# Patient Record
Sex: MALE | Race: ASIAN | NOT HISPANIC OR LATINO | URBAN - METROPOLITAN AREA
[De-identification: names, ages, dates, MRNs, and addresses within clinical notes are randomized per-mention and may not be internally consistent; named-entity substitution may affect disease eponyms.]

---

## 2022-06-16 ENCOUNTER — INPATIENT (INPATIENT)
Facility: HOSPITAL | Age: 67
LOS: 3 days | Discharge: HOME HEALTH | End: 2022-06-20
Attending: INTERNAL MEDICINE | Admitting: INTERNAL MEDICINE
Payer: MEDICARE

## 2022-06-16 VITALS
DIASTOLIC BLOOD PRESSURE: 62 MMHG | SYSTOLIC BLOOD PRESSURE: 227 MMHG | TEMPERATURE: 103 F | HEART RATE: 76 BPM | OXYGEN SATURATION: 96 % | RESPIRATION RATE: 19 BRPM

## 2022-06-16 DIAGNOSIS — R63.4 ABNORMAL WEIGHT LOSS: ICD-10-CM

## 2022-06-16 DIAGNOSIS — R65.10 SYSTEMIC INFLAMMATORY RESPONSE SYNDROME (SIRS) OF NON-INFECTIOUS ORIGIN WITHOUT ACUTE ORGAN DYSFUNCTION: ICD-10-CM

## 2022-06-16 DIAGNOSIS — Z29.9 ENCOUNTER FOR PROPHYLACTIC MEASURES, UNSPECIFIED: ICD-10-CM

## 2022-06-16 DIAGNOSIS — E78.5 HYPERLIPIDEMIA, UNSPECIFIED: ICD-10-CM

## 2022-06-16 DIAGNOSIS — E11.9 TYPE 2 DIABETES MELLITUS WITHOUT COMPLICATIONS: ICD-10-CM

## 2022-06-16 DIAGNOSIS — I10 ESSENTIAL (PRIMARY) HYPERTENSION: ICD-10-CM

## 2022-06-16 DIAGNOSIS — N18.6 END STAGE RENAL DISEASE: ICD-10-CM

## 2022-06-16 DIAGNOSIS — R47.81 SLURRED SPEECH: ICD-10-CM

## 2022-06-16 DIAGNOSIS — B34.8 OTHER VIRAL INFECTIONS OF UNSPECIFIED SITE: ICD-10-CM

## 2022-06-16 DIAGNOSIS — E04.2 NONTOXIC MULTINODULAR GOITER: ICD-10-CM

## 2022-06-16 DIAGNOSIS — I16.0 HYPERTENSIVE URGENCY: ICD-10-CM

## 2022-06-16 DIAGNOSIS — Z98.890 OTHER SPECIFIED POSTPROCEDURAL STATES: Chronic | ICD-10-CM

## 2022-06-16 DIAGNOSIS — A41.9 SEPSIS, UNSPECIFIED ORGANISM: ICD-10-CM

## 2022-06-16 DIAGNOSIS — R50.9 FEVER, UNSPECIFIED: ICD-10-CM

## 2022-06-16 LAB
ALBUMIN SERPL ELPH-MCNC: 3.1 G/DL — LOW (ref 3.3–5)
ALP SERPL-CCNC: 49 U/L — SIGNIFICANT CHANGE UP (ref 40–120)
ALT FLD-CCNC: 14 U/L — SIGNIFICANT CHANGE UP (ref 4–41)
ANION GAP SERPL CALC-SCNC: 13 MMOL/L — SIGNIFICANT CHANGE UP (ref 7–14)
ANION GAP SERPL CALC-SCNC: 17 MMOL/L — HIGH (ref 7–14)
APPEARANCE UR: CLEAR — SIGNIFICANT CHANGE UP
APTT BLD: 28.6 SEC — SIGNIFICANT CHANGE UP (ref 27–36.3)
AST SERPL-CCNC: 25 U/L — SIGNIFICANT CHANGE UP (ref 4–40)
B PERT DNA SPEC QL NAA+PROBE: SIGNIFICANT CHANGE UP
B PERT IGG+IGM PNL SER: CLEAR — SIGNIFICANT CHANGE UP
B PERT+PARAPERT DNA PNL SPEC NAA+PROBE: SIGNIFICANT CHANGE UP
BASOPHILS # BLD AUTO: 0.02 K/UL — SIGNIFICANT CHANGE UP (ref 0–0.2)
BASOPHILS NFR BLD AUTO: 0.2 % — SIGNIFICANT CHANGE UP (ref 0–2)
BILIRUB SERPL-MCNC: 0.3 MG/DL — SIGNIFICANT CHANGE UP (ref 0.2–1.2)
BILIRUB UR-MCNC: NEGATIVE — SIGNIFICANT CHANGE UP
BLOOD GAS VENOUS COMPREHENSIVE RESULT: SIGNIFICANT CHANGE UP
BORDETELLA PARAPERTUSSIS (RAPRVP): SIGNIFICANT CHANGE UP
BUN SERPL-MCNC: 53 MG/DL — HIGH (ref 7–23)
BUN SERPL-MCNC: 56 MG/DL — HIGH (ref 7–23)
C PNEUM DNA SPEC QL NAA+PROBE: SIGNIFICANT CHANGE UP
CALCIUM SERPL-MCNC: 8 MG/DL — LOW (ref 8.4–10.5)
CALCIUM SERPL-MCNC: 8.6 MG/DL — SIGNIFICANT CHANGE UP (ref 8.4–10.5)
CHLORIDE SERPL-SCNC: 93 MMOL/L — LOW (ref 98–107)
CHLORIDE SERPL-SCNC: 98 MMOL/L — SIGNIFICANT CHANGE UP (ref 98–107)
CO2 SERPL-SCNC: 23 MMOL/L — SIGNIFICANT CHANGE UP (ref 22–31)
CO2 SERPL-SCNC: 23 MMOL/L — SIGNIFICANT CHANGE UP (ref 22–31)
COLOR FLD: SIGNIFICANT CHANGE UP
COLOR SPEC: YELLOW — SIGNIFICANT CHANGE UP
CREAT SERPL-MCNC: 8.31 MG/DL — HIGH (ref 0.5–1.3)
CREAT SERPL-MCNC: 8.63 MG/DL — HIGH (ref 0.5–1.3)
DIFF PNL FLD: ABNORMAL
EGFR: 6 ML/MIN/1.73M2 — LOW
EGFR: 6 ML/MIN/1.73M2 — LOW
EOSINOPHIL # BLD AUTO: 0.07 K/UL — SIGNIFICANT CHANGE UP (ref 0–0.5)
EOSINOPHIL # FLD: 0 % — SIGNIFICANT CHANGE UP
EOSINOPHIL NFR BLD AUTO: 0.9 % — SIGNIFICANT CHANGE UP (ref 0–6)
FLUAV SUBTYP SPEC NAA+PROBE: SIGNIFICANT CHANGE UP
FLUBV RNA SPEC QL NAA+PROBE: SIGNIFICANT CHANGE UP
FLUID INTAKE SUBSTANCE CLASS: SIGNIFICANT CHANGE UP
FOLATE+VIT B12 SERBLD-IMP: 0 % — SIGNIFICANT CHANGE UP
GLUCOSE SERPL-MCNC: 125 MG/DL — HIGH (ref 70–99)
GLUCOSE SERPL-MCNC: 128 MG/DL — HIGH (ref 70–99)
GLUCOSE UR QL: ABNORMAL
HADV DNA SPEC QL NAA+PROBE: SIGNIFICANT CHANGE UP
HCOV 229E RNA SPEC QL NAA+PROBE: SIGNIFICANT CHANGE UP
HCOV HKU1 RNA SPEC QL NAA+PROBE: SIGNIFICANT CHANGE UP
HCOV NL63 RNA SPEC QL NAA+PROBE: SIGNIFICANT CHANGE UP
HCOV OC43 RNA SPEC QL NAA+PROBE: SIGNIFICANT CHANGE UP
HCT VFR BLD CALC: 30.5 % — LOW (ref 39–50)
HGB BLD-MCNC: 9.6 G/DL — LOW (ref 13–17)
HMPV RNA SPEC QL NAA+PROBE: SIGNIFICANT CHANGE UP
HPIV1 RNA SPEC QL NAA+PROBE: SIGNIFICANT CHANGE UP
HPIV2 RNA SPEC QL NAA+PROBE: SIGNIFICANT CHANGE UP
HPIV3 RNA SPEC QL NAA+PROBE: SIGNIFICANT CHANGE UP
HPIV4 RNA SPEC QL NAA+PROBE: DETECTED
IANC: 6.19 K/UL — SIGNIFICANT CHANGE UP (ref 1.8–7.4)
IMM GRANULOCYTES NFR BLD AUTO: 0.5 % — SIGNIFICANT CHANGE UP (ref 0–1.5)
INR BLD: 1 RATIO — SIGNIFICANT CHANGE UP (ref 0.88–1.16)
KETONES UR-MCNC: ABNORMAL
LEUKOCYTE ESTERASE UR-ACNC: NEGATIVE — SIGNIFICANT CHANGE UP
LYMPHOCYTES # BLD AUTO: 0.92 K/UL — LOW (ref 1–3.3)
LYMPHOCYTES # BLD AUTO: 11.4 % — LOW (ref 13–44)
LYMPHOCYTES # FLD: 34 % — SIGNIFICANT CHANGE UP
M PNEUMO DNA SPEC QL NAA+PROBE: SIGNIFICANT CHANGE UP
MAGNESIUM SERPL-MCNC: 1.9 MG/DL — SIGNIFICANT CHANGE UP (ref 1.6–2.6)
MCHC RBC-ENTMCNC: 26.7 PG — LOW (ref 27–34)
MCHC RBC-ENTMCNC: 31.5 GM/DL — LOW (ref 32–36)
MCV RBC AUTO: 84.7 FL — SIGNIFICANT CHANGE UP (ref 80–100)
MESOTHL CELL # FLD: 0 % — SIGNIFICANT CHANGE UP
MONOCYTES # BLD AUTO: 0.82 K/UL — SIGNIFICANT CHANGE UP (ref 0–0.9)
MONOCYTES NFR BLD AUTO: 10.2 % — SIGNIFICANT CHANGE UP (ref 2–14)
MONOS+MACROS # FLD: 65 % — SIGNIFICANT CHANGE UP
NEUTROPHILS # BLD AUTO: 6.19 K/UL — SIGNIFICANT CHANGE UP (ref 1.8–7.4)
NEUTROPHILS NFR BLD AUTO: 76.8 % — SIGNIFICANT CHANGE UP (ref 43–77)
NEUTROPHILS-BODY FLUID: 1 % — SIGNIFICANT CHANGE UP
NITRITE UR-MCNC: NEGATIVE — SIGNIFICANT CHANGE UP
NRBC # BLD: 0 /100 WBCS — SIGNIFICANT CHANGE UP
NRBC # FLD: 0 K/UL — SIGNIFICANT CHANGE UP
OTHER CELLS FLD MANUAL: 0 % — SIGNIFICANT CHANGE UP
PH UR: 6.5 — SIGNIFICANT CHANGE UP (ref 5–8)
PHOSPHATE SERPL-MCNC: 5.5 MG/DL — HIGH (ref 2.5–4.5)
PLATELET # BLD AUTO: 212 K/UL — SIGNIFICANT CHANGE UP (ref 150–400)
POTASSIUM SERPL-MCNC: 3.2 MMOL/L — LOW (ref 3.5–5.3)
POTASSIUM SERPL-MCNC: 3.4 MMOL/L — LOW (ref 3.5–5.3)
POTASSIUM SERPL-SCNC: 3.2 MMOL/L — LOW (ref 3.5–5.3)
POTASSIUM SERPL-SCNC: 3.4 MMOL/L — LOW (ref 3.5–5.3)
PROT SERPL-MCNC: 5.8 G/DL — LOW (ref 6–8.3)
PROT UR-MCNC: ABNORMAL
PROTHROM AB SERPL-ACNC: 11.6 SEC — SIGNIFICANT CHANGE UP (ref 10.5–13.4)
RAPID RVP RESULT: DETECTED
RBC # BLD: 3.6 M/UL — LOW (ref 4.2–5.8)
RBC # FLD: 14.9 % — HIGH (ref 10.3–14.5)
RCV VOL RI: <2000 CELLS/UL — HIGH (ref 0–5)
RSV RNA SPEC QL NAA+PROBE: SIGNIFICANT CHANGE UP
RV+EV RNA SPEC QL NAA+PROBE: SIGNIFICANT CHANGE UP
SARS-COV-2 RNA SPEC QL NAA+PROBE: SIGNIFICANT CHANGE UP
SODIUM SERPL-SCNC: 133 MMOL/L — LOW (ref 135–145)
SODIUM SERPL-SCNC: 134 MMOL/L — LOW (ref 135–145)
SP GR SPEC: 1.03 — SIGNIFICANT CHANGE UP (ref 1–1.05)
SPECIMEN SOURCE FLD: SIGNIFICANT CHANGE UP
TOTAL CELLS COUNTED, BODY FLUID: 100 CELLS — SIGNIFICANT CHANGE UP
TOTAL NUCLEATED CELL COUNT, BODY FLUID: 20 CELLS/UL — HIGH (ref 0–5)
TUBE TYPE: SIGNIFICANT CHANGE UP
UROBILINOGEN FLD QL: SIGNIFICANT CHANGE UP
WBC # BLD: 8.06 K/UL — SIGNIFICANT CHANGE UP (ref 3.8–10.5)
WBC # FLD AUTO: 8.06 K/UL — SIGNIFICANT CHANGE UP (ref 3.8–10.5)

## 2022-06-16 PROCEDURE — 99285 EMERGENCY DEPT VISIT HI MDM: CPT

## 2022-06-16 PROCEDURE — 99223 1ST HOSP IP/OBS HIGH 75: CPT | Mod: GC

## 2022-06-16 PROCEDURE — 71045 X-RAY EXAM CHEST 1 VIEW: CPT | Mod: 26

## 2022-06-16 RX ORDER — SEVELAMER CARBONATE 2400 MG/1
800 POWDER, FOR SUSPENSION ORAL
Refills: 0 | Status: DISCONTINUED | OUTPATIENT
Start: 2022-06-16 | End: 2022-06-20

## 2022-06-16 RX ORDER — PIPERACILLIN AND TAZOBACTAM 4; .5 G/20ML; G/20ML
3.38 INJECTION, POWDER, LYOPHILIZED, FOR SOLUTION INTRAVENOUS ONCE
Refills: 0 | Status: COMPLETED | OUTPATIENT
Start: 2022-06-16 | End: 2022-06-16

## 2022-06-16 RX ORDER — LABETALOL HCL 100 MG
20 TABLET ORAL ONCE
Refills: 0 | Status: DISCONTINUED | OUTPATIENT
Start: 2022-06-16 | End: 2022-06-16

## 2022-06-16 RX ORDER — SODIUM CHLORIDE 9 MG/ML
1000 INJECTION, SOLUTION INTRAVENOUS
Refills: 0 | Status: DISCONTINUED | OUTPATIENT
Start: 2022-06-16 | End: 2022-06-20

## 2022-06-16 RX ORDER — ATORVASTATIN CALCIUM 80 MG/1
80 TABLET, FILM COATED ORAL AT BEDTIME
Refills: 0 | Status: DISCONTINUED | OUTPATIENT
Start: 2022-06-16 | End: 2022-06-20

## 2022-06-16 RX ORDER — ATORVASTATIN CALCIUM 80 MG/1
40 TABLET, FILM COATED ORAL AT BEDTIME
Refills: 0 | Status: DISCONTINUED | OUTPATIENT
Start: 2022-06-16 | End: 2022-06-16

## 2022-06-16 RX ORDER — ERYTHROPOIETIN 10000 [IU]/ML
4000 INJECTION, SOLUTION INTRAVENOUS; SUBCUTANEOUS
Refills: 0 | Status: DISCONTINUED | OUTPATIENT
Start: 2022-06-16 | End: 2022-06-20

## 2022-06-16 RX ORDER — DEXTROSE 50 % IN WATER 50 %
15 SYRINGE (ML) INTRAVENOUS ONCE
Refills: 0 | Status: DISCONTINUED | OUTPATIENT
Start: 2022-06-16 | End: 2022-06-20

## 2022-06-16 RX ORDER — DEXTROSE 50 % IN WATER 50 %
25 SYRINGE (ML) INTRAVENOUS ONCE
Refills: 0 | Status: DISCONTINUED | OUTPATIENT
Start: 2022-06-16 | End: 2022-06-20

## 2022-06-16 RX ORDER — HYDRALAZINE HCL 50 MG
2.5 TABLET ORAL ONCE
Refills: 0 | Status: COMPLETED | OUTPATIENT
Start: 2022-06-16 | End: 2022-06-16

## 2022-06-16 RX ORDER — ACETAMINOPHEN 500 MG
975 TABLET ORAL ONCE
Refills: 0 | Status: COMPLETED | OUTPATIENT
Start: 2022-06-16 | End: 2022-06-16

## 2022-06-16 RX ORDER — ACETAMINOPHEN 500 MG
650 TABLET ORAL EVERY 6 HOURS
Refills: 0 | Status: DISCONTINUED | OUTPATIENT
Start: 2022-06-16 | End: 2022-06-18

## 2022-06-16 RX ORDER — INSULIN LISPRO 100/ML
VIAL (ML) SUBCUTANEOUS AT BEDTIME
Refills: 0 | Status: DISCONTINUED | OUTPATIENT
Start: 2022-06-16 | End: 2022-06-20

## 2022-06-16 RX ORDER — DOXAZOSIN MESYLATE 4 MG
4 TABLET ORAL AT BEDTIME
Refills: 0 | Status: DISCONTINUED | OUTPATIENT
Start: 2022-06-16 | End: 2022-06-16

## 2022-06-16 RX ORDER — DEXTROSE 50 % IN WATER 50 %
12.5 SYRINGE (ML) INTRAVENOUS ONCE
Refills: 0 | Status: DISCONTINUED | OUTPATIENT
Start: 2022-06-16 | End: 2022-06-20

## 2022-06-16 RX ORDER — HYDRALAZINE HCL 50 MG
50 TABLET ORAL EVERY 8 HOURS
Refills: 0 | Status: DISCONTINUED | OUTPATIENT
Start: 2022-06-16 | End: 2022-06-16

## 2022-06-16 RX ORDER — HEPARIN SODIUM 5000 [USP'U]/ML
5000 INJECTION INTRAVENOUS; SUBCUTANEOUS EVERY 8 HOURS
Refills: 0 | Status: DISCONTINUED | OUTPATIENT
Start: 2022-06-16 | End: 2022-06-20

## 2022-06-16 RX ORDER — INSULIN LISPRO 100/ML
VIAL (ML) SUBCUTANEOUS
Refills: 0 | Status: DISCONTINUED | OUTPATIENT
Start: 2022-06-16 | End: 2022-06-20

## 2022-06-16 RX ORDER — GENTAMICIN SULFATE 0.1 %
1 OINTMENT (GRAM) TOPICAL
Refills: 0 | Status: DISCONTINUED | OUTPATIENT
Start: 2022-06-16 | End: 2022-06-20

## 2022-06-16 RX ORDER — AMLODIPINE BESYLATE 2.5 MG/1
10 TABLET ORAL DAILY
Refills: 0 | Status: DISCONTINUED | OUTPATIENT
Start: 2022-06-16 | End: 2022-06-20

## 2022-06-16 RX ORDER — GLUCAGON INJECTION, SOLUTION 0.5 MG/.1ML
1 INJECTION, SOLUTION SUBCUTANEOUS ONCE
Refills: 0 | Status: DISCONTINUED | OUTPATIENT
Start: 2022-06-16 | End: 2022-06-20

## 2022-06-16 RX ADMIN — HEPARIN SODIUM 5000 UNIT(S): 5000 INJECTION INTRAVENOUS; SUBCUTANEOUS at 06:43

## 2022-06-16 RX ADMIN — PIPERACILLIN AND TAZOBACTAM 200 GRAM(S): 4; .5 INJECTION, POWDER, LYOPHILIZED, FOR SOLUTION INTRAVENOUS at 03:19

## 2022-06-16 RX ADMIN — HEPARIN SODIUM 5000 UNIT(S): 5000 INJECTION INTRAVENOUS; SUBCUTANEOUS at 22:12

## 2022-06-16 RX ADMIN — ATORVASTATIN CALCIUM 80 MILLIGRAM(S): 80 TABLET, FILM COATED ORAL at 22:12

## 2022-06-16 RX ADMIN — Medication 2.5 MILLIGRAM(S): at 22:12

## 2022-06-16 RX ADMIN — Medication 975 MILLIGRAM(S): at 03:19

## 2022-06-16 RX ADMIN — SEVELAMER CARBONATE 800 MILLIGRAM(S): 2400 POWDER, FOR SUSPENSION ORAL at 18:22

## 2022-06-16 RX ADMIN — AMLODIPINE BESYLATE 10 MILLIGRAM(S): 2.5 TABLET ORAL at 05:39

## 2022-06-16 RX ADMIN — Medication 50 MILLIGRAM(S): at 05:39

## 2022-06-16 NOTE — ED PROVIDER NOTE - OBJECTIVE STATEMENT
67 y/o M w/ hx DM, HTN, HLD, CVA in past ESRD on daily peritoneal dialysis presents with fever, cough, transfer from [facility] for peritoneal dialysis. Patient states for the past 2 days he has had cough, fever, weakness. At [facility] he was "confused, slurred speech" so code stroke was activated, were negative and he was transferred here for peritoneal dialysis after contrast load was given. Patient denies cp pressure palpitations sob. 67 y/o M w/ hx DM, HTN, HLD, CVA in past ESRD on daily peritoneal dialysis presents with fever, cough, transfer from [facility] for peritoneal dialysis. Patient states for the past 2 days he has had cough, fever, weakness. At [facility] he was "confused, slurred speech" so code stroke was activated, were negative and he was transferred here for peritoneal dialysis after contrast load was given. Patient denies cp pressure palpitations sob.    Attendinyo male presents with fever, cough and URI symptoms.  went to [place] for weakness.  transferred here for admission because pt is on peritoneal dialysis.

## 2022-06-16 NOTE — OCCUPATIONAL THERAPY INITIAL EVALUATION ADULT - PERTINENT HX OF CURRENT PROBLEM, REHAB EVAL
Pt is a 66 year old male who presented to [facility] as a transfer from [facility] for peritoneal dialysis. Found to have hypertensive urgency and SIRS+.

## 2022-06-16 NOTE — SWALLOW BEDSIDE ASSESSMENT ADULT - ASR SWALLOW RECOMMEND DIAG
objective testing is NOT warranted given no overt s/sx of impaired airway protection, clear chest imaging and normal WBC.

## 2022-06-16 NOTE — PROGRESS NOTE ADULT - ASSESSMENT
67 y/o M w/ hx DM, HTN, HLD, CVA in past ESRD on daily peritoneal dialysis presents with fever, cough, transfer from [facility] for peritoneal dialysis. Found to have hypertensive urgency and septic secondary to parainfluenza

## 2022-06-16 NOTE — ED PROVIDER NOTE - PROGRESS NOTE DETAILS
Mateo, PGY-3: admitted to medicine--hospitalist requests zosyn to cover empirically (febrile 103), nephro paged. Mateo, PGY-3: nephrology aware of patient, will evaluate him.

## 2022-06-16 NOTE — PROGRESS NOTE ADULT - PROBLEM SELECTOR PLAN 4
- On nightly PD at home  -patient also on Bumex 1mg per son, on hold in setting of goal for gradual normotension.  - Follows with Dr. Gillian Snyder  - Nephrology consulted     #normocytic anemia  -unclear baseline, like AOCKD  -confirm outpt w/u with PMD, given report of weight loss would have patient f/u with PMD for age appropriate cancer screening (reports has never had a colonoscopy)  -trend H/H

## 2022-06-16 NOTE — CONSULT NOTE ADULT - ASSESSMENT
65 y/o M w/ hx DM2, HTN, HLD, CVA in past ESRD on daily home peritoneal dialysis presents with fever, cough, transfer from [facility] for peritoneal dialysis.    ESRD on PD  Consent in chart  placed on CAPD, 4 exchanges, 2L each, 1.5% over 3-4hrs each exchange  send cell count, gram stain and culture with 1st exchange  Renal diet  daily BMP    Sepsis  Viral PNA?  Appreciate Infectious disease specialist recs    Anemia of CKD  Epo 4k TIW Sq    BMD of ESRD  Sevelamer 800 TID QAC      Thank you for allowing me to participate in the care of your patient    For any question, call:  Cell # [phone]  Pager # [phone]  Callback # [phone]

## 2022-06-16 NOTE — ED ADULT TRIAGE NOTE - CHIEF COMPLAINT QUOTE
alert lethargic transfer from [place] was a code stroke there  CT neg  c/o fever chills productive cough  temp 103.1  Blood Pressure 227/62   PMHx ESRD  due for  peritoneal dialysis  DM2

## 2022-06-16 NOTE — PHYSICAL THERAPY INITIAL EVALUATION ADULT - MANUAL MUSCLE TESTING RESULTS, REHAB EVAL
bilateral UE: grossly at least 3/5, right LE: 3-/5 (limited due to pain), left LE: at least 3/5/grossly assessed due to

## 2022-06-16 NOTE — H&P ADULT - PROBLEM SELECTOR PLAN 3
- On nightly PD at home  - Follows with Dr. Gillian Snyder  - Consult nephrology in AM, attempt made to notify office without response overnight. - On nightly PD at home  -patient also on Bumex 1mg per son.   - Follows with Dr. Gillian Snyder  - Consult nephrology in AM, attempt made to notify office without response overnight. - On nightly PD at home  -patient also on Bumex 1mg per son. Add on based on volume status.    - Follows with Dr. Gillian Snyder  - Consult nephrology in AM, attempt made to notify office without response overnight. - Hypertensive to 226/62 in ED  - after speaking with son, patient reportedly not taking BP meds past 3-4 days, aim for gradual BP control, hydralazine and doxazosin discontinued for now.

## 2022-06-16 NOTE — PHYSICAL THERAPY INITIAL EVALUATION ADULT - ADDITIONAL COMMENTS
Pt lives with his wife and son in an apartment within a house with 4 stairs to enter. prior to admission pt was independent with all mobility and ambulated with a straight cane. Pt endorsed 3 falls within the last year. Pt owns a straight cane.     Pt. left comfortable on stretcher with all tubes/lines intact, +bed alarm, call bell in reach and in NAD.

## 2022-06-16 NOTE — H&P ADULT - NSHPPHYSICALEXAM_GEN_ALL_CORE
Vital Signs Last 24 Hrs  T(C): 39.8 (16 Jun 2022 02:34), Max: 39.8 (16 Jun 2022 02:34)  T(F): 103.7 (16 Jun 2022 02:34), Max: 103.7 (16 Jun 2022 02:34)  HR: 91 (16 Jun 2022 02:34) (76 - 91)  BP: 195/78 (16 Jun 2022 02:34) (195/78 - 227/62)  BP(mean): --  RR: 22 (16 Jun 2022 02:34) (19 - 22)  SpO2: 100% (16 Jun 2022 02:34) (96% - 100%)    PHYSICAL EXAM:  GENERAL: NAD, Resting in bed, diaphoretic  HEENT:  Head atraumatic, EOMI, PERRLA, conjunctiva and sclera clear; Moist mucous membranes, normal oropharynx  NECK: Supple, No JVD, no lymphadenopathy, no thyroid nodules or enlargement  CHEST/LUNG: Clear to auscultation bilaterally; No rales, rhonchi, wheezing, or rubs. Unlabored respirations on room air  HEART: Regular rate and rhythm; No murmurs, rubs, or gallops  ABDOMEN: Bowel sounds present; Soft, Nontender, PD catheter in place, erythema or induration  EXTREMITIES:  2+ Peripheral Pulses, brisk capillary refill. No clubbing, cyanosis, or edema  NERVOUS SYSTEM:  Alert & Oriented X3, non-focal and spontaneous movements of all extremities  SKIN: No rashes or lesions Vital Signs Last 24 Hrs  T(C): 39.8 (16 Jun 2022 02:34), Max: 39.8 (16 Jun 2022 02:34)  T(F): 103.7 (16 Jun 2022 02:34), Max: 103.7 (16 Jun 2022 02:34)  HR: 91 (16 Jun 2022 02:34) (76 - 91)  BP: 195/78 (16 Jun 2022 02:34) (195/78 - 227/62)  RR: 22 (16 Jun 2022 02:34) (19 - 22)  SpO2: 100% (16 Jun 2022 02:34) (96% - 100%)    PHYSICAL EXAM:  GENERAL: NAD, Resting in bed, diaphoretic  HEENT:  Head atraumatic, EOMI, PERRLA, conjunctiva and sclera clear; Moist mucous membranes, normal oropharynx  NECK: Supple, No JVD, no lymphadenopathy, no thyroid nodules or enlargement  CHEST/LUNG: Clear to auscultation bilaterally; No rales, rhonchi, wheezing, or rubs. Unlabored respirations on room air  HEART: Regular rate and rhythm; No murmurs, rubs, or gallops  ABDOMEN: Bowel sounds present; Soft, Nontender, PD catheter in place, erythema or induration  EXTREMITIES:  2+ Peripheral Pulses, brisk capillary refill. No clubbing, cyanosis, or edema  NERVOUS SYSTEM:  Alert & Oriented X3, non-focal and spontaneous movements of all extremities, 3/5 strength b/l LE (x1mo per pt)  SKIN: No rashes or lesions Vital Signs Last 24 Hrs  T(C): 39.8 (16 Jun 2022 02:34), Max: 39.8 (16 Jun 2022 02:34)  T(F): 103.7 (16 Jun 2022 02:34), Max: 103.7 (16 Jun 2022 02:34)  HR: 91 (16 Jun 2022 02:34) (76 - 91)  BP: 195/78 (16 Jun 2022 02:34) (195/78 - 227/62)  RR: 22 (16 Jun 2022 02:34) (19 - 22)  SpO2: 100% (16 Jun 2022 02:34) (96% - 100%)    PHYSICAL EXAM:  GENERAL: NAD, Resting in bed, diaphoretic  HEENT:  Head atraumatic, EOMI, PERRLA, conjunctiva and sclera clear; Moist mucous membranes, normal oropharynx  NECK: Supple, No JVD, no lymphadenopathy, no thyroid nodules or enlargement  CHEST/LUNG: Clear to auscultation bilaterally; No rales, rhonchi, wheezing, or rubs. Unlabored respirations on room air  HEART: Regular rate and rhythm; S1S2; No murmurs, rubs, or gallops  ABDOMEN: Bowel sounds present; Soft, Nontender, PD catheter in place without erythema or induration  EXTREMITIES:  2+ Peripheral Pulses, brisk capillary refill. No clubbing, cyanosis, or edema  NERVOUS SYSTEM:  Alert & Oriented X3, non-focal and spontaneous movements of all extremities, 3/5 strength b/l LE (x1mo per pt), 4+/5 strength b/l UE; sensation grossly intact; CN II-XII intact, FTN intact b/l  SKIN: No rashes or lesions

## 2022-06-16 NOTE — H&P ADULT - NSHPLABSRESULTS_GEN_ALL_CORE
9.6    8.06  )-----------( 212      ( 16 Jun 2022 01:45 )             30.5       06-16    133<L>  |  93<L>  |  53<H>  ----------------------------<  128<H>  3.4<L>   |  23  |  8.31<H>    Ca    8.6      16 Jun 2022 01:45  Phos  5.5     06-16  Mg     1.90     06-16    TPro  5.8<L>  /  Alb  3.1<L>  /  TBili  0.3  /  DBili  x   /  AST  25  /  ALT  14  /  AlkPhos  49  06-16                  PT/INR - ( 16 Jun 2022 01:45 )   PT: 11.6 sec;   INR: 1.00 ratio         PTT - ( 16 Jun 2022 01:45 )  PTT:28.6 sec    Lactate Trend    01:45 - VBG - pH: 7.34  | pCO2: 47    | pO2: 33    | Lactate: 1.6            CAPILLARY BLOOD GLUCOSE      POCT Blood Glucose.: 136 mg/dL (16 Jun 2022 00:40)      < from: Xray Chest 1 View- PORTABLE-Urgent (06.16.22 @ 01:46) >      FINDINGS:    The lungs are clear. No pleural effusion or pneumothorax.  Cardiomediastinal silhouette is poorly evaluated on this projection.  Osseous degenerative changes.    IMPRESSION:    Clear lungs.    < end of copied text > 9.6    8.06  )-----------( 212      ( 2022 01:45 )             30.5     06-16    133<L>  |  93<L>  |  53<H>  ----------------------------<  128<H>  3.4<L>   |  23  |  8.31<H>    Ca    8.6      2022 01:45  Phos  5.5     06-16  Mg     1.90     06-16    TPro  5.8<L>  /  Alb  3.1<L>  /  TBili  0.3  /  DBili  x   /  AST  25  /  ALT  14  /  AlkPhos  49  06-16        PT/INR - ( 2022 01:45 )   PT: 11.6 sec;   INR: 1.00 ratio    PTT - ( 2022 01:45 )  PTT:28.6 sec    Lactate Trend  01:45 - VBG - pH: 7.34  | pCO2: 47    | pO2: 33    | Lactate: 1.6      Urinalysis Basic - ( 2022 04:35 )  Color: Yellow / Appearance: Clear / S.028 / pH: x  Gluc: x / Ketone: Trace  / Bili: Negative / Urobili: <2 mg/dL   Blood: x / Protein: >600 mg/dL / Nitrite: Negative   Leuk Esterase: Negative / RBC: 2 /HPF / WBC 2 /HPF   Sq Epi: x / Non Sq Epi: 1 /HPF / Bacteria: Negative    Parainfluenza 4 (RapRVP): Detected (22 @ 01:21)    CAPILLARY BLOOD GLUCOSE  POCT Blood Glucose.: 136 mg/dL (2022 00:40) 9.6    8.06  )-----------( 212      ( 2022 01:45 )             30.5     06-16    133<L>  |  93<L>  |  53<H>  ----------------------------<  128<H>  3.4<L>   |  23  |  8.31<H>    Ca    8.6      2022 01:45  Phos  5.5     06-16  Mg     1.90     06-16    TPro  5.8<L>  /  Alb  3.1<L>  /  TBili  0.3  /  DBili  x   /  AST  25  /  ALT  14  /  AlkPhos  49  06-16        PT/INR - ( 2022 01:45 )   PT: 11.6 sec;   INR: 1.00 ratio    PTT - ( 2022 01:45 )  PTT:28.6 sec    Lactate Trend  01:45 - VBG - pH: 7.34  | pCO2: 47    | pO2: 33    | Lactate: 1.6      Urinalysis Basic - ( 2022 04:35 )  Color: Yellow / Appearance: Clear / S.028 / pH: x  Gluc: x / Ketone: Trace  / Bili: Negative / Urobili: <2 mg/dL   Blood: x / Protein: >600 mg/dL / Nitrite: Negative   Leuk Esterase: Negative / RBC: 2 /HPF / WBC 2 /HPF   Sq Epi: x / Non Sq Epi: 1 /HPF / Bacteria: Negative    Parainfluenza 4 (RapRVP): Detected (22 @ 01:21)    CAPILLARY BLOOD GLUCOSE  POCT Blood Glucose.: 136 mg/dL (2022 00:40)    6/15 Imaging from OSH:  CT head wo con: No evidence of intracranial hemorrhage, mass effect or midline shift. Chronic appearing cerebellar infarct. Chronic ischemic changes in the hemispheric white matter and left basal ganglia.  CTA neck w/wo con: No evidence of significant vascular stenosis involving the extracranial carotid or vertebral arteries. Enlarged heterogenous thyroid gland with differential enlargement of the right thyroid lobe. Multiple mediastinal and hilar lymph nodes partially imaged which are nonspecific in etiology.   CTA head: No evidence of large vessel occlusion. No evidence of intracranial aneurysm, vascular malformation or significant vascular stenosis.   CT perfusion scan: normal CT perfusion scan.  CXR personally reviewed - No focal consolidations     EKG personally reviewed - NSR 60bpm, QTc 486ms (poor quality EKG)

## 2022-06-16 NOTE — SWALLOW BEDSIDE ASSESSMENT ADULT - SWALLOW EVAL: DIAGNOSIS
Functional oral and pharyngeal stage of swallow given thin liquids, puree and regular solids marked by adequate bolus containment, adequate bolus manipulation, timely mastication with adequate ability to break down solids and adequate anterior-posterior transport. adequate oral clearance noted post primary swallow. Pharyngeal stage marked by observed initiation of the pharyngeal swallow trigger judged via laryngeal palpation. No clinically overt s/sx of impaired airway protection observed for all trials.

## 2022-06-16 NOTE — H&P ADULT - PROBLEM SELECTOR PLAN 2
- - Hypertensive to 226/62 in ED, s/p labetalol 20 IV  - c/w  home BP meds here: hydralazine 50 q8h and amlodipine 10 QD  - PRN labetalol as needed. parainfluenza positive. Likely source of cough  - c/w supportive care, isolation precautions

## 2022-06-16 NOTE — H&P ADULT - PROBLEM SELECTOR PLAN 6
- continue with home lipito 80 QHS - continue with home lipitor 80 QHS No slurred speech currently. Presented to [facility] due to concern fo slurred speech and lethargy  - CT imagingin chart  negative for large vessel occlusion or flow limitations  - Possible that elevated fever 2/2 parainfluenza caused transient delirium. No slurred speech currently. Presented to [facility] due to concern fo slurred speech and lethargy  - CT imaging in chart  negative for large vessel occlusion or flow limitations  - Possible that elevated fever 2/2 parainfluenza caused transient delirium vs hypertensive emergency vs TIA  - dysphagia screen, S&S eval  - fall, aspiration precautions  - PT/OT  - neuro checks Q4  - neuro exam significant only for LE edema (reportedly ongoing x weeks w/negative outpt w/u, no recent MR brain imaging however)  - MR brain for further eval  - monitor on tele  - check TSH, FLP, A1c  - further w/u pending MR findings

## 2022-06-16 NOTE — CONSULT NOTE ADULT - SUBJECTIVE AND OBJECTIVE BOX
[facility] - SOLOMON Hurtado / SOLOMON Foley / KANDICE Shanks/ SOLOMON Broderick/ SOLOMON Allan/ NOMI Mao / VIRGILIO Marks / BENJY Woo  -------------------------------------------------------------------------------------------------------  The patient seen and examined today.  HPI:   65 y/o M w/ hx DM2, HTN, HLD, CVA in past ESRD on daily home peritoneal dialysis presents with fever, cough, transfer from [facility] for peritoneal dialysis. According to sonSyed on the phone [phone], patient became lethargic, with mild speech difficulties at home and he called EMS. Patient taken to [place] where code stroke activated.  CT imaging there negative for ischemic infarct. Patient then transferred to [facility] for PD following contrast load.     At bedside, patient endorses cough for past one day. Also endroses subjective fevers during this time. He states that he does not remember the events that led to him going to the hospital. No sick contacts, recent travel. He also denies CP, SOB, n/v, diarrhea, constipation. States that he does PD every night with help of wife. Ambulates with cane/walker.       In ED, given tylenolx1, zosynx1. Tmax 103.7, Bp elevated to 227/62. Found to be parainfluenza positive (2022 04:10)      PAST MEDICAL & SURGICAL HISTORY:  Hypertension      ESRD on peritoneal dialysis      CVA (cerebrovascular accident)   without residual effects      Hyperlipidemia      Type 2 diabetes mellitus, with long-term current use of insulin      History of peritoneal dialysis  s/p PD catheter placement        Allergies :- No Known Allergies    Home Medications Reviewed  Hospital Medications:   MEDICATIONS  (STANDING):  amLODIPine   Tablet 10 milliGRAM(s) Oral daily  atorvastatin 80 milliGRAM(s) Oral at bedtime  dextrose 5%. 1000 milliLiter(s) (50 mL/Hr) IV Continuous <Continuous>  dextrose 5%. 1000 milliLiter(s) (100 mL/Hr) IV Continuous <Continuous>  dextrose 50% Injectable 25 Gram(s) IV Push once  dextrose 50% Injectable 12.5 Gram(s) IV Push once  dextrose 50% Injectable 25 Gram(s) IV Push once  glucagon  Injectable 1 milliGRAM(s) IntraMuscular once  heparin   Injectable 5000 Unit(s) SubCutaneous every 8 hours  insulin lispro (ADMELOG) corrective regimen sliding scale   SubCutaneous three times a day before meals  insulin lispro (ADMELOG) corrective regimen sliding scale   SubCutaneous at bedtime    SOCIAL HISTORY:  Denies ETOh,Smoking,   FAMILY HISTORY:  FH: hypertension (Mother)        REVIEW OF SYSTEMS:  CONSTITUTIONAL: No weakness, fevers or chills  EYES/ENT: No visual changes;  No vertigo or throat pain   NECK: No pain or stiffness  RESPIRATORY: No cough, wheezing, hemoptysis; No shortness of breath  CARDIOVASCULAR: No chest pain or palpitations.  GASTROINTESTINAL: No abdominal or epigastric pain. No nausea, vomiting, or hematemesis; No diarrhea or constipation. No melena or hematochezia.  GENITOURINARY: No dysuria, frequency, foamy urine, urinary urgency, incontinence or hematuria  NEUROLOGICAL: No numbness or weakness  SKIN: No itching, burning, rashes, or lesions   VASCULAR: No bilateral lower extremity edema.   All other review of systems is negative unless indicated above.    VITALS:  T(F): 98.4 (22 @ 06:52), Max: 103.7 (22 @ 02:34)  HR: 56 (22 @ 06:52)  BP: 161/65 (22 @ 06:52)  RR: 18 (22 @ 06:52)  SpO2: 100% (22 @ 06:52)  Wt(kg): --        PHYSICAL EXAM:  Constitutional: NAD  HEENT: anicteric sclera, oropharynx clear, MMM  Neck: supple.   Respiratory: Bilateral equal breath sounds , no wheezes, no crackles  Cardiovascular: S1, S2, Regular, Murmur present.  Gastrointestinal: Bowel Sound present, soft, NT/ND  Extremities: No cyanosis or clubbing. No peripheral edema  Neurological: Alert and oriented x 3, no focal deficits  Psychiatric: Normal mood, normal affect  : No CVA tenderness. No brand.   Skin: No rashes    Data:      134<L>  |  98  |  56<H>  ----------------------------<  125<H>  3.2<L>   |  23  |  8.63<H>    Ca    8.0<L>      2022 07:42  Phos  5.5     -  Mg     1.90         TPro  5.8<L>  /  Alb  3.1<L>  /  TBili  0.3  /  DBili      /  AST  25  /  ALT  14  /  AlkPhos  49      Creatinine Trend: 8.63 <--, 8.31 <--                        9.6    8.06  )-----------( 212      ( 2022 01:45 )             30.5     Urine Studies:  Urinalysis Basic - ( 2022 04:35 )    Color: Yellow / Appearance: Clear / S.028 / pH:   Gluc:  / Ketone: Trace  / Bili: Negative / Urobili: <2 mg/dL   Blood:  / Protein: >600 mg/dL / Nitrite: Negative   Leuk Esterase: Negative / RBC: 2 /HPF / WBC 2 /HPF   Sq Epi:  / Non Sq Epi: 1 /HPF / Bacteria: Negative       [facility] - SOLOMON Hurtado / SOLOMON Foley / KANDICE Shanks/ SOLOMON Broderick/ SOLOMON Allan/ NOMI Mao / VIRGILIO Marks / BENJY Woo  -------------------------------------------------------------------------------------------------------  The patient seen and examined today.  HPI:   67 y/o M w/ hx DM2, HTN, HLD, CVA in past ESRD on daily home peritoneal dialysis presents with fever, cough, transfer from [facility] for peritoneal dialysis. According to sonSyed on the phone [phone], patient became lethargic, with mild speech difficulties at home and he called EMS. Patient taken to [place] where code stroke activated.  CT imaging there negative for ischemic infarct. Patient then transferred to [facility] for PD following contrast load.     At bedside, patient endorses cough for past one day. Also endroses subjective fevers during this time. He states that he does not remember the events that led to him going to the hospital. No sick contacts, recent travel. He also denies CP, SOB, n/v, diarrhea, constipation. States that he does PD every night with help of wife. Ambulates with cane/walker.       In ED, given tylenolx1, zosynx1. Tmax 103.7, Bp elevated to 227/62. Found to be parainfluenza positive (2022 04:10)      PAST MEDICAL & SURGICAL HISTORY:  Hypertension      ESRD on peritoneal dialysis      CVA (cerebrovascular accident)   without residual effects      Hyperlipidemia      Type 2 diabetes mellitus, with long-term current use of insulin      History of peritoneal dialysis  s/p PD catheter placement        Allergies :- No Known Allergies    Home Medications Reviewed  Hospital Medications:   MEDICATIONS  (STANDING):  amLODIPine   Tablet 10 milliGRAM(s) Oral daily  atorvastatin 80 milliGRAM(s) Oral at bedtime  dextrose 5%. 1000 milliLiter(s) (50 mL/Hr) IV Continuous <Continuous>  dextrose 5%. 1000 milliLiter(s) (100 mL/Hr) IV Continuous <Continuous>  dextrose 50% Injectable 25 Gram(s) IV Push once  dextrose 50% Injectable 12.5 Gram(s) IV Push once  dextrose 50% Injectable 25 Gram(s) IV Push once  glucagon  Injectable 1 milliGRAM(s) IntraMuscular once  heparin   Injectable 5000 Unit(s) SubCutaneous every 8 hours  insulin lispro (ADMELOG) corrective regimen sliding scale   SubCutaneous three times a day before meals  insulin lispro (ADMELOG) corrective regimen sliding scale   SubCutaneous at bedtime    SOCIAL HISTORY:  Denies ETOh,Smoking,   FAMILY HISTORY:  FH: hypertension (Mother)        REVIEW OF SYSTEMS:  CONSTITUTIONAL: Fever +  EYES/ENT: No visual changes;  No vertigo or throat pain   NECK: No pain or stiffness  RESPIRATORY: No cough, wheezing, hemoptysis; No shortness of breath  CARDIOVASCULAR: No chest pain or palpitations.  GASTROINTESTINAL: No abdominal or epigastric pain. No nausea, vomiting, or hematemesis; No diarrhea or constipation. No melena or hematochezia.  GENITOURINARY: No dysuria, frequency, foamy urine, urinary urgency, incontinence or hematuria  NEUROLOGICAL: No numbness or weakness  SKIN: No itching, burning, rashes, or lesions   VASCULAR: No bilateral lower extremity edema.   All other review of systems is negative unless indicated above.    VITALS:  T(F): 98.4 (22 @ 06:52), Max: 103.7 (22 @ 02:34)  HR: 56 (22 @ 06:52)  BP: 161/65 (22 @ 06:52)  RR: 18 (22 @ 06:52)  SpO2: 100% (22 @ 06:52)  Wt(kg): --        PHYSICAL EXAM:  Constitutional: NAD  HEENT: anicteric sclera, oropharynx clear, MMM  Neck: supple.   Respiratory: Bilateral equal breath sounds , no wheezes, no crackles  Cardiovascular: S1, S2, Regular, Murmur present.  Gastrointestinal: Bowel Sound present, soft, NT/ND  Extremities: No cyanosis or clubbing. No peripheral edema  Neurological: Alert and oriented x 3, no focal deficits  Psychiatric: Normal mood, normal affect  : No CVA tenderness. No brand.   Skin: No rashes    Data:  06-16    134<L>  |  98  |  56<H>  ----------------------------<  125<H>  3.2<L>   |  23  |  8.63<H>    Ca    8.0<L>      2022 07:42  Phos  5.5     06-16  Mg     1.90     06-16    TPro  5.8<L>  /  Alb  3.1<L>  /  TBili  0.3  /  DBili      /  AST  25  /  ALT  14  /  AlkPhos  49  06-16    Creatinine Trend: 8.63 <--, 8.31 <--                        9.6    8.06  )-----------( 212      ( 2022 01:45 )             30.5     Urine Studies:  Urinalysis Basic - ( 2022 04:35 )    Color: Yellow / Appearance: Clear / S.028 / pH:   Gluc:  / Ketone: Trace  / Bili: Negative / Urobili: <2 mg/dL   Blood:  / Protein: >600 mg/dL / Nitrite: Negative   Leuk Esterase: Negative / RBC: 2 /HPF / WBC 2 /HPF   Sq Epi:  / Non Sq Epi: 1 /HPF / Bacteria: Negative

## 2022-06-16 NOTE — PROGRESS NOTE ADULT - PROBLEM SELECTOR PLAN 1
T 103.7. RR >20. s/p zosynx1 in ED. Likely 2/2 Parainfluenza  - CXR clear  - f/u blood cultures  - f/u urine culture (makes urine)  - MRSA PCR  - Send PD culture, although benign abdominal exam, low suspicion for peritonitis   - Hold on further abx for now  - lactate wnl

## 2022-06-16 NOTE — SWALLOW BEDSIDE ASSESSMENT ADULT - COMMENTS
Per H&P 6/16/22: "65 y/o M w/ hx DM2, HTN, HLD, CVA in past ESRD on daily home peritoneal dialysis presents with fever, cough, transfer from [facility] for peritoneal dialysis. According to sonSyed on the phone [phone], patient became lethargic, with mild speech difficulties at home and he called EMS. Patient taken to [place] where code stroke activated.  CT imaging there negative for ischemic infarct. Patient then transferred to [facility] for PD following contrast load."     Patient received upright in EDU stretcher, awake, alert and communicative. Patient denies symptoms of dysphagia, though reports decreased appetite.

## 2022-06-16 NOTE — H&P ADULT - PROBLEM SELECTOR PROBLEM 7
AROM: Lateral Neck Flexion        Slowly tilt head toward one shoulder, then the other.  Repeat 10 times per set. Do 1 sets per session. Do 2 sessions per day.     [email or web address]       Strengthening: Straight Leg Raise (Phase 1)        Tighten muscles on front of right thigh, then lift leg from surface, keeping knee locked.   Repeat 10 times per set. Do 1 sets per session. Do 2 sessions per day.     [email or web address]     Copyright © [employer]. All rights reserved.          Strengthening: Hip Abduction (Side-Lying)        Tighten muscles on side of left thigh, then lift leg from surface, keeping knee locked.   Repeat 10 times per set. Do 1 sets per session. Do 2 sessions per day.     [email or web address]         Scapular Retraction (Standing)    With arms at sides, squeeze shoulder blades together. Do not shrug and do not hold your breath. Hold 5 seconds.  Repeat 10 times per session. Do 2 sessions per day.         Pec Door Stretch       doorframe with palms, forearms, and elbows against frame. Lean forward until a stretch is felt in the chest. Hold 5 seconds.  Repeat 5 times per session. Do 2 sessions per day.       Prophylactic measure Hyperlipidemia

## 2022-06-16 NOTE — PHYSICAL THERAPY INITIAL EVALUATION ADULT - RANGE OF MOTION EXAMINATION, REHAB EVAL
right LE: limited knee extension to -10 degrees Active Range of Motion due to pain/bilateral upper extremity ROM was WFL (within functional limits)/Left LE ROM was WFL (within functional limits)/deficits as listed below

## 2022-06-16 NOTE — ED ADULT NURSE NOTE - OBJECTIVE STATEMENT
67 y/o male, a&ox4, received to rm 7 from [facility] with c/o fever and cough. Pt endorses symptoms for the past few days, pmh of ESRD with daily peritoneal dialysis, and DM, HTN. Pt denies CP, SOB, or dyspnea at this time. NSR on cardiac monitor. Respirations are even and unlabored, no signs of respiratory distress. Pt arrives with 18G IV in right AC, 20G IV placed to left AC, labs collected and sent off. Pt medicated as per MD orders. 103.7 rectal temp noted, MD Galindo made aware. Skin intact, no signs of redness or pressure ulcer.

## 2022-06-16 NOTE — H&P ADULT - NSICDXPASTMEDICALHX_GEN_ALL_CORE_FT
PAST MEDICAL HISTORY:  Hypertension      PAST MEDICAL HISTORY:  CVA (cerebrovascular accident) 2010 without residual effects    ESRD on peritoneal dialysis     Hyperlipidemia     Hypertension     Type 2 diabetes mellitus, with long-term current use of insulin

## 2022-06-16 NOTE — PHYSICAL THERAPY INITIAL EVALUATION ADULT - PATIENT PROFILE REVIEW, REHAB EVAL
PT orders received, chart reviewed. ACTIVITY: ambulate with assist RN, Joan ODOM, endorsed pt to PT initial evaluation. Pt willing and able to participate in therapy session./yes

## 2022-06-16 NOTE — PROGRESS NOTE ADULT - PROBLEM SELECTOR PLAN 6
No slurred speech currently. Presented to [place] due to concern fo slurred speech and lethargy  - CT imaging in chart  negative for large vessel occlusion or flow limitations  - Possible that elevated fever 2/2 parainfluenza caused transient delirium vs hypertensive emergency vs TIA  - dysphagia screen, S&S eval  - fall, aspiration precautions  - PT/OT- rec rehab  - neuro checks Q4  - neuro exam significant only for LE edema (reportedly ongoing x weeks w/negative outpt w/u, no recent MR brain imaging however)  - MR brain for further eval  - monitor on tele  - check TSH, FLP, A1c  - further w/u pending MR findings

## 2022-06-16 NOTE — H&P ADULT - PROBLEM SELECTOR PLAN 1
T 103.7. RR >20. s/p zosynx1 in ED  - CXR clear  - f/u blood cultures  -f/u urine culture (makes urine)  - MRSA PCR  -  Also concern for peritonitis from chronic PD.   - Will need to send PD culture.   - empiric abx: zosyn and vanc for now. T 103.7. RR >20. s/p zosynx1 in ED. Possibly 2/2 Parainfluenza  - CXR clear  - f/u blood cultures  -f/u urine culture (makes urine)  - MRSA PCR  - Send PD culture Also concern for peritonitis from chronic PD although benign abdominal exam   - Hold on further abx for now T 103.7. RR >20. s/p zosynx1 in ED. Possibly 2/2 Parainfluenza  - CXR clear  - f/u blood cultures  - f/u urine culture (makes urine)  - MRSA PCR  - Send PD culture Also concern for peritonitis from chronic PD although benign abdominal exam   - Hold on further abx for now T 103.7. RR >20. s/p zosynx1 in ED. Possibly 2/2 Parainfluenza  - CXR clear  - f/u blood cultures  - f/u urine culture (makes urine)  - MRSA PCR  - Send PD culture, although benign abdominal exam, low suspicion for peritonitis   - Hold on further abx for now  - lactate wnl

## 2022-06-16 NOTE — PHYSICAL THERAPY INITIAL EVALUATION ADULT - PERTINENT HX OF CURRENT PROBLEM, REHAB EVAL
Pt is a 66 year old male who presented to [facility] as a transfer from [facility] for peritoneal dialysis. Found to have hypertensive urgency and SIRS+.

## 2022-06-16 NOTE — ED PROVIDER NOTE - PRINCIPAL DIAGNOSIS
Spoke with dad. States baby has mattery eyes and a slight runny nose but no fever and is eating well and not fussy. Told dad to keep an eye on her and if she starts running a fever, isnt eating or sleeping well that she should be evaluated.  Tricia Gallardo LPN.........................2021  4:49 PM       Fever

## 2022-06-16 NOTE — H&P ADULT - PROBLEM SELECTOR PLAN 4
parainfluenza positive. Likely source of cough  - c/w supportive care - On nightly PD at home  -patient also on Bumex 1mg per son, on hold in setting of goal for gradual normotension.  - Follows with Dr. Gillian Snyder  - Consult nephrology in AM, attempt made to notify office without response overnight.    #normocytic anemia  -unclear baseline, like AOCKD  -confirm outpt w/u with PMD, given report of weight loss would have patient f/u with PMD for age appropriate cancer screening - On nightly PD at home  -patient also on Bumex 1mg per son, on hold in setting of goal for gradual normotension.  - Follows with Dr. Gillian Snyder  - Consult nephrology in AM, attempt made to notify office without response overnight.    #normocytic anemia  -unclear baseline, like AOCKD  -confirm outpt w/u with PMD, given report of weight loss would have patient f/u with PMD for age appropriate cancer screening (reports has never had a colonoscopy)  -trend H/H

## 2022-06-16 NOTE — ED PROVIDER NOTE - CLINICAL SUMMARY MEDICAL DECISION MAKING FREE TEXT BOX
65 y/o M w/ hx DM HTN HLD CVA presents with fever, cough, xfer from [facility] after CTH/CTA for code stroke which was cancelled. Here for peritoneal dialysis after contrast load. OBtain labs, likely admission. PCP is Alejandro, nephrology is at [facility]

## 2022-06-16 NOTE — H&P ADULT - PROBLEM SELECTOR PLAN 7
DVT ppx: heparin SQ  Diet: Renal restrictions. - continue with home lipitor 80 QHS - continue with home lipitor 80 QHS  check FLP

## 2022-06-16 NOTE — H&P ADULT - NSHPSOCIALHISTORY_GEN_ALL_CORE
Lives with wife and son  ambualtes with cane and walker Lives with wife and son  ambualtes with cane and walker  Denies tobacco/ ETOH use Lives with wife and son  Denies tobacco/ ETOH use/ or illicit drug use

## 2022-06-16 NOTE — H&P ADULT - ASSESSMENT
65 y/o M w/ hx DM, HTN, HLD, CVA in past ESRD on daily peritoneal dialysis presents with fever, cough, transfer from [facility] for peritoneal dialysis. Found to have hypertensive urgency and SIRS+.  67 y/o M w/ hx DM, HTN, HLD, CVA in past ESRD on daily peritoneal dialysis presents with fever, cough, transfer from [facility] for peritoneal dialysis. Found to have hypertensive urgency and septic secondary to parainfluenza

## 2022-06-16 NOTE — H&P ADULT - HISTORY OF PRESENT ILLNESS
In ED, given labetalol 20 IVx1, tylenolx1, zosynx1. Tmax 103.7, Bp elevated to 227/62  65 y/o M w/ hx DM, HTN, HLD, CVA in past ESRD on daily peritoneal dialysis presents with fever, cough, transfer from [facility] for peritoneal dialysis. According to sonSyed on the phone [phone], patient became lethargic, with mild speech difficulties at home and he called EMS. Patient taken to [place] where code stroke activated.  CT imaging there negative for ischemic infarct. Patient then transferred to [facility] for PD following contrast load.     At bedside, patient endorses cough for past one day. Also endroses subjective fevers. No sick contacts, recnet travel. He also denies CP, SOB, n/v, diarrhea, constipation.       In ED, given labetalol 20 IVx1, tylenolx1, zosynx1. Tmax 103.7, Bp elevated to 227/62. Found to be parainfluenza positive  65 y/o M w/ hx DM, HTN, HLD, CVA in past ESRD on daily peritoneal dialysis presents with fever, cough, transfer from [facility] for peritoneal dialysis. According to sonSyed on the phone [phone], patient became lethargic, with mild speech difficulties at home and he called EMS. Patient taken to [place] where code stroke activated.  CT imaging there negative for ischemic infarct. Patient then transferred to [facility] for PD following contrast load.     At bedside, patient endorses cough for past one day. Also endroses subjective fevers during this time. He states that he does not remember the events that led to him going to the hospital. No sick contacts, recnet travel. He also denies CP, SOB, n/v, diarrhea, constipation. States that he does PD every night with help of wife. Ambulates with cane/walker.       In ED, given labetalol 20 IVx1, tylenolx1, zosynx1. Tmax 103.7, Bp elevated to 227/62. Found to be parainfluenza positive  67 y/o M w/ hx DM2, HTN, HLD, CVA in past ESRD on daily home peritoneal dialysis presents with fever, cough, transfer from [facility] for peritoneal dialysis. According to sonSyed on the phone [phone], patient became lethargic, with mild speech difficulties at home and he called EMS. Patient taken to [place] where code stroke activated.  CT imaging there negative for ischemic infarct. Patient then transferred to [facility] for PD following contrast load.     At bedside, patient endorses cough for past one day. Also endroses subjective fevers during this time. He states that he does not remember the events that led to him going to the hospital. No sick contacts, recent travel. He also denies CP, SOB, n/v, diarrhea, constipation. States that he does PD every night with help of wife. Ambulates with cane/walker.       In ED, given tylenolx1, zosynx1. Tmax 103.7, Bp elevated to 227/62. Found to be parainfluenza positive

## 2022-06-16 NOTE — PROGRESS NOTE ADULT - SUBJECTIVE AND OBJECTIVE BOX
Patient is a 66y old  Male who presents with a chief complaint of peritoneal dialysis (2022 10:39)      SUBJECTIVE / OVERNIGHT EVENTS: Pt febrile. He has cough but denies any shortness of breath. Denies pain     ADDITIONAL REVIEW OF SYSTEMS:    MEDICATIONS  (STANDING):  amLODIPine   Tablet 10 milliGRAM(s) Oral daily  atorvastatin 80 milliGRAM(s) Oral at bedtime  dextrose 5%. 1000 milliLiter(s) (50 mL/Hr) IV Continuous <Continuous>  dextrose 5%. 1000 milliLiter(s) (100 mL/Hr) IV Continuous <Continuous>  dextrose 50% Injectable 25 Gram(s) IV Push once  dextrose 50% Injectable 12.5 Gram(s) IV Push once  dextrose 50% Injectable 25 Gram(s) IV Push once  gentamicin 0.1% Cream 1 Application(s) Topical <User Schedule>  glucagon  Injectable 1 milliGRAM(s) IntraMuscular once  heparin   Injectable 5000 Unit(s) SubCutaneous every 8 hours  insulin lispro (ADMELOG) corrective regimen sliding scale   SubCutaneous three times a day before meals  insulin lispro (ADMELOG) corrective regimen sliding scale   SubCutaneous at bedtime    MEDICATIONS  (PRN):  acetaminophen     Tablet .. 650 milliGRAM(s) Oral every 6 hours PRN Temp greater or equal to 38C (100.4F), Mild Pain (1 - 3)  dextrose Oral Gel 15 Gram(s) Oral once PRN Blood Glucose LESS THAN 70 milliGRAM(s)/deciliter      CAPILLARY BLOOD GLUCOSE      POCT Blood Glucose.: 132 mg/dL (2022 11:55)  POCT Blood Glucose.: 117 mg/dL (2022 09:52)  POCT Blood Glucose.: 122 mg/dL (2022 06:50)  POCT Blood Glucose.: 136 mg/dL (2022 00:40)    I&O's Summary    2022 07:01  -  2022 13:57  --------------------------------------------------------  IN: 2000 mL / OUT: 0 mL / NET: 2000 mL        PHYSICAL EXAM:  Vital Signs Last 24 Hrs  T(C): 36.8 (2022 12:28), Max: 39.8 (2022 02:34)  T(F): 98.3 (2022 12:28), Max: 103.7 (2022 02:34)  HR: 60 (:28) (56 - 91)  BP: 172/89 (2022 12:28) (161/65 - 227/62)  BP(mean): --  RR: 17 (2022 12:28) (17 - 22)  SpO2: 100% (2022 12:) (96% - 100%)  GENERAL: NAD, diaphoretic  HEENT:  Head atraumatic, EOMI, PERRLA, conjunctiva and sclera clear; Moist mucous membranes, normal oropharynx  NECK: Supple, No JVD, no lymphadenopathy, no thyroid nodules or enlargement  CHEST/LUNG: Clear to auscultation bilaterally; No rales, rhonchi, wheezing, or rubs. Unlabored respirations on room air  HEART: Regular rate and rhythm; S1S2; No murmurs, rubs, or gallops  ABDOMEN: Bowel sounds present; Soft, Nontender, PD catheter in place without erythema or induration  EXTREMITIES:  2+ Peripheral Pulses, brisk capillary refill. No clubbing, cyanosis, or edema  NERVOUS SYSTEM:  Alert & Oriented X3, 3/5 strength b/l LE, 4+/5 strength b/l UE; sensation grossly intact;   SKIN: No rashes or lesions    LABS:                        9.6    8.06  )-----------( 212      ( 2022 01:45 )             30.5     06-16    134<L>  |  98  |  56<H>  ----------------------------<  125<H>  3.2<L>   |  23  |  8.63<H>    Ca    8.0<L>      2022 07:42  Phos  5.5     06-16  Mg     1.90     06-16    TPro  5.8<L>  /  Alb  3.1<L>  /  TBili  0.3  /  DBili  x   /  AST  25  /  ALT  14  /  AlkPhos  49  06-16    PT/INR - ( 2022 01:45 )   PT: 11.6 sec;   INR: 1.00 ratio         PTT - ( 2022 01:45 )  PTT:28.6 sec      Urinalysis Basic - ( 2022 04:35 )    Color: Yellow / Appearance: Clear / S.028 / pH: x  Gluc: x / Ketone: Trace  / Bili: Negative / Urobili: <2 mg/dL   Blood: x / Protein: >600 mg/dL / Nitrite: Negative   Leuk Esterase: Negative / RBC: 2 /HPF / WBC 2 /HPF   Sq Epi: x / Non Sq Epi: 1 /HPF / Bacteria: Negative          RADIOLOGY & ADDITIONAL TESTS:  Results Reviewed:   Imaging Personally Reviewed:  Electrocardiogram Personally Reviewed:    COORDINATION OF CARE:  Care Discussed with Consultants/Other Providers [Y/N]:  Prior or Outpatient Records Reviewed [Y/N]:

## 2022-06-16 NOTE — H&P ADULT - NSICDXPASTSURGICALHX_GEN_ALL_CORE_FT
PAST SURGICAL HISTORY:  History of peritoneal dialysis      PAST SURGICAL HISTORY:  History of peritoneal dialysis s/p PD catheter placement

## 2022-06-16 NOTE — H&P ADULT - ATTENDING COMMENTS
66-year-old male with DM2, HTN, HLD, CVA ('10 without residual deficits), ESRD on daily home peritoneal dialysis presents with fever, cough x2days, transferred from [facility] for peritoneal dialysis. Patient initially presented to [facility] report of generalized weakness since 10AM with mild speech difficulties, patient brought to ED at 7PM, code "LVO" was called and subsequently cancelled due to normal imaging studies, son reported elevated BP since the AM. Patient denies to me any speech disturbances, reports weakness x2-3 weeks. Notes difficulty ambulating due to weakness, 3/5 strength b/l LE reportedly had CT scan of lumbar spine, hip and knee that were reportedly normal. Patient also notes subjective fevers, cough, found to have sepsis secondary to parainfluenza. DDx for speech disturbance/encephalopathy includes sepsis with high fever vs hypertensive emergency vs TIA. Monitor on tele, discontinued hydralazine for now, would aim for gradual BP control.   Reports anorexia and weight loss since starting on PD 3 months ago.   When I spoke with the son this morning, he reports patient's BP was 200-220s. Son reports patient has mild dementia and possible depression due to anorexia, son reports patient stopped taking his medications for the past few days. Per son,  patient recently drove down to [place] to see grandkids who were sick with URI symptoms. 66-year-old male with DM2, HTN, HLD, CVA ('10 without residual deficits), ESRD on daily home peritoneal dialysis presents with fever, cough x2days, transferred from [facility] for peritoneal dialysis. Patient initially presented to [facility] report of generalized weakness since 10AM with mild speech difficulties, patient brought to ED at 7PM, code "LVO" was called and subsequently cancelled due to normal imaging studies, son reported elevated BP since the AM. Patient denies to me any speech disturbances, reports weakness x2-3 weeks. Notes difficulty ambulating due to weakness, 3/5 strength b/l LE reportedly had CT scan of lumbar spine, hip and knee that were reportedly normal. Patient also notes subjective fevers, cough, found to have sepsis secondary to parainfluenza. DDx for speech disturbance/encephalopathy includes sepsis with high fever vs hypertensive emergency vs TIA. Monitor on tele, discontinued hydralazine for now, would aim for gradual BP control.   Reports anorexia and weight loss since starting on PD 3 months ago.   When I spoke with the son this morning, he reports patient's BP was 200-220s. Son reports patient has mild dementia and possible depression due to anorexia (patient denies), son reports patient stopped taking his medications for the past few days. Per son,  patient recently drove down to [place] to see grandkids who were sick with URI symptoms.

## 2022-06-16 NOTE — PROGRESS NOTE ADULT - PROBLEM SELECTOR PLAN 3
- Hypertensive to 226/62 in ED  - BP improved today  - after speaking with son, patient reportedly not taking BP meds past 3-4 days, aim for gradual BP control.  - Amlodipine restarted.   - Will resume hydralazine after PD started

## 2022-06-16 NOTE — ED ADULT NURSE NOTE - CHIEF COMPLAINT QUOTE
alert lethargic transfer from [place] was a code stroke there  CT neg  c/o fever chills productive cough  temp 103.1  Blood Pressure 227/62   PMHx ESRD  due for  peritoneal dialysis  DM2

## 2022-06-16 NOTE — H&P ADULT - PROBLEM SELECTOR PLAN 5
Unclear A1c. Per son, patient on PRN lantus at home?  - Low ISS here for now Unclear A1c. Per son, patient on PRN lantus at home?  - Low ISS here for now  - check A1c

## 2022-06-16 NOTE — OCCUPATIONAL THERAPY INITIAL EVALUATION ADULT - PLANNED THERAPY INTERVENTIONS, OT EVAL
ADL retraining/balance training/bed mobility training/fine motor coordination training/strengthening/transfer training ADL retraining/balance training/bed mobility training/fine motor coordination training/ROM/strengthening/transfer training

## 2022-06-16 NOTE — H&P ADULT - REASON FOR ADMISSION
BFP Brief Progress Note    Notified by RN at 5:44 AM that patient had 9 beats of Vtach. Remained vitally stable and asymptomatic. Per chart review, there was concern for sustained Vtach (versus Afib) during hospital admission at [facility] in 3/2021, improved after amiodarone bolus. However, no other known history.     Also agitated and pulled out PICC. Earlier in the night showing signs of delirium. After attempt to redirect, she finally was able to sleep some with small dose of melatonin and hydroxyzine. Suspect hospital-related delirium but prednisone could also be contributing.     Plan:  - EKG  - continue to monitor on telemetry  - check BMP, mag, phos, calcium (replace as needed)  - consider amiodarone if sustained Vtach  - RN to attempt placing peripheral IV  - one time dose of 2.5 mg IM zyprexa  - will discuss with day team the necessity of prednisone    Brittany Deshpande MD PGY-3  [facility]             peritoneal dialysisi peritoneal dialysis

## 2022-06-16 NOTE — H&P ADULT - PROBLEM SELECTOR PLAN 8
DVT ppx: heparin SQ  Diet: Renal restrictions. check TSH, son made aware of finding on CT from [place]  unclear chronicity  outpt f/u with PMD

## 2022-06-16 NOTE — PHARMACOTHERAPY INTERVENTION NOTE - COMMENTS
Medication list reviewed with [facility].   Of Note:   - Pharmacy filled Lipitor 40mg QD (June/2022).   - Bumex 1 mg tabs filled (1 tablet PO TID).   - No fill history for Lantus, only insulin on file is Humalog Kwikpens (12u SQ QHS) filled in Feb/2022 x 3 months.

## 2022-06-16 NOTE — SWALLOW BEDSIDE ASSESSMENT ADULT - ADDITIONAL RECOMMENDATIONS
monitor for any evolving neurological changes that may impact PO intake due to current stroke workup.

## 2022-06-16 NOTE — H&P ADULT - NSHPREVIEWOFSYSTEMS_GEN_ALL_CORE
CONSTITUTIONAL:  No weight loss, fever, chills, weakness or fatigue.  HEENT:  Eyes:  No visual loss, blurred vision, double vision or yellow sclerae. Ears, Nose, Throat:  No hearing loss, sneezing, congestion, runny nose or sore throat.  SKIN:  No rash or itching.  CARDIOVASCULAR:  No chest pain, chest pressure or chest discomfort. No palpitations.  RESPIRATORY: +cough  GASTROINTESTINAL:  No anorexia, nausea, vomiting or diarrhea. No abdominal pain or blood.  GENITOURINARY:  Denies hematuria, dysuria.   NEUROLOGICAL:  No headache, dizziness, syncope, paralysis, ataxia, numbness or tingling in the extremities. No change in bowel or bladder control.  MUSCULOSKELETAL:  No muscle, back pain, joint pain or stiffness.  HEMATOLOGIC:  No anemia, bleeding or bruising.  LYMPHATICS:  No enlarged nodes.   PSYCHIATRIC:  No history of depression or anxiety.  ENDOCRINOLOGIC:  No reports of sweating, cold or heat intolerance. No polyuria or polydipsia.  ALLERGIES:  No history of asthma, hives, eczema or rhinitis. CONSTITUTIONAL:  (+) weight loss of 40lbs over the past 2-3 months, (+)subjective fevers and chills x2 days; (+) weakness/fatigue x2-3weeks.  HEENT:  Eyes:  No visual loss, blurred vision, double vision or yellow sclerae. Ears, Nose, Throat:  No hearing loss, sneezing, congestion, runny nose or sore throat. No dysphagia. No neck pain/stiffness  SKIN:  No rash or itching.  CARDIOVASCULAR:  No chest pain, chest pressure or chest discomfort. No palpitations. No LE edema  RESPIRATORY: + dry cough x2days; No wheezing or shortness of breath  GASTROINTESTINAL:  (+) anorexia x24H; No nausea, vomiting or diarrhea or constipation. No abdominal pain, melena, or hematochezia.  GENITOURINARY:  Denies hematuria, dysuria, nml frequency of urination  NEUROLOGICAL:  No headache, dizziness/lightheadedness, syncope, paralysis, ataxia, numbness or tingling in the extremities. No change in bowel or bladder control/No incontinence; No saddle anesthesia  MUSCULOSKELETAL:  No muscle, back pain, joint pain or stiffness. Ambulates with cane/walker. History of fall 1 month ago prior to getting cane.  HEMATOLOGIC:  No anemia, bleeding or bruising.  LYMPHATICS:  No enlarged nodes.   PSYCHIATRIC:  No history of depression or anxiety.  ENDOCRINOLOGIC:  No reports of sweating, cold or heat intolerance. No polyuria or polydipsia.  ALLERGIES:  No history of asthma, hives, eczema or rhinitis.

## 2022-06-17 DIAGNOSIS — M79.604 PAIN IN RIGHT LEG: ICD-10-CM

## 2022-06-17 LAB
ANION GAP SERPL CALC-SCNC: 14 MMOL/L — SIGNIFICANT CHANGE UP (ref 7–14)
ANION GAP SERPL CALC-SCNC: 16 MMOL/L — HIGH (ref 7–14)
BUN SERPL-MCNC: 51 MG/DL — HIGH (ref 7–23)
BUN SERPL-MCNC: 55 MG/DL — HIGH (ref 7–23)
CALCIUM SERPL-MCNC: 7.9 MG/DL — LOW (ref 8.4–10.5)
CALCIUM SERPL-MCNC: 7.9 MG/DL — LOW (ref 8.4–10.5)
CHLORIDE SERPL-SCNC: 93 MMOL/L — LOW (ref 98–107)
CHLORIDE SERPL-SCNC: 95 MMOL/L — LOW (ref 98–107)
CO2 SERPL-SCNC: 24 MMOL/L — SIGNIFICANT CHANGE UP (ref 22–31)
CO2 SERPL-SCNC: 24 MMOL/L — SIGNIFICANT CHANGE UP (ref 22–31)
CREAT SERPL-MCNC: 7.77 MG/DL — HIGH (ref 0.5–1.3)
CREAT SERPL-MCNC: 8.53 MG/DL — HIGH (ref 0.5–1.3)
CULTURE RESULTS: NO GROWTH — SIGNIFICANT CHANGE UP
CULTURE RESULTS: SIGNIFICANT CHANGE UP
DIALYSIS INSTRUMENT RESULT - HEPATITIS B SURFACE ANTIGEN: NEGATIVE — SIGNIFICANT CHANGE UP
EGFR: 6 ML/MIN/1.73M2 — LOW
EGFR: 7 ML/MIN/1.73M2 — LOW
GLUCOSE BLDC GLUCOMTR-MCNC: 151 MG/DL — HIGH (ref 70–99)
GLUCOSE BLDC GLUCOMTR-MCNC: 228 MG/DL — HIGH (ref 70–99)
GLUCOSE BLDC GLUCOMTR-MCNC: 286 MG/DL — HIGH (ref 70–99)
GLUCOSE SERPL-MCNC: 135 MG/DL — HIGH (ref 70–99)
GLUCOSE SERPL-MCNC: 156 MG/DL — HIGH (ref 70–99)
HCT VFR BLD CALC: 24.9 % — LOW (ref 39–50)
HCV AB S/CO SERPL IA: 0.07 S/CO — SIGNIFICANT CHANGE UP (ref 0–0.99)
HCV AB SERPL-IMP: SIGNIFICANT CHANGE UP
HGB BLD-MCNC: 8 G/DL — LOW (ref 13–17)
MAGNESIUM SERPL-MCNC: 2 MG/DL — SIGNIFICANT CHANGE UP (ref 1.6–2.6)
MAGNESIUM SERPL-MCNC: 2 MG/DL — SIGNIFICANT CHANGE UP (ref 1.6–2.6)
MCHC RBC-ENTMCNC: 26.7 PG — LOW (ref 27–34)
MCHC RBC-ENTMCNC: 32.1 GM/DL — SIGNIFICANT CHANGE UP (ref 32–36)
MCV RBC AUTO: 83 FL — SIGNIFICANT CHANGE UP (ref 80–100)
MRSA PCR RESULT.: SIGNIFICANT CHANGE UP
NRBC # BLD: 0 /100 WBCS — SIGNIFICANT CHANGE UP
NRBC # FLD: 0 K/UL — SIGNIFICANT CHANGE UP
PHOSPHATE SERPL-MCNC: 5 MG/DL — HIGH (ref 2.5–4.5)
PHOSPHATE SERPL-MCNC: 5.7 MG/DL — HIGH (ref 2.5–4.5)
PLATELET # BLD AUTO: 188 K/UL — SIGNIFICANT CHANGE UP (ref 150–400)
POTASSIUM SERPL-MCNC: 2.7 MMOL/L — CRITICAL LOW (ref 3.5–5.3)
POTASSIUM SERPL-MCNC: 3.4 MMOL/L — LOW (ref 3.5–5.3)
POTASSIUM SERPL-SCNC: 2.7 MMOL/L — CRITICAL LOW (ref 3.5–5.3)
POTASSIUM SERPL-SCNC: 3.4 MMOL/L — LOW (ref 3.5–5.3)
RBC # BLD: 3 M/UL — LOW (ref 4.2–5.8)
RBC # FLD: 15 % — HIGH (ref 10.3–14.5)
S AUREUS DNA NOSE QL NAA+PROBE: DETECTED
SODIUM SERPL-SCNC: 133 MMOL/L — LOW (ref 135–145)
SODIUM SERPL-SCNC: 133 MMOL/L — LOW (ref 135–145)
SPECIMEN SOURCE: SIGNIFICANT CHANGE UP
SPECIMEN SOURCE: SIGNIFICANT CHANGE UP
WBC # BLD: 7.77 K/UL — SIGNIFICANT CHANGE UP (ref 3.8–10.5)
WBC # FLD AUTO: 7.77 K/UL — SIGNIFICANT CHANGE UP (ref 3.8–10.5)

## 2022-06-17 PROCEDURE — 99232 SBSQ HOSP IP/OBS MODERATE 35: CPT | Mod: GC

## 2022-06-17 RX ORDER — HYDRALAZINE HCL 50 MG
25 TABLET ORAL ONCE
Refills: 0 | Status: COMPLETED | OUTPATIENT
Start: 2022-06-17 | End: 2022-06-17

## 2022-06-17 RX ORDER — MUPIROCIN 20 MG/G
1 OINTMENT TOPICAL
Refills: 0 | Status: DISCONTINUED | OUTPATIENT
Start: 2022-06-17 | End: 2022-06-20

## 2022-06-17 RX ORDER — SENNA PLUS 8.6 MG/1
2 TABLET ORAL AT BEDTIME
Refills: 0 | Status: DISCONTINUED | OUTPATIENT
Start: 2022-06-17 | End: 2022-06-20

## 2022-06-17 RX ORDER — POLYETHYLENE GLYCOL 3350 17 G/17G
17 POWDER, FOR SOLUTION ORAL
Refills: 0 | Status: DISCONTINUED | OUTPATIENT
Start: 2022-06-17 | End: 2022-06-20

## 2022-06-17 RX ORDER — POTASSIUM CHLORIDE 20 MEQ
10 PACKET (EA) ORAL
Refills: 0 | Status: COMPLETED | OUTPATIENT
Start: 2022-06-17 | End: 2022-06-17

## 2022-06-17 RX ORDER — HYDRALAZINE HCL 50 MG
25 TABLET ORAL ONCE
Refills: 0 | Status: DISCONTINUED | OUTPATIENT
Start: 2022-06-17 | End: 2022-06-17

## 2022-06-17 RX ORDER — ACETAMINOPHEN 500 MG
975 TABLET ORAL ONCE
Refills: 0 | Status: COMPLETED | OUTPATIENT
Start: 2022-06-17 | End: 2022-06-17

## 2022-06-17 RX ORDER — LIDOCAINE 4 G/100G
1 CREAM TOPICAL DAILY
Refills: 0 | Status: DISCONTINUED | OUTPATIENT
Start: 2022-06-17 | End: 2022-06-20

## 2022-06-17 RX ORDER — POTASSIUM CHLORIDE 20 MEQ
20 PACKET (EA) ORAL ONCE
Refills: 0 | Status: COMPLETED | OUTPATIENT
Start: 2022-06-17 | End: 2022-06-17

## 2022-06-17 RX ORDER — HYDRALAZINE HCL 50 MG
50 TABLET ORAL EVERY 8 HOURS
Refills: 0 | Status: DISCONTINUED | OUTPATIENT
Start: 2022-06-17 | End: 2022-06-19

## 2022-06-17 RX ADMIN — HEPARIN SODIUM 5000 UNIT(S): 5000 INJECTION INTRAVENOUS; SUBCUTANEOUS at 22:49

## 2022-06-17 RX ADMIN — Medication 1: at 18:10

## 2022-06-17 RX ADMIN — ATORVASTATIN CALCIUM 80 MILLIGRAM(S): 80 TABLET, FILM COATED ORAL at 22:49

## 2022-06-17 RX ADMIN — HEPARIN SODIUM 5000 UNIT(S): 5000 INJECTION INTRAVENOUS; SUBCUTANEOUS at 05:29

## 2022-06-17 RX ADMIN — Medication 100 MILLIEQUIVALENT(S): at 12:00

## 2022-06-17 RX ADMIN — Medication 100 MILLIEQUIVALENT(S): at 13:35

## 2022-06-17 RX ADMIN — Medication 100 MILLIEQUIVALENT(S): at 10:03

## 2022-06-17 RX ADMIN — Medication 650 MILLIGRAM(S): at 20:07

## 2022-06-17 RX ADMIN — MUPIROCIN 1 APPLICATION(S): 20 OINTMENT TOPICAL at 05:29

## 2022-06-17 RX ADMIN — Medication 20 MILLIEQUIVALENT(S): at 18:10

## 2022-06-17 RX ADMIN — Medication 650 MILLIGRAM(S): at 21:07

## 2022-06-17 RX ADMIN — Medication 1: at 10:04

## 2022-06-17 RX ADMIN — Medication 975 MILLIGRAM(S): at 11:02

## 2022-06-17 RX ADMIN — Medication 975 MILLIGRAM(S): at 10:02

## 2022-06-17 RX ADMIN — Medication 650 MILLIGRAM(S): at 00:27

## 2022-06-17 RX ADMIN — Medication 2: at 12:21

## 2022-06-17 RX ADMIN — HEPARIN SODIUM 5000 UNIT(S): 5000 INJECTION INTRAVENOUS; SUBCUTANEOUS at 13:34

## 2022-06-17 RX ADMIN — Medication 1: at 22:50

## 2022-06-17 RX ADMIN — SENNA PLUS 2 TABLET(S): 8.6 TABLET ORAL at 22:49

## 2022-06-17 RX ADMIN — AMLODIPINE BESYLATE 10 MILLIGRAM(S): 2.5 TABLET ORAL at 05:29

## 2022-06-17 RX ADMIN — Medication 50 MILLIGRAM(S): at 22:49

## 2022-06-17 RX ADMIN — Medication 50 MILLIGRAM(S): at 14:04

## 2022-06-17 RX ADMIN — SEVELAMER CARBONATE 800 MILLIGRAM(S): 2400 POWDER, FOR SUSPENSION ORAL at 13:34

## 2022-06-17 RX ADMIN — LIDOCAINE 1 PATCH: 4 CREAM TOPICAL at 13:34

## 2022-06-17 RX ADMIN — SEVELAMER CARBONATE 800 MILLIGRAM(S): 2400 POWDER, FOR SUSPENSION ORAL at 10:02

## 2022-06-17 RX ADMIN — LIDOCAINE 1 PATCH: 4 CREAM TOPICAL at 19:44

## 2022-06-17 RX ADMIN — POLYETHYLENE GLYCOL 3350 17 GRAM(S): 17 POWDER, FOR SOLUTION ORAL at 22:49

## 2022-06-17 RX ADMIN — MUPIROCIN 1 APPLICATION(S): 20 OINTMENT TOPICAL at 18:11

## 2022-06-17 RX ADMIN — Medication 20 MILLIEQUIVALENT(S): at 10:02

## 2022-06-17 RX ADMIN — SEVELAMER CARBONATE 800 MILLIGRAM(S): 2400 POWDER, FOR SUSPENSION ORAL at 18:11

## 2022-06-17 NOTE — PROGRESS NOTE ADULT - ATTENDING COMMENTS
65 yo M with hx of CVA, uncontrolled HTN,  DM, ESRD on PD present to OSH for fever and acute metabolic encephalopathy, likely 2/2 parainfluenza and hypertensive urgency    afebrile o/n. currently AAOX 3, MS appears to be baseline today    -monitor off abx as infectious w/u inc. CXR, blood cx, UA, PD fluid analysis negative for bacterial infection   -c/w PD as per renal   -BP remains elevated- c/w norvasc 10. added hydralazine 50 tid, titrate as needed  -c/o LBP and RLE pain after fall- reported outpt CT done revealing- will obtain collaterals. pain control with oxycodone prn   -PT eval- rehab

## 2022-06-17 NOTE — DIETITIAN INITIAL EVALUATION ADULT - ADD RECOMMEND
1) Recommend d/c dash/TLC restriction  2) Recommend Nepro 3x daily (1,275 kcals, 52.3g protein) to promote optimal PO intake  3) Encourage PO intake and honor food preferences as able.  4) Continue to Monitor weights, labs, BM's, skin integrity, p.o. intake.  5) Reconsult RD as needed

## 2022-06-17 NOTE — ED ADULT NURSE REASSESSMENT NOTE - NS ED NURSE REASSESS COMMENT FT1
Patient returned from dialysis. Patient on stretcher, no distress noted. Patient on cardiac monitor sinus bradycardia, O2 sat 100% on a room air non labored breathing. Safety maintained.
Report given to dialysis RN Alba Isbell.
report given to [facility]
Received patient in room 8 admitted to medicine diagnosed with fever. Patient denies any pain/discomfort at this time. Patient is on cardiac monitor sinus bradycardia, O2 sat 99% on a room air, temp 97.5. Patient is A&OX3, airway patent, breathing unlabored and even, radial pulses palpable, abdomen soft, skin warm, dry. Patient has hx of peritoneal dialysis, ESRD. /60 notified NP SO, no further intervention at this time as per NP. Patient denies headache, SOB, chest pain. Patient has weakness on bilateral legs. Side rails up and safety maintained. Fall precaution in place. Patient waiting for MRI.

## 2022-06-17 NOTE — PROVIDER CONTACT NOTE (OTHER) - RECOMMENDATIONS
HS 1 Isaiah Stewart aware. Pending further recommendations. WIll continue to monitor.
Notify provider.

## 2022-06-17 NOTE — PROVIDER CONTACT NOTE (OTHER) - ASSESSMENT
Pt A&Ox4, asymptomatic, no s/s of distress or discomfort. VSS otherwise. Pt going for peritoneal dialysis, informed dialysis prior to sending patient.
Patient A&Ox4, admitted to 4N from dialysis. /74, all other vitals stable. Tylenol given for pain.

## 2022-06-17 NOTE — DIETITIAN INITIAL EVALUATION ADULT - OTHER INFO
Per chart,  67 y/o M w/ hx DM, HTN, HLD, CVA in past ESRD on daily peritoneal dialysis presents with fever, cough, transfer from [facility] for peritoneal dialysis. Found to have hypertensive urgency and septic secondary to parainfluenza    Nutrition interview: No recent episodes of nausea, vomiting, diarrhea or constipation, BM noted x3d ago as pt states he has hasn't eaten much in the last few days 2/2 current illness. Denies any chewing/swallowing difficulties. No food allergies. Stated UBW: ~176# but couldn't state when he lost weight. Food preferences explored and noted. Intake is 50% per RN flowsheets and per pt. Feeding skills: set up help    Pt declined diet education at this time as pt is well versed in renal diet nutrition therapy. Pt able to teach back principals of renal diet upon todays visit.

## 2022-06-17 NOTE — PROGRESS NOTE ADULT - ASSESSMENT
65 y/o M w/ hx DM2, HTN, HLD, CVA in past ESRD on daily home peritoneal dialysis presents with fever, cough, transfer from [facility] for peritoneal dialysis.    ESRD on PD  Consent in chart  placed on CAPD, 4 exchanges, 2L each, 1.5% over 3-4hrs each exchange  cell count, gram stain and culture negative for peritonitis  Renal diet  daily BMP    Sepsis  Viral PNA  Appreciate Infectious disease specialist recs    Anemia of CKD  Epo 4k TIW Sq    BMD of ESRD  Sevelamer 800 TID QAC    Hypokalemia  supplement PO as needed      For any question, call:  Cell # [phone]  Pager # [phone]  Callback # [phone]

## 2022-06-17 NOTE — PROGRESS NOTE ADULT - PROBLEM SELECTOR PLAN 5
Unclear A1c. Per son, patient on PRN lantus at home?  - a1c 6.7%  - FS TID premeals + QHS with low dose correctional scale No slurred speech currently. Presented to [place] due to concern fo slurred speech and lethargy  - CT imaging in chart  negative for large vessel occlusion or flow limitations  - Possible that elevated fever 2/2 parainfluenza caused transient delirium vs hypertensive emergency vs TIA  - TSH wnl  - assessed by S&S- regular diet, thin liquids   - fall, aspiration precautions  - assessed by PT, was unable to cooperate due to pain, anticipate rehab. assessed by OT  --- no [facility] facilities able to perform PD, would have to do home PT   - neuro checks Q4  - confirm w/ family when they visit to see if he is back to baseline. No clear indication for MR at this time if back at baseline.

## 2022-06-17 NOTE — PROGRESS NOTE ADULT - ASSESSMENT
67 y/o M w/ hx DM, HTN, HLD, CVA in past ESRD on daily peritoneal dialysis presents with fever, cough, transfer from [facility] for peritoneal dialysis. Found to have hypertensive urgency and septic secondary to parainfluenza

## 2022-06-17 NOTE — PROGRESS NOTE ADULT - PROBLEM SELECTOR PLAN 8
TSH wnl here  - son made aware of finding on CT from [place]  - unclear chronicity  - outpt f/u with PMD h/o CVA   - mildly elevated TGL to 160 on lipid panel   - continue with home lipitor 80 QHS

## 2022-06-17 NOTE — DIETITIAN INITIAL EVALUATION ADULT - ORAL INTAKE PTA/DIET HISTORY
Pt lives at home with his son, daughter in law, and wife. His wife and daughter in law do all the cooking and grocery shopping without difficulty. Pt following renal diet at home. No supplements or vitamins PTA

## 2022-06-17 NOTE — DIETITIAN INITIAL EVALUATION ADULT - PERTINENT LABORATORY DATA
06-17 Na n/a   Glu 156 mg/dL<H> K+ n/a   Cr 7.77 mg/dL<H> BUN 51 mg/dL<H> Phos 5.0 mg/dL<H>  06-17 @ 12:17 POCT 228 mg/dL  06-17 @ 09:58 POCT 195 mg/dL  06-16 @ 22:48 POCT 155 mg/dL  06-16 @ 19:04 POCT 172 mg/dL    POCT Blood Glucose.: 228 mg/dL (06-17-22 @ 12:17)  A1C with Estimated Average Glucose Result: 6.7 % (06-16-22 @ 01:45)

## 2022-06-17 NOTE — DIETITIAN INITIAL EVALUATION ADULT - NS FNS DIET ORDER
Diet, DASH/TLC:   Sodium & Cholesterol Restricted  Consistent Carbohydrate {No Snacks} (CSTCHO)  For patients receiving Renal Replacement - No Protein Restr, No Conc K, No Conc Phos, Low Sodium (RENAL) (06-16-22 @ 09:56) [Active]

## 2022-06-17 NOTE — PROGRESS NOTE ADULT - PROBLEM SELECTOR PLAN 1
T 103.7. RR >20. s/p zosynx1 in ED. likely 2/2 Parainfluenza. Have remained afebrile here while off abx   - CXR clear, UA neg for infection, UCx neg   - f/u blood cultures- ngtd   - MRSA PCR neg, positive MSSA   - PD cx unremarkable for infection, low suspicion for SBP   - Hold on further abx for now  - lactate wnl

## 2022-06-17 NOTE — PROGRESS NOTE ADULT - PROBLEM SELECTOR PLAN 9
nutrition consult TSH wnl here  - son made aware of finding on CT from [place]  - unclear chronicity  - outpt f/u with PMD

## 2022-06-17 NOTE — PROGRESS NOTE ADULT - PROBLEM SELECTOR PLAN 3
Hypertensive to 226/62 in ED  - BP improved today  - after speaking with son, patient reportedly not taking BP meds past 3-4 days, aim for gradual BP control.  - c/w home amlodipine  - resume home hydralazine

## 2022-06-17 NOTE — PROGRESS NOTE ADULT - PROBLEM SELECTOR PLAN 7
h/o CVA   - mildly elevated TGL to 160 on lipid panel   - continue with home lipitor 80 QHS Unclear A1c. Per son, patient on PRN lantus at home?  - a1c 6.7%  - FS TID premeals + QHS with low dose correctional scale

## 2022-06-17 NOTE — PROGRESS NOTE ADULT - SUBJECTIVE AND OBJECTIVE BOX
Progress Note    22 (1d)    Patient is a 66y old  Male who presents with a chief complaint of peritoneal dialysis (2022 10:24)      Subjective / Overnight Events :  - No acute events overnight.  - Pt seen and examined at dialysis unit. C/o pain in R hip and entire R extremity. States it started over 2 weeks after, reports falling 3x over the past 2 weeks, unclear if pain preceded or followed falls. Saw doctors (?PCP/?ortho) outpatient and had XRs and CT scan of lumbar, hip, and knee performed, reportedly unrevealing and was told to follow up with PT.    - Also with dry cough. No sputum production, sore throat, nasal congestion, n/v, abdominal pain.     Additional ROS (if any):    MEDICATIONS  (STANDING):  amLODIPine   Tablet 10 milliGRAM(s) Oral daily  atorvastatin 80 milliGRAM(s) Oral at bedtime  dextrose 5%. 1000 milliLiter(s) (50 mL/Hr) IV Continuous <Continuous>  dextrose 5%. 1000 milliLiter(s) (100 mL/Hr) IV Continuous <Continuous>  dextrose 50% Injectable 25 Gram(s) IV Push once  dextrose 50% Injectable 12.5 Gram(s) IV Push once  dextrose 50% Injectable 25 Gram(s) IV Push once  epoetin heidi-epbx (RETACRIT) Injectable 4000 Unit(s) SubCutaneous <User Schedule>  gentamicin 0.1% Cream 1 Application(s) Topical <User Schedule>  glucagon  Injectable 1 milliGRAM(s) IntraMuscular once  heparin   Injectable 5000 Unit(s) SubCutaneous every 8 hours  insulin lispro (ADMELOG) corrective regimen sliding scale   SubCutaneous three times a day before meals  insulin lispro (ADMELOG) corrective regimen sliding scale   SubCutaneous at bedtime  lidocaine   4% Patch 1 Patch Transdermal daily  mupirocin 2% Ointment 1 Application(s) Topical two times a day  potassium chloride  10 mEq/100 mL IVPB 10 milliEquivalent(s) IV Intermittent every 1 hour  sevelamer carbonate 800 milliGRAM(s) Oral three times a day with meals    MEDICATIONS  (PRN):  acetaminophen     Tablet .. 650 milliGRAM(s) Oral every 6 hours PRN Temp greater or equal to 38C (100.4F), Mild Pain (1 - 3)  dextrose Oral Gel 15 Gram(s) Oral once PRN Blood Glucose LESS THAN 70 milliGRAM(s)/deciliter      CAPILLARY BLOOD GLUCOSE      POCT Blood Glucose.: 228 mg/dL (2022 12:17)  POCT Blood Glucose.: 195 mg/dL (2022 09:58)  POCT Blood Glucose.: 155 mg/dL (2022 22:48)  POCT Blood Glucose.: 172 mg/dL (2022 19:04)    I&O's Summary    2022 07:01  -  2022 07:00  --------------------------------------------------------  IN: 8000 mL / OUT: 6000 mL / NET: 2000 mL    2022 07:01  -  2022 12:50  --------------------------------------------------------  IN: 2000 mL / OUT: 2200 mL / NET: -200 mL        PHYSICAL EXAM:  Vital Signs Last 24 Hrs  T(C): 36.7 (2022 12:30), Max: 37.6 (2022 17:00)  T(F): 98.1 (2022 12:30), Max: 99.6 (2022 17:00)  HR: 62 (2022 12:30) (57 - 66)  BP: 158/79 (2022 12:30) (125/67 - 196/79)  BP(mean): --  RR: 17 (2022 12:30) (13 - 20)  SpO2: 100% (2022 10:05) (96% - 100%)    General: NAD, non-toxic appearing   HEENT: PERRLA, EOMi, no scleral icterus  CV: RRR, normal S1 and S2, no m/r/g  Lungs: normal respiratory effort. CTAB, no wheezes, rales, or rhonchi  Abd: soft, nontender, nondistended  Ext: no edema, 2+ peripheral pulses   Pysch: AAOx3, appropriate affect   Neuro: grossly non-focal, moving all extremities spontaneously   Skin: no rashes or lesions     LABS:                          8.0    7.77  )-----------( 188      ( 2022 06:00 )             24.9       WBC Trend: 7.77<--, 8.06<--  Hb Trend: 8.0<--, 9.6<--    06-17    133<L>  |  93<L>  |  55<H>  ----------------------------<  135<H>  2.7<LL>   |  24  |  8.53<H>    Ca    7.9<L>      2022 06:00  Phos  5.7     06-17  Mg     2.00     06-17    TPro  5.8<L>  /  Alb  3.1<L>  /  TBili  0.3  /  DBili  x   /  AST  25  /  ALT  14  /  AlkPhos  49  06-16    PT/INR - ( 2022 01:45 )   PT: 11.6 sec;   INR: 1.00 ratio         PTT - ( 2022 01:45 )  PTT:28.6 sec      Urinalysis Basic - ( 2022 04:35 )    Color: Yellow / Appearance: Clear / S.028 / pH: x  Gluc: x / Ketone: Trace  / Bili: Negative / Urobili: <2 mg/dL   Blood: x / Protein: >600 mg/dL / Nitrite: Negative   Leuk Esterase: Negative / RBC: 2 /HPF / WBC 2 /HPF   Sq Epi: x / Non Sq Epi: 1 /HPF / Bacteria: Negative        Culture - Urine (collected 2022 08:38)  Source: Clean Catch Clean Catch (Midstream)  Final Report (2022 07:38):    <10,000 CFU/mL Normal Urogenital Vickie    Culture - Urine (collected 2022 08:38)  Source: Clean Catch Clean Catch (Midstream)  Final Report (2022 09:02):    No growth    Culture - Blood (collected 2022 01:45)  Source: .Blood Blood-Peripheral  Preliminary Report (2022 07:01):    No growth to date.    Culture - Blood (collected 2022 01:30)  Source: .Blood Blood-Peripheral  Preliminary Report (2022 07:01):    No growth to date.          RADIOLOGY & ADDITIONAL TESTS: Reviewed

## 2022-06-17 NOTE — DIETITIAN INITIAL EVALUATION ADULT - PERTINENT MEDS FT
MEDICATIONS  (STANDING):  amLODIPine   Tablet 10 milliGRAM(s) Oral daily  atorvastatin 80 milliGRAM(s) Oral at bedtime  dextrose 5%. 1000 milliLiter(s) (50 mL/Hr) IV Continuous <Continuous>  dextrose 5%. 1000 milliLiter(s) (100 mL/Hr) IV Continuous <Continuous>  dextrose 50% Injectable 25 Gram(s) IV Push once  dextrose 50% Injectable 12.5 Gram(s) IV Push once  dextrose 50% Injectable 25 Gram(s) IV Push once  epoetin heidi-epbx (RETACRIT) Injectable 4000 Unit(s) SubCutaneous <User Schedule>  gentamicin 0.1% Cream 1 Application(s) Topical <User Schedule>  glucagon  Injectable 1 milliGRAM(s) IntraMuscular once  heparin   Injectable 5000 Unit(s) SubCutaneous every 8 hours  hydrALAZINE 50 milliGRAM(s) Oral every 8 hours  insulin lispro (ADMELOG) corrective regimen sliding scale   SubCutaneous three times a day before meals  insulin lispro (ADMELOG) corrective regimen sliding scale   SubCutaneous at bedtime  lidocaine   4% Patch 1 Patch Transdermal daily  mupirocin 2% Ointment 1 Application(s) Topical two times a day  sevelamer carbonate 800 milliGRAM(s) Oral three times a day with meals    MEDICATIONS  (PRN):  acetaminophen     Tablet .. 650 milliGRAM(s) Oral every 6 hours PRN Temp greater or equal to 38C (100.4F), Mild Pain (1 - 3)  dextrose Oral Gel 15 Gram(s) Oral once PRN Blood Glucose LESS THAN 70 milliGRAM(s)/deciliter

## 2022-06-17 NOTE — DIETITIAN INITIAL EVALUATION ADULT - NSICDXPASTMEDICALHX_GEN_ALL_CORE_FT
PAST MEDICAL HISTORY:  CVA (cerebrovascular accident) 2010 without residual effects    ESRD on peritoneal dialysis     Hyperlipidemia     Hypertension     Type 2 diabetes mellitus, with long-term current use of insulin

## 2022-06-17 NOTE — PROGRESS NOTE ADULT - PROBLEM SELECTOR PLAN 6
No slurred speech currently. Presented to [place] due to concern fo slurred speech and lethargy  - CT imaging in chart  negative for large vessel occlusion or flow limitations  - Possible that elevated fever 2/2 parainfluenza caused transient delirium vs hypertensive emergency vs TIA  - TSH wnl  - assessed by S&S- regular diet, thin liquids   - fall, aspiration precautions  - PT/OT   - neuro checks Q4 per pt, had 3 falls within the past 2 weeks. unable to clearly specify whether pain preceded falls. per son, had followed up outpt w/ ortho with XR and CT scans of lumbar, hip, and knee and were reportedly unremarkable and told to f/u with PT   - pt with diffuse L hip/LLE pain on exam, no focal swelling, TTP, erythema. no joint effusion. DP pulses 2+ and symmetric   - will get further collateral from son regarding past imaging to avoid repeating   - per CM, no [facility] facilities accept PD, would need home PT  - MR brain unlikely to be revealing, difficulty ambulating appears to be 2/2 pain rather than weakness

## 2022-06-17 NOTE — PROGRESS NOTE ADULT - SUBJECTIVE AND OBJECTIVE BOX
[facility] - SOLOMON Hurtado / SOLOMON Foley / KANDICE Shanks/ SOLOMON Broderick/ SOLOMON Allan/ NOMI Mao / VIRGILIO Marks / BNEJY Woo  ---------------------------------------------------------------------------------------------------------------  seen and examined today for ESRD  Interval : NAD  VITALS:  T(F): 97.7 (06-17-22 @ 10:05), Max: 99.6 (06-16-22 @ 17:00)  HR: 65 (06-17-22 @ 10:05)  BP: 172/74 (06-17-22 @ 10:05)  RR: 17 (06-17-22 @ 10:05)  SpO2: 100% (06-17-22 @ 10:05)  Wt(kg): --    06-16 @ 07:01  -  06-17 @ 07:00  --------------------------------------------------------  IN: 8000 mL / OUT: 6000 mL / NET: 2000 mL    06-17 @ 07:01  -  06-17 @ 10:24  --------------------------------------------------------  IN: 2000 mL / OUT: 2200 mL / NET: -200 mL      Physical Exam :-  Constitutional: NAD  Neck: Supple.  Respiratory: Bilateral equal breath sounds,  Cardiovascular: S1, S2 normal,  Gastrointestinal: Bowel Sounds present, soft, non tender.  Extremities: No edema, pain over RLE  Neurological: Alert and Oriented x 3, decreased strength of RLE  Psychiatric: Normal mood, normal affect  Data:-  Allergies :   No Known Allergies    Hospital Medications:   MEDICATIONS  (STANDING):  amLODIPine   Tablet 10 milliGRAM(s) Oral daily  atorvastatin 80 milliGRAM(s) Oral at bedtime  dextrose 5%. 1000 milliLiter(s) (50 mL/Hr) IV Continuous <Continuous>  dextrose 5%. 1000 milliLiter(s) (100 mL/Hr) IV Continuous <Continuous>  dextrose 50% Injectable 25 Gram(s) IV Push once  dextrose 50% Injectable 12.5 Gram(s) IV Push once  dextrose 50% Injectable 25 Gram(s) IV Push once  epoetin heidi-epbx (RETACRIT) Injectable 4000 Unit(s) SubCutaneous <User Schedule>  gentamicin 0.1% Cream 1 Application(s) Topical <User Schedule>  glucagon  Injectable 1 milliGRAM(s) IntraMuscular once  heparin   Injectable 5000 Unit(s) SubCutaneous every 8 hours  insulin lispro (ADMELOG) corrective regimen sliding scale   SubCutaneous three times a day before meals  insulin lispro (ADMELOG) corrective regimen sliding scale   SubCutaneous at bedtime  mupirocin 2% Ointment 1 Application(s) Topical two times a day  potassium chloride  10 mEq/100 mL IVPB 10 milliEquivalent(s) IV Intermittent every 1 hour  sevelamer carbonate 800 milliGRAM(s) Oral three times a day with meals    06-17    133<L>  |  93<L>  |  55<H>  ----------------------------<  135<H>  2.7<LL>   |  24  |  8.53<H>    Ca    7.9<L>      17 Jun 2022 06:00  Phos  5.7     06-17  Mg     2.00     06-17    TPro  5.8<L>  /  Alb  3.1<L>  /  TBili  0.3  /  DBili      /  AST  25  /  ALT  14  /  AlkPhos  49  06-16    Creatinine Trend: 8.53 <--, 8.63 <--, 8.31 <--                        8.0    7.77  )-----------( 188      ( 17 Jun 2022 06:00 )             24.9

## 2022-06-17 NOTE — PROVIDER CONTACT NOTE (OTHER) - BACKGROUND
Patient admitted with fever and cough. Hx of DM2, CVA, ESRD.
Patient admitted with fever and cough. Hx of DM2, CVA, ESRD.

## 2022-06-17 NOTE — PROGRESS NOTE ADULT - PROBLEM SELECTOR PLAN 4
Problem: ESRD on peritoneal dialysis.   ·  Plan: - On nightly PD at home  - patient also on Bumex 1mg per son, on hold in setting of goal for gradual normotension.  - Follows with Dr. Gillian Snyder  - Nephrology following     #normocytic anemia  - unclear baseline, like AOCKD  - confirm outpt w/u with PMD, given report of weight loss would have patient f/u with PMD for age appropriate cancer screening (never had colonoscopy)  - trend

## 2022-06-18 LAB
ANION GAP SERPL CALC-SCNC: 14 MMOL/L — SIGNIFICANT CHANGE UP (ref 7–14)
BUN SERPL-MCNC: 48 MG/DL — HIGH (ref 7–23)
CALCIUM SERPL-MCNC: 7.6 MG/DL — LOW (ref 8.4–10.5)
CHLORIDE SERPL-SCNC: 93 MMOL/L — LOW (ref 98–107)
CO2 SERPL-SCNC: 23 MMOL/L — SIGNIFICANT CHANGE UP (ref 22–31)
CREAT SERPL-MCNC: 7.59 MG/DL — HIGH (ref 0.5–1.3)
EGFR: 7 ML/MIN/1.73M2 — LOW
GLUCOSE BLDC GLUCOMTR-MCNC: 189 MG/DL — HIGH (ref 70–99)
GLUCOSE BLDC GLUCOMTR-MCNC: 191 MG/DL — HIGH (ref 70–99)
GLUCOSE BLDC GLUCOMTR-MCNC: 204 MG/DL — HIGH (ref 70–99)
GLUCOSE BLDC GLUCOMTR-MCNC: 208 MG/DL — HIGH (ref 70–99)
GLUCOSE BLDC GLUCOMTR-MCNC: 251 MG/DL — HIGH (ref 70–99)
GLUCOSE BLDC GLUCOMTR-MCNC: 271 MG/DL — HIGH (ref 70–99)
GLUCOSE SERPL-MCNC: 134 MG/DL — HIGH (ref 70–99)
HCT VFR BLD CALC: 24.2 % — LOW (ref 39–50)
HGB BLD-MCNC: 7.8 G/DL — LOW (ref 13–17)
MAGNESIUM SERPL-MCNC: 1.9 MG/DL — SIGNIFICANT CHANGE UP (ref 1.6–2.6)
MCHC RBC-ENTMCNC: 26.4 PG — LOW (ref 27–34)
MCHC RBC-ENTMCNC: 32.2 GM/DL — SIGNIFICANT CHANGE UP (ref 32–36)
MCV RBC AUTO: 81.8 FL — SIGNIFICANT CHANGE UP (ref 80–100)
NRBC # BLD: 0 /100 WBCS — SIGNIFICANT CHANGE UP
NRBC # FLD: 0 K/UL — SIGNIFICANT CHANGE UP
PHOSPHATE SERPL-MCNC: 4.6 MG/DL — HIGH (ref 2.5–4.5)
PLATELET # BLD AUTO: 187 K/UL — SIGNIFICANT CHANGE UP (ref 150–400)
POTASSIUM SERPL-MCNC: 3.1 MMOL/L — LOW (ref 3.5–5.3)
POTASSIUM SERPL-SCNC: 3.1 MMOL/L — LOW (ref 3.5–5.3)
RBC # BLD: 2.96 M/UL — LOW (ref 4.2–5.8)
RBC # FLD: 15 % — HIGH (ref 10.3–14.5)
SODIUM SERPL-SCNC: 130 MMOL/L — LOW (ref 135–145)
WBC # BLD: 7.47 K/UL — SIGNIFICANT CHANGE UP (ref 3.8–10.5)
WBC # FLD AUTO: 7.47 K/UL — SIGNIFICANT CHANGE UP (ref 3.8–10.5)

## 2022-06-18 PROCEDURE — 99232 SBSQ HOSP IP/OBS MODERATE 35: CPT | Mod: GC

## 2022-06-18 RX ORDER — TRAMADOL HYDROCHLORIDE 50 MG/1
25 TABLET ORAL EVERY 8 HOURS
Refills: 0 | Status: DISCONTINUED | OUTPATIENT
Start: 2022-06-18 | End: 2022-06-20

## 2022-06-18 RX ORDER — ACETAMINOPHEN 500 MG
975 TABLET ORAL EVERY 6 HOURS
Refills: 0 | Status: DISCONTINUED | OUTPATIENT
Start: 2022-06-18 | End: 2022-06-20

## 2022-06-18 RX ORDER — TRAMADOL HYDROCHLORIDE 50 MG/1
25 TABLET ORAL EVERY 8 HOURS
Refills: 0 | Status: DISCONTINUED | OUTPATIENT
Start: 2022-06-18 | End: 2022-06-18

## 2022-06-18 RX ORDER — POTASSIUM CHLORIDE 20 MEQ
40 PACKET (EA) ORAL ONCE
Refills: 0 | Status: COMPLETED | OUTPATIENT
Start: 2022-06-18 | End: 2022-06-18

## 2022-06-18 RX ORDER — OXYCODONE HYDROCHLORIDE 5 MG/1
5 TABLET ORAL EVERY 6 HOURS
Refills: 0 | Status: DISCONTINUED | OUTPATIENT
Start: 2022-06-18 | End: 2022-06-18

## 2022-06-18 RX ADMIN — Medication 975 MILLIGRAM(S): at 12:15

## 2022-06-18 RX ADMIN — SENNA PLUS 2 TABLET(S): 8.6 TABLET ORAL at 21:25

## 2022-06-18 RX ADMIN — MUPIROCIN 1 APPLICATION(S): 20 OINTMENT TOPICAL at 06:49

## 2022-06-18 RX ADMIN — Medication 1 APPLICATION(S): at 09:04

## 2022-06-18 RX ADMIN — MUPIROCIN 1 APPLICATION(S): 20 OINTMENT TOPICAL at 17:49

## 2022-06-18 RX ADMIN — AMLODIPINE BESYLATE 10 MILLIGRAM(S): 2.5 TABLET ORAL at 06:49

## 2022-06-18 RX ADMIN — Medication 3: at 13:11

## 2022-06-18 RX ADMIN — HEPARIN SODIUM 5000 UNIT(S): 5000 INJECTION INTRAVENOUS; SUBCUTANEOUS at 21:25

## 2022-06-18 RX ADMIN — HEPARIN SODIUM 5000 UNIT(S): 5000 INJECTION INTRAVENOUS; SUBCUTANEOUS at 06:49

## 2022-06-18 RX ADMIN — Medication 3: at 17:49

## 2022-06-18 RX ADMIN — Medication 40 MILLIEQUIVALENT(S): at 09:04

## 2022-06-18 RX ADMIN — Medication 1: at 09:02

## 2022-06-18 RX ADMIN — LIDOCAINE 1 PATCH: 4 CREAM TOPICAL at 13:10

## 2022-06-18 RX ADMIN — Medication 975 MILLIGRAM(S): at 17:48

## 2022-06-18 RX ADMIN — Medication 50 MILLIGRAM(S): at 06:49

## 2022-06-18 RX ADMIN — ATORVASTATIN CALCIUM 80 MILLIGRAM(S): 80 TABLET, FILM COATED ORAL at 21:25

## 2022-06-18 RX ADMIN — LIDOCAINE 1 PATCH: 4 CREAM TOPICAL at 01:37

## 2022-06-18 RX ADMIN — SEVELAMER CARBONATE 800 MILLIGRAM(S): 2400 POWDER, FOR SUSPENSION ORAL at 09:03

## 2022-06-18 RX ADMIN — Medication 50 MILLIGRAM(S): at 14:18

## 2022-06-18 RX ADMIN — LIDOCAINE 1 PATCH: 4 CREAM TOPICAL at 19:56

## 2022-06-18 RX ADMIN — HEPARIN SODIUM 5000 UNIT(S): 5000 INJECTION INTRAVENOUS; SUBCUTANEOUS at 14:17

## 2022-06-18 RX ADMIN — SEVELAMER CARBONATE 800 MILLIGRAM(S): 2400 POWDER, FOR SUSPENSION ORAL at 17:49

## 2022-06-18 RX ADMIN — Medication 50 MILLIGRAM(S): at 21:25

## 2022-06-18 RX ADMIN — SEVELAMER CARBONATE 800 MILLIGRAM(S): 2400 POWDER, FOR SUSPENSION ORAL at 13:09

## 2022-06-18 RX ADMIN — Medication 975 MILLIGRAM(S): at 13:15

## 2022-06-18 NOTE — PROGRESS NOTE ADULT - SUBJECTIVE AND OBJECTIVE BOX
Patient seen and examined  no complaints    No Known Allergies    Hospital Medications:   MEDICATIONS  (STANDING):  acetaminophen     Tablet .. 975 milliGRAM(s) Oral every 6 hours  amLODIPine   Tablet 10 milliGRAM(s) Oral daily  atorvastatin 80 milliGRAM(s) Oral at bedtime  dextrose 5%. 1000 milliLiter(s) (50 mL/Hr) IV Continuous <Continuous>  dextrose 5%. 1000 milliLiter(s) (100 mL/Hr) IV Continuous <Continuous>  dextrose 50% Injectable 25 Gram(s) IV Push once  dextrose 50% Injectable 12.5 Gram(s) IV Push once  dextrose 50% Injectable 25 Gram(s) IV Push once  epoetin heidi-epbx (RETACRIT) Injectable 4000 Unit(s) SubCutaneous <User Schedule>  gentamicin 0.1% Cream 1 Application(s) Topical <User Schedule>  glucagon  Injectable 1 milliGRAM(s) IntraMuscular once  heparin   Injectable 5000 Unit(s) SubCutaneous every 8 hours  hydrALAZINE 50 milliGRAM(s) Oral every 8 hours  insulin lispro (ADMELOG) corrective regimen sliding scale   SubCutaneous three times a day before meals  insulin lispro (ADMELOG) corrective regimen sliding scale   SubCutaneous at bedtime  lidocaine   4% Patch 1 Patch Transdermal daily  mupirocin 2% Ointment 1 Application(s) Topical two times a day  polyethylene glycol 3350 17 Gram(s) Oral two times a day  senna 2 Tablet(s) Oral at bedtime  sevelamer carbonate 800 milliGRAM(s) Oral three times a day with meals        VITALS:  T(F): 98.3 (22 @ 09:00), Max: 98.5 (22 @ 06:57)  HR: 71 (22 @ 09:00)  BP: 166/58 (22 @ 09:00)  RR: 18 (22 @ 09:00)  SpO2: 100% (22 @ 09:00)  Wt(kg): --     @ 07:01  -   @ 07:00  --------------------------------------------------------  IN: 6950 mL / OUT: 8800 mL / NET: -1850 mL     @ 07:01  -   @ 10:53  --------------------------------------------------------  IN: 2000 mL / OUT: 1800 mL / NET: 200 mL        Physical Exam :-  Constitutional: NAD  Neck: Supple.  Respiratory: Bilateral equal breath sounds,  Cardiovascular: S1, S2 normal,  Gastrointestinal: Bowel Sounds present, soft, non tender.  Extremities: No edema, pain over RLE  Neurological: Alert and Oriented x 3, decreased strength of RLE  Psychiatric: Normal mood, normal affect    LABS:      130<L>  |  93<L>  |  48<H>  ----------------------------<  134<H>  3.1<L>   |  23  |  7.59<H>    Ca    7.6<L>      2022 06:00  Phos  4.6       Mg     1.90           Creatinine Trend: 7.59 <--, 7.77 <--, 8.53 <--, 8.63 <--, 8.31 <--                        7.8    7.47  )-----------( 187      ( 2022 06:00 )             24.2     Urine Studies:  Urinalysis Basic - ( 2022 04:35 )    Color: Yellow / Appearance: Clear / S.028 / pH:   Gluc:  / Ketone: Trace  / Bili: Negative / Urobili: <2 mg/dL   Blood:  / Protein: >600 mg/dL / Nitrite: Negative   Leuk Esterase: Negative / RBC: 2 /HPF / WBC 2 /HPF   Sq Epi:  / Non Sq Epi: 1 /HPF / Bacteria: Negative        RADIOLOGY & ADDITIONAL STUDIES:

## 2022-06-18 NOTE — PROGRESS NOTE ADULT - PROBLEM SELECTOR PLAN 11
DVT ppx: heparin SQ  Diet: Renal restrictions DVT ppx: heparin SQ  Diet: Renal restrictions  Dispo: pending pain control of LE  PT eval: pt was in too much pain to work with PT, however, no [facility] facilities able to accommodate PD, pt would need home PT

## 2022-06-18 NOTE — PROGRESS NOTE ADULT - PROBLEM SELECTOR PLAN 3
Hypertensive to 226/62 in ED  - BP improved today  - after speaking with son, patient reportedly not taking BP meds past 3-4 days, aim for gradual BP control.  - c/w home amlodipine  - resume home hydralazine parainfluenza positive. Likely source of cough  - c/w supportive care, isolation precautions

## 2022-06-18 NOTE — CHART NOTE - NSCHARTNOTEFT_GEN_A_CORE
ISTOP checked and reviewed by me on 06-18-22 @ 09:50  Reference #: [id number]    Rx Last Dispensed: 05/09/2022  Rx: acetaminophen-cod #3 tablet  Quant: 28  Days: 7   Prescriber: Mario Mondragon MD, PGY1

## 2022-06-18 NOTE — PROGRESS NOTE ADULT - PROBLEM SELECTOR PLAN 7
Unclear A1c. Per son, patient on PRN lantus at home?  - a1c 6.7%  - FS TID premeals + QHS with low dose correctional scale

## 2022-06-18 NOTE — PROGRESS NOTE ADULT - ATTENDING COMMENTS
65 yo M with hx of CVA, uncontrolled HTN,  DM, ESRD on PD present to OSH for fever and acute metabolic encephalopathy, likely 2/2 parainfluenza and hypertensive urgency    afebrile o/n. currently AAOX 3, MS appears to be baseline today    -monitor off abx as infectious w/u inc. CXR, blood cx, UA, PD fluid analysis negative for bacterial infection   -c/w PD as per renal   -BP remains elevated- c/w norvasc 10. c/w hydralazine 50 tid, titrate as needed  -added on standing tylenol 975mg q 6 hours standing, tramadol prn  -PT eval- rehab.      Rest of plan as Detailed Above

## 2022-06-18 NOTE — PROGRESS NOTE ADULT - PROBLEM SELECTOR PLAN 9
TSH wnl here  - son made aware of finding on CT from [place]  - unclear chronicity  - outpt f/u with PMD

## 2022-06-18 NOTE — PROGRESS NOTE ADULT - PROBLEM SELECTOR PLAN 5
No slurred speech currently. Presented to [facility] due to concern fo slurred speech and lethargy  - CT imaging in chart  negative for large vessel occlusion or flow limitations  - Possible that elevated fever 2/2 parainfluenza caused transient delirium vs hypertensive emergency vs TIA  - TSH wnl  - assessed by S&S- regular diet, thin liquids   - fall, aspiration precautions  - assessed by PT, was unable to cooperate due to pain, anticipate rehab. assessed by OT  --- no [facility] facilities able to perform PD, would have to do home PT   - neuro checks Q4  - confirm w/ family when they visit to see if he is back to baseline. No clear indication for MR at this time if back at baseline. Problem: ESRD on peritoneal dialysis.   ·  Plan: - On nightly PD at home  - patient also on Bumex 1mg per son, on hold in setting of goal for gradual normotension.  - Follows with Dr. Gillian Snyder  - Nephrology following     #normocytic anemia  - unclear baseline, like AOCKD  - confirm outpt w/u with PMD, given report of weight loss would have patient f/u with PMD for age appropriate cancer screening (never had colonoscopy)  - trend

## 2022-06-18 NOTE — PROGRESS NOTE ADULT - PROBLEM SELECTOR PLAN 6
per pt, had 3 falls within the past 2 weeks. unable to clearly specify whether pain preceded falls. per son, had followed up outpt w/ ortho with XR and CT scans of lumbar, hip, and knee and were reportedly unremarkable and told to f/u with PT   - pt with diffuse L hip/LLE pain on exam, no focal swelling, TTP, erythema. no joint effusion. DP pulses 2+ and symmetric   - will get further collateral from son regarding past imaging to avoid repeating   - per CM, no [facility] facilities accept PD, would need home PT  - MR brain unlikely to be revealing, difficulty ambulating appears to be 2/2 pain rather than weakness No slurred speech currently. Presented to [facility] due to concern fo slurred speech and lethargy  - CT imaging in chart  negative for large vessel occlusion or flow limitations  - Possible that elevated fever 2/2 parainfluenza caused transient delirium vs hypertensive emergency vs TIA  - TSH wnl  - assessed by S&S- regular diet, thin liquids   - fall, aspiration precautions  - assessed by PT, was unable to cooperate due to pain, anticipate rehab. assessed by OT  --- no [facility] facilities able to perform PD, would have to do home PT   - confirm w/ family when they visit to see if he is back to baseline. No clear indication for MR at this time if back at baseline.

## 2022-06-18 NOTE — PROGRESS NOTE ADULT - SUBJECTIVE AND OBJECTIVE BOX
Progress Note    06-16-22 (2d)    Patient is a 66y old  Male who presents with a chief complaint of peritoneal dialysis (18 Jun 2022 07:13)      Subjective / Overnight Events :  - No acute events overnight.  - Pt seen and examined at bedside this AM. Reporting 10/10 RLE pain, minimal to no relief with tylenol, mild relief with lidocaine patch. Pt requesting stronger pain medication.     Additional ROS (if any):    MEDICATIONS  (STANDING):  amLODIPine   Tablet 10 milliGRAM(s) Oral daily  atorvastatin 80 milliGRAM(s) Oral at bedtime  dextrose 5%. 1000 milliLiter(s) (50 mL/Hr) IV Continuous <Continuous>  dextrose 5%. 1000 milliLiter(s) (100 mL/Hr) IV Continuous <Continuous>  dextrose 50% Injectable 25 Gram(s) IV Push once  dextrose 50% Injectable 12.5 Gram(s) IV Push once  dextrose 50% Injectable 25 Gram(s) IV Push once  epoetin heidi-epbx (RETACRIT) Injectable 4000 Unit(s) SubCutaneous <User Schedule>  gentamicin 0.1% Cream 1 Application(s) Topical <User Schedule>  glucagon  Injectable 1 milliGRAM(s) IntraMuscular once  heparin   Injectable 5000 Unit(s) SubCutaneous every 8 hours  hydrALAZINE 50 milliGRAM(s) Oral every 8 hours  insulin lispro (ADMELOG) corrective regimen sliding scale   SubCutaneous three times a day before meals  insulin lispro (ADMELOG) corrective regimen sliding scale   SubCutaneous at bedtime  lidocaine   4% Patch 1 Patch Transdermal daily  mupirocin 2% Ointment 1 Application(s) Topical two times a day  polyethylene glycol 3350 17 Gram(s) Oral two times a day  senna 2 Tablet(s) Oral at bedtime  sevelamer carbonate 800 milliGRAM(s) Oral three times a day with meals    MEDICATIONS  (PRN):  acetaminophen     Tablet .. 650 milliGRAM(s) Oral every 6 hours PRN Temp greater or equal to 38C (100.4F), Mild Pain (1 - 3)  dextrose Oral Gel 15 Gram(s) Oral once PRN Blood Glucose LESS THAN 70 milliGRAM(s)/deciliter  oxyCODONE    IR 5 milliGRAM(s) Oral every 6 hours PRN Severe Pain (7 - 10)      CAPILLARY BLOOD GLUCOSE      POCT Blood Glucose.: 191 mg/dL (18 Jun 2022 08:48)  POCT Blood Glucose.: 286 mg/dL (17 Jun 2022 22:48)  POCT Blood Glucose.: 151 mg/dL (17 Jun 2022 18:05)  POCT Blood Glucose.: 228 mg/dL (17 Jun 2022 12:17)    I&O's Summary    17 Jun 2022 07:01  -  18 Jun 2022 07:00  --------------------------------------------------------  IN: 6950 mL / OUT: 8800 mL / NET: -1850 mL    18 Jun 2022 07:01  -  18 Jun 2022 10:07  --------------------------------------------------------  IN: 2000 mL / OUT: 1800 mL / NET: 200 mL        PHYSICAL EXAM:  Vital Signs Last 24 Hrs  T(C): 36.8 (18 Jun 2022 09:00), Max: 36.9 (18 Jun 2022 06:57)  T(F): 98.3 (18 Jun 2022 09:00), Max: 98.5 (18 Jun 2022 06:57)  HR: 71 (18 Jun 2022 09:00) (59 - 80)  BP: 166/58 (18 Jun 2022 09:00) (155/67 - 180/87)  BP(mean): --  RR: 18 (18 Jun 2022 09:00) (17 - 18)  SpO2: 100% (18 Jun 2022 09:00) (99% - 100%)    General: NAD, non-toxic appearing   HEENT: EOMi, no scleral icterus  CV: RRR, normal S1 and S2, no m/r/g  Lungs: normal respiratory effort. CTAB, no wheezes, rales, or rhonchi  Abd: soft, nontender, nondistended  Ext: no edema, 2+ peripheral pulses. No R calf erythema/swelling/tenderness. No joint swelling/erythema.   Pysch: AAOx3, appropriate affect   Neuro: grossly non-focal, moving all extremities spontaneously       LABS:                          7.8    7.47  )-----------( 187      ( 18 Jun 2022 06:00 )             24.2       WBC Trend: 7.47<--, 7.77<--, 8.06<--  Hb Trend: 7.8<--, 8.0<--, 9.6<--    06-18    130<L>  |  93<L>  |  48<H>  ----------------------------<  134<H>  3.1<L>   |  23  |  7.59<H>    Ca    7.6<L>      18 Jun 2022 06:00  Phos  4.6     06-18  Mg     1.90     06-18        Culture - Urine (collected 16 Jun 2022 08:38)  Source: Clean Catch Clean Catch (Midstream)  Final Report (17 Jun 2022 07:38):    <10,000 CFU/mL Normal Urogenital Vickie    Culture - Urine (collected 16 Jun 2022 08:38)  Source: Clean Catch Clean Catch (Midstream)  Final Report (17 Jun 2022 09:02):    No growth    Culture - Blood (collected 16 Jun 2022 01:45)  Source: .Blood Blood-Peripheral  Preliminary Report (17 Jun 2022 07:01):    No growth to date.    Culture - Blood (collected 16 Jun 2022 01:30)  Source: .Blood Blood-Peripheral  Preliminary Report (17 Jun 2022 07:01):    No growth to date.        RADIOLOGY & ADDITIONAL TESTS: Reviewed Progress Note    06-16-22 (2d)    Patient is a 66y old  Male who presents with a chief complaint of peritoneal dialysis (18 Jun 2022 07:13)      Subjective / Overnight Events :  - No acute events overnight.  - Pt seen and examined at bedside this AM. Reporting 10/10 RLE pain, minimal to no relief with tylenol, mild relief with lidocaine patch. Pt requesting stronger pain medication.   - Spoke with son Chet Aleman over phone: per son, pt saw ortho Dr. Ness outpt for RLE pain and had extensive imaging done. R knee replacement was discussed but given PD, was not offered. Pt then advised to follow up with physical therapy. Son interested in home PT. Agrees no need for repeat imaging here. States that pt was recently prescribed percocet but that it made him very constipated, requesting we avoid oxycodone in patient. Son visited pt yesterday and states he has returned to his baseline mental status.     Additional ROS (if any):    MEDICATIONS  (STANDING):  amLODIPine   Tablet 10 milliGRAM(s) Oral daily  atorvastatin 80 milliGRAM(s) Oral at bedtime  dextrose 5%. 1000 milliLiter(s) (50 mL/Hr) IV Continuous <Continuous>  dextrose 5%. 1000 milliLiter(s) (100 mL/Hr) IV Continuous <Continuous>  dextrose 50% Injectable 25 Gram(s) IV Push once  dextrose 50% Injectable 12.5 Gram(s) IV Push once  dextrose 50% Injectable 25 Gram(s) IV Push once  epoetin heidi-epbx (RETACRIT) Injectable 4000 Unit(s) SubCutaneous <User Schedule>  gentamicin 0.1% Cream 1 Application(s) Topical <User Schedule>  glucagon  Injectable 1 milliGRAM(s) IntraMuscular once  heparin   Injectable 5000 Unit(s) SubCutaneous every 8 hours  hydrALAZINE 50 milliGRAM(s) Oral every 8 hours  insulin lispro (ADMELOG) corrective regimen sliding scale   SubCutaneous three times a day before meals  insulin lispro (ADMELOG) corrective regimen sliding scale   SubCutaneous at bedtime  lidocaine   4% Patch 1 Patch Transdermal daily  mupirocin 2% Ointment 1 Application(s) Topical two times a day  polyethylene glycol 3350 17 Gram(s) Oral two times a day  senna 2 Tablet(s) Oral at bedtime  sevelamer carbonate 800 milliGRAM(s) Oral three times a day with meals    MEDICATIONS  (PRN):  acetaminophen     Tablet .. 650 milliGRAM(s) Oral every 6 hours PRN Temp greater or equal to 38C (100.4F), Mild Pain (1 - 3)  dextrose Oral Gel 15 Gram(s) Oral once PRN Blood Glucose LESS THAN 70 milliGRAM(s)/deciliter  oxyCODONE    IR 5 milliGRAM(s) Oral every 6 hours PRN Severe Pain (7 - 10)      CAPILLARY BLOOD GLUCOSE      POCT Blood Glucose.: 191 mg/dL (18 Jun 2022 08:48)  POCT Blood Glucose.: 286 mg/dL (17 Jun 2022 22:48)  POCT Blood Glucose.: 151 mg/dL (17 Jun 2022 18:05)  POCT Blood Glucose.: 228 mg/dL (17 Jun 2022 12:17)    I&O's Summary    17 Jun 2022 07:01  -  18 Jun 2022 07:00  --------------------------------------------------------  IN: 6950 mL / OUT: 8800 mL / NET: -1850 mL    18 Jun 2022 07:01  -  18 Jun 2022 10:07  --------------------------------------------------------  IN: 2000 mL / OUT: 1800 mL / NET: 200 mL        PHYSICAL EXAM:  Vital Signs Last 24 Hrs  T(C): 36.8 (18 Jun 2022 09:00), Max: 36.9 (18 Jun 2022 06:57)  T(F): 98.3 (18 Jun 2022 09:00), Max: 98.5 (18 Jun 2022 06:57)  HR: 71 (18 Jun 2022 09:00) (59 - 80)  BP: 166/58 (18 Jun 2022 09:00) (155/67 - 180/87)  BP(mean): --  RR: 18 (18 Jun 2022 09:00) (17 - 18)  SpO2: 100% (18 Jun 2022 09:00) (99% - 100%)    General: NAD, non-toxic appearing   HEENT: EOMi, no scleral icterus  CV: RRR, normal S1 and S2, no m/r/g  Lungs: normal respiratory effort. CTAB, no wheezes, rales, or rhonchi  Abd: soft, nontender, nondistended  Ext: no edema, 2+ peripheral pulses. No R calf erythema/swelling/tenderness. No joint swelling/erythema.   Pysch: AAOx3, appropriate affect   Neuro: grossly non-focal, moving all extremities spontaneously       LABS:                          7.8    7.47  )-----------( 187      ( 18 Jun 2022 06:00 )             24.2       WBC Trend: 7.47<--, 7.77<--, 8.06<--  Hb Trend: 7.8<--, 8.0<--, 9.6<--    06-18    130<L>  |  93<L>  |  48<H>  ----------------------------<  134<H>  3.1<L>   |  23  |  7.59<H>    Ca    7.6<L>      18 Jun 2022 06:00  Phos  4.6     06-18  Mg     1.90     06-18        Culture - Urine (collected 16 Jun 2022 08:38)  Source: Clean Catch Clean Catch (Midstream)  Final Report (17 Jun 2022 07:38):    <10,000 CFU/mL Normal Urogenital Vickie    Culture - Urine (collected 16 Jun 2022 08:38)  Source: Clean Catch Clean Catch (Midstream)  Final Report (17 Jun 2022 09:02):    No growth    Culture - Blood (collected 16 Jun 2022 01:45)  Source: .Blood Blood-Peripheral  Preliminary Report (17 Jun 2022 07:01):    No growth to date.    Culture - Blood (collected 16 Jun 2022 01:30)  Source: .Blood Blood-Peripheral  Preliminary Report (17 Jun 2022 07:01):    No growth to date.        RADIOLOGY & ADDITIONAL TESTS: Reviewed

## 2022-06-18 NOTE — PROGRESS NOTE ADULT - PROBLEM SELECTOR PLAN 2
parainfluenza positive. Likely source of cough  - c/w supportive care, isolation precautions T 103.7. RR >20. s/p zosynx1 in ED. likely 2/2 Parainfluenza. Have remained afebrile here while off abx   - CXR clear, UA neg for infection, UCx neg   - f/u blood cultures- ngtd   - MRSA PCR neg, positive MSSA   - PD cx unremarkable for infection, low suspicion for SBP   - Hold on further abx for now  - lactate wnl

## 2022-06-18 NOTE — PROGRESS NOTE ADULT - PROBLEM SELECTOR PLAN 1
T 103.7. RR >20. s/p zosynx1 in ED. likely 2/2 Parainfluenza. Have remained afebrile here while off abx   - CXR clear, UA neg for infection, UCx neg   - f/u blood cultures- ngtd   - MRSA PCR neg, positive MSSA   - PD cx unremarkable for infection, low suspicion for SBP   - Hold on further abx for now  - lactate wnl per pt, had 3 falls within the past 2 weeks. unable to clearly specify whether pain preceded falls. per son, had followed up outpt w/ ortho with XR and CT scans of lumbar, hip, and knee and were reportedly unremarkable and told to f/u with PT   - pt with diffuse L hip/LLE pain on exam, no focal swelling, TTP, erythema. no joint effusion. DP pulses 2+ and symmetric   - will get further collateral from son regarding past imaging to avoid repeating   - per CM, no [facility] facilities accept PD, would need home PT  - MR brain unlikely to be revealing, difficulty ambulating appears to be 2/2 pain rather than weakness    - pain control w/ tylenol PRN mild to moderate pain, oxy 5mg q6h PRN severe pain, lidocaine patch to back per pt, had 3 falls within the past 2 weeks. unable to clearly specify whether pain preceded falls. per son, had followed up outpt w/ ortho with XR and CT scans of lumbar, hip, and knee and were reportedly unremarkable and told to f/u with PT   - pt with diffuse L hip/LLE pain on exam, no focal swelling, TTP, erythema. no joint effusion. DP pulses 2+ and symmetric   - will get further collateral from son regarding past imaging to avoid repeating   - per CM, no [facility] facilities accept PD, would need home PT  - MR brain unlikely to be revealing, difficulty ambulating appears to be 2/2 pain rather than weakness    - pain control w/ standing tylenol 975mg q6h x 3 days, oxy 5mg q6h PRN severe pain, lidocaine patch to back per pt, had 3 falls within the past 2 weeks. unable to clearly specify whether pain preceded falls. per son, had followed up outpt w/ ortho with XR and CT scans of lumbar, hip, and knee and were reportedly unremarkable and told to f/u with PT   - pt with diffuse L hip/LLE pain on exam, no focal swelling, TTP, erythema. no joint effusion. DP pulses 2+ and symmetric   - will get further collateral from son regarding past imaging to avoid repeating   - per CM, no [facility] facilities accept PD, would need home PT  - MR brain unlikely to be revealing, difficulty ambulating appears to be 2/2 pain rather than weakness    - pain control w/ standing tylenol 975mg q6h x 3 days, lidocaine patch to back  - bowel regimen per pt, had 3 falls within the past 2 weeks. unable to clearly specify whether pain preceded falls. per son, had followed up outpt w/ ortho with XR and CT scans of lumbar, hip, and knee and were reportedly unremarkable and told to f/u with PT   - pt with diffuse L hip/LLE pain on exam, no focal swelling, TTP, erythema. no joint effusion. DP pulses 2+ and symmetric   - will get further collateral from son regarding past imaging to avoid repeating   - per CM, no [facility] facilities accept PD, would need home PT  - MR brain unlikely to be revealing, difficulty ambulating appears to be 2/2 pain rather than weakness    - pain control w/ standing tylenol 975mg q6h x 3 days, lidocaine patch to back, tramadol 25mg q12h (renally dosed) prn severe pain  - bowel regimen

## 2022-06-18 NOTE — PROGRESS NOTE ADULT - ASSESSMENT
65 y/o M w/ hx DM, HTN, HLD, CVA in past ESRD on daily peritoneal dialysis presents with fever, cough, transfer from [facility] for peritoneal dialysis. Found to have hypertensive urgency and septic secondary to parainfluenza

## 2022-06-18 NOTE — PROGRESS NOTE ADULT - ASSESSMENT
67 y/o M w/ hx DM2, HTN, HLD, CVA in past ESRD on daily home peritoneal dialysis presents with fever, cough, transfer from [facility] for peritoneal dialysis.    ESRD on PD  Consent in chart  CAPD, 4 exchanges, 2L each, 1.5% over 3-4hrs each exchange-> Make pt dry post PD today  Resume PD monday  cell count, gram stain and culture negative for peritonitis  Renal diet  daily BMP    Sepsis  Viral PNA  Appreciate Infectious disease specialist recs    Anemia of CKD  Epo 4k TIW Sq  trend hgb    BMD of ESRD  Sevelamer 800 TID QAC  trend carla and phos    Hypokalemia  s/p kcl 40meq po x 1 today  f/u repeat k    Dr Broderick  [phone]

## 2022-06-18 NOTE — PROGRESS NOTE ADULT - PROBLEM SELECTOR PLAN 4
Problem: ESRD on peritoneal dialysis.   ·  Plan: - On nightly PD at home  - patient also on Bumex 1mg per son, on hold in setting of goal for gradual normotension.  - Follows with Dr. Gillian Snyder  - Nephrology following     #normocytic anemia  - unclear baseline, like AOCKD  - confirm outpt w/u with PMD, given report of weight loss would have patient f/u with PMD for age appropriate cancer screening (never had colonoscopy)  - trend Hypertensive to 226/62 in ED  - BP improved today  - after speaking with son, patient reportedly not taking BP meds past 3-4 days, aim for gradual BP control.  - c/w home amlodipine  - home hydralazine Hypertensive to 226/62 in ED, per son not taking meds for prior 3-4 days   - BPs now improved, likely partially due to pain, hold off on uptitrating home doses   - c/w home amlodipine 10mg qd  - c/w home hydral 50mg q8h

## 2022-06-19 LAB
ANION GAP SERPL CALC-SCNC: 14 MMOL/L — SIGNIFICANT CHANGE UP (ref 7–14)
BASOPHILS # BLD AUTO: 0.03 K/UL — SIGNIFICANT CHANGE UP (ref 0–0.2)
BASOPHILS NFR BLD AUTO: 0.4 % — SIGNIFICANT CHANGE UP (ref 0–2)
BUN SERPL-MCNC: 46 MG/DL — HIGH (ref 7–23)
CALCIUM SERPL-MCNC: 7.8 MG/DL — LOW (ref 8.4–10.5)
CHLORIDE SERPL-SCNC: 94 MMOL/L — LOW (ref 98–107)
CO2 SERPL-SCNC: 25 MMOL/L — SIGNIFICANT CHANGE UP (ref 22–31)
CREAT SERPL-MCNC: 6.96 MG/DL — HIGH (ref 0.5–1.3)
EGFR: 8 ML/MIN/1.73M2 — LOW
EOSINOPHIL # BLD AUTO: 0.45 K/UL — SIGNIFICANT CHANGE UP (ref 0–0.5)
EOSINOPHIL NFR BLD AUTO: 5.6 % — SIGNIFICANT CHANGE UP (ref 0–6)
GLUCOSE BLDC GLUCOMTR-MCNC: 109 MG/DL — HIGH (ref 70–99)
GLUCOSE BLDC GLUCOMTR-MCNC: 110 MG/DL — HIGH (ref 70–99)
GLUCOSE BLDC GLUCOMTR-MCNC: 174 MG/DL — HIGH (ref 70–99)
GLUCOSE BLDC GLUCOMTR-MCNC: 210 MG/DL — HIGH (ref 70–99)
GLUCOSE BLDC GLUCOMTR-MCNC: 232 MG/DL — HIGH (ref 70–99)
GLUCOSE SERPL-MCNC: 119 MG/DL — HIGH (ref 70–99)
HCT VFR BLD CALC: 23.9 % — LOW (ref 39–50)
HGB BLD-MCNC: 7.8 G/DL — LOW (ref 13–17)
IANC: 4.93 K/UL — SIGNIFICANT CHANGE UP (ref 1.8–7.4)
IMM GRANULOCYTES NFR BLD AUTO: 2.1 % — HIGH (ref 0–1.5)
LYMPHOCYTES # BLD AUTO: 1.74 K/UL — SIGNIFICANT CHANGE UP (ref 1–3.3)
LYMPHOCYTES # BLD AUTO: 21.8 % — SIGNIFICANT CHANGE UP (ref 13–44)
MAGNESIUM SERPL-MCNC: 1.9 MG/DL — SIGNIFICANT CHANGE UP (ref 1.6–2.6)
MCHC RBC-ENTMCNC: 26.4 PG — LOW (ref 27–34)
MCHC RBC-ENTMCNC: 32.6 GM/DL — SIGNIFICANT CHANGE UP (ref 32–36)
MCV RBC AUTO: 81 FL — SIGNIFICANT CHANGE UP (ref 80–100)
MONOCYTES # BLD AUTO: 0.68 K/UL — SIGNIFICANT CHANGE UP (ref 0–0.9)
MONOCYTES NFR BLD AUTO: 8.5 % — SIGNIFICANT CHANGE UP (ref 2–14)
NEUTROPHILS # BLD AUTO: 4.93 K/UL — SIGNIFICANT CHANGE UP (ref 1.8–7.4)
NEUTROPHILS NFR BLD AUTO: 61.6 % — SIGNIFICANT CHANGE UP (ref 43–77)
NRBC # BLD: 0 /100 WBCS — SIGNIFICANT CHANGE UP
NRBC # FLD: 0 K/UL — SIGNIFICANT CHANGE UP
PHOSPHATE SERPL-MCNC: 4.3 MG/DL — SIGNIFICANT CHANGE UP (ref 2.5–4.5)
PLATELET # BLD AUTO: 214 K/UL — SIGNIFICANT CHANGE UP (ref 150–400)
POTASSIUM SERPL-MCNC: 3 MMOL/L — LOW (ref 3.5–5.3)
POTASSIUM SERPL-SCNC: 3 MMOL/L — LOW (ref 3.5–5.3)
RBC # BLD: 2.95 M/UL — LOW (ref 4.2–5.8)
RBC # FLD: 14.6 % — HIGH (ref 10.3–14.5)
SODIUM SERPL-SCNC: 133 MMOL/L — LOW (ref 135–145)
WBC # BLD: 8 K/UL — SIGNIFICANT CHANGE UP (ref 3.8–10.5)
WBC # FLD AUTO: 8 K/UL — SIGNIFICANT CHANGE UP (ref 3.8–10.5)

## 2022-06-19 PROCEDURE — 99232 SBSQ HOSP IP/OBS MODERATE 35: CPT | Mod: GC

## 2022-06-19 RX ORDER — HYDRALAZINE HCL 50 MG
75 TABLET ORAL EVERY 8 HOURS
Refills: 0 | Status: DISCONTINUED | OUTPATIENT
Start: 2022-06-19 | End: 2022-06-20

## 2022-06-19 RX ADMIN — Medication 75 MILLIGRAM(S): at 13:21

## 2022-06-19 RX ADMIN — Medication 2: at 13:19

## 2022-06-19 RX ADMIN — SEVELAMER CARBONATE 800 MILLIGRAM(S): 2400 POWDER, FOR SUSPENSION ORAL at 17:21

## 2022-06-19 RX ADMIN — Medication 975 MILLIGRAM(S): at 01:27

## 2022-06-19 RX ADMIN — MUPIROCIN 1 APPLICATION(S): 20 OINTMENT TOPICAL at 06:11

## 2022-06-19 RX ADMIN — LIDOCAINE 1 PATCH: 4 CREAM TOPICAL at 01:10

## 2022-06-19 RX ADMIN — SENNA PLUS 2 TABLET(S): 8.6 TABLET ORAL at 22:29

## 2022-06-19 RX ADMIN — Medication 975 MILLIGRAM(S): at 18:21

## 2022-06-19 RX ADMIN — ATORVASTATIN CALCIUM 80 MILLIGRAM(S): 80 TABLET, FILM COATED ORAL at 22:29

## 2022-06-19 RX ADMIN — Medication 1: at 17:23

## 2022-06-19 RX ADMIN — LIDOCAINE 1 PATCH: 4 CREAM TOPICAL at 19:39

## 2022-06-19 RX ADMIN — HEPARIN SODIUM 5000 UNIT(S): 5000 INJECTION INTRAVENOUS; SUBCUTANEOUS at 22:29

## 2022-06-19 RX ADMIN — Medication 975 MILLIGRAM(S): at 00:27

## 2022-06-19 RX ADMIN — SEVELAMER CARBONATE 800 MILLIGRAM(S): 2400 POWDER, FOR SUSPENSION ORAL at 12:39

## 2022-06-19 RX ADMIN — SEVELAMER CARBONATE 800 MILLIGRAM(S): 2400 POWDER, FOR SUSPENSION ORAL at 09:22

## 2022-06-19 RX ADMIN — Medication 975 MILLIGRAM(S): at 07:10

## 2022-06-19 RX ADMIN — Medication 975 MILLIGRAM(S): at 13:20

## 2022-06-19 RX ADMIN — HEPARIN SODIUM 5000 UNIT(S): 5000 INJECTION INTRAVENOUS; SUBCUTANEOUS at 13:19

## 2022-06-19 RX ADMIN — Medication 975 MILLIGRAM(S): at 12:38

## 2022-06-19 RX ADMIN — LIDOCAINE 1 PATCH: 4 CREAM TOPICAL at 12:39

## 2022-06-19 RX ADMIN — Medication 975 MILLIGRAM(S): at 17:21

## 2022-06-19 RX ADMIN — Medication 50 MILLIGRAM(S): at 06:10

## 2022-06-19 RX ADMIN — HEPARIN SODIUM 5000 UNIT(S): 5000 INJECTION INTRAVENOUS; SUBCUTANEOUS at 06:10

## 2022-06-19 RX ADMIN — AMLODIPINE BESYLATE 10 MILLIGRAM(S): 2.5 TABLET ORAL at 06:10

## 2022-06-19 RX ADMIN — MUPIROCIN 1 APPLICATION(S): 20 OINTMENT TOPICAL at 17:21

## 2022-06-19 RX ADMIN — Medication 75 MILLIGRAM(S): at 22:30

## 2022-06-19 RX ADMIN — Medication 975 MILLIGRAM(S): at 06:10

## 2022-06-19 RX ADMIN — POLYETHYLENE GLYCOL 3350 17 GRAM(S): 17 POWDER, FOR SOLUTION ORAL at 17:22

## 2022-06-19 NOTE — PROGRESS NOTE ADULT - SUBJECTIVE AND OBJECTIVE BOX
[facility] - SOLOMON Hurtado / SOLOMON Foley / KANDICE Shanks/ SOLOMON Broderick/ SOLOMON Allan/ NOMI Mao / VIRGILIO Marks / BENJY Woo  ---------------------------------------------------------------------------------------------------------------  seen and examined today for ESRD  Interval : NAD  VITALS:  T(F): 98.3 (06-19-22 @ 06:10), Max: 98.5 (06-18-22 @ 13:00)  HR: 60 (06-19-22 @ 06:10)  BP: 152/60 (06-19-22 @ 06:10)  RR: 18 (06-19-22 @ 06:10)  SpO2: 99% (06-19-22 @ 06:10)  Wt(kg): --    06-18 @ 07:01  -  06-19 @ 07:00  --------------------------------------------------------  IN: 8550 mL / OUT: 9800 mL / NET: -1250 mL      Physical Exam :-  Constitutional: NAD  Neck: Supple.  Respiratory: Bilateral equal breath sounds,  Cardiovascular: S1, S2 normal,  Gastrointestinal: Bowel Sounds present, soft, non tender.  Extremities: No edema  Neurological: Alert and Oriented x 3, no focal deficits  Psychiatric: Normal mood, normal affect  Data:-  Allergies :   No Known Allergies    Hospital Medications:   MEDICATIONS  (STANDING):  acetaminophen     Tablet .. 975 milliGRAM(s) Oral every 6 hours  amLODIPine   Tablet 10 milliGRAM(s) Oral daily  atorvastatin 80 milliGRAM(s) Oral at bedtime  dextrose 5%. 1000 milliLiter(s) (100 mL/Hr) IV Continuous <Continuous>  dextrose 5%. 1000 milliLiter(s) (50 mL/Hr) IV Continuous <Continuous>  dextrose 50% Injectable 25 Gram(s) IV Push once  dextrose 50% Injectable 12.5 Gram(s) IV Push once  dextrose 50% Injectable 25 Gram(s) IV Push once  epoetin heidi-epbx (RETACRIT) Injectable 4000 Unit(s) SubCutaneous <User Schedule>  gentamicin 0.1% Cream 1 Application(s) Topical <User Schedule>  glucagon  Injectable 1 milliGRAM(s) IntraMuscular once  heparin   Injectable 5000 Unit(s) SubCutaneous every 8 hours  hydrALAZINE 50 milliGRAM(s) Oral every 8 hours  insulin lispro (ADMELOG) corrective regimen sliding scale   SubCutaneous three times a day before meals  insulin lispro (ADMELOG) corrective regimen sliding scale   SubCutaneous at bedtime  lidocaine   4% Patch 1 Patch Transdermal daily  mupirocin 2% Ointment 1 Application(s) Topical two times a day  polyethylene glycol 3350 17 Gram(s) Oral two times a day  senna 2 Tablet(s) Oral at bedtime  sevelamer carbonate 800 milliGRAM(s) Oral three times a day with meals    06-19    133<L>  |  94<L>  |  46<H>  ----------------------------<  119<H>  3.0<L>   |  25  |  6.96<H>    Ca    7.8<L>      19 Jun 2022 05:23  Phos  4.3     06-19  Mg     1.90     06-19      Creatinine Trend: 6.96 <--, 7.59 <--, 7.77 <--, 8.53 <--, 8.63 <--, 8.31 <--                        7.8    8.00  )-----------( 214      ( 19 Jun 2022 05:23 )             23.9

## 2022-06-19 NOTE — PROGRESS NOTE ADULT - ATTENDING COMMENTS
65 yo M with hx of CVA, uncontrolled HTN, DM, ESRD on PD present to OSH for fever and acute metabolic encephalopathy, likely 2/2 parainfluenza and hypertensive urgency    afebrile o/n. currently AAOX 3, MS appears to be baseline today    -monitor off abx as infectious w/u inc. CXR, blood cx, UA, PD fluid analysis negative for bacterial infection   -c/w PD as per renal   -BP remains elevated- c/w norvasc 10. titrate up hydralazine to 75 tid, titrate as needed  -added on standing tylenol 975mg q 6 hours standing, tramadol prn (pain improved on this regimen)  -PT eval- rehab (however unable to go to rehab w/ PD); should have repeat eval now that pain better controlled      Rest of plan as Detailed Above .

## 2022-06-19 NOTE — PROGRESS NOTE ADULT - PROBLEM SELECTOR PLAN 9
TSH wnl here  - son made aware of finding on CT from [place]  - unclear chronicity  - outpt f/u with PMD

## 2022-06-19 NOTE — PROGRESS NOTE ADULT - ASSESSMENT
65 y/o M w/ hx DM2, HTN, HLD, CVA in past ESRD on daily home peritoneal dialysis presents with fever, cough, transfer from [facility] for peritoneal dialysis.    ESRD on PD  Consent in chart  CAPD, 4 exchanges, 2L each, 1.5% over 3-4hrs each exchange  Resume PD monday  cell count, gram stain and culture negative for peritonitis  Renal diet  daily BMP    Sepsis  Viral PNA  Appreciate Infectious disease specialist recs    Anemia of CKD  Epo 4k TIW Sq  trend hgb    BMD of ESRD  Sevelamer 800 TID QAC  trend carla and phos    Hypokalemia  s/p kcl 40meq po x 1 today  f/u repeat k      For any question, call:  Cell # [phone]  Pager # [phone]  Callback # [phone]

## 2022-06-19 NOTE — PROGRESS NOTE ADULT - PROBLEM SELECTOR PLAN 11
DVT ppx: heparin SQ  Diet: Renal restrictions  Dispo: pending pain control of LE  PT eval: pt was in too much pain to work with PT, however, no [facility] facilities able to accommodate PD, pt would need home PT

## 2022-06-19 NOTE — PROGRESS NOTE ADULT - ASSESSMENT
65 y/o M w/ hx DM, HTN, HLD, CVA in past ESRD on daily peritoneal dialysis presents with fever, cough, transfer from [facility] for peritoneal dialysis. Found to have hypertensive urgency and septic secondary to parainfluenza

## 2022-06-19 NOTE — PROGRESS NOTE ADULT - SUBJECTIVE AND OBJECTIVE BOX
PROGRESS NOTE:   Authored by Dr. Jes Ordonez, ZEYNEP pager [phone]    Patient is a 66y old  Male who presents with a chief complaint of peritoneal dialysis (18 Jun 2022 10:53)      SUBJECTIVE / OVERNIGHT EVENTS:    MEDICATIONS  (STANDING):  acetaminophen     Tablet .. 975 milliGRAM(s) Oral every 6 hours  amLODIPine   Tablet 10 milliGRAM(s) Oral daily  atorvastatin 80 milliGRAM(s) Oral at bedtime  dextrose 5%. 1000 milliLiter(s) (100 mL/Hr) IV Continuous <Continuous>  dextrose 5%. 1000 milliLiter(s) (50 mL/Hr) IV Continuous <Continuous>  dextrose 50% Injectable 12.5 Gram(s) IV Push once  dextrose 50% Injectable 25 Gram(s) IV Push once  dextrose 50% Injectable 25 Gram(s) IV Push once  epoetin heidi-epbx (RETACRIT) Injectable 4000 Unit(s) SubCutaneous <User Schedule>  gentamicin 0.1% Cream 1 Application(s) Topical <User Schedule>  glucagon  Injectable 1 milliGRAM(s) IntraMuscular once  heparin   Injectable 5000 Unit(s) SubCutaneous every 8 hours  hydrALAZINE 50 milliGRAM(s) Oral every 8 hours  insulin lispro (ADMELOG) corrective regimen sliding scale   SubCutaneous three times a day before meals  insulin lispro (ADMELOG) corrective regimen sliding scale   SubCutaneous at bedtime  lidocaine   4% Patch 1 Patch Transdermal daily  mupirocin 2% Ointment 1 Application(s) Topical two times a day  polyethylene glycol 3350 17 Gram(s) Oral two times a day  senna 2 Tablet(s) Oral at bedtime  sevelamer carbonate 800 milliGRAM(s) Oral three times a day with meals    MEDICATIONS  (PRN):  dextrose Oral Gel 15 Gram(s) Oral once PRN Blood Glucose LESS THAN 70 milliGRAM(s)/deciliter  traMADol 25 milliGRAM(s) Oral every 8 hours PRN Severe Pain (7 - 10)      CAPILLARY BLOOD GLUCOSE      POCT Blood Glucose.: 232 mg/dL (19 Jun 2022 00:23)  POCT Blood Glucose.: 204 mg/dL (18 Jun 2022 23:01)  POCT Blood Glucose.: 208 mg/dL (18 Jun 2022 21:07)  POCT Blood Glucose.: 189 mg/dL (18 Jun 2022 19:44)  POCT Blood Glucose.: 251 mg/dL (18 Jun 2022 17:22)  POCT Blood Glucose.: 271 mg/dL (18 Jun 2022 13:03)  POCT Blood Glucose.: 191 mg/dL (18 Jun 2022 08:48)    I&O's Summary    18 Jun 2022 07:01  -  19 Jun 2022 07:00  --------------------------------------------------------  IN: 8550 mL / OUT: 9800 mL / NET: -1250 mL        PHYSICAL EXAM:  Vital Signs Last 24 Hrs  T(C): 36.8 (19 Jun 2022 06:10), Max: 36.9 (18 Jun 2022 10:50)  T(F): 98.3 (19 Jun 2022 06:10), Max: 98.5 (18 Jun 2022 13:00)  HR: 60 (19 Jun 2022 06:10) (59 - 73)  BP: 152/60 (19 Jun 2022 06:10) (146/61 - 175/60)  BP(mean): --  RR: 18 (19 Jun 2022 06:10) (17 - 18)  SpO2: 99% (19 Jun 2022 06:10) (98% - 100%)    GENERAL: No acute distress, well-developed  HEAD:  Atraumatic, Normocephalic  EYES: EOMI, PERRLA, conjunctiva and sclera clear  NECK: Supple, no lymphadenopathy, no JVD  CHEST/LUNG: CTAB; No wheezes, rales, or rhonchi  HEART: Regular rate and rhythm; No murmurs, rubs, or gallops  ABDOMEN: Soft, non-tender, non-distended; normal bowel sounds, no organomegaly  EXTREMITIES:  2+ peripheral pulses b/l, No clubbing, cyanosis, or edema  NEUROLOGY: A&O x 3, no focal deficits  SKIN: No rashes or lesions    LABS:                        7.8    7.47  )-----------( 187      ( 18 Jun 2022 06:00 )             24.2     06-18    130<L>  |  93<L>  |  48<H>  ----------------------------<  134<H>  3.1<L>   |  23  |  7.59<H>    Ca    7.6<L>      18 Jun 2022 06:00  Phos  4.6     06-18  Mg     1.90     06-18                Culture - Urine (collected 16 Jun 2022 08:38)  Source: Clean Catch Clean Catch (Midstream)  Final Report (17 Jun 2022 07:38):    <10,000 CFU/mL Normal Urogenital Vickie    Culture - Urine (collected 16 Jun 2022 08:38)  Source: Clean Catch Clean Catch (Midstream)  Final Report (17 Jun 2022 09:02):    No growth        RADIOLOGY & ADDITIONAL TESTS:  Results Reviewed:   Imaging Personally Reviewed:  Electrocardiogram Personally Reviewed:    COORDINATION OF CARE:  Care Discussed with Consultants/Other Providers [Y/N]:  Prior or Outpatient Records Reviewed [Y/N]:   PROGRESS NOTE:   Authored by Dr. Jes Ordonez, ZEYNEP pager [phone]    Patient is a 66y old  Male who presents with a chief complaint of peritoneal dialysis (18 Jun 2022 10:53)      SUBJECTIVE / OVERNIGHT EVENTS: no events overnight. patient appeared well and denied any pain. He said extremity pain is well controlled and patient has been able to move all extremities without pain.     MEDICATIONS  (STANDING):  acetaminophen     Tablet .. 975 milliGRAM(s) Oral every 6 hours  amLODIPine   Tablet 10 milliGRAM(s) Oral daily  atorvastatin 80 milliGRAM(s) Oral at bedtime  dextrose 5%. 1000 milliLiter(s) (100 mL/Hr) IV Continuous <Continuous>  dextrose 5%. 1000 milliLiter(s) (50 mL/Hr) IV Continuous <Continuous>  dextrose 50% Injectable 12.5 Gram(s) IV Push once  dextrose 50% Injectable 25 Gram(s) IV Push once  dextrose 50% Injectable 25 Gram(s) IV Push once  epoetin heidi-epbx (RETACRIT) Injectable 4000 Unit(s) SubCutaneous <User Schedule>  gentamicin 0.1% Cream 1 Application(s) Topical <User Schedule>  glucagon  Injectable 1 milliGRAM(s) IntraMuscular once  heparin   Injectable 5000 Unit(s) SubCutaneous every 8 hours  hydrALAZINE 50 milliGRAM(s) Oral every 8 hours  insulin lispro (ADMELOG) corrective regimen sliding scale   SubCutaneous three times a day before meals  insulin lispro (ADMELOG) corrective regimen sliding scale   SubCutaneous at bedtime  lidocaine   4% Patch 1 Patch Transdermal daily  mupirocin 2% Ointment 1 Application(s) Topical two times a day  polyethylene glycol 3350 17 Gram(s) Oral two times a day  senna 2 Tablet(s) Oral at bedtime  sevelamer carbonate 800 milliGRAM(s) Oral three times a day with meals    MEDICATIONS  (PRN):  dextrose Oral Gel 15 Gram(s) Oral once PRN Blood Glucose LESS THAN 70 milliGRAM(s)/deciliter  traMADol 25 milliGRAM(s) Oral every 8 hours PRN Severe Pain (7 - 10)      CAPILLARY BLOOD GLUCOSE      POCT Blood Glucose.: 232 mg/dL (19 Jun 2022 00:23)  POCT Blood Glucose.: 204 mg/dL (18 Jun 2022 23:01)  POCT Blood Glucose.: 208 mg/dL (18 Jun 2022 21:07)  POCT Blood Glucose.: 189 mg/dL (18 Jun 2022 19:44)  POCT Blood Glucose.: 251 mg/dL (18 Jun 2022 17:22)  POCT Blood Glucose.: 271 mg/dL (18 Jun 2022 13:03)  POCT Blood Glucose.: 191 mg/dL (18 Jun 2022 08:48)    I&O's Summary    18 Jun 2022 07:01  -  19 Jun 2022 07:00  --------------------------------------------------------  IN: 8550 mL / OUT: 9800 mL / NET: -1250 mL        PHYSICAL EXAM:  Vital Signs Last 24 Hrs  T(C): 36.8 (19 Jun 2022 06:10), Max: 36.9 (18 Jun 2022 10:50)  T(F): 98.3 (19 Jun 2022 06:10), Max: 98.5 (18 Jun 2022 13:00)  HR: 60 (19 Jun 2022 06:10) (59 - 73)  BP: 152/60 (19 Jun 2022 06:10) (146/61 - 175/60)  BP(mean): --  RR: 18 (19 Jun 2022 06:10) (17 - 18)  SpO2: 99% (19 Jun 2022 06:10) (98% - 100%)    GENERAL: No acute distress, well-developed  HEAD:  Atraumatic, Normocephalic  EYES: EOMI, PERRLA, conjunctiva and sclera clear  NECK: Supple, no lymphadenopathy, no JVD  CHEST/LUNG: CTAB; No wheezes, rales, or rhonchi  HEART: Regular rate and rhythm; No murmurs, rubs, or gallops  ABDOMEN: Soft, large abdomen, non-tender, non-distended; normal bowel sounds, no organomegaly  EXTREMITIES:  2+ peripheral pulses b/l, No clubbing, cyanosis, or edema, slight pain with palpation of LE   NEUROLOGY: A&O x 3, no focal deficits  SKIN: No rashes or lesions    LABS:                        7.8    7.47  )-----------( 187      ( 18 Jun 2022 06:00 )             24.2     06-18    130<L>  |  93<L>  |  48<H>  ----------------------------<  134<H>  3.1<L>   |  23  |  7.59<H>    Ca    7.6<L>      18 Jun 2022 06:00  Phos  4.6     06-18  Mg     1.90     06-18                Culture - Urine (collected 16 Jun 2022 08:38)  Source: Clean Catch Clean Catch (Midstream)  Final Report (17 Jun 2022 07:38):    <10,000 CFU/mL Normal Urogenital Vickie    Culture - Urine (collected 16 Jun 2022 08:38)  Source: Clean Catch Clean Catch (Midstream)  Final Report (17 Jun 2022 09:02):    No growth        RADIOLOGY & ADDITIONAL TESTS:  Results Reviewed:   Imaging Personally Reviewed:  Electrocardiogram Personally Reviewed:    COORDINATION OF CARE:  Care Discussed with Consultants/Other Providers [Y/N]:  Prior or Outpatient Records Reviewed [Y/N]:   PROGRESS NOTE:   Authored by Dr. Jes Ordonez, ZEYNEP pager [phone]    Patient is a 66y old  Male who presents with a chief complaint of peritoneal dialysis (18 Jun 2022 10:53)      SUBJECTIVE / OVERNIGHT EVENTS: no events overnight. patient appeared well and denied any pain. He said extremity pain is well controlled and patient has been able to move all extremities without pain.     MEDICATIONS  (STANDING):  acetaminophen     Tablet .. 975 milliGRAM(s) Oral every 6 hours  amLODIPine   Tablet 10 milliGRAM(s) Oral daily  atorvastatin 80 milliGRAM(s) Oral at bedtime  dextrose 5%. 1000 milliLiter(s) (100 mL/Hr) IV Continuous <Continuous>  dextrose 5%. 1000 milliLiter(s) (50 mL/Hr) IV Continuous <Continuous>  dextrose 50% Injectable 12.5 Gram(s) IV Push once  dextrose 50% Injectable 25 Gram(s) IV Push once  dextrose 50% Injectable 25 Gram(s) IV Push once  epoetin heidi-epbx (RETACRIT) Injectable 4000 Unit(s) SubCutaneous <User Schedule>  gentamicin 0.1% Cream 1 Application(s) Topical <User Schedule>  glucagon  Injectable 1 milliGRAM(s) IntraMuscular once  heparin   Injectable 5000 Unit(s) SubCutaneous every 8 hours  hydrALAZINE 50 milliGRAM(s) Oral every 8 hours  insulin lispro (ADMELOG) corrective regimen sliding scale   SubCutaneous three times a day before meals  insulin lispro (ADMELOG) corrective regimen sliding scale   SubCutaneous at bedtime  lidocaine   4% Patch 1 Patch Transdermal daily  mupirocin 2% Ointment 1 Application(s) Topical two times a day  polyethylene glycol 3350 17 Gram(s) Oral two times a day  senna 2 Tablet(s) Oral at bedtime  sevelamer carbonate 800 milliGRAM(s) Oral three times a day with meals    MEDICATIONS  (PRN):  dextrose Oral Gel 15 Gram(s) Oral once PRN Blood Glucose LESS THAN 70 milliGRAM(s)/deciliter  traMADol 25 milliGRAM(s) Oral every 8 hours PRN Severe Pain (7 - 10)      CAPILLARY BLOOD GLUCOSE      POCT Blood Glucose.: 232 mg/dL (19 Jun 2022 00:23)  POCT Blood Glucose.: 204 mg/dL (18 Jun 2022 23:01)  POCT Blood Glucose.: 208 mg/dL (18 Jun 2022 21:07)  POCT Blood Glucose.: 189 mg/dL (18 Jun 2022 19:44)  POCT Blood Glucose.: 251 mg/dL (18 Jun 2022 17:22)  POCT Blood Glucose.: 271 mg/dL (18 Jun 2022 13:03)  POCT Blood Glucose.: 191 mg/dL (18 Jun 2022 08:48)    I&O's Summary    18 Jun 2022 07:01  -  19 Jun 2022 07:00  --------------------------------------------------------  IN: 8550 mL / OUT: 9800 mL / NET: -1250 mL        PHYSICAL EXAM:  Vital Signs Last 24 Hrs  T(C): 36.8 (19 Jun 2022 06:10), Max: 36.9 (18 Jun 2022 10:50)  T(F): 98.3 (19 Jun 2022 06:10), Max: 98.5 (18 Jun 2022 13:00)  HR: 60 (19 Jun 2022 06:10) (59 - 73)  BP: 152/60 (19 Jun 2022 06:10) (146/61 - 175/60)  BP(mean): --  RR: 18 (19 Jun 2022 06:10) (17 - 18)  SpO2: 99% (19 Jun 2022 06:10) (98% - 100%)    GENERAL: No acute distress, well-developed  HEAD:  Atraumatic, Normocephalic  EYES: EOMI, PERRLA, conjunctiva and sclera clear  NECK: Supple, no lymphadenopathy, no JVD  CHEST/LUNG: CTAB; No wheezes, rales, or rhonchi  HEART: Regular rate and rhythm; No murmurs, rubs, or gallops  ABDOMEN: Soft, non-tender, non-distended; normal bowel sounds, no organomegaly  EXTREMITIES:  2+ peripheral pulses b/l, No clubbing, cyanosis, or edema, slight pain with palpation of LE   NEUROLOGY: A&O x 3, no focal deficits  SKIN: No rashes or lesions    LABS:                        7.8    7.47  )-----------( 187      ( 18 Jun 2022 06:00 )             24.2     06-18    130<L>  |  93<L>  |  48<H>  ----------------------------<  134<H>  3.1<L>   |  23  |  7.59<H>    Ca    7.6<L>      18 Jun 2022 06:00  Phos  4.6     06-18  Mg     1.90     06-18                Culture - Urine (collected 16 Jun 2022 08:38)  Source: Clean Catch Clean Catch (Midstream)  Final Report (17 Jun 2022 07:38):    <10,000 CFU/mL Normal Urogenital Vickie    Culture - Urine (collected 16 Jun 2022 08:38)  Source: Clean Catch Clean Catch (Midstream)  Final Report (17 Jun 2022 09:02):    No growth        RADIOLOGY & ADDITIONAL TESTS:  Results Reviewed:   Imaging Personally Reviewed:  Electrocardiogram Personally Reviewed:    COORDINATION OF CARE:  Care Discussed with Consultants/Other Providers [Y/N]:  Prior or Outpatient Records Reviewed [Y/N]:

## 2022-06-19 NOTE — PROGRESS NOTE ADULT - PROBLEM SELECTOR PLAN 6
No slurred speech currently. Presented to [facility] due to concern fo slurred speech and lethargy  - CT imaging in chart  negative for large vessel occlusion or flow limitations  - Possible that elevated fever 2/2 parainfluenza caused transient delirium vs hypertensive emergency vs TIA  - TSH wnl  - assessed by S&S- regular diet, thin liquids   - fall, aspiration precautions  - assessed by PT, was unable to cooperate due to pain, anticipate rehab. assessed by OT  --- no [facility] facilities able to perform PD, would have to do home PT   - confirm w/ family when they visit to see if he is back to baseline. No clear indication for MR at this time if back at baseline.

## 2022-06-19 NOTE — PROGRESS NOTE ADULT - PROBLEM SELECTOR PLAN 1
per pt, had 3 falls within the past 2 weeks. unable to clearly specify whether pain preceded falls. per son, had followed up outpt w/ ortho with XR and CT scans of lumbar, hip, and knee and were reportedly unremarkable and told to f/u with PT   - pt with diffuse L hip/LLE pain on exam, no focal swelling, TTP, erythema. no joint effusion. DP pulses 2+ and symmetric   - will get further collateral from son regarding past imaging to avoid repeating   - per CM, no [facility] facilities accept PD, would need home PT  - MR brain unlikely to be revealing, difficulty ambulating appears to be 2/2 pain rather than weakness    - pain control w/ standing tylenol 975mg q6h x 3 days, lidocaine patch to back, tramadol 25mg q12h (renally dosed) prn severe pain  - bowel regimen

## 2022-06-19 NOTE — PROGRESS NOTE ADULT - PROBLEM SELECTOR PLAN 4
Hypertensive to 226/62 in ED, per son not taking meds for prior 3-4 days   - BPs now improved, likely partially due to pain, hold off on uptitrating home doses   - c/w home amlodipine 10mg qd  - c/w home hydral 50mg q8h Hypertensive to 226/62 in ED, per son not taking meds for prior 3-4 days   - BPs now improved, likely partially due to pain, hold off on uptitrating home doses   - c/w home amlodipine 10mg qd  - home hydral 50mg q8h--> increased on 6/19 to 75 TID

## 2022-06-20 ENCOUNTER — TRANSCRIPTION ENCOUNTER (OUTPATIENT)
Age: 67
End: 2022-06-20

## 2022-06-20 VITALS
HEART RATE: 66 BPM | RESPIRATION RATE: 18 BRPM | TEMPERATURE: 99 F | OXYGEN SATURATION: 99 % | SYSTOLIC BLOOD PRESSURE: 145 MMHG | DIASTOLIC BLOOD PRESSURE: 61 MMHG

## 2022-06-20 LAB
ANION GAP SERPL CALC-SCNC: 15 MMOL/L — HIGH (ref 7–14)
BUN SERPL-MCNC: 58 MG/DL — HIGH (ref 7–23)
CALCIUM SERPL-MCNC: 8.2 MG/DL — LOW (ref 8.4–10.5)
CHLORIDE SERPL-SCNC: 98 MMOL/L — SIGNIFICANT CHANGE UP (ref 98–107)
CO2 SERPL-SCNC: 23 MMOL/L — SIGNIFICANT CHANGE UP (ref 22–31)
CREAT SERPL-MCNC: 8.01 MG/DL — HIGH (ref 0.5–1.3)
EGFR: 7 ML/MIN/1.73M2 — LOW
GLUCOSE BLDC GLUCOMTR-MCNC: 102 MG/DL — HIGH (ref 70–99)
GLUCOSE BLDC GLUCOMTR-MCNC: 152 MG/DL — HIGH (ref 70–99)
GLUCOSE BLDC GLUCOMTR-MCNC: 248 MG/DL — HIGH (ref 70–99)
GLUCOSE BLDC GLUCOMTR-MCNC: 284 MG/DL — HIGH (ref 70–99)
GLUCOSE SERPL-MCNC: 85 MG/DL — SIGNIFICANT CHANGE UP (ref 70–99)
HCT VFR BLD CALC: 24.6 % — LOW (ref 39–50)
HGB BLD-MCNC: 8 G/DL — LOW (ref 13–17)
MAGNESIUM SERPL-MCNC: 2.4 MG/DL — SIGNIFICANT CHANGE UP (ref 1.6–2.6)
MCHC RBC-ENTMCNC: 26 PG — LOW (ref 27–34)
MCHC RBC-ENTMCNC: 32.5 GM/DL — SIGNIFICANT CHANGE UP (ref 32–36)
MCV RBC AUTO: 79.9 FL — LOW (ref 80–100)
NRBC # BLD: 0 /100 WBCS — SIGNIFICANT CHANGE UP
NRBC # FLD: 0 K/UL — SIGNIFICANT CHANGE UP
PHOSPHATE SERPL-MCNC: 5.2 MG/DL — HIGH (ref 2.5–4.5)
PLATELET # BLD AUTO: 254 K/UL — SIGNIFICANT CHANGE UP (ref 150–400)
POTASSIUM SERPL-MCNC: 3.1 MMOL/L — LOW (ref 3.5–5.3)
POTASSIUM SERPL-SCNC: 3.1 MMOL/L — LOW (ref 3.5–5.3)
RBC # BLD: 3.08 M/UL — LOW (ref 4.2–5.8)
RBC # FLD: 14.9 % — HIGH (ref 10.3–14.5)
SODIUM SERPL-SCNC: 136 MMOL/L — SIGNIFICANT CHANGE UP (ref 135–145)
WBC # BLD: 8.34 K/UL — SIGNIFICANT CHANGE UP (ref 3.8–10.5)
WBC # FLD AUTO: 8.34 K/UL — SIGNIFICANT CHANGE UP (ref 3.8–10.5)

## 2022-06-20 PROCEDURE — 99239 HOSP IP/OBS DSCHRG MGMT >30: CPT | Mod: GC

## 2022-06-20 RX ORDER — POTASSIUM CHLORIDE 20 MEQ
40 PACKET (EA) ORAL EVERY 4 HOURS
Refills: 0 | Status: COMPLETED | OUTPATIENT
Start: 2022-06-20 | End: 2022-06-20

## 2022-06-20 RX ORDER — ACETAMINOPHEN 500 MG
3 TABLET ORAL
Qty: 0 | Refills: 0 | DISCHARGE
Start: 2022-06-20

## 2022-06-20 RX ORDER — SEVELAMER CARBONATE 2400 MG/1
1 POWDER, FOR SUSPENSION ORAL
Qty: 90 | Refills: 0
Start: 2022-06-20 | End: 2022-07-19

## 2022-06-20 RX ORDER — MUPIROCIN 20 MG/G
1 OINTMENT TOPICAL
Qty: 5 | Refills: 0
Start: 2022-06-20 | End: 2022-06-20

## 2022-06-20 RX ORDER — SENNA PLUS 8.6 MG/1
2 TABLET ORAL
Qty: 0 | Refills: 0 | DISCHARGE
Start: 2022-06-20

## 2022-06-20 RX ORDER — INSULIN GLARGINE 100 [IU]/ML
5 INJECTION, SOLUTION SUBCUTANEOUS
Qty: 0 | Refills: 0 | DISCHARGE

## 2022-06-20 RX ORDER — LIDOCAINE 4 G/100G
1 CREAM TOPICAL
Qty: 3 | Refills: 0
Start: 2022-06-20 | End: 2022-06-22

## 2022-06-20 RX ADMIN — Medication 3: at 13:01

## 2022-06-20 RX ADMIN — AMLODIPINE BESYLATE 10 MILLIGRAM(S): 2.5 TABLET ORAL at 05:46

## 2022-06-20 RX ADMIN — SEVELAMER CARBONATE 800 MILLIGRAM(S): 2400 POWDER, FOR SUSPENSION ORAL at 13:01

## 2022-06-20 RX ADMIN — LIDOCAINE 1 PATCH: 4 CREAM TOPICAL at 00:39

## 2022-06-20 RX ADMIN — SEVELAMER CARBONATE 800 MILLIGRAM(S): 2400 POWDER, FOR SUSPENSION ORAL at 09:15

## 2022-06-20 RX ADMIN — Medication 1 APPLICATION(S): at 08:48

## 2022-06-20 RX ADMIN — Medication 40 MILLIEQUIVALENT(S): at 09:14

## 2022-06-20 RX ADMIN — Medication 975 MILLIGRAM(S): at 00:48

## 2022-06-20 RX ADMIN — MUPIROCIN 1 APPLICATION(S): 20 OINTMENT TOPICAL at 17:21

## 2022-06-20 RX ADMIN — Medication 75 MILLIGRAM(S): at 05:45

## 2022-06-20 RX ADMIN — HEPARIN SODIUM 5000 UNIT(S): 5000 INJECTION INTRAVENOUS; SUBCUTANEOUS at 05:46

## 2022-06-20 RX ADMIN — SEVELAMER CARBONATE 800 MILLIGRAM(S): 2400 POWDER, FOR SUSPENSION ORAL at 17:24

## 2022-06-20 RX ADMIN — HEPARIN SODIUM 5000 UNIT(S): 5000 INJECTION INTRAVENOUS; SUBCUTANEOUS at 13:01

## 2022-06-20 RX ADMIN — Medication 975 MILLIGRAM(S): at 05:46

## 2022-06-20 RX ADMIN — Medication 975 MILLIGRAM(S): at 01:48

## 2022-06-20 RX ADMIN — Medication 40 MILLIEQUIVALENT(S): at 13:03

## 2022-06-20 RX ADMIN — MUPIROCIN 1 APPLICATION(S): 20 OINTMENT TOPICAL at 05:46

## 2022-06-20 RX ADMIN — Medication 975 MILLIGRAM(S): at 17:21

## 2022-06-20 RX ADMIN — Medication 75 MILLIGRAM(S): at 13:02

## 2022-06-20 NOTE — DISCHARGE NOTE NURSING/CASE MANAGEMENT/SOCIAL WORK - PATIENT PORTAL LINK FT
You can access the FollowMyHealth Patient Portal offered by [facility] by registering at the following website: [email or web address]. By joining [facility]’s FollowMyHealth portal, you will also be able to view your health information using other applications (apps) compatible with our system.

## 2022-06-20 NOTE — DISCHARGE NOTE NURSING/CASE MANAGEMENT/SOCIAL WORK - NSDCPEFALRISK_GEN_ALL_CORE
For information on Fall & Injury Prevention, visit: [email or web address] OR  [email or web address] OR  https://www.cdc.gov/steadi/patient.html

## 2022-06-20 NOTE — PROGRESS NOTE ADULT - ASSESSMENT
67 y/o M w/ hx DM, HTN, HLD, CVA in past ESRD on daily peritoneal dialysis presents with fever, cough, transfer from [facility] for peritoneal dialysis. Found to have hypertensive urgency and septic secondary to parainfluenza 67yo M w/ PMH T2DM, HTN, HLD, CVA in past, ESRD on daily peritoneal dialysis presents with fever, cough, transfer from [facility] for peritoneal dialysis. Found to have hypertensive urgency (now resolved) and septic secondary to parainfluenza. Now stable for discharge home, pending optimization of chronic RLE pain.

## 2022-06-20 NOTE — DISCHARGE NOTE PROVIDER - CARE PROVIDER_API CALL
Gillian Hurtado  INTERNAL MEDICINE  [address] [place] [id number]  Phone: [phone]  Fax: [phone]  Established Patient  Follow Up Time:

## 2022-06-20 NOTE — PROGRESS NOTE ADULT - SUBJECTIVE AND OBJECTIVE BOX
[facility] - SOLOMON Hurtado / SOLOMON Foley / KANDICE Shanks/ SOLOMON Broderick/ SOLOMON Allan/ NOMI Mao / VIRGILIO Marks / BENJY Woo  ---------------------------------------------------------------------------------------------------------------  seen and examined today for ESRD on PD  Interval : NAD  VITALS:  T(F): 98.4 (06-20-22 @ 08:32), Max: 98.4 (06-20-22 @ 08:32)  HR: 64 (06-20-22 @ 08:32)  BP: 135/66 (06-20-22 @ 08:32)  RR: 18 (06-20-22 @ 08:32)  SpO2: 98% (06-20-22 @ 05:45)  Wt(kg): --    06-20 @ 07:01  -  06-20 @ 13:29  --------------------------------------------------------  IN: 2000 mL / OUT: 0 mL / NET: 2000 mL      Physical Exam :-  Constitutional: NAD  Neck: Supple.  Respiratory: Bilateral equal breath sounds,  Cardiovascular: S1, S2 normal,  Gastrointestinal: Bowel Sounds present, soft, non tender.  Extremities: No edema  Neurological: Alert and Oriented x 3, no focal deficits  Psychiatric: Normal mood, normal affect  Data:-  Allergies :   No Known Allergies    Hospital Medications:   MEDICATIONS  (STANDING):  acetaminophen     Tablet .. 975 milliGRAM(s) Oral every 6 hours  amLODIPine   Tablet 10 milliGRAM(s) Oral daily  atorvastatin 80 milliGRAM(s) Oral at bedtime  dextrose 5%. 1000 milliLiter(s) (50 mL/Hr) IV Continuous <Continuous>  dextrose 5%. 1000 milliLiter(s) (100 mL/Hr) IV Continuous <Continuous>  dextrose 50% Injectable 25 Gram(s) IV Push once  dextrose 50% Injectable 12.5 Gram(s) IV Push once  dextrose 50% Injectable 25 Gram(s) IV Push once  epoetin heidi-epbx (RETACRIT) Injectable 4000 Unit(s) SubCutaneous <User Schedule>  gentamicin 0.1% Cream 1 Application(s) Topical <User Schedule>  glucagon  Injectable 1 milliGRAM(s) IntraMuscular once  heparin   Injectable 5000 Unit(s) SubCutaneous every 8 hours  hydrALAZINE 75 milliGRAM(s) Oral every 8 hours  insulin lispro (ADMELOG) corrective regimen sliding scale   SubCutaneous three times a day before meals  insulin lispro (ADMELOG) corrective regimen sliding scale   SubCutaneous at bedtime  lidocaine   4% Patch 1 Patch Transdermal daily  mupirocin 2% Ointment 1 Application(s) Topical two times a day  polyethylene glycol 3350 17 Gram(s) Oral two times a day  senna 2 Tablet(s) Oral at bedtime  sevelamer carbonate 800 milliGRAM(s) Oral three times a day with meals    06-20    136  |  98  |  58<H>  ----------------------------<  85  3.1<L>   |  23  |  8.01<H>    Ca    8.2<L>      20 Jun 2022 07:32  Phos  5.2     06-20  Mg     2.40     06-20      Creatinine Trend: 8.01 <--, 6.96 <--, 7.59 <--, 7.77 <--, 8.53 <--, 8.63 <--, 8.31 <--                        8.0    8.34  )-----------( 254      ( 20 Jun 2022 07:32 )             24.6

## 2022-06-20 NOTE — PROGRESS NOTE ADULT - NUTRITIONAL ASSESSMENT
This patient has been assessed with a concern for Malnutrition and has been determined to have a diagnosis/diagnoses of Moderate protein-calorie malnutrition.    This patient is being managed with:   Diet Renal Restrictions-  For patients receiving Renal Replacement - No Protein Restr No Conc K No Conc Phos Low Sodium  [employer] Vegetarian (Accepts Fish)  Supplement Feeding Modality:  Oral  Nepro Cans or Servings Per Day:  1       Frequency:  Three Times a day  Entered: Jun 19 2022 10:21PM    
This patient has been assessed with a concern for Malnutrition and has been determined to have a diagnosis/diagnoses of Moderate protein-calorie malnutrition.    This patient is being managed with:   Diet Renal Restrictions-  For patients receiving Renal Replacement - No Protein Restr No Conc K No Conc Phos Low Sodium  Supplement Feeding Modality:  Oral  Nepro Cans or Servings Per Day:  1       Frequency:  Three Times a day  Entered: Jun 17 2022  4:32PM    
This patient has been assessed with a concern for Malnutrition and has been determined to have a diagnosis/diagnoses of Moderate protein-calorie malnutrition.    This patient is being managed with:   Diet Renal Restrictions-  For patients receiving Renal Replacement - No Protein Restr No Conc K No Conc Phos Low Sodium  Supplement Feeding Modality:  Oral  Nepro Cans or Servings Per Day:  1       Frequency:  Three Times a day  Entered: Jun 17 2022  4:32PM

## 2022-06-20 NOTE — PROGRESS NOTE ADULT - PROBLEM SELECTOR PLAN 10
has not been eating well per son   - nutrition consulted  - age appropriate cancer screening with PCP

## 2022-06-20 NOTE — PROGRESS NOTE ADULT - SUBJECTIVE AND OBJECTIVE BOX
Progress Note    06-16-22 (4d)    Patient is a 66y old  Male who presents with a chief complaint of peritoneal dialysis (20 Jun 2022 07:50)      Subjective / Overnight Events :  - No acute events overnight.   - Pt seen and examined at bedside. States RLE pain is significantly improved now with standing tylenol and lidocaine patch. Have not required PRN tramadol. Pt feels ready to go home    Additional ROS (if any):    MEDICATIONS  (STANDING):  acetaminophen     Tablet .. 975 milliGRAM(s) Oral every 6 hours  amLODIPine   Tablet 10 milliGRAM(s) Oral daily  atorvastatin 80 milliGRAM(s) Oral at bedtime  dextrose 5%. 1000 milliLiter(s) (50 mL/Hr) IV Continuous <Continuous>  dextrose 5%. 1000 milliLiter(s) (100 mL/Hr) IV Continuous <Continuous>  dextrose 50% Injectable 25 Gram(s) IV Push once  dextrose 50% Injectable 12.5 Gram(s) IV Push once  dextrose 50% Injectable 25 Gram(s) IV Push once  epoetin heidi-epbx (RETACRIT) Injectable 4000 Unit(s) SubCutaneous <User Schedule>  gentamicin 0.1% Cream 1 Application(s) Topical <User Schedule>  glucagon  Injectable 1 milliGRAM(s) IntraMuscular once  heparin   Injectable 5000 Unit(s) SubCutaneous every 8 hours  hydrALAZINE 75 milliGRAM(s) Oral every 8 hours  insulin lispro (ADMELOG) corrective regimen sliding scale   SubCutaneous three times a day before meals  insulin lispro (ADMELOG) corrective regimen sliding scale   SubCutaneous at bedtime  lidocaine   4% Patch 1 Patch Transdermal daily  mupirocin 2% Ointment 1 Application(s) Topical two times a day  polyethylene glycol 3350 17 Gram(s) Oral two times a day  potassium chloride    Tablet ER 40 milliEquivalent(s) Oral every 4 hours  senna 2 Tablet(s) Oral at bedtime  sevelamer carbonate 800 milliGRAM(s) Oral three times a day with meals    MEDICATIONS  (PRN):  dextrose Oral Gel 15 Gram(s) Oral once PRN Blood Glucose LESS THAN 70 milliGRAM(s)/deciliter  traMADol 25 milliGRAM(s) Oral every 8 hours PRN Severe Pain (7 - 10)      CAPILLARY BLOOD GLUCOSE      POCT Blood Glucose.: 152 mg/dL (20 Jun 2022 09:17)  POCT Blood Glucose.: 102 mg/dL (20 Jun 2022 07:52)  POCT Blood Glucose.: 110 mg/dL (19 Jun 2022 22:27)  POCT Blood Glucose.: 174 mg/dL (19 Jun 2022 17:15)  POCT Blood Glucose.: 210 mg/dL (19 Jun 2022 13:01)    I&O's Summary    20 Jun 2022 07:01  -  20 Jun 2022 09:24  --------------------------------------------------------  IN: 2000 mL / OUT: 0 mL / NET: 2000 mL        PHYSICAL EXAM:  Vital Signs Last 24 Hrs  T(C): 36.9 (20 Jun 2022 08:32), Max: 36.9 (20 Jun 2022 08:32)  T(F): 98.4 (20 Jun 2022 08:32), Max: 98.4 (20 Jun 2022 08:32)  HR: 64 (20 Jun 2022 08:32) (61 - 68)  BP: 135/66 (20 Jun 2022 08:32) (135/66 - 172/58)  BP(mean): --  RR: 18 (20 Jun 2022 08:32) (17 - 18)  SpO2: 98% (20 Jun 2022 05:45) (97% - 99%)    General: NAD, non-toxic appearing   HEENT: EOMi, no scleral icterus  CV: RRR, normal S1 and S2, no m/r/g  Lungs: normal respiratory effort. CTAB, no wheezes, rales, or rhonchi  Abd: soft, nontender, nondistended  Ext: no edema, 2+ peripheral pulses. moving RLE now w/o pain   Pysch: AAOx3, appropriate affect   Neuro: grossly non-focal, moving all extremities spontaneously   Skin: no rashes or lesions     LABS:                          8.0    8.34  )-----------( 254      ( 20 Jun 2022 07:32 )             24.6       WBC Trend: 8.34<--, 8.00<--, 7.47<--  Hb Trend: 8.0<--, 7.8<--, 7.8<--, 8.0<--, 9.6<--    06-20    136  |  98  |  58<H>  ----------------------------<  85  3.1<L>   |  23  |  8.01<H>    Ca    8.2<L>      20 Jun 2022 07:32  Phos  5.2     06-20  Mg     2.40     06-20            RADIOLOGY & ADDITIONAL TESTS: Reviewed  No new imaging

## 2022-06-20 NOTE — PROGRESS NOTE ADULT - ASSESSMENT
67 y/o M w/ hx DM2, HTN, HLD, CVA in past ESRD on daily home peritoneal dialysis presents with fever, cough, transfer from [facility] for peritoneal dialysis.    ESRD on PD  Consent in chart  CAPD, 4 exchanges, 2L each, 1.5% over 3-4hrs each exchange  Resume PD today  cell count, gram stain and culture negative for peritonitis  Renal diet  daily BMP    Sepsis  Viral PNA  Appreciate Infectious disease specialist recs    Anemia of CKD  Epo 4k TIW Sq  trend hgb    BMD of ESRD  Sevelamer 800 TID QAC  trend carla and phos    Hypokalemia  trend BMP      For any question, call:  Cell # [phone]  Pager # [phone]  Callback # [phone]

## 2022-06-20 NOTE — DISCHARGE NOTE PROVIDER - HOSPITAL COURSE
65yo M w/ PMH T2DM, HTN, HLD, CVA in past, and ESRD on daily peritoneal dialysis initially presented to [facility] with fever, cough, and slurred speech. Presented as a stroke code there, CTH was negative for acute findings. Was also found to be septic likely secondary to parainfluenza virus, and was transferred to [facility] for continuation of his home peritoneal dialysis. Had hypertensive urgency on presentation here with gradual improvement after resumption of home anti-hypertensives. Slurred speech had resolved and mental status returned to baseline, AMS suspect secondary to sepsis. He was monitored off antibiotics given low suspicion for bacterial etiology as he remained afebrile, WBC wnl, and blood cultures were negative. Main complaint here was subacute/chronic RLE pain, which was recently worked up by outpatient orthopedist (discussed R knee replacement but not offered given PD?). Pain was controlled with standing Tylenol and lidocaine patch. Repeat imaging was not performed given recent extensive outpt workup. Pt was assessed by PT who recommended  (of note, no facility able to accommodate PD). Home hydralazine was also uptitrated to 75mg TID for uncontrolled HTN. Pt is now optimized for discharge home with appropriate outpt followup. 65yo M w/ PMH T2DM, HTN, HLD, CVA in past, and ESRD on daily peritoneal dialysis initially presented to [facility] with fever, cough, and slurred speech. Presented as a stroke code there, CTH was negative for acute findings. Was also found to be septic likely secondary to parainfluenza virus, and was transferred to [facility] for continuation of his home peritoneal dialysis. Had hypertensive urgency on presentation here with gradual improvement after resumption of home anti-hypertensives. Slurred speech had resolved and mental status returned to baseline, AMS suspect secondary to sepsis. He was monitored off antibiotics given low suspicion for bacterial etiology as he remained afebrile, WBC wnl, and blood cultures were negative. No ascites, PD fluid culture negative. Main complaint here was subacute/chronic RLE pain, which was recently worked up by outpatient orthopedist (discussed R knee replacement but not offered given PD?). Pain was controlled with standing Tylenol and lidocaine patch. Repeat imaging was not performed given recent extensive outpt workup. Pt was assessed by PT who recommended  (of note, no facility able to accommodate PD). Home hydralazine was also uptitrated to 75mg TID for uncontrolled HTN. Pt is now optimized for discharge home with appropriate outpt followup.

## 2022-06-20 NOTE — PROGRESS NOTE ADULT - PROVIDER SPECIALTY LIST ADULT
Nephrology
Internal Medicine
Nephrology
Internal Medicine
Hospitalist

## 2022-06-20 NOTE — PROGRESS NOTE ADULT - PROBLEM SELECTOR PLAN 4
Hypertensive to 226/62 in ED, per son not taking meds for prior 3-4 days   - BPs now improved, likely partially due to pain, hold off on uptitrating home doses   - c/w home amlodipine 10mg qd  - home hydral 50mg q8h--> increased on 6/19 to 75 TID

## 2022-06-20 NOTE — DISCHARGE NOTE PROVIDER - DETAILS OF MALNUTRITION DIAGNOSIS/DIAGNOSES
This patient has been assessed with a concern for Malnutrition and was treated during this hospitalization for the following Nutrition diagnosis/diagnoses:     -  06/17/2022: Moderate protein-calorie malnutrition

## 2022-06-20 NOTE — PROGRESS NOTE ADULT - PROBLEM SELECTOR PLAN 2
on presentation, T 103.7. RR >20. s/p zosynx1 in ED. likely 2/2 Parainfluenza. Have remained afebrile here while off abx   - CXR clear, UA neg for infection, UCx neg   - f/u blood cultures- ngtd   - MRSA PCR neg, positive MSSA   - PD cx unremarkable for infection, low suspicion for SBP   - Hold on further abx for now  - lactate wnl

## 2022-06-20 NOTE — PROGRESS NOTE ADULT - PROBLEM SELECTOR PLAN 1
per pt, had 3 falls within the past 2 weeks. unable to clearly specify whether pain preceded falls. per son, had followed up outpt w/ ortho with XR and CT scans of lumbar, hip, and knee and were reportedly   ?unremarkable, but R knee replacement was discussed but ultimately not offered because of PD   - pt with diffuse L hip/LLE pain on exam, no focal swelling, TTP, erythema. no joint effusion. DP pulses 2+ and symmetric   - will get further collateral from son regarding past imaging to avoid repeating   - per CM, no [facility] facilities accept PD, would need home PT  - MR brain unlikely to be revealing, difficulty ambulating appears to be 2/2 pain rather than weakness    - pain control w/ standing tylenol 975mg q6h x 3 days, lidocaine patch to back, tramadol 25mg q12h (renally dosed) prn severe pain  - bowel regimen  - pain better controlled now, will get repeat PT

## 2022-06-20 NOTE — PROGRESS NOTE ADULT - ATTENDING COMMENTS
67 yo M with hx of CVA, uncontrolled HTN,  DM, ESRD on PD present to OSH for fever and acute metabolic encephalopathy, likely 2/2 parainfluenza and hypertensive urgency    afebrile o/n. currently AAOX 3, MS at baseline    -monitor off abx as infectious w/u inc. CXR, blood cx, UA, PD fluid analysis negative for bacterial infection   -c/w PD as per renal   -BP better controlled- c/w norvasc 10 and hydralazine 75 tid  -c/o LBP and RLE pain after fall- reported outpt CT done revealing. pain better controlled   -PT eval- rehab. pt declined. will set up home PT     stable for discharge home today. total time spent on dc 40 min

## 2022-06-20 NOTE — PROGRESS NOTE ADULT - REASON FOR ADMISSION
peritoneal dialysis

## 2022-06-20 NOTE — DISCHARGE NOTE PROVIDER - NSDCCPCAREPLAN_GEN_ALL_CORE_FT
PRINCIPAL DISCHARGE DIAGNOSIS  Diagnosis: Parainfluenza infection  Assessment and Plan of Treatment: You presented to [facility] with a fever and tested positive for parainfluenza infection, which is a common virus that cause cold-like symptoms. You tested negative for covid and your blood cultures were negative for any bacterial infections. You may continue taking over-the-counter cough medications as needed and stay hydrated.      SECONDARY DISCHARGE DIAGNOSES  Diagnosis: Slurred speech  Assessment and Plan of Treatment: You presented to [facility] with slurred speech that soon resolved. Your CT head was also negative for any signs of a new stroke. Given that you are back to your baseline, this was likely caused by your fever. No further workup needed at this time. Should these symptoms return, please return to the emergency room.    Diagnosis: Pain of right lower extremity  Assessment and Plan of Treatment: Your pain was managed with standing tylenol every 6 hours and a lidocaine patch. You may continue taking Tylenol 650mg every 6 hours as needed and using lidocaine patch as needed. We will try to set up you up for home physical therapy.    Diagnosis: ESRD on peritoneal dialysis  Assessment and Plan of Treatment: Please continue your peritoneal dialysis and follow up routinely with your nephrologist. An additional medication called Sevalamer 800mg three times a day with meals have been added by your nephrology team here.    Diagnosis: Hypertensive urgency  Assessment and Plan of Treatment: Your blood pressure was very elevated with systolic to 200s when you first arrived. With resumption and dose increase of your blood pressure medications, it is now improved. We have increased your hydralazine to 75mg three times a day. Continue amlodipine 10mg daily. And follow up with your primary care doctor for any further adjustments.    Diagnosis: Diabetes mellitus  Assessment and Plan of Treatment: Your hemoglobin a1c was 6.7% here. We do not recommend you continue your bedtime Lantus, please follow up with your primary care doctor for alternatives.     PRINCIPAL DISCHARGE DIAGNOSIS  Diagnosis: Parainfluenza infection  Assessment and Plan of Treatment: You presented to [facility] with a fever and tested positive for parainfluenza infection, which is a common virus that cause cold-like symptoms. You tested negative for covid and your blood cultures were negative for any bacterial infections. You may continue taking over-the-counter cough medications as needed and stay hydrated.      SECONDARY DISCHARGE DIAGNOSES  Diagnosis: Slurred speech  Assessment and Plan of Treatment: You presented to [facility] with slurred speech that soon resolved. Your CT head was also negative for any signs of a new stroke. Given that you are back to your baseline, this was likely caused by your fever. No further workup needed at this time. Should these symptoms return, please return to the emergency room.    Diagnosis: Pain of right lower extremity  Assessment and Plan of Treatment: Your pain was managed with standing tylenol every 6 hours and a lidocaine patch. You may continue taking Tylenol 650mg every 6 hours as needed and using lidocaine patch as needed. We will try to set up you up for home physical therapy.    Diagnosis: ESRD on peritoneal dialysis  Assessment and Plan of Treatment: Please continue your peritoneal dialysis and follow up routinely with your nephrologist.    Diagnosis: Hypertensive urgency  Assessment and Plan of Treatment: Your blood pressure was very elevated with systolic to 200s when you first arrived. With resumption and dose increase of your blood pressure medications, it is now improved. We have increased your hydralazine to 75mg three times a day. Continue amlodipine 10mg daily. And follow up with your primary care doctor for any further adjustments.    Diagnosis: Diabetes mellitus  Assessment and Plan of Treatment: Your hemoglobin a1c was 6.7% here. We do not recommend you continue your bedtime Lantus, please follow up with your primary care doctor for alternatives.

## 2022-06-20 NOTE — PROGRESS NOTE ADULT - PROBLEM SELECTOR PLAN 6
Presented to [place] due to concern fo slurred speech and lethargy. Now fully resolved and back to baseline mental status per son   - CT imaging in chart  negative for large vessel occlusion or flow limitations  - Possible that elevated fever 2/2 parainfluenza caused transient delirium vs hypertensive emergency vs TIA  - TSH wnl  - assessed by S&S- regular diet, thin liquids   - fall, aspiration precautions  - assessed by PT, was unable to cooperate due to pain, anticipate rehab. assessed by OT  --- no [facility] facilities able to perform PD, would have to do home PT   - no indication for MRI or further imaging at this point

## 2022-06-20 NOTE — PROGRESS NOTE ADULT - PROBLEM SELECTOR PLAN 9
TSH wnl here  - son made aware of finding on CT from [place]  - unclear chronicity  - outpt f/u with PMD

## 2022-06-20 NOTE — DISCHARGE NOTE PROVIDER - NSDCMRMEDTOKEN_GEN_ALL_CORE_FT
amLODIPine 10 mg oral tablet: 1 tab(s) orally once a day  Bumex 1 mg oral tablet: 1 tab(s) orally once a day  (Pharmacy filled 1 tab po TID)  doxazosin 4 mg oral tablet, extended release: 1 tab(s) orally once a day (in the morning)  hydrALAZINE 50 mg oral tablet: 1 tab(s) orally every 8 hours  Lantus 100 units/mL subcutaneous solution: 5 unit(s) subcutaneous 2 times a day, As Needed  (No fill history for Lantus, last filled Humalog in Feb/2022 x 3 months).   Lipitor 80 mg oral tablet: 1 tab(s) orally once a day  (Pharmacy filled Lipitor 40mg QD June/2022)   acetaminophen 325 mg oral tablet: 3 tab(s) orally every 6 hours, As Needed  amLODIPine 10 mg oral tablet: 1 tab(s) orally once a day  Bumex 1 mg oral tablet: 1 tab(s) orally once a day  (Pharmacy filled 1 tab po TID)  doxazosin 4 mg oral tablet, extended release: 1 tab(s) orally once a day (in the morning)  hydrALAZINE 50 mg oral tablet: 1 tab(s) orally every 8 hours  lidocaine 4% topical film: Apply topically to affected area once a day as directed  Lipitor 80 mg oral tablet: 1 tab(s) orally once a day  (Pharmacy filled Lipitor 40mg QD June/2022)  mupirocin 2% topical ointment: 1 application topically 2 times a day  senna oral tablet: 2 tab(s) orally once a day (at bedtime), As Needed   acetaminophen 325 mg oral tablet: 3 tab(s) orally every 6 hours, As Needed  amLODIPine 10 mg oral tablet: 1 tab(s) orally once a day  Bumex 1 mg oral tablet: 1 tab(s) orally once a day  (Pharmacy filled 1 tab po TID)  doxazosin 4 mg oral tablet, extended release: 1 tab(s) orally once a day (in the morning)  hydrALAZINE: 75 milligram(s) orally 3 times a day  lidocaine 4% topical film: Apply topically to affected area once a day as directed  Lipitor 80 mg oral tablet: 1 tab(s) orally once a day  (Pharmacy filled Lipitor 40mg QD June/2022)  mupirocin 2% topical ointment: 1 application topically 2 times a day  senna oral tablet: 2 tab(s) orally once a day (at bedtime), As Needed  sevelamer carbonate 800 mg oral tablet: 1 tab(s) orally 3 times a day (with meals)   acetaminophen 325 mg oral tablet: 3 tab(s) orally every 6 hours, As Needed  amLODIPine 10 mg oral tablet: 1 tab(s) orally once a day  Bumex 1 mg oral tablet: 1 tab(s) orally once a day  (Pharmacy filled 1 tab po TID)  doxazosin 4 mg oral tablet, extended release: 1 tab(s) orally once a day (in the morning)  Home Physical Therapy: home physical therapy 3 times a week   hydrALAZINE: 75 milligram(s) orally 3 times a day  lidocaine 4% topical film: Apply topically to affected area once a day as directed  Lipitor 80 mg oral tablet: 1 tab(s) orally once a day  (Pharmacy filled Lipitor 40mg QD June/2022)  mupirocin 2% topical ointment: 1 application topically 2 times a day  senna oral tablet: 2 tab(s) orally once a day (at bedtime), As Needed  sevelamer carbonate 800 mg oral tablet: 1 tab(s) orally 3 times a day (with meals)

## 2022-06-21 LAB
CULTURE RESULTS: SIGNIFICANT CHANGE UP
CULTURE RESULTS: SIGNIFICANT CHANGE UP
SPECIMEN SOURCE: SIGNIFICANT CHANGE UP
SPECIMEN SOURCE: SIGNIFICANT CHANGE UP

## 2022-06-22 LAB
CULTURE RESULTS: SIGNIFICANT CHANGE UP
SPECIMEN SOURCE: SIGNIFICANT CHANGE UP

## 2022-07-23 ENCOUNTER — INPATIENT (INPATIENT)
Facility: HOSPITAL | Age: 67
LOS: 2 days | Discharge: HOME | End: 2022-07-26
Attending: STUDENT IN AN ORGANIZED HEALTH CARE EDUCATION/TRAINING PROGRAM | Admitting: STUDENT IN AN ORGANIZED HEALTH CARE EDUCATION/TRAINING PROGRAM

## 2022-07-23 VITALS
DIASTOLIC BLOOD PRESSURE: 60 MMHG | OXYGEN SATURATION: 98 % | TEMPERATURE: 98 F | HEIGHT: 68 IN | HEART RATE: 82 BPM | RESPIRATION RATE: 18 BRPM | SYSTOLIC BLOOD PRESSURE: 171 MMHG

## 2022-07-23 DIAGNOSIS — D64.9 ANEMIA, UNSPECIFIED: ICD-10-CM

## 2022-07-23 DIAGNOSIS — Z29.9 ENCOUNTER FOR PROPHYLACTIC MEASURES, UNSPECIFIED: ICD-10-CM

## 2022-07-23 DIAGNOSIS — N18.6 END STAGE RENAL DISEASE: ICD-10-CM

## 2022-07-23 DIAGNOSIS — R06.00 DYSPNEA, UNSPECIFIED: ICD-10-CM

## 2022-07-23 DIAGNOSIS — I10 ESSENTIAL (PRIMARY) HYPERTENSION: ICD-10-CM

## 2022-07-23 DIAGNOSIS — D63.8 ANEMIA IN OTHER CHRONIC DISEASES CLASSIFIED ELSEWHERE: ICD-10-CM

## 2022-07-23 DIAGNOSIS — E87.6 HYPOKALEMIA: ICD-10-CM

## 2022-07-23 DIAGNOSIS — E11.9 TYPE 2 DIABETES MELLITUS WITHOUT COMPLICATIONS: ICD-10-CM

## 2022-07-23 DIAGNOSIS — Z98.890 OTHER SPECIFIED POSTPROCEDURAL STATES: Chronic | ICD-10-CM

## 2022-07-23 DIAGNOSIS — R77.8 OTHER SPECIFIED ABNORMALITIES OF PLASMA PROTEINS: ICD-10-CM

## 2022-07-23 DIAGNOSIS — E87.1 HYPO-OSMOLALITY AND HYPONATREMIA: ICD-10-CM

## 2022-07-23 PROBLEM — I63.9 CEREBRAL INFARCTION, UNSPECIFIED: Chronic | Status: ACTIVE | Noted: 2022-06-16

## 2022-07-23 PROBLEM — E78.5 HYPERLIPIDEMIA, UNSPECIFIED: Chronic | Status: ACTIVE | Noted: 2022-06-16

## 2022-07-23 LAB
ALBUMIN SERPL ELPH-MCNC: 2.9 G/DL — LOW (ref 3.3–5)
ALP SERPL-CCNC: 46 U/L — SIGNIFICANT CHANGE UP (ref 40–120)
ALT FLD-CCNC: 8 U/L — SIGNIFICANT CHANGE UP (ref 4–41)
ANION GAP SERPL CALC-SCNC: 11 MMOL/L — SIGNIFICANT CHANGE UP (ref 7–14)
ANION GAP SERPL CALC-SCNC: 12 MMOL/L — SIGNIFICANT CHANGE UP (ref 7–14)
AST SERPL-CCNC: 18 U/L — SIGNIFICANT CHANGE UP (ref 4–40)
B PERT DNA SPEC QL NAA+PROBE: SIGNIFICANT CHANGE UP
B PERT+PARAPERT DNA PNL SPEC NAA+PROBE: SIGNIFICANT CHANGE UP
BASE EXCESS BLDV CALC-SCNC: 3.5 MMOL/L — HIGH (ref -2–3)
BASOPHILS # BLD AUTO: 0.03 K/UL — SIGNIFICANT CHANGE UP (ref 0–0.2)
BASOPHILS NFR BLD AUTO: 0.5 % — SIGNIFICANT CHANGE UP (ref 0–2)
BILIRUB SERPL-MCNC: 0.3 MG/DL — SIGNIFICANT CHANGE UP (ref 0.2–1.2)
BLD GP AB SCN SERPL QL: NEGATIVE — SIGNIFICANT CHANGE UP
BLOOD GAS VENOUS COMPREHENSIVE RESULT: SIGNIFICANT CHANGE UP
BORDETELLA PARAPERTUSSIS (RAPRVP): SIGNIFICANT CHANGE UP
BUN SERPL-MCNC: 49 MG/DL — HIGH (ref 7–23)
BUN SERPL-MCNC: 49 MG/DL — HIGH (ref 7–23)
C PNEUM DNA SPEC QL NAA+PROBE: SIGNIFICANT CHANGE UP
CALCIUM SERPL-MCNC: 8.3 MG/DL — LOW (ref 8.4–10.5)
CALCIUM SERPL-MCNC: 8.4 MG/DL — SIGNIFICANT CHANGE UP (ref 8.4–10.5)
CHLORIDE BLDV-SCNC: 102 MMOL/L — SIGNIFICANT CHANGE UP (ref 96–108)
CHLORIDE SERPL-SCNC: 95 MMOL/L — LOW (ref 98–107)
CHLORIDE SERPL-SCNC: 96 MMOL/L — LOW (ref 98–107)
CO2 BLDV-SCNC: 29 MMOL/L — HIGH (ref 22–26)
CO2 SERPL-SCNC: 25 MMOL/L — SIGNIFICANT CHANGE UP (ref 22–31)
CO2 SERPL-SCNC: 25 MMOL/L — SIGNIFICANT CHANGE UP (ref 22–31)
CREAT SERPL-MCNC: 6.69 MG/DL — HIGH (ref 0.5–1.3)
CREAT SERPL-MCNC: 6.83 MG/DL — HIGH (ref 0.5–1.3)
DIALYSIS INSTRUMENT RESULT - HEPATITIS B SURFACE ANTIGEN: NEGATIVE — SIGNIFICANT CHANGE UP
EGFR: 8 ML/MIN/1.73M2 — LOW
EGFR: 8 ML/MIN/1.73M2 — LOW
EOSINOPHIL # BLD AUTO: 0.35 K/UL — SIGNIFICANT CHANGE UP (ref 0–0.5)
EOSINOPHIL NFR BLD AUTO: 5.4 % — SIGNIFICANT CHANGE UP (ref 0–6)
FLUAV SUBTYP SPEC NAA+PROBE: SIGNIFICANT CHANGE UP
FLUBV RNA SPEC QL NAA+PROBE: SIGNIFICANT CHANGE UP
GAS PNL BLDV: 133 MMOL/L — LOW (ref 136–145)
GAS PNL BLDV: SIGNIFICANT CHANGE UP
GLUCOSE BLDC GLUCOMTR-MCNC: 133 MG/DL — HIGH (ref 70–99)
GLUCOSE BLDC GLUCOMTR-MCNC: 216 MG/DL — HIGH (ref 70–99)
GLUCOSE BLDV-MCNC: 171 MG/DL — HIGH (ref 70–99)
GLUCOSE SERPL-MCNC: 149 MG/DL — HIGH (ref 70–99)
GLUCOSE SERPL-MCNC: 177 MG/DL — HIGH (ref 70–99)
HADV DNA SPEC QL NAA+PROBE: SIGNIFICANT CHANGE UP
HAPTOGLOB SERPL-MCNC: 236 MG/DL — HIGH (ref 34–200)
HCO3 BLDV-SCNC: 28 MMOL/L — SIGNIFICANT CHANGE UP (ref 22–29)
HCOV 229E RNA SPEC QL NAA+PROBE: SIGNIFICANT CHANGE UP
HCOV HKU1 RNA SPEC QL NAA+PROBE: SIGNIFICANT CHANGE UP
HCOV NL63 RNA SPEC QL NAA+PROBE: SIGNIFICANT CHANGE UP
HCOV OC43 RNA SPEC QL NAA+PROBE: SIGNIFICANT CHANGE UP
HCT VFR BLD CALC: 17.5 % — CRITICAL LOW (ref 39–50)
HCT VFR BLD CALC: 24.6 % — LOW (ref 39–50)
HCT VFR BLDA CALC: 16 % — CRITICAL LOW (ref 39–51)
HGB BLD CALC-MCNC: 5.3 G/DL — CRITICAL LOW (ref 13–17)
HGB BLD-MCNC: 5.4 G/DL — CRITICAL LOW (ref 13–17)
HGB BLD-MCNC: 8 G/DL — LOW (ref 13–17)
HMPV RNA SPEC QL NAA+PROBE: SIGNIFICANT CHANGE UP
HPIV1 RNA SPEC QL NAA+PROBE: SIGNIFICANT CHANGE UP
HPIV2 RNA SPEC QL NAA+PROBE: SIGNIFICANT CHANGE UP
HPIV3 RNA SPEC QL NAA+PROBE: SIGNIFICANT CHANGE UP
HPIV4 RNA SPEC QL NAA+PROBE: SIGNIFICANT CHANGE UP
IANC: 4.07 K/UL — SIGNIFICANT CHANGE UP (ref 1.8–7.4)
IMM GRANULOCYTES NFR BLD AUTO: 0.8 % — SIGNIFICANT CHANGE UP (ref 0–1.5)
LACTATE BLDV-MCNC: 0.5 MMOL/L — SIGNIFICANT CHANGE UP (ref 0.5–2)
LYMPHOCYTES # BLD AUTO: 1.27 K/UL — SIGNIFICANT CHANGE UP (ref 1–3.3)
LYMPHOCYTES # BLD AUTO: 19.4 % — SIGNIFICANT CHANGE UP (ref 13–44)
M PNEUMO DNA SPEC QL NAA+PROBE: SIGNIFICANT CHANGE UP
MAGNESIUM SERPL-MCNC: 2.1 MG/DL — SIGNIFICANT CHANGE UP (ref 1.6–2.6)
MAGNESIUM SERPL-MCNC: 2.1 MG/DL — SIGNIFICANT CHANGE UP (ref 1.6–2.6)
MCHC RBC-ENTMCNC: 27 PG — SIGNIFICANT CHANGE UP (ref 27–34)
MCHC RBC-ENTMCNC: 28.7 PG — SIGNIFICANT CHANGE UP (ref 27–34)
MCHC RBC-ENTMCNC: 30.9 GM/DL — LOW (ref 32–36)
MCHC RBC-ENTMCNC: 32.5 GM/DL — SIGNIFICANT CHANGE UP (ref 32–36)
MCV RBC AUTO: 87.5 FL — SIGNIFICANT CHANGE UP (ref 80–100)
MCV RBC AUTO: 88.2 FL — SIGNIFICANT CHANGE UP (ref 80–100)
MONOCYTES # BLD AUTO: 0.77 K/UL — SIGNIFICANT CHANGE UP (ref 0–0.9)
MONOCYTES NFR BLD AUTO: 11.8 % — SIGNIFICANT CHANGE UP (ref 2–14)
NEUTROPHILS # BLD AUTO: 4.07 K/UL — SIGNIFICANT CHANGE UP (ref 1.8–7.4)
NEUTROPHILS NFR BLD AUTO: 62.1 % — SIGNIFICANT CHANGE UP (ref 43–77)
NRBC # BLD: 0 /100 WBCS — SIGNIFICANT CHANGE UP
NRBC # BLD: 0 /100 WBCS — SIGNIFICANT CHANGE UP
NRBC # FLD: 0 K/UL — SIGNIFICANT CHANGE UP
NRBC # FLD: 0 K/UL — SIGNIFICANT CHANGE UP
NT-PROBNP SERPL-SCNC: 6976 PG/ML — HIGH
OB PNL STL: NEGATIVE — SIGNIFICANT CHANGE UP
PCO2 BLDV: 40 MMHG — LOW (ref 42–55)
PH BLDV: 7.45 — HIGH (ref 7.32–7.43)
PHOSPHATE SERPL-MCNC: 2.9 MG/DL — SIGNIFICANT CHANGE UP (ref 2.5–4.5)
PHOSPHATE SERPL-MCNC: 3.4 MG/DL — SIGNIFICANT CHANGE UP (ref 2.5–4.5)
PLATELET # BLD AUTO: 275 K/UL — SIGNIFICANT CHANGE UP (ref 150–400)
PLATELET # BLD AUTO: 323 K/UL — SIGNIFICANT CHANGE UP (ref 150–400)
PO2 BLDV: 56 MMHG — SIGNIFICANT CHANGE UP
POTASSIUM BLDV-SCNC: 2.9 MMOL/L — CRITICAL LOW (ref 3.5–5.1)
POTASSIUM SERPL-MCNC: 3.1 MMOL/L — LOW (ref 3.5–5.3)
POTASSIUM SERPL-MCNC: 3.4 MMOL/L — LOW (ref 3.5–5.3)
POTASSIUM SERPL-SCNC: 3.1 MMOL/L — LOW (ref 3.5–5.3)
POTASSIUM SERPL-SCNC: 3.4 MMOL/L — LOW (ref 3.5–5.3)
PROT SERPL-MCNC: 5.8 G/DL — LOW (ref 6–8.3)
RAPID RVP RESULT: SIGNIFICANT CHANGE UP
RBC # BLD: 2 M/UL — LOW (ref 4.2–5.8)
RBC # BLD: 2.79 M/UL — LOW (ref 4.2–5.8)
RBC # FLD: 15 % — HIGH (ref 10.3–14.5)
RBC # FLD: 16.1 % — HIGH (ref 10.3–14.5)
RH IG SCN BLD-IMP: POSITIVE — SIGNIFICANT CHANGE UP
RH IG SCN BLD-IMP: POSITIVE — SIGNIFICANT CHANGE UP
RSV RNA SPEC QL NAA+PROBE: SIGNIFICANT CHANGE UP
RV+EV RNA SPEC QL NAA+PROBE: SIGNIFICANT CHANGE UP
SAO2 % BLDV: 88.2 % — SIGNIFICANT CHANGE UP
SARS-COV-2 RNA SPEC QL NAA+PROBE: SIGNIFICANT CHANGE UP
SODIUM SERPL-SCNC: 132 MMOL/L — LOW (ref 135–145)
SODIUM SERPL-SCNC: 132 MMOL/L — LOW (ref 135–145)
TROPONIN T, HIGH SENSITIVITY RESULT: 113 NG/L — CRITICAL HIGH
TROPONIN T, HIGH SENSITIVITY RESULT: 115 NG/L — CRITICAL HIGH
WBC # BLD: 6.54 K/UL — SIGNIFICANT CHANGE UP (ref 3.8–10.5)
WBC # BLD: 7.49 K/UL — SIGNIFICANT CHANGE UP (ref 3.8–10.5)
WBC # FLD AUTO: 6.54 K/UL — SIGNIFICANT CHANGE UP (ref 3.8–10.5)
WBC # FLD AUTO: 7.49 K/UL — SIGNIFICANT CHANGE UP (ref 3.8–10.5)

## 2022-07-23 PROCEDURE — 99285 EMERGENCY DEPT VISIT HI MDM: CPT

## 2022-07-23 PROCEDURE — 99223 1ST HOSP IP/OBS HIGH 75: CPT | Mod: GC

## 2022-07-23 PROCEDURE — 71045 X-RAY EXAM CHEST 1 VIEW: CPT | Mod: 26

## 2022-07-23 PROCEDURE — 71045 X-RAY EXAM CHEST 1 VIEW: CPT | Mod: 26,77

## 2022-07-23 RX ORDER — SEVELAMER CARBONATE 2400 MG/1
800 POWDER, FOR SUSPENSION ORAL
Refills: 0 | Status: DISCONTINUED | OUTPATIENT
Start: 2022-07-23 | End: 2022-07-26

## 2022-07-23 RX ORDER — DEXTROSE 50 % IN WATER 50 %
25 SYRINGE (ML) INTRAVENOUS ONCE
Refills: 0 | Status: DISCONTINUED | OUTPATIENT
Start: 2022-07-23 | End: 2022-07-26

## 2022-07-23 RX ORDER — INSULIN LISPRO 100/ML
VIAL (ML) SUBCUTANEOUS AT BEDTIME
Refills: 0 | Status: DISCONTINUED | OUTPATIENT
Start: 2022-07-23 | End: 2022-07-26

## 2022-07-23 RX ORDER — ATORVASTATIN CALCIUM 80 MG/1
1 TABLET, FILM COATED ORAL
Qty: 0 | Refills: 0 | DISCHARGE

## 2022-07-23 RX ORDER — HYDRALAZINE HCL 50 MG
25 TABLET ORAL THREE TIMES A DAY
Refills: 0 | Status: DISCONTINUED | OUTPATIENT
Start: 2022-07-23 | End: 2022-07-23

## 2022-07-23 RX ORDER — HYDRALAZINE HCL 50 MG
1 TABLET ORAL
Qty: 0 | Refills: 0 | DISCHARGE

## 2022-07-23 RX ORDER — SENNA PLUS 8.6 MG/1
2 TABLET ORAL AT BEDTIME
Refills: 0 | Status: DISCONTINUED | OUTPATIENT
Start: 2022-07-23 | End: 2022-07-23

## 2022-07-23 RX ORDER — LIDOCAINE 4 G/100G
1 CREAM TOPICAL DAILY
Refills: 0 | Status: DISCONTINUED | OUTPATIENT
Start: 2022-07-23 | End: 2022-07-26

## 2022-07-23 RX ORDER — HYDRALAZINE HCL 50 MG
75 TABLET ORAL THREE TIMES A DAY
Refills: 0 | Status: DISCONTINUED | OUTPATIENT
Start: 2022-07-23 | End: 2022-07-26

## 2022-07-23 RX ORDER — HYDRALAZINE HCL 50 MG
75 TABLET ORAL
Qty: 0 | Refills: 0 | DISCHARGE

## 2022-07-23 RX ORDER — BUMETANIDE 0.25 MG/ML
1 INJECTION INTRAMUSCULAR; INTRAVENOUS DAILY
Refills: 0 | Status: DISCONTINUED | OUTPATIENT
Start: 2022-07-23 | End: 2022-07-26

## 2022-07-23 RX ORDER — SENNA PLUS 8.6 MG/1
2 TABLET ORAL AT BEDTIME
Refills: 0 | Status: DISCONTINUED | OUTPATIENT
Start: 2022-07-23 | End: 2022-07-26

## 2022-07-23 RX ORDER — AMLODIPINE BESYLATE 2.5 MG/1
10 TABLET ORAL DAILY
Refills: 0 | Status: DISCONTINUED | OUTPATIENT
Start: 2022-07-23 | End: 2022-07-26

## 2022-07-23 RX ORDER — BUMETANIDE 0.25 MG/ML
2 INJECTION INTRAMUSCULAR; INTRAVENOUS ONCE
Refills: 0 | Status: COMPLETED | OUTPATIENT
Start: 2022-07-23 | End: 2022-07-23

## 2022-07-23 RX ORDER — DOXAZOSIN MESYLATE 4 MG
4 TABLET ORAL AT BEDTIME
Refills: 0 | Status: DISCONTINUED | OUTPATIENT
Start: 2022-07-23 | End: 2022-07-26

## 2022-07-23 RX ORDER — GENTAMICIN SULFATE 0.1 %
1 OINTMENT (GRAM) TOPICAL
Refills: 0 | Status: DISCONTINUED | OUTPATIENT
Start: 2022-07-23 | End: 2022-07-26

## 2022-07-23 RX ORDER — ATORVASTATIN CALCIUM 80 MG/1
80 TABLET, FILM COATED ORAL AT BEDTIME
Refills: 0 | Status: DISCONTINUED | OUTPATIENT
Start: 2022-07-23 | End: 2022-07-26

## 2022-07-23 RX ORDER — INSULIN LISPRO 100/ML
VIAL (ML) SUBCUTANEOUS
Refills: 0 | Status: DISCONTINUED | OUTPATIENT
Start: 2022-07-23 | End: 2022-07-26

## 2022-07-23 RX ORDER — POTASSIUM CHLORIDE 20 MEQ
40 PACKET (EA) ORAL EVERY 4 HOURS
Refills: 0 | Status: COMPLETED | OUTPATIENT
Start: 2022-07-23 | End: 2022-07-23

## 2022-07-23 RX ORDER — AMLODIPINE BESYLATE 2.5 MG/1
10 TABLET ORAL DAILY
Refills: 0 | Status: DISCONTINUED | OUTPATIENT
Start: 2022-07-23 | End: 2022-07-23

## 2022-07-23 RX ORDER — DOXAZOSIN MESYLATE 4 MG
1 TABLET ORAL
Qty: 0 | Refills: 0 | DISCHARGE

## 2022-07-23 RX ADMIN — BUMETANIDE 2 MILLIGRAM(S): 0.25 INJECTION INTRAMUSCULAR; INTRAVENOUS at 19:41

## 2022-07-23 RX ADMIN — AMLODIPINE BESYLATE 10 MILLIGRAM(S): 2.5 TABLET ORAL at 13:31

## 2022-07-23 RX ADMIN — Medication 75 MILLIGRAM(S): at 13:31

## 2022-07-23 RX ADMIN — SEVELAMER CARBONATE 800 MILLIGRAM(S): 2400 POWDER, FOR SUSPENSION ORAL at 13:31

## 2022-07-23 RX ADMIN — Medication 40 MILLIEQUIVALENT(S): at 16:14

## 2022-07-23 NOTE — H&P ADULT - ASSESSMENT
66 year old man with PMH CVA (2010), ESRD with peritoneal dialysis (last dialysis 7/22/22), DM, HTN presents with SOB of 4 day duration. He is found to have a hgb of 5.4 down from 8.0 June 20th. He is here for management of dyspnea 2/2 anemia vs HF vs PNA.  66M with hx of ESRD on peritoneal dialysis (7 days a week), CVA, T2DM, HTN, HLD, who presents with shortness of breath x 4 day duration likely d/t symptomatic anemia, r/o HF.

## 2022-07-23 NOTE — H&P ADULT - NSHPLABSRESULTS_GEN_ALL_CORE
.  LABS:                         5.4    6.54  )-----------( 323      ( 23 Jul 2022 04:35 )             17.5     07-23    132<L>  |  96<L>  |  49<H>  ----------------------------<  177<H>  3.1<L>   |  25  |  6.69<H>    Ca    8.3<L>      23 Jul 2022 04:35  Phos  2.9     07-23  Mg     2.10     07-23    TPro  5.8<L>  /  Alb  2.9<L>  /  TBili  0.3  /  DBili  x   /  AST  18  /  ALT  8   /  AlkPhos  46  07-23        Serum Pro-Brain Natriuretic Peptide: 6976 pg/mL (07-23 @ 04:35)        RADIOLOGY, EKG & ADDITIONAL TESTS: Reviewed.

## 2022-07-23 NOTE — CONSULT NOTE ADULT - ASSESSMENT
66 y.o. M with PMH of ESRD on PD, CVA, T2DM, HTN, HLD, p/w SOB, found to be anemic Hgb 5.4, was given 2U pRBC, subsequently developed respiratory distress suspected 2/2 hypervolemia, started on BiPAP and MICU consulted for pulmonary edema.     # Pulmonary edema 2/2 TACO?   - Suspect worsening pulmonary edema 2/2 volume overload in s/o recent blood transfusion   - Agree with BiPAP. Currently pulling good volumes on 10/5   - Would repeat CXR as CXR was obtained prior to blood transfusion  - Patient still urinating. Would trial Bumex 4mg IVPB   - Discussed with renal. PD will help achieve volume removal but over slower course of time compared to HD  - No urgent need for HD at this time   - BP control to prevent flash pulmonary edema     Patient is not a MICU candidate at this time. Please reach out should clinical condition change.     PLAN NOT FINALIZED UNTIL ATTENDING ATTESTATION     66 y.o. M with PMH of ESRD on PD, CVA, T2DM, HTN, HLD, p/w SOB, found to be anemic Hgb 5.4, was given 2U pRBC, subsequently more tachypneic  suspected 2/2 hypervolemia, started on BiPAP and MICU consulted for pulmonary edema.     # Pulmonary edema 2/2 TACO?   - Suspect worsening pulmonary edema 2/2 volume overload in s/o recent blood transfusion   - Currently pulling good volumes on BiPAP 10/5. Can trial off BiPAP.   - Would repeat CXR as CXR was obtained prior to blood transfusion  - Patient still urinating. Would trial Bumex 4mg IVPB   - Discussed with renal. PD will help achieve volume removal but over slower course of time compared to HD  - No urgent need for HD at this time   - BP control to prevent flash pulmonary edema     Patient is not a MICU candidate at this time. Please reach out should clinical condition change.     PLAN NOT FINALIZED UNTIL ATTENDING ATTESTATION

## 2022-07-23 NOTE — H&P ADULT - PROBLEM SELECTOR PLAN 2
#acute blood loss vs hemolysis  - Hgb 5.4, down from 8.0 6/20/22  - transfusing 2 u pRBC  -repeat CBC after transfusion  - consider CT A&P #acute blood loss vs hemolysis  - Hemodynamically stable  - Hgb 5.4, down from 8.0 6/20/22  - transfusing 2 U pRBC  -repeat CBC after transfusion  - consider CT A&P Hb 5.4 (baseline ~8)  - stool occult negative, lower suspicion for acute bleed given hemodynamic stability  - likely anemia of chronic disease iso ESRD  - check iron studies, ferritin, b12, folate, LDH, haptoglobin  - s/p 2U PRBC, check post transfusion CBC, tx Hb goal of 7, active T&S, monitor for s/s volume overload  - nephrology consult to consider EPO Hb 5.4 (baseline ~8)  - stool occult negative, lower suspicion for acute bleed given hemodynamic stability  - likely anemia of chronic disease iso ESRD  - check iron studies, ferritin, b12, folate, LDH, haptoglobin  - s/p 2U PRBC, check post transfusion CBC, tx Hb goal of 7, active T&S, monitor for s/s volume overload  - repeat HgB 8.0 after 2 U pRBC  - nephrology consult to consider EPO

## 2022-07-23 NOTE — ED PROVIDER NOTE - ADMIT DISPOSITION PRESENT ON ADMISSION SEPSIS
,[email or web address]
No

## 2022-07-23 NOTE — H&P ADULT - PROBLEM SELECTOR PLAN 7
- Na 132  - most likely hypervolemic Na 132  - likely hypervolemic iso ESRD  - HD per nephro  - trend sodium

## 2022-07-23 NOTE — ED ADULT NURSE NOTE - OBJECTIVE STATEMENT
Pt received to room 25. Pt is A&Ox4, ambulatory with walker at baseline. Pt is c/o SOB during peritoneal dialysis. Pt has been on PD for about 4 months, states that Wednesday nigh during PD he became very SOB and stopped his dialysis, symptoms improved. Friday night pt developed SOB again during PD and came to ED. Pt does not appear tachypneic, pt satting 100% on RA. Placed on CM, NSR noted. Abdomen distended, denies any abdominal pain, n/v/d, CP, dizziness, headache, discharge from site. IV access established with 20G to the L wrist, labs collected and sent as per MD orders. Pmhx of CVA in 2011. Stretcher locked and in lowest position, call bell in reach

## 2022-07-23 NOTE — H&P ADULT - NSHPREVIEWOFSYSTEMS_GEN_ALL_CORE
REVIEW OF SYSTEMS:    CONSTITUTIONAL: No weakness, fevers or chills  EYES/ENT: No visual changes;  No vertigo or throat pain   NECK: No pain or stiffness  RESPIRATORY: No cough, wheezing, hemoptysis; + shortness of breath  CARDIOVASCULAR: No palpitations  GASTROINTESTINAL: No abdominal or epigastric pain. No nausea, vomiting, or hematemesis; No diarrhea or constipation. No melena or hematochezia.  GENITOURINARY: No dysuria, frequency or hematuria  NEUROLOGICAL: No numbness or weakness  SKIN: No itching, rashes

## 2022-07-23 NOTE — H&P ADULT - NSICDXPASTMEDICALHX_GEN_ALL_CORE_FT
PAST MEDICAL HISTORY:  BPH (benign prostatic hyperplasia)     CVA (cerebrovascular accident) 2010 without residual effects    ESRD on peritoneal dialysis     Hyperlipidemia     Hypertension     Type 2 diabetes mellitus, with long-term current use of insulin

## 2022-07-23 NOTE — H&P ADULT - PROBLEM SELECTOR PLAN 1
#Anemia vs ADHF vs PNA  - Chest xray show opacities greater of rt than left  - TTE  - f/u repeat CBC  - f/u VBG  - presents with SOB x4 day duration  - DDX: symptomatic anemia iso ESRD vs. HF, lower suspicion for PNA  - CXR with no focal consolidations   - management of anemia as below  - TTE to assess cardiac function presents with SOB x4 day duration  - DDX: symptomatic anemia iso ESRD vs. HF, lower suspicion for PNA  - CXR with no focal consolidations   - management of anemia as below  - TTE to assess cardiac function  - Starting BiPAP  - Bumex 2 mg IV once

## 2022-07-23 NOTE — H&P ADULT - NSHPSOCIALHISTORY_GEN_ALL_CORE
No alcohol, no cigarettes, no illicit drugs No alcohol, no cigarettes, no illicit drugs. Walks with a walker.

## 2022-07-23 NOTE — ED ADULT NURSE REASSESSMENT NOTE - NS ED NURSE REASSESS COMMENT FT1
Pt sleeping in stretcher, noted to desat to 90% on RA. Pt denies any CP, SOB. Pt placed on 2L NC as per MD orders with improvement to 100%. Pt stable upon exiting room

## 2022-07-23 NOTE — ED PROVIDER NOTE - OBJECTIVE STATEMENT
67 y/o M with PMH CVA, ESRD on peritoneal dialysis, DM, HTN 67 y/o M with PMH CVA, ESRD on peritoneal dialysis, DM, HTN presents with SOB during peritoneal dialysis since Wed. On Wed, pt was having SOB during his home PD, with improvement after stopping. On Thursday, pt went to his nephrologist, received peritoneal Abx, and tolerated PD. On Friday, pt again developed SOB during PD so son brought him to the hospital. They deny F/C/N/V, confusion, CP, palpitations, but do say that the patient's abdomen is more distended than usual and the legs are more swollen than previously. Pt has never had these symptoms with PD before. Denies discharge, erythema of the PD site. 65 y/o M with PMH CVA, ESRD on peritoneal dialysis, DM, HTN presents with SOB during peritoneal dialysis since Wed. On Wed, pt was having SOB during his home PD, with improvement after stopping. On Thursday, pt went to his nephrologist, received peritoneal Abx, and tolerated PD. On Friday, pt again developed SOB during PD so son brought him to the hospital. They deny F/C/N/V, confusion, CP, palpitations, but do say that the patient's abdomen is more distended than usual and the legs are more swollen than previously. Pt has never had these symptoms with PD before. Denies discharge, erythema of the PD site.  Attending - Agree with above.  I evaluated patient myself. 65 y/o M with extensive PMH as noted.  On my interview reports he has been having shortness of breath x 3-4 months.  Denies chest pain.  Reports shortness of breath increased during PD.  + b/l LE edema.  reports increased abd distention and unable to complete PD.  Previous [place] records reviewed including [facility] admission in June.  Denies fever, cough, recent covid.

## 2022-07-23 NOTE — ED ADULT NURSE REASSESSMENT NOTE - NS ED NURSE REASSESS COMMENT FT1
Additional IV access established with 20G to the R AC. Blood transfusion initiated at 5:50 am for hgb of 5.4. Pt educated on s/sx of transfusion rxn. Pt tolerating transfusion well, denies any CP, SOB, itchiness, back pain. All safety measures in place

## 2022-07-23 NOTE — H&P ADULT - NSHPPHYSICALEXAM_GEN_ALL_CORE
LOS:     VITALS:   T(C): 36.7 (07-23-22 @ 08:39), Max: 36.8 (07-23-22 @ 00:43)  HR: 83 (07-23-22 @ 08:39) (77 - 85)  BP: 154/64 (07-23-22 @ 08:39) (154/64 - 171/60)  RR: 20 (07-23-22 @ 08:39) (18 - 20)  SpO2: 97% (07-23-22 @ 08:39) (97% - 100%)    GENERAL: NAD, lying in bed comfortably  HEAD:  Atraumatic, Normocephalic  EYES: EOMI, PERRLA, conjunctiva and sclera clear  ENT: Moist mucous membranes  NECK: Supple, JVP not elevated  CHEST/LUNG: Clear to auscultation bilaterally; No rales, rhonchi, wheezing, or rubs. Unlabored respirations  HEART: Regular rate and rhythm; No murmurs, rubs, or gallops  ABDOMEN: BSx4; Soft, nontender, distended  EXTREMITIES:  2+ Peripheral Pulses, brisk capillary refill. No clubbing, cyanosis, or edema  NERVOUS SYSTEM:  A&Ox3, no focal deficits   SKIN: No rashes or lesions LOS:     VITALS:   T(C): 36.7 (07-23-22 @ 08:39), Max: 36.8 (07-23-22 @ 00:43)  HR: 83 (07-23-22 @ 08:39) (77 - 85)  BP: 154/64 (07-23-22 @ 08:39) (154/64 - 171/60)  RR: 20 (07-23-22 @ 08:39) (18 - 20)  SpO2: 97% (07-23-22 @ 08:39) (97% - 100%)    GENERAL: NAD, lying in bed comfortably  HEAD:  Atraumatic, Normocephalic  EYES: EOMI, PERRLA, conjunctiva and sclera clear  ENT: Moist mucous membranes  NECK: Supple, JVP not elevated  CHEST/LUNG: Clear to auscultation bilaterally; No rales, rhonchi, wheezing, or rubs. Unlabored respirations  HEART: Regular rate and rhythm; No murmurs, rubs, or gallops  ABDOMEN: BSx4; Soft, nontender, distended, Peritoneum access left side, non-tender, no erythema, drainage.   EXTREMITIES:  2+ Peripheral Pulses, brisk capillary refill. No clubbing, cyanosis, or edema  NERVOUS SYSTEM:  A&Ox3, no focal deficits   SKIN: No rashes or lesions

## 2022-07-23 NOTE — CONSULT NOTE ADULT - SUBJECTIVE AND OBJECTIVE BOX
[facility]  - Ans Serv [phone], Office [phone]  Dr Shanks/Dr Hurtado /Dr Vladislav carvajal /Dr NETTE Broderick/Dr Les Allan/Dr Yung Mao /Dr WICHO Marks  _______________________________________________________________________________________________  The patient seen and examined today.  HPI:  66 year old male with hx fo End Stage Renal Disease on Dialysis - PD , hx of dm, Hypertension, came with edema feet and worsening of abd distension, with substenernal chest pain  last Peritoneal Dialysis yesterday    Medical History:  CVA (2010)  ESRD - on Peritoneal Dialysis  DM  HTN    Surgical History: No history of any surgeries  Last Hospitalized at [facility] 6/16/22-6/20/22 for sepsis 2/2 parainfluenza  Allergies: none  Home Medications:  Acetaminophen 325 mg 3 pills Q6PRN  Amlodipine 10 mg PO QD  Bumex 1 mg PO QD  Doxazosin 4 mg PO QD  Hydralazine 75 mg PO TID  Atorvasatin 80 mg PO QD  Mupirocin 2% BID  Senna PO BID  Sevelamer 800 mg PO TID    Social History: No cigarettes, no illicit drugs, no alcohol. Walks with a walker.   Family History: Mother has hx of diabetes as well as his brothers   (23 Jul 2022 09:22)      PAST MEDICAL & SURGICAL HISTORY:  Hypertension      ESRD on peritoneal dialysis  CVA (cerebrovascular accident)  2010 without residual effects  Hyperlipidemia  Type 2 diabetes mellitus, with long-term current use of insulin  BPH (benign prostatic hyperplasia)  History of peritoneal dialysis  s/p PD catheter placement  Allergies :- No Known Allergies    Home Medications Reviewed  Hospital Medications:   MEDICATIONS  (STANDING):  amLODIPine   Tablet 10 milliGRAM(s) Oral daily  atorvastatin 80 milliGRAM(s) Oral at bedtime  buMETAnide 1 milliGRAM(s) Oral daily  dextrose 50% Injectable 25 Gram(s) IV Push once  doxazosin 4 milliGRAM(s) Oral at bedtime  hydrALAZINE 75 milliGRAM(s) Oral three times a day  insulin lispro (ADMELOG) corrective regimen sliding scale   SubCutaneous three times a day before meals  insulin lispro (ADMELOG) corrective regimen sliding scale   SubCutaneous at bedtime  lidocaine   4% Patch 1 Patch Transdermal daily  senna 2 Tablet(s) Oral at bedtime  sevelamer carbonate 800 milliGRAM(s) Oral three times a day with meals    SOCIAL HISTORY:  Denies ETOh,Smoking,   FAMILY HISTORY:  FH: hypertension (Mother)    REVIEW OF SYSTEMS:  CONSTITUTIONAL: No weakness, fevers or chills  EYES/ENT: No visual changes;  No vertigo or throat pain   NECK: No pain or stiffness  RESPIRATORY: No cough, wheezing, hemoptysis; No shortness of breath  CARDIOVASCULAR: No chest pain or palpitations.  GASTROINTESTINAL: No abdominal or epigastric pain. No nausea, vomiting, or hematemesis; No diarrhea or constipation. No melena or hematochezia.  GENITOURINARY: No dysuria, frequency, foamy urine, urinary urgency, incontinence or hematuria  NEUROLOGICAL: No numbness or weakness  SKIN: No itching, burning, rashes, or lesions   VASCULAR: No bilateral lower extremity edema.   All other review of systems is negative unless indicated above.    VITALS:  T(F): 98.1 (07-23-22 @ 10:30), Max: 98.5 (07-23-22 @ 10:05)  HR: 80 (07-23-22 @ 10:30)  BP: 166/72 (07-23-22 @ 10:30)  RR: 17 (07-23-22 @ 10:30)  SpO2: 98% (07-23-22 @ 10:30)  Wt(kg): --    Height (cm): 172.7 (07-23 @ 00:43)    PHYSICAL EXAM:  Constitutional: NAD  HEENT: anicteric sclera, oropharynx clear, MMM  Neck: supple.   Respiratory: Bilateral equal breath sounds , no crackles  Cardiovascular: S1, S2, Regular, Murmur present.  Gastrointestinal: Bowel Sound present, soft, NT/ND  Extremities: + peripheral edema  Neurological: Alert and oriented x 3  Psychiatric: Normal mood, normal affect    Data:  07-23    132<L>  |  96<L>  |  49<H>  ----------------------------<  177<H>  3.1<L>   |  25  |  6.69<H>    Ca    8.3<L>      23 Jul 2022 04:35  Phos  2.9     07-23  Mg     2.10     07-23    TPro  5.8<L>  /  Alb  2.9<L>  /  TBili  0.3  /  DBili      /  AST  18  /  ALT  8   /  AlkPhos  46  07-23    Creatinine Trend: 6.69 <--                        5.4    6.54  )-----------( 323      ( 23 Jul 2022 04:35 )             17.5     Urine Studies:

## 2022-07-23 NOTE — H&P ADULT - PROBLEM SELECTOR PLAN 3
- Last dialysis friday, completed session - Last dialysis friday, completed session  - sevelamer 900 mg PO TID on PD 7 days a week (last on Friday)  - c/w home sevelamer 800mg TID and Bumex 1mg daily   - nephrology consult - HD per nephro  - monitor electrolytes

## 2022-07-23 NOTE — ED PROVIDER NOTE - PROGRESS NOTE DETAILS
Cyrus Cabezas MD: Pt continues to have SOB, desaturating to 90% on room air while sleeping. Hg 5.4.  Giving 1u pRBC urgently, and will give second unit after T+S returns.  Discussed case with daughter in law, who is a nephrologist herself. Attempted to reach out to nephrology group several times w/o response. House nephrology recommended to attempt to reach out to them again. MIKHAIL Lowe: Rectal with brown stool, occult obtained, pt receiving transfusion, SPO2 now  100%, will admit to medicine. Slightly Hypokalemic, will likely improve with blood transfusion.

## 2022-07-23 NOTE — CONSULT NOTE ADULT - ASSESSMENT
·	End Stage Renal Disease on Dialysis on Peritoneal Dialysis   - access - Peritoneal Dialysis catheter  Volume Status : higher  HIGH PHOS     Plan  - Peritoneal Dialysis 3 exchanges today- use 4.25 % dextrose as 1 exchange to get ultrafilation   - if shortness of breath may need to do hemodialysis to remove volume  - supplement K level - po Potassium Chloride 40 meq x q 4 hr x 2 dose  - recehck k  - CONT sevelamer  followup card    ·	Hypertension   - hydrALAZINE 75 milliGRAM(s) Oral three times a day  - amlodipine 10 mg-     if sbp do not improve consider losartan 25 mg

## 2022-07-23 NOTE — ED PROVIDER NOTE - PHYSICAL EXAMINATION
ATTENDING PHYSICAL EXAM  GEN - answers questions appropriately, + tachypnea with RR 20-22.  O2 100RA  HEAD - NC/AT; EYES/NOSE - PERRL, EOMI, mucous membranes moist, no discharge; THROAT: Oral cavity and pharynx normal. No inflammation, swelling, exudate, or lesions  NECK: Neck supple, non-tender without lymphadenopathy, no masses, no JVD  PULMONARY - CTA b/l, symmetric breath sounds, no w/r/r  CARDIAC -s1s2, RRR, no M,R,G  ABDOMEN - + peritoneal dialysis catheter in place without signs of infection   BACK - no CVA tenderness, No vertebral or paravertebral tenderness  EXTREMITIES - 2+ edema b/l LEs ATTENDING PHYSICAL EXAM  GEN - answers questions appropriately, + tachypnea with RR 20-22.  O2 100RA  HEAD - NC/AT; EYES/NOSE - PERRL, EOMI, mucous membranes moist, no discharge; THROAT: Oral cavity and pharynx normal. No inflammation, swelling, exudate, or lesions  NECK: Neck supple, non-tender without lymphadenopathy, no masses, no JVD  PULMONARY - CTA b/l, symmetric breath sounds, no w/r/r  CARDIAC -s1s2, RRR, no M,R,G  ABDOMEN - + peritoneal dialysis catheter in place without signs of infection   BACK - no CVA tenderness, No vertebral or paravertebral tenderness  EXTREMITIES - 2+ edema b/l LEs    Rectal: Brown stool in vault, no palpable masses. MIKHAIL Lowe chaperoned by MIKHAIL Clemons.

## 2022-07-23 NOTE — H&P ADULT - HISTORY OF PRESENT ILLNESS
66 year old man with PMH CVA (2010), ESRD with peritoneal dialysis (last dialysis 7/22/22), DM, HTN presents with SOB of 4 day duration. He initially had SOB on Wednesday during his home PD. On Thursday he went to see his nephrologist and received peritoneal antibiotics and tolerated PD. On Friday, pt developed SOB during PD. He has not missed a session of dialysis and completes the full course. He affirms medication adherence. He affirms worsening of his SOB with dialysis, and when lying down. He sleeps with 1 pillow, no CPAP. He affirms lower extremity edema and worsening abdominal distention. He also affirms an episode of substernal chest pain that his since resolved, nonradiating, 4/10. He denies fever, chills, nausea, vomiting, diarrhea, melena, hemoptysis, cough, hematuria, dysuria, dizziness, lightheadedness, recent falls or recent trauma. He denies recent travel and any sick contacts.     Medical History:  CVA (2010) - with no residual defects  ESRD - on Peritoneal Dialysis  DM  HTN    Surgical History: No history of any surgeries  Last Hospitalized at [facility] 6/16/22-6/20/22 for sepsis 2/2 parainfluenza  Allergies: none  Home Medications:  Acetaminophen 325 mg PO TID Q6  Amlodipine 10 mg PO QD  Bumex 1 mg PO QD  Doxazosin 4 mg PO QD  Hydralazine 75 mg PO TID  Atorvasatin 90 mg PO QD  Mupirocin 2% BID  Senna PO BID  Sevelamer 900 mg PO TID    Social History: No cigarettes, no illicit drugs, no alcohol  Family History: Mother has hx of diabetes as well as his brothers   66 year old man with PMH CVA (2010), ESRD with peritoneal dialysis (last dialysis 7/22/22), DM, HTN presents with SOB of 4 day duration. He initially had SOB on Wednesday during his home PD. On Thursday he went to see his nephrologist and received peritoneal antibiotics and tolerated PD. On Friday, pt developed SOB during PD. He has not missed a session of dialysis and completes the full course. He affirms medication adherence. He affirms worsening of his SOB with dialysis, and when lying down. He sleeps with 1 pillow, no CPAP. He affirms lower extremity edema and worsening abdominal distention. He also affirms an episode of substernal chest pain that his since resolved, nonradiating, 4/10. He denies fever, chills, nausea, vomiting, diarrhea, melena, hemoptysis, cough, hematuria, dysuria, dizziness, lightheadedness, recent falls or recent trauma. He denies recent travel and any sick contacts.     Medical History:  CVA (2010)  ESRD - on Peritoneal Dialysis  DM  HTN    Surgical History: No history of any surgeries  Last Hospitalized at [facility] 6/16/22-6/20/22 for sepsis 2/2 parainfluenza  Allergies: none  Home Medications:  Acetaminophen 325 mg PO TID Q6  Amlodipine 10 mg PO QD  Bumex 1 mg PO QD  Doxazosin 4 mg PO QD  Hydralazine 75 mg PO TID  Atorvasatin 90 mg PO QD  Mupirocin 2% BID  Senna PO BID  Sevelamer 900 mg PO TID    Social History: No cigarettes, no illicit drugs, no alcohol. Walks with a walker.   Family History: Mother has hx of diabetes as well as his brothers   66 year old man with PMH CVA (2010), ESRD with peritoneal dialysis (last dialysis 7/22/22), DM, HTN presents with SOB of 4 day duration. He initially had SOB on Wednesday during his home PD. On Thursday he went to see his nephrologist and received peritoneal antibiotics and tolerated PD. On Friday, pt developed SOB during PD. He has not missed a session of dialysis and completes the full course. He affirms medication adherence. He affirms worsening of his SOB with dialysis, and when lying down. He sleeps with 1 pillow, no CPAP. He affirms lower extremity edema and worsening abdominal distention. He also affirms an episode of substernal chest pain that his since resolved, nonradiating, 4/10. He denies fever, chills, nausea, vomiting, diarrhea, melena, hemoptysis, cough, hematuria, dysuria, dizziness, lightheadedness, recent falls or recent trauma. He denies recent travel and any sick contacts.     Medical History:  CVA (2010)  ESRD - on Peritoneal Dialysis  DM  HTN    Surgical History: No history of any surgeries  Last Hospitalized at [facility] 6/16/22-6/20/22 for sepsis 2/2 parainfluenza  Allergies: none  Home Medications:  Acetaminophen 325 mg 3 pills Q6PRN  Amlodipine 10 mg PO QD  Bumex 1 mg PO QD  Doxazosin 4 mg PO QD  Hydralazine 75 mg PO TID  Atorvasatin 80 mg PO QD  Mupirocin 2% BID  Senna PO BID  Sevelamer 800 mg PO TID    Social History: No cigarettes, no illicit drugs, no alcohol. Walks with a walker.   Family History: Mother has hx of diabetes as well as his brothers

## 2022-07-23 NOTE — ED ADULT TRIAGE NOTE - CHIEF COMPLAINT QUOTE
C/o SOB during peritoneal dialysis and b/l foot swelling. Pt was unable to complete dialysis session due to SOB Wednesday. Thursday and this morning was able to complete session but had SOB soon as he got home. Denies chest pain, dizziness, nausea or vomiting. PMH CVA, ESRD on peritoneal dialysis, DM2 , HLD

## 2022-07-23 NOTE — CONSULT NOTE ADULT - ATTENDING COMMENTS
67 yo with PMH as above, ESRD on PD presenting with increasing SOB over several days, found to be anemic to hgb 5.4, given 2U PRBC and developed worsening SOB, placed on BiPAP for WOB (no vbg/abg done), no hypoxia recorded.  He is awake, alert, in NAD on BiPAP lying with HOB slightly elevated  BiPAP 10/5, 30%   RR 17, Vt 400-500cc, O2 sat 100%  mask leak moderate 2/2 beard, but pt tolerating BiPAP well  /75  HR 73  Agree that his increased WOB may have been the result of volume overload  Also agree that since he does make urine still, Bumex would be appropriate - if 2mg doesn't work, can try 4mg; PD vs HD per renal  Agree that he should have a trial off BiPAP asap.  If he requires to be placed back on BiPAP, the current settings are adequate and he can be admitted to floor bed on BiPAP if needed.  He does not require MICU level of care.

## 2022-07-23 NOTE — CONSULT NOTE ADULT - SUBJECTIVE AND OBJECTIVE BOX
CHIEF COMPLAINT:    HPI:  66 year old man with PMH CVA (2010), ESRD with peritoneal dialysis (last dialysis 7/22/22), DM, HTN presents with SOB of 4 day duration. He initially had SOB on Wednesday during his home PD. On Thursday he went to see his nephrologist and received peritoneal antibiotics and tolerated PD. On Friday, pt developed SOB during PD. He has not missed a session of dialysis and completes the full course. He affirms medication adherence. He affirms worsening of his SOB with dialysis, and when lying down. He sleeps with 1 pillow, no CPAP. He affirms lower extremity edema and worsening abdominal distention. He also affirms an episode of substernal chest pain that his since resolved, non-radiating, 4/10. He denies fever, chills, nausea, vomiting, diarrhea, melena, hemoptysis, cough, hematuria, dysuria, dizziness, lightheadedness, recent falls or recent trauma. He denies recent travel and any sick contacts.     Patient found to have anemia Hgb 5.4, given 2U pRBC and noted to be in respiratory distress. Given CXR of vascular congestion seen, ESRD on PD, and hypertension 160-170 systolic, suspicion for pulmonary edema in s/o volume overload was high and patient placed on BiPAP. MICU consulted for new BiPAP.       PAST MEDICAL & SURGICAL HISTORY:  Hypertension  ESRD on peritoneal dialysis  CVA (cerebrovascular accident) 2010 without residual effects  Hyperlipidemia  Type 2 diabetes mellitus, with long-term current use of insulin  BPH (benign prostatic hyperplasia)  History of peritoneal dialysis s/p PD catheter placement    FAMILY HISTORY:  FH: hypertension (Mother)    SOCIAL HISTORY:  No cigarettes, no illicit drugs, no alcohol. Walks with a walker.   Allergies  No Known Allergies  Intolerances    HOME MEDICATIONS:  Acetaminophen 325 mg 3 pills Q6PRN  Amlodipine 10 mg PO QD  Bumex 1 mg PO QD  Doxazosin 4 mg PO QD  Hydralazine 75 mg PO TID  Atorvasatin 80 mg PO QD  Mupirocin 2% BID  Senna PO BID  Sevelamer 800 mg PO TID    REVIEW OF SYSTEMS:  CONSTITUTIONAL: No weakness, fevers or chills  EYES/ENT: No visual changes;  No vertigo or throat pain   NECK: No pain or stiffness  RESPIRATORY: No cough, wheezing, hemoptysis; + shortness of breath  CARDIOVASCULAR: No palpitations  GASTROINTESTINAL: No abdominal or epigastric pain. No nausea, vomiting, or hematemesis; No diarrhea or constipation. No melena or hematochezia.  GENITOURINARY: No dysuria, frequency or hematuria  NEUROLOGICAL: No numbness or weakness  SKIN: No itching, rashes  [x] All other systems negative  [ ] Unable to assess ROS because ________    OBJECTIVE:  ICU Vital Signs Last 24 Hrs  T(C): 36.7 (23 Jul 2022 16:38), Max: 36.9 (23 Jul 2022 10:05)  T(F): 98.1 (23 Jul 2022 16:38), Max: 98.5 (23 Jul 2022 10:05)  HR: 86 (23 Jul 2022 16:38) (72 - 86)  BP: 150/74 (23 Jul 2022 16:38) (148/67 - 173/74)  BP(mean): --  ABP: --  ABP(mean): --  RR: 19 (23 Jul 2022 16:38) (17 - 20)  SpO2: 100% (23 Jul 2022 16:38) (97% - 100%)    O2 Parameters below as of 23 Jul 2022 16:38  Patient On (Oxygen Delivery Method): room air    CAPILLARY BLOOD GLUCOSE    POCT Blood Glucose.: 133 mg/dL (23 Jul 2022 12:48)    PHYSICAL EXAM:  General:   HEENT:   Lymph Nodes:  Neck:   Respiratory:   Cardiovascular:   Abdomen:   Extremities:   Skin:   Neurological:  Psychiatry:    HOSPITAL MEDICATIONS:  MEDICATIONS  (STANDING):  amLODIPine   Tablet 10 milliGRAM(s) Oral daily  atorvastatin 80 milliGRAM(s) Oral at bedtime  buMETAnide 1 milliGRAM(s) Oral daily  buMETAnide Injectable 2 milliGRAM(s) IV Push once  dextrose 50% Injectable 25 Gram(s) IV Push once  doxazosin 4 milliGRAM(s) Oral at bedtime  gentamicin 0.1% Cream 1 Application(s) Topical <User Schedule>  hydrALAZINE 75 milliGRAM(s) Oral three times a day  insulin lispro (ADMELOG) corrective regimen sliding scale   SubCutaneous three times a day before meals  insulin lispro (ADMELOG) corrective regimen sliding scale   SubCutaneous at bedtime  lidocaine   4% Patch 1 Patch Transdermal daily  potassium chloride    Tablet ER 40 milliEquivalent(s) Oral every 4 hours  senna 2 Tablet(s) Oral at bedtime  sevelamer carbonate 800 milliGRAM(s) Oral three times a day with meals    MEDICATIONS  (PRN):      LABS:                        8.0    7.49  )-----------( 275      ( 23 Jul 2022 14:17 )             24.6     07-23    132<L>  |  95<L>  |  49<H>  ----------------------------<  149<H>  3.4<L>   |  25  |  6.83<H>    Ca    8.4      23 Jul 2022 14:17  Phos  3.4     07-23  Mg     2.10     07-23    TPro  5.8<L>  /  Alb  2.9<L>  /  TBili  0.3  /  DBili  x   /  AST  18  /  ALT  8   /  AlkPhos  46  07-23    Venous Blood Gas:  07-23 @ 04:35  7.45/40/56/28/88.2  VBG Lactate: 0.5    MICROBIOLOGY:     RADIOLOGY:  [x] Reviewed and interpreted by me    EKG:  CHIEF COMPLAINT:    HPI:  66 year old man with PMH CVA (2010), ESRD with peritoneal dialysis (last dialysis 7/22/22), DM, HTN presents with SOB of 4 day duration. He initially had SOB on Wednesday during his home PD. On Thursday he went to see his nephrologist and received peritoneal antibiotics and tolerated PD. On Friday, pt developed SOB during PD. He has not missed a session of dialysis and completes the full course. He affirms medication adherence. He affirms worsening of his SOB with dialysis, and when lying down. He sleeps with 1 pillow, no CPAP. He affirms lower extremity edema and worsening abdominal distention. He also affirms an episode of substernal chest pain that his since resolved, non-radiating, 4/10. He denies fever, chills, nausea, vomiting, diarrhea, melena, hemoptysis, cough, hematuria, dysuria, dizziness, lightheadedness, recent falls or recent trauma. He denies recent travel and any sick contacts.     Patient found to have anemia Hgb 5.4, given 2U pRBC and noted to be in respiratory distress. Given CXR of vascular congestion seen, ESRD on PD, and hypertension 160-170 systolic, suspicion for pulmonary edema in s/o volume overload was high and patient placed on BiPAP. MICU consulted for new BiPAP.       PAST MEDICAL & SURGICAL HISTORY:  Hypertension  ESRD on peritoneal dialysis  CVA (cerebrovascular accident) 2010 without residual effects  Hyperlipidemia  Type 2 diabetes mellitus, with long-term current use of insulin  BPH (benign prostatic hyperplasia)  History of peritoneal dialysis s/p PD catheter placement    FAMILY HISTORY:  FH: hypertension (Mother)    SOCIAL HISTORY:  No cigarettes, no illicit drugs, no alcohol. Walks with a walker.   Allergies  No Known Allergies  Intolerances    HOME MEDICATIONS:  Acetaminophen 325 mg 3 pills Q6PRN  Amlodipine 10 mg PO QD  Bumex 1 mg PO QD  Doxazosin 4 mg PO QD  Hydralazine 75 mg PO TID  Atorvasatin 80 mg PO QD  Mupirocin 2% BID  Senna PO BID  Sevelamer 800 mg PO TID    REVIEW OF SYSTEMS:  CONSTITUTIONAL: No weakness, fevers or chills  EYES/ENT: No visual changes;  No vertigo or throat pain   NECK: No pain or stiffness  RESPIRATORY: No cough, wheezing, hemoptysis; + shortness of breath  CARDIOVASCULAR: No palpitations  GASTROINTESTINAL: No abdominal or epigastric pain. No nausea, vomiting, or hematemesis; No diarrhea or constipation. No melena or hematochezia.  GENITOURINARY: No dysuria, frequency or hematuria  NEUROLOGICAL: No numbness or weakness  SKIN: No itching, rashes  [x] All other systems negative  [ ] Unable to assess ROS because ________    OBJECTIVE:  ICU Vital Signs Last 24 Hrs  T(C): 36.7 (23 Jul 2022 16:38), Max: 36.9 (23 Jul 2022 10:05)  T(F): 98.1 (23 Jul 2022 16:38), Max: 98.5 (23 Jul 2022 10:05)  HR: 86 (23 Jul 2022 16:38) (72 - 86)  BP: 150/74 (23 Jul 2022 16:38) (148/67 - 173/74)  BP(mean): --  ABP: --  ABP(mean): --  RR: 19 (23 Jul 2022 16:38) (17 - 20)  SpO2: 100% (23 Jul 2022 16:38) (97% - 100%)    O2 Parameters below as of 23 Jul 2022 16:38  Patient On (Oxygen Delivery Method): room air    CAPILLARY BLOOD GLUCOSE    POCT Blood Glucose.: 133 mg/dL (23 Jul 2022 12:48)    PHYSICAL EXAM:  General: NAD   Respiratory: crackles b/l. no wheezes/rales/ronchi  Cardiovascular: S1S2 RRR   Abdomen: Soft nondistended   Extremities: non edematous     HOSPITAL MEDICATIONS:  MEDICATIONS  (STANDING):  amLODIPine   Tablet 10 milliGRAM(s) Oral daily  atorvastatin 80 milliGRAM(s) Oral at bedtime  buMETAnide 1 milliGRAM(s) Oral daily  buMETAnide Injectable 2 milliGRAM(s) IV Push once  dextrose 50% Injectable 25 Gram(s) IV Push once  doxazosin 4 milliGRAM(s) Oral at bedtime  gentamicin 0.1% Cream 1 Application(s) Topical <User Schedule>  hydrALAZINE 75 milliGRAM(s) Oral three times a day  insulin lispro (ADMELOG) corrective regimen sliding scale   SubCutaneous three times a day before meals  insulin lispro (ADMELOG) corrective regimen sliding scale   SubCutaneous at bedtime  lidocaine   4% Patch 1 Patch Transdermal daily  potassium chloride    Tablet ER 40 milliEquivalent(s) Oral every 4 hours  senna 2 Tablet(s) Oral at bedtime  sevelamer carbonate 800 milliGRAM(s) Oral three times a day with meals    MEDICATIONS  (PRN):      LABS:                        8.0    7.49  )-----------( 275      ( 23 Jul 2022 14:17 )             24.6     07-23    132<L>  |  95<L>  |  49<H>  ----------------------------<  149<H>  3.4<L>   |  25  |  6.83<H>    Ca    8.4      23 Jul 2022 14:17  Phos  3.4     07-23  Mg     2.10     07-23    TPro  5.8<L>  /  Alb  2.9<L>  /  TBili  0.3  /  DBili  x   /  AST  18  /  ALT  8   /  AlkPhos  46  07-23    Venous Blood Gas:  07-23 @ 04:35  7.45/40/56/28/88.2  VBG Lactate: 0.5    MICROBIOLOGY:     RADIOLOGY:  [x] Reviewed and interpreted by me    EKG: NSR with PACs

## 2022-07-23 NOTE — ED PROVIDER NOTE - CARE PLAN
1 Principal Discharge DX:	Anemia of chronic disease  Secondary Diagnosis:	ESRD on dialysis  Secondary Diagnosis:	Hypokalemia  Secondary Diagnosis:	Dyspnea

## 2022-07-23 NOTE — H&P ADULT - PROBLEM SELECTOR PLAN 4
- Hydralazine 75 mg TID  - amlodipine 10 mg QD - Hydralazine 75 mg TID  - Holding Amlodipine 10 mg QD BP stable  - c/w home hydralazine 75mg TID + amlodipine 10mg daily

## 2022-07-23 NOTE — H&P ADULT - PROBLEM SELECTOR PLAN 8
- Hx ESRD, 1 episode of CP  - ECG: Sinus with PACs no ST T wave changes, QTC - 468, 85 BPM  - Trend chest pain resolved  - trops 110s can be elevated in setting of ESRD  - ECG with no ischemic changes  - tredn troponin

## 2022-07-23 NOTE — H&P ADULT - ATTENDING COMMENTS
66M with hx of ESRD on peritoneal dialysis (7 days a week), CVA, T2DM, HTN, HLD, who presents with shortness of breath x 4 day duration likely d/t symptomatic anemia, r/o HF. VSS. Labs notable for Hb 5.4, occult negative. Symptomatic anemia likely from anemia chronic disease iso of ESRD. s/p 2U PRBC transfusions, repeat CBC, tx to Hb goal of 7, further transfusions with caution/monitor for s/s of volume overload. Check iron studies, b12, folate, LDH, haptoglobin. Nephrology consult for HD, possible EPO. TTE. Rest of care as above.

## 2022-07-23 NOTE — H&P ADULT - PROBLEM SELECTOR PLAN 5
- not on any medications FS stable  - not on any home medications  - check A1C (although may not be accurate iso anemia/ESRD), check fructosamine   - FS/SSI qac and hs

## 2022-07-23 NOTE — ED PROVIDER NOTE - CLINICAL SUMMARY MEDICAL DECISION MAKING FREE TEXT BOX
67 y/o M with PMH CVA, ESRD on peritoneal dialysis, DM, HTN presents with SOB during peritoneal dialysis since Wed. Symptoms resolve with cessation of HD. Lungs clear to auscultation, pt afebrile, abdomen w/ fluid accumulation. Will obtain EKG, CXR, CBC, CMP, VBG and re-evaluate.

## 2022-07-24 ENCOUNTER — TRANSCRIPTION ENCOUNTER (OUTPATIENT)
Age: 67
End: 2022-07-24

## 2022-07-24 LAB
A1C WITH ESTIMATED AVERAGE GLUCOSE RESULT: 5.2 % — SIGNIFICANT CHANGE UP (ref 4–5.6)
ALBUMIN SERPL ELPH-MCNC: 2.8 G/DL — LOW (ref 3.3–5)
ALP SERPL-CCNC: 50 U/L — SIGNIFICANT CHANGE UP (ref 40–120)
ALT FLD-CCNC: 8 U/L — SIGNIFICANT CHANGE UP (ref 4–41)
ANION GAP SERPL CALC-SCNC: 11 MMOL/L — SIGNIFICANT CHANGE UP (ref 7–14)
AST SERPL-CCNC: 17 U/L — SIGNIFICANT CHANGE UP (ref 4–40)
BASOPHILS # BLD AUTO: 0.04 K/UL — SIGNIFICANT CHANGE UP (ref 0–0.2)
BASOPHILS NFR BLD AUTO: 0.5 % — SIGNIFICANT CHANGE UP (ref 0–2)
BILIRUB SERPL-MCNC: 0.5 MG/DL — SIGNIFICANT CHANGE UP (ref 0.2–1.2)
BUN SERPL-MCNC: 47 MG/DL — HIGH (ref 7–23)
CALCIUM SERPL-MCNC: 8.5 MG/DL — SIGNIFICANT CHANGE UP (ref 8.4–10.5)
CHLORIDE SERPL-SCNC: 99 MMOL/L — SIGNIFICANT CHANGE UP (ref 98–107)
CO2 SERPL-SCNC: 26 MMOL/L — SIGNIFICANT CHANGE UP (ref 22–31)
CREAT SERPL-MCNC: 7.14 MG/DL — HIGH (ref 0.5–1.3)
EGFR: 8 ML/MIN/1.73M2 — LOW
EOSINOPHIL # BLD AUTO: 0.33 K/UL — SIGNIFICANT CHANGE UP (ref 0–0.5)
EOSINOPHIL NFR BLD AUTO: 4.5 % — SIGNIFICANT CHANGE UP (ref 0–6)
ESTIMATED AVERAGE GLUCOSE: 103 — SIGNIFICANT CHANGE UP
FERRITIN SERPL-MCNC: 567 NG/ML — HIGH (ref 30–400)
FOLATE SERPL-MCNC: 20 NG/ML — HIGH (ref 3.1–17.5)
FRUCTOSAMINE SERPL-MCNC: 278 UMOL/L — SIGNIFICANT CHANGE UP (ref 205–285)
GLUCOSE BLDC GLUCOMTR-MCNC: 132 MG/DL — HIGH (ref 70–99)
GLUCOSE BLDC GLUCOMTR-MCNC: 137 MG/DL — HIGH (ref 70–99)
GLUCOSE BLDC GLUCOMTR-MCNC: 139 MG/DL — HIGH (ref 70–99)
GLUCOSE BLDC GLUCOMTR-MCNC: 92 MG/DL — SIGNIFICANT CHANGE UP (ref 70–99)
GLUCOSE SERPL-MCNC: 123 MG/DL — HIGH (ref 70–99)
HAPTOGLOB SERPL-MCNC: 222 MG/DL — HIGH (ref 34–200)
HCT VFR BLD CALC: 25.7 % — LOW (ref 39–50)
HGB BLD-MCNC: 8.2 G/DL — LOW (ref 13–17)
IANC: 4.73 K/UL — SIGNIFICANT CHANGE UP (ref 1.8–7.4)
IMM GRANULOCYTES NFR BLD AUTO: 0.5 % — SIGNIFICANT CHANGE UP (ref 0–1.5)
IRON SATN MFR SERPL: 28 % — SIGNIFICANT CHANGE UP (ref 14–50)
IRON SATN MFR SERPL: 57 UG/DL — SIGNIFICANT CHANGE UP (ref 45–165)
LDH SERPL L TO P-CCNC: 222 U/L — SIGNIFICANT CHANGE UP (ref 135–225)
LYMPHOCYTES # BLD AUTO: 1.27 K/UL — SIGNIFICANT CHANGE UP (ref 1–3.3)
LYMPHOCYTES # BLD AUTO: 17.3 % — SIGNIFICANT CHANGE UP (ref 13–44)
MAGNESIUM SERPL-MCNC: 2.1 MG/DL — SIGNIFICANT CHANGE UP (ref 1.6–2.6)
MCHC RBC-ENTMCNC: 28.4 PG — SIGNIFICANT CHANGE UP (ref 27–34)
MCHC RBC-ENTMCNC: 31.9 GM/DL — LOW (ref 32–36)
MCV RBC AUTO: 88.9 FL — SIGNIFICANT CHANGE UP (ref 80–100)
MONOCYTES # BLD AUTO: 0.94 K/UL — HIGH (ref 0–0.9)
MONOCYTES NFR BLD AUTO: 12.8 % — SIGNIFICANT CHANGE UP (ref 2–14)
NEUTROPHILS # BLD AUTO: 4.73 K/UL — SIGNIFICANT CHANGE UP (ref 1.8–7.4)
NEUTROPHILS NFR BLD AUTO: 64.4 % — SIGNIFICANT CHANGE UP (ref 43–77)
NRBC # BLD: 0 /100 WBCS — SIGNIFICANT CHANGE UP
NRBC # FLD: 0 K/UL — SIGNIFICANT CHANGE UP
PHOSPHATE SERPL-MCNC: 3.1 MG/DL — SIGNIFICANT CHANGE UP (ref 2.5–4.5)
PLATELET # BLD AUTO: 300 K/UL — SIGNIFICANT CHANGE UP (ref 150–400)
POTASSIUM SERPL-MCNC: 3.8 MMOL/L — SIGNIFICANT CHANGE UP (ref 3.5–5.3)
POTASSIUM SERPL-SCNC: 3.8 MMOL/L — SIGNIFICANT CHANGE UP (ref 3.5–5.3)
PROT SERPL-MCNC: 5.8 G/DL — LOW (ref 6–8.3)
RBC # BLD: 2.89 M/UL — LOW (ref 4.2–5.8)
RBC # FLD: 15.4 % — HIGH (ref 10.3–14.5)
SODIUM SERPL-SCNC: 136 MMOL/L — SIGNIFICANT CHANGE UP (ref 135–145)
TIBC SERPL-MCNC: 203 UG/DL — LOW (ref 220–430)
TRANSFERRIN SERPL-MCNC: 182 MG/DL — LOW (ref 200–360)
UIBC SERPL-MCNC: 146 UG/DL — SIGNIFICANT CHANGE UP (ref 110–370)
VIT B12 SERPL-MCNC: 900 PG/ML — SIGNIFICANT CHANGE UP (ref 200–900)
WBC # BLD: 7.35 K/UL — SIGNIFICANT CHANGE UP (ref 3.8–10.5)
WBC # FLD AUTO: 7.35 K/UL — SIGNIFICANT CHANGE UP (ref 3.8–10.5)

## 2022-07-24 PROCEDURE — 99233 SBSQ HOSP IP/OBS HIGH 50: CPT | Mod: GC

## 2022-07-24 PROCEDURE — 93306 TTE W/DOPPLER COMPLETE: CPT | Mod: 26

## 2022-07-24 RX ORDER — LOSARTAN POTASSIUM 100 MG/1
25 TABLET, FILM COATED ORAL DAILY
Refills: 0 | Status: DISCONTINUED | OUTPATIENT
Start: 2022-07-24 | End: 2022-07-26

## 2022-07-24 RX ORDER — ERYTHROPOIETIN 10000 [IU]/ML
10000 INJECTION, SOLUTION INTRAVENOUS; SUBCUTANEOUS
Refills: 0 | Status: DISCONTINUED | OUTPATIENT
Start: 2022-07-24 | End: 2022-07-26

## 2022-07-24 RX ADMIN — Medication 4 MILLIGRAM(S): at 00:20

## 2022-07-24 RX ADMIN — SEVELAMER CARBONATE 800 MILLIGRAM(S): 2400 POWDER, FOR SUSPENSION ORAL at 12:32

## 2022-07-24 RX ADMIN — Medication 75 MILLIGRAM(S): at 05:48

## 2022-07-24 RX ADMIN — ATORVASTATIN CALCIUM 80 MILLIGRAM(S): 80 TABLET, FILM COATED ORAL at 22:16

## 2022-07-24 RX ADMIN — Medication 75 MILLIGRAM(S): at 13:41

## 2022-07-24 RX ADMIN — SEVELAMER CARBONATE 800 MILLIGRAM(S): 2400 POWDER, FOR SUSPENSION ORAL at 09:04

## 2022-07-24 RX ADMIN — AMLODIPINE BESYLATE 10 MILLIGRAM(S): 2.5 TABLET ORAL at 05:48

## 2022-07-24 RX ADMIN — BUMETANIDE 1 MILLIGRAM(S): 0.25 INJECTION INTRAMUSCULAR; INTRAVENOUS at 05:48

## 2022-07-24 RX ADMIN — Medication 4 MILLIGRAM(S): at 22:16

## 2022-07-24 RX ADMIN — Medication 75 MILLIGRAM(S): at 22:16

## 2022-07-24 RX ADMIN — SEVELAMER CARBONATE 800 MILLIGRAM(S): 2400 POWDER, FOR SUSPENSION ORAL at 16:58

## 2022-07-24 RX ADMIN — SENNA PLUS 2 TABLET(S): 8.6 TABLET ORAL at 00:19

## 2022-07-24 RX ADMIN — Medication 75 MILLIGRAM(S): at 00:19

## 2022-07-24 RX ADMIN — Medication 40 MILLIEQUIVALENT(S): at 00:20

## 2022-07-24 RX ADMIN — LOSARTAN POTASSIUM 25 MILLIGRAM(S): 100 TABLET, FILM COATED ORAL at 12:44

## 2022-07-24 RX ADMIN — ATORVASTATIN CALCIUM 80 MILLIGRAM(S): 80 TABLET, FILM COATED ORAL at 00:19

## 2022-07-24 NOTE — PHYSICAL THERAPY INITIAL EVALUATION ADULT - PERTINENT HX OF CURRENT PROBLEM, REHAB EVAL
66 year old Male with hx of ESRD on peritoneal dialysis (7 days a week), CVA, T2DM, HTN, HLD, who presents with shortness of breath x 4 day duration likely d/t symptomatic anemia, r/o HF.

## 2022-07-24 NOTE — DISCHARGE NOTE PROVIDER - PROVIDER TOKENS
PROVIDER:[TOKEN:[[id number]],FOLLOWUP:[1 week]] PROVIDER:[TOKEN:[[id number]],FOLLOWUP:[1 week]],PROVIDER:[TOKEN:[[id number]],ESTABLISHEDPATIENT:[T]]

## 2022-07-24 NOTE — PROGRESS NOTE ADULT - PROBLEM SELECTOR PLAN 4
BP stable  - c/w home hydralazine 75mg TID + amlodipine 10mg daily BP stable  - c/w home hydralazine 75mg TID + amlodipine 10mg daily  - Starting losartan 25 mg PO QD

## 2022-07-24 NOTE — PROGRESS NOTE ADULT - PROBLEM SELECTOR PLAN 5
FS stable  - not on any home medications  - check A1C (although may not be accurate iso anemia/ESRD), check fructosamine   - FS/SSI qac and hs

## 2022-07-24 NOTE — PROGRESS NOTE ADULT - PROBLEM SELECTOR PLAN 8
chest pain resolved  - trops 110s can be elevated in setting of ESRD  - ECG with no ischemic changes  - tredn troponin chest pain resolved  - trops 110s can be elevated in setting of ESRD  - ECG with no ischemic changes  - troponins plateaued

## 2022-07-24 NOTE — PROGRESS NOTE ADULT - SUBJECTIVE AND OBJECTIVE BOX
[facility] : Ans Serv [phone], Office [phone]  Dr Shanks/Dr Hurtado  /Dr Vladislav carvajal /Dr NETTE Broderick/Dr Les Allan/Dr Yung Mao /Dr WICHO Marks  _______________________________________________________________________________________________    seen and examined today for End Stage Renal Disease on Dialysis on Peritoneal Dialysis   Interval : s/p 2 exchagnes yesteday  VITALS:  T(F): 98.6 (07-24 @ 09:30), Max: 98.7 (07-24 @ 00:18)  HR: 56 (07-24 @ 09:30)  BP: 154/65 (07-24 @ 09:30)  RR: 16 (07-24 @ 09:30)  SpO2: 98% (07-24 @ 09:30)    07-23 @ 07:01  -  07-24 @ 07:00  --------------------------------------------------------  IN: 2000 mL / OUT: 3600 mL / NET: -1600 mL    Physical Exam :-  Constitutional: NAD  Respiratory: Bilateral equal breath sounds, few basal crackles present.  Cardiovascular: S1, S2 normal, positive Murmur  Gastrointestinal: Bowel Sounds present, soft  Peritoneal Dialysis catheter in dressing  Extremities: no Edema Feet  Neurological: Alert and Oriented x 3  Psychiatric: Normal mood, normal affect    Data:-  Allergies :   No Known Allergies    Hospital Medications:   MEDICATIONS  (STANDING):  amLODIPine   Tablet 10 milliGRAM(s) Oral daily  atorvastatin 80 milliGRAM(s) Oral at bedtime  buMETAnide 1 milliGRAM(s) Oral daily  dextrose 50% Injectable 25 Gram(s) IV Push once  doxazosin 4 milliGRAM(s) Oral at bedtime  gentamicin 0.1% Cream 1 Application(s) Topical <User Schedule>  hydrALAZINE 75 milliGRAM(s) Oral three times a day  insulin lispro (ADMELOG) corrective regimen sliding scale   SubCutaneous three times a day before meals  insulin lispro (ADMELOG) corrective regimen sliding scale   SubCutaneous at bedtime  lidocaine   4% Patch 1 Patch Transdermal daily  losartan 25 milliGRAM(s) Oral daily  senna 2 Tablet(s) Oral at bedtime  sevelamer carbonate 800 milliGRAM(s) Oral three times a day with meals    07-24    136  |  99  |  47<H>  ----------------------------<  123<H>  3.8   |  26  |  7.14<H>    Ca    8.5      24 Jul 2022 06:00  Phos  3.1     07-24  Mg     2.10     07-24    TPro  5.8<L>  /  Alb  2.8<L>  /  TBili  0.5  /  DBili      /  AST  17  /  ALT  8   /  AlkPhos  50  07-24    Creatinine Trend: 7.14 <--, 6.83 <--, 6.69 <--  egfr trend : 8 <--, 8 <--, 8 <--                        8.2    7.35  )-----------( 300      ( 24 Jul 2022 06:00 )             25.7

## 2022-07-24 NOTE — PROGRESS NOTE ADULT - ASSESSMENT
End Stage Renal Disease on Dialysis on Peritoneal Dialysis   - access - Peritoneal Dialysis catheter  Volume Status : higher  HIGH PHOS - controlled  low k - improved    Plan  - Peritoneal Dialysis 4 exchanges tomorrow- use 2.5 % dextrose   - CONT sevelamer as phos binders  followup card    ·	Hypertension   - hydrALAZINE 75 milliGRAM(s) Oral three times a day  - amlodipine 10 mg- am dose  - losartan 25mg qhs    started losartan today, await of response on blood pressure       anemia  Anemia Labs   Hemoglobin : 8.2 <--, 8.0 <--, 5.4 <--  Platelets : 300 <--, 275 <--, 323 <--  Ferritin : 567 <--  Iron : 28 <--  Folate : 20.0 <--  Vitamin b12 : 900 <--    Plan  epogen 10,000 units sq three times per week   follow up with team, may need heme-gi consult

## 2022-07-24 NOTE — DISCHARGE NOTE PROVIDER - NSDCMRMEDTOKEN_GEN_ALL_CORE_FT
acetaminophen 325 mg oral tablet: 3 tab(s) orally every 6 hours, As Needed  amLODIPine 10 mg oral tablet: 1 tab(s) orally once a day  Bumex 1 mg oral tablet: 1 tab(s) orally once a day  doxazosin 4 mg oral tablet, extended release: 1 tab(s) orally once a day (in the morning)  hydrALAZINE 50 mg oral tablet: 1.5 tab(s) orally 3 times a day  lidocaine 4% topical film: Apply topically to affected area once a day as directed  Lipitor 80 mg oral tablet: 1 tab(s) orally once a day  (Pharmacy filled Lipitor 40mg QD June/2022)  mupirocin 2% topical ointment: 1 application topically 2 times a day  senna oral tablet: 2 tab(s) orally once a day (at bedtime), As Needed  sevelamer carbonate 800 mg oral tablet: 1 tab(s) orally 3 times a day (with meals)   acetaminophen 325 mg oral tablet: 3 tab(s) orally every 6 hours, As Needed  amLODIPine 10 mg oral tablet: 1 tab(s) orally once a day  Bumex 1 mg oral tablet: 1 tab(s) orally once a day  doxazosin 4 mg oral tablet, extended release: 1 tab(s) orally once a day (in the morning)  hydrALAZINE 50 mg oral tablet: 1.5 tab(s) orally 3 times a day  lidocaine 4% topical film: Apply topically to affected area once a day as directed  Lipitor 80 mg oral tablet: 1 tab(s) orally once a day  (Pharmacy filled Lipitor 40mg QD June/2022)  losartan 25 mg oral tablet: 1 tab(s) orally once a day  mupirocin 2% topical ointment: 1 application topically 2 times a day  senna oral tablet: 2 tab(s) orally once a day (at bedtime), As Needed  sevelamer carbonate 800 mg oral tablet: 1 tab(s) orally 3 times a day (with meals)

## 2022-07-24 NOTE — PROGRESS NOTE ADULT - PROBLEM SELECTOR PLAN 1
presents with SOB x4 day duration  - DDX: symptomatic anemia iso ESRD vs. HF, lower suspicion for PNA  - CXR with no focal consolidations   - management of anemia as below  - TTE to assess cardiac function  - Starting BiPAP  - Bumex 2 mg IV once

## 2022-07-24 NOTE — PROGRESS NOTE ADULT - SUBJECTIVE AND OBJECTIVE BOX
PROGRESS NOTE:   Authored by Cameron Nolasco MD [id number]  Patient is a 66y old  Male who presents with a chief complaint of Acute blood loss anemia, SOB (23 Jul 2022 17:03)      SUBJECTIVE / OVERNIGHT EVENTS: No complaints, no chest pain,  palpitations, dyspnea, abdominal pain. nausea, vomiting, diarrhea, blurry vision, dysuria, lightheadedness, dizziness.    ADDITIONAL REVIEW OF SYSTEMS:    MEDICATIONS  (STANDING):  amLODIPine   Tablet 10 milliGRAM(s) Oral daily  atorvastatin 80 milliGRAM(s) Oral at bedtime  buMETAnide 1 milliGRAM(s) Oral daily  dextrose 50% Injectable 25 Gram(s) IV Push once  doxazosin 4 milliGRAM(s) Oral at bedtime  gentamicin 0.1% Cream 1 Application(s) Topical <User Schedule>  hydrALAZINE 75 milliGRAM(s) Oral three times a day  insulin lispro (ADMELOG) corrective regimen sliding scale   SubCutaneous three times a day before meals  insulin lispro (ADMELOG) corrective regimen sliding scale   SubCutaneous at bedtime  lidocaine   4% Patch 1 Patch Transdermal daily  senna 2 Tablet(s) Oral at bedtime  sevelamer carbonate 800 milliGRAM(s) Oral three times a day with meals    MEDICATIONS  (PRN):      CAPILLARY BLOOD GLUCOSE      POCT Blood Glucose.: 216 mg/dL (23 Jul 2022 21:05)  POCT Blood Glucose.: 133 mg/dL (23 Jul 2022 12:48)    I&O's Summary    23 Jul 2022 07:01  -  24 Jul 2022 07:00  --------------------------------------------------------  IN: 2000 mL / OUT: 3600 mL / NET: -1600 mL        PHYSICAL EXAM:  Vital Signs Last 24 Hrs  T(C): 36.8 (24 Jul 2022 01:30), Max: 37.1 (24 Jul 2022 00:18)  T(F): 98.3 (24 Jul 2022 01:30), Max: 98.7 (24 Jul 2022 00:18)  HR: 61 (24 Jul 2022 01:30) (61 - 86)  BP: 169/68 (24 Jul 2022 01:30) (148/67 - 176/82)  BP(mean): --  RR: 18 (24 Jul 2022 01:30) (17 - 20)  SpO2: 99% (24 Jul 2022 00:35) (97% - 100%)    Parameters below as of 24 Jul 2022 01:30  Patient On (Oxygen Delivery Method): nasal cannula  O2 Flow (L/min): 6      GENERAL: No acute distress, well-developed  HEAD:  Atraumatic, Normocephalic  EYES: EOMI, PERRLA, conjunctiva and sclera clear  NECK: Supple, no lymphadenopathy, no JVD  CHEST/LUNG: CTAB; No wheezes, rales, or rhonchi  HEART: Regular rate and rhythm; No murmurs, rubs, or gallops  ABDOMEN: Soft, non-tender, non-distended; normal bowel sounds, no organomegaly  EXTREMITIES:  2+ peripheral pulses b/l, No clubbing, cyanosis, or edema  NEUROLOGY: A&O x 3, no focal deficits  SKIN: No rashes or lesions    LABS:                        8.2    7.35  )-----------( 300      ( 24 Jul 2022 06:00 )             25.7     07-24    136  |  99  |  47<H>  ----------------------------<  123<H>  3.8   |  26  |  7.14<H>    Ca    8.5      24 Jul 2022 06:00  Phos  3.1     07-24  Mg     2.10     07-24    TPro  5.8<L>  /  Alb  2.8<L>  /  TBili  0.5  /  DBili  x   /  AST  17  /  ALT  8   /  AlkPhos  50  07-24                RADIOLOGY & ADDITIONAL TESTS:  Results Reviewed:   Imaging Personally Reviewed:  Electrocardiogram Personally Reviewed:    COORDINATION OF CARE:  Care Discussed with Consultants/Other Providers [Y/N]:  Prior or Outpatient Records Reviewed [Y/N]:

## 2022-07-24 NOTE — PHYSICAL THERAPY INITIAL EVALUATION ADULT - GENERAL OBSERVATIONS, REHAB EVAL
Pt received seated at edge of bed, +telemetry, comfortable and in NAD. Pt agreeable to participate in PT evaluation.

## 2022-07-24 NOTE — DISCHARGE NOTE PROVIDER - HOSPITAL COURSE
66 year old man with hx of ESRD (7 days a week at home), prior CVA, T2DM, HTN, HLD who presents with SOB of 4 day duration, especially following home PD sessions. Presented with a HgB 5.4, without overt signs of bleeding. Labs consistent with AOCD and anemia of ESRD, although patient has never had a colonoscopy. Pt received 2 U pRBC and corrected to 8 and remained stable. After transfusion he became tachypneic and required BiPAP, IV bumex and was able to be weaned onto room air. Chest X-ray on admission showed pleural effusions and pulmonary edema. He is to receive a TTE to evaluate for potential heart failure. He is to receive a CT A&P to evaluate for potential RP bleed vs malignancy leading to anemia. He is to follow up with his nephrologist and PCP. 66 year old man with hx of ESRD (7 days a week at home), prior CVA, T2DM, HTN, HLD who presents with SOB of 4 day duration, especially following home PD sessions. Presented with a HgB 5.4, without overt signs of bleeding. Labs consistent with AOCD and anemia of ESRD, although patient has never had a colonoscopy. Pt received 2u pRBC and corrected to 8 and remained stable. After transfusion he became tachypneic and required BiPAP, IV bumex and was able to be weaned onto room air. Chest X-ray on admission showed pleural effusions and pulmonary edema. TTE without signs of heart failure. CT a/p negative for RP bleed or malignancy contributing to anemia. The patient was started on EPO during hospitalization and will continue as outpatient. He is to follow up with his nephrologist and PCP. 66 year old man with hx of ESRD (7 days a week at home), prior CVA, T2DM, HTN, HLD who presents with SOB of 4 day duration, especially following home PD sessions. Presented with a HgB 5.4, without overt signs of bleeding. Admitted with symptomatic anemia, likely due to anemia of chronic disease in setting of ESRD. Pt received 2u pRBC and corrected to 8 and remained stable. After transfusion he became tachypneic and required BiPAP, IV bumex and was able to be weaned onto room air. Chest X-ray on admission showed pleural effusions and pulmonary edema. TTE without signs of heart failure. CT a/p negative for RP bleed or malignancy contributing to anemia. The patient was started on EPO during hospitalization and will continue as outpatient. He is to follow up with his nephrologist and PCP.

## 2022-07-24 NOTE — DISCHARGE NOTE PROVIDER - NSDCCPCAREPLAN_GEN_ALL_CORE_FT
PRINCIPAL DISCHARGE DIAGNOSIS  Diagnosis: Anemia of chronic disease  Assessment and Plan of Treatment: You came into the hospital feeling short of breath and with abdominal distention. When we examined your blood we found that your hemoglobin was very low at 5.4. When you were discharged in June your hemoglobin was 8. When your hemoglobin levels are low it can cause shortness of breath and difficulty breathing. We gave you a blood transfusion that improved your symptoms and improved your hemoglobin count. We also got rid of some of the fluid in your lungs leading to your difficulty breathing with diuretics and dialysis. We also did an ultrasound of your heart that allowed us to understand if your heart was pumping effectively. It is sometimes the case that when the heart is not pumping effectively, fluid can back up into the lungs.   Please follow with your nephrologist outpatient.      SECONDARY DISCHARGE DIAGNOSES  Diagnosis: ESRD on dialysis  Assessment and Plan of Treatment: You came in because you felt short of breath after dialysis. After discussion with nephrology it was likely due to the excess fluid in your lungs due to inadequate dialysis.    Diagnosis: Hypokalemia  Assessment and Plan of Treatment:     Diagnosis: Dyspnea  Assessment and Plan of Treatment:      PRINCIPAL DISCHARGE DIAGNOSIS  Diagnosis: Anemia of chronic disease  Assessment and Plan of Treatment: You came into the hospital feeling short of breath and with abdominal distention. When we examined your blood we found that your hemoglobin was very low at 5.4. When you were discharged in June your hemoglobin was 8. When your hemoglobin levels are low it can cause shortness of breath and difficulty breathing. We gave you a blood transfusion that improved your symptoms and improved your hemoglobin count. We also got rid of some of the fluid in your lungs leading to your difficulty breathing with diuretics and dialysis. We also did an ultrasound of your heart that allowed us to understand if your heart was pumping effectively, which was normal. It is sometimes the case that when the heart is not pumping effectively, fluid can back up into the lungs. You were started on EPO to help with low blood levels. You will continue to receive EPO 3x per week at dialysis.  Please follow with your nephrologist outpatient.      SECONDARY DISCHARGE DIAGNOSES  Diagnosis: ESRD on dialysis  Assessment and Plan of Treatment: You came in because you felt short of breath after dialysis. After discussion with nephrology it was likely due to the excess fluid in your lungs due to inadequate dialysis. You received dialysis while in the hospital with improvement in your symptoms. You will continue EPO infusions during dialysis as outpatient to assist with low blood levels.   Please follow up with your nephrologist outpatient.    Diagnosis: Hypokalemia  Assessment and Plan of Treatment:     Diagnosis: Dyspnea  Assessment and Plan of Treatment:

## 2022-07-24 NOTE — PHYSICAL THERAPY INITIAL EVALUATION ADULT - ADDITIONAL COMMENTS
Pt lives in a private house with family +5 steps to enter, uses a rollator for functional mobility independently is able to complete ADLs independently.     Pt left semi-supine in bed, all lines intact, call bell in reach, in NAD. ROBER brasher.

## 2022-07-24 NOTE — PROGRESS NOTE ADULT - PROBLEM SELECTOR PLAN 7
Na 132  - likely hypervolemic iso ESRD  - HD per nephro  - trend sodium Na 136  - likely hypervolemic iso ESRD  - HD per nephro  - resolved

## 2022-07-24 NOTE — DISCHARGE NOTE PROVIDER - NSDCCPTREATMENT_GEN_ALL_CORE_FT
PRINCIPAL PROCEDURE  Procedure: CT abdomen pelvis  Findings and Treatment:       SECONDARY PROCEDURE  Procedure: Transthoracic echocardiography (TTE)  Findings and Treatment:

## 2022-07-24 NOTE — PROGRESS NOTE ADULT - PROBLEM SELECTOR PLAN 3
on PD 7 days a week (last on Friday)  - c/w home sevelamer 800mg TID and Bumex 1mg daily   - nephrology consult - HD per nephro  - monitor electrolytes

## 2022-07-24 NOTE — DISCHARGE NOTE PROVIDER - CARE PROVIDER_API CALL
Gillian Hurtado  INTERNAL MEDICINE  [address] [place] [address]  Phone: [phone]  Fax: [phone]  Follow Up Time: 1 week   Gillian Hurtado  INTERNAL MEDICINE  [address] [place] [address]  Phone: [phone]  Fax: [phone]  Follow Up Time: 1 week    Doug Gutierrez  CARDIOVASCULAR DISEASE  [address] [place] [address]  Phone: [phone]  Fax: [phone]  Established Patient  Follow Up Time:

## 2022-07-24 NOTE — PROGRESS NOTE ADULT - ATTENDING COMMENTS
66 y.o. Male w/ Hx HTN, DM2, hyperlipidemia, CVA,  ESRD on peritoneal dialysis (7 days a week), CVA p/w  shortness of breath x 4 day duration found to have Hb of 5.4. Now s/p 2 Units of PRBCs for presumed symptomatic anemia with subsequent rise in Hb to 8.2. However patient went into acute CHF likely from fluid overload after transfusion requiring Bipap and Bumex. Acute respiratory failure now resolved and off bipap.     # acute respiratory failure  -acute CHF due to blood transfusion  -s/p Bipap, s/p Bumex IV   -now resolved  -c/w Bumex 1mg PO qdaily    #acute blood loss anemia  -H/H improved s/p 2 Units PRBCs   -monitoring CBC  -Guaiac negative  -check CT A/P r/o RP bleed  -may be anemia of renal disease; may need epogen  -f/u nephrology recs    #ESRD on peritoneal dialysis  -s/p dialysis x 2 on 7/23  -will get another tx today.     # HTN  -BPs elevated  -add Losartan 25mg qdaily

## 2022-07-24 NOTE — PROGRESS NOTE ADULT - ASSESSMENT
66M with hx of ESRD on peritoneal dialysis (7 days a week), CVA, T2DM, HTN, HLD, who presents with shortness of breath x 4 day duration likely d/t symptomatic anemia, r/o HF.

## 2022-07-24 NOTE — PATIENT PROFILE ADULT - FALL HARM RISK - HARM RISK INTERVENTIONS
Assistance with ambulation/Assistance OOB with selected safe patient handling equipment/Communicate Risk of Fall with Harm to all staff/Discuss with provider need for PT consult/Monitor gait and stability/Reinforce activity limits and safety measures with patient and family/Tailored Fall Risk Interventions/Visual Cue: Yellow wristband and red socks/Bed in lowest position, wheels locked, appropriate side rails in place/Call bell, personal items and telephone in reach/Instruct patient to call for assistance before getting out of bed or chair/Non-slip footwear when patient is out of bed/Blanch to call system/Physically safe environment - no spills, clutter or unnecessary equipment/Purposeful Proactive Rounding/Room/bathroom lighting operational, light cord in reach

## 2022-07-24 NOTE — PATIENT PROFILE ADULT - VISION (WITH CORRECTIVE LENSES IF THE PATIENT USUALLY WEARS THEM):
partially impaired in right eye at baseline/Partially impaired: cannot see medication labels or newsprint, but can see obstacles in path, and the surrounding layout; can count fingers at arm's length

## 2022-07-25 ENCOUNTER — TRANSCRIPTION ENCOUNTER (OUTPATIENT)
Age: 67
End: 2022-07-25

## 2022-07-25 LAB
ALBUMIN SERPL ELPH-MCNC: 2.5 G/DL — LOW (ref 3.3–5)
ALP SERPL-CCNC: 53 U/L — SIGNIFICANT CHANGE UP (ref 40–120)
ALT FLD-CCNC: 7 U/L — SIGNIFICANT CHANGE UP (ref 4–41)
ANION GAP SERPL CALC-SCNC: 14 MMOL/L — SIGNIFICANT CHANGE UP (ref 7–14)
AST SERPL-CCNC: 19 U/L — SIGNIFICANT CHANGE UP (ref 4–40)
BILIRUB SERPL-MCNC: 0.4 MG/DL — SIGNIFICANT CHANGE UP (ref 0.2–1.2)
BUN SERPL-MCNC: 50 MG/DL — HIGH (ref 7–23)
CALCIUM SERPL-MCNC: 8.4 MG/DL — SIGNIFICANT CHANGE UP (ref 8.4–10.5)
CHLORIDE SERPL-SCNC: 99 MMOL/L — SIGNIFICANT CHANGE UP (ref 98–107)
CO2 SERPL-SCNC: 24 MMOL/L — SIGNIFICANT CHANGE UP (ref 22–31)
CREAT SERPL-MCNC: 8.08 MG/DL — HIGH (ref 0.5–1.3)
EGFR: 7 ML/MIN/1.73M2 — LOW
GLUCOSE BLDC GLUCOMTR-MCNC: 153 MG/DL — HIGH (ref 70–99)
GLUCOSE BLDC GLUCOMTR-MCNC: 179 MG/DL — HIGH (ref 70–99)
GLUCOSE BLDC GLUCOMTR-MCNC: 190 MG/DL — HIGH (ref 70–99)
GLUCOSE BLDC GLUCOMTR-MCNC: 229 MG/DL — HIGH (ref 70–99)
GLUCOSE BLDC GLUCOMTR-MCNC: 249 MG/DL — HIGH (ref 70–99)
GLUCOSE SERPL-MCNC: 90 MG/DL — SIGNIFICANT CHANGE UP (ref 70–99)
HCT VFR BLD CALC: 24.9 % — LOW (ref 39–50)
HGB BLD-MCNC: 7.9 G/DL — LOW (ref 13–17)
LDH PNL SERPL: 248 IU/L — HIGH (ref 121–224)
LDH1 SERPL-CCNC: 15 % — LOW (ref 17–32)
LDH3 SERPL-CCNC: 28 % — HIGH (ref 17–27)
LDH3 SERPL-CCNC: 33 % — SIGNIFICANT CHANGE UP (ref 25–40)
LDH4 SERPL-CCNC: 14 % — HIGH (ref 5–13)
LDH5 SERPL-CCNC: 10 % — SIGNIFICANT CHANGE UP (ref 4–20)
MAGNESIUM SERPL-MCNC: 2.1 MG/DL — SIGNIFICANT CHANGE UP (ref 1.6–2.6)
MCHC RBC-ENTMCNC: 27.9 PG — SIGNIFICANT CHANGE UP (ref 27–34)
MCHC RBC-ENTMCNC: 31.7 GM/DL — LOW (ref 32–36)
MCV RBC AUTO: 88 FL — SIGNIFICANT CHANGE UP (ref 80–100)
NRBC # BLD: 0 /100 WBCS — SIGNIFICANT CHANGE UP
NRBC # FLD: 0 K/UL — SIGNIFICANT CHANGE UP
PHOSPHATE SERPL-MCNC: 3.3 MG/DL — SIGNIFICANT CHANGE UP (ref 2.5–4.5)
PLATELET # BLD AUTO: 320 K/UL — SIGNIFICANT CHANGE UP (ref 150–400)
POTASSIUM SERPL-MCNC: 3.8 MMOL/L — SIGNIFICANT CHANGE UP (ref 3.5–5.3)
POTASSIUM SERPL-SCNC: 3.8 MMOL/L — SIGNIFICANT CHANGE UP (ref 3.5–5.3)
PROT SERPL-MCNC: 5.4 G/DL — LOW (ref 6–8.3)
RBC # BLD: 2.83 M/UL — LOW (ref 4.2–5.8)
RBC # FLD: 15.9 % — HIGH (ref 10.3–14.5)
SODIUM SERPL-SCNC: 137 MMOL/L — SIGNIFICANT CHANGE UP (ref 135–145)
WBC # BLD: 7.14 K/UL — SIGNIFICANT CHANGE UP (ref 3.8–10.5)
WBC # FLD AUTO: 7.14 K/UL — SIGNIFICANT CHANGE UP (ref 3.8–10.5)

## 2022-07-25 PROCEDURE — 99233 SBSQ HOSP IP/OBS HIGH 50: CPT | Mod: GC

## 2022-07-25 RX ADMIN — SEVELAMER CARBONATE 800 MILLIGRAM(S): 2400 POWDER, FOR SUSPENSION ORAL at 08:57

## 2022-07-25 RX ADMIN — Medication 2: at 17:33

## 2022-07-25 RX ADMIN — SEVELAMER CARBONATE 800 MILLIGRAM(S): 2400 POWDER, FOR SUSPENSION ORAL at 17:33

## 2022-07-25 RX ADMIN — Medication 1: at 08:50

## 2022-07-25 RX ADMIN — Medication 75 MILLIGRAM(S): at 05:18

## 2022-07-25 RX ADMIN — Medication 4 MILLIGRAM(S): at 22:38

## 2022-07-25 RX ADMIN — SENNA PLUS 2 TABLET(S): 8.6 TABLET ORAL at 22:35

## 2022-07-25 RX ADMIN — Medication 2: at 12:31

## 2022-07-25 RX ADMIN — LOSARTAN POTASSIUM 25 MILLIGRAM(S): 100 TABLET, FILM COATED ORAL at 05:19

## 2022-07-25 RX ADMIN — ATORVASTATIN CALCIUM 80 MILLIGRAM(S): 80 TABLET, FILM COATED ORAL at 22:38

## 2022-07-25 RX ADMIN — Medication 75 MILLIGRAM(S): at 22:36

## 2022-07-25 RX ADMIN — SEVELAMER CARBONATE 800 MILLIGRAM(S): 2400 POWDER, FOR SUSPENSION ORAL at 12:29

## 2022-07-25 RX ADMIN — AMLODIPINE BESYLATE 10 MILLIGRAM(S): 2.5 TABLET ORAL at 05:19

## 2022-07-25 RX ADMIN — BUMETANIDE 1 MILLIGRAM(S): 0.25 INJECTION INTRAMUSCULAR; INTRAVENOUS at 05:19

## 2022-07-25 RX ADMIN — Medication 75 MILLIGRAM(S): at 12:35

## 2022-07-25 NOTE — PROGRESS NOTE ADULT - PROBLEM SELECTOR PLAN 1
presents with SOB x4 day duration  - DDX: symptomatic anemia iso ESRD vs. HF, lower suspicion for PNA  - CXR with no focal consolidations   - management of anemia as below  - TTE to assess cardiac function  - Starting BiPAP  - Bumex 2 mg IV once presents with SOB x4 day duration  - DDX: symptomatic anemia iso ESRD vs. HF, lower suspicion for PNA  - CXR with no focal consolidations   - management of anemia as below  - TTE performed 7/24 showing normal RV and LV size and function  - s/p bumex, breathing well now

## 2022-07-25 NOTE — PROGRESS NOTE ADULT - ASSESSMENT
End Stage Renal Disease on Dialysis on Peritoneal Dialysis   - access - Peritoneal Dialysis catheter  Volume Status : higher  HIGH PHOS - controlled  low k - improved    Plan  - Peritoneal Dialysis 4 exchanges tomorrow- use 2.5 % dextrose   - CONT sevelamer as phos binders  followup card    ·	Hypertension   - hydrALAZINE 75 milliGRAM(s) Oral three times a day  - amlodipine 10 mg- am dose  - losartan 25mg qhs    started losartan today, await of response on blood pressure       anemia  Anemia Labs   Hemoglobin : 8.2 <--, 8.0 <--, 5.4 <--  Platelets : 300 <--, 275 <--, 323 <--  Ferritin : 567 <--  Iron : 28 <--  Folate : 20.0 <--  Vitamin b12 : 900 <--    Plan  epogen 10,000 units sq three times per week   follow up with team, may need heme-gi consult      poc d/w pt  labs, chart reviewed  For any question, pl call:  Nephrology  Cell -[phone]  Office [phone]  Ans Serv [phone]  [email or web address] - [email or web address] ( wkend coverage for Hospital)  End Stage Renal Disease on Dialysis on Peritoneal Dialysis   - access - Peritoneal Dialysis catheter  vol ok   PHOS - at goal  low k - improved    Plan  - Peritoneal Dialysis 4 exchanges - using 2.5 % dextrose   - CONT sevelamer as phos binders    ·	Hypertension better controlled now  - hydrALAZINE 75 milliGRAM(s) Oral three times a day  - amlodipine 10 mg- am dose  - losartan 25mg qhs  low Na in diet    Anemia w/sig Hb drop s/p 2units PRBC, Hb now stable at 7.9  Anemia Labs   Hemoglobin : 8.2 <--, 8.0 <--, 5.4 <--  Platelets : 300 <--, 275 <--, 323 <--  Ferritin : 567 <--  Iron : 28 <--  Folate : 20.0 <--  Vitamin b12 : 900 <--    Plan  epogen 10,000 units sq three times per week. will get BRITTANIE and IV Fe at [facility] outpt based on labs   GI w/u in pt vs outpt  pt never had colonoscopy    d/c plan per team  poc d/w pt and hospitalist   labs, chart reviewed  For any question, pl call:  Nephrology  Cell -[phone]  Office [phone]  Ans Serv [phone]  [email or web address] - [email or web address] ( wkend coverage for Hospital)

## 2022-07-25 NOTE — PROGRESS NOTE ADULT - PROBLEM SELECTOR PLAN 8
chest pain resolved  - trops 110s can be elevated in setting of ESRD  - ECG with no ischemic changes  - troponins plateaued

## 2022-07-25 NOTE — PROGRESS NOTE ADULT - SUBJECTIVE AND OBJECTIVE BOX
[facility] - S Bryant / Og S /D Dimitris/ S Davie/ S Jerrell/ Yung Mao / WICHO Paytonu/ O Amna  service -[phone], office [phone]  ---------------------------------------------------------------------------------------------------------------    Patient seen and examined bedside    Subjective and Objective: No overnight events, sob resolved. No complaints today. feeling better    Allergies: No Known Allergies      Hospital Medications:   MEDICATIONS  (STANDING):  amLODIPine   Tablet 10 milliGRAM(s) Oral daily  atorvastatin 80 milliGRAM(s) Oral at bedtime  buMETAnide 1 milliGRAM(s) Oral daily  dextrose 50% Injectable 25 Gram(s) IV Push once  doxazosin 4 milliGRAM(s) Oral at bedtime  epoetin heidi-epbx (RETACRIT) Injectable 80618 Unit(s) SubCutaneous <User Schedule>  gentamicin 0.1% Cream 1 Application(s) Topical <User Schedule>  hydrALAZINE 75 milliGRAM(s) Oral three times a day  insulin lispro (ADMELOG) corrective regimen sliding scale   SubCutaneous three times a day before meals  insulin lispro (ADMELOG) corrective regimen sliding scale   SubCutaneous at bedtime  lidocaine   4% Patch 1 Patch Transdermal daily  losartan 25 milliGRAM(s) Oral daily  senna 2 Tablet(s) Oral at bedtime  sevelamer carbonate 800 milliGRAM(s) Oral three times a day with meals    VITALS:  T(F): 98.1 (07-25-22 @ 12:34), Max: 98.5 (07-24-22 @ 21:20)  HR: 74 (07-25-22 @ 12:34)  BP: 157/66 (07-25-22 @ 12:34)  RR: 17 (07-25-22 @ 12:34)  SpO2: 99% (07-25-22 @ 12:34)  Wt(kg): --    07-24 @ 07:01 - 07-25 @ 07:00  --------------------------------------------------------  IN: 2000 mL / OUT: 0 mL / NET: 2000 mL    07-25 @ 07:01 - 07-25 @ 15:05  --------------------------------------------------------  IN: 2000 mL / OUT: 2000 mL / NET: 0 mL      PHYSICAL EXAM:  Constitutional: NAD  HEENT: anicteric sclera  Neck: No JVD  Respiratory: CTAB, no wheezes, rales or rhonchi  Cardiovascular: S1, S2, RRR  Gastrointestinal: BS+, soft, NT, PD cath   Extremities: No peripheral edema  Neurological: A/O x 3  Psychiatric: Normal mood, normal affect  : No brand.     LABS:  07-25    137  |  99  |  50<H>  ----------------------------<  90  3.8   |  24  |  8.08<H>    Ca    8.4      25 Jul 2022 06:04  Phos  3.3     07-25  Mg     2.10     07-25    TPro  5.4<L>  /  Alb  2.5<L>  /  TBili  0.4  /  DBili      /  AST  19  /  ALT  7   /  AlkPhos  53  07-25    Creatinine Trend: 8.08 <--, 7.14 <--, 6.83 <--, 6.69 <--                        7.9    7.14  )-----------( 320      ( 25 Jul 2022 06:04 )             24.9     Urine Studies:        RADIOLOGY & ADDITIONAL STUDIES:   [facility] - S Bryant / Og S /D Dimitris/ S Davie/ S Jerrell/ Yung Mao / IWCHO Paytonu/ O Amna  service [phone], office [phone]  ---------------------------------------------------------------------------------------------------------------    Patient seen and examined bedside    Subjective and Objective: No overnight events, denied V/D/fritz/abd pain/sob. No complaints today. feeling better    Allergies: No Known Allergies      Hospital Medications:   MEDICATIONS  (STANDING):  amLODIPine   Tablet 10 milliGRAM(s) Oral daily  atorvastatin 80 milliGRAM(s) Oral at bedtime  buMETAnide 1 milliGRAM(s) Oral daily  dextrose 50% Injectable 25 Gram(s) IV Push once  doxazosin 4 milliGRAM(s) Oral at bedtime  epoetin heidi-epbx (RETACRIT) Injectable 98863 Unit(s) SubCutaneous <User Schedule>  gentamicin 0.1% Cream 1 Application(s) Topical <User Schedule>  hydrALAZINE 75 milliGRAM(s) Oral three times a day  insulin lispro (ADMELOG) corrective regimen sliding scale   SubCutaneous three times a day before meals  insulin lispro (ADMELOG) corrective regimen sliding scale   SubCutaneous at bedtime  lidocaine   4% Patch 1 Patch Transdermal daily  losartan 25 milliGRAM(s) Oral daily  senna 2 Tablet(s) Oral at bedtime  sevelamer carbonate 800 milliGRAM(s) Oral three times a day with meals    VITALS:  T(F): 98.1 (07-25-22 @ 12:34), Max: 98.5 (07-24-22 @ 21:20)  HR: 74 (07-25-22 @ 12:34)  BP: 157/66 (07-25-22 @ 12:34)  RR: 17 (07-25-22 @ 12:34)  SpO2: 99% (07-25-22 @ 12:34)  Wt(kg): --    07-24 @ 07:01  -  07-25 @ 07:00  --------------------------------------------------------  IN: 2000 mL / OUT: 0 mL / NET: 2000 mL    07-25 @ 07:01  -  07-25 @ 15:05  --------------------------------------------------------  IN: 2000 mL / OUT: 2000 mL / NET: 0 mL      PHYSICAL EXAM:  Constitutional: NAD  HEENT: anicteric sclera  Neck: No JVD  Respiratory: CTAB, no wheezes, rales or rhonchi  Cardiovascular: S1, S2, RRR  Gastrointestinal: BS+, soft, NT, PD cath   Extremities: No peripheral edema  Neurological: A/O x 3  Psychiatric: Normal mood, normal affect  : No brand.     LABS:  07-25    137  |  99  |  50<H>  ----------------------------<  90  3.8   |  24  |  8.08<H>    Ca    8.4      25 Jul 2022 06:04  Phos  3.3     07-25  Mg     2.10     07-25    TPro  5.4<L>  /  Alb  2.5<L>  /  TBili  0.4  /  DBili      /  AST  19  /  ALT  7   /  AlkPhos  53  07-25    Creatinine Trend: 8.08 <--, 7.14 <--, 6.83 <--, 6.69 <--                        7.9    7.14  )-----------( 320      ( 25 Jul 2022 06:04 )             24.9     Urine Studies:        RADIOLOGY & ADDITIONAL STUDIES:

## 2022-07-25 NOTE — PROGRESS NOTE ADULT - PROBLEM SELECTOR PLAN 7
Na 136  - likely hypervolemic iso ESRD  - HD per nephro  - resolved Na 138  - likely hypervolemic iso ESRD  - HD per nephro  - resolved

## 2022-07-25 NOTE — DISCHARGE NOTE NURSING/CASE MANAGEMENT/SOCIAL WORK - NSDCPEFALRISK_GEN_ALL_CORE
For information on Fall & Injury Prevention, visit: [email or web address] OR  [email or web address] OR  https://www.cdc.gov/steadi/patient.html

## 2022-07-25 NOTE — PROGRESS NOTE ADULT - PROBLEM SELECTOR PLAN 5
FS stable  - not on any home medications  - check A1C (although may not be accurate iso anemia/ESRD), check fructosamine   - FS/SSI qac and hs FS stable  - not on any home medications  - A1C=5.2 on 7/24 (although may not be accurate iso anemia/ESRD), check fructosamine   - FS/SSI qac and hs

## 2022-07-25 NOTE — DISCHARGE NOTE NURSING/CASE MANAGEMENT/SOCIAL WORK - PATIENT PORTAL LINK FT
You can access the FollowMyHealth Patient Portal offered by [facility] by registering at the following website: [email or web address]. By joining [employer]’s FollowMyHealth portal, you will also be able to view your health information using other applications (apps) compatible with our system.

## 2022-07-25 NOTE — PROGRESS NOTE ADULT - PROBLEM SELECTOR PLAN 3
on PD 7 days a week (last on Friday)  - c/w home sevelamer 800mg TID and Bumex 1mg daily   - nephrology consult - HD per nephro  - monitor electrolytes on PD 7 days a week (last on Friday)  - c/w home sevelamer 800mg TID and Bumex 1mg daily   - nephrology consult - PD per nephro  - monitor electrolytes

## 2022-07-25 NOTE — PROGRESS NOTE ADULT - SUBJECTIVE AND OBJECTIVE BOX
Internal Medicine Progress Note    Patient is a 66y old  Male who presents with a chief complaint of Acute blood loss anemia, SOB (24 Jul 2022 17:44)    OVERNIGHT EVENTS/SUBJECTIVE:    OBJECTIVE:  Vital Signs Last 24 Hrs  T(C): 36.7 (25 Jul 2022 05:20), Max: 37 (24 Jul 2022 09:30)  T(F): 98.1 (25 Jul 2022 05:20), Max: 98.6 (24 Jul 2022 09:30)  HR: 72 (25 Jul 2022 05:20) (56 - 84)  BP: 149/64 (25 Jul 2022 05:20) (149/64 - 167/64)  BP(mean): --  RR: 18 (25 Jul 2022 05:20) (16 - 18)  SpO2: 100% (25 Jul 2022 05:20) (88% - 100%)    Parameters below as of 25 Jul 2022 05:20  Patient On (Oxygen Delivery Method): room air      I&O's Detail    23 Jul 2022 07:01  -  24 Jul 2022 07:00  --------------------------------------------------------  IN:    Other (mL): 2000 mL  Total IN: 2000 mL    OUT:    Other (mL): 3600 mL  Total OUT: 3600 mL    Total NET: -1600 mL        Daily     Daily   Physical Exam:  General: NAD, resting comfortably in bed  Neuro: A&Ox4, 5/5 strength in all ext  HEENT: NC/AT, EOMI, normal hearing, oral mucosa moist, no oral lesions noted, no pharyngeal erythema, uvula midline  Neck: supple, thyroid not enlarged, no LAD  Resp: Breathing comfortably on RA, LCTA b/l  CV: Normal sinus rhythm, S1 and S2, no r/m/g  Abd: soft, non-distended, non-tender. No HSM.  Ext: ROMIx4, no edema, +2 pulses bilaterally  Skin: Warm and dry, no rashes or discolorations  Psych: Appropriate affect    Medications:  MEDICATIONS  (STANDING):  amLODIPine   Tablet 10 milliGRAM(s) Oral daily  atorvastatin 80 milliGRAM(s) Oral at bedtime  buMETAnide 1 milliGRAM(s) Oral daily  dextrose 50% Injectable 25 Gram(s) IV Push once  doxazosin 4 milliGRAM(s) Oral at bedtime  epoetin heidi-epbx (RETACRIT) Injectable 92243 Unit(s) SubCutaneous <User Schedule>  gentamicin 0.1% Cream 1 Application(s) Topical <User Schedule>  hydrALAZINE 75 milliGRAM(s) Oral three times a day  insulin lispro (ADMELOG) corrective regimen sliding scale   SubCutaneous three times a day before meals  insulin lispro (ADMELOG) corrective regimen sliding scale   SubCutaneous at bedtime  lidocaine   4% Patch 1 Patch Transdermal daily  losartan 25 milliGRAM(s) Oral daily  senna 2 Tablet(s) Oral at bedtime  sevelamer carbonate 800 milliGRAM(s) Oral three times a day with meals    MEDICATIONS  (PRN):      Labs:                        8.2    7.35  )-----------( 300      ( 24 Jul 2022 06:00 )             25.7     07-24    136  |  99  |  47<H>  ----------------------------<  123<H>  3.8   |  26  |  7.14<H>    Ca    8.5      24 Jul 2022 06:00  Phos  3.1     07-24  Mg     2.10     07-24    TPro  5.8<L>  /  Alb  2.8<L>  /  TBili  0.5  /  DBili  x   /  AST  17  /  ALT  8   /  AlkPhos  50  07-24        SARS-CoV-2: NotDetec (23 Jul 2022 05:13)  SARS-CoV-2: NotDetec (16 Jun 2022 01:21)      Radiology: Internal Medicine Progress Note    Patient is a 66y old  Male who presents with a chief complaint of Acute blood loss anemia, SOB (24 Jul 2022 17:44)    OVERNIGHT EVENTS/SUBJECTIVE: No overnight events. Pt was desatting to 88 but asymptomatic and returned to baseline on RA. He feels very well and is in no discomfort.  Reports no SOB, increased WOB, CP, or coughing.    OBJECTIVE:  Vital Signs Last 24 Hrs  T(C): 36.7 (25 Jul 2022 05:20), Max: 37 (24 Jul 2022 09:30)  T(F): 98.1 (25 Jul 2022 05:20), Max: 98.6 (24 Jul 2022 09:30)  HR: 72 (25 Jul 2022 05:20) (56 - 84)  BP: 149/64 (25 Jul 2022 05:20) (149/64 - 167/64)  BP(mean): --  RR: 18 (25 Jul 2022 05:20) (16 - 18)  SpO2: 100% (25 Jul 2022 05:20) (88% - 100%)    Parameters below as of 25 Jul 2022 05:20  Patient On (Oxygen Delivery Method): room air      I&O's Detail    23 Jul 2022 07:01  -  24 Jul 2022 07:00  --------------------------------------------------------  IN:    Other (mL): 2000 mL  Total IN: 2000 mL    OUT:    Other (mL): 3600 mL  Total OUT: 3600 mL    Total NET: -1600 mL        Daily     Daily   Physical Exam:  General: NAD, resting comfortably in bed  Neuro: A&Ox4,   HEENT: NC/AT, EOMI, normal hearing, oral mucosa moist, no oral lesions noted, no pharyngeal erythema  Resp: Breathing comfortably on RA, LCTA b/l  CV: Normal sinus rhythm, S1 and S2, systolic murmur heard along L sternal border  Abd: soft, non-distended, non-tender  Ext: no edema, +2 pulses bilaterally  Skin: Warm and dry, no rashes or discolorations  Psych: Appropriate affect    Medications:  MEDICATIONS  (STANDING):  amLODIPine   Tablet 10 milliGRAM(s) Oral daily  atorvastatin 80 milliGRAM(s) Oral at bedtime  buMETAnide 1 milliGRAM(s) Oral daily  dextrose 50% Injectable 25 Gram(s) IV Push once  doxazosin 4 milliGRAM(s) Oral at bedtime  epoetin heidi-epbx (RETACRIT) Injectable 30952 Unit(s) SubCutaneous <User Schedule>  gentamicin 0.1% Cream 1 Application(s) Topical <User Schedule>  hydrALAZINE 75 milliGRAM(s) Oral three times a day  insulin lispro (ADMELOG) corrective regimen sliding scale   SubCutaneous three times a day before meals  insulin lispro (ADMELOG) corrective regimen sliding scale   SubCutaneous at bedtime  lidocaine   4% Patch 1 Patch Transdermal daily  losartan 25 milliGRAM(s) Oral daily  senna 2 Tablet(s) Oral at bedtime  sevelamer carbonate 800 milliGRAM(s) Oral three times a day with meals    MEDICATIONS  (PRN):      Labs:                        8.2    7.35  )-----------( 300      ( 24 Jul 2022 06:00 )             25.7     07-24    136  |  99  |  47<H>  ----------------------------<  123<H>  3.8   |  26  |  7.14<H>    Ca    8.5      24 Jul 2022 06:00  Phos  3.1     07-24  Mg     2.10     07-24    TPro  5.8<L>  /  Alb  2.8<L>  /  TBili  0.5  /  DBili  x   /  AST  17  /  ALT  8   /  AlkPhos  50  07-24        SARS-CoV-2: NotDetec (23 Jul 2022 05:13)  SARS-CoV-2: NotDetec (16 Jun 2022 01:21)      Radiology:

## 2022-07-25 NOTE — PROGRESS NOTE ADULT - PROBLEM SELECTOR PLAN 2
Hb 5.4 (baseline ~8)  - stool occult negative, lower suspicion for acute bleed given hemodynamic stability  - likely anemia of chronic disease iso ESRD  - check iron studies, ferritin, b12, folate, LDH, haptoglobin  - s/p 2U PRBC, check post transfusion CBC, tx Hb goal of 7, active T&S, monitor for s/s volume overload  - repeat HgB 8.0 after 2 U pRBC  - nephrology consult to consider EPO Hb 7.9 (baseline ~8)  - stool occult negative, lower suspicion for acute bleed given hemodynamic stability  - likely anemia of chronic disease iso ESRD  - check iron studies, ferritin, b12, folate, LDH, haptoglobin  - s/p 2U PRBC after Hb=5.4 on 7/23, Hb goal of 7, active T&S, monitor for s/s volume overload  - nephrology rec: 10,000u sq epogen 3x/week

## 2022-07-25 NOTE — DISCHARGE NOTE NURSING/CASE MANAGEMENT/SOCIAL WORK - NSDCCRNAME_GEN_ALL_CORE_FT
Pt followed by [facility]

## 2022-07-25 NOTE — PROGRESS NOTE ADULT - ATTENDING COMMENTS
66M with hx of ESRD on peritoneal dialysis (7 days a week), CVA, T2DM, HTN, HLD, who presents with shortness of breath x 4 day duration likely d/t symptomatic anemia, r/o HF. VSS. Labs notable for Hb 5.4, occult negative. Symptomatic anemia likely from anemia chronic disease iso of ESRD. No melena/hematochezia, hemodynamically stable, low suspicion for RP bleed. s/p 2U PRBC transfusions with adequate responses/p EPO per nephro. TTE unremarkable. Volume status optimized with PD per nephro. Dyspnea resolved, patient at baseline, if ok with nephro plan to dc today with resumption of home PT. 37 mins spent dc planning. 66M with hx of ESRD on peritoneal dialysis (7 days a week), CVA, T2DM, HTN, HLD, who presents with shortness of breath x 4 day duration likely d/t symptomatic anemia, r/o HF. VSS. Labs notable for Hb 5.4, occult negative. Symptomatic anemia likely from anemia chronic disease iso of ESRD. No melena/hematochezia, occult negative. CTAP pending to r/o RP bleed. TTE unremarkable. s/p 2U PRBC transfusions with adequate responses/p EPO per nephro. PD per nephro.

## 2022-07-26 VITALS
DIASTOLIC BLOOD PRESSURE: 69 MMHG | RESPIRATION RATE: 18 BRPM | TEMPERATURE: 98 F | OXYGEN SATURATION: 100 % | HEART RATE: 84 BPM | SYSTOLIC BLOOD PRESSURE: 155 MMHG

## 2022-07-26 LAB
ANION GAP SERPL CALC-SCNC: 15 MMOL/L — HIGH (ref 7–14)
BUN SERPL-MCNC: 41 MG/DL — HIGH (ref 7–23)
CALCIUM SERPL-MCNC: 8.7 MG/DL — SIGNIFICANT CHANGE UP (ref 8.4–10.5)
CHLORIDE SERPL-SCNC: 98 MMOL/L — SIGNIFICANT CHANGE UP (ref 98–107)
CO2 SERPL-SCNC: 25 MMOL/L — SIGNIFICANT CHANGE UP (ref 22–31)
CREAT SERPL-MCNC: 7.4 MG/DL — HIGH (ref 0.5–1.3)
EGFR: 7 ML/MIN/1.73M2 — LOW
GLUCOSE BLDC GLUCOMTR-MCNC: 126 MG/DL — HIGH (ref 70–99)
GLUCOSE BLDC GLUCOMTR-MCNC: 193 MG/DL — HIGH (ref 70–99)
GLUCOSE BLDC GLUCOMTR-MCNC: 253 MG/DL — HIGH (ref 70–99)
GLUCOSE SERPL-MCNC: 182 MG/DL — HIGH (ref 70–99)
HCT VFR BLD CALC: 27.5 % — LOW (ref 39–50)
HGB BLD-MCNC: 8.8 G/DL — LOW (ref 13–17)
MAGNESIUM SERPL-MCNC: 2.1 MG/DL — SIGNIFICANT CHANGE UP (ref 1.6–2.6)
MCHC RBC-ENTMCNC: 28.4 PG — SIGNIFICANT CHANGE UP (ref 27–34)
MCHC RBC-ENTMCNC: 32 GM/DL — SIGNIFICANT CHANGE UP (ref 32–36)
MCV RBC AUTO: 88.7 FL — SIGNIFICANT CHANGE UP (ref 80–100)
NRBC # BLD: 0 /100 WBCS — SIGNIFICANT CHANGE UP
NRBC # FLD: 0 K/UL — SIGNIFICANT CHANGE UP
PHOSPHATE SERPL-MCNC: 3.8 MG/DL — SIGNIFICANT CHANGE UP (ref 2.5–4.5)
PLATELET # BLD AUTO: 322 K/UL — SIGNIFICANT CHANGE UP (ref 150–400)
POTASSIUM SERPL-MCNC: 3.3 MMOL/L — LOW (ref 3.5–5.3)
POTASSIUM SERPL-SCNC: 3.3 MMOL/L — LOW (ref 3.5–5.3)
RBC # BLD: 3.1 M/UL — LOW (ref 4.2–5.8)
RBC # FLD: 16.1 % — HIGH (ref 10.3–14.5)
SODIUM SERPL-SCNC: 138 MMOL/L — SIGNIFICANT CHANGE UP (ref 135–145)
WBC # BLD: 6.53 K/UL — SIGNIFICANT CHANGE UP (ref 3.8–10.5)
WBC # FLD AUTO: 6.53 K/UL — SIGNIFICANT CHANGE UP (ref 3.8–10.5)

## 2022-07-26 PROCEDURE — 99239 HOSP IP/OBS DSCHRG MGMT >30: CPT | Mod: GC

## 2022-07-26 PROCEDURE — 74176 CT ABD & PELVIS W/O CONTRAST: CPT | Mod: 26

## 2022-07-26 RX ORDER — HYDRALAZINE HCL 50 MG
1.5 TABLET ORAL
Qty: 0 | Refills: 0 | DISCHARGE

## 2022-07-26 RX ORDER — AMLODIPINE BESYLATE 2.5 MG/1
1 TABLET ORAL
Qty: 30 | Refills: 0
Start: 2022-07-26 | End: 2022-08-24

## 2022-07-26 RX ORDER — AMLODIPINE BESYLATE 2.5 MG/1
1 TABLET ORAL
Qty: 0 | Refills: 0 | DISCHARGE

## 2022-07-26 RX ORDER — HYDRALAZINE HCL 50 MG
1.5 TABLET ORAL
Qty: 135 | Refills: 0
Start: 2022-07-26 | End: 2022-08-24

## 2022-07-26 RX ORDER — POTASSIUM CHLORIDE 20 MEQ
20 PACKET (EA) ORAL ONCE
Refills: 0 | Status: COMPLETED | OUTPATIENT
Start: 2022-07-26 | End: 2022-07-26

## 2022-07-26 RX ORDER — LOSARTAN POTASSIUM 100 MG/1
1 TABLET, FILM COATED ORAL
Qty: 30 | Refills: 0
Start: 2022-07-26 | End: 2022-08-24

## 2022-07-26 RX ADMIN — LOSARTAN POTASSIUM 25 MILLIGRAM(S): 100 TABLET, FILM COATED ORAL at 05:04

## 2022-07-26 RX ADMIN — Medication 20 MILLIEQUIVALENT(S): at 09:30

## 2022-07-26 RX ADMIN — Medication 75 MILLIGRAM(S): at 12:23

## 2022-07-26 RX ADMIN — BUMETANIDE 1 MILLIGRAM(S): 0.25 INJECTION INTRAMUSCULAR; INTRAVENOUS at 05:04

## 2022-07-26 RX ADMIN — Medication 3: at 12:25

## 2022-07-26 RX ADMIN — Medication 75 MILLIGRAM(S): at 05:05

## 2022-07-26 RX ADMIN — AMLODIPINE BESYLATE 10 MILLIGRAM(S): 2.5 TABLET ORAL at 05:04

## 2022-07-26 RX ADMIN — SEVELAMER CARBONATE 800 MILLIGRAM(S): 2400 POWDER, FOR SUSPENSION ORAL at 17:09

## 2022-07-26 RX ADMIN — Medication 1: at 07:55

## 2022-07-26 RX ADMIN — SEVELAMER CARBONATE 800 MILLIGRAM(S): 2400 POWDER, FOR SUSPENSION ORAL at 12:23

## 2022-07-26 RX ADMIN — SEVELAMER CARBONATE 800 MILLIGRAM(S): 2400 POWDER, FOR SUSPENSION ORAL at 07:55

## 2022-07-26 NOTE — PROGRESS NOTE ADULT - PROBLEM SELECTOR PLAN 3
on PD 7 days a week (last on Friday)  - c/w home sevelamer 800mg TID and Bumex 1mg daily   - nephrology consult - PD per nephro  - monitor electrolytes

## 2022-07-26 NOTE — PROGRESS NOTE ADULT - PROBLEM SELECTOR PLAN 2
Hb 7.9 (baseline ~8)  - stool occult negative, lower suspicion for acute bleed given hemodynamic stability  - likely anemia of chronic disease iso ESRD  - check iron studies, ferritin, b12, folate, LDH, haptoglobin  - s/p 2U PRBC after Hb=5.4 on 7/23, Hb goal of 7, active T&S, monitor for s/s volume overload  - nephrology rec: 10,000u sq epogen 3x/week Hb 8.8 (baseline ~8)  - stool occult negative, lower suspicion for acute bleed given hemodynamic stability  - likely anemia of chronic disease iso ESRD  - s/p 2U PRBC  - f/u outpt GI  - nephrology rec: 10,000u sq epogen 3x/week

## 2022-07-26 NOTE — PROGRESS NOTE ADULT - REASON FOR ADMISSION
Acute blood loss anemia, SOB

## 2022-07-26 NOTE — PROGRESS NOTE ADULT - PROBLEM SELECTOR PLAN 9
DVT ppx: SCDs, hold pharmacologic ppx in setting of anemia  DIET: Renal   DISPO: PT eval - uses walker at home

## 2022-07-26 NOTE — PROGRESS NOTE ADULT - SUBJECTIVE AND OBJECTIVE BOX
[facility] - S Bryant / Og S /D Dimitris/ NETTE Broderick/ NETTE Allan/ Yung Mao / WICHO Paytonu/ O Amna  service -[phone], office [phone]  ---------------------------------------------------------------------------------------------------------------    Patient seen and examined bedside    Subjective and Objective: No overnight events, sob resolved. No complaints today. feeling better    Allergies: No Known Allergies      Hospital Medications:   MEDICATIONS  (STANDING):  amLODIPine   Tablet 10 milliGRAM(s) Oral daily  atorvastatin 80 milliGRAM(s) Oral at bedtime  buMETAnide 1 milliGRAM(s) Oral daily  dextrose 50% Injectable 25 Gram(s) IV Push once  doxazosin 4 milliGRAM(s) Oral at bedtime  epoetin heidi-epbx (RETACRIT) Injectable 60408 Unit(s) SubCutaneous <User Schedule>  gentamicin 0.1% Cream 1 Application(s) Topical <User Schedule>  hydrALAZINE 75 milliGRAM(s) Oral three times a day  insulin lispro (ADMELOG) corrective regimen sliding scale   SubCutaneous three times a day before meals  insulin lispro (ADMELOG) corrective regimen sliding scale   SubCutaneous at bedtime  lidocaine   4% Patch 1 Patch Transdermal daily  losartan 25 milliGRAM(s) Oral daily  senna 2 Tablet(s) Oral at bedtime  sevelamer carbonate 800 milliGRAM(s) Oral three times a day with meals      REVIEW OF SYSTEMS:  CONSTITUTIONAL: No weakness, fevers or chills  EYES/ENT: No visual changes;  No vertigo or throat pain   NECK: No pain or stiffness  RESPIRATORY: No cough, wheezing, hemoptysis; No shortness of breath  CARDIOVASCULAR: No chest pain or palpitations.  GASTROINTESTINAL: No abdominal or epigastric pain. No nausea, vomiting, or hematemesis; No diarrhea or constipation. No melena or hematochezia.  GENITOURINARY: No dysuria, frequency, foamy urine, urinary urgency, incontinence or hematuria  NEUROLOGICAL: No numbness or weakness  SKIN: No itching, burning, rashes, or lesions   VASCULAR: No bilateral lower extremity edema.   All other review of systems is negative unless indicated above.    VITALS:  T(F): 98.2 (07-26-22 @ 14:20), Max: 98.2 (07-25-22 @ 17:30)  HR: 75 (07-26-22 @ 14:20)  BP: 158/73 (07-26-22 @ 14:20)  RR: 17 (07-26-22 @ 14:20)  SpO2: 100% (07-26-22 @ 12:06)  Wt(kg): --    07-25 @ 07:01  -  07-26 @ 07:00  --------------------------------------------------------  IN: 8000 mL / OUT: 9400 mL / NET: -1400 mL    07-26 @ 07:01  -  07-26 @ 16:30  --------------------------------------------------------  IN: 2000 mL / OUT: 4200 mL / NET: -2200 mL    PHYSICAL EXAM:  Constitutional: NAD  HEENT: anicteric sclera, oropharynx clear  Neck: No JVD  Respiratory: CTAB, no wheezes, rales or rhonchi  Cardiovascular: S1, S2, RRR  Gastrointestinal: BS+, soft, NT/ND  Extremities: No cyanosis or clubbing. No peripheral edema  Neurological: A/O x 3, no focal deficits  Psychiatric: Normal mood, normal affect  : No CVA tenderness. No brand.   Skin: No rashes  Vascular Access:    LABS:  07-26    138  |  98  |  41<H>  ----------------------------<  182<H>  3.3<L>   |  25  |  7.40<H>    Ca    8.7      26 Jul 2022 07:02  Phos  3.8     07-26  Mg     2.10     07-26    TPro  5.4<L>  /  Alb  2.5<L>  /  TBili  0.4  /  DBili      /  AST  19  /  ALT  7   /  AlkPhos  53  07-25    Creatinine Trend: 7.40 <--, 8.08 <--, 7.14 <--, 6.83 <--, 6.69 <--                        8.8    6.53  )-----------( 322      ( 26 Jul 2022 07:02 )             27.5     Urine Studies:        RADIOLOGY & ADDITIONAL STUDIES:

## 2022-07-26 NOTE — PROGRESS NOTE ADULT - PROBLEM SELECTOR PLAN 1
presents with SOB x4 day duration  - DDX: symptomatic anemia iso ESRD vs. HF, lower suspicion for PNA  - CXR with no focal consolidations   - management of anemia as below  - TTE performed 7/24 showing normal RV and LV size and function  - s/p bumex, breathing well now

## 2022-07-26 NOTE — PROGRESS NOTE ADULT - SUBJECTIVE AND OBJECTIVE BOX
Internal Medicine Progress Note    Patient is a 66y old  Male who presents with a chief complaint of Acute blood loss anemia, SOB (2022 15:05)    OVERNIGHT EVENTS/SUBJECTIVE:    OBJECTIVE:  Vital Signs Last 24 Hrs  T(C): 36.7 (2022 06:15), Max: 36.8 (2022 17:30)  T(F): 98.1 (2022 06:15), Max: 98.2 (2022 17:30)  HR: 76 (2022 06:15) (68 - 86)  BP: 155/72 (2022 06:15) (140/67 - 170/72)  BP(mean): --  RR: 17 (2022 06:15) (17 - 18)  SpO2: 99% (2022 05:00) (97% - 100%)    Parameters below as of 2022 06:15  Patient On (Oxygen Delivery Method): room air      I&O's Detail    2022 07:01  -  2022 07:00  --------------------------------------------------------  IN:    Other (mL): 8000 mL  Total IN: 8000 mL    OUT:    Other (mL): 9400 mL  Total OUT: 9400 mL    Total NET: -1400 mL        Daily     Daily Weight in k (2022 06:15)  Physical Exam:  General: NAD, resting comfortably in bed  Neuro: A&Ox4, 5/5 strength in all ext  HEENT: NC/AT, EOMI, normal hearing, oral mucosa moist, no oral lesions noted, no pharyngeal erythema, uvula midline  Neck: supple, thyroid not enlarged, no LAD  Resp: Breathing comfortably on RA, LCTA b/l  CV: Normal sinus rhythm, S1 and S2, no r/m/g  Abd: soft, non-distended, non-tender. No HSM.  Ext: ROMIx4, no edema, +2 pulses bilaterally  Skin: Warm and dry, no rashes or discolorations  Psych: Appropriate affect    Medications:  MEDICATIONS  (STANDING):  amLODIPine   Tablet 10 milliGRAM(s) Oral daily  atorvastatin 80 milliGRAM(s) Oral at bedtime  buMETAnide 1 milliGRAM(s) Oral daily  dextrose 50% Injectable 25 Gram(s) IV Push once  doxazosin 4 milliGRAM(s) Oral at bedtime  epoetin heidi-epbx (RETACRIT) Injectable 40370 Unit(s) SubCutaneous <User Schedule>  gentamicin 0.1% Cream 1 Application(s) Topical <User Schedule>  hydrALAZINE 75 milliGRAM(s) Oral three times a day  insulin lispro (ADMELOG) corrective regimen sliding scale   SubCutaneous three times a day before meals  insulin lispro (ADMELOG) corrective regimen sliding scale   SubCutaneous at bedtime  lidocaine   4% Patch 1 Patch Transdermal daily  losartan 25 milliGRAM(s) Oral daily  senna 2 Tablet(s) Oral at bedtime  sevelamer carbonate 800 milliGRAM(s) Oral three times a day with meals    MEDICATIONS  (PRN):      Labs:                        7.9    7.14  )-----------( 320      ( 2022 06:04 )             24.9     07-25    137  |  99  |  50<H>  ----------------------------<  90  3.8   |  24  |  8.08<H>    Ca    8.4      2022 06:04  Phos  3.3     07-25  Mg     2.10     07-25    TPro  5.4<L>  /  Alb  2.5<L>  /  TBili  0.4  /  DBili  x   /  AST  19  /  ALT  7   /  AlkPhos  53  07-25        SARS-CoV-2: NotDetec (2022 05:13)  SARS-CoV-2: NotDetec (2022 01:21)      Radiology: Internal Medicine Progress Note    Patient is a 66y old  Male who presents with a chief complaint of Acute blood loss anemia, SOB (2022 15:05)    OVERNIGHT EVENTS/SUBJECTIVE: No overnight events. Pt feels well and wants to go home. No SOB, CP, abd pain, palpitations, HA.    OBJECTIVE:  Vital Signs Last 24 Hrs  T(C): 36.7 (2022 06:15), Max: 36.8 (2022 17:30)  T(F): 98.1 (2022 06:15), Max: 98.2 (2022 17:30)  HR: 76 (2022 06:15) (68 - 86)  BP: 155/72 (2022 06:15) (140/67 - 170/72)  BP(mean): --  RR: 17 (2022 06:15) (17 - 18)  SpO2: 99% (2022 05:00) (97% - 100%)    Parameters below as of 2022 06:15  Patient On (Oxygen Delivery Method): room air      I&O's Detail    2022 07:01  -  2022 07:00  --------------------------------------------------------  IN:    Other (mL): 8000 mL  Total IN: 8000 mL    OUT:    Other (mL): 9400 mL  Total OUT: 9400 mL    Total NET: -1400 mL        Daily     Daily Weight in k (2022 06:15)  Physical Exam:  General: NAD, resting comfortably in bed  Neuro: A&Ox4  HEENT: NC/AT, EOMI, normal hearing, oral mucosa moist  Resp: Breathing comfortably on RA, LCTA b/l  CV: Normal sinus rhythm, S1 and S2, systolic murmur  Abd: soft, non-distended, non-tender  Ext: no edema, +2 pulses bilaterally  Skin: Warm and dry, no rashes or discolorations  Psych: Appropriate affect    Medications:  MEDICATIONS  (STANDING):  amLODIPine   Tablet 10 milliGRAM(s) Oral daily  atorvastatin 80 milliGRAM(s) Oral at bedtime  buMETAnide 1 milliGRAM(s) Oral daily  dextrose 50% Injectable 25 Gram(s) IV Push once  doxazosin 4 milliGRAM(s) Oral at bedtime  epoetin heidi-epbx (RETACRIT) Injectable 83081 Unit(s) SubCutaneous <User Schedule>  gentamicin 0.1% Cream 1 Application(s) Topical <User Schedule>  hydrALAZINE 75 milliGRAM(s) Oral three times a day  insulin lispro (ADMELOG) corrective regimen sliding scale   SubCutaneous three times a day before meals  insulin lispro (ADMELOG) corrective regimen sliding scale   SubCutaneous at bedtime  lidocaine   4% Patch 1 Patch Transdermal daily  losartan 25 milliGRAM(s) Oral daily  senna 2 Tablet(s) Oral at bedtime  sevelamer carbonate 800 milliGRAM(s) Oral three times a day with meals    MEDICATIONS  (PRN):      Labs:                        7.9    7.14  )-----------( 320      ( 2022 06:04 )             24.9     07-25    137  |  99  |  50<H>  ----------------------------<  90  3.8   |  24  |  8.08<H>    Ca    8.4      2022 06:04  Phos  3.3     07-25  Mg     2.10     07-25    TPro  5.4<L>  /  Alb  2.5<L>  /  TBili  0.4  /  DBili  x   /  AST  19  /  ALT  7   /  AlkPhos  53  07-25        SARS-CoV-2: NotDetec (2022 05:13)  SARS-CoV-2: NotDetec (2022 01:21)      Radiology:

## 2022-07-26 NOTE — PROGRESS NOTE ADULT - ATTENDING COMMENTS
66M with hx of ESRD on peritoneal dialysis (7 days a week), CVA, T2DM, HTN, HLD, who presents with shortness of breath x 4 day duration likely d/t symptomatic anemia, r/o HF. VSS. Labs notable for Hb 5.4, occult negative. Symptomatic anemia likely from anemia chronic disease iso of ESRD. No melena/hematochezia, occult negative, CTAP negative for RP bleed. CTAP noted bladder wall thickening ?cystitis, patient asymptomatic no indiction to treat. TTE unremarkable. s/p 2U PRBC transfusions with adequate response and s/p EPO per nephro. Discussed with daughter in law who is a physician - patient has never had a screening colonoscopy, occult negative here/labs not c/w iron deficiency, however patient should get routine age appropriate screening - will followup with outpatient GI. Discussed with dimitri valenzuela ok to discharge with outpatient followup and further EPO to be administered at PD clinic. 37 mins spent dc planning. 66M with hx of ESRD on peritoneal dialysis (7 days a week), CVA, T2DM, HTN, HLD, who presents with shortness of breath x 4 day duration likely d/t symptomatic anemia, r/o HF. VSS. Labs notable for Hb 5.4, occult negative. Symptomatic anemia likely from anemia chronic disease iso of ESRD. No melena/hematochezia, occult negative, CTAP negative for RP bleed. s/p 2U PRBC transfusions with adequate response and s/p EPO per nephro.    CTAP noted bladder wall thickening ?cystitis, patient asymptomatic no indiction to treat, also with nonspecific colonic wall thickening correlate for colitis - patient without diarrhea, benign abdominal exam, low suspicion for infectious etiology, planned for outpatient GI follow up for routine screening colonoscopy.     Discussed with daughter in law who is a physician - patient has never had a screening colonoscopy, occult negative here/labs not c/w iron deficiency, however patient should get routine age appropriate screening - will followup with outpatient GI. Discussed with dimitri attg ok to discharge with outpatient followup and further EPO to be administered at PD clinic. 37 mins spent dc planning.

## 2022-07-26 NOTE — PROGRESS NOTE ADULT - PROBLEM SELECTOR PLAN 5
FS stable  - not on any home medications  - A1C=5.2 on 7/24 (although may not be accurate iso anemia/ESRD), check fructosamine   - FS/SSI qac and hs

## 2022-09-13 PROBLEM — Z00.00 ENCOUNTER FOR PREVENTIVE HEALTH EXAMINATION: Status: ACTIVE | Noted: 2022-09-13

## 2022-09-13 PROBLEM — N40.0 BENIGN PROSTATIC HYPERPLASIA WITHOUT LOWER URINARY TRACT SYMPTOMS: Chronic | Status: ACTIVE | Noted: 2022-07-23

## 2022-10-15 ENCOUNTER — EMERGENCY (EMERGENCY)
Facility: HOSPITAL | Age: 67
LOS: 1 days | Discharge: HOME | End: 2022-10-15
Attending: STUDENT IN AN ORGANIZED HEALTH CARE EDUCATION/TRAINING PROGRAM | Admitting: STUDENT IN AN ORGANIZED HEALTH CARE EDUCATION/TRAINING PROGRAM

## 2022-10-15 VITALS
RESPIRATION RATE: 16 BRPM | HEART RATE: 80 BPM | DIASTOLIC BLOOD PRESSURE: 50 MMHG | OXYGEN SATURATION: 100 % | SYSTOLIC BLOOD PRESSURE: 157 MMHG | TEMPERATURE: 99 F | HEIGHT: 68 IN

## 2022-10-15 DIAGNOSIS — Z98.890 OTHER SPECIFIED POSTPROCEDURAL STATES: Chronic | ICD-10-CM

## 2022-10-15 LAB
ALBUMIN SERPL ELPH-MCNC: 3.1 G/DL — LOW (ref 3.3–5)
ALP SERPL-CCNC: 48 U/L — SIGNIFICANT CHANGE UP (ref 40–120)
ALT FLD-CCNC: 12 U/L — SIGNIFICANT CHANGE UP (ref 4–41)
ANION GAP SERPL CALC-SCNC: 17 MMOL/L — HIGH (ref 7–14)
APTT BLD: 27.8 SEC — SIGNIFICANT CHANGE UP (ref 27–36.3)
AST SERPL-CCNC: 21 U/L — SIGNIFICANT CHANGE UP (ref 4–40)
B PERT DNA SPEC QL NAA+PROBE: SIGNIFICANT CHANGE UP
B PERT+PARAPERT DNA PNL SPEC NAA+PROBE: SIGNIFICANT CHANGE UP
BASOPHILS # BLD AUTO: 0.07 K/UL — SIGNIFICANT CHANGE UP (ref 0–0.2)
BASOPHILS NFR BLD AUTO: 0.6 % — SIGNIFICANT CHANGE UP (ref 0–2)
BILIRUB SERPL-MCNC: 0.3 MG/DL — SIGNIFICANT CHANGE UP (ref 0.2–1.2)
BORDETELLA PARAPERTUSSIS (RAPRVP): SIGNIFICANT CHANGE UP
BUN SERPL-MCNC: 59 MG/DL — HIGH (ref 7–23)
C PNEUM DNA SPEC QL NAA+PROBE: SIGNIFICANT CHANGE UP
CALCIUM SERPL-MCNC: 8.2 MG/DL — LOW (ref 8.4–10.5)
CHLORIDE SERPL-SCNC: 96 MMOL/L — LOW (ref 98–107)
CO2 SERPL-SCNC: 21 MMOL/L — LOW (ref 22–31)
CREAT SERPL-MCNC: 8.21 MG/DL — HIGH (ref 0.5–1.3)
EGFR: 7 ML/MIN/1.73M2 — LOW
EOSINOPHIL # BLD AUTO: 1.7 K/UL — HIGH (ref 0–0.5)
EOSINOPHIL NFR BLD AUTO: 13.7 % — HIGH (ref 0–6)
FLUAV SUBTYP SPEC NAA+PROBE: SIGNIFICANT CHANGE UP
FLUBV RNA SPEC QL NAA+PROBE: SIGNIFICANT CHANGE UP
GLUCOSE SERPL-MCNC: 140 MG/DL — HIGH (ref 70–99)
HADV DNA SPEC QL NAA+PROBE: SIGNIFICANT CHANGE UP
HCOV 229E RNA SPEC QL NAA+PROBE: SIGNIFICANT CHANGE UP
HCOV HKU1 RNA SPEC QL NAA+PROBE: SIGNIFICANT CHANGE UP
HCOV NL63 RNA SPEC QL NAA+PROBE: SIGNIFICANT CHANGE UP
HCOV OC43 RNA SPEC QL NAA+PROBE: SIGNIFICANT CHANGE UP
HCT VFR BLD CALC: 22.4 % — LOW (ref 39–50)
HGB BLD-MCNC: 7.1 G/DL — LOW (ref 13–17)
HMPV RNA SPEC QL NAA+PROBE: SIGNIFICANT CHANGE UP
HPIV1 RNA SPEC QL NAA+PROBE: SIGNIFICANT CHANGE UP
HPIV2 RNA SPEC QL NAA+PROBE: SIGNIFICANT CHANGE UP
HPIV3 RNA SPEC QL NAA+PROBE: SIGNIFICANT CHANGE UP
HPIV4 RNA SPEC QL NAA+PROBE: SIGNIFICANT CHANGE UP
IANC: 8.22 K/UL — HIGH (ref 1.8–7.4)
IMM GRANULOCYTES NFR BLD AUTO: 0.6 % — SIGNIFICANT CHANGE UP (ref 0–0.9)
INR BLD: 1.01 RATIO — SIGNIFICANT CHANGE UP (ref 0.88–1.16)
LIDOCAIN IGE QN: 45 U/L — SIGNIFICANT CHANGE UP (ref 7–60)
LYMPHOCYTES # BLD AUTO: 1.08 K/UL — SIGNIFICANT CHANGE UP (ref 1–3.3)
LYMPHOCYTES # BLD AUTO: 8.7 % — LOW (ref 13–44)
M PNEUMO DNA SPEC QL NAA+PROBE: SIGNIFICANT CHANGE UP
MCHC RBC-ENTMCNC: 26.6 PG — LOW (ref 27–34)
MCHC RBC-ENTMCNC: 31.7 GM/DL — LOW (ref 32–36)
MCV RBC AUTO: 83.9 FL — SIGNIFICANT CHANGE UP (ref 80–100)
MONOCYTES # BLD AUTO: 1.27 K/UL — HIGH (ref 0–0.9)
MONOCYTES NFR BLD AUTO: 10.2 % — SIGNIFICANT CHANGE UP (ref 2–14)
NEUTROPHILS # BLD AUTO: 8.22 K/UL — HIGH (ref 1.8–7.4)
NEUTROPHILS NFR BLD AUTO: 66.2 % — SIGNIFICANT CHANGE UP (ref 43–77)
NRBC # BLD: 0 /100 WBCS — SIGNIFICANT CHANGE UP (ref 0–0)
NRBC # FLD: 0 K/UL — SIGNIFICANT CHANGE UP (ref 0–0)
PLATELET # BLD AUTO: 309 K/UL — SIGNIFICANT CHANGE UP (ref 150–400)
POTASSIUM SERPL-MCNC: 4.5 MMOL/L — SIGNIFICANT CHANGE UP (ref 3.5–5.3)
POTASSIUM SERPL-SCNC: 4.5 MMOL/L — SIGNIFICANT CHANGE UP (ref 3.5–5.3)
PROT SERPL-MCNC: 5.9 G/DL — LOW (ref 6–8.3)
PROTHROM AB SERPL-ACNC: 11.7 SEC — SIGNIFICANT CHANGE UP (ref 10.5–13.4)
RAPID RVP RESULT: SIGNIFICANT CHANGE UP
RBC # BLD: 2.67 M/UL — LOW (ref 4.2–5.8)
RBC # FLD: 16 % — HIGH (ref 10.3–14.5)
RSV RNA SPEC QL NAA+PROBE: SIGNIFICANT CHANGE UP
RV+EV RNA SPEC QL NAA+PROBE: SIGNIFICANT CHANGE UP
SARS-COV-2 RNA SPEC QL NAA+PROBE: SIGNIFICANT CHANGE UP
SODIUM SERPL-SCNC: 134 MMOL/L — LOW (ref 135–145)
TROPONIN T, HIGH SENSITIVITY RESULT: 110 NG/L — CRITICAL HIGH
TROPONIN T, HIGH SENSITIVITY RESULT: 115 NG/L — CRITICAL HIGH
WBC # BLD: 12.41 K/UL — HIGH (ref 3.8–10.5)
WBC # FLD AUTO: 12.41 K/UL — HIGH (ref 3.8–10.5)

## 2022-10-15 PROCEDURE — 99285 EMERGENCY DEPT VISIT HI MDM: CPT

## 2022-10-15 PROCEDURE — 93010 ELECTROCARDIOGRAM REPORT: CPT

## 2022-10-15 PROCEDURE — 71046 X-RAY EXAM CHEST 2 VIEWS: CPT | Mod: 26

## 2022-10-15 PROCEDURE — 74176 CT ABD & PELVIS W/O CONTRAST: CPT | Mod: 26,MA

## 2022-10-15 NOTE — ED PROVIDER NOTE - PHYSICAL EXAMINATION
Attending/MD Heidi.   VITALS: reviewed  GEN: No apparent distress, A & O x 4  HEAD/EYES: NC/AT, PERRL, EOMI, anicteric sclerae, no conjunctival pallor  ENT: mucus membranes moist, trachea midline, no JVD, neck is supple  RESP: lungs CTA with equal breath sounds bilaterally, chest wall nontender and atraumatic  CV: heart with reg rhythm S1, S2, no murmur; distal pulses intact and symmetric bilaterally  ABDOMEN: normoactive bowel sounds, soft, nondistended, nontender, + left lower abd, with PD catheter without erythematous  MSK: extremities atraumatic and nontender, no edema, no asymmetry.  SKIN: warm, dry, no rash, no bruising, no cyanosis.  NEURO: alert, mentating appropriately, no facial asymmetry.  PSYCH: Affect appropriate

## 2022-10-15 NOTE — ED PROVIDER NOTE - OBJECTIVE STATEMENT
Attending/MD Heidi. 68 yo M, ESRD on PD, HTN, p/w back, abd, RUQ pain, had right upper US at PCP's office, without finding, over the last 3 wks, significantly worsening, today developed chest pain, no fever or chills, but + dry cough. Denies sob, but due to the pain, pt significantly have problem walking    Son: [phone]

## 2022-10-15 NOTE — CONSULT NOTE ADULT - SUBJECTIVE AND OBJECTIVE BOX
[facility] - S Bryant / Og S /D Dimitris/ NETTE Broderick/ NETTE Allan/ Yung Mao / M Selina/ O Amna  service -[phone], office [phone]  ---------------------------------------------------------------------------------------------------------------    Patient is a 67y Male whom presented to the hospital with   Denied recent NSAID use/Abx use/iv contrast studies.    Patient seen and examined    PAST MEDICAL & SURGICAL HISTORY:  Hypertension      ESRD on peritoneal dialysis      CVA (cerebrovascular accident)  2010 without residual effects      Hyperlipidemia      Type 2 diabetes mellitus, with long-term current use of insulin      BPH (benign prostatic hyperplasia)      History of peritoneal dialysis  s/p PD catheter placement          Allergies: No Known Allergies    Home Medications Reviewed  Hospital Medications:   MEDICATIONS  (STANDING):    SOCIAL HISTORY:  Denies ETOh,Smoking, illicit drug use  FAMILY HISTORY:  FH: hypertension (Mother)        REVIEW OF SYSTEMS:  CONSTITUTIONAL: No weakness, fevers or chills  EYES/ENT: No visual changes;  No vertigo or throat pain   NECK: No pain or stiffness  RESPIRATORY: No cough, wheezing, hemoptysis; No shortness of breath  CARDIOVASCULAR: No chest pain or palpitations.  GASTROINTESTINAL: No abdominal or epigastric pain. No nausea, vomiting, or hematemesis; No diarrhea or constipation. No melena or hematochezia.  GENITOURINARY: No dysuria, frequency, foamy urine, urinary urgency, incontinence or hematuria  NEUROLOGICAL: No numbness or weakness  SKIN: No itching, burning, rashes, or lesions   VASCULAR: No bilateral lower extremity edema.   All other review of systems is negative unless indicated above.    VITALS:  T(F): 99.1 (10-15-22 @ 14:27), Max: 99.2 (10-15-22 @ 12:51)  HR: 73 (10-15-22 @ 14:27)  BP: 148/55 (10-15-22 @ 14:27)  RR: 18 (10-15-22 @ 14:27)  SpO2: 100% (10-15-22 @ 14:27)  Wt(kg): --    Height (cm): 172.7 (10-15 @ 12:51)    PHYSICAL EXAM:  Constitutional: NAD  HEENT: anicteric sclera  Neck: No JVD  Respiratory: CTAB, no wheezes, rales or rhonchi  Cardiovascular: S1, S2, RRR  Gastrointestinal: BS+, soft, NT/ND  Extremities: no pedal edema b/l   Neurological: A/O x 3  Psychiatric: Normal mood, normal affect  : No brand.     LABS:        Creatinine Trend:     Urine Studies:        RADIOLOGY & ADDITIONAL STUDIES:                 [facility] - S Bryant / Og S /D Dimitris/ S Davie/ S Jerrell/ Yung Mao / M Selina/ O Amna  service [phone], office [phone]  ---------------------------------------------------------------------------------------------------------------  Patient seen and examined in ER    68 yo M, ESRD on PD, HTN, p/w back, abd, RUQ pain, had right upper US at PCP's office, without finding, over the last 3 wks, significantly worsening, today developed chest pain, no fever or chills, but + dry cough. Denies sob, but due to the pain, pt significantly have problem walking. Renal consulted for ESRD/PD Mx. on CCPD/cycler at home. Pt well known to me, my office PD pt from [facility] PD clinic. reports PD no issues. last performed last night, drained empty in am, fluid was clear. pt c/o cp form past 2-3days. denied fevers/V/D.     PAST MEDICAL & SURGICAL HISTORY:  Hypertension      ESRD on peritoneal dialysis      CVA (cerebrovascular accident)  2010 without residual effects      Hyperlipidemia      Type 2 diabetes mellitus, with long-term current use of insulin      BPH (benign prostatic hyperplasia)      History of peritoneal dialysis  s/p PD catheter placement    Allergies: No Known Allergies    Home Medications Reviewed  Hospital Medications:   MEDICATIONS  (STANDING):    SOCIAL HISTORY:  Denies ETOh,Smoking, illicit drug use  FAMILY HISTORY:  FH: hypertension (Mother)    REVIEW OF SYSTEMS:  CONSTITUTIONAL: No weakness, fevers or chills  EYES/ENT: No visual changes;  No vertigo or throat pain   NECK: No pain or stiffness  RESPIRATORY: No cough, wheezing, hemoptysis; No shortness of breath  CARDIOVASCULAR: + chest pain no palpitations.  GASTROINTESTINAL: No nausea, vomiting, or hematemesis; No diarrhea or constipation. No melena or hematochezia.  NEUROLOGICAL: No numbness or weakness  SKIN: No itching, burning, rashes, or lesions   VASCULAR: No bilateral lower extremity edema.   All other review of systems is negative unless indicated above.    VITALS:  T(F): 99.1 (10-15-22 @ 14:27), Max: 99.2 (10-15-22 @ 12:51)  HR: 73 (10-15-22 @ 14:27)  BP: 148/55 (10-15-22 @ 14:27)  RR: 18 (10-15-22 @ 14:27)  SpO2: 100% (10-15-22 @ 14:27)  Wt(kg): --    Height (cm): 172.7 (10-15 @ 12:51)    PHYSICAL EXAM:  Constitutional: NAD  HEENT: anicteric sclera  Neck: No JVD  Respiratory: CTAB, no wheezes, rales or rhonchi  Cardiovascular: S1, S2, RRR  Gastrointestinal: BS+, soft, NT/ND  Extremities: no pedal edema b/l   Neurological: A/O x 3  Psychiatric: Normal mood, normal affect  : No brand.     LABS:  10-15    134<L>  |  96<L>  |  59<H>  ----------------------------<  140<H>  4.5   |  21<L>  |  8.21<H>    Ca    8.2<L>      15 Oct 2022 17:05    TPro  5.9<L>  /  Alb  3.1<L>  /  TBili  0.3  /  DBili  x   /  AST  21  /  ALT  12  /  AlkPhos  48  10-15                        7.1    12.41 )-----------( 309      ( 15 Oct 2022 17:27 )             22.4   Creatinine Trend:     Urine Studies:        RADIOLOGY & ADDITIONAL STUDIES:    < from: Xray Chest 2 Views PA/Lat (10.15.22 @ 17:09) >    INTERPRETATION:  Clear lungs.    < end of copied text >

## 2022-10-15 NOTE — ED PROVIDER NOTE - ATTENDING CONTRIBUTION TO CARE
I have personally seen and examined this patient.  I have fully participated in the care of this patient. I performed a substantive portion of the visit including all aspects of the medical decision making. I have reviewed all pertinent clinical information, including history, physical exam, plan and the Resident’s note and agree except as noted. - MD Heidi.    see my notes

## 2022-10-15 NOTE — ED PROVIDER NOTE - CLINICAL SUMMARY MEDICAL DECISION MAKING FREE TEXT BOX
Attending/MD Heidi. 66 yo M, on PD for ESRD, p/w acute on chronic, bodily pain, starting chest tightness, any motion worsens the pain, appears to be neuroleptic, over the 3 wk, but will get ct and labs, r/o acute pathology

## 2022-10-15 NOTE — ED ADULT NURSE NOTE - NSIMPLEMENTINTERV_GEN_ALL_ED
Implemented All Fall with Harm Risk Interventions:  [place] to call system. Call bell, personal items and telephone within reach. Instruct patient to call for assistance. Room bathroom lighting operational. Non-slip footwear when patient is off stretcher. Physically safe environment: no spills, clutter or unnecessary equipment. Stretcher in lowest position, wheels locked, appropriate side rails in place. Provide visual cue, wrist band, yellow gown, etc. Monitor gait and stability. Monitor for mental status changes and reorient to person, place, and time. Review medications for side effects contributing to fall risk. Reinforce activity limits and safety measures with patient and family. Provide visual clues: red socks.

## 2022-10-15 NOTE — ED ADULT NURSE NOTE - NSHOSCREENINGQ1_ED_ALL_ED
Bilateral Screening Mammogram completed at [facility] on 11/9/20-  Impression: Incomplete-     Unilateral right diagnostic mammogram done at [facility] on 11/18/20  Impression:  \" a irregular lesion most likely is fast lobule and is probably benign.  A follow up mammogram
No

## 2022-10-15 NOTE — ED ADULT NURSE REASSESSMENT NOTE - SYMPTOMS
Pt rpts diff walking does use walker went outside but able to walk in home unasst.. Pthx Esrd  peritoneal dialysis , DM , CVA 2010, BPH/weakness

## 2022-10-15 NOTE — ED PROVIDER NOTE - PROGRESS NOTE DETAILS
Raymond Interiano PGY3: Pt signed out to me pending CT. CT results w/o acute pathology to explain pain. Rpt trop stable, otherwise labs unactionable. Pt was re-evaluated at bedside, VSS, feeling better overall. Results were discussed with patient as well as return precautions and follow up plan with PCP and/or specialist. Time was taken to answer any questions that the patient had before providing them with discharge paperwork.

## 2022-10-15 NOTE — ED PROVIDER NOTE - EKG ADDITIONAL INFORMATION FREE TEXT
NSR, no ST segement alterations. sinus rhythm without life-threatening arrhythmic patter such as short or long MO interval, ipsilon wave form, or Brugada pattern.

## 2022-10-15 NOTE — ED ADULT NURSE NOTE - OBJECTIVE STATEMENT
Pt presents with c/o chest pain " all  over" and sob on and off for a few weeks. + abd, RUQ pain, had right upper US at PCP's office, without finding, , denies fever or chills, + dry cough. + nightly peritoneal diaylsis at home

## 2022-10-15 NOTE — ED PROVIDER NOTE - PATIENT PORTAL LINK FT
You can access the FollowMyHealth Patient Portal offered by [facility] by registering at the following website: [email or web address]. By joining [employer]’s FollowMyHealth portal, you will also be able to view your health information using other applications (apps) compatible with our system.

## 2022-10-15 NOTE — CONSULT NOTE ADULT - ASSESSMENT
66 yo M, ESRD on PD, HTN, p/w back, abd, RUQ pain, had right upper US at PCP's office, without finding, over the last 3 wks, significantly worsening, today developed chest pain. Renal consulted for ESRD/PD Mx.     ESRD on peritoneal dialysis.  CCPD/cycler at home  K, vol acceptable.   clinically not in chf    s/p PD last night at home  Informed consent for PD obtained from pt. in chart   no indication for urgent PD/HD at this time  renal diet when diet resumed  dose all meds for ESRD  Anemia in ckd, h/o guiac +, f/u w/outpt GI for outpt colonoscopy. will add BRITTANIE  HTN, controlled-bp stable. resume home meds  chest pain r/o ACS. serial Ping, EKG. cardiology eval.     w/u, disposition per ER  Thanks for consulting. will closely follow up.   poc d/w pt, ER RN  labs, rad, chart reviewed  For any question, pl call:  Nephrology  Cell -[phone]  Office [phone]  Ans Serv [phone]  [email or web address]yk[employer] ( wkend coverage for Hospital)

## 2022-10-16 VITALS
TEMPERATURE: 101 F | SYSTOLIC BLOOD PRESSURE: 187 MMHG | RESPIRATION RATE: 20 BRPM | OXYGEN SATURATION: 99 % | HEART RATE: 81 BPM | DIASTOLIC BLOOD PRESSURE: 78 MMHG

## 2022-10-26 ENCOUNTER — INPATIENT (INPATIENT)
Facility: HOSPITAL | Age: 67
LOS: 7 days | Discharge: HOME HEALTH | End: 2022-11-03
Attending: INTERNAL MEDICINE | Admitting: INTERNAL MEDICINE

## 2022-10-26 VITALS
HEART RATE: 88 BPM | DIASTOLIC BLOOD PRESSURE: 60 MMHG | SYSTOLIC BLOOD PRESSURE: 151 MMHG | TEMPERATURE: 98 F | OXYGEN SATURATION: 94 % | RESPIRATION RATE: 18 BRPM | HEIGHT: 68 IN

## 2022-10-26 DIAGNOSIS — Z98.890 OTHER SPECIFIED POSTPROCEDURAL STATES: Chronic | ICD-10-CM

## 2022-10-26 LAB
ALBUMIN SERPL ELPH-MCNC: 3.1 G/DL — LOW (ref 3.3–5)
ALP SERPL-CCNC: 67 U/L — SIGNIFICANT CHANGE UP (ref 40–120)
ALT FLD-CCNC: 14 U/L — SIGNIFICANT CHANGE UP (ref 4–41)
ANION GAP SERPL CALC-SCNC: 17 MMOL/L — HIGH (ref 7–14)
APTT BLD: 28.6 SEC — SIGNIFICANT CHANGE UP (ref 27–36.3)
AST SERPL-CCNC: 15 U/L — SIGNIFICANT CHANGE UP (ref 4–40)
B PERT DNA SPEC QL NAA+PROBE: SIGNIFICANT CHANGE UP
B PERT+PARAPERT DNA PNL SPEC NAA+PROBE: SIGNIFICANT CHANGE UP
BASOPHILS # BLD AUTO: 0.03 K/UL — SIGNIFICANT CHANGE UP (ref 0–0.2)
BASOPHILS # BLD AUTO: 0.05 K/UL — SIGNIFICANT CHANGE UP (ref 0–0.2)
BASOPHILS NFR BLD AUTO: 0.3 % — SIGNIFICANT CHANGE UP (ref 0–2)
BASOPHILS NFR BLD AUTO: 0.5 % — SIGNIFICANT CHANGE UP (ref 0–2)
BILIRUB SERPL-MCNC: 0.3 MG/DL — SIGNIFICANT CHANGE UP (ref 0.2–1.2)
BLD GP AB SCN SERPL QL: NEGATIVE — SIGNIFICANT CHANGE UP
BORDETELLA PARAPERTUSSIS (RAPRVP): SIGNIFICANT CHANGE UP
BUN SERPL-MCNC: 62 MG/DL — HIGH (ref 7–23)
C PNEUM DNA SPEC QL NAA+PROBE: SIGNIFICANT CHANGE UP
CALCIUM SERPL-MCNC: 8.5 MG/DL — SIGNIFICANT CHANGE UP (ref 8.4–10.5)
CHLORIDE SERPL-SCNC: 92 MMOL/L — LOW (ref 98–107)
CO2 SERPL-SCNC: 24 MMOL/L — SIGNIFICANT CHANGE UP (ref 22–31)
CREAT SERPL-MCNC: 7.34 MG/DL — HIGH (ref 0.5–1.3)
DIALYSIS INSTRUMENT RESULT - HEPATITIS B SURFACE ANTIGEN: NEGATIVE — SIGNIFICANT CHANGE UP
EGFR: 8 ML/MIN/1.73M2 — LOW
EOSINOPHIL # BLD AUTO: 0.57 K/UL — HIGH (ref 0–0.5)
EOSINOPHIL # BLD AUTO: 0.73 K/UL — HIGH (ref 0–0.5)
EOSINOPHIL NFR BLD AUTO: 6 % — SIGNIFICANT CHANGE UP (ref 0–6)
EOSINOPHIL NFR BLD AUTO: 7.5 % — HIGH (ref 0–6)
FLUAV SUBTYP SPEC NAA+PROBE: SIGNIFICANT CHANGE UP
FLUBV RNA SPEC QL NAA+PROBE: SIGNIFICANT CHANGE UP
GLUCOSE SERPL-MCNC: 173 MG/DL — HIGH (ref 70–99)
HADV DNA SPEC QL NAA+PROBE: SIGNIFICANT CHANGE UP
HCOV 229E RNA SPEC QL NAA+PROBE: SIGNIFICANT CHANGE UP
HCOV HKU1 RNA SPEC QL NAA+PROBE: SIGNIFICANT CHANGE UP
HCOV NL63 RNA SPEC QL NAA+PROBE: SIGNIFICANT CHANGE UP
HCOV OC43 RNA SPEC QL NAA+PROBE: SIGNIFICANT CHANGE UP
HCT VFR BLD CALC: 19 % — CRITICAL LOW (ref 39–50)
HCT VFR BLD CALC: 19.4 % — CRITICAL LOW (ref 39–50)
HGB BLD-MCNC: 5.9 G/DL — CRITICAL LOW (ref 13–17)
HGB BLD-MCNC: 6.1 G/DL — CRITICAL LOW (ref 13–17)
HMPV RNA SPEC QL NAA+PROBE: SIGNIFICANT CHANGE UP
HPIV1 RNA SPEC QL NAA+PROBE: SIGNIFICANT CHANGE UP
HPIV2 RNA SPEC QL NAA+PROBE: SIGNIFICANT CHANGE UP
HPIV3 RNA SPEC QL NAA+PROBE: SIGNIFICANT CHANGE UP
HPIV4 RNA SPEC QL NAA+PROBE: SIGNIFICANT CHANGE UP
IANC: 6.46 K/UL — SIGNIFICANT CHANGE UP (ref 1.8–7.4)
IANC: 6.49 K/UL — SIGNIFICANT CHANGE UP (ref 1.8–7.4)
IMM GRANULOCYTES NFR BLD AUTO: 1 % — HIGH (ref 0–0.9)
IMM GRANULOCYTES NFR BLD AUTO: 1.8 % — HIGH (ref 0–0.9)
INR BLD: 1.2 RATIO — HIGH (ref 0.88–1.16)
LYMPHOCYTES # BLD AUTO: 0.88 K/UL — LOW (ref 1–3.3)
LYMPHOCYTES # BLD AUTO: 1.09 K/UL — SIGNIFICANT CHANGE UP (ref 1–3.3)
LYMPHOCYTES # BLD AUTO: 11.2 % — LOW (ref 13–44)
LYMPHOCYTES # BLD AUTO: 9.3 % — LOW (ref 13–44)
M PNEUMO DNA SPEC QL NAA+PROBE: SIGNIFICANT CHANGE UP
MCHC RBC-ENTMCNC: 26.6 PG — LOW (ref 27–34)
MCHC RBC-ENTMCNC: 26.6 PG — LOW (ref 27–34)
MCHC RBC-ENTMCNC: 31.1 GM/DL — LOW (ref 32–36)
MCHC RBC-ENTMCNC: 31.4 GM/DL — LOW (ref 32–36)
MCV RBC AUTO: 84.7 FL — SIGNIFICANT CHANGE UP (ref 80–100)
MCV RBC AUTO: 85.6 FL — SIGNIFICANT CHANGE UP (ref 80–100)
MONOCYTES # BLD AUTO: 1.3 K/UL — HIGH (ref 0–0.9)
MONOCYTES # BLD AUTO: 1.34 K/UL — HIGH (ref 0–0.9)
MONOCYTES NFR BLD AUTO: 13.4 % — SIGNIFICANT CHANGE UP (ref 2–14)
MONOCYTES NFR BLD AUTO: 14.1 % — HIGH (ref 2–14)
NEUTROPHILS # BLD AUTO: 6.46 K/UL — SIGNIFICANT CHANGE UP (ref 1.8–7.4)
NEUTROPHILS # BLD AUTO: 6.49 K/UL — SIGNIFICANT CHANGE UP (ref 1.8–7.4)
NEUTROPHILS NFR BLD AUTO: 66.6 % — SIGNIFICANT CHANGE UP (ref 43–77)
NEUTROPHILS NFR BLD AUTO: 68.3 % — SIGNIFICANT CHANGE UP (ref 43–77)
NRBC # BLD: 0 /100 WBCS — SIGNIFICANT CHANGE UP (ref 0–0)
NRBC # BLD: 0 /100 WBCS — SIGNIFICANT CHANGE UP (ref 0–0)
NRBC # FLD: 0 K/UL — SIGNIFICANT CHANGE UP (ref 0–0)
NRBC # FLD: 0 K/UL — SIGNIFICANT CHANGE UP (ref 0–0)
NT-PROBNP SERPL-SCNC: 5643 PG/ML — HIGH
OB PNL STL: NEGATIVE — SIGNIFICANT CHANGE UP
PLATELET # BLD AUTO: 385 K/UL — SIGNIFICANT CHANGE UP (ref 150–400)
PLATELET # BLD AUTO: 421 K/UL — HIGH (ref 150–400)
POTASSIUM SERPL-MCNC: 3.7 MMOL/L — SIGNIFICANT CHANGE UP (ref 3.5–5.3)
POTASSIUM SERPL-SCNC: 3.7 MMOL/L — SIGNIFICANT CHANGE UP (ref 3.5–5.3)
PROT SERPL-MCNC: 6.4 G/DL — SIGNIFICANT CHANGE UP (ref 6–8.3)
PROTHROM AB SERPL-ACNC: 14 SEC — HIGH (ref 10.5–13.4)
RAPID RVP RESULT: SIGNIFICANT CHANGE UP
RBC # BLD: 2.22 M/UL — LOW (ref 4.2–5.8)
RBC # BLD: 2.29 M/UL — LOW (ref 4.2–5.8)
RBC # FLD: 16.4 % — HIGH (ref 10.3–14.5)
RBC # FLD: 16.5 % — HIGH (ref 10.3–14.5)
RH IG SCN BLD-IMP: POSITIVE — SIGNIFICANT CHANGE UP
RSV RNA SPEC QL NAA+PROBE: SIGNIFICANT CHANGE UP
RV+EV RNA SPEC QL NAA+PROBE: SIGNIFICANT CHANGE UP
SARS-COV-2 RNA SPEC QL NAA+PROBE: SIGNIFICANT CHANGE UP
SODIUM SERPL-SCNC: 133 MMOL/L — LOW (ref 135–145)
TROPONIN T, HIGH SENSITIVITY RESULT: 116 NG/L — CRITICAL HIGH
TROPONIN T, HIGH SENSITIVITY RESULT: 116 NG/L — CRITICAL HIGH
WBC # BLD: 9.5 K/UL — SIGNIFICANT CHANGE UP (ref 3.8–10.5)
WBC # BLD: 9.71 K/UL — SIGNIFICANT CHANGE UP (ref 3.8–10.5)
WBC # FLD AUTO: 9.5 K/UL — SIGNIFICANT CHANGE UP (ref 3.8–10.5)
WBC # FLD AUTO: 9.71 K/UL — SIGNIFICANT CHANGE UP (ref 3.8–10.5)

## 2022-10-26 PROCEDURE — 73030 X-RAY EXAM OF SHOULDER: CPT | Mod: 26,LT

## 2022-10-26 PROCEDURE — 99291 CRITICAL CARE FIRST HOUR: CPT

## 2022-10-26 PROCEDURE — 93308 TTE F-UP OR LMTD: CPT | Mod: 26

## 2022-10-26 PROCEDURE — 93010 ELECTROCARDIOGRAM REPORT: CPT

## 2022-10-26 PROCEDURE — 71045 X-RAY EXAM CHEST 1 VIEW: CPT | Mod: 26

## 2022-10-26 RX ORDER — FUROSEMIDE 40 MG
80 TABLET ORAL ONCE
Refills: 0 | Status: COMPLETED | OUTPATIENT
Start: 2022-10-26 | End: 2022-10-26

## 2022-10-26 RX ORDER — MORPHINE SULFATE 50 MG/1
2 CAPSULE, EXTENDED RELEASE ORAL ONCE
Refills: 0 | Status: DISCONTINUED | OUTPATIENT
Start: 2022-10-26 | End: 2022-10-26

## 2022-10-26 RX ORDER — ERYTHROPOIETIN 10000 [IU]/ML
20000 INJECTION, SOLUTION INTRAVENOUS; SUBCUTANEOUS ONCE
Refills: 0 | Status: DISCONTINUED | OUTPATIENT
Start: 2022-10-27 | End: 2022-11-03

## 2022-10-26 RX ORDER — ACETAMINOPHEN 500 MG
650 TABLET ORAL ONCE
Refills: 0 | Status: COMPLETED | OUTPATIENT
Start: 2022-10-26 | End: 2022-10-26

## 2022-10-26 RX ADMIN — Medication 650 MILLIGRAM(S): at 17:40

## 2022-10-26 RX ADMIN — Medication 80 MILLIGRAM(S): at 22:20

## 2022-10-26 RX ADMIN — Medication 650 MILLIGRAM(S): at 18:10

## 2022-10-26 RX ADMIN — MORPHINE SULFATE 2 MILLIGRAM(S): 50 CAPSULE, EXTENDED RELEASE ORAL at 21:51

## 2022-10-26 NOTE — ED PROVIDER NOTE - PHYSICAL EXAMINATION
VS:  unremarkable    GEN - malaise mild SOB but speaking in complete sentences;   A+O x3  (+)pallor  HEAD - NC/AT     ENT - PEERL, EOMI, mucous membranes    moist , no discharge      NECK: Neck supple, non-tender without lymphadenopathy, no masses, no JVD  PULM - CTA b/l,  symmetric breath sounds  COR -  normal heart sounds    ABD - , mild distension  LLQ PD site c/d/i, NT, soft,  BACK - no CVA tenderness, nontender spine     EXTREMS -(+)1 edema, no deformity, warm and well perfused    SKIN - no rash    or bruising      NEUROLOGIC - alert, face symmetric, speech fluent, sensation nl, motor no focal deficit.

## 2022-10-26 NOTE — CONSULT NOTE ADULT - SUBJECTIVE AND OBJECTIVE BOX
[facility] - S Bryant / Og S /D Dimitris/ S Davie/ NETTE Allan/ Yung Mao / WICHO Marks/ O Amna  service -[phone], office [phone]  ---------------------------------------------------------------------------------------------------------------  Patient seen and examined in ER    67M p/w ESRD on PD, HTN, DM, HDL, CVA, Anemia, sent in by PD clinic due to low Hgb. Pt reports GERMAIN and general weakness.  Also c/o L shoulder pain and L neck pain, no trauma, no fever no vomiting. Pt appears pale but HD stable with mild SOB but able to speak full sentences. Renal consulted for ESRD/PD Mx. PT WELL KNOWN TO ME, my office PD pt. Hb was drawn yesterday and was called today and advised to go to ER due to hgb dropped to 6.6. pt denies any obvious bleed/fritz. also, pt's PD cath was cut hence lines changed outpt yesterday, PD fluid sent for cx and given empirical abxs yesterday at PD clinic and pt was provided w/dialysate bag w/abx to use at home, pt filled at 11am today, was advised to drain afet r7hrs. pt usually on cycler/CCPD at night. currently asking to have PD fluid drained. denied cp/sob.     PAST MEDICAL & SURGICAL HISTORY:  Hypertension      ESRD on peritoneal dialysis      CVA (cerebrovascular accident)  2010 without residual effects      Hyperlipidemia      Type 2diabetes mellitus, with long-term current use of insulin    BPH (benign prostatic hyperplasia)    History of peritoneal dialysis  s/p PD catheter placement      Allergies: No Known Allergies    Home Medications Reviewed  Hospital Medications:   MEDICATIONS  (STANDING):    SOCIAL HISTORY:  Denies ETOh,Smoking, illicit drug use  FAMILY HISTORY:  FH: hypertension (Mother)      REVIEW OF SYSTEMS:  CONSTITUTIONAL: No weakness, fevers or chills  EYES/ENT: No visual changes;  No vertigo or throat pain   NECK: No pain or stiffness  RESPIRATORY: No cough, wheezing, hemoptysis; No shortness of breath  CARDIOVASCULAR: No chest pain or palpitations.  GASTROINTESTINAL: No abdominal or epigastric pain. No nausea, vomiting, or hematemesis; No diarrhea or constipation. No melena or hematochezia.  NEUROLOGICAL: No numbness or weakness  SKIN: No itching, burning, rashes, or lesions   VASCULAR: No bilateral lower extremity edema.   All other review of systems is negative unless indicated above.    VITALS:  T(F): 97.7 (10-26-22 @ 17:30), Max: 99.4 (10-26-22 @ 15:55)  HR: 73 (10-26-22 @ 17:50)  BP: 139/91 (10-26-22 @ 17:50)  RR: 16 (10-26-22 @ 17:50)  SpO2: 100% (10-26-22 @ 17:50)  Wt(kg): --    Height (cm): 172.7 (10-26 @ 14:39)    PHYSICAL EXAM:  Constitutional: NAD  HEENT: anicteric sclera  Neck: No JVD  Respiratory: CTAB, no wheezes, rales or rhonchi  Cardiovascular: S1, S2, RRR  Gastrointestinal: BS+, soft, distended, +PD cath, +fluid  Extremities: no pedal edema b/l   Neurological: A/O x 3  Psychiatric: Normal mood, normal affect  : No brand.       LABS:  10-26    133<L>  |  92<L>  |  62<H>  ----------------------------<  173<H>  3.7   |  24  |  7.34<H>    Ca    8.5      26 Oct 2022 17:34    TPro  6.4  /  Alb  3.1<L>  /  TBili  0.3  /  DBili      /  AST  15  /  ALT  14  /  AlkPhos  67  10-26    Creatinine Trend: 7.34 <--    Urine Studies:        RADIOLOGY & ADDITIONAL STUDIES:  < from: Xray Chest 1 View AP/PA (10.26.22 @ 18:56) >  INTERPRETATION:  cardiomegaly, moderate pulmonary edema, small bilateral   pleural effusions      < end of copied text >

## 2022-10-26 NOTE — ED PROVIDER NOTE - OBJECTIVE STATEMENT
67M p/w sent in by nephr due to low Hgb - it was drawn during PD yesterday and was called today due to hgb was apparently 5.  Pt was told by the calling Dr he may need a colonoscopy.  Pt reports GERMAIN and general weakness.  Also c/o L shoulder pain and L neck pain, no trauma, no fever no vomiting.  PMHX ESRD on PD DM HTN.  Pt appears pale but HD stable with mild SOB but able to speak full sentences.  L shoulder mild ttp but no deformity or sign of infection.  No midline c-spine ttp.  Distal hands NVT intact.  Rx pain med, check xray, labs, type, ekg trops, admit.  EKG SR at 71 no gilberto no std no twi  pr 140 qtc 410  VS:  unremarkable    GEN - malaise mild SOB but speaking in complete sentences;   A+O x3  (+)pallor  HEAD - NC/AT     ENT - PEERL, EOMI, mucous membranes    moist , no discharge      NECK: Neck supple, non-tender without lymphadenopathy, no masses, no JVD  PULM - CTA b/l,  symmetric breath sounds  COR -  normal heart sounds    ABD - , mild distension  LLQ PD site c/d/i, NT, soft,  BACK - no CVA tenderness, nontender spine     EXTREMS -(+)1 edema, no deformity, warm and well perfused    SKIN - no rash    or bruising      NEUROLOGIC - alert, face symmetric, speech fluent, sensation nl, motor no focal deficit.

## 2022-10-26 NOTE — CONSULT NOTE ADULT - ASSESSMENT
67M p/w ESRD on PD, HTN, DM, HDL, CVA, Anemia, sent in by PD clinic due to low Hgb. Renal consulted for ESRD/PD Mx.    ESRD on peritoneal dialysis.   K ok  pulm edema on prelim CXR, pt not in resp distress  Informed consent for PD obtained from pt. in chart     Peritoneal Dialysis 1 exchange tonight and 4 exchanges tomorrow using 2.5 % dextrose dialysate  renal diet, fluid restriction 1.2L/day  dose all meds for ESRD  CONT home sevelamer as phos binders    Anemia in ckd, likely GIB- Hb <goal. outpt Hb from 10/25/22 was 6.6, was getting BRITTANIE outpt  awaiting outpt colonoscopy, never had it   monitor H/H closely, pending now  transfuse PRBC 1 unit     HTN, controlled-bp stable. resume home bp meds w/holding parameters.     Thanks for consulting. will closely follow up.   poc d/w pt, ER Attending earlier, ER and HD RN  labs, rad, chart reviewed  For any question, pl call:  Nephrology  Cell -[phone]  Office [phone]  Ans Serv [phone]

## 2022-10-26 NOTE — ED PROVIDER NOTE - CRITICAL CARE ATTENDING CONTRIBUTION TO CARE
Attending Statement: This was a shared visit with the CRAIG. I reviewed and verified the documentation and independently performed the documented:     History, Exam and Medical Decision Making.     Attending Contribution to Care: 67M p/w sent in by nephr due to low Hgb - it was drawn during PD yesterday and was called today due to hgb was apparently 5.  Pt was told by the calling Dr he may need a colonoscopy.  Pt reports GERMAIN and general weakness.  Also c/o L shoulder pain and L neck pain, no trauma, no fever no vomiting.  PMHX ESRD on PD DM HTN.  Pt appears pale but HD stable with mild SOB but able to speak full sentences.  L shoulder mild ttp but no deformity or sign of infection.  No midline c-spine ttp.  Distal hands NVT intact.  Rx pain med, check xray, labs, type, ekg trops, admit.  EKG SR at 71 no gilberto no std no twi  pr 140 qtc 410  UPDATE - found to have significant anemia - Rx PRBC.  Lab result was delayed for hours, resent and found to be low.  D/w Dr Tamayo from nephro - rx PRBC, dialysis should help with PCE whether it be hemo- or peritoneal dialysis, dialysis orders written.  Cards consulted due to pericardial effusion with tamponade physiology on echo.    VS:  unremarkable    GEN - malaise mild SOB but speaking in complete sentences;   A+O x3  (+)pallor  HEAD - NC/AT     ENT - PEERL, EOMI, mucous membranes    moist , no discharge      NECK: Neck supple, non-tender without lymphadenopathy, no masses, no JVD  PULM - CTA b/l,  symmetric breath sounds  COR -  normal heart sounds    ABD - , mild distension  LLQ PD site c/d/i, NT, soft,  BACK - no CVA tenderness, nontender spine     EXTREMS -(+)1 edema, no deformity, warm and well perfused    SKIN - no rash    or bruising      NEUROLOGIC - alert, face symmetric, speech fluent, sensation nl, motor no focal deficit.    Upon my evaluation, this patient had a high probability of imminent or life-threatening deterioration due to pericardial effusion with tamponade physiology, anemia requiring blood transfusion, which required my direct attention, intervention, and personal management.  The patient has a  medical condition that impairs one or more vital organ systems.  Frequent personal assessment and adjustment of medical interventions was performed.      I have personally provided 60 minutes of critical care time exclusive of time spent on separately billable procedures. Time includes review of laboratory data, radiology results, discussion with consultants, patient and family; monitoring for potential decompensation, as well as time spent retrieving data and reviewing the chart and documenting the visit. Interventions were performed as documented above.

## 2022-10-26 NOTE — ED ADULT TRIAGE NOTE - CHIEF COMPLAINT QUOTE
Patient is on peritonea dialysis and was sent to ER for admission for ~ Hgb is 5. Pt needs transfusion and colonoscopy to r/o  blood in stool.  Next exchange to take fluid out is  is 6pm. Patient is on peritoneal dialysis and was sent to ER for admission for ~ Hgb is 5. Pt needs transfusion and colonoscopy to r/o  blood in stool. Pt c/o shortness of breath.  Next exchange to take fluid out is  is 6pm.  TYpe 2 DM: FS: 266

## 2022-10-26 NOTE — ED ADULT NURSE NOTE - OBJECTIVE STATEMENT
BEVERLEY RN: pt. received to room 19 A&Ox4 ambulatory cares for self pmh Peritoneal Dialysis p/w SOB. pt. was told he has a Hgb of ~5. pt. states "I have 5 bloods left". endorses CP, SOB, L sided neck pain, L shoulder pain. equal rise and fall of chest noted. no wheezing noted.. pt. in NAD at this time. respirations even and unlabored on RA. NSR on cardiac monitor. 20g IV placed in the R AC. labs drawn and sent. comfort measures provided. safety precautions maintained. report endorsed to Primary RN.

## 2022-10-26 NOTE — ED ADULT NURSE NOTE - CHIEF COMPLAINT QUOTE
Patient is on peritoneal dialysis and was sent to ER for admission for ~ Hgb is 5. Pt needs transfusion and colonoscopy to r/o  blood in stool. Pt c/o shortness of breath.  Next exchange to take fluid out is  is 6pm.  TYpe 2 DM: FS: 266

## 2022-10-26 NOTE — ED ADULT NURSE NOTE - BP NONINVASIVE SYSTOLIC (MM HG)
Pt wife again concerned that pt appiah is not working properly. Expresses concerns that she does not see it draining. Writer again assessed appiah to find properly functioning appiah with urine return noted in tubing and urometer. 139

## 2022-10-26 NOTE — ED PROVIDER NOTE - PROGRESS NOTE DETAILS
MIKHAIL Wilson: Pt received at sign out pending CBC and admission for anemia and HF and peritoneal dialysis. Pt sitting up in bed c/o abdominal pain because he has "too much fluid". Pt appears non-toxic, lungs with rales to bases b/l. Heart RRR. Abdomen with mild tenderness to suprapubic region with dialysis catheter in place. Morphine ordered.

## 2022-10-26 NOTE — ED PROVIDER NOTE - CARE PLAN
1 Principal Discharge DX:	Pericardial effusion   Principal Discharge DX:	Pericardial effusion  Secondary Diagnosis:	Anemia due to acute blood loss

## 2022-10-26 NOTE — ED ADULT NURSE REASSESSMENT NOTE - NS ED NURSE REASSESS COMMENT FT1
Received patient in room 19 c/o SOB, low hemoglobin hx of peritoneal dialysis. Patient is on cardiac monitor NSR w/O2 sat 99% on a room air. Patient is A&OX4, airway patent, breathing unlabored and even, radial pulses palpable, peritoneal dialysis access on abdomen. Pending lab results. Side rails up and safety maintained. Fall precaution in place. Call bells within reach.
Pt received ot RM 19 AxOx4 ambulatory self complaining of SOB. Pt sent in by PCP for low Hbg. NSR on cardiac monitor, O2 100% on 3LNC. Respirations are dyspneic on exertion. states he has chest pain during exhalation. LLQ dialysis tube.

## 2022-10-26 NOTE — ED PROVIDER NOTE - CLINICAL SUMMARY MEDICAL DECISION MAKING FREE TEXT BOX
67M p/w sent in by nephr due to low Hgb - it was drawn during PD yesterday and was called today due to hgb was apparently 5.  Pt was told by the calling Dr he may need a colonoscopy.  Pt reports GERMAIN and general weakness.  Also c/o L shoulder pain and L neck pain, no trauma, no fever no vomiting.  PMHX ESRD on PD DM HTN.  Pt appears pale but HD stable with mild SOB but able to speak full sentences.  L shoulder mild ttp but no deformity or sign of infection.  No midline c-spine ttp.  Distal hands NVT intact.  Rx pain med, check xray, labs, type, ekg trops, admit.  EKG SR at 71 no gilberto no std no twi  pr 140 qtc 410

## 2022-10-27 DIAGNOSIS — N18.6 END STAGE RENAL DISEASE: ICD-10-CM

## 2022-10-27 DIAGNOSIS — I31.39 OTHER PERICARDIAL EFFUSION (NONINFLAMMATORY): ICD-10-CM

## 2022-10-27 DIAGNOSIS — Z29.9 ENCOUNTER FOR PROPHYLACTIC MEASURES, UNSPECIFIED: ICD-10-CM

## 2022-10-27 DIAGNOSIS — J81.1 CHRONIC PULMONARY EDEMA: ICD-10-CM

## 2022-10-27 DIAGNOSIS — I63.9 CEREBRAL INFARCTION, UNSPECIFIED: ICD-10-CM

## 2022-10-27 DIAGNOSIS — D64.9 ANEMIA, UNSPECIFIED: ICD-10-CM

## 2022-10-27 DIAGNOSIS — I10 ESSENTIAL (PRIMARY) HYPERTENSION: ICD-10-CM

## 2022-10-27 DIAGNOSIS — M25.512 PAIN IN LEFT SHOULDER: ICD-10-CM

## 2022-10-27 DIAGNOSIS — E11.9 TYPE 2 DIABETES MELLITUS WITHOUT COMPLICATIONS: ICD-10-CM

## 2022-10-27 DIAGNOSIS — E78.5 HYPERLIPIDEMIA, UNSPECIFIED: ICD-10-CM

## 2022-10-27 LAB
A1C WITH ESTIMATED AVERAGE GLUCOSE RESULT: 5.6 % — SIGNIFICANT CHANGE UP (ref 4–5.6)
ALBUMIN SERPL ELPH-MCNC: 3 G/DL — LOW (ref 3.3–5)
ALP SERPL-CCNC: 67 U/L — SIGNIFICANT CHANGE UP (ref 40–120)
ALT FLD-CCNC: 12 U/L — SIGNIFICANT CHANGE UP (ref 4–41)
ANION GAP SERPL CALC-SCNC: 19 MMOL/L — HIGH (ref 7–14)
ANTI-RIBONUCLEAR PROTEIN: <0.2 AI — SIGNIFICANT CHANGE UP
APTT BLD: 27.5 SEC — SIGNIFICANT CHANGE UP (ref 27–36.3)
AST SERPL-CCNC: 18 U/L — SIGNIFICANT CHANGE UP (ref 4–40)
BASOPHILS # BLD AUTO: 0.05 K/UL — SIGNIFICANT CHANGE UP (ref 0–0.2)
BASOPHILS NFR BLD AUTO: 0.5 % — SIGNIFICANT CHANGE UP (ref 0–2)
BILIRUB SERPL-MCNC: 0.4 MG/DL — SIGNIFICANT CHANGE UP (ref 0.2–1.2)
BUN SERPL-MCNC: 60 MG/DL — HIGH (ref 7–23)
CALCIUM SERPL-MCNC: 8.6 MG/DL — SIGNIFICANT CHANGE UP (ref 8.4–10.5)
CHLORIDE SERPL-SCNC: 93 MMOL/L — LOW (ref 98–107)
CHOLEST SERPL-MCNC: 104 MG/DL — SIGNIFICANT CHANGE UP
CMV IGM FLD-ACNC: <8 AU/ML — SIGNIFICANT CHANGE UP
CMV IGM SERPL QL: NEGATIVE — SIGNIFICANT CHANGE UP
CO2 SERPL-SCNC: 24 MMOL/L — SIGNIFICANT CHANGE UP (ref 22–31)
CREAT SERPL-MCNC: 7.8 MG/DL — HIGH (ref 0.5–1.3)
CRP SERPL-MCNC: 111.4 MG/L — HIGH
DSDNA AB FLD-ACNC: <0.2 AI — SIGNIFICANT CHANGE UP
EBV EA AB SER IA-ACNC: 5.2 U/ML — SIGNIFICANT CHANGE UP
EBV EA AB TITR SER IF: POSITIVE
EBV EA IGG SER-ACNC: NEGATIVE — SIGNIFICANT CHANGE UP
EBV NA IGG SER IA-ACNC: 116 U/ML — HIGH
EBV PATRN SPEC IB-IMP: SIGNIFICANT CHANGE UP
EBV VCA IGG AVIDITY SER QL IA: POSITIVE
EBV VCA IGM SER IA-ACNC: 122 U/ML — HIGH
EBV VCA IGM SER IA-ACNC: <10 U/ML — SIGNIFICANT CHANGE UP
EBV VCA IGM TITR FLD: NEGATIVE — SIGNIFICANT CHANGE UP
EGFR: 7 ML/MIN/1.73M2 — LOW
ENA SCL70 AB SER-ACNC: <0.2 AI — SIGNIFICANT CHANGE UP
ENA SS-A AB FLD IA-ACNC: <0.2 AI — SIGNIFICANT CHANGE UP
EOSINOPHIL # BLD AUTO: 0.88 K/UL — HIGH (ref 0–0.5)
EOSINOPHIL NFR BLD AUTO: 8.7 % — HIGH (ref 0–6)
ERYTHROCYTE [SEDIMENTATION RATE] IN BLOOD: 121 MM/HR — HIGH (ref 1–15)
ESTIMATED AVERAGE GLUCOSE: 114 — SIGNIFICANT CHANGE UP
GLUCOSE BLDC GLUCOMTR-MCNC: 194 MG/DL — HIGH (ref 70–99)
GLUCOSE BLDC GLUCOMTR-MCNC: 214 MG/DL — HIGH (ref 70–99)
GLUCOSE BLDC GLUCOMTR-MCNC: 232 MG/DL — HIGH (ref 70–99)
GLUCOSE BLDC GLUCOMTR-MCNC: 271 MG/DL — HIGH (ref 70–99)
GLUCOSE BLDC GLUCOMTR-MCNC: 291 MG/DL — HIGH (ref 70–99)
GLUCOSE BLDC GLUCOMTR-MCNC: 294 MG/DL — HIGH (ref 70–99)
GLUCOSE BLDC GLUCOMTR-MCNC: 328 MG/DL — HIGH (ref 70–99)
GLUCOSE SERPL-MCNC: 160 MG/DL — HIGH (ref 70–99)
HCT VFR BLD CALC: 22.9 % — LOW (ref 39–50)
HCT VFR BLD CALC: 26.1 % — LOW (ref 39–50)
HDLC SERPL-MCNC: 44 MG/DL — SIGNIFICANT CHANGE UP
HGB BLD-MCNC: 7.4 G/DL — LOW (ref 13–17)
HGB BLD-MCNC: 8.2 G/DL — LOW (ref 13–17)
HIV 1+2 AB+HIV1 P24 AG SERPL QL IA: SIGNIFICANT CHANGE UP
IANC: 6.96 K/UL — SIGNIFICANT CHANGE UP (ref 1.8–7.4)
IMM GRANULOCYTES NFR BLD AUTO: 1.3 % — HIGH (ref 0–0.9)
INR BLD: 1.23 RATIO — HIGH (ref 0.88–1.16)
LIPID PNL WITH DIRECT LDL SERPL: 41 MG/DL — SIGNIFICANT CHANGE UP
LYMPHOCYTES # BLD AUTO: 0.83 K/UL — LOW (ref 1–3.3)
LYMPHOCYTES # BLD AUTO: 8.2 % — LOW (ref 13–44)
MAGNESIUM SERPL-MCNC: 1.9 MG/DL — SIGNIFICANT CHANGE UP (ref 1.6–2.6)
MCHC RBC-ENTMCNC: 26.6 PG — LOW (ref 27–34)
MCHC RBC-ENTMCNC: 27.3 PG — SIGNIFICANT CHANGE UP (ref 27–34)
MCHC RBC-ENTMCNC: 31.4 GM/DL — LOW (ref 32–36)
MCHC RBC-ENTMCNC: 32.3 GM/DL — SIGNIFICANT CHANGE UP (ref 32–36)
MCV RBC AUTO: 84.5 FL — SIGNIFICANT CHANGE UP (ref 80–100)
MCV RBC AUTO: 84.7 FL — SIGNIFICANT CHANGE UP (ref 80–100)
MONOCYTES # BLD AUTO: 1.26 K/UL — HIGH (ref 0–0.9)
MONOCYTES NFR BLD AUTO: 12.5 % — SIGNIFICANT CHANGE UP (ref 2–14)
NEUTROPHILS # BLD AUTO: 6.96 K/UL — SIGNIFICANT CHANGE UP (ref 1.8–7.4)
NEUTROPHILS NFR BLD AUTO: 68.8 % — SIGNIFICANT CHANGE UP (ref 43–77)
NON HDL CHOLESTEROL: 60 MG/DL — SIGNIFICANT CHANGE UP
NRBC # BLD: 0 /100 WBCS — SIGNIFICANT CHANGE UP (ref 0–0)
NRBC # BLD: 0 /100 WBCS — SIGNIFICANT CHANGE UP (ref 0–0)
NRBC # FLD: 0 K/UL — SIGNIFICANT CHANGE UP (ref 0–0)
NRBC # FLD: 0 K/UL — SIGNIFICANT CHANGE UP (ref 0–0)
PHOSPHATE SERPL-MCNC: 5.7 MG/DL — HIGH (ref 2.5–4.5)
PLATELET # BLD AUTO: 398 K/UL — SIGNIFICANT CHANGE UP (ref 150–400)
PLATELET # BLD AUTO: 479 K/UL — HIGH (ref 150–400)
POTASSIUM SERPL-MCNC: 3.7 MMOL/L — SIGNIFICANT CHANGE UP (ref 3.5–5.3)
POTASSIUM SERPL-SCNC: 3.7 MMOL/L — SIGNIFICANT CHANGE UP (ref 3.5–5.3)
PROT SERPL-MCNC: 6.2 G/DL — SIGNIFICANT CHANGE UP (ref 6–8.3)
PROTHROM AB SERPL-ACNC: 14.3 SEC — HIGH (ref 10.5–13.4)
RBC # BLD: 2.71 M/UL — LOW (ref 4.2–5.8)
RBC # BLD: 3.08 M/UL — LOW (ref 4.2–5.8)
RBC # FLD: 15.6 % — HIGH (ref 10.3–14.5)
RBC # FLD: 15.8 % — HIGH (ref 10.3–14.5)
RHEUMATOID FACT SERPL-ACNC: 17 IU/ML — HIGH (ref 0–13)
SODIUM SERPL-SCNC: 136 MMOL/L — SIGNIFICANT CHANGE UP (ref 135–145)
TRIGL SERPL-MCNC: 96 MG/DL — SIGNIFICANT CHANGE UP
WBC # BLD: 10.11 K/UL — SIGNIFICANT CHANGE UP (ref 3.8–10.5)
WBC # BLD: 11.32 K/UL — HIGH (ref 3.8–10.5)
WBC # FLD AUTO: 10.11 K/UL — SIGNIFICANT CHANGE UP (ref 3.8–10.5)
WBC # FLD AUTO: 11.32 K/UL — HIGH (ref 3.8–10.5)

## 2022-10-27 PROCEDURE — 93306 TTE W/DOPPLER COMPLETE: CPT | Mod: 26

## 2022-10-27 PROCEDURE — 99223 1ST HOSP IP/OBS HIGH 75: CPT

## 2022-10-27 PROCEDURE — 71250 CT THORAX DX C-: CPT | Mod: 26

## 2022-10-27 PROCEDURE — 12345: CPT | Mod: NC

## 2022-10-27 PROCEDURE — 99222 1ST HOSP IP/OBS MODERATE 55: CPT | Mod: GC

## 2022-10-27 RX ORDER — ATORVASTATIN CALCIUM 80 MG/1
80 TABLET, FILM COATED ORAL AT BEDTIME
Refills: 0 | Status: DISCONTINUED | OUTPATIENT
Start: 2022-10-27 | End: 2022-11-03

## 2022-10-27 RX ORDER — DOXAZOSIN MESYLATE 4 MG
4 TABLET ORAL AT BEDTIME
Refills: 0 | Status: DISCONTINUED | OUTPATIENT
Start: 2022-10-27 | End: 2022-10-27

## 2022-10-27 RX ORDER — DEXTROSE 50 % IN WATER 50 %
12.5 SYRINGE (ML) INTRAVENOUS ONCE
Refills: 0 | Status: DISCONTINUED | OUTPATIENT
Start: 2022-10-27 | End: 2022-11-03

## 2022-10-27 RX ORDER — INSULIN LISPRO 100/ML
VIAL (ML) SUBCUTANEOUS
Refills: 0 | Status: DISCONTINUED | OUTPATIENT
Start: 2022-10-27 | End: 2022-10-28

## 2022-10-27 RX ORDER — DEXTROSE 50 % IN WATER 50 %
15 SYRINGE (ML) INTRAVENOUS ONCE
Refills: 0 | Status: DISCONTINUED | OUTPATIENT
Start: 2022-10-27 | End: 2022-11-03

## 2022-10-27 RX ORDER — GENTAMICIN SULFATE 0.1 %
1 OINTMENT (GRAM) TOPICAL
Refills: 0 | Status: DISCONTINUED | OUTPATIENT
Start: 2022-10-27 | End: 2022-10-29

## 2022-10-27 RX ORDER — GLUCAGON INJECTION, SOLUTION 0.5 MG/.1ML
1 INJECTION, SOLUTION SUBCUTANEOUS ONCE
Refills: 0 | Status: DISCONTINUED | OUTPATIENT
Start: 2022-10-27 | End: 2022-11-03

## 2022-10-27 RX ORDER — HYDRALAZINE HCL 50 MG
75 TABLET ORAL EVERY 8 HOURS
Refills: 0 | Status: DISCONTINUED | OUTPATIENT
Start: 2022-10-27 | End: 2022-10-29

## 2022-10-27 RX ORDER — BUMETANIDE 0.25 MG/ML
2 INJECTION INTRAMUSCULAR; INTRAVENOUS
Refills: 0 | Status: DISCONTINUED | OUTPATIENT
Start: 2022-10-27 | End: 2022-10-27

## 2022-10-27 RX ORDER — BUMETANIDE 0.25 MG/ML
2 INJECTION INTRAMUSCULAR; INTRAVENOUS
Refills: 0 | Status: DISCONTINUED | OUTPATIENT
Start: 2022-10-27 | End: 2022-11-03

## 2022-10-27 RX ORDER — LIDOCAINE 4 G/100G
1 CREAM TOPICAL EVERY 24 HOURS
Refills: 0 | Status: DISCONTINUED | OUTPATIENT
Start: 2022-10-27 | End: 2022-11-03

## 2022-10-27 RX ORDER — LOSARTAN POTASSIUM 100 MG/1
25 TABLET, FILM COATED ORAL DAILY
Refills: 0 | Status: DISCONTINUED | OUTPATIENT
Start: 2022-10-28 | End: 2022-11-03

## 2022-10-27 RX ORDER — ACETAMINOPHEN 500 MG
650 TABLET ORAL ONCE
Refills: 0 | Status: COMPLETED | OUTPATIENT
Start: 2022-10-27 | End: 2022-10-27

## 2022-10-27 RX ORDER — INSULIN LISPRO 100/ML
VIAL (ML) SUBCUTANEOUS AT BEDTIME
Refills: 0 | Status: DISCONTINUED | OUTPATIENT
Start: 2022-10-27 | End: 2022-10-28

## 2022-10-27 RX ORDER — CHLORHEXIDINE GLUCONATE 213 G/1000ML
1 SOLUTION TOPICAL
Refills: 0 | Status: DISCONTINUED | OUTPATIENT
Start: 2022-10-27 | End: 2022-11-03

## 2022-10-27 RX ORDER — DEXTROSE 50 % IN WATER 50 %
25 SYRINGE (ML) INTRAVENOUS ONCE
Refills: 0 | Status: DISCONTINUED | OUTPATIENT
Start: 2022-10-27 | End: 2022-11-03

## 2022-10-27 RX ADMIN — Medication 4: at 19:58

## 2022-10-27 RX ADMIN — Medication 75 MILLIGRAM(S): at 22:44

## 2022-10-27 RX ADMIN — Medication 2: at 22:35

## 2022-10-27 RX ADMIN — Medication 75 MILLIGRAM(S): at 13:42

## 2022-10-27 RX ADMIN — BUMETANIDE 2 MILLIGRAM(S): 0.25 INJECTION INTRAMUSCULAR; INTRAVENOUS at 14:23

## 2022-10-27 RX ADMIN — MORPHINE SULFATE 2 MILLIGRAM(S): 50 CAPSULE, EXTENDED RELEASE ORAL at 00:00

## 2022-10-27 RX ADMIN — ATORVASTATIN CALCIUM 80 MILLIGRAM(S): 80 TABLET, FILM COATED ORAL at 22:44

## 2022-10-27 RX ADMIN — Medication 8: at 13:41

## 2022-10-27 RX ADMIN — LIDOCAINE 1 PATCH: 4 CREAM TOPICAL at 05:30

## 2022-10-27 RX ADMIN — Medication 650 MILLIGRAM(S): at 05:28

## 2022-10-27 NOTE — H&P ADULT - PROBLEM SELECTOR PLAN 8
- BG >200. Goal of 140-180  - Moderate dose Insulin sliding scale for now.  - FS premeals and at bedtime - Continue Statin when no longer NPO

## 2022-10-27 NOTE — PROGRESS NOTE ADULT - SUBJECTIVE AND OBJECTIVE BOX
[facility] - SOLOMON Hurtado / SOLOMON Foley / KANDICE Shanks/ SOLOMON Broderick/ SOLOMON Allan/ NOMI Mao / VIRGILIO Marks / BENJY Woo  ---------------------------------------------------------------------------------------------------------------  seen and examined today for ESRD  Interval : NAD  VITALS:  T(F): 98.1 (10-27-22 @ 12:29), Max: 99.4 (10-26-22 @ 15:55)  HR: 77 (10-27-22 @ 12:29)  BP: 200/76 (10-27-22 @ 12:29)  RR: 18 (10-27-22 @ 12:29)  SpO2: 100% (10-27-22 @ 12:29)  Wt(kg): --    10-26 @ 07:01  -  10-27 @ 07:00  --------------------------------------------------------  IN: 2000 mL / OUT: 3000 mL / NET: -1000 mL    10-27 @ 07:01  -  10-27 @ 13:44  --------------------------------------------------------  IN: 4000 mL / OUT: 2500 mL / NET: 1500 mL      Physical Exam :-  Constitutional: NAD  Neck: Supple.  Respiratory: Bilateral equal breath sounds,  Cardiovascular: S1, S2 normal,  Gastrointestinal: Bowel Sounds present, soft, non tender.  Extremities: 2+ edema  Neurological: Alert and Oriented x 3, no focal deficits  Psychiatric: Normal mood, normal affect  Data:-  Allergies :   No Known Allergies    Hospital Medications:   MEDICATIONS  (STANDING):  atorvastatin 80 milliGRAM(s) Oral at bedtime  buMETAnide Injectable 2 milliGRAM(s) IV Push two times a day  dextrose 50% Injectable 25 Gram(s) IV Push once  dextrose 50% Injectable 12.5 Gram(s) IV Push once  dextrose 50% Injectable 25 Gram(s) IV Push once  dextrose Oral Gel 15 Gram(s) Oral once  epoetin heidi-epbx (RETACRIT) Injectable 87870 Unit(s) SubCutaneous Once  glucagon  Injectable 1 milliGRAM(s) IntraMuscular once  hydrALAZINE 75 milliGRAM(s) Oral every 8 hours  insulin lispro (ADMELOG) corrective regimen sliding scale   SubCutaneous three times a day before meals  insulin lispro (ADMELOG) corrective regimen sliding scale   SubCutaneous at bedtime  lidocaine   4% Patch 1 Patch Transdermal every 24 hours    10-27    136  |  93<L>  |  60<H>  ----------------------------<  160<H>  3.7   |  24  |  7.80<H>    Ca    8.6      27 Oct 2022 05:42  Phos  5.7     10-27  Mg     1.90     10-27    TPro  6.2  /  Alb  3.0<L>  /  TBili  0.4  /  DBili      /  AST  18  /  ALT  12  /  AlkPhos  67  10-27    Creatinine Trend: 7.80 <--, 7.34 <--                        7.4    10.11 )-----------( 398      ( 27 Oct 2022 05:42 )             22.9

## 2022-10-27 NOTE — CONSULT NOTE ADULT - ASSESSMENT
Chiki Aleman is a 67 year old with a PMHx notable for ESRD on PD, HTN, DM, HDL, CVA and chronic anemia sent in by PD clinic for acute on chronic anemia. GI consulted for workup of acute on chronic anemia.     #Acute on Chronic Anemia   Presenting with a hgb of 5.9 from a baseline of 7-8 with no overt signs of GI bleeding with no episodes of melena or hematochezia with pt incidentally found to have a large  Chiki Aleman is a 67 year old with a PMHx notable for ESRD on PD, HTN, DM, HDL, CVA and chronic anemia sent in by PD clinic for acute on chronic anemia. GI consulted for workup of acute on chronic anemia.     #Acute on Chronic Anemia   Presenting with a hgb of 5.9 from a baseline of 7-8 with no overt signs of GI bleeding with no episodes of melena or hematochezia with pt incidentally found to have a large pericardial effusion this admission. s/p 1 unit pRBC with hgb increase to 7.4. 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 No signs of any active bleeding at this time with no melena or hematochezia. Suspect largely related to AoCD although with Tsat<20% can not definitely exclude occult blood loss possibly related to AVMs in the setting of ESRD vs. less likely PUD or underlying malignancies. While he warrants pan-endoscopic evaluation for workup of the above, no plans for any acute endoscopic intervention at this time given ongoing pericardial effusion. Please reach out to our team after resolution of the above medical issues where we can consider inpatient pan-endoscopic evaluation or defer till the outpatient setting.     Recommendations;  -Diet per primary team   -CBC QD and transfuse for goal hgb>7   -Maintain two large bore IV, active T&S  -No plans for any endoscopic intervention at this time. Will need non-urgent pan-endoscopic evaluation either inpatient of ongoing acute medical issues or as an outpatient.    GI will plan to sign off at this time. Please call back with any folloq up q's and resolution of the above. All recommendations are tentative until note is attested by attending.     Edin Rivera, PGY-4  Gastroenterology/Hepatology Fellow  Available on [employer] Teams   [phone] (Long Range Pager)  [phone] (Short Range Pager [facility])    After 5pm, please contact the on-call GI fellow. [phone]              Chiki Aleman is a 67 year old with a PMHx notable for ESRD on PD, HTN, DM, HDL, CVA and chronic anemia sent in by PD clinic for acute on chronic anemia. GI consulted for workup of acute on chronic anemia.     #Acute on Chronic Anemia   Presenting with a hgb of 5.9 from a baseline of 7-8 with no overt signs of GI bleeding with no episodes of melena or hematochezia with pt incidentally found to have a large pericardial effusion this admission. s/p 1 unit pRBC with hgb increase to 7.4. 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 No signs of any active bleeding at this time with no melena or hematochezia. Suspect largely related to AoCD although with Tsat<20% can not definitely exclude occult blood loss possibly related to AVMs in the setting of ESRD vs. less likely PUD or underlying malignancies. While he warrants pan-endoscopic evaluation for workup of the above, no plans for any acute endoscopic intervention at this time given ongoing pericardial effusion. Please reach out to our team after resolution of the above medical issues where we can consider inpatient pan-endoscopic evaluation or defer till the outpatient setting.     Recommendations;  -Diet per primary team   -CBC QD and transfuse for goal hgb>7   -Maintain two large bore IV, active T&S  -No plans for any endoscopic intervention at this time. Will need non-urgent pan-endoscopic evaluation after resolution of the above.    GI will plan to sign off at this time. Please call back with any follow up q's and resolution of the above. All recommendations are tentative until note is attested by attending.     Edin Rivera, PGY-4  Gastroenterology/Hepatology Fellow  Available on [employer] Teams   [phone] (Long Range Pager)  [phone] (Short Range Pager [facility])    After 5pm, please contact the on-call GI fellow. [phone]

## 2022-10-27 NOTE — PROGRESS NOTE ADULT - PROBLEM SELECTOR PLAN 6
- BP elevated at this time  - Per cardiology, deferring to hold home anti-hypertensives for now in setting of pericardial effusion with tamponade physiology - BP elevated at this time  - can restart home BP meds one at a time. - BP elevated at this time  - can restart home BP meds one at a time.  Start Hydralazine

## 2022-10-27 NOTE — PROGRESS NOTE ADULT - PROBLEM SELECTOR PLAN 3
- CXR preliminary read with cardiomegaly, moderate pulmonary edema, small bilateral pleural effusions likely 2/2 volume overload in setting of dialysis  - Patient to undergo PD per nephro recs  - Monitor respiratory status  - Supplemental O2. Will wean as tolerated. - CXR preliminary read with cardiomegaly, moderate pulmonary edema, small bilateral pleural effusions likely 2/2 volume overload in setting of dialysis  - c/w PD per nephro recs  - Monitor respiratory status  - Supplemental O2. Will wean as tolerated.

## 2022-10-27 NOTE — H&P ADULT - PROBLEM SELECTOR PLAN 7
- Continue Statin when no longer NPO - BG >200. Goal of 140-180  - Moderate dose Insulin sliding scale for now.  - FS premeals and at bedtime

## 2022-10-27 NOTE — PROGRESS NOTE ADULT - ASSESSMENT
67M p/w ESRD on PD, HTN, DM, HDL, CVA, Anemia, sent in by PD clinic due to low Hgb. Renal consulted for ESRD/PD Mx.    ESRD on peritoneal dialysis.   K ok  pulm edema on prelim CXR, pt not in resp distress  Informed consent for PD obtained from pt. in chart   Peritoneal Dialysis increase PD fluid glucose to 4.25% to improve UF  Start Bumex 2mg IV BID also  renal diet, fluid restriction 1.2L/day  dose all meds for ESRD  CONT home sevelamer as phos binders    Anemia in ckd,   likely GIB- Hb <goal. outpt Hb from 10/25/22 was 6.6, was getting BRITTANIE outpt  awaiting outpt colonoscopy, never had it   monitor H/H closely, pending now  transfuse PRBC 1 unit     HTN,   uncontrolled-edema +  increase UF on PD      For any question, call:  Cell # [phone]  Pager # [phone]  Callback # [phone]

## 2022-10-27 NOTE — H&P ADULT - ATTENDING COMMENTS
Agree w/ above    #Large pericardial effusion  -etiology unknown obtain formal TTE  -BP stable, possible early tamponade  -RVP negative, no viral symptoms, chest pain, or AI symptoms  -obtain rheum labs, hiv, bld cx, and antibody tests for viral dx (coxsackie)  -EKG in am   -NPO for now in case of any intervention  -hold AC    #Anemia  -no melena/hematochezia reported  -hasn't had egd or c-scope yet was planning on it  -added on iron panel f/u    #ESRD  -nephro following  -euvolemic  -K+acceptable

## 2022-10-27 NOTE — H&P ADULT - NSHPLABSRESULTS_GEN_ALL_CORE
.  LABS:                         5.9    9.71  )-----------( 385      ( 26 Oct 2022 21:56 )             19.0     10-26    133<L>  |  92<L>  |  62<H>  ----------------------------<  173<H>  3.7   |  24  |  7.34<H>    Ca    8.5      26 Oct 2022 17:34    TPro  6.4  /  Alb  3.1<L>  /  TBili  0.3  /  DBili  x   /  AST  15  /  ALT  14  /  AlkPhos  67  10-26    PT/INR - ( 26 Oct 2022 17:44 )   PT: 14.0 sec;   INR: 1.20 ratio         PTT - ( 26 Oct 2022 17:44 )  PTT:28.6 sec    Serum Pro-Brain Natriuretic Peptide: 5643 pg/mL (10-26 @ 17:34)        RADIOLOGY, EKG & ADDITIONAL TESTS: Reviewed.

## 2022-10-27 NOTE — PROGRESS NOTE ADULT - ASSESSMENT
66 yo Male w/ hx HTN, DM, CVA, ESRD on PD, HDL, Anemia, sent in by PD clinic due to low Hgb. Endorses GERMAIN and general weakness. In ED Hg/hct 5.9/19, CXR: mod pulm edema with small b/l pleural effusions. Seen by nephrology with plan to start PD tonight and transfuse with PRBC. Cardiology consult called for Pericardial effusion seen on POCUS with early tamponade physiology. 68 yo Male w/ hx HTN, DM, CVA, ESRD on PD, HDL, Anemia, sent in by PD clinic due to low Hgb. Endorses GERMAIN and general weakness. In ED Hg/hct 5.9/19 which improved to 7.4/22.9 after 1 Unit PRBCs.  CXR: mod pulm edema with small b/l pleural effusions. Patient also found to have a large pericardial effusion with signs of early tamponade on POCUS and TTE.

## 2022-10-27 NOTE — CONSULT NOTE ADULT - ASSESSMENT
66 yo M PMH ESRD on PD, HTN, DM, HDL, CVA, Anemia, sent in by PD clinic due to low Hgb. Endorses GERMAIN and general weakness. In ED Hg/hct 5.9/19, CXR: mod pulm edema with small b/l pleural effusions. Seen by nephrology with plan to start PD tonight and transfuse with PRBC. Cardiology consult called for Pericardial effusion seen on POCUS with early tamponade physiology.    Pericardial effusion   Etiology unknown: malignant vs uremia vs idiopathic  Patient without s/s tamponade: no tachycardia, no JVP, no hypotension, extremities warm to touch   Patient hemodynamically stable at present. No indication for CCU at current time. Will reevaluate as needed.    Plan  Monitor on telemetry  TTE evaluation of pericardial effusion and R/O tamponade physiology  Avoid anticoagulants   maintain optimal blood pressure  Hold antihypertensives

## 2022-10-27 NOTE — PROGRESS NOTE ADULT - PROBLEM SELECTOR PLAN 2
- Per nephro, plan for Peritoneal Dialysis 1 exchange tonight and 4 exchanges tomorrow using 2.5 % dextrose dialysate  - Renal diet, fluid restriction 1.2L/day  - Dose meds renally  - Avoid nephrotoxins.  - Will continue home sevelamer once patient no longer NPO - s/p Peritoneal Dialysis 1 exchange yesterday  -Plan for 4 exchanges today  using 2.5 % dextrose dialysate  - Renal diet, fluid restriction 1.2L/day  - Dose meds renally  - Avoid nephrotoxins.  - Will continue home sevelamer once patient no longer NPO

## 2022-10-27 NOTE — H&P ADULT - PROBLEM SELECTOR PLAN 10
Diet: NPO for now pending tentative pericardiocentesis  DVT Ppx: SCD for now given anemia  - Dispo: Pending cardiac workup and intervention

## 2022-10-27 NOTE — H&P ADULT - PROBLEM SELECTOR PLAN 3
- CXR preliminary read with cardiomegaly, moderate pulmonary edema, small bilateral pleural effusions likely 2/2 volume overload in setting of dialysis  - Patient to undergo PD per nephro recs  - Monitor respiratory status  - Supplemental O2. Will wean as tolerated.

## 2022-10-27 NOTE — H&P ADULT - NSHPPHYSICALEXAM_GEN_ALL_CORE
VITALS:   T(C): 36.3 (10-27-22 @ 03:20), Max: 37.4 (10-26-22 @ 15:55)  HR: 79 (10-27-22 @ 03:20) (70 - 88)  BP: 183/74 (10-27-22 @ 03:20) (139/91 - 183/74)  RR: 17 (10-27-22 @ 01:50) (16 - 18)  SpO2: 100% (10-27-22 @ 01:50) (94% - 100%)    GENERAL: NAD, lying in bed comfortably  HEAD:  Atraumatic, normocephalic  EYES: EOMI, PERRLA, conjunctiva and sclera clear  ENT: Moist mucous membranes  NECK: Supple, no JVD  HEART: Regular rate and rhythm, no murmurs, rubs, or gallops  LUNGS: Unlabored respirations.  Clear to auscultation bilaterally, no crackles, wheezing, or rhonchi  ABDOMEN: Soft, nontender, nondistended, +BS  EXTREMITIES: 2+ peripheral pulses bilaterally. No clubbing, cyanosis, or edema  NERVOUS SYSTEM:  A&Ox3, no focal deficits   SKIN: No rashes or lesions  Psych: Normal. normal behavior. normal speech VITALS:   T(C): 36.3 (10-27-22 @ 03:20), Max: 37.4 (10-26-22 @ 15:55)  HR: 79 (10-27-22 @ 03:20) (70 - 88)  BP: 183/74 (10-27-22 @ 03:20) (139/91 - 183/74)  RR: 17 (10-27-22 @ 01:50) (16 - 18)  SpO2: 100% (10-27-22 @ 01:50) (94% - 100%)    GENERAL: NAD, lying in bed comfortably  HEAD:  Atraumatic, normocephalic  EYES: EOMI, PERRLA, conjunctiva and sclera clear  ENT: Moist mucous membranes  NECK: Supple, no JVD  HEART: Mild friction Rub heard on auscultation. Regular rate and rhythm, no murmurs, rubs, or gallops  LUNGS: Unlabored respirations.  Clear to auscultation bilaterally, no crackles, wheezing, or rhonchi  ABDOMEN: Soft, nontender, nondistended, +BS. PD catheter in place and c/d/i  EXTREMITIES: 2+ peripheral pulses bilaterally. No clubbing, cyanosis, or edema  NERVOUS SYSTEM:  A&Ox3, no focal deficits   SKIN: No rashes or lesions  Psych: Normal. normal behavior. normal speech

## 2022-10-27 NOTE — H&P ADULT - PROBLEM SELECTOR PLAN 1
- Unclear etiology. Considerations would include uremic pericardial effusion, viral pericardial effusion, autoimmune related pericardial effusion and other infectious etiology. Likely hemorrhagic as patient not on AC  - POCUS with effusion with concern for tamponade physiology   - Monitor on telemetry  - TTE evaluation of pericardial effusion and R/O tamponade physiology  - RVP negative  - F/u autoimmune, rheumatological workup and infectious workup  - F/u CT. Tentative plan to perform pericardiocentesis per cardiology.   - Avoid anticoagulants   - Per cardiology deferring to hold anti-hypertensives at this time.

## 2022-10-27 NOTE — CONSULT NOTE ADULT - ATTENDING COMMENTS
67 year old with a PMHx notable for ESRD on PD, HTN, DM, HDL, CVA and chronic anemia sent in by PD clinic for acute on chronic anemia. GI consulted for workup of acute on chronic anemia  Hemoglobin 5.9, baseline is 7-8, no reports of overt GI bleeding such as melena/hematochezia   Found with pericardial effusion, systolic blood pressure greater than 200, patient received in the emergency room with acute shortness of breath    Recommendation  1.  No plans for urgent EGD/colonoscopy as patient is not medically optimized

## 2022-10-27 NOTE — H&P ADULT - PROBLEM SELECTOR PLAN 6
- BP elevated at this time  - Per cardiology, deferring to hold home anti-hypertensives for now in setting of pericardial effusion with tamponade physiology

## 2022-10-27 NOTE — H&P ADULT - HISTORY OF PRESENT ILLNESS
67M p/w ESRD on PD, HTN, DM, HDL, CVA, Anemia, sent in by PD clinic due to low Hgb. Pt reports GERMAIN and general weakness.  Also c/o L shoulder pain and L neck pain, no trauma, no fever no vomiting. Pt appears pale but HD stable with mild SOB but able to speak full sentences. Renal consulted for ESRD/PD Mx. PT WELL KNOWN TO ME, my office PD pt. Hb was drawn yesterday and was called today and advised to go to ER due to hgb dropped to 6.6. pt denies any obvious bleed/fritz. also, pt's PD cath was cut hence lines changed outpt yesterday, PD fluid sent for cx and given empirical abxs yesterday at PD clinic and pt was provided w/dialysate bag w/abx to use at home, pt filled at 11am today, was advised to drain afet r7hrs. pt usually on cycler/CCPD at night. currently asking to have PD fluid drained. denied cp/sob.     In ED Hg/hct 5.9/19, CXR: mod pulm edema with small b/l pleural effusions. EKG: SR without ischemia. troponins flat iso CKD. Seen by nephrology with plan to start PD tonight and transfuse with PRBC. Cardiology consult called for Pericardial effusion seen on POCUS with early tamponade physiology. In ED patient hemodynamically stable. Received 80 Lasix and Morphine x 1 for shoulder neck pain.  67M p/w ESRD on PD, HTN, DM, HDL, CVA, Anemia, sent in by PD clinic due to low Hgb. Pt reports GERMAIN and general weakness.  Also c/o L shoulder pain and L neck pain, no trauma, no fever no vomiting.  Endorses some mild SOB and some fatigue over the last couple of days. He was called by his primary nephrologist regarding Hb which was drawn yesterday, and was advised to go to ER due to hgb dropped to 6.6. He denies any obvious bleed/fritz. Patients PD cath was cut at home so he was not able to do proper dialysis at home for about 2 days.  At Nephrology office line was changed and PD fluid was sent for cx and he was given empirical abxs w/dialysate bag w/abx to use at home. Denies any fever/chills, nausea/vomiting, abdominal pain.     In ED Hg/hct 5.9/19, CXR: mod pulm edema with small b/l pleural effusions. EKG: SR without ischemia. troponins flat iso CKD. Seen by nephrology with plan to start PD tonight and transfuse with PRBC. Cardiology consult called for Pericardial effusion seen on POCUS with early tamponade physiology. In ED patient hemodynamically stable. Received 80 Lasix and Morphine x 1 for shoulder neck pain.  67M p/w ESRD on PD, HTN, DM, HDL, CVA, Anemia, sent in by PD clinic due to low Hgb. Pt reports GERMAIN and general weakness.  Also c/o L shoulder pain and L neck pain, no trauma, no fever no vomiting.  Endorses some mild SOB and some fatigue over the last couple of days. He was called by his primary nephrologist regarding Hb which was drawn yesterday, and was advised to go to ER due to hgb dropped to 6.6. He denies any obvious bleed/fritz. Patients PD cath was cut at home so he was not able to do proper dialysis at home for about 2 days.  At Nephrology office line was changed and PD fluid was sent for cx and he was given empirical abxs w/dialysate bag w/abx to use at home. Denies any fever/chills, nausea/vomiting, abdominal pain.     POCUS in ED w/ large pericardial effusion, concern for tamponade, cardiology consulted.    In ED Hg/hct 5.9/19, CXR: mod pulm edema with small b/l pleural effusions. EKG: SR without ischemia. troponins flat iso CKD. Seen by nephrology with plan to start PD tonight and transfuse with PRBC. Cardiology consult called for Pericardial effusion seen on POCUS with early tamponade physiology. In ED patient hemodynamically stable. Received 80 Lasix and Morphine x 1 for shoulder neck pain.

## 2022-10-27 NOTE — H&P ADULT - PROBLEM SELECTOR PLAN 5
- Per patient he sustained a fracture to his left shoulder but can't recall the exact mechanism 3 months ago  - Xray showing Known left distal clavicular fracture, with either fracture nonunion or incomplete healing.  - Consider ortho consult given concern for non union of fracture  - Pain control  - Continue Lidocaine patch

## 2022-10-27 NOTE — H&P ADULT - ASSESSMENT
68 yo M PMH ESRD on PD, HTN, DM, HDL, CVA, Anemia, sent in by PD clinic due to low Hgb. Endorses GERMAIN and general weakness. In ED Hg/hct 5.9/19, CXR: mod pulm edema with small b/l pleural effusions. Seen by nephrology with plan to start PD tonight and transfuse with PRBC. Cardiology consult called for Pericardial effusion seen on POCUS with early tamponade physiology.

## 2022-10-27 NOTE — PROGRESS NOTE ADULT - PROBLEM SELECTOR PLAN 1
- Unclear etiology. Considerations would include uremic pericardial effusion, viral pericardial effusion, autoimmune related pericardial effusion and other infectious etiology. Likely hemorrhagic as patient not on AC  - POCUS with effusion with concern for tamponade physiology   - Monitor on telemetry  - TTE evaluation of pericardial effusion and R/O tamponade physiology  - RVP negative  - F/u autoimmune, rheumatological workup and infectious workup  - F/u CT. Tentative plan to perform pericardiocentesis per cardiology.   - Avoid anticoagulants   - Per cardiology deferring to hold anti-hypertensives at this time. - Unclear etiology.  - POCUS with effusion with concern for tamponade physiology   - TTE also shows pericardial effusion with concern of early tamponade physiology  - RVP negative  - F/u autoimmune, rheumatological workup and infectious workup  - CT chest shows moderate pericardial effusion increased from previous imaging.   -F/U cardiology recs in regard to pericardiocentesis  - Avoid anticoagulants - Unclear etiology.  - POCUS with effusion with concern for tamponade physiology   - TTE also shows pericardial effusion with concern of early tamponade physiology  - RVP negative  - F/u autoimmune, rheumatological workup and infectious workup  - CT chest shows moderate pericardial effusion increased from previous imaging.   -Spoke to cardiology attending Dr arellano and he said it would be technically difficult to do a pericardiocentesis b/c of location of effusion so suggested IR consult.   - Avoid anticoagulants - Unclear etiology.  - POCUS with effusion with concern for tamponade physiology   - TTE also shows pericardial effusion with concern of early tamponade physiology  - RVP negative  - F/u autoimmune, rheumatological workup and infectious workup  - CT chest shows moderate pericardial effusion increased from previous imaging.   -Spoke to cardiology attending Dr Monique and he said it would be technically difficult to do a pericardiocentesis b/c of location of effusion so suggested IR consult.   - Avoid anticoagulants

## 2022-10-27 NOTE — H&P ADULT - NSHPREVIEWOFSYSTEMS_GEN_ALL_CORE
REVIEW OF SYSTEMS:    CONSTITUTIONAL: No weakness, fevers or chills  EYES: PEERLA  ENT: No visual changes;  No vertigo or throat pain   NECK: No pain or stiffness  RESPIRATORY: No cough, wheezing, hemoptysis; No shortness of breath  CARDIOVASCULAR: No chest pain or palpitations  GASTROINTESTINAL: No abdominal or epigastric pain. No nausea, vomiting, or hematemesis; No diarrhea or constipation. No melena or hematochezia.  GENITOURINARY: No dysuria, frequency or hematuria  NEUROLOGICAL: No numbness or weakness  SKIN: No itching, rashes  Psych: No anxiety. No depression REVIEW OF SYSTEMS:    CONSTITUTIONAL: No weakness, fevers or chills  EYES: PEERLA  ENT: No visual changes;  No vertigo or throat pain   NECK: No pain or stiffness  RESPIRATORY: No cough, wheezing, hemoptysis; + shortness of breath  CARDIOVASCULAR: No chest pain or palpitations  GASTROINTESTINAL: No abdominal or epigastric pain. No nausea, vomiting, or hematemesis; No diarrhea or constipation. No melena or hematochezia.  GENITOURINARY: No dysuria, frequency or hematuria  NEUROLOGICAL: No numbness or weakness  SKIN: No itching, rashes  Psych: No anxiety. No depression

## 2022-10-27 NOTE — CONSULT NOTE ADULT - SUBJECTIVE AND OBJECTIVE BOX
HISTORY OF PRESENT ILLNESS:  Patient is a 67y old  Male who presents with a chief complaint of     HPI: 66 yo M PMH ESRD on PD, HTN, DM, HDL, CVA, Anemia, sent in by PD clinic due to low Hgb. Endorses GERMAIN and general weakness. Pt also told by the calling MD he may need a colonoscopy.  In ED Hg/hct 5.9/19, CXR: mod pulm edema with small b/l pleural effusions. EKG: SR without ischemia. troponins flat iso CKD. Seen by nephrology with plan to start PD tonight and transfuse with PRBC. Cardiology consult called for Pericardial effusion seen on POCUS with early tamponade physiology. Pt currently hemodynamically stable, in no distress. Denies CP, palpitations, syncope, cough, NVD, fever or chills      ED Course: T  , HR 74 , /70 , SpO2 100% 2-3L NC, resp 16 .     Allergies  No Known Allergies  	    MEDICATIONS:  epoetin heidi-epbx (RETACRIT) Injectable 23627 Unit(s) SubCutaneous Once      PAST MEDICAL & SURGICAL HISTORY:  Hypertension  ESRD on peritoneal dialysis  CVA (cerebrovascular accident)  2010 without residual effects  Hyperlipidemia  Type 2 diabetes mellitus, with long-term current use of insulin  BPH (benign prostatic hyperplasia)  History of peritoneal dialysis  s/p PD catheter placement          FAMILY HISTORY:  FH: hypertension (Mother)        REVIEW OF SYSTEMS  See HPI. Otherwise, 10 point ROS done and otherwise negative.  >>> <<<    PHYSICAL EXAM:  T(C): 36.6 (10-26-22 @ 23:15), Max: 37.4 (10-26-22 @ 15:55)  HR: 77 (10-26-22 @ 23:15) (73 - 88)  BP: 156/70 (10-26-22 @ 22:26) (139/91 - 156/70)  RR: 17 (10-26-22 @ 22:26) (16 - 18)  SpO2: 100% (10-26-22 @ 22:26) (94% - 100%)  Wt(kg): --    Appearance: Normal	  HEENT:   Normal oral mucosa,   Lymphatic: No lymphadenopathy  Cardiovascular: Normal S1 S2, No JVD, No murmurs, mild pedal edema  Respiratory: B/L crackles  Psychiatry: A & O x 3, Mood & affect appropriate  Gastrointestinal:  Soft, Non-tender, + BS	  Skin: No rashes, No ecchymoses, No cyanosis	  Neurologic: Non-focal, A&Ox3, nonfocal, FRIEND x 4  Extremities: Normal range of motion, No clubbing, cyanosis   Vascular: Peripheral pulses palpable 2+ bilaterally    I&O's Summary    	 	  LABS:	 	                          5.9    9.71  )-----------( 385      ( 26 Oct 2022 21:56 )             19.0     10-26    133<L>  |  92<L>  |  62<H>  ----------------------------<  173<H>  3.7   |  24  |  7.34<H>  Ca    8.5      26 Oct 2022 17:34  TPro  6.4  /  Alb  3.1<L>  /  TBili  0.3  /  DBili  x   /  AST  15  /  ALT  14  /  AlkPhos  67  10-26  LIVER FUNCTIONS - ( 26 Oct 2022 17:34 )  Alb: 3.1 g/dL / Pro: 6.4 g/dL / ALK PHOS: 67 U/L / ALT: 14 U/L / AST: 15 U/L / GGT: x         proBNP: Serum Pro-Brain Natriuretic Peptide: 5643 pg/mL (10-26 @ 17:34)  Lipid Profile:   HgA1c:   TSH:   PT/INR - ( 26 Oct 2022 17:44 )   PT: 14.0 sec;   INR: 1.20 ratio    PTT - ( 26 Oct 2022 17:44 )  PTT:28.6 sec

## 2022-10-27 NOTE — H&P ADULT - NSHPSOCIALHISTORY_GEN_ALL_CORE
Marital Status:  (   )    (   ) Single    (   )    (  )   Lives with: (  ) alone  (  ) children   (  ) spouse   (  ) parents  (  ) other  Recent Travel: No recent travel  Occupation:  Mobility:   Funcational status:  Substance Use (street drugs): ( x ) never used  (  ) other:  Tobacco Usage:  ( x  ) never smoked   (   ) former smoker   (   ) current smoker  (     ) pack year  Alcohol Usage: None Marital Status:  ( x )    (   ) Single    (   )    (  )   Lives with: (  ) alone  (  ) children   ( x ) spouse   (  ) parents  (  ) other  Recent Travel: No recent travel  Mobility: No disability  Funcational status:   Substance Use (street drugs): ( x ) never used  (  ) other:  Tobacco Usage:  ( x  ) never smoked   (   ) former smoker   (   ) current smoker  (     ) pack year  Alcohol Usage: None

## 2022-10-27 NOTE — CONSULT NOTE ADULT - NS ATTEND AMEND GEN_ALL_CORE FT
Personally saw and examined patient  labs and vitals reviewed   agree with above assessment and plan  pt sent in with acute anemia, workup per primary team  also found to have pericardial effusion.   tte showing possible early tamponade physiology, although systolic bp now 170s  no plans to intervene on effusion at this time given stable BP.   given location of pericardial effusion, if he requires a tap, would need to consult IR.   workup of etiology of pericardial effusion per primary team, vast differential includes uremia, infectious, inflammatory or malignant  will follow with you

## 2022-10-27 NOTE — PROGRESS NOTE ADULT - PROBLEM SELECTOR PLAN 7
- BG >200. Goal of 140-180  - Moderate dose Insulin sliding scale for now.  - FS premeals and at bedtime

## 2022-10-27 NOTE — H&P ADULT - PROBLEM SELECTOR PLAN 4
- Likely anemia of chronic disease vs secondary to occult bleed, thought patient does not endorse any hematochezia/melena  - Trend CBC q12. If stable and no signs of bleeding, can trend QD  - Consider colonoscopy  - Transfuse <7  - Continue Retacrit

## 2022-10-27 NOTE — CHART NOTE - NSCHARTNOTEFT_GEN_A_CORE
Patient's imaging reviewed, no acute intervention advised during this admission. Patient should be NWB LUE in sling for comfort and followup with Dr. Baum outpatient. ([phone])    Sultana Rhodes PA-C  Team Pager #[phone]

## 2022-10-27 NOTE — CONSULT NOTE ADULT - SUBJECTIVE AND OBJECTIVE BOX
Interventional Radiology Inpatient Consult Note     Patient is a 67y old  Male who presents with a chief complaint of GERMAIN pericardial effusion (27 Oct 2022 13:43)      Vital Signs: Vital Signs Last 24 Hrs  T(C): 37.1 (27 Oct 2022 13:40), Max: 37.4 (26 Oct 2022 15:55)  T(F): 98.7 (27 Oct 2022 13:40), Max: 99.4 (26 Oct 2022 15:55)  HR: 82 (27 Oct 2022 14:10) (69 - 84)  BP: 183/77 (27 Oct 2022 14:10) (139/91 - 209/92)  BP(mean): --  RR: 18 (27 Oct 2022 14:10) (16 - 18)  SpO2: 100% (27 Oct 2022 14:10) (98% - 100%)    Parameters below as of 27 Oct 2022 14:10  Patient On (Oxygen Delivery Method): nasal cannula  O2 Flow (L/min): 3      Past Medical/ Surgical History: PAST MEDICAL & SURGICAL HISTORY:  Hypertension      ESRD on peritoneal dialysis      CVA (cerebrovascular accident)  2010 without residual effects      Hyperlipidemia      Type 2 diabetes mellitus, with long-term current use of insulin      BPH (benign prostatic hyperplasia)      History of peritoneal dialysis  s/p PD catheter placement          Allergies: Allergies    No Known Allergies    Intolerances        Medications: MEDICATIONS  (STANDING):  atorvastatin 80 milliGRAM(s) Oral at bedtime  buMETAnide Injectable 2 milliGRAM(s) IV Push two times a day  dextrose 50% Injectable 25 Gram(s) IV Push once  dextrose 50% Injectable 12.5 Gram(s) IV Push once  dextrose 50% Injectable 25 Gram(s) IV Push once  dextrose Oral Gel 15 Gram(s) Oral once  epoetin heidi-epbx (RETACRIT) Injectable 49774 Unit(s) SubCutaneous Once  glucagon  Injectable 1 milliGRAM(s) IntraMuscular once  hydrALAZINE 75 milliGRAM(s) Oral every 8 hours  insulin lispro (ADMELOG) corrective regimen sliding scale   SubCutaneous three times a day before meals  insulin lispro (ADMELOG) corrective regimen sliding scale   SubCutaneous at bedtime  lidocaine   4% Patch 1 Patch Transdermal every 24 hours    MEDICATIONS  (PRN):      SOCIAL HISTORY:    FAMILY HISTORY:  FH: hypertension (Mother)          LABS:                        7.4    10.11 )-----------( 398      ( 27 Oct 2022 05:42 )             22.9     10-27    136  |  93<L>  |  60<H>  ----------------------------<  160<H>  3.7   |  24  |  7.80<H>    Ca    8.6      27 Oct 2022 05:42  Phos  5.7     10-27  Mg     1.90     10-27    TPro  6.2  /  Alb  3.0<L>  /  TBili  0.4  /  DBili  x   /  AST  18  /  ALT  12  /  AlkPhos  67  10-27    PT/INR - ( 27 Oct 2022 06:43 )   PT: 14.3 sec;   INR: 1.23 ratio         PTT - ( 27 Oct 2022 06:43 )  PTT:27.5 sec      RADIOLOGY & ADDITIONAL STUDIES:      ASSESSMENT/DISCUSSION:  Patient is a 67y old  Male who presents with a chief complaint of GERMAIN pericardial effusion (27 Oct 2022 13:43)    IR consulted for pericardiocentesis.    Cardiology consultants have recommended drainage.    Pt w/POCUS c/f tamponade physio; IR called for management. Imaging and case reviewed w/IR attending Dr. Marquez.     Numerous medical management issues plague the pt at current including anemia (2/2 GIB), ESRD on PD, uremia, and the pericardial effusion. Multiple lab derangements and poor glycemic control also active issues.  Severely hypertensive with most recent SBP>200 not c/w tamponade physiology.    RECOMMENDATIONS:    - Address pt's medical issues; correct electrolytes, obtain dialysis as recommended by nephro, control BP. Controlling medical issues may independently improve the pericardial effusion   - Obtain formal echocardiogram for validity  - IR will follow the pt; consideration of drain TBD by the f/u echo and pt status after medical optimization  - d/w IR attending Dr. Marquez   - Covering ACP was paged at [phone] at time of consult note entry to allow opportunity for questions    Chandan Rust MD  PGY-3, Interventional Radiology    -Available on [employer] TEAMS for all non-urgent questions  -Emergent issues: [facility]-p.[phone]; [facility]-p.[phone] ([phone])  -Non-emergent consults: Please place a [facility] order "Consult-Interventional Radiology" with an appropriate callback number  -Scheduling questions: [facility]: [phone]; [facility]: [phone]  -Clinic/Outpatient booking: [facility]: [phone]; [facility]: [phone]                   Interventional Radiology Inpatient Consult Note     Patient is a 67y old  Male who presents with a chief complaint of GERMAIN, pericardial effusion (27 Oct 2022 13:43)    Vital Signs: Vital Signs Last 24 Hrs  T(C): 37.1 (27 Oct 2022 13:40), Max: 37.4 (26 Oct 2022 15:55)  T(F): 98.7 (27 Oct 2022 13:40), Max: 99.4 (26 Oct 2022 15:55)  HR: 82 (27 Oct 2022 14:10) (69 - 84)  BP: 183/77 (27 Oct 2022 14:10) (139/91 - 209/92)  BP(mean): --  RR: 18 (27 Oct 2022 14:10) (16 - 18)  SpO2: 100% (27 Oct 2022 14:10) (98% - 100%)    Parameters below as of 27 Oct 2022 14:10  Patient On (Oxygen Delivery Method): nasal cannula  O2 Flow (L/min): 3    Past Medical/ Surgical History: PAST MEDICAL & SURGICAL HISTORY:  Hypertension    ESRD on peritoneal dialysis    CVA (cerebrovascular accident)  2010 without residual effects    Hyperlipidemia    Type 2 diabetes mellitus, with long-term current use of insulin    BPH (benign prostatic hyperplasia)    History of peritoneal dialysis  s/p PD catheter placement      Allergies: Allergies    No Known Allergies      Medications: MEDICATIONS  (STANDING):  atorvastatin 80 milliGRAM(s) Oral at bedtime  buMETAnide Injectable 2 milliGRAM(s) IV Push two times a day  dextrose 50% Injectable 25 Gram(s) IV Push once  dextrose 50% Injectable 12.5 Gram(s) IV Push once  dextrose 50% Injectable 25 Gram(s) IV Push once  dextrose Oral Gel 15 Gram(s) Oral once  epoetin heidi-epbx (RETACRIT) Injectable 88998 Unit(s) SubCutaneous Once  glucagon  Injectable 1 milliGRAM(s) IntraMuscular once  hydrALAZINE 75 milliGRAM(s) Oral every 8 hours  insulin lispro (ADMELOG) corrective regimen sliding scale   SubCutaneous three times a day before meals  insulin lispro (ADMELOG) corrective regimen sliding scale   SubCutaneous at bedtime  lidocaine   4% Patch 1 Patch Transdermal every 24 hours    MEDICATIONS  (PRN):      SOCIAL HISTORY:    FAMILY HISTORY:  FH: hypertension (Mother)      LABS:                        7.4    10.11 )-----------( 398      ( 27 Oct 2022 05:42 )             22.9     10-27    136  |  93<L>  |  60<H>  ----------------------------<  160<H>  3.7   |  24  |  7.80<H>    Ca    8.6      27 Oct 2022 05:42  Phos  5.7     10-27  Mg     1.90     10-27    TPro  6.2  /  Alb  3.0<L>  /  TBili  0.4  /  DBili  x   /  AST  18  /  ALT  12  /  AlkPhos  67  10-27    PT/INR - ( 27 Oct 2022 06:43 )   PT: 14.3 sec;   INR: 1.23 ratio         PTT - ( 27 Oct 2022 06:43 )  PTT:27.5 sec      RADIOLOGY & ADDITIONAL STUDIES:      ASSESSMENT/DISCUSSION:  Patient is a 67y old  Male who presents with a chief complaint of GERMAIN, pericardial effusion (27 Oct 2022 13:43)    IR consulted for pericardiocentesis.    Cardiology consultants have recommended drainage.    Pt w/POCUS c/f tamponade physio; IR called for management. Imaging and case reviewed w/IR attending Dr. Marquez.     Patient with multiple medical management issues, including anemia (2/2 GIB), ESRD on PD, uremia, and the pericardial effusion. Multiple lab derangements and poor glycemic control also active issues.  Severely hypertensive with most recent SBP>200 not c/w tamponade physiology.    RECOMMENDATIONS:    - Address pt's medical issues; correct electrolytes, recommend dialysis as recommended by nephro, control BP. Controlling medical issues/dialysis may independently improve the pericardial effusion.   - Recommend obtaining a formal echocardiogram  - IR will follow the pt; consideration of drain TBD by the f/u echo and pt status after medical optimization  - d/w IR attending Dr. Marquez   - Covering ACP was paged at [phone] at time of consult note entry to allow opportunity for questions    Chandan Rust MD  PGY-3, Interventional Radiology    -Available on [employer] TEAMS for all non-urgent questions  -Emergent issues: [facility]-p.[phone]; [facility]-p.[phone] ([phone])  -Non-emergent consults: Please place a [place] order "Consult-Interventional Radiology" with an appropriate callback number  -Scheduling questions: [facility]: [phone]; [facility]: [phone]  -Clinic/Outpatient booking: [facility]: [phone]; [facility]: [phone]

## 2022-10-27 NOTE — PROGRESS NOTE ADULT - PROBLEM SELECTOR PLAN 5
- Per patient he sustained a fracture to his left shoulder but can't recall the exact mechanism 3 months ago  - Xray showing Known left distal clavicular fracture, with either fracture nonunion or incomplete healing.  - Consider ortho consult given concern for non union of fracture  - Pain control  - Continue Lidocaine patch - Per patient he sustained a fracture to his left shoulder but can't recall the exact mechanism 3 months ago  - Xray showing Known left distal clavicular fracture, with either fracture nonunion or incomplete healing.  - Will consult ortho consult given concern for non union of fracture.   - Pain control  - Continue Lidocaine patch

## 2022-10-27 NOTE — PROGRESS NOTE ADULT - PROBLEM SELECTOR PLAN 4
- Likely anemia of chronic disease vs secondary to occult bleed, thought patient does not endorse any hematochezia/melena  - Trend CBC q12. If stable and no signs of bleeding, can trend QD  - Consider colonoscopy  - Transfuse <7  - Continue Retacrit - Likely anemia of chronic disease vs secondary to occult bleed, thought patient does not endorse any hematochezia/melena   -GI consult appreciated  - Hb improved s/p 1 Unit PRBcs  - Transfuse <7  - Continue Retacrit

## 2022-10-27 NOTE — CONSULT NOTE ADULT - SUBJECTIVE AND OBJECTIVE BOX
HPI:  Chiki Aleman is a 67 year old with a PMHx notable for ESRD on PD, HTN, DM, HDL, CVA and chronic anemia sent in by PD clinic for acute on chronic anemia. GI consulted for the above.     Mr. Aleman states that he has been having GERMAIN and and general weaknes in addition to c/o L shoulder pain and L neck pain. Notably, pt PD cath was cut at home so he was not able to do proper dialysis at home for about 2 days.  At Nephrology office line was changed and PD fluid was sent for cx and he was given empirical abxs w/dialysate bag w/abx to use at home. He had labs checked during that clinic visit and was noted to have a hgb of 6.1 and referred into the ED for further management. Workup in the ED was notable for  a hgb of 5.9 for which he was transfused 1 unit pRBC. He also underwent a TTE that was notable for a large pericardial effusion, measuring about  2.2 cm lateral to the left ventricle with some c/f early tamponade physiology for which cardiology was consulted.       Allergies:  No Known Allergies    Home Medications:  acetaminophen 325 mg oral tablet: 3 tab(s) orally every 6 hours, As Needed (23 Jul 2022 11:13)  Bumex 1 mg oral tablet: 1 tab(s) orally once a day (23 Jul 2022 11:13)  doxazosin 4 mg oral tablet, extended release: 1 tab(s) orally once a day (in the morning) (23 Jul 2022 11:13)  Lipitor 80 mg oral tablet: 1 tab(s) orally once a day  (Pharmacy filled Lipitor 40mg QD June/2022) (23 Jul 2022 11:13)  senna oral tablet: 2 tab(s) orally once a day (at bedtime), As Needed (23 Jul 2022 11:13)      Hospital Medications:  atorvastatin 80 milliGRAM(s) Oral at bedtime  buMETAnide IVPB 2 milliGRAM(s) IV Intermittent two times a day  dextrose 50% Injectable 25 Gram(s) IV Push once  dextrose 50% Injectable 12.5 Gram(s) IV Push once  dextrose 50% Injectable 25 Gram(s) IV Push once  dextrose Oral Gel 15 Gram(s) Oral once  epoetin heidi-epbx (RETACRIT) Injectable 07448 Unit(s) SubCutaneous Once  glucagon  Injectable 1 milliGRAM(s) IntraMuscular once  insulin lispro (ADMELOG) corrective regimen sliding scale   SubCutaneous three times a day before meals  insulin lispro (ADMELOG) corrective regimen sliding scale   SubCutaneous at bedtime  lidocaine   4% Patch 1 Patch Transdermal every 24 hours      PMHX/PSHX:    Hypertension  ESRD on peritoneal dialysis  CVA (cerebrovascular accident)  Hyperlipidemia  Type 2 diabetes mellitus, with long-term current use of insulin  BPH (benign prostatic hyperplasia)  History of peritoneal dialysis        Family history:  FH: hypertension (Mother)  Denies family history of colon cancer/polyps, stomach cancer/polyps, pancreatic cancer/masses, liver cancer/disease, ovarian cancer and endometrial cancer.    Social History:   Tob: Denies  EtOH: Denies  Illicit Drugs: Denies    PHYSICAL EXAM:     GENERAL:  No acute distress  HEENT:  NCAT, no scleral icterus   CHEST:  no respiratory distress  HEART:  Regular rate and rhythm  ABDOMEN:  Soft, non-tender, non-distended, normoactive bowel sounds,  no masses  EXTREMITIES: No edema  SKIN:  No rash/erythema/ecchymoses/petechiae/wounds/abscess/warm/dry  NEURO:  Alert and oriented x 3, no asterixis    Vital Signs:  Vital Signs Last 24 Hrs  T(C): 36.5 (27 Oct 2022 10:58), Max: 37.4 (26 Oct 2022 15:55)  T(F): 97.7 (27 Oct 2022 10:58), Max: 99.4 (26 Oct 2022 15:55)  HR: 75 (27 Oct 2022 10:58) (69 - 88)  BP: 170/70 (27 Oct 2022 10:58) (139/91 - 183/74)  BP(mean): --  RR: 17 (27 Oct 2022 10:58) (16 - 18)  SpO2: 100% (27 Oct 2022 09:05) (94% - 100%)    Parameters below as of 27 Oct 2022 10:58  Patient On (Oxygen Delivery Method): nasal cannula  O2 Flow (L/min): 3    Daily Height in cm: 172.72 (26 Oct 2022 14:39)    Daily     LABS:                        7.4    10.11 )-----------( 398      ( 27 Oct 2022 05:42 )             22.9     Mean Cell Volume: 84.5 fL (10-27-22 @ 05:42)    10-27    136  |  93<L>  |  60<H>  ----------------------------<  160<H>  3.7   |  24  |  7.80<H>    Ca    8.6      27 Oct 2022 05:42  Phos  5.7     10-27  Mg     1.90     10-27    TPro  6.2  /  Alb  3.0<L>  /  TBili  0.4  /  DBili  x   /  AST  18  /  ALT  12  /  AlkPhos  67  10-27    LIVER FUNCTIONS - ( 27 Oct 2022 05:42 )  Alb: 3.0 g/dL / Pro: 6.2 g/dL / ALK PHOS: 67 U/L / ALT: 12 U/L / AST: 18 U/L / GGT: x           PT/INR - ( 27 Oct 2022 06:43 )   PT: 14.3 sec;   INR: 1.23 ratio         PTT - ( 27 Oct 2022 06:43 )  PTT:27.5 sec                            7.4    10.11 )-----------( 398      ( 27 Oct 2022 05:42 )             22.9                         5.9    9.71  )-----------( 385      ( 26 Oct 2022 21:56 )             19.0                         6.1    9.50  )-----------( 421      ( 26 Oct 2022 17:34 )             19.4       Imaging:  TTE 10/27/2022  ------------------------------------------------------------------------  CONCLUSIONS:  1. Mitral annular calcification, otherwise normal mitral  valve. Minimal mitral regurgitation.  2. Normal left ventricular internal dimensions and wall  thicknesses.  3. Normal left ventricular systolic function. No segmental  wall motion abnormalities.  4. Normal right ventricular size and function.  5. Large pericardial effusion, measuring about  2.2 cm  lateral to the left ventricle.  Moderate pericardial  effusion, measuring about  1.6 cm posterior to the left  ventricle and about  1.0 cm around the LV apex.  Small  pericardial effusion anterior to the right ventricle.  Increased respirophasic variability in the transmitral and  transtricuspid spectral Doppler signals is seen.  In  addition, the right ventricle appears underfilled in the  subcostal view.  Findings may be consistent with early  cardiac tamponade physiology.  *** Compared with echocardiogram of 7/24/2022, a large  pericardial effusion is now seen.           HPI:  Chiki Aleman is a 67 year old with a PMHx notable for ESRD on PD, HTN, DM, HDL, CVA and chronic anemia sent in by PD clinic for acute on chronic anemia. GI consulted for the above.     Mr. Aleman states that he has been having GERMAIN and and general weaknes in addition to c/o L shoulder pain and L neck pain. Notably, pt PD cath was cut at home so he was not able to do proper dialysis at home for about 2 days.  At Nephrology office line was changed and PD fluid was sent for cx and he was given empirical abxs w/dialysate bag w/abx to use at home. He had labs checked during that clinic visit and was noted to have a hgb of 6.1 and referred into the ED for further management. Workup in the ED was notable for  a hgb of 5.9 for which he was transfused 1 unit pRBC. He also underwent a TTE that was notable for a large pericardial effusion, measuring about  2.2 cm lateral to the left ventricle with some c/f early tamponade physiology for which cardiology was consulted.     Currently endorsing ongoing shortness of breath and GERMAIN. Regarding medications, patient denies any ASA or anticoagulation use. Does occasionally use around 1-2 times per month. Denies any prior EGD or colonoscopy. He denies any ongoing n/v or abdominal pain. Endorses brown BMs with no episodes of melena, hematochezia or hematemesis. Patient denies any family history of GI malignancy. Otherwise denies any ongoing dysphagia, odynophagia, early satiety, weight loss.     Allergies:  No Known Allergies    Home Medications:  acetaminophen 325 mg oral tablet: 3 tab(s) orally every 6 hours, As Needed (23 Jul 2022 11:13)  Bumex 1 mg oral tablet: 1 tab(s) orally once a day (23 Jul 2022 11:13)  doxazosin 4 mg oral tablet, extended release: 1 tab(s) orally once a day (in the morning) (23 Jul 2022 11:13)  Lipitor 80 mg oral tablet: 1 tab(s) orally once a day  (Pharmacy filled Lipitor 40mg QD June/2022) (23 Jul 2022 11:13)  senna oral tablet: 2 tab(s) orally once a day (at bedtime), As Needed (23 Jul 2022 11:13)      Hospital Medications:  atorvastatin 80 milliGRAM(s) Oral at bedtime  buMETAnide IVPB 2 milliGRAM(s) IV Intermittent two times a day  dextrose 50% Injectable 25 Gram(s) IV Push once  dextrose 50% Injectable 12.5 Gram(s) IV Push once  dextrose 50% Injectable 25 Gram(s) IV Push once  dextrose Oral Gel 15 Gram(s) Oral once  epoetin heidi-epbx (RETACRIT) Injectable 77451 Unit(s) SubCutaneous Once  glucagon  Injectable 1 milliGRAM(s) IntraMuscular once  insulin lispro (ADMELOG) corrective regimen sliding scale   SubCutaneous three times a day before meals  insulin lispro (ADMELOG) corrective regimen sliding scale   SubCutaneous at bedtime  lidocaine   4% Patch 1 Patch Transdermal every 24 hours      PMHX/PSHX:    Hypertension  ESRD on peritoneal dialysis  CVA (cerebrovascular accident)  Hyperlipidemia  Type 2 diabetes mellitus, with long-term current use of insulin  BPH (benign prostatic hyperplasia)  History of peritoneal dialysis      Family history:  FH: hypertension (Mother). Denies family history of hx of CRC     Social History:   Tob: Denies  EtOH: Denies  Illicit Drugs: Denies    PHYSICAL EXAM:   GENERAL:  No acute distress  HEENT:  +JVD   CHEST:  no respiratory distress  HEART:  Regular rate and rhythm, distant heart sounds   ABDOMEN:  Soft, non-tender, non-distended, normoactive bowel sounds. PD site in place with dressing c/d/i,Small non-bleeding external hemorrhoids with brown stool in the rectal vauly.    EXTREMITIES: No pedal edema  NEURO:  Alert and oriented x 3    Vital Signs:  Vital Signs Last 24 Hrs  T(C): 36.5 (27 Oct 2022 10:58), Max: 37.4 (26 Oct 2022 15:55)  T(F): 97.7 (27 Oct 2022 10:58), Max: 99.4 (26 Oct 2022 15:55)  HR: 75 (27 Oct 2022 10:58) (69 - 88)  BP: 170/70 (27 Oct 2022 10:58) (139/91 - 183/74)  BP(mean): --  RR: 17 (27 Oct 2022 10:58) (16 - 18)  SpO2: 100% (27 Oct 2022 09:05) (94% - 100%)    Parameters below as of 27 Oct 2022 10:58  Patient On (Oxygen Delivery Method): nasal cannula  O2 Flow (L/min): 3    Daily Height in cm: 172.72 (26 Oct 2022 14:39)    Daily     LABS:                        7.4    10.11 )-----------( 398      ( 27 Oct 2022 05:42 )             22.9     Mean Cell Volume: 84.5 fL (10-27-22 @ 05:42)    10-27    136  |  93<L>  |  60<H>  ----------------------------<  160<H>  3.7   |  24  |  7.80<H>    Ca    8.6      27 Oct 2022 05:42  Phos  5.7     10-27  Mg     1.90     10-27    TPro  6.2  /  Alb  3.0<L>  /  TBili  0.4  /  DBili  x   /  AST  18  /  ALT  12  /  AlkPhos  67  10-27    LIVER FUNCTIONS - ( 27 Oct 2022 05:42 )  Alb: 3.0 g/dL / Pro: 6.2 g/dL / ALK PHOS: 67 U/L / ALT: 12 U/L / AST: 18 U/L / GGT: x           PT/INR - ( 27 Oct 2022 06:43 )   PT: 14.3 sec;   INR: 1.23 ratio         PTT - ( 27 Oct 2022 06:43 )  PTT:27.5 sec                            7.4    10.11 )-----------( 398      ( 27 Oct 2022 05:42 )             22.9                         5.9    9.71  )-----------( 385      ( 26 Oct 2022 21:56 )             19.0                         6.1    9.50  )-----------( 421      ( 26 Oct 2022 17:34 )             19.4       Imaging:  TTE 10/27/2022  ------------------------------------------------------------------------  CONCLUSIONS:  1. Mitral annular calcification, otherwise normal mitral  valve. Minimal mitral regurgitation.  2. Normal left ventricular internal dimensions and wall  thicknesses.  3. Normal left ventricular systolic function. No segmental  wall motion abnormalities.  4. Normal right ventricular size and function.  5. Large pericardial effusion, measuring about  2.2 cm  lateral to the left ventricle.  Moderate pericardial  effusion, measuring about  1.6 cm posterior to the left  ventricle and about  1.0 cm around the LV apex.  Small  pericardial effusion anterior to the right ventricle.  Increased respirophasic variability in the transmitral and  transtricuspid spectral Doppler signals is seen.  In  addition, the right ventricle appears underfilled in the  subcostal view.  Findings may be consistent with early  cardiac tamponade physiology.  *** Compared with echocardiogram of 7/24/2022, a large  pericardial effusion is now seen.

## 2022-10-27 NOTE — PROGRESS NOTE ADULT - SUBJECTIVE AND OBJECTIVE BOX
Patient is a 67y old  Male who presents with a chief complaint of GERMAIN pericardial effusion (27 Oct 2022 11:19)      SUBJECTIVE / OVERNIGHT EVENTS:    MEDICATIONS  (STANDING):  atorvastatin 80 milliGRAM(s) Oral at bedtime  buMETAnide Injectable 2 milliGRAM(s) IV Push two times a day  dextrose 50% Injectable 25 Gram(s) IV Push once  dextrose 50% Injectable 12.5 Gram(s) IV Push once  dextrose 50% Injectable 25 Gram(s) IV Push once  dextrose Oral Gel 15 Gram(s) Oral once  epoetin heidi-epbx (RETACRIT) Injectable 58336 Unit(s) SubCutaneous Once  glucagon  Injectable 1 milliGRAM(s) IntraMuscular once  hydrALAZINE 75 milliGRAM(s) Oral every 8 hours  insulin lispro (ADMELOG) corrective regimen sliding scale   SubCutaneous three times a day before meals  insulin lispro (ADMELOG) corrective regimen sliding scale   SubCutaneous at bedtime  lidocaine   4% Patch 1 Patch Transdermal every 24 hours    MEDICATIONS  (PRN):      Vital Signs Last 24 Hrs  T(C): 36.7 (27 Oct 2022 12:29), Max: 37.4 (26 Oct 2022 15:55)  T(F): 98.1 (27 Oct 2022 12:29), Max: 99.4 (26 Oct 2022 15:55)  HR: 77 (27 Oct 2022 12:29) (69 - 88)  BP: 200/76 (27 Oct 2022 12:29) (139/91 - 200/76)  BP(mean): --  RR: 18 (27 Oct 2022 12:29) (16 - 18)  SpO2: 100% (27 Oct 2022 12:29) (94% - 100%)    Parameters below as of 27 Oct 2022 12:29  Patient On (Oxygen Delivery Method): nasal cannula      CAPILLARY BLOOD GLUCOSE      POCT Blood Glucose.: 214 mg/dL (27 Oct 2022 10:38)  POCT Blood Glucose.: 235 mg/dL (27 Oct 2022 01:24)  POCT Blood Glucose.: 266 mg/dL (26 Oct 2022 14:46)    I&O's Summary    26 Oct 2022 07:01  -  27 Oct 2022 07:00  --------------------------------------------------------  IN: 2000 mL / OUT: 3000 mL / NET: -1000 mL    27 Oct 2022 07:01  -  27 Oct 2022 12:57  --------------------------------------------------------  IN: 4000 mL / OUT: 2500 mL / NET: 1500 mL        PHYSICAL EXAM:  GENERAL: NAD, well-developed  HEAD:  Atraumatic, Normocephalic  EYES: EOMI, PERRLA, conjunctiva and sclera clear  NECK: Supple, No JVD  CHEST/LUNG: Clear to auscultation bilaterally; No wheeze  HEART: Regular rate and rhythm; No murmurs, rubs, or gallops  ABDOMEN: Soft, Nontender, Nondistended; Bowel sounds present  EXTREMITIES:  2+ Peripheral Pulses, No clubbing, cyanosis, or edema  PSYCH: AAOx3  NEUROLOGY: non-focal  SKIN: No rashes or lesions    LABS:                        7.4    10.11 )-----------( 398      ( 27 Oct 2022 05:42 )             22.9     10-27    136  |  93<L>  |  60<H>  ----------------------------<  160<H>  3.7   |  24  |  7.80<H>    Ca    8.6      27 Oct 2022 05:42  Phos  5.7     10-27  Mg     1.90     10-27    TPro  6.2  /  Alb  3.0<L>  /  TBili  0.4  /  DBili  x   /  AST  18  /  ALT  12  /  AlkPhos  67  10-27    PT/INR - ( 27 Oct 2022 06:43 )   PT: 14.3 sec;   INR: 1.23 ratio         PTT - ( 27 Oct 2022 06:43 )  PTT:27.5 sec          RADIOLOGY & ADDITIONAL TESTS:    Imaging Personally Reviewed:    Consultant(s) Notes Reviewed:      Care Discussed with Consultants/Other Providers:   Patient is a 67y old  Male who presents with a chief complaint of GERMAIN pericardial effusion (27 Oct 2022 11:19)      SUBJECTIVE / OVERNIGHT EVENTS:    MEDICATIONS  (STANDING):  atorvastatin 80 milliGRAM(s) Oral at bedtime  buMETAnide Injectable 2 milliGRAM(s) IV Push two times a day  dextrose 50% Injectable 25 Gram(s) IV Push once  dextrose 50% Injectable 12.5 Gram(s) IV Push once  dextrose 50% Injectable 25 Gram(s) IV Push once  dextrose Oral Gel 15 Gram(s) Oral once  epoetin heidi-epbx (RETACRIT) Injectable 59140 Unit(s) SubCutaneous Once  glucagon  Injectable 1 milliGRAM(s) IntraMuscular once  hydrALAZINE 75 milliGRAM(s) Oral every 8 hours  insulin lispro (ADMELOG) corrective regimen sliding scale   SubCutaneous three times a day before meals  insulin lispro (ADMELOG) corrective regimen sliding scale   SubCutaneous at bedtime  lidocaine   4% Patch 1 Patch Transdermal every 24 hours    MEDICATIONS  (PRN):      Vital Signs Last 24 Hrs  T(C): 36.7 (27 Oct 2022 12:29), Max: 37.4 (26 Oct 2022 15:55)  T(F): 98.1 (27 Oct 2022 12:29), Max: 99.4 (26 Oct 2022 15:55)  HR: 77 (27 Oct 2022 12:29) (69 - 88)  BP: 200/76 (27 Oct 2022 12:29) (139/91 - 200/76)  BP(mean): --  RR: 18 (27 Oct 2022 12:29) (16 - 18)  SpO2: 100% (27 Oct 2022 12:29) (94% - 100%)    Parameters below as of 27 Oct 2022 12:29  Patient On (Oxygen Delivery Method): nasal cannula      CAPILLARY BLOOD GLUCOSE      POCT Blood Glucose.: 214 mg/dL (27 Oct 2022 10:38)  POCT Blood Glucose.: 235 mg/dL (27 Oct 2022 01:24)  POCT Blood Glucose.: 266 mg/dL (26 Oct 2022 14:46)    I&O's Summary    26 Oct 2022 07:01  -  27 Oct 2022 07:00  --------------------------------------------------------  IN: 2000 mL / OUT: 3000 mL / NET: -1000 mL    27 Oct 2022 07:01  -  27 Oct 2022 12:57  --------------------------------------------------------  IN: 4000 mL / OUT: 2500 mL / NET: 1500 mL        PHYSICAL EXAM:  GENERAL: NAD, well-developed  HEAD:  Atraumatic, Normocephalic  EYES: EOMI, PERRLA, conjunctiva and sclera clear  NECK: Supple, No JVD  CHEST/LUNG: Clear to auscultation bilaterally; No wheeze  HEART: Regular rate and rhythm; No murmurs, rubs, or gallops  ABDOMEN: Soft, Nontender, Nondistended; Bowel sounds present  EXTREMITIES:  2+ Peripheral Pulses, No clubbing, cyanosis, or edema  PSYCH: AAOx3  NEUROLOGY: non-focal  SKIN: No rashes or lesions    LABS:                        7.4    10.11 )-----------( 398      ( 27 Oct 2022 05:42 )             22.9     10-27    136  |  93<L>  |  60<H>  ----------------------------<  160<H>  3.7   |  24  |  7.80<H>    Ca    8.6      27 Oct 2022 05:42  Phos  5.7     10-27  Mg     1.90     10-27    TPro  6.2  /  Alb  3.0<L>  /  TBili  0.4  /  DBili  x   /  AST  18  /  ALT  12  /  AlkPhos  67  10-27    PT/INR - ( 27 Oct 2022 06:43 )   PT: 14.3 sec;   INR: 1.23 ratio         PTT - ( 27 Oct 2022 06:43 )  PTT:27.5 sec          RADIOLOGY & ADDITIONAL TESTS:    Imaging Personally Reviewed: < from: Transthoracic Echocardiogram (10.27.22 @ 08:02) >  CONCLUSIONS:  1. Mitral annular calcification, otherwise normal mitral  valve. Minimal mitral regurgitation.  2. Normal left ventricular internal dimensions and wall  thicknesses.  3. Normal left ventricular systolic function. No segmental  wall motion abnormalities.  4. Normal right ventricular size and function.  5. Large pericardial effusion, measuring about  2.2 cm  lateral to the left ventricle.  Moderate pericardial  effusion, measuring about  1.6 cm posterior to the left  ventricle and about  1.0 cm around the LV apex.  Small  pericardial effusion anterior to the right ventricle.  Increased respirophasic variability in the transmitral and  transtricuspid spectral Doppler signals is seen.  In  addition, the right ventricle appears underfilled in the  subcostal view.  Findings may be consistent with early  cardiac tamponade physiology.  *** Compared with echocardiogram of 7/24/2022, a large  pericardial effusion is now seen.    < end of copied text >    < from: CT Chest No Cont (10.27.22 @ 10:13) >  IMPRESSION:    Moderate pericardial effusion, increased in size from prior CT.    Bilateral pleural effusions,left greater than right, with complete   atelectasis of left lower lobe, increased in size from prior CT from   10/15/2022.      < end of copied text >  7.4    Consultant(s) Notes Reviewed:      Care Discussed with Consultants/Other Providers:   Patient is a 67y old  Male who presents with a chief complaint of GERMAIN pericardial effusion (27 Oct 2022 11:19)      SUBJECTIVE / OVERNIGHT EVENTS:  Patient has no new complaints. Denies cp, SOB, abdominal pain, N/V/D     MEDICATIONS  (STANDING):  atorvastatin 80 milliGRAM(s) Oral at bedtime  buMETAnide Injectable 2 milliGRAM(s) IV Push two times a day  dextrose 50% Injectable 25 Gram(s) IV Push once  dextrose 50% Injectable 12.5 Gram(s) IV Push once  dextrose 50% Injectable 25 Gram(s) IV Push once  dextrose Oral Gel 15 Gram(s) Oral once  epoetin heidi-epbx (RETACRIT) Injectable 63502 Unit(s) SubCutaneous Once  glucagon  Injectable 1 milliGRAM(s) IntraMuscular once  hydrALAZINE 75 milliGRAM(s) Oral every 8 hours  insulin lispro (ADMELOG) corrective regimen sliding scale   SubCutaneous three times a day before meals  insulin lispro (ADMELOG) corrective regimen sliding scale   SubCutaneous at bedtime  lidocaine   4% Patch 1 Patch Transdermal every 24 hours    MEDICATIONS  (PRN):      Vital Signs Last 24 Hrs  T(C): 36.7 (27 Oct 2022 12:29), Max: 37.4 (26 Oct 2022 15:55)  T(F): 98.1 (27 Oct 2022 12:29), Max: 99.4 (26 Oct 2022 15:55)  HR: 77 (27 Oct 2022 12:29) (69 - 88)  BP: 200/76 (27 Oct 2022 12:29) (139/91 - 200/76)  BP(mean): --  RR: 18 (27 Oct 2022 12:29) (16 - 18)  SpO2: 100% (27 Oct 2022 12:29) (94% - 100%)    Parameters below as of 27 Oct 2022 12:29  Patient On (Oxygen Delivery Method): nasal cannula      CAPILLARY BLOOD GLUCOSE      POCT Blood Glucose.: 214 mg/dL (27 Oct 2022 10:38)  POCT Blood Glucose.: 235 mg/dL (27 Oct 2022 01:24)  POCT Blood Glucose.: 266 mg/dL (26 Oct 2022 14:46)    I&O's Summary    26 Oct 2022 07:01  -  27 Oct 2022 07:00  --------------------------------------------------------  IN: 2000 mL / OUT: 3000 mL / NET: -1000 mL    27 Oct 2022 07:01  -  27 Oct 2022 12:57  --------------------------------------------------------  IN: 4000 mL / OUT: 2500 mL / NET: 1500 mL        PHYSICAL EXAM:  GENERAL: NAD, well-developed  HEAD:  Atraumatic, Normocephalic  EYES: EOMI, PERRLA, conjunctiva and sclera clear  NECK: Supple, No JVD  CHEST/LUNG: Decreased BS  bilaterally; No wheeze  HEART: Regular rate and rhythm; No murmurs, rubs, or gallops  ABDOMEN: Soft, Nontender, Nondistended; Bowel sounds present  EXTREMITIES:  2+ Peripheral Pulses, No clubbing, cyanosis, or edema  PSYCH: AAOx3  NEUROLOGY: non-focal  SKIN: No rashes or lesions    LABS:                        7.4    10.11 )-----------( 398      ( 27 Oct 2022 05:42 )             22.9     10-27    136  |  93<L>  |  60<H>  ----------------------------<  160<H>  3.7   |  24  |  7.80<H>    Ca    8.6      27 Oct 2022 05:42  Phos  5.7     10-27  Mg     1.90     10-27    TPro  6.2  /  Alb  3.0<L>  /  TBili  0.4  /  DBili  x   /  AST  18  /  ALT  12  /  AlkPhos  67  10-27    PT/INR - ( 27 Oct 2022 06:43 )   PT: 14.3 sec;   INR: 1.23 ratio         PTT - ( 27 Oct 2022 06:43 )  PTT:27.5 sec          RADIOLOGY & ADDITIONAL TESTS:    Imaging Personally Reviewed: < from: Transthoracic Echocardiogram (10.27.22 @ 08:02) >  CONCLUSIONS:  1. Mitral annular calcification, otherwise normal mitral  valve. Minimal mitral regurgitation.  2. Normal left ventricular internal dimensions and wall  thicknesses.  3. Normal left ventricular systolic function. No segmental  wall motion abnormalities.  4. Normal right ventricular size and function.  5. Large pericardial effusion, measuring about  2.2 cm  lateral to the left ventricle.  Moderate pericardial  effusion, measuring about  1.6 cm posterior to the left  ventricle and about  1.0 cm around the LV apex.  Small  pericardial effusion anterior to the right ventricle.  Increased respirophasic variability in the transmitral and  transtricuspid spectral Doppler signals is seen.  In  addition, the right ventricle appears underfilled in the  subcostal view.  Findings may be consistent with early  cardiac tamponade physiology.  *** Compared with echocardiogram of 7/24/2022, a large  pericardial effusion is now seen.    < end of copied text >    < from: CT Chest No Cont (10.27.22 @ 10:13) >  IMPRESSION:    Moderate pericardial effusion, increased in size from prior CT.    Bilateral pleural effusions,left greater than right, with complete   atelectasis of left lower lobe, increased in size from prior CT from   10/15/2022.      < end of copied text >  7.4    Consultant(s) Notes Reviewed:      Care Discussed with Consultants/Other Providers:

## 2022-10-28 ENCOUNTER — RESULT REVIEW (OUTPATIENT)
Age: 67
End: 2022-10-28

## 2022-10-28 LAB
ANION GAP SERPL CALC-SCNC: 12 MMOL/L — SIGNIFICANT CHANGE UP (ref 7–14)
ANION GAP SERPL CALC-SCNC: 17 MMOL/L — HIGH (ref 7–14)
B PERT IGG+IGM PNL SER: ABNORMAL
BUN SERPL-MCNC: 54 MG/DL — HIGH (ref 7–23)
BUN SERPL-MCNC: 54 MG/DL — HIGH (ref 7–23)
CALCIUM SERPL-MCNC: 8.4 MG/DL — SIGNIFICANT CHANGE UP (ref 8.4–10.5)
CALCIUM SERPL-MCNC: 8.8 MG/DL — SIGNIFICANT CHANGE UP (ref 8.4–10.5)
CHLORIDE SERPL-SCNC: 92 MMOL/L — LOW (ref 98–107)
CHLORIDE SERPL-SCNC: 96 MMOL/L — LOW (ref 98–107)
CO2 SERPL-SCNC: 25 MMOL/L — SIGNIFICANT CHANGE UP (ref 22–31)
CO2 SERPL-SCNC: 29 MMOL/L — SIGNIFICANT CHANGE UP (ref 22–31)
COLOR FLD: ABNORMAL
CREAT SERPL-MCNC: 7.64 MG/DL — HIGH (ref 0.5–1.3)
CREAT SERPL-MCNC: 7.86 MG/DL — HIGH (ref 0.5–1.3)
EGFR: 7 ML/MIN/1.73M2 — LOW
EGFR: 7 ML/MIN/1.73M2 — LOW
EOSINOPHIL # FLD: 1 % — SIGNIFICANT CHANGE UP
FLUID INTAKE SUBSTANCE CLASS: SIGNIFICANT CHANGE UP
FOLATE+VIT B12 SERBLD-IMP: 0 % — SIGNIFICANT CHANGE UP
GLUCOSE BLDC GLUCOMTR-MCNC: 117 MG/DL — HIGH (ref 70–99)
GLUCOSE BLDC GLUCOMTR-MCNC: 119 MG/DL — HIGH (ref 70–99)
GLUCOSE BLDC GLUCOMTR-MCNC: 224 MG/DL — HIGH (ref 70–99)
GLUCOSE BLDC GLUCOMTR-MCNC: 244 MG/DL — HIGH (ref 70–99)
GLUCOSE BLDC GLUCOMTR-MCNC: 294 MG/DL — HIGH (ref 70–99)
GLUCOSE BLDC GLUCOMTR-MCNC: 360 MG/DL — HIGH (ref 70–99)
GLUCOSE SERPL-MCNC: 107 MG/DL — HIGH (ref 70–99)
GLUCOSE SERPL-MCNC: 196 MG/DL — HIGH (ref 70–99)
HCT VFR BLD CALC: 22 % — LOW (ref 39–50)
HCT VFR BLD CALC: 25.1 % — LOW (ref 39–50)
HGB BLD-MCNC: 7.1 G/DL — LOW (ref 13–17)
HGB BLD-MCNC: 8 G/DL — LOW (ref 13–17)
IGG4 SER-MCNC: 59 MG/DL — SIGNIFICANT CHANGE UP (ref 2–96)
LYMPHOCYTES # FLD: 11 % — SIGNIFICANT CHANGE UP
MAGNESIUM SERPL-MCNC: 1.8 MG/DL — SIGNIFICANT CHANGE UP (ref 1.6–2.6)
MAGNESIUM SERPL-MCNC: 1.9 MG/DL — SIGNIFICANT CHANGE UP (ref 1.6–2.6)
MCHC RBC-ENTMCNC: 26.7 PG — LOW (ref 27–34)
MCHC RBC-ENTMCNC: 26.9 PG — LOW (ref 27–34)
MCHC RBC-ENTMCNC: 31.9 GM/DL — LOW (ref 32–36)
MCHC RBC-ENTMCNC: 32.3 GM/DL — SIGNIFICANT CHANGE UP (ref 32–36)
MCV RBC AUTO: 82.7 FL — SIGNIFICANT CHANGE UP (ref 80–100)
MCV RBC AUTO: 84.5 FL — SIGNIFICANT CHANGE UP (ref 80–100)
MESOTHL CELL # FLD: 0 % — SIGNIFICANT CHANGE UP
MONOS+MACROS # FLD: 38 % — SIGNIFICANT CHANGE UP
MRSA PCR RESULT.: SIGNIFICANT CHANGE UP
NEUTROPHILS-BODY FLUID: 50 % — SIGNIFICANT CHANGE UP
NRBC # BLD: 0 /100 WBCS — SIGNIFICANT CHANGE UP (ref 0–0)
NRBC # BLD: 0 /100 WBCS — SIGNIFICANT CHANGE UP (ref 0–0)
NRBC # FLD: 0 K/UL — SIGNIFICANT CHANGE UP (ref 0–0)
NRBC # FLD: 0 K/UL — SIGNIFICANT CHANGE UP (ref 0–0)
OTHER CELLS FLD MANUAL: 0 % — SIGNIFICANT CHANGE UP
PHOSPHATE SERPL-MCNC: 6.3 MG/DL — HIGH (ref 2.5–4.5)
PHOSPHATE SERPL-MCNC: 6.4 MG/DL — HIGH (ref 2.5–4.5)
PLATELET # BLD AUTO: 387 K/UL — SIGNIFICANT CHANGE UP (ref 150–400)
PLATELET # BLD AUTO: 445 K/UL — HIGH (ref 150–400)
POTASSIUM SERPL-MCNC: 3.5 MMOL/L — SIGNIFICANT CHANGE UP (ref 3.5–5.3)
POTASSIUM SERPL-MCNC: 3.6 MMOL/L — SIGNIFICANT CHANGE UP (ref 3.5–5.3)
POTASSIUM SERPL-SCNC: 3.5 MMOL/L — SIGNIFICANT CHANGE UP (ref 3.5–5.3)
POTASSIUM SERPL-SCNC: 3.6 MMOL/L — SIGNIFICANT CHANGE UP (ref 3.5–5.3)
RBC # BLD: 2.66 M/UL — LOW (ref 4.2–5.8)
RBC # BLD: 2.97 M/UL — LOW (ref 4.2–5.8)
RBC # FLD: 15.7 % — HIGH (ref 10.3–14.5)
RBC # FLD: 15.8 % — HIGH (ref 10.3–14.5)
RCV VOL RI: HIGH CELLS/UL (ref 0–5)
S AUREUS DNA NOSE QL NAA+PROBE: DETECTED
SODIUM SERPL-SCNC: 134 MMOL/L — LOW (ref 135–145)
SODIUM SERPL-SCNC: 137 MMOL/L — SIGNIFICANT CHANGE UP (ref 135–145)
SPECIMEN SOURCE FLD: SIGNIFICANT CHANGE UP
TOTAL NUCLEATED CELL COUNT, BODY FLUID: 4687 CELLS/UL — HIGH (ref 0–5)
TUBE TYPE: SIGNIFICANT CHANGE UP
WBC # BLD: 10.64 K/UL — HIGH (ref 3.8–10.5)
WBC # BLD: 11.07 K/UL — HIGH (ref 3.8–10.5)
WBC # FLD AUTO: 10.64 K/UL — HIGH (ref 3.8–10.5)
WBC # FLD AUTO: 11.07 K/UL — HIGH (ref 3.8–10.5)

## 2022-10-28 PROCEDURE — 88112 CYTOPATH CELL ENHANCE TECH: CPT | Mod: 26

## 2022-10-28 PROCEDURE — 33019 PERQ PRCRD DRG INSJ CATH CT: CPT

## 2022-10-28 PROCEDURE — 99233 SBSQ HOSP IP/OBS HIGH 50: CPT

## 2022-10-28 PROCEDURE — 88305 TISSUE EXAM BY PATHOLOGIST: CPT | Mod: 26

## 2022-10-28 RX ORDER — AMLODIPINE BESYLATE 2.5 MG/1
10 TABLET ORAL DAILY
Refills: 0 | Status: DISCONTINUED | OUTPATIENT
Start: 2022-10-28 | End: 2022-11-03

## 2022-10-28 RX ORDER — IPRATROPIUM/ALBUTEROL SULFATE 18-103MCG
3 AEROSOL WITH ADAPTER (GRAM) INHALATION ONCE
Refills: 0 | Status: COMPLETED | OUTPATIENT
Start: 2022-10-28 | End: 2022-10-28

## 2022-10-28 RX ORDER — INSULIN LISPRO 100/ML
VIAL (ML) SUBCUTANEOUS EVERY 6 HOURS
Refills: 0 | Status: DISCONTINUED | OUTPATIENT
Start: 2022-10-28 | End: 2022-10-29

## 2022-10-28 RX ORDER — LABETALOL HCL 100 MG
5 TABLET ORAL ONCE
Refills: 0 | Status: COMPLETED | OUTPATIENT
Start: 2022-10-28 | End: 2022-10-28

## 2022-10-28 RX ADMIN — Medication 75 MILLIGRAM(S): at 06:11

## 2022-10-28 RX ADMIN — BUMETANIDE 2 MILLIGRAM(S): 0.25 INJECTION INTRAMUSCULAR; INTRAVENOUS at 06:11

## 2022-10-28 RX ADMIN — LIDOCAINE 1 PATCH: 4 CREAM TOPICAL at 18:05

## 2022-10-28 RX ADMIN — Medication 2: at 15:34

## 2022-10-28 RX ADMIN — Medication 75 MILLIGRAM(S): at 22:46

## 2022-10-28 RX ADMIN — Medication 1 APPLICATION(S): at 07:12

## 2022-10-28 RX ADMIN — LOSARTAN POTASSIUM 25 MILLIGRAM(S): 100 TABLET, FILM COATED ORAL at 06:11

## 2022-10-28 RX ADMIN — Medication 5 MILLIGRAM(S): at 20:49

## 2022-10-28 RX ADMIN — CHLORHEXIDINE GLUCONATE 1 APPLICATION(S): 213 SOLUTION TOPICAL at 10:15

## 2022-10-28 RX ADMIN — LIDOCAINE 1 PATCH: 4 CREAM TOPICAL at 06:13

## 2022-10-28 RX ADMIN — BUMETANIDE 2 MILLIGRAM(S): 0.25 INJECTION INTRAMUSCULAR; INTRAVENOUS at 18:01

## 2022-10-28 RX ADMIN — Medication 75 MILLIGRAM(S): at 15:31

## 2022-10-28 RX ADMIN — ATORVASTATIN CALCIUM 80 MILLIGRAM(S): 80 TABLET, FILM COATED ORAL at 22:46

## 2022-10-28 RX ADMIN — Medication 3: at 10:14

## 2022-10-28 NOTE — CHART NOTE - NSCHARTNOTEFT_GEN_A_CORE
Interventional Radiology Pre-Procedure Note    This is a 67y Male    Procedure: Pericardial effusion    Diagnosis/Indication: Patient is a 67y old  Male who presents with a chief complaint of GERMAIN pericardial effusion (28 Oct 2022 08:27)      PAST MEDICAL & SURGICAL HISTORY:  Hypertension      ESRD on peritoneal dialysis      CVA (cerebrovascular accident)  2010 without residual effects      Hyperlipidemia      Type 2 diabetes mellitus, with long-term current use of insulin      BPH (benign prostatic hyperplasia)      History of peritoneal dialysis  s/p PD catheter placement           Male    Allergies: No Known Allergies      LABS:  CBC Full  -  ( 27 Oct 2022 21:07 )  WBC Count : 11.32 K/uL  RBC Count : 3.08 M/uL  Hemoglobin : 8.2 g/dL  Hematocrit : 26.1 %  Platelet Count - Automated : 479 K/uL  Mean Cell Volume : 84.7 fL  Mean Cell Hemoglobin : 26.6 pg  Mean Cell Hemoglobin Concentration : 31.4 gm/dL  Auto Neutrophil # : x  Auto Lymphocyte # : x  Auto Monocyte # : x  Auto Eosinophil # : x  Auto Basophil # : x  Auto Neutrophil % : x  Auto Lymphocyte % : x  Auto Monocyte % : x  Auto Eosinophil % : x  Auto Basophil % : x    10-28    134<L>  |  92<L>  |  54<H>  ----------------------------<  107<H>  3.5   |  25  |  7.86<H>    Ca    8.4      28 Oct 2022 06:07  Phos  6.3     10-28  Mg     1.80     10-28    TPro  6.2  /  Alb  3.0<L>  /  TBili  0.4  /  DBili  x   /  AST  18  /  ALT  12  /  AlkPhos  67  10-27    PT/INR - ( 27 Oct 2022 06:43 )   PT: 14.3 sec;   INR: 1.23 ratio         PTT - ( 27 Oct 2022 06:43 )  PTT:27.5 sec Interventional Radiology Pre-Procedure Note    This is a 67y Male    Procedure: Pericardial drain    Diagnosis/Indication: Patient is a 67y old  Male who presents with a chief complaint of GERMAIN pericardial effusion (28 Oct 2022 08:27)      PAST MEDICAL & SURGICAL HISTORY:  Hypertension      ESRD on peritoneal dialysis      CVA (cerebrovascular accident)  2010 without residual effects      Hyperlipidemia      Type 2 diabetes mellitus, with long-term current use of insulin      BPH (benign prostatic hyperplasia)      History of peritoneal dialysis  s/p PD catheter placement           Male    Allergies: No Known Allergies      LABS:  CBC Full  -  ( 27 Oct 2022 21:07 )  WBC Count : 11.32 K/uL  RBC Count : 3.08 M/uL  Hemoglobin : 8.2 g/dL  Hematocrit : 26.1 %  Platelet Count - Automated : 479 K/uL  Mean Cell Volume : 84.7 fL  Mean Cell Hemoglobin : 26.6 pg  Mean Cell Hemoglobin Concentration : 31.4 gm/dL  Auto Neutrophil # : x  Auto Lymphocyte # : x  Auto Monocyte # : x  Auto Eosinophil # : x  Auto Basophil # : x  Auto Neutrophil % : x  Auto Lymphocyte % : x  Auto Monocyte % : x  Auto Eosinophil % : x  Auto Basophil % : x    10-28    134<L>  |  92<L>  |  54<H>  ----------------------------<  107<H>  3.5   |  25  |  7.86<H>    Ca    8.4      28 Oct 2022 06:07  Phos  6.3     10-28  Mg     1.80     10-28    TPro  6.2  /  Alb  3.0<L>  /  TBili  0.4  /  DBili  x   /  AST  18  /  ALT  12  /  AlkPhos  67  10-27    PT/INR - ( 27 Oct 2022 06:43 )   PT: 14.3 sec;   INR: 1.23 ratio         PTT - ( 27 Oct 2022 06:43 )  PTT:27.5 sec

## 2022-10-28 NOTE — PROGRESS NOTE ADULT - ASSESSMENT
67M p/w ESRD on PD, HTN, DM, HDL, CVA, Anemia, sent in by PD clinic due to low Hgb. Renal consulted for ESRD/PD Mx.    ESRD on peritoneal dialysis.   K ok  pulm edema on prelim CXR, pt not in resp distress  Informed consent for PD obtained from pt. in chart   Peritoneal Dialysis increase PD fluid glucose to 4.25% to improve UF  continue Bumex 2mg IV BID also  renal diet, fluid restriction 1.2L/day  dose all meds for ESRD  CONT home sevelamer as phos binders    Pericardial effusion  due for pericardiocentesis today  depending on quality of effusion might need transition to HD  continue high UF for now    Anemia in ckd,   likely GIB- Hb <goal. outpt Hb from 10/25/22 was 6.6, was getting BRITTANIE outpt  awaiting outpt colonoscopy, never had it   monitor H/H closely, pending now  transfuse PRBC 1 unit     HTN,   uncontrolled-edema +  increase UF on PD      For any question, call:  Cell # [phone]  Pager # [phone]  Callback # [phone]

## 2022-10-28 NOTE — PRE PROCEDURE NOTE - PRE PROCEDURE EVALUATION
Interventional Radiology Pre-Procedure Note      Diagnosis/Indication: Patient is a 67y old  Male who presents with a chief complaint of GERMAIN pericardial effusion. Patient not improved with peritoneal dialysis with persistent shortness of breath. Early tamponade physiology on echo. Plan for pericardial drain placement       PAST MEDICAL & SURGICAL HISTORY:  Hypertension      ESRD on peritoneal dialysis      CVA (cerebrovascular accident)  2010 without residual effects      Hyperlipidemia      Type 2 diabetes mellitus, with long-term current use of insulin      BPH (benign prostatic hyperplasia)      History of peritoneal dialysis  s/p PD catheter placement           Allergies: No Known Allergies      LABS:                        8.0    11.07 )-----------( 387      ( 28 Oct 2022 16:00 )             25.1     10-28    137  |  96<L>  |  54<H>  ----------------------------<  196<H>  3.6   |  29  |  7.64<H>    Ca    8.8      28 Oct 2022 16:00  Phos  6.4     10-28  Mg     1.90     10-28    TPro  6.2  /  Alb  3.0<L>  /  TBili  0.4  /  DBili  x   /  AST  18  /  ALT  12  /  AlkPhos  67  10-27    PT/INR - ( 27 Oct 2022 06:43 )   PT: 14.3 sec;   INR: 1.23 ratio         PTT - ( 27 Oct 2022 06:43 )  PTT:27.5 sec    Procedure/ risks/ benefits were explained, informed consent obtained from patient, verbalizes understanding.

## 2022-10-28 NOTE — PROGRESS NOTE ADULT - SUBJECTIVE AND OBJECTIVE BOX
[facility] - SOLOMON Hurtado / SOLOMON Foley / KANDICE Shanks/ SOLOMON Broderick/ SOLOMON Allan/ NOMI Mao / VIRGILIO Marks / BENJY Woo  ---------------------------------------------------------------------------------------------------------------  seen and examined today for ESRD  Interval : plan for pericardiocentesis today  VITALS:  T(F): 97.7 (10-28-22 @ 10:40), Max: 98.9 (10-27-22 @ 22:30)  HR: 71 (10-28-22 @ 10:40)  BP: 159/71 (10-28-22 @ 10:40)  RR: 18 (10-28-22 @ 10:40)  SpO2: 100% (10-28-22 @ 10:40)  Wt(kg): --    10-27 @ 07:01  -  10-28 @ 07:00  --------------------------------------------------------  IN: 04253 mL / OUT: 16049 mL / NET: -50 mL    10-28 @ 07:01  -  10-28 @ 11:09  --------------------------------------------------------  IN: 2000 mL / OUT: 2600 mL / NET: -600 mL      Physical Exam :-  Constitutional: NAD  Neck: Supple.  Respiratory: Bilateral equal breath sounds,  Cardiovascular: S1, S2 normal,  Gastrointestinal: Bowel Sounds present, soft, non tender.  Extremities: 2+ edema  Neurological: Alert and Oriented x 3, no focal deficits  Psychiatric: Normal mood, normal affect  Data:-  Allergies :   No Known Allergies    Hospital Medications:   MEDICATIONS  (STANDING):  albuterol/ipratropium for Nebulization. 3 milliLiter(s) Nebulizer once  amLODIPine   Tablet 10 milliGRAM(s) Oral daily  atorvastatin 80 milliGRAM(s) Oral at bedtime  buMETAnide Injectable 2 milliGRAM(s) IV Push two times a day  chlorhexidine 2% Cloths 1 Application(s) Topical <User Schedule>  dextrose 50% Injectable 25 Gram(s) IV Push once  dextrose 50% Injectable 12.5 Gram(s) IV Push once  dextrose 50% Injectable 25 Gram(s) IV Push once  dextrose Oral Gel 15 Gram(s) Oral once  epoetin heidi-epbx (RETACRIT) Injectable 51512 Unit(s) SubCutaneous Once  gentamicin 0.1% Cream 1 Application(s) Topical <User Schedule>  glucagon  Injectable 1 milliGRAM(s) IntraMuscular once  hydrALAZINE 75 milliGRAM(s) Oral every 8 hours  insulin lispro (ADMELOG) corrective regimen sliding scale   SubCutaneous every 6 hours  lidocaine   4% Patch 1 Patch Transdermal every 24 hours  losartan 25 milliGRAM(s) Oral daily    10-28    134<L>  |  92<L>  |  54<H>  ----------------------------<  107<H>  3.5   |  25  |  7.86<H>    Ca    8.4      28 Oct 2022 06:07  Phos  6.3     10-28  Mg     1.80     10-28    TPro  6.2  /  Alb  3.0<L>  /  TBili  0.4  /  DBili      /  AST  18  /  ALT  12  /  AlkPhos  67  10-27    Creatinine Trend: 7.86 <--, 7.80 <--, 7.34 <--                        7.1    10.64 )-----------( 445      ( 28 Oct 2022 06:07 )             22.0

## 2022-10-28 NOTE — PROGRESS NOTE ADULT - PROBLEM SELECTOR PLAN 5
- Per patient he sustained a fracture to his left shoulder but can't recall the exact mechanism 3 months ago  - Xray showing Known left distal clavicular fracture, with either fracture nonunion or incomplete healing.  - Will consult ortho consult given concern for non union of fracture.   - Pain control  - Continue Lidocaine patch

## 2022-10-28 NOTE — PROVIDER CONTACT NOTE (OTHER) - RECOMMENDATIONS
Patient will resume CAPD tomorrow 10/29/22.
As per provider Iraida Rosas (#[id number]) administer bumex at 1730 if the patient is still on th unit. RN will continue to monitor.
As per provider Iraida Rosas (#[id number]) recheck BP in an hour. RN will continue to monitor.

## 2022-10-28 NOTE — PROGRESS NOTE ADULT - ASSESSMENT
68 yo M PMH ESRD on PD, HTN, DM, HDL, CVA, Anemia, sent in by PD clinic due to low Hgb. Endorses GERMAIN and general weakness. In ED Hg/hct 5.9/19, CXR: mod pulm edema with small b/l pleural effusions. Seen by nephrology with plan to start PD tonight and transfuse with 2 PRBC. Cardiology consult called for Pericardial effusion seen on POCUS with early tamponade physiology.    #Pericardial Effusion - not in clinical tamponade. TTE shows early tamponade physiology 1.6cm posterior to LV 2.2cm lateral to LV 1cm around LV apex and small anterior to RV with respirophasic variability in transmitral and transtricuspid spectral doppler signals. DDx malignancy vs. infectious vs. inflammatory  - Plan for IR to intervene today   - Monitor on tele     #Hypertension - remains uncontrolled   - Started on Amlodipine 10mg  Note not final until signed by attending     #Volume Overload - likely in the setting of renal disease on PD EF 64%  - On Bumex as per renal     #Anemia - FOBT negative  - GI recs appreciated  - Anemia w/u as per primary team      Note not final until signed by attending      Smita Clemons MD  Cardiology Fellow PGY-5  Phone: [phone]    For all New Consults  [email or web address]   Login: ko- Continue Losartan 25mg, hydralazine 75mg q8h

## 2022-10-28 NOTE — PATIENT PROFILE ADULT - FALL HARM RISK - HARM RISK INTERVENTIONS

## 2022-10-28 NOTE — PROGRESS NOTE ADULT - PROBLEM SELECTOR PLAN 4
- Likely anemia of chronic disease vs secondary to occult bleed, thought patient does not endorse any hematochezia/melena   -GI consult appreciated  - Hb improved s/p 1 Unit PRBcs  - Transfuse <7  - Continue Retacrit

## 2022-10-28 NOTE — PROGRESS NOTE ADULT - PROBLEM SELECTOR PLAN 10
Diet: NPO for now pending tentative pericardiocentesis  DVT Ppx: SCD for now given anemia  - Dispo: Pending cardiac workup and intervention Diet: NPO for now pending tentative pericardiocentesis  DVT Ppx: SCD for now given anemia  - Dispo: Pending cardiac workup and intervention    Spoke to Family at bedside and sister in law (Dr Escobar)  [phone]

## 2022-10-28 NOTE — PROGRESS NOTE ADULT - SUBJECTIVE AND OBJECTIVE BOX
Patient seen and examined at bedside.    Overnight Events:     REVIEW OF SYSTEMS:  General: no fatigue/malaise, weight loss/gain.  Skin: no rashes.  Ophthalmologic: no blurred vision, no loss of vision. 	  ENT: no sore throat, rhinorrhea, sinus congestion.  Respiratory: no SOB, cough or wheeze.  CV: No chest pain. No palpitations.   Gastrointestinal:  no N/V/D, no melena/hematemesis/hematochezia.  Genitourinary: no dysuria/hesitancy or hematuria.  Musculoskeletal: no myalgias or arthralgias.  Neurological: no changes in vision or hearing, no lightheadedness/dizziness, no syncope/near syncope	  Psychiatric: no unusual stress/anxiety.   Hematology/Lymphatics: no unusual bleeding, bruising and no lymphadenopathy.  Endocrine: no unusual thirst.        Current Meds:  atorvastatin 80 milliGRAM(s) Oral at bedtime  buMETAnide Injectable 2 milliGRAM(s) IV Push two times a day  chlorhexidine 2% Cloths 1 Application(s) Topical <User Schedule>  dextrose 50% Injectable 25 Gram(s) IV Push once  dextrose 50% Injectable 12.5 Gram(s) IV Push once  dextrose 50% Injectable 25 Gram(s) IV Push once  dextrose Oral Gel 15 Gram(s) Oral once  epoetin heidi-epbx (RETACRIT) Injectable 27208 Unit(s) SubCutaneous Once  gentamicin 0.1% Cream 1 Application(s) Topical <User Schedule>  glucagon  Injectable 1 milliGRAM(s) IntraMuscular once  hydrALAZINE 75 milliGRAM(s) Oral every 8 hours  insulin lispro (ADMELOG) corrective regimen sliding scale   SubCutaneous three times a day before meals  insulin lispro (ADMELOG) corrective regimen sliding scale   SubCutaneous at bedtime  lidocaine   4% Patch 1 Patch Transdermal every 24 hours  losartan 25 milliGRAM(s) Oral daily      PAST MEDICAL & SURGICAL HISTORY:  Hypertension      ESRD on peritoneal dialysis      CVA (cerebrovascular accident)  2010 without residual effects      Hyperlipidemia      Type 2 diabetes mellitus, with long-term current use of insulin      BPH (benign prostatic hyperplasia)      History of peritoneal dialysis  s/p PD catheter placement          Vitals:  T(F): 98.7 (10-28), Max: 98.9 (10-27)  HR: 87 (10-28) (69 - 88)  BP: 170/74 (10-28) (158/73 - 209/92)  RR: 18 (10-28)  SpO2: 97% (10-27)  I&O's Summary    26 Oct 2022 07:01  -  27 Oct 2022 07:00  --------------------------------------------------------  IN: 2000 mL / OUT: 3000 mL / NET: -1000 mL    27 Oct 2022 07:01  -  28 Oct 2022 06:32  --------------------------------------------------------  IN: 8000 mL / OUT: 18027 mL / NET: -2000 mL        Physical Exam:  Appearance: No acute distress  Eyes: PERRL, EOMI, pink conjunctiva  HENT: Normal oral mucosa  Cardiovascular: RRR, S1, S2, no murmurs, rubs, or gallops; no edema; no JVD  Respiratory: Clear to auscultation bilaterally  Gastrointestinal: soft, non-tender, non-distended with normal bowel sounds  Musculoskeletal: No clubbing; no joint deformity   Neurologic: Non-focal  Lymphatic: No lymphadenopathy  Psychiatry: AAOx3, mood & affect appropriate  Skin: No rashes, ecchymoses, or cyanosis                          8.2    11.32 )-----------( 479      ( 27 Oct 2022 21:07 )             26.1     10-27    136  |  93<L>  |  60<H>  ----------------------------<  160<H>  3.7   |  24  |  7.80<H>    Ca    8.6      27 Oct 2022 05:42  Phos  5.7     10-27  Mg     1.90     10-27    TPro  6.2  /  Alb  3.0<L>  /  TBili  0.4  /  DBili  x   /  AST  18  /  ALT  12  /  AlkPhos  67  10-27    PT/INR - ( 27 Oct 2022 06:43 )   PT: 14.3 sec;   INR: 1.23 ratio         PTT - ( 27 Oct 2022 06:43 )  PTT:27.5 sec      Serum Pro-Brain Natriuretic Peptide: 5643 pg/mL (10-26 @ 17:34)          New ECG(s): Personally reviewed    Echo:    Stress Testing:     Cath:    Imaging:    Interpretation of Telemetry:   Patient seen and examined at bedside.    Overnight Events:   - Sleeping comfortably  - Denies CP/SOB/LH/dizziness      Current Meds:  atorvastatin 80 milliGRAM(s) Oral at bedtime  buMETAnide Injectable 2 milliGRAM(s) IV Push two times a day  chlorhexidine 2% Cloths 1 Application(s) Topical <User Schedule>  dextrose 50% Injectable 25 Gram(s) IV Push once  dextrose 50% Injectable 12.5 Gram(s) IV Push once  dextrose 50% Injectable 25 Gram(s) IV Push once  dextrose Oral Gel 15 Gram(s) Oral once  epoetin heidi-epbx (RETACRIT) Injectable 14001 Unit(s) SubCutaneous Once  gentamicin 0.1% Cream 1 Application(s) Topical <User Schedule>  glucagon  Injectable 1 milliGRAM(s) IntraMuscular once  hydrALAZINE 75 milliGRAM(s) Oral every 8 hours  insulin lispro (ADMELOG) corrective regimen sliding scale   SubCutaneous three times a day before meals  insulin lispro (ADMELOG) corrective regimen sliding scale   SubCutaneous at bedtime  lidocaine   4% Patch 1 Patch Transdermal every 24 hours  losartan 25 milliGRAM(s) Oral daily      PAST MEDICAL & SURGICAL HISTORY:  Hypertension      ESRD on peritoneal dialysis      CVA (cerebrovascular accident)  2010 without residual effects      Hyperlipidemia      Type 2 diabetes mellitus, with long-term current use of insulin      BPH (benign prostatic hyperplasia)      History of peritoneal dialysis  s/p PD catheter placement          Vitals:  T(F): 98.7 (10-28), Max: 98.9 (10-27)  HR: 87 (10-28) (69 - 88)  BP: 170/74 (10-28) (158/73 - 209/92)  RR: 18 (10-28)  SpO2: 97% (10-27)  I&O's Summary    26 Oct 2022 07:01  -  27 Oct 2022 07:00  --------------------------------------------------------  IN: 2000 mL / OUT: 3000 mL / NET: -1000 mL    27 Oct 2022 07:01  -  28 Oct 2022 06:32  --------------------------------------------------------  IN: 8000 mL / OUT: 71383 mL / NET: -2000 mL        Appearance: Normal	  HEENT:   Normal oral mucosa,   Lymphatic: No lymphadenopathy  Cardiovascular: Normal S1 S2, No JVD, No murmurs, mild pedal edema  Respiratory: B/L crackles  Psychiatry: A & O x 3, Mood & affect appropriate  Gastrointestinal:  Soft, Non-tender, + BS	  Skin: No rashes, No ecchymoses, No cyanosis	  Neurologic: Non-focal, A&Ox3, nonfocal, FRIEND x 4  Extremities: Normal range of motion, No clubbing, cyanosis   Vascular: Peripheral pulses palpable 2+ bilaterally                          8.2    11.32 )-----------( 479      ( 27 Oct 2022 21:07 )             26.1     10-27    136  |  93<L>  |  60<H>  ----------------------------<  160<H>  3.7   |  24  |  7.80<H>    Ca    8.6      27 Oct 2022 05:42  Phos  5.7     10-27  Mg     1.90     10-27    TPro  6.2  /  Alb  3.0<L>  /  TBili  0.4  /  DBili  x   /  AST  18  /  ALT  12  /  AlkPhos  67  10-27    PT/INR - ( 27 Oct 2022 06:43 )   PT: 14.3 sec;   INR: 1.23 ratio         PTT - ( 27 Oct 2022 06:43 )  PTT:27.5 sec      Serum Pro-Brain Natriuretic Peptide: 5643 pg/mL (10-26 @ 17:34)          New ECG(s): Personally reviewed    Echo:    PROCEDURE: Transthoracic echocardiogram with 2-D, M-Mode  and complete spectral and color flow Doppler.  INDICATION: Heart failure, unspecified (I50.9), Pericardial  effusion (noninflammatory) (I31.3)  ------------------------------------------------------------------------  DIMENSIONS:  Dimensions:     Normal Values:  LA:     3.8 cm    2.0 - 4.0 cm  Ao:     3.2 cm    2.0 - 3.8 cm  SEPTUM: 0.9 cm    0.6 - 1.2 cm  PWT:    0.9 cm    0.6 - 1.1 cm  LVIDd:  5.1 cm    3.0 - 5.6 cm  LVIDs:  3.3 cm    1.8 - 4.0 cm  Derived Variables:  LVMI: 90 g/m2  RWT: 0.35  Fractional short: 35 %  Ejection Fraction (Wilburn Rule): 64 %  ------------------------------------------------------------------------  OBSERVATIONS:  Mitral Valve: Mitral annular calcification, otherwise  normal mitral valve. Minimal mitral regurgitation.  Aortic Root: Normal aortic root.  Aortic Valve: Aortic valve leaflet morphology not well  visualized.  Left Atrium: Normal left atrium.  Left Ventricle: Normal left ventricular systolic function.  No segmental wall motion abnormalities. Normal left  ventricular internal dimensions and wall thicknesses.  Right Heart: Normal right atrium. Normal right ventricular  size and function. Normal tricuspid valve. Minimal  tricuspid regurgitation. Normal pulmonic valve.  Pericardium/PleuraLarge pericardial effusion, measuring  about  2.2 cm lateral to theleft ventricle.  Moderate  pericardial effusion, measuring about  1.6 cm posterior to  the left ventricle and about  1.0 cm around the LV apex.  Small pericardial effusion anterior to the right ventricle.   Increased respirophasic variability in the transmitral and  transtricuspid spectral Doppler signals is seen.  In  addition, the right ventricle appears underfilled in the  subcostal view.  Findings may be consistent with early  cardiac tamponade physiology.  ------------------------------------------------------------------------  CONCLUSIONS:  1. Mitral annular calcification, otherwise normal mitral  valve. Minimal mitral regurgitation.  2. Normal left ventricular internal dimensions and wall  thicknesses.  3. Normal left ventricular systolic function. No segmental  wall motion abnormalities.  4. Normal right ventricular size and function.  5. Large pericardial effusion, measuring about  2.2 cm  lateral to the left ventricle.  Moderate pericardial  effusion, measuring about  1.6 cm posterior to the left  ventricle and about  1.0 cm around the LV apex.  Small  pericardial effusion anterior to the right ventricle.  Increased respirophasic variability in the transmitral and  transtricuspid spectral Doppler signals is seen.  In  addition, the right ventricle appears underfilled in the  subcostal view.  Findings may be consistent with early  cardiac tamponade physiology.  *** Compared with echocardiogram of 7/24/2022, a large  pericardial effusion is now seen.  ------------------------------------------------------------------------  Confirmed on  10/27/2022 - 10:26:32 by Merrill Brambila M.D.,  [facility], [place]  ------------------------------------------------------------------------

## 2022-10-28 NOTE — PROGRESS NOTE ADULT - ASSESSMENT
66 yo Male w/ hx HTN, DM, CVA, ESRD on PD, HDL, Anemia, sent in by PD clinic due to low Hgb. Endorses GERMAIN and general weakness. In ED Hg/hct 5.9/19 which improved to 7.4/22.9 after 1 Unit PRBCs.  CXR: mod pulm edema with small b/l pleural effusions. Patient also found to have a large pericardial effusion with signs of early tamponade on POCUS and TTE.

## 2022-10-28 NOTE — PROGRESS NOTE ADULT - PROBLEM SELECTOR PLAN 6
- BP elevated on admission but improving now when restarting home BP meds  - c/w Hydralazine  -restarted losartan   -can also restart Norvasc 10mg qdaily today.

## 2022-10-28 NOTE — PROCEDURE NOTE - NSASSISTBY_GEN_A_CORE
26y Female whom presented to the hospital with a history of depression, HTN, FSGS with proteinuria here with suicide attempt with taking multiple lisinopril T's.     FSGS  -NSAID avoidance  -Ok to hold ace for now  -Check urine studies, protein:cr ratio to assess for increase proteinuria as possible source of edema      HTN  -Clonidine increase to BID  - Coreg keep at 6.25 mg po bid, doubt BBL playing a role in edema (none noticeable clinically)    CVS  -[facility]S following  -ECHO Dr. Sebastian/Resident Dr. Sebastian/Fellow

## 2022-10-28 NOTE — PROGRESS NOTE ADULT - PROBLEM SELECTOR PLAN 3
- CXR preliminary read with cardiomegaly, moderate pulmonary edema, small bilateral pleural effusions likely 2/2 volume overload in setting of dialysis  - c/w PD per nephro recs  - Monitor respiratory status  - Supplemental O2. Will wean as tolerated. - CXR preliminary read with cardiomegaly, moderate pulmonary edema, small bilateral pleural effusions likely 2/2 volume overload in setting of dialysis  - c/w PD per nephro recs  - Monitor respiratory status  - Supplemental O2. Will wean as tolerated.  -Duonebs ordered for wheezing

## 2022-10-28 NOTE — PROCEDURE NOTE - PROCEDURE FINDINGS AND DETAILS
Large pericardial effusion. A 7F drain age catheter placed with 520cc of serosanguinous fluid. Connected to gravity drainage,.

## 2022-10-28 NOTE — PROGRESS NOTE ADULT - SUBJECTIVE AND OBJECTIVE BOX
Patient is a 67y old  Male who presents with a chief complaint of GERMAIN pericardial effusion (28 Oct 2022 06:32)      SUBJECTIVE / OVERNIGHT EVENTS:    MEDICATIONS  (STANDING):  atorvastatin 80 milliGRAM(s) Oral at bedtime  buMETAnide Injectable 2 milliGRAM(s) IV Push two times a day  chlorhexidine 2% Cloths 1 Application(s) Topical <User Schedule>  dextrose 50% Injectable 25 Gram(s) IV Push once  dextrose 50% Injectable 12.5 Gram(s) IV Push once  dextrose 50% Injectable 25 Gram(s) IV Push once  dextrose Oral Gel 15 Gram(s) Oral once  epoetin heidi-epbx (RETACRIT) Injectable 18558 Unit(s) SubCutaneous Once  gentamicin 0.1% Cream 1 Application(s) Topical <User Schedule>  glucagon  Injectable 1 milliGRAM(s) IntraMuscular once  hydrALAZINE 75 milliGRAM(s) Oral every 8 hours  insulin lispro (ADMELOG) corrective regimen sliding scale   SubCutaneous three times a day before meals  insulin lispro (ADMELOG) corrective regimen sliding scale   SubCutaneous at bedtime  lidocaine   4% Patch 1 Patch Transdermal every 24 hours  losartan 25 milliGRAM(s) Oral daily    MEDICATIONS  (PRN):      Vital Signs Last 24 Hrs  T(C): 36.8 (28 Oct 2022 06:54), Max: 37.2 (27 Oct 2022 22:30)  T(F): 98.2 (28 Oct 2022 06:54), Max: 98.9 (27 Oct 2022 22:30)  HR: 79 (28 Oct 2022 06:54) (75 - 88)  BP: 161/69 (28 Oct 2022 06:54) (152/65 - 209/92)  BP(mean): --  RR: 17 (28 Oct 2022 06:54) (16 - 20)  SpO2: 99% (28 Oct 2022 06:00) (97% - 100%)    Parameters below as of 28 Oct 2022 06:54  Patient On (Oxygen Delivery Method): nasal cannula  O2 Flow (L/min): 3    CAPILLARY BLOOD GLUCOSE      POCT Blood Glucose.: 244 mg/dL (28 Oct 2022 07:24)  POCT Blood Glucose.: 117 mg/dL (28 Oct 2022 05:52)  POCT Blood Glucose.: 291 mg/dL (27 Oct 2022 21:37)  POCT Blood Glucose.: 232 mg/dL (27 Oct 2022 19:46)  POCT Blood Glucose.: 271 mg/dL (27 Oct 2022 17:28)  POCT Blood Glucose.: 194 mg/dL (27 Oct 2022 15:32)  POCT Blood Glucose.: 294 mg/dL (27 Oct 2022 14:29)  POCT Blood Glucose.: 328 mg/dL (27 Oct 2022 13:35)  POCT Blood Glucose.: 214 mg/dL (27 Oct 2022 10:38)    I&O's Summary    27 Oct 2022 07:01  -  28 Oct 2022 07:00  --------------------------------------------------------  IN: 50828 mL / OUT: 99898 mL / NET: -50 mL        PHYSICAL EXAM:  GENERAL: NAD, well-developed  HEAD:  Atraumatic, Normocephalic  EYES: EOMI, PERRLA, conjunctiva and sclera clear  NECK: Supple, No JVD  CHEST/LUNG: Clear to auscultation bilaterally; No wheeze  HEART: Regular rate and rhythm; No murmurs, rubs, or gallops  ABDOMEN: Soft, Nontender, Nondistended; Bowel sounds present  EXTREMITIES:  2+ Peripheral Pulses, No clubbing, cyanosis, or edema  PSYCH: AAOx3  NEUROLOGY: non-focal  SKIN: No rashes or lesions    LABS:                        8.2    11.32 )-----------( 479      ( 27 Oct 2022 21:07 )             26.1     10-27    136  |  93<L>  |  60<H>  ----------------------------<  160<H>  3.7   |  24  |  7.80<H>    Ca    8.6      27 Oct 2022 05:42  Phos  5.7     10-27  Mg     1.90     10-27    TPro  6.2  /  Alb  3.0<L>  /  TBili  0.4  /  DBili  x   /  AST  18  /  ALT  12  /  AlkPhos  67  10-27    PT/INR - ( 27 Oct 2022 06:43 )   PT: 14.3 sec;   INR: 1.23 ratio         PTT - ( 27 Oct 2022 06:43 )  PTT:27.5 sec          RADIOLOGY & ADDITIONAL TESTS:    Imaging Personally Reviewed:    Consultant(s) Notes Reviewed:      Care Discussed with Consultants/Other Providers:   Patient is a 67y old  Male who presents with a chief complaint of GERMAIN pericardial effusion (28 Oct 2022 06:32)      SUBJECTIVE / OVERNIGHT EVENTS:  Patient with mild SOB but no chest pain. Denies abdominal pain, N/V/D     MEDICATIONS  (STANDING):  atorvastatin 80 milliGRAM(s) Oral at bedtime  buMETAnide Injectable 2 milliGRAM(s) IV Push two times a day  chlorhexidine 2% Cloths 1 Application(s) Topical <User Schedule>  dextrose 50% Injectable 25 Gram(s) IV Push once  dextrose 50% Injectable 12.5 Gram(s) IV Push once  dextrose 50% Injectable 25 Gram(s) IV Push once  dextrose Oral Gel 15 Gram(s) Oral once  epoetin heidi-epbx (RETACRIT) Injectable 33315 Unit(s) SubCutaneous Once  gentamicin 0.1% Cream 1 Application(s) Topical <User Schedule>  glucagon  Injectable 1 milliGRAM(s) IntraMuscular once  hydrALAZINE 75 milliGRAM(s) Oral every 8 hours  insulin lispro (ADMELOG) corrective regimen sliding scale   SubCutaneous three times a day before meals  insulin lispro (ADMELOG) corrective regimen sliding scale   SubCutaneous at bedtime  lidocaine   4% Patch 1 Patch Transdermal every 24 hours  losartan 25 milliGRAM(s) Oral daily    MEDICATIONS  (PRN):      Vital Signs Last 24 Hrs  T(C): 36.8 (28 Oct 2022 06:54), Max: 37.2 (27 Oct 2022 22:30)  T(F): 98.2 (28 Oct 2022 06:54), Max: 98.9 (27 Oct 2022 22:30)  HR: 79 (28 Oct 2022 06:54) (75 - 88)  BP: 161/69 (28 Oct 2022 06:54) (152/65 - 209/92)  BP(mean): --  RR: 17 (28 Oct 2022 06:54) (16 - 20)  SpO2: 99% (28 Oct 2022 06:00) (97% - 100%)    Parameters below as of 28 Oct 2022 06:54  Patient On (Oxygen Delivery Method): nasal cannula  O2 Flow (L/min): 3    CAPILLARY BLOOD GLUCOSE      POCT Blood Glucose.: 244 mg/dL (28 Oct 2022 07:24)  POCT Blood Glucose.: 117 mg/dL (28 Oct 2022 05:52)  POCT Blood Glucose.: 291 mg/dL (27 Oct 2022 21:37)  POCT Blood Glucose.: 232 mg/dL (27 Oct 2022 19:46)  POCT Blood Glucose.: 271 mg/dL (27 Oct 2022 17:28)  POCT Blood Glucose.: 194 mg/dL (27 Oct 2022 15:32)  POCT Blood Glucose.: 294 mg/dL (27 Oct 2022 14:29)  POCT Blood Glucose.: 328 mg/dL (27 Oct 2022 13:35)  POCT Blood Glucose.: 214 mg/dL (27 Oct 2022 10:38)    I&O's Summary    27 Oct 2022 07:01  -  28 Oct 2022 07:00  --------------------------------------------------------  IN: 37866 mL / OUT: 86915 mL / NET: -50 mL        PHYSICAL EXAM:  GENERAL: NAD, well-developed  HEAD:  Atraumatic, Normocephalic  EYES: EOMI, PERRLA, conjunctiva and sclera clear  NECK: Supple, No JVD  CHEST/LUNG: B/L diffuse wheezing  bilaterally  HEART: Regular rate and rhythm; No murmurs, rubs, or gallops  ABDOMEN: Soft, Nontender, Nondistended; Bowel sounds present  EXTREMITIES:  2+ Peripheral Pulses, No clubbing, cyanosis, or edema  PSYCH: AAOx3  NEUROLOGY: non-focal  SKIN: No rashes or lesions    LABS:                        8.2    11.32 )-----------( 479      ( 27 Oct 2022 21:07 )             26.1     10-27    136  |  93<L>  |  60<H>  ----------------------------<  160<H>  3.7   |  24  |  7.80<H>    Ca    8.6      27 Oct 2022 05:42  Phos  5.7     10-27  Mg     1.90     10-27    TPro  6.2  /  Alb  3.0<L>  /  TBili  0.4  /  DBili  x   /  AST  18  /  ALT  12  /  AlkPhos  67  10-27    PT/INR - ( 27 Oct 2022 06:43 )   PT: 14.3 sec;   INR: 1.23 ratio         PTT - ( 27 Oct 2022 06:43 )  PTT:27.5 sec          RADIOLOGY & ADDITIONAL TESTS:    Imaging Personally Reviewed:    Consultant(s) Notes Reviewed:      Care Discussed with Consultants/Other Providers:

## 2022-10-28 NOTE — PROGRESS NOTE ADULT - PROBLEM SELECTOR PLAN 2
-s/p 4 exchanges on 10/27  - Renal diet, fluid restriction 1.2L/day  - Dose meds renally  - Avoid nephrotoxins.  - c/w home sevelamer

## 2022-10-28 NOTE — PROGRESS NOTE ADULT - PROBLEM SELECTOR PLAN 1
- Unclear etiology.  - POCUS with effusion with concern for tamponade physiology   - TTE also shows pericardial effusion with concern of early tamponade physiology  - RVP negative  - F/u autoimmune, rheumatological workup and infectious workup  - CT chest shows moderate pericardial effusion increased from previous imaging.   -Spoke to cardiology attending Dr Monique and he said it would be technically difficult to do a pericardiocentesis b/c of location of effusion so suggested IR consult.   -F/U IR recs  - Avoid anticoagulants - Unclear etiology.  - POCUS with effusion with concern for tamponade physiology   - TTE also shows pericardial effusion with concern of early tamponade physiology  - RVP negative  - F/u autoimmune, rheumatological workup and infectious workup  - CT chest shows moderate pericardial effusion increased from previous imaging.   -Spoke to cardiology attending Dr Monique and he said it would be technically difficult to do a pericardiocentesis b/c of location of effusion so suggested IR consult.   -F/U IR recs  - Avoid anticoagulants  -ordered antihistone Ab to r/o hydralazine induced pericardial effusion.

## 2022-10-28 NOTE — PROVIDER CONTACT NOTE (OTHER) - ASSESSMENT
Patient is A&Ox4, Patient dneies pain, chest pain, shortness of breath, nausea, vomiting or dizziness.
Patient is A&Ox4, Patient denies pain, chest pain, shortness of breath, nausea, vomiting or dizziness.
RAOUL

## 2022-10-28 NOTE — PROCEDURE NOTE - PLAN
- record output of drainage catheter   - repeat echocardiogram once output decreases.   - follow up results of fluid analysis   - remainder of care per primary team.

## 2022-10-28 NOTE — CHART NOTE - NSCHARTNOTEFT_GEN_A_CORE
IR Follow up     IR consulted for pericardial drain in light of new pericardial effusion. Echo demonstrates early tamponade physiology. Patient remains hypertensive. Patient due for peritoneal dialysis today.    IR to place pericardial drain today 10/28    -- Keep patient NPO  -- Primary team to help coordinate with dialysis. Will obtain post dialysis labs.  -- Follow up glucose  -- hold AC  -- please complete IR pre-procedure note  -- please place IR procedure request order under Dr. Das    --  Mayank High, PGY-3  Vascular and Interventional Radiology   Available on [employer] Teams    - Non-emergent consults: Place IR consult order in [place]  - Emergent issues (pager): [facility] [phone]; [facility] [phone]; [phone]  - Scheduling questions: [facility] [phone]; [facility] [phone]  - Clinic/outpatient booking: [facility] [phone]; [facility] [phone]

## 2022-10-28 NOTE — CHART NOTE - NSCHARTNOTEFT_GEN_A_CORE
Discussed case with Dr. Nunez. Amlodipine 10 mg QD restarted per recommendations. Team to continue to monitor.

## 2022-10-29 LAB
ANA TITR SER: NEGATIVE — SIGNIFICANT CHANGE UP
ANION GAP SERPL CALC-SCNC: 17 MMOL/L — HIGH (ref 7–14)
ANION GAP SERPL CALC-SCNC: 18 MMOL/L — HIGH (ref 7–14)
BASOPHILS # BLD AUTO: 0.07 K/UL — SIGNIFICANT CHANGE UP (ref 0–0.2)
BASOPHILS NFR BLD AUTO: 0.7 % — SIGNIFICANT CHANGE UP (ref 0–2)
BUN SERPL-MCNC: 52 MG/DL — HIGH (ref 7–23)
BUN SERPL-MCNC: 55 MG/DL — HIGH (ref 7–23)
CALCIUM SERPL-MCNC: 8.5 MG/DL — SIGNIFICANT CHANGE UP (ref 8.4–10.5)
CALCIUM SERPL-MCNC: 8.7 MG/DL — SIGNIFICANT CHANGE UP (ref 8.4–10.5)
CCP IGG SERPL-ACNC: <8 UNITS — SIGNIFICANT CHANGE UP
CHLORIDE SERPL-SCNC: 94 MMOL/L — LOW (ref 98–107)
CHLORIDE SERPL-SCNC: 96 MMOL/L — LOW (ref 98–107)
CO2 SERPL-SCNC: 24 MMOL/L — SIGNIFICANT CHANGE UP (ref 22–31)
CO2 SERPL-SCNC: 26 MMOL/L — SIGNIFICANT CHANGE UP (ref 22–31)
CREAT SERPL-MCNC: 7.99 MG/DL — HIGH (ref 0.5–1.3)
CREAT SERPL-MCNC: 8.67 MG/DL — HIGH (ref 0.5–1.3)
CV B1 AB TITR FLD: NEGATIVE — SIGNIFICANT CHANGE UP
CV B2 AB TITR FLD: HIGH
CV B3 AB TITR FLD: NEGATIVE — SIGNIFICANT CHANGE UP
CV B4 AB TITR FLD: NEGATIVE — SIGNIFICANT CHANGE UP
CV B5 AB TITR FLD: HIGH
CV B6 AB TITR FLD: HIGH
DSDNA AB SER-ACNC: 69 IU/ML — HIGH
EGFR: 6 ML/MIN/1.73M2 — LOW
EGFR: 7 ML/MIN/1.73M2 — LOW
EOSINOPHIL # BLD AUTO: 0.85 K/UL — HIGH (ref 0–0.5)
EOSINOPHIL NFR BLD AUTO: 7.9 % — HIGH (ref 0–6)
GLUCOSE BLDC GLUCOMTR-MCNC: 102 MG/DL — HIGH (ref 70–99)
GLUCOSE BLDC GLUCOMTR-MCNC: 106 MG/DL — HIGH (ref 70–99)
GLUCOSE BLDC GLUCOMTR-MCNC: 283 MG/DL — HIGH (ref 70–99)
GLUCOSE BLDC GLUCOMTR-MCNC: 289 MG/DL — HIGH (ref 70–99)
GLUCOSE BLDC GLUCOMTR-MCNC: 313 MG/DL — HIGH (ref 70–99)
GLUCOSE BLDC GLUCOMTR-MCNC: 355 MG/DL — HIGH (ref 70–99)
GLUCOSE SERPL-MCNC: 392 MG/DL — HIGH (ref 70–99)
GLUCOSE SERPL-MCNC: 94 MG/DL — SIGNIFICANT CHANGE UP (ref 70–99)
GRAM STN FLD: SIGNIFICANT CHANGE UP
HCT VFR BLD CALC: 24.1 % — LOW (ref 39–50)
HGB BLD-MCNC: 7.6 G/DL — LOW (ref 13–17)
IANC: 7.5 K/UL — HIGH (ref 1.8–7.4)
IMM GRANULOCYTES NFR BLD AUTO: 0.9 % — SIGNIFICANT CHANGE UP (ref 0–0.9)
LYMPHOCYTES # BLD AUTO: 0.96 K/UL — LOW (ref 1–3.3)
LYMPHOCYTES # BLD AUTO: 8.9 % — LOW (ref 13–44)
MAGNESIUM SERPL-MCNC: 1.8 MG/DL — SIGNIFICANT CHANGE UP (ref 1.6–2.6)
MAGNESIUM SERPL-MCNC: 1.9 MG/DL — SIGNIFICANT CHANGE UP (ref 1.6–2.6)
MCHC RBC-ENTMCNC: 26.7 PG — LOW (ref 27–34)
MCHC RBC-ENTMCNC: 31.5 GM/DL — LOW (ref 32–36)
MCV RBC AUTO: 84.6 FL — SIGNIFICANT CHANGE UP (ref 80–100)
MONOCYTES # BLD AUTO: 1.27 K/UL — HIGH (ref 0–0.9)
MONOCYTES NFR BLD AUTO: 11.8 % — SIGNIFICANT CHANGE UP (ref 2–14)
MPO AB + PR3 PNL SER: SIGNIFICANT CHANGE UP
NEUTROPHILS # BLD AUTO: 7.5 K/UL — HIGH (ref 1.8–7.4)
NEUTROPHILS NFR BLD AUTO: 69.8 % — SIGNIFICANT CHANGE UP (ref 43–77)
NRBC # BLD: 0 /100 WBCS — SIGNIFICANT CHANGE UP (ref 0–0)
NRBC # FLD: 0 K/UL — SIGNIFICANT CHANGE UP (ref 0–0)
PHOSPHATE SERPL-MCNC: 6.4 MG/DL — HIGH (ref 2.5–4.5)
PHOSPHATE SERPL-MCNC: 7 MG/DL — HIGH (ref 2.5–4.5)
PLATELET # BLD AUTO: 451 K/UL — HIGH (ref 150–400)
POTASSIUM SERPL-MCNC: 3.6 MMOL/L — SIGNIFICANT CHANGE UP (ref 3.5–5.3)
POTASSIUM SERPL-MCNC: 3.8 MMOL/L — SIGNIFICANT CHANGE UP (ref 3.5–5.3)
POTASSIUM SERPL-SCNC: 3.6 MMOL/L — SIGNIFICANT CHANGE UP (ref 3.5–5.3)
POTASSIUM SERPL-SCNC: 3.8 MMOL/L — SIGNIFICANT CHANGE UP (ref 3.5–5.3)
RBC # BLD: 2.85 M/UL — LOW (ref 4.2–5.8)
RBC # FLD: 15.5 % — HIGH (ref 10.3–14.5)
RF+CCP IGG SER-IMP: NEGATIVE — SIGNIFICANT CHANGE UP
SODIUM SERPL-SCNC: 137 MMOL/L — SIGNIFICANT CHANGE UP (ref 135–145)
SODIUM SERPL-SCNC: 138 MMOL/L — SIGNIFICANT CHANGE UP (ref 135–145)
SPECIMEN SOURCE: SIGNIFICANT CHANGE UP
WBC # BLD: 10.75 K/UL — HIGH (ref 3.8–10.5)
WBC # FLD AUTO: 10.75 K/UL — HIGH (ref 3.8–10.5)

## 2022-10-29 PROCEDURE — 99233 SBSQ HOSP IP/OBS HIGH 50: CPT

## 2022-10-29 RX ORDER — GENTAMICIN SULFATE 0.1 %
1 OINTMENT (GRAM) TOPICAL
Refills: 0 | Status: DISCONTINUED | OUTPATIENT
Start: 2022-10-29 | End: 2022-11-03

## 2022-10-29 RX ORDER — ACETAMINOPHEN 500 MG
650 TABLET ORAL ONCE
Refills: 0 | Status: COMPLETED | OUTPATIENT
Start: 2022-10-29 | End: 2022-10-29

## 2022-10-29 RX ORDER — INSULIN LISPRO 100/ML
VIAL (ML) SUBCUTANEOUS
Refills: 0 | Status: DISCONTINUED | OUTPATIENT
Start: 2022-10-29 | End: 2022-11-03

## 2022-10-29 RX ORDER — SEVELAMER CARBONATE 2400 MG/1
800 POWDER, FOR SUSPENSION ORAL
Refills: 0 | Status: DISCONTINUED | OUTPATIENT
Start: 2022-10-29 | End: 2022-11-03

## 2022-10-29 RX ORDER — LABETALOL HCL 100 MG
100 TABLET ORAL THREE TIMES A DAY
Refills: 0 | Status: DISCONTINUED | OUTPATIENT
Start: 2022-10-29 | End: 2022-11-03

## 2022-10-29 RX ORDER — MAGNESIUM SULFATE 500 MG/ML
1 VIAL (ML) INJECTION ONCE
Refills: 0 | Status: COMPLETED | OUTPATIENT
Start: 2022-10-29 | End: 2022-10-29

## 2022-10-29 RX ORDER — TRAMADOL HYDROCHLORIDE 50 MG/1
50 TABLET ORAL EVERY 6 HOURS
Refills: 0 | Status: DISCONTINUED | OUTPATIENT
Start: 2022-10-29 | End: 2022-11-03

## 2022-10-29 RX ORDER — POTASSIUM CHLORIDE 20 MEQ
20 PACKET (EA) ORAL ONCE
Refills: 0 | Status: COMPLETED | OUTPATIENT
Start: 2022-10-29 | End: 2022-10-29

## 2022-10-29 RX ORDER — ACETAMINOPHEN 500 MG
650 TABLET ORAL EVERY 6 HOURS
Refills: 0 | Status: DISCONTINUED | OUTPATIENT
Start: 2022-10-29 | End: 2022-11-03

## 2022-10-29 RX ORDER — INSULIN LISPRO 100/ML
VIAL (ML) SUBCUTANEOUS AT BEDTIME
Refills: 0 | Status: DISCONTINUED | OUTPATIENT
Start: 2022-10-29 | End: 2022-11-03

## 2022-10-29 RX ADMIN — Medication 650 MILLIGRAM(S): at 18:24

## 2022-10-29 RX ADMIN — Medication 100 GRAM(S): at 22:19

## 2022-10-29 RX ADMIN — Medication 1 APPLICATION(S): at 09:25

## 2022-10-29 RX ADMIN — SEVELAMER CARBONATE 800 MILLIGRAM(S): 2400 POWDER, FOR SUSPENSION ORAL at 11:57

## 2022-10-29 RX ADMIN — Medication 20 MILLIEQUIVALENT(S): at 22:18

## 2022-10-29 RX ADMIN — LOSARTAN POTASSIUM 25 MILLIGRAM(S): 100 TABLET, FILM COATED ORAL at 07:59

## 2022-10-29 RX ADMIN — BUMETANIDE 2 MILLIGRAM(S): 0.25 INJECTION INTRAMUSCULAR; INTRAVENOUS at 07:59

## 2022-10-29 RX ADMIN — Medication 650 MILLIGRAM(S): at 07:59

## 2022-10-29 RX ADMIN — ATORVASTATIN CALCIUM 80 MILLIGRAM(S): 80 TABLET, FILM COATED ORAL at 22:18

## 2022-10-29 RX ADMIN — TRAMADOL HYDROCHLORIDE 50 MILLIGRAM(S): 50 TABLET ORAL at 13:00

## 2022-10-29 RX ADMIN — Medication 5: at 17:50

## 2022-10-29 RX ADMIN — TRAMADOL HYDROCHLORIDE 50 MILLIGRAM(S): 50 TABLET ORAL at 13:30

## 2022-10-29 RX ADMIN — CHLORHEXIDINE GLUCONATE 1 APPLICATION(S): 213 SOLUTION TOPICAL at 09:25

## 2022-10-29 RX ADMIN — Medication 3: at 11:57

## 2022-10-29 RX ADMIN — LIDOCAINE 1 PATCH: 4 CREAM TOPICAL at 05:36

## 2022-10-29 RX ADMIN — BUMETANIDE 2 MILLIGRAM(S): 0.25 INJECTION INTRAMUSCULAR; INTRAVENOUS at 23:42

## 2022-10-29 RX ADMIN — Medication 100 MILLIGRAM(S): at 17:49

## 2022-10-29 RX ADMIN — Medication 650 MILLIGRAM(S): at 08:30

## 2022-10-29 RX ADMIN — SEVELAMER CARBONATE 800 MILLIGRAM(S): 2400 POWDER, FOR SUSPENSION ORAL at 17:49

## 2022-10-29 RX ADMIN — AMLODIPINE BESYLATE 10 MILLIGRAM(S): 2.5 TABLET ORAL at 07:59

## 2022-10-29 RX ADMIN — Medication 2: at 22:17

## 2022-10-29 RX ADMIN — Medication 75 MILLIGRAM(S): at 07:59

## 2022-10-29 NOTE — PROGRESS NOTE ADULT - PROBLEM SELECTOR PLAN 10
Diet: NPO for now pending tentative pericardiocentesis  DVT Ppx: SCD for now given anemia  - Dispo: Pending cardiac workup and intervention    Spoke to Family at bedside and sister in law (Dr Escobar)  [phone] on 10/28 Diet: NPO for now pending tentative pericardiocentesis  DVT Ppx: SCD for now given anemia  - Dispo: Pending cardiac workup and intervention    Spoke to Son Rosario [phone] and sister in law ( Luz)  [phone] on 10/28.

## 2022-10-29 NOTE — PROGRESS NOTE ADULT - SUBJECTIVE AND OBJECTIVE BOX
Patient is a 67y old  Male who presents with a chief complaint of GERMAIN pericardial effusion (28 Oct 2022 11:08)      SUBJECTIVE / OVERNIGHT EVENTS:    MEDICATIONS  (STANDING):  amLODIPine   Tablet 10 milliGRAM(s) Oral daily  atorvastatin 80 milliGRAM(s) Oral at bedtime  buMETAnide Injectable 2 milliGRAM(s) IV Push two times a day  chlorhexidine 2% Cloths 1 Application(s) Topical <User Schedule>  dextrose 50% Injectable 25 Gram(s) IV Push once  dextrose 50% Injectable 12.5 Gram(s) IV Push once  dextrose 50% Injectable 25 Gram(s) IV Push once  dextrose Oral Gel 15 Gram(s) Oral once  epoetin heidi-epbx (RETACRIT) Injectable 00468 Unit(s) SubCutaneous Once  gentamicin 0.1% Cream 1 Application(s) Topical <User Schedule>  glucagon  Injectable 1 milliGRAM(s) IntraMuscular once  hydrALAZINE 75 milliGRAM(s) Oral every 8 hours  insulin lispro (ADMELOG) corrective regimen sliding scale   SubCutaneous every 6 hours  lidocaine   4% Patch 1 Patch Transdermal every 24 hours  losartan 25 milliGRAM(s) Oral daily  sevelamer carbonate 800 milliGRAM(s) Oral three times a day with meals    MEDICATIONS  (PRN):      Vital Signs Last 24 Hrs  T(C): 36.8 (29 Oct 2022 06:40), Max: 37.2 (28 Oct 2022 18:00)  T(F): 98.2 (29 Oct 2022 06:40), Max: 99 (29 Oct 2022 00:57)  HR: 69 (29 Oct 2022 06:40) (66 - 85)  BP: 160/62 (29 Oct 2022 06:40) (139/62 - 191/78)  BP(mean): 87 (28 Oct 2022 21:15) (84 - 106)  RR: 18 (29 Oct 2022 06:40) (12 - 19)  SpO2: 100% (29 Oct 2022 06:40) (98% - 100%)    Parameters below as of 29 Oct 2022 06:40  Patient On (Oxygen Delivery Method): nasal cannula  O2 Flow (L/min): 2    CAPILLARY BLOOD GLUCOSE      POCT Blood Glucose.: 102 mg/dL (29 Oct 2022 05:29)  POCT Blood Glucose.: 106 mg/dL (29 Oct 2022 01:00)  POCT Blood Glucose.: 119 mg/dL (28 Oct 2022 20:35)  POCT Blood Glucose.: 224 mg/dL (28 Oct 2022 15:29)  POCT Blood Glucose.: 360 mg/dL (28 Oct 2022 11:57)  POCT Blood Glucose.: 294 mg/dL (28 Oct 2022 09:28)    I&O's Summary    28 Oct 2022 07:01  -  29 Oct 2022 07:00  --------------------------------------------------------  IN: 4000 mL / OUT: 5550 mL / NET: -1550 mL    29 Oct 2022 07:01  -  29 Oct 2022 09:04  --------------------------------------------------------  IN: 80 mL / OUT: 300 mL / NET: -220 mL        PHYSICAL EXAM:  GENERAL: NAD, well-developed  HEAD:  Atraumatic, Normocephalic  EYES: EOMI, PERRLA, conjunctiva and sclera clear  NECK: Supple, No JVD  CHEST/LUNG: Clear to auscultation bilaterally; No wheeze  HEART: Regular rate and rhythm; No murmurs, rubs, or gallops  ABDOMEN: Soft, Nontender, Nondistended; Bowel sounds present  EXTREMITIES:  2+ Peripheral Pulses, No clubbing, cyanosis, or edema  PSYCH: AAOx3  NEUROLOGY: non-focal  SKIN: No rashes or lesions    LABS:                        7.6    10.75 )-----------( 451      ( 29 Oct 2022 05:21 )             24.1     10-29    138  |  96<L>  |  55<H>  ----------------------------<  94  3.6   |  24  |  8.67<H>    Ca    8.5      29 Oct 2022 05:21  Phos  7.0     10-29  Mg     1.80     10-29                RADIOLOGY & ADDITIONAL TESTS:    Imaging Personally Reviewed:    Consultant(s) Notes Reviewed:      Care Discussed with Consultants/Other Providers:   Patient is a 67y old  Male who presents with a chief complaint of GERMAIN pericardial effusion (28 Oct 2022 11:08)      SUBJECTIVE / OVERNIGHT EVENTS:  Patient has no new complaints. He is very sleepy because he didn't sleep last night so wants to rest. Denies cp, SOB, abdominal pain, N/V/D     MEDICATIONS  (STANDING):  amLODIPine   Tablet 10 milliGRAM(s) Oral daily  atorvastatin 80 milliGRAM(s) Oral at bedtime  buMETAnide Injectable 2 milliGRAM(s) IV Push two times a day  chlorhexidine 2% Cloths 1 Application(s) Topical <User Schedule>  dextrose 50% Injectable 25 Gram(s) IV Push once  dextrose 50% Injectable 12.5 Gram(s) IV Push once  dextrose 50% Injectable 25 Gram(s) IV Push once  dextrose Oral Gel 15 Gram(s) Oral once  epoetin heidi-epbx (RETACRIT) Injectable 37822 Unit(s) SubCutaneous Once  gentamicin 0.1% Cream 1 Application(s) Topical <User Schedule>  glucagon  Injectable 1 milliGRAM(s) IntraMuscular once  hydrALAZINE 75 milliGRAM(s) Oral every 8 hours  insulin lispro (ADMELOG) corrective regimen sliding scale   SubCutaneous every 6 hours  lidocaine   4% Patch 1 Patch Transdermal every 24 hours  losartan 25 milliGRAM(s) Oral daily  sevelamer carbonate 800 milliGRAM(s) Oral three times a day with meals    MEDICATIONS  (PRN):      Vital Signs Last 24 Hrs  T(C): 36.8 (29 Oct 2022 06:40), Max: 37.2 (28 Oct 2022 18:00)  T(F): 98.2 (29 Oct 2022 06:40), Max: 99 (29 Oct 2022 00:57)  HR: 69 (29 Oct 2022 06:40) (66 - 85)  BP: 160/62 (29 Oct 2022 06:40) (139/62 - 191/78)  BP(mean): 87 (28 Oct 2022 21:15) (84 - 106)  RR: 18 (29 Oct 2022 06:40) (12 - 19)  SpO2: 100% (29 Oct 2022 06:40) (98% - 100%)    Parameters below as of 29 Oct 2022 06:40  Patient On (Oxygen Delivery Method): nasal cannula  O2 Flow (L/min): 2    CAPILLARY BLOOD GLUCOSE      POCT Blood Glucose.: 102 mg/dL (29 Oct 2022 05:29)  POCT Blood Glucose.: 106 mg/dL (29 Oct 2022 01:00)  POCT Blood Glucose.: 119 mg/dL (28 Oct 2022 20:35)  POCT Blood Glucose.: 224 mg/dL (28 Oct 2022 15:29)  POCT Blood Glucose.: 360 mg/dL (28 Oct 2022 11:57)  POCT Blood Glucose.: 294 mg/dL (28 Oct 2022 09:28)    I&O's Summary    28 Oct 2022 07:01  -  29 Oct 2022 07:00  --------------------------------------------------------  IN: 4000 mL / OUT: 5550 mL / NET: -1550 mL    29 Oct 2022 07:01  -  29 Oct 2022 09:04  --------------------------------------------------------  IN: 80 mL / OUT: 300 mL / NET: -220 mL        PHYSICAL EXAM:  GENERAL: NAD, well-developed  HEAD:  Atraumatic, Normocephalic  EYES: EOMI, PERRLA, conjunctiva and sclera clear  NECK: Supple, No JVD  CHEST/LUNG: Left chest pericardial drain in placed with minimal bloody fluid output. Clear to auscultation bilaterally; No wheeze  HEART: Regular rate and rhythm; No murmurs, rubs, or gallops  ABDOMEN: Soft, Nontender, Nondistended; Bowel sounds present  EXTREMITIES:  2+ Peripheral Pulses, No clubbing, cyanosis, or edema  PSYCH: AAOx3  NEUROLOGY: non-focal  SKIN: No rashes or lesions    LABS:                        7.6    10.75 )-----------( 451      ( 29 Oct 2022 05:21 )             24.1     10-29    138  |  96<L>  |  55<H>  ----------------------------<  94  3.6   |  24  |  8.67<H>    Ca    8.5      29 Oct 2022 05:21  Phos  7.0     10-29  Mg     1.80     10-29                RADIOLOGY & ADDITIONAL TESTS:    Imaging Personally Reviewed:    Consultant(s) Notes Reviewed:      Care Discussed with Consultants/Other Providers:

## 2022-10-29 NOTE — PROGRESS NOTE ADULT - PROBLEM SELECTOR PLAN 3
- CXR preliminary read with cardiomegaly, moderate pulmonary edema, small bilateral pleural effusions likely 2/2 volume overload in setting of dialysis  - c/w PD per nephro recs  - Monitor respiratory status  - Supplemental O2. Will wean as tolerated.  -Duonebs ordered for wheezing

## 2022-10-29 NOTE — PROGRESS NOTE ADULT - SUBJECTIVE AND OBJECTIVE BOX
[facility] - S Bryant / Og S /D Dimitris/ S Davie/ S Jerrell/ Yung Mao / WICHO Paytonu/ O Amna  service -[phone], office [phone]  ---------------------------------------------------------------------------------------------------------------    Patient seen and examined bedside    Subjective and Objective: No overnight events, sob resolved. No complaints today. feeling better    Allergies: No Known Allergies      Hospital Medications:   MEDICATIONS  (STANDING):  amLODIPine   Tablet 10 milliGRAM(s) Oral daily  atorvastatin 80 milliGRAM(s) Oral at bedtime  buMETAnide Injectable 2 milliGRAM(s) IV Push two times a day  chlorhexidine 2% Cloths 1 Application(s) Topical <User Schedule>  dextrose 50% Injectable 25 Gram(s) IV Push once  dextrose 50% Injectable 12.5 Gram(s) IV Push once  dextrose 50% Injectable 25 Gram(s) IV Push once  dextrose Oral Gel 15 Gram(s) Oral once  epoetin heidi-epbx (RETACRIT) Injectable 54803 Unit(s) SubCutaneous Once  gentamicin 0.1% Ointment 1 Application(s) Topical <User Schedule>  glucagon  Injectable 1 milliGRAM(s) IntraMuscular once  insulin lispro (ADMELOG) corrective regimen sliding scale   SubCutaneous every 6 hours  labetalol 100 milliGRAM(s) Oral three times a day  lidocaine   4% Patch 1 Patch Transdermal every 24 hours  losartan 25 milliGRAM(s) Oral daily  sevelamer carbonate 800 milliGRAM(s) Oral three times a day with meals    VITALS:  T(F): 98.4 (10-29-22 @ 16:53), Max: 99 (10-29-22 @ 00:57)  HR: 79 (10-29-22 @ 16:53)  BP: 137/60 (10-29-22 @ 16:53)  RR: 18 (10-29-22 @ 16:53)  SpO2: 98% (10-29-22 @ 16:53)  Wt(kg): --    10-28 @ 07:01  -  10-29 @ 07:00  --------------------------------------------------------  IN: 4000 mL / OUT: 5550 mL / NET: -1550 mL    10-29 @ 07:01  -  10-29 @ 17:52  --------------------------------------------------------  IN: 4430 mL / OUT: 6200 mL / NET: -1770 mL      Height (cm): 172.7 (10-28 @ 21:59)  Weight (kg): 67.5 (10-28 @ 21:59)  BMI (kg/m2): 22.6 (10-28 @ 21:59)  BSA (m2): 1.8 (10-28 @ 21:59)    PHYSICAL EXAM:  Constitutional: NAD  HEENT: anicteric sclera  Neck: No JVD  Respiratory: CTAB, no wheezes, rales or rhonchi  Cardiovascular: S1, S2, RRR, +pericardial drain   Gastrointestinal: BS+, soft, NT, +fluid  Extremities: no pedal edema b/l   Neurological: A/O x 3  Psychiatric: Normal mood, normal affect  : No brand.     LABS:  10-29    138  |  96<L>  |  55<H>  ----------------------------<  94  3.6   |  24  |  8.67<H>    Ca    8.5      29 Oct 2022 05:21  Phos  7.0     10-29  Mg     1.80     10-29      Creatinine Trend: 8.67 <--, 7.64 <--, 7.86 <--, 7.80 <--, 7.34 <--                        7.6    10.75 )-----------( 451      ( 29 Oct 2022 05:21 )             24.1     Urine Studies:        RADIOLOGY & ADDITIONAL STUDIES:   [facility] - S Bryant / Og S /D Dimitris/ S Davie/ S Jerrell/ Yung Mao / WICHO Paytonu/ O Amna  service -[phone], office [phone]  ---------------------------------------------------------------------------------------------------------------    Patient seen and examined bedside    Subjective and Objective: No overnight events, sob resolved. No complaints today. feeling better    Allergies: No Known Allergies      Hospital Medications:   MEDICATIONS  (STANDING):  amLODIPine   Tablet 10 milliGRAM(s) Oral daily  atorvastatin 80 milliGRAM(s) Oral at bedtime  buMETAnide Injectable 2 milliGRAM(s) IV Push two times a day  chlorhexidine 2% Cloths 1 Application(s) Topical <User Schedule>  dextrose 50% Injectable 25 Gram(s) IV Push once  dextrose 50% Injectable 12.5 Gram(s) IV Push once  dextrose 50% Injectable 25 Gram(s) IV Push once  dextrose Oral Gel 15 Gram(s) Oral once  epoetin heidi-epbx (RETACRIT) Injectable 85331 Unit(s) SubCutaneous Once  gentamicin 0.1% Ointment 1 Application(s) Topical <User Schedule>  glucagon  Injectable 1 milliGRAM(s) IntraMuscular once  insulin lispro (ADMELOG) corrective regimen sliding scale   SubCutaneous every 6 hours  labetalol 100 milliGRAM(s) Oral three times a day  lidocaine   4% Patch 1 Patch Transdermal every 24 hours  losartan 25 milliGRAM(s) Oral daily  sevelamer carbonate 800 milliGRAM(s) Oral three times a day with meals    VITALS:  T(F): 98.4 (10-29-22 @ 16:53), Max: 99 (10-29-22 @ 00:57)  HR: 79 (10-29-22 @ 16:53)  BP: 137/60 (10-29-22 @ 16:53)  RR: 18 (10-29-22 @ 16:53)  SpO2: 98% (10-29-22 @ 16:53)  Wt(kg): --    10-28 @ 07:01  -  10-29 @ 07:00  --------------------------------------------------------  IN: 4000 mL / OUT: 5550 mL / NET: -1550 mL    10-29 @ 07:01  -  10-29 @ 17:52  --------------------------------------------------------  IN: 4430 mL / OUT: 6200 mL / NET: -1770 mL      Height (cm): 172.7 (10-28 @ 21:59)  Weight (kg): 67.5 (10-28 @ 21:59)  BMI (kg/m2): 22.6 (10-28 @ 21:59)  BSA (m2): 1.8 (10-28 @ 21:59)    PHYSICAL EXAM:  Constitutional: NAD  HEENT: anicteric sclera  Neck: No JVD  Respiratory: CTAB, no wheezes, rales or rhonchi  Cardiovascular: S1, S2, RRR, +pericardial drain   Gastrointestinal: BS+, soft, NT, +fluid, +PD cath  Extremities: no pedal edema b/l   Neurological: A/O x 3  Psychiatric: Normal mood, normal affect  : No brand.     LABS:  10-29    138  |  96<L>  |  55<H>  ----------------------------<  94  3.6   |  24  |  8.67<H>    Ca    8.5      29 Oct 2022 05:21  Phos  7.0     10-29  Mg     1.80     10-29      Creatinine Trend: 8.67 <--, 7.64 <--, 7.86 <--, 7.80 <--, 7.34 <--                        7.6    10.75 )-----------( 451      ( 29 Oct 2022 05:21 )             24.1     Urine Studies:        RADIOLOGY & ADDITIONAL STUDIES:

## 2022-10-29 NOTE — PROGRESS NOTE ADULT - ASSESSMENT
67M p/w ESRD on PD, HTN, DM, HDL, CVA, Anemia, sent in by PD clinic due to low Hgb. Renal following for ESRD/PD Mx.    ESRD on peritoneal dialysis.   K ok  pulm edema on prelim CXR, pt not in resp distress  Informed consent for PD obtained from pt. in chart     c/w Peritoneal Dialysis 4 exchanges today using 4.25 % dextrose dialysate, overnight dry. no PD Sunday  renal diet, fluid restriction 1.2L/day  dose all meds for ESRD  CONT home sevelamer as phos binders    Anemia in ckd, likely GIB- Hb <goal. outpt Hb from 10/25/22 was 6.6, was getting BRITTANIE outpt  awaiting outpt colonoscopy, never had it   monitor H/H closely,  transfused PRBC 1 unit     HTN, controlled-bp stable. resume home bp meds w/holding parameters.     will closely follow up.   poc d/w pt, PD RN  labs, rad, chart reviewed  For any question, pl call:  Nephrology  Cell [phone]  Office [phone]  Ans Serv [phone]   67M p/w ESRD on PD, HTN, DM, HDL, CVA, Anemia, sent in by PD clinic due to low Hgb. Renal following for ESRD/PD Mx.    ESRD on peritoneal dialysis.   K ok  pulm edema on prelim CXR, pt not in resp distress  Informed consent for PD obtained from pt. in chart     c/w Peritoneal Dialysis 4 exchanges today using 4.25 % dextrose dialysate, overnight dry. no PD Sunday  renal diet, fluid restriction 1.2L/day  dose all meds for ESRD  CONT home sevelamer as phos binders    Pericardial effusion  s/p pericardiocentesis 10/28  depending on quality of effusion might need transition to HD  continue high UF for now    Anemia in ckd, likely GIB- Hb <goal. outpt Hb from 10/25/22 was 6.6, was getting BRITTANIE outpt  awaiting outpt colonoscopy, never had it   monitor H/H closely,  transfused PRBC 1 unit     HTN, controlled-bp stable. resume home bp meds w/holding parameters.     will closely follow up.   poc d/w pt, PD RN  labs, rad, chart reviewed  For any question, pl call:  Nephrology  Cell -[phone]  Office [phone]  Ans Serv [phone]   67M p/w ESRD on PD, HTN, DM, HDL, CVA, Anemia, sent in by PD clinic due to low Hgb. Renal following for ESRD/PD Mx.    ESRD on peritoneal dialysis.   K ok  hypervolemia improving  Informed consent for PD obtained from pt. in chart     c/w Peritoneal Dialysis 4 exchanges using 4.25 % dextrose dialysate, overnight dry. UF negative. no PD Sunday  renal diet, fluid restriction 1.2L/day  dose all meds for ESRD  CONT home sevelamer as phos binders    Pericardial effusion s/p pericardiocentesis 10/28  f/u output. f/u fluid analysis  continue high UF PD for now    Anemia in ckd, likely GIB- Hb <goal. outpt Hb from 10/25/22 was 6.6, was getting BRITTANIE outpt  awaiting outpt colonoscopy, never had it   monitor H/H closely,  transfused PRBC 1 unit     HTN, controlled-bp stable. resume home bp meds w/holding parameters.     will closely follow up.   poc d/w pt, PD RN  labs, rad, chart reviewed  For any question, pl call:  Nephrology  Cell [phone]  Office [phone]  Ans Serv [phone]

## 2022-10-29 NOTE — PROGRESS NOTE ADULT - ASSESSMENT
66 yo Male w/ hx HTN, DM, CVA, ESRD on PD, HDL, Anemia, sent in by PD clinic due to low Hgb. Endorses GERMAIN and general weakness. In ED Hg/hct 5.9/19 which improved to 7.4/22.9 after 1 Unit PRBCs.  CXR: mod pulm edema with small b/l pleural effusions. Patient also found to have a large pericardial effusion with signs of early tamponade on POCUS and TTE.  Patient now s/p pericardiocentesis via IR on 10/28 w/ drain placed.

## 2022-10-29 NOTE — PROGRESS NOTE ADULT - PROBLEM SELECTOR PLAN 1
- Unclear etiology.  - POCUS with effusion with concern for tamponade physiology   - TTE also shows pericardial effusion with concern of early tamponade physiology  - RVP negative  - F/u autoimmune, rheumatological workup and infectious workup  - CT chest shows moderate pericardial effusion increased from previous imaging.   -Spoke to cardiology attending Dr Monique and he said it would be technically difficult to do a pericardiocentesis b/c of location of effusion so suggested IR consult.   - s/p pericardiocentesis via IR on 10/28 w/ drain placed; 520 cc of serosanginous fluid drained  -F/U IR recs  - Avoid anticoagulants  -ordered antihistone Ab to r/o hydralazine induced pericardial effusion.

## 2022-10-29 NOTE — PROGRESS NOTE ADULT - PROBLEM SELECTOR PLAN 6
- BP elevated on admission but improving now when restarting home BP meds  - c/w Hydralazine  -restarted losartan   -can also restart Norvasc 10mg qdaily today. - BP elevated on admission but improving now when restarting home BP meds  -c/w Norvasc and losartan   -Will d/c hydralazine as pericardial effusion may be induced by this; Will substitute with labetalol 100mg tid

## 2022-10-30 LAB
ANION GAP SERPL CALC-SCNC: 16 MMOL/L — HIGH (ref 7–14)
AUTO DIFF PNL BLD: NEGATIVE — SIGNIFICANT CHANGE UP
BASOPHILS # BLD AUTO: 0.07 K/UL — SIGNIFICANT CHANGE UP (ref 0–0.2)
BASOPHILS NFR BLD AUTO: 0.6 % — SIGNIFICANT CHANGE UP (ref 0–2)
BUN SERPL-MCNC: 54 MG/DL — HIGH (ref 7–23)
C-ANCA SER-ACNC: NEGATIVE — SIGNIFICANT CHANGE UP
CALCIUM SERPL-MCNC: 8.7 MG/DL — SIGNIFICANT CHANGE UP (ref 8.4–10.5)
CHLORIDE SERPL-SCNC: 98 MMOL/L — SIGNIFICANT CHANGE UP (ref 98–107)
CO2 SERPL-SCNC: 26 MMOL/L — SIGNIFICANT CHANGE UP (ref 22–31)
CREAT SERPL-MCNC: 8.61 MG/DL — HIGH (ref 0.5–1.3)
EGFR: 6 ML/MIN/1.73M2 — LOW
EOSINOPHIL # BLD AUTO: 0.96 K/UL — HIGH (ref 0–0.5)
EOSINOPHIL NFR BLD AUTO: 8.5 % — HIGH (ref 0–6)
GLUCOSE BLDC GLUCOMTR-MCNC: 119 MG/DL — HIGH (ref 70–99)
GLUCOSE BLDC GLUCOMTR-MCNC: 159 MG/DL — HIGH (ref 70–99)
GLUCOSE BLDC GLUCOMTR-MCNC: 234 MG/DL — HIGH (ref 70–99)
GLUCOSE BLDC GLUCOMTR-MCNC: 243 MG/DL — HIGH (ref 70–99)
GLUCOSE SERPL-MCNC: 137 MG/DL — HIGH (ref 70–99)
HCT VFR BLD CALC: 23.7 % — LOW (ref 39–50)
HGB BLD-MCNC: 7.6 G/DL — LOW (ref 13–17)
IANC: 7.63 K/UL — HIGH (ref 1.8–7.4)
IMM GRANULOCYTES NFR BLD AUTO: 0.9 % — SIGNIFICANT CHANGE UP (ref 0–0.9)
LYMPHOCYTES # BLD AUTO: 1.27 K/UL — SIGNIFICANT CHANGE UP (ref 1–3.3)
LYMPHOCYTES # BLD AUTO: 11.3 % — LOW (ref 13–44)
MAGNESIUM SERPL-MCNC: 2.1 MG/DL — SIGNIFICANT CHANGE UP (ref 1.6–2.6)
MCHC RBC-ENTMCNC: 27 PG — SIGNIFICANT CHANGE UP (ref 27–34)
MCHC RBC-ENTMCNC: 32.1 GM/DL — SIGNIFICANT CHANGE UP (ref 32–36)
MCV RBC AUTO: 84 FL — SIGNIFICANT CHANGE UP (ref 80–100)
MONOCYTES # BLD AUTO: 1.24 K/UL — HIGH (ref 0–0.9)
MONOCYTES NFR BLD AUTO: 11 % — SIGNIFICANT CHANGE UP (ref 2–14)
NEUTROPHILS # BLD AUTO: 7.63 K/UL — HIGH (ref 1.8–7.4)
NEUTROPHILS NFR BLD AUTO: 67.7 % — SIGNIFICANT CHANGE UP (ref 43–77)
NRBC # BLD: 0 /100 WBCS — SIGNIFICANT CHANGE UP (ref 0–0)
NRBC # FLD: 0 K/UL — SIGNIFICANT CHANGE UP (ref 0–0)
P-ANCA SER-ACNC: NEGATIVE — SIGNIFICANT CHANGE UP
PHOSPHATE SERPL-MCNC: 7.2 MG/DL — HIGH (ref 2.5–4.5)
PLATELET # BLD AUTO: 457 K/UL — HIGH (ref 150–400)
POTASSIUM SERPL-MCNC: 3.7 MMOL/L — SIGNIFICANT CHANGE UP (ref 3.5–5.3)
POTASSIUM SERPL-SCNC: 3.7 MMOL/L — SIGNIFICANT CHANGE UP (ref 3.5–5.3)
RBC # BLD: 2.82 M/UL — LOW (ref 4.2–5.8)
RBC # FLD: 15.5 % — HIGH (ref 10.3–14.5)
SODIUM SERPL-SCNC: 140 MMOL/L — SIGNIFICANT CHANGE UP (ref 135–145)
WBC # BLD: 11.27 K/UL — HIGH (ref 3.8–10.5)
WBC # FLD AUTO: 11.27 K/UL — HIGH (ref 3.8–10.5)

## 2022-10-30 PROCEDURE — 99233 SBSQ HOSP IP/OBS HIGH 50: CPT

## 2022-10-30 RX ORDER — INSULIN GLARGINE 100 [IU]/ML
12 INJECTION, SOLUTION SUBCUTANEOUS AT BEDTIME
Refills: 0 | Status: DISCONTINUED | OUTPATIENT
Start: 2022-10-30 | End: 2022-11-03

## 2022-10-30 RX ORDER — INSULIN LISPRO 100/ML
4 VIAL (ML) SUBCUTANEOUS
Refills: 0 | Status: DISCONTINUED | OUTPATIENT
Start: 2022-10-30 | End: 2022-11-03

## 2022-10-30 RX ADMIN — LIDOCAINE 1 PATCH: 4 CREAM TOPICAL at 17:59

## 2022-10-30 RX ADMIN — Medication 1: at 12:32

## 2022-10-30 RX ADMIN — Medication 100 MILLIGRAM(S): at 22:09

## 2022-10-30 RX ADMIN — Medication 4 UNIT(S): at 17:49

## 2022-10-30 RX ADMIN — Medication 1 APPLICATION(S): at 09:33

## 2022-10-30 RX ADMIN — SEVELAMER CARBONATE 800 MILLIGRAM(S): 2400 POWDER, FOR SUSPENSION ORAL at 17:50

## 2022-10-30 RX ADMIN — LOSARTAN POTASSIUM 25 MILLIGRAM(S): 100 TABLET, FILM COATED ORAL at 06:14

## 2022-10-30 RX ADMIN — Medication 4 UNIT(S): at 12:32

## 2022-10-30 RX ADMIN — LIDOCAINE 1 PATCH: 4 CREAM TOPICAL at 06:53

## 2022-10-30 RX ADMIN — BUMETANIDE 2 MILLIGRAM(S): 0.25 INJECTION INTRAMUSCULAR; INTRAVENOUS at 06:37

## 2022-10-30 RX ADMIN — INSULIN GLARGINE 12 UNIT(S): 100 INJECTION, SOLUTION SUBCUTANEOUS at 22:42

## 2022-10-30 RX ADMIN — Medication 650 MILLIGRAM(S): at 22:41

## 2022-10-30 RX ADMIN — ATORVASTATIN CALCIUM 80 MILLIGRAM(S): 80 TABLET, FILM COATED ORAL at 22:09

## 2022-10-30 RX ADMIN — SEVELAMER CARBONATE 800 MILLIGRAM(S): 2400 POWDER, FOR SUSPENSION ORAL at 09:33

## 2022-10-30 RX ADMIN — CHLORHEXIDINE GLUCONATE 1 APPLICATION(S): 213 SOLUTION TOPICAL at 09:33

## 2022-10-30 RX ADMIN — Medication 2: at 09:32

## 2022-10-30 RX ADMIN — Medication 650 MILLIGRAM(S): at 22:11

## 2022-10-30 RX ADMIN — Medication 100 MILLIGRAM(S): at 14:50

## 2022-10-30 RX ADMIN — BUMETANIDE 2 MILLIGRAM(S): 0.25 INJECTION INTRAMUSCULAR; INTRAVENOUS at 14:51

## 2022-10-30 RX ADMIN — Medication 100 MILLIGRAM(S): at 06:14

## 2022-10-30 RX ADMIN — LIDOCAINE 1 PATCH: 4 CREAM TOPICAL at 06:13

## 2022-10-30 RX ADMIN — AMLODIPINE BESYLATE 10 MILLIGRAM(S): 2.5 TABLET ORAL at 06:14

## 2022-10-30 RX ADMIN — SEVELAMER CARBONATE 800 MILLIGRAM(S): 2400 POWDER, FOR SUSPENSION ORAL at 12:35

## 2022-10-30 NOTE — PROGRESS NOTE ADULT - PROBLEM SELECTOR PLAN 4
- Likely anemia of chronic disease vs secondary to occult bleed, thought patient does not endorse any hematochezia/melena   -anemia may have been due to pericardial effusion since > 500 cc of bloody fluid was drained.   -GI consult appreciated  - Hb improved s/p 1 Unit PRBcs  -H/H stable  - Transfuse <7  - Continue Retacrit

## 2022-10-30 NOTE — PROGRESS NOTE ADULT - PROBLEM SELECTOR PLAN 7
- BG >200. Goal of 140-180  - Moderate dose Insulin sliding scale for now.  - FS premeals and at bedtime - BG >200. Goal of 140-180  - Moderate dose Insulin sliding scale   -Start Lantus 12 Units qHS and admelog 4 Units qAc  - FS premeals and at bedtime

## 2022-10-30 NOTE — PROGRESS NOTE ADULT - PROBLEM SELECTOR PLAN 3
- CXR preliminary read with cardiomegaly, moderate pulmonary edema, small bilateral pleural effusions likely 2/2 volume overload in setting of dialysis  - c/w PD per nephro recs  - Monitor respiratory status  - Supplemental O2. Will wean as tolerated.  -c/w Duonebs prn for wheezing

## 2022-10-30 NOTE — PROGRESS NOTE ADULT - PROBLEM SELECTOR PLAN 6
- BP elevated on admission but improving now when restarting home BP meds  -c/w Norvasc and losartan   -Will d/c hydralazine as pericardial effusion may be induced by this; Will substitute with labetalol 100mg tid - BP elevated on admission but improving now when restarting home BP meds  -c/w Norvasc and losartan   -home hydralazine d/c'ed due to concern of it as a cause of  pericardial effusion. F/U antihistone ab sent on 10/27 to confirm.    -Added labetalol 100mg PO tid on 10/27 with better BP control.

## 2022-10-30 NOTE — PROGRESS NOTE ADULT - PROBLEM SELECTOR PLAN 1
- Unclear etiology.  - POCUS with effusion with concern for tamponade physiology   - TTE also shows pericardial effusion with concern of early tamponade physiology  - RVP negative  - so far autoimmune, rheumatological workup and infectious workup negative  - CT chest shows moderate pericardial effusion increased from previous imaging.   -Spoke to cardiology attending Dr Monique and he said it would be technically difficult to do a pericardiocentesis b/c of location of effusion so suggested IR consult.   - s/p pericardiocentesis via IR on 10/28 w/ drain placed; 520 cc of serosanginous fluid drained  -F/U IR recs in regard to drain management  - Avoid anticoagulants  -F/U antihistone Ab sent on 10/29 to r/o hydralazine induced pericardial effusion. - Unclear etiology.  - POCUS with effusion with concern for tamponade physiology   - TTE also shows pericardial effusion with concern of early tamponade physiology  - RVP negative  - so far autoimmune, rheumatological workup and infectious workup negative; Will need to consult rheumatology.   - CT chest shows moderate pericardial effusion increased from previous imaging.   -Spoke to cardiology attending Dr Monique and he said it would be technically difficult to do a pericardiocentesis b/c of location of effusion so suggested IR consult.   - s/p pericardiocentesis via IR on 10/28 w/ drain placed; 520 cc of serosanginous fluid drained  -F/U IR recs in regard to drain management  - Avoid anticoagulants  -F/U antihistone Ab sent on 10/29 to r/o hydralazine induced pericardial effusion.

## 2022-10-30 NOTE — PROGRESS NOTE ADULT - PROBLEM SELECTOR PLAN 10
Diet: NPO for now pending tentative pericardiocentesis  DVT Ppx: SCD for now given anemia  - Dispo: Pending cardiac workup and intervention    Spoke to Son Rosario [phone] and sister in law ( Luz)  [phone] on 10/28. Diet: diabetic diet  DVT Ppx: SCD for now given anemia  - Dispo: Pending pain control, pericardial effusion management    Spoke to Son Rosario [phone] and sister in law (Dr Escobar)  [phone] on 10/28. Diet: diabetic diet  DVT Ppx: SCD for now given anemia  - Dispo: Pending PT eval, pericardial effusion management    Spoke to Son Rosario [phone] and sister in law (Dr Escobar)  [phone] on 10/28. Diet: diabetic diet  DVT Ppx: SCD for now given anemia  - Dispo: Pending PT eval, pericardial effusion management    Spoke to Son Rosario [phone] on 10/30 and sister in law (Dr Escobar)  [phone] on 10/29.

## 2022-10-30 NOTE — PROGRESS NOTE ADULT - SUBJECTIVE AND OBJECTIVE BOX
Patient is a 67y old  Male who presents with a chief complaint of GERMAIN pericardial effusion (29 Oct 2022 17:52)      SUBJECTIVE / OVERNIGHT EVENTS:    MEDICATIONS  (STANDING):  amLODIPine   Tablet 10 milliGRAM(s) Oral daily  atorvastatin 80 milliGRAM(s) Oral at bedtime  buMETAnide Injectable 2 milliGRAM(s) IV Push two times a day  chlorhexidine 2% Cloths 1 Application(s) Topical <User Schedule>  dextrose 50% Injectable 25 Gram(s) IV Push once  dextrose 50% Injectable 12.5 Gram(s) IV Push once  dextrose 50% Injectable 25 Gram(s) IV Push once  dextrose Oral Gel 15 Gram(s) Oral once  epoetin heidi-epbx (RETACRIT) Injectable 34060 Unit(s) SubCutaneous Once  gentamicin 0.1% Ointment 1 Application(s) Topical <User Schedule>  glucagon  Injectable 1 milliGRAM(s) IntraMuscular once  insulin lispro (ADMELOG) corrective regimen sliding scale   SubCutaneous three times a day before meals  insulin lispro (ADMELOG) corrective regimen sliding scale   SubCutaneous at bedtime  labetalol 100 milliGRAM(s) Oral three times a day  lidocaine   4% Patch 1 Patch Transdermal every 24 hours  losartan 25 milliGRAM(s) Oral daily  sevelamer carbonate 800 milliGRAM(s) Oral three times a day with meals    MEDICATIONS  (PRN):  acetaminophen     Tablet .. 650 milliGRAM(s) Oral every 6 hours PRN Temp greater or equal to 38C (100.4F), Mild Pain (1 - 3), Moderate Pain (4 - 6)  traMADol 50 milliGRAM(s) Oral every 6 hours PRN moderate to severe pain      Vital Signs Last 24 Hrs  T(C): 37.7 (30 Oct 2022 05:45), Max: 37.7 (30 Oct 2022 05:45)  T(F): 99.9 (30 Oct 2022 05:45), Max: 99.9 (30 Oct 2022 05:45)  HR: 82 (30 Oct 2022 05:45) (64 - 82)  BP: 140/53 (30 Oct 2022 05:45) (115/59 - 155/60)  BP(mean): --  RR: 19 (30 Oct 2022 05:45) (17 - 19)  SpO2: 99% (30 Oct 2022 05:45) (98% - 100%)    Parameters below as of 30 Oct 2022 05:45  Patient On (Oxygen Delivery Method): room air      CAPILLARY BLOOD GLUCOSE      POCT Blood Glucose.: 313 mg/dL (29 Oct 2022 22:13)  POCT Blood Glucose.: 283 mg/dL (29 Oct 2022 20:20)  POCT Blood Glucose.: 355 mg/dL (29 Oct 2022 17:42)  POCT Blood Glucose.: 289 mg/dL (29 Oct 2022 11:54)    I&O's Summary    29 Oct 2022 07:01  -  30 Oct 2022 07:00  --------------------------------------------------------  IN: 6770 mL / OUT: 49092 mL / NET: -6340 mL    30 Oct 2022 07:01  -  30 Oct 2022 09:15  --------------------------------------------------------  IN: 0 mL / OUT: 150 mL / NET: -150 mL        PHYSICAL EXAM:  GENERAL: NAD, well-developed  HEAD:  Atraumatic, Normocephalic  EYES: EOMI, PERRLA, conjunctiva and sclera clear  NECK: Supple, No JVD  CHEST/LUNG: Clear to auscultation bilaterally; No wheeze  HEART: Regular rate and rhythm; No murmurs, rubs, or gallops  ABDOMEN: Soft, Nontender, Nondistended; Bowel sounds present  EXTREMITIES:  2+ Peripheral Pulses, No clubbing, cyanosis, or edema  PSYCH: AAOx3  NEUROLOGY: non-focal  SKIN: No rashes or lesions    LABS:                        7.6    11.27 )-----------( 457      ( 30 Oct 2022 05:40 )             23.7     10-30    140  |  98  |  54<H>  ----------------------------<  137<H>  3.7   |  26  |  8.61<H>    Ca    8.7      30 Oct 2022 05:40  Phos  7.2     10-30  Mg     2.10     10-30                RADIOLOGY & ADDITIONAL TESTS:    Imaging Personally Reviewed:    Consultant(s) Notes Reviewed:      Care Discussed with Consultants/Other Providers:   Patient is a 67y old  Male who presents with a chief complaint of GERMAIN pericardial effusion (29 Oct 2022 17:52)      SUBJECTIVE / OVERNIGHT EVENTS:  Patient has no new complaints. He says his pain is controlled. Denies cp, SOB, abdominal pain, N/V/D     MEDICATIONS  (STANDING):  amLODIPine   Tablet 10 milliGRAM(s) Oral daily  atorvastatin 80 milliGRAM(s) Oral at bedtime  buMETAnide Injectable 2 milliGRAM(s) IV Push two times a day  chlorhexidine 2% Cloths 1 Application(s) Topical <User Schedule>  dextrose 50% Injectable 25 Gram(s) IV Push once  dextrose 50% Injectable 12.5 Gram(s) IV Push once  dextrose 50% Injectable 25 Gram(s) IV Push once  dextrose Oral Gel 15 Gram(s) Oral once  epoetin heidi-epbx (RETACRIT) Injectable 99343 Unit(s) SubCutaneous Once  gentamicin 0.1% Ointment 1 Application(s) Topical <User Schedule>  glucagon  Injectable 1 milliGRAM(s) IntraMuscular once  insulin lispro (ADMELOG) corrective regimen sliding scale   SubCutaneous three times a day before meals  insulin lispro (ADMELOG) corrective regimen sliding scale   SubCutaneous at bedtime  labetalol 100 milliGRAM(s) Oral three times a day  lidocaine   4% Patch 1 Patch Transdermal every 24 hours  losartan 25 milliGRAM(s) Oral daily  sevelamer carbonate 800 milliGRAM(s) Oral three times a day with meals    MEDICATIONS  (PRN):  acetaminophen     Tablet .. 650 milliGRAM(s) Oral every 6 hours PRN Temp greater or equal to 38C (100.4F), Mild Pain (1 - 3), Moderate Pain (4 - 6)  traMADol 50 milliGRAM(s) Oral every 6 hours PRN moderate to severe pain      Vital Signs Last 24 Hrs  T(C): 37.7 (30 Oct 2022 05:45), Max: 37.7 (30 Oct 2022 05:45)  T(F): 99.9 (30 Oct 2022 05:45), Max: 99.9 (30 Oct 2022 05:45)  HR: 82 (30 Oct 2022 05:45) (64 - 82)  BP: 140/53 (30 Oct 2022 05:45) (115/59 - 155/60)  BP(mean): --  RR: 19 (30 Oct 2022 05:45) (17 - 19)  SpO2: 99% (30 Oct 2022 05:45) (98% - 100%)    Parameters below as of 30 Oct 2022 05:45  Patient On (Oxygen Delivery Method): room air      CAPILLARY BLOOD GLUCOSE      POCT Blood Glucose.: 313 mg/dL (29 Oct 2022 22:13)  POCT Blood Glucose.: 283 mg/dL (29 Oct 2022 20:20)  POCT Blood Glucose.: 355 mg/dL (29 Oct 2022 17:42)  POCT Blood Glucose.: 289 mg/dL (29 Oct 2022 11:54)    I&O's Summary    29 Oct 2022 07:01  -  30 Oct 2022 07:00  --------------------------------------------------------  IN: 6770 mL / OUT: 64555 mL / NET: -6340 mL    30 Oct 2022 07:01  -  30 Oct 2022 09:15  --------------------------------------------------------  IN: 0 mL / OUT: 150 mL / NET: -150 mL        PHYSICAL EXAM:  GENERAL: NAD, well-developed  HEAD:  Atraumatic, Normocephalic  EYES: EOMI, PERRLA, conjunctiva and sclera clear  NECK: Supple, No JVD  CHEST/LUNG: Left chest pericardial drain in placed with minimal bloody fluid output. Clear to auscultation bilaterally; No wheeze  HEART: Regular rate and rhythm; No murmurs, rubs, or gallops  ABDOMEN: Soft, Nontender, Nondistended; Bowel sounds present  EXTREMITIES:  2+ Peripheral Pulses, No clubbing, cyanosis, or edema  PSYCH: AAOx3  NEUROLOGY: non-focal  SKIN: No rashes or lesions    LABS:                        7.6    11.27 )-----------( 457      ( 30 Oct 2022 05:40 )             23.7     10-30    140  |  98  |  54<H>  ----------------------------<  137<H>  3.7   |  26  |  8.61<H>    Ca    8.7      30 Oct 2022 05:40  Phos  7.2     10-30  Mg     2.10     10-30                RADIOLOGY & ADDITIONAL TESTS:    Imaging Personally Reviewed:    Consultant(s) Notes Reviewed:      Care Discussed with Consultants/Other Providers:

## 2022-10-30 NOTE — PROGRESS NOTE ADULT - PROBLEM SELECTOR PLAN 5
- Per patient he sustained a fracture to his left shoulder but can't recall the exact mechanism 3 months ago  - Xray showing Known left distal clavicular fracture, with either fracture nonunion or incomplete healing.  - Will consult ortho consult given concern for non union of fracture.   - Pain control  - Continue Lidocaine patch - Per patient he sustained a fracture to his left shoulder but can't recall the exact mechanism 3 months ago  - Xray showing Known left distal clavicular fracture, with either fracture nonunion or incomplete healing.  - Ortho consulted and assessed no acute intervention advised during this admission. Patient should be NWB LUE in sling for comfort and followup with Dr. Baum outpatient. ([phone])  - Pain control  - Continue Lidocaine patch

## 2022-10-30 NOTE — PROGRESS NOTE ADULT - SUBJECTIVE AND OBJECTIVE BOX
[facility] - S Bryant / Og S /D Dimitris/ S Davie/ NETTE Allan/ Yung Mao / WICHO Paytonu/ O Amna  service -[phone], office [phone]  ---------------------------------------------------------------------------------------------------------------    Patient seen and examined bedside    Subjective and Objective: No overnight events, sob resolved. No complaints today. feeling better    Allergies: No Known Allergies      Hospital Medications:   MEDICATIONS  (STANDING):  amLODIPine   Tablet 10 milliGRAM(s) Oral daily  atorvastatin 80 milliGRAM(s) Oral at bedtime  buMETAnide Injectable 2 milliGRAM(s) IV Push two times a day  chlorhexidine 2% Cloths 1 Application(s) Topical <User Schedule>  dextrose 50% Injectable 25 Gram(s) IV Push once  dextrose 50% Injectable 12.5 Gram(s) IV Push once  dextrose 50% Injectable 25 Gram(s) IV Push once  dextrose Oral Gel 15 Gram(s) Oral once  epoetin heidi-epbx (RETACRIT) Injectable 36595 Unit(s) SubCutaneous Once  gentamicin 0.1% Ointment 1 Application(s) Topical <User Schedule>  glucagon  Injectable 1 milliGRAM(s) IntraMuscular once  insulin glargine Injectable (LANTUS) 12 Unit(s) SubCutaneous at bedtime  insulin lispro (ADMELOG) corrective regimen sliding scale   SubCutaneous three times a day before meals  insulin lispro (ADMELOG) corrective regimen sliding scale   SubCutaneous at bedtime  insulin lispro Injectable (ADMELOG) 4 Unit(s) SubCutaneous three times a day before meals  labetalol 100 milliGRAM(s) Oral three times a day  lidocaine   4% Patch 1 Patch Transdermal every 24 hours  losartan 25 milliGRAM(s) Oral daily  sevelamer carbonate 800 milliGRAM(s) Oral three times a day with meals      REVIEW OF SYSTEMS:  CONSTITUTIONAL: No weakness, fevers or chills  EYES/ENT: No visual changes;  No vertigo or throat pain   NECK: No pain or stiffness  RESPIRATORY: No cough, wheezing, hemoptysis; No shortness of breath  CARDIOVASCULAR: No chest pain or palpitations.  GASTROINTESTINAL: No abdominal or epigastric pain. No nausea, vomiting, or hematemesis; No diarrhea or constipation. No melena or hematochezia.  GENITOURINARY: No dysuria, frequency, foamy urine, urinary urgency, incontinence or hematuria  NEUROLOGICAL: No numbness or weakness  SKIN: No itching, burning, rashes, or lesions   VASCULAR: No bilateral lower extremity edema.   All other review of systems is negative unless indicated above.    VITALS:  T(F): 97.9 (10-30-22 @ 12:43), Max: 99.9 (10-30-22 @ 05:45)  HR: 69 (10-30-22 @ 14:50)  BP: 146/59 (10-30-22 @ 14:50)  RR: 16 (10-30-22 @ 12:43)  SpO2: 99% (10-30-22 @ 12:43)  Wt(kg): --    10-29 @ 07:01  -  10-30 @ 07:00  --------------------------------------------------------  IN: 6770 mL / OUT: 60409 mL / NET: -6340 mL    10-30 @ 07:01  -  10-30 @ 18:06  --------------------------------------------------------  IN: 480 mL / OUT: 155 mL / NET: 325 mL          PHYSICAL EXAM:  Constitutional: NAD  HEENT: anicteric sclera, oropharynx clear  Neck: No JVD  Respiratory: CTAB, no wheezes, rales or rhonchi  Cardiovascular: S1, S2, RRR  Gastrointestinal: BS+, soft, NT/ND  Extremities: No cyanosis or clubbing. No peripheral edema  Neurological: A/O x 3, no focal deficits  Psychiatric: Normal mood, normal affect  : No CVA tenderness. No brand.   Skin: No rashes  Vascular Access:    LABS:  10-30    140  |  98  |  54<H>  ----------------------------<  137<H>  3.7   |  26  |  8.61<H>    Ca    8.7      30 Oct 2022 05:40  Phos  7.2     10-30  Mg     2.10     10-30      Creatinine Trend: 8.61 <--, 7.99 <--, 8.67 <--, 7.64 <--, 7.86 <--, 7.80 <--, 7.34 <--                        7.6    11.27 )-----------( 457      ( 30 Oct 2022 05:40 )             23.7     Urine Studies:        RADIOLOGY & ADDITIONAL STUDIES:   [facility] - S Bryant / Og S /D Dimitris/ NETTE Broderick/ NETTE Allan/ Yung Mao / WICHO Paytonu/ O Amna  service -[phone], office [phone]  ---------------------------------------------------------------------------------------------------------------    Patient seen and examined bedside    Subjective and Objective: No overnight events, sob better. No new complaints today. feeling better    Allergies: No Known Allergies      Hospital Medications:   MEDICATIONS  (STANDING):  amLODIPine   Tablet 10 milliGRAM(s) Oral daily  atorvastatin 80 milliGRAM(s) Oral at bedtime  buMETAnide Injectable 2 milliGRAM(s) IV Push two times a day  chlorhexidine 2% Cloths 1 Application(s) Topical <User Schedule>  dextrose 50% Injectable 25 Gram(s) IV Push once  dextrose 50% Injectable 12.5 Gram(s) IV Push once  dextrose 50% Injectable 25 Gram(s) IV Push once  dextrose Oral Gel 15 Gram(s) Oral once  epoetin heidi-epbx (RETACRIT) Injectable 12074 Unit(s) SubCutaneous Once  gentamicin 0.1% Ointment 1 Application(s) Topical <User Schedule>  glucagon  Injectable 1 milliGRAM(s) IntraMuscular once  insulin glargine Injectable (LANTUS) 12 Unit(s) SubCutaneous at bedtime  insulin lispro (ADMELOG) corrective regimen sliding scale   SubCutaneous three times a day before meals  insulin lispro (ADMELOG) corrective regimen sliding scale   SubCutaneous at bedtime  insulin lispro Injectable (ADMELOG) 4 Unit(s) SubCutaneous three times a day before meals  labetalol 100 milliGRAM(s) Oral three times a day  lidocaine   4% Patch 1 Patch Transdermal every 24 hours  losartan 25 milliGRAM(s) Oral daily  sevelamer carbonate 800 milliGRAM(s) Oral three times a day with meals    VITALS:  T(F): 97.9 (10-30-22 @ 12:43), Max: 99.9 (10-30-22 @ 05:45)  HR: 69 (10-30-22 @ 14:50)  BP: 146/59 (10-30-22 @ 14:50)  RR: 16 (10-30-22 @ 12:43)  SpO2: 99% (10-30-22 @ 12:43)  Wt(kg): --    10-29 @ 07:01  -  10-30 @ 07:00  --------------------------------------------------------  IN: 6770 mL / OUT: 75078 mL / NET: -6340 mL    10-30 @ 07:01  -  10-30 @ 18:06  --------------------------------------------------------  IN: 480 mL / OUT: 155 mL / NET: 325 mL      PHYSICAL EXAM:  Constitutional: NAD  HEENT: anicteric sclera  Neck: No JVD  Respiratory: CTAB, no wheezes, rales or rhonchi  Cardiovascular: S1, S2, RRR, +pericardial drain   Gastrointestinal: BS+, soft, NT, +fluid, +PD cath  Extremities: no pedal edema b/l   Neurological: A/O x 3  Psychiatric: Normal mood, normal affect  : No brand.     LABS:  10-30    140  |  98  |  54<H>  ----------------------------<  137<H>  3.7   |  26  |  8.61<H>    Ca    8.7      30 Oct 2022 05:40  Phos  7.2     10-30  Mg     2.10     10-30      Creatinine Trend: 8.61 <--, 7.99 <--, 8.67 <--, 7.64 <--, 7.86 <--, 7.80 <--, 7.34 <--                        7.6    11.27 )-----------( 457      ( 30 Oct 2022 05:40 )             23.7     Urine Studies:        RADIOLOGY & ADDITIONAL STUDIES:

## 2022-10-30 NOTE — PROGRESS NOTE ADULT - ASSESSMENT
68 yo Male w/ hx HTN, DM, CVA, ESRD on PD, HDL, Anemia, sent in by PD clinic due to low Hgb. Endorses GERMAIN and general weakness. In ED Hg/hct 5.9/19 which improved to 7.4/22.9 after 1 Unit PRBCs.  CXR: mod pulm edema with small b/l pleural effusions. Patient also found to have a large pericardial effusion with signs of early tamponade on POCUS and TTE.  Patient now s/p pericardiocentesis via IR on 10/28 w/ drain placed.

## 2022-10-30 NOTE — PROGRESS NOTE ADULT - ASSESSMENT
67M p/w ESRD on PD, HTN, DM, HDL, CVA, Anemia, sent in by PD clinic due to low Hgb. Renal following for ESRD/PD Mx.    ESRD on peritoneal dialysis.   K ok  hypervolemia improving  Informed consent for PD obtained from pt. in chart     will resume Peritoneal Dialysis 4 exchanges tomorrow using 4.25 % dextrose dialysate, overnight dry. UF negative. no PD today  renal diet, fluid restriction 1.2L/day  dose all meds for ESRD  Hyperphosphatemia - CONT home sevelamer as phos binders    mod Pericardial effusion w/s/o tamponade   s/p pericardiocentesis 10/28 520ml removed  f/u fluid analysis. f/u output  continue high UF PD for now  f/u autoimmune, rheum, infectious w/u     Anemia in ckd, likely GIB- Hb <goal. outpt Hb from 10/25/22 was 6.6, was getting BRITTANIE outpt  awaiting outpt colonoscopy, never had it   monitor H/H closely,  s/p PRBC 1 unit   epo 20k subqx1 given    HTN, controlled-bp stable. c/w home bp meds w/holding parameters.     will closely follow up.   poc d/w pt, PD RN  labs, rad, chart reviewed  For any question, pl call:  Nephrology  Cell -[phone]  Office [phone]  Ans Serv [phone]

## 2022-10-31 LAB
ANION GAP SERPL CALC-SCNC: 16 MMOL/L — HIGH (ref 7–14)
BASOPHILS # BLD AUTO: 0.07 K/UL — SIGNIFICANT CHANGE UP (ref 0–0.2)
BASOPHILS NFR BLD AUTO: 0.7 % — SIGNIFICANT CHANGE UP (ref 0–2)
BLD GP AB SCN SERPL QL: NEGATIVE — SIGNIFICANT CHANGE UP
BUN SERPL-MCNC: 60 MG/DL — HIGH (ref 7–23)
CALCIUM SERPL-MCNC: 8.1 MG/DL — LOW (ref 8.4–10.5)
CHLORIDE SERPL-SCNC: 98 MMOL/L — SIGNIFICANT CHANGE UP (ref 98–107)
CO2 SERPL-SCNC: 25 MMOL/L — SIGNIFICANT CHANGE UP (ref 22–31)
CREAT SERPL-MCNC: 9.54 MG/DL — HIGH (ref 0.5–1.3)
EGFR: 5 ML/MIN/1.73M2 — LOW
EOSINOPHIL # BLD AUTO: 1.17 K/UL — HIGH (ref 0–0.5)
EOSINOPHIL NFR BLD AUTO: 11.4 % — HIGH (ref 0–6)
GLUCOSE BLDC GLUCOMTR-MCNC: 155 MG/DL — HIGH (ref 70–99)
GLUCOSE BLDC GLUCOMTR-MCNC: 170 MG/DL — HIGH (ref 70–99)
GLUCOSE BLDC GLUCOMTR-MCNC: 184 MG/DL — HIGH (ref 70–99)
GLUCOSE BLDC GLUCOMTR-MCNC: 194 MG/DL — HIGH (ref 70–99)
GLUCOSE BLDC GLUCOMTR-MCNC: 206 MG/DL — HIGH (ref 70–99)
GLUCOSE BLDC GLUCOMTR-MCNC: 238 MG/DL — HIGH (ref 70–99)
GLUCOSE SERPL-MCNC: 113 MG/DL — HIGH (ref 70–99)
HCT VFR BLD CALC: 22.2 % — LOW (ref 39–50)
HGB BLD-MCNC: 7.1 G/DL — LOW (ref 13–17)
IANC: 6.3 K/UL — SIGNIFICANT CHANGE UP (ref 1.8–7.4)
IMM GRANULOCYTES NFR BLD AUTO: 1.1 % — HIGH (ref 0–0.9)
LYMPHOCYTES # BLD AUTO: 1.52 K/UL — SIGNIFICANT CHANGE UP (ref 1–3.3)
LYMPHOCYTES # BLD AUTO: 14.8 % — SIGNIFICANT CHANGE UP (ref 13–44)
MAGNESIUM SERPL-MCNC: 2.2 MG/DL — SIGNIFICANT CHANGE UP (ref 1.6–2.6)
MCHC RBC-ENTMCNC: 26.8 PG — LOW (ref 27–34)
MCHC RBC-ENTMCNC: 32 GM/DL — SIGNIFICANT CHANGE UP (ref 32–36)
MCV RBC AUTO: 83.8 FL — SIGNIFICANT CHANGE UP (ref 80–100)
MONOCYTES # BLD AUTO: 1.09 K/UL — HIGH (ref 0–0.9)
MONOCYTES NFR BLD AUTO: 10.6 % — SIGNIFICANT CHANGE UP (ref 2–14)
NEUTROPHILS # BLD AUTO: 6.3 K/UL — SIGNIFICANT CHANGE UP (ref 1.8–7.4)
NEUTROPHILS NFR BLD AUTO: 61.4 % — SIGNIFICANT CHANGE UP (ref 43–77)
NRBC # BLD: 0 /100 WBCS — SIGNIFICANT CHANGE UP (ref 0–0)
NRBC # FLD: 0 K/UL — SIGNIFICANT CHANGE UP (ref 0–0)
PHOSPHATE SERPL-MCNC: 7.3 MG/DL — HIGH (ref 2.5–4.5)
PLATELET # BLD AUTO: 446 K/UL — HIGH (ref 150–400)
POTASSIUM SERPL-MCNC: 3.7 MMOL/L — SIGNIFICANT CHANGE UP (ref 3.5–5.3)
POTASSIUM SERPL-SCNC: 3.7 MMOL/L — SIGNIFICANT CHANGE UP (ref 3.5–5.3)
RBC # BLD: 2.65 M/UL — LOW (ref 4.2–5.8)
RBC # FLD: 15.4 % — HIGH (ref 10.3–14.5)
RH IG SCN BLD-IMP: POSITIVE — SIGNIFICANT CHANGE UP
SODIUM SERPL-SCNC: 139 MMOL/L — SIGNIFICANT CHANGE UP (ref 135–145)
WBC # BLD: 10.26 K/UL — SIGNIFICANT CHANGE UP (ref 3.8–10.5)
WBC # FLD AUTO: 10.26 K/UL — SIGNIFICANT CHANGE UP (ref 3.8–10.5)

## 2022-10-31 PROCEDURE — 99222 1ST HOSP IP/OBS MODERATE 55: CPT | Mod: GC

## 2022-10-31 PROCEDURE — 99233 SBSQ HOSP IP/OBS HIGH 50: CPT

## 2022-10-31 PROCEDURE — 93306 TTE W/DOPPLER COMPLETE: CPT | Mod: 26

## 2022-10-31 RX ORDER — MUPIROCIN 20 MG/G
1 OINTMENT TOPICAL
Refills: 0 | Status: DISCONTINUED | OUTPATIENT
Start: 2022-10-31 | End: 2022-11-03

## 2022-10-31 RX ADMIN — SEVELAMER CARBONATE 800 MILLIGRAM(S): 2400 POWDER, FOR SUSPENSION ORAL at 18:47

## 2022-10-31 RX ADMIN — LOSARTAN POTASSIUM 25 MILLIGRAM(S): 100 TABLET, FILM COATED ORAL at 09:15

## 2022-10-31 RX ADMIN — BUMETANIDE 2 MILLIGRAM(S): 0.25 INJECTION INTRAMUSCULAR; INTRAVENOUS at 13:05

## 2022-10-31 RX ADMIN — LIDOCAINE 1 PATCH: 4 CREAM TOPICAL at 06:19

## 2022-10-31 RX ADMIN — Medication 4 UNIT(S): at 18:46

## 2022-10-31 RX ADMIN — Medication 1: at 18:46

## 2022-10-31 RX ADMIN — Medication 2: at 13:02

## 2022-10-31 RX ADMIN — Medication 4 UNIT(S): at 08:50

## 2022-10-31 RX ADMIN — Medication 100 MILLIGRAM(S): at 09:16

## 2022-10-31 RX ADMIN — INSULIN GLARGINE 12 UNIT(S): 100 INJECTION, SOLUTION SUBCUTANEOUS at 22:23

## 2022-10-31 RX ADMIN — Medication 2: at 08:49

## 2022-10-31 RX ADMIN — ATORVASTATIN CALCIUM 80 MILLIGRAM(S): 80 TABLET, FILM COATED ORAL at 22:24

## 2022-10-31 RX ADMIN — CHLORHEXIDINE GLUCONATE 1 APPLICATION(S): 213 SOLUTION TOPICAL at 09:16

## 2022-10-31 RX ADMIN — SEVELAMER CARBONATE 800 MILLIGRAM(S): 2400 POWDER, FOR SUSPENSION ORAL at 13:04

## 2022-10-31 RX ADMIN — AMLODIPINE BESYLATE 10 MILLIGRAM(S): 2.5 TABLET ORAL at 09:16

## 2022-10-31 RX ADMIN — BUMETANIDE 2 MILLIGRAM(S): 0.25 INJECTION INTRAMUSCULAR; INTRAVENOUS at 06:18

## 2022-10-31 RX ADMIN — Medication 4 UNIT(S): at 13:02

## 2022-10-31 RX ADMIN — Medication 100 MILLIGRAM(S): at 18:47

## 2022-10-31 RX ADMIN — Medication 1 APPLICATION(S): at 08:00

## 2022-10-31 RX ADMIN — LIDOCAINE 1 PATCH: 4 CREAM TOPICAL at 07:02

## 2022-10-31 RX ADMIN — MUPIROCIN 1 APPLICATION(S): 20 OINTMENT TOPICAL at 18:47

## 2022-10-31 RX ADMIN — LIDOCAINE 1 PATCH: 4 CREAM TOPICAL at 18:32

## 2022-10-31 RX ADMIN — SEVELAMER CARBONATE 800 MILLIGRAM(S): 2400 POWDER, FOR SUSPENSION ORAL at 08:51

## 2022-10-31 NOTE — PHYSICAL THERAPY INITIAL EVALUATION ADULT - NS ASR WT BEARING DETAIL LUE
nonweight-bearing X-ray showing known left distal clavicular fracture; Per ortho consult note NWB, sling for comfort./nonweight-bearing

## 2022-10-31 NOTE — CONSULT NOTE ADULT - ASSESSMENT
66 yo M PMH ESRD on PD, HTN, DM2, HDL, CVA, L shoulder fracture (2022), chronic anemia sent in by his PCP for dyspnea on exertion and fatigue found to have hgb 5.9 w/ large hemorrhagic pericardial effusion. Rheumatology consulted for evaluation.    #large hemorrhagic pericardial effusion:  =s/p pericardiocentesis 10/28/22 w/ 160,000 RBC  =pt also w/ significant weight loss, worsening anemia, b/l pleural effusions and multiple supraclavicular lymph nodes largest of which measures 1.2 cm w/ multiple precarinal lymph nodes largest measures 1 cm on CT chest  =Most common cause of hemorrhagic pericardial effusion is malignancy, idiopathic, or uremia. Autoimmune causes do not usually cause hemorrhagic effusion  =Pt w/o stigmata of autoimmune disease on ROS or exam  =Does have serology +DS-DNA, however, this can occur w/ hydralazine use - which can create autoantibodies that are not pathologic    Serology: RF=17, DS-DNA=69. Negative HARVEY, ANCA, SSA/SSB, CCP, RNP, IGG4     Plan:  -Pt needs malignancy ruled out, would ensure proper screening performed, as this is the most common cause of hemorrhagic pericardial effusion  -would obtain QuantiFeron to r/o TB, SPEP, UPEP, DS-DNA crithidia (more specific), C3/C4, histone (ordered)  -f/u pericardial fluid cytology    Discussed with Attending Dr. Flavio Harper DO  Rheumatology Fellow  Pager: [phone]  Available on TEAMS 66 yo M PMH ESRD on PD, HTN, DM2, HDL, CVA, L shoulder fracture (2022), chronic anemia sent in by his PCP for dyspnea on exertion and fatigue found to have hgb 5.9 w/ large hemorrhagic pericardial effusion. Rheumatology consulted for evaluation.    #large hemorrhagic pericardial effusion:  =s/p pericardiocentesis 10/28/22 w/ 160,000 RBC  =pt also w/ significant weight loss, worsening anemia, b/l pleural effusions and multiple supraclavicular lymph nodes largest of which measures 1.2 cm w/ multiple precarinal lymph nodes largest measures 1 cm on CT chest  =Most common cause of hemorrhagic pericardial effusion is malignancy, idiopathic, or uremia. Autoimmune causes do not usually cause hemorrhagic effusion  =Pt w/o stigmata of autoimmune disease on ROS or exam.  Per daughter in law, who is a nephrologist, pt had ESRD 2/2 HTN/DM2, not requiring kidney biopsy. Pt has also had various joint pain and swelling over last couple of months including knees and elbows. Has had unrevealing arthrocentesis w/ ortho.    =Does have serology +DS-DNA, however, this can occur w/ hydralazine use - which can create autoantibodies that are not pathologic    Serology: RF=17, DS-DNA=69. Negative HARVEY, ANCA, SSA/SSB, CCP, RNP, IGG4     Plan:  -Pt needs malignancy ruled out, would ensure proper screening performed, as this is the most common cause of hemorrhagic pericardial effusion  -would obtain QuantiFeron to r/o TB, SPEP, UPEP, DS-DNA crithidia (more specific), C3/C4, histone (ordered)  -f/u pericardial fluid cytology    Discussed with Attending Dr. Flavio Hraper DO  Rheumatology Fellow  Pager: [phone]  Available on TEAMS 66 yo M PMH ESRD on PD, HTN, DM2, HDL, CVA, L shoulder fracture (2022), chronic anemia sent in by his PCP for dyspnea on exertion and fatigue found to have hgb 5.9 w/ large hemorrhagic pericardial effusion. Rheumatology consulted for evaluation.    #large hemorrhagic pericardial effusion:  =s/p pericardiocentesis 10/28/22 w/ 160,000 RBC  =pt also w/ significant weight loss, worsening anemia, b/l pleural effusions and multiple supraclavicular lymph nodes largest of which measures 1.2 cm w/ multiple precarinal lymph nodes largest measures 1 cm on CT chest  =Most common cause of hemorrhagic pericardial effusion is malignancy, idiopathic, or uremia. Autoimmune causes do not usually cause hemorrhagic effusion  =Pt w/o stigmata of autoimmune disease on ROS or exam.  Per daughter in law, who is a nephrologist, pt had ESRD 2/2 HTN/DM2, not requiring kidney biopsy. Pt has also had various joint pain and swelling over last couple of months including knees and elbows. Has had unrevealing arthrocentesis w/ ortho.    =Does have borderline +DS-DNA, however, this can occur w/ hydralazine use - which can create autoantibodies that are not pathologic    Serology: RF=17, DS-DNA=69. Negative HARVEY, ANCA, SSA/SSB, CCP, RNP, IGG4     Plan:  -Pt needs malignancy work up as stated above, would ensure proper screening performed, as this is the most common cause of hemorrhagic pericardial effusion  -would obtain QuantiFeron to r/o TB, SPEP, UPEP, DS-DNA crithidia (more specific), C3/C4, histone (ordered)  -f/u pericardial fluid cytology    Discussed with Attending Dr. Flavio Harper DO  Rheumatology Fellow  Pager: [phone]  Available on TEAMS

## 2022-10-31 NOTE — PHYSICAL THERAPY INITIAL EVALUATION ADULT - FOLLOWS COMMANDS/ANSWERS QUESTIONS, REHAB EVAL
Juliann primary, able to make needs known and follow commands in English/100% of the time/able to follow single-step instructions

## 2022-10-31 NOTE — PROGRESS NOTE ADULT - PROBLEM SELECTOR PLAN 10
Diet: diabetic diet  DVT Ppx: SCD for now given anemia  - Dispo: Pending PT eval, pericardial effusion management

## 2022-10-31 NOTE — PROGRESS NOTE ADULT - PROBLEM SELECTOR PLAN 5
- Per patient he sustained a fracture to his left shoulder but can't recall the exact mechanism 3 months ago  - Xray showing Known left distal clavicular fracture, with either fracture nonunion or incomplete healing.  - Ortho consulted and assessed no acute intervention advised during this admission. Patient should be NWB LUE in sling for comfort and followup with Dr. Baum outpatient. ([phone])  - Pain control  - Continue Lidocaine patch

## 2022-10-31 NOTE — PROVIDER CONTACT NOTE (OTHER) - BACKGROUND
67M p/w ESRD on PD, HTN, DM, HDL, CVA, Anemia, sent in by PD clinic due to low Hgb. Due for pericardiocentesis today.
67 year old male admitted for pericardial effusion
Pt. admitted for pericardial effusion.
67 year old male admitted for pericardial effusion

## 2022-10-31 NOTE — PROGRESS NOTE ADULT - SUBJECTIVE AND OBJECTIVE BOX
[facility] Division of Hospital Medicine  Bobo Kidd MD  Pager [phone]      Patient is a 67y old  Male who presents with a chief complaint of GERMAIN pericardial effusion (31 Oct 2022 11:16)      SUBJECTIVE / OVERNIGHT EVENTS:    no acute event o/n. no new complaints. minimal output from pericardial drain     ADDITIONAL REVIEW OF SYSTEMS:    RESPIRATORY: No cough, wheezing, chills or hemoptysis; No shortness of breath  CARDIOVASCULAR: No chest pain, palpitations, dizziness, or leg swelling  GASTROINTESTINAL: No abdominal or epigastric pain. No nausea, vomiting, or hematemesis; No diarrhea or constipation. No melena or hematochezia.      MEDICATIONS  (STANDING):  amLODIPine   Tablet 10 milliGRAM(s) Oral daily  atorvastatin 80 milliGRAM(s) Oral at bedtime  buMETAnide Injectable 2 milliGRAM(s) IV Push two times a day  chlorhexidine 2% Cloths 1 Application(s) Topical <User Schedule>  dextrose 50% Injectable 25 Gram(s) IV Push once  dextrose 50% Injectable 12.5 Gram(s) IV Push once  dextrose 50% Injectable 25 Gram(s) IV Push once  dextrose Oral Gel 15 Gram(s) Oral once  epoetin heidi-epbx (RETACRIT) Injectable 84818 Unit(s) SubCutaneous Once  gentamicin 0.1% Ointment 1 Application(s) Topical <User Schedule>  glucagon  Injectable 1 milliGRAM(s) IntraMuscular once  insulin glargine Injectable (LANTUS) 12 Unit(s) SubCutaneous at bedtime  insulin lispro (ADMELOG) corrective regimen sliding scale   SubCutaneous three times a day before meals  insulin lispro (ADMELOG) corrective regimen sliding scale   SubCutaneous at bedtime  insulin lispro Injectable (ADMELOG) 4 Unit(s) SubCutaneous three times a day before meals  labetalol 100 milliGRAM(s) Oral three times a day  lidocaine   4% Patch 1 Patch Transdermal every 24 hours  losartan 25 milliGRAM(s) Oral daily  mupirocin 2% Ointment 1 Application(s) Topical two times a day  sevelamer carbonate 800 milliGRAM(s) Oral three times a day with meals    MEDICATIONS  (PRN):  acetaminophen     Tablet .. 650 milliGRAM(s) Oral every 6 hours PRN Temp greater or equal to 38C (100.4F), Mild Pain (1 - 3), Moderate Pain (4 - 6)  traMADol 50 milliGRAM(s) Oral every 6 hours PRN moderate to severe pain      CAPILLARY BLOOD GLUCOSE      POCT Blood Glucose.: 238 mg/dL (31 Oct 2022 12:04)  POCT Blood Glucose.: 206 mg/dL (31 Oct 2022 08:42)  POCT Blood Glucose.: 234 mg/dL (30 Oct 2022 22:26)  POCT Blood Glucose.: 119 mg/dL (30 Oct 2022 17:17)    I&O's Summary    30 Oct 2022 07:01  -  31 Oct 2022 07:00  --------------------------------------------------------  IN: 600 mL / OUT: 405 mL / NET: 195 mL    31 Oct 2022 07:01  -  31 Oct 2022 14:48  --------------------------------------------------------  IN: 6240 mL / OUT: 5650 mL / NET: 590 mL        PHYSICAL EXAM:  Vital Signs Last 24 Hrs  T(C): 36.7 (31 Oct 2022 13:38), Max: 37.7 (30 Oct 2022 22:18)  T(F): 98 (31 Oct 2022 13:38), Max: 99.9 (30 Oct 2022 22:18)  HR: 57 (31 Oct 2022 13:38) (54 - 70)  BP: 145/56 (31 Oct 2022 13:38) (104/46 - 152/61)  BP(mean): --  RR: 16 (31 Oct 2022 13:38) (16 - 19)  SpO2: 99% (31 Oct 2022 13:01) (95% - 99%)    Parameters below as of 31 Oct 2022 13:38  Patient On (Oxygen Delivery Method): room air        CONSTITUTIONAL: NAD,  EYES: PERRLA; conjunctiva and sclera clear  ENMT: Moist oral mucosa, no pharyngeal injection or exudates;   NECK: Supple, no palpable masses;  RESPIRATORY: Normal respiratory effort; lungs are clear to auscultation bilaterally  CARDIOVASCULAR: Regular rate and rhythm, normal S1 and S2, no murmur/rub/gallop; No lower extremity edema; Peripheral pulses are 2+ bilaterally. pericardial drain in place  ABDOMEN: Nontender to palpation, normoactive bowel sounds, no rebound/guarding;   MUSCLOSKELETAL:   no clubbing or cyanosis of digits; no joint swelling or tenderness to palpation  PSYCH: A+O to person, place, and time; affect appropriate  NEUROLOGY: CN 2-12 are intact and symmetric; no gross sensory deficits;   SKIN: No rashes;     LABS:                        7.1    10.26 )-----------( 446      ( 31 Oct 2022 06:39 )             22.2     10-31    139  |  98  |  60<H>  ----------------------------<  113<H>  3.7   |  25  |  9.54<H>    Ca    8.1<L>      31 Oct 2022 06:39  Phos  7.3     10-31  Mg     2.20     10-31                Culture - Fungal, Body Fluid (collected 28 Oct 2022 20:00)  Source: .Body Fluid PERICARDIAL EFFUSION  Preliminary Report (31 Oct 2022 10:55):    Testing in progress    Culture - Body Fluid with Gram Stain (collected 28 Oct 2022 20:00)  Source: .Body Fluid PERICARDIAL EFFUSION  Gram Stain (29 Oct 2022 03:56):    polymorphonuclear leukocytes seen    No organisms seen    by cytocentrifuge  Preliminary Report (29 Oct 2022 20:20):    No growth        RADIOLOGY & ADDITIONAL TESTS:  Results Reviewed:   Imaging Personally Reviewed:  Electrocardiogram Personally Reviewed:    COORDINATION OF CARE:  Care Discussed with Consultants/Other Providers [Y/N]:  Prior or Outpatient Records Reviewed [Y/N]:

## 2022-10-31 NOTE — PROGRESS NOTE ADULT - PROBLEM SELECTOR PLAN 2
-c/w PD as per renal   - Renal diet, fluid restriction 1.2L/day  - Dose meds renally  - Avoid nephrotoxins.  - c/w home sevelamer

## 2022-10-31 NOTE — PROGRESS NOTE ADULT - PROBLEM SELECTOR PLAN 7
- BG >200. Goal of 140-180  - Moderate dose Insulin sliding scale   -Start Lantus 12 Units qHS and admelog 4 Units qAc  - FS premeals and at bedtime

## 2022-10-31 NOTE — PROVIDER CONTACT NOTE (OTHER) - ACTION/TREATMENT ORDERED:
As per provider Iraida Rsoas (#[id number]) recheck BP in an hour. RN will continue to monitor.
Provider OK with moving hypertensive medications to later on in the day and administering Bumex with AM medications.
Patient will resume CAPD tomorrow 10/29/22.
As per provider Iraida Rosas (#[id number]) administer bumex at 1730 if the patient is still on th unit. RN will continue to monitor.

## 2022-10-31 NOTE — CONSULT NOTE ADULT - SUBJECTIVE AND OBJECTIVE BOX
incomplete LEONIDAS LARON  [id number]    HPI/Hospital Course: 66 yo M PMH ESRD on PD, HTN, DM2, HDL, CVA, L shoulder fracture (2022), chronic anemia sent in by his PCP for dyspnea on exertion and fatigue found to have hgb 5.9. He was transfused prbc, and had CXR showing pulmonary edema, given bumex. TTE showing large pericardial effusion w/ signs of early tamponade physiology. S/p pericardial drain 10/28/2022. Fluid showing hemorrhagic effusion w/ 160,000 RBC. CT chest showing b/l pleural effusions L>R, w/ Multiple supraclavicular lymph nodes largest of which measures 1.2 cm. Multiple precarinal lymph nodes largest measures 1 cm. 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 Negative HARVEY, ANCA, SSA/SSB, CCP, RNP, IGG4  Rheumatology consulted to evaluate for autoimmune causes of pericardial effusion.    Subjective: Pt admits to still having some shortness of breath. Denies having any issues w/ his peritoneal dialysis at home. Has some pain at the catheter site. Feels weak in his legs as he has been bedbound in hospital. Says he had rash on his back from excessive sweating when he first came in, now resolved. Has been on hydralazine for many years. Admits to 30lb weight loss in the last 3 months. No prior EGD/c-scope in past. Admits to lack of appetite. No prior hx of blood clot.     ROS: Denies fever, chills, gerd, alopecia, rash, nasal/oral/genital ulcers, sicca symptoms, joint pain, Raynaud's melena, hematochezia     MEDICATIONS  (STANDING):  amLODIPine   Tablet 10 milliGRAM(s) Oral daily  atorvastatin 80 milliGRAM(s) Oral at bedtime  buMETAnide Injectable 2 milliGRAM(s) IV Push two times a day  chlorhexidine 2% Cloths 1 Application(s) Topical <User Schedule>  dextrose 50% Injectable 25 Gram(s) IV Push once  dextrose 50% Injectable 12.5 Gram(s) IV Push once  dextrose 50% Injectable 25 Gram(s) IV Push once  dextrose Oral Gel 15 Gram(s) Oral once  epoetin heidi-epbx (RETACRIT) Injectable 21093 Unit(s) SubCutaneous Once  gentamicin 0.1% Ointment 1 Application(s) Topical <User Schedule>  glucagon  Injectable 1 milliGRAM(s) IntraMuscular once  insulin glargine Injectable (LANTUS) 12 Unit(s) SubCutaneous at bedtime  insulin lispro (ADMELOG) corrective regimen sliding scale   SubCutaneous three times a day before meals  insulin lispro (ADMELOG) corrective regimen sliding scale   SubCutaneous at bedtime  insulin lispro Injectable (ADMELOG) 4 Unit(s) SubCutaneous three times a day before meals  labetalol 100 milliGRAM(s) Oral three times a day  lidocaine   4% Patch 1 Patch Transdermal every 24 hours  losartan 25 milliGRAM(s) Oral daily  mupirocin 2% Ointment 1 Application(s) Topical two times a day  sevelamer carbonate 800 milliGRAM(s) Oral three times a day with meals    MEDICATIONS  (PRN):  acetaminophen     Tablet .. 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    FAMILY HISTORY:  FH: hypertension (Mother)        Vital Signs Last 24 Hrs  T(C): 36.7 (31 Oct 2022 13:38), Max: 37.7 (30 Oct 2022 22:18)  T(F): 98 (31 Oct 2022 13:38), Max: 99.9 (30 Oct 2022 22:18)  HR: 57 (31 Oct 2022 13:38) (54 - 70)  BP: 145/56 (31 Oct 2022 13:38) (104/46 - 152/61)  BP(mean): --  RR: 16 (31 Oct 2022 13:38) (16 - 19)  SpO2: 99% (31 Oct 2022 13:01) (95% - 99%)    Parameters below as of 31 Oct 2022 13:38  Patient On (Oxygen Delivery Method): room air        Physical Exam:  General: cachexic, breathing comfortably on room air  HEENT: EOMI, MMM, no oral ulcers  CVS: +S1/S2, RRR  Resp: CTA b/l. No crackles/wheezing  GI: Soft, NT/ND +BS  MSK: 5/5 muscle strength in neck, arms, wrists, hands, fingers, legs, feet. 4/5 in b/l thighs. No synovitis.   Skin: no visible rashes    LABS:                        7.1    10.26 )-----------( 446      ( 31 Oct 2022 06:39 )             22.2     10-31    139  |  98  |  60<H>  ----------------------------<  113<H>  3.7   |  25  |  9.54<H>    Ca    8.1<L>      31 Oct 2022 06:39  Phos  7.3     10-31  Mg     2.20     10-31      Cell Count, Body Fluid (10.28.22 @ 20:00)   Mesothelial Cells - Body Fluid: 0 %   Fluid Source: Pericardial   Tube Type: Lavender   Fluid Type: Pericardial Fluid   Body Fluid Appearance: Bloody   Color - Body Fluid: Red   Total Nucleated Cell Count, Body Fluid: 4687: Reference Ranges:   Synovial Fluid   Total nucleated cells < 150 cells/uL   Neutrophils <25%   Lymphocytes 10-15%   Monocytes/Macrophages   < from: CT Chest No Cont (10.27.22 @ 10:13) >  ACC: [id number] EXAM:  CT CHEST                          PROCEDURE DATE:  10/27/2022          INTERPRETATION:  CLINICAL INFORMATION: Shortness of breath. Pericardial   effusion of unknown etiology.    COMPARISON: Chest x-ray 10/26/2022 CT abdomen 10/15/2022.    PROCEDURE:  CT scan of the chest was obtained without intravenous contrast.    FINDINGS:    LYMPH NODES: Multiple supraclavicular lymph nodes largest of which   measures 1.2 cm. Multiple precarinal lymph nodes largest measures 1 cm.    HEART/VASCULATURE: Pericardial effusion, measuring 2.0cm anterior to the   free wall of RV and 3.2cm adjacent to lateral wall of left ventricle,   increased in size from prior CT from 10/15/2022. Aorta and pulmonary   artery are normal in caliber. Coronary artery and aortic calcifications.    AIRWAYS/LUNGS/PLEURA: Central airways are patent. Bilateral pleural   effusions, left greater than right, with complete atelectasis of left   lower lobe, increased in size from prior CT from 10/15/2022.    UPPERABDOMEN: Visualized upper abdomen is within normal limits.    BONES/SOFT TISSUES: Mild degenerative changes. No aggressive bone lesions.    IMPRESSION:    Moderate pericardial effusion, increased in size from prior CT.    Bilateral pleural effusions,left greater than right, with complete   atelectasis of left lower lobe, increased in size from prior CT from   10/15/2022.    --- End of Report ---          DANA ALEXANDER MD; Resident Radiologist  This document has been electronically signed.  SERENITY PAULINO MD; Attending Radiologist  This document has been electronically signed. Oct 27 2022 12:59PM    < end of copied text >  Other parameters- No established reference ranges.   Bronchoalveolar lavage   Macrophages >85%   Lymphocytes 10-15%   Neutrophils <3%   Eosinophils <1%   Other parameters- No established reference ranges.   Peritoneal/pleural/pericardial fluid/other body fluid types   No established reference ranges. cells/uL   Total RBC Count: [id number]: Red Cell count correlates with the number and proportion of cells on   cytospin preparation. cells/uL   Neutrophils-Body Fluid: 50 %   BF Lymphocytes: 11 %   Atypical Lymphocytes - Body Fluid: 0 %   Monocyte/Macrophage Count - Body Fluid: 38 %   Eosinophil Count - Body Fluid: 1 %   Other Body Cells: 0 %     LEONIDAS LARON  [id number]    HPI/Hospital Course: 68 yo M PMH ESRD on PD, HTN, DM2, HDL, CVA, L shoulder fracture (2022), chronic anemia sent in by his PCP for dyspnea on exertion and fatigue found to have hgb 5.9. He was transfused prbc, and had CXR showing pulmonary edema, given bumex. TTE showing large pericardial effusion w/ signs of early tamponade physiology. S/p pericardial drain 10/28/2022. Fluid showing hemorrhagic effusion w/ 160,000 RBC. CT chest showing b/l pleural effusions L>R, w/ Multiple supraclavicular lymph nodes largest of which measures 1.2 cm. Multiple precarinal lymph nodes largest measures 1 cm. 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 Negative HARVEY, ANCA, SSA/SSB, CCP, RNP, IGG4  Rheumatology consulted to evaluate for autoimmune causes of pericardial effusion.    Subjective: Pt admits to still having some shortness of breath. Denies having any issues w/ his peritoneal dialysis at home. Has some pain at the catheter site. Feels weak in his legs as he has been bedbound in hospital. Says he had rash on his back from excessive sweating when he first came in, now resolved. Has been on hydralazine for many years. Admits to 30lb weight loss in the last 3 months. No prior EGD/c-scope in past. Admits to lack of appetite. No prior hx of blood clot. Per daughter in law, who is a nephrologist, pt had ESRD 2/2 HTN/DM2, not requiring kidney biopsy. Pt has also had various joint pain and swelling over last couple of months including knees and elbows. Has had unrevealing arthrocentesis w/ ortho.      ROS: Denies fever, chills, gerd, alopecia, rash, nasal/oral/genital ulcers, sicca symptoms, joint pain, Raynaud's melena, hematochezia     MEDICATIONS  (STANDING):  amLODIPine   Tablet 10 milliGRAM(s) Oral daily  atorvastatin 80 milliGRAM(s) Oral at bedtime  buMETAnide Injectable 2 milliGRAM(s) IV Push two times a day  chlorhexidine 2% Cloths 1 Application(s) Topical <User Schedule>  dextrose 50% Injectable 25 Gram(s) IV Push once  dextrose 50% Injectable 12.5 Gram(s) IV Push once  dextrose 50% Injectable 25 Gram(s) IV Push once  dextrose Oral Gel 15 Gram(s) Oral once  epoetin heidi-epbx (RETACRIT) Injectable 46236 Unit(s) SubCutaneous Once  gentamicin 0.1% Ointment 1 Application(s) Topical <User Schedule>  glucagon  Injectable 1 milliGRAM(s) IntraMuscular once  insulin glargine Injectable (LANTUS) 12 Unit(s) SubCutaneous at bedtime  insulin lispro (ADMELOG) corrective regimen sliding scale   SubCutaneous three times a day before meals  insulin lispro (ADMELOG) corrective regimen sliding scale   SubCutaneous at bedtime  insulin lispro Injectable (ADMELOG) 4 Unit(s) SubCutaneous three times a day before meals  labetalol 100 milliGRAM(s) Oral three times a day  lidocaine   4% Patch 1 Patch Transdermal every 24 hours  losartan 25 milliGRAM(s) Oral daily  mupirocin 2% Ointment 1 Application(s) Topical two times a day  sevelamer carbonate 800 milliGRAM(s) Oral three times a day with meals    MEDICATIONS  (PRN):  acetaminophen     Tablet .. 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    FAMILY HISTORY:  FH: hypertension (Mother)        Vital Signs Last 24 Hrs  T(C): 36.7 (31 Oct 2022 13:38), Max: 37.7 (30 Oct 2022 22:18)  T(F): 98 (31 Oct 2022 13:38), Max: 99.9 (30 Oct 2022 22:18)  HR: 57 (31 Oct 2022 13:38) (54 - 70)  BP: 145/56 (31 Oct 2022 13:38) (104/46 - 152/61)  BP(mean): --  RR: 16 (31 Oct 2022 13:38) (16 - 19)  SpO2: 99% (31 Oct 2022 13:01) (95% - 99%)    Parameters below as of 31 Oct 2022 13:38  Patient On (Oxygen Delivery Method): room air        Physical Exam:  General: cachexic, breathing comfortably on room air  HEENT: EOMI, MMM, no oral ulcers  CVS: +S1/S2, RRR  Resp: CTA b/l. No crackles/wheezing  GI: Soft, NT/ND +BS  MSK: 5/5 muscle strength in neck, arms, wrists, hands, fingers, legs, feet. 4/5 in b/l thighs. No synovitis.   Skin: no visible rashes    LABS:                        7.1    10.26 )-----------( 446      ( 31 Oct 2022 06:39 )             22.2     10-31    139  |  98  |  60<H>  ----------------------------<  113<H>  3.7   |  25  |  9.54<H>    Ca    8.1<L>      31 Oct 2022 06:39  Phos  7.3     10-31  Mg     2.20     10-31      Cell Count, Body Fluid (10.28.22 @ 20:00)   Mesothelial Cells - Body Fluid: 0 %   Fluid Source: Pericardial   Tube Type: Lavender   Fluid Type: Pericardial Fluid   Body Fluid Appearance: Bloody   Color - Body Fluid: Red   Total Nucleated Cell Count, Body Fluid: 4687: Reference Ranges:   Synovial Fluid   Total nucleated cells < 150 cells/uL   Neutrophils <25%   Lymphocytes 10-15%   Monocytes/Macrophages   < from: CT Chest No Cont (10.27.22 @ 10:13) >  ACC: [id number] EXAM:  CT CHEST                          PROCEDURE DATE:  10/27/2022          INTERPRETATION:  CLINICAL INFORMATION: Shortness of breath. Pericardial   effusion of unknown etiology.    COMPARISON: Chest x-ray 10/26/2022 CT abdomen 10/15/2022.    PROCEDURE:  CT scan of the chest was obtained without intravenous contrast.    FINDINGS:    LYMPH NODES: Multiple supraclavicular lymph nodes largest of which   measures 1.2 cm. Multiple precarinal lymph nodes largest measures 1 cm.    HEART/VASCULATURE: Pericardial effusion, measuring 2.0cm anterior to the   free wall of RV and 3.2cm adjacent to lateral wall of left ventricle,   increased in size from prior CT from 10/15/2022. Aorta and pulmonary   artery are normal in caliber. Coronary artery and aortic calcifications.    AIRWAYS/LUNGS/PLEURA: Central airways are patent. Bilateral pleural   effusions, left greater than right, with complete atelectasis of left   lower lobe, increased in size from prior CT from 10/15/2022.    UPPERABDOMEN: Visualized upper abdomen is within normal limits.    BONES/SOFT TISSUES: Mild degenerative changes. No aggressive bone lesions.    IMPRESSION:    Moderate pericardial effusion, increased in size from prior CT.    Bilateral pleural effusions,left greater than right, with complete   atelectasis of left lower lobe, increased in size from prior CT from   10/15/2022.    --- End of Report ---          DANA ALEXANDER MD; Resident Radiologist  This document has been electronically signed.  SERENITY PAULINO MD; Attending Radiologist  This document has been electronically signed. Oct 27 2022 12:59PM    < end of copied text >  Other parameters- No established reference ranges.   Bronchoalveolar lavage   Macrophages >85%   Lymphocytes 10-15%   Neutrophils <3%   Eosinophils <1%   Other parameters- No established reference ranges.   Peritoneal/pleural/pericardial fluid/other body fluid types   No established reference ranges. cells/uL   Total RBC Count: [id number]: Red Cell count correlates with the number and proportion of cells on   cytospin preparation. cells/uL   Neutrophils-Body Fluid: 50 %   BF Lymphocytes: 11 %   Atypical Lymphocytes - Body Fluid: 0 %   Monocyte/Macrophage Count - Body Fluid: 38 %   Eosinophil Count - Body Fluid: 1 %   Other Body Cells: 0 %

## 2022-10-31 NOTE — PROGRESS NOTE ADULT - ASSESSMENT
67M p/w ESRD on PD, HTN, DM, HDL, CVA, Anemia, sent in by PD clinic due to low Hgb. Renal following for ESRD/PD Mx.    ESRD on peritoneal dialysis.   K ok  hypervolemia improving  Informed consent for PD obtained from pt. in chart     will resume Peritoneal Dialysis 4 exchanges tomorrow,  now more euvolemic, will do 2 exchanges 4.25 % dextrose dialysate and 2 of 2.5%  renal diet, fluid restriction 1.2L/day  dose all meds for ESRD  Hyperphosphatemia - CONT home sevelamer as phos binders    mod Pericardial effusion w/s/o tamponade   s/p pericardiocentesis 10/28 520ml removed  fluid analysis hemorrhagic  discussed with patient -> he says he has performed HD as prescribed, not skipping, performing nightly  Achieving good KT/V as outpatient  effusion could be viral in nature  appreciate Infectious disease specialist recs    Anemia in ckd, likely GIB- Hb <goal. outpt Hb from 10/25/22 was 6.6, was getting BRITTANIE outpt  awaiting outpt colonoscopy, never had it   monitor H/H closely,  s/p PRBC 1 unit   epo 20k subqx1 given    HTN, controlled-bp stable. c/w home bp meds w/holding parameters.       For any question, call:  Cell # [phone]  Pager # [phone]  Callback # [phone]

## 2022-10-31 NOTE — PHYSICAL THERAPY INITIAL EVALUATION ADULT - PERTINENT HX OF CURRENT PROBLEM, REHAB EVAL
Pt is a 67 year old male presenting with Pt is a 67 year old male sent in from outpatient PD clinic with low hemoglobin. Pt also admits to GERMAIN and generalized weakness. Pt found to have a large pericardial effusion with signs of early tamponade on POCUS and TTE. Pt now s/p pericardiocentesis via IR on 10/28 with drain placed. PMH listed below.

## 2022-10-31 NOTE — PROGRESS NOTE ADULT - PROBLEM SELECTOR PLAN 1
- Unclear etiology.  - initial TTE also shows pericardial effusion with concern of early tamponade physiology   - CT chest shows moderate pericardial effusion increased from previous imaging.   - s/p pericardiocentesis via IR on 10/28 w/ drain placed; 520 cc of serosanginous fluid drained. currently minimal output from drain. will obtain repeat echo and F/U IR recs in regard to drain management  - Avoid anticoagulants  -F/U antihistone Ab sent on 10/29 to r/o hydralazine induced pericardial effusion. DS DNA and RF also elevated, will consult Rheum

## 2022-10-31 NOTE — CONSULT NOTE ADULT - CONSULT REQUESTED DATE/TIME
Mr. Kevon Perez, 81 y.o. man with multi-vessel diseases not amenable for CABG with plan to proceed with staged PCI, high degree AV block s/p DC-PPM on 5/2/20222. He presented with chest pain, SOB, cough.  -Initial workup was notable for BNP 1194, uptrending troponin, worsening BUN/Cr. CXR showed Bibasilar edema.   -Given his tachycardia and dyspnea, there were initial concerns for PE, however it is unlikely as LE U/S, V/Q scan non-concerning, and absence of right heart strain on TTE.     Recent Labs     05/04/22  1426 05/05/22  0450 05/05/22  1230   TROPONINI 27.388* 35.947* 40.689*  39.987*  39.388*       Intake/Output Summary (Last 24 hours) at 5/6/2022 1759  Last data filed at 5/6/2022 1700  Gross per 24 hour   Intake 1853.75 ml   Output 4335 ml   Net -2481.25 ml       Net IO Since Admission: -2,951.85 mL [05/06/22 1759]    -Given his TTE showing significant worsening of Ejection Fraction to less than 20%, signs of end-organ damage(acute renal failure, troponinemia, transaminitis), there were concerns for cardiogenic shock for which patient was transferred to the CCU .    Plan  - Transferred to CCU  - Interventional Cardiology consulted, continue ACS protocol with heparin, aspirin, clopidogrel  - s/p Vaiden Kaykay catheter for hemodynamics montioring and IABP balloon pump MCS on 05/05.   - Initiated on  gtt, vasopressin gtt. Wean pressors and inotropic support as tolerated  - Hold AV sue blocking agents as concerns for cardiogenic shock  - Given leukocytosis, initiatied antibiotics an de-escalate if no infectious foci.    
27-Oct-2022 00:30
27-Oct-2022 11:19
26-Oct-2022
27-Oct-2022 14:56
31-Oct-2022 16:34
EPISTAXIS

## 2022-10-31 NOTE — PROVIDER CONTACT NOTE (OTHER) - SITUATION
Patient's BP is 160/60
Pt. scheduled for peritoneal dialysis this morning. Current VS - /59 HR 57 T 97.9 O2 97%. Pt. receiving 3 BP meds and Bumex this morning. RN verifying medication administration.
Patient with an order to provide 4 CAPD exchanges today and received 3 exchanges due to patient had to go for pericardiocentesis.
Patient's BP is 180/70, hydralazine 75mg administered as ordered.

## 2022-10-31 NOTE — PROGRESS NOTE ADULT - PROBLEM SELECTOR PLAN 6
- BP elevated on admission but improving now when restarting home BP meds  -c/w Norvasc and losartan   -home hydralazine d/c'ed due to concern of it as a cause of  pericardial effusion. F/U antihistone ab sent on 10/27 to confirm.    -Added labetalol 100mg PO tid on 10/27 with better BP control.

## 2022-10-31 NOTE — PROGRESS NOTE ADULT - SUBJECTIVE AND OBJECTIVE BOX
[facility] - SOLOMON Hurtado / SOLOMON Foley / KANDICE Shanks/ SOLOMON Broderick/ SOLOMON Allan/ NOMI Mao / VIRGILIO Marks / BENJY Woo  ---------------------------------------------------------------------------------------------------------------  seen and examined today for ESRD  Interval : S/P pericardiocentesis  VITALS:  T(F): 97.2 (10-31-22 @ 10:35), Max: 99.9 (10-30-22 @ 22:18)  HR: 54 (10-31-22 @ 10:35)  BP: 104/46 (10-31-22 @ 10:35)  RR: 16 (10-31-22 @ 10:35)  SpO2: 99% (10-31-22 @ 08:56)  Wt(kg): --    10-30 @ 07:01  -  10-31 @ 07:00  --------------------------------------------------------  IN: 600 mL / OUT: 405 mL / NET: 195 mL    10-31 @ 07:01  -  10-31 @ 11:16  --------------------------------------------------------  IN: 4240 mL / OUT: 3150 mL / NET: 1090 mL      Physical Exam :-  Constitutional: NAD  Neck: Supple.  Respiratory: Bilateral equal breath sounds,  Cardiovascular: S1, S2 normal,  Gastrointestinal: Bowel Sounds present, soft, non tender.  Extremities: No edema  Neurological: Alert and Oriented x 3, no focal deficits  Psychiatric: Normal mood, normal affect  Data:-  Allergies :   No Known Allergies    Hospital Medications:   MEDICATIONS  (STANDING):  amLODIPine   Tablet 10 milliGRAM(s) Oral daily  atorvastatin 80 milliGRAM(s) Oral at bedtime  buMETAnide Injectable 2 milliGRAM(s) IV Push two times a day  chlorhexidine 2% Cloths 1 Application(s) Topical <User Schedule>  dextrose 50% Injectable 25 Gram(s) IV Push once  dextrose 50% Injectable 12.5 Gram(s) IV Push once  dextrose 50% Injectable 25 Gram(s) IV Push once  dextrose Oral Gel 15 Gram(s) Oral once  epoetin heidi-epbx (RETACRIT) Injectable 19780 Unit(s) SubCutaneous Once  gentamicin 0.1% Ointment 1 Application(s) Topical <User Schedule>  glucagon  Injectable 1 milliGRAM(s) IntraMuscular once  insulin glargine Injectable (LANTUS) 12 Unit(s) SubCutaneous at bedtime  insulin lispro (ADMELOG) corrective regimen sliding scale   SubCutaneous three times a day before meals  insulin lispro (ADMELOG) corrective regimen sliding scale   SubCutaneous at bedtime  insulin lispro Injectable (ADMELOG) 4 Unit(s) SubCutaneous three times a day before meals  labetalol 100 milliGRAM(s) Oral three times a day  lidocaine   4% Patch 1 Patch Transdermal every 24 hours  losartan 25 milliGRAM(s) Oral daily  sevelamer carbonate 800 milliGRAM(s) Oral three times a day with meals    10-31    139  |  98  |  60<H>  ----------------------------<  113<H>  3.7   |  25  |  9.54<H>    Ca    8.1<L>      31 Oct 2022 06:39  Phos  7.3     10-31  Mg     2.20     10-31      Creatinine Trend: 9.54 <--, 8.61 <--, 7.99 <--, 8.67 <--, 7.64 <--, 7.86 <--, 7.80 <--, 7.34 <--                        7.1    10.26 )-----------( 446      ( 31 Oct 2022 06:39 )             22.2

## 2022-10-31 NOTE — PHYSICAL THERAPY INITIAL EVALUATION ADULT - GENERAL OBSERVATIONS, REHAB EVAL
Pt received semisupine in bed, +drain, +telemetry, in NAD. Pt agreeable to participate in PT evaluation. Pt left seated in bedside chair, all needs met, all lines intact and RN aware.

## 2022-10-31 NOTE — PHYSICAL THERAPY INITIAL EVALUATION ADULT - ADDITIONAL COMMENTS
Pt lives in an apartment with 3-4 steps to enter, +railing. At baseline, pt endorses ambulating independently.

## 2022-10-31 NOTE — PHYSICAL THERAPY INITIAL EVALUATION ADULT - RANGE OF MOTION EXAMINATION, REHAB EVAL
except left shoulder not tested/bilateral upper extremity ROM was WFL (within functional limits)/bilateral lower extremity ROM was WFL (within functional limits)

## 2022-10-31 NOTE — PHYSICAL THERAPY INITIAL EVALUATION ADULT - PATIENT PROFILE REVIEW, REHAB EVAL
PT evaluate and treat orders received: no formal activity orders. Consult with ROBER SANDS, pt may participate in PT evaluation./yes

## 2022-10-31 NOTE — PROVIDER CONTACT NOTE (OTHER) - REASON
Patient's BP is 180/70, hydralazine 75mg administered as ordered.
Patient's BP is 160/60
Hold BP medications.
Interruption on CAPD exchange order

## 2022-10-31 NOTE — PROGRESS NOTE ADULT - PROBLEM SELECTOR PLAN 4
- Likely anemia of chronic disease vs secondary to occult bleed, thought patient does not endorse any hematochezia/melena   -GI consult appreciated- no indication for inpt EGD/C-scope. f/u outpt  - Hb improved s/p 1 Unit PRBcs. cont to monitor   - Transfuse <7  - Continue Retacrit

## 2022-11-01 LAB
% ALBUMIN: 39.8 % — SIGNIFICANT CHANGE UP
% ALPHA 1: 7.3 % — SIGNIFICANT CHANGE UP
% ALPHA 2: 21.6 % — SIGNIFICANT CHANGE UP
% BETA: 15.2 % — SIGNIFICANT CHANGE UP
% GAMMA: 16.1 % — SIGNIFICANT CHANGE UP
ALBUMIN SERPL ELPH-MCNC: 2.43 G/DL — LOW (ref 3.3–4.4)
ALBUMIN/GLOB SERPL ELPH: 0.7 RATIO — SIGNIFICANT CHANGE UP
ALPHA1 GLOB SERPL ELPH-MCNC: 0.45 G/DL — HIGH (ref 0.1–0.3)
ALPHA2 GLOB SERPL ELPH-MCNC: 1.3 G/DL — HIGH (ref 0.6–1)
ANION GAP SERPL CALC-SCNC: 18 MMOL/L — HIGH (ref 7–14)
B-GLOBULIN SERPL ELPH-MCNC: 0.93 G/DL — SIGNIFICANT CHANGE UP (ref 0.6–1.1)
BASOPHILS # BLD AUTO: 0.07 K/UL — SIGNIFICANT CHANGE UP (ref 0–0.2)
BASOPHILS NFR BLD AUTO: 0.7 % — SIGNIFICANT CHANGE UP (ref 0–2)
BUN SERPL-MCNC: 52 MG/DL — HIGH (ref 7–23)
C3 SERPL-MCNC: 128 MG/DL — SIGNIFICANT CHANGE UP (ref 90–180)
C4 SERPL-MCNC: 32 MG/DL — SIGNIFICANT CHANGE UP (ref 10–40)
C4 SERPL-MCNC: 32 MG/DL — SIGNIFICANT CHANGE UP (ref 10–40)
CALCIUM SERPL-MCNC: 8.4 MG/DL — SIGNIFICANT CHANGE UP (ref 8.4–10.5)
CHLORIDE SERPL-SCNC: 97 MMOL/L — LOW (ref 98–107)
CO2 SERPL-SCNC: 26 MMOL/L — SIGNIFICANT CHANGE UP (ref 22–31)
CREAT SERPL-MCNC: 9.84 MG/DL — HIGH (ref 0.5–1.3)
CULTURE RESULTS: SIGNIFICANT CHANGE UP
CULTURE RESULTS: SIGNIFICANT CHANGE UP
EGFR: 5 ML/MIN/1.73M2 — LOW
EOSINOPHIL # BLD AUTO: 1.14 K/UL — HIGH (ref 0–0.5)
EOSINOPHIL NFR BLD AUTO: 12.2 % — HIGH (ref 0–6)
GAMMA GLOBULIN: 0.98 G/DL — SIGNIFICANT CHANGE UP (ref 0.7–1.7)
GLUCOSE BLDC GLUCOMTR-MCNC: 153 MG/DL — HIGH (ref 70–99)
GLUCOSE BLDC GLUCOMTR-MCNC: 153 MG/DL — HIGH (ref 70–99)
GLUCOSE BLDC GLUCOMTR-MCNC: 207 MG/DL — HIGH (ref 70–99)
GLUCOSE BLDC GLUCOMTR-MCNC: 241 MG/DL — HIGH (ref 70–99)
GLUCOSE BLDC GLUCOMTR-MCNC: 270 MG/DL — HIGH (ref 70–99)
GLUCOSE SERPL-MCNC: 67 MG/DL — LOW (ref 70–99)
HCT VFR BLD CALC: 23.8 % — LOW (ref 39–50)
HGB BLD-MCNC: 7.6 G/DL — LOW (ref 13–17)
IANC: 5.49 K/UL — SIGNIFICANT CHANGE UP (ref 1.8–7.4)
IMM GRANULOCYTES NFR BLD AUTO: 0.6 % — SIGNIFICANT CHANGE UP (ref 0–0.9)
LYMPHOCYTES # BLD AUTO: 1.68 K/UL — SIGNIFICANT CHANGE UP (ref 1–3.3)
LYMPHOCYTES # BLD AUTO: 18 % — SIGNIFICANT CHANGE UP (ref 13–44)
MAGNESIUM SERPL-MCNC: 2.2 MG/DL — SIGNIFICANT CHANGE UP (ref 1.6–2.6)
MCHC RBC-ENTMCNC: 26.7 PG — LOW (ref 27–34)
MCHC RBC-ENTMCNC: 31.9 GM/DL — LOW (ref 32–36)
MCV RBC AUTO: 83.5 FL — SIGNIFICANT CHANGE UP (ref 80–100)
MONOCYTES # BLD AUTO: 0.91 K/UL — HIGH (ref 0–0.9)
MONOCYTES NFR BLD AUTO: 9.7 % — SIGNIFICANT CHANGE UP (ref 2–14)
NEUTROPHILS # BLD AUTO: 5.49 K/UL — SIGNIFICANT CHANGE UP (ref 1.8–7.4)
NEUTROPHILS NFR BLD AUTO: 58.8 % — SIGNIFICANT CHANGE UP (ref 43–77)
NON-GYNECOLOGICAL CYTOLOGY STUDY: SIGNIFICANT CHANGE UP
NRBC # BLD: 0 /100 WBCS — SIGNIFICANT CHANGE UP (ref 0–0)
NRBC # FLD: 0 K/UL — SIGNIFICANT CHANGE UP (ref 0–0)
PHOSPHATE SERPL-MCNC: 7.2 MG/DL — HIGH (ref 2.5–4.5)
PLATELET # BLD AUTO: 495 K/UL — HIGH (ref 150–400)
POTASSIUM SERPL-MCNC: 3.5 MMOL/L — SIGNIFICANT CHANGE UP (ref 3.5–5.3)
POTASSIUM SERPL-SCNC: 3.5 MMOL/L — SIGNIFICANT CHANGE UP (ref 3.5–5.3)
PROT PATTERN SERPL ELPH-IMP: SIGNIFICANT CHANGE UP
PROT SERPL-MCNC: 6.1 G/DL — SIGNIFICANT CHANGE UP
PROT SERPL-MCNC: 6.1 G/DL — SIGNIFICANT CHANGE UP (ref 6–8.3)
RBC # BLD: 2.85 M/UL — LOW (ref 4.2–5.8)
RBC # FLD: 15.3 % — HIGH (ref 10.3–14.5)
SODIUM SERPL-SCNC: 141 MMOL/L — SIGNIFICANT CHANGE UP (ref 135–145)
SPECIMEN SOURCE: SIGNIFICANT CHANGE UP
SPECIMEN SOURCE: SIGNIFICANT CHANGE UP
WBC # BLD: 9.35 K/UL — SIGNIFICANT CHANGE UP (ref 3.8–10.5)
WBC # FLD AUTO: 9.35 K/UL — SIGNIFICANT CHANGE UP (ref 3.8–10.5)

## 2022-11-01 PROCEDURE — 84165 PROTEIN E-PHORESIS SERUM: CPT | Mod: 26

## 2022-11-01 PROCEDURE — 99232 SBSQ HOSP IP/OBS MODERATE 35: CPT

## 2022-11-01 RX ORDER — ERYTHROPOIETIN 10000 [IU]/ML
20000 INJECTION, SOLUTION INTRAVENOUS; SUBCUTANEOUS ONCE
Refills: 0 | Status: COMPLETED | OUTPATIENT
Start: 2022-11-01 | End: 2022-11-01

## 2022-11-01 RX ADMIN — Medication 4 UNIT(S): at 18:00

## 2022-11-01 RX ADMIN — SEVELAMER CARBONATE 800 MILLIGRAM(S): 2400 POWDER, FOR SUSPENSION ORAL at 12:58

## 2022-11-01 RX ADMIN — Medication 1 APPLICATION(S): at 12:59

## 2022-11-01 RX ADMIN — ERYTHROPOIETIN 20000 UNIT(S): 10000 INJECTION, SOLUTION INTRAVENOUS; SUBCUTANEOUS at 18:01

## 2022-11-01 RX ADMIN — ATORVASTATIN CALCIUM 80 MILLIGRAM(S): 80 TABLET, FILM COATED ORAL at 23:02

## 2022-11-01 RX ADMIN — Medication 1: at 08:43

## 2022-11-01 RX ADMIN — Medication 4 UNIT(S): at 13:15

## 2022-11-01 RX ADMIN — BUMETANIDE 2 MILLIGRAM(S): 0.25 INJECTION INTRAMUSCULAR; INTRAVENOUS at 17:58

## 2022-11-01 RX ADMIN — INSULIN GLARGINE 12 UNIT(S): 100 INJECTION, SOLUTION SUBCUTANEOUS at 23:02

## 2022-11-01 RX ADMIN — LOSARTAN POTASSIUM 25 MILLIGRAM(S): 100 TABLET, FILM COATED ORAL at 05:04

## 2022-11-01 RX ADMIN — MUPIROCIN 1 APPLICATION(S): 20 OINTMENT TOPICAL at 05:05

## 2022-11-01 RX ADMIN — Medication 3: at 13:14

## 2022-11-01 RX ADMIN — BUMETANIDE 2 MILLIGRAM(S): 0.25 INJECTION INTRAMUSCULAR; INTRAVENOUS at 05:05

## 2022-11-01 RX ADMIN — Medication 4 UNIT(S): at 08:44

## 2022-11-01 RX ADMIN — Medication 100 MILLIGRAM(S): at 05:04

## 2022-11-01 RX ADMIN — AMLODIPINE BESYLATE 10 MILLIGRAM(S): 2.5 TABLET ORAL at 05:05

## 2022-11-01 RX ADMIN — SEVELAMER CARBONATE 800 MILLIGRAM(S): 2400 POWDER, FOR SUSPENSION ORAL at 18:04

## 2022-11-01 RX ADMIN — MUPIROCIN 1 APPLICATION(S): 20 OINTMENT TOPICAL at 17:59

## 2022-11-01 RX ADMIN — CHLORHEXIDINE GLUCONATE 1 APPLICATION(S): 213 SOLUTION TOPICAL at 17:57

## 2022-11-01 RX ADMIN — Medication 2: at 17:59

## 2022-11-01 RX ADMIN — SEVELAMER CARBONATE 800 MILLIGRAM(S): 2400 POWDER, FOR SUSPENSION ORAL at 08:43

## 2022-11-01 NOTE — PROGRESS NOTE ADULT - PROBLEM SELECTOR PLAN 10
Diet: diabetic diet  DVT Ppx: SCD for now given anemia    Dispo- PT rec [facility] but family prefer home with services. dc pending removal of pericardial drain

## 2022-11-01 NOTE — PROGRESS NOTE ADULT - ASSESSMENT
67M p/w ESRD on PD, HTN, DM, HDL, CVA, Anemia, sent in by PD clinic due to low Hgb. Renal following for ESRD/PD Mx.    ESRD on peritoneal dialysis.   K ok  hypervolemia improving  Informed consent for PD obtained from pt. in chart     c/w Peritoneal Dialysis 4 exchanges using alternating 4.25 % and 2.5% dextrose dialysate, overnight dry. UF negative, 1L neg w/current exchange  low Na diet, fluid restriction 1.2L/day  dose all meds for ESRD  Hyperphosphatemia - CONT home sevelamer as phos binders    mod Pericardial effusion w/s/o tamponade   s/p pericardiocentesis 10/28 520ml removed  f/u fluid analysis. f/u output  continue high UF PD for now  f/u autoimmune, rheum, infectious w/u     Anemia in ckd, likely GIB- Hb <goal. outpt Hb from 10/25/22 was 6.6, was getting BRITTANIE outpt  awaiting outpt colonoscopy, never had it   monitor H/H closely,  s/p PRBC 1 unit   epo 20k subqx1 given, will rpt qwkly    HTN, controlled-bp stable. c/w home bp meds w/holding parameters.     will closely follow up.   poc d/w pt, PD RN  labs, chart reviewed  For any question, pl call:  Nephrology  Cell -[phone]  Office [phone]  Ans Serv [phone]

## 2022-11-01 NOTE — PROGRESS NOTE ADULT - PROBLEM SELECTOR PLAN 1
Unclear etiology. DDx inc. uremia vs. drug induced vs. malignancy vs. autoimmune. infectious w/u negative so far  - initial TTE also shows pericardial effusion with concern of early tamponade physiology   - CT chest shows moderate pericardial effusion increased from previous imaging.   - s/p pericardiocentesis via IR on 10/28 w/ drain placed; 520 cc of serosanguinous fluid drained. currently minimal output from drain. repeat echo trace effusion. will consult IR for drain removal   - Avoid anticoagulants  -F/U antihistone Ab, QuantiFeron TB, cytology.   - rheum consult appreciated -currently low suspicion for connective tissue disease as cause of pericardial effusion

## 2022-11-01 NOTE — PROGRESS NOTE ADULT - PROBLEM SELECTOR PLAN 3
Pt called back informed of results and repeat labs in 2-3 months   - CXR preliminary read with cardiomegaly, moderate pulmonary edema, small bilateral pleural effusions likely 2/2 volume overload in setting of dialysis  - c/w PD per nephro recs  - Monitor respiratory status  - Supplemental O2. Will wean as tolerated.  -c/w Duonebs prn for wheezing

## 2022-11-01 NOTE — PROGRESS NOTE ADULT - PROBLEM SELECTOR PLAN 5
- Per patient he sustained a fracture to his left shoulder but can't recall the exact mechanism 3 months ago  - Xray showing Known left distal clavicular fracture, with either fracture nonunion or incomplete healing.  - Ortho consulted and assessed no acute intervention advised during this admission. Patient should be NWB LUE in sling for comfort and followup with Dr. Baum outpatient. ([phone])  - Pain control  - Continue Lidocaine patch

## 2022-11-01 NOTE — PROGRESS NOTE ADULT - SUBJECTIVE AND OBJECTIVE BOX
[facility] - S Bryant / Og S /D Dimitris/ S Davie/ NETTE Allan/ Yung Mao / WICHO Paytonu/ O Amna  service -[phone], office [phone]  ---------------------------------------------------------------------------------------------------------------    Patient seen and examined bedside    Subjective and Objective: No overnight events, sob resolved. No complaints today. feeling better    Allergies: No Known Allergies      Hospital Medications:   MEDICATIONS  (STANDING):  amLODIPine   Tablet 10 milliGRAM(s) Oral daily  atorvastatin 80 milliGRAM(s) Oral at bedtime  buMETAnide Injectable 2 milliGRAM(s) IV Push two times a day  chlorhexidine 2% Cloths 1 Application(s) Topical <User Schedule>  dextrose 50% Injectable 25 Gram(s) IV Push once  dextrose 50% Injectable 12.5 Gram(s) IV Push once  dextrose 50% Injectable 25 Gram(s) IV Push once  dextrose Oral Gel 15 Gram(s) Oral once  epoetin heidi-epbx (RETACRIT) Injectable 80537 Unit(s) SubCutaneous Once  gentamicin 0.1% Ointment 1 Application(s) Topical <User Schedule>  glucagon  Injectable 1 milliGRAM(s) IntraMuscular once  insulin glargine Injectable (LANTUS) 12 Unit(s) SubCutaneous at bedtime  insulin lispro (ADMELOG) corrective regimen sliding scale   SubCutaneous three times a day before meals  insulin lispro (ADMELOG) corrective regimen sliding scale   SubCutaneous at bedtime  insulin lispro Injectable (ADMELOG) 4 Unit(s) SubCutaneous three times a day before meals  labetalol 100 milliGRAM(s) Oral three times a day  lidocaine   4% Patch 1 Patch Transdermal every 24 hours  losartan 25 milliGRAM(s) Oral daily  mupirocin 2% Ointment 1 Application(s) Topical two times a day  sevelamer carbonate 800 milliGRAM(s) Oral three times a day with meals      REVIEW OF SYSTEMS:  CONSTITUTIONAL: No weakness, fevers or chills  EYES/ENT: No visual changes;  No vertigo or throat pain   NECK: No pain or stiffness  RESPIRATORY: No cough, wheezing, hemoptysis; No shortness of breath  CARDIOVASCULAR: No chest pain or palpitations.  GASTROINTESTINAL: No abdominal or epigastric pain. No nausea, vomiting, or hematemesis; No diarrhea or constipation. No melena or hematochezia.  GENITOURINARY: No dysuria, frequency, foamy urine, urinary urgency, incontinence or hematuria  NEUROLOGICAL: No numbness or weakness  SKIN: No itching, burning, rashes, or lesions   VASCULAR: No bilateral lower extremity edema.   All other review of systems is negative unless indicated above.    VITALS:  T(F): 98 (11-01-22 @ 09:45), Max: 98.6 (10-31-22 @ 17:50)  HR: 56 (11-01-22 @ 09:45)  BP: 117/50 (11-01-22 @ 09:45)  RR: 18 (11-01-22 @ 09:45)  SpO2: 99% (11-01-22 @ 09:45)  Wt(kg): --    10-31 @ 07:01  -  11-01 @ 07:00  --------------------------------------------------------  IN: 8360 mL / OUT: 71982 mL / NET: -3240 mL    11-01 @ 07:01  -  11-01 @ 10:33  --------------------------------------------------------  IN: 2000 mL / OUT: 300 mL / NET: 1700 mL          PHYSICAL EXAM:  Constitutional: NAD  HEENT: anicteric sclera, oropharynx clear  Neck: No JVD  Respiratory: CTAB, no wheezes, rales or rhonchi  Cardiovascular: S1, S2, RRR  Gastrointestinal: BS+, soft, NT/ND  Extremities: No cyanosis or clubbing. No peripheral edema  Neurological: A/O x 3, no focal deficits  Psychiatric: Normal mood, normal affect  : No CVA tenderness. No brand.   Skin: No rashes  Vascular Access:    LABS:  11-01    141  |  97<L>  |  52<H>  ----------------------------<  67<L>  3.5   |  26  |  9.84<H>    Ca    8.4      01 Nov 2022 05:25  Phos  7.2     11-01  Mg     2.20     11-01    TPro  6.1  /  Alb      /  TBili      /  DBili      /  AST      /  ALT      /  AlkPhos      11-01    Creatinine Trend: 9.84 <--, 9.54 <--, 8.61 <--, 7.99 <--, 8.67 <--, 7.64 <--, 7.86 <--, 7.80 <--, 7.34 <--                        7.6    9.35  )-----------( 495      ( 01 Nov 2022 05:25 )             23.8     Urine Studies:        RADIOLOGY & ADDITIONAL STUDIES:   [facility] - S Bryant / Og S /D Dimitris/ NETTE Broderick/ NETTE Allan/ Yung Mao / WICHO Paytonu/ O Amna  service -[phone], office [phone]  ---------------------------------------------------------------------------------------------------------------    Patient seen and examined bedside in hd unit during PD exchange    Subjective and Objective: No overnight events, sob resolved. No complaints today. feeling better  PD draining well    Allergies: No Known Allergies      Hospital Medications:   MEDICATIONS  (STANDING):  amLODIPine   Tablet 10 milliGRAM(s) Oral daily  atorvastatin 80 milliGRAM(s) Oral at bedtime  buMETAnide Injectable 2 milliGRAM(s) IV Push two times a day  chlorhexidine 2% Cloths 1 Application(s) Topical <User Schedule>  dextrose 50% Injectable 25 Gram(s) IV Push once  dextrose 50% Injectable 12.5 Gram(s) IV Push once  dextrose 50% Injectable 25 Gram(s) IV Push once  dextrose Oral Gel 15 Gram(s) Oral once  epoetin heidi-epbx (RETACRIT) Injectable 43988 Unit(s) SubCutaneous Once  gentamicin 0.1% Ointment 1 Application(s) Topical <User Schedule>  glucagon  Injectable 1 milliGRAM(s) IntraMuscular once  insulin glargine Injectable (LANTUS) 12 Unit(s) SubCutaneous at bedtime  insulin lispro (ADMELOG) corrective regimen sliding scale   SubCutaneous three times a day before meals  insulin lispro (ADMELOG) corrective regimen sliding scale   SubCutaneous at bedtime  insulin lispro Injectable (ADMELOG) 4 Unit(s) SubCutaneous three times a day before meals  labetalol 100 milliGRAM(s) Oral three times a day  lidocaine   4% Patch 1 Patch Transdermal every 24 hours  losartan 25 milliGRAM(s) Oral daily  mupirocin 2% Ointment 1 Application(s) Topical two times a day  sevelamer carbonate 800 milliGRAM(s) Oral three times a day with meals    VITALS:  T(F): 98 (11-01-22 @ 09:45), Max: 98.6 (10-31-22 @ 17:50)  HR: 56 (11-01-22 @ 09:45)  BP: 117/50 (11-01-22 @ 09:45)  RR: 18 (11-01-22 @ 09:45)  SpO2: 99% (11-01-22 @ 09:45)  Wt(kg): --    10-31 @ 07:01  -  11-01 @ 07:00  --------------------------------------------------------  IN: 8360 mL / OUT: 04393 mL / NET: -3240 mL    11-01 @ 07:01  -  11-01 @ 10:33  --------------------------------------------------------  IN: 2000 mL / OUT: 300 mL / NET: 1700 mL      PHYSICAL EXAM:  Constitutional: NAD  HEENT: anicteric sclera  Neck: No JVD  Respiratory: CTAB, no wheezes, rales or rhonchi  Cardiovascular: S1, S2, RRR, +pericardial drain   Gastrointestinal: BS+, soft, NT, +fluid, +PD cath  Extremities: no pedal edema b/l   Neurological: A/O x 3  Psychiatric: Normal mood, normal affect  : No brand.     LABS:  11-01    141  |  97<L>  |  52<H>  ----------------------------<  67<L>  3.5   |  26  |  9.84<H>    Ca    8.4      01 Nov 2022 05:25  Phos  7.2     11-01  Mg     2.20     11-01    TPro  6.1  /  Alb      /  TBili      /  DBili      /  AST      /  ALT      /  AlkPhos      11-01    Creatinine Trend: 9.84 <--, 9.54 <--, 8.61 <--, 7.99 <--, 8.67 <--, 7.64 <--, 7.86 <--, 7.80 <--, 7.34 <--                        7.6    9.35  )-----------( 495      ( 01 Nov 2022 05:25 )             23.8     Urine Studies:        RADIOLOGY & ADDITIONAL STUDIES:

## 2022-11-02 ENCOUNTER — TRANSCRIPTION ENCOUNTER (OUTPATIENT)
Age: 67
End: 2022-11-02

## 2022-11-02 ENCOUNTER — NON-APPOINTMENT (OUTPATIENT)
Age: 67
End: 2022-11-02

## 2022-11-02 LAB
ANION GAP SERPL CALC-SCNC: 19 MMOL/L — HIGH (ref 7–14)
BUN SERPL-MCNC: 57 MG/DL — HIGH (ref 7–23)
CALCIUM SERPL-MCNC: 8.5 MG/DL — SIGNIFICANT CHANGE UP (ref 8.4–10.5)
CHLORIDE SERPL-SCNC: 97 MMOL/L — LOW (ref 98–107)
CMV IGG AVIDITY SERPL IA-RTO: 0.91 — SIGNIFICANT CHANGE UP
CO2 SERPL-SCNC: 26 MMOL/L — SIGNIFICANT CHANGE UP (ref 22–31)
CREAT SERPL-MCNC: 10.38 MG/DL — HIGH (ref 0.5–1.3)
CULTURE RESULTS: SIGNIFICANT CHANGE UP
EGFR: 5 ML/MIN/1.73M2 — LOW
GLUCOSE BLDC GLUCOMTR-MCNC: 107 MG/DL — HIGH (ref 70–99)
GLUCOSE BLDC GLUCOMTR-MCNC: 141 MG/DL — HIGH (ref 70–99)
GLUCOSE BLDC GLUCOMTR-MCNC: 170 MG/DL — HIGH (ref 70–99)
GLUCOSE BLDC GLUCOMTR-MCNC: 183 MG/DL — HIGH (ref 70–99)
GLUCOSE BLDC GLUCOMTR-MCNC: 245 MG/DL — HIGH (ref 70–99)
GLUCOSE SERPL-MCNC: 105 MG/DL — HIGH (ref 70–99)
HCT VFR BLD CALC: 26.1 % — LOW (ref 39–50)
HGB BLD-MCNC: 8 G/DL — LOW (ref 13–17)
HISTONE AB SER-ACNC: 0.2 UNITS — SIGNIFICANT CHANGE UP (ref 0–0.9)
MAGNESIUM SERPL-MCNC: 2.3 MG/DL — SIGNIFICANT CHANGE UP (ref 1.6–2.6)
MCHC RBC-ENTMCNC: 26.2 PG — LOW (ref 27–34)
MCHC RBC-ENTMCNC: 30.7 GM/DL — LOW (ref 32–36)
MCV RBC AUTO: 85.6 FL — SIGNIFICANT CHANGE UP (ref 80–100)
NRBC # BLD: 0 /100 WBCS — SIGNIFICANT CHANGE UP (ref 0–0)
NRBC # FLD: 0 K/UL — SIGNIFICANT CHANGE UP (ref 0–0)
PHOSPHATE SERPL-MCNC: 7.2 MG/DL — HIGH (ref 2.5–4.5)
PLATELET # BLD AUTO: 540 K/UL — HIGH (ref 150–400)
POTASSIUM SERPL-MCNC: 3.7 MMOL/L — SIGNIFICANT CHANGE UP (ref 3.5–5.3)
POTASSIUM SERPL-SCNC: 3.7 MMOL/L — SIGNIFICANT CHANGE UP (ref 3.5–5.3)
PROT SERPL-MCNC: 6.6 G/DL — SIGNIFICANT CHANGE UP (ref 6–8.3)
RBC # BLD: 3.05 M/UL — LOW (ref 4.2–5.8)
RBC # FLD: 15 % — HIGH (ref 10.3–14.5)
SARS-COV-2 RNA SPEC QL NAA+PROBE: SIGNIFICANT CHANGE UP
SODIUM SERPL-SCNC: 142 MMOL/L — SIGNIFICANT CHANGE UP (ref 135–145)
SPECIMEN SOURCE: SIGNIFICANT CHANGE UP
WBC # BLD: 9.58 K/UL — SIGNIFICANT CHANGE UP (ref 3.8–10.5)
WBC # FLD AUTO: 9.58 K/UL — SIGNIFICANT CHANGE UP (ref 3.8–10.5)

## 2022-11-02 PROCEDURE — 99232 SBSQ HOSP IP/OBS MODERATE 35: CPT

## 2022-11-02 PROCEDURE — 71250 CT THORAX DX C-: CPT | Mod: 26

## 2022-11-02 PROCEDURE — 99231 SBSQ HOSP IP/OBS SF/LOW 25: CPT | Mod: GC

## 2022-11-02 PROCEDURE — 84165 PROTEIN E-PHORESIS SERUM: CPT | Mod: 26

## 2022-11-02 RX ORDER — GENTAMICIN SULFATE 0.1 %
1 OINTMENT (GRAM) TOPICAL ONCE
Refills: 0 | Status: COMPLETED | OUTPATIENT
Start: 2022-11-02 | End: 2022-11-02

## 2022-11-02 RX ADMIN — LIDOCAINE 1 PATCH: 4 CREAM TOPICAL at 06:20

## 2022-11-02 RX ADMIN — BUMETANIDE 2 MILLIGRAM(S): 0.25 INJECTION INTRAMUSCULAR; INTRAVENOUS at 18:54

## 2022-11-02 RX ADMIN — Medication 100 MILLIGRAM(S): at 12:26

## 2022-11-02 RX ADMIN — Medication 2: at 12:25

## 2022-11-02 RX ADMIN — Medication 1 APPLICATION(S): at 12:19

## 2022-11-02 RX ADMIN — SEVELAMER CARBONATE 800 MILLIGRAM(S): 2400 POWDER, FOR SUSPENSION ORAL at 18:54

## 2022-11-02 RX ADMIN — MUPIROCIN 1 APPLICATION(S): 20 OINTMENT TOPICAL at 18:53

## 2022-11-02 RX ADMIN — Medication 4 UNIT(S): at 08:54

## 2022-11-02 RX ADMIN — CHLORHEXIDINE GLUCONATE 1 APPLICATION(S): 213 SOLUTION TOPICAL at 12:26

## 2022-11-02 RX ADMIN — SEVELAMER CARBONATE 800 MILLIGRAM(S): 2400 POWDER, FOR SUSPENSION ORAL at 08:55

## 2022-11-02 RX ADMIN — Medication 4 UNIT(S): at 12:25

## 2022-11-02 RX ADMIN — Medication 4 UNIT(S): at 18:54

## 2022-11-02 RX ADMIN — BUMETANIDE 2 MILLIGRAM(S): 0.25 INJECTION INTRAMUSCULAR; INTRAVENOUS at 06:18

## 2022-11-02 RX ADMIN — LIDOCAINE 1 PATCH: 4 CREAM TOPICAL at 19:02

## 2022-11-02 RX ADMIN — MUPIROCIN 1 APPLICATION(S): 20 OINTMENT TOPICAL at 06:15

## 2022-11-02 RX ADMIN — INSULIN GLARGINE 12 UNIT(S): 100 INJECTION, SOLUTION SUBCUTANEOUS at 23:59

## 2022-11-02 RX ADMIN — LOSARTAN POTASSIUM 25 MILLIGRAM(S): 100 TABLET, FILM COATED ORAL at 06:16

## 2022-11-02 RX ADMIN — ATORVASTATIN CALCIUM 80 MILLIGRAM(S): 80 TABLET, FILM COATED ORAL at 21:10

## 2022-11-02 RX ADMIN — SEVELAMER CARBONATE 800 MILLIGRAM(S): 2400 POWDER, FOR SUSPENSION ORAL at 12:26

## 2022-11-02 RX ADMIN — LIDOCAINE 1 PATCH: 4 CREAM TOPICAL at 07:06

## 2022-11-02 RX ADMIN — Medication 100 MILLIGRAM(S): at 21:10

## 2022-11-02 NOTE — DISCHARGE NOTE PROVIDER - PROVIDER TOKENS
PROVIDER:[TOKEN:[[id number]]],PROVIDER:[TOKEN:[[id number]]],PROVIDER:[TOKEN:[[id number]]],FREE:[LAST:[],PHONE:[phone]],FAX:[(   )    -],ADDRESS:[Orthopedic Doctor]]

## 2022-11-02 NOTE — PROGRESS NOTE ADULT - ASSESSMENT
67M p/w ESRD on PD, HTN, DM, HDL, CVA, Anemia, sent in by PD clinic due to low Hgb. Renal following for ESRD/PD Mx.    ESRD on peritoneal dialysis.   K ok  hypervolemia improving  Informed consent for PD obtained from pt. in chart   c/w Peritoneal Dialysis 4 exchanges using 2.5% now, more euvolemic  low Na diet, fluid restriction 1.2L/day  dose all meds for ESRD  Hyperphosphatemia - CONT home sevelamer as phos binders    mod Pericardial effusion w/s/o tamponade   s/p pericardiocentesis 10/28 520ml removed  f/u fluid analysis. f/u output  continue high UF PD for now  f/u autoimmune, rheum, infectious w/u     Anemia in ckd, likely GIB- Hb <goal. outpt Hb from 10/25/22 was 6.6, was getting BRITTANIE outpt  awaiting outpt colonoscopy, never had it   monitor H/H closely,  s/p PRBC 1 unit   epo 20k subqx1 given, will rpt qwkly    HTN, controlled-bp stable. c/w home bp meds w/holding parameters.       For any question, call:  Cell # [phone]  Pager # [phone]  Callback # [phone]

## 2022-11-02 NOTE — PROGRESS NOTE ADULT - ASSESSMENT
66 yo M PMH ESRD on PD, HTN, DM2, HDL, CVA, L shoulder fracture (2022), chronic anemia sent in by his PCP for dyspnea on exertion and fatigue found to have hgb 5.9 w/ large hemorrhagic pericardial effusion. Rheumatology consulted for evaluation.    #large hemorrhagic pericardial effusion:  =s/p pericardiocentesis 10/28/22 w/ 160,000 RBC  =pt also w/ significant weight loss, worsening anemia, b/l pleural effusions and multiple supraclavicular lymph nodes largest of which measures 1.2 cm w/ multiple precarinal lymph nodes largest measures 1 cm on CT chest  =Most common cause of hemorrhagic pericardial effusion is malignancy, idiopathic, or uremia. Autoimmune causes do not usually cause hemorrhagic effusion  =Pt w/o stigmata of autoimmune disease on ROS or exam.  Per daughter in law, who is a nephrologist, pt had ESRD 2/2 HTN/DM2, not requiring kidney biopsy. Pt has also had various joint pain and swelling over last couple of months including knees and elbows. Has had unrevealing arthrocentesis w/ ortho.    =Does have borderline +DS-DNA, however, this can occur w/ hydralazine use - which can create autoantibodies that are not pathologic    Serology:   pericardial fluid negative for malignant cells  RF=17, DS-DNA=69.   Negative HARVEY, ANCA, SSA/SSB, CCP, RNP, IGG4, histone antibody, c3, c4    Plan:  -rheumatologic w/u has been unrevealing  -Pt still needs malignancy work up as stated above, would ensure proper screening performed, as this is the most common cause of hemorrhagic pericardial effusion  -follow up QuantiFeron and crithidia (more specific for double stranded DNA)   -will sign off. Please call/teams if any questions    Discussed with Attending Dr. Flavio Harper,   Rheumatology Fellow  Pager: [phone]  Available on TEAMS 68 yo M PMH ESRD on PD, HTN, DM2, HDL, CVA, L shoulder fracture (2022), chronic anemia sent in by his PCP for dyspnea on exertion and fatigue found to have hgb 5.9 w/ large hemorrhagic pericardial effusion. Rheumatology consulted for evaluation.    #large hemorrhagic pericardial effusion:  =s/p pericardiocentesis 10/28/22 w/ 160,000 RBC  =pt also w/ significant weight loss, worsening anemia, b/l pleural effusions and multiple supraclavicular lymph nodes largest of which measures 1.2 cm w/ multiple precarinal lymph nodes largest measures 1 cm on CT chest  =Most common cause of hemorrhagic pericardial effusion is malignancy, idiopathic, or uremia. Autoimmune causes do not usually cause hemorrhagic effusion  =Pt w/o stigmata of autoimmune disease on ROS or exam.  Per daughter in law, who is a nephrologist, pt had ESRD 2/2 HTN/DM2, not requiring kidney biopsy. Pt has also had various joint pain and swelling over last couple of months including knees and elbows. Has had unrevealing arthrocentesis w/ ortho.    =Does have borderline +DS-DNA, however, this can occur w/ hydralazine use - which can create autoantibodies that are not pathologic    Serology:   pericardial fluid negative for malignant cells  RF=17, DS-DNA=69.   Negative HARVEY, ANCA, SSA/SSB, CCP, RNP, IGG4, histone antibody, c3, c4    Plan:  -rheumatologic w/u has been unrevealing  -Pt still needs malignancy work up as stated above, would ensure proper screening performed  -follow up QuantiFeron and crithidia (more specific for double stranded DNA)   -will sign off. Please call/teams if any questions    Discussed with Attending Dr. Flavio Harper DO  Rheumatology Fellow  Pager: [phone]  Available on TEAMS 68 yo M PMH ESRD on PD, HTN, DM2, HDL, CVA, L shoulder fracture (2022), chronic anemia sent in by his PCP for dyspnea on exertion and fatigue found to have hgb 5.9 w/ large hemorrhagic pericardial effusion. Rheumatology consulted for evaluation.    #large hemorrhagic pericardial effusion:  =s/p pericardiocentesis 10/28/22 w/ 160,000 RBC  =pt also w/ significant weight loss, worsening anemia, b/l pleural effusions and multiple supraclavicular lymph nodes largest of which measures 1.2 cm w/ multiple precarinal lymph nodes largest measures 1 cm on CT chest  =Most common cause of hemorrhagic pericardial effusion is malignancy, idiopathic, or uremia. Autoimmune causes do not usually cause hemorrhagic effusion  =Pt w/o stigmata of autoimmune disease on ROS or exam.  Per daughter in law, who is a nephrologist, pt had ESRD 2/2 HTN/DM2, not requiring kidney biopsy. Pt has also had various joint pain and swelling over last couple of months including knees and elbows. Has had unrevealing arthrocentesis w/ ortho.    =Does have borderline +DS-DNA, however, this can occur w/ hydralazine use - which can create autoantibodies that are not pathologic    Serology:   pericardial fluid negative for malignant cells  RF=17, DS-DNA=69.   Negative HARVEY, ANCA, SSA/SSB, CCP, RNP, IGG4, histone antibody, c3, c4, crithidia (specific test for double-stranded DNA)    Plan:  -rheumatologic w/u has been unrevealing  -Pt still needs malignancy work up as stated above, would ensure proper screening performed  -follow up QuantiFeron    -will sign off. Please call/teams if any questions    Discussed with Attending Dr. Flavio Harper DO  Rheumatology Fellow  Pager: [phone]  Available on TEAMS

## 2022-11-02 NOTE — PROGRESS NOTE ADULT - SUBJECTIVE AND OBJECTIVE BOX
[facility] - SOLOMON Hurtado / SOLOMON Foley / KANDICE Shanks/ SOLOMON Broderick/ SOLOMON Allan/ NOMI Mao / VIRGILIO Marks / BENJY Woo  ---------------------------------------------------------------------------------------------------------------  seen and examined today for ESRD  Interval : NAD  VITALS:  T(F): 98.8 (11-02-22 @ 09:00), Max: 98.8 (11-02-22 @ 09:00)  HR: 68 (11-02-22 @ 09:00)  BP: 152/58 (11-02-22 @ 09:00)  RR: 18 (11-02-22 @ 09:00)  SpO2: 98% (11-02-22 @ 09:00)  Wt(kg): --    11-01 @ 07:01  -  11-02 @ 07:00  --------------------------------------------------------  IN: 8000 mL / OUT: 37938 mL / NET: -4300 mL    11-02 @ 07:01  -  11-02 @ 10:39  --------------------------------------------------------  IN: 2120 mL / OUT: 100 mL / NET: 2020 mL      Physical Exam :-  Constitutional: NAD  Neck: Supple.  Respiratory: Bilateral equal breath sounds,  Cardiovascular: S1, S2 normal,  Gastrointestinal: Bowel Sounds present, soft, non tender.  Extremities: No edema  Neurological: Alert and Oriented x 3, no focal deficits  Psychiatric: Normal mood, normal affect  Data:-  Allergies :   No Known Allergies    Hospital Medications:   MEDICATIONS  (STANDING):  amLODIPine   Tablet 10 milliGRAM(s) Oral daily  atorvastatin 80 milliGRAM(s) Oral at bedtime  buMETAnide Injectable 2 milliGRAM(s) IV Push two times a day  chlorhexidine 2% Cloths 1 Application(s) Topical <User Schedule>  dextrose 50% Injectable 25 Gram(s) IV Push once  dextrose 50% Injectable 12.5 Gram(s) IV Push once  dextrose 50% Injectable 25 Gram(s) IV Push once  dextrose Oral Gel 15 Gram(s) Oral once  epoetin heidi-epbx (RETACRIT) Injectable 10691 Unit(s) SubCutaneous Once  gentamicin 0.1% Ointment 1 Application(s) Topical <User Schedule>  glucagon  Injectable 1 milliGRAM(s) IntraMuscular once  insulin glargine Injectable (LANTUS) 12 Unit(s) SubCutaneous at bedtime  insulin lispro (ADMELOG) corrective regimen sliding scale   SubCutaneous three times a day before meals  insulin lispro (ADMELOG) corrective regimen sliding scale   SubCutaneous at bedtime  insulin lispro Injectable (ADMELOG) 4 Unit(s) SubCutaneous three times a day before meals  labetalol 100 milliGRAM(s) Oral three times a day  lidocaine   4% Patch 1 Patch Transdermal every 24 hours  losartan 25 milliGRAM(s) Oral daily  mupirocin 2% Ointment 1 Application(s) Topical two times a day  sevelamer carbonate 800 milliGRAM(s) Oral three times a day with meals    11-02    142  |  97<L>  |  57<H>  ----------------------------<  105<H>  3.7   |  26  |  10.38<H>    Ca    8.5      02 Nov 2022 06:45  Phos  7.2     11-02  Mg     2.30     11-02    TPro  6.6  /  Alb      /  TBili      /  DBili      /  AST      /  ALT      /  AlkPhos      11-02    Creatinine Trend: 10.38 <--, 9.84 <--, 9.54 <--, 8.61 <--, 7.99 <--, 8.67 <--, 7.64 <--, 7.86 <--, 7.80 <--, 7.34 <--                        8.0    9.58  )-----------( 540      ( 02 Nov 2022 06:45 )             26.1

## 2022-11-02 NOTE — DISCHARGE NOTE PROVIDER - CARE PROVIDERS DIRECT ADDRESSES
[email or web address]

## 2022-11-02 NOTE — DISCHARGE NOTE PROVIDER - NSDCMRMEDTOKEN_GEN_ALL_CORE_FT
acetaminophen 325 mg oral tablet: 3 tab(s) orally every 6 hours, As Needed  amLODIPine 10 mg oral tablet: 1 tab(s) orally once a day  Bumex 1 mg oral tablet: 1 tab(s) orally once a day  doxazosin 4 mg oral tablet, extended release: 1 tab(s) orally once a day (in the morning)  hydrALAZINE 50 mg oral tablet: 1.5 tab(s) orally 3 times a day  lidocaine 4% topical film: Apply topically to affected area once a day as directed  Lipitor 80 mg oral tablet: 1 tab(s) orally once a day  (Pharmacy filled Lipitor 40mg QD June/2022)  losartan 25 mg oral tablet: 1 tab(s) orally once a day  senna oral tablet: 2 tab(s) orally once a day (at bedtime), As Needed  sevelamer carbonate 800 mg oral tablet: 1 tab(s) orally 3 times a day (with meals)   amLODIPine 10 mg oral tablet: 1 tab(s) orally once a day  bumetanide 2 mg oral tablet: 1 tab(s) orally 2 times a day  doxazosin 4 mg oral tablet, extended release: 1 tab(s) orally once a day (in the morning)  insulin glargine 100 units/mL subcutaneous solution: 12 unit(s) subcutaneous once a day (at bedtime)  labetalol 100 mg oral tablet: 1 tab(s) orally 2 times a day   Lipitor 80 mg oral tablet: 1 tab(s) orally once a day  (Pharmacy filled Lipitor 40mg QD June/2022)  losartan 25 mg oral tablet: 1 tab(s) orally once a day  sevelamer carbonate 800 mg oral tablet: 1 tab(s) orally 3 times a day (with meals)

## 2022-11-02 NOTE — DISCHARGE NOTE PROVIDER - NSDCFUADDAPPT_GEN_ALL_CORE_FT
Continue medications as prescribed. Follow up with your primary care doctor for further evaluation and management. Please call to make an appointment within 1-2 weeks of discharge.     Ortho Dr. Baum outpatient. ([phone]) Continue medications as prescribed. Follow up with your primary care doctor, transplant surgeon, nephrologist, cardiologist, Gastroenterologist, and orthopedist for further evaluation and management. Please call to make an appointment within 1-2 weeks of discharge.    Continue medications as prescribed. Follow up with your primary care doctor, transplant surgeon, nephrologist, cardiologist, gastroenterologist, and orthopedist for further evaluation and management. Please call to make an appointment within 1-2 weeks of discharge.

## 2022-11-02 NOTE — DISCHARGE NOTE PROVIDER - CARE PROVIDER_API CALL
Randall Garcia  NEPHROLOGY  [address] [place] [address]  Phone: [phone]  Fax: [phone]  Follow Up Time:     Dane Phipps  SURGERY  [address] [place] [address]  Phone: [phone]  Fax: [phone]  Follow Up Time:     DANA MARY  Internal Medicine  [address] [place] [address]  Phone: [phone]  Fax: [phone]  Follow Up Time:     ,   Orthopedic Doctor  Phone: [phone]  Fax: (   )    -  Follow Up Time:

## 2022-11-02 NOTE — DISCHARGE NOTE PROVIDER - NSFOLLOWUPCLINICS_GEN_ALL_ED_FT
[facility]  Cardiology  [address] [place] [address]  Phone: [phone]  Fax:     [facility]  Gastroenterology  [address] [place] [id number]  Phone: [phone]  Fax:

## 2022-11-02 NOTE — PROGRESS NOTE ADULT - PROBLEM SELECTOR PLAN 1
Unclear etiology. DDx inc. uremia vs. drug induced vs. malignancy vs. autoimmune. infectious w/u negative so far  - initial TTE also shows pericardial effusion with concern of early tamponade physiology   - CT chest shows moderate pericardial effusion increased from previous imaging.   - s/p pericardiocentesis via IR on 10/28 w/ drain placed; 520 cc of serosanguinous fluid drained. currently minimal output from drain. repeat echo trace effusion. consulted IR for drain removal   - Avoid anticoagulants  - rheum consult appreciated -currently low suspicion for connective tissue disease as cause of pericardial effusion

## 2022-11-02 NOTE — DISCHARGE NOTE PROVIDER - HOSPITAL COURSE
68 yo Male w/ hx HTN, DM, CVA, ESRD on PD, HDL, Anemia, sent in by PD clinic due to low Hgb. Endorses GERMAIN and general weakness. In ED Hg/hct 5.9/19 which improved to 7.4/22.9 after 1 Unit PRBCs.  CXR: mod pulm edema with small b/l pleural effusions. Patient also found to have a large pericardial effusion with signs of early tamponade on POCUS and TTE.  Patient now s/p pericardiocentesis via IR on 10/28 w/ drain placed.     Pericardial effusion.   - Unclear etiology. DDx inc. uremia vs. drug induced vs. malignancy vs. autoimmune. infectious w/u negative so far  - initial TTE also shows pericardial effusion with concern of early tamponade physiology   - CT chest shows moderate pericardial effusion increased from previous imaging.   - s/p pericardiocentesis via IR on 10/28 w/ drain placed; 520 cc of serosanguinous fluid drained. currently minimal output from drain. repeat echo trace effusion. IR has removed drain.  - Avoid anticoagulants  - rheum consult appreciated - currently low suspicion for connective tissue disease as cause of pericardial effusion    ESRD on peritoneal dialysis.   - c/w PD as per renal   - Renal diet, fluid restriction 1.2L/day  - Dosed meds renally  - Avoided nephrotoxins.  - c/w home sevelamer    Pulmonary edema.   - CXR preliminary read with cardiomegaly, moderate pulmonary edema, small bilateral pleural effusions likely 2/2 volume overload in setting of dialysis  - c/w PD per nephro recs  - Monitored respiratory status  - Supplemental O2. Has been weaned off, stable on room air   - received Duonebs prn for wheezing    Anemia  - Likely anemia of chronic disease vs secondary to occult bleed, though patient does not endorse any hematochezia/melena   - GI consult appreciated- no indication for inpt EGD/C-scope. f/u outpt  - Hb improved s/p 1 Unit PRBcs  - Transfuse <7  - Given Retacrit    Shoulder pain, left.   - Per patient he sustained a fracture to his left shoulder but can't recall the exact mechanism 3 months ago  - Xray showing Known left distal clavicular fracture, with either fracture nonunion or incomplete healing  - Ortho consulted and assessed no acute intervention advised during this admission. Patient should be NWB LUE in sling for comfort and followup with Dr. Baum outpatient. ([phone])  - Pain Control  - Given Lidocaine patch    Hypertension.   - BP elevated on admission but improving now when restarting home BP meds  - c/w Norvasc and losartan   - home hydralazine d/c'ed due to concern of it as a cause of  pericardial effusion. F/U antihistone ab sent on 10/27 to confirm.    - Added labetalol 100mg PO tid on 10/27 with better BP control    Diabetes mellitus.   ·  Plan: - BG >200. Goal of 140-180  - Moderate dose Insulin sliding scale   -Start Lantus 12 Units qHS and admelog 4 Units qAc  - FS premeals and at bedtime.     Problem/Plan - 8:  ·  Problem: Hyperlipidemia.   ·  Plan: - Continue Statin.     Problem/Plan - 9:  ·  Problem: CVA (cerebrovascular accident).   ·  Plan: - Continue Statin.     Problem/Plan - 10:  ·  Problem: Prophylactic measure.   ·  Plan; Diet: diabetic diet  DVT Ppx: SCD for now given anemia   68 yo Male w/ hx HTN, DM, CVA, ESRD on PD, HDL, Anemia, sent in by PD clinic due to low Hgb. Endorses GERMAIN and general weakness. In ED Hg/hct 5.9/19 which improved to 7.4/22.9 after 1 Unit PRBCs.  CXR: mod pulm edema with small b/l pleural effusions. Patient also found to have a large pericardial effusion with signs of early tamponade on POCUS and TTE.  Patient now s/p pericardiocentesis via IR on 10/28 w/ drain placed.     Pericardial effusion.   - Unclear etiology. DDx inc. uremia vs. drug induced vs. malignancy vs. autoimmune. infectious w/u negative so far  - initial TTE also shows pericardial effusion with concern of early tamponade physiology   - CT chest shows moderate pericardial effusion increased from previous imaging.   - s/p pericardiocentesis via IR on 10/28 w/ drain placed; 520 cc of serosanguinous fluid drained. currently minimal output from drain. repeat echo trace effusion. IR has removed drain.  - Avoid anticoagulants  - rheum consult appreciated - currently low suspicion for connective tissue disease as cause of pericardial effusion    ESRD on peritoneal dialysis.   - c/w PD as per renal   - Renal diet, fluid restriction 1.2L/day  - Dosed meds renally  - Avoided nephrotoxins.  - c/w home sevelamer    Pulmonary edema.   - CXR preliminary read with cardiomegaly, moderate pulmonary edema, small bilateral pleural effusions likely 2/2 volume overload in setting of dialysis  - c/w PD per nephro recs  - Monitored respiratory status  - Supplemental O2. Has been weaned off, stable on room air   - received Duonebs prn for wheezing    Anemia  - Likely anemia of chronic disease vs secondary to occult bleed, though patient does not endorse any hematochezia/melena   - GI consult appreciated- no indication for inpt EGD/C-scope. f/u outpt  - Hb improved s/p 1 Unit PRBcs  - Transfuse <7  - Given Retacrit    Shoulder pain, left.   - Per patient he sustained a fracture to his left shoulder but can't recall the exact mechanism 3 months ago  - Xray showing Known left distal clavicular fracture, with either fracture nonunion or incomplete healing  - Ortho consulted and assessed no acute intervention advised during this admission. Patient should be NWB LUE in sling for comfort and followup with Dr. Baum outpatient. ([phone])  - Pain Control  - Given Lidocaine patch    Hypertension.   - BP elevated on admission but improving now when restarting home BP meds  - c/w Norvasc and losartan   - home hydralazine d/c'ed due to concern of it as a cause of  pericardial effusion. F/U antihistone ab sent on 10/27 to confirm.    - Added labetalol 100mg PO tid on 10/27 with better BP control    Diabetes mellitus.   - BG >200. Goal of 140-180  - Given a moderate dose Insulin sliding scale   - Started Lantus 12 Units qHS and admelog 4 Units qAc  - FS premeals and at bedtime performed    Hyperlipidemia.   - Continue Statin.    CVA (cerebrovascular accident).   ·  Plan: - Continue Statin.     Problem/Plan - 10:  ·  Problem: Prophylactic measure.   ·  Plan; Diet: diabetic diet  DVT Ppx: SCD for now given anemia   68 yo Male w/ hx HTN, DM, CVA, ESRD on PD, HDL, Anemia, sent in by PD clinic due to low Hgb. Endorses GERMAIN and general weakness. In ED Hg/hct 5.9/19 which improved to 7.4/22.9 after 1 Unit PRBCs.  CXR: mod pulm edema with small b/l pleural effusions. Patient also found to have a large pericardial effusion with signs of early tamponade on POCUS and TTE.  Patient now s/p pericardiocentesis via IR on 10/28 w/ drain placed.     Pericardial effusion.   - Unclear etiology. DDx inc. uremia vs. drug induced vs. malignancy vs. autoimmune. infectious w/u negative so far  - initial TTE also shows pericardial effusion with concern of early tamponade physiology   - CT chest shows moderate pericardial effusion increased from previous imaging.   - s/p pericardiocentesis via IR on 10/28 w/ drain placed; 520 cc of serosanguinous fluid drained. currently minimal output from drain. repeat echo trace effusion. IR has removed drain.  - Avoid anticoagulants  - rheum consult appreciated - currently low suspicion for connective tissue disease as cause of pericardial effusion    ESRD on peritoneal dialysis.   - c/w PD as per renal   - Renal diet, fluid restriction 1.2L/day  - Dosed meds renally  - Avoided nephrotoxins.  - c/w home sevelamer    Pulmonary edema.   - CXR preliminary read with cardiomegaly, moderate pulmonary edema, small bilateral pleural effusions likely 2/2 volume overload in setting of dialysis  - c/w PD per nephro recs  - Monitored respiratory status  - Supplemental O2. Has been weaned off, stable on room air   - received Duonebs prn for wheezing    Anemia  - Likely anemia of chronic disease vs secondary to occult bleed, though patient does not endorse any hematochezia/melena   - GI consult appreciated- no indication for inpt EGD/C-scope. f/u outpt  - Hb improved s/p 1 Unit PRBcs  - Transfuse <7  - Given Retacrit    Shoulder pain, left.   - Per patient he sustained a fracture to his left shoulder but can't recall the exact mechanism 3 months ago  - Xray showing Known left distal clavicular fracture, with either fracture nonunion or incomplete healing  - Ortho consulted and assessed no acute intervention advised during this admission. Patient should be NWB LUE in sling for comfort and followup with Dr. Baum outpatient. ([phone])  - Pain Control  - Given Lidocaine patch    Hypertension.   - BP elevated on admission but improving now when restarting home BP meds  - c/w Norvasc and losartan   - home hydralazine d/c'ed due to concern of it as a cause of  pericardial effusion. F/U antihistone ab sent on 10/27 to confirm.    - Added labetalol 100mg PO tid on 10/27 with better BP control    Diabetes mellitus.   - BG >200. Goal of 140-180  - Given a moderate dose Insulin sliding scale   - Started Lantus 12 Units qHS and admelog 4 Units qAc  - FS premeals and at bedtime performed    Hyperlipidemia.   - Continue Statin.    CVA (cerebrovascular accident).   - Continue Statin.    On 11/3/22, case was discussed with , patient is medically cleared and optimized for discharge today. All medications were reviewed with attending, and sent to mutually agreed upon pharmacy.

## 2022-11-02 NOTE — PROGRESS NOTE ADULT - PROBLEM SELECTOR PROBLEM 5
Shoulder pain, left

## 2022-11-02 NOTE — PROGRESS NOTE ADULT - PROBLEM SELECTOR PLAN 5
- Per patient he sustained a fracture to his left shoulder but can't recall the exact mechanism 3 months ago  - Xray showing Known left distal clavicular fracture, with either fracture nonunion or incomplete healing.  - Ortho consulted and assessed no acute intervention advised during this admission. Patient should be NWB LUE in sling for comfort and followup with Dr. Baum outpatient. ([phone])  - Pain control  - Continue Lidocaine patch

## 2022-11-02 NOTE — PROGRESS NOTE ADULT - PROBLEM SELECTOR PROBLEM 3
Pulmonary edema

## 2022-11-02 NOTE — PROGRESS NOTE ADULT - ATTENDING COMMENTS
Personally saw and examined patient  labs and vitals reviewed  agree with above assessment and plan  pt resting  no complaints, denies cp sob, syncope  plan for IR pericardial drain today  cont futher care of anemia ?GIB per primary team/GI  will follow with you
as above

## 2022-11-02 NOTE — PROGRESS NOTE ADULT - SUBJECTIVE AND OBJECTIVE BOX
incomplete LEONIDAS [facility]  [id number]    INTERVAL HPI/OVERNIGHT EVENTS: Pt feels well. Able to walk around w/o issue. Still has drain in place. Denies fever, chills, chest pain, sob.     MEDICATIONS  (STANDING):  amLODIPine   Tablet 10 milliGRAM(s) Oral daily  atorvastatin 80 milliGRAM(s) Oral at bedtime  buMETAnide Injectable 2 milliGRAM(s) IV Push two times a day  chlorhexidine 2% Cloths 1 Application(s) Topical <User Schedule>  dextrose 50% Injectable 25 Gram(s) IV Push once  dextrose 50% Injectable 12.5 Gram(s) IV Push once  dextrose 50% Injectable 25 Gram(s) IV Push once  dextrose Oral Gel 15 Gram(s) Oral once  epoetin heidi-epbx (RETACRIT) Injectable 99723 Unit(s) SubCutaneous Once  gentamicin 0.1% Ointment 1 Application(s) Topical <User Schedule>  glucagon  Injectable 1 milliGRAM(s) IntraMuscular once  insulin glargine Injectable (LANTUS) 12 Unit(s) SubCutaneous at bedtime  insulin lispro (ADMELOG) corrective regimen sliding scale   SubCutaneous three times a day before meals  insulin lispro (ADMELOG) corrective regimen sliding scale   SubCutaneous at bedtime  insulin lispro Injectable (ADMELOG) 4 Unit(s) SubCutaneous three times a day before meals  labetalol 100 milliGRAM(s) Oral three times a day  lidocaine   4% Patch 1 Patch Transdermal every 24 hours  losartan 25 milliGRAM(s) Oral daily  mupirocin 2% Ointment 1 Application(s) Topical two times a day  sevelamer carbonate 800 milliGRAM(s) Oral three times a day with meals    MEDICATIONS  (PRN):  acetaminophen     Tablet .. 650 milliGRAM(s) Oral every 6 hours PRN Temp greater or equal to 38C (100.4F), Mild Pain (1 - 3), Moderate Pain (4 - 6)  traMADol 50 milliGRAM(s) Oral every 6 hours PRN moderate to severe pain      Allergies    No Known Allergies    Intolerances        Review of Systems: As above    Vital Signs Last 24 Hrs  T(C): 36.5 (02 Nov 2022 16:40), Max: 37.1 (02 Nov 2022 09:00)  T(F): 97.7 (02 Nov 2022 16:40), Max: 98.8 (02 Nov 2022 09:00)  HR: 69 (02 Nov 2022 16:40) (57 - 69)  BP: 149/68 (02 Nov 2022 16:40) (141/59 - 165/73)  BP(mean): --  RR: 18 (02 Nov 2022 16:40) (18 - 18)  SpO2: 100% (02 Nov 2022 16:40) (96% - 100%)    Parameters below as of 02 Nov 2022 16:40  Patient On (Oxygen Delivery Method): room air        Physical Exam:  General: cachexic, breathing comfortably on room air  HEENT: EOMI, MMM, no oral ulcers  CVS: +S1/S2, RRR. +COSMO drain on chest w/ minimal serosanguinous fluid  Resp: CTA b/l. No crackles/wheezing  GI: Soft, NT/ND +BS  MSK: No synovitis.   Skin: no visible rashes    LABS:                        8.0    9.58  )-----------( 540      ( 02 Nov 2022 06:45 )             26.1     11-02    142  |  97<L>  |  57<H>  ----------------------------<  105<H>  3.7   |  26  |  10.38<H>    Ca    8.5      02 Nov 2022 06:45  Phos  7.2     11-02  Mg     2.30     11-02    TPro  6.6  /  Alb  x   /  TBili  x   /  DBili  x   /  AST  x   /  ALT  x   /  AlkPhos  x   11-02

## 2022-11-02 NOTE — DISCHARGE NOTE PROVIDER - NSDCFUSCHEDAPPT_GEN_ALL_CORE_FT
[facility]  TRANSPLANT [address] [facility]   Scheduled Appointment: 11/21/2022    Randall Garcia  [facility]  NEPHRO [address] [facility]  Scheduled Appointment: 11/21/2022    Dane Phipps  [facility]  TRANSPLANT [address] [facility]   Scheduled Appointment: 11/21/2022    [facility]  TRANSPLANT [address] [facility]   Scheduled Appointment: 11/21/2022

## 2022-11-02 NOTE — PROGRESS NOTE ADULT - SUBJECTIVE AND OBJECTIVE BOX
[facility] Division of Hospital Medicine  Bobo Kidd MD  Pager [phone]      Patient is a 67y old  Male who presents with a chief complaint of GERMAIN pericardial effusion (02 Nov 2022 10:38)      SUBJECTIVE / OVERNIGHT EVENTS:    no acute event o/n. no new complaints     ADDITIONAL REVIEW OF SYSTEMS:    RESPIRATORY: No cough, wheezing, chills or hemoptysis; No shortness of breath  CARDIOVASCULAR: No chest pain, palpitations, dizziness, or leg swelling  GASTROINTESTINAL: No abdominal or epigastric pain. No nausea, vomiting, or hematemesis; No diarrhea or constipation. No melena or hematochezia.      MEDICATIONS  (STANDING):  amLODIPine   Tablet 10 milliGRAM(s) Oral daily  atorvastatin 80 milliGRAM(s) Oral at bedtime  buMETAnide Injectable 2 milliGRAM(s) IV Push two times a day  chlorhexidine 2% Cloths 1 Application(s) Topical <User Schedule>  dextrose 50% Injectable 25 Gram(s) IV Push once  dextrose 50% Injectable 12.5 Gram(s) IV Push once  dextrose 50% Injectable 25 Gram(s) IV Push once  dextrose Oral Gel 15 Gram(s) Oral once  epoetin heidi-epbx (RETACRIT) Injectable 21612 Unit(s) SubCutaneous Once  gentamicin 0.1% Ointment 1 Application(s) Topical <User Schedule>  glucagon  Injectable 1 milliGRAM(s) IntraMuscular once  insulin glargine Injectable (LANTUS) 12 Unit(s) SubCutaneous at bedtime  insulin lispro (ADMELOG) corrective regimen sliding scale   SubCutaneous three times a day before meals  insulin lispro (ADMELOG) corrective regimen sliding scale   SubCutaneous at bedtime  insulin lispro Injectable (ADMELOG) 4 Unit(s) SubCutaneous three times a day before meals  labetalol 100 milliGRAM(s) Oral three times a day  lidocaine   4% Patch 1 Patch Transdermal every 24 hours  losartan 25 milliGRAM(s) Oral daily  mupirocin 2% Ointment 1 Application(s) Topical two times a day  sevelamer carbonate 800 milliGRAM(s) Oral three times a day with meals    MEDICATIONS  (PRN):  acetaminophen     Tablet .. 650 milliGRAM(s) Oral every 6 hours PRN Temp greater or equal to 38C (100.4F), Mild Pain (1 - 3), Moderate Pain (4 - 6)  traMADol 50 milliGRAM(s) Oral every 6 hours PRN moderate to severe pain      CAPILLARY BLOOD GLUCOSE      POCT Blood Glucose.: 245 mg/dL (02 Nov 2022 12:01)  POCT Blood Glucose.: 170 mg/dL (02 Nov 2022 08:39)  POCT Blood Glucose.: 107 mg/dL (02 Nov 2022 07:15)  POCT Blood Glucose.: 153 mg/dL (01 Nov 2022 22:47)  POCT Blood Glucose.: 207 mg/dL (01 Nov 2022 17:38)    I&O's Summary    01 Nov 2022 07:01  -  02 Nov 2022 07:00  --------------------------------------------------------  IN: 8000 mL / OUT: 33587 mL / NET: -4300 mL    02 Nov 2022 07:01  -  02 Nov 2022 14:15  --------------------------------------------------------  IN: 4120 mL / OUT: 2400 mL / NET: 1720 mL        PHYSICAL EXAM:  Vital Signs Last 24 Hrs  T(C): 36.6 (02 Nov 2022 11:15), Max: 37.1 (02 Nov 2022 09:00)  T(F): 97.9 (02 Nov 2022 11:15), Max: 98.8 (02 Nov 2022 09:00)  HR: 64 (02 Nov 2022 12:35) (53 - 68)  BP: 154/66 (02 Nov 2022 12:35) (126/63 - 165/73)  BP(mean): --  RR: 18 (02 Nov 2022 11:15) (17 - 18)  SpO2: 98% (02 Nov 2022 09:00) (96% - 100%)    Parameters below as of 02 Nov 2022 11:15  Patient On (Oxygen Delivery Method): room air        CONSTITUTIONAL: NAD,  EYES: PERRLA; conjunctiva and sclera clear  ENMT: Moist oral mucosa, no pharyngeal injection or exudates;   NECK: Supple, no palpable masses;  RESPIRATORY: Normal respiratory effort; lungs are clear to auscultation bilaterally  CARDIOVASCULAR: Regular rate and rhythm, normal S1 and S2, no murmur/rub/gallop; No lower extremity edema; Peripheral pulses are 2+ bilaterally. pericardial drain in place   ABDOMEN: Nontender to palpation, normoactive bowel sounds, no rebound/guarding;   MUSCLOSKELETAL:   no clubbing or cyanosis of digits; no joint swelling or tenderness to palpation  PSYCH: A+O to person, place, and time; affect appropriate  NEUROLOGY: CN 2-12 are intact and symmetric; no gross sensory deficits;   SKIN: No rashes;     LABS:                        8.0    9.58  )-----------( 540      ( 02 Nov 2022 06:45 )             26.1     11-02    142  |  97<L>  |  57<H>  ----------------------------<  105<H>  3.7   |  26  |  10.38<H>    Ca    8.5      02 Nov 2022 06:45  Phos  7.2     11-02  Mg     2.30     11-02    TPro  6.6  /  Alb  x   /  TBili  x   /  DBili  x   /  AST  x   /  ALT  x   /  AlkPhos  x   11-02                RADIOLOGY & ADDITIONAL TESTS:  Results Reviewed:   Imaging Personally Reviewed:  Electrocardiogram Personally Reviewed:    COORDINATION OF CARE:  Care Discussed with Consultants/Other Providers [Y/N]:  Prior or Outpatient Records Reviewed [Y/N]:

## 2022-11-02 NOTE — DISCHARGE NOTE PROVIDER - NSDCCPCAREPLAN_GEN_ALL_CORE_FT
PRINCIPAL DISCHARGE DIAGNOSIS  Diagnosis: Pericardial effusion  Assessment and Plan of Treatment: On ultrasound of your heart and CAT scan you were found to have fluid build up around your heart. You were seen by the interventional radiology team. They removed fluid from your heart with a drain. This drain was eventually removed. Repeat ultrasound of your heart showed minimal fluid around your heart. You were also seen by the rheumatology team and it is not likely this fluid build up around your heart was due to a contective tissue disease. Continue medications as prescribed. Follow up with your primary care doctor and cardiologist for further evaluation and management. Please call to make an appointment within 1-2 weeks of discharge.      SECONDARY DISCHARGE DIAGNOSES  Diagnosis: Anemia due to acute blood loss  Assessment and Plan of Treatment: You came to the hospital from dialysis clinic due to low blood lab levels. You were given 1 unit of blood. Your blood labs have improved since.  You were seen by the GI team (gastroenterology team) and there was no need for acute intervention at this time. It is recommended you follow up with them in the outpatient setting. Continue medications as prescribed. Follow up with your primary care doctor and GI doctor for further evaluation and management. Please call to make an appointment within 1-2 weeks of discharge.    Diagnosis: ESRD on peritoneal dialysis  Assessment and Plan of Treatment: You have a medical history of end stage renal disease and are on dialysis. Please continue to follow your dialysis schedule and refer to your primary provider/nephrologist for further care and monitoring of kidney function and electrolytes. Continue a renal restricted diet (Avoiding foods high in potassium and phosphorus), your prescribed medications, and supplementations as directed.    Diagnosis: Pulmonary edema  Assessment and Plan of Treatment: You were found to have fluid in your lungs on chest xray. You were seen by the Nephrology team (kidney doctors). You were on oxygen and are now stable on room air. Continue medications as prescribed. Follow up with your primary care doctor and nephrologist for further evaluation and management. Please call to make an appointment within 1-2 weeks of discharge.    Diagnosis: Shoulder pain, left  Assessment and Plan of Treatment: You had a fracture to your left shoulder three months ago. During your stay you complained of shouler pain. you were given medication for this pain. Xray of your shoulder had no new findings compared to your old xray. You were seen by the orthopedic team and they do not recommend any acute intervention at this time. You are to follow up with the orthopedist  ([phone]) in the outpatient setting. You were given a sling. Continue medications as prescribed. Follow up with your primary care doctor and orthopedist for further evaluation and management. Please call to make an appointment within 1-2 weeks of discharge.    Diagnosis: Hypertension  Assessment and Plan of Treatment: You have a medical history of high blood pressure. You blood pressure medication Hydralazine was discontinued. You were started on the blood pressure medication called Labetalol. Continue current blood pressure medication regimen as directed. Monitor for any visual changes, headaches or dizziness.  Monitor blood pressure regularly.  Follow up with your PCP for further management for high blood pressure, please call to make appointment within 1 week of discharge    Diagnosis: Diabetes mellitus  Assessment and Plan of Treatment: You have a medical history of Diabetes. You were given insulin during your stay. Continue your medication regimen and a consistent carbohydrate diet (Meaning eating the same amount of carbohydrates at the same time each day). Monitor blood glucose levels throughout the day before meals and at bedtime. Record blood sugars and bring to outpatient providers appointment in order to be reviewed by your doctor for management modifications. If your sugars are more than 400 or less than 70 you should contact your PCP immediately. Monitor for signs/symptoms of low blood glucose, such as, dizziness, altered mental status, or cool/clammy skin. In addition, monitor for signs/symptoms of high blood glucose, such as, feeling hot, dry, fatigued, or with increased thirst/urination. Make regular podiatry appointments in order to have feet checked for wounds and uncontrolled toe nail growth to prevent infections, as well as, appointments with an ophthalmologist to monitor your vision.

## 2022-11-02 NOTE — PROGRESS NOTE ADULT - PROBLEM SELECTOR PROBLEM 2
ESRD on peritoneal dialysis

## 2022-11-02 NOTE — PROGRESS NOTE ADULT - PROBLEM SELECTOR PROBLEM 9
CVA (cerebrovascular accident)

## 2022-11-02 NOTE — CHART NOTE - NSCHARTNOTEFT_GEN_A_CORE
67yoM p/w GERMAIN found to have a large pericardial effusion of unclear etiology s/p IR placement of a pericardial drain on 10/28. 5cc output recorded on 10/30, since then no recorded output. Repeat echo 10/31 showing trace pericardial effusion. IR consulted for removal.     - IR to remove pericardial drain

## 2022-11-02 NOTE — PROGRESS NOTE ADULT - PROBLEM SELECTOR PLAN 9
- Continue Statin

## 2022-11-03 ENCOUNTER — TRANSCRIPTION ENCOUNTER (OUTPATIENT)
Age: 67
End: 2022-11-03

## 2022-11-03 VITALS
TEMPERATURE: 97 F | SYSTOLIC BLOOD PRESSURE: 148 MMHG | OXYGEN SATURATION: 100 % | RESPIRATION RATE: 18 BRPM | HEART RATE: 54 BPM | DIASTOLIC BLOOD PRESSURE: 62 MMHG

## 2022-11-03 LAB
ANION GAP SERPL CALC-SCNC: 19 MMOL/L — HIGH (ref 7–14)
BASOPHILS # BLD AUTO: 0.08 K/UL — SIGNIFICANT CHANGE UP (ref 0–0.2)
BASOPHILS NFR BLD AUTO: 0.9 % — SIGNIFICANT CHANGE UP (ref 0–2)
BLD GP AB SCN SERPL QL: NEGATIVE — SIGNIFICANT CHANGE UP
BUN SERPL-MCNC: 52 MG/DL — HIGH (ref 7–23)
CALCIUM SERPL-MCNC: 8.9 MG/DL — SIGNIFICANT CHANGE UP (ref 8.4–10.5)
CHLORIDE SERPL-SCNC: 98 MMOL/L — SIGNIFICANT CHANGE UP (ref 98–107)
CO2 SERPL-SCNC: 27 MMOL/L — SIGNIFICANT CHANGE UP (ref 22–31)
CREAT SERPL-MCNC: 10.06 MG/DL — HIGH (ref 0.5–1.3)
DSDNA AB SER QL CLIF: NEGATIVE — SIGNIFICANT CHANGE UP
EGFR: 5 ML/MIN/1.73M2 — LOW
EOSINOPHIL # BLD AUTO: 0.86 K/UL — HIGH (ref 0–0.5)
EOSINOPHIL NFR BLD AUTO: 9.9 % — HIGH (ref 0–6)
GAMMA INTERFERON BACKGROUND BLD IA-ACNC: 0.07 IU/ML — SIGNIFICANT CHANGE UP
GLUCOSE BLDC GLUCOMTR-MCNC: 164 MG/DL — HIGH (ref 70–99)
GLUCOSE BLDC GLUCOMTR-MCNC: 173 MG/DL — HIGH (ref 70–99)
GLUCOSE BLDC GLUCOMTR-MCNC: 201 MG/DL — HIGH (ref 70–99)
GLUCOSE BLDC GLUCOMTR-MCNC: 335 MG/DL — HIGH (ref 70–99)
GLUCOSE BLDC GLUCOMTR-MCNC: 48 MG/DL — CRITICAL LOW (ref 70–99)
GLUCOSE BLDC GLUCOMTR-MCNC: 52 MG/DL — CRITICAL LOW (ref 70–99)
GLUCOSE BLDC GLUCOMTR-MCNC: 77 MG/DL — SIGNIFICANT CHANGE UP (ref 70–99)
GLUCOSE SERPL-MCNC: 90 MG/DL — SIGNIFICANT CHANGE UP (ref 70–99)
HCT VFR BLD CALC: 29.4 % — LOW (ref 39–50)
HGB BLD-MCNC: 8.9 G/DL — LOW (ref 13–17)
IANC: 5.27 K/UL — SIGNIFICANT CHANGE UP (ref 1.8–7.4)
IMM GRANULOCYTES NFR BLD AUTO: 0.6 % — SIGNIFICANT CHANGE UP (ref 0–0.9)
LYMPHOCYTES # BLD AUTO: 1.75 K/UL — SIGNIFICANT CHANGE UP (ref 1–3.3)
LYMPHOCYTES # BLD AUTO: 20.1 % — SIGNIFICANT CHANGE UP (ref 13–44)
M TB IFN-G BLD-IMP: NEGATIVE — SIGNIFICANT CHANGE UP
M TB IFN-G CD4+ BCKGRND COR BLD-ACNC: 0.11 IU/ML — SIGNIFICANT CHANGE UP
M TB IFN-G CD4+CD8+ BCKGRND COR BLD-ACNC: 0.11 IU/ML — SIGNIFICANT CHANGE UP
MAGNESIUM SERPL-MCNC: 2.1 MG/DL — SIGNIFICANT CHANGE UP (ref 1.6–2.6)
MCHC RBC-ENTMCNC: 26.2 PG — LOW (ref 27–34)
MCHC RBC-ENTMCNC: 30.3 GM/DL — LOW (ref 32–36)
MCV RBC AUTO: 86.5 FL — SIGNIFICANT CHANGE UP (ref 80–100)
MONOCYTES # BLD AUTO: 0.71 K/UL — SIGNIFICANT CHANGE UP (ref 0–0.9)
MONOCYTES NFR BLD AUTO: 8.1 % — SIGNIFICANT CHANGE UP (ref 2–14)
NEUTROPHILS # BLD AUTO: 5.27 K/UL — SIGNIFICANT CHANGE UP (ref 1.8–7.4)
NEUTROPHILS NFR BLD AUTO: 60.4 % — SIGNIFICANT CHANGE UP (ref 43–77)
NRBC # BLD: 0 /100 WBCS — SIGNIFICANT CHANGE UP (ref 0–0)
NRBC # FLD: 0 K/UL — SIGNIFICANT CHANGE UP (ref 0–0)
PHOSPHATE SERPL-MCNC: 7 MG/DL — HIGH (ref 2.5–4.5)
PLATELET # BLD AUTO: 533 K/UL — HIGH (ref 150–400)
POTASSIUM SERPL-MCNC: 3.7 MMOL/L — SIGNIFICANT CHANGE UP (ref 3.5–5.3)
POTASSIUM SERPL-SCNC: 3.7 MMOL/L — SIGNIFICANT CHANGE UP (ref 3.5–5.3)
QUANT TB PLUS MITOGEN MINUS NIL: 2.04 IU/ML — SIGNIFICANT CHANGE UP
RBC # BLD: 3.4 M/UL — LOW (ref 4.2–5.8)
RBC # FLD: 15.1 % — HIGH (ref 10.3–14.5)
RH IG SCN BLD-IMP: POSITIVE — SIGNIFICANT CHANGE UP
SODIUM SERPL-SCNC: 144 MMOL/L — SIGNIFICANT CHANGE UP (ref 135–145)
WBC # BLD: 8.72 K/UL — SIGNIFICANT CHANGE UP (ref 3.8–10.5)
WBC # FLD AUTO: 8.72 K/UL — SIGNIFICANT CHANGE UP (ref 3.8–10.5)

## 2022-11-03 PROCEDURE — 99239 HOSP IP/OBS DSCHRG MGMT >30: CPT

## 2022-11-03 RX ORDER — LOSARTAN POTASSIUM 100 MG/1
1 TABLET, FILM COATED ORAL
Qty: 0 | Refills: 0 | DISCHARGE
Start: 2022-11-03

## 2022-11-03 RX ORDER — BUMETANIDE 0.25 MG/ML
1 INJECTION INTRAMUSCULAR; INTRAVENOUS
Qty: 60 | Refills: 0
Start: 2022-11-03 | End: 2022-12-02

## 2022-11-03 RX ORDER — BUMETANIDE 0.25 MG/ML
1 INJECTION INTRAMUSCULAR; INTRAVENOUS
Qty: 0 | Refills: 0 | DISCHARGE

## 2022-11-03 RX ORDER — INSULIN GLARGINE 100 [IU]/ML
12 INJECTION, SOLUTION SUBCUTANEOUS
Qty: 0 | Refills: 0 | DISCHARGE
Start: 2022-11-03

## 2022-11-03 RX ORDER — LABETALOL HCL 100 MG
1 TABLET ORAL
Qty: 60 | Refills: 0
Start: 2022-11-03 | End: 2022-12-02

## 2022-11-03 RX ORDER — BUMETANIDE 0.25 MG/ML
2 INJECTION INTRAMUSCULAR; INTRAVENOUS
Refills: 0 | Status: DISCONTINUED | OUTPATIENT
Start: 2022-11-03 | End: 2022-11-03

## 2022-11-03 RX ADMIN — CHLORHEXIDINE GLUCONATE 1 APPLICATION(S): 213 SOLUTION TOPICAL at 12:11

## 2022-11-03 RX ADMIN — Medication 1 APPLICATION(S): at 12:12

## 2022-11-03 RX ADMIN — BUMETANIDE 2 MILLIGRAM(S): 0.25 INJECTION INTRAMUSCULAR; INTRAVENOUS at 15:49

## 2022-11-03 RX ADMIN — Medication 4 UNIT(S): at 12:17

## 2022-11-03 RX ADMIN — Medication 4: at 12:14

## 2022-11-03 RX ADMIN — MUPIROCIN 1 APPLICATION(S): 20 OINTMENT TOPICAL at 05:41

## 2022-11-03 RX ADMIN — LOSARTAN POTASSIUM 25 MILLIGRAM(S): 100 TABLET, FILM COATED ORAL at 05:41

## 2022-11-03 RX ADMIN — BUMETANIDE 2 MILLIGRAM(S): 0.25 INJECTION INTRAMUSCULAR; INTRAVENOUS at 05:42

## 2022-11-03 RX ADMIN — SEVELAMER CARBONATE 800 MILLIGRAM(S): 2400 POWDER, FOR SUSPENSION ORAL at 12:13

## 2022-11-03 NOTE — PROGRESS NOTE ADULT - REASON FOR ADMISSION
GERMAIN pericardial effusion

## 2022-11-03 NOTE — DISCHARGE NOTE NURSING/CASE MANAGEMENT/SOCIAL WORK - NSDCPEFALRISK_GEN_ALL_CORE
For information on Fall & Injury Prevention, visit: [email or web address] OR  [email or web address] OR  https://www.cdc.gov/steadi/patient.html

## 2022-11-03 NOTE — DISCHARGE NOTE NURSING/CASE MANAGEMENT/SOCIAL WORK - NSDCFUADDAPPT_GEN_ALL_CORE_FT
Continue medications as prescribed. Follow up with your primary care doctor, transplant surgeon, nephrologist, cardiologist, gastroenterologist, and orthopedist for further evaluation and management. Please call to make an appointment within 1-2 weeks of discharge.

## 2022-11-03 NOTE — PROGRESS NOTE ADULT - SUBJECTIVE AND OBJECTIVE BOX
[facility] - S Bryant / Og S /D Dimitris/ S Davie/ NETTE Allan/ Yung Mao / WICHO Paytonu/ O Amna  service -[phone], office [phone]  ---------------------------------------------------------------------------------------------------------------    Patient seen and examined bedside    Subjective and Objective: No overnight events, sob resolved. No complaints today. feeling better    Allergies: No Known Allergies      Hospital Medications:   MEDICATIONS  (STANDING):  amLODIPine   Tablet 10 milliGRAM(s) Oral daily  atorvastatin 80 milliGRAM(s) Oral at bedtime  buMETAnide 2 milliGRAM(s) Oral two times a day  chlorhexidine 2% Cloths 1 Application(s) Topical <User Schedule>  dextrose 50% Injectable 25 Gram(s) IV Push once  dextrose 50% Injectable 12.5 Gram(s) IV Push once  dextrose 50% Injectable 25 Gram(s) IV Push once  dextrose Oral Gel 15 Gram(s) Oral once  epoetin heidi-epbx (RETACRIT) Injectable 24269 Unit(s) SubCutaneous Once  gentamicin 0.1% Ointment 1 Application(s) Topical <User Schedule>  glucagon  Injectable 1 milliGRAM(s) IntraMuscular once  insulin glargine Injectable (LANTUS) 12 Unit(s) SubCutaneous at bedtime  insulin lispro (ADMELOG) corrective regimen sliding scale   SubCutaneous three times a day before meals  insulin lispro (ADMELOG) corrective regimen sliding scale   SubCutaneous at bedtime  insulin lispro Injectable (ADMELOG) 4 Unit(s) SubCutaneous three times a day before meals  labetalol 100 milliGRAM(s) Oral three times a day  lidocaine   4% Patch 1 Patch Transdermal every 24 hours  losartan 25 milliGRAM(s) Oral daily  mupirocin 2% Ointment 1 Application(s) Topical two times a day  sevelamer carbonate 800 milliGRAM(s) Oral three times a day with meals      REVIEW OF SYSTEMS:  CONSTITUTIONAL: No weakness, fevers or chills  EYES/ENT: No visual changes;  No vertigo or throat pain   NECK: No pain or stiffness  RESPIRATORY: No cough, wheezing, hemoptysis; No shortness of breath  CARDIOVASCULAR: No chest pain or palpitations.  GASTROINTESTINAL: No abdominal or epigastric pain. No nausea, vomiting, or hematemesis; No diarrhea or constipation. No melena or hematochezia.  GENITOURINARY: No dysuria, frequency, foamy urine, urinary urgency, incontinence or hematuria  NEUROLOGICAL: No numbness or weakness  SKIN: No itching, burning, rashes, or lesions   VASCULAR: No bilateral lower extremity edema.   All other review of systems is negative unless indicated above.    VITALS:  T(F): 97.8 (11-03-22 @ 11:24), Max: 98.6 (11-02-22 @ 22:43)  HR: 60 (11-03-22 @ 11:24)  BP: 145/69 (11-03-22 @ 11:24)  RR: 17 (11-03-22 @ 11:24)  SpO2: 99% (11-03-22 @ 11:24)  Wt(kg): --    11-02 @ 07:01  -  11-03 @ 07:00  --------------------------------------------------------  IN: 8360 mL / OUT: 9650 mL / NET: -1290 mL    11-03 @ 07:01  -  11-03 @ 13:37  --------------------------------------------------------  IN: 2000 mL / OUT: 3200 mL / NET: -1200 mL          PHYSICAL EXAM:  Constitutional: NAD  HEENT: anicteric sclera, oropharynx clear  Neck: No JVD  Respiratory: CTAB, no wheezes, rales or rhonchi  Cardiovascular: S1, S2, RRR  Gastrointestinal: BS+, soft, NT/ND  Extremities: No cyanosis or clubbing. No peripheral edema  Neurological: A/O x 3, no focal deficits  Psychiatric: Normal mood, normal affect  : No CVA tenderness. No brand.   Skin: No rashes  Vascular Access:    LABS:  11-03    144  |  98  |  52<H>  ----------------------------<  90  3.7   |  27  |  10.06<H>    Ca    8.9      03 Nov 2022 06:40  Phos  7.0     11-03  Mg     2.10     11-03    TPro  6.6  /  Alb      /  TBili      /  DBili      /  AST      /  ALT      /  AlkPhos      11-02    Creatinine Trend: 10.06 <--, 10.38 <--, 9.84 <--, 9.54 <--, 8.61 <--, 7.99 <--, 8.67 <--, 7.64 <--, 7.86 <--                        8.9    8.72  )-----------( 533      ( 03 Nov 2022 06:40 )             29.4     Urine Studies:        RADIOLOGY & ADDITIONAL STUDIES:   [facility] - S Bryant / Og S /D Dimitris/ S Davie/ NETTE Allan/ Yung Mao / WICHO Paytonu/ O Amna  service -[phone], office [phone]  ---------------------------------------------------------------------------------------------------------------    Patient seen and examined bedside    Subjective and Objective: No overnight events, sob resolved. No complaints today. feeling better    Allergies: No Known Allergies      Hospital Medications:   MEDICATIONS  (STANDING):  amLODIPine   Tablet 10 milliGRAM(s) Oral daily  atorvastatin 80 milliGRAM(s) Oral at bedtime  buMETAnide 2 milliGRAM(s) Oral two times a day  chlorhexidine 2% Cloths 1 Application(s) Topical <User Schedule>  dextrose 50% Injectable 25 Gram(s) IV Push once  dextrose 50% Injectable 12.5 Gram(s) IV Push once  dextrose 50% Injectable 25 Gram(s) IV Push once  dextrose Oral Gel 15 Gram(s) Oral once  epoetin heidi-epbx (RETACRIT) Injectable 52885 Unit(s) SubCutaneous Once  gentamicin 0.1% Ointment 1 Application(s) Topical <User Schedule>  glucagon  Injectable 1 milliGRAM(s) IntraMuscular once  insulin glargine Injectable (LANTUS) 12 Unit(s) SubCutaneous at bedtime  insulin lispro (ADMELOG) corrective regimen sliding scale   SubCutaneous three times a day before meals  insulin lispro (ADMELOG) corrective regimen sliding scale   SubCutaneous at bedtime  insulin lispro Injectable (ADMELOG) 4 Unit(s) SubCutaneous three times a day before meals  labetalol 100 milliGRAM(s) Oral three times a day  lidocaine   4% Patch 1 Patch Transdermal every 24 hours  losartan 25 milliGRAM(s) Oral daily  mupirocin 2% Ointment 1 Application(s) Topical two times a day  sevelamer carbonate 800 milliGRAM(s) Oral three times a day with meals      VITALS:  T(F): 97.8 (11-03-22 @ 11:24), Max: 98.6 (11-02-22 @ 22:43)  HR: 60 (11-03-22 @ 11:24)  BP: 145/69 (11-03-22 @ 11:24)  RR: 17 (11-03-22 @ 11:24)  SpO2: 99% (11-03-22 @ 11:24)  Wt(kg): --    11-02 @ 07:01  -  11-03 @ 07:00  --------------------------------------------------------  IN: 8360 mL / OUT: 9650 mL / NET: -1290 mL    11-03 @ 07:01  -  11-03 @ 13:37  --------------------------------------------------------  IN: 2000 mL / OUT: 3200 mL / NET: -1200 mL      PHYSICAL EXAM:  Constitutional: NAD  HEENT: anicteric sclera  Neck: No JVD  Respiratory: CTAB, no wheezes, rales or rhonchi  Cardiovascular: S1, S2, RRR  Gastrointestinal: BS+, soft, NT, +PD cath  Extremities: No peripheral edema  Neurological: A/O x 3, no focal deficits  Psychiatric: Normal mood, normal affect  : No brand.     LABS:  11-03    144  |  98  |  52<H>  ----------------------------<  90  3.7   |  27  |  10.06<H>    Ca    8.9      03 Nov 2022 06:40  Phos  7.0     11-03  Mg     2.10     11-03    TPro  6.6  /  Alb      /  TBili      /  DBili      /  AST      /  ALT      /  AlkPhos      11-02    Creatinine Trend: 10.06 <--, 10.38 <--, 9.84 <--, 9.54 <--, 8.61 <--, 7.99 <--, 8.67 <--, 7.64 <--, 7.86 <--                        8.9    8.72  )-----------( 533      ( 03 Nov 2022 06:40 )             29.4     Urine Studies:        RADIOLOGY & ADDITIONAL STUDIES:

## 2022-11-03 NOTE — PROGRESS NOTE ADULT - PROVIDER SPECIALTY LIST ADULT
Nephrology
Hospitalist
Nephrology
Rheumatology
Cardiology
Nephrology
Hospitalist

## 2022-11-03 NOTE — PROGRESS NOTE ADULT - ASSESSMENT
67M p/w ESRD on PD, HTN, DM, HDL, CVA, Anemia, sent in by PD clinic due to low Hgb. Renal following for ESRD/PD Mx.    ESRD on peritoneal dialysis.   K ok  hypervolemia improving  Informed consent for PD obtained from pt. in chart     on Peritoneal Dialysis 4 exchanges using 2.5% now, more euvolemic now. drained empty as plan for d/c home. pt to resume cycler/CCPD at home tonight  low Na diet, fluid restriction 1.2L/day  dose all meds for ESRD  Hyperphosphatemia - CONT home sevelamer as phos binders    mod Pericardial effusion w/s/o tamponade   s/p pericardiocentesis 10/28 520ml removed  continue high UF PD for now  f/u autoimmune, rheum, infectious w/u   s/p removal catheter    Anemia in ckd, likely GIB- Hb <goal. outpt Hb from 10/25/22 was 6.6, was getting BRITTANIE outpt  awaiting outpt colonoscopy, never had it   monitor H/H closely,  s/p PRBC 1 unit   epo 20k subqx1 given, will continue outpt at PD clinic    HTN, controlled-bp stable. c/w home bp meds w/holding parameters.       stable for d/c renal stand point

## 2022-11-03 NOTE — CHART NOTE - NSCHARTNOTESELECT_GEN_ALL_CORE
Interventional Radiology
Chart Note/Event Note
Event Note
IR Follow up/Event Note
Orthopedics/Event Note

## 2022-11-03 NOTE — CHART NOTE - NSCHARTNOTEFT_GEN_A_CORE
pt seen and examined. vitals, labs reviewed. pericardial drain removed by IR this am. pt is stable for discharge home today. will increase home dose lantus from 4 to 12, and replace home hydralazine to labetalol.  pt in agreement with plan     total time spent on dc 45min

## 2022-11-03 NOTE — CHART NOTE - NSCHARTNOTEFT_GEN_A_CORE
IR called to bedside for pericardial drain removal. Drain placed by IR on 10/28. Pt seen at bedside. No acute distress. Drain successfully removed. No immediate complication. Sterile dressing placed. I personally saw and examined the patient in detail.  I have spoken to the above provider regarding the assessment and plan of care.  I reviewed the above assessment and plan of care, and agree.  I have made changes in the body of the note where appropriate.

## 2022-11-03 NOTE — DISCHARGE NOTE NURSING/CASE MANAGEMENT/SOCIAL WORK - PATIENT PORTAL LINK FT
You can access the FollowMyHealth Patient Portal offered by [facility] by registering at the following website: [email or web address]. By joining [employer]’s FollowMyHealth portal, you will also be able to view your health information using other applications (apps) compatible with our system.

## 2022-11-09 LAB
% ALBUMIN: 40 % — SIGNIFICANT CHANGE UP
% ALPHA 1: 6.7 % — SIGNIFICANT CHANGE UP
% ALPHA 2: 20.4 % — SIGNIFICANT CHANGE UP
% BETA: 15.9 % — SIGNIFICANT CHANGE UP
% GAMMA: 17 % — SIGNIFICANT CHANGE UP
ALBUMIN SERPL ELPH-MCNC: 2.64 G/DL — LOW (ref 3.3–4.4)
ALBUMIN/GLOB SERPL ELPH: 0.7 RATIO — SIGNIFICANT CHANGE UP
ALPHA1 GLOB SERPL ELPH-MCNC: 0.44 G/DL — HIGH (ref 0.1–0.3)
ALPHA2 GLOB SERPL ELPH-MCNC: 1.3 G/DL — HIGH (ref 0.6–1)
B-GLOBULIN SERPL ELPH-MCNC: 1.05 G/DL — SIGNIFICANT CHANGE UP (ref 0.6–1.1)
GAMMA GLOBULIN: 1.12 G/DL — SIGNIFICANT CHANGE UP (ref 0.7–1.7)
PROT PATTERN SERPL ELPH-IMP: SIGNIFICANT CHANGE UP
PROT SERPL-MCNC: 6.6 G/DL — SIGNIFICANT CHANGE UP

## 2022-11-21 ENCOUNTER — APPOINTMENT (OUTPATIENT)
Dept: NEPHROLOGY | Facility: CLINIC | Age: 67
End: 2022-11-21

## 2022-11-21 ENCOUNTER — NON-APPOINTMENT (OUTPATIENT)
Age: 67
End: 2022-11-21

## 2022-11-21 ENCOUNTER — APPOINTMENT (OUTPATIENT)
Dept: TRANSPLANT | Facility: CLINIC | Age: 67
End: 2022-11-21

## 2022-11-21 VITALS
RESPIRATION RATE: 14 BRPM | OXYGEN SATURATION: 100 % | WEIGHT: 136 LBS | HEART RATE: 52 BPM | TEMPERATURE: 97.8 F | BODY MASS INDEX: 20.61 KG/M2 | HEIGHT: 68 IN | DIASTOLIC BLOOD PRESSURE: 61 MMHG | SYSTOLIC BLOOD PRESSURE: 135 MMHG

## 2022-11-21 PROCEDURE — 99072 ADDL SUPL MATRL&STAF TM PHE: CPT

## 2022-11-21 PROCEDURE — 99204 OFFICE O/P NEW MOD 45 MIN: CPT

## 2022-11-21 PROCEDURE — 99205 OFFICE O/P NEW HI 60 MIN: CPT

## 2022-11-21 NOTE — ASSESSMENT
[Good candidate] : a good candidate. We should proceed with our protocol for evaluation for kidney transplantation. [FreeTextEntry1] : recent pericardial effusion. Needs to complete hem/onc workup. Recommend Hem/onc eval\par Needs colonoscopy as he never had one\par CT chest, abdomen, pelvis from [facility] reviewed- no abnormalities or calcified vessels. No need to repeat\par Cardiology eval\par standard transplant workup

## 2022-11-21 NOTE — REVIEW OF SYSTEMS
[Patient Intake Form Reviewed] : Patient intake form was reviewed [Sclera anicteric] : sclera anicteric [Anemia] : anemia [Fever] : no fever [Fatigue] : no fatigue [Recent Weight Loss (___ Lbs)] : no recent weight loss [Blurred Vision] : no blurred vision [Chest Pain] : no chest pain [SOB] : no shortness of breath [Dyspnea on Exertion] : no dyspnea on exertion [Abdominal Pain] : no abdominal pain [Nausea] : no nausea [Dysuria] : no dysuria [Hematuria] : no hematuria

## 2022-11-21 NOTE — PHYSICAL EXAM
[No Acute Distress] : no acute distress [Sclera Anicteric] : sclera anicteric [Clear to Auscultation] : clear to auscultation [Breathing Comfortably on RA] : breathing comfortably on room air [Normal Rate] : normal rate [Regular Rhythm] : regular rhythm [Soft] : soft [Non-tender] : non-tender [No] : no fistula/graft [] : right dorsalis pedis palpable [Normal] : normal [FreeTextEntry1] : +dentures [TextBox_25] : Right inguinal hernia, reducible, nontender [TextBox_34] : No edema or ulcerations b/l LE [TextBox_86] : No inguinal lymphadenopathy

## 2022-11-21 NOTE — HISTORY OF PRESENT ILLNESS
[Diabetes Mellitus] : Diabetes Mellitus [Hypertension] : Hypertension [Blood Transfusion] : prior blood transfusion [Diabetes] : diabetes [Previous Kidney Transplant] : no previous kidney transplant [Cardiac History] : no cardiac history [Claudication/Angina] : no claudication/angina [Hx of DVT/Thrombosis/Miscarriage] : no history of dvt/thrombosis/miscarriage [TextBox_16] : 2/2022 [TextBox_24] : 15 years ago [TextBox_28] : 15 years ago [TextBox_30] : several blocks [de-identified] : 67M referred for evaluation for renal transplant.\par Patient has been on PD since earlier this year without incident.\par He was admitted to [facility] a few weeks ago with anemia and received 2u RBC. he was found to have pericardial effusion and underwent IR drainage. Cytology was negative for malignancy, but etiology has not been found yet.\par He denies any fevers, cp, sob, nausea, abdominal pain, hematuria or dysuria.\par He has never had a colonoscopy, but guaiac was negative.\par His daughter-in-law is a nephrologist.\par \par \par Prior transplant - No\par Listed elsewhere - No\par HD - PD at home nightly\par Living Donor - possible, friend\par \par PMH: HTN, DM, CVA (no deficit)\par PSH: PD catheter placement\par Meds: Amlodipine, Atorvastatin, Bumetanide, Calcitriol, Doxazosin, Gabapentin, Hydralazine, Labetalol, Losartan, Renavite, Sevelamer\par Allergies: NKDA\par Social: Denies tob, etoh, drugs\par lives with wife and son in [place]\par FMH: 3 sons, all relatively healthy. Possible HTN not requiring meds\par Mother had DM.\par No family history of kidney disease or malignancy\par \par ROS: Cardiac - no MI, CHF, CAD\par Pulmonary- no h/o COPD, Asthma, or pneumonia\par Infections- no h/o TB, HIV, Hepatitis. +vaccinated for covid. \par Heme- no h/o malignancy or bleeding disorders. No DVT/PE\par Endo- Diabetes, Not on insulin. No Thyroid disease\par Neuro- +history of CVA, no deficit. No seizures\par Sensitization events- + previous blood transfusions x2; No previous transplant\par No infections or hospitalizations in the last 6 months\par  - No UTI or Kidney stones. Makes normal amount of urine\par Never had colonoscopy

## 2022-11-21 NOTE — REASON FOR VISIT
[Initial] : an initial visit for [End-Stage Renal Disease] : end-stage renal disease [Family Member] : family member

## 2022-11-22 NOTE — HISTORY OF PRESENT ILLNESS
[TextBox_42] : LEONIDAS LARRY is a 67 year old male who presents for kidney transplant evaluation. \par Cause of ESRD : Long standing h/o DM. no biopsy done. \par on PD since 2/2022\par  \par Other PMH :\par Cardiac - HTN X 15 years. No h/o MI , CHF, CAD\par Pulmonary-  no h/o COPD, Asthma. \par Infections- no h/o TB, HIV, Hepatitis  \par Heme- no h/o malignancy or bleeding disorders.No DVT/PE\par Endo- diabetes X 15 years with retinopathy and neuropathy.  no Thyroid disease\par Neuro- h/o  CVA 9 years ago with mild residual weakness on right side . h/o  TIA 2 years ago \par Psych- no h/o suicidal ideation, depression or anxiety. \par Sensitization events- no previous blood transfusions or previous transplant\par \par Most recent hospitalizations-  admitted to [facility] a few weeks ago with anemia and received 2u RBC. He was found to have pericardial effusion and underwent IR drainage. Cytology was negative for malignancy, but etiology has not been found yet.\par \par Surgical h/o : none\par Functional status: can walk 5-6 blocks .\par Social: Denies tob, etoh, drugs. lives with wife and son in [place]\par Family history : 3 sons, all relatively healthy. Possible HTN not requiring meds. Mother had DM.No family history of kidney disease or malignancy\par Meds: Amlodipine, Atorvastatin, Bumetanide, Calcitriol, Doxazosin, Gabapentin, Hydralazine, Labetalol, Losartan, Renavite, Sevelamer\par Allergies: NKDA\par \par

## 2022-11-22 NOTE — PLAN
[FreeTextEntry1] : 1. ESRD sec to DM on PD since 2/2022:  Mr. LEONIDAS LARRY is a  good candidate for kidney transplantation \par 2.Cardiac risk: echo, stress test and cardiac evaluation \par 3. Cancer screening: colonoscopy, PSA\par 4. ID: Serology for acute and chronic viral infections. Screening for latent TB\par 5. Imaging: Renal/abdominal /chest /Iliac imaging\par 6.Diabetes Mellitus: check HbA1c \par \par I have personally discussed the risks and benefits of transplantation and patient attended transplant education class where the following was disclosed:\par  \par Reviewed factors affecting survival and morbidity while on dialysis, the transplant wait list and reviewed wes-operative and long-term risk factors affecting outcome in kidney transplantation. \par  \par One year SRTR outcomes for national and [facility] were discussed in regards to patient survival and graft survival after transplantation. \par  \par Details of transplant surgery, including complications were discussed.\par Immunosuppression and complications including infection including life threatening sepsis and opportunistic infections, malignancy and new onset diabetes were discussed. \par  \par Benefits of live donor transplantation as well as variability in wait times across regions and multiple listing were discussed. \par KDPI >85% and PHS high risk criteria donors were discussed. \par HCV kidney transplantation was discussed.\par  \par Will proceed with completing/ updating work up and listing for transplant/ live donor transplant once work up is reviewed and found to be acceptable by multidisciplinary listing committee.\par

## 2022-11-26 LAB
CULTURE RESULTS: SIGNIFICANT CHANGE UP
SPECIMEN SOURCE: SIGNIFICANT CHANGE UP

## 2022-11-29 LAB
ABO + RH PNL BLD: NORMAL
ABO + RH PNL BLD: NORMAL
ALBUMIN SERPL ELPH-MCNC: 3.3 G/DL
ALP BLD-CCNC: 69 U/L
ALT SERPL-CCNC: 12 U/L
ANION GAP SERPL CALC-SCNC: 16 MMOL/L
APPEARANCE: CLEAR
AST SERPL-CCNC: 18 U/L
BACTERIA: NEGATIVE
BASOPHILS # BLD AUTO: 0.06 K/UL
BASOPHILS NFR BLD AUTO: 0.8 %
BILIRUB SERPL-MCNC: 0.2 MG/DL
BILIRUBIN URINE: NEGATIVE
BLOOD URINE: ABNORMAL
BUN SERPL-MCNC: 48 MG/DL
CALCIUM SERPL-MCNC: 8.5 MG/DL
CHLORIDE SERPL-SCNC: 93 MMOL/L
CHOLEST SERPL-MCNC: 128 MG/DL
CMV IGG SERPL QL: 2.7 U/ML
CMV IGG SERPL-IMP: POSITIVE
CO2 SERPL-SCNC: 24 MMOL/L
COLOR: NORMAL
COVID-19 NUCLEOCAPSID  GAM ANTIBODY INTERPRETATION: POSITIVE
COVID-19 SPIKE DOMAIN ANTIBODY INTERPRETATION: POSITIVE
CREAT SERPL-MCNC: 7.95 MG/DL
CREAT SPEC-SCNC: 41 MG/DL
CREAT/PROT UR: 9.5 RATIO
EBV EA AB SER IA-ACNC: <5 U/ML
EBV EA AB TITR SER IF: POSITIVE
EBV EA IGG SER QL IA: 144 U/ML
EBV EA IGG SER-ACNC: NEGATIVE
EBV EA IGM SER IA-ACNC: NEGATIVE
EBV PATRN SPEC IB-IMP: NORMAL
EBV VCA IGG SER IA-ACNC: 137 U/ML
EBV VCA IGM SER QL IA: <10 U/ML
EGFR: 7 ML/MIN/1.73M2
EOSINOPHIL # BLD AUTO: 0.58 K/UL
EOSINOPHIL NFR BLD AUTO: 7.5 %
EPSTEIN-BARR VIRUS CAPSID ANTIGEN IGG: POSITIVE
ESTIMATED AVERAGE GLUCOSE: 131 MG/DL
GLUCOSE QUALITATIVE U: ABNORMAL
GLUCOSE SERPL-MCNC: 111 MG/DL
HAV IGM SER QL: NONREACTIVE
HBA1C MFR BLD HPLC: 6.2 %
HBV CORE IGG+IGM SER QL: NONREACTIVE
HBV SURFACE AB SER QL: ABNORMAL
HBV SURFACE AB SERPL IA-ACNC: 9.5 MIU/ML
HBV SURFACE AG SER QL: NONREACTIVE
HCT VFR BLD CALC: 31.6 %
HCV AB SER QL: NONREACTIVE
HCV S/CO RATIO: 0.12 S/CO
HDLC SERPL-MCNC: 50 MG/DL
HEPATITIS A IGG ANTIBODY: REACTIVE
HGB BLD-MCNC: 9.6 G/DL
HIV1+2 AB SPEC QL IA.RAPID: NONREACTIVE
HSV 1+2 IGG SER IA-IMP: POSITIVE
HSV 1+2 IGG SER IA-IMP: POSITIVE
HSV1 IGG SER QL: 18.2 INDEX
HSV2 IGG SER QL: 13.5 INDEX
HYALINE CASTS: 0 /LPF
IMM GRANULOCYTES NFR BLD AUTO: 0.4 %
KETONES URINE: NEGATIVE
LDLC SERPL CALC-MCNC: 52 MG/DL
LEUKOCYTE ESTERASE URINE: NEGATIVE
LYMPHOCYTES # BLD AUTO: 1.18 K/UL
LYMPHOCYTES NFR BLD AUTO: 15.3 %
M TB IFN-G BLD-IMP: NEGATIVE
MAGNESIUM SERPL-MCNC: 2.1 MG/DL
MAN DIFF?: NORMAL
MCHC RBC-ENTMCNC: 25.9 PG
MCHC RBC-ENTMCNC: 30.4 GM/DL
MCV RBC AUTO: 85.4 FL
MICROSCOPIC-UA: NORMAL
MONOCYTES # BLD AUTO: 0.66 K/UL
MONOCYTES NFR BLD AUTO: 8.6 %
NEUTROPHILS # BLD AUTO: 5.19 K/UL
NEUTROPHILS NFR BLD AUTO: 67.4 %
NITRITE URINE: NEGATIVE
NONHDLC SERPL-MCNC: 78 MG/DL
PH URINE: 7.5
PHOSPHATE SERPL-MCNC: 7 MG/DL
PLATELET # BLD AUTO: 385 K/UL
POTASSIUM SERPL-SCNC: 4.4 MMOL/L
PROT SERPL-MCNC: 6.2 G/DL
PROT UR-MCNC: 389 MG/DL
PROTEIN URINE: ABNORMAL
PSA SERPL-MCNC: 1.89 NG/ML
QUANTIFERON TB PLUS MITOGEN MINUS NIL: 0.5 IU/ML
QUANTIFERON TB PLUS NIL: 0.03 IU/ML
QUANTIFERON TB PLUS TB1 MINUS NIL: 0.05 IU/ML
QUANTIFERON TB PLUS TB2 MINUS NIL: 0.06 IU/ML
RBC # BLD: 3.7 M/UL
RBC # FLD: 16.6 %
RED BLOOD CELLS URINE: 2 /HPF
ROGOSIN: NORMAL
RUBV IGG FLD-ACNC: 6.9 INDEX
RUBV IGG SER-IMP: POSITIVE
SARS-COV-2 AB SERPL IA-ACNC: >250 U/ML
SARS-COV-2 AB SERPL QL IA: 115 INDEX
SARS-COV-2 N GENE NPH QL NAA+PROBE: NOT DETECTED
SODIUM SERPL-SCNC: 133 MMOL/L
SPECIFIC GRAVITY URINE: 1.01
SQUAMOUS EPITHELIAL CELLS: 1 /HPF
T GONDII AB SER-IMP: NEGATIVE
T GONDII IGG SER QL: <3 IU/ML
T PALLIDUM AB SER QL IA: NEGATIVE
TRIGL SERPL-MCNC: 130 MG/DL
UROBILINOGEN URINE: NORMAL
VZV AB TITR SER: POSITIVE
VZV IGG SER IF-ACNC: 2551 INDEX
WBC # FLD AUTO: 7.7 K/UL
WHITE BLOOD CELLS URINE: 1 /HPF

## 2022-12-13 ENCOUNTER — NON-APPOINTMENT (OUTPATIENT)
Age: 67
End: 2022-12-13

## 2022-12-20 ENCOUNTER — NON-APPOINTMENT (OUTPATIENT)
Age: 67
End: 2022-12-20

## 2022-12-20 ENCOUNTER — APPOINTMENT (OUTPATIENT)
Dept: CARDIOLOGY | Facility: CLINIC | Age: 67
End: 2022-12-20

## 2022-12-20 VITALS
HEART RATE: 61 BPM | BODY MASS INDEX: 20.98 KG/M2 | WEIGHT: 138 LBS | OXYGEN SATURATION: 100 % | DIASTOLIC BLOOD PRESSURE: 64 MMHG | SYSTOLIC BLOOD PRESSURE: 160 MMHG

## 2022-12-20 DIAGNOSIS — E11.9 TYPE 2 DIABETES MELLITUS W/OUT COMPLICATIONS: ICD-10-CM

## 2022-12-20 DIAGNOSIS — Z83.3 FAMILY HISTORY OF DIABETES MELLITUS: ICD-10-CM

## 2022-12-20 DIAGNOSIS — Z86.73 PERSONAL HISTORY OF TRANSIENT ISCHEMIC ATTACK (TIA), AND CEREBRAL INFARCTION W/OUT RESIDUAL DEFICITS: ICD-10-CM

## 2022-12-20 DIAGNOSIS — Z78.9 OTHER SPECIFIED HEALTH STATUS: ICD-10-CM

## 2022-12-20 PROCEDURE — 93000 ELECTROCARDIOGRAM COMPLETE: CPT

## 2022-12-20 PROCEDURE — 99072 ADDL SUPL MATRL&STAF TM PHE: CPT

## 2022-12-20 PROCEDURE — 99215 OFFICE O/P EST HI 40 MIN: CPT

## 2022-12-20 NOTE — CARDIOLOGY SUMMARY
[de-identified] : 12/20/2022 sinus rhythm, LAE, voltage criteria for LVH, non-specific ST-T wave abnormality.

## 2022-12-20 NOTE — DISCUSSION/SUMMARY
[Patient] : the patient [EKG obtained to assist in diagnosis and management of assessed problem(s)] : EKG obtained to assist in diagnosis and management of assessed problem(s) [Echocardiogram] : echocardiogram [Hypertension] : hypertension [Responding to Treatment] : responding to treatment [de-identified] : Murmurs suggestive of mild aortic stenosis and mitral regurgitation [de-identified] : On multidrug regimen [FreeTextEntry1] : 67 year old man being evaluated for kidney transplant. Arranging echocardiogram to evaluate murmurs consistent with mild aortic stenosis and mitral regurgitation as well as LV function, pulmonary pressures. Also arranging pharmacologic stress test with radionuclide perfusion imaging.

## 2022-12-20 NOTE — HISTORY OF PRESENT ILLNESS
[FreeTextEntry1] : Mr. Chiki Aleman is a 67 year old man presenting for cardiovascular evaluation in anticipation of kidney transplant. End stage renal disease attributed to diabetes and has been on peritoneal dialysis since 2/2022. He has hypertension, otherwise no known cardiovascular disease. He had a stroke nine years ago with residual right sided weakness and had a TIA two years ago. Functional capacity is impaired, limited walking with walker in home and cannot walk outdoors or climb stairs, too weak. He has never been a smoker and does not consume alcohol. He stopped working many years ago.

## 2022-12-20 NOTE — PHYSICAL EXAM
[Normal Rate] : normal [Rhythm Regular] : regular [P2 Accentuated] : had an accentuated P2 [Holosystolic] : holosystolic [High] : high pitched [II] : a grade 2 [Crescendo-Decrescendo] : crescendo-decrescendo [Early] : early [___ +] : bilateral [unfilled]U+ pretibial pitting edema [2+] : left 2+ [1+] : left 1+ [No Abnormalities] : the abdominal aorta was not enlarged and no bruit was heard [Normal S2] : abnormal S2 [Right Carotid Bruit] : no bruit heard over the right carotid [Left Carotid Bruit] : no bruit heard over the left carotid [de-identified] : right lateral ribs tender to palpation

## 2022-12-20 NOTE — REVIEW OF SYSTEMS
[Feeling Fatigued] : feeling fatigued [Lower Ext Edema] : lower extremity edema [Weakness] : weakness [Negative] : Heme/Lymph [SOB] : no shortness of breath [Chest Discomfort] : no chest discomfort [Leg Claudication] : no intermittent leg claudication [Palpitations] : no palpitations [Orthopnea] : no orthopnea [PND] : no PND [Syncope] : no syncope [FreeTextEntry9] : right sided chest, rib pain

## 2022-12-21 NOTE — PATIENT PROFILE ADULT - NSPROHMDIABETHBA1C_GEN_A_NUR
Additional Notes: Patient has had allergy test by allergist previously and has lots of allergies.
Render Risk Assessment In Note?: no
Detail Level: Simple
unknown

## 2023-01-10 ENCOUNTER — APPOINTMENT (OUTPATIENT)
Dept: GASTROENTEROLOGY | Facility: CLINIC | Age: 68
End: 2023-01-10
Payer: COMMERCIAL

## 2023-01-10 VITALS
HEART RATE: 72 BPM | BODY MASS INDEX: 22.13 KG/M2 | TEMPERATURE: 98 F | SYSTOLIC BLOOD PRESSURE: 120 MMHG | OXYGEN SATURATION: 97 % | WEIGHT: 146 LBS | RESPIRATION RATE: 12 BRPM | DIASTOLIC BLOOD PRESSURE: 60 MMHG | HEIGHT: 68 IN

## 2023-01-10 DIAGNOSIS — Z86.79 PERSONAL HISTORY OF OTHER DISEASES OF THE CIRCULATORY SYSTEM: ICD-10-CM

## 2023-01-10 DIAGNOSIS — Z86.39 PERSONAL HISTORY OF OTHER ENDOCRINE, NUTRITIONAL AND METABOLIC DISEASE: ICD-10-CM

## 2023-01-10 PROCEDURE — 99204 OFFICE O/P NEW MOD 45 MIN: CPT

## 2023-01-10 PROCEDURE — 99072 ADDL SUPL MATRL&STAF TM PHE: CPT

## 2023-01-10 RX ORDER — BUMETANIDE 1 MG/1
1 TABLET ORAL
Refills: 0 | Status: ACTIVE | COMMUNITY

## 2023-01-10 RX ORDER — AMLODIPINE BESYLATE 10 MG/1
10 TABLET ORAL
Refills: 0 | Status: ACTIVE | COMMUNITY

## 2023-01-10 RX ORDER — LOSARTAN POTASSIUM 25 MG/1
25 TABLET, FILM COATED ORAL
Refills: 0 | Status: ACTIVE | COMMUNITY

## 2023-01-10 RX ORDER — DOXAZOSIN 4 MG/1
4 TABLET ORAL
Refills: 0 | Status: ACTIVE | COMMUNITY

## 2023-01-10 RX ORDER — LABETALOL HYDROCHLORIDE 200 MG/1
200 TABLET, FILM COATED ORAL
Refills: 0 | Status: ACTIVE | COMMUNITY

## 2023-01-10 RX ORDER — ATORVASTATIN CALCIUM 80 MG/1
80 TABLET, FILM COATED ORAL
Refills: 0 | Status: ACTIVE | COMMUNITY

## 2023-01-10 RX ORDER — INSULIN GLARGINE 100 [IU]/ML
100 INJECTION, SOLUTION SUBCUTANEOUS
Refills: 0 | Status: ACTIVE | COMMUNITY

## 2023-01-10 NOTE — ASSESSMENT
[FreeTextEntry1] : LEONIDAS LARRY was advised to undergo colonoscopy to which he agreed. The procedure will be performed in [facility] \par [facility] with the assistance of an anesthesiologist. The patient was given a Golytely preparation prescription and understood the \par procedure as it was explained to his. He was given a booklet distributed by the [employer] Society of Gastrointestinal\par  Endoscopy explaining the procedure in detail and he understood the risks of the procedure not limited to infection, bleeding,\par perforation or non- diagnosis of colorectal cancer. He was advised that he could not drive home, if he chooses to\par  receive sedation.\par \par Further diagnostic and treatment recommendations will be based upon the procedure and any biopsies, if they are taken.\par \par Thank you for allowing me to participate in this [facility] health care.\par \par , Best personal regards -- Don\par \par He needs cardiology clearance prior to his colonoscopy\par He also needs IV antibiotic prophylaxis prior to his colonoscopy\par

## 2023-01-10 NOTE — HISTORY OF PRESENT ILLNESS
[FreeTextEntry1] : He is a 67-year-old asymptomatic male referred for a prekidney transplant evaluation of his colon.  He has never had a screening colonoscopy.  He denies any family history of colon cancer. He is getting  daily peritoneal dialysis in the evening

## 2023-01-10 NOTE — CONSULT LETTER
[Dear  ___] : Dear  [unfilled], [Consult Letter:] : I had the pleasure of evaluating your patient, [unfilled]. [Please see my note below.] : Please see my note below. [Consult Closing:] : Thank you very much for allowing me to participate in the care of this patient.  If you have any questions, please do not hesitate to contact me. [Sincerely,] : Sincerely, [FreeTextEntry3] : Nito Khalil MD\par \par Gastroenterology\par [facility]\par [facility]\par \par

## 2023-01-10 NOTE — PHYSICAL EXAM
[Normal] : normal bowel sounds, non-tender, no masses, soft, no no hepato-splenomegaly [de-identified] : LLQ peritoneal;catheter

## 2023-01-19 NOTE — PROGRESS NOTE ADULT - PROBLEM SELECTOR PLAN 6
[Cerumen Impaction] : Cerumen Impaction [Same] : same as the Pre Op Dx. [] : Removal of Cerumen [FreeTextEntry5] : b scant cerumen removed atraumatically with curette and alligator forceps l>r, b TMs nl  - BP elevated on admission but improving now when restarting home BP meds  -c/w Norvasc and losartan   -home hydralazine d/c'ed due to concern of it as a cause of  pericardial effusion. F/U antihistone ab sent on 10/27 to confirm.    -Added labetalol 100mg PO tid on 10/27 with better BP control.

## 2023-01-24 ENCOUNTER — APPOINTMENT (OUTPATIENT)
Dept: CARDIOLOGY | Facility: CLINIC | Age: 68
End: 2023-01-24
Payer: COMMERCIAL

## 2023-01-24 PROCEDURE — 93015 CV STRESS TEST SUPVJ I&R: CPT

## 2023-01-24 PROCEDURE — 93306 TTE W/DOPPLER COMPLETE: CPT

## 2023-01-24 PROCEDURE — 78452 HT MUSCLE IMAGE SPECT MULT: CPT

## 2023-01-24 PROCEDURE — A9500: CPT

## 2023-01-24 PROCEDURE — 99072 ADDL SUPL MATRL&STAF TM PHE: CPT

## 2023-01-28 ENCOUNTER — EMERGENCY (EMERGENCY)
Facility: HOSPITAL | Age: 68
LOS: 1 days | Discharge: HOME | End: 2023-01-28
Attending: EMERGENCY MEDICINE | Admitting: EMERGENCY MEDICINE
Payer: MEDICARE

## 2023-01-28 VITALS
HEART RATE: 64 BPM | DIASTOLIC BLOOD PRESSURE: 74 MMHG | SYSTOLIC BLOOD PRESSURE: 187 MMHG | TEMPERATURE: 98 F | OXYGEN SATURATION: 100 % | RESPIRATION RATE: 18 BRPM

## 2023-01-28 DIAGNOSIS — Z98.890 OTHER SPECIFIED POSTPROCEDURAL STATES: Chronic | ICD-10-CM

## 2023-01-28 LAB
ALBUMIN SERPL ELPH-MCNC: 3.2 G/DL — LOW (ref 3.3–5)
ALP SERPL-CCNC: 56 U/L — SIGNIFICANT CHANGE UP (ref 40–120)
ALT FLD-CCNC: 19 U/L — SIGNIFICANT CHANGE UP (ref 4–41)
ANION GAP SERPL CALC-SCNC: 15 MMOL/L — HIGH (ref 7–14)
AST SERPL-CCNC: 36 U/L — SIGNIFICANT CHANGE UP (ref 4–40)
BASOPHILS # BLD AUTO: 0.04 K/UL — SIGNIFICANT CHANGE UP (ref 0–0.2)
BASOPHILS NFR BLD AUTO: 0.4 % — SIGNIFICANT CHANGE UP (ref 0–2)
BILIRUB SERPL-MCNC: 0.4 MG/DL — SIGNIFICANT CHANGE UP (ref 0.2–1.2)
BUN SERPL-MCNC: 57 MG/DL — HIGH (ref 7–23)
CALCIUM SERPL-MCNC: 7.6 MG/DL — LOW (ref 8.4–10.5)
CHLORIDE SERPL-SCNC: 92 MMOL/L — LOW (ref 98–107)
CK MB CFR SERPL CALC: 7.2 NG/ML — HIGH
CO2 SERPL-SCNC: 22 MMOL/L — SIGNIFICANT CHANGE UP (ref 22–31)
CREAT SERPL-MCNC: 8.21 MG/DL — HIGH (ref 0.5–1.3)
EGFR: 7 ML/MIN/1.73M2 — LOW
EOSINOPHIL # BLD AUTO: 0.78 K/UL — HIGH (ref 0–0.5)
EOSINOPHIL NFR BLD AUTO: 8.7 % — HIGH (ref 0–6)
GLUCOSE SERPL-MCNC: 93 MG/DL — SIGNIFICANT CHANGE UP (ref 70–99)
HCT VFR BLD CALC: 29.1 % — LOW (ref 39–50)
HGB BLD-MCNC: 9.2 G/DL — LOW (ref 13–17)
IANC: 6.11 K/UL — SIGNIFICANT CHANGE UP (ref 1.8–7.4)
IMM GRANULOCYTES NFR BLD AUTO: 0.4 % — SIGNIFICANT CHANGE UP (ref 0–0.9)
LYMPHOCYTES # BLD AUTO: 1.14 K/UL — SIGNIFICANT CHANGE UP (ref 1–3.3)
LYMPHOCYTES # BLD AUTO: 12.8 % — LOW (ref 13–44)
MCHC RBC-ENTMCNC: 25.6 PG — LOW (ref 27–34)
MCHC RBC-ENTMCNC: 31.6 GM/DL — LOW (ref 32–36)
MCV RBC AUTO: 80.8 FL — SIGNIFICANT CHANGE UP (ref 80–100)
MONOCYTES # BLD AUTO: 0.83 K/UL — SIGNIFICANT CHANGE UP (ref 0–0.9)
MONOCYTES NFR BLD AUTO: 9.3 % — SIGNIFICANT CHANGE UP (ref 2–14)
NEUTROPHILS # BLD AUTO: 6.11 K/UL — SIGNIFICANT CHANGE UP (ref 1.8–7.4)
NEUTROPHILS NFR BLD AUTO: 68.4 % — SIGNIFICANT CHANGE UP (ref 43–77)
NRBC # BLD: 0 /100 WBCS — SIGNIFICANT CHANGE UP (ref 0–0)
NRBC # FLD: 0 K/UL — SIGNIFICANT CHANGE UP (ref 0–0)
NT-PROBNP SERPL-SCNC: HIGH PG/ML
PLATELET # BLD AUTO: 303 K/UL — SIGNIFICANT CHANGE UP (ref 150–400)
POTASSIUM SERPL-MCNC: 4.1 MMOL/L — SIGNIFICANT CHANGE UP (ref 3.5–5.3)
POTASSIUM SERPL-SCNC: 4.1 MMOL/L — SIGNIFICANT CHANGE UP (ref 3.5–5.3)
PROT SERPL-MCNC: 6.5 G/DL — SIGNIFICANT CHANGE UP (ref 6–8.3)
RBC # BLD: 3.6 M/UL — LOW (ref 4.2–5.8)
RBC # FLD: 17.5 % — HIGH (ref 10.3–14.5)
SODIUM SERPL-SCNC: 129 MMOL/L — LOW (ref 135–145)
TROPONIN T, HIGH SENSITIVITY RESULT: 167 NG/L — CRITICAL HIGH
WBC # BLD: 8.94 K/UL — SIGNIFICANT CHANGE UP (ref 3.8–10.5)
WBC # FLD AUTO: 8.94 K/UL — SIGNIFICANT CHANGE UP (ref 3.8–10.5)

## 2023-01-28 PROCEDURE — 99285 EMERGENCY DEPT VISIT HI MDM: CPT

## 2023-01-28 NOTE — ED ADULT TRIAGE NOTE - RESPIRATORY RATE (BREATHS/MIN)
[de-identified] : General:  Awake and alert in no acute distress\par Psych: normal mood and affect\par HEENT: NC/AT, normal visual tracking\par Pulmonary: no increased work of breathing\par CV: extremities are warm and perfused\par Abd: non-distended\par Ext: no c/c/e\par \par Inspection: Non-antalgic gait\par \par Palpation: Mild TTP over coccyx region\par \par ROM: \par Flexion - full, painless\par Extension -  full, painless\par Oblique Extension -  full, painless\par \par \par Strength:  HF, KE, KF, DF, PF, EHL all 5/5 \par \par Sensation: Intact at L2-S1 dermatomes bilaterally to light touch \par \par Reflexes: 2+/4 at bilateral Patellar (L2-4) and Achilles (S1).\par Downgoing Babinski bilaterally\par \par Special Tests: \par SLR: (R) - negative; (L) - negative  \par JANAE:  (R) - negative    ; (L) - negative  \par FADIR:  (R) - negative  ; (L) - negative  \par \par \par \par 
18

## 2023-01-28 NOTE — ED PROVIDER NOTE - PHYSICAL EXAMINATION
General: Alert and Orientated x 3. No apparent distress.  Head: Normocephalic and atraumatic.  Eyes: PERRLA with EOMI.  Neck: Supple. Trachea midline.   Cardiac: Normal S1 and S2 w/ RRR. No murmurs appreciated.   Pulmonary: CTA bilaterally. No increased WOB. No wheezes or crackles.  Abdominal: Soft, non-tender. (+) bowel sounds appreciated in all 4 quadrants. No hepatosplenomegaly. No erythema, warmth or induration around PD catheter site.   Neurologic: No focal sensory or motor deficits.  Musculoskeletal: Strength appropriate in all 4 extremities for age with no limited ROM.  Skin: Color appropriate for race. Intact, warm, and well-perfused.  Psychiatric: Appropriate mood and affect. No apparent risk to self or others.

## 2023-01-28 NOTE — ED PROVIDER NOTE - PROGRESS NOTE DETAILS
Theresa Campos MD (PGY2): POCUS w/o pericardial effusion. B-lines throughout. No obvious pleural effusion. PendinG CXR. Theresa Campos MD (PGY2): BNP elevated likely i/s/o ESRD. Troponin stable. CXR clear. Lab work otherwise non-actionable. Patient walked w/ amb sats wnl. Vitals stable. Patient states he makes his own medical decisions and would like to go home. States SOB subsided since hes been in the ED. Offered to speak to son who is MD, but patient declined. He will have friend pick him up. Theresa Campos MD (PGY2): POCUS w/o pericardial effusion. B-lines throughout. No obvious pleural effusion. Pending CXR.

## 2023-01-28 NOTE — ED PROVIDER NOTE - PATIENT PORTAL LINK FT
You can access the FollowMyHealth Patient Portal offered by [facility] by registering at the following website: [email or web address]. By joining [employer]’s FollowMyHealth portal, you will also be able to view your health information using other applications (apps) compatible with our system.

## 2023-01-28 NOTE — ED PROVIDER NOTE - DIFFERENTIAL DIAGNOSIS
pericardial effusion, pericarditis, pneumonia, pleural effusions, viral illness Differential Diagnosis

## 2023-01-28 NOTE — ED PROVIDER NOTE - CLINICAL SUMMARY MEDICAL DECISION MAKING FREE TEXT BOX
TBD 68 y/o M with PMHx sig for HTN, DM, CVA, ESRD on PD, HDL, Anemia, hx of pericardial effusion who pted with 1.5 hours sob. Denies chest pain, n/v, f/c, uri sx. Due for PD tonight. No sick contacts or travel. Vitals stable, sat 100 on RA. Exam w/o clear lungs to auscultation, no murmur, no abd ttp, no skin changes around PD catheter site. Patient states SOB subsided in ED. Will pocus to eval for effusion. Will also get labs including trop and bnp, cxr, ekg and reassess. Dispo pending labs and imaging.

## 2023-01-28 NOTE — ED PROVIDER NOTE - NSFOLLOWUPINSTRUCTIONS_ED_ALL_ED_FT
You were seen in the Emergency Department for shortness of breath. Lab and imaging results, if performed, were discussed with you along with your discharge diagnosis.    Continue dialysis as scheduled. Follow with your Nephrologist. Follow up with your doctor in 1 week - bring copies of your results if you were given. If you do not have a primary doctor, please call [phone] to find one convenient for you.    Continue all prescribed medications.     Return to ED for any new or worsening symptoms including but not limited to: development of chest pain, shortness of breath, fever, vomiting, focal numbness, weakness or tingling, any severe CP, headache, abdominal pain, back pain.      Rest and keep yourself hydrated with fluids

## 2023-01-28 NOTE — ED PROVIDER NOTE - ATTENDING CONTRIBUTION TO CARE
Brief HPI:   68 y/o M with PMHx sig for HTN, DM, CVA, ESRD on PD, HDL, Anemia, hx of pericardial effusion  who presents to the ED with SOB x1.5 hrs.  Patient did not perform PD tonight.  Denies cp, fever, chill.      Vitals:   Reviewed    Exam:    GEN:  Non-toxic appearing, non-distressed, speaking full sentences, non-diaphoretic, AAOx3  HEENT:  NCAT, neck supple, EOMI, PERRLA, sclera anicteric, no conjunctival pallor or injection, no stridor, normal voice, no tonsillar exudate, uvula midline  CV:  regular rhythm and rate, s1/s2 audible, no murmurs, rubs or gallops, peripheral pulses 2+ and symmetric  PULM:  non-labored respirations, crackles at b/l lung bases   ABD:  non distended, non-tender, no rebound, no guarding, negative Craft's sign, bowel sounds normal, no cvat  MSK:  no gross deformity, non-tender extremities and joints, range of motion grossly normal appearing, no extremity edema, extremities warm and well perfused   NEURO:  AAOx3, CN II-XII intact, motor 5/5 in upper and lower extremities bilaterally, sensation grossly intact in extremities and trunk  SKIN:  warm, dry, no rash or vesicles     A/P:  68 y/o M with PMHx sig for HTN, DM, CVA, ESRD on PD, HDL, Anemia, hx of pericardial effusion  who presents to the ED with SOB x1.5 hrs.  VSS.  Crackles at lung bases.  Suspect pulm edema vs. pna.  Low c/f pe.  Plan for labs, cxr, supportive care.  Dispo pending.

## 2023-01-28 NOTE — ED ADULT TRIAGE NOTE - CHIEF COMPLAINT QUOTE
alert oriented c/o SOB started 1 hour ago no c/o CP or dizziness PMHx ESRD with HD HTN DM2 pericardial effusion

## 2023-01-28 NOTE — ED PROVIDER NOTE - OBJECTIVE STATEMENT
Patient is a 66 y/o M with PMHx sig for HTN, DM, CVA, ESRD on PD, HDL, Anemia, hx of pericardial effusion  who presents to the ED with SOB x1.5 hrs. Felt like he "gasping for air" at rest. No chest pain, nausea, vomiting, fever, chills, cough, congestion, sore throat. Completed PD yesterday, due for today. No skin changes around catheter.

## 2023-01-28 NOTE — ED ADULT NURSE NOTE - OBJECTIVE STATEMENT
66yo male received in room 24. pt A&Ox4, hx of ESRD, HTN, DM, pericardiac effusion, c/o shortness of breath x 1 hour ago. Pt offered no other complains at present. Denies cp, dizziness, and palpitation. Respiration even and non-labored. in NAD. NSR on monitor. MD cleary in progress. Side rails up, bed at lowest position, call bell within reach, patient oriented to the unit, safety maintained. 66yo male received in room 24. pt A&Ox4, hx of ESRD with peritoneal catheter (daily dialysis), HTN, DM, pericardiac effusion, c/o shortness of breath x 1 hour ago when he was sitting down on couch. Pt offered no other complains at present. Denies cp, dizziness, and palpitation. States shortness of breath is resolved. Respiration even and non-labored. in NAD. NSR on monitor. Dressing clean and intact on peritoneal catheter. MD cleary in progress. Side rails up, bed at lowest position, call bell within reach, patient oriented to the unit, safety maintained. 20G IV placed in RAC, lab drawn and sent.

## 2023-01-29 VITALS
RESPIRATION RATE: 18 BRPM | SYSTOLIC BLOOD PRESSURE: 169 MMHG | HEART RATE: 65 BPM | TEMPERATURE: 98 F | DIASTOLIC BLOOD PRESSURE: 68 MMHG | OXYGEN SATURATION: 100 %

## 2023-01-29 LAB
FLUAV AG NPH QL: SIGNIFICANT CHANGE UP
FLUBV AG NPH QL: SIGNIFICANT CHANGE UP
RSV RNA NPH QL NAA+NON-PROBE: SIGNIFICANT CHANGE UP
SARS-COV-2 RNA SPEC QL NAA+PROBE: SIGNIFICANT CHANGE UP
TROPONIN T, HIGH SENSITIVITY RESULT: 168 NG/L — CRITICAL HIGH

## 2023-01-29 PROCEDURE — 71046 X-RAY EXAM CHEST 2 VIEWS: CPT | Mod: 26

## 2023-01-29 NOTE — ED ADULT NURSE REASSESSMENT NOTE - NS ED NURSE REASSESS COMMENT FT1
VS as noted. Ambulatory sat %. Pt offered no complains at present. Denies cp, sob, dizziness, and palpitation. Respiration even and non-labored. in NAD. NSR on monitor. Dispo in progress.

## 2023-03-22 ENCOUNTER — NON-APPOINTMENT (OUTPATIENT)
Age: 68
End: 2023-03-22

## 2023-04-04 RX ORDER — POLYETHYLENE GLYCOL 3350 AND ELECTROLYTES WITH LEMON FLAVOR 236; 22.74; 6.74; 5.86; 2.97 G/4L; G/4L; G/4L; G/4L; G/4L
236 POWDER, FOR SOLUTION ORAL
Qty: 1 | Refills: 0 | Status: ACTIVE | COMMUNITY
Start: 2023-01-10 | End: 1900-01-01

## 2023-05-05 ENCOUNTER — NON-APPOINTMENT (OUTPATIENT)
Age: 68
End: 2023-05-05

## 2023-05-10 ENCOUNTER — RESULT REVIEW (OUTPATIENT)
Age: 68
End: 2023-05-10

## 2023-05-11 ENCOUNTER — APPOINTMENT (OUTPATIENT)
Dept: GASTROENTEROLOGY | Facility: AMBULATORY SURGERY CENTER | Age: 68
End: 2023-05-11
Payer: MEDICARE

## 2023-05-11 PROCEDURE — 45380 COLONOSCOPY AND BIOPSY: CPT | Mod: 59

## 2023-05-11 PROCEDURE — 45385 COLONOSCOPY W/LESION REMOVAL: CPT

## 2023-06-02 ENCOUNTER — APPOINTMENT (OUTPATIENT)
Dept: INFECTIOUS DISEASE | Facility: CLINIC | Age: 68
End: 2023-06-02
Payer: COMMERCIAL

## 2023-06-02 VITALS
HEART RATE: 47 BPM | WEIGHT: 139 LBS | BODY MASS INDEX: 21.07 KG/M2 | TEMPERATURE: 98 F | OXYGEN SATURATION: 98 % | DIASTOLIC BLOOD PRESSURE: 75 MMHG | HEIGHT: 68 IN | SYSTOLIC BLOOD PRESSURE: 162 MMHG

## 2023-06-02 DIAGNOSIS — Z23 ENCOUNTER FOR IMMUNIZATION: ICD-10-CM

## 2023-06-02 PROCEDURE — G0009: CPT

## 2023-06-02 PROCEDURE — 90677 PCV20 VACCINE IM: CPT

## 2023-06-02 PROCEDURE — 99204 OFFICE O/P NEW MOD 45 MIN: CPT | Mod: 25

## 2023-06-02 PROCEDURE — 90739 HEPB VACC 2/4 DOSE ADULT IM: CPT

## 2023-06-02 PROCEDURE — G0010: CPT

## 2023-06-02 NOTE — ASSESSMENT
[FreeTextEntry1] : Patient is a 66 y/o male with PMH of  CVA 13 years ago, TIA about 2 years ago, HTN, DM not on insulin with Hgb A1c of 6.2, hyperlipidemia, end stage renal disease on peritoneal presents for pre-transplant evaluation. \par Presents with Daughter In-law Daniella\par \par \par #Encounter for Immunization \par - COVID: Moderna x3. Would benefit from bivalent booster\par - Flu: Fall 2022  \par - Pneumococcal: Prevnar 20 administered today\par - Hep B: Heplisav #1 administered today, #2 due in 1 month \par - Hep A: immune \par - Shingles: Would benefit from Shingrix\par - VZV:  immune \par \par  \par #Pre-Transplant Evaluation \par ---- HAV IgG: reactive \par ---- HBs Ab: non-reactive \par ---- HCV Ab: non-reactive \par ---- HSV 1/2 IgG: positive\par ---- EBV Serology: positive \par ---- CMV IgG: positive \par ---- VZV IgG: positive  \par ---- Rubella IgG: positive \par ---- QuantiFERON-TB Gold: negative \par ---- HIV Test: negative \par ---- Syphilis Screen: negative  \par ---- Toxoplasma IgG: negative \par ---- Strongyloides Ab: will send serology \par ----COVID Ruiz: positive \par ----COVID Nucleocapsid: positive \par \par Patient with no absolute ID contraindications for transplant.\par \par Return for follow-up care in 1 month.\par \par  \par \par  \par \par  \par \par

## 2023-06-02 NOTE — HISTORY OF PRESENT ILLNESS
[FreeTextEntry1] : Patient is a 66 y/o male with PMH of  CVA 13 years ago, TIA about 2 years ago, HTN, DM not on insulin with Hgb A1c of 6.2, hyperlipidemia, end stage renal disease on peritoneal presents for pre-transplant evaluation. \par Presents with Daughter In-law Daniella\par \par Patient reports a very active lifestyle walks couple of miles, walks with a cane due to right sided weakness. 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

## 2023-06-02 NOTE — PHYSICAL EXAM
[General Appearance - Alert] : alert [General Appearance - In No Acute Distress] : in no acute distress [Sclera] : the sclera and conjunctiva were normal [PERRL With Normal Accommodation] : pupils were equal in size, round, reactive to light [Outer Ear] : the ears and nose were normal in appearance [Hearing Threshold Finger Rub Not [place]] : hearing was normal [Both Tympanic Membranes Were Examined] : both tympanic membranes were normal [Oropharynx] : the oropharynx was normal with no thrush [Neck Appearance] : the appearance of the neck was normal [Neck Cervical Mass (___cm)] : no neck mass was observed [Jugular Venous Distention Increased] : there was no jugular-venous distention [Thyroid Diffuse Enlargement] : the thyroid was not enlarged [Respiration, Rhythm And Depth] : normal respiratory rhythm and effort [Exaggerated Use Of Accessory Muscles For Inspiration] : no accessory muscle use [Auscultation Breath Sounds / Voice Sounds] : lungs were clear to auscultation bilaterally [Heart Rate And Rhythm] : heart rate was normal and rhythm regular [Heart Sounds] : normal S1 and S2 [Heart Sounds Gallop] : no gallops [Heart Sounds Pericardial Friction Rub] : no pericardial rub [Edema] : there was no peripheral edema [Bowel Sounds] : normal bowel sounds [Abdomen Soft] : soft [Abdomen Tenderness] : non-tender [Abdomen Mass (___ Cm)] : no abdominal mass palpated [Costovertebral Angle Tenderness] : no CVA tenderness [No Palpable Adenopathy] : no palpable adenopathy [Cervical Lymph Nodes Enlarged Posterior Bilaterally] : posterior cervical [Cervical Lymph Nodes Enlarged Anterior Bilaterally] : anterior cervical [Supraclavicular Lymph Nodes Enlarged Bilaterally] : supraclavicular [Musculoskeletal - Swelling] : no joint swelling [Range of Motion to Joints] : range of motion to joints [Nail Clubbing] : no clubbing  or cyanosis of the fingernails [Motor Tone] : muscle strength and tone were normal [FreeTextEntry1] : Right sided weakness [Skin Color & Pigmentation] : normal skin color and pigmentation [] : no rash [Skin Lesions] : no skin lesions [Oriented To Time, Place, And Person] : oriented to person, place, and time [Affect] : the affect was normal

## 2023-06-06 LAB — STRONGYLOIDES AB SER IA-ACNC: NEGATIVE

## 2023-07-10 ENCOUNTER — APPOINTMENT (OUTPATIENT)
Dept: INFECTIOUS DISEASE | Facility: CLINIC | Age: 68
End: 2023-07-10

## 2023-07-10 RX ORDER — ZOSTER VACCINE RECOMBINANT, ADJUVANTED 50 MCG/0.5
50 KIT INTRAMUSCULAR
Qty: 1 | Refills: 1 | Status: ACTIVE | COMMUNITY
Start: 2023-07-10 | End: 1900-01-01

## 2023-07-14 ENCOUNTER — APPOINTMENT (OUTPATIENT)
Dept: GASTROENTEROLOGY | Facility: CLINIC | Age: 68
End: 2023-07-14
Payer: MEDICARE

## 2023-07-14 VITALS
TEMPERATURE: 98.2 F | SYSTOLIC BLOOD PRESSURE: 128 MMHG | WEIGHT: 148 LBS | RESPIRATION RATE: 14 BRPM | BODY MASS INDEX: 22.43 KG/M2 | HEIGHT: 68 IN | OXYGEN SATURATION: 97 % | DIASTOLIC BLOOD PRESSURE: 60 MMHG | HEART RATE: 53 BPM

## 2023-07-14 DIAGNOSIS — Z86.010 PERSONAL HISTORY OF COLONIC POLYPS: ICD-10-CM

## 2023-07-14 DIAGNOSIS — D37.4 NEOPLASM OF UNCERTAIN BEHAVIOR OF COLON: ICD-10-CM

## 2023-07-14 PROCEDURE — 99214 OFFICE O/P EST MOD 30 MIN: CPT

## 2023-07-15 PROBLEM — D37.4 VILLOUS ADENOMA OF COLON: Status: ACTIVE | Noted: 2023-07-15

## 2023-07-15 PROBLEM — Z86.010 HISTORY OF COLON POLYPS: Status: ACTIVE | Noted: 2023-07-15

## 2023-07-15 NOTE — ASSESSMENT
[FreeTextEntry1] : Repeat colonoscopy with a 2 day prep\par \par LEONIDAS LARRY was advised to undergo colonoscopy to which he agreed. The procedure will be performed in [facility] \[facility] with the assistance of an anesthesiologist. The patient was given a Miralax and Golytely  preparation. prescription and understood the \par procedure as it was explained to his. He was given a booklet distributed by the [employer] of Gastrointestinal\par  Endoscopy explaining the procedure in detail and he understood the risks of the procedure not limited to infection, bleeding,\par perforation or non- diagnosis of colorectal cancer. He was advised that he could not drive home, if he chooses to\par  receive sedation.\par \par Further diagnostic and treatment recommendations will be based upon the procedure and any biopsies, if they are taken.\par \par Thank you for allowing me to participate in this [facility] health care.\par \par , Best personal regards -- Don\par \par I spent 32 minutes with the patient and his son and answered all their  questions\par

## 2023-07-15 NOTE — CONSULT LETTER
[Dear  ___] : Dear  [unfilled], [Consult Letter:] : I had the pleasure of evaluating your patient, [unfilled]. [( Thank you for referring [unfilled] for consultation for _____ )] : Thank you for referring [unfilled] for consultation for [unfilled] [Please see my note below.] : Please see my note below. [Consult Closing:] : Thank you very much for allowing me to participate in the care of this patient.  If you have any questions, please do not hesitate to contact me. [Sincerely,] : Sincerely, [FreeTextEntry3] : Nito Khalil MD\par \par Gastroenterology\par [facility]\par [facility]\par \par

## 2023-07-18 ENCOUNTER — NON-APPOINTMENT (OUTPATIENT)
Age: 68
End: 2023-07-18

## 2023-07-24 ENCOUNTER — APPOINTMENT (OUTPATIENT)
Dept: GASTROENTEROLOGY | Facility: AMBULATORY SURGERY CENTER | Age: 68
End: 2023-07-24

## 2023-08-11 NOTE — HISTORY OF PRESENT ILLNESS
Mom called back.  She was able to get him in to [facility] for a Sports Physical.  Disregard request.  Madeline Jeanine on 8/11/2023 at 9:31 AM     [FreeTextEntry1] : He had a colonoscopy on  May 11 2023 in which 5 polyps were removed including a a very large one of 50 mm , which was consistent with a tubulovillous  adenoma. His prep was only fair, so he needs a repeat colonoscopy with better prep. He is on peritoneal dialysis.\par \par \par His son was in the room.

## 2023-09-06 ENCOUNTER — APPOINTMENT (OUTPATIENT)
Dept: ULTRASOUND IMAGING | Facility: CLINIC | Age: 68
End: 2023-09-06

## 2023-09-12 RX ORDER — POLYETHYLENE GLYCOL 3350, SODIUM CHLORIDE, SODIUM BICARBONATE AND POTASSIUM CHLORIDE WITH LEMON FLAVOR 420; 11.2; 5.72; 1.48 G/4L; G/4L; G/4L; G/4L
420 POWDER, FOR SOLUTION ORAL
Qty: 1 | Refills: 0 | Status: ACTIVE | COMMUNITY
Start: 2023-07-15 | End: 1900-01-01

## 2023-09-14 ENCOUNTER — RESULT REVIEW (OUTPATIENT)
Age: 68
End: 2023-09-14

## 2023-09-14 ENCOUNTER — APPOINTMENT (OUTPATIENT)
Dept: GASTROENTEROLOGY | Facility: AMBULATORY SURGERY CENTER | Age: 68
End: 2023-09-14
Payer: MEDICARE

## 2023-09-14 PROCEDURE — 45380 COLONOSCOPY AND BIOPSY: CPT | Mod: 59

## 2023-09-14 PROCEDURE — 45385 COLONOSCOPY W/LESION REMOVAL: CPT

## 2023-09-22 ENCOUNTER — NON-APPOINTMENT (OUTPATIENT)
Age: 68
End: 2023-09-22

## 2023-10-13 ENCOUNTER — APPOINTMENT (OUTPATIENT)
Dept: GASTROENTEROLOGY | Facility: CLINIC | Age: 68
End: 2023-10-13

## 2023-10-30 NOTE — ED ADULT TRIAGE NOTE - MODE OF ARRIVAL
What Type Of Note Output Would You Prefer (Optional)?: Standard Output How Severe Is Your Rash?: moderate Is This A New Presentation, Or A Follow-Up?: Rash Walk in

## 2024-02-13 ENCOUNTER — NON-APPOINTMENT (OUTPATIENT)
Age: 69
End: 2024-02-13

## 2024-02-16 NOTE — PROGRESS NOTE ADULT - PROBLEM SELECTOR PLAN 2
Hb 5.4 (baseline ~8)  - stool occult negative, lower suspicion for acute bleed given hemodynamic stability  - likely anemia of chronic disease iso ESRD  - check iron studies, ferritin, b12, folate, LDH, haptoglobin  - s/p 2U PRBC, check post transfusion CBC, tx Hb goal of 7, active T&S, monitor for s/s volume overload  - repeat HgB 8.0 after 2 U pRBC  - nephrology consult to consider EPO declines

## 2024-03-07 ENCOUNTER — APPOINTMENT (OUTPATIENT)
Dept: NEPHROLOGY | Facility: CLINIC | Age: 69
End: 2024-03-07
Payer: COMMERCIAL

## 2024-03-07 ENCOUNTER — LABORATORY RESULT (OUTPATIENT)
Age: 69
End: 2024-03-07

## 2024-03-07 ENCOUNTER — NON-APPOINTMENT (OUTPATIENT)
Age: 69
End: 2024-03-07

## 2024-03-07 VITALS
SYSTOLIC BLOOD PRESSURE: 124 MMHG | HEIGHT: 68 IN | DIASTOLIC BLOOD PRESSURE: 73 MMHG | OXYGEN SATURATION: 98 % | HEART RATE: 46 BPM | TEMPERATURE: 98.1 F

## 2024-03-07 PROCEDURE — 99215 OFFICE O/P EST HI 40 MIN: CPT

## 2024-03-08 NOTE — HISTORY OF PRESENT ILLNESS
[TextBox_42] : LEONIDAS LARRY is a 68 year old male who presents for annual f/u for  kidney transplant evaluation.  Cause of ESRD : Long standing h/o DM. no biopsy done.  on PD since 2/2022   Other PMH : Cardiac - HTN X 15 years. No h/o MI , CHF, CAD Pulmonary-  no h/o COPD, Asthma.  Infections- no h/o TB, HIV, Hepatitis   Heme- no h/o malignancy or bleeding disorders. No DVT/PE Endo- diabetes X 15 years with retinopathy and neuropathy.  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 h/o  TIA 2 years ago  Psych- no h/o suicidal ideation, depression or anxiety.  Sensitization events- no previous blood transfusions or previous transplant  Surgical h/o : none Functional status: can walk 5-6 blocks . Social: Denies tob, etoh, drugs. lives with wife and son in [place] Family history : 3 sons, all relatively healthy. Possible HTN not requiring meds. Mother had DM.No family history of kidney disease or malignancy Meds: Amlodipine, Atorvastatin, Bumetanide, Calcitriol, Doxazosin, Gabapentin, Hydralazine, Labetalol, Losartan, Renavite, Sevelamer Allergies: NKDA  DIALYSIS started 2/2022 PD at home nightly  Last Seen 11/21/2022 Listed 9/21/2022 Dialysis PD 2/2022 ABO B will check titer today PRA 0 % will repeat today  Most recent testing Cardiac - Dr. Galan ECHO 1/24/2023 - Mild mitral regurgitation. Aortic valve calcified tri leaflet aortic valve w/ normal opening. Moderately dilated LA. LA volume index =45cc/m2. Mild concentric LVH. EF 70%.  Nuclear Stress 1/24/2023- Normal study. 41% MPHR, MPHR: 152bpm. LV mildly enlarged, normal myocardial perfusion scan w/o evidence of infarction or inducible ischemia. LVEF 72% and post stress LVEDV of 122 mL.  Per Dr. Galan's note on 1/24/2023, Mr. Larry is a good candidate for kidney transplant and there is no contraindication to surgery and anesthesia.  Radiology Chest Xray 2V PA LAT 1/29/2023 clear lungs  CT A/P 10/15/2022 Interval improvement of bilateral pleural effusions to a small residuum. Interval development of a small pericardial effusion. Airspace opacity in the left lower lobe is likely related to compression atelectasis however superimposed infection is not entirely excluded. Otherwise, no acute abdominopelvic findings. Right fat-containing inguinal hernia with a loop of non obstructed bowel at the level of the right inner inguinal orifice.  CT Chest 11/2/2022 Drainage catheter in pericardial space with interval decrease in size of pericardial effusion. Interval decrease in size of bilateral pleural effusion.  **NEEDS CT A/P**  Cancer Screening PSA 11/21/2022- 1.89  Colonoscopy 9/14/2023- revealed 15mm polyp in descending colon, 8 mm in descending colon and 9 mm polyp in sigmoid colon. Final path tubular adenoma, repeat colonoscopy in 3 years.  Currently seeing ID - Quantiferon TB neg. Received Hep B 6-7/2023 11/2022 labs: Anti-HBs indeterminate, Hep B surface AB low

## 2024-03-08 NOTE — PHYSICAL EXAM
[No Acute Distress] : no acute distress [Sclera Anicteric] : sclera anicteric [Clear to Auscultation] : clear to auscultation [Breathing Comfortably on RA] : breathing comfortably on room air [Normal Rate] : normal rate [Soft] : soft [Regular Rhythm] : regular rhythm [Non-tender] : non-tender [No] : no fistula/graft [] : right dorsalis pedis palpable [Normal] : normal [TextBox_25] : Right inguinal hernia, reducible, nontender [FreeTextEntry1] : +dentures [TextBox_86] : No inguinal lymphadenopathy  [TextBox_34] : No edema or ulcerations b/l LE

## 2024-03-21 ENCOUNTER — NON-APPOINTMENT (OUTPATIENT)
Age: 69
End: 2024-03-21

## 2024-03-21 ENCOUNTER — APPOINTMENT (OUTPATIENT)
Dept: CARDIOLOGY | Facility: CLINIC | Age: 69
End: 2024-03-21

## 2024-03-26 LAB
ABO + RH PNL BLD: NORMAL
ALBUMIN SERPL ELPH-MCNC: 3.7 G/DL
ALP BLD-CCNC: 85 U/L
ALT SERPL-CCNC: 25 U/L
ANION GAP SERPL CALC-SCNC: 18 MMOL/L
APPEARANCE: CLEAR
AST SERPL-CCNC: 28 U/L
BILIRUB SERPL-MCNC: 0.4 MG/DL
BILIRUBIN URINE: NEGATIVE
BLOOD URINE: ABNORMAL
BUN SERPL-MCNC: 53 MG/DL
C PEPTIDE SERPL-MCNC: 13.3 NG/ML
CALCIUM SERPL-MCNC: 8.3 MG/DL
CHLORIDE SERPL-SCNC: 94 MMOL/L
CHOLEST SERPL-MCNC: 113 MG/DL
CK SERPL-CCNC: 415 U/L
CMV IGG SERPL QL: 6.2 U/ML
CMV IGG SERPL-IMP: POSITIVE
CO2 SERPL-SCNC: 24 MMOL/L
COLOR: YELLOW
COVID-19 SPIKE DOMAIN ANTIBODY INTERPRETATION: POSITIVE
CREAT SERPL-MCNC: 10.47 MG/DL
CREAT SPEC-SCNC: 4 MG/DL
CREAT/PROT UR: 9.6 RATIO
CRP SERPL-MCNC: <3 MG/L
EBV EA AB SER IA-ACNC: <5 U/ML
EBV EA AB TITR SER IF: POSITIVE
EBV EA IGG SER QL IA: 131 U/ML
EBV EA IGG SER-ACNC: NEGATIVE
EBV EA IGM SER IA-ACNC: NEGATIVE
EBV PATRN SPEC IB-IMP: NORMAL
EBV VCA IGG SER IA-ACNC: 181 U/ML
EBV VCA IGM SER QL IA: <10 U/ML
EGFR: 5 ML/MIN/1.73M2
EPSTEIN-BARR VIRUS CAPSID ANTIGEN IGG: POSITIVE
ERYTHROCYTE [SEDIMENTATION RATE] IN BLOOD BY WESTERGREN METHOD: 56 MM/HR
ESTIMATED AVERAGE GLUCOSE: 151 MG/DL
GLUCOSE QUALITATIVE U: NEGATIVE MG/DL
GLUCOSE SERPL-MCNC: 114 MG/DL
HAV IGM SER QL: NONREACTIVE
HBA1C MFR BLD HPLC: 6.9 %
HBV CORE IGG+IGM SER QL: NONREACTIVE
HBV SURFACE AB SER QL: REACTIVE
HBV SURFACE AB SERPL IA-ACNC: 101.9 MIU/ML
HBV SURFACE AG SER QL: NONREACTIVE
HCT VFR BLD CALC: 28.6 %
HCV AB SER QL: NONREACTIVE
HCV S/CO RATIO: 0.07 S/CO
HDLC SERPL-MCNC: 56 MG/DL
HEPATITIS A IGG ANTIBODY: REACTIVE
HGB BLD-MCNC: 9.1 G/DL
HIV1+2 AB SPEC QL IA.RAPID: NONREACTIVE
HSV 1+2 IGG SER IA-IMP: POSITIVE
HSV1 IGG SER QL: 22.3 INDEX
HSV1 IGG SER QL: 22.3 INDEX
HSV2 IGG SER QL: >23.6 INDEX
ISOAGGLUTININ TITER, ANTI-A, IGG: 32
ISOAGGLUTININ TITER, ANTI-A, IGM: 64
KETONES URINE: NEGATIVE MG/DL
LDLC SERPL CALC-MCNC: 43 MG/DL
LEUKOCYTE ESTERASE URINE: NEGATIVE
M TB IFN-G BLD-IMP: NEGATIVE
MAGNESIUM SERPL-MCNC: 2.4 MG/DL
MCHC RBC-ENTMCNC: 25.6 PG
MCHC RBC-ENTMCNC: 31.8 GM/DL
MCV RBC AUTO: 80.3 FL
NITRITE URINE: NEGATIVE
NONHDLC SERPL-MCNC: 58 MG/DL
PH URINE: 7
PHOSPHATE SERPL-MCNC: 5.3 MG/DL
PLATELET # BLD AUTO: 223 K/UL
POTASSIUM SERPL-SCNC: 5 MMOL/L
PROT SERPL-MCNC: 5.8 G/DL
PROT UR-MCNC: 34 MG/DL
PROTEIN URINE: 30 MG/DL
PSA SERPL-MCNC: 1.28 NG/ML
QUANTIFERON TB PLUS MITOGEN MINUS NIL: >10 IU/ML
QUANTIFERON TB PLUS NIL: 0.03 IU/ML
QUANTIFERON TB PLUS TB1 MINUS NIL: 0.17 IU/ML
QUANTIFERON TB PLUS TB2 MINUS NIL: 0.15 IU/ML
RBC # BLD: 3.56 M/UL
RBC # FLD: 17.2 %
RUBV IGG FLD-ACNC: 4.4 INDEX
RUBV IGG SER-IMP: POSITIVE
SARS-COV-2 AB SERPL IA-ACNC: >250 U/ML
SARS-COV-2 N GENE NPH QL NAA+PROBE: NOT DETECTED
SODIUM SERPL-SCNC: 137 MMOL/L
SPECIFIC GRAVITY URINE: <1.005
T GONDII AB SER-IMP: NEGATIVE
T GONDII IGG SER QL: <3 IU/ML
T PALLIDUM AB SER QL IA: NEGATIVE
T3 SERPL-MCNC: 93 NG/DL
T4 FREE SERPL-MCNC: 1.1 NG/DL
TRIGL SERPL-MCNC: 72 MG/DL
TSH SERPL-ACNC: 3.06 UIU/ML
URATE SERPL-MCNC: 6.6 MG/DL
UROBILINOGEN URINE: 0.2 MG/DL
VZV AB TITR SER: POSITIVE
VZV IGG SER IF-ACNC: 2383 INDEX
WBC # FLD AUTO: 7.05 K/UL

## 2024-04-07 ENCOUNTER — TRANSCRIPTION ENCOUNTER (OUTPATIENT)
Age: 69
End: 2024-04-07

## 2024-04-07 ENCOUNTER — OUTPATIENT (OUTPATIENT)
Dept: OUTPATIENT SERVICES | Facility: HOSPITAL | Age: 69
LOS: 1 days | End: 2024-04-07
Payer: MEDICARE

## 2024-04-07 DIAGNOSIS — Z98.890 OTHER SPECIFIED POSTPROCEDURAL STATES: Chronic | ICD-10-CM

## 2024-04-07 PROCEDURE — 86803 HEPATITIS C AB TEST: CPT

## 2024-04-07 PROCEDURE — 83605 ASSAY OF LACTIC ACID: CPT

## 2024-04-07 PROCEDURE — 85730 THROMBOPLASTIN TIME PARTIAL: CPT

## 2024-04-07 PROCEDURE — 86704 HEP B CORE ANTIBODY TOTAL: CPT

## 2024-04-07 PROCEDURE — 86900 BLOOD TYPING SEROLOGIC ABO: CPT

## 2024-04-07 PROCEDURE — 87635 SARS-COV-2 COVID-19 AMP PRB: CPT

## 2024-04-07 PROCEDURE — 82803 BLOOD GASES ANY COMBINATION: CPT

## 2024-04-07 PROCEDURE — 84100 ASSAY OF PHOSPHORUS: CPT

## 2024-04-07 PROCEDURE — 84295 ASSAY OF SERUM SODIUM: CPT

## 2024-04-07 PROCEDURE — 85610 PROTHROMBIN TIME: CPT

## 2024-04-07 PROCEDURE — 86850 RBC ANTIBODY SCREEN: CPT

## 2024-04-07 PROCEDURE — 82330 ASSAY OF CALCIUM: CPT

## 2024-04-07 PROCEDURE — 80053 COMPREHEN METABOLIC PANEL: CPT

## 2024-04-07 PROCEDURE — 87389 HIV-1 AG W/HIV-1&-2 AB AG IA: CPT

## 2024-04-07 PROCEDURE — 71045 X-RAY EXAM CHEST 1 VIEW: CPT

## 2024-04-07 PROCEDURE — 85018 HEMOGLOBIN: CPT

## 2024-04-07 PROCEDURE — 82947 ASSAY GLUCOSE BLOOD QUANT: CPT

## 2024-04-07 PROCEDURE — 82435 ASSAY OF BLOOD CHLORIDE: CPT

## 2024-04-07 PROCEDURE — 82962 GLUCOSE BLOOD TEST: CPT

## 2024-04-07 PROCEDURE — 85014 HEMATOCRIT: CPT

## 2024-04-07 PROCEDURE — 87340 HEPATITIS B SURFACE AG IA: CPT

## 2024-04-07 PROCEDURE — 86901 BLOOD TYPING SEROLOGIC RH(D): CPT

## 2024-04-07 PROCEDURE — 83735 ASSAY OF MAGNESIUM: CPT

## 2024-04-07 PROCEDURE — 86706 HEP B SURFACE ANTIBODY: CPT

## 2024-04-07 PROCEDURE — 85025 COMPLETE CBC W/AUTO DIFF WBC: CPT

## 2024-04-07 PROCEDURE — 87522 HEPATITIS C REVRS TRNSCRPJ: CPT

## 2024-04-07 PROCEDURE — 93005 ELECTROCARDIOGRAM TRACING: CPT

## 2024-04-07 PROCEDURE — 84132 ASSAY OF SERUM POTASSIUM: CPT

## 2024-04-12 ENCOUNTER — NON-APPOINTMENT (OUTPATIENT)
Age: 69
End: 2024-04-12

## 2024-04-12 DIAGNOSIS — Z94.0 KIDNEY TRANSPLANT STATUS: ICD-10-CM

## 2024-04-20 NOTE — PLAN
show
[FreeTextEntry1] : 1. ESRD sec to DM on PD since 2/2022:  Mr. LEONIDAS LARRY remains a good candidate for kidney transplantation. 2.Cardiac risk: echo, stress test from 2023 reviewed. 3. Cancer screening: colonoscopy reviewed, update PSA 4. ID: update serology for acute and chronic viral infections. Screening for latent TB 5. Imaging: needs to be updated  6.Diabetes Mellitus: check HbA1c   I have personally discussed the risks and benefits of transplantation and patient attended transplant education class where the following was disclosed:   Reviewed factors affecting survival and morbidity while on dialysis, the transplant wait list and reviewed wes-operative and long-term risk factors affecting outcome in kidney transplantation.    One year SRTR outcomes for national and [facility] were discussed in regards to patient survival and graft survival after transplantation.    Details of transplant surgery, including complications were discussed. Immunosuppression and complications including infection including life threatening sepsis and opportunistic infections, malignancy and new onset diabetes were discussed.    Benefits of live donor transplantation as well as variability in wait times across regions and multiple listing were discussed.  KDPI >85% and PHS high risk criteria donors were discussed.  HCV kidney transplantation was discussed.   Will proceed with completing/ updating work up and listing for transplant/ live donor transplant once work up is reviewed and found to be acceptable by multidisciplinary listing committee.

## 2024-04-24 ENCOUNTER — APPOINTMENT (OUTPATIENT)
Dept: CARDIOLOGY | Facility: CLINIC | Age: 69
End: 2024-04-24
Payer: COMMERCIAL

## 2024-04-24 ENCOUNTER — NON-APPOINTMENT (OUTPATIENT)
Age: 69
End: 2024-04-24

## 2024-04-24 VITALS
DIASTOLIC BLOOD PRESSURE: 60 MMHG | WEIGHT: 146 LBS | BODY MASS INDEX: 22.2 KG/M2 | OXYGEN SATURATION: 100 % | SYSTOLIC BLOOD PRESSURE: 160 MMHG | HEART RATE: 51 BPM

## 2024-04-24 VITALS — SYSTOLIC BLOOD PRESSURE: 120 MMHG | DIASTOLIC BLOOD PRESSURE: 65 MMHG | HEART RATE: 52 BPM

## 2024-04-24 VITALS — DIASTOLIC BLOOD PRESSURE: 60 MMHG | SYSTOLIC BLOOD PRESSURE: 154 MMHG

## 2024-04-24 DIAGNOSIS — I44.0 ATRIOVENTRICULAR BLOCK, FIRST DEGREE: ICD-10-CM

## 2024-04-24 DIAGNOSIS — I34.0 NONRHEUMATIC MITRAL (VALVE) INSUFFICIENCY: ICD-10-CM

## 2024-04-24 DIAGNOSIS — I10 ESSENTIAL (PRIMARY) HYPERTENSION: ICD-10-CM

## 2024-04-24 DIAGNOSIS — I35.0 NONRHEUMATIC AORTIC (VALVE) STENOSIS: ICD-10-CM

## 2024-04-24 DIAGNOSIS — Z01.818 ENCOUNTER FOR OTHER PREPROCEDURAL EXAMINATION: ICD-10-CM

## 2024-04-24 PROCEDURE — 93000 ELECTROCARDIOGRAM COMPLETE: CPT

## 2024-04-24 PROCEDURE — 99214 OFFICE O/P EST MOD 30 MIN: CPT

## 2024-04-24 NOTE — PHYSICAL EXAM
[Normal Rate] : normal [Rhythm Regular] : regular [P2 Accentuated] : had an accentuated P2 [Holosystolic] : holosystolic [High] : high pitched [II] : a grade 2 [Crescendo-Decrescendo] : crescendo-decrescendo [Early] : early [2+] : left 2+ [No Abnormalities] : the abdominal aorta was not enlarged and no bruit was heard [Normal S1] : normal S1 [Normal S2] : abnormal S2 [Carotids] : the murmur was transmitted to the carotid arteries [No Pitting Edema] : no pitting edema present [1+] : left 1+ [Normal] : alert and oriented, normal memory [de-identified] : peritoneal dialysis catheter

## 2024-04-24 NOTE — DISCUSSION/SUMMARY
[Patient] : the patient [EKG obtained to assist in diagnosis and management of assessed problem(s)] : EKG obtained to assist in diagnosis and management of assessed problem(s) [Echocardiogram] : echocardiogram [First Degree A-V Block] : first degree AV block [Hypertension] : hypertension [Responding to Treatment] : responding to treatment [de-identified] : Murmurs suggestive of mild aortic stenosis and mitral regurgitation [de-identified] : On multidrug regimen [FreeTextEntry1] : 68 year old man being evaluated for kidney transplant. Test results satisfactory last year. Arranging echocardiogram to evaluate murmurs consistent with mild aortic stenosis and mitral regurgitation as well as LV function, pulmonary pressures. Also arranging pharmacologic stress test with radionuclide perfusion imaging.

## 2024-04-24 NOTE — REASON FOR VISIT
[Hypertension] : hypertension [Other: ____] : [unfilled] [Arrhythmia/ECG Abnorrmalities] : arrhythmia/ECG abnormalities [Structural Heart and Valve Disease] : structural heart and valve disease

## 2024-04-24 NOTE — HISTORY OF PRESENT ILLNESS
[FreeTextEntry1] : Mr. Chiki Aleman is a 68 year old man presenting for cardiovascular re-evaluation in anticipation of kidney transplant. End stage renal disease attributed to diabetes and has been on peritoneal dialysis since 2/2022. He has hypertension, otherwise no known cardiovascular disease. He had a stroke nine years ago with residual right sided weakness and had a TIA two years ago. Functional capacity is impaired, limited walking with walker in home and cannot walk outdoors or climb stairs, too weak. He has never been a smoker and does not consume alcohol. He stopped working many years ago.

## 2024-04-24 NOTE — REVIEW OF SYSTEMS
[Weakness] : weakness [SOB] : no shortness of breath [Chest Discomfort] : no chest discomfort [Lower Ext Edema] : no extremity edema [Leg Claudication] : no intermittent leg claudication [Palpitations] : no palpitations [Orthopnea] : no orthopnea [PND] : no PND [Syncope] : no syncope [Negative] : Musculoskeletal

## 2024-04-24 NOTE — CARDIOLOGY SUMMARY
[de-identified] : 12/20/2022 sinus rhythm, LAE, voltage criteria for LVH, non-specific ST-T wave abnormality. 4/24/2024 sinus bradycardia, 1st degree heart block,  ms. [de-identified] : 1/24/2023 pharmacologic, enlarged LV with normal perfusion. [de-identified] : 1/24/2023 normal LV function, mild LVH, no significant valvular pathology, but AV calcified.

## 2024-04-25 ENCOUNTER — APPOINTMENT (OUTPATIENT)
Dept: CARDIOLOGY | Facility: CLINIC | Age: 69
End: 2024-04-25

## 2024-05-20 ENCOUNTER — APPOINTMENT (OUTPATIENT)
Dept: CARDIOLOGY | Facility: CLINIC | Age: 69
End: 2024-05-20

## 2024-06-20 ENCOUNTER — APPOINTMENT (OUTPATIENT)
Dept: CARDIOLOGY | Facility: CLINIC | Age: 69
End: 2024-06-20
Payer: COMMERCIAL

## 2024-06-20 PROCEDURE — 93306 TTE W/DOPPLER COMPLETE: CPT

## 2024-06-20 PROCEDURE — 78452 HT MUSCLE IMAGE SPECT MULT: CPT

## 2024-06-20 PROCEDURE — A9500: CPT

## 2024-06-20 PROCEDURE — 93015 CV STRESS TEST SUPVJ I&R: CPT

## 2024-06-21 DIAGNOSIS — R94.39 ABNORMAL RESULT OF OTHER CARDIOVASCULAR FUNCTION STUDY: ICD-10-CM

## 2024-07-08 ENCOUNTER — OUTPATIENT (OUTPATIENT)
Dept: OUTPATIENT SERVICES | Facility: HOSPITAL | Age: 69
LOS: 1 days | End: 2024-07-08
Payer: MEDICARE

## 2024-07-08 ENCOUNTER — TRANSCRIPTION ENCOUNTER (OUTPATIENT)
Age: 69
End: 2024-07-08

## 2024-07-08 VITALS
DIASTOLIC BLOOD PRESSURE: 76 MMHG | HEART RATE: 48 BPM | RESPIRATION RATE: 17 BRPM | OXYGEN SATURATION: 100 % | SYSTOLIC BLOOD PRESSURE: 161 MMHG | TEMPERATURE: 97 F

## 2024-07-08 VITALS
OXYGEN SATURATION: 100 % | TEMPERATURE: 98 F | WEIGHT: 149.91 LBS | DIASTOLIC BLOOD PRESSURE: 81 MMHG | HEIGHT: 67 IN | SYSTOLIC BLOOD PRESSURE: 180 MMHG | HEART RATE: 41 BPM | RESPIRATION RATE: 16 BRPM

## 2024-07-08 DIAGNOSIS — R94.39 ABNORMAL RESULT OF OTHER CARDIOVASCULAR FUNCTION STUDY: ICD-10-CM

## 2024-07-08 DIAGNOSIS — Z98.890 OTHER SPECIFIED POSTPROCEDURAL STATES: Chronic | ICD-10-CM

## 2024-07-08 LAB
ANION GAP SERPL CALC-SCNC: 17 MMOL/L — SIGNIFICANT CHANGE UP (ref 5–17)
BUN SERPL-MCNC: 54 MG/DL — HIGH (ref 7–23)
CALCIUM SERPL-MCNC: 8.8 MG/DL — SIGNIFICANT CHANGE UP (ref 8.4–10.5)
CHLORIDE SERPL-SCNC: 91 MMOL/L — LOW (ref 96–108)
CO2 SERPL-SCNC: 24 MMOL/L — SIGNIFICANT CHANGE UP (ref 22–31)
CREAT SERPL-MCNC: 9.77 MG/DL — HIGH (ref 0.5–1.3)
EGFR: 5 ML/MIN/1.73M2 — LOW
GLUCOSE BLDC GLUCOMTR-MCNC: 128 MG/DL — HIGH (ref 70–99)
GLUCOSE BLDC GLUCOMTR-MCNC: 185 MG/DL — HIGH (ref 70–99)
GLUCOSE SERPL-MCNC: 128 MG/DL — HIGH (ref 70–99)
HCT VFR BLD CALC: 42.2 % — SIGNIFICANT CHANGE UP (ref 39–50)
HGB BLD-MCNC: 13 G/DL — SIGNIFICANT CHANGE UP (ref 13–17)
MCHC RBC-ENTMCNC: 26.9 PG — LOW (ref 27–34)
MCHC RBC-ENTMCNC: 30.8 GM/DL — LOW (ref 32–36)
MCV RBC AUTO: 87.2 FL — SIGNIFICANT CHANGE UP (ref 80–100)
NRBC # BLD: 0 /100 WBCS — SIGNIFICANT CHANGE UP (ref 0–0)
PLATELET # BLD AUTO: 241 K/UL — SIGNIFICANT CHANGE UP (ref 150–400)
POTASSIUM SERPL-MCNC: 4.2 MMOL/L — SIGNIFICANT CHANGE UP (ref 3.5–5.3)
POTASSIUM SERPL-SCNC: 4.2 MMOL/L — SIGNIFICANT CHANGE UP (ref 3.5–5.3)
RBC # BLD: 4.84 M/UL — SIGNIFICANT CHANGE UP (ref 4.2–5.8)
RBC # FLD: 15.4 % — HIGH (ref 10.3–14.5)
SODIUM SERPL-SCNC: 132 MMOL/L — LOW (ref 135–145)
WBC # BLD: 7.06 K/UL — SIGNIFICANT CHANGE UP (ref 3.8–10.5)
WBC # FLD AUTO: 7.06 K/UL — SIGNIFICANT CHANGE UP (ref 3.8–10.5)

## 2024-07-08 PROCEDURE — C1887: CPT

## 2024-07-08 PROCEDURE — 36415 COLL VENOUS BLD VENIPUNCTURE: CPT

## 2024-07-08 PROCEDURE — C1894: CPT

## 2024-07-08 PROCEDURE — C9600: CPT | Mod: LD

## 2024-07-08 PROCEDURE — 93454 CORONARY ARTERY ANGIO S&I: CPT | Mod: 26,59

## 2024-07-08 PROCEDURE — C1769: CPT

## 2024-07-08 PROCEDURE — C1874: CPT

## 2024-07-08 PROCEDURE — 85027 COMPLETE CBC AUTOMATED: CPT

## 2024-07-08 PROCEDURE — 93010 ELECTROCARDIOGRAM REPORT: CPT

## 2024-07-08 PROCEDURE — C1725: CPT

## 2024-07-08 PROCEDURE — 92928 PRQ TCAT PLMT NTRAC ST 1 LES: CPT | Mod: LD

## 2024-07-08 PROCEDURE — 99152 MOD SED SAME PHYS/QHP 5/>YRS: CPT

## 2024-07-08 PROCEDURE — 93005 ELECTROCARDIOGRAM TRACING: CPT

## 2024-07-08 PROCEDURE — 80048 BASIC METABOLIC PNL TOTAL CA: CPT

## 2024-07-08 PROCEDURE — 82962 GLUCOSE BLOOD TEST: CPT

## 2024-07-08 PROCEDURE — 93454 CORONARY ARTERY ANGIO S&I: CPT | Mod: 59

## 2024-07-08 RX ORDER — LABETALOL HYDROCHLORIDE 300 MG/1
200 TABLET ORAL
Refills: 0 | Status: DISCONTINUED | OUTPATIENT
Start: 2024-07-08 | End: 2024-07-22

## 2024-07-08 RX ORDER — AMLODIPINE BESYLATE 2.5 MG/1
10 TABLET ORAL DAILY
Refills: 0 | Status: DISCONTINUED | OUTPATIENT
Start: 2024-07-08 | End: 2024-07-22

## 2024-07-08 RX ORDER — ISOSORBIDE MONONITRATE 30 MG/1
60 TABLET, EXTENDED RELEASE ORAL DAILY
Refills: 0 | Status: DISCONTINUED | OUTPATIENT
Start: 2024-07-08 | End: 2024-07-22

## 2024-07-08 RX ORDER — CALCITRIOL 0.25 UG/1
0.5 CAPSULE, LIQUID FILLED ORAL DAILY
Refills: 0 | Status: DISCONTINUED | OUTPATIENT
Start: 2024-07-08 | End: 2024-07-22

## 2024-07-08 RX ORDER — DOXAZOSIN MESYLATE 2 MG/1
4 TABLET ORAL DAILY
Refills: 0 | Status: DISCONTINUED | OUTPATIENT
Start: 2024-07-08 | End: 2024-07-22

## 2024-07-08 RX ORDER — ISOSORBIDE MONONITRATE 30 MG/1
1 TABLET, EXTENDED RELEASE ORAL
Refills: 0 | DISCHARGE

## 2024-07-08 RX ORDER — LABETALOL HYDROCHLORIDE 300 MG/1
1 TABLET ORAL
Refills: 0 | DISCHARGE

## 2024-07-08 RX ORDER — CLOPIDOGREL BISULFATE 75 MG/1
75 TABLET, FILM COATED ORAL DAILY
Refills: 0 | Status: DISCONTINUED | OUTPATIENT
Start: 2024-07-09 | End: 2024-07-22

## 2024-07-08 RX ORDER — ATORVASTATIN CALCIUM 20 MG/1
80 TABLET, FILM COATED ORAL AT BEDTIME
Refills: 0 | Status: DISCONTINUED | OUTPATIENT
Start: 2024-07-08 | End: 2024-07-22

## 2024-07-08 RX ORDER — CALCITRIOL 0.25 UG/1
1 CAPSULE, LIQUID FILLED ORAL
Refills: 0 | DISCHARGE

## 2024-07-08 RX ORDER — VALSARTAN 320 MG/1
1 TABLET ORAL
Refills: 0 | DISCHARGE

## 2024-07-08 RX ORDER — ACETAMINOPHEN 325 MG
975 TABLET ORAL ONCE
Refills: 0 | Status: COMPLETED | OUTPATIENT
Start: 2024-07-08 | End: 2024-07-08

## 2024-07-08 RX ORDER — VALSARTAN 320 MG/1
160 TABLET ORAL
Refills: 0 | Status: DISCONTINUED | OUTPATIENT
Start: 2024-07-08 | End: 2024-07-22

## 2024-07-08 RX ORDER — BUMETANIDE 0.25 MG/ML
2 INJECTION INTRAMUSCULAR; INTRAVENOUS
Refills: 0 | Status: DISCONTINUED | OUTPATIENT
Start: 2024-07-08 | End: 2024-07-22

## 2024-07-08 RX ORDER — ASPIRIN 325 MG/1
81 TABLET, FILM COATED ORAL DAILY
Refills: 0 | Status: DISCONTINUED | OUTPATIENT
Start: 2024-07-08 | End: 2024-07-22

## 2024-07-08 RX ORDER — ASPIRIN 325 MG/1
0 TABLET, FILM COATED ORAL
Refills: 0 | DISCHARGE

## 2024-07-08 RX ORDER — CLOPIDOGREL BISULFATE 75 MG/1
1 TABLET, FILM COATED ORAL
Qty: 90 | Refills: 3
Start: 2024-07-08 | End: 2025-07-02

## 2024-07-08 RX ADMIN — DOXAZOSIN MESYLATE 4 MILLIGRAM(S): 2 TABLET ORAL at 10:15

## 2024-07-08 RX ADMIN — AMLODIPINE BESYLATE 10 MILLIGRAM(S): 2.5 TABLET ORAL at 10:15

## 2024-07-08 RX ADMIN — VALSARTAN 160 MILLIGRAM(S): 320 TABLET ORAL at 10:15

## 2024-07-08 RX ADMIN — Medication 975 MILLIGRAM(S): at 11:40

## 2024-07-08 RX ADMIN — ISOSORBIDE MONONITRATE 60 MILLIGRAM(S): 30 TABLET, EXTENDED RELEASE ORAL at 10:15

## 2024-07-08 RX ADMIN — BUMETANIDE 2 MILLIGRAM(S): 0.25 INJECTION INTRAMUSCULAR; INTRAVENOUS at 12:31

## 2024-07-08 RX ADMIN — Medication 975 MILLIGRAM(S): at 10:38

## 2024-07-08 NOTE — ASU DISCHARGE PLAN (ADULT/PEDIATRIC) - CARE PROVIDER_API CALL
Félix Galan  Cardiovascular Disease  [address]  [place] [address]  Phone: [phone]  Fax: [phone]  Follow Up Time: 2 weeks

## 2024-07-08 NOTE — H&P CARDIOLOGY - NSICDXFAMILYHX_GEN_ALL_CORE_FT
FAMILY HISTORY:  Mother  Still living? Unknown  FH: hypertension, Age at diagnosis: Age Unknown     FAMILY HISTORY:  Father  Still living? Unknown  FH: HTN (hypertension), Age at diagnosis: Age Unknown  FH: type 2 diabetes, Age at diagnosis: Age Unknown    Mother  Still living? No  FH: HTN (hypertension), Age at diagnosis: Age Unknown  FH: type 2 diabetes, Age at diagnosis: Age Unknown

## 2024-07-08 NOTE — PROVIDER CONTACT NOTE (OTHER) - SITUATION
HR runs SB 40s. Spoke with NP okay to lower HR parameter from 43 to 40. pt sitting up with no complaints.
Pt baseline HR is 45. SB. pt is asymptomatic. Per NP okay to lower HR parameter to 43 at this time. Spoke with tele tech Marivel Tenorio and confirmed.

## 2024-07-08 NOTE — ASU DISCHARGE PLAN (ADULT/PEDIATRIC) - NS MD DC FALL RISK RISK
For information on Fall & Injury Prevention, visit: [email or web address] OR  [email or web address] OR  https://www.cdc.gov/steadi/patient.html

## 2024-07-08 NOTE — H&P CARDIOLOGY - HISTORY OF PRESENT ILLNESS
67 yo M with PMHx of HTN, HLD, DMT2, ESRD on PD since 2/2022, CVA(2015) with mild residual weakness on right side, TIA(2022), pericardiocentesis 11/2022 presented for cardiac cath. Pt reports he was evaluated by Dr. Galan for pre kidney transplant cardiac clearance.     NST 6/20/24  1. Myocardial Perfusion: Abnormal.  2. Qualitative Perfusion:  - medium-sized, mild to moderate defect(s) in the anterior and anteroseptal and anterolateral walls that are predominantly reversible consistent with ischemia. - medium-sized, mild defect(s) in the inferior and basal inferoseptal walls that are reversible consistent with ischemia.  3. The left ventricle is normal in function and mildly enlarged in size. The post stress left ventricular EF is 74 %. The stress end diastolic volume is 133 ml and systolic volume is 35 ml.    TTE 6/20/24:   1. Left ventricular cavity is mildly dilated. Left ventricular systolic function is normal with an ejection fraction of 58 % by Wilburn's method of disks.  2. There is increased LV mass and eccentric hypertrophy.  3. The left atrium is moderately dilated.  4. No significant valvular disease.  5. Estimated pulmonary artery systolic pressure is 34 mmHg.     67 yo M with PMHx of HTN, HLD, DMT2(not on med), ESRD on PD since 2/2022, CVA(2015) with mild residual weakness on right side, TIA(2022), pericardiocentesis 11/2022 presented for cardiac cath. Pt reports he was evaluated by Dr. Galan for pre kidney transplant cardiac clearance and referred for C after having abnormal NST. Pt denies chest pain, SOB or dizziness.     Card: Dr. DINESH Galan    NST 6/20/24  1. Myocardial Perfusion: Abnormal.  2. Qualitative Perfusion:  - medium-sized, mild to moderate defect(s) in the anterior and anteroseptal and anterolateral walls that are predominantly reversible consistent with ischemia. - medium-sized, mild defect(s) in the inferior and basal inferoseptal walls that are reversible consistent with ischemia.  3. The left ventricle is normal in function and mildly enlarged in size. The post stress left ventricular EF is 74 %. The stress end diastolic volume is 133 ml and systolic volume is 35 ml.    TTE 6/20/24:   1. Left ventricular cavity is mildly dilated. Left ventricular systolic function is normal with an ejection fraction of 58 % by Wilburn's method of disks.  2. There is increased LV mass and eccentric hypertrophy.  3. The left atrium is moderately dilated.  4. No significant valvular disease.  5. Estimated pulmonary artery systolic pressure is 34 mmHg.

## 2024-07-08 NOTE — ASU PATIENT PROFILE, ADULT - FALL HARM RISK - ATTEMPT OOB
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 *  Resolved Problems:    * No resolved hospital problems. *    Medical changes or updates since last session: s/p laryngeal cleft injection this afternoon    SUBJECTIVE   Collaboration with: Nurse   and Parent/Caregiver mom who report pt had been tolerating diet of thickened liquids via Roberto with level 2 nipple and straw cup at home.    Pain before session:  FLACC (face, legs, activity, cry, consolability):   Face 0 No particular expression or smile   Legs 0 Normal position or relaxed   Activity 0 Lying quietly, normal position, moves easily   Cry 0 No cry, quiet   Consolability 0 Content, relaxed   Score 0       OBJECTIVE   Pt seen bedside for assessment with baseline diet, especially liquids.    Status at onset of session:   Physiologic: Stable autonomic behaviors including  stable heart rate, regular respirations, pink/stable color and appropriate oxygen saturations.  Motor: Stable motor behaviors including  sucking and balance of flexion/extension.  State: Infant was in a drowsy state. Stable state behaviors including appropriate responses.    Current Feeding: PO Nectar thick liquids and general toddler diet  Respiratory Status: room air,         Treatment/Intervention    Oral Motor/ Peripheral Examination  Structural observations: intact  Oral musculature: adequate for feeding  Secretion management: within functional limits  Phonation: hoarse  Non-nutritive suck: able to root and initiate a non-nutirtive suck with adequate skills for gestational age  Stress cues observed during oral motor:   - Motor: none noted   - Physiological: none noted    Oral Feeding:   - Pt fed by parent/mom  - Postioning during  feeding: Upright   - Infant consumed 120 mls  and nectar thick liquid plus liquid Tylenol from oral syringe via Home bottle (Lidia with level 2 nipple).       Oral Phase:   - Initiation: able to initiate a suck without stimulation required   - Skills: WNL    - Pattern: rhythmical   Pharyngeal Phase: Intact    Stress Cues noted during oral feeding:  Autonomic: none  State: none  Motor: none    Therapy Techniques: assessment and guidance to parent on recommendations, scheduling of repeat VFSS    Family Centered Support/Education: Mom present to share impressions and continue teaching.     ASSESSMENT:     Impressions & Recommendations:  Aspiration Risk Mild and Moderate  Factors include: aspiration confirmed on VFSS   Pacing Self-paced   Nipple Home bottle (lidia level 2) OR straw   Positioning Upright   Safe feeding strategies found effective Resume preoperative diet of nectar thick liquids and general toddler diet  Repeat VFSS scheduled for 5/5/21     Speech therapy to see for repeat VFSS    Pain after session:  FLACC (face, legs, activity, cry, consolability):   Face 0 No particular expression or smile   Legs 0 Normal position or relaxed   Activity 0 Lying quietly, normal position, moves easily   Cry 0 No cry, quiet   Consolability 0 Content, relaxed   Score 0       PLAN:   Suggestions for next session as indicated:  Reassessment as needed, repeat VFSS    Goals:       Frequency:      Documentation completed on following flowsheet: SLP flowsheet   No

## 2024-07-08 NOTE — ASU DISCHARGE PLAN (ADULT/PEDIATRIC) - ASU DC SPECIAL INSTRUCTIONSFT
See pre printed instructions  We have provided you with a prescription for cardiac rehab which is medically supervised exercise program for your heart and has been shown to improve the quantity and quality of life of people with heart disease like yours. You should attend cardiac rehab 3 times per week for 12 weeks. We have provided you with a list of nearby facilities. Please call your insurance carrier to determine which of these facilities are covered under your plan. Please bring this prescription with you to your follow up appointment with your cardiologist who can then further assist you to enroll into a cardiac rehab program.

## 2024-07-08 NOTE — PROVIDER CONTACT NOTE (OTHER) - ASSESSMENT
axox4. vss. asymptomatic. Pt arrived to pre-procedure at HR of 44. Pts HR is running 44-45 baseline SB no symptoms. Okay to lower HR parameter to 43 discussed with Jamel Juarez
axox4. vss. no complaints.

## 2024-10-01 ENCOUNTER — EMERGENCY (EMERGENCY)
Facility: HOSPITAL | Age: 69
LOS: 0 days | Discharge: HOME | End: 2024-10-01
Attending: STUDENT IN AN ORGANIZED HEALTH CARE EDUCATION/TRAINING PROGRAM
Payer: MEDICARE

## 2024-10-01 VITALS
WEIGHT: 150.36 LBS | HEIGHT: 68 IN | RESPIRATION RATE: 18 BRPM | OXYGEN SATURATION: 98 % | HEART RATE: 46 BPM | DIASTOLIC BLOOD PRESSURE: 57 MMHG | TEMPERATURE: 98 F | SYSTOLIC BLOOD PRESSURE: 126 MMHG

## 2024-10-01 VITALS
RESPIRATION RATE: 18 BRPM | HEART RATE: 51 BPM | TEMPERATURE: 98 F | OXYGEN SATURATION: 100 % | SYSTOLIC BLOOD PRESSURE: 143 MMHG | DIASTOLIC BLOOD PRESSURE: 62 MMHG

## 2024-10-01 DIAGNOSIS — E11.22 TYPE 2 DIABETES MELLITUS WITH DIABETIC CHRONIC KIDNEY DISEASE: ICD-10-CM

## 2024-10-01 DIAGNOSIS — Z99.2 DEPENDENCE ON RENAL DIALYSIS: ICD-10-CM

## 2024-10-01 DIAGNOSIS — N18.6 END STAGE RENAL DISEASE: ICD-10-CM

## 2024-10-01 DIAGNOSIS — I69.351 HEMIPLEGIA AND HEMIPARESIS FOLLOWING CEREBRAL INFARCTION AFFECTING RIGHT DOMINANT SIDE: ICD-10-CM

## 2024-10-01 DIAGNOSIS — R00.1 BRADYCARDIA, UNSPECIFIED: ICD-10-CM

## 2024-10-01 DIAGNOSIS — I12.0 HYPERTENSIVE CHRONIC KIDNEY DISEASE WITH STAGE 5 CHRONIC KIDNEY DISEASE OR END STAGE RENAL DISEASE: ICD-10-CM

## 2024-10-01 DIAGNOSIS — Z98.890 OTHER SPECIFIED POSTPROCEDURAL STATES: Chronic | ICD-10-CM

## 2024-10-01 LAB
ALBUMIN SERPL ELPH-MCNC: 2.5 G/DL — LOW (ref 3.3–5)
ALP SERPL-CCNC: 84 U/L — SIGNIFICANT CHANGE UP (ref 40–120)
ALT FLD-CCNC: 21 U/L — SIGNIFICANT CHANGE UP (ref 12–78)
ANION GAP SERPL CALC-SCNC: 9 MMOL/L — SIGNIFICANT CHANGE UP (ref 5–17)
AST SERPL-CCNC: 19 U/L — SIGNIFICANT CHANGE UP (ref 15–37)
BASOPHILS # BLD AUTO: 0.04 K/UL — SIGNIFICANT CHANGE UP (ref 0–0.2)
BASOPHILS NFR BLD AUTO: 0.5 % — SIGNIFICANT CHANGE UP (ref 0–2)
BILIRUB SERPL-MCNC: 0.6 MG/DL — SIGNIFICANT CHANGE UP (ref 0.2–1.2)
BUN SERPL-MCNC: 56 MG/DL — HIGH (ref 7–23)
CALCIUM SERPL-MCNC: 8.5 MG/DL — SIGNIFICANT CHANGE UP (ref 8.5–10.1)
CHLORIDE SERPL-SCNC: 88 MMOL/L — LOW (ref 96–108)
CO2 SERPL-SCNC: 29 MMOL/L — SIGNIFICANT CHANGE UP (ref 22–31)
CREAT SERPL-MCNC: 11 MG/DL — HIGH (ref 0.5–1.3)
EGFR: 5 ML/MIN/1.73M2 — LOW
EOSINOPHIL # BLD AUTO: 0.56 K/UL — HIGH (ref 0–0.5)
EOSINOPHIL NFR BLD AUTO: 6.6 % — HIGH (ref 0–6)
GLUCOSE SERPL-MCNC: 125 MG/DL — HIGH (ref 70–99)
HCT VFR BLD CALC: 22.7 % — LOW (ref 39–50)
HGB BLD-MCNC: 7.9 G/DL — LOW (ref 13–17)
IMM GRANULOCYTES NFR BLD AUTO: 0.5 % — SIGNIFICANT CHANGE UP (ref 0–0.9)
LYMPHOCYTES # BLD AUTO: 1.08 K/UL — SIGNIFICANT CHANGE UP (ref 1–3.3)
LYMPHOCYTES # BLD AUTO: 12.8 % — LOW (ref 13–44)
MAGNESIUM SERPL-MCNC: 2.3 MG/DL — SIGNIFICANT CHANGE UP (ref 1.6–2.6)
MCHC RBC-ENTMCNC: 27.7 PG — SIGNIFICANT CHANGE UP (ref 27–34)
MCHC RBC-ENTMCNC: 34.8 G/DL — SIGNIFICANT CHANGE UP (ref 32–36)
MCV RBC AUTO: 79.6 FL — LOW (ref 80–100)
MONOCYTES # BLD AUTO: 0.91 K/UL — HIGH (ref 0–0.9)
MONOCYTES NFR BLD AUTO: 10.8 % — SIGNIFICANT CHANGE UP (ref 2–14)
NEUTROPHILS # BLD AUTO: 5.81 K/UL — SIGNIFICANT CHANGE UP (ref 1.8–7.4)
NEUTROPHILS NFR BLD AUTO: 68.8 % — SIGNIFICANT CHANGE UP (ref 43–77)
NRBC # BLD: 0 /100 WBCS — SIGNIFICANT CHANGE UP (ref 0–0)
PHOSPHATE SERPL-MCNC: 4 MG/DL — SIGNIFICANT CHANGE UP (ref 2.5–4.5)
PLATELET # BLD AUTO: 211 K/UL — SIGNIFICANT CHANGE UP (ref 150–400)
POTASSIUM SERPL-MCNC: 3.8 MMOL/L — SIGNIFICANT CHANGE UP (ref 3.5–5.3)
POTASSIUM SERPL-SCNC: 3.8 MMOL/L — SIGNIFICANT CHANGE UP (ref 3.5–5.3)
PROT SERPL-MCNC: 5.7 GM/DL — LOW (ref 6–8.3)
RBC # BLD: 2.85 M/UL — LOW (ref 4.2–5.8)
RBC # FLD: 16.8 % — HIGH (ref 10.3–14.5)
SODIUM SERPL-SCNC: 126 MMOL/L — LOW (ref 135–145)
WBC # BLD: 8.44 K/UL — SIGNIFICANT CHANGE UP (ref 3.8–10.5)
WBC # FLD AUTO: 8.44 K/UL — SIGNIFICANT CHANGE UP (ref 3.8–10.5)

## 2024-10-01 PROCEDURE — 93010 ELECTROCARDIOGRAM REPORT: CPT | Mod: 76

## 2024-10-01 PROCEDURE — 99285 EMERGENCY DEPT VISIT HI MDM: CPT | Mod: FS

## 2024-10-01 PROCEDURE — 74018 RADEX ABDOMEN 1 VIEW: CPT | Mod: 26

## 2024-10-01 NOTE — ED ADULT NURSE NOTE - NSFALLHARMRISKINTERV_ED_ALL_ED

## 2024-10-01 NOTE — ED PROVIDER NOTE - CLINICAL SUMMARY MEDICAL DECISION MAKING FREE TEXT BOX
68 y/o male with HTN, HLD, DMT2(not on med), ESRD on PD since 2/2022, CVA(2015) with mild residual weakness on right side, TIA(2022), pericardiocentesis 11/2022 here for dialysis due to PD catheter not working.   Vs stable.   Pt's nephrologist Andrey Mendoza.   Will obtain basic labs check for electrolyte abnormalities, ekg  and consult renal 68 y/o male with HTN, HLD, DMT2(not on med), ESRD on PD since 2/2022, CVA(2015) with mild residual weakness on right side, TIA(2022), pericardiocentesis 11/2022 here for dialysis due to PD catheter not working.   Vs stable.   Pt's nephrologist Andrey Mendoza.   Will obtain basic labs check for electrolyte abnormalities, ekg  and consult renal    labs reviewed . K normal Creatinine 11. Discussed with Dr Caceres at bedside covering for Dr Mendoza recommend Dr Ram to see patient. Spoke with surgery PA and states Dr Ram is not available. Dr Caceres in the ED saw patient and arrange the PD catheter to be evaluated outpatient. Pt denies any complaints. Pt stable ot be discharged home.

## 2024-10-01 NOTE — ED PROVIDER NOTE - PHYSICAL EXAMINATION
GEN: Awake, alert, interactive, NAD.  HEAD AND NECK: NC/AT. Airway patent. Neck supple.   EYES:  Clear b/l. EOMI. PERRL.   ENT: Moist mucus membranes.   CARDIAC: Regular rate, regular rhythm. No evident pedal edema.    RESP/CHEST: Normal respiratory effort with no use of accessory muscles or retractions. Clear throughout on auscultation.  ABD: soft, non-distended, non-tender. (+) PD catheter in place to the LLQ. No erythema , nontender, no drainage. No rebound, no guarding.   BACK: No midline spinal TTP. No CVAT.   EXTREMITIES: Moving all extremities with no apparent deformities.   SKIN: Warm, dry, intact normal color. No rash.   NEURO: AOx3, CN II-XII grossly intact, no focal deficits.   PSYCH: Appropriate mood and affect.

## 2024-10-01 NOTE — CONSULT NOTE ADULT - ASSESSMENT
ESRD on  PD.  Not able to fill today.  No complaints.  Dr Ram is not available.  Labs reviewed.  He is stable clinically and labs acceptable.  - Discharge home.  - Referred to [facility] tomorrow.    Anand Caceres MD  Nephrology.  [phone]

## 2024-10-01 NOTE — CONSULT NOTE ADULT - SUBJECTIVE AND OBJECTIVE BOX
LEONIDAS LARRY  Patient is a 69y old  Male who presents with a chief complaint of   HPI:  ESRD on PD, missed PD due to inability to drain.  No new complaints.    PAST MEDICAL & SURGICAL HISTORY:  Hypertension      ESRD on peritoneal dialysis      CVA (cerebrovascular accident)  2010 without residual effects      Hyperlipidemia      Type 2 diabetes mellitus, with long-term current use of insulin      BPH (benign prostatic hyperplasia)      History of peritoneal dialysis  s/p PD catheter placement        MEDICATIONS  (STANDING):    Allergies    No Known Allergies    Intolerances    hydrALAZINE (Short breath)    FAMILY HISTORY:  FH: HTN (hypertension) (Mother, Father)    FH: type 2 diabetes (Mother, Father)        REVIEW OF SYSTEMS    General:  No fever, chills or night sweats.    Ophthalmologic: No changes in vision.  	  ENMT: No difficulty swallowing. 	    Respiratory and Thorax: No cough, wheezes or dyspnea.  	  Cardiovascular: No chest pains, tiredness or palpitations.	    Gastrointestinal: No dyspepsia, constipation or diarrhea.	    Genitourinary:	 No dysuria, hematuria or frequency.    Musculoskeletal: No joint pains or swelling. No muscle pains.    Neurological:	No weakness or numbness. No seizures.    Hematology/Lymphatics: No heat or cold intolerance.    Endocrine: No polyuria or polydipsia.	      Vital Signs Last 24 Hrs  T(C): 36.7 (01 Oct 2024 10:35), Max: 36.7 (01 Oct 2024 10:35)  T(F): 98.1 (01 Oct 2024 10:35), Max: 98.1 (01 Oct 2024 10:35)  HR: 46 (01 Oct 2024 10:35) (46 - 46)  BP: 126/57 (01 Oct 2024 10:35) (126/57 - 126/57)  BP(mean): --  RR: 18 (01 Oct 2024 10:35) (18 - 18)  SpO2: 98% (01 Oct 2024 10:35) (98% - 98%)    Parameters below as of 01 Oct 2024 10:35  Patient On (Oxygen Delivery Method): room air        PHYSICAL EXAMINATION:  Constitutional:   appears comfortable and not distressed. Not diaphoretic.    Neck:  The thyroid is normal. Trachea is midline.     Breasts: Normal examination.    Respiratory: The lungs are clear to auscultation. No dullness and expansion is normal.    Cardiovascular: S1 and S2 are normal. No mummurs, rubs or gallops are present.    Gastrointestinal: The abdomen is soft. No tenderness is present. No masses are present. Bowel sounds are normal.    Genitourinary: The bladder is not distended. No CVA tenderness is present.    Extremities: No edema is noted. No deformities are present.    Neurological: Cognition is normal. Tone, power and sensation are normal. Gait is steady.    Skin: No leasions are seen  or palpated.    Lymph Nodes: No lymphadenopathy is present.    Psychiatric: Mood is appropriate. No hallucinations or flight of ideas are noted.                            7.9    8.44  )-----------( 211      ( 01 Oct 2024 13:00 )             22.7     10-01    126[L]  |  88[L]  |  56[H]  ----------------------------<  125[H]  3.8   |  29  |  11.00[H]    Ca    8.5      01 Oct 2024 13:00  Phos  4.0     10-01  Mg     2.3     10-01    TPro  5.7[L]  /  Alb  2.5[L]  /  TBili  0.6  /  DBili  x   /  AST  19  /  ALT  21  /  AlkPhos  84  10-01    LIVER FUNCTIONS - ( 01 Oct 2024 13:00 )  Alb: 2.5 g/dL / Pro: 5.7 gm/dL / ALK PHOS: 84 U/L / ALT: 21 U/L / AST: 19 U/L / GGT: x

## 2024-10-01 NOTE — ED PROVIDER NOTE - NSFOLLOWUPINSTRUCTIONS_ED_ALL_ED_FT
Rest, drink plenty of fluids.  Advance activity as tolerated.  Continue all previously prescribed medications as directed.  Follow up with your kidney doctor tomorrow - bring copies of your results.  Return to the ER for worsening or persistent symptoms, and/or ANY NEW OR CONCERNING SYMPTOMS. If you have issues obtaining follow up, please call: [phone] to obtain a doctor or specialist who takes your insurance in your area.  You may call [phone] to make an appointment with the internal medicine clinic.

## 2024-10-01 NOTE — ED PROVIDER NOTE - PATIENT PORTAL LINK FT
You can access the FollowMyHealth Patient Portal offered by [facility] by registering at the following website: [email or web address]. By joining [employer]’s FollowMyHealth portal, you will also be able to view your health information using other applications (apps) compatible with our system.

## 2024-10-01 NOTE — ED PROVIDER NOTE - NSICDXFAMILYHX_GEN_ALL_CORE_FT
FAMILY HISTORY:  Father  Still living? Unknown  FH: HTN (hypertension), Age at diagnosis: Age Unknown  FH: type 2 diabetes, Age at diagnosis: Age Unknown    Mother  Still living? No  FH: HTN (hypertension), Age at diagnosis: Age Unknown  FH: type 2 diabetes, Age at diagnosis: Age Unknown

## 2024-10-01 NOTE — ED ADULT TRIAGE NOTE - CHIEF COMPLAINT QUOTE
pt states was sent to ED by pmd for dialysis. pt states was unable to complete dialysis last night. hx: peritoneal dialysis nightly

## 2024-10-01 NOTE — ED PROVIDER NOTE - OBJECTIVE STATEMENT
68 y/o male with esrd on PD, htn, dm, CVA 2015, history TIA 2022 here for dialysis. Pt states he was trying to do dialysis at home and was unable to and states the catheter was not working. Last dialysis was 2 days ago. Denies fever, vomiting, pain, chest pain, dyspnea. Pt currently is asymptomatic.

## 2024-10-01 NOTE — ED ADULT NURSE NOTE - SUICIDE SCREENING QUESTION 3
Patient Instructions by Grecia Lucas DO at 08/18/17 12:55 PM     Author:  Grecia Lucas DO Service:  (none) Author Type:  Physician     Filed:  08/18/17 12:55 PM Encounter Date:  8/18/2017 Status:  Signed     :  Grecia Lucas DO (Physician)            PATIENT INFORMATION       Additional Educational Resources:  For additional resources regarding your symptoms, diagnosis, or further health information, please visit the Health Resources section on [email or web address] or the Online Health Resources section in [email or web address].          Revision History        User Key Date/Time User Provider Type Action    > [N/A] 08/18/17 12:55 PM Grecia Lucas DO Physician Sign             No

## 2024-10-01 NOTE — ED ADULT NURSE NOTE - NS ED NURSE IV DC DT
MS RN NOTES

PT IS REQUESTING TO GO HOME TODAY WHILE ROBERT POST MADE THE PT ROUNDS AT BEDSIDE.DNP SAID 
OK TO D/C HOME TODAY.NEW ORDERS NOTED AND CARRIED OUT. 01-Oct-2024 15:30

## 2024-10-01 NOTE — ED PROVIDER NOTE - ATTENDING APP SHARED VISIT CONTRIBUTION OF CARE
68 y/o male with  h/o esrd on PD, htn, dm, CVA 2015, history TIA 2022 here for dialysis. Pt states he was trying to do dialysis at home and was unable to and states the catheter was not working, instructed to go to the ER, his last dialysis was 2 days ago. Denies fever, vomiting, pain, chest pain, dyspnea. Pt currently is asymptomatic. Pts nephrologist Dr Caceres did evaluate the patient in the ER and able to set up an appt PD evaluation for then following day. Labs today show a normal K level, no emergent dialysis needed, pt in agreement with plan for follow up tmr

## 2024-10-01 NOTE — ED ADULT NURSE NOTE - EXTENSIONS OF SELF_ADULT
RECORDS STATUS - ALL OTHER DIAGNOSIS      RECORDS RECEIVED FROM: Internal Referral   DATE RECEIVED: In Epic   NOTES STATUS DETAILS   OFFICE NOTE from referring provider     OFFICE NOTE from medical oncologist     DISCHARGE SUMMARY from hospital     DISCHARGE REPORT from the ER     OPERATIVE REPORT     MEDICATION LIST     CLINICAL TRIAL TREATMENTS TO DATE     LABS     PATHOLOGY REPORTS     ANYTHING RELATED TO DIAGNOSIS     GENONOMIC TESTING     TYPE:     IMAGING (NEED IMAGES & REPORT)     CT SCANS     MRI     MAMMO     ULTRASOUND     PET          Cane

## 2024-10-01 NOTE — ED ADULT NURSE NOTE - OBJECTIVE STATEMENT
68 yo male, A&Ox4, presents to ED instructed by nephrologists. Per patient he has a hx of ESRD on peritoneal dialysis. States he has been unable to perform dialysis because "catheter isn't working". Reports calling nephrologist who instructed him to come to ED. Patient currently has no other complaints, asymptomatic. Last PD 2 days ago. Catheter site clean, dry, and intact, without s/s of infection. PMH: ESRD, HTN, BPH, CVA, DMT2

## 2024-11-24 ENCOUNTER — INPATIENT (INPATIENT)
Facility: HOSPITAL | Age: 69
LOS: 4 days | Discharge: HOME | End: 2024-11-29
Attending: INTERNAL MEDICINE | Admitting: INTERNAL MEDICINE
Payer: MEDICARE

## 2024-11-24 VITALS
RESPIRATION RATE: 180 BRPM | HEIGHT: 68 IN | TEMPERATURE: 98 F | HEART RATE: 53 BPM | SYSTOLIC BLOOD PRESSURE: 150 MMHG | WEIGHT: 149.91 LBS | DIASTOLIC BLOOD PRESSURE: 64 MMHG | OXYGEN SATURATION: 99 %

## 2024-11-24 DIAGNOSIS — Z98.890 OTHER SPECIFIED POSTPROCEDURAL STATES: Chronic | ICD-10-CM

## 2024-11-24 DIAGNOSIS — R06.02 SHORTNESS OF BREATH: ICD-10-CM

## 2024-11-24 LAB
ADD ON TEST-SPECIMEN IN LAB: SIGNIFICANT CHANGE UP
ALBUMIN SERPL ELPH-MCNC: 3 G/DL — LOW (ref 3.3–5)
ALP SERPL-CCNC: 100 U/L — SIGNIFICANT CHANGE UP (ref 40–120)
ALT FLD-CCNC: 16 U/L — SIGNIFICANT CHANGE UP (ref 4–41)
ANION GAP SERPL CALC-SCNC: 16 MMOL/L — HIGH (ref 7–14)
AST SERPL-CCNC: 25 U/L — SIGNIFICANT CHANGE UP (ref 4–40)
BASOPHILS # BLD AUTO: 0.05 K/UL — SIGNIFICANT CHANGE UP (ref 0–0.2)
BASOPHILS NFR BLD AUTO: 0.7 % — SIGNIFICANT CHANGE UP (ref 0–2)
BILIRUB SERPL-MCNC: 0.3 MG/DL — SIGNIFICANT CHANGE UP (ref 0.2–1.2)
BUN SERPL-MCNC: 58 MG/DL — HIGH (ref 7–23)
CALCIUM SERPL-MCNC: 7.5 MG/DL — LOW (ref 8.4–10.5)
CHLORIDE SERPL-SCNC: 93 MMOL/L — LOW (ref 98–107)
CO2 SERPL-SCNC: 25 MMOL/L — SIGNIFICANT CHANGE UP (ref 22–31)
CREAT SERPL-MCNC: 13.7 MG/DL — HIGH (ref 0.5–1.3)
EGFR: 4 ML/MIN/1.73M2 — LOW
EOSINOPHIL # BLD AUTO: 0.96 K/UL — HIGH (ref 0–0.5)
EOSINOPHIL NFR BLD AUTO: 13.3 % — HIGH (ref 0–6)
FLUAV AG NPH QL: SIGNIFICANT CHANGE UP
FLUBV AG NPH QL: SIGNIFICANT CHANGE UP
GLUCOSE BLDC GLUCOMTR-MCNC: 100 MG/DL — HIGH (ref 70–99)
GLUCOSE BLDC GLUCOMTR-MCNC: 114 MG/DL — HIGH (ref 70–99)
GLUCOSE SERPL-MCNC: 115 MG/DL — HIGH (ref 70–99)
HCT VFR BLD CALC: 25.4 % — LOW (ref 39–50)
HGB BLD-MCNC: 8.4 G/DL — LOW (ref 13–17)
IANC: 4.42 K/UL — SIGNIFICANT CHANGE UP (ref 1.8–7.4)
IMM GRANULOCYTES NFR BLD AUTO: 0.4 % — SIGNIFICANT CHANGE UP (ref 0–0.9)
LIDOCAIN IGE QN: 57 U/L — SIGNIFICANT CHANGE UP (ref 7–60)
LYMPHOCYTES # BLD AUTO: 0.97 K/UL — LOW (ref 1–3.3)
LYMPHOCYTES # BLD AUTO: 13.4 % — SIGNIFICANT CHANGE UP (ref 13–44)
MAGNESIUM SERPL-MCNC: 2.5 MG/DL — SIGNIFICANT CHANGE UP (ref 1.6–2.6)
MCHC RBC-ENTMCNC: 28.5 PG — SIGNIFICANT CHANGE UP (ref 27–34)
MCHC RBC-ENTMCNC: 33.1 G/DL — SIGNIFICANT CHANGE UP (ref 32–36)
MCV RBC AUTO: 86.1 FL — SIGNIFICANT CHANGE UP (ref 80–100)
MONOCYTES # BLD AUTO: 0.79 K/UL — SIGNIFICANT CHANGE UP (ref 0–0.9)
MONOCYTES NFR BLD AUTO: 10.9 % — SIGNIFICANT CHANGE UP (ref 2–14)
NEUTROPHILS # BLD AUTO: 4.42 K/UL — SIGNIFICANT CHANGE UP (ref 1.8–7.4)
NEUTROPHILS NFR BLD AUTO: 61.3 % — SIGNIFICANT CHANGE UP (ref 43–77)
NRBC # BLD: 0 /100 WBCS — SIGNIFICANT CHANGE UP (ref 0–0)
NRBC # FLD: 0 K/UL — SIGNIFICANT CHANGE UP (ref 0–0)
NT-PROBNP SERPL-SCNC: HIGH PG/ML
PLATELET # BLD AUTO: 269 K/UL — SIGNIFICANT CHANGE UP (ref 150–400)
POTASSIUM SERPL-MCNC: 4 MMOL/L — SIGNIFICANT CHANGE UP (ref 3.5–5.3)
POTASSIUM SERPL-SCNC: 4 MMOL/L — SIGNIFICANT CHANGE UP (ref 3.5–5.3)
PROT SERPL-MCNC: 6.2 G/DL — SIGNIFICANT CHANGE UP (ref 6–8.3)
RBC # BLD: 2.95 M/UL — LOW (ref 4.2–5.8)
RBC # FLD: 13.8 % — SIGNIFICANT CHANGE UP (ref 10.3–14.5)
RSV RNA NPH QL NAA+NON-PROBE: SIGNIFICANT CHANGE UP
SARS-COV-2 RNA SPEC QL NAA+PROBE: SIGNIFICANT CHANGE UP
SODIUM SERPL-SCNC: 134 MMOL/L — LOW (ref 135–145)
TROPONIN T, HIGH SENSITIVITY RESULT: 273 NG/L — CRITICAL HIGH
TSH SERPL-MCNC: 6.22 UIU/ML — HIGH (ref 0.27–4.2)
WBC # BLD: 7.22 K/UL — SIGNIFICANT CHANGE UP (ref 3.8–10.5)
WBC # FLD AUTO: 7.22 K/UL — SIGNIFICANT CHANGE UP (ref 3.8–10.5)

## 2024-11-24 PROCEDURE — 71046 X-RAY EXAM CHEST 2 VIEWS: CPT | Mod: 26

## 2024-11-24 PROCEDURE — 99285 EMERGENCY DEPT VISIT HI MDM: CPT

## 2024-11-24 PROCEDURE — 99223 1ST HOSP IP/OBS HIGH 75: CPT

## 2024-11-24 RX ORDER — FUROSEMIDE 40 MG/1
80 TABLET ORAL ONCE
Refills: 0 | Status: COMPLETED | OUTPATIENT
Start: 2024-11-24 | End: 2024-11-24

## 2024-11-24 RX ORDER — HEPARIN SODIUM,PORCINE 1000/ML
5000 VIAL (ML) INJECTION EVERY 12 HOURS
Refills: 0 | Status: DISCONTINUED | OUTPATIENT
Start: 2024-11-24 | End: 2024-11-29

## 2024-11-24 RX ORDER — ACETAMINOPHEN, DIPHENHYDRAMINE HCL, PHENYLEPHRINE HCL 325; 25; 5 MG/1; MG/1; MG/1
3 TABLET ORAL AT BEDTIME
Refills: 0 | Status: DISCONTINUED | OUTPATIENT
Start: 2024-11-24 | End: 2024-11-29

## 2024-11-24 RX ORDER — ACETAMINOPHEN 500MG 500 MG/1
650 TABLET, COATED ORAL EVERY 6 HOURS
Refills: 0 | Status: DISCONTINUED | OUTPATIENT
Start: 2024-11-24 | End: 2024-11-29

## 2024-11-24 NOTE — ED ADULT NURSE REASSESSMENT NOTE - NS ED NURSE REASSESS COMMENT FT1
[facility], pt transported with all pt belongings to [facility]
report received from ROBER New. pt remains at baseline mental status A&oX4, RR even unlabored completing full clear sentences. pt resting in stretcher comfortably at this time, no new complaints offered. rpt vs per flowsheet. o2 saturation 95% and above on RA. stretcher lowest position siderails up safety measures in place. pt pending results at this time

## 2024-11-24 NOTE — H&P ADULT - PROBLEM SELECTOR PLAN 4
Pro-BNP = 93787, Troponin = 273  ECGs as above  Elevated pro-BNP not solely of cardiac origin and is likely associated with pulmonary etiology  Patient reports on average ~ 0.5 liters of fluid intake daily  - intake/output Q4H, weight daily, HOB elevation, fluid restriction of 1.2 liters daily for now  - f/u TSH level; added to initial lab-work

## 2024-11-24 NOTE — ED ADULT NURSE NOTE - NSFALLRISKINTERV_ED_ALL_ED

## 2024-11-24 NOTE — H&P ADULT - ASSESSMENT
[ x ]  Lab studies personally reviewed  [ x ]  Radiology personally reviewed  [ x ]  Old records personally reviewed    69-year-old male, with past history of CAD - s/p Stent, HTN, CAV, ESRD (on Peritoneal dialysis), DM and BPH presented to the ED secondary to shortness of breath.  Diagnosed with Shortness of Breath in the ED.

## 2024-11-24 NOTE — H&P ADULT - PROBLEM SELECTOR PLAN 1
Persistent for the past few days, but now with acute worsening.  Sitting up in bed with significant shortness of breath, per patient.  Has GERMAIN, orthopnea, PND.  Occasional palpitations  CXR = "Moderate right layering pleural effusion with associated atelectasis." [personally reviewed]  Pro-BNP = 33994, Troponin = 273.  No significant LE edema  ECG [17:32:10] = sinus bradycardia w/ 1st degree AVB at 56 bpm, QTc = 474, Q-wave in III  ECG [22:53:48] = sinus bradycardia w/ 1st degree AVB at 57 bpm, QTc = 465, Q-wave in III  COVID-19/RSV/Influenza A&B negative  Compliant with nightly peritoneal dialysis therapy  Shortness of breath appears to be secondary to the right pleural effusion  - d/w Nephrologist; giving furosemide 80 mg IV x one (will also give metolazone 30 min prior to furosemide dose)  - f/u intake/output Q4H, weight daily.  For fluid restriction of 1.2 liters daily for now.  Weight daily  - HOB elevation  - for peritoneal dialysis in the AM  - Pulmonology consult in the AM (per PMD's designation)

## 2024-11-24 NOTE — H&P ADULT - HISTORY OF PRESENT ILLNESS
69-year-old male, with past history of CAD - s/p Stent, HTN, CAV, ESRD (on Peritoneal dialysis), DM and BPH presented to the ED secondary to shortness of breath.  Seen and evaluated at bedside;    Vital signs upon ED presentation as follows: BP = 150/64, HR = 53, RR = ?____ (later recorded at 16), T = 36.7 C (98.1 F), O2 Sat = 99% on RA.  Diagnosed with Shortness of Breath in the ED. 69-year-old male, with past history of CAD - s/p Stent, HTN, CAV, ESRD (on Peritoneal dialysis), DM and BPH presented to the ED secondary to shortness of breath.  Seen and evaluated at bedside; NAD at rest.  Patient endorses worsening shortness of breath for the past few days, but with sudden acuity while sitting up in bed in the AM.  Unable to lie flat and wakens from sleep gasping for breath at times.  Endorses palpitations, but no chest pain.  No associated diaphoresis, nausea, vomiting.  No lower extremities edema.  Drinks about 0.5 liters of fluid daily.  Uses one pillow nightly.  Has profound GERMAIN.  Relates falling in the bathroom while going into the shower about 3 to 4 days ago; hit the right temporal head on the tub.  Unable to determine if had LOC, but does stress that the trauma to the head was significant.      Vital signs upon ED presentation as follows: BP = 150/64, HR = 53, RR = ?____ (later recorded at 16), T = 36.7 C (98.1 F), O2 Sat = 99% on RA.  Diagnosed with Shortness of Breath in the ED.

## 2024-11-24 NOTE — H&P ADULT - PROBLEM SELECTOR PLAN 3
No acute issues presently; shortness of breath is most likely due to the moderate right sided pleural effusion  No significant electrolyte abnormalities  - case discussed with Nephrologist; for peritoneal dialysis tomorrow  - per recommendation, giving furosemide 80 mg IV x one (will also give metolazone 30 min prior to furosemide dose)  - f/u repeat lab-work in the AM

## 2024-11-24 NOTE — ED PROVIDER NOTE - ATTENDING CONTRIBUTION TO CARE
Magdaleno Broderick DO:  patient seen and evaluated with the resident.  I was present for key portions of the History & Physical, and I agree with the Impression & Plan.

## 2024-11-24 NOTE — ED ADULT NURSE NOTE - DOES PATIENT HAVE ADVANCE DIRECTIVE
Have stitches removed in 8-10 days    Avoid contact sports or activities that could put you at risk for another head injury for 1 week from when you are feeling back to normal.    Take Tylenol or ibuprofen as needed for pain.  
No

## 2024-11-24 NOTE — H&P ADULT - PROBLEM SELECTOR PLAN 7
- Med-Hx Pharmacist e-mailed; please f/u and complete Med-Rec No acute issues presently  F/u Med-Hx Pharmacist for current meds list and prescribe meds

## 2024-11-24 NOTE — H&P ADULT - NSHPREVIEWOFSYSTEMS_GEN_ALL_CORE
REVIEW OF SYSTEMS:    CONSTITUTIONAL: No weakness, fever, chills or sweating  EYES/ENT: No visual changes.  No dysphagia  NECK: No pain or stiffness  RESPIRATORY: No cough or hemoptysis.  (+) shortness of breath and GERMAIN  CARDIOVASCULAR: Shortness of breath, GERMAIN, PND and orthopnea.  Occasional palpitations.  No chest pain.  No lower extremity edema  GASTROINTESTINAL: No epigastric or abdominal pain. No nausea, vomiting or hematemesis.  No diarrhea or constipation. No melena or hematochezia.  GENITOURINARY: Still makes a small amount of urine.  No dysuria, frequency or hematuria  MUSCULOSKELETAL: No joint pain, swelling, decreased ROM, erythema, warmth  NEUROLOGICAL: No numbness or weakness  PSYCHIATRY: No anxiety, or depression.  SKIN: No itching, burning, rashes, or lesions   All other review of systems is negative unless indicated above.

## 2024-11-24 NOTE — H&P ADULT - PROBLEM SELECTOR PLAN 6
No acute issues presently  F/u Med-Hx Pharmacist for current meds list and prescribe meds Patient cites significant discomfort  Likely in the setting of kidney disease  Could also be due to vitamin, mineral deficiency  Reports using skin emollients without relief  - trial of Lac-hydrin lotion prescribed; f/u for effectivity, or change to other regimen  - f/u AM lab-work

## 2024-11-24 NOTE — ED PROVIDER NOTE - CLINICAL SUMMARY MEDICAL DECISION MAKING FREE TEXT BOX
Magdaleno Broderick, DO: 68 yo m pmh esrd on PD, htn, dm, CVA 2015, history TIA 2022, pw sob. Patient reports 1 day of symptoms, mild SOB, worse with laying down and GERMAIN.  Reports had full peritoneal dialysis session last night, 10 hours.  Denies CP, N/V, F/C, new cough.  Patient arrives HDS, well-appearing, pending EKG.  Not hypoxic.  Will do labs, imaging, reassess.  Atypical for ACS.

## 2024-11-24 NOTE — H&P ADULT - NSICDXPASTMEDICALHX_GEN_ALL_CORE_FT
PAST MEDICAL HISTORY:  BPH (benign prostatic hyperplasia)     CVA (cerebrovascular accident) 2010 without residual effects    ESRD on peritoneal dialysis     Hyperlipidemia     Hypertension     Type 2 diabetes mellitus, with long-term current use of insulin      PAST MEDICAL HISTORY:  BPH (benign prostatic hyperplasia)     CAD (coronary artery disease)     CVA (cerebrovascular accident) 2010 without residual effects    ESRD on peritoneal dialysis     Hyperlipidemia     Hypertension     Type 2 diabetes mellitus, with long-term current use of insulin

## 2024-11-24 NOTE — H&P ADULT - PROBLEM SELECTOR PLAN 2
CXR = ""Moderate right layering pleural effusion with associated atelectasis." [personally reviewed]  Likely the cause of patient's persistent, and now acute, shortness of breath  - HOB elevation  - aspiration precautions  - f/u AM lab-work, including VBG  - Pulmonology consult in the AM (per PMD's designation)

## 2024-11-24 NOTE — H&P ADULT - PROBLEM SELECTOR PLAN 8
Heparin SQ  HOB elevation  Fall precautions - Med-Hx Pharmacist e-mailed; please f/u and complete Med-Rec

## 2024-11-24 NOTE — H&P ADULT - NSHPPHYSICALEXAM_GEN_ALL_CORE
Vital Signs Last 24 Hrs  T(C): 36.9 (24 Nov 2024 21:51), Max: 36.9 (24 Nov 2024 21:51)  T(F): 98.5 (24 Nov 2024 21:51), Max: 98.5 (24 Nov 2024 21:51)  HR: 60 (24 Nov 2024 21:51) (53 - 60)  BP: 155/60 (24 Nov 2024 21:51) (142/68 - 155/60)  BP(mean): --  RR: 18 (24 Nov 2024 21:51) (16 - 180)  SpO2: 99% (24 Nov 2024 21:51) (99% - 100%)    Parameters below as of 24 Nov 2024 20:47  Patient On (Oxygen Delivery Method): room air    =================================================== Vital Signs Last 24 Hrs  T(C): 36.9 (24 Nov 2024 21:51), Max: 36.9 (24 Nov 2024 21:51)  T(F): 98.5 (24 Nov 2024 21:51), Max: 98.5 (24 Nov 2024 21:51)  HR: 60 (24 Nov 2024 21:51) (53 - 60)  BP: 155/60 (24 Nov 2024 21:51) (142/68 - 155/60)  BP(mean): --  RR: 18 (24 Nov 2024 21:51) (16 - 180)  SpO2: 99% (24 Nov 2024 21:51) (99% - 100%)    Parameters below as of 24 Nov 2024 20:47  Patient On (Oxygen Delivery Method): room air    ===================================================  PHYSICAL EXAMINATION:    APPEARANCE: Adequately groomed, slim male, lying propped up in bed w/ HOB elevated at ~ >45 degrees.  Increased respiratory effort with movement  HEENT: Moist oral mucosa.  Pupils reactive to light.  EOMI  LYMPHATIC: No lymphadenopathy appreciated  CARDIOVASCULAR: (+) S1 S2.  No JVD.  No gross murmurs appreciated.  No edema  RESPIRATORY: Coarse breath sounds bilaterally with decreased breath sounds at the RLL area.  No gross wheezing, rhonchi appreciated  GASTROINTESTINAL: Peritoneal catheter in place to the RLQ; non-tender and site clean and intact.  Soft.  Non-tender.  (+) BS  GENITOURINARY: No suprapubic tenderness.  No CVA tenderness B/L  EXTREMITIES: Normal range of motion.  No clubbing, cyanosis or edema  MUSCULOSKELETAL: No atrophy.  No asymmetry.  Good ROM  SKIN: No rashes. No ecchymoses.  No cyanosis  PSYCHIATRIC: A&O x 3.  Mood & affect appropriate to situation  NEUROLOGICAL: Non-focal, FRIEND x 4 against gravity  VASCULAR: Peripheral pulses palpable Vital Signs Last 24 Hrs  T(C): 36.9 (24 Nov 2024 21:51), Max: 36.9 (24 Nov 2024 21:51)  T(F): 98.5 (24 Nov 2024 21:51), Max: 98.5 (24 Nov 2024 21:51)  HR: 60 (24 Nov 2024 21:51) (53 - 60)  BP: 155/60 (24 Nov 2024 21:51) (142/68 - 155/60)  BP(mean): --  RR: 18 (24 Nov 2024 21:51) (16 - 180)  SpO2: 99% (24 Nov 2024 21:51) (99% - 100%)    Parameters below as of 24 Nov 2024 20:47  Patient On (Oxygen Delivery Method): room air    ===================================================  PHYSICAL EXAMINATION:    APPEARANCE: Adequately groomed, slim male, lying propped up in bed w/ HOB elevated at ~ >45 degrees.  Increased respiratory effort with movement  HEENT: Moist oral mucosa.  Pupils reactive to light.  EOMI  LYMPHATIC: No lymphadenopathy appreciated  CARDIOVASCULAR: (+) S1 S2.  No JVD.  No gross murmurs appreciated.  Trace LE edema  RESPIRATORY: Coarse breath sounds bilaterally with decreased breath sounds at the RLL area.  No gross wheezing, rhonchi appreciated  GASTROINTESTINAL: Peritoneal catheter in place to the RLQ; non-tender and site clean and intact.  Soft.  Non-tender.  (+) BS  GENITOURINARY: No suprapubic tenderness.  No CVA tenderness B/L  EXTREMITIES: Normal range of motion.  No clubbing or cyanosis.  Trace LE edema  MUSCULOSKELETAL: No atrophy.  No asymmetry.  Good ROM  SKIN: No rashes. No ecchymoses.  No cyanosis  PSYCHIATRIC: A&O x 3.  Mood & affect appropriate to situation  NEUROLOGICAL: Non-focal, FRIEND x 4 against gravity  VASCULAR: Peripheral pulses palpable

## 2024-11-24 NOTE — H&P ADULT - NSHPSOCIALHISTORY_GEN_ALL_CORE
SOCIAL HISTORY:    Marital Status:  (  )    (  ) Single        (  )        (  )        (  )   Lives with:          (  ) Alone      (  ) Spouse     (  ) Children         (  ) Parents             (  ) Other    No history of smoking  No history of alcohol abuse  No history of illegal drug use    Occupation: SOCIAL HISTORY:    Marital Status:  ( x )    (  ) Single        (  )        (  )        (  )   Lives with:          (  ) Alone      ( x ) Spouse     ( x ) Children         (  ) Parents             (  ) Other    No history of smoking  No history of alcohol abuse  No history of illegal drug use    Occupation:

## 2024-11-24 NOTE — H&P ADULT - NSHPLABSRESULTS_GEN_ALL_CORE
8.4    7.22  )-----------( 269      ( 24 Nov 2024 20:34 )             25.4       11-24    134[L]  |  93[L]  |  58[H]  ----------------------------<  115[H]  4.0   |  25  |  13.70[H]    Ca    7.5[L]      24 Nov 2024 20:34  Mg     2.50     11-24    TPro  6.2  /  Alb  3.0[L]  /  TBili  0.3  /  DBili  x   /  AST  25  /  ALT  16  /  AlkPhos  100  11-24    Pro-BNP = 85693    Lactate Trend    CAPILLARY BLOOD GLUCOSE    POCT Blood Glucose.: 145 mg/dL (24 Nov 2024 17:23)    =============================================        =============================================  . 8.4    7.22  )-----------( 269      ( 24 Nov 2024 20:34 )             25.4       11-24    134[L]  |  93[L]  |  58[H]  ----------------------------<  115[H]  4.0   |  25  |  13.70[H]    Ca    7.5[L]      24 Nov 2024 20:34  Mg     2.50     11-24    TPro  6.2  /  Alb  3.0[L]  /  TBili  0.3  /  DBili  x   /  AST  25  /  ALT  16  /  AlkPhos  100  11-24      Troponin = 273  Pro-BNP = 81055    Lactate Trend    CAPILLARY BLOOD GLUCOSE    POCT Blood Glucose.: 145 mg/dL (24 Nov 2024 17:23)    ========================================================  ECG [17:32:10] = sinus bradycardia w/ 1st degree AVB at 56 bpm,   QTc = 474, Q-wave in III    ECG [22:53:48] = sinus bradycardia w/ 1st degree AVB at 57 bpm,   QTc = 465, Q-save in III  ========================================================  . 8.4    7.22  )-----------( 269      ( 24 Nov 2024 20:34 )             25.4       11-24    134[L]  |  93[L]  |  58[H]  ----------------------------<  115[H]  4.0   |  25  |  13.70[H]    Ca    7.5[L]      24 Nov 2024 20:34  Mg     2.50     11-24    TPro  6.2  /  Alb  3.0[L]  /  TBili  0.3  /  DBili  x   /  AST  25  /  ALT  16  /  AlkPhos  100  11-24      Troponin = 273  Pro-BNP = 91800    Lactate Trend    CAPILLARY BLOOD GLUCOSE    POCT Blood Glucose.: 145 mg/dL (24 Nov 2024 17:23)    ========================================================  ECG [17:32:10] = sinus bradycardia w/ 1st degree AVB at 56 bpm,   QTc = 474, Q-wave in III    ECG [22:53:48] = sinus bradycardia w/ 1st degree AVB at 57 bpm,   QTc = 465, Q-wave in III  ========================================================  .

## 2024-11-24 NOTE — H&P ADULT - NSICDXPASTSURGICALHX_GEN_ALL_CORE_FT
PAST SURGICAL HISTORY:  History of peritoneal dialysis s/p PD catheter placement     PAST SURGICAL HISTORY:  History of peritoneal dialysis s/p PD catheter placement    S/P coronary artery stent placement

## 2024-11-24 NOTE — H&P ADULT - PROBLEM SELECTOR PLAN 5
Heart rate upper 50's from ECGs x 2  Chart review notes prior bradycardia on ECG  - could be secondary to medication side effect; f/u Med-Hx Pharmacist for current list and evaluate meds  - TSH elevated at 6.22; f/u FT3, T4 levels (added to prior lab-work also)  - f/u electrolytes

## 2024-11-24 NOTE — ED PROVIDER NOTE - PROGRESS NOTE DETAILS
Shanelle Suarez PGY-1:  pt with new R pleural effusion. vitals stable. dr. lopez is pts nephrologist. will reach out regarding admission.

## 2024-11-25 ENCOUNTER — RESULT REVIEW (OUTPATIENT)
Age: 69
End: 2024-11-25

## 2024-11-25 DIAGNOSIS — R00.1 BRADYCARDIA, UNSPECIFIED: ICD-10-CM

## 2024-11-25 DIAGNOSIS — Z95.5 PRESENCE OF CORONARY ANGIOPLASTY IMPLANT AND GRAFT: Chronic | ICD-10-CM

## 2024-11-25 DIAGNOSIS — J90 PLEURAL EFFUSION, NOT ELSEWHERE CLASSIFIED: ICD-10-CM

## 2024-11-25 DIAGNOSIS — L29.9 PRURITUS, UNSPECIFIED: ICD-10-CM

## 2024-11-25 DIAGNOSIS — N18.6 END STAGE RENAL DISEASE: ICD-10-CM

## 2024-11-25 DIAGNOSIS — I10 ESSENTIAL (PRIMARY) HYPERTENSION: ICD-10-CM

## 2024-11-25 DIAGNOSIS — Z79.899 OTHER LONG TERM (CURRENT) DRUG THERAPY: ICD-10-CM

## 2024-11-25 DIAGNOSIS — R06.02 SHORTNESS OF BREATH: ICD-10-CM

## 2024-11-25 DIAGNOSIS — R79.89 OTHER SPECIFIED ABNORMAL FINDINGS OF BLOOD CHEMISTRY: ICD-10-CM

## 2024-11-25 DIAGNOSIS — Z29.9 ENCOUNTER FOR PROPHYLACTIC MEASURES, UNSPECIFIED: ICD-10-CM

## 2024-11-25 LAB
24R-OH-CALCIDIOL SERPL-MCNC: 17.1 NG/ML — LOW (ref 30–80)
A1C WITH ESTIMATED AVERAGE GLUCOSE RESULT: 4.9 % — SIGNIFICANT CHANGE UP (ref 4–5.6)
ALBUMIN FLD-MCNC: 1.7 G/DL — SIGNIFICANT CHANGE UP
ANION GAP SERPL CALC-SCNC: 19 MMOL/L — HIGH (ref 7–14)
B PERT IGG+IGM PNL SER: ABNORMAL
BASOPHILS # BLD AUTO: 0.05 K/UL — SIGNIFICANT CHANGE UP (ref 0–0.2)
BASOPHILS NFR BLD AUTO: 0.8 % — SIGNIFICANT CHANGE UP (ref 0–2)
BLOOD GAS VENOUS COMPREHENSIVE RESULT: SIGNIFICANT CHANGE UP
BUN SERPL-MCNC: 58 MG/DL — HIGH (ref 7–23)
CALCIUM SERPL-MCNC: 7.2 MG/DL — LOW (ref 8.4–10.5)
CHLORIDE SERPL-SCNC: 94 MMOL/L — LOW (ref 98–107)
CO2 SERPL-SCNC: 23 MMOL/L — SIGNIFICANT CHANGE UP (ref 22–31)
COLOR FLD: YELLOW
CREAT SERPL-MCNC: 13.79 MG/DL — HIGH (ref 0.5–1.3)
DIALYSIS INSTRUMENT RESULT - HEPATITIS B SURFACE ANTIGEN: NEGATIVE — SIGNIFICANT CHANGE UP
EGFR: 3 ML/MIN/1.73M2 — LOW
EOSINOPHIL # BLD AUTO: 0.81 K/UL — HIGH (ref 0–0.5)
EOSINOPHIL # FLD: 1 % — SIGNIFICANT CHANGE UP
EOSINOPHIL NFR BLD AUTO: 12.2 % — HIGH (ref 0–6)
ESTIMATED AVERAGE GLUCOSE: 94 — SIGNIFICANT CHANGE UP
FLUID INTAKE SUBSTANCE CLASS: SIGNIFICANT CHANGE UP
FOLATE+VIT B12 SERBLD-IMP: 0 % — SIGNIFICANT CHANGE UP
GLUCOSE BLDC GLUCOMTR-MCNC: 122 MG/DL — HIGH (ref 70–99)
GLUCOSE BLDC GLUCOMTR-MCNC: 160 MG/DL — HIGH (ref 70–99)
GLUCOSE BLDC GLUCOMTR-MCNC: 179 MG/DL — HIGH (ref 70–99)
GLUCOSE BLDC GLUCOMTR-MCNC: 326 MG/DL — HIGH (ref 70–99)
GLUCOSE BLDC GLUCOMTR-MCNC: 82 MG/DL — SIGNIFICANT CHANGE UP (ref 70–99)
GLUCOSE BLDC GLUCOMTR-MCNC: 88 MG/DL — SIGNIFICANT CHANGE UP (ref 70–99)
GLUCOSE BLDC GLUCOMTR-MCNC: 97 MG/DL — SIGNIFICANT CHANGE UP (ref 70–99)
GLUCOSE FLD-MCNC: 139 MG/DL — SIGNIFICANT CHANGE UP
GLUCOSE SERPL-MCNC: 79 MG/DL — SIGNIFICANT CHANGE UP (ref 70–99)
HCT VFR BLD CALC: 25.1 % — LOW (ref 39–50)
HCV AB S/CO SERPL IA: 0.07 S/CO — SIGNIFICANT CHANGE UP (ref 0–0.99)
HCV AB SERPL-IMP: SIGNIFICANT CHANGE UP
HGB BLD-MCNC: 8 G/DL — LOW (ref 13–17)
IANC: 3.92 K/UL — SIGNIFICANT CHANGE UP (ref 1.8–7.4)
IMM GRANULOCYTES NFR BLD AUTO: 0.3 % — SIGNIFICANT CHANGE UP (ref 0–0.9)
LDH SERPL L TO P-CCNC: 145 U/L — SIGNIFICANT CHANGE UP
LYMPHOCYTES # BLD AUTO: 1.13 K/UL — SIGNIFICANT CHANGE UP (ref 1–3.3)
LYMPHOCYTES # BLD AUTO: 17 % — SIGNIFICANT CHANGE UP (ref 13–44)
LYMPHOCYTES # FLD: 5 % — SIGNIFICANT CHANGE UP
MAGNESIUM SERPL-MCNC: 2.4 MG/DL — SIGNIFICANT CHANGE UP (ref 1.6–2.6)
MCHC RBC-ENTMCNC: 28 PG — SIGNIFICANT CHANGE UP (ref 27–34)
MCHC RBC-ENTMCNC: 31.9 G/DL — LOW (ref 32–36)
MCV RBC AUTO: 87.8 FL — SIGNIFICANT CHANGE UP (ref 80–100)
MESOTHL CELL # FLD: 3 % — SIGNIFICANT CHANGE UP
MONOCYTES # BLD AUTO: 0.72 K/UL — SIGNIFICANT CHANGE UP (ref 0–0.9)
MONOCYTES NFR BLD AUTO: 10.8 % — SIGNIFICANT CHANGE UP (ref 2–14)
MONOS+MACROS # FLD: 89 % — SIGNIFICANT CHANGE UP
MRSA PCR RESULT.: SIGNIFICANT CHANGE UP
NEUTROPHILS # BLD AUTO: 3.92 K/UL — SIGNIFICANT CHANGE UP (ref 1.8–7.4)
NEUTROPHILS NFR BLD AUTO: 58.9 % — SIGNIFICANT CHANGE UP (ref 43–77)
NEUTROPHILS-BODY FLUID: 2 % — SIGNIFICANT CHANGE UP
NRBC # BLD: 0 /100 WBCS — SIGNIFICANT CHANGE UP (ref 0–0)
NRBC # FLD: 0 K/UL — SIGNIFICANT CHANGE UP (ref 0–0)
OTHER CELLS FLD MANUAL: 0 % — SIGNIFICANT CHANGE UP
PHOSPHATE SERPL-MCNC: 5.7 MG/DL — HIGH (ref 2.5–4.5)
PLATELET # BLD AUTO: 246 K/UL — SIGNIFICANT CHANGE UP (ref 150–400)
POTASSIUM SERPL-MCNC: 3.6 MMOL/L — SIGNIFICANT CHANGE UP (ref 3.5–5.3)
POTASSIUM SERPL-SCNC: 3.6 MMOL/L — SIGNIFICANT CHANGE UP (ref 3.5–5.3)
PROT FLD-MCNC: 2.9 G/DL — SIGNIFICANT CHANGE UP
RBC # BLD: 2.86 M/UL — LOW (ref 4.2–5.8)
RBC # FLD: 14.2 % — SIGNIFICANT CHANGE UP (ref 10.3–14.5)
RCV VOL RI: 3000 CELLS/UL — HIGH (ref 0–5)
S AUREUS DNA NOSE QL NAA+PROBE: SIGNIFICANT CHANGE UP
SODIUM SERPL-SCNC: 136 MMOL/L — SIGNIFICANT CHANGE UP (ref 135–145)
SPECIMEN SOURCE FLD: SIGNIFICANT CHANGE UP
T4 FREE SERPL-MCNC: 1.1 NG/DL — SIGNIFICANT CHANGE UP (ref 0.9–1.8)
TOTAL CELLS COUNTED, BODY FLUID: 100 CELLS — SIGNIFICANT CHANGE UP
TOTAL NUCLEATED CELL COUNT, BODY FLUID: 164 CELLS/UL — HIGH (ref 0–5)
TROPONIN T, HIGH SENSITIVITY RESULT: 238 NG/L — CRITICAL HIGH
TUBE TYPE: SIGNIFICANT CHANGE UP
WBC # BLD: 6.65 K/UL — SIGNIFICANT CHANGE UP (ref 3.8–10.5)
WBC # FLD AUTO: 6.65 K/UL — SIGNIFICANT CHANGE UP (ref 3.8–10.5)

## 2024-11-25 PROCEDURE — 70450 CT HEAD/BRAIN W/O DYE: CPT | Mod: 26

## 2024-11-25 PROCEDURE — 32555 ASPIRATE PLEURA W/ IMAGING: CPT | Mod: RT,GC

## 2024-11-25 PROCEDURE — 71045 X-RAY EXAM CHEST 1 VIEW: CPT | Mod: 26

## 2024-11-25 PROCEDURE — 88305 TISSUE EXAM BY PATHOLOGIST: CPT | Mod: 26

## 2024-11-25 PROCEDURE — 88341 IMHCHEM/IMCYTCHM EA ADD ANTB: CPT | Mod: 26

## 2024-11-25 PROCEDURE — 76604 US EXAM CHEST: CPT | Mod: 26,GC

## 2024-11-25 PROCEDURE — 88342 IMHCHEM/IMCYTCHM 1ST ANTB: CPT | Mod: 26

## 2024-11-25 PROCEDURE — 88112 CYTOPATH CELL ENHANCE TECH: CPT | Mod: 26

## 2024-11-25 PROCEDURE — 99223 1ST HOSP IP/OBS HIGH 75: CPT | Mod: GC,25

## 2024-11-25 RX ORDER — CHLORHEXIDINE GLUCONATE 1.2 MG/ML
1 RINSE ORAL DAILY
Refills: 0 | Status: DISCONTINUED | OUTPATIENT
Start: 2024-11-25 | End: 2024-11-29

## 2024-11-25 RX ORDER — CHOLECALCIFEROL (VITAMIN D3) 10MCG/0.25
1000 DROPS ORAL DAILY
Refills: 0 | Status: DISCONTINUED | OUTPATIENT
Start: 2024-11-25 | End: 2024-11-29

## 2024-11-25 RX ORDER — CYANOCOBALAMIN/FOLIC AC/VIT B6 1-2.2-25MG
1 TABLET ORAL
Refills: 0 | DISCHARGE

## 2024-11-25 RX ORDER — AMLODIPINE BESYLATE 10 MG/1
5 TABLET ORAL ONCE
Refills: 0 | Status: COMPLETED | OUTPATIENT
Start: 2024-11-25 | End: 2024-11-25

## 2024-11-25 RX ORDER — ERYTHROPOIETIN 3000 [IU]/ML
10000 INJECTION, SOLUTION INTRAVENOUS; SUBCUTANEOUS
Refills: 0 | Status: DISCONTINUED | OUTPATIENT
Start: 2024-11-25 | End: 2024-11-29

## 2024-11-25 RX ORDER — 0.9 % SODIUM CHLORIDE 0.9 %
1000 INTRAVENOUS SOLUTION INTRAVENOUS
Refills: 0 | Status: DISCONTINUED | OUTPATIENT
Start: 2024-11-25 | End: 2024-11-29

## 2024-11-25 RX ORDER — GLUCAGON INJECTION, SOLUTION 0.5 MG/.1ML
1 INJECTION, SOLUTION SUBCUTANEOUS ONCE
Refills: 0 | Status: DISCONTINUED | OUTPATIENT
Start: 2024-11-25 | End: 2024-11-29

## 2024-11-25 RX ORDER — GENTAMICIN SULFATE 0.1 %
1 CREAM (GRAM) TOPICAL ONCE
Refills: 0 | Status: COMPLETED | OUTPATIENT
Start: 2024-11-25 | End: 2024-11-25

## 2024-11-25 RX ORDER — DOXAZOSIN MESYLATE 1 MG
1 TABLET ORAL
Refills: 0 | DISCHARGE

## 2024-11-25 RX ORDER — AMMONIUM LACTATE 120 MG/G
1 LOTION TOPICAL
Refills: 0 | Status: DISCONTINUED | OUTPATIENT
Start: 2024-11-25 | End: 2024-11-29

## 2024-11-25 RX ADMIN — FUROSEMIDE 80 MILLIGRAM(S): 40 TABLET ORAL at 00:26

## 2024-11-25 RX ADMIN — Medication 4: at 11:45

## 2024-11-25 RX ADMIN — Medication 1 APPLICATION(S): at 11:09

## 2024-11-25 RX ADMIN — AMLODIPINE BESYLATE 5 MILLIGRAM(S): 10 TABLET ORAL at 06:39

## 2024-11-25 RX ADMIN — AMMONIUM LACTATE 1 APPLICATION(S): 120 LOTION TOPICAL at 18:33

## 2024-11-25 RX ADMIN — Medication 5000 UNIT(S): at 17:13

## 2024-11-25 RX ADMIN — CHLORHEXIDINE GLUCONATE 1 APPLICATION(S): 1.2 RINSE ORAL at 11:49

## 2024-11-25 RX ADMIN — Medication 5000 UNIT(S): at 06:00

## 2024-11-25 NOTE — PHARMACOTHERAPY INTERVENTION NOTE - COMMENTS
Medication history is incomplete. Unable to verify patient's medication list with two sources. Sources: Patient, Surescript    Patientt was unsure of dosage/frequency for the following:  -Calcitriol 0.25 mcg oral capsule: 3 cap(s) orally 3 times a week (25 Nov 2024 14:37)  -Sevelamer carbonate 800 mg oral tablet: 3 tab(s) orally 3 times a day ...(last filled 7/25/2024 x 90 day supply) (25 Nov 2024 14:36    -Pharmacy will continue to reach out to family member to confirm    Home Medications:  aspirin 81 mg oral delayed release tablet: 1 tab(s) orally once a day (25 Nov 2024 14:29)  amLODIPine 10 mg oral tablet: 1 tab(s) orally once a day (25 Nov 2024 14:29)  atorvastatin 40 mg oral tablet: 1 tab(s) orally once a day (25 Nov 2024 14:37)  bumetanide 2 mg oral tablet: 1 tab(s) orally 2 times a day (25 Nov 2024 14:29)  clopidogrel 75 mg oral tablet: 1 tab(s) orally once a day MDD: 1 (25 Nov 2024 14:29)  Diovan 160 mg oral tablet: 1 tab(s) orally 2 times a day (25 Nov 2024 14:29)  doxazosin 4 mg oral tablet: 1 tab(s) orally once a day (at bedtime) (25 Nov 2024 14:37)  isosorbide mononitrate 60 mg oral tablet, extended release: 1 tab(s) orally 2 times a day (25 Nov 2024 14:29)  labetalol 200 mg oral tablet: 1 tab(s) orally 2 times a day (25 Nov 2024 14:37)  Yee-Selena Rx oral tablet: 1 tab(s) orally once a day (25 Nov 2024 14:37)             Medication history is incomplete. Unable to verify patient's medication list with two sources. Sources: Patient, Surescript    Patient was unsure of dosage/frequency for the following:  -Calcitriol 0.25 mcg oral capsule: 3 cap(s) orally 3 times a week (25 Nov 2024 14:37)  -Sevelamer carbonate 800 mg oral tablet: 3 tab(s) orally 3 times a day ...(last filled 7/25/2024 x 90 day supply) (25 Nov 2024 14:36        Home Medications:  aspirin 81 mg oral delayed release tablet: 1 tab(s) orally once a day (25 Nov 2024 14:29)  amLODIPine 10 mg oral tablet: 1 tab(s) orally once a day (25 Nov 2024 14:29)  atorvastatin 40 mg oral tablet: 1 tab(s) orally once a day (25 Nov 2024 14:37)  bumetanide 2 mg oral tablet: 1 tab(s) orally 2 times a day (25 Nov 2024 14:29)  clopidogrel 75 mg oral tablet: 1 tab(s) orally once a day MDD: 1 (25 Nov 2024 14:29)  Diovan 160 mg oral tablet: 1 tab(s) orally 2 times a day (25 Nov 2024 14:29)  doxazosin 4 mg oral tablet: 1 tab(s) orally once a day (at bedtime) (25 Nov 2024 14:37)  isosorbide mononitrate 60 mg oral tablet, extended release: 1 tab(s) orally 2 times a day (25 Nov 2024 14:29)  labetalol 200 mg oral tablet: 1 tab(s) orally 2 times a day (25 Nov 2024 14:37)  Yee-Selena Rx oral tablet: 1 tab(s) orally once a day (25 Nov 2024 14:37)

## 2024-11-25 NOTE — CONSULT NOTE ADULT - SUBJECTIVE AND OBJECTIVE BOX
INTERVENTIONAL PULMONOLOGY CONSULTATION NOTE    NAME: LEONIDAS LARRY  MEDICAL RECORD NUMBER: [id number]    CHIEF COMPLAINT: Patient is a 69y old  Male who presents with a chief complaint of Shortness of breath (25 Nov 2024 11:44)      HISTORY OF PRESENT ILLNESS:   HPI:  69-year-old male, with past history of CAD - s/p Stent, HTN, CAV, ESRD (on Peritoneal dialysis), DM and BPH presented to the ED secondary to shortness of breath.  Seen and evaluated at bedside; NAD at rest.  Patient endorses worsening shortness of breath for the past few days, but with sudden acuity while sitting up in bed in the AM.  Unable to lie flat and wakens from sleep gasping for breath at times.  Endorses palpitations, but no chest pain.  No associated diaphoresis, nausea, vomiting.  No lower extremities edema.  Drinks about 0.5 liters of fluid daily.  Uses one pillow nightly.  Has profound GERMAIN.  Relates falling in the bathroom while going into the shower about 3 to 4 days ago; hit the right temporal head on the tub.  Unable to determine if had LOC, but does stress that the trauma to the head was significant.     (24 Nov 2024 21:52)      ====================BACKGROUND INFORMATION====================    FAMILY HISTORY: FAMILY HISTORY:  FH: HTN (hypertension) (Mother, Father)    FH: type 2 diabetes (Mother, Father)        PAST MEDICAL AND SURGICAL HISTORY: PAST MEDICAL & SURGICAL HISTORY:  Hypertension      ESRD on peritoneal dialysis      CVA (cerebrovascular accident)  2010 without residual effects      Hyperlipidemia      Type 2 diabetes mellitus, with long-term current use of insulin      BPH (benign prostatic hyperplasia)      CAD (coronary artery disease)      History of peritoneal dialysis  s/p PD catheter placement      S/P coronary artery stent placement          ALLERGIES:Allergies    No Known Allergies    Intolerances    hydrALAZINE (Short breath)      HOME MEDICATIONS:     OUTPATIENT PULMONARY DOCTOR:     PFTs:     SOCIAL HISTORY:  TOBACCO USE:  EMPLOYMENT:  CODE STATUS:    ========================REVIEW OF SYSTEMS========================  [x] ROS negative except as per HPI    ========================MEDICATIONS=============================  NEURO    CARDIO    PULM    FEN/GI  dextrose 5%. 1000 milliLiter(s) IV Continuous <Continuous>  dextrose 5%. 1000 milliLiter(s) IV Continuous <Continuous>    HEME/ONC  heparin   Injectable 5000 Unit(s) SubCutaneous every 12 hours    ANTIMICROBIALS    ENDO  dextrose 50% Injectable 25 Gram(s) IV Push once  dextrose 50% Injectable 12.5 Gram(s) IV Push once  dextrose 50% Injectable 25 Gram(s) IV Push once  glucagon  Injectable 1 milliGRAM(s) IntraMuscular once  insulin lispro (ADMELOG) corrective regimen sliding scale   SubCutaneous three times a day before meals  insulin lispro (ADMELOG) corrective regimen sliding scale   SubCutaneous at bedtime    OTHER  ammonium lactate 12% Lotion 1 Application(s) Topical two times a day  chlorhexidine 2% Cloths 1 Application(s) Topical daily  epoetin heidi (EPOGEN) Injectable 99448 Unit(s) SubCutaneous <User Schedule>      PRN      Drips      ========================PHYSICAL EXAM============================    VITALS: Vital Signs Last 24 Hrs  T(C): 36.5 (25 Nov 2024 14:30), Max: 36.9 (24 Nov 2024 21:51)  T(F): 97.7 (25 Nov 2024 14:30), Max: 98.5 (24 Nov 2024 21:51)  HR: 56 (25 Nov 2024 14:30) (53 - 61)  BP: 150/69 (25 Nov 2024 14:30) (142/68 - 174/71)  BP(mean): --  RR: 17 (25 Nov 2024 14:30) (16 - 180)  SpO2: 97% (25 Nov 2024 12:46) (97% - 100%)    Parameters below as of 25 Nov 2024 14:30  Patient On (Oxygen Delivery Method): room air        INTAKE and OUTPUT: I&O's Detail    25 Nov 2024 07:01  -  25 Nov 2024 15:05  --------------------------------------------------------  IN:    Oral Fluid: 380 mL    Other (mL): 4000 mL  Total IN: 4380 mL    OUT:    Other (mL): 2400 mL  Total OUT: 2400 mL    Total NET: 1980 mL          WEIGHTHeight (cm): 172.7 (11-24-24 @ 17:20)  Weight (kg): 68 (11-24-24 @ 17:20)  BMI (kg/m2): 22.8 (11-24-24 @ 17:20)  BSA (m2): 1.81 (11-24-24 @ 17:20)    VENTILATOR SETTINGS:     Physical Exam  CONST:        NAD, well-developed, awake and alert  ENMT:         MMM, no pharyngeal injection or exudates; no LAD  RESP:           Normal effort and expansion. Normal and equal air entry. No WRR.  CHEST:        No tenderness. No cardiac devices, lines, or chest tubes.   CARD:          RRR, normal S1,S2. no MRG.  VASC:          No appreciable JVD; no LE edema  ABD:            Soft, nontender, nondistended  MSK:            No clubbing or cyanosis of digits  EXT:             WWP; cap refill <2s; pulses are 2+ B/L  PSYCH:         Mood and affect appropriate  NEURO:       Non-focal; moving all extremities   SKIN:            No visible rashes on exposed skin       ======================LABORATORY RESULTS AND IMAGING=============                        8.0    6.65  )-----------( 246      ( 25 Nov 2024 06:50 )             25.1                                  11-25    136  |  94[L]  |  58[H]  ----------------------------<  79  3.6   |  23  |  13.79[H]    Ca    7.2[L]      25 Nov 2024 06:50  Phos  5.7     11-25  Mg     2.40     11-25    TPro  6.2  /  Alb  3.0[L]  /  TBili  0.3  /  DBili  x   /  AST  25  /  ALT  16  /  AlkPhos  100  11-24        ABG Trend    VBG Trend  11-25-24 @ 06:45 FiO2--  - 7.39/41/60/25/90.9 Lactate 0.7  04-07-24 @ 03:15 FiO2--  - 7.35/33/45/18/72.6 Lactate 0.9  07-23-22 @ 04:35 FiO2--  - 7.45/40/56/28/88.2 Lactate 0.5  06-16-22 @ 01:45 FiO2--  - 7.34/47/33/25/53.6 Lactate 1.6        [x] RADIOLOGY REVIEWED AND INTERPRETED BY ME  Xray Chest 2 Views PA/Lat:   ACC: [id number] EXAM:  XR CHEST PA LAT 2V   ORDERED BY: TOMER THOMAS     PROCEDURE DATE:  11/24/2024          INTERPRETATION:  EXAMINATION: XR CHEST PA AND LATERAL    CLINICAL INDICATION: Chest Pain    TECHNIQUE: 2 views; Frontal and lateral views of the chest were obtained.    COMPARISON: Chest x-ray 4/7/2024.    FINDINGS:  The heart is normal in size.  The visualized lungs are clear.  No pneumothorax.  Moderate right layering pleural effusion with associated atelectasis.  No acute bony abnormality.      IMPRESSION:  Moderate right layering pleural effusion with associated atelectasis.    --- End of Report ---          JESSE GONSALEZ MD; Resident Radiologist  This document has been electronically signed.  MOISES HERNANDEZ MD; Attending Radiologist  This document has been electronically signed. Nov 25 2024  9:09AM (11-24-24 @ 20:36)

## 2024-11-25 NOTE — PATIENT PROFILE ADULT - HAVE YOU RECENTLY LOST WEIGHT WITHOUT TRYING?
Other (Free Text): *pt was self treating lesion at her last with efudex, initial lesion may have been a superficial BCC based on history and reaction to efudex.  S/p full four week course with reported moderate-severe inflammatory reaction. \\nClear today. Render Risk Assessment In Note?: no Note Text (......Xxx Chief Complaint.): This diagnosis correlates with the Detail Level: Simple No (0)

## 2024-11-25 NOTE — CONSULT NOTE ADULT - SUBJECTIVE AND OBJECTIVE BOX
[facility]   MD MESSI ROSE MD MARIA SANTIAGO, NP        TEL:  OFFICE: [phone]  From 5pm-7am answering service [phone]    --- INITIAL RENAL CONSULT NOTE ---date of service 11-25-24 @ 10:51    HPI:  69-year-old male, with past history of CAD - s/p Stent, HTN, CAV, ESRD (on Peritoneal dialysis), DM and BPH presented to the ED with shortness of breath.  Seen and evaluated at bedside; NAD at rest.  Patient endorses worsening shortness of breath for the past few days, but with sudden acuity while sitting up in bed in the AM.  Unable to lie flat and wakens from sleep gasping for breath at times.  Endorses palpitations, but no chest pain. Nephrology consulted for dialysis needs.          Allergies:  No Known Allergies  hydrALAZINE (Short breath)      PAST MEDICAL & SURGICAL HISTORY:  Hypertension      ESRD on peritoneal dialysis      CVA (cerebrovascular accident)  2010 without residual effects      Hyperlipidemia      Type 2 diabetes mellitus, with long-term current use of insulin      BPH (benign prostatic hyperplasia)      CAD (coronary artery disease)      History of peritoneal dialysis  s/p PD catheter placement      S/P coronary artery stent placement          Home Medications Reviewed    Hospital Medications:   MEDICATIONS  (STANDING):  ammonium lactate 12% Lotion 1 Application(s) Topical two times a day  chlorhexidine 2% Cloths 1 Application(s) Topical daily  gentamicin 0.1% Cream 1 Application(s) Topical once  heparin   Injectable 5000 Unit(s) SubCutaneous every 12 hours      SOCIAL HISTORY:  Denies ETOh, Smoking    FAMILY HISTORY:  FH: HTN (hypertension) (Mother, Father)    FH: type 2 diabetes (Mother, Father)        REVIEW OF SYSTEMS:  CONSTITUTIONAL: No weakness, fevers or chills  EYES/ENT: No visual changes;  No vertigo or throat pain   NECK: No pain or stiffness  RESPIRATORY: as per hpi   CARDIOVASCULAR: No chest pain or palpitations.  GASTROINTESTINAL: No abdominal or epigastric pain. No nausea, vomiting, or hematemesis; No diarrhea or constipation. No melena or hematochezia.  GENITOURINARY: No dysuria, frequency, foamy urine, urinary urgency, incontinence or hematuria  NEUROLOGICAL: No numbness or weakness  SKIN: No itching, burning, rashes, or lesions   VASCULAR: No bilateral lower extremity edema.   All other review of systems is negative unless indicated above.    VITALS:  T(F): 98.1 (11-25-24 @ 06:00), Max: 98.5 (11-24-24 @ 21:51)  HR: 60 (11-25-24 @ 06:37)  BP: 174/71 (11-25-24 @ 06:37)  RR: 18 (11-25-24 @ 06:00)  SpO2: 99% (11-25-24 @ 06:00)  Wt(kg): --    Height (cm): 172.7 (11-24 @ 17:20)  Weight (kg): 68 (11-24 @ 17:20)  BMI (kg/m2): 22.8 (11-24 @ 17:20)  BSA (m2): 1.81 (11-24 @ 17:20)    PHYSICAL EXAM:  General: NAD  HEENT: anicteric sclera, oropharynx clear, MMM  Neck: No JVD  Respiratory: decreased breath sounds  Cardiovascular: S1, S2, RRR  Gastrointestinal: BS+, soft, NT/ND  Extremities: No cyanosis or clubbing. No peripheral edema  Neurological: A/O x 3, no focal deficits  Psychiatric: Normal mood, normal affect  : No CVA tenderness. No brand.   Skin: No rashes  Vascular Access: peritoneal dialysis catheter    LABS:  11-25    136  |  94[L]  |  58[H]  ----------------------------<  79  3.6   |  23  |  13.79[H]    Ca    7.2[L]      25 Nov 2024 06:50  Phos  5.7     11-25  Mg     2.40     11-25    TPro  6.2  /  Alb  3.0[L]  /  TBili  0.3  /  DBili      /  AST  25  /  ALT  16  /  AlkPhos  100  11-24    Creatinine Trend: 13.79 <--, 13.70 <--                        8.0    6.65  )-----------( 246      ( 25 Nov 2024 06:50 )             25.1     Urine Studies:  Urinalysis Basic - ( 25 Nov 2024 06:50 )    Color:  / Appearance:  / SG:  / pH:   Gluc: 79 mg/dL / Ketone:   / Bili:  / Urobili:    Blood:  / Protein:  / Nitrite:    Leuk Esterase:  / RBC:  / WBC    Sq Epi:  / Non Sq Epi:  / Bacteria:           RADIOLOGY & ADDITIONAL STUDIES:

## 2024-11-25 NOTE — CONSULT NOTE ADULT - NS ATTEND AMEND GEN_ALL_CORE FT

## 2024-11-25 NOTE — CONSULT NOTE ADULT - ATTENDING COMMENTS
Patient seen and examined with team at bedside during rounds after lab data, medical records and radiology reports reviewed. I have read and agreeable in general with the documented note, assessment, and management plan which reflects my opinions from bedside rounds. Agree with note above and will highlight the followinM with PMH of MI s/p stent ESRD (peritoneal dialysis) who presented with progressive dyspnea. CXR demonstrating large right effusion of unclear duration. No fevers chills recent pneumonia. No previous thoracentesis completed. differential including Mi vs ESRD as transudative . Unilateral nature raises concern for more concerning etiologies including malignancy or infection however no history to suggest. Thoracentesis therefore indicated for diagnostic and therapeutic indications.     I have personally provided 78 minutes of non-critical care time concurrently with the resident/fellow/NP/PA. This time excludes time spent on separate procedures and time spent teaching. I have reviewed theresident/fellow/NP/PA’s documentation and I agree with the assessment and plan of care. I have personally reviewed laboratory data, radiology results, discussion with primary team\patient, discussion with consulting services, and monitoring for potential decompensation.    Guerrero Santos MD  Interventional Pulmonology & Critical Care Medicine

## 2024-11-25 NOTE — CONSULT NOTE ADULT - SUBJECTIVE AND OBJECTIVE BOX
11-25-24 @ 11:44    Patient is a 69y old  Male who presents with a chief complaint of Shortness of breath (25 Nov 2024 10:50)      HPI:  69-year-old male, with past history of CAD - s/p Stent, HTN, CAV, ESRD (on Peritoneal dialysis), DM and BPH presented to the ED secondary to shortness of breath.  Seen and evaluated at bedside; NAD at rest.  Patient endorses worsening shortness of breath for the past few days, but with sudden acuity while sitting up in bed in the AM.  Unable to lie flat and wakens from sleep gasping for breath at times.  Endorses palpitations, but no chest pain.  No associated diaphoresis, nausea, vomiting.  No lower extremities edema.  Drinks about 0.5 liters of fluid daily.  Uses one pillow nightly.  Has profound GERMAIN.  Relates falling in the bathroom while going into the shower about 3 to 4 days ago; hit the right temporal head on the tub.  Unable to determine if had LOC, but does stress that the trauma to the head was significant.      Vital signs upon ED presentation as follows: BP = 150/64, HR = 53, RR = ?____ (later recorded at 16), T = 36.7 C (98.1 F), O2 Sat = 99% on RA.  Diagnosed with Shortness of Breath in the ED. (24 Nov 2024 21:52)      ?FOLLOWING PRESENT  [ ] Hx of PE/DVT, [ ] Hx COPD, [ ] Hx of Asthma, [ ] Hx of Hospitalization, [ ]  Hx of BiPAP/CPAP use, [ ] Hx of DEBORAH    Allergies    No Known Allergies    Intolerances    hydrALAZINE (Short breath)      PAST MEDICAL & SURGICAL HISTORY:  Hypertension      ESRD on peritoneal dialysis      CVA (cerebrovascular accident)  2010 without residual effects      Hyperlipidemia      Type 2 diabetes mellitus, with long-term current use of insulin      BPH (benign prostatic hyperplasia)      CAD (coronary artery disease)      History of peritoneal dialysis  s/p PD catheter placement      S/P coronary artery stent placement          FAMILY HISTORY:  FH: HTN (hypertension) (Mother, Father)    FH: type 2 diabetes (Mother, Father)        Social History: [  ] TOBACCO                  [  ] ETOH                                 [  ] IVDA/DRUGS    REVIEW OF SYSTEMS      General:	    Skin/Breast:  	  Ophthalmologic:  	  ENMT:	    Respiratory and Thorax:  	  Cardiovascular:	    Gastrointestinal:	    Genitourinary:	    Musculoskeletal:	    Neurological:	    Psychiatric:	    Hematology/Lymphatics:	    Endocrine:	    Allergic/Immunologic:	    MEDICATIONS  (STANDING):  ammonium lactate 12% Lotion 1 Application(s) Topical two times a day  chlorhexidine 2% Cloths 1 Application(s) Topical daily  dextrose 5%. 1000 milliLiter(s) (50 mL/Hr) IV Continuous <Continuous>  dextrose 5%. 1000 milliLiter(s) (100 mL/Hr) IV Continuous <Continuous>  dextrose 50% Injectable 25 Gram(s) IV Push once  dextrose 50% Injectable 12.5 Gram(s) IV Push once  dextrose 50% Injectable 25 Gram(s) IV Push once  glucagon  Injectable 1 milliGRAM(s) IntraMuscular once  heparin   Injectable 5000 Unit(s) SubCutaneous every 12 hours  insulin lispro (ADMELOG) corrective regimen sliding scale   SubCutaneous three times a day before meals  insulin lispro (ADMELOG) corrective regimen sliding scale   SubCutaneous at bedtime    MEDICATIONS  (PRN):  acetaminophen     Tablet .. 650 milliGRAM(s) Oral every 6 hours PRN Mild Pain (1 - 3)  dextrose Oral Gel 15 Gram(s) Oral once PRN Blood Glucose LESS THAN 70 milliGRAM(s)/deciliter  melatonin 3 milliGRAM(s) Oral at bedtime PRN Insomnia       Vital Signs Last 24 Hrs  T(C): 36.7 (25 Nov 2024 11:00), Max: 36.9 (24 Nov 2024 21:51)  T(F): 98 (25 Nov 2024 11:00), Max: 98.5 (24 Nov 2024 21:51)  HR: 59 (25 Nov 2024 11:00) (53 - 61)  BP: 155/72 (25 Nov 2024 11:00) (142/68 - 174/71)  BP(mean): --  RR: 19 (25 Nov 2024 11:00) (16 - 180)  SpO2: 99% (25 Nov 2024 11:00) (98% - 100%)    Parameters below as of 25 Nov 2024 11:00  Patient On (Oxygen Delivery Method): room air    Orthostatic VS          I&O's Summary    25 Nov 2024 07:01  -  25 Nov 2024 11:44  --------------------------------------------------------  IN: 2000 mL / OUT: 0 mL / NET: 2000 mL        Physical Exam:   GENERAL: NAD, well-groomed, well-developed  HEENT: ALEJANDRO/   Atraumatic, Normocephalic  ENMT: No tonsillar erythema, exudates, or enlargement; Moist mucous membranes, Good dentition, No lesions  NECK: Supple, No JVD, Normal thyroid  CHEST/LUNG: Clear to auscultation bilaterally; No rales, rhonchi, wheezing, or rubs  CVS: Regular rate and rhythm; No murmurs, rubs, or gallops  GI: : Soft, Nontender, Nondistended; Bowel sounds present  NERVOUS SYSTEM:  Alert & Oriented X3, Good concentration; Motor Strength 5/5 B/L upper and lower extremities; DTRs 2+ intact and symmetric  EXTREMITIES:  2+ Peripheral Pulses, No clubbing, cyanosis, or edema  LYMPH: No lymphadenopathy noted  SKIN: No rashes or lesions  ENDOCRINOLOGY: No Thyromegaly  PSYCH: Appropriate    Labs:  -0.2<41<4>>60<<7.395>>-0.2<<3><<4><<5<<609>>                            8.0    6.65  )-----------( 246      ( 25 Nov 2024 06:50 )             25.1                         8.4    7.22  )-----------( 269      ( 24 Nov 2024 20:34 )             25.4     11-25    136  |  94[L]  |  58[H]  ----------------------------<  79  3.6   |  23  |  13.79[H]  11-24    134[L]  |  93[L]  |  58[H]  ----------------------------<  115[H]  4.0   |  25  |  13.70[H]    Ca    7.2[L]      25 Nov 2024 06:50  Ca    7.5[L]      24 Nov 2024 20:34  Phos  5.7     11-25  Mg     2.40     11-25  Mg     2.50     11-24    TPro  6.2  /  Alb  3.0[L]  /  TBili  0.3  /  DBili  x   /  AST  25  /  ALT  16  /  AlkPhos  100  11-24    CAPILLARY BLOOD GLUCOSE      POCT Blood Glucose.: 326 mg/dL (25 Nov 2024 11:37)  POCT Blood Glucose.: 88 mg/dL (25 Nov 2024 07:21)  POCT Blood Glucose.: 97 mg/dL (25 Nov 2024 00:40)  POCT Blood Glucose.: 100 mg/dL (24 Nov 2024 23:35)  POCT Blood Glucose.: 114 mg/dL (24 Nov 2024 22:03)  POCT Blood Glucose.: 145 mg/dL (24 Nov 2024 17:23)    LIVER FUNCTIONS - ( 24 Nov 2024 20:34 )  Alb: 3.0 g/dL / Pro: 6.2 g/dL / ALK PHOS: 100 U/L / ALT: 16 U/L / AST: 25 U/L / GGT: x             Urinalysis Basic - ( 25 Nov 2024 06:50 )    Color: x / Appearance: x / SG: x / pH: x  Gluc: 79 mg/dL / Ketone: x  / Bili: x / Urobili: x   Blood: x / Protein: x / Nitrite: x   Leuk Esterase: x / RBC: x / WBC x   Sq Epi: x / Non Sq Epi: x / Bacteria: x      D DImer      Studies  Chest X-RAY  CT SCAN Chest   CT Abdomen  Venous Dopplers: LE:   Others      rad< from: CT Head No Cont (11.25.24 @ 09:01) >  There is periventricular and subcortical white matter hypodensity without   mass effect, nonspecific, likely representing mild chronic microvascular   ischemic changes. There is no compelling evidence for an acute   transcortical infarction. There is no evidence of mass, mass effect,   midline shift or extra-axial fluid collection. The lateral ventricles and   cortical sulci are age-appropriate in size and configuration. Small left   mastoid effusion. The orbits, right mastoid air cells and visualized   paranasal sinuses are unremarkable. The calvarium is intact. Consider MRI   as clinically warranted.    IMPRESSION:  Mild chronic microvascular changes without evidence of an   acute transcortical infarction or hemorrhage.    --- End of Report ---            NAY LA MD; Attending Radiologist  This document has been electronically signed. Nov 25 2024  9:12AM    < end of copied text >  < from: Xray Chest 2 Views PA/Lat (11.24.24 @ 20:36) >    COMPARISON: Chest x-ray 4/7/2024.    FINDINGS:  The heart is normal in size.  The visualized lungs are clear.  No pneumothorax.  Moderate right layering pleural effusion with associated atelectasis.  No acute bony abnormality.      IMPRESSION:  Moderate right layering pleural effusion with associated atelectasis.    --- End of Report ---          JESSE GONSALEZ MD; Resident Radiologist  This document has been electronically signed.  MOISES HERNANDEZ MD; Attending Radiologist  This document has been electronically signed. Nov 25 2024  9:09AM    < end of copied text >  < from: CT Chest No Cont (11.02.22 @ 09:09) >  BONES/SOFT TISSUES: Mild degenerative changes. No osseous lesions.    IMPRESSION:  Drainage catheter in pericardial space with interval decrease in size of   pericardial effusion.    Interval decrease in size of bilateral pleural effusion.    --- End of Report ---    < end of copied text >      < from: Transthoracic Echocardiogram (10.31.22 @ 16:15) >  Height: 172 cm  Weight: 67 kg  BSA: 1.8 m2  ------------------------------------------------------------------------  PROCEDURE: Transthoracic echocardiogram with 2-D, M-Mode  and complete spectral and color flow Doppler.  INDICATION: Pericardial effusion (noninflammatory) (I31.3)  ------------------------------------------------------------------------  OBSERVATIONS:  Pericardium/PleuraNormal pericardium with trace pericardial  effusion. Bilateral pleural effusions.  ------------------------------------------------------------------------  CONCLUSIONS:  1. Normal pericardium with trace pericardial effusion.  2. Bilateral pleural effusions.  ------------------------------------------------------------------------  Confirmed on  11/1/2022 - 08:50:54 by DOMONIQUE Woods  ------------------------------------------------------------------------    < end of copied text >             11-25-24 @ 11:44    Patient is a 69y old  Male who presents with a chief complaint of Shortness of breath (25 Nov 2024 10:50)      HPI:  69-year-old male, with past history of CAD - s/p Stent, HTN, CAV, ESRD (on Peritoneal dialysis), DM and BPH presented to the ED secondary to shortness of breath.  Seen and evaluated at bedside; NAD at rest.  Patient endorses worsening shortness of breath for the past few days, but with sudden acuity while sitting up in bed in the AM.  Unable to lie flat and wakens from sleep gasping for breath at times.  Endorses palpitations, but no chest pain.  No associated diaphoresis, nausea, vomiting.  No lower extremities edema.  Drinks about 0.5 liters of fluid daily.  Uses one pillow nightly.  Has profound MARQUEZ.  Relates falling in the bathroom while going into the shower about 3 to 4 days ago; hit the right temporal head on the tub.  Unable to determine if had LOC, but does stress that the trauma to the head was significant.      Vital signs upon ED presentation as follows: BP = 150/64, HR = 53, RR = ?____ (later recorded at 16), T = 36.7 C (98.1 F), O2 Sat = 99% on RA.  Diagnosed with Shortness of Breath in the ED. (24 Nov 2024 21:52):    he say she is feeling sob for a few days and is also hearing whistling sounds in his lungs :  he has no hx of asthma and never smoked        ?FOLLOWING PRESENT  [ x] Hx of PE/DVT, x[ ] Hx COPD, [ ]x Hx of Asthma, [ y] Hx of Hospitalization, [x ]  Hx of BiPAP/CPAP use, [ ]x Hx of DEBORAH    Allergies    No Known Allergies    Intolerances    hydrALAZINE (Short breath)      PAST MEDICAL & SURGICAL HISTORY:  Hypertension      ESRD on peritoneal dialysis      CVA (cerebrovascular accident)  2010 without residual effects      Hyperlipidemia      Type 2 diabetes mellitus, with long-term current use of insulin      BPH (benign prostatic hyperplasia)      CAD (coronary artery disease)      History of peritoneal dialysis  s/p PD catheter placement      S/P coronary artery stent placement          FAMILY HISTORY:  FH: HTN (hypertension) (Mother, Father)    FH: type 2 diabetes (Mother, Father)        Social History: [ x ] TOBACCO                  [ x ] ETOH                                 [ x ] IVDA/DRUGS    REVIEW OF SYSTEMS      General:	x    Skin/Breast:x  	  Ophthalmologic:x  	  ENMT:	x    Respiratory and Thorax:  sob,  marquez,  wheezing  	  Cardiovascular:	x    Gastrointestinal:	x    Genitourinary:	x    Musculoskeletal:	x    Neurological:	x    Psychiatric:	x    Hematology/Lymphatics:	x    Endocrine:x	    Allergic/Immunologic:	x    MEDICATIONS  (STANDING):  ammonium lactate 12% Lotion 1 Application(s) Topical two times a day  chlorhexidine 2% Cloths 1 Application(s) Topical daily  dextrose 5%. 1000 milliLiter(s) (50 mL/Hr) IV Continuous <Continuous>  dextrose 5%. 1000 milliLiter(s) (100 mL/Hr) IV Continuous <Continuous>  dextrose 50% Injectable 25 Gram(s) IV Push once  dextrose 50% Injectable 12.5 Gram(s) IV Push once  dextrose 50% Injectable 25 Gram(s) IV Push once  glucagon  Injectable 1 milliGRAM(s) IntraMuscular once  heparin   Injectable 5000 Unit(s) SubCutaneous every 12 hours  insulin lispro (ADMELOG) corrective regimen sliding scale   SubCutaneous three times a day before meals  insulin lispro (ADMELOG) corrective regimen sliding scale   SubCutaneous at bedtime    MEDICATIONS  (PRN):  acetaminophen     Tablet .. 650 milliGRAM(s) Oral every 6 hours PRN Mild Pain (1 - 3)  dextrose Oral Gel 15 Gram(s) Oral once PRN Blood Glucose LESS THAN 70 milliGRAM(s)/deciliter  melatonin 3 milliGRAM(s) Oral at bedtime PRN Insomnia       Vital Signs Last 24 Hrs  T(C): 36.7 (25 Nov 2024 11:00), Max: 36.9 (24 Nov 2024 21:51)  T(F): 98 (25 Nov 2024 11:00), Max: 98.5 (24 Nov 2024 21:51)  HR: 59 (25 Nov 2024 11:00) (53 - 61)  BP: 155/72 (25 Nov 2024 11:00) (142/68 - 174/71)  BP(mean): --  RR: 19 (25 Nov 2024 11:00) (16 - 180)  SpO2: 99% (25 Nov 2024 11:00) (98% - 100%)    Parameters below as of 25 Nov 2024 11:00  Patient On (Oxygen Delivery Method): room air    Orthostatic VS          I&O's Summary    25 Nov 2024 07:01  -  25 Nov 2024 11:44  --------------------------------------------------------  IN: 2000 mL / OUT: 0 mL / NET: 2000 mL        Physical Exam:   GENERAL: NAD, well-groomed, well-developed  HEENT: ALEJANDRO/   Atraumatic, Normocephalic  ENMT: No tonsillar erythema, exudates, or enlargement; Moist mucous membranes, Good dentition, No lesions  NECK: Supple, No JVD, Normal thyroid  CHEST/LUNG: mild wheezing:  decreased air entry rigth side  CVS: Regular rate and rhythm; No murmurs, rubs, or gallops  GI: : Soft, Nontender, Nondistended; Bowel sounds present  NERVOUS SYSTEM:  Alert & Oriented X3  EXTREMITIES: - edema  LYMPH: No lymphadenopathy noted  SKIN: No rashes or lesions  ENDOCRINOLOGY: No Thyromegaly  PSYCH: Appropriate    Labs:  -0.2<41<4>>60<<7.395>>-0.2<<3><<4><<5<<609>>                            8.0    6.65  )-----------( 246      ( 25 Nov 2024 06:50 )             25.1                         8.4    7.22  )-----------( 269      ( 24 Nov 2024 20:34 )             25.4     11-25    136  |  94[L]  |  58[H]  ----------------------------<  79  3.6   |  23  |  13.79[H]  11-24    134[L]  |  93[L]  |  58[H]  ----------------------------<  115[H]  4.0   |  25  |  13.70[H]    Ca    7.2[L]      25 Nov 2024 06:50  Ca    7.5[L]      24 Nov 2024 20:34  Phos  5.7     11-25  Mg     2.40     11-25  Mg     2.50     11-24    TPro  6.2  /  Alb  3.0[L]  /  TBili  0.3  /  DBili  x   /  AST  25  /  ALT  16  /  AlkPhos  100  11-24    CAPILLARY BLOOD GLUCOSE      POCT Blood Glucose.: 326 mg/dL (25 Nov 2024 11:37)  POCT Blood Glucose.: 88 mg/dL (25 Nov 2024 07:21)  POCT Blood Glucose.: 97 mg/dL (25 Nov 2024 00:40)  POCT Blood Glucose.: 100 mg/dL (24 Nov 2024 23:35)  POCT Blood Glucose.: 114 mg/dL (24 Nov 2024 22:03)  POCT Blood Glucose.: 145 mg/dL (24 Nov 2024 17:23)    LIVER FUNCTIONS - ( 24 Nov 2024 20:34 )  Alb: 3.0 g/dL / Pro: 6.2 g/dL / ALK PHOS: 100 U/L / ALT: 16 U/L / AST: 25 U/L / GGT: x             Urinalysis Basic - ( 25 Nov 2024 06:50 )    Color: x / Appearance: x / SG: x / pH: x  Gluc: 79 mg/dL / Ketone: x  / Bili: x / Urobili: x   Blood: x / Protein: x / Nitrite: x   Leuk Esterase: x / RBC: x / WBC x   Sq Epi: x / Non Sq Epi: x / Bacteria: x      D DImer      Studies  Chest X-RAY  CT SCAN Chest   CT Abdomen  Venous Dopplers: LE:   Others      rad< from: CT Head No Cont (11.25.24 @ 09:01) >  There is periventricular and subcortical white matter hypodensity without   mass effect, nonspecific, likely representing mild chronic microvascular   ischemic changes. There is no compelling evidence for an acute   transcortical infarction. There is no evidence of mass, mass effect,   midline shift or extra-axial fluid collection. The lateral ventricles and   cortical sulci are age-appropriate in size and configuration. Small left   mastoid effusion. The orbits, right mastoid air cells and visualized   paranasal sinuses are unremarkable. The calvarium is intact. Consider MRI   as clinically warranted.    IMPRESSION:  Mild chronic microvascular changes without evidence of an   acute transcortical infarction or hemorrhage.    --- End of Report ---            NAY LA MD; Attending Radiologist  This document has been electronically signed. Nov 25 2024  9:12AM    < end of copied text >  < from: Xray Chest 2 Views PA/Lat (11.24.24 @ 20:36) >    COMPARISON: Chest x-ray 4/7/2024.    FINDINGS:  The heart is normal in size.  The visualized lungs are clear.  No pneumothorax.  Moderate right layering pleural effusion with associated atelectasis.  No acute bony abnormality.      IMPRESSION:  Moderate right layering pleural effusion with associated atelectasis.    --- End of Report ---          JESSE GONSALEZ MD; Resident Radiologist  This document has been electronically signed.  MOISES HERNANDEZ MD; Attending Radiologist  This document has been electronically signed. Nov 25 2024  9:09AM    < end of copied text >  < from: CT Chest No Cont (11.02.22 @ 09:09) >  BONES/SOFT TISSUES: Mild degenerative changes. No osseous lesions.    IMPRESSION:  Drainage catheter in pericardial space with interval decrease in size of   pericardial effusion.    Interval decrease in size of bilateral pleural effusion.    --- End of Report ---    < end of copied text >      < from: Transthoracic Echocardiogram (10.31.22 @ 16:15) >  Height: 172 cm  Weight: 67 kg  BSA: 1.8 m2  ------------------------------------------------------------------------  PROCEDURE: Transthoracic echocardiogram with 2-D, M-Mode  and complete spectral and color flow Doppler.  INDICATION: Pericardial effusion (noninflammatory) (I31.3)  ------------------------------------------------------------------------  OBSERVATIONS:  Pericardium/PleuraNormal pericardium with trace pericardial  effusion. Bilateral pleural effusions.  ------------------------------------------------------------------------  CONCLUSIONS:  1. Normal pericardium with trace pericardial effusion.  2. Bilateral pleural effusions.  ------------------------------------------------------------------------  Confirmed on  11/1/2022 - 08:50:54 by Deysi Vera M.D. [facility]  ------------------------------------------------------------------------    < end of copied text >

## 2024-11-25 NOTE — CONSULT NOTE ADULT - ASSESSMENT
69-year-old male, with past history of CAD - s/p Stent, HTN, CAV, ESRD (on Peritoneal dialysis), DM and BPH presented to the ED secondary to shortness of breath. Found to have mod-large pleural effusion, IP consulted for thoracentesis.    # Right pleural effusion  - most likely transudate based on history  - s/p thora on 11/25 with 2L removed. patient had some anterior chest discomfort so procedure terminated at that point.   - lung sliding noted post procedure  - f/u post-procedure CXR  - f/u thoracentesis studies  - remainder of care per primary team and Dr. Palafox  - [facility] Pulmonology team to sign off, please call back with questions

## 2024-11-25 NOTE — CONSULT NOTE ADULT - ASSESSMENT
69-year-old male, with past history of CAD - s/p Stent, HTN, CAV, ESRD (on Peritoneal dialysis), DM and BPH presented to the ED secondary to shortness of breath.  Seen and evaluated at bedside; NAD at rest.  Patient endorses worsening shortness of breath for the past few days, but with sudden acuity while sitting up in bed in the AM.  Unable to lie flat and wakens from sleep gasping for breath at times.  Endorses palpitations, but no chest pain.  No associated diaphoresis, nausea, vomiting.  No lower extremities edema.  Drinks about 0.5 liters of fluid daily.  Uses one pillow nightly.  Has profound GERMAIN.  Relates falling in the bathroom while going into the shower about 3 to 4 days ago; hit the right temporal head on the tub.  Unable to determine if had LOC, but does stress that the trauma to the head was significant.      Vital signs upon ED presentation as follows: BP = 150/64, HR = 53, RR = ?____ (later recorded at 16), T = 36.7 C (98.1 F), O2 Sat = 99% on RA.  Diagnosed with Shortness of Breath in the ED. (24 Nov 2024 21:52)      sob:  increasing rigth sided pleural effusion :  hx of pericardial fluid drainage  CAD  HTN  ESRD -PD  DM  BPH    sob:  increasing rigth sided pleural effusion :  hx of pericardial fluid drainage  CAD  HTN  ESRD -PD  DM  BPH   69-year-old male, with past history of CAD - s/p Stent, HTN, CAV, ESRD (on Peritoneal dialysis), DM and BPH presented to the ED secondary to shortness of breath.  Seen and evaluated at bedside; NAD at rest.  Patient endorses worsening shortness of breath for the past few days, but with sudden acuity while sitting up in bed in the AM.  Unable to lie flat and wakens from sleep gasping for breath at times.  Endorses palpitations, but no chest pain.  No associated diaphoresis, nausea, vomiting.  No lower extremities edema.  Drinks about 0.5 liters of fluid daily.  Uses one pillow nightly.  Has profound GERMAIN.  Relates falling in the bathroom while going into the shower about 3 to 4 days ago; hit the right temporal head on the tub.  Unable to determine if had LOC, but does stress that the trauma to the head was significant.      Vital signs upon ED presentation as follows: BP = 150/64, HR = 53, RR = ?____ (later recorded at 16), T = 36.7 C (98.1 F), O2 Sat = 99% on RA.  Diagnosed with Shortness of Breath in the ED. (24 Nov 2024 21:52)      sob:  increasing rigth sided pleural effusion :  hx of pericardial fluid drainage  CAD  HTN  ESRD -PD  DM  BPH    sob:  increasing rigth sided pleural effusion :  his sob seems to be relaetd to pleural effusion and wheezing he is getting;   he carries no diagnosis of wheezing:   start BD and thoracentesis   add steroids too  currently he isno tin any resp distress  hx of pericardial fluid drainage  -was drained before?  etiology  : defer to cards   CAD  -cont current meds  HTN  -not on any anti hypertensives at this time  ESRD -PD  -per renal    DM  heparin   Injectable 5000 Unit(s) SubCutaneous every 12 hours  insulin lispro (ADMELOG) corrective regimen sliding scale   SubCutaneous three times a day before meals  insulin lispro (ADMELOG) corrective regimen sliding scale   SubCutaneous at   [facility]    dw team

## 2024-11-25 NOTE — PATIENT PROFILE ADULT - FALL HARM RISK - HARM RISK INTERVENTIONS

## 2024-11-25 NOTE — PATIENT PROFILE ADULT - FALL HARM RISK - PATIENT NEEDS ASSISTANCE
Lab Results   Component Value Date    HGBA1C 6 7 (H) 10/07/2019       · History of diabetes on metformin, glipizide, repaglinide and Canaglifozin    · A1c 6 7  · Discontinue insulin and discharged patient on his home medications  · To follow up for regular A1c checks Standing/Walking/Toileting

## 2024-11-25 NOTE — CHART NOTE - NSCHARTNOTEFT_GEN_A_CORE
:  Esteban Ham    INDICATION: pleural effusion    PROCEDURE:   [ ] LIMITED ECHO  [ x] LIMITED CHEST  [ ] LIMITED RETROPERITONEAL  [ ] LIMITED ABDOMINAL  [ ] LIMITED DVT  [ ] LIMITED NECK - TRACHEOSTOMY EVAL  [ ] NEEDLE GUIDANCE VASCULAR  [x ] NEEDLE GUIDANCE THORACENTESIS  [ ] NEEDLE GUIDANCE PARACENTESIS  [ ] NEEDLE GUIDANCE PERICARDIOCENTESIS  [ ] OTHER    FINDINGS:   moderate-large simple right pleural effusion.  after drainage, effusion is now trace    INTERPRETATION:   successful thoracentesis    Supervised by Dr. Santos  Images stored in qpath  Should not be considered final until signed by attending. :  Esteban Ham    INDICATION: pleural effusion    PROCEDURE:   [ ] LIMITED ECHO  [ x] LIMITED CHEST  [ ] LIMITED RETROPERITONEAL  [ ] LIMITED ABDOMINAL  [ ] LIMITED DVT  [ ] LIMITED NECK - TRACHEOSTOMY EVAL  [ ] NEEDLE GUIDANCE VASCULAR  [x ] NEEDLE GUIDANCE THORACENTESIS  [ ] NEEDLE GUIDANCE PARACENTESIS  [ ] NEEDLE GUIDANCE PERICARDIOCENTESIS  [ ] OTHER    FINDINGS:   moderate-large simple right pleural effusion.  after drainage, effusion is now trace    INTERPRETATION:   successful thoracentesis    Supervised by Dr. Santos  Images stored in qpath  Should not be considered final until signed by attending.      Attending Addendum:     I was present during the entire procedure and agree with the above findings and interpretation. TIme spent on the procedure was separate from the critical care time spent caring for the patient.       Guerrero Santos MD  Interventional Pulmonology & Critical Care Medicine  [facility]

## 2024-11-25 NOTE — CONSULT NOTE ADULT - ASSESSMENT
69-year-old male, with past history of CAD - s/p Stent, HTN, CAV, ESRD (on Peritoneal dialysis), DM and BPH presented to the ED with shortness of breath.  Seen and evaluated at bedside; NAD at rest.  Patient endorses worsening shortness of breath for the past few days, but with sudden acuity while sitting up in bed in the AM.  Unable to lie flat and wakens from sleep gasping for breath at times.  Endorses palpitations, but no chest pain. Nephrology consulted for dialysis needs.    A/P  Peritoneal dialysis  PD at home for 10 hrs  Follows with nephro Dr. Menjivar   Consent obtained, witnessed, placed in chart  Manual today using 4.25% dialysate then cycler tomorrow   Monitor bmp     HTN  bp suboptimal  resume home meds  low sodium diet  monitor bp     Anemia   hgb below goal  epo TIW  transfuse prn to keep hgb >8   monitor hgb     CKD-MBD  check pth  monitor Ca, PO4 daily     Shortness of breath  managed as per primary team    69-year-old male, with past history of CAD - s/p Stent, HTN, CAV, ESRD (on Peritoneal dialysis), DM and BPH presented to the ED with shortness of breath.  Seen and evaluated at bedside; NAD at rest.  Patient endorses worsening shortness of breath for the past few days, but with sudden acuity while sitting up in bed in the AM.  Unable to lie flat and wakens from sleep gasping for breath at times.  Endorses palpitations, but no chest pain. Nephrology consulted for dialysis needs.    A/P  Peritoneal dialysis  PD at home  Follows with nephro Dr. Menjivar   Consent obtained, witnessed, placed in chart  Manual today using 4.25% dialysate then cycler tomorrow   Monitor bmp     HTN  bp suboptimal  resume home meds  low sodium diet  monitor bp     Anemia   hgb below goal  epo 10,000 SQ TIW  transfuse prn to keep hgb >8   monitor hgb     CKD-MBD  check pth  monitor Ca, PO4 daily     Shortness of breath  managed as per primary team    69-year-old male, with past history of CAD - s/p Stent, HTN, CAV, ESRD (on Peritoneal dialysis), DM and BPH presented to the ED with shortness of breath.  Seen and evaluated at bedside; NAD at rest.  Patient endorses worsening shortness of breath for the past few days, but with sudden acuity while sitting up in bed in the AM.  Unable to lie flat and wakens from sleep gasping for breath at times.  Endorses palpitations, but no chest pain. Nephrology consulted for dialysis needs.    A/P  ESRD on Peritoneal dialysis  FRom   Follows with nephro Dr. Menjivar   Consent obtained, witnessed, placed in chart  Manual today using 2.5% and 4.25% dialysate then cycler tomorrow   Monitor bmp     HTN  bp suboptimal  resume home meds  low sodium diet  monitor bp     Anemia   hgb below goal  epo 10,000 SQ TIW  transfuse prn to keep hgb >8   monitor hgb     CKD-MBD  check pth  monitor Ca, PO4 daily     Shortness of breath  UF with PD   Recommend Bumex IV  managed as per primary team

## 2024-11-25 NOTE — CONSULT NOTE ADULT - ASSESSMENT
EKG NSR LVH, repolarization abnormality    [facility] 7/8/2024 LAD s/p PCI    A/P    69-year-old male, with past history of CAD - s/p Stent, HTN, CAV, ESRD (on Peritoneal dialysis), DM and BPH presented to the ED secondary to shortness of breath.  Diagnosed with Shortness of Breath in the ED.    1. Shortness of breath  - BNP 78977, Trop 273>238, s/p lasix 80 IV, metolazol po per renal in the ED  - CXR Rt pulm effu, pulmo consulted  - Pt denies CP, TTE pending  - ESRD on PD    2. ESRD on peritoneal dialysis.   -f/u renal recs    3. CAD s/p PCI  - asa, plavix, statin    4. HTN  -resume BP meds accordlingly       EKG NSR LVH, repolarization abnormality    [facility] 7/8/2024 LAD s/p PCI    A/P    69-year-old male, with past history of CAD - s/p Stent, HTN, CAV, ESRD (on Peritoneal dialysis), DM and BPH presented to the ED secondary to shortness of breath.  Diagnosed with Shortness of Breath in the ED.    1. Shortness of breath  - BNP 53616, Trop 273>238, s/p lasix 80 IV, metolazol po per renal in the ED  - CXR Rt pulm effu, pulmo consulted  - Pt denies CP, TTE pending  - ESRD on PD    2. ESRD on peritoneal dialysis.   -f/u renal recs    3. CAD s/p LAD , LM PCI  - asa, plavix, statin    4. HTN  -resume BP meds accordlingly

## 2024-11-25 NOTE — CONSULT NOTE ADULT - SUBJECTIVE AND OBJECTIVE BOX
Doug Garcia MD  Interventional Cardiology / Endovascular Specialist  [place] Office : [address] [place] [address] [place] [address]  Tel:   [place] Office : [address] [facility] [place] [address]  Tel: [phone]  Cell : [phone]    HISTORY OF PRESENTING ILLNESS:  HPI:  69-year-old male, with past history of CAD - s/p Stent, HTN, CAV, ESRD (on Peritoneal dialysis), DM and BPH presented to the ED secondary to shortness of breath.  Seen and evaluated at bedside; NAD at rest.  Patient endorses worsening shortness of breath for the past few days, but with sudden acuity while sitting up in bed in the AM.  Unable to lie flat and wakens from sleep gasping for breath at times.  Endorses palpitations, but no chest pain.  No associated diaphoresis, nausea, vomiting.  No lower extremities edema.  Drinks about 0.5 liters of fluid daily.  Uses one pillow nightly.  Has profound GERMAIN.  Relates falling in the bathroom while going into the shower about 3 to 4 days ago; hit the right temporal head on the tub.  Unable to determine if had LOC, but does stress that the trauma to the head was significant.        	  MEDICATIONS:  heparin   Injectable 5000 Unit(s) SubCutaneous every 12 hours        acetaminophen     Tablet .. 650 milliGRAM(s) Oral every 6 hours PRN  melatonin 3 milliGRAM(s) Oral at bedtime PRN      dextrose 50% Injectable 25 Gram(s) IV Push once  dextrose 50% Injectable 12.5 Gram(s) IV Push once  dextrose 50% Injectable 25 Gram(s) IV Push once  dextrose Oral Gel 15 Gram(s) Oral once PRN  glucagon  Injectable 1 milliGRAM(s) IntraMuscular once  insulin lispro (ADMELOG) corrective regimen sliding scale   SubCutaneous three times a day before meals  insulin lispro (ADMELOG) corrective regimen sliding scale   SubCutaneous at bedtime    ammonium lactate 12% Lotion 1 Application(s) Topical two times a day  chlorhexidine 2% Cloths 1 Application(s) Topical daily  dextrose 5%. 1000 milliLiter(s) IV Continuous <Continuous>  dextrose 5%. 1000 milliLiter(s) IV Continuous <Continuous>  epoetin heidi (EPOGEN) Injectable 32322 Unit(s) SubCutaneous <User Schedule>      PAST MEDICAL/SURGICAL HISTORY  PAST MEDICAL & SURGICAL HISTORY:  Hypertension      ESRD on peritoneal dialysis      CVA (cerebrovascular accident)  2010 without residual effects      Hyperlipidemia      Type 2 diabetes mellitus, with long-term current use of insulin      BPH (benign prostatic hyperplasia)      CAD (coronary artery disease)      History of peritoneal dialysis  s/p PD catheter placement      S/P coronary artery stent placement          SOCIAL HISTORY: Substance Use (street drugs): ( x ) never used  (  ) other:    FAMILY HISTORY:  FH: HTN (hypertension) (Mother, Father)    FH: type 2 diabetes (Mother, Father)            PHYSICAL EXAM:  T(C): 36.5 (11-25-24 @ 14:30), Max: 36.9 (11-24-24 @ 21:51)  HR: 56 (11-25-24 @ 14:30) (53 - 61)  BP: 150/69 (11-25-24 @ 14:30) (142/68 - 174/71)  RR: 17 (11-25-24 @ 14:30) (16 - 180)  SpO2: 97% (11-25-24 @ 12:46) (97% - 100%)  Wt(kg): --  I&O's Summary    25 Nov 2024 07:01  -  25 Nov 2024 15:54  --------------------------------------------------------  IN: 4380 mL / OUT: 2400 mL / NET: 1980 mL      Height (cm): 172.7 (11-24 @ 17:20)  Weight (kg): 68 (11-24 @ 17:20)  BMI (kg/m2): 22.8 (11-24 @ 17:20)  BSA (m2): 1.81 (11-24 @ 17:20)    GENERAL: NAD  EYES: EOMI, PERRLA, conjunctiva and sclera clear  ENMT: No tonsillar erythema, exudates, or enlargement; Moist mucous membranes, Good dentition, No lesions  Cardiovascular: Normal S1 S2, No JVD, No murmurs, No edema  Respiratory: mild wheezing bilaterally  Gastrointestinal:  Soft, Non-tender, + BS	  Extremities: no edema                                    8.0    6.65  )-----------( 246      ( 25 Nov 2024 06:50 )             25.1     11-25    136  |  94[L]  |  58[H]  ----------------------------<  79  3.6   |  23  |  13.79[H]    Ca    7.2[L]      25 Nov 2024 06:50  Phos  5.7     11-25  Mg     2.40     11-25    TPro  6.2  /  Alb  3.0[L]  /  TBili  0.3  /  DBili  x   /  AST  25  /  ALT  16  /  AlkPhos  100  11-24    proBNP:   Lipid Profile:   HgA1c:   TSH: Thyroid Stimulating Hormone, Serum: 6.22 uIU/mL (11-24 @ 20:34)      Consultant(s) Notes Reviewed:  [x ] YES  [ ] NO    Care Discussed with Consultants/Other Providers [ x] YES  [ ] NO    Imaging Personally Reviewed independently:  [x] YES  [ ] NO    All labs, radiologic studies, vitals, orders and medications list reviewed. Patient is seen and examined at bedside. Case discussed with medical team.

## 2024-11-26 LAB
ADD ON TEST-SPECIMEN IN LAB: SIGNIFICANT CHANGE UP
ANION GAP SERPL CALC-SCNC: 20 MMOL/L — HIGH (ref 7–14)
BUN SERPL-MCNC: 52 MG/DL — HIGH (ref 7–23)
CALCIUM SERPL-MCNC: 7.3 MG/DL — LOW (ref 8.4–10.5)
CHLORIDE SERPL-SCNC: 94 MMOL/L — LOW (ref 98–107)
CHOLEST FLD-MCNC: 43 MG/DL — SIGNIFICANT CHANGE UP
CO2 SERPL-SCNC: 24 MMOL/L — SIGNIFICANT CHANGE UP (ref 22–31)
CREAT SERPL-MCNC: 12.51 MG/DL — HIGH (ref 0.5–1.3)
EGFR: 4 ML/MIN/1.73M2 — LOW
GLUCOSE BLDC GLUCOMTR-MCNC: 160 MG/DL — HIGH (ref 70–99)
GLUCOSE BLDC GLUCOMTR-MCNC: 286 MG/DL — HIGH (ref 70–99)
GLUCOSE BLDC GLUCOMTR-MCNC: 92 MG/DL — SIGNIFICANT CHANGE UP (ref 70–99)
GLUCOSE BLDC GLUCOMTR-MCNC: 98 MG/DL — SIGNIFICANT CHANGE UP (ref 70–99)
GLUCOSE SERPL-MCNC: 76 MG/DL — SIGNIFICANT CHANGE UP (ref 70–99)
GRAM STN FLD: SIGNIFICANT CHANGE UP
HCT VFR BLD CALC: 27.4 % — LOW (ref 39–50)
HGB BLD-MCNC: 8.7 G/DL — LOW (ref 13–17)
LDH SERPL L TO P-CCNC: 346 U/L — HIGH (ref 135–225)
MCHC RBC-ENTMCNC: 28 PG — SIGNIFICANT CHANGE UP (ref 27–34)
MCHC RBC-ENTMCNC: 31.8 G/DL — LOW (ref 32–36)
MCV RBC AUTO: 88.1 FL — SIGNIFICANT CHANGE UP (ref 80–100)
NRBC # BLD: 0 /100 WBCS — SIGNIFICANT CHANGE UP (ref 0–0)
NRBC # FLD: 0 K/UL — SIGNIFICANT CHANGE UP (ref 0–0)
PH FLD: 7.8 — SIGNIFICANT CHANGE UP
PLATELET # BLD AUTO: 263 K/UL — SIGNIFICANT CHANGE UP (ref 150–400)
POTASSIUM SERPL-MCNC: 3.8 MMOL/L — SIGNIFICANT CHANGE UP (ref 3.5–5.3)
POTASSIUM SERPL-SCNC: 3.8 MMOL/L — SIGNIFICANT CHANGE UP (ref 3.5–5.3)
PROT SERPL-MCNC: 5.5 G/DL — LOW (ref 6–8.3)
RBC # BLD: 3.11 M/UL — LOW (ref 4.2–5.8)
RBC # FLD: 14.3 % — SIGNIFICANT CHANGE UP (ref 10.3–14.5)
SODIUM SERPL-SCNC: 138 MMOL/L — SIGNIFICANT CHANGE UP (ref 135–145)
SPECIMEN SOURCE: SIGNIFICANT CHANGE UP
WBC # BLD: 6.96 K/UL — SIGNIFICANT CHANGE UP (ref 3.8–10.5)
WBC # FLD AUTO: 6.96 K/UL — SIGNIFICANT CHANGE UP (ref 3.8–10.5)

## 2024-11-26 RX ORDER — CALCIUM ACETATE 667 MG/5ML
667 SOLUTION ORAL
Refills: 0 | Status: DISCONTINUED | OUTPATIENT
Start: 2024-11-26 | End: 2024-11-29

## 2024-11-26 RX ORDER — LABETALOL 100 MG/1
200 TABLET, FILM COATED ORAL
Refills: 0 | Status: DISCONTINUED | OUTPATIENT
Start: 2024-11-26 | End: 2024-11-29

## 2024-11-26 RX ORDER — BUMETANIDE 1 MG/1
2 TABLET ORAL
Refills: 0 | Status: DISCONTINUED | OUTPATIENT
Start: 2024-11-26 | End: 2024-11-29

## 2024-11-26 RX ORDER — AMLODIPINE BESYLATE 10 MG/1
10 TABLET ORAL DAILY
Refills: 0 | Status: DISCONTINUED | OUTPATIENT
Start: 2024-11-26 | End: 2024-11-29

## 2024-11-26 RX ADMIN — Medication 5000 UNIT(S): at 17:34

## 2024-11-26 RX ADMIN — BUMETANIDE 2 MILLIGRAM(S): 1 TABLET ORAL at 15:39

## 2024-11-26 RX ADMIN — AMLODIPINE BESYLATE 10 MILLIGRAM(S): 10 TABLET ORAL at 17:33

## 2024-11-26 RX ADMIN — CALCIUM ACETATE 667 MILLIGRAM(S): 667 SOLUTION ORAL at 17:33

## 2024-11-26 RX ADMIN — Medication 3: at 13:05

## 2024-11-26 RX ADMIN — Medication 1000 UNIT(S): at 13:04

## 2024-11-26 RX ADMIN — Medication 1: at 18:51

## 2024-11-26 RX ADMIN — LABETALOL 200 MILLIGRAM(S): 100 TABLET, FILM COATED ORAL at 17:33

## 2024-11-26 RX ADMIN — CHLORHEXIDINE GLUCONATE 1 APPLICATION(S): 1.2 RINSE ORAL at 13:05

## 2024-11-26 RX ADMIN — AMMONIUM LACTATE 1 APPLICATION(S): 120 LOTION TOPICAL at 06:10

## 2024-11-26 RX ADMIN — Medication 5000 UNIT(S): at 06:09

## 2024-11-26 NOTE — PROGRESS NOTE ADULT - ASSESSMENT
69-year-old male, with past history of CAD - s/p Stent, HTN, CAV, ESRD (on Peritoneal dialysis), DM and BPH presented to the ED secondary to shortness of breath.  Seen and evaluated at bedside; NAD at rest.  Patient endorses worsening shortness of breath for the past few days, but with sudden acuity while sitting up in bed in the AM.  Unable to lie flat and wakens from sleep gasping for breath at times.  Endorses palpitations, but no chest pain.  No associated diaphoresis, nausea, vomiting.  No lower extremities edema.  Drinks about 0.5 liters of fluid daily.  Uses one pillow nightly.  Has profound GERMAIN.  Relates falling in the bathroom while going into the shower about 3 to 4 days ago; hit the right temporal head on the tub.  Unable to determine if had LOC, but does stress that the trauma to the head was significant.      Vital signs upon ED presentation as follows: BP = 150/64, HR = 53, RR = ?____ (later recorded at 16), T = 36.7 C (98.1 F), O2 Sat = 99% on RA.  Diagnosed with Shortness of Breath in the ED. (24 Nov 2024 21:52)      sob:  increasing rigth sided pleural effusion :  hx of pericardial fluid drainage  CAD  HTN  ESRD -PD  DM  BPH    sob:  increasing rigth sided pleural effusion :  his sob seems to be relaetd to pleural effusion and wheezing he is getting;   he carries no diagnosis of wheezing:   start BD and thoracentesis   add steroids too  currently he isno tin any resp distress    11/26:  he seems vvery good today  :  no sob:   on room air:   effusion seems transudative but need to wait for serum LDH     hx of pericardial fluid drainage  -was drained before?  etiology  : defer to cards     CAD  -cont current meds  HTN  -not on any anti hypertensives at this time  ESRD -PD  -per renal    DM  heparin   Injectable 5000 Unit(s) SubCutaneous every 12 hours  insulin lispro (ADMELOG) corrective regimen sliding scale   SubCutaneous three times a day before meals  insulin lispro (ADMELOG) corrective regimen sliding scale   SubCutaneous at   [facility]    dw team

## 2024-11-26 NOTE — PROGRESS NOTE ADULT - ASSESSMENT
69-year-old male, with past history of CAD - s/p Stent, HTN, CAV, ESRD (on Peritoneal dialysis), DM and BPH presented to the ED with shortness of breath.  Seen and evaluated at bedside; NAD at rest.  Patient endorses worsening shortness of breath for the past few days, but with sudden acuity while sitting up in bed in the AM.  Unable to lie flat and wakens from sleep gasping for breath at times.  Endorses palpitations, but no chest pain. Nephrology consulted for dialysis needs.    A/P  ESRD on Peritoneal dialysis  FRom   Follows with nephro Dr. Menjivar   Consent obtained, witnessed, placed in chart  continue PD as ordered.   Monitor bmp     HTN  bp suboptimal  on bumex 2mg bid at home will continue iv while in hospital  low sodium diet  monitor bp     Anemia   hgb below goal  epo 10,000 SQ TIW  transfuse prn to keep hgb >8   monitor hgb     CKD-MBD  check pth  low calcium using pd 2  elevated phos will start phoslo  monitor Ca, PO4 daily     Shortness of breath  UF with PD   Recommend Bumex IV  s/p thoracentesis f/u pulm  managed as per primary team

## 2024-11-26 NOTE — PROGRESS NOTE ADULT - ASSESSMENT
EKG NSR LVH, repolarization abnormality    [facility] 7/8/2024 LAD s/p PCI    A/P    69-year-old male, with past history of CAD - s/p Stent, HTN, CAV, ESRD (on Peritoneal dialysis), DM and BPH presented to the ED secondary to shortness of breath.  Diagnosed with Shortness of Breath in the ED.    1. Shortness of breath  - BNP 96282, Trop 273>238, s/p lasix 80 IV, metolazol po per renal in the ED, Pt denies CP  - CXR Rt pulm effu s/p thora 2L 11/25  - ESRD on PD  - get echo     2. ESRD on peritoneal dialysis.   -f/u renal recs    3. CAD s/p LAD , LM PCI  - asa, plavix, statin    4. HTN  -resume BP meds accordlingly   EKG NSR LVH, repolarization abnormality    [facility] 7/8/2024 LAD s/p PCI    A/P    69-year-old male, with past history of CAD - s/p Stent, HTN, CAV, ESRD (on Peritoneal dialysis), DM and BPH presented to the ED secondary to shortness of breath.  Diagnosed with Shortness of Breath in the ED.    1. Shortness of breath  - BNP 78774, Trop 273>238, s/p lasix 80 IV, metolazol po per renal in the ED, Pt denies CP  - CXR Rt pulm effu s/p thora 2L 11/25  - ESRD on PD  - ok to start diuretics   - get echo     2. ESRD on peritoneal dialysis.   -f/u renal recs    3. CAD s/p LAD , LM PCI  - asa, plavix, statin    4. HTN  -resume BP meds accordlingly

## 2024-11-27 LAB
ANION GAP SERPL CALC-SCNC: 19 MMOL/L — HIGH (ref 7–14)
BASOPHILS # BLD AUTO: 0.05 K/UL — SIGNIFICANT CHANGE UP (ref 0–0.2)
BASOPHILS NFR BLD AUTO: 0.7 % — SIGNIFICANT CHANGE UP (ref 0–2)
BUN SERPL-MCNC: 47 MG/DL — HIGH (ref 7–23)
CALCIUM SERPL-MCNC: 7.6 MG/DL — LOW (ref 8.4–10.5)
CALCIUM SERPL-MCNC: 7.6 MG/DL — LOW (ref 8.4–10.5)
CHLORIDE SERPL-SCNC: 96 MMOL/L — LOW (ref 98–107)
CO2 SERPL-SCNC: 23 MMOL/L — SIGNIFICANT CHANGE UP (ref 22–31)
CREAT SERPL-MCNC: 11.95 MG/DL — HIGH (ref 0.5–1.3)
EGFR: 4 ML/MIN/1.73M2 — LOW
EOSINOPHIL # BLD AUTO: 0.64 K/UL — HIGH (ref 0–0.5)
EOSINOPHIL NFR BLD AUTO: 8.7 % — HIGH (ref 0–6)
GLUCOSE BLDC GLUCOMTR-MCNC: 109 MG/DL — HIGH (ref 70–99)
GLUCOSE BLDC GLUCOMTR-MCNC: 205 MG/DL — HIGH (ref 70–99)
GLUCOSE BLDC GLUCOMTR-MCNC: 285 MG/DL — HIGH (ref 70–99)
GLUCOSE BLDC GLUCOMTR-MCNC: 79 MG/DL — SIGNIFICANT CHANGE UP (ref 70–99)
GLUCOSE SERPL-MCNC: 82 MG/DL — SIGNIFICANT CHANGE UP (ref 70–99)
HCT VFR BLD CALC: 29.1 % — LOW (ref 39–50)
HGB BLD-MCNC: 8.9 G/DL — LOW (ref 13–17)
IANC: 5.1 K/UL — SIGNIFICANT CHANGE UP (ref 1.8–7.4)
IMM GRANULOCYTES NFR BLD AUTO: 0.3 % — SIGNIFICANT CHANGE UP (ref 0–0.9)
LYMPHOCYTES # BLD AUTO: 1.01 K/UL — SIGNIFICANT CHANGE UP (ref 1–3.3)
LYMPHOCYTES # BLD AUTO: 13.8 % — SIGNIFICANT CHANGE UP (ref 13–44)
MAGNESIUM SERPL-MCNC: 2.1 MG/DL — SIGNIFICANT CHANGE UP (ref 1.6–2.6)
MCHC RBC-ENTMCNC: 27.6 PG — SIGNIFICANT CHANGE UP (ref 27–34)
MCHC RBC-ENTMCNC: 30.6 G/DL — LOW (ref 32–36)
MCV RBC AUTO: 90.1 FL — SIGNIFICANT CHANGE UP (ref 80–100)
MONOCYTES # BLD AUTO: 0.52 K/UL — SIGNIFICANT CHANGE UP (ref 0–0.9)
MONOCYTES NFR BLD AUTO: 7.1 % — SIGNIFICANT CHANGE UP (ref 2–14)
NEUTROPHILS # BLD AUTO: 5.1 K/UL — SIGNIFICANT CHANGE UP (ref 1.8–7.4)
NEUTROPHILS NFR BLD AUTO: 69.4 % — SIGNIFICANT CHANGE UP (ref 43–77)
NON-GYNECOLOGICAL CYTOLOGY STUDY: SIGNIFICANT CHANGE UP
NRBC # BLD: 0 /100 WBCS — SIGNIFICANT CHANGE UP (ref 0–0)
NRBC # FLD: 0 K/UL — SIGNIFICANT CHANGE UP (ref 0–0)
PHOSPHATE SERPL-MCNC: 5.8 MG/DL — HIGH (ref 2.5–4.5)
PLATELET # BLD AUTO: 284 K/UL — SIGNIFICANT CHANGE UP (ref 150–400)
POTASSIUM SERPL-MCNC: 3.9 MMOL/L — SIGNIFICANT CHANGE UP (ref 3.5–5.3)
POTASSIUM SERPL-SCNC: 3.9 MMOL/L — SIGNIFICANT CHANGE UP (ref 3.5–5.3)
PTH-INTACT FLD-MCNC: 250 PG/ML — HIGH (ref 15–65)
RBC # BLD: 3.23 M/UL — LOW (ref 4.2–5.8)
RBC # FLD: 14.6 % — HIGH (ref 10.3–14.5)
SODIUM SERPL-SCNC: 138 MMOL/L — SIGNIFICANT CHANGE UP (ref 135–145)
WBC # BLD: 7.34 K/UL — SIGNIFICANT CHANGE UP (ref 3.8–10.5)
WBC # FLD AUTO: 7.34 K/UL — SIGNIFICANT CHANGE UP (ref 3.8–10.5)

## 2024-11-27 RX ORDER — GENTAMICIN SULFATE 0.1 %
1 CREAM (GRAM) TOPICAL
Refills: 0 | Status: DISCONTINUED | OUTPATIENT
Start: 2024-11-27 | End: 2024-11-29

## 2024-11-27 RX ADMIN — Medication 5000 UNIT(S): at 18:03

## 2024-11-27 RX ADMIN — AMLODIPINE BESYLATE 10 MILLIGRAM(S): 10 TABLET ORAL at 06:00

## 2024-11-27 RX ADMIN — CHLORHEXIDINE GLUCONATE 1 APPLICATION(S): 1.2 RINSE ORAL at 12:41

## 2024-11-27 RX ADMIN — Medication 2: at 18:22

## 2024-11-27 RX ADMIN — Medication 1 APPLICATION(S): at 11:27

## 2024-11-27 RX ADMIN — BUMETANIDE 2 MILLIGRAM(S): 1 TABLET ORAL at 15:16

## 2024-11-27 RX ADMIN — Medication 3: at 12:40

## 2024-11-27 RX ADMIN — LABETALOL 200 MILLIGRAM(S): 100 TABLET, FILM COATED ORAL at 18:02

## 2024-11-27 RX ADMIN — CALCIUM ACETATE 667 MILLIGRAM(S): 667 SOLUTION ORAL at 12:40

## 2024-11-27 RX ADMIN — CALCIUM ACETATE 667 MILLIGRAM(S): 667 SOLUTION ORAL at 18:02

## 2024-11-27 RX ADMIN — Medication 5000 UNIT(S): at 05:59

## 2024-11-27 RX ADMIN — LABETALOL 200 MILLIGRAM(S): 100 TABLET, FILM COATED ORAL at 06:00

## 2024-11-27 RX ADMIN — BUMETANIDE 2 MILLIGRAM(S): 1 TABLET ORAL at 06:00

## 2024-11-27 RX ADMIN — CALCIUM ACETATE 667 MILLIGRAM(S): 667 SOLUTION ORAL at 08:55

## 2024-11-27 RX ADMIN — Medication 1000 UNIT(S): at 12:41

## 2024-11-27 NOTE — PROGRESS NOTE ADULT - ASSESSMENT
69-year-old male, with past history of CAD - s/p Stent, HTN, CAV, ESRD (on Peritoneal dialysis), DM and BPH presented to the ED with shortness of breath.  Seen and evaluated at bedside; NAD at rest.  Patient endorses worsening shortness of breath for the past few days, but with sudden acuity while sitting up in bed in the AM.  Unable to lie flat and wakens from sleep gasping for breath at times.  Endorses palpitations, but no chest pain. Nephrology consulted for dialysis needs.    A/P  ESRD on Peritoneal dialysis  FRom QV  Follows with nephro Dr. Menjivar   Consent obtained, witnessed, placed in chart  cycler done 11/26 with +500cc  will attempt cycler with 4.25% today  continue PD as ordered.   Monitor bmp     HTN  bp better  on bumex 2mg bid at home continue iv bumex while in hospital  low sodium diet  monitor bp     Anemia   hgb below goal  epo 10,000 SQ TIW  transfuse prn to keep hgb >8   monitor hgb     CKD-MBD  acceptable pth for ckd  low calcium using pd 2  elevated phos will start phoslo  monitor Ca, PO4 daily     Shortness of breath  UF with PD   continue Bumex IV  s/p thoracentesis f/u pulm  managed as per primary team    69-year-old male, with past history of CAD - s/p Stent, HTN, CAV, ESRD (on Peritoneal dialysis), DM and BPH presented to the ED with shortness of breath.  Seen and evaluated at bedside; NAD at rest.  Patient endorses worsening shortness of breath for the past few days, but with sudden acuity while sitting up in bed in the AM.  Unable to lie flat and wakens from sleep gasping for breath at times.  Endorses palpitations, but no chest pain. Nephrology consulted for dialysis needs.    A/P  ESRD on Peritoneal dialysis  FRom QV  Follows with nephro Dr. Menjivar   Consent obtained, witnessed, placed in chart  cycler done 11/26 with +500cc  will attempt cycler with 4.25% today  continue PD as ordered.   IN view of  unilateral pleural effusion , need to rule out if there is leak from peritoneal fluid . IF reoccurs , please send pleural fluid glucose   Monitor bmp     HTN  bp better  on bumex 2mg bid at home continue iv bumex while in hospital  low sodium diet  ALLERGIC To hydralazine , please avoid  monitor bp     Anemia   hgb below goal  epo 10,000 SQ TIW  transfuse prn to keep hgb >8   monitor hgb     CKD-MBD  acceptable pth for ckd  low calcium using pd 2  elevated phos will start phoslo  monitor Ca, PO4 daily     Shortness of breath  UF with PD   continue Bumex IV  s/p thoracentesis f/u pulm  managed as per primary team

## 2024-11-27 NOTE — PHYSICAL THERAPY INITIAL EVALUATION ADULT - PERTINENT HX OF CURRENT PROBLEM, REHAB EVAL
69-year-old male, with past history of CAD - s/p Stent, HTN, CAV, ESRD (on Peritoneal dialysis), DM and BPH presented to the ED secondary to shortness of breath.

## 2024-11-27 NOTE — PROGRESS NOTE ADULT - ASSESSMENT
EKG NSR LVH, repolarization abnormality    [facility] 7/8/2024 LAD s/p PCI    A/P    69-year-old male, with past history of CAD - s/p Stent, HTN, CAV, ESRD (on Peritoneal dialysis), DM and BPH presented to the ED secondary to shortness of breath.  Diagnosed with Shortness of Breath in the ED.    1. Shortness of breath  - BNP 97717, Trop 273>238, s/p lasix 80 IV, metolazol po per renal in the ED, Pt denies CP  - CXR Rt pulm effu s/p thora 2L 11/25  - ESRD on PD  - ok to start diuretics   - echo pending    2. ESRD on peritoneal dialysis.   -f/u renal recs    3. CAD s/p LAD , LM PCI  - asa, plavix, statin    4. HTN  -labetalol 200 bid, amlodipine 10

## 2024-11-27 NOTE — PHYSICAL THERAPY INITIAL EVALUATION ADULT - ADDITIONAL COMMENTS
Pt states he lives with his family in a house with 3 steps to enter and 1 flight inside to his bedroom. Prior to admission, pt was ambulating with a quad cane.   Post PT evaluation, pt left seated at edge of bed, alarm on, call bell and remote within reach, all precautions maintained, NAD. RN aware.

## 2024-11-27 NOTE — PROGRESS NOTE ADULT - ASSESSMENT
69-year-old male, with past history of CAD - s/p Stent, HTN, CAV, ESRD (on Peritoneal dialysis), DM and BPH presented to the ED secondary to shortness of breath.  Seen and evaluated at bedside; NAD at rest.  Patient endorses worsening shortness of breath for the past few days, but with sudden acuity while sitting up in bed in the AM.  Unable to lie flat and wakens from sleep gasping for breath at times.  Endorses palpitations, but no chest pain.  No associated diaphoresis, nausea, vomiting.  No lower extremities edema.  Drinks about 0.5 liters of fluid daily.  Uses one pillow nightly.  Has profound GERMAIN.  Relates falling in the bathroom while going into the shower about 3 to 4 days ago; hit the right temporal head on the tub.  Unable to determine if had LOC, but does stress that the trauma to the head was significant.      Vital signs upon ED presentation as follows: BP = 150/64, HR = 53, RR = ?____ (later recorded at 16), T = 36.7 C (98.1 F), O2 Sat = 99% on RA.  Diagnosed with Shortness of Breath in the ED. (24 Nov 2024 21:52)      sob:  increasing rigth sided pleural effusion :  hx of pericardial fluid drainage  CAD  HTN  ESRD -PD  DM  BPH    sob:  increasing rigth sided pleural effusion :  his sob seems to be relaetd to pleural effusion and wheezing he is getting;   he carries no diagnosis of wheezing:   start BD and thoracentesis   add steroids too  currently he isno tin any resp distress    11/26:  he seems vvery good today  :  no sob:   on room air:   effusion seems transudative but need to wait for serum LDH     11/27: transudative effusion;     hx of pericardial fluid drainage  -was drained before?  etiology  : defer to cards     CAD  -cont current meds  HTN  -not on any anti hypertensives at this time  ESRD -PD  -per renal    DM  heparin   Injectable 5000 Unit(s) SubCutaneous every 12 hours  insulin lispro (ADMELOG) corrective regimen sliding scale   SubCutaneous three times a day before meals  insulin lispro (ADMELOG) corrective regimen sliding scale   SubCutaneous at   [facility]    dw team

## 2024-11-28 ENCOUNTER — RESULT REVIEW (OUTPATIENT)
Age: 69
End: 2024-11-28

## 2024-11-28 LAB
ANION GAP SERPL CALC-SCNC: 20 MMOL/L — HIGH (ref 7–14)
BASOPHILS # BLD AUTO: 0.06 K/UL — SIGNIFICANT CHANGE UP (ref 0–0.2)
BASOPHILS NFR BLD AUTO: 0.7 % — SIGNIFICANT CHANGE UP (ref 0–2)
BUN SERPL-MCNC: 43 MG/DL — HIGH (ref 7–23)
CALCIUM SERPL-MCNC: 8.5 MG/DL — SIGNIFICANT CHANGE UP (ref 8.4–10.5)
CHLORIDE SERPL-SCNC: 97 MMOL/L — LOW (ref 98–107)
CO2 SERPL-SCNC: 24 MMOL/L — SIGNIFICANT CHANGE UP (ref 22–31)
CREAT SERPL-MCNC: 11.13 MG/DL — HIGH (ref 0.5–1.3)
EGFR: 4 ML/MIN/1.73M2 — LOW
EOSINOPHIL # BLD AUTO: 0.64 K/UL — HIGH (ref 0–0.5)
EOSINOPHIL NFR BLD AUTO: 7.9 % — HIGH (ref 0–6)
GLUCOSE BLDC GLUCOMTR-MCNC: 109 MG/DL — HIGH (ref 70–99)
GLUCOSE BLDC GLUCOMTR-MCNC: 94 MG/DL — SIGNIFICANT CHANGE UP (ref 70–99)
GLUCOSE BLDC GLUCOMTR-MCNC: 95 MG/DL — SIGNIFICANT CHANGE UP (ref 70–99)
GLUCOSE BLDC GLUCOMTR-MCNC: 98 MG/DL — SIGNIFICANT CHANGE UP (ref 70–99)
GLUCOSE SERPL-MCNC: 86 MG/DL — SIGNIFICANT CHANGE UP (ref 70–99)
HCT VFR BLD CALC: 30.7 % — LOW (ref 39–50)
HGB BLD-MCNC: 9.9 G/DL — LOW (ref 13–17)
IANC: 5.64 K/UL — SIGNIFICANT CHANGE UP (ref 1.8–7.4)
IMM GRANULOCYTES NFR BLD AUTO: 0.2 % — SIGNIFICANT CHANGE UP (ref 0–0.9)
LYMPHOCYTES # BLD AUTO: 1.2 K/UL — SIGNIFICANT CHANGE UP (ref 1–3.3)
LYMPHOCYTES # BLD AUTO: 14.8 % — SIGNIFICANT CHANGE UP (ref 13–44)
MAGNESIUM SERPL-MCNC: 2.2 MG/DL — SIGNIFICANT CHANGE UP (ref 1.6–2.6)
MCHC RBC-ENTMCNC: 28.4 PG — SIGNIFICANT CHANGE UP (ref 27–34)
MCHC RBC-ENTMCNC: 32.2 G/DL — SIGNIFICANT CHANGE UP (ref 32–36)
MCV RBC AUTO: 88 FL — SIGNIFICANT CHANGE UP (ref 80–100)
MONOCYTES # BLD AUTO: 0.56 K/UL — SIGNIFICANT CHANGE UP (ref 0–0.9)
MONOCYTES NFR BLD AUTO: 6.9 % — SIGNIFICANT CHANGE UP (ref 2–14)
NEUTROPHILS # BLD AUTO: 5.64 K/UL — SIGNIFICANT CHANGE UP (ref 1.8–7.4)
NEUTROPHILS NFR BLD AUTO: 69.5 % — SIGNIFICANT CHANGE UP (ref 43–77)
NRBC # BLD: 0 /100 WBCS — SIGNIFICANT CHANGE UP (ref 0–0)
NRBC # FLD: 0 K/UL — SIGNIFICANT CHANGE UP (ref 0–0)
PHOSPHATE SERPL-MCNC: 5.6 MG/DL — HIGH (ref 2.5–4.5)
PLATELET # BLD AUTO: 384 K/UL — SIGNIFICANT CHANGE UP (ref 150–400)
POTASSIUM SERPL-MCNC: 3.8 MMOL/L — SIGNIFICANT CHANGE UP (ref 3.5–5.3)
POTASSIUM SERPL-SCNC: 3.8 MMOL/L — SIGNIFICANT CHANGE UP (ref 3.5–5.3)
RBC # BLD: 3.49 M/UL — LOW (ref 4.2–5.8)
RBC # FLD: 14.4 % — SIGNIFICANT CHANGE UP (ref 10.3–14.5)
SODIUM SERPL-SCNC: 141 MMOL/L — SIGNIFICANT CHANGE UP (ref 135–145)
T3FREE SERPL-MCNC: 1.85 PG/ML — LOW (ref 2–4.4)
WBC # BLD: 8.12 K/UL — SIGNIFICANT CHANGE UP (ref 3.8–10.5)
WBC # FLD AUTO: 8.12 K/UL — SIGNIFICANT CHANGE UP (ref 3.8–10.5)

## 2024-11-28 PROCEDURE — 93306 TTE W/DOPPLER COMPLETE: CPT | Mod: 26

## 2024-11-28 RX ORDER — LEVOTHYROXINE SODIUM 150 MCG
25 TABLET ORAL DAILY
Refills: 0 | Status: DISCONTINUED | OUTPATIENT
Start: 2024-11-28 | End: 2024-11-29

## 2024-11-28 RX ORDER — DOXAZOSIN MESYLATE 1 MG
4 TABLET ORAL AT BEDTIME
Refills: 0 | Status: DISCONTINUED | OUTPATIENT
Start: 2024-11-28 | End: 2024-11-29

## 2024-11-28 RX ORDER — ISOSORBIDE MONONITRATE 10 MG
60 TABLET ORAL DAILY
Refills: 0 | Status: DISCONTINUED | OUTPATIENT
Start: 2024-11-28 | End: 2024-11-29

## 2024-11-28 RX ORDER — CLOPIDOGREL 75 MG/1
75 TABLET, FILM COATED ORAL DAILY
Refills: 0 | Status: DISCONTINUED | OUTPATIENT
Start: 2024-11-28 | End: 2024-11-29

## 2024-11-28 RX ORDER — VALSARTAN 320 MG/1
160 TABLET ORAL
Refills: 0 | Status: DISCONTINUED | OUTPATIENT
Start: 2024-11-28 | End: 2024-11-29

## 2024-11-28 RX ADMIN — Medication 81 MILLIGRAM(S): at 17:35

## 2024-11-28 RX ADMIN — VALSARTAN 160 MILLIGRAM(S): 320 TABLET ORAL at 17:34

## 2024-11-28 RX ADMIN — CHLORHEXIDINE GLUCONATE 1 APPLICATION(S): 1.2 RINSE ORAL at 13:06

## 2024-11-28 RX ADMIN — CALCIUM ACETATE 667 MILLIGRAM(S): 667 SOLUTION ORAL at 13:05

## 2024-11-28 RX ADMIN — BUMETANIDE 2 MILLIGRAM(S): 1 TABLET ORAL at 06:22

## 2024-11-28 RX ADMIN — Medication 5000 UNIT(S): at 06:22

## 2024-11-28 RX ADMIN — LABETALOL 200 MILLIGRAM(S): 100 TABLET, FILM COATED ORAL at 17:36

## 2024-11-28 RX ADMIN — Medication 1000 UNIT(S): at 13:05

## 2024-11-28 RX ADMIN — AMMONIUM LACTATE 1 APPLICATION(S): 120 LOTION TOPICAL at 17:36

## 2024-11-28 RX ADMIN — Medication 4 MILLIGRAM(S): at 21:29

## 2024-11-28 RX ADMIN — Medication 5000 UNIT(S): at 17:36

## 2024-11-28 RX ADMIN — Medication 40 MILLIGRAM(S): at 21:29

## 2024-11-28 RX ADMIN — CALCIUM ACETATE 667 MILLIGRAM(S): 667 SOLUTION ORAL at 17:35

## 2024-11-28 RX ADMIN — CLOPIDOGREL 75 MILLIGRAM(S): 75 TABLET, FILM COATED ORAL at 17:35

## 2024-11-28 RX ADMIN — AMLODIPINE BESYLATE 10 MILLIGRAM(S): 10 TABLET ORAL at 06:22

## 2024-11-28 RX ADMIN — BUMETANIDE 2 MILLIGRAM(S): 1 TABLET ORAL at 13:05

## 2024-11-28 RX ADMIN — AMMONIUM LACTATE 1 APPLICATION(S): 120 LOTION TOPICAL at 06:23

## 2024-11-28 NOTE — PROGRESS NOTE ADULT - ASSESSMENT
69-year-old male, with past history of CAD - s/p Stent, HTN, CAV, ESRD (on Peritoneal dialysis), DM and BPH presented to the ED secondary to shortness of breath.  Diagnosed with Shortness of Breath in the ED.    # Shortness of breath  - BNP 41962, Trop 273>238, s/p lasix 80 IV, metolazol po per renal in the ED, Pt denies CP  - CXR Rt pulm effu s/p thora 2L 11/25  - ESRD on PD  - Repeat TTE Trace pericardial effusion known LVEF-64%  -Dyspnea improved was likely 2/2 Pleural effusion s/p tap-2000ml      # ESRD on peritoneal dialysis.   -f/u renal recs    # CAD s/p LAD , LM PCI  - asa, plavix, statin    #  HTN  -labetalol 200 bid, amlodipine 10

## 2024-11-28 NOTE — PROGRESS NOTE ADULT - ASSESSMENT
69-year-old male, with past history of CAD - s/p Stent, HTN, CAV, ESRD (on Peritoneal dialysis), DM and BPH presented to the ED with shortness of breath.  Seen and evaluated at bedside; NAD at rest.  Patient endorses worsening shortness of breath for the past few days, but with sudden acuity while sitting up in bed in the AM.  Unable to lie flat and wakens from sleep gasping for breath at times.  Endorses palpitations, but no chest pain. Nephrology consulted for dialysis needs.    A/P  ESRD on Peritoneal dialysis  FRom   Follows with nephro Dr. Menjivar   Consent obtained, witnessed, placed in chart  cycler done 11/26 with +500cc  cycler with 4.25% 11/26 with -1.6L  continue PD as ordered.   IN view of  unilateral pleural effusion , need to rule out if there is leak from peritoneal fluid . IF reoccurs , please send pleural fluid glucose   Monitor bmp     HTN  bp suboptimal  on bumex 2mg bid at home continue iv bumex while in hospital  low sodium diet  ALLERGIC To hydralazine , please avoid  monitor bp     Anemia   hgb below goal  epo 10,000 SQ TIW  transfuse prn to keep hgb >8   monitor hgb     CKD-MBD  acceptable pth for ckd  low calcium using pd 2  elevated phos, on phoslo 667 mg tid  monitor Ca, PO4 daily     Shortness of breath  UF with PD   continue Bumex IV  s/p thoracentesis f/u pulm  managed as per primary team

## 2024-11-28 NOTE — PROGRESS NOTE ADULT - ASSESSMENT
69-year-old male, with past history of CAD - s/p Stent, HTN, CAV, ESRD (on Peritoneal dialysis), DM and BPH presented to the ED secondary to shortness of breath.  Seen and evaluated at bedside; NAD at rest.  Patient endorses worsening shortness of breath for the past few days, but with sudden acuity while sitting up in bed in the AM.  Unable to lie flat and wakens from sleep gasping for breath at times.  Endorses palpitations, but no chest pain.  No associated diaphoresis, nausea, vomiting.  No lower extremities edema.  Drinks about 0.5 liters of fluid daily.  Uses one pillow nightly.  Has profound GERMAIN.  Relates falling in the bathroom while going into the shower about 3 to 4 days ago; hit the right temporal head on the tub.  Unable to determine if had LOC, but does stress that the trauma to the head was significant.      Vital signs upon ED presentation as follows: BP = 150/64, HR = 53, RR = ?____ (later recorded at 16), T = 36.7 C (98.1 F), O2 Sat = 99% on RA.  Diagnosed with Shortness of Breath in the ED. (24 Nov 2024 21:52)      sob:  increasing rigth sided pleural effusion :  hx of pericardial fluid drainage  CAD  HTN  ESRD -PD  DM  BPH    sob:  increasing rigth sided pleural effusion :  his sob seems to be relaetd to pleural effusion and wheezing he is getting;   he carries no diagnosis of wheezing:   start BD and thoracentesis   add steroids too  currently he isno tin any resp distress    11/26:  he seems vvery good today  :  no sob:   on room air:   effusion seems transudative but need to wait for serum LDH     11/27: transudative effusion;     11/28: he seems pretty good:   no sob:  no cuh : no phlegm  :  cont diuresis pre primary team      hx of pericardial fluid drainage  -was drained before?  etiology  : defer to cards     CAD  -cont current meds  HTN  -not on any anti hypertensives at this time  ESRD -PD  -per renal    DM  heparin   Injectable 5000 Unit(s) SubCutaneous every 12 hours  insulin lispro (ADMELOG) corrective regimen sliding scale   SubCutaneous three times a day before meals  insulin lispro (ADMELOG) corrective regimen sliding scale   SubCutaneous at   [facility]    dw team

## 2024-11-29 ENCOUNTER — TRANSCRIPTION ENCOUNTER (OUTPATIENT)
Age: 69
End: 2024-11-29

## 2024-11-29 VITALS
WEIGHT: 141.1 LBS | SYSTOLIC BLOOD PRESSURE: 146 MMHG | RESPIRATION RATE: 18 BRPM | DIASTOLIC BLOOD PRESSURE: 61 MMHG | TEMPERATURE: 98 F | HEART RATE: 50 BPM

## 2024-11-29 LAB
ANION GAP SERPL CALC-SCNC: 15 MMOL/L — HIGH (ref 7–14)
BASOPHILS # BLD AUTO: 0.03 K/UL — SIGNIFICANT CHANGE UP (ref 0–0.2)
BASOPHILS NFR BLD AUTO: 0.5 % — SIGNIFICANT CHANGE UP (ref 0–2)
BUN SERPL-MCNC: 49 MG/DL — HIGH (ref 7–23)
CALCIUM SERPL-MCNC: 7.8 MG/DL — LOW (ref 8.4–10.5)
CHLORIDE SERPL-SCNC: 96 MMOL/L — LOW (ref 98–107)
CO2 SERPL-SCNC: 26 MMOL/L — SIGNIFICANT CHANGE UP (ref 22–31)
CREAT SERPL-MCNC: 12.33 MG/DL — HIGH (ref 0.5–1.3)
EGFR: 4 ML/MIN/1.73M2 — LOW
EOSINOPHIL # BLD AUTO: 0.78 K/UL — HIGH (ref 0–0.5)
EOSINOPHIL NFR BLD AUTO: 12.4 % — HIGH (ref 0–6)
GLUCOSE BLDC GLUCOMTR-MCNC: 161 MG/DL — HIGH (ref 70–99)
GLUCOSE BLDC GLUCOMTR-MCNC: 256 MG/DL — HIGH (ref 70–99)
GLUCOSE BLDC GLUCOMTR-MCNC: 87 MG/DL — SIGNIFICANT CHANGE UP (ref 70–99)
GLUCOSE SERPL-MCNC: 74 MG/DL — SIGNIFICANT CHANGE UP (ref 70–99)
HCT VFR BLD CALC: 29.3 % — LOW (ref 39–50)
HGB BLD-MCNC: 8.8 G/DL — LOW (ref 13–17)
IANC: 3.66 K/UL — SIGNIFICANT CHANGE UP (ref 1.8–7.4)
IMM GRANULOCYTES NFR BLD AUTO: 0.3 % — SIGNIFICANT CHANGE UP (ref 0–0.9)
LYMPHOCYTES # BLD AUTO: 1.22 K/UL — SIGNIFICANT CHANGE UP (ref 1–3.3)
LYMPHOCYTES # BLD AUTO: 19.4 % — SIGNIFICANT CHANGE UP (ref 13–44)
MAGNESIUM SERPL-MCNC: 2.1 MG/DL — SIGNIFICANT CHANGE UP (ref 1.6–2.6)
MCHC RBC-ENTMCNC: 26.8 PG — LOW (ref 27–34)
MCHC RBC-ENTMCNC: 30 G/DL — LOW (ref 32–36)
MCV RBC AUTO: 89.3 FL — SIGNIFICANT CHANGE UP (ref 80–100)
MONOCYTES # BLD AUTO: 0.59 K/UL — SIGNIFICANT CHANGE UP (ref 0–0.9)
MONOCYTES NFR BLD AUTO: 9.4 % — SIGNIFICANT CHANGE UP (ref 2–14)
NEUTROPHILS # BLD AUTO: 3.66 K/UL — SIGNIFICANT CHANGE UP (ref 1.8–7.4)
NEUTROPHILS NFR BLD AUTO: 58 % — SIGNIFICANT CHANGE UP (ref 43–77)
NRBC # BLD: 0 /100 WBCS — SIGNIFICANT CHANGE UP (ref 0–0)
NRBC # FLD: 0 K/UL — SIGNIFICANT CHANGE UP (ref 0–0)
PHOSPHATE SERPL-MCNC: 5.3 MG/DL — HIGH (ref 2.5–4.5)
PLATELET # BLD AUTO: 313 K/UL — SIGNIFICANT CHANGE UP (ref 150–400)
POTASSIUM SERPL-MCNC: 3.8 MMOL/L — SIGNIFICANT CHANGE UP (ref 3.5–5.3)
POTASSIUM SERPL-SCNC: 3.8 MMOL/L — SIGNIFICANT CHANGE UP (ref 3.5–5.3)
RBC # BLD: 3.28 M/UL — LOW (ref 4.2–5.8)
RBC # FLD: 14.5 % — SIGNIFICANT CHANGE UP (ref 10.3–14.5)
SODIUM SERPL-SCNC: 137 MMOL/L — SIGNIFICANT CHANGE UP (ref 135–145)
WBC # BLD: 6.3 K/UL — SIGNIFICANT CHANGE UP (ref 3.8–10.5)
WBC # FLD AUTO: 6.3 K/UL — SIGNIFICANT CHANGE UP (ref 3.8–10.5)

## 2024-11-29 RX ORDER — SEVELAMER CARBONATE 800 MG/1
3 TABLET, FILM COATED ORAL
Refills: 0 | DISCHARGE

## 2024-11-29 RX ORDER — ISOSORBIDE MONONITRATE 10 MG
1 TABLET ORAL
Qty: 30 | Refills: 0
Start: 2024-11-29 | End: 2024-12-28

## 2024-11-29 RX ORDER — CALCIUM ACETATE 667 MG/5ML
1 SOLUTION ORAL
Qty: 90 | Refills: 0
Start: 2024-11-29 | End: 2024-12-28

## 2024-11-29 RX ORDER — CHOLECALCIFEROL (VITAMIN D3) 10MCG/0.25
1000 DROPS ORAL
Qty: 30 | Refills: 0
Start: 2024-11-29 | End: 2024-12-28

## 2024-11-29 RX ORDER — CALCITRIOL 0.5 UG/1
3 CAPSULE, LIQUID FILLED ORAL
Refills: 0 | DISCHARGE

## 2024-11-29 RX ORDER — CALCITRIOL 0.5 UG/1
1 CAPSULE, LIQUID FILLED ORAL
Qty: 0 | Refills: 0 | DISCHARGE
Start: 2024-11-29

## 2024-11-29 RX ORDER — GENTAMICIN SULFATE 0.1 %
1 CREAM (GRAM) TOPICAL
Qty: 1 | Refills: 0
Start: 2024-11-29 | End: 2024-12-28

## 2024-11-29 RX ORDER — ISOSORBIDE MONONITRATE 10 MG
1 TABLET ORAL
Refills: 0 | DISCHARGE

## 2024-11-29 RX ORDER — NIFEDIPINE 10 MG
60 CAPSULE ORAL DAILY
Refills: 0 | Status: DISCONTINUED | OUTPATIENT
Start: 2024-11-29 | End: 2024-11-29

## 2024-11-29 RX ORDER — AMMONIUM LACTATE 120 MG/G
1 LOTION TOPICAL
Qty: 1 | Refills: 0
Start: 2024-11-29 | End: 2024-12-28

## 2024-11-29 RX ORDER — NIFEDIPINE 10 MG
1 CAPSULE ORAL
Qty: 30 | Refills: 0
Start: 2024-11-29 | End: 2024-12-28

## 2024-11-29 RX ORDER — LEVOTHYROXINE SODIUM 150 MCG
1 TABLET ORAL
Qty: 30 | Refills: 0
Start: 2024-11-29 | End: 2024-12-28

## 2024-11-29 RX ADMIN — BUMETANIDE 2 MILLIGRAM(S): 1 TABLET ORAL at 05:24

## 2024-11-29 RX ADMIN — Medication 25 MICROGRAM(S): at 05:24

## 2024-11-29 RX ADMIN — Medication 3: at 14:03

## 2024-11-29 RX ADMIN — BUMETANIDE 2 MILLIGRAM(S): 1 TABLET ORAL at 14:02

## 2024-11-29 RX ADMIN — Medication 5000 UNIT(S): at 18:08

## 2024-11-29 RX ADMIN — CALCIUM ACETATE 667 MILLIGRAM(S): 667 SOLUTION ORAL at 14:10

## 2024-11-29 RX ADMIN — AMLODIPINE BESYLATE 10 MILLIGRAM(S): 10 TABLET ORAL at 05:24

## 2024-11-29 RX ADMIN — AMMONIUM LACTATE 1 APPLICATION(S): 120 LOTION TOPICAL at 18:51

## 2024-11-29 RX ADMIN — AMMONIUM LACTATE 1 APPLICATION(S): 120 LOTION TOPICAL at 05:24

## 2024-11-29 RX ADMIN — CALCIUM ACETATE 667 MILLIGRAM(S): 667 SOLUTION ORAL at 09:53

## 2024-11-29 RX ADMIN — Medication 81 MILLIGRAM(S): at 14:11

## 2024-11-29 RX ADMIN — VALSARTAN 160 MILLIGRAM(S): 320 TABLET ORAL at 18:07

## 2024-11-29 RX ADMIN — Medication 1000 UNIT(S): at 14:10

## 2024-11-29 RX ADMIN — Medication 5000 UNIT(S): at 05:23

## 2024-11-29 RX ADMIN — Medication 1: at 18:24

## 2024-11-29 RX ADMIN — CLOPIDOGREL 75 MILLIGRAM(S): 75 TABLET, FILM COATED ORAL at 14:03

## 2024-11-29 RX ADMIN — CHLORHEXIDINE GLUCONATE 1 APPLICATION(S): 1.2 RINSE ORAL at 14:12

## 2024-11-29 RX ADMIN — Medication 60 MILLIGRAM(S): at 14:03

## 2024-11-29 RX ADMIN — CALCIUM ACETATE 667 MILLIGRAM(S): 667 SOLUTION ORAL at 18:08

## 2024-11-29 RX ADMIN — LABETALOL 200 MILLIGRAM(S): 100 TABLET, FILM COATED ORAL at 18:09

## 2024-11-29 RX ADMIN — VALSARTAN 160 MILLIGRAM(S): 320 TABLET ORAL at 05:23

## 2024-11-29 NOTE — DISCHARGE NOTE PROVIDER - CARE PROVIDER_API CALL
Doug Gutierrez  Cardiovascular Disease  [address] [place] [address]  Phone: [phone]  Fax: [phone]  Follow Up Time:     Félix Galan  Cardiovascular Disease  [address] [address]  Phone: [phone]  Fax: [phone]  Follow Up Time:    Doug Gutierrez  Cardiovascular Disease  [address] [place] [address]  Phone: [phone]  Fax: [phone]  Follow Up Time:     Félix Galan  Cardiovascular Disease  [address] [address]  Phone: [phone]  Fax: [phone]  Follow Up Time:     Andrey Menjivar  Nephrology  [address]  [place] [address]  Phone: [phone]  Fax: [phone]  Follow Up Time:

## 2024-11-29 NOTE — PROGRESS NOTE ADULT - ASSESSMENT
69-year-old male, with past history of CAD - s/p Stent, HTN, CAV, ESRD (on Peritoneal dialysis), DM and BPH presented to the ED with shortness of breath.  Seen and evaluated at bedside; NAD at rest.  Patient endorses worsening shortness of breath for the past few days, but with sudden acuity while sitting up in bed in the AM.  Unable to lie flat and wakens from sleep gasping for breath at times.  Endorses palpitations, but no chest pain. Nephrology consulted for dialysis needs.    A/P  ESRD on Peritoneal dialysis  FRom QV  Follows with nephro Dr. Menjivar   Consent obtained, witnessed, placed in chart  cycler done 11/26 with +500cc  cycler with 4.25% 11/26 with -1.6L  stable from renal for dc. will need to follow up with Dr. Menjivar for PD.   continue PD as ordered.   Monitor bmp     HTN  bp suboptimal  on bumex 2mg bid at home continue iv bumex while in hospital  change norvasc to nifedipine 60mg daily  low sodium diet  ALLERGIC To hydralazine , please avoid  monitor bp     Anemia   hgb below goal  epo 10,000 SQ TIW  transfuse prn to keep hgb >8   monitor hgb     CKD-MBD  acceptable pth for ckd  low calcium using pd 2  elevated phos, on phoslo 667 mg tid  monitor Ca, PO4 daily     Shortness of breath  UF with PD   right pl effusion s/p thoracentesis  ? etiology of pl effusion ? leak from pd catheter check glucose level of pl fluids if reoccurs   f/u pulm  managed as per primary team

## 2024-11-29 NOTE — PROGRESS NOTE ADULT - PROBLEM SELECTOR PROBLEM 3
ESRD on peritoneal dialysis

## 2024-11-29 NOTE — PROGRESS NOTE ADULT - PROBLEM SELECTOR PLAN 1
Persistent for the past few days, but now with acute worsening.  Sitting up in bed with significant shortness of breath, per patient.  Has GERMAIN, orthopnea, PND.  Occasional palpitations  CXR = "Moderate right layering pleural effusion with associated atelectasis." [personally reviewed]  Pro-BNP = 54010, Troponin = 273.  No significant LE edema  ECG [17:32:10] = sinus bradycardia w/ 1st degree AVB at 56 bpm, QTc = 474, Q-wave in III  ECG [22:53:48] = sinus bradycardia w/ 1st degree AVB at 57 bpm, QTc = 465, Q-wave in III  COVID-19/RSV/Influenza A&B negative  Compliant with nightly peritoneal dialysis therapy  Shortness of breath appears to be secondary to the right pleural effusion  - d/w Nephrologist; giving furosemide 80 mg IV x one (will also give metolazone 30 min prior to furosemide dose)  - f/u intake/output Q4H, weight daily.  For fluid restriction of 1.2 liters daily for now.  Weight daily  - HOB elevation  -pulm eval called
Persistent for the past few days, but now with acute worsening.  Sitting up in bed with significant shortness of breath, per patient.  Has GERMAIN, orthopnea, PND.  Occasional palpitations  CXR = "Moderate right layering pleural effusion with associated atelectasis." [personally reviewed]  Pro-BNP = 66039, Troponin = 273.  No significant LE edema  ECG [17:32:10] = sinus bradycardia w/ 1st degree AVB at 56 bpm, QTc = 474, Q-wave in III  ECG [22:53:48] = sinus bradycardia w/ 1st degree AVB at 57 bpm, QTc = 465, Q-wave in III  COVID-19/RSV/Influenza A&B negative  Compliant with nightly peritoneal dialysis therapy  Shortness of breath appears to be secondary to the right pleural effusion  - d/w Nephrologist; giving furosemide 80 mg IV x one (will also give metolazone 30 min prior to furosemide dose)  - f/u intake/output Q4H, weight daily.  For fluid restriction of 1.2 liters daily for now.  Weight daily  - HOB elevation  -pulm eval called, S/P thoracentesis
Persistent for the past few days, but now with acute worsening.  Sitting up in bed with significant shortness of breath, per patient.  Has GERMAIN, orthopnea, PND.  Occasional palpitations  CXR = "Moderate right layering pleural effusion with associated atelectasis." [personally reviewed]  Pro-BNP = 86972, Troponin = 273.  No significant LE edema  ECG [17:32:10] = sinus bradycardia w/ 1st degree AVB at 56 bpm, QTc = 474, Q-wave in III  ECG [22:53:48] = sinus bradycardia w/ 1st degree AVB at 57 bpm, QTc = 465, Q-wave in III  COVID-19/RSV/Influenza A&B negative  Compliant with nightly peritoneal dialysis therapy  Shortness of breath appears to be secondary to the right pleural effusion  - d/w Nephrologist; giving furosemide 80 mg IV x one (will also give metolazone 30 min prior to furosemide dose)  - f/u intake/output Q4H, weight daily.  For fluid restriction of 1.2 liters daily for now.  Weight daily  - HOB elevation  -pulm eval called, S/P thoracentesis
Persistent for the past few days, but now with acute worsening.  Sitting up in bed with significant shortness of breath, per patient.  Has GERMAIN, orthopnea, PND.  Occasional palpitations  CXR = "Moderate right layering pleural effusion with associated atelectasis." [personally reviewed]  Pro-BNP = 87471, Troponin = 273.  No significant LE edema  ECG [17:32:10] = sinus bradycardia w/ 1st degree AVB at 56 bpm, QTc = 474, Q-wave in III  ECG [22:53:48] = sinus bradycardia w/ 1st degree AVB at 57 bpm, QTc = 465, Q-wave in III  COVID-19/RSV/Influenza A&B negative  Compliant with nightly peritoneal dialysis therapy  Shortness of breath appears to be secondary to the right pleural effusion  - d/w Nephrologist; giving furosemide 80 mg IV x one (will also give metolazone 30 min prior to furosemide dose)  - f/u intake/output Q4H, weight daily.  For fluid restriction of 1.2 liters daily for now.  Weight daily  - HOB elevation  -pulm eval called, S/P thoracentesis  - bumex drip, diuresis as per renal
Persistent for the past few days, but now with acute worsening.  Sitting up in bed with significant shortness of breath, per patient.  Has GERMAIN, orthopnea, PND.  Occasional palpitations  CXR = "Moderate right layering pleural effusion with associated atelectasis." [personally reviewed]  Pro-BNP = 54370, Troponin = 273.  No significant LE edema  ECG [17:32:10] = sinus bradycardia w/ 1st degree AVB at 56 bpm, QTc = 474, Q-wave in III  ECG [22:53:48] = sinus bradycardia w/ 1st degree AVB at 57 bpm, QTc = 465, Q-wave in III  COVID-19/RSV/Influenza A&B negative  Compliant with nightly peritoneal dialysis therapy  Shortness of breath appears to be secondary to the right pleural effusion  - d/w Nephrologist; giving furosemide 80 mg IV x one (will also give metolazone 30 min prior to furosemide dose)  - f/u intake/output Q4H, weight daily.  For fluid restriction of 1.2 liters daily for now.  Weight daily  - HOB elevation  -pulm eval called, S/P thoracentesis  - bumex drip

## 2024-11-29 NOTE — DISCHARGE NOTE NURSING/CASE MANAGEMENT/SOCIAL WORK - PATIENT PORTAL LINK FT
You can access the FollowMyHealth Patient Portal offered by [facility] by registering at the following website: [email or web address]. By joining [employer]’s FollowMyHealth portal, you will also be able to view your health information using other applications (apps) compatible with our system.

## 2024-11-29 NOTE — DISCHARGE NOTE PROVIDER - CARE PROVIDERS DIRECT ADDRESSES
,DirectAddress_Unknown,[email or web address] ,DirectAddress_Unknown,[email or web address]

## 2024-11-29 NOTE — DISCHARGE NOTE PROVIDER - HOSPITAL COURSE
69-year-old male, with past history of CAD - s/p Stent, HTN, CAV, ESRD (on Peritoneal dialysis), DM and BPH presented to the ED secondary to shortness of breath.  Diagnosed with Shortness of Breath in the ED.    SOB 2/2 R pleural effusion  - CXR w/ R sided pleural effusion & atelectasis   - COVID/RSV/Flu neg, compliant w/ peritoneal dialysis    - Nephrology consulted: s/p Lasix 80mg IV x1, Bumex 2mg IV BID, changed to PO on d/c     - Pulmonology consulted: 11/25 s/p thora w/ 2L removed, Cx neg, cytology neg    - Cardiology consulted: TTE w/ nL LVSF, EF 68%, no WMA, mild LVDD, severely dilated LA, R pleural effusion   - Started on Doxazosin, IMDUR, Atorvastatin, ASA, and Valsartan     ESRD on Peritoneal Dialysis  - Nephrology consulted as ordered and will resume outpatient    Sinus Bradycardia  - on prior ECGs, poss 2/2 med SE  - TSH elevated, f/u free T3 outpatient  - Free T4 neg    Skin Pruiritus   - Lac-hydrin lotion    HTN  - Cards: labetalol 200 BID, amlodipine 10 QD    CAD s/p LAD , LM PCI  - Cards: asa, plavix, statin    Dispo  - PT recs no needs    ***STARS Patient***** 69-year-old male, with past history of CAD - s/p Stent, HTN, CAV, ESRD (on Peritoneal dialysis), DM and BPH presented to the ED secondary to shortness of breath.  Diagnosed with Shortness of Breath in the ED.    SOB 2/2 R pleural effusion  - CXR w/ R sided pleural effusion & atelectasis   - COVID/RSV/Flu neg, compliant w/ peritoneal dialysis    - Nephrology consulted: s/p Lasix 80mg IV x1, Bumex 2mg IV BID, changed to PO on d/c     - Pulmonology consulted: 11/25 s/p thora w/ 2L removed, Cx neg, cytology neg    - Cardiology consulted: TTE w/ nL LVSF, EF 68%, no WMA, mild LVDD, severely dilated LA, R pleural effusion   - Started on Doxazosin, IMDUR, Atorvastatin, ASA, and Valsartan     ESRD on Peritoneal Dialysis  - Nephrology consulted as ordered and will resume outpatient    Sinus Bradycardia  - on prior ECGs, poss 2/2 med SE  - TSH elevated, f/u free T3 outpatient  - Free T4 neg  - Started on Synthroid    Skin Pruiritus   - Lac-hydrin lotion    HTN  - Cards: labetalol 200 BID  - Nephrology: discontinue Norvasc and started Nifedipine 60mg QD    CAD s/p LAD , LM PCI  - Cards: asa, plavix, statin    Dispo  - PT recs no needs    ***STARS Patient*****    On 11/29/24, case was discussed with , patient is medically cleared and optimized for discharge today. All medications were reviewed with attending, and sent to mutually agreed upon pharmacy.

## 2024-11-29 NOTE — DISCHARGE NOTE PROVIDER - NSDCMRMEDTOKEN_GEN_ALL_CORE_FT
amLODIPine 10 mg oral tablet: 1 tab(s) orally once a day  aspirin 81 mg oral delayed release tablet: 1 tab(s) orally once a day  atorvastatin 40 mg oral tablet: 1 tab(s) orally once a day  bumetanide 2 mg oral tablet: 1 tab(s) orally 2 times a day  calcitriol 0.25 mcg oral capsule: 3 cap(s) orally 3 times a week  clopidogrel 75 mg oral tablet: 1 tab(s) orally once a day MDD: 1  Diovan 160 mg oral tablet: 1 tab(s) orally 2 times a day  doxazosin 4 mg oral tablet: 1 tab(s) orally once a day (at bedtime)  isosorbide mononitrate 60 mg oral tablet, extended release: 1 tab(s) orally 2 times a day  labetalol 200 mg oral tablet: 1 tab(s) orally 2 times a day  Yee-Selena Rx oral tablet: 1 tab(s) orally once a day  sevelamer carbonate 800 mg oral tablet: 3 tab(s) orally 3 times a day ...(last filled 7/25/2024 x 90 day supply)   ammonium lactate 12% topical lotion: Apply topically to affected area 2 times a day as needed for  itching  aspirin 81 mg oral delayed release tablet: 1 tab(s) orally once a day  atorvastatin 40 mg oral tablet: 1 tab(s) orally once a day  bumetanide 2 mg oral tablet: 1 tab(s) orally 2 times a day  calcium acetate 667 mg oral tablet: 1 tab(s) orally 3 times a day  cholecalciferol oral tablet: 1,000 unit(s) orally once a day 1000 unit(s) orally once a day  clopidogrel 75 mg oral tablet: 1 tab(s) orally once a day MDD: 1  Diovan 160 mg oral tablet: 1 tab(s) orally 2 times a day  doxazosin 4 mg oral tablet: 1 tab(s) orally once a day (at bedtime)  gentamicin 0.1% topical cream: Apply topically to affected area every 48 hours for PD cath dressing changes  isosorbide mononitrate 60 mg oral tablet, extended release: 1 tab(s) orally once a day  labetalol 200 mg oral tablet: 1 tab(s) orally 2 times a day  levothyroxine 25 mcg (0.025 mg) oral tablet: 1 tab(s) orally once a day  NIFEdipine 60 mg oral tablet, extended release: 1 tab(s) orally once a day  Yee-Selena Rx oral tablet: 1 tab(s) orally once a day   ammonium lactate 12% topical lotion: Apply topically to affected area 2 times a day as needed for  itching  aspirin 81 mg oral delayed release tablet: 1 tab(s) orally once a day  atorvastatin 40 mg oral tablet: 1 tab(s) orally once a day  bumetanide 2 mg oral tablet: 1 tab(s) orally 2 times a day  calcitriol 0.25 mcg oral capsule: 1 cap(s) orally 3 times a week  calcium acetate 667 mg oral tablet: 1 tab(s) orally 3 times a day  cholecalciferol oral tablet: 1,000 unit(s) orally once a day 1000 unit(s) orally once a day  clopidogrel 75 mg oral tablet: 1 tab(s) orally once a day MDD: 1  Diovan 160 mg oral tablet: 1 tab(s) orally 2 times a day  doxazosin 4 mg oral tablet: 1 tab(s) orally once a day (at bedtime)  gentamicin 0.1% topical cream: Apply topically to affected area every 48 hours for PD cath dressing changes  isosorbide mononitrate 60 mg oral tablet, extended release: 1 tab(s) orally once a day  labetalol 200 mg oral tablet: 1 tab(s) orally 2 times a day  levothyroxine 25 mcg (0.025 mg) oral tablet: 1 tab(s) orally once a day  NIFEdipine 60 mg oral tablet, extended release: 1 tab(s) orally once a day  Yee-Selena Rx oral tablet: 1 tab(s) orally once a day

## 2024-11-29 NOTE — PROGRESS NOTE ADULT - PROVIDER SPECIALTY LIST ADULT
Nephrology
Pulmonology
Cardiology
Pulmonology
Cardiology
Cardiology
Internal Medicine
Nephrology
Pulmonology
Pulmonology
Nephrology
Nephrology
Cardiology
Internal Medicine

## 2024-11-29 NOTE — PROGRESS NOTE ADULT - NSPROGADDITIONALINFOA_GEN_ALL_CORE
Patient seen and examined by me. patient care and plan discussed and reviewed with PA. Plan as outlined above edited by me to reflect our discussion. Advanced care planning/advanced directives discussed with patient/family. DNR status including forceful chest compressions to attempt to restart the heart, ventilator support/artificial breathing, electric shock, artificial nutrition, health care proxy, Molst form all discussed with pt. More than 50% of the visit was spent counseling and/or coordinating care by the attending physician.
D/C planing

## 2024-11-29 NOTE — PROGRESS NOTE ADULT - TIME BILLING
- Review of records, telemetry, vital signs and daily labs.   - General and cardiovascular physical examination.  - Generation of cardiovascular treatment plan and completion of note .  - Coordination of care.      Patient was seen and examined by me on  11/29/2024 ,interim events noted,labs and radiology studies reviewed.  Anthony Rosario MD,FACC.  [address] [facility].  [phone]
- Review of records, telemetry, vital signs and daily labs.   - General and cardiovascular physical examination.  - Generation of cardiovascular treatment plan and completion of note .  - Coordination of care.      Patient was seen and examined by me on  11/28/2024 ,interim events noted,labs and radiology studies reviewed.  Anthony Rosario MD,FACC.  [address] [facility].  [phone]

## 2024-11-29 NOTE — PROGRESS NOTE ADULT - PROBLEM SELECTOR PLAN 9
Heparin SQ  HOB elevation  Fall precautions

## 2024-11-29 NOTE — PROGRESS NOTE ADULT - PROBLEM SELECTOR PROBLEM 1
Shortness of breath at rest

## 2024-11-29 NOTE — DISCHARGE NOTE NURSING/CASE MANAGEMENT/SOCIAL WORK - INFLUENZA IMMUNIZATION DATE (APPROXIMATE):
48y f h/o etoh abuse p/w AMS. Pt Polish-speaking, HPI provided by sons and niece who state that pt has been increasingly altered over last week. Rpts that she is confused with memory loss and falling a lot. Also w/decr po intake. Family rpts long-standing h/o EtOH abuse, last drink was yesterday afternoon. Does not have a PCP. Pt denies any current HA, blurry vision, cp/sob, nv, abd pain, FNDs except for chronic bl hand & foot paresthesias. No recent illnesses. No other complaints.
25-Sep-2024

## 2024-11-29 NOTE — DISCHARGE NOTE NURSING/CASE MANAGEMENT/SOCIAL WORK - NSDCCRNAME_GEN_ALL_CORE_FT
[facility]  [address]  [place] [address] [facility]  [address]  [place] [address]

## 2024-11-29 NOTE — PHARMACOTHERAPY INTERVENTION NOTE - COMMENTS
Discharge medications reviewed with the patient. Current medication schedule was discussed in detail including: medication name, indication, dose, administration times, treatment duration, and side effects. Patient questions and concerns were answered and addressed. Patient demonstrated understanding.     New medications:   Calcium acetate, levothyroxine, nifedipine    Blayne Escobar, PaulaD  Clinical Pharmacy Specialist  [facility] [address]

## 2024-11-29 NOTE — PROGRESS NOTE ADULT - NS ATTEND AMEND GEN_ALL_CORE FT
I have discussed the patient in detail. Plan as above
I reviewed the overnight course of events on the unit, re-confirming the patient history. I discussed the care with the patient and their family. The plan of care was discussed with the ACP team and modifications were made to the notation where appropriate. Differential diagnosis and plan of care discussed with patient after the evaluation. Advanced care planning was discussed with patient and family.  Advanced care planning forms were reviewed and discussed.  Risks, benefits and alternatives of cardiac procedures were discussed in detail and all questions were answered. 35 minutes spent on total encounter of which more than fifty percent of the encounter was spent counseling and/or coordinating care by the attending physician.
I reviewed the overnight course of events on the unit, re-confirming the patient history. I discussed the care with the patient and their family. The plan of care was discussed with the ACP team and modifications were made to the notation where appropriate. Differential diagnosis and plan of care discussed with patient after the evaluation. Advanced care planning was discussed with patient and family.  Advanced care planning forms were reviewed and discussed.  Risks, benefits and alternatives of cardiac procedures were discussed in detail and all questions were answered. 35 minutes spent on total encounter of which more than fifty percent of the encounter was spent counseling and/or coordinating care by the attending physician.
cw PD as ordered
Patient discussed in details. Plan as above. PD holiday today to resume tomm. Continue diuretics
as above

## 2024-11-29 NOTE — DISCHARGE NOTE PROVIDER - PROVIDER TOKENS
PROVIDER:[TOKEN:[[id number]]],PROVIDER:[TOKEN:[[id number]]] PROVIDER:[TOKEN:[[id number]]],PROVIDER:[TOKEN:[[id number]]],PROVIDER:[TOKEN:[[id number]]]

## 2024-11-29 NOTE — PROGRESS NOTE ADULT - PROBLEM SELECTOR PROBLEM 2
Moderate sized pleural effusion

## 2024-11-29 NOTE — PROGRESS NOTE ADULT - PROBLEM SELECTOR PLAN 3
No acute issues presently; shortness of breath is most likely due to the moderate right sided pleural effusion  No significant electrolyte abnormalities  - case discussed with Nephrologist; for peritoneal dialysis tomorrow  - per recommendation, giving furosemide 80 mg IV x one (will also give metolazone 30 min prior to furosemide dose)  - Renal eval appreciated

## 2024-11-29 NOTE — PROGRESS NOTE ADULT - ASSESSMENT
69-year-old male, with past history of CAD - s/p Stent, HTN, CAV, ESRD (on Peritoneal dialysis), DM and BPH presented to the ED secondary to shortness of breath.  Seen and evaluated at bedside; NAD at rest.  Patient endorses worsening shortness of breath for the past few days, but with sudden acuity while sitting up in bed in the AM.  Unable to lie flat and wakens from sleep gasping for breath at times.  Endorses palpitations, but no chest pain.  No associated diaphoresis, nausea, vomiting.  No lower extremities edema.  Drinks about 0.5 liters of fluid daily.  Uses one pillow nightly.  Has profound GERMAIN.  Relates falling in the bathroom while going into the shower about 3 to 4 days ago; hit the right temporal head on the tub.  Unable to determine if had LOC, but does stress that the trauma to the head was significant.      Vital signs upon ED presentation as follows: BP = 150/64, HR = 53, RR = ?____ (later recorded at 16), T = 36.7 C (98.1 F), O2 Sat = 99% on RA.  Diagnosed with Shortness of Breath in the ED. (24 Nov 2024 21:52)      sob:  increasing rigth sided pleural effusion :  hx of pericardial fluid drainage  CAD  HTN  ESRD -PD  DM  BPH    sob:  increasing rigth sided pleural effusion :  his sob seems to be relaetd to pleural effusion and wheezing he is getting;   he carries no diagnosis of wheezing:   start BD and thoracentesis   add steroids too  currently he isno tin any resp distress    11/26:  he seems very good today  :  no sob:   on room air:   effusion seems transudative but need to wait for serum LDH     11/27: transudative effusion;     11/28: he seems pretty good:   no sob:  no cuh : no phlegm  :  cont diuresis pre primary team      11/29: no symptoms   no sob:   no cough or wheezing:   pleural fluid culture is negative:   no malignant cells       hx of pericardial fluid drainage  -was drained before?  etiology  : defer to cards     CAD  -cont current meds  HTN  -not on any anti hypertensives at this time  ESRD -PD  -per renal    DM  heparin   Injectable 5000 Unit(s) SubCutaneous every 12 hours  insulin lispro (ADMELOG) corrective regimen sliding scale   SubCutaneous three times a day before meals  insulin lispro (ADMELOG) corrective regimen sliding scale   SubCutaneous at   [facility] : dc planning

## 2024-11-29 NOTE — PROGRESS NOTE ADULT - PROBLEM SELECTOR PLAN 6
Patient cites significant discomfort  Likely in the setting of kidney disease  Could also be due to vitamin, mineral deficiency  Reports using skin emollients without relief  - trial of Lac-hydrin lotion prescribed; f/u for effectivity, or change to other regimen

## 2024-11-29 NOTE — PROGRESS NOTE ADULT - PROBLEM SELECTOR PROBLEM 4
Elevated brain natriuretic peptide (BNP) level

## 2024-11-29 NOTE — PROGRESS NOTE ADULT - ASSESSMENT
69-year-old male, with past history of CAD - s/p Stent, HTN, CAV, ESRD (on Peritoneal dialysis), DM and BPH presented to the ED secondary to shortness of breath.  Diagnosed with Shortness of Breath in the ED.    # Shortness of breath  - BNP 43138, Trop 273>238, s/p lasix 80 IV, metolazol po per renal in the ED, Pt denies CP  - CXR Rt pulm effu s/p thora 2L 11/25  - ESRD on PD  - Repeat TTE Trace pericardial effusion known LVEF-64%  -Dyspnea improved was likely 2/2 Pleural effusion s/p tap-2000ml      # ESRD on peritoneal dialysis.   -f/u renal recs    # CAD s/p LAD , LM PCI  - asa, plavix, statin    #  HTN  -labetalol 200 bid, amlodipine 10

## 2024-11-29 NOTE — PROGRESS NOTE ADULT - SUBJECTIVE AND OBJECTIVE BOX
Date of Service: 11-26-24 @ 12:48    Patient is a 69y old  Male who presents with a chief complaint of Shortness of breath (26 Nov 2024 11:17)      Any change in ROS: feels to be doing  ok :  no sob:   s/p tap yesterday  : he feels much better     MEDICATIONS  (STANDING):  ammonium lactate 12% Lotion 1 Application(s) Topical two times a day  chlorhexidine 2% Cloths 1 Application(s) Topical daily  cholecalciferol 1000 Unit(s) Oral daily  dextrose 5%. 1000 milliLiter(s) (100 mL/Hr) IV Continuous <Continuous>  dextrose 5%. 1000 milliLiter(s) (50 mL/Hr) IV Continuous <Continuous>  dextrose 50% Injectable 25 Gram(s) IV Push once  dextrose 50% Injectable 12.5 Gram(s) IV Push once  dextrose 50% Injectable 25 Gram(s) IV Push once  epoetin heidi (EPOGEN) Injectable 98070 Unit(s) SubCutaneous <User Schedule>  glucagon  Injectable 1 milliGRAM(s) IntraMuscular once  heparin   Injectable 5000 Unit(s) SubCutaneous every 12 hours  insulin lispro (ADMELOG) corrective regimen sliding scale   SubCutaneous three times a day before meals  insulin lispro (ADMELOG) corrective regimen sliding scale   SubCutaneous at bedtime    MEDICATIONS  (PRN):  acetaminophen     Tablet .. 650 milliGRAM(s) Oral every 6 hours PRN Mild Pain (1 - 3)  dextrose Oral Gel 15 Gram(s) Oral once PRN Blood Glucose LESS THAN 70 milliGRAM(s)/deciliter  melatonin 3 milliGRAM(s) Oral at bedtime PRN Insomnia    Vital Signs Last 24 Hrs  T(C): 36.6 (26 Nov 2024 11:10), Max: 36.9 (26 Nov 2024 06:00)  T(F): 97.8 (26 Nov 2024 11:10), Max: 98.4 (26 Nov 2024 06:00)  HR: 61 (26 Nov 2024 11:10) (56 - 64)  BP: 159/65 (26 Nov 2024 11:10) (150/69 - 180/78)  BP(mean): --  RR: 18 (26 Nov 2024 11:10) (16 - 18)  SpO2: 99% (26 Nov 2024 06:00) (99% - 100%)    Parameters below as of 26 Nov 2024 11:10  Patient On (Oxygen Delivery Method): room air        I&O's Summary    25 Nov 2024 07:01  -  26 Nov 2024 07:00  --------------------------------------------------------  IN: 8620 mL / OUT: 7650 mL / NET: 970 mL          Physical Exam:   GENERAL: NAD, well-groomed, well-developed  HEENT: ALEJANDRO/   Atraumatic, Normocephalic  ENMT: No tonsillar erythema, exudates, or enlargement; Moist mucous membranes, Good dentition, No lesions  NECK: Supple, No JVD, Normal thyroid  CHEST/LUNG: Clear to auscultaion  CVS: Regular rate and rhythm; No murmurs, rubs, or gallops  GI: : Soft, Nontender, Nondistended; Bowel sounds present  NERVOUS SYSTEM:  Alert & Oriented X3  EXTREMITIES: - edema  LYMPH: No lymphadenopathy noted  SKIN: No rashes or lesions  ENDOCRINOLOGY: No Thyromegaly  PSYCH: Appropriate    Labs:  25                            8.7    6.96  )-----------( 263      ( 26 Nov 2024 06:05 )             27.4                         8.0    6.65  )-----------( 246      ( 25 Nov 2024 06:50 )             25.1                         8.4    7.22  )-----------( 269      ( 24 Nov 2024 20:34 )             25.4     11-26    138  |  94[L]  |  52[H]  ----------------------------<  76  3.8   |  24  |  12.51[H]  11-25    136  |  94[L]  |  58[H]  ----------------------------<  79  3.6   |  23  |  13.79[H]  11-24    134[L]  |  93[L]  |  58[H]  ----------------------------<  115[H]  4.0   |  25  |  13.70[H]    Ca    7.3[L]      26 Nov 2024 06:05  Ca    7.2[L]      25 Nov 2024 06:50  Ca    7.5[L]      24 Nov 2024 20:34  Phos  5.7     11-25  Mg     2.40     11-25  Mg     2.50     11-24    TPro  6.2  /  Alb  3.0[L]  /  TBili  0.3  /  DBili  x   /  AST  25  /  ALT  16  /  AlkPhos  100  11-24    CAPILLARY BLOOD GLUCOSE      POCT Blood Glucose.: 98 mg/dL (26 Nov 2024 08:28)  POCT Blood Glucose.: 160 mg/dL (25 Nov 2024 21:53)  POCT Blood Glucose.: 179 mg/dL (25 Nov 2024 21:28)  POCT Blood Glucose.: 122 mg/dL (25 Nov 2024 17:00)  POCT Blood Glucose.: 82 mg/dL (25 Nov 2024 14:18)      LIVER FUNCTIONS - ( 24 Nov 2024 20:34 )  Alb: 3.0 g/dL / Pro: 6.2 g/dL / ALK PHOS: 100 U/L / ALT: 16 U/L / AST: 25 U/L / GGT: x             Urinalysis Basic - ( 26 Nov 2024 06:05 )    Color: x / Appearance: x / SG: x / pH: x  Gluc: 76 mg/dL / Ketone: x  / Bili: x / Urobili: x   Blood: x / Protein: x / Nitrite: x   Leuk Esterase: x / RBC: x / WBC x   Sq Epi: x / Non Sq Epi: x / Bacteria: x      Fluid Source Pleural  Albumin, Fluid1.7 g/dL  Glucose, [id number] mg/dL  Protein total, Fluid2.9 g/dL  Lacatate Dehydrogenase, [id number] U/L  pH, Fluid--  Cytopathology-Non Gyn Report--        RECENT CULTURES:  11-25 @ 16:29 Body Fluid       polymorphonuclear leukocytes seen  No organisms seen  by cytocentrifuge    rad< from: Xray Chest 2 Views PA/Lat (11.24.24 @ 20:36) >  CLINICAL INDICATION: Chest Pain    TECHNIQUE: 2 views; Frontal and lateral views of the chest were obtained.    COMPARISON: Chest x-ray 4/7/2024.    FINDINGS:  The heart is normal in size.  The visualized lungs are clear.  No pneumothorax.  Moderate right layering pleural effusion with associated atelectasis.  No acute bony abnormality.      IMPRESSION:  Moderate right layering pleural effusion with associated atelectasis.    --- End of Report ---          JESSE GONSALEZ MD; Resident Radiologist  This document has been electronically signed.  MOISES HERNANDEZ MD; Attending Radiologist  This document has been electronically signed. Nov 25 2024  9:09AM    < end of copied text >  < from: Xray Chest 1 View- PORTABLE-Urgent (Xray Chest 1 View- PORTABLE-Urgent .) (11.25.24 @ 16:16) >  Decrease in the right pleural effusion since the study of the day before.  No pneumothorax.  No focal consolidations.        COMPARISON: November 24        IMPRESSION: Status post right thoracentesis without complications.    --- End of Report ---            MALLORIE CARPENTER MD; Attending Radiologist  This document has been electronically signed. Nov 25 2024  4:59PM    < end of copied text >         Testing in progress          RESPIRATORY CULTURES:          Studies  Chest X-RAY  CT SCAN Chest   Venous Dopplers: LE:   CT Abdomen  Others              
Date of Service: 11-27-24 @ 12:38    Patient is a 69y old  Male who presents with a chief complaint of Shortness of breath (26 Nov 2024 13:59)      Any change in ROS: seems to be doing ok : no sob:     MEDICATIONS  (STANDING):  amLODIPine   Tablet 10 milliGRAM(s) Oral daily  ammonium lactate 12% Lotion 1 Application(s) Topical two times a day  buMETAnide Injectable 2 milliGRAM(s) IV Push two times a day  calcium acetate 667 milliGRAM(s) Oral three times a day with meals  chlorhexidine 2% Cloths 1 Application(s) Topical daily  cholecalciferol 1000 Unit(s) Oral daily  dextrose 5%. 1000 milliLiter(s) (100 mL/Hr) IV Continuous <Continuous>  dextrose 5%. 1000 milliLiter(s) (50 mL/Hr) IV Continuous <Continuous>  dextrose 50% Injectable 25 Gram(s) IV Push once  dextrose 50% Injectable 12.5 Gram(s) IV Push once  dextrose 50% Injectable 25 Gram(s) IV Push once  epoetin heidi (EPOGEN) Injectable 95242 Unit(s) SubCutaneous <User Schedule>  gentamicin 0.1% Cream 1 Application(s) Topical <User Schedule>  glucagon  Injectable 1 milliGRAM(s) IntraMuscular once  heparin   Injectable 5000 Unit(s) SubCutaneous every 12 hours  insulin lispro (ADMELOG) corrective regimen sliding scale   SubCutaneous three times a day before meals  insulin lispro (ADMELOG) corrective regimen sliding scale   SubCutaneous at bedtime  labetalol 200 milliGRAM(s) Oral two times a day    MEDICATIONS  (PRN):  acetaminophen     Tablet .. 650 milliGRAM(s) Oral every 6 hours PRN Mild Pain (1 - 3)  dextrose Oral Gel 15 Gram(s) Oral once PRN Blood Glucose LESS THAN 70 milliGRAM(s)/deciliter  melatonin 3 milliGRAM(s) Oral at bedtime PRN Insomnia    Vital Signs Last 24 Hrs  T(C): 36.2 (27 Nov 2024 08:40), Max: 36.9 (26 Nov 2024 21:26)  T(F): 97.2 (27 Nov 2024 08:40), Max: 98.4 (26 Nov 2024 21:26)  HR: 51 (27 Nov 2024 08:40) (51 - 70)  BP: 123/47 (27 Nov 2024 08:40) (123/47 - 189/76)  BP(mean): 91 (26 Nov 2024 23:26) (91 - 91)  RR: 16 (27 Nov 2024 08:40) (16 - 18)  SpO2: 100% (27 Nov 2024 08:40) (100% - 100%)    Parameters below as of 27 Nov 2024 08:40  Patient On (Oxygen Delivery Method): room air        I&O's Summary    26 Nov 2024 07:01  -  27 Nov 2024 07:00  --------------------------------------------------------  IN: 96481 mL / OUT: 56195 mL / NET: 507 mL    27 Nov 2024 07:01  -  27 Nov 2024 12:38  --------------------------------------------------------  IN: 2000 mL / OUT: 2000 mL / NET: 0 mL          Physical Exam:   GENERAL: NAD, well-groomed, well-developed  HEENT: ALEJANDRO/   Atraumatic, Normocephalic  ENMT: No tonsillar erythema, exudates, or enlargement; Moist mucous membranes, Good dentition, No lesions  NECK: Supple, No JVD, Normal thyroid  CHEST/LUNG: Clear to auscultaion  CVS: Regular rate and rhythm; No murmurs, rubs, or gallops  GI: : Soft, Nontender, Nondistended; Bowel sounds present  NERVOUS SYSTEM:  Alert & Oriented X3  EXTREMITIES:  -edema  LYMPH: No lymphadenopathy noted  SKIN: No rashes or lesions  ENDOCRINOLOGY: No Thyromegaly  PSYCH: Appropriate    Labs:  25                            8.9    7.34  )-----------( 284      ( 27 Nov 2024 04:23 )             29.1                         8.7    6.96  )-----------( 263      ( 26 Nov 2024 06:05 )             27.4                         8.0    6.65  )-----------( 246      ( 25 Nov 2024 06:50 )             25.1                         8.4    7.22  )-----------( 269      ( 24 Nov 2024 20:34 )             25.4     11-27    138  |  96[L]  |  47[H]  ----------------------------<  82  3.9   |  23  |  11.95[H]  11-26    138  |  94[L]  |  52[H]  ----------------------------<  76  3.8   |  24  |  12.51[H]  11-25    136  |  94[L]  |  58[H]  ----------------------------<  79  3.6   |  23  |  13.79[H]  11-24    134[L]  |  93[L]  |  58[H]  ----------------------------<  115[H]  4.0   |  25  |  13.70[H]    Ca    7.6[L]      27 Nov 2024 04:23  Ca    7.3[L]      26 Nov 2024 06:05  Phos  5.8     11-27  Mg     2.10     11-27    TPro  5.5[L]  /  Alb  x   /  TBili  x   /  DBili  x   /  AST  x   /  ALT  x   /  AlkPhos  x   11-26  TPro  6.2  /  Alb  3.0[L]  /  TBili  0.3  /  DBili  x   /  AST  25  /  ALT  16  /  AlkPhos  100  11-24    CAPILLARY BLOOD GLUCOSE      POCT Blood Glucose.: 285 mg/dL (27 Nov 2024 12:29)  POCT Blood Glucose.: 79 mg/dL (27 Nov 2024 08:37)  POCT Blood Glucose.: 92 mg/dL (26 Nov 2024 21:49)  POCT Blood Glucose.: 160 mg/dL (26 Nov 2024 18:03)  POCT Blood Glucose.: 286 mg/dL (26 Nov 2024 13:02)      LIVER FUNCTIONS - ( 26 Nov 2024 06:05 )  Alb: x     / Pro: 5.5 g/dL / ALK PHOS: x     / ALT: x     / AST: x     / GGT: x             Urinalysis Basic - ( 27 Nov 2024 04:23 )    Color: x / Appearance: x / SG: x / pH: x  Gluc: 82 mg/dL / Ketone: x  / Bili: x / Urobili: x   Blood: x / Protein: x / Nitrite: x   Leuk Esterase: x / RBC: x / WBC x   Sq Epi: x / Non Sq Epi: x / Bacteria: x      Fluid Source Pleural  Albumin, Fluid1.7 g/dL  Glucose, [id number] mg/dL  Protein total, Fluid2.9 g/dL  Lacatate Dehydrogenase, Mjllq932 U/L  pH, Fluid7.8  Cytopathology-Non Gyn Report--        RECENT CULTURES:  11-25 @ 16:29 Body Fluid       polymorphonuclear leukocytes seen  No organisms seen  by cytocentrifuge       rad< from: Xray Chest 1 View- PORTABLE-Urgent (Xray Chest 1 View- PORTABLE-Urgent .) (11.25.24 @ 16:16) >    Decrease in the right pleural effusion since the study of the day before.  No pneumothorax.  No focal consolidations.        COMPARISON: November 24        IMPRESSION: Status post right thoracentesis without complications.    --- End of Report ---            MALLORIE CARPENTER MD; Attending Radiologist  This document has been electronically signed. Nov 25 2024  4:59PM    < end of copied text >      No growth to date.          RESPIRATORY CULTURES:          Studies  Chest X-RAY  CT SCAN Chest   Venous Dopplers: LE:   CT Abdomen  Others              
Date of Service: 11-28-24 @ 09:42    Patient is a 69y old  Male who presents with a chief complaint of Shortness of breath (27 Nov 2024 15:48)      Any change in ROS: seems dee doing  ok ; no cough : no phlegm      MEDICATIONS  (STANDING):  amLODIPine   Tablet 10 milliGRAM(s) Oral daily  ammonium lactate 12% Lotion 1 Application(s) Topical two times a day  buMETAnide Injectable 2 milliGRAM(s) IV Push two times a day  calcium acetate 667 milliGRAM(s) Oral three times a day with meals  chlorhexidine 2% Cloths 1 Application(s) Topical daily  cholecalciferol 1000 Unit(s) Oral daily  dextrose 5%. 1000 milliLiter(s) (100 mL/Hr) IV Continuous <Continuous>  dextrose 5%. 1000 milliLiter(s) (50 mL/Hr) IV Continuous <Continuous>  dextrose 50% Injectable 25 Gram(s) IV Push once  dextrose 50% Injectable 12.5 Gram(s) IV Push once  dextrose 50% Injectable 25 Gram(s) IV Push once  epoetin heidi (EPOGEN) Injectable 29680 Unit(s) SubCutaneous <User Schedule>  gentamicin 0.1% Cream 1 Application(s) Topical <User Schedule>  glucagon  Injectable 1 milliGRAM(s) IntraMuscular once  heparin   Injectable 5000 Unit(s) SubCutaneous every 12 hours  insulin lispro (ADMELOG) corrective regimen sliding scale   SubCutaneous three times a day before meals  insulin lispro (ADMELOG) corrective regimen sliding scale   SubCutaneous at bedtime  labetalol 200 milliGRAM(s) Oral two times a day    MEDICATIONS  (PRN):  acetaminophen     Tablet .. 650 milliGRAM(s) Oral every 6 hours PRN Mild Pain (1 - 3)  dextrose Oral Gel 15 Gram(s) Oral once PRN Blood Glucose LESS THAN 70 milliGRAM(s)/deciliter  melatonin 3 milliGRAM(s) Oral at bedtime PRN Insomnia    Vital Signs Last 24 Hrs  T(C): 36.6 (28 Nov 2024 05:30), Max: 36.6 (28 Nov 2024 05:30)  T(F): 97.9 (28 Nov 2024 05:30), Max: 97.9 (28 Nov 2024 05:30)  HR: 54 (28 Nov 2024 05:30) (54 - 58)  BP: 151/67 (28 Nov 2024 05:30) (151/67 - 158/80)  BP(mean): --  RR: 18 (28 Nov 2024 05:30) (17 - 18)  SpO2: 100% (28 Nov 2024 05:30) (100% - 100%)    Parameters below as of 28 Nov 2024 05:30  Patient On (Oxygen Delivery Method): room air        I&O's Summary    27 Nov 2024 07:01  -  28 Nov 2024 07:00  --------------------------------------------------------  IN: 80859 mL / OUT: 63610 mL / NET: -1632 mL          Physical Exam:   GENERAL: NAD, well-groomed, well-developed  HEENT: ALEJANDRO/   Atraumatic, Normocephalic  ENMT: No tonsillar erythema, exudates, or enlargement; Moist mucous membranes, Good dentition, No lesions  NECK: Supple, No JVD, Normal thyroid  CHEST/LUNG: Clear to auscultaion  CVS: Regular rate and rhythm; No murmurs, rubs, or gallops  GI: : Soft, Nontender, Nondistended; Bowel sounds present  NERVOUS SYSTEM:  Alert & Oriented X3  EXTREMITIES:  - edema  LYMPH: No lymphadenopathy noted  SKIN: No rashes or lesions  ENDOCRINOLOGY: No Thyromegaly  PSYCH: Appropriate    Labs:  25                            9.9    8.12  )-----------( 384      ( 28 Nov 2024 05:00 )             30.7                         8.9    7.34  )-----------( 284      ( 27 Nov 2024 04:23 )             29.1                         8.7    6.96  )-----------( 263      ( 26 Nov 2024 06:05 )             27.4                         8.0    6.65  )-----------( 246      ( 25 Nov 2024 06:50 )             25.1                         8.4    7.22  )-----------( 269      ( 24 Nov 2024 20:34 )             25.4     11-28    141  |  97[L]  |  43[H]  ----------------------------<  86  3.8   |  24  |  11.13[H]  11-27    138  |  96[L]  |  47[H]  ----------------------------<  82  3.9   |  23  |  11.95[H]  11-26    138  |  94[L]  |  52[H]  ----------------------------<  76  3.8   |  24  |  12.51[H]  11-25    136  |  94[L]  |  58[H]  ----------------------------<  79  3.6   |  23  |  13.79[H]  11-24    134[L]  |  93[L]  |  58[H]  ----------------------------<  115[H]  4.0   |  25  |  13.70[H]    Ca    8.5      28 Nov 2024 05:00  Ca    7.6[L]      27 Nov 2024 04:23  Phos  5.6     11-28  Phos  5.8     11-27  Mg     2.20     11-28  Mg     2.10     11-27    TPro  5.5[L]  /  Alb  x   /  TBili  x   /  DBili  x   /  AST  x   /  ALT  x   /  AlkPhos  x   11-26  TPro  6.2  /  Alb  3.0[L]  /  TBili  0.3  /  DBili  x   /  AST  25  /  ALT  16  /  AlkPhos  100  11-24    CAPILLARY BLOOD GLUCOSE      POCT Blood Glucose.: 94 mg/dL (28 Nov 2024 09:09)  POCT Blood Glucose.: 109 mg/dL (27 Nov 2024 21:38)  POCT Blood Glucose.: 205 mg/dL (27 Nov 2024 18:12)  POCT Blood Glucose.: 285 mg/dL (27 Nov 2024 12:29)     (11-25 @ 13:30)        Urinalysis Basic - ( 28 Nov 2024 05:00 )    Color: x / Appearance: x / SG: x / pH: x  Gluc: 86 mg/dL / Ketone: x  / Bili: x / Urobili: x   Blood: x / Protein: x / Nitrite: x   Leuk Esterase: x / RBC: x / WBC x   Sq Epi: x / Non Sq Epi: x / Bacteria: x      Fluid Source Pleural  Albumin, Fluid1.7 g/dL  Glucose, [id number] mg/dL  Protein total, Fluid2.9 g/dL  Lacatate Dehydrogenase, [id number] U/L  pH, Fluid7.8  Cytopathology-Non Gyn Report--  Fluid Source --  Albumin, Fluid--  Glucose, Fluid--  Protein total, Fluid--  Lacatate Dehydrogenase, Fluid--  pH, Fluid--  Cytopathology-Non Gyn Report  ACCESSION No:  [id number]  Patient:     LEONIDAS LARRY      Accession:                             [id number]    Collected Date/Time:                   11/25/2024 13:30 EST  Received Date/Time:                    11/25/2024 20:52 EST    Non-Gynecologic Report - Auth (Verified)    Specimen(s) Submitted  PLEURAL FLUID, RIGHT    Final Diagnosis  PLEURAL FLUID, RIGHT  NEGATIVE FOR MALIGNANT CELLS.    The cytology slides and cell block consist of mesothelial cells,  histiocytes and lymphocytes.    Screened by: Leila DE LA PAZ(ASCP)  Verified by: Lencho Vilchis M.D.  (Electronic Signature)  Reported on: 11/27/24 15:31 EST, [id number] [place] [address]  Phone: [phone] Fax: [phone]  Cytology processed at [facility], [address] [place] [id number]  _________________________________________________________________      Clinical Information  69 years old male with past history of CAD status post stent, HT, CAV,  ESRD on peritoneal dialysis.  DM and BPH presented to the ED secondary to shortness of breath.  Transudate.    Gross Description  Received: 30  ml of cloudy fluid in Cyto[facility]yt  Prepared: 1 ThinPrep slide, 1 smear, 1 cellblock        RECENT CULTURES:  11-25 @ 16:29 Body Fluid       polymorphonuclear leukocytes seen  No organisms seen  by cytocentrifuge           No growth to date.          RESPIRATORY CULTURES:    rad< from: Xray Chest 1 View- PORTABLE-Urgent (Xray Chest 1 View- PORTABLE-Urgent .) (11.25.24 @ 16:16) >    TIME OF EXAMINATION: November 25 at 2:20 PM    EXAM: Portable chest    FINDINGS:    Decrease in the right pleural effusion since the study of the day before.  No pneumothorax.  No focal consolidations.        COMPARISON: November 24        IMPRESSION: Status post right thoracentesis without complications.    --- End of Report ---    < end of copied text >        Studies  Chest X-RAY  CT SCAN Chest   Venous Dopplers: LE:   CT Abdomen  Others              
Doug Garcia MD   Interventional Cardiology / Endovascular Specialist   [place] Office : [address] [place] [address] [place] [address]  Tel:    [place] Office : [address] [place] [address]  Tel: [phone]   Cell : [phone]      Pt is lying in bed comfortable not in distress, no chest pains no SOB no palpitations  	  MEDICATIONS:  amLODIPine   Tablet 10 milliGRAM(s) Oral daily  buMETAnide Injectable 2 milliGRAM(s) IV Push two times a day  heparin   Injectable 5000 Unit(s) SubCutaneous every 12 hours  labetalol 200 milliGRAM(s) Oral two times a day        acetaminophen     Tablet .. 650 milliGRAM(s) Oral every 6 hours PRN  melatonin 3 milliGRAM(s) Oral at bedtime PRN      dextrose 50% Injectable 25 Gram(s) IV Push once  dextrose 50% Injectable 12.5 Gram(s) IV Push once  dextrose 50% Injectable 25 Gram(s) IV Push once  dextrose Oral Gel 15 Gram(s) Oral once PRN  glucagon  Injectable 1 milliGRAM(s) IntraMuscular once  insulin lispro (ADMELOG) corrective regimen sliding scale   SubCutaneous three times a day before meals  insulin lispro (ADMELOG) corrective regimen sliding scale   SubCutaneous at bedtime    ammonium lactate 12% Lotion 1 Application(s) Topical two times a day  calcium acetate 667 milliGRAM(s) Oral three times a day with meals  chlorhexidine 2% Cloths 1 Application(s) Topical daily  cholecalciferol 1000 Unit(s) Oral daily  dextrose 5%. 1000 milliLiter(s) IV Continuous <Continuous>  dextrose 5%. 1000 milliLiter(s) IV Continuous <Continuous>  epoetin heidi (EPOGEN) Injectable 79623 Unit(s) SubCutaneous <User Schedule>  gentamicin 0.1% Cream 1 Application(s) Topical <User Schedule>      PAST MEDICAL/SURGICAL HISTORY  PAST MEDICAL & SURGICAL HISTORY:  Hypertension      ESRD on peritoneal dialysis      CVA (cerebrovascular accident)  2010 without residual effects      Hyperlipidemia      Type 2 diabetes mellitus, with long-term current use of insulin      BPH (benign prostatic hyperplasia)      CAD (coronary artery disease)      History of peritoneal dialysis  s/p PD catheter placement      S/P coronary artery stent placement          SOCIAL HISTORY: Substance Use (street drugs): ( x ) never used  (  ) other:    FAMILY HISTORY:  FH: HTN (hypertension) (Mother, Father)    FH: type 2 diabetes (Mother, Father)          PHYSICAL EXAM:  T(C): 36.2 (11-27-24 @ 08:40), Max: 36.9 (11-26-24 @ 21:26)  HR: 51 (11-27-24 @ 08:40) (51 - 70)  BP: 123/47 (11-27-24 @ 08:40) (123/47 - 189/76)  RR: 16 (11-27-24 @ 08:40) (16 - 18)  SpO2: 100% (11-27-24 @ 08:40) (100% - 100%)  Wt(kg): --  I&O's Summary    26 Nov 2024 07:01  -  27 Nov 2024 07:00  --------------------------------------------------------  IN: 33517 mL / OUT: 06935 mL / NET: 507 mL    27 Nov 2024 07:01  -  27 Nov 2024 14:00  --------------------------------------------------------  IN: 2000 mL / OUT: 2000 mL / NET: 0 mL          GENERAL: NAD  EYES:   PERRLA   ENMT:   Moist mucous membranes, Good dentition, No lesions  Cardiovascular: Normal S1 S2, No JVD, No murmurs, No edema  Respiratory: Lungs clear to auscultation	  Gastrointestinal:  Soft, Non-tender, + BS	  Extremities: no edema                                    8.9    7.34  )-----------( 284      ( 27 Nov 2024 04:23 )             29.1     11-27    138  |  96[L]  |  47[H]  ----------------------------<  82  3.9   |  23  |  11.95[H]    Ca    7.6[L]      27 Nov 2024 04:23  Phos  5.8     11-27  Mg     2.10     11-27    TPro  5.5[L]  /  Alb  x   /  TBili  x   /  DBili  x   /  AST  x   /  ALT  x   /  AlkPhos  x   11-26    proBNP:   Lipid Profile:   HgA1c:   TSH:     Consultant(s) Notes Reviewed:  [x ] YES  [ ] NO    Care Discussed with Consultants/Other Providers [ x] YES  [ ] NO    Imaging Personally Reviewed independently:  [x] YES  [ ] NO    All labs, radiologic studies, vitals, orders and medications list reviewed. Patient is seen and examined at bedside. Case discussed with medical team.        
Doug Garcia MD   Interventional Cardiology / Endovascular Specialist   [place] Office : [address] [place] [address] [place] [address]  Tel:    [place] Office : [address] [facility] [place] [address]  Tel: [phone]   Cell : [phone]      Pt is lying in bed comfortable not in distress, no chest pains no palpitations, pt reports sob improved  	  MEDICATIONS:  heparin   Injectable 5000 Unit(s) SubCutaneous every 12 hours        acetaminophen     Tablet .. 650 milliGRAM(s) Oral every 6 hours PRN  melatonin 3 milliGRAM(s) Oral at bedtime PRN      dextrose 50% Injectable 25 Gram(s) IV Push once  dextrose 50% Injectable 12.5 Gram(s) IV Push once  dextrose 50% Injectable 25 Gram(s) IV Push once  dextrose Oral Gel 15 Gram(s) Oral once PRN  glucagon  Injectable 1 milliGRAM(s) IntraMuscular once  insulin lispro (ADMELOG) corrective regimen sliding scale   SubCutaneous three times a day before meals  insulin lispro (ADMELOG) corrective regimen sliding scale   SubCutaneous at bedtime    ammonium lactate 12% Lotion 1 Application(s) Topical two times a day  chlorhexidine 2% Cloths 1 Application(s) Topical daily  cholecalciferol 1000 Unit(s) Oral daily  dextrose 5%. 1000 milliLiter(s) IV Continuous <Continuous>  dextrose 5%. 1000 milliLiter(s) IV Continuous <Continuous>  epoetin heidi (EPOGEN) Injectable 37952 Unit(s) SubCutaneous <User Schedule>      PAST MEDICAL/SURGICAL HISTORY  PAST MEDICAL & SURGICAL HISTORY:  Hypertension      ESRD on peritoneal dialysis      CVA (cerebrovascular accident)  2010 without residual effects      Hyperlipidemia      Type 2 diabetes mellitus, with long-term current use of insulin      BPH (benign prostatic hyperplasia)      CAD (coronary artery disease)      History of peritoneal dialysis  s/p PD catheter placement      S/P coronary artery stent placement          SOCIAL HISTORY: Substance Use (street drugs): ( x ) never used  (  ) other:    FAMILY HISTORY:  FH: HTN (hypertension) (Mother, Father)    FH: type 2 diabetes (Mother, Father)          PHYSICAL EXAM:  T(C): 36.6 (11-26-24 @ 11:10), Max: 36.9 (11-26-24 @ 06:00)  HR: 61 (11-26-24 @ 11:10) (56 - 64)  BP: 159/65 (11-26-24 @ 11:10) (150/69 - 180/78)  RR: 18 (11-26-24 @ 11:10) (16 - 18)  SpO2: 99% (11-26-24 @ 06:00) (97% - 100%)  Wt(kg): --  I&O's Summary    25 Nov 2024 07:01  -  26 Nov 2024 07:00  --------------------------------------------------------  IN: 8620 mL / OUT: 7650 mL / NET: 970 mL          GENERAL: NAD  EYES:   PERRLA   ENMT:   Moist mucous membranes, Good dentition, No lesions  Cardiovascular: Normal S1 S2, No JVD, No murmurs, No edema  Respiratory: mild wheezing bilaterally  Gastrointestinal:  Soft, Non-tender, + BS	  Extremities: no edema                                    8.7    6.96  )-----------( 263      ( 26 Nov 2024 06:05 )             27.4     11-26    138  |  94[L]  |  52[H]  ----------------------------<  76  3.8   |  24  |  12.51[H]    Ca    7.3[L]      26 Nov 2024 06:05  Phos  5.7     11-25  Mg     2.40     11-25    TPro  6.2  /  Alb  3.0[L]  /  TBili  0.3  /  DBili  x   /  AST  25  /  ALT  16  /  AlkPhos  100  11-24    proBNP:   Lipid Profile:   HgA1c:   TSH:     Consultant(s) Notes Reviewed:  [x ] YES  [ ] NO    Care Discussed with Consultants/Other Providers [ x] YES  [ ] NO    Imaging Personally Reviewed independently:  [x] YES  [ ] NO    All labs, radiologic studies, vitals, orders and medications list reviewed. Patient is seen and examined at bedside. Case discussed with medical team.        
[facility] [place]   MD MESSI ROSE MD ANGELA WONG, PA    TEL:  OFFICE: [phone]  From 5pm-7am Answering Service [phone]    -- RENAL FOLLOW UP NOTE ---Date of Service 11-26-24 @ 13:44    Patient is a 69y old  Male who presents with a chief complaint of Shortness of breath (26 Nov 2024 12:48)      Patient seen and examined at bedside. No chest pain/sob feeling much better    VITALS:  T(F): 97.8 (11-26-24 @ 11:10), Max: 98.4 (11-26-24 @ 06:00)  HR: 61 (11-26-24 @ 11:10)  BP: 159/65 (11-26-24 @ 11:10)  RR: 18 (11-26-24 @ 11:10)  SpO2: 99% (11-26-24 @ 06:00)  Wt(kg): --    11-25 @ 07:01  -  11-26 @ 07:00  --------------------------------------------------------  IN: 8620 mL / OUT: 7650 mL / NET: 970 mL          PHYSICAL EXAM:  General: NAD  Neck: No JVD  Respiratory: CTAB, no wheezes, rales or rhonchi  Cardiovascular: S1, S2, RRR  Gastrointestinal: BS+, soft, NT/ND  Extremities: No peripheral edema    Hospital Medications:   MEDICATIONS  (STANDING):  ammonium lactate 12% Lotion 1 Application(s) Topical two times a day  chlorhexidine 2% Cloths 1 Application(s) Topical daily  cholecalciferol 1000 Unit(s) Oral daily  dextrose 5%. 1000 milliLiter(s) (100 mL/Hr) IV Continuous <Continuous>  dextrose 5%. 1000 milliLiter(s) (50 mL/Hr) IV Continuous <Continuous>  dextrose 50% Injectable 25 Gram(s) IV Push once  dextrose 50% Injectable 12.5 Gram(s) IV Push once  dextrose 50% Injectable 25 Gram(s) IV Push once  epoetin heidi (EPOGEN) Injectable 47509 Unit(s) SubCutaneous <User Schedule>  glucagon  Injectable 1 milliGRAM(s) IntraMuscular once  heparin   Injectable 5000 Unit(s) SubCutaneous every 12 hours  insulin lispro (ADMELOG) corrective regimen sliding scale   SubCutaneous three times a day before meals  insulin lispro (ADMELOG) corrective regimen sliding scale   SubCutaneous at bedtime      LABS:  11-26    138  |  94[L]  |  52[H]  ----------------------------<  76  3.8   |  24  |  12.51[H]    Ca    7.3[L]      26 Nov 2024 06:05  Phos  5.7     11-25  Mg     2.40     11-25    TPro  6.2  /  Alb  3.0[L]  /  TBili  0.3  /  DBili      /  AST  25  /  ALT  16  /  AlkPhos  100  11-24    Creatinine Trend: 12.51 <--, 13.79 <--, 13.70 <--                                8.7    6.96  )-----------( 263      ( 26 Nov 2024 06:05 )             27.4     Urine Studies:  Urinalysis - [11-26-24 @ 06:05]      Color  / Appearance  / SG  / pH       Gluc 76 / Ketone   / Bili  / Urobili        Blood  / Protein  / Leuk Est  / Nitrite       RBC  / WBC  / Hyaline  / Gran  / Sq Epi  / Non Sq Epi  / Bacteria       Vitamin D (25OH) 17.1      [11-25-24 @ 06:50]  TSH 6.22      [11-24-24 @ 20:34]    HCV 0.07, Nonreact      [11-24-24 @ 20:34]      RADIOLOGY & ADDITIONAL STUDIES:  
[facility] [place] [facility]   MD MESSI ROSE MD SAKIL BHUIYAN, MD MARIA SANTIAGO, MARGUERITE    TEL:  OFFICE: [phone]  From 5pm-7am Answering Service [phone]    -- RENAL FOLLOW UP NOTE ---Date of Service 11-28-24 @ 11:57    Patient is a 69y old  Male who presents with a chief complaint of Shortness of breath       Patient seen and examined at bedside. No chest pain/sob    VITALS:  T(F): 97.9 (11-28-24 @ 05:30), Max: 97.9 (11-28-24 @ 05:30)  HR: 54 (11-28-24 @ 05:30)  BP: 151/67 (11-28-24 @ 05:30)  RR: 18 (11-28-24 @ 05:30)  SpO2: 100% (11-28-24 @ 05:30)  Wt(kg): --    11-27 @ 07:01  -  11-28 @ 07:00  --------------------------------------------------------  IN: 49169 mL / OUT: 45239 mL / NET: -1632 mL          PHYSICAL EXAM:  General: NAD  Neck: No JVD  Respiratory: CTAB, no wheezes, rales or rhonchi  Cardiovascular: S1, S2, RRR  Gastrointestinal: BS+, soft, NT/ND  Extremities: No peripheral edema    Hospital Medications:   MEDICATIONS  (STANDING):  amLODIPine   Tablet 10 milliGRAM(s) Oral daily  ammonium lactate 12% Lotion 1 Application(s) Topical two times a day  buMETAnide Injectable 2 milliGRAM(s) IV Push two times a day  calcium acetate 667 milliGRAM(s) Oral three times a day with meals  chlorhexidine 2% Cloths 1 Application(s) Topical daily  cholecalciferol 1000 Unit(s) Oral daily  dextrose 5%. 1000 milliLiter(s) (100 mL/Hr) IV Continuous <Continuous>  dextrose 5%. 1000 milliLiter(s) (50 mL/Hr) IV Continuous <Continuous>  dextrose 50% Injectable 25 Gram(s) IV Push once  dextrose 50% Injectable 12.5 Gram(s) IV Push once  dextrose 50% Injectable 25 Gram(s) IV Push once  epoetin heidi (EPOGEN) Injectable 18256 Unit(s) SubCutaneous <User Schedule>  gentamicin 0.1% Cream 1 Application(s) Topical <User Schedule>  glucagon  Injectable 1 milliGRAM(s) IntraMuscular once  heparin   Injectable 5000 Unit(s) SubCutaneous every 12 hours  insulin lispro (ADMELOG) corrective regimen sliding scale   SubCutaneous three times a day before meals  insulin lispro (ADMELOG) corrective regimen sliding scale   SubCutaneous at bedtime  labetalol 200 milliGRAM(s) Oral two times a day      LABS:  11-28    141  |  97[L]  |  43[H]  ----------------------------<  86  3.8   |  24  |  11.13[H]    Ca    8.5      28 Nov 2024 05:00  Phos  5.6     11-28  Mg     2.20     11-28      Creatinine Trend: 11.13 <--, 11.95 <--, 12.51 <--, 13.79 <--, 13.70 <--    Phosphorus: 5.6 mg/dL (11-28 @ 05:00)                              9.9    8.12  )-----------( 384      ( 28 Nov 2024 05:00 )             30.7     Urine Studies:  Urinalysis - [11-28-24 @ 05:00]      Color  / Appearance  / SG  / pH       Gluc 86 / Ketone   / Bili  / Urobili        Blood  / Protein  / Leuk Est  / Nitrite       RBC  / WBC  / Hyaline  / Gran  / Sq Epi  / Non Sq Epi  / Bacteria       PTH -- (Ca 7.6)      [11-27-24 @ 04:23]   250  Vitamin D (25OH) 17.1      [11-25-24 @ 06:50]  TSH 6.22      [11-24-24 @ 20:34]    HCV 0.07, Nonreact      [11-24-24 @ 20:34]      RADIOLOGY & ADDITIONAL STUDIES:  
[facility] – [place] [place] [facility]   MD MESSI ROSE MD ANGELA WONG, PA    TEL:  OFFICE: [phone]  From 5pm-7am Answering Service [phone]    -- RENAL FOLLOW UP NOTE ---Date of Service 11-27-24 @ 12:43    Patient is a 69y old  Male who presents with a chief complaint of Shortness of breath (27 Nov 2024 12:38)      Patient seen and examined at bedside. No chest pain/sob    VITALS:  T(F): 97.2 (11-27-24 @ 08:40), Max: 98.4 (11-26-24 @ 21:26)  HR: 51 (11-27-24 @ 08:40)  BP: 123/47 (11-27-24 @ 08:40)  RR: 16 (11-27-24 @ 08:40)  SpO2: 100% (11-27-24 @ 08:40)  Wt(kg): --    11-26 @ 07:01  -  11-27 @ 07:00  --------------------------------------------------------  IN: 71772 mL / OUT: 18618 mL / NET: 507 mL    11-27 @ 07:01  -  11-27 @ 12:43  --------------------------------------------------------  IN: 2000 mL / OUT: 2000 mL / NET: 0 mL          PHYSICAL EXAM:  General: NAD  Neck: No JVD  Respiratory: CTAB, no wheezes, rales or rhonchi  Cardiovascular: S1, S2, RRR  Gastrointestinal: BS+, soft, NT/ND  Extremities: No peripheral edema    Hospital Medications:   MEDICATIONS  (STANDING):  amLODIPine   Tablet 10 milliGRAM(s) Oral daily  ammonium lactate 12% Lotion 1 Application(s) Topical two times a day  buMETAnide Injectable 2 milliGRAM(s) IV Push two times a day  calcium acetate 667 milliGRAM(s) Oral three times a day with meals  chlorhexidine 2% Cloths 1 Application(s) Topical daily  cholecalciferol 1000 Unit(s) Oral daily  dextrose 5%. 1000 milliLiter(s) (100 mL/Hr) IV Continuous <Continuous>  dextrose 5%. 1000 milliLiter(s) (50 mL/Hr) IV Continuous <Continuous>  dextrose 50% Injectable 25 Gram(s) IV Push once  dextrose 50% Injectable 12.5 Gram(s) IV Push once  dextrose 50% Injectable 25 Gram(s) IV Push once  epoetin heidi (EPOGEN) Injectable 36703 Unit(s) SubCutaneous <User Schedule>  gentamicin 0.1% Cream 1 Application(s) Topical <User Schedule>  glucagon  Injectable 1 milliGRAM(s) IntraMuscular once  heparin   Injectable 5000 Unit(s) SubCutaneous every 12 hours  insulin lispro (ADMELOG) corrective regimen sliding scale   SubCutaneous three times a day before meals  insulin lispro (ADMELOG) corrective regimen sliding scale   SubCutaneous at bedtime  labetalol 200 milliGRAM(s) Oral two times a day      LABS:  11-27    138  |  96[L]  |  47[H]  ----------------------------<  82  3.9   |  23  |  11.95[H]    Ca    7.6[L]      27 Nov 2024 04:23  Phos  5.8     11-27  Mg     2.10     11-27    TPro  5.5[L]  /  Alb      /  TBili      /  DBili      /  AST      /  ALT      /  AlkPhos      11-26    Creatinine Trend: 11.95 <--, 12.51 <--, 13.79 <--, 13.70 <--    Phosphorus: 5.8 mg/dL (11-27 @ 04:23)    calcium7.6  intact [id number]  parathyroid hormone intact, serum--                            8.9    7.34  )-----------( 284      ( 27 Nov 2024 04:23 )             29.1     Urine Studies:  Urinalysis - [11-27-24 @ 04:23]      Color  / Appearance  / SG  / pH       Gluc 82 / Ketone   / Bili  / Urobili        Blood  / Protein  / Leuk Est  / Nitrite       RBC  / WBC  / Hyaline  / Gran  / Sq Epi  / Non Sq Epi  / Bacteria       PTH -- (Ca 7.6)      [11-27-24 @ 04:23]   250  Vitamin D (25OH) 17.1      [11-25-24 @ 06:50]  TSH 6.22      [11-24-24 @ 20:34]    HCV 0.07, Nonreact      [11-24-24 @ 20:34]      RADIOLOGY & ADDITIONAL STUDIES:  
Date of Service: 11-29-24 @ 11:24    Patient is a 69y old  Male who presents with a chief complaint of Shortness of breath (28 Nov 2024 13:27)      Any change in ROS: seems OK:   no sob:   no cough  no phlegm      MEDICATIONS  (STANDING):  ammonium lactate 12% Lotion 1 Application(s) Topical two times a day  aspirin enteric coated 81 milliGRAM(s) Oral daily  atorvastatin 40 milliGRAM(s) Oral at bedtime  buMETAnide Injectable 2 milliGRAM(s) IV Push two times a day  calcium acetate 667 milliGRAM(s) Oral three times a day with meals  chlorhexidine 2% Cloths 1 Application(s) Topical daily  cholecalciferol 1000 Unit(s) Oral daily  clopidogrel Tablet 75 milliGRAM(s) Oral daily  dextrose 5%. 1000 milliLiter(s) (100 mL/Hr) IV Continuous <Continuous>  dextrose 5%. 1000 milliLiter(s) (50 mL/Hr) IV Continuous <Continuous>  dextrose 50% Injectable 25 Gram(s) IV Push once  dextrose 50% Injectable 12.5 Gram(s) IV Push once  dextrose 50% Injectable 25 Gram(s) IV Push once  doxazosin 4 milliGRAM(s) Oral at bedtime  epoetin heidi (EPOGEN) Injectable 65061 Unit(s) SubCutaneous <User Schedule>  gentamicin 0.1% Cream 1 Application(s) Topical <User Schedule>  glucagon  Injectable 1 milliGRAM(s) IntraMuscular once  heparin   Injectable 5000 Unit(s) SubCutaneous every 12 hours  insulin lispro (ADMELOG) corrective regimen sliding scale   SubCutaneous three times a day before meals  insulin lispro (ADMELOG) corrective regimen sliding scale   SubCutaneous at bedtime  isosorbide   mononitrate ER Tablet (IMDUR) 60 milliGRAM(s) Oral daily  labetalol 200 milliGRAM(s) Oral two times a day  levothyroxine 25 MICROGram(s) Oral daily  NIFEdipine XL 60 milliGRAM(s) Oral daily  valsartan 160 milliGRAM(s) Oral two times a day    MEDICATIONS  (PRN):  acetaminophen     Tablet .. 650 milliGRAM(s) Oral every 6 hours PRN Mild Pain (1 - 3)  dextrose Oral Gel 15 Gram(s) Oral once PRN Blood Glucose LESS THAN 70 milliGRAM(s)/deciliter  melatonin 3 milliGRAM(s) Oral at bedtime PRN Insomnia    Vital Signs Last 24 Hrs  T(C): 36.2 (29 Nov 2024 10:05), Max: 36.7 (28 Nov 2024 11:37)  T(F): 97.2 (29 Nov 2024 10:05), Max: 98 (28 Nov 2024 11:37)  HR: 51 (29 Nov 2024 10:05) (49 - 58)  BP: 161/52 (29 Nov 2024 10:05) (161/52 - 183/65)  BP(mean): --  RR: 18 (29 Nov 2024 10:05) (17 - 18)  SpO2: 100% (29 Nov 2024 09:20) (100% - 100%)    Parameters below as of 29 Nov 2024 10:05  Patient On (Oxygen Delivery Method): room air        I&O's Summary        Physical Exam:   GENERAL: NAD, well-groomed, well-developed  HEENT: ALEJANDRO/   Atraumatic, Normocephalic  ENMT: No tonsillar erythema, exudates, or enlargement; Moist mucous membranes, Good dentition, No lesions  NECK: Supple, No JVD, Normal thyroid  CHEST/LUNG: Clear to auscultaion  CVS: Regular rate and rhythm; No murmurs, rubs, or gallops  GI: : Soft, Nontender, Nondistended; Bowel sounds present  NERVOUS SYSTEM:  Alert & Oriented X3  EXTREMITIES:  - edema  LYMPH: No lymphadenopathy noted  SKIN: No rashes or lesions  ENDOCRINOLOGY: No Thyromegaly  PSYCH: Appropriate    Labs:  25                            8.8    6.30  )-----------( 313      ( 29 Nov 2024 04:40 )             29.3                         9.9    8.12  )-----------( 384      ( 28 Nov 2024 05:00 )             30.7                         8.9    7.34  )-----------( 284      ( 27 Nov 2024 04:23 )             29.1                         8.7    6.96  )-----------( 263      ( 26 Nov 2024 06:05 )             27.4     11-29    137  |  96[L]  |  49[H]  ----------------------------<  74  3.8   |  26  |  12.33[H]  11-28    141  |  97[L]  |  43[H]  ----------------------------<  86  3.8   |  24  |  11.13[H]  11-27    138  |  96[L]  |  47[H]  ----------------------------<  82  3.9   |  23  |  11.95[H]  11-26    138  |  94[L]  |  52[H]  ----------------------------<  76  3.8   |  24  |  12.51[H]    Ca    7.8[L]      29 Nov 2024 04:40  Ca    8.5      28 Nov 2024 05:00  Phos  5.3     11-29  Phos  5.6     11-28  Mg     2.10     11-29  Mg     2.20     11-28    TPro  5.5[L]  /  Alb  x   /  TBili  x   /  DBili  x   /  AST  x   /  ALT  x   /  AlkPhos  x   11-26    CAPILLARY BLOOD GLUCOSE      POCT Blood Glucose.: 87 mg/dL (29 Nov 2024 08:46)  POCT Blood Glucose.: 109 mg/dL (28 Nov 2024 21:18)  POCT Blood Glucose.: 95 mg/dL (28 Nov 2024 17:43)  POCT Blood Glucose.: 98 mg/dL (28 Nov 2024 12:46)     (11-25 @ 13:30)        Urinalysis Basic - ( 29 Nov 2024 04:40 )    Color: x / Appearance: x / SG: x / pH: x  Gluc: 74 mg/dL / Ketone: x  / Bili: x / Urobili: x   Blood: x / Protein: x / Nitrite: x   Leuk Esterase: x / RBC: x / WBC x   Sq Epi: x / Non Sq Epi: x / Bacteria: x      Fluid Source Pleural  Albumin, Fluid1.7 g/dL  Glucose, [id number] mg/dL  Protein total, Fluid2.9 g/dL  Lacatate Dehydrogenase, [id number] U/L  pH, Fluid7.8  Cytopathology-Non Gyn Report--  Fluid Source --  Albumin, Fluid--  Glucose, Fluid--  Protein total, Fluid--  Lacatate Dehydrogenase, Fluid--  pH, Fluid--  Cytopathology-Non Gyn Report  ACCESSION No:  [id number]  Patient:     LEONIDAS LARRY      Accession:                             [id number]    Collected Date/Time:                   11/25/2024 13:30 EST  Received Date/Time:                    11/25/2024 20:52 EST    Non-Gynecologic Report - Auth (Verified)    Specimen(s) Submitted  PLEURAL FLUID, RIGHT    Final Diagnosis  PLEURAL FLUID, RIGHT  NEGATIVE FOR MALIGNANT CELLS.    The cytology slides and cell block consist of mesothelial cells,  histiocytes and lymphocytes.    Screened by: Leila DE LA PAZ(ASCP)  Verified by: Lencho Vilchis M.D.  (Electronic Signature)  Reported on: 11/27/24 15:31 EST, [id number]   [address] [place] [address]  Phone: [phone] Fax: [phone]  Cytology processed at [facility], [address] [place] [address]  _________________________________________________________________      Clinical Information  69 years old male with past history of CAD status post stent, HT, CAV,  ESRD on peritoneal dialysis.  DM and BPH presented to the ED secondary to shortness of breath.  Transudate.    Gross Description  Received: 30  ml of cloudy fluid in CytoLyt  Prepared: 1 ThinPrep slide, 1 smear, 1 cellblock        RECENT CULTURES:  11-25 @ 16:29 Body Fluid       polymorphonuclear leukocytes seen  No organisms seen  by cytocentrifuge    rad< from: Xray Chest 1 View- PORTABLE-Urgent (Xray Chest 1 View- PORTABLE-Urgent .) (11.25.24 @ 16:16) >    ACC: [id number] EXAM:  XR CHEST PORTABLE URGENT 1V   ORDERED BY: JESSE RINALDI     PROCEDURE DATE:  11/25/2024          INTERPRETATION:  CLINICAL INFORMATION: Post right thoracentesis    TIME OF EXAMINATION: November 25 at 2:20 PM    EXAM: Portable chest    FINDINGS:    Decrease in the right pleural effusion since the study of the day before.  No pneumothorax.  No focal consolidations.        COMPARISON: November 24        IMPRESSION: Status post right thoracentesis without complications.    --- End of Report ---            MALLORIE CARPENTER MD; Attending Radiologist  This document has been electronically signed. Nov 25 2024  4:59PM    < end of copied text >         No growth to date.          RESPIRATORY CULTURES:          Studies  Chest X-RAY  CT SCAN Chest   Venous Dopplers: LE:   CT Abdomen  Others              
[facility] [place] [facility]   MD MESSI ROSE MD ANGELA WONG, PA    TEL:  OFFICE: [phone]  From 5pm-7am Answering Service [phone]    -- RENAL FOLLOW UP NOTE ---Date of Service 11-29-24 @ 11:19    Patient is a 69y old  Male who presents with a chief complaint of Shortness of breath (28 Nov 2024 13:27)      Patient seen and examined at bedside. No chest pain/sob    VITALS:  T(F): 97.2 (11-29-24 @ 10:05), Max: 98 (11-28-24 @ 11:37)  HR: 51 (11-29-24 @ 10:05)  BP: 161/52 (11-29-24 @ 10:05)  RR: 18 (11-29-24 @ 10:05)  SpO2: 100% (11-29-24 @ 09:20)  Wt(kg): --        PHYSICAL EXAM:  General: NAD  Neck: No JVD  Respiratory: CTAB, no wheezes, rales or rhonchi  Cardiovascular: S1, S2, RRR  Gastrointestinal: BS+, soft, NT/ND  Extremities: No peripheral edema    Hospital Medications:   MEDICATIONS  (STANDING):  amLODIPine   Tablet 10 milliGRAM(s) Oral daily  ammonium lactate 12% Lotion 1 Application(s) Topical two times a day  aspirin enteric coated 81 milliGRAM(s) Oral daily  atorvastatin 40 milliGRAM(s) Oral at bedtime  buMETAnide Injectable 2 milliGRAM(s) IV Push two times a day  calcium acetate 667 milliGRAM(s) Oral three times a day with meals  chlorhexidine 2% Cloths 1 Application(s) Topical daily  cholecalciferol 1000 Unit(s) Oral daily  clopidogrel Tablet 75 milliGRAM(s) Oral daily  dextrose 5%. 1000 milliLiter(s) (100 mL/Hr) IV Continuous <Continuous>  dextrose 5%. 1000 milliLiter(s) (50 mL/Hr) IV Continuous <Continuous>  dextrose 50% Injectable 25 Gram(s) IV Push once  dextrose 50% Injectable 12.5 Gram(s) IV Push once  dextrose 50% Injectable 25 Gram(s) IV Push once  doxazosin 4 milliGRAM(s) Oral at bedtime  epoetin heidi (EPOGEN) Injectable 04199 Unit(s) SubCutaneous <User Schedule>  gentamicin 0.1% Cream 1 Application(s) Topical <User Schedule>  glucagon  Injectable 1 milliGRAM(s) IntraMuscular once  heparin   Injectable 5000 Unit(s) SubCutaneous every 12 hours  insulin lispro (ADMELOG) corrective regimen sliding scale   SubCutaneous three times a day before meals  insulin lispro (ADMELOG) corrective regimen sliding scale   SubCutaneous at bedtime  isosorbide   mononitrate ER Tablet (IMDUR) 60 milliGRAM(s) Oral daily  labetalol 200 milliGRAM(s) Oral two times a day  levothyroxine 25 MICROGram(s) Oral daily  valsartan 160 milliGRAM(s) Oral two times a day      LABS:  11-29    137  |  96[L]  |  49[H]  ----------------------------<  74  3.8   |  26  |  12.33[H]    Ca    7.8[L]      29 Nov 2024 04:40  Phos  5.3     11-29  Mg     2.10     11-29      Creatinine Trend: 12.33 <--, 11.13 <--, 11.95 <--, 12.51 <--, 13.79 <--, 13.70 <--    Phosphorus: 5.3 mg/dL (11-29 @ 04:40)                              8.8    6.30  )-----------( 313      ( 29 Nov 2024 04:40 )             29.3     Urine Studies:  Urinalysis - [11-29-24 @ 04:40]      Color  / Appearance  / SG  / pH       Gluc 74 / Ketone   / Bili  / Urobili        Blood  / Protein  / Leuk Est  / Nitrite       RBC  / WBC  / Hyaline  / Gran  / Sq Epi  / Non Sq Epi  / Bacteria       PTH -- (Ca 7.6)      [11-27-24 @ 04:23]   250  Vitamin D (25OH) 17.1      [11-25-24 @ 06:50]  TSH 6.22      [11-24-24 @ 20:34]    HCV 0.07, Nonreact      [11-24-24 @ 20:34]      RADIOLOGY & ADDITIONAL STUDIES:  
MR#[id number]  PATIENT NAME:TATO LARRY    DATE OF SERVICE: 11-28-24 @ 10:25  Patient was seen and examined by Anthony Rosario MD on    11-28-24 @ 10:25 .  Interim events noted.Consultant notes ,Labs,Telemetry reviewed by me    Covering Dr Garcia     HOSPITAL COURSE: HPI:  69-year-old male, with past history of CAD - s/p Stent, HTN, CAV, ESRD (on Peritoneal dialysis), DM and BPH presented to the ED secondary to shortness of breath.  Seen and evaluated at bedside; NAD at rest.  Patient endorses worsening shortness of breath for the past few days, but with sudden acuity while sitting up in bed in the AM.  Unable to lie flat and wakens from sleep gasping for breath at times.  Endorses palpitations, but no chest pain.  No associated diaphoresis, nausea, vomiting.  No lower extremities edema.  Drinks about 0.5 liters of fluid daily.  Uses one pillow nightly.  Has profound GERMAIN.  Relates falling in the bathroom while going into the shower about 3 to 4 days ago; hit the right temporal head on the tub.  Unable to determine if had LOC, but does stress that the trauma to the head was significant.      Vital signs upon ED presentation as follows: BP = 150/64, HR = 53, RR = ?____ (later recorded at 16), T = 36.7 C (98.1 F), O2 Sat = 99% on RA.  Diagnosed with Shortness of Breath in the ED. (24 Nov 2024 21:52)      INTERIM EVENTS:Patient seen at bedside ,interim events noted.  Awake alert not dyspneac     PMH -reviewed admission note, no change since admission  HEART FAILURE: Acute[ ]Chronic[x ] Systolic[x ] Diastolic[ ] Combined Systolic and Diastolic[ ]  CAD[x ] CABG[ ] PCI[x]  DEVICES[ ] PPM[ ] ICD[ ] ILR[ ]  ATRIAL FIBRILLATION[ ] Paroxysmal[ ] Permanent[ ] CHADS2-[  ]  MACRINA[ ] CKD1[ ] CKD2[ ] CKD3[ ] CKD4[ ] ESRD[x ]  COPD[x ] HTN[ ]   DM[ ] Type1[ ] Type 2[ ]   CVA[ ] Paresis[ ]    AMBULATION: Assisted[ ] Cane/walker[x ] Independent[ ]    MEDICATIONS  (STANDING):  amLODIPine   Tablet 10 milliGRAM(s) Oral daily  ammonium lactate 12% Lotion 1 Application(s) Topical two times a day  buMETAnide Injectable 2 milliGRAM(s) IV Push two times a day  calcium acetate 667 milliGRAM(s) Oral three times a day with meals  chlorhexidine 2% Cloths 1 Application(s) Topical daily  cholecalciferol 1000 Unit(s) Oral daily  epoetin heidi (EPOGEN) Injectable 67113 Unit(s) SubCutaneous <User Schedule>  gentamicin 0.1% Cream 1 Application(s) Topical <User Schedule>  glucagon  Injectable 1 milliGRAM(s) IntraMuscular once  heparin   Injectable 5000 Unit(s) SubCutaneous every 12 hours  insulin lispro (ADMELOG) corrective regimen sliding scale   SubCutaneous three times a day before meals  insulin lispro (ADMELOG) corrective regimen sliding scale   SubCutaneous at bedtime  labetalol 200 milliGRAM(s) Oral two times a day    MEDICATIONS  (PRN):  acetaminophen     Tablet .. 650 milliGRAM(s) Oral every 6 hours PRN Mild Pain (1 - 3)  dextrose Oral Gel 15 Gram(s) Oral once PRN Blood Glucose LESS THAN 70 milliGRAM(s)/deciliter  melatonin 3 milliGRAM(s) Oral at bedtime PRN Insomnia            REVIEW OF SYSTEMS:  Constitutional: [ ] fever, [ ]weight loss,  [x ]fatigue [ ]weight gain  Eyes: [ ] visual changes  Respiratory: [ ]shortness of breath;  [ ] cough, [ ]wheezing, [ ]chills, [ ]hemoptysis  Cardiovascular: [ ] chest pain, [ ]palpitations, [ ]dizziness,  [ ]leg swelling[ ]orthopnea[ ]PND  Gastrointestinal: [ ] abdominal pain, [ ]nausea, [ ]vomiting,  [ ]diarrhea [ ]Constipation [ ]Melena  Genitourinary: [ ] dysuria, [ ] hematuria [ ][place]  Neurologic: [ ] headaches [ ] tremors[ ]weakness [ ]Paralysis Right[ ] Left[ ]  Skin: [ ] itching, [ ]burning, [ ] rashes  Endocrine: [ ] heat or cold intolerance  Musculoskeletal: [ ] joint pain or swelling; [ ] muscle, back, or extremity pain  Psychiatric: [ ] depression, [ ]anxiety, [ ]mood swings, or [ ]difficulty sleeping  Hematologic: [ ] easy bruising, [ ] bleeding gums    [ ] All remaining systems negative except as per above.   [ ]Unable to obtain.  [x] No change in ROS since admission      Vital Signs Last 24 Hrs  T(C): 36.6 (28 Nov 2024 05:30), Max: 36.6 (28 Nov 2024 05:30)  T(F): 97.9 (28 Nov 2024 05:30), Max: 97.9 (28 Nov 2024 05:30)  HR: 54 (28 Nov 2024 05:30) (54 - 58)  BP: 151/67 (28 Nov 2024 05:30) (151/67 - 158/80)  RR: 18 (28 Nov 2024 05:30) (17 - 18)  SpO2: 100% (28 Nov 2024 05:30) (100% - 100%)    Parameters below as of 28 Nov 2024 05:30  Patient On (Oxygen Delivery Method): room air      I&O's Summary    27 Nov 2024 07:01  -  28 Nov 2024 07:00  --------------------------------------------------------  IN: 82572 mL / OUT: 87579 mL / NET: -1632 mL        PHYSICAL EXAM:  General: No acute distress BMI-30  HEENT: EOMI, PERRL  Neck: Supple, [ ] JVD  Lungs: Equal air entry bilaterally; [ ] rales [ ] wheezing [ ] rhonchi  Heart: Regular rate and rhythm; [x ] murmur   2/6 [ x] systolic [ ] diastolic [ ] radiation[ ] rubs [ ]  gallops  Abdomen: Nontender, bowel sounds present  Extremities: No clubbing, cyanosis, [ ] edema [ ]Pulses  equal and intact  Nervous system:  Alert & Oriented X3, no focal deficits  Psychiatric: Normal affect  Skin: No rashes or lesions    LABS:  11-28    141  |  97[L]  |  43[H]  ----------------------------<  86  3.8   |  24  |  11.13[H]    Ca    8.5      28 Nov 2024 05:00  Phos  5.6     11-28  Mg     2.20     11-28      Creatinine Trend: 11.13<--, 11.95<--, 12.51<--, 13.79<--, 13.70<--                        9.9    8.12  )-----------( 384      ( 28 Nov 2024 05:00 )             30.7        Transthoracic Echocardiogram (10.31.22 @ 16:15) >  CONCLUSIONS:  1. Normal pericardium with trace pericardial effusion.  2. Bilateral pleural effusions.              
PATIENT SEEN AND EXAMINED BY DANNIELLE DEL RIO M.D. ON :- 11/29/24  DATE OF SERVICE:    11/29/24         Interim events noted,Labs ,Radiological studies and Cardiology tests reviewed .    Patient is a 69y old  Male who presents with a chief complaint of Shortness of breath (29 Nov 2024 11:32)      HPI:  69-year-old male, with past history of CAD - s/p Stent, HTN, CAV, ESRD (on Peritoneal dialysis), DM and BPH presented to the ED secondary to shortness of breath.  Seen and evaluated at bedside; NAD at rest.  Patient endorses worsening shortness of breath for the past few days, but with sudden acuity while sitting up in bed in the AM.  Unable to lie flat and wakens from sleep gasping for breath at times.  Endorses palpitations, but no chest pain.  No associated diaphoresis, nausea, vomiting.  No lower extremities edema.  Drinks about 0.5 liters of fluid daily.  Uses one pillow nightly.  Has profound GERMAIN.  Relates falling in the bathroom while going into the shower about 3 to 4 days ago; hit the right temporal head on the tub.  Unable to determine if had LOC, but does stress that the trauma to the head was significant.      Vital signs upon ED presentation as follows: BP = 150/64, HR = 53, RR = ?____ (later recorded at 16), T = 36.7 C (98.1 F), O2 Sat = 99% on RA.  Diagnosed with Shortness of Breath in the ED. (24 Nov 2024 21:52)      PAST MEDICAL & SURGICAL HISTORY:  Hypertension      ESRD on peritoneal dialysis      CVA (cerebrovascular accident)  2010 without residual effects      Hyperlipidemia      Type 2 diabetes mellitus, with long-term current use of insulin      BPH (benign prostatic hyperplasia)      CAD (coronary artery disease)      History of peritoneal dialysis  s/p PD catheter placement      S/P coronary artery stent placement          PREVIOUS DIAGNOSTIC TESTING:      ECHO  FINDINGS:    STRESS  FINDINGS:    CATHETERIZATION  FINDINGS:    MEDICATIONS  (STANDING):  ammonium lactate 12% Lotion 1 Application(s) Topical two times a day  aspirin enteric coated 81 milliGRAM(s) Oral daily  atorvastatin 40 milliGRAM(s) Oral at bedtime  buMETAnide Injectable 2 milliGRAM(s) IV Push two times a day  calcium acetate 667 milliGRAM(s) Oral three times a day with meals  chlorhexidine 2% Cloths 1 Application(s) Topical daily  cholecalciferol 1000 Unit(s) Oral daily  clopidogrel Tablet 75 milliGRAM(s) Oral daily  dextrose 5%. 1000 milliLiter(s) (100 mL/Hr) IV Continuous <Continuous>  dextrose 5%. 1000 milliLiter(s) (50 mL/Hr) IV Continuous <Continuous>  dextrose 50% Injectable 25 Gram(s) IV Push once  dextrose 50% Injectable 12.5 Gram(s) IV Push once  dextrose 50% Injectable 25 Gram(s) IV Push once  doxazosin 4 milliGRAM(s) Oral at bedtime  epoetin heidi (EPOGEN) Injectable 45907 Unit(s) SubCutaneous <User Schedule>  gentamicin 0.1% Cream 1 Application(s) Topical <User Schedule>  glucagon  Injectable 1 milliGRAM(s) IntraMuscular once  heparin   Injectable 5000 Unit(s) SubCutaneous every 12 hours  insulin lispro (ADMELOG) corrective regimen sliding scale   SubCutaneous three times a day before meals  insulin lispro (ADMELOG) corrective regimen sliding scale   SubCutaneous at bedtime  isosorbide   mononitrate ER Tablet (IMDUR) 60 milliGRAM(s) Oral daily  labetalol 200 milliGRAM(s) Oral two times a day  levothyroxine 25 MICROGram(s) Oral daily  NIFEdipine XL 60 milliGRAM(s) Oral daily  valsartan 160 milliGRAM(s) Oral two times a day    MEDICATIONS  (PRN):  acetaminophen     Tablet .. 650 milliGRAM(s) Oral every 6 hours PRN Mild Pain (1 - 3)  dextrose Oral Gel 15 Gram(s) Oral once PRN Blood Glucose LESS THAN 70 milliGRAM(s)/deciliter  melatonin 3 milliGRAM(s) Oral at bedtime PRN Insomnia      FAMILY HISTORY:  FH: HTN (hypertension) (Mother, Father)    FH: type 2 diabetes (Mother, Father)        SOCIAL HISTORY:    CIGARETTES:    ALCOHOL:    REVIEW OF SYSTEMS:  CONSTITUTIONAL: No fever, weight loss, or fatigue  EYES: No eye pain, visual disturbances, or discharge  ENMT:  No difficulty hearing, tinnitus, vertigo; No sinus or throat pain  NECK: No pain or stiffness  RESPIRATORY: No cough, wheezing, chills or hemoptysis; No shortness of breath  CARDIOVASCULAR: No chest pain, palpitations, dizziness, or leg swelling  GASTROINTESTINAL: No abdominal or epigastric pain. No nausea, vomiting, or hematemesis; No diarrhea or constipation. No melena or hematochezia.  GENITOURINARY: No dysuria, frequency, hematuria, or incontinence  NEUROLOGICAL: No headaches, memory loss, loss of strength, numbness, or tremors  SKIN: No itching, burning, rashes, or lesions   LYMPH NODES: No enlarged glands  ENDOCRINE: No heat or cold intolerance; No hair loss  MUSCULOSKELETAL: No joint pain or swelling; No muscle, back, or extremity pain  PSYCHIATRIC: No depression, anxiety, mood swings, or difficulty sleeping  HEME/LYMPH: No easy bruising, or bleeding gums  ALLERY AND IMMUNOLOGIC: No hives or eczema    Vital Signs Last 24 Hrs  T(C): 36.2 (29 Nov 2024 10:05), Max: 36.6 (28 Nov 2024 20:46)  T(F): 97.2 (29 Nov 2024 10:05), Max: 97.8 (28 Nov 2024 20:46)  HR: 51 (29 Nov 2024 10:05) (49 - 54)  BP: 161/52 (29 Nov 2024 10:05) (161/52 - 176/69)  BP(mean): --  RR: 18 (29 Nov 2024 10:05) (17 - 18)  SpO2: 100% (29 Nov 2024 09:20) (100% - 100%)    Parameters below as of 29 Nov 2024 10:05  Patient On (Oxygen Delivery Method): room air          PHYSICAL EXAM:  GENERAL: NAD, well-groomed, well-developed  HEAD:  Atraumatic, Normocephalic  EYES: EOMI, PERRLA, conjunctiva and sclera clear  ENMT: No tonsillar erythema, exudates, or enlargement; Moist mucous membranes, Good dentition, No lesions  NECK: Supple, No JVD, Normal thyroid  NERVOUS SYSTEM:  Alert & Oriented X3, Good concentration; Motor Strength 5/5 B/L upper and lower extremities; DTRs 2+ intact and symmetric  CHEST/LUNG: Clear to percussion bilaterally; No rales, rhonchi, wheezing, or rubs  HEART: Regular rate and rhythm; No murmurs, rubs, or gallops  ABDOMEN: Soft, Nontender, Nondistended; Bowel sounds present  EXTREMITIES:  2+ Peripheral Pulses, No clubbing, cyanosis, or edema  LYMPH: No lymphadenopathy noted  SKIN: No rashes or lesions      INTERPRETATION OF TELEMETRY:    ECG:    CHADSVASC:     LABS:                        8.8    6.30  )-----------( 313      ( 29 Nov 2024 04:40 )             29.3     11-29    137  |  96[L]  |  49[H]  ----------------------------<  74  3.8   |  26  |  12.33[H]    Ca    7.8[L]      29 Nov 2024 04:40  Phos  5.3     11-29  Mg     2.10     11-29            Urinalysis Basic - ( 29 Nov 2024 04:40 )    Color: x / Appearance: x / SG: x / pH: x  Gluc: 74 mg/dL / Ketone: x  / Bili: x / Urobili: x   Blood: x / Protein: x / Nitrite: x   Leuk Esterase: x / RBC: x / WBC x   Sq Epi: x / Non Sq Epi: x / Bacteria: x      Lipid Panel:   I&O's Summary      RADIOLOGY & ADDITIONAL STUDIES:    < from: TTE W or WO Ultrasound Enhancing Agent (11.28.24 @ 09:57) >  CONCLUSIONS:      1. Left ventricular cavity is normal in size. Left ventricular wall thickness is normal. Left ventricular systolic function is normal with an ejection fraction of 68 % by Wilburn's method of disks. There are no regional wall motion abnormalities seen.   2. There is mild (grade 1) left ventricular diastolic dysfunction.   3. Left atrium is severely dilated.   4. No significant valvular disease.   5. No pericardial effusion seen.   6. Right pleural effusion noted.   7. The inferior vena cava is normal in size (normal <2.1cm) with normal inspiratory collapse (normal >50%) consistent with normal right atrial pressure (~3, range 0-5mmHg).    < end of copied text >  
Name of Patient : LEONIDAS LARRY  MRN: [id number]  Date of visit: 11-27-24       Subjective: Patient seen and examined. No new events except as noted.   Doing okay     REVIEW OF SYSTEMS:    CONSTITUTIONAL: No weakness, fevers or chills  EYES/ENT: No visual changes;  No vertigo or throat pain   NECK: No pain or stiffness  RESPIRATORY: No cough, wheezing, hemoptysis; No shortness of breath  CARDIOVASCULAR: No chest pain or palpitations  GASTROINTESTINAL: No abdominal or epigastric pain. No nausea, vomiting, or hematemesis; No diarrhea or constipation. No melena or hematochezia.  GENITOURINARY: No dysuria, frequency or hematuria  NEUROLOGICAL: No numbness or weakness  SKIN: No itching, burning, rashes, or lesions   All other review of systems is negative unless indicated above.    MEDICATIONS:  MEDICATIONS  (STANDING):  amLODIPine   Tablet 10 milliGRAM(s) Oral daily  ammonium lactate 12% Lotion 1 Application(s) Topical two times a day  buMETAnide Injectable 2 milliGRAM(s) IV Push two times a day  calcium acetate 667 milliGRAM(s) Oral three times a day with meals  chlorhexidine 2% Cloths 1 Application(s) Topical daily  cholecalciferol 1000 Unit(s) Oral daily  dextrose 5%. 1000 milliLiter(s) (100 mL/Hr) IV Continuous <Continuous>  dextrose 5%. 1000 milliLiter(s) (50 mL/Hr) IV Continuous <Continuous>  dextrose 50% Injectable 25 Gram(s) IV Push once  dextrose 50% Injectable 12.5 Gram(s) IV Push once  dextrose 50% Injectable 25 Gram(s) IV Push once  epoetin heidi (EPOGEN) Injectable 02825 Unit(s) SubCutaneous <User Schedule>  gentamicin 0.1% Cream 1 Application(s) Topical <User Schedule>  glucagon  Injectable 1 milliGRAM(s) IntraMuscular once  heparin   Injectable 5000 Unit(s) SubCutaneous every 12 hours  insulin lispro (ADMELOG) corrective regimen sliding scale   SubCutaneous three times a day before meals  insulin lispro (ADMELOG) corrective regimen sliding scale   SubCutaneous at bedtime  labetalol 200 milliGRAM(s) Oral two times a day      PHYSICAL EXAM:  T(C): 36.2 (11-27-24 @ 08:40), Max: 36.9 (11-26-24 @ 21:26)  HR: 55 (11-27-24 @ 15:10) (51 - 70)  BP: 157/69 (11-27-24 @ 15:10) (123/47 - 178/57)  RR: 16 (11-27-24 @ 08:40) (16 - 18)  SpO2: 100% (11-27-24 @ 08:40) (100% - 100%)  Wt(kg): --  I&O's Summary    26 Nov 2024 07:01 - 27 Nov 2024 07:00  --------------------------------------------------------  IN: 98433 mL / OUT: 95669 mL / NET: 507 mL    27 Nov 2024 07:01  -  27 Nov 2024 18:49  --------------------------------------------------------  IN: 2000 mL / OUT: 2000 mL / NET: 0 mL          Appearance: Normal	  HEENT:  PERRLA   Lymphatic: No lymphadenopathy   Cardiovascular: Normal S1 S2, no JVD  Respiratory: normal effort , clear  Gastrointestinal:  Soft, Non-tender  Skin: No rashes,  warm to touch  Psychiatry:  Mood & affect appropriate  Musculuskeletal: No edema    recent labs, Imaging and EKGs personally reviewed     11-26-24 @ 07:01  -  11-27-24 @ 07:00  --------------------------------------------------------  IN: 42613 mL / OUT: 12138 mL / NET: 507 mL    11-27-24 @ 07:01  -  11-27-24 @ 18:49  --------------------------------------------------------  IN: 2000 mL / OUT: 2000 mL / NET: 0 mL                          8.9    7.34  )-----------( 284      ( 27 Nov 2024 04:23 )             29.1               11-27    138  |  96[L]  |  47[H]  ----------------------------<  82  3.9   |  23  |  11.95[H]    Ca    7.6[L]      27 Nov 2024 04:23  Phos  5.8     11-27  Mg     2.10     11-27    TPro  5.5[L]  /  Alb  x   /  TBili  x   /  DBili  x   /  AST  x   /  ALT  x   /  AlkPhos  x   11-26                       Urinalysis Basic - ( 27 Nov 2024 04:23 )    Color: x / Appearance: x / SG: x / pH: x  Gluc: 82 mg/dL / Ketone: x  / Bili: x / Urobili: x   Blood: x / Protein: x / Nitrite: x   Leuk Esterase: x / RBC: x / WBC x   Sq Epi: x / Non Sq Epi: x / Bacteria: x              
Name of Patient : LEONIDAS LARRY  MRN: [id number]  Date of visit: 11-28-24       Subjective: Patient seen and examined. No new events except as noted.     REVIEW OF SYSTEMS:    CONSTITUTIONAL: No weakness, fevers or chills  EYES/ENT: No visual changes;  No vertigo or throat pain   NECK: No pain or stiffness  RESPIRATORY: No cough, wheezing, hemoptysis; No shortness of breath  CARDIOVASCULAR: No chest pain or palpitations  GASTROINTESTINAL: No abdominal or epigastric pain. No nausea, vomiting, or hematemesis; No diarrhea or constipation. No melena or hematochezia.  GENITOURINARY: No dysuria, frequency or hematuria  NEUROLOGICAL: No numbness or weakness  SKIN: No itching, burning, rashes, or lesions   All other review of systems is negative unless indicated above.    MEDICATIONS:  MEDICATIONS  (STANDING):  amLODIPine   Tablet 10 milliGRAM(s) Oral daily  ammonium lactate 12% Lotion 1 Application(s) Topical two times a day  aspirin enteric coated 81 milliGRAM(s) Oral daily  atorvastatin 40 milliGRAM(s) Oral at bedtime  buMETAnide Injectable 2 milliGRAM(s) IV Push two times a day  calcium acetate 667 milliGRAM(s) Oral three times a day with meals  chlorhexidine 2% Cloths 1 Application(s) Topical daily  cholecalciferol 1000 Unit(s) Oral daily  clopidogrel Tablet 75 milliGRAM(s) Oral daily  dextrose 5%. 1000 milliLiter(s) (100 mL/Hr) IV Continuous <Continuous>  dextrose 5%. 1000 milliLiter(s) (50 mL/Hr) IV Continuous <Continuous>  dextrose 50% Injectable 25 Gram(s) IV Push once  dextrose 50% Injectable 12.5 Gram(s) IV Push once  dextrose 50% Injectable 25 Gram(s) IV Push once  doxazosin 4 milliGRAM(s) Oral at bedtime  epoetin heidi (EPOGEN) Injectable 61221 Unit(s) SubCutaneous <User Schedule>  gentamicin 0.1% Cream 1 Application(s) Topical <User Schedule>  glucagon  Injectable 1 milliGRAM(s) IntraMuscular once  heparin   Injectable 5000 Unit(s) SubCutaneous every 12 hours  insulin lispro (ADMELOG) corrective regimen sliding scale   SubCutaneous three times a day before meals  insulin lispro (ADMELOG) corrective regimen sliding scale   SubCutaneous at bedtime  isosorbide   mononitrate ER Tablet (IMDUR) 60 milliGRAM(s) Oral daily  labetalol 200 milliGRAM(s) Oral two times a day  levothyroxine 25 MICROGram(s) Oral daily  valsartan 160 milliGRAM(s) Oral two times a day      PHYSICAL EXAM:  T(C): 36.6 (11-28-24 @ 20:46), Max: 36.7 (11-28-24 @ 11:37)  HR: 53 (11-28-24 @ 20:46) (53 - 58)  BP: 161/77 (11-28-24 @ 20:46) (151/67 - 183/65)  RR: 18 (11-28-24 @ 20:46) (18 - 18)  SpO2: 100% (11-28-24 @ 20:46) (100% - 100%)  Wt(kg): --  I&O's Summary    27 Nov 2024 07:01  -  28 Nov 2024 07:00  --------------------------------------------------------  IN: 70187 mL / OUT: 47555 mL / NET: -1632 mL          Appearance: Normal	  HEENT:  PERRLA   Lymphatic: No lymphadenopathy   Cardiovascular: Normal S1 S2, no JVD  Respiratory: normal effort , clear  Gastrointestinal:  Soft, Non-tender  Skin: No rashes,  warm to touch  Psychiatry:  Mood & affect appropriate  Musculuskeletal: No edema    recent labs, Imaging and EKGs personally reviewed     11-27-24 @ 07:01  -  11-28-24 @ 07:00  --------------------------------------------------------  IN: 74303 mL / OUT: 59339 mL / NET: -1632 mL                          9.9    8.12  )-----------( 384      ( 28 Nov 2024 05:00 )             30.7               11-28    141  |  97[L]  |  43[H]  ----------------------------<  86  3.8   |  24  |  11.13[H]    Ca    8.5      28 Nov 2024 05:00  Phos  5.6     11-28  Mg     2.20     11-28                         Urinalysis Basic - ( 28 Nov 2024 05:00 )    Color: x / Appearance: x / SG: x / pH: x  Gluc: 86 mg/dL / Ketone: x  / Bili: x / Urobili: x   Blood: x / Protein: x / Nitrite: x   Leuk Esterase: x / RBC: x / WBC x   Sq Epi: x / Non Sq Epi: x / Bacteria: x              
Name of Patient : LEONIDAS LARRY  MRN: [id number]  Date of visit: 11-29-24     Subjective: Patient seen and examined. No new events except as noted.   doing okay     REVIEW OF SYSTEMS:    CONSTITUTIONAL: No weakness, fevers or chills  EYES/ENT: No visual changes;  No vertigo or throat pain   NECK: No pain or stiffness  RESPIRATORY: No cough, wheezing, hemoptysis; No shortness of breath  CARDIOVASCULAR: No chest pain or palpitations  GASTROINTESTINAL: No abdominal or epigastric pain. No nausea, vomiting, or hematemesis; No diarrhea or constipation. No melena or hematochezia.  GENITOURINARY: No dysuria, frequency or hematuria  NEUROLOGICAL: No numbness or weakness  SKIN: No itching, burning, rashes, or lesions   All other review of systems is negative unless indicated above.    MEDICATIONS:  MEDICATIONS  (STANDING):  ammonium lactate 12% Lotion 1 Application(s) Topical two times a day  aspirin enteric coated 81 milliGRAM(s) Oral daily  atorvastatin 40 milliGRAM(s) Oral at bedtime  buMETAnide Injectable 2 milliGRAM(s) IV Push two times a day  calcium acetate 667 milliGRAM(s) Oral three times a day with meals  chlorhexidine 2% Cloths 1 Application(s) Topical daily  cholecalciferol 1000 Unit(s) Oral daily  clopidogrel Tablet 75 milliGRAM(s) Oral daily  dextrose 5%. 1000 milliLiter(s) (100 mL/Hr) IV Continuous <Continuous>  dextrose 5%. 1000 milliLiter(s) (50 mL/Hr) IV Continuous <Continuous>  dextrose 50% Injectable 25 Gram(s) IV Push once  dextrose 50% Injectable 12.5 Gram(s) IV Push once  dextrose 50% Injectable 25 Gram(s) IV Push once  doxazosin 4 milliGRAM(s) Oral at bedtime  epoetin heidi (EPOGEN) Injectable 01593 Unit(s) SubCutaneous <User Schedule>  gentamicin 0.1% Cream 1 Application(s) Topical <User Schedule>  glucagon  Injectable 1 milliGRAM(s) IntraMuscular once  heparin   Injectable 5000 Unit(s) SubCutaneous every 12 hours  insulin lispro (ADMELOG) corrective regimen sliding scale   SubCutaneous three times a day before meals  insulin lispro (ADMELOG) corrective regimen sliding scale   SubCutaneous at bedtime  isosorbide   mononitrate ER Tablet (IMDUR) 60 milliGRAM(s) Oral daily  labetalol 200 milliGRAM(s) Oral two times a day  levothyroxine 25 MICROGram(s) Oral daily  NIFEdipine XL 60 milliGRAM(s) Oral daily  valsartan 160 milliGRAM(s) Oral two times a day      PHYSICAL EXAM:  T(C): 36.4 (11-29-24 @ 19:10), Max: 36.6 (11-29-24 @ 00:48)  HR: 50 (11-29-24 @ 19:10) (49 - 54)  BP: 146/61 (11-29-24 @ 19:10) (146/61 - 176/69)  RR: 18 (11-29-24 @ 19:10) (17 - 18)  SpO2: 100% (11-29-24 @ 09:20) (100% - 100%)  Wt(kg): --  I&O's Summary    29 Nov 2024 07:01  -  29 Nov 2024 23:00  --------------------------------------------------------  IN: 72132 mL / OUT: 29255 mL / NET: -1555 mL          Appearance: Normal	  HEENT:  PERRLA   Lymphatic: No lymphadenopathy   Cardiovascular: Normal S1 S2, no JVD  Respiratory: normal effort , clear  Gastrointestinal:  Soft, Non-tender  Skin: No rashes,  warm to touch  Psychiatry:  Mood & affect appropriate  Musculuskeletal: No edema    recent labs, Imaging and EKGs personally reviewed     11-29-24 @ 07:01  -  11-29-24 @ 23:00  --------------------------------------------------------  IN: 69725 mL / OUT: 90015 mL / NET: -1555 mL                          8.8    6.30  )-----------( 313      ( 29 Nov 2024 04:40 )             29.3               11-29    137  |  96[L]  |  49[H]  ----------------------------<  74  3.8   |  26  |  12.33[H]    Ca    7.8[L]      29 Nov 2024 04:40  Phos  5.3     11-29  Mg     2.10     11-29                         Urinalysis Basic - ( 29 Nov 2024 04:40 )    Color: x / Appearance: x / SG: x / pH: x  Gluc: 74 mg/dL / Ketone: x  / Bili: x / Urobili: x   Blood: x / Protein: x / Nitrite: x   Leuk Esterase: x / RBC: x / WBC x   Sq Epi: x / Non Sq Epi: x / Bacteria: x              
Name of Patient : LEONIDAS LARRY  MRN: [id number]  Date of visit: 11-26-24     Subjective: Patient seen and examined. No new events except as noted.   doing okay  S/P thoracentesis       REVIEW OF SYSTEMS:    CONSTITUTIONAL: No weakness, fevers or chills  EYES/ENT: No visual changes;  No vertigo or throat pain   NECK: No pain or stiffness  RESPIRATORY: No cough, wheezing, hemoptysis; No shortness of breath  CARDIOVASCULAR: No chest pain or palpitations  GASTROINTESTINAL: No abdominal or epigastric pain. No nausea, vomiting, or hematemesis; No diarrhea or constipation. No melena or hematochezia.  GENITOURINARY: No dysuria, frequency or hematuria  NEUROLOGICAL: No numbness or weakness  SKIN: No itching, burning, rashes, or lesions   All other review of systems is negative unless indicated above.    MEDICATIONS:  MEDICATIONS  (STANDING):  amLODIPine   Tablet 10 milliGRAM(s) Oral daily  ammonium lactate 12% Lotion 1 Application(s) Topical two times a day  buMETAnide Injectable 2 milliGRAM(s) IV Push two times a day  calcium acetate 667 milliGRAM(s) Oral three times a day with meals  chlorhexidine 2% Cloths 1 Application(s) Topical daily  cholecalciferol 1000 Unit(s) Oral daily  dextrose 5%. 1000 milliLiter(s) (100 mL/Hr) IV Continuous <Continuous>  dextrose 5%. 1000 milliLiter(s) (50 mL/Hr) IV Continuous <Continuous>  dextrose 50% Injectable 25 Gram(s) IV Push once  dextrose 50% Injectable 12.5 Gram(s) IV Push once  dextrose 50% Injectable 25 Gram(s) IV Push once  epoetin heidi (EPOGEN) Injectable 20437 Unit(s) SubCutaneous <User Schedule>  glucagon  Injectable 1 milliGRAM(s) IntraMuscular once  heparin   Injectable 5000 Unit(s) SubCutaneous every 12 hours  insulin lispro (ADMELOG) corrective regimen sliding scale   SubCutaneous three times a day before meals  insulin lispro (ADMELOG) corrective regimen sliding scale   SubCutaneous at bedtime  labetalol 200 milliGRAM(s) Oral two times a day      PHYSICAL EXAM:  T(C): 36.9 (11-26-24 @ 21:26), Max: 36.9 (11-26-24 @ 06:00)  HR: 60 (11-26-24 @ 21:26) (57 - 62)  BP: 175/63 (11-26-24 @ 21:26) (159/65 - 189/76)  RR: 18 (11-26-24 @ 21:26) (17 - 18)  SpO2: 100% (11-26-24 @ 21:26) (99% - 100%)  Wt(kg): --  I&O's Summary    25 Nov 2024 07:01 - 26 Nov 2024 07:00  --------------------------------------------------------  IN: 8620 mL / OUT: 7650 mL / NET: 970 mL    26 Nov 2024 07:01  -  26 Nov 2024 22:59  --------------------------------------------------------  IN: 71962 mL / OUT: 24437 mL / NET: 507 mL          Appearance: Normal	  HEENT:  PERRLA   Lymphatic: No lymphadenopathy   Cardiovascular: Normal S1 S2, no JVD  Respiratory: normal effort , clear  Gastrointestinal:  Soft, Non-tender  Skin: No rashes,  warm to touch  Psychiatry:  Mood & affect appropriate  Musculuskeletal: No edema    recent labs, Imaging and EKGs personally reviewed     11-25-24 @ 07:01  -  11-26-24 @ 07:00  --------------------------------------------------------  IN: 8620 mL / OUT: 7650 mL / NET: 970 mL    11-26-24 @ 07:01  -  11-26-24 @ 22:59  --------------------------------------------------------  IN: 38987 mL / OUT: 02026 mL / NET: 507 mL                            8.7    6.96  )-----------( 263      ( 26 Nov 2024 06:05 )             27.4               11-26    138  |  94[L]  |  52[H]  ----------------------------<  76  3.8   |  24  |  12.51[H]    Ca    7.3[L]      26 Nov 2024 06:05  Phos  5.7     11-25  Mg     2.40     11-25    TPro  5.5[L]  /  Alb  x   /  TBili  x   /  DBili  x   /  AST  x   /  ALT  x   /  AlkPhos  x   11-26                       Urinalysis Basic - ( 26 Nov 2024 06:05 )    Color: x / Appearance: x / SG: x / pH: x  Gluc: 76 mg/dL / Ketone: x  / Bili: x / Urobili: x   Blood: x / Protein: x / Nitrite: x   Leuk Esterase: x / RBC: x / WBC x   Sq Epi: x / Non Sq Epi: x / Bacteria: x            
Name of Patient : LEONIDAS LARRY  MRN: [id number]  Date of visit: 11-25-24       Subjective: Patient seen and examined. No new events except as noted.   Doing okay       REVIEW OF SYSTEMS:    CONSTITUTIONAL: No weakness, fevers or chills  EYES/ENT: No visual changes;  No vertigo or throat pain   NECK: No pain or stiffness  RESPIRATORY: No cough, wheezing, hemoptysis; No shortness of breath  CARDIOVASCULAR: No chest pain or palpitations  GASTROINTESTINAL: No abdominal or epigastric pain. No nausea, vomiting, or hematemesis; No diarrhea or constipation. No melena or hematochezia.  GENITOURINARY: No dysuria, frequency or hematuria  NEUROLOGICAL: No numbness or weakness  SKIN: No itching, burning, rashes, or lesions   All other review of systems is negative unless indicated above.    MEDICATIONS:  MEDICATIONS  (STANDING):  ammonium lactate 12% Lotion 1 Application(s) Topical two times a day  chlorhexidine 2% Cloths 1 Application(s) Topical daily  dextrose 5%. 1000 milliLiter(s) (50 mL/Hr) IV Continuous <Continuous>  dextrose 5%. 1000 milliLiter(s) (100 mL/Hr) IV Continuous <Continuous>  dextrose 50% Injectable 25 Gram(s) IV Push once  dextrose 50% Injectable 12.5 Gram(s) IV Push once  dextrose 50% Injectable 25 Gram(s) IV Push once  epoetin heidi (EPOGEN) Injectable 45191 Unit(s) SubCutaneous <User Schedule>  glucagon  Injectable 1 milliGRAM(s) IntraMuscular once  heparin   Injectable 5000 Unit(s) SubCutaneous every 12 hours  insulin lispro (ADMELOG) corrective regimen sliding scale   SubCutaneous three times a day before meals  insulin lispro (ADMELOG) corrective regimen sliding scale   SubCutaneous at bedtime      PHYSICAL EXAM:  T(C): 36.7 (11-25-24 @ 22:00), Max: 36.9 (11-24-24 @ 23:38)  HR: 61 (11-25-24 @ 22:00) (56 - 61)  BP: 168/69 (11-25-24 @ 22:00) (150/69 - 180/78)  RR: 16 (11-25-24 @ 22:00) (16 - 20)  SpO2: 99% (11-25-24 @ 22:00) (97% - 100%)  Wt(kg): --  I&O's Summary    25 Nov 2024 07:01  -  25 Nov 2024 22:31  --------------------------------------------------------  IN: 6620 mL / OUT: 4850 mL / NET: 1770 mL          Appearance: Normal	  HEENT:  PERRLA   Lymphatic: No lymphadenopathy   Cardiovascular: Normal S1 S2, no JVD  Respiratory: normal effort , clear  Gastrointestinal:  Soft, Non-tender  Skin: No rashes,  warm to touch  Psychiatry:  Mood & affect appropriate  Musculuskeletal: No edema    recent labs, Imaging and EKGs personally reviewed     11-25-24 @ 07:01  -  11-25-24 @ 22:31  --------------------------------------------------------  IN: 6620 mL / OUT: 4850 mL / NET: 1770 mL                          8.0    6.65  )-----------( 246      ( 25 Nov 2024 06:50 )             25.1               11-25    136  |  94[L]  |  58[H]  ----------------------------<  79  3.6   |  23  |  13.79[H]    Ca    7.2[L]      25 Nov 2024 06:50  Phos  5.7     11-25  Mg     2.40     11-25    TPro  6.2  /  Alb  3.0[L]  /  TBili  0.3  /  DBili  x   /  AST  25  /  ALT  16  /  AlkPhos  100  11-24                       Urinalysis Basic - ( 25 Nov 2024 06:50 )    Color: x / Appearance: x / SG: x / pH: x  Gluc: 79 mg/dL / Ketone: x  / Bili: x / Urobili: x   Blood: x / Protein: x / Nitrite: x   Leuk Esterase: x / RBC: x / WBC x   Sq Epi: x / Non Sq Epi: x / Bacteria: x

## 2024-11-29 NOTE — DISCHARGE NOTE PROVIDER - NSDCFUSCHEDAPPT_GEN_ALL_CORE_FT
Félix Galan  [facility]  CARDIOLOGY [address] [place] [address]   Scheduled Appointment: 12/02/2024    Félix Galan  [facility]  CARDIOLOGY [address] [place] [address]   Scheduled Appointment: 01/03/2025

## 2024-11-29 NOTE — PROGRESS NOTE ADULT - PROBLEM SELECTOR PLAN 2
CXR = ""Moderate right layering pleural effusion with associated atelectasis." [personally reviewed]  Likely the cause of patient's persistent, and now acute, shortness of breath  - HOB elevation  - aspiration precautions  - pulm eval for thoracentesis eval, S/P drain  - diuresis
CXR = ""Moderate right layering pleural effusion with associated atelectasis." [personally reviewed]  Likely the cause of patient's persistent, and now acute, shortness of breath  - HOB elevation  - aspiration precautions  - pulm eval for thracentesis eval
CXR = ""Moderate right layering pleural effusion with associated atelectasis." [personally reviewed]  Likely the cause of patient's persistent, and now acute, shortness of breath  - HOB elevation  - aspiration precautions  - pulm eval for thoracentesis eval, S/P drain  - diuresis
CXR = ""Moderate right layering pleural effusion with associated atelectasis." [personally reviewed]  Likely the cause of patient's persistent, and now acute, shortness of breath  - HOB elevation  - aspiration precautions  - pulm eval for thoracentesis eval, S/P drain  - diuresis
CXR = ""Moderate right layering pleural effusion with associated atelectasis." [personally reviewed]  Likely the cause of patient's persistent, and now acute, shortness of breath  - HOB elevation  - aspiration precautions  - pulm eval for thracentesis eval, S/P drain

## 2024-11-29 NOTE — PROGRESS NOTE ADULT - PROBLEM SELECTOR PLAN 7
No acute issues presently

## 2024-11-29 NOTE — PROGRESS NOTE ADULT - PROBLEM SELECTOR PLAN 5
Heart rate upper 50's from ECGs x 2  Chart review notes prior bradycardia on ECG  - could be secondary to medication side effect; f/u Med-Hx Pharmacist for current list and evaluate meds  - TSH elevated , added synthroid , follow up outatient with PCP   - f/u electrolytes
Heart rate upper 50's from ECGs x 2  Chart review notes prior bradycardia on ECG  - could be secondary to medication side effect; f/u Med-Hx Pharmacist for current list and evaluate meds  - TSH elevated at 6.22; f/u FT3, T4 levels (added to prior lab-work also)  - f/u electrolytes

## 2024-11-29 NOTE — DISCHARGE NOTE PROVIDER - NSDCFUADDAPPT_GEN_ALL_CORE_FT
APPTS ARE READY TO BE MADE: [x] YES    Best Family or Patient Contact (if needed):    Additional Information about above appointments (if needed):    1: Heart Failure  2:   3:     Other comments or requests:    APPTS ARE READY TO BE MADE: [x] YES    Best Family or Patient Contact (if needed):    Additional Information about above appointments (if needed):    1: Heart Failure  2:   3:     Other comments or requests:     Prior to outreaching the patient, it was visible that the patient has secured a follow up appointment which was not scheduled by our team. Patient is scheduled with Dr. Galan 12/2 3:30pm [facility] [place] 2[place]   APPTS ARE READY TO BE MADE: [x] YES    Best Family or Patient Contact (if needed):    Additional Information about above appointments (if needed):    1: Heart Failure  2:   3:     Other comments or requests:     Prior to outreaching the patient, it was visible that the patient has secured a follow up appointment which was not scheduled by our team. Patient is scheduled with Dr. Galan 12/2 3:30pm [facility]    Prior to outreaching the patient, it was visible that the patient has secured a follow up appointment which was not scheduled by our team. Patient is scheduled with Dr. Garcia 3/20/25 10:30am [facility]

## 2024-11-29 NOTE — PROGRESS NOTE ADULT - PROBLEM SELECTOR PLAN 4
Pro-BNP = 37815, Troponin = 273  ECGs as above  Elevated pro-BNP not solely of cardiac origin and is likely associated with pulmonary etiology  Patient reports on average ~ 0.5 liters of fluid intake daily  - intake/output Q4H, weight daily, HOB elevation, fluid restriction of 1.2 liters daily for now  - f/u TSH level; added to initial lab-work  - card evin appreciated
Pro-BNP = 79754, Troponin = 273  ECGs as above  Elevated pro-BNP not solely of cardiac origin and is likely associated with pulmonary etiology  Patient reports on average ~ 0.5 liters of fluid intake daily  - intake/output Q4H, weight daily, HOB elevation, fluid restriction of 1.2 liters daily for now  - f/u TSH level; added to initial lab-work  - card evin appreciated
Pro-BNP = 72942, Troponin = 273  ECGs as above  Elevated pro-BNP not solely of cardiac origin and is likely associated with pulmonary etiology  Patient reports on average ~ 0.5 liters of fluid intake daily  - intake/output Q4H, weight daily, HOB elevation, fluid restriction of 1.2 liters daily for now  - f/u TSH level; added to initial lab-work  - card evin appreciated
Pro-BNP = 57483, Troponin = 273  ECGs as above  Elevated pro-BNP not solely of cardiac origin and is likely associated with pulmonary etiology  Patient reports on average ~ 0.5 liters of fluid intake daily  - intake/output Q4H, weight daily, HOB elevation, fluid restriction of 1.2 liters daily for now  - f/u TSH level; added to initial lab-work  - card evin appreciated
Pro-BNP = 15690, Troponin = 273  ECGs as above  Elevated pro-BNP not solely of cardiac origin and is likely associated with pulmonary etiology  Patient reports on average ~ 0.5 liters of fluid intake daily  - intake/output Q4H, weight daily, HOB elevation, fluid restriction of 1.2 liters daily for now  - f/u TSH level; added to initial lab-work  - card evin called

## 2024-11-29 NOTE — DISCHARGE NOTE NURSING/CASE MANAGEMENT/SOCIAL WORK - FINANCIAL ASSISTANCE
[facility] provides services at a reduced cost to those who are determined to be eligible through [facility]’s financial assistance program. Information regarding [facility]’s financial assistance program can be found by going to [email or web address] or by calling [phone].

## 2024-11-30 LAB
CULTURE RESULTS: SIGNIFICANT CHANGE UP
SPECIMEN SOURCE: SIGNIFICANT CHANGE UP

## 2024-12-02 ENCOUNTER — NON-APPOINTMENT (OUTPATIENT)
Age: 69
End: 2024-12-02

## 2024-12-02 ENCOUNTER — APPOINTMENT (OUTPATIENT)
Dept: CARDIOLOGY | Facility: CLINIC | Age: 69
End: 2024-12-02
Payer: MEDICARE

## 2024-12-02 VITALS
WEIGHT: 138 LBS | SYSTOLIC BLOOD PRESSURE: 164 MMHG | OXYGEN SATURATION: 100 % | BODY MASS INDEX: 20.98 KG/M2 | DIASTOLIC BLOOD PRESSURE: 60 MMHG | HEART RATE: 56 BPM

## 2024-12-02 DIAGNOSIS — I25.10 ATHEROSCLEROTIC HEART DISEASE OF NATIVE CORONARY ARTERY W/OUT ANGINA PECTORIS: ICD-10-CM

## 2024-12-02 DIAGNOSIS — I10 ESSENTIAL (PRIMARY) HYPERTENSION: ICD-10-CM

## 2024-12-02 DIAGNOSIS — I44.0 ATRIOVENTRICULAR BLOCK, FIRST DEGREE: ICD-10-CM

## 2024-12-02 DIAGNOSIS — I50.31 ACUTE DIASTOLIC (CONGESTIVE) HEART FAILURE: ICD-10-CM

## 2024-12-02 DIAGNOSIS — Z95.5 PRESENCE OF CORONARY ANGIOPLASTY IMPLANT AND GRAFT: ICD-10-CM

## 2024-12-02 DIAGNOSIS — Z01.818 ENCOUNTER FOR OTHER PREPROCEDURAL EXAMINATION: ICD-10-CM

## 2024-12-02 PROCEDURE — 99215 OFFICE O/P EST HI 40 MIN: CPT

## 2024-12-02 PROCEDURE — 93000 ELECTROCARDIOGRAM COMPLETE: CPT

## 2024-12-02 PROCEDURE — G2211 COMPLEX E/M VISIT ADD ON: CPT

## 2024-12-02 RX ORDER — SEVELAMER CARBONATE 800 MG/1
800 TABLET, FILM COATED ORAL
Qty: 810 | Refills: 0 | Status: DISCONTINUED | COMMUNITY
Start: 2024-11-27

## 2024-12-02 RX ORDER — LEVOTHYROXINE SODIUM 0.03 MG/1
25 TABLET ORAL
Qty: 30 | Refills: 0 | Status: ACTIVE | COMMUNITY
Start: 2024-11-29

## 2024-12-02 RX ORDER — CEPHALEXIN 500 MG/1
500 CAPSULE ORAL
Qty: 14 | Refills: 0 | Status: COMPLETED | COMMUNITY
Start: 2024-06-10

## 2024-12-02 RX ORDER — CALCIUM ACETATE 667 MG
667 TABLET ORAL
Qty: 90 | Refills: 0 | Status: ACTIVE | COMMUNITY
Start: 2024-11-29

## 2024-12-02 RX ORDER — ATORVASTATIN CALCIUM 40 MG/1
40 TABLET, FILM COATED ORAL
Qty: 90 | Refills: 0 | Status: ACTIVE | COMMUNITY
Start: 2023-12-11

## 2024-12-02 RX ORDER — CALCITRIOL 0.25 UG/1
0.25 CAPSULE, LIQUID FILLED ORAL
Qty: 116 | Refills: 0 | Status: ACTIVE | COMMUNITY
Start: 2024-02-16

## 2024-12-02 RX ORDER — CEPHALEXIN 250 MG/1
250 CAPSULE ORAL
Qty: 21 | Refills: 0 | Status: COMPLETED | COMMUNITY
Start: 2024-07-10

## 2024-12-02 RX ORDER — VALSARTAN 160 MG/1
160 TABLET, COATED ORAL
Qty: 180 | Refills: 0 | Status: ACTIVE | COMMUNITY
Start: 2024-09-02

## 2024-12-02 RX ORDER — ISOSORBIDE MONONITRATE 60 MG/1
60 TABLET, EXTENDED RELEASE ORAL
Qty: 30 | Refills: 0 | Status: ACTIVE | COMMUNITY
Start: 2024-11-29

## 2024-12-02 RX ORDER — GENTAMICIN SULFATE 1 MG/G
0.1 CREAM TOPICAL
Qty: 15 | Refills: 0 | Status: ACTIVE | COMMUNITY
Start: 2024-11-29

## 2024-12-02 RX ORDER — NIFEDIPINE 60 MG/1
60 TABLET, EXTENDED RELEASE ORAL
Qty: 30 | Refills: 0 | Status: ACTIVE | COMMUNITY
Start: 2024-11-29

## 2024-12-02 RX ORDER — VIT B COMP NO.3/FOLIC/C/BIOTIN 1 MG-60 MG
1 TABLET ORAL
Qty: 90 | Refills: 0 | Status: ACTIVE | COMMUNITY
Start: 2023-12-11

## 2024-12-02 RX ORDER — CLOPIDOGREL BISULFATE 75 MG/1
75 TABLET, FILM COATED ORAL
Qty: 90 | Refills: 0 | Status: ACTIVE | COMMUNITY
Start: 2024-11-27

## 2024-12-29 ENCOUNTER — INPATIENT (INPATIENT)
Facility: HOSPITAL | Age: 69
LOS: 19 days | Discharge: HOME HEALTH | End: 2025-01-18
Attending: INTERNAL MEDICINE | Admitting: INTERNAL MEDICINE
Payer: MEDICARE

## 2024-12-29 VITALS
WEIGHT: 147.93 LBS | DIASTOLIC BLOOD PRESSURE: 69 MMHG | HEIGHT: 68 IN | HEART RATE: 70 BPM | OXYGEN SATURATION: 96 % | SYSTOLIC BLOOD PRESSURE: 222 MMHG | TEMPERATURE: 98 F | RESPIRATION RATE: 16 BRPM

## 2024-12-29 DIAGNOSIS — I16.0 HYPERTENSIVE URGENCY: ICD-10-CM

## 2024-12-29 DIAGNOSIS — Z98.890 OTHER SPECIFIED POSTPROCEDURAL STATES: Chronic | ICD-10-CM

## 2024-12-29 DIAGNOSIS — Z95.5 PRESENCE OF CORONARY ANGIOPLASTY IMPLANT AND GRAFT: Chronic | ICD-10-CM

## 2024-12-29 DIAGNOSIS — R06.02 SHORTNESS OF BREATH: ICD-10-CM

## 2024-12-29 DIAGNOSIS — N18.6 END STAGE RENAL DISEASE: ICD-10-CM

## 2024-12-29 DIAGNOSIS — J90 PLEURAL EFFUSION, NOT ELSEWHERE CLASSIFIED: ICD-10-CM

## 2024-12-29 DIAGNOSIS — E78.5 HYPERLIPIDEMIA, UNSPECIFIED: ICD-10-CM

## 2024-12-29 DIAGNOSIS — I25.10 ATHEROSCLEROTIC HEART DISEASE OF NATIVE CORONARY ARTERY WITHOUT ANGINA PECTORIS: ICD-10-CM

## 2024-12-29 DIAGNOSIS — E11.9 TYPE 2 DIABETES MELLITUS WITHOUT COMPLICATIONS: ICD-10-CM

## 2024-12-29 LAB
ALBUMIN SERPL ELPH-MCNC: 2.7 G/DL — LOW (ref 3.3–5)
ALP SERPL-CCNC: 75 U/L — SIGNIFICANT CHANGE UP (ref 40–120)
ALT FLD-CCNC: 21 U/L — SIGNIFICANT CHANGE UP (ref 4–41)
ANION GAP SERPL CALC-SCNC: 16 MMOL/L — HIGH (ref 7–14)
AST SERPL-CCNC: 28 U/L — SIGNIFICANT CHANGE UP (ref 4–40)
B PERT IGG+IGM PNL SER: CLEAR — SIGNIFICANT CHANGE UP
BASOPHILS # BLD AUTO: 0.06 K/UL — SIGNIFICANT CHANGE UP (ref 0–0.2)
BASOPHILS NFR BLD AUTO: 0.6 % — SIGNIFICANT CHANGE UP (ref 0–2)
BILIRUB SERPL-MCNC: 0.3 MG/DL — SIGNIFICANT CHANGE UP (ref 0.2–1.2)
BLOOD GAS VENOUS COMPREHENSIVE RESULT: SIGNIFICANT CHANGE UP
BUN SERPL-MCNC: 51 MG/DL — HIGH (ref 7–23)
CALCIUM SERPL-MCNC: 8.4 MG/DL — SIGNIFICANT CHANGE UP (ref 8.4–10.5)
CHLORIDE SERPL-SCNC: 97 MMOL/L — LOW (ref 98–107)
CO2 SERPL-SCNC: 26 MMOL/L — SIGNIFICANT CHANGE UP (ref 22–31)
COLOR FLD: SIGNIFICANT CHANGE UP
CREAT SERPL-MCNC: 9.84 MG/DL — HIGH (ref 0.5–1.3)
EGFR: 5 ML/MIN/1.73M2 — LOW
EGFR: 5 ML/MIN/1.73M2 — LOW
EOSINOPHIL # BLD AUTO: 0.84 K/UL — HIGH (ref 0–0.5)
EOSINOPHIL # FLD: 0 % — SIGNIFICANT CHANGE UP
EOSINOPHIL NFR BLD AUTO: 8.2 % — HIGH (ref 0–6)
FLUID INTAKE SUBSTANCE CLASS: SIGNIFICANT CHANGE UP
FOLATE+VIT B12 SERBLD-IMP: 0 % — SIGNIFICANT CHANGE UP
GLUCOSE BLDC GLUCOMTR-MCNC: 253 MG/DL — HIGH (ref 70–99)
GLUCOSE BLDC GLUCOMTR-MCNC: 274 MG/DL — HIGH (ref 70–99)
GLUCOSE BLDC GLUCOMTR-MCNC: 352 MG/DL — HIGH (ref 70–99)
GLUCOSE SERPL-MCNC: 90 MG/DL — SIGNIFICANT CHANGE UP (ref 70–99)
HCT VFR BLD CALC: 23.8 % — LOW (ref 39–50)
HGB BLD-MCNC: 7.7 G/DL — LOW (ref 13–17)
IANC: 7.02 K/UL — SIGNIFICANT CHANGE UP (ref 1.8–7.4)
IMM GRANULOCYTES NFR BLD AUTO: 0.7 % — SIGNIFICANT CHANGE UP (ref 0–0.9)
LIDOCAIN IGE QN: 38 U/L — SIGNIFICANT CHANGE UP (ref 7–60)
LYMPHOCYTES # BLD AUTO: 1.17 K/UL — SIGNIFICANT CHANGE UP (ref 1–3.3)
LYMPHOCYTES # BLD AUTO: 11.4 % — LOW (ref 13–44)
LYMPHOCYTES # FLD: 15 % — SIGNIFICANT CHANGE UP
MAGNESIUM SERPL-MCNC: 1.9 MG/DL — SIGNIFICANT CHANGE UP (ref 1.6–2.6)
MCHC RBC-ENTMCNC: 27 PG — SIGNIFICANT CHANGE UP (ref 27–34)
MCHC RBC-ENTMCNC: 32.4 G/DL — SIGNIFICANT CHANGE UP (ref 32–36)
MCV RBC AUTO: 83.5 FL — SIGNIFICANT CHANGE UP (ref 80–100)
MESOTHL CELL # FLD: 1 % — SIGNIFICANT CHANGE UP
MONOCYTES # BLD AUTO: 1.09 K/UL — HIGH (ref 0–0.9)
MONOCYTES NFR BLD AUTO: 10.6 % — SIGNIFICANT CHANGE UP (ref 2–14)
MONOS+MACROS # FLD: 76 % — SIGNIFICANT CHANGE UP
NEUTROPHILS # BLD AUTO: 7.02 K/UL — SIGNIFICANT CHANGE UP (ref 1.8–7.4)
NEUTROPHILS NFR BLD AUTO: 68.5 % — SIGNIFICANT CHANGE UP (ref 43–77)
NEUTROPHILS-BODY FLUID: 8 % — SIGNIFICANT CHANGE UP
NRBC # BLD AUTO: 0 K/UL — SIGNIFICANT CHANGE UP (ref 0–0)
NRBC # BLD: 0 /100 WBCS — SIGNIFICANT CHANGE UP (ref 0–0)
NRBC # FLD: 0 K/UL — SIGNIFICANT CHANGE UP (ref 0–0)
NRBC BLD-RTO: 0 /100 WBCS — SIGNIFICANT CHANGE UP (ref 0–0)
NT-PROBNP SERPL-SCNC: HIGH PG/ML
OTHER CELLS FLD MANUAL: 0 % — SIGNIFICANT CHANGE UP
PHOSPHATE SERPL-MCNC: 3.9 MG/DL — SIGNIFICANT CHANGE UP (ref 2.5–4.5)
PLATELET # BLD AUTO: 344 K/UL — SIGNIFICANT CHANGE UP (ref 150–400)
POTASSIUM SERPL-MCNC: 3.8 MMOL/L — SIGNIFICANT CHANGE UP (ref 3.5–5.3)
POTASSIUM SERPL-SCNC: 3.8 MMOL/L — SIGNIFICANT CHANGE UP (ref 3.5–5.3)
PROT SERPL-MCNC: 6.2 G/DL — SIGNIFICANT CHANGE UP (ref 6–8.3)
RBC # BLD: 2.85 M/UL — LOW (ref 4.2–5.8)
RBC # FLD: 15.5 % — HIGH (ref 10.3–14.5)
RCV VOL RI: <2000 CELLS/UL — HIGH (ref 0–5)
SODIUM SERPL-SCNC: 139 MMOL/L — SIGNIFICANT CHANGE UP (ref 135–145)
TOTAL CELLS COUNTED, BODY FLUID: 100 CELLS — SIGNIFICANT CHANGE UP
TOTAL NUCLEATED CELL COUNT, BODY FLUID: 47 CELLS/UL — HIGH (ref 0–5)
TROPONIN T, HIGH SENSITIVITY RESULT: 257 NG/L — CRITICAL HIGH
TROPONIN T, HIGH SENSITIVITY RESULT: 265 NG/L — CRITICAL HIGH
WBC # BLD: 10.25 K/UL — SIGNIFICANT CHANGE UP (ref 3.8–10.5)
WBC # FLD AUTO: 10.25 K/UL — SIGNIFICANT CHANGE UP (ref 3.8–10.5)

## 2024-12-29 PROCEDURE — 99285 EMERGENCY DEPT VISIT HI MDM: CPT | Mod: GC

## 2024-12-29 PROCEDURE — 71250 CT THORAX DX C-: CPT | Mod: 26,MC

## 2024-12-29 PROCEDURE — 74176 CT ABD & PELVIS W/O CONTRAST: CPT | Mod: 26,MC

## 2024-12-29 PROCEDURE — 99497 ADVNCD CARE PLAN 30 MIN: CPT | Mod: 25

## 2024-12-29 PROCEDURE — 71045 X-RAY EXAM CHEST 1 VIEW: CPT | Mod: 26

## 2024-12-29 PROCEDURE — 99223 1ST HOSP IP/OBS HIGH 75: CPT

## 2024-12-29 RX ORDER — ISOSORBIDE MONONITRATE 60 MG/1
60 TABLET, EXTENDED RELEASE ORAL
Refills: 0 | Status: DISCONTINUED | OUTPATIENT
Start: 2024-12-29 | End: 2025-01-18

## 2024-12-29 RX ORDER — CLOPIDOGREL BISULFATE 75 MG/1
75 TABLET, FILM COATED ORAL DAILY
Refills: 0 | Status: DISCONTINUED | OUTPATIENT
Start: 2024-12-29 | End: 2024-12-30

## 2024-12-29 RX ORDER — ASPIRIN 325 MG
81 TABLET ORAL DAILY
Refills: 0 | Status: DISCONTINUED | OUTPATIENT
Start: 2024-12-29 | End: 2024-12-30

## 2024-12-29 RX ORDER — ONDANSETRON HCL/PF 4 MG/2 ML
4 VIAL (ML) INJECTION EVERY 8 HOURS
Refills: 0 | Status: DISCONTINUED | OUTPATIENT
Start: 2024-12-29 | End: 2024-12-29

## 2024-12-29 RX ORDER — LEVOTHYROXINE SODIUM 300 MCG
25 TABLET ORAL DAILY
Refills: 0 | Status: DISCONTINUED | OUTPATIENT
Start: 2024-12-29 | End: 2025-01-18

## 2024-12-29 RX ORDER — LABETALOL HYDROCHLORIDE 200 MG/1
200 TABLET, FILM COATED ORAL
Refills: 0 | Status: DISCONTINUED | OUTPATIENT
Start: 2024-12-30 | End: 2025-01-18

## 2024-12-29 RX ORDER — HEPARIN SODIUM 1000 [USP'U]/ML
5000 INJECTION INTRAVENOUS; SUBCUTANEOUS EVERY 12 HOURS
Refills: 0 | Status: DISCONTINUED | OUTPATIENT
Start: 2024-12-30 | End: 2025-01-18

## 2024-12-29 RX ORDER — MAGNESIUM, ALUMINUM HYDROXIDE 200-200 MG
30 TABLET,CHEWABLE ORAL EVERY 4 HOURS
Refills: 0 | Status: DISCONTINUED | OUTPATIENT
Start: 2024-12-29 | End: 2025-01-18

## 2024-12-29 RX ORDER — ISOSORBIDE MONONITRATE 60 MG/1
60 TABLET, EXTENDED RELEASE ORAL ONCE
Refills: 0 | Status: COMPLETED | OUTPATIENT
Start: 2024-12-29 | End: 2024-12-29

## 2024-12-29 RX ORDER — GENTAMICIN SULFATE 1 MG/G
1 OINTMENT TOPICAL
Refills: 0 | Status: DISCONTINUED | OUTPATIENT
Start: 2024-12-29 | End: 2025-01-18

## 2024-12-29 RX ORDER — LABETALOL HYDROCHLORIDE 200 MG/1
10 TABLET, FILM COATED ORAL ONCE
Refills: 0 | Status: COMPLETED | OUTPATIENT
Start: 2024-12-29 | End: 2024-12-29

## 2024-12-29 RX ORDER — SODIUM CHLORIDE 9 G/1000ML
1000 INJECTION, SOLUTION INTRAVENOUS
Refills: 0 | Status: DISCONTINUED | OUTPATIENT
Start: 2024-12-29 | End: 2025-01-18

## 2024-12-29 RX ORDER — DOXAZOSIN MESYLATE 8 MG/1
4 TABLET ORAL AT BEDTIME
Refills: 0 | Status: DISCONTINUED | OUTPATIENT
Start: 2024-12-30 | End: 2025-01-18

## 2024-12-29 RX ORDER — ACETAMINOPHEN 500 MG/5ML
975 LIQUID (ML) ORAL ONCE
Refills: 0 | Status: COMPLETED | OUTPATIENT
Start: 2024-12-29 | End: 2024-12-29

## 2024-12-29 RX ORDER — DEXTROSE 50 % IN WATER 50 %
12.5 SYRINGE (ML) INTRAVENOUS ONCE
Refills: 0 | Status: DISCONTINUED | OUTPATIENT
Start: 2024-12-29 | End: 2025-01-18

## 2024-12-29 RX ORDER — B1/B2/B3/B5/B6/B12/VIT C/FOLIC 500-0.5 MG
1 TABLET ORAL DAILY
Refills: 0 | Status: DISCONTINUED | OUTPATIENT
Start: 2024-12-29 | End: 2025-01-18

## 2024-12-29 RX ORDER — LABETALOL HYDROCHLORIDE 200 MG/1
200 TABLET, FILM COATED ORAL ONCE
Refills: 0 | Status: COMPLETED | OUTPATIENT
Start: 2024-12-29 | End: 2024-12-29

## 2024-12-29 RX ORDER — EPOETIN ALFA 10000 [IU]/ML
10000 SOLUTION INTRAVENOUS; SUBCUTANEOUS
Refills: 0 | Status: DISCONTINUED | OUTPATIENT
Start: 2024-12-29 | End: 2025-01-07

## 2024-12-29 RX ORDER — DEXTROSE 50 % IN WATER 50 %
25 SYRINGE (ML) INTRAVENOUS ONCE
Refills: 0 | Status: DISCONTINUED | OUTPATIENT
Start: 2024-12-29 | End: 2025-01-18

## 2024-12-29 RX ORDER — GLUCAGON 3 MG/1
1 POWDER NASAL ONCE
Refills: 0 | Status: DISCONTINUED | OUTPATIENT
Start: 2024-12-29 | End: 2025-01-18

## 2024-12-29 RX ORDER — INSULIN LISPRO 100 U/ML
INJECTION, SOLUTION INTRAVENOUS; SUBCUTANEOUS AT BEDTIME
Refills: 0 | Status: DISCONTINUED | OUTPATIENT
Start: 2024-12-29 | End: 2025-01-08

## 2024-12-29 RX ORDER — ACETAMINOPHEN 500 MG/5ML
650 LIQUID (ML) ORAL EVERY 6 HOURS
Refills: 0 | Status: DISCONTINUED | OUTPATIENT
Start: 2024-12-29 | End: 2025-01-18

## 2024-12-29 RX ORDER — NIFEDIPINE 30 MG
60 TABLET, EXTENDED RELEASE 24 HR ORAL
Refills: 0 | Status: DISCONTINUED | OUTPATIENT
Start: 2024-12-29 | End: 2025-01-05

## 2024-12-29 RX ORDER — MELATONIN 5 MG
3 TABLET ORAL AT BEDTIME
Refills: 0 | Status: DISCONTINUED | OUTPATIENT
Start: 2024-12-29 | End: 2025-01-18

## 2024-12-29 RX ORDER — BUMETANIDE 1 MG/1
2 TABLET ORAL
Refills: 0 | Status: DISCONTINUED | OUTPATIENT
Start: 2024-12-29 | End: 2025-01-18

## 2024-12-29 RX ORDER — DEXTROSE 50 % IN WATER 50 %
15 SYRINGE (ML) INTRAVENOUS ONCE
Refills: 0 | Status: DISCONTINUED | OUTPATIENT
Start: 2024-12-29 | End: 2025-01-18

## 2024-12-29 RX ORDER — BUMETANIDE 1 MG/1
2 TABLET ORAL ONCE
Refills: 0 | Status: COMPLETED | OUTPATIENT
Start: 2024-12-29 | End: 2024-12-29

## 2024-12-29 RX ORDER — INSULIN LISPRO 100 U/ML
INJECTION, SOLUTION INTRAVENOUS; SUBCUTANEOUS
Refills: 0 | Status: DISCONTINUED | OUTPATIENT
Start: 2024-12-29 | End: 2025-01-08

## 2024-12-29 RX ORDER — ATORVASTATIN CALCIUM 80 MG/1
40 TABLET, FILM COATED ORAL AT BEDTIME
Refills: 0 | Status: DISCONTINUED | OUTPATIENT
Start: 2024-12-29 | End: 2025-01-18

## 2024-12-29 RX ORDER — NIFEDIPINE 30 MG
60 TABLET, EXTENDED RELEASE 24 HR ORAL ONCE
Refills: 0 | Status: COMPLETED | OUTPATIENT
Start: 2024-12-29 | End: 2024-12-29

## 2024-12-29 RX ADMIN — LABETALOL HYDROCHLORIDE 200 MILLIGRAM(S): 200 TABLET, FILM COATED ORAL at 12:34

## 2024-12-29 RX ADMIN — ISOSORBIDE MONONITRATE 60 MILLIGRAM(S): 60 TABLET, EXTENDED RELEASE ORAL at 12:34

## 2024-12-29 RX ADMIN — BUMETANIDE 2 MILLIGRAM(S): 1 TABLET ORAL at 09:58

## 2024-12-29 RX ADMIN — INSULIN LISPRO 1: 100 INJECTION, SOLUTION INTRAVENOUS; SUBCUTANEOUS at 23:52

## 2024-12-29 RX ADMIN — Medication 60 MILLIGRAM(S): at 12:34

## 2024-12-29 RX ADMIN — LABETALOL HYDROCHLORIDE 10 MILLIGRAM(S): 200 TABLET, FILM COATED ORAL at 09:47

## 2024-12-29 RX ADMIN — Medication 975 MILLIGRAM(S): at 09:58

## 2024-12-30 PROBLEM — E11.9 TYPE 2 DIABETES MELLITUS WITHOUT COMPLICATIONS: Chronic | Status: INACTIVE | Noted: 2022-06-16 | Resolved: 2024-12-29

## 2024-12-30 LAB
ANION GAP SERPL CALC-SCNC: 16 MMOL/L — HIGH (ref 7–14)
BASOPHILS # BLD AUTO: 0.05 K/UL — SIGNIFICANT CHANGE UP (ref 0–0.2)
BASOPHILS NFR BLD AUTO: 0.6 % — SIGNIFICANT CHANGE UP (ref 0–2)
BUN SERPL-MCNC: 54 MG/DL — HIGH (ref 7–23)
CALCIUM SERPL-MCNC: 7.9 MG/DL — LOW (ref 8.4–10.5)
CALCIUM SERPL-MCNC: 7.9 MG/DL — LOW (ref 8.4–10.5)
CHLORIDE SERPL-SCNC: 96 MMOL/L — LOW (ref 98–107)
CO2 SERPL-SCNC: 28 MMOL/L — SIGNIFICANT CHANGE UP (ref 22–31)
CREAT SERPL-MCNC: 10.72 MG/DL — HIGH (ref 0.5–1.3)
EGFR: 5 ML/MIN/1.73M2 — LOW
EGFR: 5 ML/MIN/1.73M2 — LOW
EOSINOPHIL # BLD AUTO: 0.77 K/UL — HIGH (ref 0–0.5)
EOSINOPHIL NFR BLD AUTO: 8.7 % — HIGH (ref 0–6)
GLUCOSE BLDC GLUCOMTR-MCNC: 126 MG/DL — HIGH (ref 70–99)
GLUCOSE BLDC GLUCOMTR-MCNC: 144 MG/DL — HIGH (ref 70–99)
GLUCOSE BLDC GLUCOMTR-MCNC: 207 MG/DL — HIGH (ref 70–99)
GLUCOSE BLDC GLUCOMTR-MCNC: 226 MG/DL — HIGH (ref 70–99)
GLUCOSE BLDC GLUCOMTR-MCNC: 245 MG/DL — HIGH (ref 70–99)
GLUCOSE BLDC GLUCOMTR-MCNC: 258 MG/DL — HIGH (ref 70–99)
GLUCOSE BLDC GLUCOMTR-MCNC: 88 MG/DL — SIGNIFICANT CHANGE UP (ref 70–99)
GLUCOSE SERPL-MCNC: 74 MG/DL — SIGNIFICANT CHANGE UP (ref 70–99)
HCT VFR BLD CALC: 27.2 % — LOW (ref 39–50)
HGB BLD-MCNC: 8.8 G/DL — LOW (ref 13–17)
IANC: 6.09 K/UL — SIGNIFICANT CHANGE UP (ref 1.8–7.4)
IMM GRANULOCYTES NFR BLD AUTO: 0.4 % — SIGNIFICANT CHANGE UP (ref 0–0.9)
LYMPHOCYTES # BLD AUTO: 0.99 K/UL — LOW (ref 1–3.3)
LYMPHOCYTES # BLD AUTO: 11.1 % — LOW (ref 13–44)
MCHC RBC-ENTMCNC: 27.2 PG — SIGNIFICANT CHANGE UP (ref 27–34)
MCHC RBC-ENTMCNC: 32.4 G/DL — SIGNIFICANT CHANGE UP (ref 32–36)
MCV RBC AUTO: 84.2 FL — SIGNIFICANT CHANGE UP (ref 80–100)
MONOCYTES # BLD AUTO: 0.96 K/UL — HIGH (ref 0–0.9)
MONOCYTES NFR BLD AUTO: 10.8 % — SIGNIFICANT CHANGE UP (ref 2–14)
MRSA PCR RESULT.: SIGNIFICANT CHANGE UP
NEUTROPHILS # BLD AUTO: 6.09 K/UL — SIGNIFICANT CHANGE UP (ref 1.8–7.4)
NEUTROPHILS NFR BLD AUTO: 68.4 % — SIGNIFICANT CHANGE UP (ref 43–77)
NRBC # BLD AUTO: 0 K/UL — SIGNIFICANT CHANGE UP (ref 0–0)
NRBC # BLD: 0 /100 WBCS — SIGNIFICANT CHANGE UP (ref 0–0)
NRBC # FLD: 0 K/UL — SIGNIFICANT CHANGE UP (ref 0–0)
NRBC BLD-RTO: 0 /100 WBCS — SIGNIFICANT CHANGE UP (ref 0–0)
PHOSPHATE SERPL-MCNC: 3.8 MG/DL — SIGNIFICANT CHANGE UP (ref 2.5–4.5)
PLATELET # BLD AUTO: 395 K/UL — SIGNIFICANT CHANGE UP (ref 150–400)
POTASSIUM SERPL-MCNC: 3.9 MMOL/L — SIGNIFICANT CHANGE UP (ref 3.5–5.3)
POTASSIUM SERPL-SCNC: 3.9 MMOL/L — SIGNIFICANT CHANGE UP (ref 3.5–5.3)
PTH-INTACT FLD-MCNC: 229 PG/ML — HIGH (ref 15–65)
RBC # BLD: 3.23 M/UL — LOW (ref 4.2–5.8)
RBC # FLD: 15.4 % — HIGH (ref 10.3–14.5)
S AUREUS DNA NOSE QL NAA+PROBE: SIGNIFICANT CHANGE UP
SODIUM SERPL-SCNC: 140 MMOL/L — SIGNIFICANT CHANGE UP (ref 135–145)
TSH SERPL-MCNC: 3.53 UIU/ML — SIGNIFICANT CHANGE UP (ref 0.27–4.2)
WBC # BLD: 8.9 K/UL — SIGNIFICANT CHANGE UP (ref 3.8–10.5)
WBC # FLD AUTO: 8.9 K/UL — SIGNIFICANT CHANGE UP (ref 3.8–10.5)

## 2024-12-30 PROCEDURE — 76604 US EXAM CHEST: CPT | Mod: 26,GC

## 2024-12-30 PROCEDURE — 99283 EMERGENCY DEPT VISIT LOW MDM: CPT | Mod: 57

## 2024-12-30 PROCEDURE — 99223 1ST HOSP IP/OBS HIGH 75: CPT | Mod: GC

## 2024-12-30 RX ADMIN — CLOPIDOGREL BISULFATE 75 MILLIGRAM(S): 75 TABLET, FILM COATED ORAL at 09:30

## 2024-12-30 RX ADMIN — LABETALOL HYDROCHLORIDE 200 MILLIGRAM(S): 200 TABLET, FILM COATED ORAL at 17:58

## 2024-12-30 RX ADMIN — BUMETANIDE 2 MILLIGRAM(S): 1 TABLET ORAL at 06:06

## 2024-12-30 RX ADMIN — BUMETANIDE 2 MILLIGRAM(S): 1 TABLET ORAL at 13:41

## 2024-12-30 RX ADMIN — ISOSORBIDE MONONITRATE 60 MILLIGRAM(S): 60 TABLET, EXTENDED RELEASE ORAL at 09:49

## 2024-12-30 RX ADMIN — INSULIN LISPRO 2: 100 INJECTION, SOLUTION INTRAVENOUS; SUBCUTANEOUS at 17:58

## 2024-12-30 RX ADMIN — LABETALOL HYDROCHLORIDE 200 MILLIGRAM(S): 200 TABLET, FILM COATED ORAL at 06:06

## 2024-12-30 RX ADMIN — Medication 60 MILLIGRAM(S): at 09:49

## 2024-12-30 RX ADMIN — EPOETIN ALFA 10000 UNIT(S): 10000 SOLUTION INTRAVENOUS; SUBCUTANEOUS at 20:03

## 2024-12-30 RX ADMIN — INSULIN LISPRO 3: 100 INJECTION, SOLUTION INTRAVENOUS; SUBCUTANEOUS at 13:33

## 2024-12-30 RX ADMIN — Medication 667 MILLIGRAM(S): at 09:29

## 2024-12-30 RX ADMIN — Medication 667 MILLIGRAM(S): at 17:57

## 2024-12-30 RX ADMIN — Medication 1 TABLET(S): at 09:29

## 2024-12-30 RX ADMIN — HEPARIN SODIUM 5000 UNIT(S): 1000 INJECTION INTRAVENOUS; SUBCUTANEOUS at 17:58

## 2024-12-30 RX ADMIN — Medication 25 MICROGRAM(S): at 06:06

## 2024-12-30 RX ADMIN — Medication 667 MILLIGRAM(S): at 13:33

## 2024-12-30 RX ADMIN — Medication 160 MILLIGRAM(S): at 06:13

## 2024-12-30 RX ADMIN — ATORVASTATIN CALCIUM 40 MILLIGRAM(S): 80 TABLET, FILM COATED ORAL at 22:34

## 2024-12-30 RX ADMIN — Medication 1000 UNIT(S): at 09:30

## 2024-12-30 RX ADMIN — DOXAZOSIN MESYLATE 4 MILLIGRAM(S): 8 TABLET ORAL at 22:57

## 2024-12-30 RX ADMIN — Medication 81 MILLIGRAM(S): at 09:29

## 2024-12-30 RX ADMIN — Medication 1 APPLICATION(S): at 09:54

## 2024-12-30 RX ADMIN — Medication 160 MILLIGRAM(S): at 19:53

## 2024-12-31 LAB
ANION GAP SERPL CALC-SCNC: 15 MMOL/L — HIGH (ref 7–14)
BUN SERPL-MCNC: 50 MG/DL — HIGH (ref 7–23)
CALCIUM SERPL-MCNC: 8.4 MG/DL — SIGNIFICANT CHANGE UP (ref 8.4–10.5)
CHLORIDE SERPL-SCNC: 96 MMOL/L — LOW (ref 98–107)
CHOLEST SERPL-MCNC: 112 MG/DL — SIGNIFICANT CHANGE UP
CO2 SERPL-SCNC: 27 MMOL/L — SIGNIFICANT CHANGE UP (ref 22–31)
CREAT SERPL-MCNC: 10.18 MG/DL — HIGH (ref 0.5–1.3)
EGFR: 5 ML/MIN/1.73M2 — LOW
EGFR: 5 ML/MIN/1.73M2 — LOW
GLUCOSE BLDC GLUCOMTR-MCNC: 130 MG/DL — HIGH (ref 70–99)
GLUCOSE BLDC GLUCOMTR-MCNC: 150 MG/DL — HIGH (ref 70–99)
GLUCOSE BLDC GLUCOMTR-MCNC: 252 MG/DL — HIGH (ref 70–99)
GLUCOSE BLDC GLUCOMTR-MCNC: 99 MG/DL — SIGNIFICANT CHANGE UP (ref 70–99)
GLUCOSE SERPL-MCNC: 88 MG/DL — SIGNIFICANT CHANGE UP (ref 70–99)
HCT VFR BLD CALC: 25.8 % — LOW (ref 39–50)
HDLC SERPL-MCNC: 46 MG/DL — SIGNIFICANT CHANGE UP
HGB BLD-MCNC: 7.9 G/DL — LOW (ref 13–17)
LDLC SERPL-MCNC: 53 MG/DL — SIGNIFICANT CHANGE UP
LIPID PNL WITH DIRECT LDL SERPL: 53 MG/DL — SIGNIFICANT CHANGE UP
MAGNESIUM SERPL-MCNC: 2 MG/DL — SIGNIFICANT CHANGE UP (ref 1.6–2.6)
MCHC RBC-ENTMCNC: 26.4 PG — LOW (ref 27–34)
MCHC RBC-ENTMCNC: 30.6 G/DL — LOW (ref 32–36)
MCV RBC AUTO: 86.3 FL — SIGNIFICANT CHANGE UP (ref 80–100)
NONHDLC SERPL-MCNC: 66 MG/DL — SIGNIFICANT CHANGE UP
NRBC # BLD AUTO: 0 K/UL — SIGNIFICANT CHANGE UP (ref 0–0)
NRBC # BLD: 0 /100 WBCS — SIGNIFICANT CHANGE UP (ref 0–0)
NRBC # FLD: 0 K/UL — SIGNIFICANT CHANGE UP (ref 0–0)
NRBC BLD-RTO: 0 /100 WBCS — SIGNIFICANT CHANGE UP (ref 0–0)
PHOSPHATE SERPL-MCNC: 3.9 MG/DL — SIGNIFICANT CHANGE UP (ref 2.5–4.5)
PLATELET # BLD AUTO: 405 K/UL — HIGH (ref 150–400)
POTASSIUM SERPL-MCNC: 3.2 MMOL/L — LOW (ref 3.5–5.3)
POTASSIUM SERPL-SCNC: 3.2 MMOL/L — LOW (ref 3.5–5.3)
RBC # BLD: 2.99 M/UL — LOW (ref 4.2–5.8)
RBC # FLD: 15.6 % — HIGH (ref 10.3–14.5)
SODIUM SERPL-SCNC: 138 MMOL/L — SIGNIFICANT CHANGE UP (ref 135–145)
TRIGL SERPL-MCNC: 61 MG/DL — SIGNIFICANT CHANGE UP
WBC # BLD: 8.39 K/UL — SIGNIFICANT CHANGE UP (ref 3.8–10.5)
WBC # FLD AUTO: 8.39 K/UL — SIGNIFICANT CHANGE UP (ref 3.8–10.5)

## 2024-12-31 RX ORDER — ASPIRIN 325 MG
81 TABLET ORAL DAILY
Refills: 0 | Status: DISCONTINUED | OUTPATIENT
Start: 2024-12-31 | End: 2025-01-18

## 2024-12-31 RX ADMIN — GENTAMICIN SULFATE 1 APPLICATION(S): 1 OINTMENT TOPICAL at 13:15

## 2024-12-31 RX ADMIN — Medication 1 TABLET(S): at 12:23

## 2024-12-31 RX ADMIN — HEPARIN SODIUM 5000 UNIT(S): 1000 INJECTION INTRAVENOUS; SUBCUTANEOUS at 06:07

## 2024-12-31 RX ADMIN — BUMETANIDE 2 MILLIGRAM(S): 1 TABLET ORAL at 14:52

## 2024-12-31 RX ADMIN — Medication 40 MILLIEQUIVALENT(S): at 12:42

## 2024-12-31 RX ADMIN — INSULIN LISPRO 3: 100 INJECTION, SOLUTION INTRAVENOUS; SUBCUTANEOUS at 17:19

## 2024-12-31 RX ADMIN — Medication 60 MILLIGRAM(S): at 09:19

## 2024-12-31 RX ADMIN — Medication 1 APPLICATION(S): at 12:21

## 2024-12-31 RX ADMIN — DOXAZOSIN MESYLATE 4 MILLIGRAM(S): 8 TABLET ORAL at 22:09

## 2024-12-31 RX ADMIN — Medication 160 MILLIGRAM(S): at 18:21

## 2024-12-31 RX ADMIN — Medication 1000 UNIT(S): at 12:23

## 2024-12-31 RX ADMIN — Medication 667 MILLIGRAM(S): at 12:23

## 2024-12-31 RX ADMIN — LABETALOL HYDROCHLORIDE 200 MILLIGRAM(S): 200 TABLET, FILM COATED ORAL at 18:21

## 2024-12-31 RX ADMIN — LABETALOL HYDROCHLORIDE 200 MILLIGRAM(S): 200 TABLET, FILM COATED ORAL at 06:06

## 2024-12-31 RX ADMIN — ISOSORBIDE MONONITRATE 60 MILLIGRAM(S): 60 TABLET, EXTENDED RELEASE ORAL at 09:40

## 2024-12-31 RX ADMIN — Medication 160 MILLIGRAM(S): at 06:06

## 2024-12-31 RX ADMIN — HEPARIN SODIUM 5000 UNIT(S): 1000 INJECTION INTRAVENOUS; SUBCUTANEOUS at 18:22

## 2024-12-31 RX ADMIN — Medication 25 MICROGRAM(S): at 06:06

## 2024-12-31 RX ADMIN — Medication 81 MILLIGRAM(S): at 12:42

## 2024-12-31 RX ADMIN — Medication 667 MILLIGRAM(S): at 09:40

## 2024-12-31 RX ADMIN — BUMETANIDE 2 MILLIGRAM(S): 1 TABLET ORAL at 06:06

## 2024-12-31 RX ADMIN — ATORVASTATIN CALCIUM 40 MILLIGRAM(S): 80 TABLET, FILM COATED ORAL at 22:09

## 2024-12-31 RX ADMIN — Medication 667 MILLIGRAM(S): at 17:21

## 2025-01-01 LAB
ANION GAP SERPL CALC-SCNC: 16 MMOL/L — HIGH (ref 7–14)
APTT BLD: 33.1 SEC — SIGNIFICANT CHANGE UP (ref 24.5–35.6)
BUN SERPL-MCNC: 48 MG/DL — HIGH (ref 7–23)
CALCIUM SERPL-MCNC: 8.7 MG/DL — SIGNIFICANT CHANGE UP (ref 8.4–10.5)
CHLORIDE SERPL-SCNC: 97 MMOL/L — LOW (ref 98–107)
CO2 SERPL-SCNC: 26 MMOL/L — SIGNIFICANT CHANGE UP (ref 22–31)
CREAT SERPL-MCNC: 10.08 MG/DL — HIGH (ref 0.5–1.3)
EGFR: 5 ML/MIN/1.73M2 — LOW
EGFR: 5 ML/MIN/1.73M2 — LOW
GLUCOSE BLDC GLUCOMTR-MCNC: 100 MG/DL — HIGH (ref 70–99)
GLUCOSE BLDC GLUCOMTR-MCNC: 109 MG/DL — HIGH (ref 70–99)
GLUCOSE BLDC GLUCOMTR-MCNC: 158 MG/DL — HIGH (ref 70–99)
GLUCOSE BLDC GLUCOMTR-MCNC: 96 MG/DL — SIGNIFICANT CHANGE UP (ref 70–99)
GLUCOSE SERPL-MCNC: 91 MG/DL — SIGNIFICANT CHANGE UP (ref 70–99)
HCT VFR BLD CALC: 24.4 % — LOW (ref 39–50)
HGB BLD-MCNC: 7.9 G/DL — LOW (ref 13–17)
INR BLD: 1.04 RATIO — SIGNIFICANT CHANGE UP (ref 0.85–1.16)
MAGNESIUM SERPL-MCNC: 1.9 MG/DL — SIGNIFICANT CHANGE UP (ref 1.6–2.6)
MCHC RBC-ENTMCNC: 26.8 PG — LOW (ref 27–34)
MCHC RBC-ENTMCNC: 32.4 G/DL — SIGNIFICANT CHANGE UP (ref 32–36)
MCV RBC AUTO: 82.7 FL — SIGNIFICANT CHANGE UP (ref 80–100)
NRBC # BLD AUTO: 0 K/UL — SIGNIFICANT CHANGE UP (ref 0–0)
NRBC # BLD: 0 /100 WBCS — SIGNIFICANT CHANGE UP (ref 0–0)
NRBC # FLD: 0 K/UL — SIGNIFICANT CHANGE UP (ref 0–0)
NRBC BLD-RTO: 0 /100 WBCS — SIGNIFICANT CHANGE UP (ref 0–0)
PHOSPHATE SERPL-MCNC: 3.7 MG/DL — SIGNIFICANT CHANGE UP (ref 2.5–4.5)
PLATELET # BLD AUTO: 393 K/UL — SIGNIFICANT CHANGE UP (ref 150–400)
POTASSIUM SERPL-MCNC: 3.9 MMOL/L — SIGNIFICANT CHANGE UP (ref 3.5–5.3)
POTASSIUM SERPL-SCNC: 3.9 MMOL/L — SIGNIFICANT CHANGE UP (ref 3.5–5.3)
PROTHROM AB SERPL-ACNC: 12.1 SEC — SIGNIFICANT CHANGE UP (ref 9.9–13.4)
RBC # BLD: 2.95 M/UL — LOW (ref 4.2–5.8)
RBC # FLD: 15.4 % — HIGH (ref 10.3–14.5)
SODIUM SERPL-SCNC: 139 MMOL/L — SIGNIFICANT CHANGE UP (ref 135–145)
WBC # BLD: 9.27 K/UL — SIGNIFICANT CHANGE UP (ref 3.8–10.5)
WBC # FLD AUTO: 9.27 K/UL — SIGNIFICANT CHANGE UP (ref 3.8–10.5)

## 2025-01-01 RX ADMIN — Medication 667 MILLIGRAM(S): at 11:41

## 2025-01-01 RX ADMIN — Medication 160 MILLIGRAM(S): at 06:12

## 2025-01-01 RX ADMIN — EPOETIN ALFA 10000 UNIT(S): 10000 SOLUTION INTRAVENOUS; SUBCUTANEOUS at 11:00

## 2025-01-01 RX ADMIN — ISOSORBIDE MONONITRATE 60 MILLIGRAM(S): 60 TABLET, EXTENDED RELEASE ORAL at 09:01

## 2025-01-01 RX ADMIN — Medication 81 MILLIGRAM(S): at 11:40

## 2025-01-01 RX ADMIN — Medication 25 MICROGRAM(S): at 06:12

## 2025-01-01 RX ADMIN — BUMETANIDE 2 MILLIGRAM(S): 1 TABLET ORAL at 06:12

## 2025-01-01 RX ADMIN — Medication 667 MILLIGRAM(S): at 17:15

## 2025-01-01 RX ADMIN — Medication 1000 UNIT(S): at 11:40

## 2025-01-01 RX ADMIN — Medication 1 APPLICATION(S): at 11:41

## 2025-01-01 RX ADMIN — LABETALOL HYDROCHLORIDE 200 MILLIGRAM(S): 200 TABLET, FILM COATED ORAL at 06:12

## 2025-01-01 RX ADMIN — LABETALOL HYDROCHLORIDE 200 MILLIGRAM(S): 200 TABLET, FILM COATED ORAL at 17:15

## 2025-01-01 RX ADMIN — HEPARIN SODIUM 5000 UNIT(S): 1000 INJECTION INTRAVENOUS; SUBCUTANEOUS at 17:23

## 2025-01-01 RX ADMIN — Medication 160 MILLIGRAM(S): at 17:15

## 2025-01-01 RX ADMIN — ATORVASTATIN CALCIUM 40 MILLIGRAM(S): 80 TABLET, FILM COATED ORAL at 21:20

## 2025-01-01 RX ADMIN — INSULIN LISPRO 1: 100 INJECTION, SOLUTION INTRAVENOUS; SUBCUTANEOUS at 11:54

## 2025-01-01 RX ADMIN — HEPARIN SODIUM 5000 UNIT(S): 1000 INJECTION INTRAVENOUS; SUBCUTANEOUS at 06:12

## 2025-01-01 RX ADMIN — BUMETANIDE 2 MILLIGRAM(S): 1 TABLET ORAL at 14:05

## 2025-01-01 RX ADMIN — Medication 60 MILLIGRAM(S): at 09:01

## 2025-01-01 RX ADMIN — DOXAZOSIN MESYLATE 4 MILLIGRAM(S): 8 TABLET ORAL at 21:27

## 2025-01-01 RX ADMIN — Medication 1 TABLET(S): at 11:39

## 2025-01-01 RX ADMIN — Medication 667 MILLIGRAM(S): at 09:01

## 2025-01-02 LAB
ANION GAP SERPL CALC-SCNC: 17 MMOL/L — HIGH (ref 7–14)
BUN SERPL-MCNC: 55 MG/DL — HIGH (ref 7–23)
CALCIUM SERPL-MCNC: 8.8 MG/DL — SIGNIFICANT CHANGE UP (ref 8.4–10.5)
CHLORIDE SERPL-SCNC: 94 MMOL/L — LOW (ref 98–107)
CO2 SERPL-SCNC: 24 MMOL/L — SIGNIFICANT CHANGE UP (ref 22–31)
CREAT SERPL-MCNC: 11.33 MG/DL — HIGH (ref 0.5–1.3)
EGFR: 4 ML/MIN/1.73M2 — LOW
EGFR: 4 ML/MIN/1.73M2 — LOW
GLUCOSE BLDC GLUCOMTR-MCNC: 396 MG/DL — HIGH (ref 70–99)
GLUCOSE BLDC GLUCOMTR-MCNC: 409 MG/DL — HIGH (ref 70–99)
GLUCOSE BLDC GLUCOMTR-MCNC: 90 MG/DL — SIGNIFICANT CHANGE UP (ref 70–99)
GLUCOSE SERPL-MCNC: 79 MG/DL — SIGNIFICANT CHANGE UP (ref 70–99)
HCT VFR BLD CALC: 25.9 % — LOW (ref 39–50)
HGB BLD-MCNC: 8.2 G/DL — LOW (ref 13–17)
MAGNESIUM SERPL-MCNC: 2 MG/DL — SIGNIFICANT CHANGE UP (ref 1.6–2.6)
MCHC RBC-ENTMCNC: 26.6 PG — LOW (ref 27–34)
MCHC RBC-ENTMCNC: 31.7 G/DL — LOW (ref 32–36)
MCV RBC AUTO: 84.1 FL — SIGNIFICANT CHANGE UP (ref 80–100)
NRBC # BLD AUTO: 0 K/UL — SIGNIFICANT CHANGE UP (ref 0–0)
NRBC # BLD: 0 /100 WBCS — SIGNIFICANT CHANGE UP (ref 0–0)
NRBC # FLD: 0 K/UL — SIGNIFICANT CHANGE UP (ref 0–0)
NRBC BLD-RTO: 0 /100 WBCS — SIGNIFICANT CHANGE UP (ref 0–0)
PHOSPHATE SERPL-MCNC: 3.9 MG/DL — SIGNIFICANT CHANGE UP (ref 2.5–4.5)
PLATELET # BLD AUTO: 391 K/UL — SIGNIFICANT CHANGE UP (ref 150–400)
POTASSIUM SERPL-MCNC: 4.2 MMOL/L — SIGNIFICANT CHANGE UP (ref 3.5–5.3)
POTASSIUM SERPL-SCNC: 4.2 MMOL/L — SIGNIFICANT CHANGE UP (ref 3.5–5.3)
RBC # BLD: 3.08 M/UL — LOW (ref 4.2–5.8)
RBC # FLD: 15.3 % — HIGH (ref 10.3–14.5)
SODIUM SERPL-SCNC: 135 MMOL/L — SIGNIFICANT CHANGE UP (ref 135–145)
WBC # BLD: 8.96 K/UL — SIGNIFICANT CHANGE UP (ref 3.8–10.5)
WBC # FLD AUTO: 8.96 K/UL — SIGNIFICANT CHANGE UP (ref 3.8–10.5)

## 2025-01-02 PROCEDURE — 71045 X-RAY EXAM CHEST 1 VIEW: CPT | Mod: 26

## 2025-01-02 RX ADMIN — Medication 60 MILLIGRAM(S): at 09:05

## 2025-01-02 RX ADMIN — DOXAZOSIN MESYLATE 4 MILLIGRAM(S): 8 TABLET ORAL at 22:11

## 2025-01-02 RX ADMIN — Medication 25 MICROGRAM(S): at 05:22

## 2025-01-02 RX ADMIN — BUMETANIDE 2 MILLIGRAM(S): 1 TABLET ORAL at 05:18

## 2025-01-02 RX ADMIN — ATORVASTATIN CALCIUM 40 MILLIGRAM(S): 80 TABLET, FILM COATED ORAL at 22:11

## 2025-01-02 RX ADMIN — INSULIN LISPRO 3: 100 INJECTION, SOLUTION INTRAVENOUS; SUBCUTANEOUS at 22:15

## 2025-01-02 RX ADMIN — LABETALOL HYDROCHLORIDE 200 MILLIGRAM(S): 200 TABLET, FILM COATED ORAL at 05:21

## 2025-01-02 RX ADMIN — Medication 667 MILLIGRAM(S): at 09:05

## 2025-01-02 RX ADMIN — ISOSORBIDE MONONITRATE 60 MILLIGRAM(S): 60 TABLET, EXTENDED RELEASE ORAL at 09:05

## 2025-01-02 RX ADMIN — HEPARIN SODIUM 5000 UNIT(S): 1000 INJECTION INTRAVENOUS; SUBCUTANEOUS at 05:25

## 2025-01-02 RX ADMIN — Medication 160 MILLIGRAM(S): at 05:50

## 2025-01-03 ENCOUNTER — APPOINTMENT (OUTPATIENT)
Dept: CARDIOLOGY | Facility: CLINIC | Age: 70
End: 2025-01-03

## 2025-01-03 LAB
ANION GAP SERPL CALC-SCNC: 20 MMOL/L — HIGH (ref 7–14)
BUN SERPL-MCNC: 56 MG/DL — HIGH (ref 7–23)
CALCIUM SERPL-MCNC: 8.6 MG/DL — SIGNIFICANT CHANGE UP (ref 8.4–10.5)
CHLORIDE SERPL-SCNC: 94 MMOL/L — LOW (ref 98–107)
CO2 SERPL-SCNC: 23 MMOL/L — SIGNIFICANT CHANGE UP (ref 22–31)
CREAT SERPL-MCNC: 11.82 MG/DL — HIGH (ref 0.5–1.3)
EGFR: 4 ML/MIN/1.73M2 — LOW
EGFR: 4 ML/MIN/1.73M2 — LOW
GLUCOSE BLDC GLUCOMTR-MCNC: 112 MG/DL — HIGH (ref 70–99)
GLUCOSE BLDC GLUCOMTR-MCNC: 123 MG/DL — HIGH (ref 70–99)
GLUCOSE BLDC GLUCOMTR-MCNC: 210 MG/DL — HIGH (ref 70–99)
GLUCOSE BLDC GLUCOMTR-MCNC: 275 MG/DL — HIGH (ref 70–99)
GLUCOSE BLDC GLUCOMTR-MCNC: 83 MG/DL — SIGNIFICANT CHANGE UP (ref 70–99)
GLUCOSE SERPL-MCNC: 78 MG/DL — SIGNIFICANT CHANGE UP (ref 70–99)
HCT VFR BLD CALC: 26.5 % — LOW (ref 39–50)
HGB BLD-MCNC: 8.5 G/DL — LOW (ref 13–17)
MAGNESIUM SERPL-MCNC: 2.1 MG/DL — SIGNIFICANT CHANGE UP (ref 1.6–2.6)
MCHC RBC-ENTMCNC: 26.8 PG — LOW (ref 27–34)
MCHC RBC-ENTMCNC: 32.1 G/DL — SIGNIFICANT CHANGE UP (ref 32–36)
MCV RBC AUTO: 83.6 FL — SIGNIFICANT CHANGE UP (ref 80–100)
NRBC # BLD AUTO: 0 K/UL — SIGNIFICANT CHANGE UP (ref 0–0)
NRBC # BLD: 0 /100 WBCS — SIGNIFICANT CHANGE UP (ref 0–0)
NRBC # FLD: 0 K/UL — SIGNIFICANT CHANGE UP (ref 0–0)
NRBC BLD-RTO: 0 /100 WBCS — SIGNIFICANT CHANGE UP (ref 0–0)
PHOSPHATE SERPL-MCNC: 4.6 MG/DL — HIGH (ref 2.5–4.5)
PLATELET # BLD AUTO: 495 K/UL — HIGH (ref 150–400)
POTASSIUM SERPL-MCNC: 4.1 MMOL/L — SIGNIFICANT CHANGE UP (ref 3.5–5.3)
POTASSIUM SERPL-SCNC: 4.1 MMOL/L — SIGNIFICANT CHANGE UP (ref 3.5–5.3)
RBC # BLD: 3.17 M/UL — LOW (ref 4.2–5.8)
RBC # FLD: 15.4 % — HIGH (ref 10.3–14.5)
SODIUM SERPL-SCNC: 137 MMOL/L — SIGNIFICANT CHANGE UP (ref 135–145)
WBC # BLD: 8.84 K/UL — SIGNIFICANT CHANGE UP (ref 3.8–10.5)
WBC # FLD AUTO: 8.84 K/UL — SIGNIFICANT CHANGE UP (ref 3.8–10.5)

## 2025-01-03 PROCEDURE — 78291 PERTL-VEN SHUNT PATENCY TEST: CPT | Mod: 26

## 2025-01-03 RX ADMIN — INSULIN LISPRO 3: 100 INJECTION, SOLUTION INTRAVENOUS; SUBCUTANEOUS at 16:51

## 2025-01-03 RX ADMIN — BUMETANIDE 2 MILLIGRAM(S): 1 TABLET ORAL at 14:08

## 2025-01-03 RX ADMIN — Medication 1000 UNIT(S): at 12:04

## 2025-01-03 RX ADMIN — Medication 160 MILLIGRAM(S): at 18:35

## 2025-01-03 RX ADMIN — DOXAZOSIN MESYLATE 4 MILLIGRAM(S): 8 TABLET ORAL at 21:50

## 2025-01-03 RX ADMIN — Medication 667 MILLIGRAM(S): at 17:07

## 2025-01-03 RX ADMIN — HEPARIN SODIUM 5000 UNIT(S): 1000 INJECTION INTRAVENOUS; SUBCUTANEOUS at 06:14

## 2025-01-03 RX ADMIN — EPOETIN ALFA 10000 UNIT(S): 10000 SOLUTION INTRAVENOUS; SUBCUTANEOUS at 14:36

## 2025-01-03 RX ADMIN — LABETALOL HYDROCHLORIDE 200 MILLIGRAM(S): 200 TABLET, FILM COATED ORAL at 18:35

## 2025-01-03 RX ADMIN — ATORVASTATIN CALCIUM 40 MILLIGRAM(S): 80 TABLET, FILM COATED ORAL at 21:50

## 2025-01-03 RX ADMIN — BUMETANIDE 2 MILLIGRAM(S): 1 TABLET ORAL at 06:14

## 2025-01-03 RX ADMIN — Medication 81 MILLIGRAM(S): at 12:04

## 2025-01-03 RX ADMIN — GENTAMICIN SULFATE 1 APPLICATION(S): 1 OINTMENT TOPICAL at 12:30

## 2025-01-03 RX ADMIN — HEPARIN SODIUM 5000 UNIT(S): 1000 INJECTION INTRAVENOUS; SUBCUTANEOUS at 18:35

## 2025-01-03 RX ADMIN — Medication 160 MILLIGRAM(S): at 06:15

## 2025-01-03 RX ADMIN — Medication 1 TABLET(S): at 12:04

## 2025-01-03 RX ADMIN — LABETALOL HYDROCHLORIDE 200 MILLIGRAM(S): 200 TABLET, FILM COATED ORAL at 06:14

## 2025-01-03 RX ADMIN — ISOSORBIDE MONONITRATE 60 MILLIGRAM(S): 60 TABLET, EXTENDED RELEASE ORAL at 07:59

## 2025-01-03 RX ADMIN — Medication 1 APPLICATION(S): at 12:04

## 2025-01-03 RX ADMIN — Medication 25 MICROGRAM(S): at 06:14

## 2025-01-03 RX ADMIN — Medication 667 MILLIGRAM(S): at 07:59

## 2025-01-03 RX ADMIN — Medication 60 MILLIGRAM(S): at 07:59

## 2025-01-03 RX ADMIN — Medication 667 MILLIGRAM(S): at 12:03

## 2025-01-04 LAB
ALBUMIN FLD-MCNC: 1.3 G/DL — SIGNIFICANT CHANGE UP
ANION GAP SERPL CALC-SCNC: 19 MMOL/L — HIGH (ref 7–14)
BUN SERPL-MCNC: 52 MG/DL — HIGH (ref 7–23)
CALCIUM SERPL-MCNC: 8.3 MG/DL — LOW (ref 8.4–10.5)
CHLORIDE SERPL-SCNC: 95 MMOL/L — LOW (ref 98–107)
CO2 SERPL-SCNC: 24 MMOL/L — SIGNIFICANT CHANGE UP (ref 22–31)
CREAT SERPL-MCNC: 11.58 MG/DL — HIGH (ref 0.5–1.3)
CULTURE RESULTS: NO GROWTH — SIGNIFICANT CHANGE UP
EGFR: 4 ML/MIN/1.73M2 — LOW
EGFR: 4 ML/MIN/1.73M2 — LOW
GLUCOSE BLDC GLUCOMTR-MCNC: 110 MG/DL — HIGH (ref 70–99)
GLUCOSE BLDC GLUCOMTR-MCNC: 215 MG/DL — HIGH (ref 70–99)
GLUCOSE BLDC GLUCOMTR-MCNC: 282 MG/DL — HIGH (ref 70–99)
GLUCOSE BLDC GLUCOMTR-MCNC: 99 MG/DL — SIGNIFICANT CHANGE UP (ref 70–99)
GLUCOSE FLD-MCNC: 208 MG/DL — SIGNIFICANT CHANGE UP
GLUCOSE FLD-MCNC: 97 MG/DL — SIGNIFICANT CHANGE UP
GLUCOSE SERPL-MCNC: 92 MG/DL — SIGNIFICANT CHANGE UP (ref 70–99)
HCT VFR BLD CALC: 25.6 % — LOW (ref 39–50)
HGB BLD-MCNC: 7.9 G/DL — LOW (ref 13–17)
LDH SERPL L TO P-CCNC: 233 U/L — SIGNIFICANT CHANGE UP
MAGNESIUM SERPL-MCNC: 1.9 MG/DL — SIGNIFICANT CHANGE UP (ref 1.6–2.6)
MCHC RBC-ENTMCNC: 26.1 PG — LOW (ref 27–34)
MCHC RBC-ENTMCNC: 30.9 G/DL — LOW (ref 32–36)
MCV RBC AUTO: 84.5 FL — SIGNIFICANT CHANGE UP (ref 80–100)
NRBC # BLD AUTO: 0 K/UL — SIGNIFICANT CHANGE UP (ref 0–0)
NRBC # BLD: 0 /100 WBCS — SIGNIFICANT CHANGE UP (ref 0–0)
NRBC # FLD: 0 K/UL — SIGNIFICANT CHANGE UP (ref 0–0)
NRBC BLD-RTO: 0 /100 WBCS — SIGNIFICANT CHANGE UP (ref 0–0)
PHOSPHATE SERPL-MCNC: 4.9 MG/DL — HIGH (ref 2.5–4.5)
PLATELET # BLD AUTO: 480 K/UL — HIGH (ref 150–400)
POTASSIUM SERPL-MCNC: 4 MMOL/L — SIGNIFICANT CHANGE UP (ref 3.5–5.3)
POTASSIUM SERPL-SCNC: 4 MMOL/L — SIGNIFICANT CHANGE UP (ref 3.5–5.3)
PROT FLD-MCNC: 2.2 G/DL — SIGNIFICANT CHANGE UP
RBC # BLD: 3.03 M/UL — LOW (ref 4.2–5.8)
RBC # FLD: 15.9 % — HIGH (ref 10.3–14.5)
SODIUM SERPL-SCNC: 138 MMOL/L — SIGNIFICANT CHANGE UP (ref 135–145)
SPECIMEN SOURCE: SIGNIFICANT CHANGE UP
WBC # BLD: 9.29 K/UL — SIGNIFICANT CHANGE UP (ref 3.8–10.5)
WBC # FLD AUTO: 9.29 K/UL — SIGNIFICANT CHANGE UP (ref 3.8–10.5)

## 2025-01-04 PROCEDURE — 88112 CYTOPATH CELL ENHANCE TECH: CPT | Mod: 26

## 2025-01-04 PROCEDURE — 71045 X-RAY EXAM CHEST 1 VIEW: CPT | Mod: 26

## 2025-01-04 PROCEDURE — 88305 TISSUE EXAM BY PATHOLOGIST: CPT | Mod: 26

## 2025-01-04 RX ORDER — HYDROMORPHONE/SOD CHLOR,ISO/PF 2 MG/10 ML
0.25 SYRINGE (ML) INJECTION ONCE
Refills: 0 | Status: DISCONTINUED | OUTPATIENT
Start: 2025-01-04 | End: 2025-01-04

## 2025-01-04 RX ADMIN — BUMETANIDE 2 MILLIGRAM(S): 1 TABLET ORAL at 06:02

## 2025-01-04 RX ADMIN — Medication 160 MILLIGRAM(S): at 06:01

## 2025-01-04 RX ADMIN — ISOSORBIDE MONONITRATE 60 MILLIGRAM(S): 60 TABLET, EXTENDED RELEASE ORAL at 08:24

## 2025-01-04 RX ADMIN — Medication 1 TABLET(S): at 11:59

## 2025-01-04 RX ADMIN — Medication 160 MILLIGRAM(S): at 17:52

## 2025-01-04 RX ADMIN — Medication 667 MILLIGRAM(S): at 08:23

## 2025-01-04 RX ADMIN — Medication 1 APPLICATION(S): at 11:58

## 2025-01-04 RX ADMIN — Medication 0.25 MILLIGRAM(S): at 14:58

## 2025-01-04 RX ADMIN — Medication 81 MILLIGRAM(S): at 12:00

## 2025-01-04 RX ADMIN — LABETALOL HYDROCHLORIDE 200 MILLIGRAM(S): 200 TABLET, FILM COATED ORAL at 04:37

## 2025-01-04 RX ADMIN — Medication 667 MILLIGRAM(S): at 16:40

## 2025-01-04 RX ADMIN — Medication 1000 UNIT(S): at 11:58

## 2025-01-04 RX ADMIN — INSULIN LISPRO 3: 100 INJECTION, SOLUTION INTRAVENOUS; SUBCUTANEOUS at 16:40

## 2025-01-04 RX ADMIN — Medication 60 MILLIGRAM(S): at 08:23

## 2025-01-04 RX ADMIN — BUMETANIDE 2 MILLIGRAM(S): 1 TABLET ORAL at 15:18

## 2025-01-04 RX ADMIN — DOXAZOSIN MESYLATE 4 MILLIGRAM(S): 8 TABLET ORAL at 21:36

## 2025-01-04 RX ADMIN — LABETALOL HYDROCHLORIDE 200 MILLIGRAM(S): 200 TABLET, FILM COATED ORAL at 17:51

## 2025-01-04 RX ADMIN — Medication 667 MILLIGRAM(S): at 11:59

## 2025-01-04 RX ADMIN — ATORVASTATIN CALCIUM 40 MILLIGRAM(S): 80 TABLET, FILM COATED ORAL at 21:36

## 2025-01-04 RX ADMIN — HEPARIN SODIUM 5000 UNIT(S): 1000 INJECTION INTRAVENOUS; SUBCUTANEOUS at 06:01

## 2025-01-04 RX ADMIN — HEPARIN SODIUM 5000 UNIT(S): 1000 INJECTION INTRAVENOUS; SUBCUTANEOUS at 17:52

## 2025-01-04 RX ADMIN — Medication 25 MICROGRAM(S): at 06:00

## 2025-01-04 RX ADMIN — Medication 0.25 MILLIGRAM(S): at 15:18

## 2025-01-05 LAB
ADD ON TEST-SPECIMEN IN LAB: SIGNIFICANT CHANGE UP
ANION GAP SERPL CALC-SCNC: 15 MMOL/L — HIGH (ref 7–14)
B PERT IGG+IGM PNL SER: CLEAR — SIGNIFICANT CHANGE UP
BUN SERPL-MCNC: 48 MG/DL — HIGH (ref 7–23)
CALCIUM SERPL-MCNC: 8.1 MG/DL — LOW (ref 8.4–10.5)
CHLORIDE SERPL-SCNC: 98 MMOL/L — SIGNIFICANT CHANGE UP (ref 98–107)
CO2 SERPL-SCNC: 27 MMOL/L — SIGNIFICANT CHANGE UP (ref 22–31)
COLOR FLD: ABNORMAL
CREAT SERPL-MCNC: 10.74 MG/DL — HIGH (ref 0.5–1.3)
EGFR: 5 ML/MIN/1.73M2 — LOW
EGFR: 5 ML/MIN/1.73M2 — LOW
EOSINOPHIL # FLD: 0 % — SIGNIFICANT CHANGE UP
FLUID INTAKE SUBSTANCE CLASS: SIGNIFICANT CHANGE UP
FOLATE+VIT B12 SERBLD-IMP: 0 % — SIGNIFICANT CHANGE UP
GLUCOSE BLDC GLUCOMTR-MCNC: 108 MG/DL — HIGH (ref 70–99)
GLUCOSE BLDC GLUCOMTR-MCNC: 120 MG/DL — HIGH (ref 70–99)
GLUCOSE BLDC GLUCOMTR-MCNC: 209 MG/DL — HIGH (ref 70–99)
GLUCOSE BLDC GLUCOMTR-MCNC: 93 MG/DL — SIGNIFICANT CHANGE UP (ref 70–99)
GLUCOSE SERPL-MCNC: 92 MG/DL — SIGNIFICANT CHANGE UP (ref 70–99)
GRAM STN FLD: SIGNIFICANT CHANGE UP
HCT VFR BLD CALC: 25.6 % — LOW (ref 39–50)
HGB BLD-MCNC: 8.1 G/DL — LOW (ref 13–17)
LDH SERPL L TO P-CCNC: 250 U/L — HIGH (ref 135–225)
LYMPHOCYTES # FLD: 27 % — SIGNIFICANT CHANGE UP
MAGNESIUM SERPL-MCNC: 1.8 MG/DL — SIGNIFICANT CHANGE UP (ref 1.6–2.6)
MCHC RBC-ENTMCNC: 26.4 PG — LOW (ref 27–34)
MCHC RBC-ENTMCNC: 31.6 G/DL — LOW (ref 32–36)
MCV RBC AUTO: 83.4 FL — SIGNIFICANT CHANGE UP (ref 80–100)
MESOTHL CELL # FLD: 0 % — SIGNIFICANT CHANGE UP
MONOS+MACROS # FLD: 70 % — SIGNIFICANT CHANGE UP
NEUTROPHILS-BODY FLUID: 3 % — SIGNIFICANT CHANGE UP
NRBC # BLD AUTO: 0 K/UL — SIGNIFICANT CHANGE UP (ref 0–0)
NRBC # BLD: 0 /100 WBCS — SIGNIFICANT CHANGE UP (ref 0–0)
NRBC # FLD: 0 K/UL — SIGNIFICANT CHANGE UP (ref 0–0)
NRBC BLD-RTO: 0 /100 WBCS — SIGNIFICANT CHANGE UP (ref 0–0)
OTHER CELLS FLD MANUAL: 0 % — SIGNIFICANT CHANGE UP
PH FLD: 8.2 — SIGNIFICANT CHANGE UP
PHOSPHATE SERPL-MCNC: 4.4 MG/DL — SIGNIFICANT CHANGE UP (ref 2.5–4.5)
PLATELET # BLD AUTO: 480 K/UL — HIGH (ref 150–400)
POTASSIUM SERPL-MCNC: 3.9 MMOL/L — SIGNIFICANT CHANGE UP (ref 3.5–5.3)
POTASSIUM SERPL-SCNC: 3.9 MMOL/L — SIGNIFICANT CHANGE UP (ref 3.5–5.3)
RBC # BLD: 3.07 M/UL — LOW (ref 4.2–5.8)
RBC # FLD: 15.9 % — HIGH (ref 10.3–14.5)
RCV VOL RI: 1000 CELLS/UL — HIGH (ref 0–5)
SODIUM SERPL-SCNC: 140 MMOL/L — SIGNIFICANT CHANGE UP (ref 135–145)
SPECIMEN SOURCE FLD: SIGNIFICANT CHANGE UP
SPECIMEN SOURCE: SIGNIFICANT CHANGE UP
TOTAL CELLS COUNTED, BODY FLUID: 100 CELLS — SIGNIFICANT CHANGE UP
TOTAL NUCLEATED CELL COUNT, BODY FLUID: 202 CELLS/UL — HIGH (ref 0–5)
TUBE TYPE: SIGNIFICANT CHANGE UP
WBC # BLD: 10.5 K/UL — SIGNIFICANT CHANGE UP (ref 3.8–10.5)
WBC # FLD AUTO: 10.5 K/UL — SIGNIFICANT CHANGE UP (ref 3.8–10.5)

## 2025-01-05 PROCEDURE — 74018 RADEX ABDOMEN 1 VIEW: CPT | Mod: 26

## 2025-01-05 RX ORDER — CLOPIDOGREL BISULFATE 75 MG/1
75 TABLET, FILM COATED ORAL DAILY
Refills: 0 | Status: DISCONTINUED | OUTPATIENT
Start: 2025-01-05 | End: 2025-01-05

## 2025-01-05 RX ORDER — NIFEDIPINE 30 MG
90 TABLET, EXTENDED RELEASE 24 HR ORAL DAILY
Refills: 0 | Status: DISCONTINUED | OUTPATIENT
Start: 2025-01-06 | End: 2025-01-10

## 2025-01-05 RX ADMIN — LABETALOL HYDROCHLORIDE 200 MILLIGRAM(S): 200 TABLET, FILM COATED ORAL at 05:11

## 2025-01-05 RX ADMIN — Medication 60 MILLIGRAM(S): at 08:59

## 2025-01-05 RX ADMIN — Medication 1000 UNIT(S): at 11:37

## 2025-01-05 RX ADMIN — Medication 1 TABLET(S): at 11:38

## 2025-01-05 RX ADMIN — Medication 81 MILLIGRAM(S): at 11:37

## 2025-01-05 RX ADMIN — Medication 25 MICROGRAM(S): at 05:11

## 2025-01-05 RX ADMIN — HEPARIN SODIUM 5000 UNIT(S): 1000 INJECTION INTRAVENOUS; SUBCUTANEOUS at 17:01

## 2025-01-05 RX ADMIN — Medication 667 MILLIGRAM(S): at 11:37

## 2025-01-05 RX ADMIN — Medication 1 APPLICATION(S): at 11:38

## 2025-01-05 RX ADMIN — LABETALOL HYDROCHLORIDE 200 MILLIGRAM(S): 200 TABLET, FILM COATED ORAL at 17:01

## 2025-01-05 RX ADMIN — DOXAZOSIN MESYLATE 4 MILLIGRAM(S): 8 TABLET ORAL at 21:45

## 2025-01-05 RX ADMIN — BUMETANIDE 2 MILLIGRAM(S): 1 TABLET ORAL at 13:22

## 2025-01-05 RX ADMIN — Medication 667 MILLIGRAM(S): at 17:00

## 2025-01-05 RX ADMIN — Medication 160 MILLIGRAM(S): at 05:11

## 2025-01-05 RX ADMIN — CLOPIDOGREL BISULFATE 75 MILLIGRAM(S): 75 TABLET, FILM COATED ORAL at 12:38

## 2025-01-05 RX ADMIN — BUMETANIDE 2 MILLIGRAM(S): 1 TABLET ORAL at 05:11

## 2025-01-05 RX ADMIN — HEPARIN SODIUM 5000 UNIT(S): 1000 INJECTION INTRAVENOUS; SUBCUTANEOUS at 05:11

## 2025-01-05 RX ADMIN — Medication 667 MILLIGRAM(S): at 08:59

## 2025-01-05 RX ADMIN — Medication 160 MILLIGRAM(S): at 17:00

## 2025-01-05 RX ADMIN — ATORVASTATIN CALCIUM 40 MILLIGRAM(S): 80 TABLET, FILM COATED ORAL at 21:45

## 2025-01-05 RX ADMIN — ISOSORBIDE MONONITRATE 60 MILLIGRAM(S): 60 TABLET, EXTENDED RELEASE ORAL at 09:00

## 2025-01-06 LAB
ANION GAP SERPL CALC-SCNC: 21 MMOL/L — HIGH (ref 7–14)
APTT BLD: 33.2 SEC — SIGNIFICANT CHANGE UP (ref 24.5–35.6)
BLD GP AB SCN SERPL QL: NEGATIVE — SIGNIFICANT CHANGE UP
BUN SERPL-MCNC: 61 MG/DL — HIGH (ref 7–23)
CALCIUM SERPL-MCNC: 8.6 MG/DL — SIGNIFICANT CHANGE UP (ref 8.4–10.5)
CHLORIDE SERPL-SCNC: 91 MMOL/L — LOW (ref 98–107)
CO2 SERPL-SCNC: 22 MMOL/L — SIGNIFICANT CHANGE UP (ref 22–31)
CREAT SERPL-MCNC: 12.28 MG/DL — HIGH (ref 0.5–1.3)
DIALYSIS INSTRUMENT RESULT - HEPATITIS B SURFACE ANTIGEN: NEGATIVE — SIGNIFICANT CHANGE UP
EGFR: 4 ML/MIN/1.73M2 — LOW
EGFR: 4 ML/MIN/1.73M2 — LOW
FLUAV AG NPH QL: SIGNIFICANT CHANGE UP
FLUBV AG NPH QL: SIGNIFICANT CHANGE UP
GLUCOSE BLDC GLUCOMTR-MCNC: 101 MG/DL — HIGH (ref 70–99)
GLUCOSE BLDC GLUCOMTR-MCNC: 107 MG/DL — HIGH (ref 70–99)
GLUCOSE BLDC GLUCOMTR-MCNC: 94 MG/DL — SIGNIFICANT CHANGE UP (ref 70–99)
GLUCOSE BLDC GLUCOMTR-MCNC: 95 MG/DL — SIGNIFICANT CHANGE UP (ref 70–99)
GLUCOSE SERPL-MCNC: 91 MG/DL — SIGNIFICANT CHANGE UP (ref 70–99)
HCT VFR BLD CALC: 25.9 % — LOW (ref 39–50)
HGB BLD-MCNC: 8.6 G/DL — LOW (ref 13–17)
INR BLD: 1.1 RATIO — SIGNIFICANT CHANGE UP (ref 0.85–1.16)
LDH SERPL L TO P-CCNC: 230 U/L — HIGH (ref 135–225)
MAGNESIUM SERPL-MCNC: 2 MG/DL — SIGNIFICANT CHANGE UP (ref 1.6–2.6)
MCHC RBC-ENTMCNC: 27 PG — SIGNIFICANT CHANGE UP (ref 27–34)
MCHC RBC-ENTMCNC: 33.2 G/DL — SIGNIFICANT CHANGE UP (ref 32–36)
MCV RBC AUTO: 81.4 FL — SIGNIFICANT CHANGE UP (ref 80–100)
NRBC # BLD AUTO: 0 K/UL — SIGNIFICANT CHANGE UP (ref 0–0)
NRBC # BLD: 0 /100 WBCS — SIGNIFICANT CHANGE UP (ref 0–0)
NRBC # FLD: 0 K/UL — SIGNIFICANT CHANGE UP (ref 0–0)
NRBC BLD-RTO: 0 /100 WBCS — SIGNIFICANT CHANGE UP (ref 0–0)
PHOSPHATE SERPL-MCNC: 4.9 MG/DL — HIGH (ref 2.5–4.5)
PLATELET # BLD AUTO: 513 K/UL — HIGH (ref 150–400)
POTASSIUM SERPL-MCNC: 4 MMOL/L — SIGNIFICANT CHANGE UP (ref 3.5–5.3)
POTASSIUM SERPL-SCNC: 4 MMOL/L — SIGNIFICANT CHANGE UP (ref 3.5–5.3)
PROT SERPL-MCNC: 6.7 G/DL — SIGNIFICANT CHANGE UP (ref 6–8.3)
PROTHROM AB SERPL-ACNC: 13.1 SEC — SIGNIFICANT CHANGE UP (ref 9.9–13.4)
RBC # BLD: 3.18 M/UL — LOW (ref 4.2–5.8)
RBC # FLD: 15.8 % — HIGH (ref 10.3–14.5)
RH IG SCN BLD-IMP: POSITIVE — SIGNIFICANT CHANGE UP
RSV RNA NPH QL NAA+NON-PROBE: DETECTED
SARS-COV-2 RNA SPEC QL NAA+PROBE: SIGNIFICANT CHANGE UP
SODIUM SERPL-SCNC: 134 MMOL/L — LOW (ref 135–145)
WBC # BLD: 9.61 K/UL — SIGNIFICANT CHANGE UP (ref 3.8–10.5)
WBC # FLD AUTO: 9.61 K/UL — SIGNIFICANT CHANGE UP (ref 3.8–10.5)

## 2025-01-06 PROCEDURE — 76937 US GUIDE VASCULAR ACCESS: CPT | Mod: 26

## 2025-01-06 PROCEDURE — 77001 FLUOROGUIDE FOR VEIN DEVICE: CPT | Mod: 26,GC

## 2025-01-06 PROCEDURE — 36556 INSERT NON-TUNNEL CV CATH: CPT

## 2025-01-06 RX ADMIN — Medication 81 MILLIGRAM(S): at 11:15

## 2025-01-06 RX ADMIN — BUMETANIDE 2 MILLIGRAM(S): 1 TABLET ORAL at 05:45

## 2025-01-06 RX ADMIN — Medication 650 MILLIGRAM(S): at 13:41

## 2025-01-06 RX ADMIN — Medication 25 MICROGRAM(S): at 05:46

## 2025-01-06 RX ADMIN — Medication 1 TABLET(S): at 11:18

## 2025-01-06 RX ADMIN — BUMETANIDE 2 MILLIGRAM(S): 1 TABLET ORAL at 15:02

## 2025-01-06 RX ADMIN — ISOSORBIDE MONONITRATE 60 MILLIGRAM(S): 60 TABLET, EXTENDED RELEASE ORAL at 11:17

## 2025-01-06 RX ADMIN — Medication 1 APPLICATION(S): at 12:06

## 2025-01-06 RX ADMIN — Medication 1 APPLICATION(S): at 16:54

## 2025-01-06 RX ADMIN — HEPARIN SODIUM 5000 UNIT(S): 1000 INJECTION INTRAVENOUS; SUBCUTANEOUS at 05:51

## 2025-01-06 RX ADMIN — Medication 1000 UNIT(S): at 11:16

## 2025-01-06 RX ADMIN — Medication 650 MILLIGRAM(S): at 12:05

## 2025-01-06 RX ADMIN — Medication 90 MILLIGRAM(S): at 05:53

## 2025-01-06 RX ADMIN — Medication 160 MILLIGRAM(S): at 05:44

## 2025-01-06 RX ADMIN — LABETALOL HYDROCHLORIDE 200 MILLIGRAM(S): 200 TABLET, FILM COATED ORAL at 05:46

## 2025-01-06 RX ADMIN — EPOETIN ALFA 10000 UNIT(S): 10000 SOLUTION INTRAVENOUS; SUBCUTANEOUS at 15:02

## 2025-01-07 LAB
ADD ON TEST-SPECIMEN IN LAB: SIGNIFICANT CHANGE UP
ANION GAP SERPL CALC-SCNC: 15 MMOL/L — HIGH (ref 7–14)
ANION GAP SERPL CALC-SCNC: 18 MMOL/L — HIGH (ref 7–14)
BASOPHILS # BLD AUTO: 0.04 K/UL — SIGNIFICANT CHANGE UP (ref 0–0.2)
BASOPHILS NFR BLD AUTO: 0.5 % — SIGNIFICANT CHANGE UP (ref 0–2)
BUN SERPL-MCNC: 24 MG/DL — HIGH (ref 7–23)
BUN SERPL-MCNC: 30 MG/DL — HIGH (ref 7–23)
CALCIUM SERPL-MCNC: 7.8 MG/DL — LOW (ref 8.4–10.5)
CALCIUM SERPL-MCNC: 8.2 MG/DL — LOW (ref 8.4–10.5)
CHLORIDE SERPL-SCNC: 97 MMOL/L — LOW (ref 98–107)
CHLORIDE SERPL-SCNC: 98 MMOL/L — SIGNIFICANT CHANGE UP (ref 98–107)
CO2 SERPL-SCNC: 24 MMOL/L — SIGNIFICANT CHANGE UP (ref 22–31)
CO2 SERPL-SCNC: 25 MMOL/L — SIGNIFICANT CHANGE UP (ref 22–31)
CREAT SERPL-MCNC: 6.54 MG/DL — HIGH (ref 0.5–1.3)
CREAT SERPL-MCNC: 7.65 MG/DL — HIGH (ref 0.5–1.3)
EGFR: 7 ML/MIN/1.73M2 — LOW
EGFR: 7 ML/MIN/1.73M2 — LOW
EGFR: 8 ML/MIN/1.73M2 — LOW
EGFR: 8 ML/MIN/1.73M2 — LOW
EOSINOPHIL # BLD AUTO: 0.32 K/UL — SIGNIFICANT CHANGE UP (ref 0–0.5)
EOSINOPHIL NFR BLD AUTO: 4.1 % — SIGNIFICANT CHANGE UP (ref 0–6)
GLUCOSE BLDC GLUCOMTR-MCNC: 107 MG/DL — HIGH (ref 70–99)
GLUCOSE BLDC GLUCOMTR-MCNC: 111 MG/DL — HIGH (ref 70–99)
GLUCOSE BLDC GLUCOMTR-MCNC: 139 MG/DL — HIGH (ref 70–99)
GLUCOSE BLDC GLUCOMTR-MCNC: 249 MG/DL — HIGH (ref 70–99)
GLUCOSE BLDC GLUCOMTR-MCNC: 70 MG/DL — SIGNIFICANT CHANGE UP (ref 70–99)
GLUCOSE BLDC GLUCOMTR-MCNC: 89 MG/DL — SIGNIFICANT CHANGE UP (ref 70–99)
GLUCOSE BLDC GLUCOMTR-MCNC: 93 MG/DL — SIGNIFICANT CHANGE UP (ref 70–99)
GLUCOSE SERPL-MCNC: 115 MG/DL — HIGH (ref 70–99)
GLUCOSE SERPL-MCNC: 62 MG/DL — LOW (ref 70–99)
HBV CORE AB SER-ACNC: SIGNIFICANT CHANGE UP
HBV SURFACE AB SER-ACNC: 81.3 MIU/ML — SIGNIFICANT CHANGE UP
HBV SURFACE AB SER-ACNC: REACTIVE — SIGNIFICANT CHANGE UP
HBV SURFACE AG SER-ACNC: SIGNIFICANT CHANGE UP
HCT VFR BLD CALC: 22.8 % — LOW (ref 39–50)
HCT VFR BLD CALC: 23.8 % — LOW (ref 39–50)
HCV AB S/CO SERPL IA: 0.08 S/CO — SIGNIFICANT CHANGE UP (ref 0–0.99)
HCV AB SERPL-IMP: SIGNIFICANT CHANGE UP
HGB BLD-MCNC: 7.3 G/DL — LOW (ref 13–17)
HGB BLD-MCNC: 7.3 G/DL — LOW (ref 13–17)
IANC: 5.68 K/UL — SIGNIFICANT CHANGE UP (ref 1.8–7.4)
IMM GRANULOCYTES NFR BLD AUTO: 0.3 % — SIGNIFICANT CHANGE UP (ref 0–0.9)
IRON SATN MFR SERPL: 13 % — LOW (ref 14–50)
IRON SATN MFR SERPL: 13 UG/DL — LOW (ref 45–165)
LYMPHOCYTES # BLD AUTO: 0.51 K/UL — LOW (ref 1–3.3)
LYMPHOCYTES # BLD AUTO: 6.6 % — LOW (ref 13–44)
MAGNESIUM SERPL-MCNC: 1.8 MG/DL — SIGNIFICANT CHANGE UP (ref 1.6–2.6)
MAGNESIUM SERPL-MCNC: 1.8 MG/DL — SIGNIFICANT CHANGE UP (ref 1.6–2.6)
MCHC RBC-ENTMCNC: 25.6 PG — LOW (ref 27–34)
MCHC RBC-ENTMCNC: 26.6 PG — LOW (ref 27–34)
MCHC RBC-ENTMCNC: 30.7 G/DL — LOW (ref 32–36)
MCHC RBC-ENTMCNC: 32 G/DL — SIGNIFICANT CHANGE UP (ref 32–36)
MCV RBC AUTO: 83.2 FL — SIGNIFICANT CHANGE UP (ref 80–100)
MCV RBC AUTO: 83.5 FL — SIGNIFICANT CHANGE UP (ref 80–100)
MONOCYTES # BLD AUTO: 1.21 K/UL — HIGH (ref 0–0.9)
MONOCYTES NFR BLD AUTO: 15.6 % — HIGH (ref 2–14)
NEUTROPHILS # BLD AUTO: 5.68 K/UL — SIGNIFICANT CHANGE UP (ref 1.8–7.4)
NEUTROPHILS NFR BLD AUTO: 72.9 % — SIGNIFICANT CHANGE UP (ref 43–77)
NRBC # BLD AUTO: 0 K/UL — SIGNIFICANT CHANGE UP (ref 0–0)
NRBC # BLD AUTO: 0 K/UL — SIGNIFICANT CHANGE UP (ref 0–0)
NRBC # BLD: 0 /100 WBCS — SIGNIFICANT CHANGE UP (ref 0–0)
NRBC # BLD: 0 /100 WBCS — SIGNIFICANT CHANGE UP (ref 0–0)
NRBC # FLD: 0 K/UL — SIGNIFICANT CHANGE UP (ref 0–0)
NRBC # FLD: 0 K/UL — SIGNIFICANT CHANGE UP (ref 0–0)
NRBC BLD-RTO: 0 /100 WBCS — SIGNIFICANT CHANGE UP (ref 0–0)
NRBC BLD-RTO: 0 /100 WBCS — SIGNIFICANT CHANGE UP (ref 0–0)
PHOSPHATE SERPL-MCNC: 3.2 MG/DL — SIGNIFICANT CHANGE UP (ref 2.5–4.5)
PHOSPHATE SERPL-MCNC: 4 MG/DL — SIGNIFICANT CHANGE UP (ref 2.5–4.5)
PLATELET # BLD AUTO: 406 K/UL — HIGH (ref 150–400)
PLATELET # BLD AUTO: 449 K/UL — HIGH (ref 150–400)
POTASSIUM SERPL-MCNC: 3.4 MMOL/L — LOW (ref 3.5–5.3)
POTASSIUM SERPL-MCNC: 3.5 MMOL/L — SIGNIFICANT CHANGE UP (ref 3.5–5.3)
POTASSIUM SERPL-SCNC: 3.4 MMOL/L — LOW (ref 3.5–5.3)
POTASSIUM SERPL-SCNC: 3.5 MMOL/L — SIGNIFICANT CHANGE UP (ref 3.5–5.3)
RBC # BLD: 2.74 M/UL — LOW (ref 4.2–5.8)
RBC # BLD: 2.85 M/UL — LOW (ref 4.2–5.8)
RBC # FLD: 16.1 % — HIGH (ref 10.3–14.5)
RBC # FLD: 16.4 % — HIGH (ref 10.3–14.5)
SODIUM SERPL-SCNC: 138 MMOL/L — SIGNIFICANT CHANGE UP (ref 135–145)
SODIUM SERPL-SCNC: 139 MMOL/L — SIGNIFICANT CHANGE UP (ref 135–145)
TIBC SERPL-MCNC: 97 UG/DL — LOW (ref 220–430)
UIBC SERPL-MCNC: 84 UG/DL — LOW (ref 110–370)
WBC # BLD: 7.78 K/UL — SIGNIFICANT CHANGE UP (ref 3.8–10.5)
WBC # BLD: 8.61 K/UL — SIGNIFICANT CHANGE UP (ref 3.8–10.5)
WBC # FLD AUTO: 7.78 K/UL — SIGNIFICANT CHANGE UP (ref 3.8–10.5)
WBC # FLD AUTO: 8.61 K/UL — SIGNIFICANT CHANGE UP (ref 3.8–10.5)

## 2025-01-07 RX ORDER — EPOETIN ALFA 10000 [IU]/ML
10000 SOLUTION INTRAVENOUS; SUBCUTANEOUS
Refills: 0 | Status: DISCONTINUED | OUTPATIENT
Start: 2025-01-07 | End: 2025-01-08

## 2025-01-07 RX ORDER — DEXTROMETHORPHAN HBR, GUAIFENESIN 200 MG/10ML
100 LIQUID ORAL EVERY 6 HOURS
Refills: 0 | Status: COMPLETED | OUTPATIENT
Start: 2025-01-07 | End: 2025-01-07

## 2025-01-07 RX ORDER — ACETAMINOPHEN 500 MG/5ML
1000 LIQUID (ML) ORAL ONCE
Refills: 0 | Status: COMPLETED | OUTPATIENT
Start: 2025-01-07 | End: 2025-01-07

## 2025-01-07 RX ADMIN — Medication 650 MILLIGRAM(S): at 09:22

## 2025-01-07 RX ADMIN — Medication 1000 MILLIGRAM(S): at 21:41

## 2025-01-07 RX ADMIN — Medication 667 MILLIGRAM(S): at 17:05

## 2025-01-07 RX ADMIN — Medication 25 MICROGRAM(S): at 05:33

## 2025-01-07 RX ADMIN — Medication 160 MILLIGRAM(S): at 06:29

## 2025-01-07 RX ADMIN — DOXAZOSIN MESYLATE 4 MILLIGRAM(S): 8 TABLET ORAL at 21:42

## 2025-01-07 RX ADMIN — BUMETANIDE 2 MILLIGRAM(S): 1 TABLET ORAL at 06:29

## 2025-01-07 RX ADMIN — BUMETANIDE 2 MILLIGRAM(S): 1 TABLET ORAL at 15:23

## 2025-01-07 RX ADMIN — HEPARIN SODIUM 5000 UNIT(S): 1000 INJECTION INTRAVENOUS; SUBCUTANEOUS at 05:33

## 2025-01-07 RX ADMIN — ATORVASTATIN CALCIUM 40 MILLIGRAM(S): 80 TABLET, FILM COATED ORAL at 00:38

## 2025-01-07 RX ADMIN — ATORVASTATIN CALCIUM 40 MILLIGRAM(S): 80 TABLET, FILM COATED ORAL at 21:42

## 2025-01-07 RX ADMIN — Medication 81 MILLIGRAM(S): at 15:21

## 2025-01-07 RX ADMIN — Medication 160 MILLIGRAM(S): at 19:02

## 2025-01-07 RX ADMIN — HEPARIN SODIUM 5000 UNIT(S): 1000 INJECTION INTRAVENOUS; SUBCUTANEOUS at 19:04

## 2025-01-07 RX ADMIN — Medication 1 APPLICATION(S): at 17:02

## 2025-01-07 RX ADMIN — Medication 400 MILLIGRAM(S): at 20:41

## 2025-01-07 RX ADMIN — Medication 650 MILLIGRAM(S): at 02:45

## 2025-01-07 RX ADMIN — Medication 667 MILLIGRAM(S): at 15:22

## 2025-01-07 RX ADMIN — LABETALOL HYDROCHLORIDE 200 MILLIGRAM(S): 200 TABLET, FILM COATED ORAL at 06:29

## 2025-01-07 RX ADMIN — DEXTROMETHORPHAN HBR, GUAIFENESIN 100 MILLIGRAM(S): 200 LIQUID ORAL at 20:41

## 2025-01-07 RX ADMIN — Medication 650 MILLIGRAM(S): at 01:35

## 2025-01-07 RX ADMIN — Medication 650 MILLIGRAM(S): at 11:00

## 2025-01-07 RX ADMIN — Medication 1 TABLET(S): at 15:23

## 2025-01-07 RX ADMIN — INSULIN LISPRO 2: 100 INJECTION, SOLUTION INTRAVENOUS; SUBCUTANEOUS at 17:00

## 2025-01-07 RX ADMIN — DOXAZOSIN MESYLATE 4 MILLIGRAM(S): 8 TABLET ORAL at 00:37

## 2025-01-07 RX ADMIN — Medication 1 APPLICATION(S): at 05:38

## 2025-01-07 RX ADMIN — LABETALOL HYDROCHLORIDE 200 MILLIGRAM(S): 200 TABLET, FILM COATED ORAL at 19:02

## 2025-01-07 RX ADMIN — Medication 90 MILLIGRAM(S): at 06:29

## 2025-01-07 NOTE — PROGRESS NOTE ADULT - SUBJECTIVE AND OBJECTIVE BOX
[facility]  Psychology  Progress Note  Individual Psychotherapy (PhD/LCSW)    Patient Name: Brenda Payne  MRN: [id number]    Patient Class: OP- [facility]  Primary Care Provider: Clarke Dennison MD    Psychiatry Visit (PhD/LCSW)  Individual Psychotherapy - CPT 01531    Date: 1/6/2025    Site: [place]    The patient location is: [facility]    Referral source: Tal Barfield MD    Clinical status of patient: Outpatient    Brenda Payne, a 16 y.o. female.  Met with patient    Chief complaint/reason for encounter: addictive disorder, depression, mood swings, anger, anxiety, sleep, appetite, somatic and interpersonal    History of present illness: Dx with anxiety in 5th or 6th grade. Patient and family did suspect ASD but ruled out by [facility] testing in 2022.     Pain: 0    Symptoms:   Mood: depressed mood, weight loss, insomnia, poor concentration and tearfulness  Anxiety: decreased memory, excessive anxiety/worry, restlessness/keyed up, irritability and panic attacks  Substance abuse: denied  Cognitive functioning: denied  Health behaviors: noncontributory    Psychiatric history: has participated in counseling/psychotherapy on an outpatient basis in the past and currently under psychiatric care    Medical history: none    Family history of psychiatric illness:  sister has depression     Social history (marriage, employment, etc.): Mom, Dad, Sister. 10th grade. Parents are coparenting well.     Substance use:   Alcohol: none   Drugs: none   Tobacco: none   Caffeine: none    Current medications and drug reactions (include OTC, herbal): see medication list     Strengths and liabilities: Strength: Patient is intelligent., Strength: Patient is physically healthy., Strength: Patient has positive support network., Strength: Patient has reasonable judgment., Liability: Patient is impulsive., Liability: Patient lacks social skills., Liability: Patient lacks  "coping skills.    Current Evaluation:     Mental Status Exam:  General Appearance:  unremarkable, age appropriate, thin & gaunt looking   Speech: normal tone, normal pitch, loud, pressured, rapid      Level of Cooperation: cooperative      Thought Processes: normal and logical   Mood: steady      Thought Content: normal, no suicidality, no homicidality, delusions, or paranoia   Affect: congruent and appropriate   Orientation: Oriented x3   Memory: recent >  intact, remote >  intact   Attention Span & Concentration: intact   Fund of General Knowledge: intact and appropriate to age and level of education   Abstract Reasoning: not assessed   Judgment & Insight: fair     Language  intact     Summary: Patient shared that she is now sleeping in her own room at her mom's house. This happened when her maternal grandmother (with whom she has a very strained relationship) was in town. Patient was insightful about her tendency to mask as a way of managing her anxiety. She admitted to having fears about adulthood because she has "so many chronic diagnoses" and fears that she would not be able to handle them independently or that her parents would help her. She believes that her father is going to kick her out when she turns 17 because her sister left at 17 and now her room belongs to one of the step kids. We discussed rigidity, particularly when people make promises. She stated that she does not fill her own water bottle because her parents promised her that they would fill it for her. The patient expressed anger with her father about the divorce and his current relationship. She believes that her father and stepmother are not legally  and that she is taking advantage of her father's large inheritance. The patient often feels misunderstood and has felt this way since childhood. She hates making people sad and gets upset when she is unable to manage her feelings of overwhelm. She discussed some of her childhood trauma and " "cried while speaking about it. This is one of the first times that the patient has cried in session, particularly about her childhood experiences. We still need to discuss the results of testing. Patient and I agreed that the results should be discussed with her parents present instead of doing it in two sessions. The patient feels that she would be "heard" if there was a doctor in the room. I will reach out to parents to schedule the appointment.   Diagnostic Impression - Plan:       ICD-10-CM ICD-9-CM   1. Attention deficit hyperactivity disorder (ADHD), predominantly inattentive type  F90.0 314.00   2. NASIM (generalized anxiety disorder)  F41.1 300.02   3. Recurrent major depressive disorder, in partial remission  F33.41 296.35   4. Compulsive behavior  R46.89 300.3             Plan:individual psychotherapy    Return to Clinic: as scheduled    Length of Service (minutes): 45          Quyen Trujillo, PhD  Psychologist  [facility]                                                                  " [facility] Division of Hospital Medicine  Bobo Kidd MD  Pager [phone]      Patient is a 67y old  Male who presents with a chief complaint of GERMAIN pericardial effusion (01 Nov 2022 10:33)      SUBJECTIVE / OVERNIGHT EVENTS:    no acute event o/n. no new complaints    ADDITIONAL REVIEW OF SYSTEMS:    RESPIRATORY: No cough, wheezing, chills or hemoptysis; No shortness of breath  CARDIOVASCULAR: No chest pain, palpitations, dizziness, or leg swelling  GASTROINTESTINAL: No abdominal or epigastric pain. No nausea, vomiting, or hematemesis; No diarrhea or constipation. No melena or hematochezia.      MEDICATIONS  (STANDING):  amLODIPine   Tablet 10 milliGRAM(s) Oral daily  atorvastatin 80 milliGRAM(s) Oral at bedtime  buMETAnide Injectable 2 milliGRAM(s) IV Push two times a day  chlorhexidine 2% Cloths 1 Application(s) Topical <User Schedule>  dextrose 50% Injectable 25 Gram(s) IV Push once  dextrose 50% Injectable 12.5 Gram(s) IV Push once  dextrose 50% Injectable 25 Gram(s) IV Push once  dextrose Oral Gel 15 Gram(s) Oral once  epoetin heidi-epbx (RETACRIT) Injectable 81639 Unit(s) SubCutaneous Once  gentamicin 0.1% Ointment 1 Application(s) Topical <User Schedule>  glucagon  Injectable 1 milliGRAM(s) IntraMuscular once  insulin glargine Injectable (LANTUS) 12 Unit(s) SubCutaneous at bedtime  insulin lispro (ADMELOG) corrective regimen sliding scale   SubCutaneous three times a day before meals  insulin lispro (ADMELOG) corrective regimen sliding scale   SubCutaneous at bedtime  insulin lispro Injectable (ADMELOG) 4 Unit(s) SubCutaneous three times a day before meals  labetalol 100 milliGRAM(s) Oral three times a day  lidocaine   4% Patch 1 Patch Transdermal every 24 hours  losartan 25 milliGRAM(s) Oral daily  mupirocin 2% Ointment 1 Application(s) Topical two times a day  sevelamer carbonate 800 milliGRAM(s) Oral three times a day with meals    MEDICATIONS  (PRN):  acetaminophen     Tablet .. 650 milliGRAM(s) Oral every 6 hours PRN Temp greater or equal to 38C (100.4F), Mild Pain (1 - 3), Moderate Pain (4 - 6)  traMADol 50 milliGRAM(s) Oral every 6 hours PRN moderate to severe pain      CAPILLARY BLOOD GLUCOSE      POCT Blood Glucose.: 270 mg/dL (01 Nov 2022 13:09)  POCT Blood Glucose.: 241 mg/dL (01 Nov 2022 11:59)  POCT Blood Glucose.: 153 mg/dL (01 Nov 2022 08:25)  POCT Blood Glucose.: 170 mg/dL (31 Oct 2022 22:15)  POCT Blood Glucose.: 194 mg/dL (31 Oct 2022 21:27)  POCT Blood Glucose.: 184 mg/dL (31 Oct 2022 18:44)  POCT Blood Glucose.: 155 mg/dL (31 Oct 2022 17:30)    I&O's Summary    31 Oct 2022 07:01  -  01 Nov 2022 07:00  --------------------------------------------------------  IN: 8360 mL / OUT: 26964 mL / NET: -3240 mL    01 Nov 2022 07:01  -  01 Nov 2022 13:31  --------------------------------------------------------  IN: 2000 mL / OUT: 300 mL / NET: 1700 mL        PHYSICAL EXAM:  Vital Signs Last 24 Hrs  T(C): 36.9 (01 Nov 2022 11:52), Max: 37 (31 Oct 2022 17:50)  T(F): 98.4 (01 Nov 2022 11:52), Max: 98.6 (31 Oct 2022 17:50)  HR: 63 (01 Nov 2022 11:52) (56 - 68)  BP: 124/52 (01 Nov 2022 11:52) (117/50 - 170/70)  BP(mean): --  RR: 18 (01 Nov 2022 11:52) (16 - 18)  SpO2: 98% (01 Nov 2022 11:52) (96% - 99%)    Parameters below as of 01 Nov 2022 11:52  Patient On (Oxygen Delivery Method): room air        CONSTITUTIONAL: NAD,  EYES: PERRLA; conjunctiva and sclera clear  ENMT: Moist oral mucosa, no pharyngeal injection or exudates;   NECK: Supple, no palpable masses;  RESPIRATORY: Normal respiratory effort; lungs are clear to auscultation bilaterally  CARDIOVASCULAR: Regular rate and rhythm, normal S1 and S2, no murmur/rub/gallop; No lower extremity edema; Peripheral pulses are 2+ bilaterally. pericardial drain in place  ABDOMEN: Nontender to palpation, normoactive bowel sounds, no rebound/guarding;   MUSCLOSKELETAL:   no clubbing or cyanosis of digits; no joint swelling or tenderness to palpation  PSYCH: A+O to person, place, and time; affect appropriate  NEUROLOGY: CN 2-12 are intact and symmetric; no gross sensory deficits;   SKIN: No rashes;     LABS:                        7.6    9.35  )-----------( 495      ( 01 Nov 2022 05:25 )             23.8     11-01    141  |  97<L>  |  52<H>  ----------------------------<  67<L>  3.5   |  26  |  9.84<H>    Ca    8.4      01 Nov 2022 05:25  Phos  7.2     11-01  Mg     2.20     11-01    TPro  6.1  /  Alb  x   /  TBili  x   /  DBili  x   /  AST  x   /  ALT  x   /  AlkPhos  x   11-01                RADIOLOGY & ADDITIONAL TESTS:  Results Reviewed:   Imaging Personally Reviewed:  Electrocardiogram Personally Reviewed:    COORDINATION OF CARE:  Care Discussed with Consultants/Other Providers [Y/N]:  Prior or Outpatient Records Reviewed [Y/N]:

## 2025-01-08 ENCOUNTER — TRANSCRIPTION ENCOUNTER (OUTPATIENT)
Age: 70
End: 2025-01-08

## 2025-01-08 DIAGNOSIS — R50.9 FEVER, UNSPECIFIED: ICD-10-CM

## 2025-01-08 LAB
ADD ON TEST-SPECIMEN IN LAB: SIGNIFICANT CHANGE UP
ANION GAP SERPL CALC-SCNC: 15 MMOL/L — HIGH (ref 7–14)
B PERT DNA SPEC QL NAA+PROBE: SIGNIFICANT CHANGE UP
B PERT+PARAPERT DNA PNL SPEC NAA+PROBE: SIGNIFICANT CHANGE UP
BUN SERPL-MCNC: 21 MG/DL — SIGNIFICANT CHANGE UP (ref 7–23)
C PNEUM DNA SPEC QL NAA+PROBE: SIGNIFICANT CHANGE UP
CALCIUM SERPL-MCNC: 8.2 MG/DL — LOW (ref 8.4–10.5)
CHLORIDE SERPL-SCNC: 98 MMOL/L — SIGNIFICANT CHANGE UP (ref 98–107)
CO2 SERPL-SCNC: 24 MMOL/L — SIGNIFICANT CHANGE UP (ref 22–31)
CREAT SERPL-MCNC: 5.96 MG/DL — HIGH (ref 0.5–1.3)
EGFR: 10 ML/MIN/1.73M2 — LOW
EGFR: 10 ML/MIN/1.73M2 — LOW
FLUAV AG NPH QL: SIGNIFICANT CHANGE UP
FLUAV SUBTYP SPEC NAA+PROBE: SIGNIFICANT CHANGE UP
FLUBV AG NPH QL: SIGNIFICANT CHANGE UP
FLUBV RNA SPEC QL NAA+PROBE: SIGNIFICANT CHANGE UP
GLUCOSE BLDC GLUCOMTR-MCNC: 101 MG/DL — HIGH (ref 70–99)
GLUCOSE BLDC GLUCOMTR-MCNC: 106 MG/DL — HIGH (ref 70–99)
GLUCOSE BLDC GLUCOMTR-MCNC: 116 MG/DL — HIGH (ref 70–99)
GLUCOSE BLDC GLUCOMTR-MCNC: 126 MG/DL — HIGH (ref 70–99)
GLUCOSE BLDC GLUCOMTR-MCNC: 127 MG/DL — HIGH (ref 70–99)
GLUCOSE BLDC GLUCOMTR-MCNC: 172 MG/DL — HIGH (ref 70–99)
GLUCOSE BLDC GLUCOMTR-MCNC: 215 MG/DL — HIGH (ref 70–99)
GLUCOSE SERPL-MCNC: 112 MG/DL — HIGH (ref 70–99)
HADV DNA SPEC QL NAA+PROBE: SIGNIFICANT CHANGE UP
HCOV 229E RNA SPEC QL NAA+PROBE: SIGNIFICANT CHANGE UP
HCOV HKU1 RNA SPEC QL NAA+PROBE: SIGNIFICANT CHANGE UP
HCOV NL63 RNA SPEC QL NAA+PROBE: SIGNIFICANT CHANGE UP
HCOV OC43 RNA SPEC QL NAA+PROBE: SIGNIFICANT CHANGE UP
HCT VFR BLD CALC: 27.1 % — LOW (ref 39–50)
HGB BLD-MCNC: 8.5 G/DL — LOW (ref 13–17)
HMPV RNA SPEC QL NAA+PROBE: SIGNIFICANT CHANGE UP
HPIV1 RNA SPEC QL NAA+PROBE: SIGNIFICANT CHANGE UP
HPIV2 RNA SPEC QL NAA+PROBE: SIGNIFICANT CHANGE UP
HPIV3 RNA SPEC QL NAA+PROBE: SIGNIFICANT CHANGE UP
HPIV4 RNA SPEC QL NAA+PROBE: SIGNIFICANT CHANGE UP
IRON SATN MFR SERPL: 17 % — SIGNIFICANT CHANGE UP (ref 14–50)
IRON SATN MFR SERPL: 17 UG/DL — LOW (ref 45–165)
M PNEUMO DNA SPEC QL NAA+PROBE: SIGNIFICANT CHANGE UP
MAGNESIUM SERPL-MCNC: 2 MG/DL — SIGNIFICANT CHANGE UP (ref 1.6–2.6)
MCHC RBC-ENTMCNC: 26.7 PG — LOW (ref 27–34)
MCHC RBC-ENTMCNC: 31.4 G/DL — LOW (ref 32–36)
MCV RBC AUTO: 85.2 FL — SIGNIFICANT CHANGE UP (ref 80–100)
NON-GYNECOLOGICAL CYTOLOGY STUDY: SIGNIFICANT CHANGE UP
NRBC # BLD AUTO: 0 K/UL — SIGNIFICANT CHANGE UP (ref 0–0)
NRBC # BLD: 0 /100 WBCS — SIGNIFICANT CHANGE UP (ref 0–0)
NRBC # FLD: 0 K/UL — SIGNIFICANT CHANGE UP (ref 0–0)
NRBC BLD-RTO: 0 /100 WBCS — SIGNIFICANT CHANGE UP (ref 0–0)
PHOSPHATE SERPL-MCNC: 3.2 MG/DL — SIGNIFICANT CHANGE UP (ref 2.5–4.5)
PLATELET # BLD AUTO: 435 K/UL — HIGH (ref 150–400)
POTASSIUM SERPL-MCNC: 4.1 MMOL/L — SIGNIFICANT CHANGE UP (ref 3.5–5.3)
POTASSIUM SERPL-SCNC: 4.1 MMOL/L — SIGNIFICANT CHANGE UP (ref 3.5–5.3)
RAPID RVP RESULT: DETECTED
RBC # BLD: 3.18 M/UL — LOW (ref 4.2–5.8)
RBC # FLD: 16 % — HIGH (ref 10.3–14.5)
RSV RNA NPH QL NAA+NON-PROBE: DETECTED
RSV RNA SPEC QL NAA+PROBE: DETECTED
RV+EV RNA SPEC QL NAA+PROBE: SIGNIFICANT CHANGE UP
SARS-COV-2 RNA SPEC QL NAA+PROBE: SIGNIFICANT CHANGE UP
SARS-COV-2 RNA SPEC QL NAA+PROBE: SIGNIFICANT CHANGE UP
SODIUM SERPL-SCNC: 137 MMOL/L — SIGNIFICANT CHANGE UP (ref 135–145)
TIBC SERPL-MCNC: 99 UG/DL — LOW (ref 220–430)
UIBC SERPL-MCNC: 82 UG/DL — LOW (ref 110–370)
WBC # BLD: 11.06 K/UL — HIGH (ref 3.8–10.5)
WBC # FLD AUTO: 11.06 K/UL — HIGH (ref 3.8–10.5)

## 2025-01-08 PROCEDURE — 88300 SURGICAL PATH GROSS: CPT | Mod: 26

## 2025-01-08 PROCEDURE — 71045 X-RAY EXAM CHEST 1 VIEW: CPT | Mod: 26,76

## 2025-01-08 RX ORDER — ONDANSETRON HCL/PF 4 MG/2 ML
4 VIAL (ML) INJECTION ONCE
Refills: 0 | Status: DISCONTINUED | OUTPATIENT
Start: 2025-01-08 | End: 2025-01-08

## 2025-01-08 RX ORDER — ACETAMINOPHEN 500 MG/5ML
1000 LIQUID (ML) ORAL ONCE
Refills: 0 | Status: COMPLETED | OUTPATIENT
Start: 2025-01-08 | End: 2025-01-08

## 2025-01-08 RX ORDER — INSULIN LISPRO 100 U/ML
INJECTION, SOLUTION INTRAVENOUS; SUBCUTANEOUS
Refills: 0 | Status: DISCONTINUED | OUTPATIENT
Start: 2025-01-08 | End: 2025-01-18

## 2025-01-08 RX ORDER — INSULIN LISPRO 100 U/ML
INJECTION, SOLUTION INTRAVENOUS; SUBCUTANEOUS AT BEDTIME
Refills: 0 | Status: DISCONTINUED | OUTPATIENT
Start: 2025-01-08 | End: 2025-01-18

## 2025-01-08 RX ORDER — FENTANYL CITRATE-0.9 % NACL/PF 100MCG/2ML
25 SYRINGE (ML) INTRAVENOUS
Refills: 0 | Status: DISCONTINUED | OUTPATIENT
Start: 2025-01-08 | End: 2025-01-08

## 2025-01-08 RX ORDER — EPOETIN ALFA 10000 [IU]/ML
10000 SOLUTION INTRAVENOUS; SUBCUTANEOUS
Refills: 0 | Status: DISCONTINUED | OUTPATIENT
Start: 2025-01-08 | End: 2025-01-15

## 2025-01-08 RX ORDER — INSULIN LISPRO 100 U/ML
INJECTION, SOLUTION INTRAVENOUS; SUBCUTANEOUS EVERY 6 HOURS
Refills: 0 | Status: DISCONTINUED | OUTPATIENT
Start: 2025-01-08 | End: 2025-01-08

## 2025-01-08 RX ADMIN — BUMETANIDE 2 MILLIGRAM(S): 1 TABLET ORAL at 15:01

## 2025-01-08 RX ADMIN — Medication 667 MILLIGRAM(S): at 12:20

## 2025-01-08 RX ADMIN — Medication 90 MILLIGRAM(S): at 05:01

## 2025-01-08 RX ADMIN — LABETALOL HYDROCHLORIDE 200 MILLIGRAM(S): 200 TABLET, FILM COATED ORAL at 19:06

## 2025-01-08 RX ADMIN — Medication 400 MILLIGRAM(S): at 05:01

## 2025-01-08 RX ADMIN — HEPARIN SODIUM 5000 UNIT(S): 1000 INJECTION INTRAVENOUS; SUBCUTANEOUS at 19:02

## 2025-01-08 RX ADMIN — Medication 650 MILLIGRAM(S): at 12:20

## 2025-01-08 RX ADMIN — Medication 25 MICROGRAM(S): at 05:01

## 2025-01-08 RX ADMIN — Medication 1 APPLICATION(S): at 05:10

## 2025-01-08 RX ADMIN — Medication 1000 MILLIGRAM(S): at 06:00

## 2025-01-08 RX ADMIN — Medication 160 MILLIGRAM(S): at 19:02

## 2025-01-08 RX ADMIN — Medication 81 MILLIGRAM(S): at 19:03

## 2025-01-08 RX ADMIN — Medication 667 MILLIGRAM(S): at 19:01

## 2025-01-08 RX ADMIN — INSULIN LISPRO 2: 100 INJECTION, SOLUTION INTRAVENOUS; SUBCUTANEOUS at 19:03

## 2025-01-08 RX ADMIN — Medication 1 APPLICATION(S): at 12:26

## 2025-01-08 RX ADMIN — LABETALOL HYDROCHLORIDE 200 MILLIGRAM(S): 200 TABLET, FILM COATED ORAL at 05:01

## 2025-01-08 RX ADMIN — Medication 1000 UNIT(S): at 12:20

## 2025-01-08 RX ADMIN — Medication 160 MILLIGRAM(S): at 05:02

## 2025-01-08 RX ADMIN — Medication 650 MILLIGRAM(S): at 13:20

## 2025-01-08 RX ADMIN — ATORVASTATIN CALCIUM 40 MILLIGRAM(S): 80 TABLET, FILM COATED ORAL at 21:46

## 2025-01-08 RX ADMIN — DOXAZOSIN MESYLATE 4 MILLIGRAM(S): 8 TABLET ORAL at 21:45

## 2025-01-08 RX ADMIN — Medication 1 TABLET(S): at 12:26

## 2025-01-08 RX ADMIN — Medication 1 APPLICATION(S): at 12:19

## 2025-01-08 RX ADMIN — BUMETANIDE 2 MILLIGRAM(S): 1 TABLET ORAL at 05:02

## 2025-01-08 RX ADMIN — HEPARIN SODIUM 5000 UNIT(S): 1000 INJECTION INTRAVENOUS; SUBCUTANEOUS at 05:01

## 2025-01-09 LAB
ANION GAP SERPL CALC-SCNC: 14 MMOL/L — SIGNIFICANT CHANGE UP (ref 7–14)
ANION GAP SERPL CALC-SCNC: 18 MMOL/L — HIGH (ref 7–14)
BUN SERPL-MCNC: 18 MG/DL — SIGNIFICANT CHANGE UP (ref 7–23)
BUN SERPL-MCNC: 33 MG/DL — HIGH (ref 7–23)
CALCIUM SERPL-MCNC: 8 MG/DL — LOW (ref 8.4–10.5)
CALCIUM SERPL-MCNC: 8.3 MG/DL — LOW (ref 8.4–10.5)
CHLORIDE SERPL-SCNC: 95 MMOL/L — LOW (ref 98–107)
CHLORIDE SERPL-SCNC: 96 MMOL/L — LOW (ref 98–107)
CO2 SERPL-SCNC: 23 MMOL/L — SIGNIFICANT CHANGE UP (ref 22–31)
CO2 SERPL-SCNC: 27 MMOL/L — SIGNIFICANT CHANGE UP (ref 22–31)
CREAT SERPL-MCNC: 4.64 MG/DL — HIGH (ref 0.5–1.3)
CREAT SERPL-MCNC: 7.75 MG/DL — HIGH (ref 0.5–1.3)
CULTURE RESULTS: NO GROWTH — SIGNIFICANT CHANGE UP
EGFR: 13 ML/MIN/1.73M2 — LOW
EGFR: 13 ML/MIN/1.73M2 — LOW
EGFR: 7 ML/MIN/1.73M2 — LOW
EGFR: 7 ML/MIN/1.73M2 — LOW
GLUCOSE BLDC GLUCOMTR-MCNC: 103 MG/DL — HIGH (ref 70–99)
GLUCOSE BLDC GLUCOMTR-MCNC: 149 MG/DL — HIGH (ref 70–99)
GLUCOSE BLDC GLUCOMTR-MCNC: 159 MG/DL — HIGH (ref 70–99)
GLUCOSE BLDC GLUCOMTR-MCNC: 163 MG/DL — HIGH (ref 70–99)
GLUCOSE BLDC GLUCOMTR-MCNC: 185 MG/DL — HIGH (ref 70–99)
GLUCOSE BLDC GLUCOMTR-MCNC: 229 MG/DL — HIGH (ref 70–99)
GLUCOSE BLDC GLUCOMTR-MCNC: 95 MG/DL — SIGNIFICANT CHANGE UP (ref 70–99)
GLUCOSE SERPL-MCNC: 204 MG/DL — HIGH (ref 70–99)
GLUCOSE SERPL-MCNC: 98 MG/DL — SIGNIFICANT CHANGE UP (ref 70–99)
HCT VFR BLD CALC: 22.6 % — LOW (ref 39–50)
HCT VFR BLD CALC: 24.7 % — LOW (ref 39–50)
HCT VFR BLD CALC: 26 % — LOW (ref 39–50)
HGB BLD-MCNC: 7.3 G/DL — LOW (ref 13–17)
HGB BLD-MCNC: 7.8 G/DL — LOW (ref 13–17)
HGB BLD-MCNC: 8.1 G/DL — LOW (ref 13–17)
LACTATE SERPL-SCNC: 0.8 MMOL/L — SIGNIFICANT CHANGE UP (ref 0.5–2)
MAGNESIUM SERPL-MCNC: 1.9 MG/DL — SIGNIFICANT CHANGE UP (ref 1.6–2.6)
MAGNESIUM SERPL-MCNC: 2 MG/DL — SIGNIFICANT CHANGE UP (ref 1.6–2.6)
MCHC RBC-ENTMCNC: 26.4 PG — LOW (ref 27–34)
MCHC RBC-ENTMCNC: 26.4 PG — LOW (ref 27–34)
MCHC RBC-ENTMCNC: 26.8 PG — LOW (ref 27–34)
MCHC RBC-ENTMCNC: 31.2 G/DL — LOW (ref 32–36)
MCHC RBC-ENTMCNC: 31.6 G/DL — LOW (ref 32–36)
MCHC RBC-ENTMCNC: 32.3 G/DL — SIGNIFICANT CHANGE UP (ref 32–36)
MCV RBC AUTO: 83.1 FL — SIGNIFICANT CHANGE UP (ref 80–100)
MCV RBC AUTO: 83.7 FL — SIGNIFICANT CHANGE UP (ref 80–100)
MCV RBC AUTO: 84.7 FL — SIGNIFICANT CHANGE UP (ref 80–100)
NRBC # BLD AUTO: 0 K/UL — SIGNIFICANT CHANGE UP (ref 0–0)
NRBC # BLD AUTO: 0 K/UL — SIGNIFICANT CHANGE UP (ref 0–0)
NRBC # BLD AUTO: 0.02 K/UL — HIGH (ref 0–0)
NRBC # BLD: 0 /100 WBCS — SIGNIFICANT CHANGE UP (ref 0–0)
NRBC # FLD: 0 K/UL — SIGNIFICANT CHANGE UP (ref 0–0)
NRBC # FLD: 0 K/UL — SIGNIFICANT CHANGE UP (ref 0–0)
NRBC # FLD: 0.02 K/UL — HIGH (ref 0–0)
NRBC BLD-RTO: 0 /100 WBCS — SIGNIFICANT CHANGE UP (ref 0–0)
PHOSPHATE SERPL-MCNC: 2 MG/DL — LOW (ref 2.5–4.5)
PHOSPHATE SERPL-MCNC: 3.4 MG/DL — SIGNIFICANT CHANGE UP (ref 2.5–4.5)
PLATELET # BLD AUTO: 376 K/UL — SIGNIFICANT CHANGE UP (ref 150–400)
PLATELET # BLD AUTO: 383 K/UL — SIGNIFICANT CHANGE UP (ref 150–400)
PLATELET # BLD AUTO: 427 K/UL — HIGH (ref 150–400)
POTASSIUM SERPL-MCNC: 3.6 MMOL/L — SIGNIFICANT CHANGE UP (ref 3.5–5.3)
POTASSIUM SERPL-MCNC: 4.3 MMOL/L — SIGNIFICANT CHANGE UP (ref 3.5–5.3)
POTASSIUM SERPL-SCNC: 3.6 MMOL/L — SIGNIFICANT CHANGE UP (ref 3.5–5.3)
POTASSIUM SERPL-SCNC: 4.3 MMOL/L — SIGNIFICANT CHANGE UP (ref 3.5–5.3)
PROCALCITONIN SERPL-MCNC: 8.14 NG/ML — HIGH (ref 0.02–0.1)
RBC # BLD: 2.72 M/UL — LOW (ref 4.2–5.8)
RBC # BLD: 2.95 M/UL — LOW (ref 4.2–5.8)
RBC # BLD: 3.07 M/UL — LOW (ref 4.2–5.8)
RBC # FLD: 16 % — HIGH (ref 10.3–14.5)
RBC # FLD: 16.3 % — HIGH (ref 10.3–14.5)
RBC # FLD: 16.5 % — HIGH (ref 10.3–14.5)
SODIUM SERPL-SCNC: 136 MMOL/L — SIGNIFICANT CHANGE UP (ref 135–145)
SODIUM SERPL-SCNC: 137 MMOL/L — SIGNIFICANT CHANGE UP (ref 135–145)
SPECIMEN SOURCE: SIGNIFICANT CHANGE UP
WBC # BLD: 11.38 K/UL — HIGH (ref 3.8–10.5)
WBC # BLD: 8.97 K/UL — SIGNIFICANT CHANGE UP (ref 3.8–10.5)
WBC # BLD: 9.52 K/UL — SIGNIFICANT CHANGE UP (ref 3.8–10.5)
WBC # FLD AUTO: 11.38 K/UL — HIGH (ref 3.8–10.5)
WBC # FLD AUTO: 8.97 K/UL — SIGNIFICANT CHANGE UP (ref 3.8–10.5)
WBC # FLD AUTO: 9.52 K/UL — SIGNIFICANT CHANGE UP (ref 3.8–10.5)

## 2025-01-09 RX ORDER — PIPERACILLIN-TAZO-DEXTROSE,ISO 3.375G/5
3.38 IV SOLUTION, PIGGYBACK PREMIX FROZEN(ML) INTRAVENOUS ONCE
Refills: 0 | Status: COMPLETED | OUTPATIENT
Start: 2025-01-09 | End: 2025-01-09

## 2025-01-09 RX ORDER — PIPERACILLIN-TAZO-DEXTROSE,ISO 3.375G/5
3.38 IV SOLUTION, PIGGYBACK PREMIX FROZEN(ML) INTRAVENOUS EVERY 12 HOURS
Refills: 0 | Status: DISCONTINUED | OUTPATIENT
Start: 2025-01-09 | End: 2025-01-14

## 2025-01-09 RX ORDER — DEXTROMETHORPHAN HBR, GUAIFENESIN 200 MG/10ML
100 LIQUID ORAL EVERY 6 HOURS
Refills: 0 | Status: COMPLETED | OUTPATIENT
Start: 2025-01-09 | End: 2025-01-09

## 2025-01-09 RX ORDER — IRON SUCROSE 20 MG/ML
100 INJECTION, SOLUTION INTRAVENOUS
Refills: 0 | Status: COMPLETED | OUTPATIENT
Start: 2025-01-09 | End: 2025-01-13

## 2025-01-09 RX ORDER — CLOPIDOGREL BISULFATE 75 MG/1
75 TABLET, FILM COATED ORAL DAILY
Refills: 0 | Status: DISCONTINUED | OUTPATIENT
Start: 2025-01-09 | End: 2025-01-18

## 2025-01-09 RX ADMIN — BUMETANIDE 2 MILLIGRAM(S): 1 TABLET ORAL at 05:54

## 2025-01-09 RX ADMIN — Medication 1 APPLICATION(S): at 17:38

## 2025-01-09 RX ADMIN — Medication 160 MILLIGRAM(S): at 05:54

## 2025-01-09 RX ADMIN — ISOSORBIDE MONONITRATE 60 MILLIGRAM(S): 60 TABLET, EXTENDED RELEASE ORAL at 17:38

## 2025-01-09 RX ADMIN — Medication 1 TABLET(S): at 17:39

## 2025-01-09 RX ADMIN — Medication 90 MILLIGRAM(S): at 17:38

## 2025-01-09 RX ADMIN — INSULIN LISPRO 1: 100 INJECTION, SOLUTION INTRAVENOUS; SUBCUTANEOUS at 18:49

## 2025-01-09 RX ADMIN — Medication 1 APPLICATION(S): at 06:03

## 2025-01-09 RX ADMIN — DOXAZOSIN MESYLATE 4 MILLIGRAM(S): 8 TABLET ORAL at 21:40

## 2025-01-09 RX ADMIN — BUMETANIDE 2 MILLIGRAM(S): 1 TABLET ORAL at 17:40

## 2025-01-09 RX ADMIN — Medication 160 MILLIGRAM(S): at 21:39

## 2025-01-09 RX ADMIN — Medication 81 MILLIGRAM(S): at 17:39

## 2025-01-09 RX ADMIN — CLOPIDOGREL BISULFATE 75 MILLIGRAM(S): 75 TABLET, FILM COATED ORAL at 17:39

## 2025-01-09 RX ADMIN — Medication 650 MILLIGRAM(S): at 01:02

## 2025-01-09 RX ADMIN — Medication 25 GRAM(S): at 18:49

## 2025-01-09 RX ADMIN — Medication 650 MILLIGRAM(S): at 02:02

## 2025-01-09 RX ADMIN — Medication 200 GRAM(S): at 02:01

## 2025-01-09 RX ADMIN — HEPARIN SODIUM 5000 UNIT(S): 1000 INJECTION INTRAVENOUS; SUBCUTANEOUS at 18:49

## 2025-01-09 RX ADMIN — IRON SUCROSE 210 MILLIGRAM(S): 20 INJECTION, SOLUTION INTRAVENOUS at 17:37

## 2025-01-09 RX ADMIN — Medication 667 MILLIGRAM(S): at 17:40

## 2025-01-09 RX ADMIN — Medication 1000 UNIT(S): at 17:39

## 2025-01-09 RX ADMIN — LABETALOL HYDROCHLORIDE 200 MILLIGRAM(S): 200 TABLET, FILM COATED ORAL at 21:39

## 2025-01-09 RX ADMIN — Medication 25 GRAM(S): at 08:56

## 2025-01-09 RX ADMIN — Medication 667 MILLIGRAM(S): at 08:56

## 2025-01-09 RX ADMIN — HEPARIN SODIUM 5000 UNIT(S): 1000 INJECTION INTRAVENOUS; SUBCUTANEOUS at 05:53

## 2025-01-09 RX ADMIN — Medication 25 MICROGRAM(S): at 05:53

## 2025-01-09 RX ADMIN — ATORVASTATIN CALCIUM 40 MILLIGRAM(S): 80 TABLET, FILM COATED ORAL at 21:38

## 2025-01-09 RX ADMIN — DEXTROMETHORPHAN HBR, GUAIFENESIN 100 MILLIGRAM(S): 200 LIQUID ORAL at 08:28

## 2025-01-09 RX ADMIN — EPOETIN ALFA 10000 UNIT(S): 10000 SOLUTION INTRAVENOUS; SUBCUTANEOUS at 13:03

## 2025-01-10 LAB
ANION GAP SERPL CALC-SCNC: 19 MMOL/L — HIGH (ref 7–14)
BLD GP AB SCN SERPL QL: NEGATIVE — SIGNIFICANT CHANGE UP
BUN SERPL-MCNC: 22 MG/DL — SIGNIFICANT CHANGE UP (ref 7–23)
CALCIUM SERPL-MCNC: 8 MG/DL — LOW (ref 8.4–10.5)
CHLORIDE SERPL-SCNC: 97 MMOL/L — LOW (ref 98–107)
CO2 SERPL-SCNC: 23 MMOL/L — SIGNIFICANT CHANGE UP (ref 22–31)
CREAT SERPL-MCNC: 5.36 MG/DL — HIGH (ref 0.5–1.3)
EGFR: 11 ML/MIN/1.73M2 — LOW
EGFR: 11 ML/MIN/1.73M2 — LOW
GLUCOSE BLDC GLUCOMTR-MCNC: 135 MG/DL — HIGH (ref 70–99)
GLUCOSE BLDC GLUCOMTR-MCNC: 163 MG/DL — HIGH (ref 70–99)
GLUCOSE BLDC GLUCOMTR-MCNC: 174 MG/DL — HIGH (ref 70–99)
GLUCOSE BLDC GLUCOMTR-MCNC: 192 MG/DL — HIGH (ref 70–99)
GLUCOSE BLDC GLUCOMTR-MCNC: 194 MG/DL — HIGH (ref 70–99)
GLUCOSE BLDC GLUCOMTR-MCNC: 217 MG/DL — HIGH (ref 70–99)
GLUCOSE SERPL-MCNC: 112 MG/DL — HIGH (ref 70–99)
HCT VFR BLD CALC: 26.2 % — LOW (ref 39–50)
HGB BLD-MCNC: 7.9 G/DL — LOW (ref 13–17)
MAGNESIUM SERPL-MCNC: 2 MG/DL — SIGNIFICANT CHANGE UP (ref 1.6–2.6)
MCHC RBC-ENTMCNC: 25.6 PG — LOW (ref 27–34)
MCHC RBC-ENTMCNC: 30.2 G/DL — LOW (ref 32–36)
MCV RBC AUTO: 85.1 FL — SIGNIFICANT CHANGE UP (ref 80–100)
NRBC # BLD AUTO: 0.02 K/UL — HIGH (ref 0–0)
NRBC # BLD: 0 /100 WBCS — SIGNIFICANT CHANGE UP (ref 0–0)
NRBC # FLD: 0.02 K/UL — HIGH (ref 0–0)
NRBC BLD-RTO: 0 /100 WBCS — SIGNIFICANT CHANGE UP (ref 0–0)
PHOSPHATE SERPL-MCNC: 2.5 MG/DL — SIGNIFICANT CHANGE UP (ref 2.5–4.5)
PLATELET # BLD AUTO: 405 K/UL — HIGH (ref 150–400)
POTASSIUM SERPL-MCNC: 4.1 MMOL/L — SIGNIFICANT CHANGE UP (ref 3.5–5.3)
POTASSIUM SERPL-SCNC: 4.1 MMOL/L — SIGNIFICANT CHANGE UP (ref 3.5–5.3)
RBC # BLD: 3.08 M/UL — LOW (ref 4.2–5.8)
RBC # FLD: 16.6 % — HIGH (ref 10.3–14.5)
RH IG SCN BLD-IMP: POSITIVE — SIGNIFICANT CHANGE UP
SODIUM SERPL-SCNC: 139 MMOL/L — SIGNIFICANT CHANGE UP (ref 135–145)
WBC # BLD: 9.43 K/UL — SIGNIFICANT CHANGE UP (ref 3.8–10.5)
WBC # FLD AUTO: 9.43 K/UL — SIGNIFICANT CHANGE UP (ref 3.8–10.5)

## 2025-01-10 RX ORDER — BACITRACIN 500 UNIT/G
1 OINTMENT (GRAM) TOPICAL DAILY
Refills: 0 | Status: DISCONTINUED | OUTPATIENT
Start: 2025-01-10 | End: 2025-01-18

## 2025-01-10 RX ORDER — NIFEDIPINE 30 MG
60 TABLET, EXTENDED RELEASE 24 HR ORAL DAILY
Refills: 0 | Status: DISCONTINUED | OUTPATIENT
Start: 2025-01-11 | End: 2025-01-18

## 2025-01-10 RX ADMIN — DOXAZOSIN MESYLATE 4 MILLIGRAM(S): 8 TABLET ORAL at 22:31

## 2025-01-10 RX ADMIN — Medication 25 GRAM(S): at 05:57

## 2025-01-10 RX ADMIN — INSULIN LISPRO 1: 100 INJECTION, SOLUTION INTRAVENOUS; SUBCUTANEOUS at 17:23

## 2025-01-10 RX ADMIN — Medication 1 APPLICATION(S): at 12:41

## 2025-01-10 RX ADMIN — Medication 1 APPLICATION(S): at 06:31

## 2025-01-10 RX ADMIN — IRON SUCROSE 210 MILLIGRAM(S): 20 INJECTION, SOLUTION INTRAVENOUS at 19:37

## 2025-01-10 RX ADMIN — Medication 667 MILLIGRAM(S): at 12:41

## 2025-01-10 RX ADMIN — HEPARIN SODIUM 5000 UNIT(S): 1000 INJECTION INTRAVENOUS; SUBCUTANEOUS at 19:37

## 2025-01-10 RX ADMIN — Medication 25 MICROGRAM(S): at 05:58

## 2025-01-10 RX ADMIN — Medication 1 APPLICATION(S): at 12:36

## 2025-01-10 RX ADMIN — Medication 650 MILLIGRAM(S): at 04:01

## 2025-01-10 RX ADMIN — Medication 1 TABLET(S): at 12:39

## 2025-01-10 RX ADMIN — ATORVASTATIN CALCIUM 40 MILLIGRAM(S): 80 TABLET, FILM COATED ORAL at 22:36

## 2025-01-10 RX ADMIN — Medication 25 GRAM(S): at 19:40

## 2025-01-10 RX ADMIN — HEPARIN SODIUM 5000 UNIT(S): 1000 INJECTION INTRAVENOUS; SUBCUTANEOUS at 06:59

## 2025-01-10 RX ADMIN — CLOPIDOGREL BISULFATE 75 MILLIGRAM(S): 75 TABLET, FILM COATED ORAL at 12:41

## 2025-01-10 RX ADMIN — BUMETANIDE 2 MILLIGRAM(S): 1 TABLET ORAL at 06:26

## 2025-01-10 RX ADMIN — Medication 1000 UNIT(S): at 12:39

## 2025-01-10 RX ADMIN — Medication 81 MILLIGRAM(S): at 12:40

## 2025-01-10 RX ADMIN — Medication 667 MILLIGRAM(S): at 10:06

## 2025-01-10 RX ADMIN — Medication 90 MILLIGRAM(S): at 06:26

## 2025-01-10 RX ADMIN — Medication 650 MILLIGRAM(S): at 03:01

## 2025-01-10 RX ADMIN — Medication 667 MILLIGRAM(S): at 17:39

## 2025-01-10 RX ADMIN — INSULIN LISPRO 1: 100 INJECTION, SOLUTION INTRAVENOUS; SUBCUTANEOUS at 12:35

## 2025-01-11 LAB
ANION GAP SERPL CALC-SCNC: 18 MMOL/L — HIGH (ref 7–14)
BUN SERPL-MCNC: 36 MG/DL — HIGH (ref 7–23)
CALCIUM SERPL-MCNC: 8.4 MG/DL — SIGNIFICANT CHANGE UP (ref 8.4–10.5)
CHLORIDE SERPL-SCNC: 92 MMOL/L — LOW (ref 98–107)
CO2 SERPL-SCNC: 25 MMOL/L — SIGNIFICANT CHANGE UP (ref 22–31)
CREAT SERPL-MCNC: 6.95 MG/DL — HIGH (ref 0.5–1.3)
CULTURE RESULTS: SIGNIFICANT CHANGE UP
CULTURE RESULTS: SIGNIFICANT CHANGE UP
EGFR: 8 ML/MIN/1.73M2 — LOW
EGFR: 8 ML/MIN/1.73M2 — LOW
GLUCOSE BLDC GLUCOMTR-MCNC: 115 MG/DL — HIGH (ref 70–99)
GLUCOSE BLDC GLUCOMTR-MCNC: 118 MG/DL — HIGH (ref 70–99)
GLUCOSE BLDC GLUCOMTR-MCNC: 118 MG/DL — HIGH (ref 70–99)
GLUCOSE BLDC GLUCOMTR-MCNC: 122 MG/DL — HIGH (ref 70–99)
GLUCOSE BLDC GLUCOMTR-MCNC: 130 MG/DL — HIGH (ref 70–99)
GLUCOSE SERPL-MCNC: 114 MG/DL — HIGH (ref 70–99)
HCT VFR BLD CALC: 30.8 % — LOW (ref 39–50)
HGB BLD-MCNC: 9.5 G/DL — LOW (ref 13–17)
MAGNESIUM SERPL-MCNC: 2.1 MG/DL — SIGNIFICANT CHANGE UP (ref 1.6–2.6)
MCHC RBC-ENTMCNC: 25.7 PG — LOW (ref 27–34)
MCHC RBC-ENTMCNC: 30.8 G/DL — LOW (ref 32–36)
MCV RBC AUTO: 83.5 FL — SIGNIFICANT CHANGE UP (ref 80–100)
NRBC # BLD AUTO: 0.04 K/UL — HIGH (ref 0–0)
NRBC # BLD: 0 /100 WBCS — SIGNIFICANT CHANGE UP (ref 0–0)
NRBC # FLD: 0.04 K/UL — HIGH (ref 0–0)
NRBC BLD-RTO: 0 /100 WBCS — SIGNIFICANT CHANGE UP (ref 0–0)
PHOSPHATE SERPL-MCNC: 2.6 MG/DL — SIGNIFICANT CHANGE UP (ref 2.5–4.5)
PLATELET # BLD AUTO: 420 K/UL — HIGH (ref 150–400)
POTASSIUM SERPL-MCNC: 3.7 MMOL/L — SIGNIFICANT CHANGE UP (ref 3.5–5.3)
POTASSIUM SERPL-SCNC: 3.7 MMOL/L — SIGNIFICANT CHANGE UP (ref 3.5–5.3)
RBC # BLD: 3.69 M/UL — LOW (ref 4.2–5.8)
RBC # FLD: 16.5 % — HIGH (ref 10.3–14.5)
SODIUM SERPL-SCNC: 135 MMOL/L — SIGNIFICANT CHANGE UP (ref 135–145)
SPECIMEN SOURCE: SIGNIFICANT CHANGE UP
SPECIMEN SOURCE: SIGNIFICANT CHANGE UP
WBC # BLD: 10.29 K/UL — SIGNIFICANT CHANGE UP (ref 3.8–10.5)
WBC # FLD AUTO: 10.29 K/UL — SIGNIFICANT CHANGE UP (ref 3.8–10.5)

## 2025-01-11 RX ADMIN — ATORVASTATIN CALCIUM 40 MILLIGRAM(S): 80 TABLET, FILM COATED ORAL at 21:49

## 2025-01-11 RX ADMIN — EPOETIN ALFA 10000 UNIT(S): 10000 SOLUTION INTRAVENOUS; SUBCUTANEOUS at 07:58

## 2025-01-11 RX ADMIN — Medication 25 GRAM(S): at 18:46

## 2025-01-11 RX ADMIN — BUMETANIDE 2 MILLIGRAM(S): 1 TABLET ORAL at 14:33

## 2025-01-11 RX ADMIN — Medication 667 MILLIGRAM(S): at 17:42

## 2025-01-11 RX ADMIN — Medication 667 MILLIGRAM(S): at 12:28

## 2025-01-11 RX ADMIN — Medication 1 APPLICATION(S): at 12:28

## 2025-01-11 RX ADMIN — Medication 1 TABLET(S): at 12:28

## 2025-01-11 RX ADMIN — Medication 160 MILLIGRAM(S): at 17:43

## 2025-01-11 RX ADMIN — HEPARIN SODIUM 5000 UNIT(S): 1000 INJECTION INTRAVENOUS; SUBCUTANEOUS at 17:42

## 2025-01-11 RX ADMIN — Medication 81 MILLIGRAM(S): at 12:28

## 2025-01-11 RX ADMIN — HEPARIN SODIUM 5000 UNIT(S): 1000 INJECTION INTRAVENOUS; SUBCUTANEOUS at 05:47

## 2025-01-11 RX ADMIN — IRON SUCROSE 210 MILLIGRAM(S): 20 INJECTION, SOLUTION INTRAVENOUS at 07:56

## 2025-01-11 RX ADMIN — CLOPIDOGREL BISULFATE 75 MILLIGRAM(S): 75 TABLET, FILM COATED ORAL at 12:28

## 2025-01-11 RX ADMIN — DOXAZOSIN MESYLATE 4 MILLIGRAM(S): 8 TABLET ORAL at 21:50

## 2025-01-11 RX ADMIN — LABETALOL HYDROCHLORIDE 200 MILLIGRAM(S): 200 TABLET, FILM COATED ORAL at 17:42

## 2025-01-11 RX ADMIN — Medication 1 APPLICATION(S): at 12:29

## 2025-01-11 RX ADMIN — Medication 25 GRAM(S): at 05:46

## 2025-01-11 RX ADMIN — ISOSORBIDE MONONITRATE 60 MILLIGRAM(S): 60 TABLET, EXTENDED RELEASE ORAL at 11:09

## 2025-01-11 RX ADMIN — Medication 25 MICROGRAM(S): at 05:48

## 2025-01-11 RX ADMIN — Medication 1000 UNIT(S): at 12:29

## 2025-01-12 LAB
ANION GAP SERPL CALC-SCNC: 16 MMOL/L — HIGH (ref 7–14)
BUN SERPL-MCNC: 26 MG/DL — HIGH (ref 7–23)
CALCIUM SERPL-MCNC: 8.5 MG/DL — SIGNIFICANT CHANGE UP (ref 8.4–10.5)
CHLORIDE SERPL-SCNC: 96 MMOL/L — LOW (ref 98–107)
CO2 SERPL-SCNC: 26 MMOL/L — SIGNIFICANT CHANGE UP (ref 22–31)
CREAT SERPL-MCNC: 5.29 MG/DL — HIGH (ref 0.5–1.3)
EGFR: 11 ML/MIN/1.73M2 — LOW
EGFR: 11 ML/MIN/1.73M2 — LOW
GLUCOSE BLDC GLUCOMTR-MCNC: 105 MG/DL — HIGH (ref 70–99)
GLUCOSE BLDC GLUCOMTR-MCNC: 110 MG/DL — HIGH (ref 70–99)
GLUCOSE BLDC GLUCOMTR-MCNC: 141 MG/DL — HIGH (ref 70–99)
GLUCOSE BLDC GLUCOMTR-MCNC: 181 MG/DL — HIGH (ref 70–99)
GLUCOSE SERPL-MCNC: 94 MG/DL — SIGNIFICANT CHANGE UP (ref 70–99)
HCT VFR BLD CALC: 31.2 % — LOW (ref 39–50)
HGB BLD-MCNC: 9.5 G/DL — LOW (ref 13–17)
MAGNESIUM SERPL-MCNC: 2.1 MG/DL — SIGNIFICANT CHANGE UP (ref 1.6–2.6)
MCHC RBC-ENTMCNC: 25.8 PG — LOW (ref 27–34)
MCHC RBC-ENTMCNC: 30.4 G/DL — LOW (ref 32–36)
MCV RBC AUTO: 84.8 FL — SIGNIFICANT CHANGE UP (ref 80–100)
NRBC # BLD AUTO: 0.17 K/UL — HIGH (ref 0–0)
NRBC # BLD: 2 /100 WBCS — HIGH (ref 0–0)
NRBC # FLD: 0.17 K/UL — HIGH (ref 0–0)
NRBC BLD-RTO: 2 /100 WBCS — HIGH (ref 0–0)
PHOSPHATE SERPL-MCNC: 2.2 MG/DL — LOW (ref 2.5–4.5)
PLATELET # BLD AUTO: 458 K/UL — HIGH (ref 150–400)
POTASSIUM SERPL-MCNC: 4.1 MMOL/L — SIGNIFICANT CHANGE UP (ref 3.5–5.3)
POTASSIUM SERPL-SCNC: 4.1 MMOL/L — SIGNIFICANT CHANGE UP (ref 3.5–5.3)
RBC # BLD: 3.68 M/UL — LOW (ref 4.2–5.8)
RBC # FLD: 16.7 % — HIGH (ref 10.3–14.5)
SODIUM SERPL-SCNC: 138 MMOL/L — SIGNIFICANT CHANGE UP (ref 135–145)
WBC # BLD: 11.66 K/UL — HIGH (ref 3.8–10.5)
WBC # FLD AUTO: 11.66 K/UL — HIGH (ref 3.8–10.5)

## 2025-01-12 RX ADMIN — Medication 1 APPLICATION(S): at 12:14

## 2025-01-12 RX ADMIN — Medication 1000 UNIT(S): at 12:13

## 2025-01-12 RX ADMIN — Medication 667 MILLIGRAM(S): at 07:55

## 2025-01-12 RX ADMIN — Medication 667 MILLIGRAM(S): at 12:10

## 2025-01-12 RX ADMIN — ATORVASTATIN CALCIUM 40 MILLIGRAM(S): 80 TABLET, FILM COATED ORAL at 21:48

## 2025-01-12 RX ADMIN — BUMETANIDE 2 MILLIGRAM(S): 1 TABLET ORAL at 06:08

## 2025-01-12 RX ADMIN — Medication 25 MICROGRAM(S): at 05:02

## 2025-01-12 RX ADMIN — Medication 25 GRAM(S): at 18:45

## 2025-01-12 RX ADMIN — BUMETANIDE 2 MILLIGRAM(S): 1 TABLET ORAL at 14:33

## 2025-01-12 RX ADMIN — DOXAZOSIN MESYLATE 4 MILLIGRAM(S): 8 TABLET ORAL at 21:49

## 2025-01-12 RX ADMIN — ISOSORBIDE MONONITRATE 60 MILLIGRAM(S): 60 TABLET, EXTENDED RELEASE ORAL at 07:55

## 2025-01-12 RX ADMIN — LABETALOL HYDROCHLORIDE 200 MILLIGRAM(S): 200 TABLET, FILM COATED ORAL at 17:09

## 2025-01-12 RX ADMIN — Medication 25 GRAM(S): at 06:39

## 2025-01-12 RX ADMIN — INSULIN LISPRO 1: 100 INJECTION, SOLUTION INTRAVENOUS; SUBCUTANEOUS at 17:08

## 2025-01-12 RX ADMIN — CLOPIDOGREL BISULFATE 75 MILLIGRAM(S): 75 TABLET, FILM COATED ORAL at 12:12

## 2025-01-12 RX ADMIN — Medication 60 MILLIGRAM(S): at 06:07

## 2025-01-12 RX ADMIN — Medication 667 MILLIGRAM(S): at 17:09

## 2025-01-12 RX ADMIN — Medication 160 MILLIGRAM(S): at 06:08

## 2025-01-12 RX ADMIN — Medication 160 MILLIGRAM(S): at 17:09

## 2025-01-12 RX ADMIN — Medication 81 MILLIGRAM(S): at 12:12

## 2025-01-12 RX ADMIN — Medication 1 TABLET(S): at 12:11

## 2025-01-12 RX ADMIN — HEPARIN SODIUM 5000 UNIT(S): 1000 INJECTION INTRAVENOUS; SUBCUTANEOUS at 17:08

## 2025-01-12 RX ADMIN — HEPARIN SODIUM 5000 UNIT(S): 1000 INJECTION INTRAVENOUS; SUBCUTANEOUS at 05:02

## 2025-01-12 RX ADMIN — IRON SUCROSE 210 MILLIGRAM(S): 20 INJECTION, SOLUTION INTRAVENOUS at 17:40

## 2025-01-12 RX ADMIN — LABETALOL HYDROCHLORIDE 200 MILLIGRAM(S): 200 TABLET, FILM COATED ORAL at 06:07

## 2025-01-12 RX ADMIN — Medication 1 APPLICATION(S): at 12:32

## 2025-01-13 LAB
ANION GAP SERPL CALC-SCNC: 18 MMOL/L — HIGH (ref 7–14)
BUN SERPL-MCNC: 35 MG/DL — HIGH (ref 7–23)
CALCIUM SERPL-MCNC: 8.5 MG/DL — SIGNIFICANT CHANGE UP (ref 8.4–10.5)
CHLORIDE SERPL-SCNC: 97 MMOL/L — LOW (ref 98–107)
CO2 SERPL-SCNC: 22 MMOL/L — SIGNIFICANT CHANGE UP (ref 22–31)
CREAT SERPL-MCNC: 7.2 MG/DL — HIGH (ref 0.5–1.3)
EGFR: 8 ML/MIN/1.73M2 — LOW
EGFR: 8 ML/MIN/1.73M2 — LOW
GLUCOSE BLDC GLUCOMTR-MCNC: 115 MG/DL — HIGH (ref 70–99)
GLUCOSE BLDC GLUCOMTR-MCNC: 117 MG/DL — HIGH (ref 70–99)
GLUCOSE BLDC GLUCOMTR-MCNC: 130 MG/DL — HIGH (ref 70–99)
GLUCOSE BLDC GLUCOMTR-MCNC: 145 MG/DL — HIGH (ref 70–99)
GLUCOSE SERPL-MCNC: 106 MG/DL — HIGH (ref 70–99)
HCT VFR BLD CALC: 27.6 % — LOW (ref 39–50)
HGB BLD-MCNC: 8.7 G/DL — LOW (ref 13–17)
MAGNESIUM SERPL-MCNC: 2.2 MG/DL — SIGNIFICANT CHANGE UP (ref 1.6–2.6)
MCHC RBC-ENTMCNC: 26.1 PG — LOW (ref 27–34)
MCHC RBC-ENTMCNC: 31.5 G/DL — LOW (ref 32–36)
MCV RBC AUTO: 82.9 FL — SIGNIFICANT CHANGE UP (ref 80–100)
NRBC # BLD AUTO: 0.05 K/UL — HIGH (ref 0–0)
NRBC # BLD: 0 /100 WBCS — SIGNIFICANT CHANGE UP (ref 0–0)
NRBC # FLD: 0.05 K/UL — HIGH (ref 0–0)
NRBC BLD-RTO: 0 /100 WBCS — SIGNIFICANT CHANGE UP (ref 0–0)
PHOSPHATE SERPL-MCNC: 2.5 MG/DL — SIGNIFICANT CHANGE UP (ref 2.5–4.5)
PLATELET # BLD AUTO: 430 K/UL — HIGH (ref 150–400)
POTASSIUM SERPL-MCNC: 3.9 MMOL/L — SIGNIFICANT CHANGE UP (ref 3.5–5.3)
POTASSIUM SERPL-SCNC: 3.9 MMOL/L — SIGNIFICANT CHANGE UP (ref 3.5–5.3)
RBC # BLD: 3.33 M/UL — LOW (ref 4.2–5.8)
RBC # FLD: 16.8 % — HIGH (ref 10.3–14.5)
SODIUM SERPL-SCNC: 137 MMOL/L — SIGNIFICANT CHANGE UP (ref 135–145)
WBC # BLD: 12.58 K/UL — HIGH (ref 3.8–10.5)
WBC # FLD AUTO: 12.58 K/UL — HIGH (ref 3.8–10.5)

## 2025-01-13 RX ADMIN — Medication 1 TABLET(S): at 12:37

## 2025-01-13 RX ADMIN — BUMETANIDE 2 MILLIGRAM(S): 1 TABLET ORAL at 14:22

## 2025-01-13 RX ADMIN — Medication 60 MILLIGRAM(S): at 06:58

## 2025-01-13 RX ADMIN — Medication 667 MILLIGRAM(S): at 12:36

## 2025-01-13 RX ADMIN — Medication 25 MICROGRAM(S): at 05:46

## 2025-01-13 RX ADMIN — CLOPIDOGREL BISULFATE 75 MILLIGRAM(S): 75 TABLET, FILM COATED ORAL at 12:38

## 2025-01-13 RX ADMIN — HEPARIN SODIUM 5000 UNIT(S): 1000 INJECTION INTRAVENOUS; SUBCUTANEOUS at 17:56

## 2025-01-13 RX ADMIN — ATORVASTATIN CALCIUM 40 MILLIGRAM(S): 80 TABLET, FILM COATED ORAL at 22:20

## 2025-01-13 RX ADMIN — Medication 25 GRAM(S): at 07:11

## 2025-01-13 RX ADMIN — Medication 667 MILLIGRAM(S): at 08:32

## 2025-01-13 RX ADMIN — LABETALOL HYDROCHLORIDE 200 MILLIGRAM(S): 200 TABLET, FILM COATED ORAL at 17:55

## 2025-01-13 RX ADMIN — IRON SUCROSE 210 MILLIGRAM(S): 20 INJECTION, SOLUTION INTRAVENOUS at 18:03

## 2025-01-13 RX ADMIN — Medication 25 GRAM(S): at 18:46

## 2025-01-13 RX ADMIN — Medication 667 MILLIGRAM(S): at 17:55

## 2025-01-13 RX ADMIN — HEPARIN SODIUM 5000 UNIT(S): 1000 INJECTION INTRAVENOUS; SUBCUTANEOUS at 06:58

## 2025-01-13 RX ADMIN — Medication 81 MILLIGRAM(S): at 12:36

## 2025-01-13 RX ADMIN — BUMETANIDE 2 MILLIGRAM(S): 1 TABLET ORAL at 06:56

## 2025-01-13 RX ADMIN — DOXAZOSIN MESYLATE 4 MILLIGRAM(S): 8 TABLET ORAL at 22:20

## 2025-01-13 RX ADMIN — LABETALOL HYDROCHLORIDE 200 MILLIGRAM(S): 200 TABLET, FILM COATED ORAL at 06:56

## 2025-01-13 RX ADMIN — Medication 160 MILLIGRAM(S): at 06:55

## 2025-01-13 RX ADMIN — Medication 160 MILLIGRAM(S): at 17:55

## 2025-01-13 RX ADMIN — Medication 1000 UNIT(S): at 12:38

## 2025-01-13 RX ADMIN — ISOSORBIDE MONONITRATE 60 MILLIGRAM(S): 60 TABLET, EXTENDED RELEASE ORAL at 07:09

## 2025-01-13 RX ADMIN — Medication 1 APPLICATION(S): at 12:35

## 2025-01-14 LAB
ANION GAP SERPL CALC-SCNC: 17 MMOL/L — HIGH (ref 7–14)
BUN SERPL-MCNC: 48 MG/DL — HIGH (ref 7–23)
CALCIUM SERPL-MCNC: 8.6 MG/DL — SIGNIFICANT CHANGE UP (ref 8.4–10.5)
CHLORIDE SERPL-SCNC: 94 MMOL/L — LOW (ref 98–107)
CO2 SERPL-SCNC: 23 MMOL/L — SIGNIFICANT CHANGE UP (ref 22–31)
CREAT SERPL-MCNC: 9.66 MG/DL — HIGH (ref 0.5–1.3)
CULTURE RESULTS: SIGNIFICANT CHANGE UP
CULTURE RESULTS: SIGNIFICANT CHANGE UP
EGFR: 5 ML/MIN/1.73M2 — LOW
EGFR: 5 ML/MIN/1.73M2 — LOW
GLUCOSE BLDC GLUCOMTR-MCNC: 119 MG/DL — HIGH (ref 70–99)
GLUCOSE BLDC GLUCOMTR-MCNC: 156 MG/DL — HIGH (ref 70–99)
GLUCOSE BLDC GLUCOMTR-MCNC: 183 MG/DL — HIGH (ref 70–99)
GLUCOSE BLDC GLUCOMTR-MCNC: 239 MG/DL — HIGH (ref 70–99)
GLUCOSE BLDC GLUCOMTR-MCNC: 96 MG/DL — SIGNIFICANT CHANGE UP (ref 70–99)
GLUCOSE SERPL-MCNC: 201 MG/DL — HIGH (ref 70–99)
HCT VFR BLD CALC: 26.1 % — LOW (ref 39–50)
HGB BLD-MCNC: 8.4 G/DL — LOW (ref 13–17)
MAGNESIUM SERPL-MCNC: 2.4 MG/DL — SIGNIFICANT CHANGE UP (ref 1.6–2.6)
MCHC RBC-ENTMCNC: 26.5 PG — LOW (ref 27–34)
MCHC RBC-ENTMCNC: 32.2 G/DL — SIGNIFICANT CHANGE UP (ref 32–36)
MCV RBC AUTO: 82.3 FL — SIGNIFICANT CHANGE UP (ref 80–100)
NRBC # BLD AUTO: 0 K/UL — SIGNIFICANT CHANGE UP (ref 0–0)
NRBC # BLD: 0 /100 WBCS — SIGNIFICANT CHANGE UP (ref 0–0)
NRBC # FLD: 0 K/UL — SIGNIFICANT CHANGE UP (ref 0–0)
NRBC BLD-RTO: 0 /100 WBCS — SIGNIFICANT CHANGE UP (ref 0–0)
PHOSPHATE SERPL-MCNC: 3 MG/DL — SIGNIFICANT CHANGE UP (ref 2.5–4.5)
PLATELET # BLD AUTO: 444 K/UL — HIGH (ref 150–400)
POTASSIUM SERPL-MCNC: 4.2 MMOL/L — SIGNIFICANT CHANGE UP (ref 3.5–5.3)
POTASSIUM SERPL-SCNC: 4.2 MMOL/L — SIGNIFICANT CHANGE UP (ref 3.5–5.3)
RBC # BLD: 3.17 M/UL — LOW (ref 4.2–5.8)
RBC # FLD: 17.7 % — HIGH (ref 10.3–14.5)
SODIUM SERPL-SCNC: 134 MMOL/L — LOW (ref 135–145)
SPECIMEN SOURCE: SIGNIFICANT CHANGE UP
SPECIMEN SOURCE: SIGNIFICANT CHANGE UP
SURGICAL PATHOLOGY STUDY: SIGNIFICANT CHANGE UP
WBC # BLD: 12.2 K/UL — HIGH (ref 3.8–10.5)
WBC # FLD AUTO: 12.2 K/UL — HIGH (ref 3.8–10.5)

## 2025-01-14 RX ADMIN — Medication 160 MILLIGRAM(S): at 18:16

## 2025-01-14 RX ADMIN — LABETALOL HYDROCHLORIDE 200 MILLIGRAM(S): 200 TABLET, FILM COATED ORAL at 06:03

## 2025-01-14 RX ADMIN — ATORVASTATIN CALCIUM 40 MILLIGRAM(S): 80 TABLET, FILM COATED ORAL at 22:19

## 2025-01-14 RX ADMIN — DOXAZOSIN MESYLATE 4 MILLIGRAM(S): 8 TABLET ORAL at 22:19

## 2025-01-14 RX ADMIN — BUMETANIDE 2 MILLIGRAM(S): 1 TABLET ORAL at 14:44

## 2025-01-14 RX ADMIN — Medication 1000 UNIT(S): at 14:43

## 2025-01-14 RX ADMIN — HEPARIN SODIUM 5000 UNIT(S): 1000 INJECTION INTRAVENOUS; SUBCUTANEOUS at 18:17

## 2025-01-14 RX ADMIN — INSULIN LISPRO 2: 100 INJECTION, SOLUTION INTRAVENOUS; SUBCUTANEOUS at 16:59

## 2025-01-14 RX ADMIN — Medication 1 TABLET(S): at 14:42

## 2025-01-14 RX ADMIN — BUMETANIDE 2 MILLIGRAM(S): 1 TABLET ORAL at 06:01

## 2025-01-14 RX ADMIN — Medication 160 MILLIGRAM(S): at 06:02

## 2025-01-14 RX ADMIN — HEPARIN SODIUM 5000 UNIT(S): 1000 INJECTION INTRAVENOUS; SUBCUTANEOUS at 06:02

## 2025-01-14 RX ADMIN — Medication 60 MILLIGRAM(S): at 06:03

## 2025-01-14 RX ADMIN — Medication 667 MILLIGRAM(S): at 18:16

## 2025-01-14 RX ADMIN — Medication 81 MILLIGRAM(S): at 14:43

## 2025-01-14 RX ADMIN — Medication 25 MICROGRAM(S): at 05:01

## 2025-01-14 RX ADMIN — CLOPIDOGREL BISULFATE 75 MILLIGRAM(S): 75 TABLET, FILM COATED ORAL at 14:43

## 2025-01-14 RX ADMIN — Medication 667 MILLIGRAM(S): at 14:43

## 2025-01-14 RX ADMIN — Medication 1 APPLICATION(S): at 14:42

## 2025-01-14 RX ADMIN — EPOETIN ALFA 10000 UNIT(S): 10000 SOLUTION INTRAVENOUS; SUBCUTANEOUS at 12:55

## 2025-01-14 RX ADMIN — LABETALOL HYDROCHLORIDE 200 MILLIGRAM(S): 200 TABLET, FILM COATED ORAL at 18:16

## 2025-01-14 RX ADMIN — Medication 1 APPLICATION(S): at 06:41

## 2025-01-15 LAB
ANION GAP SERPL CALC-SCNC: 15 MMOL/L — HIGH (ref 7–14)
BLD GP AB SCN SERPL QL: NEGATIVE — SIGNIFICANT CHANGE UP
BUN SERPL-MCNC: 27 MG/DL — HIGH (ref 7–23)
CALCIUM SERPL-MCNC: 9 MG/DL — SIGNIFICANT CHANGE UP (ref 8.4–10.5)
CHLORIDE SERPL-SCNC: 100 MMOL/L — SIGNIFICANT CHANGE UP (ref 98–107)
CO2 SERPL-SCNC: 25 MMOL/L — SIGNIFICANT CHANGE UP (ref 22–31)
CREAT SERPL-MCNC: 5.98 MG/DL — HIGH (ref 0.5–1.3)
EGFR: 9 ML/MIN/1.73M2 — LOW
EGFR: 9 ML/MIN/1.73M2 — LOW
GLUCOSE BLDC GLUCOMTR-MCNC: 111 MG/DL — HIGH (ref 70–99)
GLUCOSE BLDC GLUCOMTR-MCNC: 128 MG/DL — HIGH (ref 70–99)
GLUCOSE BLDC GLUCOMTR-MCNC: 164 MG/DL — HIGH (ref 70–99)
GLUCOSE BLDC GLUCOMTR-MCNC: 171 MG/DL — HIGH (ref 70–99)
GLUCOSE BLDC GLUCOMTR-MCNC: 341 MG/DL — HIGH (ref 70–99)
GLUCOSE BLDC GLUCOMTR-MCNC: 95 MG/DL — SIGNIFICANT CHANGE UP (ref 70–99)
GLUCOSE SERPL-MCNC: 105 MG/DL — HIGH (ref 70–99)
HCT VFR BLD CALC: 30.3 % — LOW (ref 39–50)
HGB BLD-MCNC: 9.5 G/DL — LOW (ref 13–17)
INR BLD: 1.01 RATIO — SIGNIFICANT CHANGE UP (ref 0.85–1.16)
MAGNESIUM SERPL-MCNC: 2.4 MG/DL — SIGNIFICANT CHANGE UP (ref 1.6–2.6)
MCHC RBC-ENTMCNC: 26.5 PG — LOW (ref 27–34)
MCHC RBC-ENTMCNC: 31.4 G/DL — LOW (ref 32–36)
MCV RBC AUTO: 84.6 FL — SIGNIFICANT CHANGE UP (ref 80–100)
NRBC # BLD AUTO: 0.02 K/UL — HIGH (ref 0–0)
NRBC # BLD: 0 /100 WBCS — SIGNIFICANT CHANGE UP (ref 0–0)
NRBC # FLD: 0.02 K/UL — HIGH (ref 0–0)
NRBC BLD-RTO: 0 /100 WBCS — SIGNIFICANT CHANGE UP (ref 0–0)
PHOSPHATE SERPL-MCNC: 3.2 MG/DL — SIGNIFICANT CHANGE UP (ref 2.5–4.5)
PLATELET # BLD AUTO: 449 K/UL — HIGH (ref 150–400)
POTASSIUM SERPL-MCNC: 4.1 MMOL/L — SIGNIFICANT CHANGE UP (ref 3.5–5.3)
POTASSIUM SERPL-SCNC: 4.1 MMOL/L — SIGNIFICANT CHANGE UP (ref 3.5–5.3)
PROTHROM AB SERPL-ACNC: 11.7 SEC — SIGNIFICANT CHANGE UP (ref 9.9–13.4)
RBC # BLD: 3.58 M/UL — LOW (ref 4.2–5.8)
RBC # FLD: 18.3 % — HIGH (ref 10.3–14.5)
RH IG SCN BLD-IMP: POSITIVE — SIGNIFICANT CHANGE UP
SODIUM SERPL-SCNC: 140 MMOL/L — SIGNIFICANT CHANGE UP (ref 135–145)
WBC # BLD: 11.32 K/UL — HIGH (ref 3.8–10.5)
WBC # FLD AUTO: 11.32 K/UL — HIGH (ref 3.8–10.5)

## 2025-01-15 PROCEDURE — 36558 INSERT TUNNELED CV CATH: CPT | Mod: RT

## 2025-01-15 PROCEDURE — 77001 FLUOROGUIDE FOR VEIN DEVICE: CPT | Mod: 26,GC

## 2025-01-15 RX ORDER — FENTANYL CITRATE-0.9 % NACL/PF 100MCG/2ML
25 SYRINGE (ML) INTRAVENOUS
Refills: 0 | Status: DISCONTINUED | OUTPATIENT
Start: 2025-01-15 | End: 2025-01-18

## 2025-01-15 RX ORDER — EPOETIN ALFA 10000 [IU]/ML
10000 SOLUTION INTRAVENOUS; SUBCUTANEOUS
Refills: 0 | Status: DISCONTINUED | OUTPATIENT
Start: 2025-01-15 | End: 2025-01-18

## 2025-01-15 RX ADMIN — HEPARIN SODIUM 5000 UNIT(S): 1000 INJECTION INTRAVENOUS; SUBCUTANEOUS at 18:38

## 2025-01-15 RX ADMIN — Medication 25 MICROGRAM(S): at 05:40

## 2025-01-15 RX ADMIN — Medication 160 MILLIGRAM(S): at 05:40

## 2025-01-15 RX ADMIN — Medication 160 MILLIGRAM(S): at 18:38

## 2025-01-15 RX ADMIN — Medication 81 MILLIGRAM(S): at 12:47

## 2025-01-15 RX ADMIN — INSULIN LISPRO 2: 100 INJECTION, SOLUTION INTRAVENOUS; SUBCUTANEOUS at 22:01

## 2025-01-15 RX ADMIN — ATORVASTATIN CALCIUM 40 MILLIGRAM(S): 80 TABLET, FILM COATED ORAL at 21:09

## 2025-01-15 RX ADMIN — Medication 1 TABLET(S): at 12:47

## 2025-01-15 RX ADMIN — HEPARIN SODIUM 5000 UNIT(S): 1000 INJECTION INTRAVENOUS; SUBCUTANEOUS at 05:40

## 2025-01-15 RX ADMIN — Medication 1 APPLICATION(S): at 06:00

## 2025-01-15 RX ADMIN — DOXAZOSIN MESYLATE 4 MILLIGRAM(S): 8 TABLET ORAL at 21:09

## 2025-01-15 RX ADMIN — LABETALOL HYDROCHLORIDE 200 MILLIGRAM(S): 200 TABLET, FILM COATED ORAL at 18:38

## 2025-01-15 RX ADMIN — Medication 1000 UNIT(S): at 12:47

## 2025-01-15 RX ADMIN — Medication 60 MILLIGRAM(S): at 05:40

## 2025-01-15 RX ADMIN — CLOPIDOGREL BISULFATE 75 MILLIGRAM(S): 75 TABLET, FILM COATED ORAL at 12:47

## 2025-01-15 RX ADMIN — INSULIN LISPRO 1: 100 INJECTION, SOLUTION INTRAVENOUS; SUBCUTANEOUS at 12:13

## 2025-01-15 RX ADMIN — BUMETANIDE 2 MILLIGRAM(S): 1 TABLET ORAL at 05:39

## 2025-01-15 RX ADMIN — LABETALOL HYDROCHLORIDE 200 MILLIGRAM(S): 200 TABLET, FILM COATED ORAL at 05:40

## 2025-01-15 RX ADMIN — BUMETANIDE 2 MILLIGRAM(S): 1 TABLET ORAL at 14:14

## 2025-01-16 LAB
ANION GAP SERPL CALC-SCNC: 12 MMOL/L — SIGNIFICANT CHANGE UP (ref 7–14)
ANISOCYTOSIS BLD QL: SLIGHT — SIGNIFICANT CHANGE UP
BASOPHILS # BLD AUTO: 0.11 K/UL — SIGNIFICANT CHANGE UP (ref 0–0.2)
BASOPHILS NFR BLD AUTO: 0.9 % — SIGNIFICANT CHANGE UP (ref 0–2)
BUN SERPL-MCNC: 26 MG/DL — HIGH (ref 7–23)
CALCIUM SERPL-MCNC: 8.6 MG/DL — SIGNIFICANT CHANGE UP (ref 8.4–10.5)
CHLORIDE SERPL-SCNC: 97 MMOL/L — LOW (ref 98–107)
CO2 SERPL-SCNC: 27 MMOL/L — SIGNIFICANT CHANGE UP (ref 22–31)
CREAT SERPL-MCNC: 5.24 MG/DL — HIGH (ref 0.5–1.3)
DACRYOCYTES BLD QL SMEAR: SLIGHT — SIGNIFICANT CHANGE UP
EGFR: 11 ML/MIN/1.73M2 — LOW
EGFR: 11 ML/MIN/1.73M2 — LOW
EOSINOPHIL # BLD AUTO: 0.52 K/UL — HIGH (ref 0–0.5)
EOSINOPHIL NFR BLD AUTO: 4.3 % — SIGNIFICANT CHANGE UP (ref 0–6)
GIANT PLATELETS BLD QL SMEAR: PRESENT — SIGNIFICANT CHANGE UP
GLUCOSE BLDC GLUCOMTR-MCNC: 105 MG/DL — HIGH (ref 70–99)
GLUCOSE BLDC GLUCOMTR-MCNC: 109 MG/DL — HIGH (ref 70–99)
GLUCOSE BLDC GLUCOMTR-MCNC: 269 MG/DL — HIGH (ref 70–99)
GLUCOSE BLDC GLUCOMTR-MCNC: 329 MG/DL — HIGH (ref 70–99)
GLUCOSE SERPL-MCNC: 97 MG/DL — SIGNIFICANT CHANGE UP (ref 70–99)
HCT VFR BLD CALC: 32 % — LOW (ref 39–50)
HGB BLD-MCNC: 10 G/DL — LOW (ref 13–17)
IANC: 7.27 K/UL — SIGNIFICANT CHANGE UP (ref 1.8–7.4)
LYMPHOCYTES # BLD AUTO: 0.63 K/UL — LOW (ref 1–3.3)
LYMPHOCYTES # BLD AUTO: 5.2 % — LOW (ref 13–44)
MACROCYTES BLD QL: SLIGHT — SIGNIFICANT CHANGE UP
MAGNESIUM SERPL-MCNC: 2.2 MG/DL — SIGNIFICANT CHANGE UP (ref 1.6–2.6)
MANUAL SMEAR VERIFICATION: SIGNIFICANT CHANGE UP
MCHC RBC-ENTMCNC: 26.5 PG — LOW (ref 27–34)
MCHC RBC-ENTMCNC: 31.3 G/DL — LOW (ref 32–36)
MCV RBC AUTO: 84.9 FL — SIGNIFICANT CHANGE UP (ref 80–100)
MONOCYTES # BLD AUTO: 1.15 K/UL — HIGH (ref 0–0.9)
MONOCYTES NFR BLD AUTO: 9.6 % — SIGNIFICANT CHANGE UP (ref 2–14)
MYELOCYTES NFR BLD: 1.7 % — HIGH (ref 0–0)
NEUTROPHILS # BLD AUTO: 8.99 K/UL — HIGH (ref 1.8–7.4)
NEUTROPHILS NFR BLD AUTO: 73.9 % — SIGNIFICANT CHANGE UP (ref 43–77)
NEUTS BAND # BLD: 0.9 % — SIGNIFICANT CHANGE UP (ref 0–6)
NEUTS BAND NFR BLD: 0.9 % — SIGNIFICANT CHANGE UP (ref 0–6)
OVALOCYTES BLD QL SMEAR: SLIGHT — SIGNIFICANT CHANGE UP
PHOSPHATE SERPL-MCNC: 3.1 MG/DL — SIGNIFICANT CHANGE UP (ref 2.5–4.5)
PLAT MORPH BLD: NORMAL — SIGNIFICANT CHANGE UP
PLATELET # BLD AUTO: 436 K/UL — HIGH (ref 150–400)
PLATELET COUNT - ESTIMATE: NORMAL — SIGNIFICANT CHANGE UP
POIKILOCYTOSIS BLD QL AUTO: SLIGHT — SIGNIFICANT CHANGE UP
POLYCHROMASIA BLD QL SMEAR: SIGNIFICANT CHANGE UP
POTASSIUM SERPL-MCNC: 3.6 MMOL/L — SIGNIFICANT CHANGE UP (ref 3.5–5.3)
POTASSIUM SERPL-SCNC: 3.6 MMOL/L — SIGNIFICANT CHANGE UP (ref 3.5–5.3)
RBC # BLD: 3.77 M/UL — LOW (ref 4.2–5.8)
RBC # FLD: 18.9 % — HIGH (ref 10.3–14.5)
RBC BLD AUTO: ABNORMAL
SODIUM SERPL-SCNC: 136 MMOL/L — SIGNIFICANT CHANGE UP (ref 135–145)
VARIANT LYMPHS # BLD: 3.5 % — SIGNIFICANT CHANGE UP (ref 0–6)
VARIANT LYMPHS NFR BLD MANUAL: 3.5 % — SIGNIFICANT CHANGE UP (ref 0–6)
WBC # BLD: 12.02 K/UL — HIGH (ref 3.8–10.5)
WBC # FLD AUTO: 12.02 K/UL — HIGH (ref 3.8–10.5)

## 2025-01-16 RX ADMIN — BUMETANIDE 2 MILLIGRAM(S): 1 TABLET ORAL at 13:51

## 2025-01-16 RX ADMIN — HEPARIN SODIUM 5000 UNIT(S): 1000 INJECTION INTRAVENOUS; SUBCUTANEOUS at 05:40

## 2025-01-16 RX ADMIN — LABETALOL HYDROCHLORIDE 200 MILLIGRAM(S): 200 TABLET, FILM COATED ORAL at 18:21

## 2025-01-16 RX ADMIN — BUMETANIDE 2 MILLIGRAM(S): 1 TABLET ORAL at 05:37

## 2025-01-16 RX ADMIN — Medication 60 MILLIGRAM(S): at 05:37

## 2025-01-16 RX ADMIN — Medication 1000 UNIT(S): at 12:03

## 2025-01-16 RX ADMIN — ISOSORBIDE MONONITRATE 60 MILLIGRAM(S): 60 TABLET, EXTENDED RELEASE ORAL at 08:48

## 2025-01-16 RX ADMIN — Medication 1 TABLET(S): at 12:01

## 2025-01-16 RX ADMIN — DOXAZOSIN MESYLATE 4 MILLIGRAM(S): 8 TABLET ORAL at 21:40

## 2025-01-16 RX ADMIN — CLOPIDOGREL BISULFATE 75 MILLIGRAM(S): 75 TABLET, FILM COATED ORAL at 12:02

## 2025-01-16 RX ADMIN — Medication 1 APPLICATION(S): at 12:12

## 2025-01-16 RX ADMIN — Medication 81 MILLIGRAM(S): at 12:00

## 2025-01-16 RX ADMIN — Medication 667 MILLIGRAM(S): at 08:49

## 2025-01-16 RX ADMIN — HEPARIN SODIUM 5000 UNIT(S): 1000 INJECTION INTRAVENOUS; SUBCUTANEOUS at 18:22

## 2025-01-16 RX ADMIN — INSULIN LISPRO 3: 100 INJECTION, SOLUTION INTRAVENOUS; SUBCUTANEOUS at 16:20

## 2025-01-16 RX ADMIN — Medication 1 APPLICATION(S): at 12:02

## 2025-01-16 RX ADMIN — Medication 650 MILLIGRAM(S): at 10:15

## 2025-01-16 RX ADMIN — Medication 160 MILLIGRAM(S): at 18:21

## 2025-01-16 RX ADMIN — Medication 667 MILLIGRAM(S): at 18:22

## 2025-01-16 RX ADMIN — Medication 650 MILLIGRAM(S): at 00:43

## 2025-01-16 RX ADMIN — INSULIN LISPRO 4: 100 INJECTION, SOLUTION INTRAVENOUS; SUBCUTANEOUS at 11:34

## 2025-01-16 RX ADMIN — LABETALOL HYDROCHLORIDE 200 MILLIGRAM(S): 200 TABLET, FILM COATED ORAL at 05:37

## 2025-01-16 RX ADMIN — Medication 667 MILLIGRAM(S): at 12:01

## 2025-01-16 RX ADMIN — Medication 25 MICROGRAM(S): at 05:37

## 2025-01-16 RX ADMIN — Medication 650 MILLIGRAM(S): at 09:45

## 2025-01-16 RX ADMIN — Medication 650 MILLIGRAM(S): at 01:43

## 2025-01-16 RX ADMIN — Medication 160 MILLIGRAM(S): at 05:38

## 2025-01-16 RX ADMIN — ATORVASTATIN CALCIUM 40 MILLIGRAM(S): 80 TABLET, FILM COATED ORAL at 21:40

## 2025-01-17 ENCOUNTER — TRANSCRIPTION ENCOUNTER (OUTPATIENT)
Age: 70
End: 2025-01-17

## 2025-01-17 LAB
ANION GAP SERPL CALC-SCNC: 18 MMOL/L — HIGH (ref 7–14)
BASOPHILS # BLD AUTO: 0.07 K/UL — SIGNIFICANT CHANGE UP (ref 0–0.2)
BASOPHILS NFR BLD AUTO: 0.5 % — SIGNIFICANT CHANGE UP (ref 0–2)
BUN SERPL-MCNC: 40 MG/DL — HIGH (ref 7–23)
CALCIUM SERPL-MCNC: 8.8 MG/DL — SIGNIFICANT CHANGE UP (ref 8.4–10.5)
CHLORIDE SERPL-SCNC: 96 MMOL/L — LOW (ref 98–107)
CO2 SERPL-SCNC: 22 MMOL/L — SIGNIFICANT CHANGE UP (ref 22–31)
CREAT SERPL-MCNC: 7.04 MG/DL — HIGH (ref 0.5–1.3)
EGFR: 8 ML/MIN/1.73M2 — LOW
EGFR: 8 ML/MIN/1.73M2 — LOW
EOSINOPHIL # BLD AUTO: 0.78 K/UL — HIGH (ref 0–0.5)
EOSINOPHIL NFR BLD AUTO: 6 % — SIGNIFICANT CHANGE UP (ref 0–6)
GLUCOSE BLDC GLUCOMTR-MCNC: 139 MG/DL — HIGH (ref 70–99)
GLUCOSE BLDC GLUCOMTR-MCNC: 168 MG/DL — HIGH (ref 70–99)
GLUCOSE BLDC GLUCOMTR-MCNC: 246 MG/DL — HIGH (ref 70–99)
GLUCOSE BLDC GLUCOMTR-MCNC: 269 MG/DL — HIGH (ref 70–99)
GLUCOSE BLDC GLUCOMTR-MCNC: 97 MG/DL — SIGNIFICANT CHANGE UP (ref 70–99)
GLUCOSE SERPL-MCNC: 105 MG/DL — HIGH (ref 70–99)
HCT VFR BLD CALC: 28.3 % — LOW (ref 39–50)
HGB BLD-MCNC: 8.8 G/DL — LOW (ref 13–17)
IANC: 7.93 K/UL — HIGH (ref 1.8–7.4)
IMM GRANULOCYTES NFR BLD AUTO: 3.8 % — HIGH (ref 0–0.9)
LYMPHOCYTES # BLD AUTO: 18.1 % — SIGNIFICANT CHANGE UP (ref 13–44)
LYMPHOCYTES # BLD AUTO: 2.33 K/UL — SIGNIFICANT CHANGE UP (ref 1–3.3)
MAGNESIUM SERPL-MCNC: 2.2 MG/DL — SIGNIFICANT CHANGE UP (ref 1.6–2.6)
MCHC RBC-ENTMCNC: 26.1 PG — LOW (ref 27–34)
MCHC RBC-ENTMCNC: 31.1 G/DL — LOW (ref 32–36)
MCV RBC AUTO: 84 FL — SIGNIFICANT CHANGE UP (ref 80–100)
MONOCYTES # BLD AUTO: 1.3 K/UL — HIGH (ref 0–0.9)
MONOCYTES NFR BLD AUTO: 10.1 % — SIGNIFICANT CHANGE UP (ref 2–14)
NEUTROPHILS # BLD AUTO: 7.93 K/UL — HIGH (ref 1.8–7.4)
NEUTROPHILS NFR BLD AUTO: 61.5 % — SIGNIFICANT CHANGE UP (ref 43–77)
NRBC # BLD AUTO: 0 K/UL — SIGNIFICANT CHANGE UP (ref 0–0)
NRBC # BLD: 0 /100 WBCS — SIGNIFICANT CHANGE UP (ref 0–0)
NRBC # FLD: 0 K/UL — SIGNIFICANT CHANGE UP (ref 0–0)
NRBC BLD-RTO: 0 /100 WBCS — SIGNIFICANT CHANGE UP (ref 0–0)
PHOSPHATE SERPL-MCNC: 3.8 MG/DL — SIGNIFICANT CHANGE UP (ref 2.5–4.5)
PLATELET # BLD AUTO: 462 K/UL — HIGH (ref 150–400)
POTASSIUM SERPL-MCNC: 3.9 MMOL/L — SIGNIFICANT CHANGE UP (ref 3.5–5.3)
POTASSIUM SERPL-SCNC: 3.9 MMOL/L — SIGNIFICANT CHANGE UP (ref 3.5–5.3)
RBC # BLD: 3.37 M/UL — LOW (ref 4.2–5.8)
RBC # FLD: 19.2 % — HIGH (ref 10.3–14.5)
SODIUM SERPL-SCNC: 136 MMOL/L — SIGNIFICANT CHANGE UP (ref 135–145)
WBC # BLD: 12.9 K/UL — HIGH (ref 3.8–10.5)
WBC # FLD AUTO: 12.9 K/UL — HIGH (ref 3.8–10.5)

## 2025-01-17 PROCEDURE — 74183 MRI ABD W/O CNTR FLWD CNTR: CPT | Mod: 26

## 2025-01-17 RX ORDER — EPOETIN ALFA 10000 [IU]/ML
10000 SOLUTION INTRAVENOUS; SUBCUTANEOUS ONCE
Refills: 0 | Status: COMPLETED | OUTPATIENT
Start: 2025-01-17 | End: 2025-01-17

## 2025-01-17 RX ADMIN — EPOETIN ALFA 10000 UNIT(S): 10000 SOLUTION INTRAVENOUS; SUBCUTANEOUS at 19:25

## 2025-01-17 RX ADMIN — Medication 60 MILLIGRAM(S): at 05:30

## 2025-01-17 RX ADMIN — DOXAZOSIN MESYLATE 4 MILLIGRAM(S): 8 TABLET ORAL at 21:11

## 2025-01-17 RX ADMIN — Medication 81 MILLIGRAM(S): at 11:45

## 2025-01-17 RX ADMIN — Medication 1 APPLICATION(S): at 11:45

## 2025-01-17 RX ADMIN — LABETALOL HYDROCHLORIDE 200 MILLIGRAM(S): 200 TABLET, FILM COATED ORAL at 05:31

## 2025-01-17 RX ADMIN — Medication 1000 UNIT(S): at 11:45

## 2025-01-17 RX ADMIN — Medication 25 MICROGRAM(S): at 05:29

## 2025-01-17 RX ADMIN — Medication 1 APPLICATION(S): at 11:44

## 2025-01-17 RX ADMIN — Medication 667 MILLIGRAM(S): at 08:33

## 2025-01-17 RX ADMIN — Medication 160 MILLIGRAM(S): at 05:27

## 2025-01-17 RX ADMIN — LABETALOL HYDROCHLORIDE 200 MILLIGRAM(S): 200 TABLET, FILM COATED ORAL at 22:33

## 2025-01-17 RX ADMIN — ATORVASTATIN CALCIUM 40 MILLIGRAM(S): 80 TABLET, FILM COATED ORAL at 21:10

## 2025-01-17 RX ADMIN — BUMETANIDE 2 MILLIGRAM(S): 1 TABLET ORAL at 22:33

## 2025-01-17 RX ADMIN — INSULIN LISPRO 2: 100 INJECTION, SOLUTION INTRAVENOUS; SUBCUTANEOUS at 11:48

## 2025-01-17 RX ADMIN — HEPARIN SODIUM 5000 UNIT(S): 1000 INJECTION INTRAVENOUS; SUBCUTANEOUS at 06:08

## 2025-01-17 RX ADMIN — BUMETANIDE 2 MILLIGRAM(S): 1 TABLET ORAL at 05:27

## 2025-01-17 RX ADMIN — Medication 667 MILLIGRAM(S): at 11:45

## 2025-01-17 RX ADMIN — Medication 160 MILLIGRAM(S): at 22:33

## 2025-01-17 RX ADMIN — ISOSORBIDE MONONITRATE 60 MILLIGRAM(S): 60 TABLET, EXTENDED RELEASE ORAL at 08:33

## 2025-01-17 RX ADMIN — CLOPIDOGREL BISULFATE 75 MILLIGRAM(S): 75 TABLET, FILM COATED ORAL at 11:45

## 2025-01-17 RX ADMIN — INSULIN LISPRO 3: 100 INJECTION, SOLUTION INTRAVENOUS; SUBCUTANEOUS at 15:35

## 2025-01-17 RX ADMIN — Medication 1 TABLET(S): at 11:44

## 2025-01-18 VITALS
HEART RATE: 66 BPM | RESPIRATION RATE: 16 BRPM | TEMPERATURE: 99 F | SYSTOLIC BLOOD PRESSURE: 136 MMHG | DIASTOLIC BLOOD PRESSURE: 69 MMHG | OXYGEN SATURATION: 100 %

## 2025-01-18 LAB
ALBUMIN SERPL ELPH-MCNC: 3.3 G/DL — SIGNIFICANT CHANGE UP (ref 3.3–5)
ALP SERPL-CCNC: 78 U/L — SIGNIFICANT CHANGE UP (ref 40–120)
ALT FLD-CCNC: 12 U/L — SIGNIFICANT CHANGE UP (ref 4–41)
ANION GAP SERPL CALC-SCNC: 15 MMOL/L — HIGH (ref 7–14)
AST SERPL-CCNC: 25 U/L — SIGNIFICANT CHANGE UP (ref 4–40)
BASOPHILS # BLD AUTO: 0.09 K/UL — SIGNIFICANT CHANGE UP (ref 0–0.2)
BASOPHILS NFR BLD AUTO: 0.7 % — SIGNIFICANT CHANGE UP (ref 0–2)
BILIRUB SERPL-MCNC: <0.2 MG/DL — SIGNIFICANT CHANGE UP (ref 0.2–1.2)
BUN SERPL-MCNC: 32 MG/DL — HIGH (ref 7–23)
CALCIUM SERPL-MCNC: 9 MG/DL — SIGNIFICANT CHANGE UP (ref 8.4–10.5)
CHLORIDE SERPL-SCNC: 98 MMOL/L — SIGNIFICANT CHANGE UP (ref 98–107)
CO2 SERPL-SCNC: 24 MMOL/L — SIGNIFICANT CHANGE UP (ref 22–31)
CREAT SERPL-MCNC: 5.02 MG/DL — HIGH (ref 0.5–1.3)
EGFR: 12 ML/MIN/1.73M2 — LOW
EGFR: 12 ML/MIN/1.73M2 — LOW
EOSINOPHIL # BLD AUTO: 0.43 K/UL — SIGNIFICANT CHANGE UP (ref 0–0.5)
EOSINOPHIL NFR BLD AUTO: 3.4 % — SIGNIFICANT CHANGE UP (ref 0–6)
GLUCOSE BLDC GLUCOMTR-MCNC: 135 MG/DL — HIGH (ref 70–99)
GLUCOSE BLDC GLUCOMTR-MCNC: 152 MG/DL — HIGH (ref 70–99)
GLUCOSE BLDC GLUCOMTR-MCNC: 310 MG/DL — HIGH (ref 70–99)
GLUCOSE SERPL-MCNC: 114 MG/DL — HIGH (ref 70–99)
HCT VFR BLD CALC: 33 % — LOW (ref 39–50)
HGB BLD-MCNC: 10.1 G/DL — LOW (ref 13–17)
IANC: 8.95 K/UL — HIGH (ref 1.8–7.4)
IMM GRANULOCYTES NFR BLD AUTO: 3.3 % — HIGH (ref 0–0.9)
LYMPHOCYTES # BLD AUTO: 1.48 K/UL — SIGNIFICANT CHANGE UP (ref 1–3.3)
LYMPHOCYTES # BLD AUTO: 11.8 % — LOW (ref 13–44)
MCHC RBC-ENTMCNC: 26.4 PG — LOW (ref 27–34)
MCHC RBC-ENTMCNC: 30.6 G/DL — LOW (ref 32–36)
MCV RBC AUTO: 86.4 FL — SIGNIFICANT CHANGE UP (ref 80–100)
MONOCYTES # BLD AUTO: 1.18 K/UL — HIGH (ref 0–0.9)
MONOCYTES NFR BLD AUTO: 9.4 % — SIGNIFICANT CHANGE UP (ref 2–14)
NEUTROPHILS # BLD AUTO: 8.95 K/UL — HIGH (ref 1.8–7.4)
NEUTROPHILS NFR BLD AUTO: 71.4 % — SIGNIFICANT CHANGE UP (ref 43–77)
NRBC # BLD AUTO: 0 K/UL — SIGNIFICANT CHANGE UP (ref 0–0)
NRBC # BLD: 0 /100 WBCS — SIGNIFICANT CHANGE UP (ref 0–0)
NRBC # FLD: 0 K/UL — SIGNIFICANT CHANGE UP (ref 0–0)
NRBC BLD-RTO: 0 /100 WBCS — SIGNIFICANT CHANGE UP (ref 0–0)
PLATELET # BLD AUTO: 529 K/UL — HIGH (ref 150–400)
POTASSIUM SERPL-MCNC: 4 MMOL/L — SIGNIFICANT CHANGE UP (ref 3.5–5.3)
POTASSIUM SERPL-SCNC: 4 MMOL/L — SIGNIFICANT CHANGE UP (ref 3.5–5.3)
PROT SERPL-MCNC: 7.5 G/DL — SIGNIFICANT CHANGE UP (ref 6–8.3)
RBC # BLD: 3.82 M/UL — LOW (ref 4.2–5.8)
RBC # FLD: 20.2 % — HIGH (ref 10.3–14.5)
SODIUM SERPL-SCNC: 137 MMOL/L — SIGNIFICANT CHANGE UP (ref 135–145)
WBC # BLD: 12.55 K/UL — HIGH (ref 3.8–10.5)
WBC # FLD AUTO: 12.55 K/UL — HIGH (ref 3.8–10.5)

## 2025-01-18 RX ORDER — EPOETIN ALFA 10000 [IU]/ML
10000 SOLUTION INTRAVENOUS; SUBCUTANEOUS ONCE
Refills: 0 | Status: COMPLETED | OUTPATIENT
Start: 2025-01-18 | End: 2025-01-18

## 2025-01-18 RX ADMIN — Medication 667 MILLIGRAM(S): at 10:15

## 2025-01-18 RX ADMIN — EPOETIN ALFA 10000 UNIT(S): 10000 SOLUTION INTRAVENOUS; SUBCUTANEOUS at 12:39

## 2025-01-18 RX ADMIN — Medication 25 MICROGRAM(S): at 05:03

## 2025-01-18 RX ADMIN — INSULIN LISPRO 4: 100 INJECTION, SOLUTION INTRAVENOUS; SUBCUTANEOUS at 17:23

## 2025-01-18 RX ADMIN — HEPARIN SODIUM 5000 UNIT(S): 1000 INJECTION INTRAVENOUS; SUBCUTANEOUS at 06:09

## 2025-01-18 RX ADMIN — Medication 60 MILLIGRAM(S): at 05:03

## 2025-01-21 ENCOUNTER — INPATIENT (INPATIENT)
Facility: HOSPITAL | Age: 70
LOS: 0 days | Discharge: HOME HEALTH | End: 2025-01-22
Attending: INTERNAL MEDICINE | Admitting: INTERNAL MEDICINE
Payer: MEDICARE

## 2025-01-21 VITALS
SYSTOLIC BLOOD PRESSURE: 93 MMHG | RESPIRATION RATE: 18 BRPM | OXYGEN SATURATION: 97 % | DIASTOLIC BLOOD PRESSURE: 49 MMHG | TEMPERATURE: 98 F | WEIGHT: 119.93 LBS | HEIGHT: 68 IN | HEART RATE: 55 BPM

## 2025-01-21 DIAGNOSIS — E03.9 HYPOTHYROIDISM, UNSPECIFIED: ICD-10-CM

## 2025-01-21 DIAGNOSIS — I25.10 ATHEROSCLEROTIC HEART DISEASE OF NATIVE CORONARY ARTERY WITHOUT ANGINA PECTORIS: ICD-10-CM

## 2025-01-21 DIAGNOSIS — E11.9 TYPE 2 DIABETES MELLITUS WITHOUT COMPLICATIONS: ICD-10-CM

## 2025-01-21 DIAGNOSIS — E78.5 HYPERLIPIDEMIA, UNSPECIFIED: ICD-10-CM

## 2025-01-21 DIAGNOSIS — Z95.5 PRESENCE OF CORONARY ANGIOPLASTY IMPLANT AND GRAFT: Chronic | ICD-10-CM

## 2025-01-21 DIAGNOSIS — N18.6 END STAGE RENAL DISEASE: ICD-10-CM

## 2025-01-21 DIAGNOSIS — I10 ESSENTIAL (PRIMARY) HYPERTENSION: ICD-10-CM

## 2025-01-21 DIAGNOSIS — E87.5 HYPERKALEMIA: ICD-10-CM

## 2025-01-21 DIAGNOSIS — Z98.890 OTHER SPECIFIED POSTPROCEDURAL STATES: Chronic | ICD-10-CM

## 2025-01-21 DIAGNOSIS — T82.41XA BREAKDOWN (MECHANICAL) OF VASCULAR DIALYSIS CATHETER, INITIAL ENCOUNTER: ICD-10-CM

## 2025-01-21 LAB
ALBUMIN SERPL ELPH-MCNC: 3.7 G/DL — SIGNIFICANT CHANGE UP (ref 3.3–5)
ALP SERPL-CCNC: 92 U/L — SIGNIFICANT CHANGE UP (ref 40–120)
ALT FLD-CCNC: <5 U/L — LOW (ref 4–41)
ANION GAP SERPL CALC-SCNC: 16 MMOL/L — HIGH (ref 7–14)
ANION GAP SERPL CALC-SCNC: 21 MMOL/L — HIGH (ref 7–14)
APTT BLD: 31.6 SEC — SIGNIFICANT CHANGE UP (ref 24.5–35.6)
AST SERPL-CCNC: <5 U/L — LOW (ref 4–40)
BASOPHILS # BLD AUTO: 0.08 K/UL — SIGNIFICANT CHANGE UP (ref 0–0.2)
BASOPHILS NFR BLD AUTO: 0.7 % — SIGNIFICANT CHANGE UP (ref 0–2)
BILIRUB SERPL-MCNC: 0.3 MG/DL — SIGNIFICANT CHANGE UP (ref 0.2–1.2)
BLOOD GAS VENOUS COMPREHENSIVE RESULT: SIGNIFICANT CHANGE UP
BLOOD GAS VENOUS COMPREHENSIVE RESULT: SIGNIFICANT CHANGE UP
BUN SERPL-MCNC: 77 MG/DL — HIGH (ref 7–23)
BUN SERPL-MCNC: 77 MG/DL — HIGH (ref 7–23)
CALCIUM SERPL-MCNC: 10 MG/DL — SIGNIFICANT CHANGE UP (ref 8.4–10.5)
CALCIUM SERPL-MCNC: 9.9 MG/DL — SIGNIFICANT CHANGE UP (ref 8.4–10.5)
CHLORIDE SERPL-SCNC: 88 MMOL/L — LOW (ref 98–107)
CHLORIDE SERPL-SCNC: 92 MMOL/L — LOW (ref 98–107)
CO2 SERPL-SCNC: 21 MMOL/L — LOW (ref 22–31)
CO2 SERPL-SCNC: 22 MMOL/L — SIGNIFICANT CHANGE UP (ref 22–31)
CREAT SERPL-MCNC: 7.23 MG/DL — HIGH (ref 0.5–1.3)
CREAT SERPL-MCNC: 7.34 MG/DL — HIGH (ref 0.5–1.3)
EGFR: 7 ML/MIN/1.73M2 — LOW
EGFR: 8 ML/MIN/1.73M2 — LOW
EOSINOPHIL # BLD AUTO: 0.42 K/UL — SIGNIFICANT CHANGE UP (ref 0–0.5)
EOSINOPHIL NFR BLD AUTO: 3.6 % — SIGNIFICANT CHANGE UP (ref 0–6)
GLUCOSE SERPL-MCNC: 130 MG/DL — HIGH (ref 70–99)
GLUCOSE SERPL-MCNC: 190 MG/DL — HIGH (ref 70–99)
HCT VFR BLD CALC: 33.7 % — LOW (ref 39–50)
HGB BLD-MCNC: 10.8 G/DL — LOW (ref 13–17)
IANC: 8.39 K/UL — HIGH (ref 1.8–7.4)
IMM GRANULOCYTES NFR BLD AUTO: 1.5 % — HIGH (ref 0–0.9)
INR BLD: 1 RATIO — SIGNIFICANT CHANGE UP (ref 0.85–1.16)
LYMPHOCYTES # BLD AUTO: 1.24 K/UL — SIGNIFICANT CHANGE UP (ref 1–3.3)
LYMPHOCYTES # BLD AUTO: 10.7 % — LOW (ref 13–44)
MAGNESIUM SERPL-MCNC: 2.7 MG/DL — HIGH (ref 1.6–2.6)
MCHC RBC-ENTMCNC: 27.4 PG — SIGNIFICANT CHANGE UP (ref 27–34)
MCHC RBC-ENTMCNC: 32 G/DL — SIGNIFICANT CHANGE UP (ref 32–36)
MCV RBC AUTO: 85.5 FL — SIGNIFICANT CHANGE UP (ref 80–100)
MONOCYTES # BLD AUTO: 1.31 K/UL — HIGH (ref 0–0.9)
MONOCYTES NFR BLD AUTO: 11.3 % — SIGNIFICANT CHANGE UP (ref 2–14)
NEUTROPHILS # BLD AUTO: 8.39 K/UL — HIGH (ref 1.8–7.4)
NEUTROPHILS NFR BLD AUTO: 72.2 % — SIGNIFICANT CHANGE UP (ref 43–77)
NRBC # BLD AUTO: 0 K/UL — SIGNIFICANT CHANGE UP (ref 0–0)
NRBC # BLD: 0 /100 WBCS — SIGNIFICANT CHANGE UP (ref 0–0)
NRBC # FLD: 0 K/UL — SIGNIFICANT CHANGE UP (ref 0–0)
NRBC BLD-RTO: 0 /100 WBCS — SIGNIFICANT CHANGE UP (ref 0–0)
PHOSPHATE SERPL-MCNC: 4.6 MG/DL — HIGH (ref 2.5–4.5)
PLATELET # BLD AUTO: 476 K/UL — HIGH (ref 150–400)
POTASSIUM SERPL-MCNC: 5.1 MMOL/L — SIGNIFICANT CHANGE UP (ref 3.5–5.3)
POTASSIUM SERPL-MCNC: 6.4 MMOL/L — CRITICAL HIGH (ref 3.5–5.3)
POTASSIUM SERPL-SCNC: 5.1 MMOL/L — SIGNIFICANT CHANGE UP (ref 3.5–5.3)
POTASSIUM SERPL-SCNC: 6.4 MMOL/L — CRITICAL HIGH (ref 3.5–5.3)
PROT SERPL-MCNC: 8.2 G/DL — SIGNIFICANT CHANGE UP (ref 6–8.3)
PROTHROM AB SERPL-ACNC: 11.6 SEC — SIGNIFICANT CHANGE UP (ref 9.9–13.4)
RBC # BLD: 3.94 M/UL — LOW (ref 4.2–5.8)
RBC # FLD: 20.6 % — HIGH (ref 10.3–14.5)
SODIUM SERPL-SCNC: 130 MMOL/L — LOW (ref 135–145)
SODIUM SERPL-SCNC: 130 MMOL/L — LOW (ref 135–145)
WBC # BLD: 11.62 K/UL — HIGH (ref 3.8–10.5)
WBC # FLD AUTO: 11.62 K/UL — HIGH (ref 3.8–10.5)

## 2025-01-21 PROCEDURE — 99223 1ST HOSP IP/OBS HIGH 75: CPT

## 2025-01-21 PROCEDURE — 99285 EMERGENCY DEPT VISIT HI MDM: CPT

## 2025-01-21 RX ORDER — DM/PSEUDOEPHED/ACETAMINOPHEN 10-30-250
15 CAPSULE ORAL ONCE
Refills: 0 | Status: DISCONTINUED | OUTPATIENT
Start: 2025-01-21 | End: 2025-01-22

## 2025-01-21 RX ORDER — GLUCAGON 3 MG/1
1 POWDER NASAL ONCE
Refills: 0 | Status: DISCONTINUED | OUTPATIENT
Start: 2025-01-21 | End: 2025-01-22

## 2025-01-21 RX ORDER — ONDANSETRON 4 MG/1
4 TABLET, ORALLY DISINTEGRATING ORAL EVERY 8 HOURS
Refills: 0 | Status: DISCONTINUED | OUTPATIENT
Start: 2025-01-21 | End: 2025-01-22

## 2025-01-21 RX ORDER — MAGNESIUM, ALUMINUM HYDROXIDE 200-225/5
30 SUSPENSION, ORAL (FINAL DOSE FORM) ORAL EVERY 4 HOURS
Refills: 0 | Status: DISCONTINUED | OUTPATIENT
Start: 2025-01-21 | End: 2025-01-22

## 2025-01-21 RX ORDER — ALTEPLASE 100 MG
2 KIT INTRAVENOUS ONCE
Refills: 0 | Status: COMPLETED | OUTPATIENT
Start: 2025-01-21 | End: 2025-01-21

## 2025-01-21 RX ORDER — ANTISEPTIC SURGICAL SCRUB 0.04 MG/ML
1 SOLUTION TOPICAL DAILY
Refills: 0 | Status: DISCONTINUED | OUTPATIENT
Start: 2025-01-21 | End: 2025-01-22

## 2025-01-21 RX ORDER — ASPIRIN 81 MG/1
81 TABLET, COATED ORAL DAILY
Refills: 0 | Status: DISCONTINUED | OUTPATIENT
Start: 2025-01-21 | End: 2025-01-22

## 2025-01-21 RX ORDER — NIFEDIPINE 90 MG/1
60 TABLET, FILM COATED, EXTENDED RELEASE ORAL DAILY
Refills: 0 | Status: DISCONTINUED | OUTPATIENT
Start: 2025-01-22 | End: 2025-01-22

## 2025-01-21 RX ORDER — SODIUM CHLORIDE 9 G/ML
1000 INJECTION, SOLUTION INTRAVENOUS
Refills: 0 | Status: DISCONTINUED | OUTPATIENT
Start: 2025-01-21 | End: 2025-01-22

## 2025-01-21 RX ORDER — CALCIUM ACETATE 667 MG/1
667 CAPSULE ORAL
Refills: 0 | Status: DISCONTINUED | OUTPATIENT
Start: 2025-01-21 | End: 2025-01-22

## 2025-01-21 RX ORDER — ACETAMINOPHEN 160 MG/5ML
650 SUSPENSION ORAL EVERY 6 HOURS
Refills: 0 | Status: DISCONTINUED | OUTPATIENT
Start: 2025-01-21 | End: 2025-01-22

## 2025-01-21 RX ORDER — DM/PSEUDOEPHED/ACETAMINOPHEN 10-30-250
25 CAPSULE ORAL ONCE
Refills: 0 | Status: DISCONTINUED | OUTPATIENT
Start: 2025-01-21 | End: 2025-01-22

## 2025-01-21 RX ORDER — ALBUTEROL 90 MCG
10 AEROSOL REFILL (GRAM) INHALATION ONCE
Refills: 0 | Status: DISCONTINUED | OUTPATIENT
Start: 2025-01-21 | End: 2025-01-21

## 2025-01-21 RX ORDER — BUMETANIDE 2 MG/1
2 TABLET ORAL
Refills: 0 | Status: DISCONTINUED | OUTPATIENT
Start: 2025-01-22 | End: 2025-01-22

## 2025-01-21 RX ORDER — MECOBAL/LEVOMEFOLAT CA/B6 PHOS 2-3-35 MG
1 TABLET ORAL DAILY
Refills: 0 | Status: DISCONTINUED | OUTPATIENT
Start: 2025-01-21 | End: 2025-01-22

## 2025-01-21 RX ORDER — DM/PSEUDOEPHED/ACETAMINOPHEN 10-30-250
12.5 CAPSULE ORAL ONCE
Refills: 0 | Status: DISCONTINUED | OUTPATIENT
Start: 2025-01-21 | End: 2025-01-22

## 2025-01-21 RX ORDER — ATORVASTATIN CALCIUM 80 MG/1
40 TABLET, FILM COATED ORAL AT BEDTIME
Refills: 0 | Status: DISCONTINUED | OUTPATIENT
Start: 2025-01-21 | End: 2025-01-22

## 2025-01-21 RX ORDER — ACETAMINOPHEN, DIPHENHYDRAMINE HCL, PHENYLEPHRINE HCL 325; 25; 5 MG/1; MG/1; MG/1
3 TABLET ORAL AT BEDTIME
Refills: 0 | Status: DISCONTINUED | OUTPATIENT
Start: 2025-01-21 | End: 2025-01-22

## 2025-01-21 RX ORDER — INSULIN LISPRO 100/ML
VIAL (ML) SUBCUTANEOUS
Refills: 0 | Status: DISCONTINUED | OUTPATIENT
Start: 2025-01-21 | End: 2025-01-22

## 2025-01-21 RX ORDER — VALSARTAN 80 MG
160 TABLET ORAL
Refills: 0 | Status: DISCONTINUED | OUTPATIENT
Start: 2025-01-21 | End: 2025-01-22

## 2025-01-21 RX ORDER — LEVOTHYROXINE SODIUM 25 UG/1
25 TABLET ORAL DAILY
Refills: 0 | Status: DISCONTINUED | OUTPATIENT
Start: 2025-01-22 | End: 2025-01-22

## 2025-01-21 RX ORDER — DM/PSEUDOEPHED/ACETAMINOPHEN 10-30-250
50 CAPSULE ORAL ONCE
Refills: 0 | Status: DISCONTINUED | OUTPATIENT
Start: 2025-01-21 | End: 2025-01-21

## 2025-01-21 RX ORDER — DOXAZOSIN MESYLATE 4 MG
4 TABLET ORAL AT BEDTIME
Refills: 0 | Status: DISCONTINUED | OUTPATIENT
Start: 2025-01-21 | End: 2025-01-22

## 2025-01-21 RX ADMIN — ALTEPLASE 2 MILLIGRAM(S): KIT at 21:45

## 2025-01-21 RX ADMIN — Medication 200 GRAM(S): at 21:18

## 2025-01-21 NOTE — ED PROVIDER NOTE - CARE PLAN
1 Principal Discharge DX:	Hemodialysis catheter malfunction   Principal Discharge DX:	Hemodialysis catheter malfunction  Secondary Diagnosis:	Hyperkalemia

## 2025-01-21 NOTE — ED PROVIDER NOTE - ATTENDING CONTRIBUTION TO CARE
Dr. Funez:  I have personally performed a face to face bedside history and physical examination of this patient. I have discussed the history, examination, review of systems, assessment and plan of management with the resident. I have reviewed the electronic medical record and amended it to reflect my history, review of systems, physical exam, assessment and plan.    69M h/o CAD, HTN, DM, ESRD on HD, sent in by dialysis for non-functioning port today.  Last HD 1/18.      Exam:  - nad  - rrr   -ctab   -abd soft ntnd    A/P  - non=functioning dialysis port; check electrolytes, admit for dialysis

## 2025-01-21 NOTE — H&P ADULT - ASSESSMENT
69-year-old male history of CAD status post PCI ( plavix and asa), HTN, HLD, Hypothyroidism, T2DM(diet controlled) , ESRD (was on peritoneal dialysis/ s/p PD cath removal on 1/8, recent Tunneled HD  catheter placement on 1/17/25) ,  right-sided pleural effusion status post thoracentesis presents to the ED after his dialysis port was not working. Patient had dialysis on 1/18/25.

## 2025-01-21 NOTE — H&P ADULT - TIME BILLING
Review ED labs and records , review and order labs, personally evaluate patient , discuss with the Nephrologist, implement care

## 2025-01-21 NOTE — ED ADULT TRIAGE NOTE - CHIEF COMPLAINT QUOTE
Type 2 DM: FS: 149 Patient went to have dialysis today but was unable to flush access. patient sent to ER by MD for dialysis. Patient had last dialysis here Saturday.

## 2025-01-21 NOTE — ED PROVIDER NOTE - CLINICAL SUMMARY MEDICAL DECISION MAKING FREE TEXT BOX
69-year-old male ESRD presents to the ED with complaints of port malfunction after attempting hemodialysis today.  Patient currently asymptomatic.    Nephro consulted for port malfunction, will obtain labs and admit for hemodialysis.

## 2025-01-21 NOTE — ED ADULT NURSE NOTE - OBJECTIVE STATEMENT
This is a 68 y/o male alert and oriented x 4, with a past medical history of DM2, on hemodialysis. Presented today due to failed hemod[facility] a [facility], patient states he was todl to come to ED his port is not functioning well. Denies any pain, pallor noted. AV access to right upper chest noted, dressing intact. IV initaited on left hand g 20 blood work drawn and sent to the lab. Breathing is even and unlabored, abdomen is soft and non tender on palpation. Dressing to former PD cath site dry and intact. Bed in lowest position, side rails up for safety.

## 2025-01-21 NOTE — H&P ADULT - NSICDXPASTMEDICALHX_GEN_ALL_CORE_FT
PAST MEDICAL HISTORY:  BPH (benign prostatic hyperplasia)     CAD (coronary artery disease)     CVA (cerebrovascular accident) 2010 without residual effects    ESRD on peritoneal dialysis     Hyperlipidemia     Hypertension     Hypothyroidism     T2DM (type 2 diabetes mellitus)

## 2025-01-21 NOTE — ED PROVIDER NOTE - OBJECTIVE STATEMENT
69-year-old male history of CAD status post PCI, HTN, HLP, T2DM, ESRD (was on peritoneal dialysis, newly inserted CVC port 1/17/25) presents to the ED after his dialysis port was not working. Patient had dilysis on 1/18/25.  Patient currently denies any symptoms, ROS negative for fever, chills, nausea, vomiting, chest pain, shortness of breath, abdominal pain, dysurina.

## 2025-01-21 NOTE — H&P ADULT - PROBLEM SELECTOR PLAN 2
Malfunctioning HD catheter , pt is now receiving HD s/o Cathflo injection   converted from PD to HD  s/p shiley placement  s/p PD catheter removal 1/8  s/p permcath placement 1/15  Case is discussed with Nephro on 1/21   F/up in AM if further evaluation is needed for the HD access

## 2025-01-21 NOTE — H&P ADULT - HISTORY OF PRESENT ILLNESS
69-year-old male history of CAD status post PCI, HTN, HLP, T2DM, ESRD (was on peritoneal dialysis, newly inserted CVC port 1/17/25) presents to the ED after his dialysis port was not working. Patient had dilysis on 1/18/25.  Patient currently denies any symptoms, ROS negative for fever, chills, nausea, vomiting, chest pain, shortness of breath, abdominal pain, dysurina.  69-year-old male history of CAD status post PCI ( plavix and asa), HTN, HLD, Hypothyroidism, T2DM(diet controlled) , ESRD (was on peritoneal dialysis/ s/p PD cath removal on 1/8, recent Tunneled HD  catheter placement on 1/17/25) ,  right-sided pleural effusion status post thoracentesis presents to the ED after his dialysis port was not working. Patient had dialysis on 1/18/25.  Patient currently denies any symptoms, ROS negative for fever, chills, nausea, vomiting, chest pain, shortness of breath, abdominal pain, dysuria   Pt was recently in the hosp 12/19-1/18 for SOB   Pt was seen during HD and offers no current complaints         69-year-old male history of CAD status post PCI  in 7/2024.(  on  plavix and asa), HTN, HLD, Hypothyroidism, T2DM(diet controlled) , ESRD (was on peritoneal dialysis/ s/p PD cath removal on 1/8, recent Tunneled HD  catheter placement on 1/17/25) ,  Recurrent Pleural  effusion status post thoracentesis in 1/2025 presents to the ED due to malfunctioning recent HD tunneled catheter, no pain and no bleeding at the HD catheter site.    Last dialysis was on 1/18/25.  Patient currently denies any symptoms, ROS negative for fever, chills, nausea, vomiting, chest pain, shortness of breath, abdominal pain, dysuria   Pt was recently in the hosp 12/19-1/18 for SOB   Pt was seen during HD and offers no current complaints

## 2025-01-21 NOTE — H&P ADULT - NSHPADDITIONALINFOADULT_GEN_ALL_CORE
Anya West   Hospitalist   available on TEAMS abnormal ECG and h/o Pleural effusion --> ECG in AM and CXR ordered   SCD tonight and if no intervention is planned, can start Chemical Prophylaxis       Anya West   Hospitalist   available on TEAMS abnormal ECG and h/o Pleural effusion --> ECG in AM and CXR ordered   SCD tonight and if no intervention is planned, can start Chemical Prophylaxis   HOLD Labetalol due to first degree AV block , can restart in AM if stable , repeat ECG In AM   Monitor WBC and hemodynamics for any fever       Anya West   Hospitalist   available on TEAMS

## 2025-01-21 NOTE — H&P ADULT - NSICDXPASTSURGICALHX_GEN_ALL_CORE_FT
PAST SURGICAL HISTORY:  History of peritoneal dialysis s/p PD catheter placement    S/P coronary artery stent placement

## 2025-01-21 NOTE — H&P ADULT - PROBLEM SELECTOR PLAN 1
In setting of missed HD due to malfunctioning HD catheter   S/P Calcium GLuconate in the ED   repeat K is now stable at 5.1   Pt is currently receiving HD

## 2025-01-21 NOTE — ED PROVIDER NOTE - PHYSICAL EXAMINATION
Lopez Stone DO (PGY1)   Physical Exam:    Gen: NAD, AOx3  Head: NCAT  HEENT: EOMI, PEERLA  Lung: CTAB,  CV: RRR, no murmurs, rubs or gallops  Abd: soft, NT, ND, no guarding, guaze over prior PD placement clean without drainage or signs of infection  MSK: no visible deformities, ROM normal in UE/LE, no back pain  Neuro: No focal sensory or motor deficits. Sensation intact to light touch all extremities.  Skin: Warm, well perfused, no rash, no leg swelling, CVC cath in the right subclavian non erythematous  Psych: normal affect, calm

## 2025-01-22 ENCOUNTER — TRANSCRIPTION ENCOUNTER (OUTPATIENT)
Age: 70
End: 2025-01-22

## 2025-01-22 VITALS
SYSTOLIC BLOOD PRESSURE: 149 MMHG | DIASTOLIC BLOOD PRESSURE: 62 MMHG | RESPIRATION RATE: 18 BRPM | HEART RATE: 60 BPM | OXYGEN SATURATION: 100 % | TEMPERATURE: 98 F

## 2025-01-22 PROBLEM — E03.9 HYPOTHYROIDISM, UNSPECIFIED: Chronic | Status: ACTIVE | Noted: 2024-12-29

## 2025-01-22 PROBLEM — E11.9 TYPE 2 DIABETES MELLITUS WITHOUT COMPLICATIONS: Chronic | Status: ACTIVE | Noted: 2024-12-29

## 2025-01-22 LAB
ALBUMIN SERPL ELPH-MCNC: 3.6 G/DL — SIGNIFICANT CHANGE UP (ref 3.3–5)
ALP SERPL-CCNC: 84 U/L — SIGNIFICANT CHANGE UP (ref 40–120)
ALT FLD-CCNC: 18 U/L — SIGNIFICANT CHANGE UP (ref 4–41)
ANION GAP SERPL CALC-SCNC: 15 MMOL/L — HIGH (ref 7–14)
AST SERPL-CCNC: 24 U/L — SIGNIFICANT CHANGE UP (ref 4–40)
BILIRUB SERPL-MCNC: 0.3 MG/DL — SIGNIFICANT CHANGE UP (ref 0.2–1.2)
BUN SERPL-MCNC: 36 MG/DL — HIGH (ref 7–23)
CALCIUM SERPL-MCNC: 9.4 MG/DL — SIGNIFICANT CHANGE UP (ref 8.4–10.5)
CHLORIDE SERPL-SCNC: 97 MMOL/L — LOW (ref 98–107)
CO2 SERPL-SCNC: 24 MMOL/L — SIGNIFICANT CHANGE UP (ref 22–31)
CREAT SERPL-MCNC: 4.59 MG/DL — HIGH (ref 0.5–1.3)
EGFR: 13 ML/MIN/1.73M2 — LOW
GLUCOSE BLDC GLUCOMTR-MCNC: 104 MG/DL — HIGH (ref 70–99)
GLUCOSE BLDC GLUCOMTR-MCNC: 136 MG/DL — HIGH (ref 70–99)
GLUCOSE SERPL-MCNC: 99 MG/DL — SIGNIFICANT CHANGE UP (ref 70–99)
MRSA PCR RESULT.: SIGNIFICANT CHANGE UP
POTASSIUM SERPL-MCNC: 4.7 MMOL/L — SIGNIFICANT CHANGE UP (ref 3.5–5.3)
POTASSIUM SERPL-SCNC: 4.7 MMOL/L — SIGNIFICANT CHANGE UP (ref 3.5–5.3)
PROT SERPL-MCNC: 7.3 G/DL — SIGNIFICANT CHANGE UP (ref 6–8.3)
S AUREUS DNA NOSE QL NAA+PROBE: SIGNIFICANT CHANGE UP
SODIUM SERPL-SCNC: 136 MMOL/L — SIGNIFICANT CHANGE UP (ref 135–145)

## 2025-01-22 PROCEDURE — 93010 ELECTROCARDIOGRAM REPORT: CPT

## 2025-01-22 RX ADMIN — BUMETANIDE 2 MILLIGRAM(S): 2 TABLET ORAL at 05:46

## 2025-01-22 RX ADMIN — ASPIRIN 81 MILLIGRAM(S): 81 TABLET, COATED ORAL at 10:25

## 2025-01-22 RX ADMIN — NIFEDIPINE 60 MILLIGRAM(S): 90 TABLET, FILM COATED, EXTENDED RELEASE ORAL at 05:44

## 2025-01-22 RX ADMIN — Medication 1 TABLET(S): at 10:25

## 2025-01-22 RX ADMIN — Medication 60 MILLIGRAM(S): at 10:26

## 2025-01-22 RX ADMIN — Medication 1000 UNIT(S): at 10:25

## 2025-01-22 RX ADMIN — Medication 75 MILLIGRAM(S): at 10:25

## 2025-01-22 RX ADMIN — CALCIUM ACETATE 667 MILLIGRAM(S): 667 CAPSULE ORAL at 10:26

## 2025-01-22 RX ADMIN — LEVOTHYROXINE SODIUM 25 MICROGRAM(S): 25 TABLET ORAL at 05:47

## 2025-01-22 NOTE — PROVIDER CONTACT NOTE (OTHER) - BACKGROUND
69 year old male admitted for breakdown (mechanical) of vascular dialysis catheter, initial encounter

## 2025-01-22 NOTE — PROGRESS NOTE ADULT - SUBJECTIVE AND OBJECTIVE BOX
SUBJECTIVE / OVERNIGHT EVENTS:pt seen and examined  01-22-25     MEDICATIONS  (STANDING):  aspirin enteric coated 81 milliGRAM(s) Oral daily  atorvastatin 40 milliGRAM(s) Oral at bedtime  buMETAnide 2 milliGRAM(s) Oral two times a day  calcium acetate 667 milliGRAM(s) Oral three times a day with meals  chlorhexidine 2% Cloths 1 Application(s) Topical daily  cholecalciferol 1000 Unit(s) Oral daily  clopidogrel Tablet 75 milliGRAM(s) Oral daily  dextrose 5%. 1000 milliLiter(s) (100 mL/Hr) IV Continuous <Continuous>  dextrose 5%. 1000 milliLiter(s) (50 mL/Hr) IV Continuous <Continuous>  dextrose 50% Injectable 25 Gram(s) IV Push once  dextrose 50% Injectable 12.5 Gram(s) IV Push once  dextrose 50% Injectable 25 Gram(s) IV Push once  doxazosin 4 milliGRAM(s) Oral at bedtime  glucagon  Injectable 1 milliGRAM(s) IntraMuscular once  insulin lispro (ADMELOG) corrective regimen sliding scale   SubCutaneous three times a day before meals  isosorbide   mononitrate ER Tablet (IMDUR) 60 milliGRAM(s) Oral daily  levothyroxine 25 MICROGram(s) Oral daily  multivitamin 1 Tablet(s) Oral daily  NIFEdipine XL 60 milliGRAM(s) Oral daily  valsartan 160 milliGRAM(s) Oral two times a day    MEDICATIONS  (PRN):  acetaminophen     Tablet .. 650 milliGRAM(s) Oral every 6 hours PRN Temp greater or equal to 38C (100.4F), Mild Pain (1 - 3)  aluminum hydroxide/magnesium hydroxide/simethicone Suspension 30 milliLiter(s) Oral every 4 hours PRN Dyspepsia  dextrose Oral Gel 15 Gram(s) Oral once PRN Blood Glucose LESS THAN 70 milliGRAM(s)/deciliter  melatonin 3 milliGRAM(s) Oral at bedtime PRN Insomnia  ondansetron Injectable 4 milliGRAM(s) IV Push every 8 hours PRN Nausea and/or Vomiting    T(C): 36.8 (01-22-25 @ 10:20), Max: 37.1 (01-22-25 @ 04:15)  HR: 60 (01-22-25 @ 10:20) (56 - 60)  BP: 149/62 (01-22-25 @ 10:20) (136/66 - 152/64)  RR: 18 (01-22-25 @ 10:20) (16 - 18)  SpO2: 100% (01-22-25 @ 10:20) (100% - 100%)    CAPILLARY BLOOD GLUCOSE      POCT Blood Glucose.: 136 mg/dL (22 Jan 2025 12:56)  POCT Blood Glucose.: 104 mg/dL (22 Jan 2025 03:58)    I&O's Summary    21 Jan 2025 07:01  -  22 Jan 2025 07:00  --------------------------------------------------------  IN: 600 mL / OUT: 2600 mL / NET: -2000 mL        Constitutional: No fever, fatigue  Skin: No rash.  Eyes: No recent vision problems or eye pain.  ENT: No congestion, ear pain, or sore throat.  Cardiovascular: No chest pain or palpation.  Respiratory: No cough, shortness of breath, congestion, or wheezing.  Gastrointestinal: No abdominal pain, nausea, vomiting, or diarrhea.  Genitourinary: No dysuria.  Musculoskeletal: No joint swelling.  Neurologic: No headache.    PHYSICAL EXAM:  GENERAL: NAD  EYES: EOMI, PERRLA  NECK: Supple, No JVD  CHEST/LUNG: dec breath sounds at bases  HEART:  S1 , S2 +  ABDOMEN: soft , bs+  EXTREMITIES: no edema  NEUROLOGY:alert awake      LABS:                        10.8   11.62 )-----------( 476      ( 21 Jan 2025 19:50 )             33.7     01-22    136  |  97[L]  |  36[H]  ----------------------------<  99  4.7   |  24  |  4.59[H]    Ca    9.4      22 Jan 2025 06:38  Phos  4.6     01-21  Mg     2.70     01-21    TPro  7.3  /  Alb  3.6  /  TBili  0.3  /  DBili  x   /  AST  24  /  ALT  18  /  AlkPhos  84  01-22    PT/INR - ( 21 Jan 2025 21:08 )   PT: 11.6 sec;   INR: 1.00 ratio         PTT - ( 21 Jan 2025 21:08 )  PTT:31.6 sec      Urinalysis Basic - ( 22 Jan 2025 06:38 )    Color: x / Appearance: x / SG: x / pH: x  Gluc: 99 mg/dL / Ketone: x  / Bili: x / Urobili: x   Blood: x / Protein: x / Nitrite: x   Leuk Esterase: x / RBC: x / WBC x   Sq Epi: x / Non Sq Epi: x / Bacteria: x        RADIOLOGY & ADDITIONAL TESTS:    Imaging Personally Reviewed:    Consultant(s) Notes Reviewed:      Care Discussed with Consultants/Other Providers:

## 2025-01-22 NOTE — DISCHARGE NOTE NURSING/CASE MANAGEMENT/SOCIAL WORK - PATIENT PORTAL LINK FT
You can access the FollowMyHealth Patient Portal offered by [facility] by registering at the following website: [email or web address]. By joining [employer]’s FollowMyHealth portal, you will also be able to view your health information using other applications (apps) compatible with our system.

## 2025-01-22 NOTE — DISCHARGE NOTE PROVIDER - CARE PROVIDER_API CALL
Doug Gutierrez  Cardiovascular Disease  [address] [place] [address]  Phone: [phone]  Fax: [phone]  Follow Up Time:

## 2025-01-22 NOTE — DISCHARGE NOTE PROVIDER - NSDCMRMEDTOKEN_GEN_ALL_CORE_FT
aspirin 81 mg oral delayed release tablet: 1 tab(s) orally once a day  atorvastatin 40 mg oral tablet: 1 tab(s) orally once a day  bumetanide 2 mg oral tablet: 1 tab(s) orally 2 times a day  calcitriol 0.25 mcg oral capsule: 1 cap(s) orally once a week  calcium acetate 667 mg oral tablet: 1 tab(s) orally 3 times a day  cholecalciferol oral tablet: 1,000 unit(s) orally once a day  clopidogrel 75 mg oral tablet: 1 tab(s) orally once a day MDD: 1  Diovan 160 mg oral tablet: 1 tab(s) orally 2 times a day  doxazosin 4 mg oral tablet: 1 tab(s) orally once a day (at bedtime)  isosorbide mononitrate 60 mg oral tablet, extended release: 1 tab(s) orally once a day  labetalol 200 mg oral tablet: 1 tab(s) orally 2 times a day  levothyroxine 25 mcg (0.025 mg) oral tablet: 1 tab(s) orally once a day  NIFEdipine 60 mg oral tablet, extended release: 1 tab(s) orally once a day  Yee-Selena Rx oral tablet: 1 tab(s) orally once a day   aspirin 81 mg oral delayed release tablet: 1 tab(s) orally once a day  atorvastatin 40 mg oral tablet: 1 tab(s) orally once a day  bumetanide 2 mg oral tablet: 1 tab(s) orally 2 times a day  calcitriol 0.25 mcg oral capsule: 1 cap(s) orally once a week  calcium acetate 667 mg oral tablet: 1 tab(s) orally 3 times a day  cholecalciferol oral tablet: 1,000 unit(s) orally once a day  clopidogrel 75 mg oral tablet: 1 tab(s) orally once a day MDD: 1  Diovan 160 mg oral tablet: 1 tab(s) orally 2 times a day  doxazosin 4 mg oral tablet: 1 tab(s) orally once a day (at bedtime)  isosorbide mononitrate 60 mg oral tablet, extended release: 1 tab(s) orally once a day  labetalol 200 mg oral tablet: 0.5 tab(s) orally 2 times a day  levothyroxine 25 mcg (0.025 mg) oral tablet: 1 tab(s) orally once a day  NIFEdipine 60 mg oral tablet, extended release: 1 tab(s) orally once a day  Yee-Selena Rx oral tablet: 1 tab(s) orally once a day

## 2025-01-22 NOTE — DISCHARGE NOTE PROVIDER - HOSPITAL COURSE
69 M PMHx CAD S/P PCI (7/2024), HTN, HLD, hypothyroid, DM, ESRD on HD (S/P Permacath 1/15), recurrent pleural effusion S/P thoracentesis 1/2025 p/w malfunctioning Permacath. Nephro consulted. S/P cathflo. Pt tolerated HD w/o issue    Spoke with attending, Dr. Sepulveda, pt is medically stable for discharge to home      69 M PMHx CAD S/P PCI (7/2024), HTN, HLD, hypothyroid, DM, ESRD on HD (S/P Permacath 1/15), recurrent pleural effusion S/P thoracentesis 1/2025 p/w malfunctioning Permacath. Nephro consulted. S/P cathflo. Pt tolerated HD w/o issue    EKG noted with 1rst degree AVB. Asymptomatic. RPT EKG unchanged. Will resume Labetalol on DC to 100 mg BID as per attending.     Spoke with attending, Dr. Sepulveda, pt is medically stable for discharge to home

## 2025-01-22 NOTE — DISCHARGE NOTE PROVIDER - NSDCFUSCHEDAPPT_GEN_ALL_CORE_FT
Amara Garcia  [facility]  NEPHRO [address] [place] [address]  Scheduled Appointment: 03/20/2025     Félix Galan  [facility]  CARDIOLOGY [address]   Scheduled Appointment: 01/27/2025    Amara Garcia  [facility]  NEPHRO [address] [facility]  Scheduled Appointment: 03/20/2025

## 2025-01-22 NOTE — DISCHARGE NOTE PROVIDER - NSDCCPCAREPLAN_GEN_ALL_CORE_FT
PRINCIPAL DISCHARGE DIAGNOSIS  Diagnosis: Hemodialysis catheter malfunction  Assessment and Plan of Treatment: Resolved. Please continue to follow your dialysis schedule and refer to your primary provider for further care/recommendations. Continue your medications and supplementation as directed.        SECONDARY DISCHARGE DIAGNOSES  Diagnosis: Hyperlipidemia  Assessment and Plan of Treatment: Continue cholesterol control medications. Continue DASH diet. Follow up with your PCP within 1 week of discharge for further management and monitoring of lipid and cholesterol panels.      Diagnosis: CAD (coronary artery disease)  Assessment and Plan of Treatment: Continue with home Aspirin, Plavix, statin. Follow up outpatient PCP in 1-2 weeks for further management. Monitor for chest pain, shortness of breath, palpitations.    Diagnosis: Hypothyroidism  Assessment and Plan of Treatment: Continue your thyroid medications as recommended and follow-up with your outpatient provider for continual thyroid function testing and further medication management.      Diagnosis: T2DM (type 2 diabetes mellitus)  Assessment and Plan of Treatment: Continue consistent carbohydrate diet.  Monitor blood glucose levels throughout the day before meals and at bedtime.  Record blood sugars and bring to outpatient providers appointment in order to be reviewed by your doctor for management modifications.  Be aware of diabetes management symptoms including feeling cool and clammy may be related to low glucose levels.  Feeling hot and dry may indicate high glucose levels. If you feel these symptoms, check your blood sugar.  Make regular podiatry appointments in order to have feet checked for wounds and toe nails cut by a doctor to prevent infections, as well as, appointments with an ophthalmologist to monitor your vision.      Diagnosis: Hypertension  Assessment and Plan of Treatment: Continue blood pressure medication regimen as directed. Monitor for any visual changes, headaches or dizziness.  Monitor blood pressure regularly.  Follow up with your PCP for further management for high blood pressure.       PRINCIPAL DISCHARGE DIAGNOSIS  Diagnosis: Hemodialysis catheter malfunction  Assessment and Plan of Treatment: Resolved. Please continue to follow your dialysis schedule and refer to your primary provider for further care/recommendations. Continue your medications and supplementation as directed.        SECONDARY DISCHARGE DIAGNOSES  Diagnosis: Hyperlipidemia  Assessment and Plan of Treatment: Continue cholesterol control medications. Continue DASH diet. Follow up with your PCP within 1 week of discharge for further management and monitoring of lipid and cholesterol panels.      Diagnosis: CAD (coronary artery disease)  Assessment and Plan of Treatment: Continue with home Aspirin, Plavix, statin. Follow up outpatient PCP in 1-2 weeks for further management. Monitor for chest pain, shortness of breath, palpitations.    Diagnosis: Hypothyroidism  Assessment and Plan of Treatment: Continue your thyroid medications as recommended and follow-up with your outpatient provider for continual thyroid function testing and further medication management.      Diagnosis: T2DM (type 2 diabetes mellitus)  Assessment and Plan of Treatment: Continue consistent carbohydrate diet.  Monitor blood glucose levels throughout the day before meals and at bedtime.  Record blood sugars and bring to outpatient providers appointment in order to be reviewed by your doctor for management modifications.  Be aware of diabetes management symptoms including feeling cool and clammy may be related to low glucose levels.  Feeling hot and dry may indicate high glucose levels. If you feel these symptoms, check your blood sugar.  Make regular podiatry appointments in order to have feet checked for wounds and toe nails cut by a doctor to prevent infections, as well as, appointments with an ophthalmologist to monitor your vision.      Diagnosis: Hypertension  Assessment and Plan of Treatment: Continue blood pressure medication regimen as directed. You were noted with low heart rate, please reduce your Labetalol to 100 mg twice daily. Monitor for any visual changes, headaches or dizziness.  Monitor blood pressure regularly.  Follow up with your PCP for further management for high blood pressure.       PRINCIPAL DISCHARGE DIAGNOSIS  Diagnosis: Hemodialysis catheter malfunction  Assessment and Plan of Treatment: Resolved. Please continue to follow your dialysis schedule and refer to your primary provider for further care/recommendations. Continue your medications and supplementation as directed.        SECONDARY DISCHARGE DIAGNOSES  Diagnosis: Hyperlipidemia  Assessment and Plan of Treatment: Continue cholesterol control medications. Continue DASH diet. Follow up with your PCP within 1 week of discharge for further management and monitoring of lipid and cholesterol panels.      Diagnosis: CAD (coronary artery disease)  Assessment and Plan of Treatment: Continue with home Aspirin, Plavix, statin. Follow up outpatient as scheduled with cardiologist (Dr. Galan) on 1/27 at 4 PM for further management. Monitor for chest pain, shortness of breath, palpitations.    Diagnosis: Hypothyroidism  Assessment and Plan of Treatment: Continue your thyroid medications as recommended and follow-up with your outpatient provider for continual thyroid function testing and further medication management.      Diagnosis: T2DM (type 2 diabetes mellitus)  Assessment and Plan of Treatment: Continue consistent carbohydrate diet.  Monitor blood glucose levels throughout the day before meals and at bedtime.  Record blood sugars and bring to outpatient providers appointment in order to be reviewed by your doctor for management modifications.  Be aware of diabetes management symptoms including feeling cool and clammy may be related to low glucose levels.  Feeling hot and dry may indicate high glucose levels. If you feel these symptoms, check your blood sugar.  Make regular podiatry appointments in order to have feet checked for wounds and toe nails cut by a doctor to prevent infections, as well as, appointments with an ophthalmologist to monitor your vision.      Diagnosis: Hypertension  Assessment and Plan of Treatment: Continue blood pressure medication regimen as directed. You were noted with low heart rate, please reduce your Labetalol to 100 mg twice daily. Monitor for any visual changes, headaches or dizziness.  Monitor blood pressure regularly.  Follow up with your PCP for further management for high blood pressure.

## 2025-01-22 NOTE — DISCHARGE NOTE NURSING/CASE MANAGEMENT/SOCIAL WORK - FINANCIAL ASSISTANCE
[facility] provides services at a reduced cost to those who are determined to be eligible through [facility]’s financial assistance program. Information regarding [facility]’s financial assistance program can be found by going to [email or web address] or by calling [phone].

## 2025-01-22 NOTE — CONSULT NOTE ADULT - SUBJECTIVE AND OBJECTIVE BOX
[facility] [place] [facility]   MD MESSI ROSE MD ANGELA WONG, PA        TEL:  OFFICE: [phone]  From 5pm-7am answering service [phone]    --- INITIAL RENAL CONSULT NOTE ---date of service 01-22-25 @ 11:40    HPI:   69-year-old male history of CAD status post PCI  in 7/2024.(  on  plavix and asa), HTN, HLD, Hypothyroidism, T2DM(diet controlled) , ESRD (was on peritoneal dialysis/ s/p PD cath removal on 1/8, recent Tunneled HD  catheter placement on 1/17/25) ,  Recurrent Pleural  effusion status post thoracentesis in 1/2025 presents to the ED due to malfunctioning recent HD tunneled catheter, no pain and no bleeding at the HD catheter site.    Last dialysis was on 1/18/25.  Patient currently denies any symptoms, ROS negative for fever, chills, nausea, vomiting, chest pain, shortness of breath, abdominal pain, dysuria   nephrology consulted for dialysis needs      Allergies:  No Known Allergies  hydrALAZINE (Short breath)      PAST MEDICAL & SURGICAL HISTORY:  Hypertension      ESRD on peritoneal dialysis      CVA (cerebrovascular accident)  2010 without residual effects      Hyperlipidemia      BPH (benign prostatic hyperplasia)      CAD (coronary artery disease)      T2DM (type 2 diabetes mellitus)      Hypothyroidism      History of peritoneal dialysis  s/p PD catheter placement      S/P coronary artery stent placement          Home Medications Reviewed    Hospital Medications:   MEDICATIONS  (STANDING):  aspirin enteric coated 81 milliGRAM(s) Oral daily  atorvastatin 40 milliGRAM(s) Oral at bedtime  buMETAnide 2 milliGRAM(s) Oral two times a day  calcium acetate 667 milliGRAM(s) Oral three times a day with meals  chlorhexidine 2% Cloths 1 Application(s) Topical daily  cholecalciferol 1000 Unit(s) Oral daily  clopidogrel Tablet 75 milliGRAM(s) Oral daily  dextrose 5%. 1000 milliLiter(s) (100 mL/Hr) IV Continuous <Continuous>  dextrose 5%. 1000 milliLiter(s) (50 mL/Hr) IV Continuous <Continuous>  dextrose 50% Injectable 25 Gram(s) IV Push once  dextrose 50% Injectable 12.5 Gram(s) IV Push once  dextrose 50% Injectable 25 Gram(s) IV Push once  doxazosin 4 milliGRAM(s) Oral at bedtime  glucagon  Injectable 1 milliGRAM(s) IntraMuscular once  insulin lispro (ADMELOG) corrective regimen sliding scale   SubCutaneous three times a day before meals  isosorbide   mononitrate ER Tablet (IMDUR) 60 milliGRAM(s) Oral daily  levothyroxine 25 MICROGram(s) Oral daily  multivitamin 1 Tablet(s) Oral daily  NIFEdipine XL 60 milliGRAM(s) Oral daily  valsartan 160 milliGRAM(s) Oral two times a day      SOCIAL HISTORY:  Denies ETOh, Smoking,     FAMILY HISTORY:  FH: HTN (hypertension) (Mother, Father)    FH: type 2 diabetes (Mother, Father)        REVIEW OF SYSTEMS:  CONSTITUTIONAL: No weakness, fevers or chills  EYES/ENT: No visual changes;  No vertigo or throat pain   NECK: No pain or stiffness  RESPIRATORY: No cough, wheezing, hemoptysis; No shortness of breath  CARDIOVASCULAR: No chest pain or palpitations.  GASTROINTESTINAL: No abdominal or epigastric pain. No nausea, vomiting, or hematemesis; No diarrhea or constipation. No melena or hematochezia.  GENITOURINARY: No dysuria, frequency, foamy urine, urinary urgency, incontinence or hematuria  NEUROLOGICAL: No numbness or weakness  SKIN: No itching, burning, rashes, or lesions   VASCULAR: No bilateral lower extremity edema.   All other review of systems is negative unless indicated above.    VITALS:  T(F): 98.3 (01-22-25 @ 10:20), Max: 98.8 (01-22-25 @ 04:15)  HR: 60 (01-22-25 @ 10:20)  BP: 149/62 (01-22-25 @ 10:20)  RR: 18 (01-22-25 @ 10:20)  SpO2: 100% (01-22-25 @ 10:20)  Wt(kg): --    01-21 @ 07:01  -  01-22 @ 07:00  --------------------------------------------------------  IN: 600 mL / OUT: 2600 mL / NET: -2000 mL      Height (cm): 172.7 (01-21 @ 16:55)  Weight (kg): 54.4 (01-21 @ 16:55)  BMI (kg/m2): 18.2 (01-21 @ 16:55)  BSA (m2): 1.64 (01-21 @ 16:55)    PHYSICAL EXAM:  General: NAD  HEENT: anicteric sclera, oropharynx clear, MMM  Neck: No JVD  Respiratory: decrease bs at right  Cardiovascular: S1, S2, RRR  Gastrointestinal: BS+, soft, NT/ND  Extremities: No cyanosis or clubbing. No peripheral edema  Neurological: A/O x 3, no focal deficits  Psychiatric: Normal mood, normal affect  : No CVA tenderness. No brand.   Skin: No rashes  Vascular Access: pc    LABS:  01-22    136  |  97[L]  |  36[H]  ----------------------------<  99  4.7   |  24  |  4.59[H]    Ca    9.4      22 Jan 2025 06:38  Phos  4.6     01-21  Mg     2.70     01-21    TPro  7.3  /  Alb  3.6  /  TBili  0.3  /  DBili      /  AST  24  /  ALT  18  /  AlkPhos  84  01-22    Creatinine Trend: 4.59 <--, 7.34 <--, 7.23 <--, 5.02 <--, 7.04 <--, 5.24 <--                        10.8   11.62 )-----------( 476      ( 21 Jan 2025 19:50 )             33.7     Urine Studies:  Urinalysis Basic - ( 22 Jan 2025 06:38 )    Color:  / Appearance:  / SG:  / pH:   Gluc: 99 mg/dL / Ketone:   / Bili:  / Urobili:    Blood:  / Protein:  / Nitrite:    Leuk Esterase:  / RBC:  / WBC    Sq Epi:  / Non Sq Epi:  / Bacteria:           RADIOLOGY & ADDITIONAL STUDIES:

## 2025-01-22 NOTE — CONSULT NOTE ADULT - ASSESSMENT
69-year-old male, with past history of CAD - s/p Stent, HTN, CAV, ESRD (on Peritoneal dialysis), DM and BPH presented to the ED with shortness of breath. Similar to presentation about 1 Month ago. Nephro called for ESRD    A/P  ESRD on HD TTS via PC  From conduit   Follows with nephro Dr. Menjivar   PC cathter not functioning s/p cathflo with successful dialysis 1/21  dc planning  hd tmr  Consent obtained, witnessed, placed in chart.  Monitor bmp.    HTN  BP controlled  continue home meds  UF w/ HD as tolerated.  low sodium diet  ALLERGIC To hydralazine, please avoid  monitor bp     Anemia   Epo w/ HD.  better.  monitor hgb     CKD-MBD   acceptable   Continue calcium acetate 667 mg tid  Low PO4 diet.  monitor Ca, PO4 daily

## 2025-01-22 NOTE — PROVIDER CONTACT NOTE (OTHER) - SITUATION
Patient is refusing fingerstick at this time. Patient was educated about why finger sticks have been ordered and the importance about adhering to checking his finger stick as frequently as ordered.

## 2025-01-25 ENCOUNTER — INPATIENT (INPATIENT)
Facility: HOSPITAL | Age: 70
LOS: 5 days | Discharge: HOME | End: 2025-01-31
Attending: INTERNAL MEDICINE | Admitting: INTERNAL MEDICINE
Payer: MEDICARE

## 2025-01-25 VITALS
RESPIRATION RATE: 17 BRPM | HEART RATE: 60 BPM | HEIGHT: 68 IN | OXYGEN SATURATION: 99 % | DIASTOLIC BLOOD PRESSURE: 72 MMHG | SYSTOLIC BLOOD PRESSURE: 111 MMHG | TEMPERATURE: 98 F | WEIGHT: 115.08 LBS

## 2025-01-25 DIAGNOSIS — I10 ESSENTIAL (PRIMARY) HYPERTENSION: ICD-10-CM

## 2025-01-25 DIAGNOSIS — I25.10 ATHEROSCLEROTIC HEART DISEASE OF NATIVE CORONARY ARTERY WITHOUT ANGINA PECTORIS: ICD-10-CM

## 2025-01-25 DIAGNOSIS — E78.5 HYPERLIPIDEMIA, UNSPECIFIED: ICD-10-CM

## 2025-01-25 DIAGNOSIS — Z29.9 ENCOUNTER FOR PROPHYLACTIC MEASURES, UNSPECIFIED: ICD-10-CM

## 2025-01-25 DIAGNOSIS — T82.41XA BREAKDOWN (MECHANICAL) OF VASCULAR DIALYSIS CATHETER, INITIAL ENCOUNTER: ICD-10-CM

## 2025-01-25 DIAGNOSIS — Z95.5 PRESENCE OF CORONARY ANGIOPLASTY IMPLANT AND GRAFT: Chronic | ICD-10-CM

## 2025-01-25 DIAGNOSIS — E03.9 HYPOTHYROIDISM, UNSPECIFIED: ICD-10-CM

## 2025-01-25 DIAGNOSIS — Z98.890 OTHER SPECIFIED POSTPROCEDURAL STATES: Chronic | ICD-10-CM

## 2025-01-25 LAB
ALBUMIN SERPL ELPH-MCNC: 3.5 G/DL — SIGNIFICANT CHANGE UP (ref 3.3–5)
ALP SERPL-CCNC: 84 U/L — SIGNIFICANT CHANGE UP (ref 40–120)
ALT FLD-CCNC: 14 U/L — SIGNIFICANT CHANGE UP (ref 4–41)
ANION GAP SERPL CALC-SCNC: 19 MMOL/L — HIGH (ref 7–14)
AST SERPL-CCNC: 19 U/L — SIGNIFICANT CHANGE UP (ref 4–40)
BASOPHILS # BLD AUTO: 0.09 K/UL — SIGNIFICANT CHANGE UP (ref 0–0.2)
BASOPHILS NFR BLD AUTO: 1 % — SIGNIFICANT CHANGE UP (ref 0–2)
BILIRUB SERPL-MCNC: 0.3 MG/DL — SIGNIFICANT CHANGE UP (ref 0.2–1.2)
BUN SERPL-MCNC: 64 MG/DL — HIGH (ref 7–23)
CALCIUM SERPL-MCNC: 9.4 MG/DL — SIGNIFICANT CHANGE UP (ref 8.4–10.5)
CHLORIDE SERPL-SCNC: 94 MMOL/L — LOW (ref 98–107)
CO2 SERPL-SCNC: 22 MMOL/L — SIGNIFICANT CHANGE UP (ref 22–31)
CREAT SERPL-MCNC: 6.94 MG/DL — HIGH (ref 0.5–1.3)
EGFR: 8 ML/MIN/1.73M2 — LOW
EOSINOPHIL # BLD AUTO: 0.66 K/UL — HIGH (ref 0–0.5)
EOSINOPHIL NFR BLD AUTO: 7.7 % — HIGH (ref 0–6)
GLUCOSE BLDC GLUCOMTR-MCNC: 116 MG/DL — HIGH (ref 70–99)
GLUCOSE BLDC GLUCOMTR-MCNC: 206 MG/DL — HIGH (ref 70–99)
GLUCOSE BLDC GLUCOMTR-MCNC: 97 MG/DL — SIGNIFICANT CHANGE UP (ref 70–99)
GLUCOSE BLDC GLUCOMTR-MCNC: 98 MG/DL — SIGNIFICANT CHANGE UP (ref 70–99)
GLUCOSE SERPL-MCNC: 156 MG/DL — HIGH (ref 70–99)
HCT VFR BLD CALC: 31 % — LOW (ref 39–50)
HGB BLD-MCNC: 9.8 G/DL — LOW (ref 13–17)
IANC: 5.06 K/UL — SIGNIFICANT CHANGE UP (ref 1.8–7.4)
IMM GRANULOCYTES NFR BLD AUTO: 0.7 % — SIGNIFICANT CHANGE UP (ref 0–0.9)
LYMPHOCYTES # BLD AUTO: 1.61 K/UL — SIGNIFICANT CHANGE UP (ref 1–3.3)
LYMPHOCYTES # BLD AUTO: 18.7 % — SIGNIFICANT CHANGE UP (ref 13–44)
MCHC RBC-ENTMCNC: 27.5 PG — SIGNIFICANT CHANGE UP (ref 27–34)
MCHC RBC-ENTMCNC: 31.6 G/DL — LOW (ref 32–36)
MCV RBC AUTO: 87.1 FL — SIGNIFICANT CHANGE UP (ref 80–100)
MONOCYTES # BLD AUTO: 1.11 K/UL — HIGH (ref 0–0.9)
MONOCYTES NFR BLD AUTO: 12.9 % — SIGNIFICANT CHANGE UP (ref 2–14)
NEUTROPHILS # BLD AUTO: 5.06 K/UL — SIGNIFICANT CHANGE UP (ref 1.8–7.4)
NEUTROPHILS NFR BLD AUTO: 59 % — SIGNIFICANT CHANGE UP (ref 43–77)
NRBC # BLD AUTO: 0 K/UL — SIGNIFICANT CHANGE UP (ref 0–0)
NRBC # BLD: 0 /100 WBCS — SIGNIFICANT CHANGE UP (ref 0–0)
NRBC # FLD: 0 K/UL — SIGNIFICANT CHANGE UP (ref 0–0)
NRBC BLD-RTO: 0 /100 WBCS — SIGNIFICANT CHANGE UP (ref 0–0)
PLATELET # BLD AUTO: 430 K/UL — HIGH (ref 150–400)
POTASSIUM SERPL-MCNC: 4.8 MMOL/L — SIGNIFICANT CHANGE UP (ref 3.5–5.3)
POTASSIUM SERPL-SCNC: 4.8 MMOL/L — SIGNIFICANT CHANGE UP (ref 3.5–5.3)
PROT SERPL-MCNC: 7.2 G/DL — SIGNIFICANT CHANGE UP (ref 6–8.3)
RBC # BLD: 3.56 M/UL — LOW (ref 4.2–5.8)
RBC # FLD: 21.5 % — HIGH (ref 10.3–14.5)
SODIUM SERPL-SCNC: 135 MMOL/L — SIGNIFICANT CHANGE UP (ref 135–145)
WBC # BLD: 8.59 K/UL — SIGNIFICANT CHANGE UP (ref 3.8–10.5)
WBC # FLD AUTO: 8.59 K/UL — SIGNIFICANT CHANGE UP (ref 3.8–10.5)

## 2025-01-25 PROCEDURE — 99285 EMERGENCY DEPT VISIT HI MDM: CPT | Mod: FS

## 2025-01-25 PROCEDURE — 99223 1ST HOSP IP/OBS HIGH 75: CPT

## 2025-01-25 RX ORDER — DM/PSEUDOEPHED/ACETAMINOPHEN 10-30-250
25 CAPSULE ORAL ONCE
Refills: 0 | Status: DISCONTINUED | OUTPATIENT
Start: 2025-01-25 | End: 2025-01-31

## 2025-01-25 RX ORDER — ALTEPLASE 100 MG
2 KIT INTRAVENOUS ONCE
Refills: 0 | Status: COMPLETED | OUTPATIENT
Start: 2025-01-25 | End: 2025-01-25

## 2025-01-25 RX ORDER — DM/PSEUDOEPHED/ACETAMINOPHEN 10-30-250
12.5 CAPSULE ORAL ONCE
Refills: 0 | Status: DISCONTINUED | OUTPATIENT
Start: 2025-01-25 | End: 2025-01-31

## 2025-01-25 RX ORDER — INSULIN LISPRO 100/ML
VIAL (ML) SUBCUTANEOUS
Refills: 0 | Status: DISCONTINUED | OUTPATIENT
Start: 2025-01-25 | End: 2025-01-31

## 2025-01-25 RX ORDER — ATORVASTATIN CALCIUM 80 MG/1
40 TABLET, FILM COATED ORAL AT BEDTIME
Refills: 0 | Status: DISCONTINUED | OUTPATIENT
Start: 2025-01-25 | End: 2025-01-31

## 2025-01-25 RX ORDER — DOXAZOSIN MESYLATE 4 MG
4 TABLET ORAL AT BEDTIME
Refills: 0 | Status: DISCONTINUED | OUTPATIENT
Start: 2025-01-25 | End: 2025-01-31

## 2025-01-25 RX ORDER — ACETAMINOPHEN 160 MG/5ML
650 SUSPENSION ORAL EVERY 6 HOURS
Refills: 0 | Status: DISCONTINUED | OUTPATIENT
Start: 2025-01-25 | End: 2025-01-31

## 2025-01-25 RX ORDER — ASPIRIN 81 MG/1
81 TABLET, COATED ORAL DAILY
Refills: 0 | Status: DISCONTINUED | OUTPATIENT
Start: 2025-01-25 | End: 2025-01-31

## 2025-01-25 RX ORDER — VALSARTAN 80 MG
160 TABLET ORAL DAILY
Refills: 0 | Status: DISCONTINUED | OUTPATIENT
Start: 2025-01-25 | End: 2025-01-31

## 2025-01-25 RX ORDER — INSULIN LISPRO 100/ML
VIAL (ML) SUBCUTANEOUS AT BEDTIME
Refills: 0 | Status: DISCONTINUED | OUTPATIENT
Start: 2025-01-25 | End: 2025-01-31

## 2025-01-25 RX ORDER — MAGNESIUM, ALUMINUM HYDROXIDE 200-225/5
30 SUSPENSION, ORAL (FINAL DOSE FORM) ORAL EVERY 4 HOURS
Refills: 0 | Status: DISCONTINUED | OUTPATIENT
Start: 2025-01-25 | End: 2025-01-31

## 2025-01-25 RX ORDER — CALCIUM ACETATE 667 MG/1
667 CAPSULE ORAL
Refills: 0 | Status: DISCONTINUED | OUTPATIENT
Start: 2025-01-25 | End: 2025-01-31

## 2025-01-25 RX ORDER — GLUCAGON 3 MG/1
1 POWDER NASAL ONCE
Refills: 0 | Status: DISCONTINUED | OUTPATIENT
Start: 2025-01-25 | End: 2025-01-31

## 2025-01-25 RX ORDER — ANTISEPTIC SURGICAL SCRUB 0.04 MG/ML
1 SOLUTION TOPICAL DAILY
Refills: 0 | Status: DISCONTINUED | OUTPATIENT
Start: 2025-01-25 | End: 2025-01-31

## 2025-01-25 RX ORDER — LEVOTHYROXINE SODIUM 25 UG/1
25 TABLET ORAL DAILY
Refills: 0 | Status: DISCONTINUED | OUTPATIENT
Start: 2025-01-25 | End: 2025-01-31

## 2025-01-25 RX ORDER — ACETAMINOPHEN, DIPHENHYDRAMINE HCL, PHENYLEPHRINE HCL 325; 25; 5 MG/1; MG/1; MG/1
3 TABLET ORAL AT BEDTIME
Refills: 0 | Status: DISCONTINUED | OUTPATIENT
Start: 2025-01-25 | End: 2025-01-31

## 2025-01-25 RX ORDER — ONDANSETRON 4 MG/1
4 TABLET, ORALLY DISINTEGRATING ORAL EVERY 8 HOURS
Refills: 0 | Status: DISCONTINUED | OUTPATIENT
Start: 2025-01-25 | End: 2025-01-31

## 2025-01-25 RX ORDER — MECOBAL/LEVOMEFOLAT CA/B6 PHOS 2-3-35 MG
1 TABLET ORAL DAILY
Refills: 0 | Status: DISCONTINUED | OUTPATIENT
Start: 2025-01-25 | End: 2025-01-31

## 2025-01-25 RX ORDER — DM/PSEUDOEPHED/ACETAMINOPHEN 10-30-250
15 CAPSULE ORAL ONCE
Refills: 0 | Status: DISCONTINUED | OUTPATIENT
Start: 2025-01-25 | End: 2025-01-31

## 2025-01-25 RX ORDER — LABETALOL HYDROCHLORIDE 300 MG/1
100 TABLET, FILM COATED ORAL
Refills: 0 | Status: DISCONTINUED | OUTPATIENT
Start: 2025-01-25 | End: 2025-01-31

## 2025-01-25 RX ORDER — SODIUM CHLORIDE 9 G/ML
1000 INJECTION, SOLUTION INTRAVENOUS
Refills: 0 | Status: DISCONTINUED | OUTPATIENT
Start: 2025-01-25 | End: 2025-01-31

## 2025-01-25 RX ORDER — BUMETANIDE 2 MG/1
2 TABLET ORAL
Refills: 0 | Status: DISCONTINUED | OUTPATIENT
Start: 2025-01-25 | End: 2025-01-31

## 2025-01-25 RX ORDER — NIFEDIPINE 90 MG/1
60 TABLET, FILM COATED, EXTENDED RELEASE ORAL DAILY
Refills: 0 | Status: DISCONTINUED | OUTPATIENT
Start: 2025-01-25 | End: 2025-01-31

## 2025-01-25 RX ADMIN — ALTEPLASE 2 MILLIGRAM(S): KIT at 19:30

## 2025-01-25 RX ADMIN — LABETALOL HYDROCHLORIDE 100 MILLIGRAM(S): 300 TABLET, FILM COATED ORAL at 18:43

## 2025-01-25 NOTE — ED ADULT NURSE NOTE - OBJECTIVE STATEMENT
Pt is A&OX4. Pt is ambulatory independently at baseline. Pt reporting to the ED from HD center for HD. Pt stated that the center was unable to connect HD machine to his Shiley cath on R side chest. Last treatment was Thursday. Past Hx of CVA, HTN, ESRD, DM. Pt denies any pain at this time. Pt fistula on upper R side chest is intact. Pt denies SOB, fever, chills, N/V/D. Heart sounds S1 and S2 heard upon auscultation. +2 pulses palpated in all extremities. Capillary refill less than 3 seconds. Breathing even and unlabored. Clear breath sounds. Neuro status in tact + facial symmetry, PERRLA, strength equal in all extremities, sensory in tact. Skin warm dry and in tact. Color normal for race. No pedal edema. Abdomen soft, non tender, non distended. Normoactive bowel sounds heard in all four quadrants. No urinary symptoms. 20 G IV placed in L AC. Pt on cardiac monitor. Pt is lying in stretcher, bed in lowest position, side rails up, call bell within reach. Waiting for results.

## 2025-01-25 NOTE — CHART NOTE - NSCHARTNOTEFT_GEN_A_CORE
IR was contacted about poor flows in the tunneled HD catheter during dialysis. Plan was discussed with the team for a trial of cathflo and dialysis.   If unsuccessful, can set patient up for possible tunneled HD catheter exchange. IR was contacted about poor flows in the tunneled HD catheter during dialysis. Plan was discussed with the team for a trial of cathflo and dialysis. Recommend chest xray to assess position of catheter tip.   If unsuccessful, can set patient up for possible tunneled HD catheter exchange.    --  Zeina Espinosa MD  Interventional Radiology Resident (PGY-5)  Available on [employer] TEAMS    For EMERGENT inquiries/questions:  IR Pager ([facility]): [phone]  IR Pager ([facility]): [phone] ; [phone]    For non-emergent consults/questions:   Please place a sunrise order "Consult- Interventional Radiology" with an appropriate callback number    For questions about scheduling during appropriate work hours, call IR :  [facility]: [phone]  [facility]: [phone]    For outpatient IR booking:  [facility]: [phone]  [facility]: [phone] or [email or web address]

## 2025-01-25 NOTE — H&P ADULT - PROBLEM SELECTOR PLAN 1
-pt with malfunctioning HD catheter at outpt facility, unable to receive HD thus came to [facility]  -neph consulted, cath marcello administered, patient now receiving HD inpatient  -per HD nurse catheter pressure noted to be markedly elevated, likely needs IR re-eval  -f/u with IR on Monday for catheter assessment  -f/u additional nephro recs  -nephro diet

## 2025-01-25 NOTE — ED PROVIDER NOTE - CLINICAL SUMMARY MEDICAL DECISION MAKING FREE TEXT BOX
68 y/o male pmh htn, hld, ESRD on HD t,th,sat sent from dialysis for malfunctioning R chest wall tunneled HD cath. Pt was unable to be dialyzed. Pt was admitted x 3 days ago for similar symptoms. WIll check labs, admit for dialysis, likely IR for eval of tunneld cath

## 2025-01-25 NOTE — H&P ADULT - NSHPPHYSICALEXAM_GEN_ALL_CORE
VITALS:   T(C): 36.2 (01-25-25 @ 18:21), Max: 36.7 (01-25-25 @ 15:32)  HR: 59 (01-25-25 @ 19:21) (53 - 60)  BP: 196/78 (01-25-25 @ 19:21) (111/72 - 205/81)  RR: 18 (01-25-25 @ 19:21) (17 - 18)  SpO2: 100% (01-25-25 @ 19:21) (99% - 100%)    GENERAL: NAD, lying in bed comfortably, thin appearance  HEAD:  Atraumatic, normocephalic  EYES: EOMI, PERRLA, conjunctiva and sclera clear  ENT: Moist mucous membranes  NECK: Supple, no JVD  HEART: Regular rate and rhythm, no murmurs, rubs, or gallops  LUNGS: Unlabored respirations.  Clear to auscultation bilaterally, no crackles, wheezing, or rhonchi  ABDOMEN: Soft, nontender, nondistended, +BS  EXTREMITIES: 2+ peripheral pulses bilaterally. No clubbing, cyanosis, or edema  NERVOUS SYSTEM:  A&Ox3, no focal deficits   SKIN: No rashes or lesions. Tunneled catheter on R upper chest w/o erythema/warmrth

## 2025-01-25 NOTE — H&P ADULT - ASSESSMENT
69M with PMH HTN, HLD, ESRD on HD T/TH/S, s/p new R chest wall tunneled HD cath on 1/17 now coming in for HD catheter malfunction noted at outpt HD session, had similar issue several days ago requiring admission.

## 2025-01-25 NOTE — H&P ADULT - HISTORY OF PRESENT ILLNESS
69M with PMH HTN, HLD, ESRD on HD T/TH/S, s/p new R chest wall tunneled HD cath on 1/17 now coming in for HD catheter malfunction. Patient reports that he was at HD outpatient facility today and was unable to receive HD as the catheter was malfunctioning, though is unable to remember specifically what the issue was. Had recent admission 1/21 for similar presentation and was discharged the following day after successful HD inpatient. Denies any symptoms today however BP noted to be elevated to 200's systolic in ED.    In the ED nephro consulted, patient given cathflo through tunnelled HD successfully and set up for HD this evening. Patient examined while received HD, tolerating well with no symptoms, BP improved to 140/70. Informed by HD nurse that patient's catheter pressures were noted to be elevated. IR consulted.

## 2025-01-25 NOTE — ED PROVIDER NOTE - ATTENDING APP SHARED VISIT CONTRIBUTION OF CARE
Attending MD Oneal:  I performed a history and physical exam of the patient and discussed their management with the resident. I reviewed the resident's note and agree with the documented findings and plan of care. My medical decision making and observations are found above.    Attending MD Oneal.  Agree with above.  Pt is a 70 yo male with pmhx of hypothyroidism, DMII, CAD, BPH, HLD, CVA, HTN, ESRD on peritoneal and now hemodialysis who presents to ED today 2/2 HD unable to connect HD machine to pt's shiley cath (in place to R chest).  Last dialyzed Thursday (2 days ago) and due for regular dialysis today.  Denies sxs.  No CP/SOB/light-headedness.  VS's c/w HTN.  Pt well appearing.  Nephro in house and to attempt in hosp dialysis + potential IR trouble shoot access.  Pt stable for admission.

## 2025-01-25 NOTE — H&P ADULT - PROBLEM SELECTOR PLAN 4
-[facility] 7/8/24 showing ostial LAD disease s/p PTCA  -c/w aspirin, plavix, statin

## 2025-01-25 NOTE — ED ADULT TRIAGE NOTE - CHIEF COMPLAINT QUOTE
pt reporting to the ED from HD center for HD. As per pt Center was unable to connect HD machine to  Shiley cath on R side chest. Last treatment Thursday. pmh of CVA, HTN, ESRD on HD., DM. Pt offering no com,plaints at this time.

## 2025-01-25 NOTE — ED PROVIDER NOTE - OBJECTIVE STATEMENT
68 y/o male pmh htn, hld, ESRD on HD t/th/sat, s/p new R chest wall tunneled HD catheter on 1/17 which is malfunctioning. Pt was sent in from dialysis center after being unable to initiate dialysis. Pt currently denies any complaint. Pt was admitted x3 days ago for similar complaint.

## 2025-01-25 NOTE — ED PROVIDER NOTE - PATIENT'S PREFERRED PRONOUN
[facility] Opioid Counseling: I discussed with the patient the potential side effects of opioids including but not limited to addiction, altered mental status, and depression. I stressed avoiding alcohol, benzodiazepines, muscle relaxants and sleep aids unless specifically okayed by a physician. The patient verbalized understanding of the proper use and possible adverse effects of opioids. All of the patient's questions and concerns were addressed. They were instructed to flush the remaining pills down the toilet if they did not need them for pain.

## 2025-01-25 NOTE — H&P ADULT - NSHPLABSRESULTS_GEN_ALL_CORE
LABS:                          9.8    8.59  )-----------( 430      ( 25 Jan 2025 16:26 )             31.0     01-25    135  |  94[L]  |  64[H]  ----------------------------<  156[H]  4.8   |  22  |  6.94[H]    Ca    9.4      25 Jan 2025 16:26    TPro  7.2  /  Alb  3.5  /  TBili  0.3  /  DBili  x   /  AST  19  /  ALT  14  /  AlkPhos  84  01-25

## 2025-01-26 LAB
A1C WITH ESTIMATED AVERAGE GLUCOSE RESULT: 5 % — SIGNIFICANT CHANGE UP (ref 4–5.6)
ALBUMIN SERPL ELPH-MCNC: 3.6 G/DL — SIGNIFICANT CHANGE UP (ref 3.3–5)
ALP SERPL-CCNC: 91 U/L — SIGNIFICANT CHANGE UP (ref 40–120)
ALT FLD-CCNC: 16 U/L — SIGNIFICANT CHANGE UP (ref 4–41)
ANION GAP SERPL CALC-SCNC: 20 MMOL/L — HIGH (ref 7–14)
AST SERPL-CCNC: 18 U/L — SIGNIFICANT CHANGE UP (ref 4–40)
BILIRUB SERPL-MCNC: 0.3 MG/DL — SIGNIFICANT CHANGE UP (ref 0.2–1.2)
BUN SERPL-MCNC: 31 MG/DL — HIGH (ref 7–23)
CALCIUM SERPL-MCNC: 9.4 MG/DL — SIGNIFICANT CHANGE UP (ref 8.4–10.5)
CHLORIDE SERPL-SCNC: 95 MMOL/L — LOW (ref 98–107)
CO2 SERPL-SCNC: 22 MMOL/L — SIGNIFICANT CHANGE UP (ref 22–31)
CREAT SERPL-MCNC: 4.6 MG/DL — HIGH (ref 0.5–1.3)
EGFR: 13 ML/MIN/1.73M2 — LOW
ESTIMATED AVERAGE GLUCOSE: 97 — SIGNIFICANT CHANGE UP
GLUCOSE BLDC GLUCOMTR-MCNC: 138 MG/DL — HIGH (ref 70–99)
GLUCOSE BLDC GLUCOMTR-MCNC: 149 MG/DL — HIGH (ref 70–99)
GLUCOSE BLDC GLUCOMTR-MCNC: 253 MG/DL — HIGH (ref 70–99)
GLUCOSE BLDC GLUCOMTR-MCNC: 84 MG/DL — SIGNIFICANT CHANGE UP (ref 70–99)
GLUCOSE SERPL-MCNC: 93 MG/DL — SIGNIFICANT CHANGE UP (ref 70–99)
HCT VFR BLD CALC: 33.7 % — LOW (ref 39–50)
HGB BLD-MCNC: 10.6 G/DL — LOW (ref 13–17)
MAGNESIUM SERPL-MCNC: 2.2 MG/DL — SIGNIFICANT CHANGE UP (ref 1.6–2.6)
MCHC RBC-ENTMCNC: 27.1 PG — SIGNIFICANT CHANGE UP (ref 27–34)
MCHC RBC-ENTMCNC: 31.5 G/DL — LOW (ref 32–36)
MCV RBC AUTO: 86.2 FL — SIGNIFICANT CHANGE UP (ref 80–100)
NRBC # BLD AUTO: 0 K/UL — SIGNIFICANT CHANGE UP (ref 0–0)
NRBC # BLD: 0 /100 WBCS — SIGNIFICANT CHANGE UP (ref 0–0)
NRBC # FLD: 0 K/UL — SIGNIFICANT CHANGE UP (ref 0–0)
NRBC BLD-RTO: 0 /100 WBCS — SIGNIFICANT CHANGE UP (ref 0–0)
PHOSPHATE SERPL-MCNC: 4.2 MG/DL — SIGNIFICANT CHANGE UP (ref 2.5–4.5)
PLATELET # BLD AUTO: 433 K/UL — HIGH (ref 150–400)
POTASSIUM SERPL-MCNC: 4 MMOL/L — SIGNIFICANT CHANGE UP (ref 3.5–5.3)
POTASSIUM SERPL-SCNC: 4 MMOL/L — SIGNIFICANT CHANGE UP (ref 3.5–5.3)
PROT SERPL-MCNC: 7.8 G/DL — SIGNIFICANT CHANGE UP (ref 6–8.3)
RBC # BLD: 3.91 M/UL — LOW (ref 4.2–5.8)
RBC # FLD: 21.8 % — HIGH (ref 10.3–14.5)
SODIUM SERPL-SCNC: 137 MMOL/L — SIGNIFICANT CHANGE UP (ref 135–145)
WBC # BLD: 7.06 K/UL — SIGNIFICANT CHANGE UP (ref 3.8–10.5)
WBC # FLD AUTO: 7.06 K/UL — SIGNIFICANT CHANGE UP (ref 3.8–10.5)

## 2025-01-26 PROCEDURE — 71045 X-RAY EXAM CHEST 1 VIEW: CPT | Mod: 26,XE

## 2025-01-26 PROCEDURE — 73130 X-RAY EXAM OF HAND: CPT | Mod: 26,RT

## 2025-01-26 PROCEDURE — 71046 X-RAY EXAM CHEST 2 VIEWS: CPT | Mod: 26

## 2025-01-26 RX ADMIN — CALCIUM ACETATE 667 MILLIGRAM(S): 667 CAPSULE ORAL at 13:04

## 2025-01-26 RX ADMIN — CALCIUM ACETATE 667 MILLIGRAM(S): 667 CAPSULE ORAL at 09:34

## 2025-01-26 RX ADMIN — CALCIUM ACETATE 667 MILLIGRAM(S): 667 CAPSULE ORAL at 17:21

## 2025-01-26 RX ADMIN — Medication 1 TABLET(S): at 10:08

## 2025-01-26 RX ADMIN — ANTISEPTIC SURGICAL SCRUB 1 APPLICATION(S): 0.04 SOLUTION TOPICAL at 10:06

## 2025-01-26 RX ADMIN — Medication 4 MILLIGRAM(S): at 22:34

## 2025-01-26 RX ADMIN — ATORVASTATIN CALCIUM 40 MILLIGRAM(S): 80 TABLET, FILM COATED ORAL at 00:02

## 2025-01-26 RX ADMIN — BUMETANIDE 2 MILLIGRAM(S): 2 TABLET ORAL at 13:05

## 2025-01-26 RX ADMIN — ACETAMINOPHEN 650 MILLIGRAM(S): 160 SUSPENSION ORAL at 15:39

## 2025-01-26 RX ADMIN — Medication 1000 UNIT(S): at 10:08

## 2025-01-26 RX ADMIN — Medication 75 MILLIGRAM(S): at 10:08

## 2025-01-26 RX ADMIN — Medication 160 MILLIGRAM(S): at 05:10

## 2025-01-26 RX ADMIN — LEVOTHYROXINE SODIUM 25 MICROGRAM(S): 25 TABLET ORAL at 05:11

## 2025-01-26 RX ADMIN — NIFEDIPINE 60 MILLIGRAM(S): 90 TABLET, FILM COATED, EXTENDED RELEASE ORAL at 05:11

## 2025-01-26 RX ADMIN — ATORVASTATIN CALCIUM 40 MILLIGRAM(S): 80 TABLET, FILM COATED ORAL at 22:35

## 2025-01-26 RX ADMIN — BUMETANIDE 2 MILLIGRAM(S): 2 TABLET ORAL at 05:11

## 2025-01-26 RX ADMIN — Medication 4 MILLIGRAM(S): at 00:02

## 2025-01-26 RX ADMIN — Medication 60 MILLIGRAM(S): at 10:13

## 2025-01-26 RX ADMIN — Medication 3: at 13:05

## 2025-01-26 RX ADMIN — ASPIRIN 81 MILLIGRAM(S): 81 TABLET, COATED ORAL at 10:07

## 2025-01-26 RX ADMIN — ACETAMINOPHEN 650 MILLIGRAM(S): 160 SUSPENSION ORAL at 16:39

## 2025-01-26 NOTE — PATIENT PROFILE ADULT - NSTRANSFERBELONGINGSRESP_GEN_A_NUR
Per Mom, Oscar Main has a round sprinkle in his left nose, I tried to blow it out but couldn't\"
yes

## 2025-01-26 NOTE — PROGRESS NOTE ADULT - ASSESSMENT
69-year-old male, with past history of CAD - s/p Stent, HTN, CAV, ESRD (on Peritoneal dialysis), DM and BPH presented to the ED with shortness of breath. Similar to presentation about 1 Month ago. Nephro called for ESRD    A/P  ESRD on HD TTS via PC  Catheter site appears to without evidence of infection   From [facility] conduit   Follows with nephro Dr. Menjivar   S/p HD in hosp on -01/25 tolerated well  PC cathter not functioning OP however seemed to work better in hospital post cathflo (also similar results when hospitalized last visit)  Although HD RN reports poor flow  Consider permcath exchange as having issues with OP dialysis  Appreciate IR input  Consent obtained, witnessed, placed in chart.  Monitor bmp.    HTN  BP controlled  continue home meds  UF w/ HD as tolerated.  low sodium diet  ALLERGIC To hydralazine, please avoid  monitor bp     Anemia   Hold EPO, given catheter issue will reval   monitor hgb     CKD-MBD   acceptable   Continue calcium acetate 667 mg tid  Low PO4 diet.  monitor Ca, PO4 daily

## 2025-01-26 NOTE — PATIENT PROFILE ADULT - FALL HARM RISK - HARM RISK INTERVENTIONS

## 2025-01-27 ENCOUNTER — APPOINTMENT (OUTPATIENT)
Dept: CARDIOLOGY | Facility: CLINIC | Age: 70
End: 2025-01-27

## 2025-01-27 LAB
ANION GAP SERPL CALC-SCNC: 20 MMOL/L — HIGH (ref 7–14)
BUN SERPL-MCNC: 55 MG/DL — HIGH (ref 7–23)
CALCIUM SERPL-MCNC: 8.9 MG/DL — SIGNIFICANT CHANGE UP (ref 8.4–10.5)
CHLORIDE SERPL-SCNC: 93 MMOL/L — LOW (ref 98–107)
CO2 SERPL-SCNC: 22 MMOL/L — SIGNIFICANT CHANGE UP (ref 22–31)
CREAT SERPL-MCNC: 6.86 MG/DL — HIGH (ref 0.5–1.3)
EGFR: 8 ML/MIN/1.73M2 — LOW
GLUCOSE BLDC GLUCOMTR-MCNC: 109 MG/DL — HIGH (ref 70–99)
GLUCOSE BLDC GLUCOMTR-MCNC: 112 MG/DL — HIGH (ref 70–99)
GLUCOSE BLDC GLUCOMTR-MCNC: 140 MG/DL — HIGH (ref 70–99)
GLUCOSE BLDC GLUCOMTR-MCNC: 190 MG/DL — HIGH (ref 70–99)
GLUCOSE SERPL-MCNC: 223 MG/DL — HIGH (ref 70–99)
HCT VFR BLD CALC: 35 % — LOW (ref 39–50)
HGB BLD-MCNC: 10.6 G/DL — LOW (ref 13–17)
MCHC RBC-ENTMCNC: 26.9 PG — LOW (ref 27–34)
MCHC RBC-ENTMCNC: 30.3 G/DL — LOW (ref 32–36)
MCV RBC AUTO: 88.8 FL — SIGNIFICANT CHANGE UP (ref 80–100)
MRSA PCR RESULT.: SIGNIFICANT CHANGE UP
NRBC # BLD AUTO: 0 K/UL — SIGNIFICANT CHANGE UP (ref 0–0)
NRBC # BLD: 0 /100 WBCS — SIGNIFICANT CHANGE UP (ref 0–0)
NRBC # FLD: 0 K/UL — SIGNIFICANT CHANGE UP (ref 0–0)
NRBC BLD-RTO: 0 /100 WBCS — SIGNIFICANT CHANGE UP (ref 0–0)
PLATELET # BLD AUTO: 405 K/UL — HIGH (ref 150–400)
POTASSIUM SERPL-MCNC: 4.6 MMOL/L — SIGNIFICANT CHANGE UP (ref 3.5–5.3)
POTASSIUM SERPL-SCNC: 4.6 MMOL/L — SIGNIFICANT CHANGE UP (ref 3.5–5.3)
RBC # BLD: 3.94 M/UL — LOW (ref 4.2–5.8)
RBC # FLD: 22.2 % — HIGH (ref 10.3–14.5)
S AUREUS DNA NOSE QL NAA+PROBE: SIGNIFICANT CHANGE UP
SODIUM SERPL-SCNC: 135 MMOL/L — SIGNIFICANT CHANGE UP (ref 135–145)
WBC # BLD: 7.73 K/UL — SIGNIFICANT CHANGE UP (ref 3.8–10.5)
WBC # FLD AUTO: 7.73 K/UL — SIGNIFICANT CHANGE UP (ref 3.8–10.5)

## 2025-01-27 RX ADMIN — Medication 4 MILLIGRAM(S): at 23:28

## 2025-01-27 RX ADMIN — ATORVASTATIN CALCIUM 40 MILLIGRAM(S): 80 TABLET, FILM COATED ORAL at 23:27

## 2025-01-27 RX ADMIN — Medication 1000 UNIT(S): at 11:36

## 2025-01-27 RX ADMIN — Medication 160 MILLIGRAM(S): at 05:26

## 2025-01-27 RX ADMIN — CALCIUM ACETATE 667 MILLIGRAM(S): 667 CAPSULE ORAL at 11:39

## 2025-01-27 RX ADMIN — CALCIUM ACETATE 667 MILLIGRAM(S): 667 CAPSULE ORAL at 09:51

## 2025-01-27 RX ADMIN — ASPIRIN 81 MILLIGRAM(S): 81 TABLET, COATED ORAL at 11:37

## 2025-01-27 RX ADMIN — LEVOTHYROXINE SODIUM 25 MICROGRAM(S): 25 TABLET ORAL at 05:26

## 2025-01-27 RX ADMIN — BUMETANIDE 2 MILLIGRAM(S): 2 TABLET ORAL at 14:16

## 2025-01-27 RX ADMIN — NIFEDIPINE 60 MILLIGRAM(S): 90 TABLET, FILM COATED, EXTENDED RELEASE ORAL at 05:26

## 2025-01-27 RX ADMIN — Medication 1 TABLET(S): at 11:37

## 2025-01-27 RX ADMIN — Medication 75 MILLIGRAM(S): at 12:40

## 2025-01-27 RX ADMIN — ANTISEPTIC SURGICAL SCRUB 1 APPLICATION(S): 0.04 SOLUTION TOPICAL at 11:44

## 2025-01-27 RX ADMIN — CALCIUM ACETATE 667 MILLIGRAM(S): 667 CAPSULE ORAL at 20:28

## 2025-01-27 RX ADMIN — LABETALOL HYDROCHLORIDE 100 MILLIGRAM(S): 300 TABLET, FILM COATED ORAL at 20:28

## 2025-01-27 RX ADMIN — Medication 60 MILLIGRAM(S): at 11:37

## 2025-01-27 RX ADMIN — BUMETANIDE 2 MILLIGRAM(S): 2 TABLET ORAL at 05:26

## 2025-01-27 NOTE — PROGRESS NOTE ADULT - ASSESSMENT
69-year-old male, with past history of CAD - s/p Stent, HTN, CAV, ESRD (on Peritoneal dialysis), DM and BPH presented to the ED with shortness of breath. Similar to presentation about 1 Month ago. Nephro called for ESRD    A/P  ESRD on HD TTS via PC  Catheter site appears to without evidence of infection   From [facility] conduit   Follows with nephro Dr. Menjivar   S/p HD in hosp on -01/25 tolerated well  PC catheter not functioning OP however seemed to work better in hospital post cathflo (also similar results when hospitalized last visit)  Although HD RN reports poor flow  Consider permacath exchange as having issues with OP dialysis  IR consulted, planned for HD catheter check/exchange this week   Consent obtained, witnessed, placed in chart.  Monitor bmp.    HTN  BP acceptable  continue home meds  UF w/ HD as tolerated.  low sodium diet  ALLERGIC To hydralazine, please avoid  monitor bp     Anemia   Hold EPO, given catheter issue will reval   monitor hgb     CKD-MBD   acceptable   Continue calcium acetate 667 mg tid  Low PO4 diet.  monitor Ca, PO4 daily      69-year-old male, with past history of CAD - s/p Stent, HTN, CAV, ESRD (on Peritoneal dialysis), DM and BPH presented to the ED with shortness of breath. Similar to presentation about 1 Month ago. Nephro called for ESRD    A/P  ESRD on HD TTS via PC  Catheter site appears to without evidence of infection   From [facility] conduit   Follows with nephro Dr. Menjivar   S/p HD in hosp on -01/25 tolerated well  PC catheter not functioning OP however seemed to work better in hospital post cathflo (also similar results when hospitalized last visit)  Although HD RN reports poor flow  Consider permacath exchange as having issues with OP dialysis  IR consulted, planned for HD catheter check/exchange this week   Patient would like to switch outpatient hd center to [place], discussed with CM/SW  Consent obtained, witnessed, placed in chart.  Monitor bmp.    HTN  BP acceptable  continue home meds  UF w/ HD as tolerated.  low sodium diet  ALLERGIC To hydralazine, please avoid  monitor bp     Anemia   Hold EPO, given catheter issue will reval   monitor hgb     CKD-MBD   acceptable   Continue calcium acetate 667 mg tid  Low PO4 diet.  monitor Ca, PO4 daily

## 2025-01-27 NOTE — CHART NOTE - NSCHARTNOTEFT_GEN_A_CORE
Pre-Interventional Radiology Procedure Note    LEONIDAS LARRY | [id number]    01-27-25 @ 17:51    Interventional Radiology Attending Physician: Dr. Das     Ordering Attending Physician: Lázaro Sepulveda    Diagnosis/Indication: Patient is a 69y old  Male who presents with a chief complaint of Tunneled catheter malfunction (27 Jan 2025 15:05)    Procedure: Tunneled Dialysis Catheter exchange     69y    Male    PAST MEDICAL & SURGICAL HISTORY:  Hypertension      ESRD on peritoneal dialysis      CVA (cerebrovascular accident)  2010 without residual effects      Hyperlipidemia      BPH (benign prostatic hyperplasia)      CAD (coronary artery disease)      T2DM (type 2 diabetes mellitus)      Hypothyroidism      History of peritoneal dialysis  s/p PD catheter placement      S/P coronary artery stent placement           CBC Full  -  ( 27 Jan 2025 09:25 )  WBC Count : 7.73 K/uL  RBC Count : 3.94 M/uL  Hemoglobin : 10.6 g/dL  Hematocrit : 35.0 %  Platelet Count - Automated : 405 K/uL  Mean Cell Volume : 88.8 fL  Mean Cell Hemoglobin : 26.9 pg  Mean Cell Hemoglobin Concentration : 30.3 g/dL  Auto Neutrophil # : x  Auto Lymphocyte # : x  Auto Monocyte # : x  Auto Eosinophil # : x  Auto Basophil # : x  Auto Neutrophil % : x  Auto Lymphocyte % : x  Auto Monocyte % : x  Auto Eosinophil % : x  Auto Basophil % : x    01-27    135  |  93[L]  |  55[H]  ----------------------------<  223[H]  4.6   |  22  |  6.86[H]    Ca    8.9      27 Jan 2025 09:25  Phos  4.2     01-26  Mg     2.20     01-26    TPro  7.8  /  Alb  3.6  /  TBili  0.3  /  DBili  x   /  AST  18  /  ALT  16  /  AlkPhos  91  01-26

## 2025-01-28 LAB
ANION GAP SERPL CALC-SCNC: 19 MMOL/L — HIGH (ref 7–14)
BUN SERPL-MCNC: 69 MG/DL — HIGH (ref 7–23)
CALCIUM SERPL-MCNC: 8.8 MG/DL — SIGNIFICANT CHANGE UP (ref 8.4–10.5)
CHLORIDE SERPL-SCNC: 92 MMOL/L — LOW (ref 98–107)
CO2 SERPL-SCNC: 20 MMOL/L — LOW (ref 22–31)
CREAT SERPL-MCNC: 8.37 MG/DL — HIGH (ref 0.5–1.3)
EGFR: 6 ML/MIN/1.73M2 — LOW
GLUCOSE BLDC GLUCOMTR-MCNC: 100 MG/DL — HIGH (ref 70–99)
GLUCOSE BLDC GLUCOMTR-MCNC: 161 MG/DL — HIGH (ref 70–99)
GLUCOSE BLDC GLUCOMTR-MCNC: 173 MG/DL — HIGH (ref 70–99)
GLUCOSE SERPL-MCNC: 87 MG/DL — SIGNIFICANT CHANGE UP (ref 70–99)
HCT VFR BLD CALC: 32.7 % — LOW (ref 39–50)
HGB BLD-MCNC: 10.5 G/DL — LOW (ref 13–17)
MAGNESIUM SERPL-MCNC: 2.4 MG/DL — SIGNIFICANT CHANGE UP (ref 1.6–2.6)
MCHC RBC-ENTMCNC: 27.3 PG — SIGNIFICANT CHANGE UP (ref 27–34)
MCHC RBC-ENTMCNC: 32.1 G/DL — SIGNIFICANT CHANGE UP (ref 32–36)
MCV RBC AUTO: 84.9 FL — SIGNIFICANT CHANGE UP (ref 80–100)
NRBC # BLD AUTO: 0 K/UL — SIGNIFICANT CHANGE UP (ref 0–0)
NRBC # BLD: 0 /100 WBCS — SIGNIFICANT CHANGE UP (ref 0–0)
NRBC # FLD: 0 K/UL — SIGNIFICANT CHANGE UP (ref 0–0)
NRBC BLD-RTO: 0 /100 WBCS — SIGNIFICANT CHANGE UP (ref 0–0)
PHOSPHATE SERPL-MCNC: 6.2 MG/DL — HIGH (ref 2.5–4.5)
PLATELET # BLD AUTO: 394 K/UL — SIGNIFICANT CHANGE UP (ref 150–400)
POTASSIUM SERPL-MCNC: 5.3 MMOL/L — SIGNIFICANT CHANGE UP (ref 3.5–5.3)
POTASSIUM SERPL-SCNC: 5.3 MMOL/L — SIGNIFICANT CHANGE UP (ref 3.5–5.3)
RBC # BLD: 3.85 M/UL — LOW (ref 4.2–5.8)
RBC # FLD: 21.1 % — HIGH (ref 10.3–14.5)
SODIUM SERPL-SCNC: 131 MMOL/L — LOW (ref 135–145)
WBC # BLD: 8.47 K/UL — SIGNIFICANT CHANGE UP (ref 3.8–10.5)
WBC # FLD AUTO: 8.47 K/UL — SIGNIFICANT CHANGE UP (ref 3.8–10.5)

## 2025-01-28 RX ORDER — ALTEPLASE 100 MG
2 KIT INTRAVENOUS ONCE
Refills: 0 | Status: COMPLETED | OUTPATIENT
Start: 2025-01-28 | End: 2025-01-28

## 2025-01-28 RX ADMIN — ANTISEPTIC SURGICAL SCRUB 1 APPLICATION(S): 0.04 SOLUTION TOPICAL at 11:15

## 2025-01-28 RX ADMIN — ALTEPLASE 2 MILLIGRAM(S): KIT at 19:16

## 2025-01-28 RX ADMIN — Medication 60 MILLIGRAM(S): at 11:39

## 2025-01-28 RX ADMIN — BUMETANIDE 2 MILLIGRAM(S): 2 TABLET ORAL at 06:29

## 2025-01-28 RX ADMIN — BUMETANIDE 2 MILLIGRAM(S): 2 TABLET ORAL at 14:07

## 2025-01-28 RX ADMIN — CALCIUM ACETATE 667 MILLIGRAM(S): 667 CAPSULE ORAL at 17:23

## 2025-01-28 RX ADMIN — ASPIRIN 81 MILLIGRAM(S): 81 TABLET, COATED ORAL at 11:16

## 2025-01-28 RX ADMIN — Medication 1000 UNIT(S): at 11:16

## 2025-01-28 RX ADMIN — Medication 1: at 17:23

## 2025-01-28 RX ADMIN — CALCIUM ACETATE 667 MILLIGRAM(S): 667 CAPSULE ORAL at 11:39

## 2025-01-28 RX ADMIN — LEVOTHYROXINE SODIUM 25 MICROGRAM(S): 25 TABLET ORAL at 06:28

## 2025-01-28 RX ADMIN — LABETALOL HYDROCHLORIDE 100 MILLIGRAM(S): 300 TABLET, FILM COATED ORAL at 07:42

## 2025-01-28 RX ADMIN — CALCIUM ACETATE 667 MILLIGRAM(S): 667 CAPSULE ORAL at 09:30

## 2025-01-28 RX ADMIN — NIFEDIPINE 60 MILLIGRAM(S): 90 TABLET, FILM COATED, EXTENDED RELEASE ORAL at 06:29

## 2025-01-28 RX ADMIN — LABETALOL HYDROCHLORIDE 100 MILLIGRAM(S): 300 TABLET, FILM COATED ORAL at 17:23

## 2025-01-28 RX ADMIN — ALTEPLASE 2 MILLIGRAM(S): KIT at 19:15

## 2025-01-28 RX ADMIN — Medication 75 MILLIGRAM(S): at 11:16

## 2025-01-28 RX ADMIN — Medication 160 MILLIGRAM(S): at 06:28

## 2025-01-28 RX ADMIN — Medication 1 TABLET(S): at 11:16

## 2025-01-28 RX ADMIN — Medication 1: at 12:31

## 2025-01-28 NOTE — PROGRESS NOTE ADULT - ASSESSMENT
69-year-old male, with past history of CAD - s/p Stent, HTN, CAV, ESRD (on Peritoneal dialysis), DM and BPH presented to the ED with shortness of breath. Similar to presentation about 1 Month ago. Nephro called for ESRD    A/P  ESRD on HD TTS via PC  Catheter site appears to without evidence of infection   From [facility] conduit   Follows with nephro Dr. Menjivar   S/p HD in hosp on -01/25 tolerated well  PC catheter not functioning OP however seemed to work better in hospital post cathflo (also similar results when hospitalized last visit)  Although HD RN reports poor flow  f/u IR for permacath exchange as having issues with OP dialysis  hd today  Patient would like to switch outpatient hd center to [place], discussed with CM/SW  Consent obtained, witnessed, placed in chart.  Monitor bmp.    HTN  BP acceptable  continue home meds  UF w/ HD as tolerated.  low sodium diet  ALLERGIC To hydralazine, please avoid  monitor bp     Anemia   acceptable   monitor hgb     CKD-MBD   acceptable   Continue calcium acetate 667 mg tid  Low PO4 diet.  monitor Ca, PO4 daily

## 2025-01-29 LAB
ANION GAP SERPL CALC-SCNC: 17 MMOL/L — HIGH (ref 7–14)
APTT BLD: 29.6 SEC — SIGNIFICANT CHANGE UP (ref 24.5–35.6)
BUN SERPL-MCNC: 40 MG/DL — HIGH (ref 7–23)
CALCIUM SERPL-MCNC: 9.2 MG/DL — SIGNIFICANT CHANGE UP (ref 8.4–10.5)
CHLORIDE SERPL-SCNC: 95 MMOL/L — LOW (ref 98–107)
CO2 SERPL-SCNC: 23 MMOL/L — SIGNIFICANT CHANGE UP (ref 22–31)
CREAT SERPL-MCNC: 5.6 MG/DL — HIGH (ref 0.5–1.3)
EGFR: 10 ML/MIN/1.73M2 — LOW
GLUCOSE BLDC GLUCOMTR-MCNC: 111 MG/DL — HIGH (ref 70–99)
GLUCOSE BLDC GLUCOMTR-MCNC: 112 MG/DL — HIGH (ref 70–99)
GLUCOSE BLDC GLUCOMTR-MCNC: 137 MG/DL — HIGH (ref 70–99)
GLUCOSE BLDC GLUCOMTR-MCNC: 194 MG/DL — HIGH (ref 70–99)
GLUCOSE BLDC GLUCOMTR-MCNC: 210 MG/DL — HIGH (ref 70–99)
GLUCOSE BLDC GLUCOMTR-MCNC: 90 MG/DL — SIGNIFICANT CHANGE UP (ref 70–99)
GLUCOSE SERPL-MCNC: 93 MG/DL — SIGNIFICANT CHANGE UP (ref 70–99)
HCT VFR BLD CALC: 35 % — LOW (ref 39–50)
HGB BLD-MCNC: 10.7 G/DL — LOW (ref 13–17)
MAGNESIUM SERPL-MCNC: 2.2 MG/DL — SIGNIFICANT CHANGE UP (ref 1.6–2.6)
MCHC RBC-ENTMCNC: 26.9 PG — LOW (ref 27–34)
MCHC RBC-ENTMCNC: 30.6 G/DL — LOW (ref 32–36)
MCV RBC AUTO: 87.9 FL — SIGNIFICANT CHANGE UP (ref 80–100)
NRBC # BLD AUTO: 0 K/UL — SIGNIFICANT CHANGE UP (ref 0–0)
NRBC # BLD: 0 /100 WBCS — SIGNIFICANT CHANGE UP (ref 0–0)
NRBC # FLD: 0 K/UL — SIGNIFICANT CHANGE UP (ref 0–0)
NRBC BLD-RTO: 0 /100 WBCS — SIGNIFICANT CHANGE UP (ref 0–0)
PHOSPHATE SERPL-MCNC: 4.2 MG/DL — SIGNIFICANT CHANGE UP (ref 2.5–4.5)
PLATELET # BLD AUTO: 355 K/UL — SIGNIFICANT CHANGE UP (ref 150–400)
POTASSIUM SERPL-MCNC: 4.5 MMOL/L — SIGNIFICANT CHANGE UP (ref 3.5–5.3)
POTASSIUM SERPL-SCNC: 4.5 MMOL/L — SIGNIFICANT CHANGE UP (ref 3.5–5.3)
RBC # BLD: 3.98 M/UL — LOW (ref 4.2–5.8)
RBC # FLD: 22.1 % — HIGH (ref 10.3–14.5)
SODIUM SERPL-SCNC: 135 MMOL/L — SIGNIFICANT CHANGE UP (ref 135–145)
WBC # BLD: 6.21 K/UL — SIGNIFICANT CHANGE UP (ref 3.8–10.5)
WBC # FLD AUTO: 6.21 K/UL — SIGNIFICANT CHANGE UP (ref 3.8–10.5)

## 2025-01-29 PROCEDURE — 36581 REPLACE TUNNELED CV CATH: CPT

## 2025-01-29 PROCEDURE — 77001 FLUOROGUIDE FOR VEIN DEVICE: CPT | Mod: 26,GC

## 2025-01-29 RX ORDER — ONDANSETRON 4 MG/1
4 TABLET, ORALLY DISINTEGRATING ORAL ONCE
Refills: 0 | Status: DISCONTINUED | OUTPATIENT
Start: 2025-01-29 | End: 2025-01-31

## 2025-01-29 RX ORDER — FENTANYL CITRATE 50 UG/ML
25 INJECTION INTRAMUSCULAR; INTRAVENOUS
Refills: 0 | Status: DISCONTINUED | OUTPATIENT
Start: 2025-01-29 | End: 2025-01-31

## 2025-01-29 RX ADMIN — BUMETANIDE 2 MILLIGRAM(S): 2 TABLET ORAL at 05:48

## 2025-01-29 RX ADMIN — Medication 1000 UNIT(S): at 12:27

## 2025-01-29 RX ADMIN — Medication 4 MILLIGRAM(S): at 00:48

## 2025-01-29 RX ADMIN — Medication 75 MILLIGRAM(S): at 12:28

## 2025-01-29 RX ADMIN — LABETALOL HYDROCHLORIDE 100 MILLIGRAM(S): 300 TABLET, FILM COATED ORAL at 05:49

## 2025-01-29 RX ADMIN — ATORVASTATIN CALCIUM 40 MILLIGRAM(S): 80 TABLET, FILM COATED ORAL at 00:48

## 2025-01-29 RX ADMIN — LEVOTHYROXINE SODIUM 25 MICROGRAM(S): 25 TABLET ORAL at 05:48

## 2025-01-29 RX ADMIN — Medication 4 MILLIGRAM(S): at 22:56

## 2025-01-29 RX ADMIN — CALCIUM ACETATE 667 MILLIGRAM(S): 667 CAPSULE ORAL at 12:28

## 2025-01-29 RX ADMIN — CALCIUM ACETATE 667 MILLIGRAM(S): 667 CAPSULE ORAL at 17:09

## 2025-01-29 RX ADMIN — Medication 2: at 12:27

## 2025-01-29 RX ADMIN — Medication 60 MILLIGRAM(S): at 12:28

## 2025-01-29 RX ADMIN — Medication 1 TABLET(S): at 12:28

## 2025-01-29 RX ADMIN — ASPIRIN 81 MILLIGRAM(S): 81 TABLET, COATED ORAL at 12:28

## 2025-01-29 RX ADMIN — LABETALOL HYDROCHLORIDE 100 MILLIGRAM(S): 300 TABLET, FILM COATED ORAL at 17:09

## 2025-01-29 RX ADMIN — ANTISEPTIC SURGICAL SCRUB 1 APPLICATION(S): 0.04 SOLUTION TOPICAL at 12:57

## 2025-01-29 RX ADMIN — Medication 160 MILLIGRAM(S): at 05:49

## 2025-01-29 RX ADMIN — ATORVASTATIN CALCIUM 40 MILLIGRAM(S): 80 TABLET, FILM COATED ORAL at 22:56

## 2025-01-29 RX ADMIN — NIFEDIPINE 60 MILLIGRAM(S): 90 TABLET, FILM COATED, EXTENDED RELEASE ORAL at 05:49

## 2025-01-29 NOTE — PRE PROCEDURE NOTE - PRE PROCEDURE EVALUATION
Interventional Radiology    HPI: 69y Male with HD catheter palced 1/15 with multiple vists for malfunctioned catheter requiring CathFlo for exchange.     Allergies: No Known Allergies    Medications (Abx/Cardiac/Anticoagulation/Blood Products)    alteplase for catheter clearance: 2 milliGRAM(s) Catheter (01-28 @ 19:15)  alteplase for catheter clearance: 2 milliGRAM(s) Catheter (01-28 @ 19:16)  aspirin enteric coated: 81 milliGRAM(s) Oral (01-28 @ 11:16)  buMETAnide: 2 milliGRAM(s) Oral (01-29 @ 05:48)  clopidogrel Tablet: 75 milliGRAM(s) Oral (01-28 @ 11:16)  doxazosin: 4 milliGRAM(s) Oral (01-29 @ 00:48)  isosorbide   mononitrate ER Tablet (IMDUR): 60 milliGRAM(s) Oral (01-28 @ 11:39)  labetalol: 100 milliGRAM(s) Oral (01-29 @ 05:49)  NIFEdipine XL: 60 milliGRAM(s) Oral (01-29 @ 05:49)  valsartan: 160 milliGRAM(s) Oral (01-29 @ 05:49)    Data:  T(C): 36.7  HR: 64  BP: 123/67  RR: 18  SpO2: 99%    Exam  General: No acute distress  Chest: Non labored breathing    -WBC 6.21 / HgB 10.7 / Hct 35.0 / Plt 355  -Na 135 / Cl 95 / BUN 40 / Glucose 93  -K 4.5 / CO2 23 / Cr 5.60    Imaging: Reviewed    Plan: 69y Male presents for tunneled HD catheter exchange.   -Risks/Benefits/alternatives explained with the patient and/or healthcare proxy and witnessed informed consent obtained.

## 2025-01-29 NOTE — CHART NOTE - NSCHARTNOTEFT_GEN_A_CORE
Interventional Radiology:    IR will plan to perform a repeat HD tunneled catheter check and/or exchange of the R chest catheter, tentative 1/30    -- obtain CT Venogram of the chest  -- obtain up to date labs: CMP, CBC, PT/INR  -- NPO at midnight prior to procedure day  -- hold a.m. anticoagulation on day of procedure if applicable  -- please complete a new IR pre-procedure note  -- please place a new IR procedure request order    Edie Gongora MD  IR Pager - [phone]

## 2025-01-29 NOTE — CONSULT NOTE ADULT - SUBJECTIVE AND OBJECTIVE BOX
[facility]    Dr. Tiara Garcia     Office [phone] (9 am to 5 pm)  Service : [phone] ( 5pm to 9 am)  Also Available on Teams        RENAL INITIAL CONSULT NOTE: DATE OF SERVICE 01-25-25 @ 16:41    HPI: 69-year-old male history of CAD status post PCI  in 7/2024.(  on  plavix and asa), HTN, HLD, Hypothyroidism, T2DM(diet controlled) , ESRD (was on peritoneal dialysis/ s/p PD cath removal on 1/8, recent Tunneled HD  catheter placement on 1/17/25) ,  Recurrent Pleural  effusion status post thoracentesis in 1/2025 presents to the ED due to malfunctioning recent HD tunneled catheter. Last HD on Thursday per patient. States again having issues with catheter today. Was sent here for evaluation. Patient denies CP, HA, N/V/D. Denies SOB      Allergies:  No Known Allergies  hydrALAZINE (Short breath)      PAST MEDICAL & SURGICAL HISTORY:  Hypertension      ESRD on peritoneal dialysis      CVA (cerebrovascular accident)  2010 without residual effects      Hyperlipidemia      BPH (benign prostatic hyperplasia)      CAD (coronary artery disease)      T2DM (type 2 diabetes mellitus)      Hypothyroidism      History of peritoneal dialysis  s/p PD catheter placement      S/P coronary artery stent placement          Home Medications Reviewed    Hospital Medications:   MEDICATIONS  (STANDING):  alteplase for catheter clearance 2 milliGRAM(s) Catheter once  alteplase for catheter clearance 2 milliGRAM(s) Catheter once      SOCIAL HISTORY:  Denies ETOh, Smoking,     FAMILY HISTORY:  FH: HTN (hypertension) (Mother, Father)    FH: type 2 diabetes (Mother, Father)        REVIEW OF SYSTEMS:  CONSTITUTIONAL: No weakness, fevers or chills  EYES/ENT: No visual changes;  No vertigo or throat pain   NECK: No pain or stiffness  RESPIRATORY: No cough, wheezing, hemoptysis; No shortness of breath  CARDIOVASCULAR: No chest pain or palpitations.  GASTROINTESTINAL: No abdominal or epigastric pain. No nausea, vomiting, or hematemesis; No diarrhea or constipation. No melena or hematochezia.  GENITOURINARY: No dysuria, frequency, foamy urine, urinary urgency, incontinence or hematuria  NEUROLOGICAL: No numbness or weakness  SKIN: No itching, burning, rashes, or lesions   VASCULAR: No bilateral lower extremity edema.   All other review of systems is negative unless indicated above.    VITALS:  T(F): 98.1 (01-25-25 @ 16:05), Max: 98.1 (01-25-25 @ 15:32)  HR: 56 (01-25-25 @ 16:05)  BP: 179/71 (01-25-25 @ 16:05)  RR: 17 (01-25-25 @ 16:05)  SpO2: 100% (01-25-25 @ 16:05)  Wt(kg): --    Height (cm): 172.7 (01-25 @ 14:50)  Weight (kg): 52.2 (01-25 @ 14:50)  BMI (kg/m2): 17.5 (01-25 @ 14:50)  BSA (m2): 1.62 (01-25 @ 14:50)    PHYSICAL EXAM:  Constitutional: NAD  HEENT: anicteric sclera, oropharynx clear, MMM  Neck: No JVD  Respiratory: CTAB, no wheezes, rales or rhonchi  Cardiovascular: S1, S2, RRR  Gastrointestinal: BS+, soft, NT/ND  Extremities: No cyanosis or clubbing. No peripheral edema  Neurological: A/O x 3, no focal deficits  Psychiatric: Normal mood, normal affect  : No CVA tenderness. No brand.   Skin: No rashes  Vascular Access: RCP    LABS:        Creatinine Trend: 4.59 <--, 7.34 <--, 7.23 <--                        9.8    8.59  )-----------( 430      ( 25 Jan 2025 16:26 )             31.0     Urine Studies:  Urinalysis Basic - ( 22 Jan 2025 06:38 )    Color:  / Appearance:  / SG:  / pH:   Gluc: 99 mg/dL / Ketone:   / Bili:  / Urobili:    Blood:  / Protein:  / Nitrite:    Leuk Esterase:  / RBC:  / WBC    Sq Epi:  / Non Sq Epi:  / Bacteria:           RADIOLOGY & ADDITIONAL STUDIES:                
  Doug Garcia MD  Interventional Cardiology / Endovascular Specialist  [place] Office : [phone] [place] [address] [place] [address]  Tel:   [place] Office : [address] [facility] [place] [address]  Tel: [phone]    69M with PMH HTN, HLD, CAD s/p LAD PCI 7/24 ESRD on HD T/TH/S, s/p new R chest wall tunneled HD cath on 1/17 now coming in for HD catheter malfunction. Patient reports that he was at HD outpatient facility today and was unable to receive HD as the catheter was malfunctioning, though is unable to remember specifically what the issue was. Had recent admission 1/21 for similar presentation and was discharged the following day after successful HD inpatient. Denies any symptoms today however BP noted to be elevated to 200's systolic in ED.    In the ED nephro consulted, patient given cathflo through tunnelled HD successfully and set up for HD this evening. Patient examined while received HD, tolerating well with no symptoms, BP improved to 140/70. Informed by HD nurse that patient's catheter pressures were noted to be elevated. IR consulted.  	  MEDICATIONS:  aspirin enteric coated 81 milliGRAM(s) Oral daily  buMETAnide 2 milliGRAM(s) Oral two times a day  clopidogrel Tablet 75 milliGRAM(s) Oral daily  doxazosin 4 milliGRAM(s) Oral at bedtime  isosorbide   mononitrate ER Tablet (IMDUR) 60 milliGRAM(s) Oral daily  labetalol 100 milliGRAM(s) Oral two times a day  NIFEdipine XL 60 milliGRAM(s) Oral daily  valsartan 160 milliGRAM(s) Oral daily        acetaminophen     Tablet .. 650 milliGRAM(s) Oral every 6 hours PRN  fentaNYL    Injectable 25 MICROGram(s) IV Push every 5 minutes PRN  melatonin 3 milliGRAM(s) Oral at bedtime PRN  ondansetron Injectable 4 milliGRAM(s) IV Push once PRN  ondansetron Injectable 4 milliGRAM(s) IV Push every 8 hours PRN    aluminum hydroxide/magnesium hydroxide/simethicone Suspension 30 milliLiter(s) Oral every 4 hours PRN    atorvastatin 40 milliGRAM(s) Oral at bedtime  dextrose 50% Injectable 25 Gram(s) IV Push once  dextrose 50% Injectable 12.5 Gram(s) IV Push once  dextrose 50% Injectable 25 Gram(s) IV Push once  dextrose Oral Gel 15 Gram(s) Oral once PRN  glucagon  Injectable 1 milliGRAM(s) IntraMuscular once  insulin lispro (ADMELOG) corrective regimen sliding scale   SubCutaneous three times a day before meals  insulin lispro (ADMELOG) corrective regimen sliding scale   SubCutaneous at bedtime  levothyroxine 25 MICROGram(s) Oral daily    calcium acetate 667 milliGRAM(s) Oral three times a day with meals  chlorhexidine 2% Cloths 1 Application(s) Topical daily  cholecalciferol 1000 Unit(s) Oral daily  dextrose 5%. 1000 milliLiter(s) IV Continuous <Continuous>  dextrose 5%. 1000 milliLiter(s) IV Continuous <Continuous>  Nephro-brenda 1 Tablet(s) Oral daily      PAST MEDICAL/SURGICAL HISTORY  PAST MEDICAL & SURGICAL HISTORY:  Hypertension      ESRD on peritoneal dialysis      CVA (cerebrovascular accident)  2010 without residual effects      Hyperlipidemia      BPH (benign prostatic hyperplasia)      CAD (coronary artery disease)      T2DM (type 2 diabetes mellitus)      Hypothyroidism      History of peritoneal dialysis  s/p PD catheter placement      S/P coronary artery stent placement          SOCIAL HISTORY: Substance Use (street drugs): ( x ) never used  (  ) other:    FAMILY HISTORY:  FH: HTN (hypertension) (Mother, Father)    FH: type 2 diabetes (Mother, Father)        REVIEW OF SYSTEMS:  CONSTITUTIONAL: No fever, weight loss, or fatigue  EYES: No eye pain, visual disturbances, or discharge  ENMT:  No difficulty hearing, tinnitus, vertigo; No sinus or throat pain  BREASTS: No pain, masses, or nipple discharge  GASTROINTESTINAL: No abdominal or epigastric pain. No nausea, vomiting, or hematemesis; No diarrhea or constipation. No melena or hematochezia.  GENITOURINARY: No dysuria, frequency, hematuria, or incontinence  NEUROLOGICAL: No headaches, memory loss, loss of strength, numbness, or tremors  ENDOCRINE: No heat or cold intolerance; No hair loss  MUSCULOSKELETAL: No joint pain or swelling; No muscle, back, or extremity pain  PSYCHIATRIC: No depression, anxiety, mood swings, or difficulty sleeping  HEME/LYMPH: No easy bruising, or bleeding gums  All others negative    PHYSICAL EXAM:  T(C): 36.4 (01-30-25 @ 09:58), Max: 36.7 (01-29-25 @ 17:05)  HR: 53 (01-30-25 @ 09:58) (50 - 74)  BP: 120/59 (01-30-25 @ 09:58) (95/54 - 147/70)  RR: 18 (01-30-25 @ 09:58) (14 - 18)  SpO2: 100% (01-30-25 @ 09:58) (98% - 100%)  Wt(kg): --  I&O's Summary    29 Jan 2025 07:01  -  30 Jan 2025 07:00  --------------------------------------------------------  IN: 500 mL / OUT: 310 mL / NET: 190 mL          GENERAL: NAD, RIJ perm cath  EYES:   PERRLA   ENMT:   Moist mucous membranes, Good dentition, No lesions  Cardiovascular: Normal S1 S2, No JVD, No murmurs, No edema  Respiratory: Lungs clear to auscultation	  Gastrointestinal:  Soft, Non-tender, + BS	  Extremities: no edema                                9.7    7.28  )-----------( 310      ( 30 Jan 2025 06:50 )             30.4     01-30    132[L]  |  93[L]  |  61[H]  ----------------------------<  77  5.1   |  22  |  7.03[H]    Ca    8.4      30 Jan 2025 09:23  Phos  6.5     01-30  Mg     2.20     01-30      proBNP:   Lipid Profile:   HgA1c:   TSH:     Consultant(s) Notes Reviewed:  [x ] YES  [ ] NO    Care Discussed with Consultants/Other Providers [ x] YES  [ ] NO    Imaging Personally Reviewed independently:  [x] YES  [ ] NO    All labs, radiologic studies, vitals, orders and medications list reviewed. Patient is seen and examined at bedside. Case discussed with medical team.              
Interventional Radiology    Evaluate for Procedure: HD tunneled catheter check/exchange    HPI: 69y Male with ESRD on HD    Allergies: No Known Allergies    Medications (Abx/Cardiac/Anticoagulation/Blood Products)    alteplase for catheter clearance: 2 milliGRAM(s) Catheter (01-25 @ 19:30)  alteplase for catheter clearance: 2 milliGRAM(s) Catheter (01-25 @ 19:30)  aspirin enteric coated: 81 milliGRAM(s) Oral (01-27 @ 11:37)  buMETAnide: 2 milliGRAM(s) Oral (01-27 @ 14:16)  clopidogrel Tablet: 75 milliGRAM(s) Oral (01-27 @ 12:40)  doxazosin: 4 milliGRAM(s) Oral (01-26 @ 22:34)  isosorbide   mononitrate ER Tablet (IMDUR): 60 milliGRAM(s) Oral (01-27 @ 11:37)  labetalol: 100 milliGRAM(s) Oral (01-25 @ 18:43)  NIFEdipine XL: 60 milliGRAM(s) Oral (01-27 @ 05:26)  valsartan: 160 milliGRAM(s) Oral (01-27 @ 05:26)    Data:    T(C): 36.3  HR: 58  BP: 140/69  RR: 18  SpO2: 100%    -WBC 7.73 / HgB 10.6 / Hct 35.0 / Plt 405  -Na 135 / Cl 93 / BUN 55 / Glucose 223  -K 4.6 / CO2 22 / Cr 6.86  -ALT -- / Alk Phos -- / T.Bili --  -INR 1.00 / PTT 31.6      Radiology:     Assessment/Plan:     -- IR will plan to perform a HD tunneled catheter check and/or exchange of R chest catheter, tentative week of 1/27 (likely on non-HD day, MWF)  -- NPO at midnight prior to procedure day  -- hold a.m. anticoagulation on day of procedure  -- please complete IR pre-procedure note  -- please place IR procedure request order under Dr. Das    --  Edie Gongora MD  IR Pager - [phone]

## 2025-01-29 NOTE — CONSULT NOTE ADULT - ASSESSMENT
69-year-old male, with past history of CAD - s/p Stent, HTN, CAV, ESRD (on Peritoneal dialysis), DM and BPH presented to the ED with shortness of breath. Similar to presentation about 1 Month ago. Nephro called for ESRD    A/P  ESRD on HD TTS via PC  Catheter site appears to without evidence of infection   From [facility] conduit   Follows with nephro Dr. Menjivar   PC cathter not functioning  Will attempt HD with cathflo again today, recently tried and was successful  Will need IR eval to determine viability of catheter as having recurrent ed visits  Consider eval for kinked catheter or whether needs replacement Permcath  Consent obtained, witnessed, placed in chart.  Monitor bmp.    HTN  BP controlled  continue home meds  UF w/ HD as tolerated.  low sodium diet  ALLERGIC To hydralazine, please avoid  monitor bp     Anemia   Hold EPO, given catheter issue will reval   monitor hgb     CKD-MBD   acceptable   Continue calcium acetate 667 mg tid  Low PO4 diet.  monitor Ca, PO4 daily     Discussed with ED  
EKG NSR 68    [facility] 7/8/2024  mod to severe ostial LAD disease s/p PTCI of ostial/proximal LAD    TTE 11/28/2024   1. Left ventricular cavity is normal in size. Left ventricular wall thickness is normal. Left ventricular systolic function is normal with an ejection fraction of 68 % by Wilburn's method of disks. There are no regional wall motion abnormalities seen.   2. There is mild (grade 1) left ventricular diastolic dysfunction.   3. Left atrium is severely dilated.   4. No significant valvular disease.   5. No pericardial effusion seen.   6. Right pleural effusion noted.   7. The inferior vena cava is normal in size (normal <2.1cm) with normal inspiratory collapse (normal >50%) consistent with normal right atrial pressure (~3, range 0-5mmHg).      A/P    1. ESRD  - s/p RIJ perm cath    - f/u renal recs    2.CAD s/p LAD , LM PCI  -[facility] 7/8/2024  mod to severe ostial LAD disease s/p PTCA of ostial/proximal LAD  - asa, plavix, statin    3. HTN  -labetalol 200 bid, Nifedipine 60, doxazosin 4mg, valsartan 160 bid, imdur 60

## 2025-01-29 NOTE — PROGRESS NOTE ADULT - ASSESSMENT
69-year-old male, with past history of CAD - s/p Stent, HTN, CAV, ESRD (on Peritoneal dialysis), DM and BPH presented to the ED with shortness of breath. Similar to presentation about 1 Month ago. Nephro called for ESRD    A/P  ESRD on HD TTS via PC  Catheter site appears to without evidence of infection   From [facility] conduit   Follows with nephro Dr. Menjivar   S/p HD in hosp on -01/25 tolerated well  PC catheter not functioning OP however seemed to work better in hospital post cathflo (also similar results when hospitalized last visit)  Although HD RN reports poor flow  last hd 1/28 tolerated well. s/p IR for PC exchange 1/29. planning for short treat today. if PC working well. pt can be dc.   Patient is switching dialysis unit to [facility]. please sent info to [facility]. pt accepted [facility] schedule to start on friday  Consent obtained, witnessed, placed in chart.  Monitor bmp.    HTN  BP acceptable  continue home meds  UF w/ HD as tolerated.  low sodium diet  ALLERGIC To hydralazine, please avoid  monitor bp     Anemia   acceptable   monitor hgb     CKD-MBD   acceptable   Continue calcium acetate 667 mg tid  Low PO4 diet.  monitor Ca, PO4 daily

## 2025-01-29 NOTE — CHART NOTE - NSCHARTNOTEFT_GEN_A_CORE
Pt underwent IR permacath exchange this morning by IR. Called by nephrology PA, permacath still malfunctioning in HD. Discussed with IR fellow Dr. Gongora, recommend CT venogram of the chest with IV contrast, make NPO after MN to IR procedure to adjust or potentially replace permacath. Dr. Sepulveda aware and agrees to plan. Pt underwent IR permacath exchange this morning by IR. Called by nephrology PA Cori, permacath still malfunctioning in HD. Discussed with IR fellow Dr. Gongora, recommend CT venogram of the chest with IV contrast, make NPO after MN to IR procedure to adjust or potentially replace permacath. Renal PA and Dr. Sepulveda aware and agree to plan, renal to plan for HD tm morning following IR procedure/new access.

## 2025-01-30 ENCOUNTER — TRANSCRIPTION ENCOUNTER (OUTPATIENT)
Age: 70
End: 2025-01-30

## 2025-01-30 LAB
ANION GAP SERPL CALC-SCNC: 17 MMOL/L — HIGH (ref 7–14)
ANION GAP SERPL CALC-SCNC: 19 MMOL/L — HIGH (ref 7–14)
BUN SERPL-MCNC: 61 MG/DL — HIGH (ref 7–23)
BUN SERPL-MCNC: 61 MG/DL — HIGH (ref 7–23)
CALCIUM SERPL-MCNC: 8.4 MG/DL — SIGNIFICANT CHANGE UP (ref 8.4–10.5)
CALCIUM SERPL-MCNC: 8.6 MG/DL — SIGNIFICANT CHANGE UP (ref 8.4–10.5)
CHLORIDE SERPL-SCNC: 93 MMOL/L — LOW (ref 98–107)
CHLORIDE SERPL-SCNC: 95 MMOL/L — LOW (ref 98–107)
CO2 SERPL-SCNC: 20 MMOL/L — LOW (ref 22–31)
CO2 SERPL-SCNC: 22 MMOL/L — SIGNIFICANT CHANGE UP (ref 22–31)
CREAT SERPL-MCNC: 6.9 MG/DL — HIGH (ref 0.5–1.3)
CREAT SERPL-MCNC: 7.03 MG/DL — HIGH (ref 0.5–1.3)
EGFR: 8 ML/MIN/1.73M2 — LOW
EGFR: 8 ML/MIN/1.73M2 — LOW
GLUCOSE BLDC GLUCOMTR-MCNC: 102 MG/DL — HIGH (ref 70–99)
GLUCOSE BLDC GLUCOMTR-MCNC: 106 MG/DL — HIGH (ref 70–99)
GLUCOSE BLDC GLUCOMTR-MCNC: 132 MG/DL — HIGH (ref 70–99)
GLUCOSE BLDC GLUCOMTR-MCNC: 96 MG/DL — SIGNIFICANT CHANGE UP (ref 70–99)
GLUCOSE BLDC GLUCOMTR-MCNC: 97 MG/DL — SIGNIFICANT CHANGE UP (ref 70–99)
GLUCOSE SERPL-MCNC: 111 MG/DL — HIGH (ref 70–99)
GLUCOSE SERPL-MCNC: 77 MG/DL — SIGNIFICANT CHANGE UP (ref 70–99)
HCT VFR BLD CALC: 30.4 % — LOW (ref 39–50)
HGB BLD-MCNC: 9.7 G/DL — LOW (ref 13–17)
MAGNESIUM SERPL-MCNC: 2.2 MG/DL — SIGNIFICANT CHANGE UP (ref 1.6–2.6)
MAGNESIUM SERPL-MCNC: 2.3 MG/DL — SIGNIFICANT CHANGE UP (ref 1.6–2.6)
MCHC RBC-ENTMCNC: 27.8 PG — SIGNIFICANT CHANGE UP (ref 27–34)
MCHC RBC-ENTMCNC: 31.9 G/DL — LOW (ref 32–36)
MCV RBC AUTO: 87.1 FL — SIGNIFICANT CHANGE UP (ref 80–100)
NRBC # BLD AUTO: 0 K/UL — SIGNIFICANT CHANGE UP (ref 0–0)
NRBC # BLD: 0 /100 WBCS — SIGNIFICANT CHANGE UP (ref 0–0)
NRBC # FLD: 0 K/UL — SIGNIFICANT CHANGE UP (ref 0–0)
NRBC BLD-RTO: 0 /100 WBCS — SIGNIFICANT CHANGE UP (ref 0–0)
PHOSPHATE SERPL-MCNC: 6 MG/DL — HIGH (ref 2.5–4.5)
PHOSPHATE SERPL-MCNC: 6.5 MG/DL — HIGH (ref 2.5–4.5)
PLATELET # BLD AUTO: 310 K/UL — SIGNIFICANT CHANGE UP (ref 150–400)
POTASSIUM SERPL-MCNC: 5.1 MMOL/L — SIGNIFICANT CHANGE UP (ref 3.5–5.3)
POTASSIUM SERPL-MCNC: 5.4 MMOL/L — HIGH (ref 3.5–5.3)
POTASSIUM SERPL-SCNC: 5.1 MMOL/L — SIGNIFICANT CHANGE UP (ref 3.5–5.3)
POTASSIUM SERPL-SCNC: 5.4 MMOL/L — HIGH (ref 3.5–5.3)
RBC # BLD: 3.49 M/UL — LOW (ref 4.2–5.8)
RBC # FLD: 21.3 % — HIGH (ref 10.3–14.5)
SODIUM SERPL-SCNC: 132 MMOL/L — LOW (ref 135–145)
SODIUM SERPL-SCNC: 134 MMOL/L — LOW (ref 135–145)
WBC # BLD: 7.28 K/UL — SIGNIFICANT CHANGE UP (ref 3.8–10.5)
WBC # FLD AUTO: 7.28 K/UL — SIGNIFICANT CHANGE UP (ref 3.8–10.5)

## 2025-01-30 PROCEDURE — 77001 FLUOROGUIDE FOR VEIN DEVICE: CPT | Mod: 26,GC,XU

## 2025-01-30 PROCEDURE — 36581 REPLACE TUNNELED CV CATH: CPT | Mod: 78

## 2025-01-30 PROCEDURE — 71260 CT THORAX DX C+: CPT | Mod: 26

## 2025-01-30 PROCEDURE — 75825 VEIN X-RAY TRUNK: CPT | Mod: 26,XU

## 2025-01-30 PROCEDURE — 37248 TRLUML BALO ANGIOP 1ST VEIN: CPT | Mod: 79

## 2025-01-30 RX ORDER — ANTISEPTIC SURGICAL SCRUB 0.04 MG/ML
1 SOLUTION TOPICAL ONCE
Refills: 0 | Status: COMPLETED | OUTPATIENT
Start: 2025-01-30 | End: 2025-01-30

## 2025-01-30 RX ORDER — ANTISEPTIC SURGICAL SCRUB 0.04 MG/ML
1 SOLUTION TOPICAL
Refills: 0 | Status: DISCONTINUED | OUTPATIENT
Start: 2025-01-30 | End: 2025-01-31

## 2025-01-30 RX ORDER — SODIUM CHLORIDE 9 G/ML
500 INJECTION, SOLUTION INTRAVENOUS
Refills: 0 | Status: DISCONTINUED | OUTPATIENT
Start: 2025-01-30 | End: 2025-01-31

## 2025-01-30 RX ORDER — BACTERIOSTATIC SODIUM CHLORIDE 0.9 %
10 VIAL (ML) INJECTION
Refills: 0 | Status: DISCONTINUED | OUTPATIENT
Start: 2025-01-30 | End: 2025-01-31

## 2025-01-30 RX ADMIN — Medication 4 MILLIGRAM(S): at 23:20

## 2025-01-30 RX ADMIN — Medication 60 MILLIGRAM(S): at 11:23

## 2025-01-30 RX ADMIN — SODIUM CHLORIDE 50 MILLILITER(S): 9 INJECTION, SOLUTION INTRAVENOUS at 12:25

## 2025-01-30 RX ADMIN — NIFEDIPINE 60 MILLIGRAM(S): 90 TABLET, FILM COATED, EXTENDED RELEASE ORAL at 06:40

## 2025-01-30 RX ADMIN — BUMETANIDE 2 MILLIGRAM(S): 2 TABLET ORAL at 06:41

## 2025-01-30 RX ADMIN — Medication 1000 UNIT(S): at 11:22

## 2025-01-30 RX ADMIN — LABETALOL HYDROCHLORIDE 100 MILLIGRAM(S): 300 TABLET, FILM COATED ORAL at 06:41

## 2025-01-30 RX ADMIN — LEVOTHYROXINE SODIUM 25 MICROGRAM(S): 25 TABLET ORAL at 06:41

## 2025-01-30 RX ADMIN — Medication 160 MILLIGRAM(S): at 06:40

## 2025-01-30 RX ADMIN — ATORVASTATIN CALCIUM 40 MILLIGRAM(S): 80 TABLET, FILM COATED ORAL at 23:21

## 2025-01-30 RX ADMIN — ANTISEPTIC SURGICAL SCRUB 1 APPLICATION(S): 0.04 SOLUTION TOPICAL at 11:22

## 2025-01-30 RX ADMIN — ANTISEPTIC SURGICAL SCRUB 1 APPLICATION(S): 0.04 SOLUTION TOPICAL at 12:31

## 2025-01-30 RX ADMIN — Medication 1 TABLET(S): at 11:22

## 2025-01-30 NOTE — DISCHARGE NOTE PROVIDER - NSDCFUSCHEDAPPT_GEN_ALL_CORE_FT
Félix Galan  [facility] Physician [facility]  CARDIOLOGY [address] [facility]   Scheduled Appointment: 02/19/2025    Amara Garcia  [facility] Physician [facility]  NEPHRO [address] [facility]  Scheduled Appointment: 03/20/2025

## 2025-01-30 NOTE — PROGRESS NOTE ADULT - PROBLEM SELECTOR PLAN 6
Patient is calling asking for concerta 18 and concerta 27. Pharmacy does not carry 45mg.   
Diet: DASH/renal  DVT: heparin subq  Dispo: pending
negative
Diet: DASH/renal  DVT: heparin subq  Dispo: pending

## 2025-01-30 NOTE — DISCHARGE NOTE PROVIDER - HOSPITAL COURSE
69M with PMH HTN, HLD, ESRD on HD T/TH/S, s/p new R chest wall tunneled HD cath on 1/17 now coming in for HD catheter malfunction noted at outpt HD session, had similar issue several days ago requiring admission.    Hemodialysis catheter malfunction: As per renal, Pt with malfunctioning HD catheter at outpt facility, unable to receive HD thus came to [facility]. PC cathter not functioning OP however seemed to work better in hospital post cathflo (also similar results when hospitalized last visit). Although HD RN reports poor flow. Permacath exchange as having issues with OP dialysis. Appreciate IR input. s/p permacath exchange on 1/29, however, still unable to complete HD session due to malfunction. IR re-evaluated and recommended CT venogram  ****** and to replace catheter ****INCOMPLETE    Hypertension: c/w home labetalol, nifedipine, doxazosin, Valsartan, Imdur. Monitor BP.    Hyperlipidemia: c/w atorvastatin.    CAD (coronary artery disease): [facility] 7/8/24 showing ostial LAD disease s/p PTCA. c/w aspirin, Plavix, statin.    Hypothyroidism: c/w home levothyroxine.    Case discussed with *******, labs/vitals/medications reviewed, Pt medically cleared for discharge to home with outpt follow up as directed with PCP and Nephrologist.        69M with PMH HTN, HLD, ESRD on HD T/TH/S, s/p new R chest wall tunneled HD cath on 1/17 now coming in for HD catheter malfunction noted at outpt HD session, had similar issue several days ago requiring admission.    Hemodialysis catheter malfunction: As per renal, Pt with malfunctioning HD catheter at outpt facility, unable to receive HD thus came to [facility]. On admission PC catheter seemed to work better post cathflo initially, however, HD RN reported poor flow. He is s/p permacath exchange on 1/29, however, still unable to complete HD session due to malfunction. IR re-evaluated and recommended CT venogram which showed patent central veins with question of nonocclusive calcified thrombus/remnant fibrous sheath from prior indwelling catheter within the right internal jugular vein. He is now s/p -IR PC exchange w/ SVC venoplasty on 1/30, no issues with PC access during HD post-procedure on 1/30 and 1/31.    Patient seen and evaluated. Reviewed discharge medications with patient and attending. Reviewed need for prescription for previous home medications, none requested. Medically cleared/stable for discharge as per Dr. Sepulveda on 1/31/2025 with appropriate follow up. Patient understands and agrees with plan of care.     69M with PMH HTN, HLD, ESRD on HD T/TH/S, s/p new R chest wall tunneled HD cath on 1/17 now coming in for HD catheter malfunction noted at outpt HD session, had similar issue several days ago requiring admission.    Hemodialysis catheter malfunction: As per renal, Pt with malfunctioning HD catheter at outpt facility, unable to receive HD thus came to [facility]. On admission PC catheter seemed to work better post cathflo initially, however, HD RN reported poor flow. He is s/p permacath exchange on 1/29, however, still unable to complete HD session due to malfunction. IR re-evaluated and recommended CT venogram which showed patent central veins with question of nonocclusive calcified thrombus/remnant fibrous sheath from prior indwelling catheter within the right internal jugular vein. He is now s/p -IR PC exchange w/ SVC venoplasty on 1/30, no issues with PC access during HD post-procedure on 1/30 and 1/31.    Patient seen and evaluated. Reviewed discharge medications with patient and attending. Reviewed need for prescription for previous home medications, none requested. Medically cleared/stable for discharge as per Dr. Sepulveda on 1/31/2025 with appropriate follow up. Patient understands and agrees with plan of care.

## 2025-01-30 NOTE — PROGRESS NOTE ADULT - PROBLEM SELECTOR PLAN 4
-[facility] 7/8/24 showing ostial LAD disease s/p PTCA  -c/w aspirin, plavix, statin
-[facility] 7/8/24 showing ostial LAD disease s/p PTCA  -c/w aspirin, plavix, statin
-[facility] 7/8/24 showing ostial LAD disease s/p PTCA  -c/w aspirin, plavix, statin
-[facility] 7/8/24 showing ostial LAD disease s/p PTCA  -c/w aspirin, plavix, statin
-[facility] 7/8/24 showing ostial LAD disease s/p PTCA  -c/w aspirin, plavix, statin

## 2025-01-30 NOTE — PROCEDURE NOTE - PROCEDURE FINDINGS AND DETAILS
Right tunneled HD catheter exchange. 19cm catheter placed with tip in SVC.
Right tunneled HD catheter exchanged. Question of fibrin sheath. Venoplasty performed. New tunneled HD catheter placed with tip in SVC. Tested multiple times, no issue with aspiration or flush.

## 2025-01-30 NOTE — DISCHARGE NOTE PROVIDER - NSDCCONDITION_GEN_ALL_CORE
Radiology History & Physical      SUBJECTIVE:     Chief Complaint: chest port dysfunction.    History of Present Illness:  Ivy Salgado is a 41 y.o. female who presents for chest port exchange.    Past Medical History:   Diagnosis Date    Abnormal Pap smear 2007    Alkaline phosphatase elevation- based on lab from 4/9/2019 05/28/2019    Arthritis     Avascular necrosis of bone of hip, left     Bacterial vaginosis     Crohn disease     Depression 08/05/2017    Drug-induced pancreatitis (imuran)     Encounter for blood transfusion     Generalized anxiety disorder     Genital HSV     GERD (gastroesophageal reflux disease)     Hypertension     Ileostomy in place 07/09/2012    Kidney stone     Long QT syndrome     Melanoma in situ (excised-buttocks 2018, para-stomal site 2019)     Moderate episode of recurrent major depressive disorder 02/02/2024    Multiple thyroid nodules 11/27/2018    Osteopenia of multiple sites 01/07/2019    Pancreas cyst (tail, possible IPMN)     Recurrent Clostridioides difficile diarrhea     Recurrent UTI 04/03/2013     Past Surgical History:   Procedure Laterality Date    ABDOMINAL SURGERY      APPENDECTOMY      ARTHROPLASTY OF SHOULDER Left 7/18/2023    Procedure: ARTHROPLASTY, SHOULDER;  Surgeon: Sharon Babb MD;  Location: [facility] OR;  Service: Orthopedics;  Laterality: Left;    AUGMENTATION OF BREAST Bilateral 06/2022    gel implants    BILATERAL SALPINGO-OOPHORECTOMY (BSO) Bilateral 05/30/2019    Procedure: SALPINGO-OOPHORECTOMY, BILATERAL;  Surgeon: Rupa German MD;  [facility];  Service: OB/GYN;  Laterality: Bilateral;    BLADDER SURGERY      partial cystectomy due to fistula    breast lift      BREAST SURGERY      [facility]      COLON SURGERY      COLONOSCOPY      CYSTOSCOPY  09/23/2020    Procedure: CYSTOSCOPY;  Surgeon: Sascha Florentino MD;  [facility];  Service: Urology;;    CYSTOSCOPY W/ URETERAL STENT PLACEMENT Left 09/15/2020    Procedure:  CYSTOSCOPY, WITH URETERAL STENT INSERTION;  Surgeon: Sascha Florentino MD;  Location: [facility] OR 1ST FLR;  Service: Urology;  Laterality: Left;    DIAGNOSTIC LAPAROSCOPY N/A 07/09/2020    Procedure: LAPAROSCOPY, DIAGNOSTIC;  Surgeon: Gilson El MD;  Location: [facility] OR;  Service: OB/GYN;  Laterality: N/A;    ESOPHAGOGASTRODUODENOSCOPY N/A 1/3/2024    Procedure: EGD (ESOPHAGOGASTRODUODENOSCOPY);  Surgeon: Lily Lind MD;  Location: [facility] ENDO (2ND FLR);  Service: Endoscopy;  Laterality: N/A;    EXCISION OF MELANOMA  07/17/2019    ILEOSCOPY N/A 1/3/2024    Procedure: ILEOSCOPY;  Surgeon: Lily Lind MD;  Location: [facility] ENDO (2ND FLR);  Service: Endoscopy;  Laterality: N/A;    ILEOSCOPY N/A 1/24/2024    Procedure: ILEOSCOPY;  Surgeon: Lilian Navarro MD;  Location: [facility] ENDO (2ND FLR);  Service: Endoscopy;  Laterality: N/A;  through stoma    ILEOSCOPY N/A 6/4/2024    Procedure: ILEOSCOPY;  Surgeon: Peace Garcia MD;  Location: [facility] ENDO (4TH FLR);  Service: Endoscopy;  Laterality: N/A;  Ref By:kaleb Stallworth sent via portal,  received urgent message to reschedule pt from 7/15  to may or june-updated prep instructions sent-   5/29/24- precall complete - ERW    ILEOSTOMY      LAPAROSCOPIC LYSIS OF ADHESIONS N/A 07/09/2020    Procedure: LYSIS, ADHESIONS, LAPAROSCOPIC;  Surgeon: Gilson El MD;  Location: [facility] OR;  Service: OB/GYN;  Laterality: N/A;    LASER LITHOTRIPSY  09/23/2020    Procedure: LITHOTRIPSY, USING LASER;  Surgeon: Sascha Florentino MD;  Location: [facility] OR 1ST FLR;  Service: Urology;;    LYSIS OF ADHESIONS N/A 05/30/2019    Procedure: LYSIS, ADHESIONS;  Surgeon: Rupa German MD;  Location: [facility] OR 2ND FLR;  Service: OB/GYN;  Laterality: N/A;    OOPHORECTOMY Right 04/16/2015    PORTACATH PLACEMENT  02/21/2017    SKIN BIOPSY      SMALL INTESTINE SURGERY      age 15 Y    TOTAL ABDOMINAL HYSTERECTOMY  04/16/2015    TOTAL COLECTOMY      TUBAL LIGATION  06/06/2012    UPPER  GASTROINTESTINAL ENDOSCOPY      URETEROSCOPIC REMOVAL OF URETERIC CALCULUS  09/23/2020    Procedure: REMOVAL, CALCULUS, URETER, URETEROSCOPIC;  Surgeon: Sascha Florentino MD;  Location: [facility] OR [address] [place] [address];  Service: Urology;;    URETEROSCOPY Left 09/23/2020    Procedure: URETEROSCOPY;  Surgeon: Sascha Florentino MD;  Location: [facility] OR [address] [place] [address];  Service: Urology;  Laterality: Left;       Home Meds:   Prior to Admission medications    Medication Sig Start Date End Date Taking? Authorizing Provider   clonazePAM (KLONOPIN) 1 MG tablet Take 1 tablet (1 mg total) by mouth 2 (two) times daily. 7/16/24  Yes Chano, Luis OCHOA,    cyclobenzaprine (FLEXERIL) 10 MG tablet Take 1 tablet (10 mg total) by mouth every evening as needed for muscle pain 5/28/24  Yes Mono Giles III, PA-C   droNABinol (MARINOL) 5 MG capsule Take 1 capsule (5 mg total) by mouth 2 (two) times daily before meals. 5/9/24  Yes Chano, Luis OCHOA, DO   estradiol 0.025 mg/24 hr 0.025 mg/24 hr Place 1 patch onto the skin once a week.  Patient taking differently: Place 1 patch onto the skin once a week. CHANGES ON MONDAYS 1/29/24 1/28/25 Yes Tawanda Mahajan MD   gabapentin (NEURONTIN) 300 MG capsule Take 1 capsule (300 mg total) by mouth 2 (two) times daily. 3/20/24  Yes Peace Garcia MD   loperamide (IMODIUM) 1 mg/7.5 mL solution Take 4 mg by mouth 3 (three) times daily as needed for Diarrhea.   Yes Provider, Historical   mirtazapine (REMERON SOL-TAB) 30 MG disintegrating tablet Take 1 tablet (30 mg total) by mouth nightly. 6/5/24  Yes Chano, Luis OCHOA, DO   multivitamin (THERAGRAN) per tablet Take 1 tablet by mouth once daily.   Yes Provider, Historical   nadoloL (CORGARD) 40 MG tablet Take 1 tablet (40 mg total) by mouth once daily. Patient needs appointment before next refill. 4/15/24 4/5/26 Yes Parth Monsalve MD   pantoprazole (PROTONIX) 40 MG tablet Take 1 tablet (40 mg total) by mouth 2 (two) times daily. 5/8/24  Yes Parish Ross MD    promethazine (PHENERGAN) 25 MG tablet Take 1 tablet (25 mg total) by mouth every 6 (six) hours as needed for Nausea. 6/21/24  Yes Peace Garcia MD   valACYclovir (VALTREX) 500 MG tablet Take 1 tablet (500 mg total) by mouth once daily. 10/7/23 10/7/24 Yes Tawanda Mahajan MD   zolpidem (AMBIEN) 10 mg Tab Take 1 tablet (10 mg total) by mouth every evening. 5/28/24  Yes Luis Madden,    albuterol (VENTOLIN HFA) 90 mcg/actuation inhaler Inhale 2 puffs into the lungs every 6 (six) hours as needed. Rescue  Patient not taking: Reported on 4/15/2024 4/2/24   Rosey Giles MD   diphenoxylate-atropine 2.5-0.025 mg/5 ml (LOMOTIL) 2.5-0.025 mg/5 mL liquid Take 2.5-5 ml up to 4 times a day - dose might change at visit on 1/30 3/13/24   Peace Garcia MD   ipratropium (ATROVENT) 21 mcg (0.03 %) nasal spray use 2 sprays by Each Nostril route 2 (two) times daily as needed.  Patient not taking: Reported on 4/15/2024 4/2/24   Rosey Giles MD   methenamine (HIPREX) 1 gram Tab Take 1 tablet (1 g total) by mouth 2 (two) times daily. 4/18/24   Andres Pineda MD   oxyCODONE (ROXICODONE) 5 MG immediate release tablet Take 1 tablet (5 mg total) by mouth every 4 (four) hours as needed for Pain. 4/18/24   Andres Pineda MD   tamsulosin (FLOMAX) 0.4 mg Cap Take 1 capsule (0.4 mg total) by mouth once daily.  Patient not taking: Reported on 4/15/2024 3/15/24 5/14/24  Luis Madden DO   vedolizumab (ENTYVIO) 300 mg SolR injection Inject 300 mg into the vein every 8 weeks.    Provider, Historical     Anticoagulants/Antiplatelets: no anticoagulation    Allergies:   Review of patient's allergies indicates:   Allergen Reactions    Azathioprine sodium Other (See Comments)     Other reaction(s): pancreatitis  Other reaction(s): pancreatitis    Methotrexate analogues Other (See Comments)     leukopenia    Stelara [ustekinumab] Other (See Comments)     Multiple infections    Zofran [ondansetron hcl  (pf)] Other (See Comments)     Per patient causes prolong QT    Vancomycin analogues Other (See Comments)     Made her red    Azathioprine      Other reaction(s): Unknown    Methotrexate      Other reaction(s): infection-    Morphine Itching and Other (See Comments)     Other reaction(s): Itching    Zofran [ondansetron hcl]      Other reaction(s): Hives    Bactrim [sulfamethoxazole-trimethoprim] Palpitations    Ciprofloxacin Palpitations     Sedation History:  no adverse reactions    Review of Systems:   Hematological: no known coagulopathies  Respiratory: no shortness of breath  Cardiovascular: no chest pain  Gastrointestinal: no abdominal pain  Genito-Urinary: no dysuria  Musculoskeletal: negative  Neurological: no TIA or stroke symptoms         OBJECTIVE:     Vital Signs (Most Recent)  Temp: 98.2 °F (36.8 °C) (07/23/24 0635)  Pulse: 66 (07/23/24 0635)  Resp: 16 (07/23/24 0635)  BP: 116/73 (07/23/24 0636)  SpO2: 98 % (07/23/24 0635)    Physical Exam:  ASA: per anesthesia  Mallampati: per anesthesia    General: no acute distress  Mental Status: alert and oriented to person, place and time  HEENT: normocephalic, atraumatic  Chest: unlabored breathing, L chest port in place  Abdomen: nondistended  Extremity: moves all extremities    Laboratory  Lab Results   Component Value Date    INR 1.0 07/16/2024       Lab Results   Component Value Date    WBC 5.64 07/02/2024    HGB 13.1 07/02/2024    HCT 41.2 07/02/2024    MCV 82 07/02/2024     07/02/2024      Lab Results   Component Value Date    GLU 69 (L) 07/02/2024     07/02/2024    K 3.5 07/02/2024     07/02/2024    CO2 20 (L) 07/02/2024    BUN 7 07/02/2024    CREATININE 0.8 07/02/2024    CALCIUM 9.9 07/02/2024    MG 1.8 03/11/2024    ALT 14 07/02/2024    AST 25 07/02/2024    ALBUMIN 4.1 07/02/2024    BILITOT 0.5 07/02/2024    BILIDIR 0.1 01/10/2024       ASSESSMENT/PLAN:     Sedation Plan: per anesthesia  Patient will undergo port exchange.    Randolph  Jackie HENDERSON  Department of Radiology, PGY-3       Stable

## 2025-01-30 NOTE — DISCHARGE NOTE PROVIDER - NSDCMRMEDTOKEN_GEN_ALL_CORE_FT
aspirin 81 mg oral delayed release tablet: 1 tab(s) orally once a day  atorvastatin 40 mg oral tablet: 1 tab(s) orally once a day  bumetanide 2 mg oral tablet: 1 tab(s) orally 2 times a day  calcitriol 0.25 mcg oral capsule: 1 cap(s) orally once a week  calcium acetate 667 mg oral tablet: 1 tab(s) orally 3 times a day  cholecalciferol oral tablet: 1,000 unit(s) orally once a day  clopidogrel 75 mg oral tablet: 1 tab(s) orally once a day MDD: 1  doxazosin 4 mg oral tablet: 1 tab(s) orally once a day (at bedtime)  isosorbide mononitrate 60 mg oral tablet, extended release: 1 tab(s) orally once a day  labetalol 200 mg oral tablet: 0.5 tab(s) orally 2 times a day  levothyroxine 25 mcg (0.025 mg) oral tablet: 1 tab(s) orally once a day  NIFEdipine 60 mg oral tablet, extended release: 1 tab(s) orally once a day  Yee-Selena Rx oral tablet: 1 tab(s) orally once a day  valsartan 160 mg oral tablet: 1 tab(s) orally once a day

## 2025-01-30 NOTE — DISCHARGE NOTE PROVIDER - NSDCCPCAREPLAN_GEN_ALL_CORE_FT
PRINCIPAL DISCHARGE DIAGNOSIS  Diagnosis: Hemodialysis catheter malfunction  Assessment and Plan of Treatment: You were evaluated by interventional radiology team and had new catheter placed.   Follow up with your primary care physician and nephrologist for further monitoring in 1-2 weeks. Please call to arrange appointment.   Ensure compliance with your hemodialysis per your routine.      SECONDARY DISCHARGE DIAGNOSES  Diagnosis: Hyperlipidemia  Assessment and Plan of Treatment: Ensure compliance with your medications as directed, including Lipitor. Follow up with your primary care physician for further monitoring in 1-2 weeks. Please call to arrange appointment. Low cholesterol diet. Monitor your liver function every 3 months with your PCP while taking this medication.    Diagnosis: Hypothyroidism  Assessment and Plan of Treatment: Continue Levothyroxine daily. Follow up with your primary care physician for further monitoring in 1-2 weeks. Please call to arrange appointment.    Diagnosis: Hypertension  Assessment and Plan of Treatment: Ensure compliance with your medications as directed. Follow up with your primary care physician for further monitoring in 1-2 weeks. Please call to arrange appointment. Low salt diet.     PRINCIPAL DISCHARGE DIAGNOSIS  Diagnosis: Hemodialysis catheter malfunction  Assessment and Plan of Treatment: You were evaluated by interventional radiology team and had new catheter placed.   Follow up with your primary care physician and nephrologist for further monitoring in 1-2 weeks. Please call to arrange appointment.   Ensure compliance with your hemodialysis per your routine.      SECONDARY DISCHARGE DIAGNOSES  Diagnosis: Hypertension  Assessment and Plan of Treatment: Ensure compliance with your medications as directed. Your VALSARTAN was DECREASED due to acceptable blood pressure readings in the hospital.  Follow up with your primary care physician for further monitoring in 1-2 weeks. Please call to arrange appointment. Low salt diet.    Diagnosis: Hyperlipidemia  Assessment and Plan of Treatment: Ensure compliance with your medications as directed, including Lipitor. Follow up with your primary care physician for further monitoring in 1-2 weeks. Please call to arrange appointment. Low cholesterol diet. Monitor your liver function every 3 months with your PCP while taking this medication.    Diagnosis: Hypothyroidism  Assessment and Plan of Treatment: Continue Levothyroxine daily. Follow up with your primary care physician for further monitoring in 1-2 weeks. Please call to arrange appointment.

## 2025-01-30 NOTE — PROGRESS NOTE ADULT - PROBLEM SELECTOR PROBLEM 1
Hemodialysis catheter malfunction

## 2025-01-30 NOTE — PROGRESS NOTE ADULT - PROBLEM SELECTOR PLAN 2
-c/w home labetalol, nifedipine, doxazosin, valasartan, imdur  -monitor BP
-c/w home labetalol, nifedipine, doxazosin, valasartan, imdur  -monitor BP
-c/w home labetalol, nifedipine, doxazosin, valsartan, imdur  -monitor BP
-c/w home labetalol, nifedipine, doxazosin, valasartan, imdur  -monitor BP
-c/w home labetalol, nifedipine, doxazosin, valasartan, imdur  -monitor BP

## 2025-01-30 NOTE — PROGRESS NOTE ADULT - ASSESSMENT
69-year-old male, with past history of CAD - s/p Stent, HTN, CAV, ESRD (on Peritoneal dialysis), DM and BPH presented to the ED with shortness of breath. Similar to presentation about 1 Month ago. Nephro called for ESRD    A/P  ESRD on HD TTS via PC  Catheter site appears to without evidence of infection   From [facility] conduit   Follows with nephro Dr. Menjivar   S/p HD in hosp on -01/25 tolerated well  PC catheter not functioning OP however seemed to work better in hospital post cathflo (also similar results when hospitalized last visit)  Although HD RN reports poor flow  last hd 1/28 tolerated well. s/p IR for PC exchange 1/29. hd after pc exchange only had 15 min dialysis PC not working. IR notified. planning for CT venogram and PC check today  hd after IR. if PC working pt can be dc from renal standpoint.   Patient is switching dialysis unit to [place], [facility]. please sent info to [place]. pt accepted [facility]   Consent obtained, witnessed, placed in chart.  Monitor bmp.    HTN  BP acceptable  continue home meds  UF w/ HD as tolerated.  low sodium diet  ALLERGIC To hydralazine, please avoid  monitor bp     Anemia   acceptable   monitor hgb     CKD-MBD   acceptable   Continue calcium acetate 667 mg tid  Low PO4 diet.  monitor Ca, PO4 daily

## 2025-01-30 NOTE — PROGRESS NOTE ADULT - PROBLEM SELECTOR PLAN 1
-as per renal, pt with malfunctioning HD catheter at outpt facility, unable to receive HD thus came to [facility]  PC cathter not functioning OP however seemed to work better in hospital post cathflo (also similar results when hospitalized last visit)  Although HD RN reports poor flow   permcath exchange as having issues with OP dialysis  Appreciate IR input  s/p permacath placement
-as per renal, pt with malfunctioning HD catheter at outpt facility, unable to receive HD thus came to [facility]  PC cathter not functioning OP however seemed to work better in hospital post cathflo (also similar results when hospitalized last visit)  Although HD RN reports poor flow   permcath exchange as having issues with OP dialysis  Appreciate IR input
-as per renal, pt with malfunctioning HD catheter at outpt facility, unable to receive HD thus came to [facility]  PC cathter not functioning OP however seemed to work better in hospital post cathflo (also similar results when hospitalized last visit)  Although HD RN reports poor flow   permcath exchange as having issues with OP dialysis  Appreciate IR input  poss permacath placement tomorrow
-as per renal, pt with malfunctioning HD catheter at outpt facility, unable to receive HD thus came to [facility]  PC cathter not functioning OP however seemed to work better in hospital post cathflo (also similar results when hospitalized last visit)  Although HD RN reports poor flow   permcath exchange as having issues with OP dialysis  Appreciate IR input  s/p < from: IR Procedure (01.29.25 @ 09:17) >    - Tunneled central venous catheter exchange with fluoroscopic guidance    < end of copied text >
-as per renal, pt with malfunctioning HD catheter at outpt facility, unable to receive HD thus came to [facility]  PC cathter not functioning OP however seemed to work better in hospital post cathflo (also similar results when hospitalized last visit)  Although HD RN reports poor flow  Consider permcath exchange as having issues with OP dialysis  Appreciate IR input

## 2025-01-30 NOTE — PROGRESS NOTE ADULT - PROBLEM SELECTOR PLAN 5
-c/w home levothyroxine

## 2025-01-30 NOTE — DISCHARGE NOTE PROVIDER - CARE PROVIDER_API CALL
Andrey Menjivar [facility]  Nephrology  [address]  [place] [address]  Phone: [phone]  Fax: [phone]  Follow Up Time:

## 2025-01-31 ENCOUNTER — TRANSCRIPTION ENCOUNTER (OUTPATIENT)
Age: 70
End: 2025-01-31

## 2025-01-31 VITALS
OXYGEN SATURATION: 100 % | TEMPERATURE: 98 F | RESPIRATION RATE: 18 BRPM | HEART RATE: 64 BPM | SYSTOLIC BLOOD PRESSURE: 144 MMHG | DIASTOLIC BLOOD PRESSURE: 76 MMHG

## 2025-01-31 LAB
ANION GAP SERPL CALC-SCNC: 16 MMOL/L — HIGH (ref 7–14)
BUN SERPL-MCNC: 38 MG/DL — HIGH (ref 7–23)
CALCIUM SERPL-MCNC: 8.7 MG/DL — SIGNIFICANT CHANGE UP (ref 8.4–10.5)
CHLORIDE SERPL-SCNC: 96 MMOL/L — LOW (ref 98–107)
CO2 SERPL-SCNC: 22 MMOL/L — SIGNIFICANT CHANGE UP (ref 22–31)
CREAT SERPL-MCNC: 5.11 MG/DL — HIGH (ref 0.5–1.3)
EGFR: 11 ML/MIN/1.73M2 — LOW
GLUCOSE BLDC GLUCOMTR-MCNC: 109 MG/DL — HIGH (ref 70–99)
GLUCOSE BLDC GLUCOMTR-MCNC: 231 MG/DL — HIGH (ref 70–99)
GLUCOSE BLDC GLUCOMTR-MCNC: 82 MG/DL — SIGNIFICANT CHANGE UP (ref 70–99)
GLUCOSE SERPL-MCNC: 121 MG/DL — HIGH (ref 70–99)
HCT VFR BLD CALC: 31.3 % — LOW (ref 39–50)
HGB BLD-MCNC: 10.1 G/DL — LOW (ref 13–17)
MAGNESIUM SERPL-MCNC: 2.1 MG/DL — SIGNIFICANT CHANGE UP (ref 1.6–2.6)
MCHC RBC-ENTMCNC: 27.4 PG — SIGNIFICANT CHANGE UP (ref 27–34)
MCHC RBC-ENTMCNC: 32.3 G/DL — SIGNIFICANT CHANGE UP (ref 32–36)
MCV RBC AUTO: 85.1 FL — SIGNIFICANT CHANGE UP (ref 80–100)
NRBC # BLD AUTO: 0 K/UL — SIGNIFICANT CHANGE UP (ref 0–0)
NRBC # BLD: 0 /100 WBCS — SIGNIFICANT CHANGE UP (ref 0–0)
NRBC # FLD: 0 K/UL — SIGNIFICANT CHANGE UP (ref 0–0)
NRBC BLD-RTO: 0 /100 WBCS — SIGNIFICANT CHANGE UP (ref 0–0)
PHOSPHATE SERPL-MCNC: 4.4 MG/DL — SIGNIFICANT CHANGE UP (ref 2.5–4.5)
PLATELET # BLD AUTO: 309 K/UL — SIGNIFICANT CHANGE UP (ref 150–400)
POTASSIUM SERPL-MCNC: 5.2 MMOL/L — SIGNIFICANT CHANGE UP (ref 3.5–5.3)
POTASSIUM SERPL-SCNC: 5.2 MMOL/L — SIGNIFICANT CHANGE UP (ref 3.5–5.3)
RBC # BLD: 3.68 M/UL — LOW (ref 4.2–5.8)
RBC # FLD: 21 % — HIGH (ref 10.3–14.5)
SODIUM SERPL-SCNC: 134 MMOL/L — LOW (ref 135–145)
WBC # BLD: 6.79 K/UL — SIGNIFICANT CHANGE UP (ref 3.8–10.5)
WBC # FLD AUTO: 6.79 K/UL — SIGNIFICANT CHANGE UP (ref 3.8–10.5)

## 2025-01-31 RX ORDER — ISOPROPYL ALCOHOL, BENZOCAINE .7; .06 ML/ML; ML/ML
0 SWAB TOPICAL
Qty: 100 | Refills: 1
Start: 2025-01-31

## 2025-01-31 RX ORDER — VALSARTAN 80 MG
1 TABLET ORAL
Qty: 0 | Refills: 0 | DISCHARGE
Start: 2025-01-31

## 2025-01-31 RX ADMIN — CALCIUM ACETATE 667 MILLIGRAM(S): 667 CAPSULE ORAL at 10:57

## 2025-01-31 RX ADMIN — BUMETANIDE 2 MILLIGRAM(S): 2 TABLET ORAL at 14:53

## 2025-01-31 RX ADMIN — Medication 60 MILLIGRAM(S): at 14:53

## 2025-01-31 RX ADMIN — Medication 160 MILLIGRAM(S): at 06:05

## 2025-01-31 RX ADMIN — ANTISEPTIC SURGICAL SCRUB 1 APPLICATION(S): 0.04 SOLUTION TOPICAL at 11:02

## 2025-01-31 RX ADMIN — CALCIUM ACETATE 667 MILLIGRAM(S): 667 CAPSULE ORAL at 08:40

## 2025-01-31 RX ADMIN — LABETALOL HYDROCHLORIDE 100 MILLIGRAM(S): 300 TABLET, FILM COATED ORAL at 06:06

## 2025-01-31 RX ADMIN — BUMETANIDE 2 MILLIGRAM(S): 2 TABLET ORAL at 06:06

## 2025-01-31 RX ADMIN — ASPIRIN 81 MILLIGRAM(S): 81 TABLET, COATED ORAL at 10:57

## 2025-01-31 RX ADMIN — Medication 75 MILLIGRAM(S): at 10:57

## 2025-01-31 RX ADMIN — Medication 1 TABLET(S): at 11:05

## 2025-01-31 RX ADMIN — Medication 2: at 10:58

## 2025-01-31 RX ADMIN — NIFEDIPINE 60 MILLIGRAM(S): 90 TABLET, FILM COATED, EXTENDED RELEASE ORAL at 06:06

## 2025-01-31 RX ADMIN — LEVOTHYROXINE SODIUM 25 MICROGRAM(S): 25 TABLET ORAL at 06:06

## 2025-01-31 RX ADMIN — Medication 1000 UNIT(S): at 10:57

## 2025-01-31 NOTE — PROGRESS NOTE ADULT - PROVIDER SPECIALTY LIST ADULT
Nephrology
Anesthesia
Nephrology
Internal Medicine

## 2025-01-31 NOTE — DISCHARGE NOTE NURSING/CASE MANAGEMENT/SOCIAL WORK - NSCORESITESY/N_GEN_A_CORE_RD
[employer]  [facility] [place], [place]   PHONE: [phone]          In the next few weeks you may receive a Press Ganey survey regarding your most recent clinic visit with us.  Please take a few moments out of your day to accurately evaluate your visit.  We strive to provide you with the best medical care.  Again, thank you for your time and we look forward to your next visit.    If we need to contact you regarding any test results, we will make 2 attempts to reach you at the number you have listed during your office visit today. If we are unable to reach you, a letter with your results and any further instructions will be mailed to you home.    Please do not hesitate to call our office with any questions or concerns   [phone]      
No

## 2025-01-31 NOTE — PROGRESS NOTE ADULT - REASON FOR ADMISSION
Tunneled catheter malfunction

## 2025-01-31 NOTE — DISCHARGE NOTE NURSING/CASE MANAGEMENT/SOCIAL WORK - FINANCIAL ASSISTANCE
[facility] [employer] provides services at a reduced cost to those who are determined to be eligible through [facility] [employer]’s financial assistance program. Information regarding [employer]’s financial assistance program can be found by going to [email or web address] or by calling [phone].

## 2025-01-31 NOTE — PROGRESS NOTE ADULT - ASSESSMENT
69-year-old male, with past history of CAD - s/p Stent, HTN, CAV, ESRD (on Peritoneal dialysis), DM and BPH presented to the ED with shortness of breath. Similar to presentation about 1 Month ago. Nephro called for ESRD    A/P  ESRD on HD TTS via PC  Catheter site appears to without evidence of infection   From [facility] conduit   Follows with nephro Dr. Menjivar   S/p HD in hosp on -01/25 tolerated well  PC catheter not functioning OP however seemed to work better in hospital post cathflo (also similar results when hospitalized last visit)  Although HD RN reports poor flow  last hd 1/28 tolerated well. s/p IR for PC exchange 1/29. hd after pc exchange only had 15 min dialysis PC not working. IR notified.  s/p CT venogram and PC exchange by IR 1/30 s/p hd without issue   HD today, if without issue pt can be dc from renal standpoint  Patient is switching dialysis unit to [place], [facility]. please sent info to [place]. pt accepted [facility]   Consent obtained, witnessed, placed in chart.  Monitor bmp.    HTN  BP acceptable  continue home meds  UF w/ HD as tolerated.  low sodium diet  ALLERGIC To hydralazine, please avoid  monitor bp     Anemia   acceptable   monitor hgb     CKD-MBD   acceptable   Continue calcium acetate 667 mg tid  Low PO4 diet.  monitor Ca, PO4 daily

## 2025-01-31 NOTE — PROGRESS NOTE ADULT - SUBJECTIVE AND OBJECTIVE BOX
SUBJECTIVE / OVERNIGHT EVENTS:pt seen and examined  01-27-25     MEDICATIONS  (STANDING):  aspirin enteric coated 81 milliGRAM(s) Oral daily  atorvastatin 40 milliGRAM(s) Oral at bedtime  buMETAnide 2 milliGRAM(s) Oral two times a day  calcium acetate 667 milliGRAM(s) Oral three times a day with meals  chlorhexidine 2% Cloths 1 Application(s) Topical daily  cholecalciferol 1000 Unit(s) Oral daily  clopidogrel Tablet 75 milliGRAM(s) Oral daily  dextrose 5%. 1000 milliLiter(s) (100 mL/Hr) IV Continuous <Continuous>  dextrose 5%. 1000 milliLiter(s) (50 mL/Hr) IV Continuous <Continuous>  dextrose 50% Injectable 25 Gram(s) IV Push once  dextrose 50% Injectable 12.5 Gram(s) IV Push once  dextrose 50% Injectable 25 Gram(s) IV Push once  doxazosin 4 milliGRAM(s) Oral at bedtime  glucagon  Injectable 1 milliGRAM(s) IntraMuscular once  insulin lispro (ADMELOG) corrective regimen sliding scale   SubCutaneous three times a day before meals  insulin lispro (ADMELOG) corrective regimen sliding scale   SubCutaneous at bedtime  isosorbide   mononitrate ER Tablet (IMDUR) 60 milliGRAM(s) Oral daily  labetalol 100 milliGRAM(s) Oral two times a day  levothyroxine 25 MICROGram(s) Oral daily  Nephro-brenda 1 Tablet(s) Oral daily  NIFEdipine XL 60 milliGRAM(s) Oral daily  valsartan 160 milliGRAM(s) Oral daily    MEDICATIONS  (PRN):  acetaminophen     Tablet .. 650 milliGRAM(s) Oral every 6 hours PRN Temp greater or equal to 38C (100.4F), Mild Pain (1 - 3)  aluminum hydroxide/magnesium hydroxide/simethicone Suspension 30 milliLiter(s) Oral every 4 hours PRN Dyspepsia  dextrose Oral Gel 15 Gram(s) Oral once PRN Blood Glucose LESS THAN 70 milliGRAM(s)/deciliter  melatonin 3 milliGRAM(s) Oral at bedtime PRN Insomnia  ondansetron Injectable 4 milliGRAM(s) IV Push every 8 hours PRN Nausea and/or Vomiting    Vital Signs Last 24 Hrs  T(C): 36.5 (01-27-25 @ 18:42), Max: 36.8 (01-27-25 @ 05:20)  T(F): 97.7 (01-27-25 @ 18:42), Max: 98.3 (01-27-25 @ 05:20)  HR: 62 (01-27-25 @ 20:26) (55 - 62)  BP: 129/68 (01-27-25 @ 20:26) (115/62 - 143/77)  BP(mean): --  RR: 16 (01-27-25 @ 18:42) (16 - 18)  SpO2: 100% (01-27-25 @ 18:42) (100% - 100%)    Constitutional: No fever, fatigue  Skin: No rash.  Eyes: No recent vision problems or eye pain.  ENT: No congestion, ear pain, or sore throat.  Cardiovascular: No chest pain or palpation.  Respiratory: No cough, shortness of breath, congestion, or wheezing.  Gastrointestinal: No abdominal pain, nausea, vomiting, or diarrhea.  Genitourinary: No dysuria.  Musculoskeletal: No joint swelling.  Neurologic: No headache.    PHYSICAL EXAM:  GENERAL: NAD  EYES: EOMI, PERRLA  NECK: Supple, No JVD  CHEST/LUNG: dec breath sounds at bases  HEART:  S1 , S2 +  ABDOMEN: soft , bs+  EXTREMITIES: trace edema  NEUROLOGY:alert awake    LABS:  01-27    135  |  93[L]  |  55[H]  ----------------------------<  223[H]  4.6   |  22  |  6.86[H]    Ca    8.9      27 Jan 2025 09:25  Phos  4.2     01-26  Mg     2.20     01-26    TPro  7.8  /  Alb  3.6  /  TBili  0.3  /  DBili      /  AST  18  /  ALT  16  /  AlkPhos  91  01-26    Creatinine Trend: 6.86 <--, 4.60 <--, 6.94 <--, 4.59 <--, 7.34 <--, 7.23 <--                        10.6   7.73  )-----------( 405      ( 27 Jan 2025 09:25 )             35.0     Urine Studies:  Urinalysis Basic - ( 27 Jan 2025 09:25 )    Color:  / Appearance:  / SG:  / pH:   Gluc: 223 mg/dL / Ketone:   / Bili:  / Urobili:    Blood:  / Protein:  / Nitrite:    Leuk Esterase:  / RBC:  / WBC    Sq Epi:  / Non Sq Epi:  / Bacteria:               LIVER FUNCTIONS - ( 26 Jan 2025 06:24 )  Alb: 3.6 g/dL / Pro: 7.8 g/dL / ALK PHOS: 91 U/L / ALT: 16 U/L / AST: 18 U/L / GGT: x             Leuk Esterase: x / RBC: x / WBC x   Sq Epi: x / Non Sq Epi: x / Bacteria: x        RADIOLOGY & ADDITIONAL TESTS:    Imaging Personally Reviewed:    Consultant(s) Notes Reviewed:      Care Discussed with Consultants/Other Providers:  
ANESTHESIA POSTOP CHECK    69y Male POSTOP DAY 1 S/P     Vital Signs Last 24 Hrs  T(C): 36.4 (30 Jan 2025 10:20), Max: 36.7 (29 Jan 2025 17:05)  T(F): 97.6 (30 Jan 2025 10:20), Max: 98.1 (29 Jan 2025 17:05)  HR: 52 (30 Jan 2025 11:20) (52 - 62)  BP: 160/71 (30 Jan 2025 11:20) (95/54 - 160/71)  BP(mean): --  RR: 18 (30 Jan 2025 11:20) (16 - 18)  SpO2: 100% (30 Jan 2025 11:20) (100% - 100%)    Parameters below as of 30 Jan 2025 11:20  Patient On (Oxygen Delivery Method): room air      I&O's Summary    29 Jan 2025 07:01  -  30 Jan 2025 07:00  --------------------------------------------------------  IN: 500 mL / OUT: 310 mL / NET: 190 mL        [ x] NO APPARENT ANESTHESIA COMPLICATIONS      
[facility] [place] [facility]   MD MESSI ROSE MD ANGELA WONG, PA    TEL:  OFFICE: [phone]  From 5pm-7am Answering Service [phone]    -- RENAL FOLLOW UP NOTE ---Date of Service 01-29-25 @ 14:42    Patient is a 69y old  Male who presents with a chief complaint of Tunneled catheter malfunction (28 Jan 2025 15:43)      Patient seen and examined at bedside. No chest pain/sob    VITALS:  T(F): 97.4 (01-29-25 @ 13:50), Max: 98.5 (01-28-25 @ 20:50)  HR: 58 (01-29-25 @ 13:50)  BP: 96/50 (01-29-25 @ 13:50)  RR: 16 (01-29-25 @ 13:50)  SpO2: 100% (01-29-25 @ 13:50)  Wt(kg): --    01-28 @ 07:01  -  01-29 @ 07:00  --------------------------------------------------------  IN: 400 mL / OUT: 2400 mL / NET: -2000 mL      Height (cm): 17.7 (01-29 @ 05:52)  Weight (kg): 58.9 (01-29 @ 05:52)  BMI (kg/m2): 1880 (01-29 @ 05:52)  BSA (m2): 0.33 (01-29 @ 05:52)    PHYSICAL EXAM:  General: NAD  Neck: No JVD  Respiratory: CTAB, no wheezes, rales or rhonchi  Cardiovascular: S1, S2, RRR  Gastrointestinal: BS+, soft, NT/ND  Extremities: No peripheral edema    Hospital Medications:   MEDICATIONS  (STANDING):  aspirin enteric coated 81 milliGRAM(s) Oral daily  atorvastatin 40 milliGRAM(s) Oral at bedtime  buMETAnide 2 milliGRAM(s) Oral two times a day  calcium acetate 667 milliGRAM(s) Oral three times a day with meals  chlorhexidine 2% Cloths 1 Application(s) Topical daily  cholecalciferol 1000 Unit(s) Oral daily  clopidogrel Tablet 75 milliGRAM(s) Oral daily  dextrose 5%. 1000 milliLiter(s) (100 mL/Hr) IV Continuous <Continuous>  dextrose 5%. 1000 milliLiter(s) (50 mL/Hr) IV Continuous <Continuous>  dextrose 50% Injectable 25 Gram(s) IV Push once  dextrose 50% Injectable 12.5 Gram(s) IV Push once  dextrose 50% Injectable 25 Gram(s) IV Push once  doxazosin 4 milliGRAM(s) Oral at bedtime  glucagon  Injectable 1 milliGRAM(s) IntraMuscular once  insulin lispro (ADMELOG) corrective regimen sliding scale   SubCutaneous three times a day before meals  insulin lispro (ADMELOG) corrective regimen sliding scale   SubCutaneous at bedtime  isosorbide   mononitrate ER Tablet (IMDUR) 60 milliGRAM(s) Oral daily  labetalol 100 milliGRAM(s) Oral two times a day  levothyroxine 25 MICROGram(s) Oral daily  Nephro-brenda 1 Tablet(s) Oral daily  NIFEdipine XL 60 milliGRAM(s) Oral daily  valsartan 160 milliGRAM(s) Oral daily      LABS:  01-29    135  |  95[L]  |  40[H]  ----------------------------<  93  4.5   |  23  |  5.60[H]    Ca    9.2      29 Jan 2025 05:31  Phos  4.2     01-29  Mg     2.20     01-29      Creatinine Trend: 5.60 <--, 8.37 <--, 6.86 <--, 4.60 <--, 6.94 <--    Phosphorus: 4.2 mg/dL (01-29 @ 05:31)                              10.7   6.21  )-----------( 355      ( 29 Jan 2025 05:31 )             35.0     Urine Studies:  Urinalysis - [01-29-25 @ 05:31]      Color  / Appearance  / SG  / pH       Gluc 93 / Ketone   / Bili  / Urobili        Blood  / Protein  / Leuk Est  / Nitrite       RBC  / WBC  / Hyaline  / Gran  / Sq Epi  / Non Sq Epi  / Bacteria       Iron 17, TIBC 99, %sat 17      [01-08-25 @ 04:35]  PTH -- (Ca 7.9)      [12-30-24 @ 06:50]   229  PTH -- (Ca 7.6)      [11-27-24 @ 04:23]   250  Vitamin D (25OH) 17.1      [11-25-24 @ 06:50]  TSH 3.53      [12-30-24 @ 06:50]  Lipid: chol 112, TG 61, HDL 46, LDL --      [12-31-24 @ 04:27]    HBsAb 81.3      [01-06-25 @ 11:22]  HBsAb Reactive      [01-06-25 @ 11:22]  HBsAg Nonreact      [01-06-25 @ 11:22]  HBcAb Nonreact      [01-06-25 @ 11:22]  HCV 0.08, Nonreact      [01-06-25 @ 11:22]      RADIOLOGY & ADDITIONAL STUDIES:  
[facility] [place] [facility]   MD MESSI ROSE MD MARIA SANTIAGO, NP    TEL:  OFFICE: [phone]  From 5pm-7am Answering Service [phone]    -- RENAL FOLLOW UP NOTE ---Date of Service 01-27-25 @ 15:06    Patient is a 69y old  Male who presents with a chief complaint of Tunneled catheter malfunction      Patient seen and examined at bedside. No chest pain/sob    VITALS:  T(F): 97.4 (01-27-25 @ 11:10), Max: 98.3 (01-27-25 @ 05:20)  HR: 58 (01-27-25 @ 11:10)  BP: 140/69 (01-27-25 @ 11:10)  RR: 18 (01-27-25 @ 11:10)  SpO2: 100% (01-27-25 @ 11:10)  Wt(kg): --        PHYSICAL EXAM:  General: NAD  Neck: No JVD  Respiratory: CTAB, no wheezes, rales or rhonchi  Cardiovascular: S1, S2, RRR  Gastrointestinal: BS+, soft, NT/ND  Extremities: No peripheral edema    Hospital Medications:   MEDICATIONS  (STANDING):  aspirin enteric coated 81 milliGRAM(s) Oral daily  atorvastatin 40 milliGRAM(s) Oral at bedtime  buMETAnide 2 milliGRAM(s) Oral two times a day  calcium acetate 667 milliGRAM(s) Oral three times a day with meals  chlorhexidine 2% Cloths 1 Application(s) Topical daily  cholecalciferol 1000 Unit(s) Oral daily  clopidogrel Tablet 75 milliGRAM(s) Oral daily  dextrose 5%. 1000 milliLiter(s) (100 mL/Hr) IV Continuous <Continuous>  dextrose 5%. 1000 milliLiter(s) (50 mL/Hr) IV Continuous <Continuous>  dextrose 50% Injectable 25 Gram(s) IV Push once  dextrose 50% Injectable 12.5 Gram(s) IV Push once  dextrose 50% Injectable 25 Gram(s) IV Push once  doxazosin 4 milliGRAM(s) Oral at bedtime  glucagon  Injectable 1 milliGRAM(s) IntraMuscular once  insulin lispro (ADMELOG) corrective regimen sliding scale   SubCutaneous three times a day before meals  insulin lispro (ADMELOG) corrective regimen sliding scale   SubCutaneous at bedtime  isosorbide   mononitrate ER Tablet (IMDUR) 60 milliGRAM(s) Oral daily  labetalol 100 milliGRAM(s) Oral two times a day  levothyroxine 25 MICROGram(s) Oral daily  Nephro-brenda 1 Tablet(s) Oral daily  NIFEdipine XL 60 milliGRAM(s) Oral daily  valsartan 160 milliGRAM(s) Oral daily      LABS:  01-27    135  |  93[L]  |  55[H]  ----------------------------<  223[H]  4.6   |  22  |  6.86[H]    Ca    8.9      27 Jan 2025 09:25  Phos  4.2     01-26  Mg     2.20     01-26    TPro  7.8  /  Alb  3.6  /  TBili  0.3  /  DBili      /  AST  18  /  ALT  16  /  AlkPhos  91  01-26    Creatinine Trend: 6.86 <--, 4.60 <--, 6.94 <--, 4.59 <--, 7.34 <--, 7.23 <--                                10.6   7.73  )-----------( 405      ( 27 Jan 2025 09:25 )             35.0     Urine Studies:  Urinalysis - [01-27-25 @ 09:25]      Color  / Appearance  / SG  / pH       Gluc 223 / Ketone   / Bili  / Urobili        Blood  / Protein  / Leuk Est  / Nitrite       RBC  / WBC  / Hyaline  / Gran  / Sq Epi  / Non Sq Epi  / Bacteria       Iron 17, TIBC 99, %sat 17      [01-08-25 @ 04:35]  PTH -- (Ca 7.9)      [12-30-24 @ 06:50]   229  PTH -- (Ca 7.6)      [11-27-24 @ 04:23]   250  Vitamin D (25OH) 17.1      [11-25-24 @ 06:50]  TSH 3.53      [12-30-24 @ 06:50]  Lipid: chol 112, TG 61, HDL 46, LDL --      [12-31-24 @ 04:27]    HBsAb 81.3      [01-06-25 @ 11:22]  HBsAb Reactive      [01-06-25 @ 11:22]  HBsAg Nonreact      [01-06-25 @ 11:22]  HBcAb Nonreact      [01-06-25 @ 11:22]  HCV 0.08, Nonreact      [01-06-25 @ 11:22]      RADIOLOGY & ADDITIONAL STUDIES:  
    SUBJECTIVE / OVERNIGHT EVENTS:pt seen and examined  01-29-25     MEDICATIONS  (STANDING):  aspirin enteric coated 81 milliGRAM(s) Oral daily  atorvastatin 40 milliGRAM(s) Oral at bedtime  buMETAnide 2 milliGRAM(s) Oral two times a day  calcium acetate 667 milliGRAM(s) Oral three times a day with meals  chlorhexidine 2% Cloths 1 Application(s) Topical daily  cholecalciferol 1000 Unit(s) Oral daily  clopidogrel Tablet 75 milliGRAM(s) Oral daily  dextrose 5%. 1000 milliLiter(s) (100 mL/Hr) IV Continuous <Continuous>  dextrose 5%. 1000 milliLiter(s) (50 mL/Hr) IV Continuous <Continuous>  dextrose 50% Injectable 25 Gram(s) IV Push once  dextrose 50% Injectable 12.5 Gram(s) IV Push once  dextrose 50% Injectable 25 Gram(s) IV Push once  doxazosin 4 milliGRAM(s) Oral at bedtime  glucagon  Injectable 1 milliGRAM(s) IntraMuscular once  insulin lispro (ADMELOG) corrective regimen sliding scale   SubCutaneous three times a day before meals  insulin lispro (ADMELOG) corrective regimen sliding scale   SubCutaneous at bedtime  isosorbide   mononitrate ER Tablet (IMDUR) 60 milliGRAM(s) Oral daily  labetalol 100 milliGRAM(s) Oral two times a day  levothyroxine 25 MICROGram(s) Oral daily  Nephro-brenda 1 Tablet(s) Oral daily  NIFEdipine XL 60 milliGRAM(s) Oral daily  valsartan 160 milliGRAM(s) Oral daily    MEDICATIONS  (PRN):  acetaminophen     Tablet .. 650 milliGRAM(s) Oral every 6 hours PRN Temp greater or equal to 38C (100.4F), Mild Pain (1 - 3)  aluminum hydroxide/magnesium hydroxide/simethicone Suspension 30 milliLiter(s) Oral every 4 hours PRN Dyspepsia  dextrose Oral Gel 15 Gram(s) Oral once PRN Blood Glucose LESS THAN 70 milliGRAM(s)/deciliter  fentaNYL    Injectable 25 MICROGram(s) IV Push every 5 minutes PRN Moderate Pain (4 - 6)  melatonin 3 milliGRAM(s) Oral at bedtime PRN Insomnia  ondansetron Injectable 4 milliGRAM(s) IV Push once PRN Nausea and/or Vomiting  ondansetron Injectable 4 milliGRAM(s) IV Push every 8 hours PRN Nausea and/or Vomiting    Vital Signs Last 24 Hrs  T(C): 36.7 (01-29-25 @ 17:05), Max: 36.9 (01-28-25 @ 20:50)  T(F): 98.1 (01-29-25 @ 17:05), Max: 98.5 (01-28-25 @ 20:50)  HR: 62 (01-29-25 @ 17:05) (50 - 79)  BP: 117/67 (01-29-25 @ 17:05) (95/54 - 181/74)  BP(mean): --  RR: 16 (01-29-25 @ 17:05) (14 - 18)  SpO2: 100% (01-29-25 @ 17:05) (97% - 100%)        Constitutional: No fever, fatigue  Skin: No rash.  Eyes: No recent vision problems or eye pain.  ENT: No congestion, ear pain, or sore throat.  Cardiovascular: No chest pain or palpation.  Respiratory: No cough, shortness of breath, congestion, or wheezing.  Gastrointestinal: No abdominal pain, nausea, vomiting, or diarrhea.  Genitourinary: No dysuria.  Musculoskeletal: No joint swelling.  Neurologic: No headache.    PHYSICAL EXAM:  GENERAL: NAD  EYES: EOMI, PERRLA  NECK: Supple, No JVD  CHEST/LUNG: dec breath sounds at bases  HEART:  S1 , S2 +  ABDOMEN: soft , bs+  EXTREMITIES: trace edema  NEUROLOGY:alert awake    LABS:  01-29    135  |  95[L]  |  40[H]  ----------------------------<  93  4.5   |  23  |  5.60[H]    Ca    9.2      29 Jan 2025 05:31  Phos  4.2     01-29  Mg     2.20     01-29      Creatinine Trend: 5.60 <--, 8.37 <--, 6.86 <--, 4.60 <--, 6.94 <--                        10.7   6.21  )-----------( 355      ( 29 Jan 2025 05:31 )             35.0     Urine Studies:  Urinalysis Basic - ( 29 Jan 2025 05:31 )    Color:  / Appearance:  / SG:  / pH:   Gluc: 93 mg/dL / Ketone:   / Bili:  / Urobili:    Blood:  / Protein:  / Nitrite:    Leuk Esterase:  / RBC:  / WBC    Sq Epi:  / Non Sq Epi:  / Bacteria:                 PTT - ( 29 Jan 2025 05:31 )  PTT:29.6 sec            RADIOLOGY & ADDITIONAL TESTS:    Imaging Personally Reviewed:    Consultant(s) Notes Reviewed:      Care Discussed with Consultants/Other Providers:  
[facility] [place] [facility]   MD MESSI ROSE MD ANGELA WONG, PA    TEL:  OFFICE: [phone]  From 5pm-7am Answering Service [phone]    -- RENAL FOLLOW UP NOTE ---Date of Service 01-28-25 @ 15:43    Patient is a 69y old  Male who presents with a chief complaint of Tunneled catheter malfunction (27 Jan 2025 15:05)      Patient seen and examined at bedside. No chest pain/sob    VITALS:  T(F): 97.7 (01-28-25 @ 12:39), Max: 98.5 (01-28-25 @ 06:20)  HR: 57 (01-28-25 @ 14:05)  BP: 138/65 (01-28-25 @ 14:05)  RR: 18 (01-28-25 @ 14:05)  SpO2: 100% (01-28-25 @ 14:05)  Wt(kg): --        PHYSICAL EXAM:  General: NAD  Neck: No JVD  Respiratory: decrease bs on right  Cardiovascular: S1, S2, RRR  Gastrointestinal: BS+, soft, NT/ND  Extremities: No peripheral edema    Hospital Medications:   MEDICATIONS  (STANDING):  aspirin enteric coated 81 milliGRAM(s) Oral daily  atorvastatin 40 milliGRAM(s) Oral at bedtime  buMETAnide 2 milliGRAM(s) Oral two times a day  calcium acetate 667 milliGRAM(s) Oral three times a day with meals  chlorhexidine 2% Cloths 1 Application(s) Topical daily  cholecalciferol 1000 Unit(s) Oral daily  clopidogrel Tablet 75 milliGRAM(s) Oral daily  dextrose 5%. 1000 milliLiter(s) (100 mL/Hr) IV Continuous <Continuous>  dextrose 5%. 1000 milliLiter(s) (50 mL/Hr) IV Continuous <Continuous>  dextrose 50% Injectable 25 Gram(s) IV Push once  dextrose 50% Injectable 12.5 Gram(s) IV Push once  dextrose 50% Injectable 25 Gram(s) IV Push once  doxazosin 4 milliGRAM(s) Oral at bedtime  glucagon  Injectable 1 milliGRAM(s) IntraMuscular once  insulin lispro (ADMELOG) corrective regimen sliding scale   SubCutaneous three times a day before meals  insulin lispro (ADMELOG) corrective regimen sliding scale   SubCutaneous at bedtime  isosorbide   mononitrate ER Tablet (IMDUR) 60 milliGRAM(s) Oral daily  labetalol 100 milliGRAM(s) Oral two times a day  levothyroxine 25 MICROGram(s) Oral daily  Nephro-brenda 1 Tablet(s) Oral daily  NIFEdipine XL 60 milliGRAM(s) Oral daily  valsartan 160 milliGRAM(s) Oral daily      LABS:  01-28    131[L]  |  92[L]  |  69[H]  ----------------------------<  87  5.3   |  20[L]  |  8.37[H]    Ca    8.8      28 Jan 2025 06:35  Phos  6.2     01-28  Mg     2.40     01-28      Creatinine Trend: 8.37 <--, 6.86 <--, 4.60 <--, 6.94 <--, 4.59 <--, 7.34 <--, 7.23 <--    Phosphorus: 6.2 mg/dL (01-28 @ 06:35)                              10.5   8.47  )-----------( 394      ( 28 Jan 2025 06:35 )             32.7     Urine Studies:  Urinalysis - [01-28-25 @ 06:35]      Color  / Appearance  / SG  / pH       Gluc 87 / Ketone   / Bili  / Urobili        Blood  / Protein  / Leuk Est  / Nitrite       RBC  / WBC  / Hyaline  / Gran  / Sq Epi  / Non Sq Epi  / Bacteria       Iron 17, TIBC 99, %sat 17      [01-08-25 @ 04:35]  PTH -- (Ca 7.9)      [12-30-24 @ 06:50]   229  PTH -- (Ca 7.6)      [11-27-24 @ 04:23]   250  Vitamin D (25OH) 17.1      [11-25-24 @ 06:50]  TSH 3.53      [12-30-24 @ 06:50]  Lipid: chol 112, TG 61, HDL 46, LDL --      [12-31-24 @ 04:27]    HBsAb 81.3      [01-06-25 @ 11:22]  HBsAb Reactive      [01-06-25 @ 11:22]  HBsAg Nonreact      [01-06-25 @ 11:22]  HBcAb Nonreact      [01-06-25 @ 11:22]  HCV 0.08, Nonreact      [01-06-25 @ 11:22]      RADIOLOGY & ADDITIONAL STUDIES:  
[facility]    Dr. Tiara Garcia     Office [phone] (9 am to 5 pm)  Service: [phone] (5pm to 9am)  Also Available on TEAMS      RENAL PROGRESS NOTE: DATE OF SERVICE 01-26-25 @ 12:22    Patient is a 69y old  Male who presents with a chief complaint of Tunneled catheter malfunction (25 Jan 2025 21:12)      Patient seen and examined at bedside. No chest pain/sob    VITALS:  T(F): 97.9 (01-26-25 @ 10:00), Max: 98.1 (01-25-25 @ 15:32)  HR: 61 (01-26-25 @ 10:00)  BP: 133/64 (01-26-25 @ 10:00)  RR: 18 (01-26-25 @ 10:00)  SpO2: 100% (01-26-25 @ 10:00)  Wt(kg): --    01-25 @ 07:01  -  01-26 @ 07:00  --------------------------------------------------------  IN: 400 mL / OUT: 2400 mL / NET: -2000 mL      Height (cm): 172.7 (01-25 @ 14:50)  Weight (kg): 52.2 (01-25 @ 14:50)  BMI (kg/m2): 17.5 (01-25 @ 14:50)  BSA (m2): 1.62 (01-25 @ 14:50)    PHYSICAL EXAM:  Constitutional: NAD  Neck: No JVD  Respiratory: CTAB, no wheezes, rales or rhonchi  Cardiovascular: S1, S2, RRR  Gastrointestinal: BS+, soft, NT/ND  Extremities: No peripheral edema    Hospital Medications:   MEDICATIONS  (STANDING):  aspirin enteric coated 81 milliGRAM(s) Oral daily  atorvastatin 40 milliGRAM(s) Oral at bedtime  buMETAnide 2 milliGRAM(s) Oral two times a day  calcium acetate 667 milliGRAM(s) Oral three times a day with meals  chlorhexidine 2% Cloths 1 Application(s) Topical daily  cholecalciferol 1000 Unit(s) Oral daily  clopidogrel Tablet 75 milliGRAM(s) Oral daily  dextrose 5%. 1000 milliLiter(s) (100 mL/Hr) IV Continuous <Continuous>  dextrose 5%. 1000 milliLiter(s) (50 mL/Hr) IV Continuous <Continuous>  dextrose 50% Injectable 25 Gram(s) IV Push once  dextrose 50% Injectable 12.5 Gram(s) IV Push once  dextrose 50% Injectable 25 Gram(s) IV Push once  doxazosin 4 milliGRAM(s) Oral at bedtime  glucagon  Injectable 1 milliGRAM(s) IntraMuscular once  insulin lispro (ADMELOG) corrective regimen sliding scale   SubCutaneous three times a day before meals  insulin lispro (ADMELOG) corrective regimen sliding scale   SubCutaneous at bedtime  isosorbide   mononitrate ER Tablet (IMDUR) 60 milliGRAM(s) Oral daily  labetalol 100 milliGRAM(s) Oral two times a day  levothyroxine 25 MICROGram(s) Oral daily  Nephro-brenda 1 Tablet(s) Oral daily  NIFEdipine XL 60 milliGRAM(s) Oral daily  valsartan 160 milliGRAM(s) Oral daily      LABS:  01-26    137  |  95[L]  |  31[H]  ----------------------------<  93  4.0   |  22  |  4.60[H]    Ca    9.4      26 Jan 2025 06:24  Phos  4.2     01-26  Mg     2.20     01-26    TPro  7.8  /  Alb  3.6  /  TBili  0.3  /  DBili      /  AST  18  /  ALT  16  /  AlkPhos  91  01-26    Creatinine Trend: 4.60 <--, 6.94 <--, 4.59 <--, 7.34 <--, 7.23 <--    Albumin: 3.6 g/dL (01-26 @ 06:24)  Phosphorus: 4.2 mg/dL (01-26 @ 06:24)  Albumin: 3.5 g/dL (01-25 @ 16:26)                              10.6   7.06  )-----------( 433      ( 26 Jan 2025 06:24 )             33.7     Urine Studies:  Urinalysis - [01-26-25 @ 06:24]      Color  / Appearance  / SG  / pH       Gluc 93 / Ketone   / Bili  / Urobili        Blood  / Protein  / Leuk Est  / Nitrite       RBC  / WBC  / Hyaline  / Gran  / Sq Epi  / Non Sq Epi  / Bacteria       Iron 17, TIBC 99, %sat 17      [01-08-25 @ 04:35]  PTH -- (Ca 7.9)      [12-30-24 @ 06:50]   229  PTH -- (Ca 7.6)      [11-27-24 @ 04:23]   250  Vitamin D (25OH) 17.1      [11-25-24 @ 06:50]  TSH 3.53      [12-30-24 @ 06:50]  Lipid: chol 112, TG 61, HDL 46, LDL --      [12-31-24 @ 04:27]    HBsAb 81.3      [01-06-25 @ 11:22]  HBsAb Reactive      [01-06-25 @ 11:22]  HBsAg Nonreact      [01-06-25 @ 11:22]  HBcAb Nonreact      [01-06-25 @ 11:22]  HCV 0.08, Nonreact      [01-06-25 @ 11:22]      RADIOLOGY & ADDITIONAL STUDIES:  
[facility]   MD MESSI ROSE MD ANGELA WONG, PA    TEL:  OFFICE: [phone]  From 5pm-7am Answering Service [phone]    -- RENAL FOLLOW UP NOTE ---Date of Service 01-30-25 @ 15:11    Patient is a 69y old  Male who presents with a chief complaint of Tunneled catheter malfunction (30 Jan 2025 12:09)      Patient seen and examined at bedside. No chest pain/sob    VITALS:  T(F): 97.4 (01-30-25 @ 13:57), Max: 98.1 (01-29-25 @ 17:05)  HR: 51 (01-30-25 @ 13:57)  BP: 104/60 (01-30-25 @ 13:57)  RR: 18 (01-30-25 @ 13:57)  SpO2: 100% (01-30-25 @ 13:57)  Wt(kg): --    01-29 @ 07:01  -  01-30 @ 07:00  --------------------------------------------------------  IN: 500 mL / OUT: 310 mL / NET: 190 mL      Height (cm): 172.7 (01-30 @ 10:20)  Weight (kg): 58.9 (01-30 @ 10:20)  BMI (kg/m2): 19.7 (01-30 @ 10:20)  BSA (m2): 1.7 (01-30 @ 10:20)    PHYSICAL EXAM:  General: NAD  Neck: No JVD  Respiratory: decrease bs on right  Cardiovascular: S1, S2, RRR  Gastrointestinal: BS+, soft, NT/ND  Extremities: No peripheral edema    Hospital Medications:   MEDICATIONS  (STANDING):  aspirin enteric coated 81 milliGRAM(s) Oral daily  atorvastatin 40 milliGRAM(s) Oral at bedtime  buMETAnide 2 milliGRAM(s) Oral two times a day  calcium acetate 667 milliGRAM(s) Oral three times a day with meals  chlorhexidine 2% Cloths 1 Application(s) Topical daily  cholecalciferol 1000 Unit(s) Oral daily  clopidogrel Tablet 75 milliGRAM(s) Oral daily  dextrose 5% + sodium chloride 0.9%. 500 milliLiter(s) (50 mL/Hr) IV Continuous <Continuous>  dextrose 5%. 1000 milliLiter(s) (100 mL/Hr) IV Continuous <Continuous>  dextrose 5%. 1000 milliLiter(s) (50 mL/Hr) IV Continuous <Continuous>  dextrose 50% Injectable 25 Gram(s) IV Push once  dextrose 50% Injectable 12.5 Gram(s) IV Push once  dextrose 50% Injectable 25 Gram(s) IV Push once  doxazosin 4 milliGRAM(s) Oral at bedtime  glucagon  Injectable 1 milliGRAM(s) IntraMuscular once  insulin lispro (ADMELOG) corrective regimen sliding scale   SubCutaneous three times a day before meals  insulin lispro (ADMELOG) corrective regimen sliding scale   SubCutaneous at bedtime  isosorbide   mononitrate ER Tablet (IMDUR) 60 milliGRAM(s) Oral daily  labetalol 100 milliGRAM(s) Oral two times a day  levothyroxine 25 MICROGram(s) Oral daily  Nephro-brenda 1 Tablet(s) Oral daily  NIFEdipine XL 60 milliGRAM(s) Oral daily  valsartan 160 milliGRAM(s) Oral daily      LABS:  01-30    132[L]  |  93[L]  |  61[H]  ----------------------------<  77  5.1   |  22  |  7.03[H]    Ca    8.4      30 Jan 2025 09:23  Phos  6.5     01-30  Mg     2.20     01-30      Creatinine Trend: 7.03 <--, 6.90 <--, 5.60 <--, 8.37 <--, 6.86 <--, 4.60 <--, 6.94 <--    Phosphorus: 6.5 mg/dL (01-30 @ 09:23)  Phosphorus: 6.0 mg/dL (01-30 @ 06:50)                              9.7    7.28  )-----------( 310      ( 30 Jan 2025 06:50 )             30.4     Urine Studies:  Urinalysis - [01-30-25 @ 09:23]      Color  / Appearance  / SG  / pH       Gluc 77 / Ketone   / Bili  / Urobili        Blood  / Protein  / Leuk Est  / Nitrite       RBC  / WBC  / Hyaline  / Gran  / Sq Epi  / Non Sq Epi  / Bacteria       Iron 17, TIBC 99, %sat 17      [01-08-25 @ 04:35]  PTH -- (Ca 7.9)      [12-30-24 @ 06:50]   229  PTH -- (Ca 7.6)      [11-27-24 @ 04:23]   250  Vitamin D (25OH) 17.1      [11-25-24 @ 06:50]  TSH 3.53      [12-30-24 @ 06:50]  Lipid: chol 112, TG 61, HDL 46, LDL --      [12-31-24 @ 04:27]    HBsAb 81.3      [01-06-25 @ 11:22]  HBsAb Reactive      [01-06-25 @ 11:22]  HBsAg Nonreact      [01-06-25 @ 11:22]  HBcAb Nonreact      [01-06-25 @ 11:22]  HCV 0.08, Nonreact      [01-06-25 @ 11:22]      RADIOLOGY & ADDITIONAL STUDIES:  
[facility]   MD MESSI ROSE MD ANGELA WONG, PA    TEL:  OFFICE: [phone]  From 5pm-7am Answering Service [phone]    -- RENAL FOLLOW UP NOTE ---Date of Service 01-31-25 @ 12:09    Patient is a 69y old  Male who presents with a chief complaint of Tunneled catheter malfunction (30 Jan 2025 15:11)      Patient seen and examined at bedside. No chest pain/sob    VITALS:  T(F): 98.1 (01-31-25 @ 11:45), Max: 98.2 (01-31-25 @ 06:00)  HR: 66 (01-31-25 @ 11:45)  BP: 146/69 (01-31-25 @ 11:45)  RR: 18 (01-31-25 @ 11:45)  SpO2: 100% (01-31-25 @ 10:55)  Wt(kg): --    01-30 @ 07:01  -  01-31 @ 07:00  --------------------------------------------------------  IN: 400 mL / OUT: 1400 mL / NET: -1000 mL          PHYSICAL EXAM:  General: NAD  Neck: No JVD  Respiratory: CTAB, no wheezes, rales or rhonchi  Cardiovascular: S1, S2, RRR  Gastrointestinal: BS+, soft, NT/ND  Extremities: No peripheral edema    Hospital Medications:   MEDICATIONS  (STANDING):  aspirin enteric coated 81 milliGRAM(s) Oral daily  atorvastatin 40 milliGRAM(s) Oral at bedtime  buMETAnide 2 milliGRAM(s) Oral two times a day  calcium acetate 667 milliGRAM(s) Oral three times a day with meals  chlorhexidine 2% Cloths 1 Application(s) Topical daily  chlorhexidine 4% Liquid 1 Application(s) Topical <User Schedule>  cholecalciferol 1000 Unit(s) Oral daily  clopidogrel Tablet 75 milliGRAM(s) Oral daily  dextrose 5% + sodium chloride 0.9%. 500 milliLiter(s) (50 mL/Hr) IV Continuous <Continuous>  dextrose 5%. 1000 milliLiter(s) (100 mL/Hr) IV Continuous <Continuous>  dextrose 5%. 1000 milliLiter(s) (50 mL/Hr) IV Continuous <Continuous>  dextrose 50% Injectable 25 Gram(s) IV Push once  dextrose 50% Injectable 12.5 Gram(s) IV Push once  dextrose 50% Injectable 25 Gram(s) IV Push once  doxazosin 4 milliGRAM(s) Oral at bedtime  glucagon  Injectable 1 milliGRAM(s) IntraMuscular once  insulin lispro (ADMELOG) corrective regimen sliding scale   SubCutaneous three times a day before meals  insulin lispro (ADMELOG) corrective regimen sliding scale   SubCutaneous at bedtime  isosorbide   mononitrate ER Tablet (IMDUR) 60 milliGRAM(s) Oral daily  labetalol 100 milliGRAM(s) Oral two times a day  levothyroxine 25 MICROGram(s) Oral daily  Nephro-brenda 1 Tablet(s) Oral daily  NIFEdipine XL 60 milliGRAM(s) Oral daily  valsartan 160 milliGRAM(s) Oral daily      LABS:  01-31    134[L]  |  96[L]  |  38[H]  ----------------------------<  121[H]  5.2   |  22  |  5.11[H]    Ca    8.7      31 Jan 2025 06:22  Phos  4.4     01-31  Mg     2.10     01-31      Creatinine Trend: 5.11 <--, 7.03 <--, 6.90 <--, 5.60 <--, 8.37 <--, 6.86 <--, 4.60 <--, 6.94 <--    Phosphorus: 4.4 mg/dL (01-31 @ 06:22)                              10.1   6.79  )-----------( 309      ( 31 Jan 2025 06:22 )             31.3     Urine Studies:  Urinalysis - [01-31-25 @ 06:22]      Color  / Appearance  / SG  / pH       Gluc 121 / Ketone   / Bili  / Urobili        Blood  / Protein  / Leuk Est  / Nitrite       RBC  / WBC  / Hyaline  / Gran  / Sq Epi  / Non Sq Epi  / Bacteria       Iron 17, TIBC 99, %sat 17      [01-08-25 @ 04:35]  PTH -- (Ca 7.9)      [12-30-24 @ 06:50]   229  PTH -- (Ca 7.6)      [11-27-24 @ 04:23]   250  Vitamin D (25OH) 17.1      [11-25-24 @ 06:50]  TSH 3.53      [12-30-24 @ 06:50]  Lipid: chol 112, TG 61, HDL 46, LDL --      [12-31-24 @ 04:27]    HBsAb 81.3      [01-06-25 @ 11:22]  HBsAb Reactive      [01-06-25 @ 11:22]  HBsAg Nonreact      [01-06-25 @ 11:22]  HBcAb Nonreact      [01-06-25 @ 11:22]  HCV 0.08, Nonreact      [01-06-25 @ 11:22]      RADIOLOGY & ADDITIONAL STUDIES:  
    SUBJECTIVE / OVERNIGHT EVENTS:pt seen and examined  01-26-25     MEDICATIONS  (STANDING):  aspirin enteric coated 81 milliGRAM(s) Oral daily  atorvastatin 40 milliGRAM(s) Oral at bedtime  buMETAnide 2 milliGRAM(s) Oral two times a day  calcium acetate 667 milliGRAM(s) Oral three times a day with meals  chlorhexidine 2% Cloths 1 Application(s) Topical daily  cholecalciferol 1000 Unit(s) Oral daily  clopidogrel Tablet 75 milliGRAM(s) Oral daily  dextrose 5%. 1000 milliLiter(s) (100 mL/Hr) IV Continuous <Continuous>  dextrose 5%. 1000 milliLiter(s) (50 mL/Hr) IV Continuous <Continuous>  dextrose 50% Injectable 25 Gram(s) IV Push once  dextrose 50% Injectable 12.5 Gram(s) IV Push once  dextrose 50% Injectable 25 Gram(s) IV Push once  doxazosin 4 milliGRAM(s) Oral at bedtime  glucagon  Injectable 1 milliGRAM(s) IntraMuscular once  insulin lispro (ADMELOG) corrective regimen sliding scale   SubCutaneous three times a day before meals  insulin lispro (ADMELOG) corrective regimen sliding scale   SubCutaneous at bedtime  isosorbide   mononitrate ER Tablet (IMDUR) 60 milliGRAM(s) Oral daily  labetalol 100 milliGRAM(s) Oral two times a day  levothyroxine 25 MICROGram(s) Oral daily  Nephro-brenda 1 Tablet(s) Oral daily  NIFEdipine XL 60 milliGRAM(s) Oral daily  valsartan 160 milliGRAM(s) Oral daily    MEDICATIONS  (PRN):  acetaminophen     Tablet .. 650 milliGRAM(s) Oral every 6 hours PRN Temp greater or equal to 38C (100.4F), Mild Pain (1 - 3)  aluminum hydroxide/magnesium hydroxide/simethicone Suspension 30 milliLiter(s) Oral every 4 hours PRN Dyspepsia  dextrose Oral Gel 15 Gram(s) Oral once PRN Blood Glucose LESS THAN 70 milliGRAM(s)/deciliter  melatonin 3 milliGRAM(s) Oral at bedtime PRN Insomnia  ondansetron Injectable 4 milliGRAM(s) IV Push every 8 hours PRN Nausea and/or Vomiting    T(C): 36.3 (01-26-25 @ 18:34), Max: 36.6 (01-26-25 @ 10:00)  HR: 57 (01-26-25 @ 18:34) (55 - 61)  BP: 118/61 (01-26-25 @ 18:34) (105/62 - 146/79)  RR: 18 (01-26-25 @ 18:34) (17 - 19)  SpO2: 100% (01-26-25 @ 18:34) (100% - 100%)    CAPILLARY BLOOD GLUCOSE      POCT Blood Glucose.: 138 mg/dL (26 Jan 2025 21:30)  POCT Blood Glucose.: 84 mg/dL (26 Jan 2025 17:15)  POCT Blood Glucose.: 253 mg/dL (26 Jan 2025 12:52)  POCT Blood Glucose.: 149 mg/dL (26 Jan 2025 09:03)  POCT Blood Glucose.: 98 mg/dL (25 Jan 2025 23:56)  POCT Blood Glucose.: 97 mg/dL (25 Jan 2025 23:10)    I&O's Summary    25 Jan 2025 07:01  -  26 Jan 2025 07:00  --------------------------------------------------------  IN: 400 mL / OUT: 2400 mL / NET: -2000 mL        Constitutional: No fever, fatigue  Skin: No rash.  Eyes: No recent vision problems or eye pain.  ENT: No congestion, ear pain, or sore throat.  Cardiovascular: No chest pain or palpation.  Respiratory: No cough, shortness of breath, congestion, or wheezing.  Gastrointestinal: No abdominal pain, nausea, vomiting, or diarrhea.  Genitourinary: No dysuria.  Musculoskeletal: No joint swelling.  Neurologic: No headache.    PHYSICAL EXAM:  GENERAL: NAD  EYES: EOMI, PERRLA  NECK: Supple, No JVD  CHEST/LUNG: dec breath sounds at bases  HEART:  S1 , S2 +  ABDOMEN: soft , bs+  EXTREMITIES: trace edema  NEUROLOGY:alert awake      LABS:                        10.6   7.06  )-----------( 433      ( 26 Jan 2025 06:24 )             33.7     01-26    137  |  95[L]  |  31[H]  ----------------------------<  93  4.0   |  22  |  4.60[H]    Ca    9.4      26 Jan 2025 06:24  Phos  4.2     01-26  Mg     2.20     01-26    TPro  7.8  /  Alb  3.6  /  TBili  0.3  /  DBili  x   /  AST  18  /  ALT  16  /  AlkPhos  91  01-26          Urinalysis Basic - ( 26 Jan 2025 06:24 )    Color: x / Appearance: x / SG: x / pH: x  Gluc: 93 mg/dL / Ketone: x  / Bili: x / Urobili: x   Blood: x / Protein: x / Nitrite: x   Leuk Esterase: x / RBC: x / WBC x   Sq Epi: x / Non Sq Epi: x / Bacteria: x        RADIOLOGY & ADDITIONAL TESTS:    Imaging Personally Reviewed:    Consultant(s) Notes Reviewed:      Care Discussed with Consultants/Other Providers:  
    SUBJECTIVE / OVERNIGHT EVENTS:pt seen and examined  01-28-25     MEDICATIONS  (STANDING):  alteplase for catheter clearance 2 milliGRAM(s) Catheter once  alteplase for catheter clearance 2 milliGRAM(s) Catheter once  aspirin enteric coated 81 milliGRAM(s) Oral daily  atorvastatin 40 milliGRAM(s) Oral at bedtime  buMETAnide 2 milliGRAM(s) Oral two times a day  calcium acetate 667 milliGRAM(s) Oral three times a day with meals  chlorhexidine 2% Cloths 1 Application(s) Topical daily  cholecalciferol 1000 Unit(s) Oral daily  clopidogrel Tablet 75 milliGRAM(s) Oral daily  dextrose 5%. 1000 milliLiter(s) (100 mL/Hr) IV Continuous <Continuous>  dextrose 5%. 1000 milliLiter(s) (50 mL/Hr) IV Continuous <Continuous>  dextrose 50% Injectable 25 Gram(s) IV Push once  dextrose 50% Injectable 12.5 Gram(s) IV Push once  dextrose 50% Injectable 25 Gram(s) IV Push once  doxazosin 4 milliGRAM(s) Oral at bedtime  glucagon  Injectable 1 milliGRAM(s) IntraMuscular once  insulin lispro (ADMELOG) corrective regimen sliding scale   SubCutaneous three times a day before meals  insulin lispro (ADMELOG) corrective regimen sliding scale   SubCutaneous at bedtime  isosorbide   mononitrate ER Tablet (IMDUR) 60 milliGRAM(s) Oral daily  labetalol 100 milliGRAM(s) Oral two times a day  levothyroxine 25 MICROGram(s) Oral daily  Nephro-brenda 1 Tablet(s) Oral daily  NIFEdipine XL 60 milliGRAM(s) Oral daily  valsartan 160 milliGRAM(s) Oral daily    MEDICATIONS  (PRN):  acetaminophen     Tablet .. 650 milliGRAM(s) Oral every 6 hours PRN Temp greater or equal to 38C (100.4F), Mild Pain (1 - 3)  aluminum hydroxide/magnesium hydroxide/simethicone Suspension 30 milliLiter(s) Oral every 4 hours PRN Dyspepsia  dextrose Oral Gel 15 Gram(s) Oral once PRN Blood Glucose LESS THAN 70 milliGRAM(s)/deciliter  melatonin 3 milliGRAM(s) Oral at bedtime PRN Insomnia  ondansetron Injectable 4 milliGRAM(s) IV Push every 8 hours PRN Nausea and/or Vomiting    Vital Signs Last 24 Hrs  T(C): 36.9 (01-28-25 @ 18:11), Max: 36.9 (01-28-25 @ 06:20)  T(F): 98.4 (01-28-25 @ 18:11), Max: 98.5 (01-28-25 @ 06:20)  HR: 67 (01-28-25 @ 18:11) (57 - 67)  BP: 133/62 (01-28-25 @ 18:11) (129/68 - 143/77)  BP(mean): 94 (01-28-25 @ 06:20) (94 - 96)  RR: 18 (01-28-25 @ 18:11) (16 - 18)  SpO2: 100% (01-28-25 @ 18:11) (99% - 100%)      Constitutional: No fever, fatigue  Skin: No rash.  Eyes: No recent vision problems or eye pain.  ENT: No congestion, ear pain, or sore throat.  Cardiovascular: No chest pain or palpation.  Respiratory: No cough, shortness of breath, congestion, or wheezing.  Gastrointestinal: No abdominal pain, nausea, vomiting, or diarrhea.  Genitourinary: No dysuria.  Musculoskeletal: No joint swelling.  Neurologic: No headache.    PHYSICAL EXAM:  GENERAL: NAD  EYES: EOMI, PERRLA  NECK: Supple, No JVD  CHEST/LUNG: dec breath sounds at bases  HEART:  S1 , S2 +  ABDOMEN: soft , bs+  EXTREMITIES: trace edema  NEUROLOGY:alert awake    LABS:  01-28    131[L]  |  92[L]  |  69[H]  ----------------------------<  87  5.3   |  20[L]  |  8.37[H]    Ca    8.8      28 Jan 2025 06:35  Phos  6.2     01-28  Mg     2.40     01-28      Creatinine Trend: 8.37 <--, 6.86 <--, 4.60 <--, 6.94 <--, 4.59 <--, 7.34 <--, 7.23 <--                        10.5   8.47  )-----------( 394      ( 28 Jan 2025 06:35 )             32.7     Urine Studies:  Urinalysis Basic - ( 28 Jan 2025 06:35 )    Color:  / Appearance:  / SG:  / pH:   Gluc: 87 mg/dL / Ketone:   / Bili:  / Urobili:    Blood:  / Protein:  / Nitrite:    Leuk Esterase:  / RBC:  / WBC    Sq Epi:  / Non Sq Epi:  / Bacteria:                       RADIOLOGY & ADDITIONAL TESTS:    Imaging Personally Reviewed:    Consultant(s) Notes Reviewed:      Care Discussed with Consultants/Other Providers:  
    SUBJECTIVE / OVERNIGHT EVENTS:pt seen and examined  01-30-25     MEDICATIONS  (STANDING):  aspirin enteric coated 81 milliGRAM(s) Oral daily  atorvastatin 40 milliGRAM(s) Oral at bedtime  buMETAnide 2 milliGRAM(s) Oral two times a day  calcium acetate 667 milliGRAM(s) Oral three times a day with meals  chlorhexidine 2% Cloths 1 Application(s) Topical daily  chlorhexidine 4% Liquid 1 Application(s) Topical <User Schedule>  cholecalciferol 1000 Unit(s) Oral daily  clopidogrel Tablet 75 milliGRAM(s) Oral daily  dextrose 5% + sodium chloride 0.9%. 500 milliLiter(s) (50 mL/Hr) IV Continuous <Continuous>  dextrose 5%. 1000 milliLiter(s) (100 mL/Hr) IV Continuous <Continuous>  dextrose 5%. 1000 milliLiter(s) (50 mL/Hr) IV Continuous <Continuous>  dextrose 50% Injectable 25 Gram(s) IV Push once  dextrose 50% Injectable 12.5 Gram(s) IV Push once  dextrose 50% Injectable 25 Gram(s) IV Push once  doxazosin 4 milliGRAM(s) Oral at bedtime  glucagon  Injectable 1 milliGRAM(s) IntraMuscular once  insulin lispro (ADMELOG) corrective regimen sliding scale   SubCutaneous three times a day before meals  insulin lispro (ADMELOG) corrective regimen sliding scale   SubCutaneous at bedtime  isosorbide   mononitrate ER Tablet (IMDUR) 60 milliGRAM(s) Oral daily  labetalol 100 milliGRAM(s) Oral two times a day  levothyroxine 25 MICROGram(s) Oral daily  Nephro-brenda 1 Tablet(s) Oral daily  NIFEdipine XL 60 milliGRAM(s) Oral daily  valsartan 160 milliGRAM(s) Oral daily    MEDICATIONS  (PRN):  acetaminophen     Tablet .. 650 milliGRAM(s) Oral every 6 hours PRN Temp greater or equal to 38C (100.4F), Mild Pain (1 - 3)  aluminum hydroxide/magnesium hydroxide/simethicone Suspension 30 milliLiter(s) Oral every 4 hours PRN Dyspepsia  dextrose Oral Gel 15 Gram(s) Oral once PRN Blood Glucose LESS THAN 70 milliGRAM(s)/deciliter  fentaNYL    Injectable 25 MICROGram(s) IV Push every 5 minutes PRN Moderate Pain (4 - 6)  melatonin 3 milliGRAM(s) Oral at bedtime PRN Insomnia  ondansetron Injectable 4 milliGRAM(s) IV Push once PRN Nausea and/or Vomiting  ondansetron Injectable 4 milliGRAM(s) IV Push every 8 hours PRN Nausea and/or Vomiting  sodium chloride 0.9% lock flush 10 milliLiter(s) IV Push every 1 hour PRN Pre/post blood products, medications, blood draw, and to maintain line patency    Vital Signs Last 24 Hrs  T(C): 36.5 (01-30-25 @ 20:00), Max: 36.6 (01-29-25 @ 22:44)  T(F): 97.7 (01-30-25 @ 20:00), Max: 97.9 (01-29-25 @ 22:44)  HR: 89 (01-30-25 @ 20:00) (50 - 89)  BP: 125/72 (01-30-25 @ 20:00) (104/60 - 168/65)  BP(mean): --  RR: 18 (01-30-25 @ 20:00) (16 - 20)  SpO2: 100% (01-30-25 @ 18:15) (100% - 100%)      Constitutional: No fever, fatigue  Skin: No rash.  Eyes: No recent vision problems or eye pain.  ENT: No congestion, ear pain, or sore throat.  Cardiovascular: No chest pain or palpation.  Respiratory: No cough, shortness of breath, congestion, or wheezing.  Gastrointestinal: No abdominal pain, nausea, vomiting, or diarrhea.  Genitourinary: No dysuria.  Musculoskeletal: No joint swelling.  Neurologic: No headache.    PHYSICAL EXAM:  GENERAL: NAD  EYES: EOMI, PERRLA  NECK: Supple, No JVD  CHEST/LUNG: dec breath sounds at bases  HEART:  S1 , S2 +  ABDOMEN: soft , bs+  EXTREMITIES: trace edema  NEUROLOGY:alert awake    LABS:  01-30    132[L]  |  93[L]  |  61[H]  ----------------------------<  77  5.1   |  22  |  7.03[H]    Ca    8.4      30 Jan 2025 09:23  Phos  6.5     01-30  Mg     2.20     01-30      Creatinine Trend: 7.03 <--, 6.90 <--, 5.60 <--, 8.37 <--, 6.86 <--, 4.60 <--, 6.94 <--                        9.7    7.28  )-----------( 310      ( 30 Jan 2025 06:50 )             30.4     Urine Studies:  Urinalysis Basic - ( 30 Jan 2025 09:23 )    Color:  / Appearance:  / SG:  / pH:   Gluc: 77 mg/dL / Ketone:   / Bili:  / Urobili:    Blood:  / Protein:  / Nitrite:    Leuk Esterase:  / RBC:  / WBC    Sq Epi:  / Non Sq Epi:  / Bacteria:                 PTT - ( 29 Jan 2025 05:31 )  PTT:29.6 sec          PTT - ( 29 Jan 2025 05:31 )  PTT:29.6 sec            RADIOLOGY & ADDITIONAL TESTS:    Imaging Personally Reviewed:    Consultant(s) Notes Reviewed:      Care Discussed with Consultants/Other Providers:

## 2025-01-31 NOTE — DISCHARGE NOTE NURSING/CASE MANAGEMENT/SOCIAL WORK - PATIENT PORTAL LINK FT
You can access the FollowMyHealth Patient Portal offered by [facility] by registering at the following website: [email or web address]. By joining [employer]’s FollowMyHealth portal, you will also be able to view your health information using other applications (apps) compatible with our system.

## 2025-02-01 LAB
CULTURE RESULTS: SIGNIFICANT CHANGE UP
SPECIMEN SOURCE: SIGNIFICANT CHANGE UP

## 2025-02-11 ENCOUNTER — INPATIENT (INPATIENT)
Facility: HOSPITAL | Age: 70
LOS: 3 days | Discharge: HOME HEALTH | End: 2025-02-15
Attending: INTERNAL MEDICINE | Admitting: INTERNAL MEDICINE
Payer: MEDICARE

## 2025-02-11 VITALS
HEIGHT: 68 IN | TEMPERATURE: 100 F | SYSTOLIC BLOOD PRESSURE: 210 MMHG | OXYGEN SATURATION: 95 % | WEIGHT: 125 LBS | DIASTOLIC BLOOD PRESSURE: 90 MMHG | RESPIRATION RATE: 16 BRPM | HEART RATE: 72 BPM

## 2025-02-11 DIAGNOSIS — D72.10 EOSINOPHILIA, UNSPECIFIED: ICD-10-CM

## 2025-02-11 DIAGNOSIS — Z29.9 ENCOUNTER FOR PROPHYLACTIC MEASURES, UNSPECIFIED: ICD-10-CM

## 2025-02-11 DIAGNOSIS — N18.6 END STAGE RENAL DISEASE: ICD-10-CM

## 2025-02-11 DIAGNOSIS — Z98.890 OTHER SPECIFIED POSTPROCEDURAL STATES: Chronic | ICD-10-CM

## 2025-02-11 DIAGNOSIS — Z79.899 OTHER LONG TERM (CURRENT) DRUG THERAPY: ICD-10-CM

## 2025-02-11 DIAGNOSIS — J90 PLEURAL EFFUSION, NOT ELSEWHERE CLASSIFIED: ICD-10-CM

## 2025-02-11 DIAGNOSIS — I10 ESSENTIAL (PRIMARY) HYPERTENSION: ICD-10-CM

## 2025-02-11 DIAGNOSIS — Z95.5 PRESENCE OF CORONARY ANGIOPLASTY IMPLANT AND GRAFT: Chronic | ICD-10-CM

## 2025-02-11 DIAGNOSIS — I25.10 ATHEROSCLEROTIC HEART DISEASE OF NATIVE CORONARY ARTERY WITHOUT ANGINA PECTORIS: ICD-10-CM

## 2025-02-11 DIAGNOSIS — R06.02 SHORTNESS OF BREATH: ICD-10-CM

## 2025-02-11 LAB
ADD ON TEST-SPECIMEN IN LAB: SIGNIFICANT CHANGE UP
ALBUMIN SERPL ELPH-MCNC: 3.4 G/DL — SIGNIFICANT CHANGE UP (ref 3.3–5)
ALP SERPL-CCNC: 73 U/L — SIGNIFICANT CHANGE UP (ref 40–120)
ALT FLD-CCNC: 15 U/L — SIGNIFICANT CHANGE UP (ref 4–41)
ANION GAP SERPL CALC-SCNC: 20 MMOL/L — HIGH (ref 7–14)
APTT BLD: 29.5 SEC — SIGNIFICANT CHANGE UP (ref 24.5–35.6)
AST SERPL-CCNC: 37 U/L — SIGNIFICANT CHANGE UP (ref 4–40)
B PERT DNA SPEC QL NAA+PROBE: SIGNIFICANT CHANGE UP
B PERT+PARAPERT DNA PNL SPEC NAA+PROBE: SIGNIFICANT CHANGE UP
BASOPHILS # BLD AUTO: 0.08 K/UL — SIGNIFICANT CHANGE UP (ref 0–0.2)
BASOPHILS NFR BLD AUTO: 0.7 % — SIGNIFICANT CHANGE UP (ref 0–2)
BILIRUB SERPL-MCNC: 0.4 MG/DL — SIGNIFICANT CHANGE UP (ref 0.2–1.2)
BUN SERPL-MCNC: 39 MG/DL — HIGH (ref 7–23)
C PNEUM DNA SPEC QL NAA+PROBE: SIGNIFICANT CHANGE UP
CALCIUM SERPL-MCNC: 8.6 MG/DL — SIGNIFICANT CHANGE UP (ref 8.4–10.5)
CHLORIDE SERPL-SCNC: 94 MMOL/L — LOW (ref 98–107)
CK MB BLD-MCNC: 3.5 % — HIGH (ref 0–2.5)
CK MB CFR SERPL CALC: 2.6 NG/ML — SIGNIFICANT CHANGE UP
CK SERPL-CCNC: 75 U/L — SIGNIFICANT CHANGE UP (ref 30–200)
CO2 SERPL-SCNC: 21 MMOL/L — LOW (ref 22–31)
CREAT SERPL-MCNC: 4.84 MG/DL — HIGH (ref 0.5–1.3)
EGFR: 12 ML/MIN/1.73M2 — LOW
EGFR: 12 ML/MIN/1.73M2 — LOW
EOSINOPHIL # BLD AUTO: 1.44 K/UL — HIGH (ref 0–0.5)
EOSINOPHIL NFR BLD AUTO: 12.1 % — HIGH (ref 0–6)
FLUAV AG NPH QL: SIGNIFICANT CHANGE UP
FLUAV SUBTYP SPEC NAA+PROBE: SIGNIFICANT CHANGE UP
FLUBV AG NPH QL: SIGNIFICANT CHANGE UP
FLUBV RNA SPEC QL NAA+PROBE: SIGNIFICANT CHANGE UP
GLUCOSE BLDC GLUCOMTR-MCNC: 119 MG/DL — HIGH (ref 70–99)
GLUCOSE BLDC GLUCOMTR-MCNC: 86 MG/DL — SIGNIFICANT CHANGE UP (ref 70–99)
GLUCOSE SERPL-MCNC: 90 MG/DL — SIGNIFICANT CHANGE UP (ref 70–99)
HADV DNA SPEC QL NAA+PROBE: SIGNIFICANT CHANGE UP
HCOV 229E RNA SPEC QL NAA+PROBE: SIGNIFICANT CHANGE UP
HCOV HKU1 RNA SPEC QL NAA+PROBE: SIGNIFICANT CHANGE UP
HCOV NL63 RNA SPEC QL NAA+PROBE: SIGNIFICANT CHANGE UP
HCOV OC43 RNA SPEC QL NAA+PROBE: SIGNIFICANT CHANGE UP
HCT VFR BLD CALC: 30 % — LOW (ref 39–50)
HGB BLD-MCNC: 9.4 G/DL — LOW (ref 13–17)
HMPV RNA SPEC QL NAA+PROBE: SIGNIFICANT CHANGE UP
HPIV1 RNA SPEC QL NAA+PROBE: SIGNIFICANT CHANGE UP
HPIV2 RNA SPEC QL NAA+PROBE: SIGNIFICANT CHANGE UP
HPIV3 RNA SPEC QL NAA+PROBE: SIGNIFICANT CHANGE UP
HPIV4 RNA SPEC QL NAA+PROBE: SIGNIFICANT CHANGE UP
IANC: 8.13 K/UL — HIGH (ref 1.8–7.4)
IMM GRANULOCYTES NFR BLD AUTO: 0.4 % — SIGNIFICANT CHANGE UP (ref 0–0.9)
INR BLD: 0.98 RATIO — SIGNIFICANT CHANGE UP (ref 0.85–1.16)
LYMPHOCYTES # BLD AUTO: 1.08 K/UL — SIGNIFICANT CHANGE UP (ref 1–3.3)
LYMPHOCYTES # BLD AUTO: 9 % — LOW (ref 13–44)
M PNEUMO DNA SPEC QL NAA+PROBE: SIGNIFICANT CHANGE UP
MAGNESIUM SERPL-MCNC: 2 MG/DL — SIGNIFICANT CHANGE UP (ref 1.6–2.6)
MCHC RBC-ENTMCNC: 26.7 PG — LOW (ref 27–34)
MCHC RBC-ENTMCNC: 31.3 G/DL — LOW (ref 32–36)
MCV RBC AUTO: 85.2 FL — SIGNIFICANT CHANGE UP (ref 80–100)
MONOCYTES # BLD AUTO: 1.16 K/UL — HIGH (ref 0–0.9)
MONOCYTES NFR BLD AUTO: 9.7 % — SIGNIFICANT CHANGE UP (ref 2–14)
NEUTROPHILS # BLD AUTO: 8.13 K/UL — HIGH (ref 1.8–7.4)
NEUTROPHILS NFR BLD AUTO: 68.1 % — SIGNIFICANT CHANGE UP (ref 43–77)
NRBC # BLD AUTO: 0 K/UL — SIGNIFICANT CHANGE UP (ref 0–0)
NRBC # BLD: 0 /100 WBCS — SIGNIFICANT CHANGE UP (ref 0–0)
NRBC # FLD: 0 K/UL — SIGNIFICANT CHANGE UP (ref 0–0)
NRBC BLD-RTO: 0 /100 WBCS — SIGNIFICANT CHANGE UP (ref 0–0)
NT-PROBNP SERPL-SCNC: HIGH PG/ML
PHOSPHATE SERPL-MCNC: 3.2 MG/DL — SIGNIFICANT CHANGE UP (ref 2.5–4.5)
PLATELET # BLD AUTO: 310 K/UL — SIGNIFICANT CHANGE UP (ref 150–400)
POTASSIUM SERPL-MCNC: 5 MMOL/L — SIGNIFICANT CHANGE UP (ref 3.5–5.3)
POTASSIUM SERPL-SCNC: 5 MMOL/L — SIGNIFICANT CHANGE UP (ref 3.5–5.3)
PROCALCITONIN SERPL-MCNC: 0.5 NG/ML — HIGH (ref 0.02–0.1)
PROT SERPL-MCNC: 6.9 G/DL — SIGNIFICANT CHANGE UP (ref 6–8.3)
PROTHROM AB SERPL-ACNC: 11.7 SEC — SIGNIFICANT CHANGE UP (ref 9.9–13.4)
RAPID RVP RESULT: SIGNIFICANT CHANGE UP
RBC # BLD: 3.52 M/UL — LOW (ref 4.2–5.8)
RBC # FLD: 20.3 % — HIGH (ref 10.3–14.5)
RSV RNA NPH QL NAA+NON-PROBE: SIGNIFICANT CHANGE UP
RSV RNA SPEC QL NAA+PROBE: SIGNIFICANT CHANGE UP
RV+EV RNA SPEC QL NAA+PROBE: SIGNIFICANT CHANGE UP
SARS-COV-2 RNA SPEC QL NAA+PROBE: SIGNIFICANT CHANGE UP
SARS-COV-2 RNA SPEC QL NAA+PROBE: SIGNIFICANT CHANGE UP
SODIUM SERPL-SCNC: 135 MMOL/L — SIGNIFICANT CHANGE UP (ref 135–145)
TROPONIN T, HIGH SENSITIVITY RESULT: 224 NG/L — CRITICAL HIGH
TROPONIN T, HIGH SENSITIVITY RESULT: 244 NG/L — CRITICAL HIGH
WBC # BLD: 11.94 K/UL — HIGH (ref 3.8–10.5)
WBC # FLD AUTO: 11.94 K/UL — HIGH (ref 3.8–10.5)

## 2025-02-11 PROCEDURE — 99223 1ST HOSP IP/OBS HIGH 75: CPT

## 2025-02-11 PROCEDURE — 99285 EMERGENCY DEPT VISIT HI MDM: CPT

## 2025-02-11 PROCEDURE — 71045 X-RAY EXAM CHEST 1 VIEW: CPT | Mod: 26

## 2025-02-11 RX ORDER — HEPARIN SODIUM 1000 [USP'U]/ML
5000 INJECTION INTRAVENOUS; SUBCUTANEOUS EVERY 12 HOURS
Refills: 0 | Status: DISCONTINUED | OUTPATIENT
Start: 2025-02-11 | End: 2025-02-15

## 2025-02-11 RX ORDER — NIFEDIPINE 30 MG
60 TABLET, EXTENDED RELEASE 24 HR ORAL ONCE
Refills: 0 | Status: COMPLETED | OUTPATIENT
Start: 2025-02-11 | End: 2025-02-11

## 2025-02-11 RX ORDER — BUMETANIDE 1 MG/1
2 TABLET ORAL ONCE
Refills: 0 | Status: COMPLETED | OUTPATIENT
Start: 2025-02-11 | End: 2025-02-11

## 2025-02-11 RX ORDER — EPOETIN ALFA 10000 [IU]/ML
10000 SOLUTION INTRAVENOUS; SUBCUTANEOUS
Refills: 0 | Status: DISCONTINUED | OUTPATIENT
Start: 2025-02-11 | End: 2025-02-15

## 2025-02-11 RX ORDER — LEVOTHYROXINE SODIUM 300 MCG
25 TABLET ORAL DAILY
Refills: 0 | Status: DISCONTINUED | OUTPATIENT
Start: 2025-02-11 | End: 2025-02-15

## 2025-02-11 RX ORDER — LABETALOL HYDROCHLORIDE 200 MG/1
100 TABLET, FILM COATED ORAL
Refills: 0 | Status: DISCONTINUED | OUTPATIENT
Start: 2025-02-11 | End: 2025-02-15

## 2025-02-11 RX ORDER — NIFEDIPINE 30 MG
60 TABLET, EXTENDED RELEASE 24 HR ORAL DAILY
Refills: 0 | Status: DISCONTINUED | OUTPATIENT
Start: 2025-02-11 | End: 2025-02-14

## 2025-02-11 RX ORDER — ISOSORBIDE MONONITRATE 60 MG/1
60 TABLET, EXTENDED RELEASE ORAL ONCE
Refills: 0 | Status: COMPLETED | OUTPATIENT
Start: 2025-02-11 | End: 2025-02-11

## 2025-02-11 RX ORDER — CLOPIDOGREL BISULFATE 75 MG/1
75 TABLET, FILM COATED ORAL DAILY
Refills: 0 | Status: DISCONTINUED | OUTPATIENT
Start: 2025-02-11 | End: 2025-02-15

## 2025-02-11 RX ORDER — BUMETANIDE 1 MG/1
2 TABLET ORAL
Refills: 0 | Status: DISCONTINUED | OUTPATIENT
Start: 2025-02-11 | End: 2025-02-15

## 2025-02-11 RX ORDER — ISOSORBIDE MONONITRATE 60 MG/1
60 TABLET, EXTENDED RELEASE ORAL DAILY
Refills: 0 | Status: DISCONTINUED | OUTPATIENT
Start: 2025-02-11 | End: 2025-02-15

## 2025-02-11 RX ORDER — ATORVASTATIN CALCIUM 80 MG/1
40 TABLET, FILM COATED ORAL AT BEDTIME
Refills: 0 | Status: DISCONTINUED | OUTPATIENT
Start: 2025-02-11 | End: 2025-02-15

## 2025-02-11 RX ORDER — ASPIRIN 325 MG
81 TABLET ORAL DAILY
Refills: 0 | Status: DISCONTINUED | OUTPATIENT
Start: 2025-02-11 | End: 2025-02-15

## 2025-02-11 RX ORDER — LABETALOL HYDROCHLORIDE 200 MG/1
100 TABLET, FILM COATED ORAL ONCE
Refills: 0 | Status: COMPLETED | OUTPATIENT
Start: 2025-02-11 | End: 2025-02-11

## 2025-02-11 RX ADMIN — Medication 1 APPLICATION(S): at 12:30

## 2025-02-11 RX ADMIN — LABETALOL HYDROCHLORIDE 100 MILLIGRAM(S): 200 TABLET, FILM COATED ORAL at 07:25

## 2025-02-11 RX ADMIN — Medication 60 MILLIGRAM(S): at 07:24

## 2025-02-11 RX ADMIN — BUMETANIDE 2 MILLIGRAM(S): 1 TABLET ORAL at 07:25

## 2025-02-11 RX ADMIN — ATORVASTATIN CALCIUM 40 MILLIGRAM(S): 80 TABLET, FILM COATED ORAL at 21:51

## 2025-02-11 RX ADMIN — ISOSORBIDE MONONITRATE 60 MILLIGRAM(S): 60 TABLET, EXTENDED RELEASE ORAL at 07:25

## 2025-02-12 LAB
ANION GAP SERPL CALC-SCNC: 14 MMOL/L — SIGNIFICANT CHANGE UP (ref 7–14)
BASOPHILS # BLD AUTO: 0.08 K/UL — SIGNIFICANT CHANGE UP (ref 0–0.2)
BASOPHILS NFR BLD AUTO: 0.8 % — SIGNIFICANT CHANGE UP (ref 0–2)
BUN SERPL-MCNC: 37 MG/DL — HIGH (ref 7–23)
CALCIUM SERPL-MCNC: 9 MG/DL — SIGNIFICANT CHANGE UP (ref 8.4–10.5)
CHLORIDE SERPL-SCNC: 98 MMOL/L — SIGNIFICANT CHANGE UP (ref 98–107)
CO2 SERPL-SCNC: 26 MMOL/L — SIGNIFICANT CHANGE UP (ref 22–31)
CREAT SERPL-MCNC: 5.3 MG/DL — HIGH (ref 0.5–1.3)
EGFR: 11 ML/MIN/1.73M2 — LOW
EGFR: 11 ML/MIN/1.73M2 — LOW
EOSINOPHIL # BLD AUTO: 1.73 K/UL — HIGH (ref 0–0.5)
EOSINOPHIL NFR BLD AUTO: 18.4 % — HIGH (ref 0–6)
GLUCOSE SERPL-MCNC: 83 MG/DL — SIGNIFICANT CHANGE UP (ref 70–99)
HCT VFR BLD CALC: 31.9 % — LOW (ref 39–50)
HGB BLD-MCNC: 9.8 G/DL — LOW (ref 13–17)
IANC: 4.98 K/UL — SIGNIFICANT CHANGE UP (ref 1.8–7.4)
IMM GRANULOCYTES NFR BLD AUTO: 0.4 % — SIGNIFICANT CHANGE UP (ref 0–0.9)
LYMPHOCYTES # BLD AUTO: 1.46 K/UL — SIGNIFICANT CHANGE UP (ref 1–3.3)
LYMPHOCYTES # BLD AUTO: 15.5 % — SIGNIFICANT CHANGE UP (ref 13–44)
MAGNESIUM SERPL-MCNC: 2.2 MG/DL — SIGNIFICANT CHANGE UP (ref 1.6–2.6)
MCHC RBC-ENTMCNC: 26.4 PG — LOW (ref 27–34)
MCHC RBC-ENTMCNC: 30.7 G/DL — LOW (ref 32–36)
MCV RBC AUTO: 86 FL — SIGNIFICANT CHANGE UP (ref 80–100)
MONOCYTES # BLD AUTO: 1.13 K/UL — HIGH (ref 0–0.9)
MONOCYTES NFR BLD AUTO: 12 % — SIGNIFICANT CHANGE UP (ref 2–14)
MRSA PCR RESULT.: SIGNIFICANT CHANGE UP
NEUTROPHILS # BLD AUTO: 4.98 K/UL — SIGNIFICANT CHANGE UP (ref 1.8–7.4)
NEUTROPHILS NFR BLD AUTO: 52.9 % — SIGNIFICANT CHANGE UP (ref 43–77)
NRBC # BLD AUTO: 0 K/UL — SIGNIFICANT CHANGE UP (ref 0–0)
NRBC # FLD: 0 K/UL — SIGNIFICANT CHANGE UP (ref 0–0)
NRBC BLD AUTO-RTO: 0 /100 WBCS — SIGNIFICANT CHANGE UP (ref 0–0)
PHOSPHATE SERPL-MCNC: 3.8 MG/DL — SIGNIFICANT CHANGE UP (ref 2.5–4.5)
PLATELET # BLD AUTO: 296 K/UL — SIGNIFICANT CHANGE UP (ref 150–400)
POTASSIUM SERPL-MCNC: 4.9 MMOL/L — SIGNIFICANT CHANGE UP (ref 3.5–5.3)
POTASSIUM SERPL-SCNC: 4.9 MMOL/L — SIGNIFICANT CHANGE UP (ref 3.5–5.3)
RBC # BLD: 3.71 M/UL — LOW (ref 4.2–5.8)
RBC # FLD: 19.7 % — HIGH (ref 10.3–14.5)
S AUREUS DNA NOSE QL NAA+PROBE: SIGNIFICANT CHANGE UP
SODIUM SERPL-SCNC: 138 MMOL/L — SIGNIFICANT CHANGE UP (ref 135–145)
WBC # BLD: 9.42 K/UL — SIGNIFICANT CHANGE UP (ref 3.8–10.5)
WBC # FLD AUTO: 9.42 K/UL — SIGNIFICANT CHANGE UP (ref 3.8–10.5)

## 2025-02-12 PROCEDURE — 71250 CT THORAX DX C-: CPT | Mod: 26

## 2025-02-12 RX ADMIN — CLOPIDOGREL BISULFATE 75 MILLIGRAM(S): 75 TABLET, FILM COATED ORAL at 12:20

## 2025-02-12 RX ADMIN — Medication 25 MICROGRAM(S): at 05:22

## 2025-02-12 RX ADMIN — LABETALOL HYDROCHLORIDE 100 MILLIGRAM(S): 200 TABLET, FILM COATED ORAL at 05:22

## 2025-02-12 RX ADMIN — Medication 160 MILLIGRAM(S): at 05:21

## 2025-02-12 RX ADMIN — Medication 667 MILLIGRAM(S): at 18:09

## 2025-02-12 RX ADMIN — BUMETANIDE 2 MILLIGRAM(S): 1 TABLET ORAL at 15:01

## 2025-02-12 RX ADMIN — HEPARIN SODIUM 5000 UNIT(S): 1000 INJECTION INTRAVENOUS; SUBCUTANEOUS at 05:22

## 2025-02-12 RX ADMIN — Medication 1000 UNIT(S): at 12:19

## 2025-02-12 RX ADMIN — ISOSORBIDE MONONITRATE 60 MILLIGRAM(S): 60 TABLET, EXTENDED RELEASE ORAL at 12:20

## 2025-02-12 RX ADMIN — Medication 667 MILLIGRAM(S): at 12:21

## 2025-02-12 RX ADMIN — BUMETANIDE 2 MILLIGRAM(S): 1 TABLET ORAL at 05:21

## 2025-02-12 RX ADMIN — ATORVASTATIN CALCIUM 40 MILLIGRAM(S): 80 TABLET, FILM COATED ORAL at 21:35

## 2025-02-12 RX ADMIN — LABETALOL HYDROCHLORIDE 100 MILLIGRAM(S): 200 TABLET, FILM COATED ORAL at 18:10

## 2025-02-12 RX ADMIN — HEPARIN SODIUM 5000 UNIT(S): 1000 INJECTION INTRAVENOUS; SUBCUTANEOUS at 18:10

## 2025-02-12 RX ADMIN — Medication 81 MILLIGRAM(S): at 12:20

## 2025-02-12 RX ADMIN — Medication 667 MILLIGRAM(S): at 08:19

## 2025-02-12 RX ADMIN — Medication 60 MILLIGRAM(S): at 05:22

## 2025-02-12 RX ADMIN — Medication 1 APPLICATION(S): at 15:07

## 2025-02-13 LAB
ANION GAP SERPL CALC-SCNC: 17 MMOL/L — HIGH (ref 7–14)
BASOPHILS # BLD AUTO: 0.05 K/UL — SIGNIFICANT CHANGE UP (ref 0–0.2)
BASOPHILS NFR BLD AUTO: 0.5 % — SIGNIFICANT CHANGE UP (ref 0–2)
BUN SERPL-MCNC: 44 MG/DL — HIGH (ref 7–23)
CALCIUM SERPL-MCNC: 8.7 MG/DL — SIGNIFICANT CHANGE UP (ref 8.4–10.5)
CHLORIDE SERPL-SCNC: 96 MMOL/L — LOW (ref 98–107)
CO2 SERPL-SCNC: 25 MMOL/L — SIGNIFICANT CHANGE UP (ref 22–31)
CREAT SERPL-MCNC: 6 MG/DL — HIGH (ref 0.5–1.3)
EGFR: 9 ML/MIN/1.73M2 — LOW
EGFR: 9 ML/MIN/1.73M2 — LOW
EOSINOPHIL # BLD AUTO: 1.82 K/UL — HIGH (ref 0–0.5)
EOSINOPHIL NFR BLD AUTO: 19.7 % — HIGH (ref 0–6)
GAS PNL BLDV: SIGNIFICANT CHANGE UP
GLUCOSE BLDC GLUCOMTR-MCNC: 101 MG/DL — HIGH (ref 70–99)
GLUCOSE BLDC GLUCOMTR-MCNC: 103 MG/DL — HIGH (ref 70–99)
GLUCOSE BLDC GLUCOMTR-MCNC: 107 MG/DL — HIGH (ref 70–99)
GLUCOSE BLDC GLUCOMTR-MCNC: 107 MG/DL — HIGH (ref 70–99)
GLUCOSE BLDC GLUCOMTR-MCNC: 171 MG/DL — HIGH (ref 70–99)
GLUCOSE BLDC GLUCOMTR-MCNC: 92 MG/DL — SIGNIFICANT CHANGE UP (ref 70–99)
GLUCOSE SERPL-MCNC: 82 MG/DL — SIGNIFICANT CHANGE UP (ref 70–99)
HCT VFR BLD CALC: 29.1 % — LOW (ref 39–50)
HGB BLD-MCNC: 8.8 G/DL — LOW (ref 13–17)
IANC: 5.1 K/UL — SIGNIFICANT CHANGE UP (ref 1.8–7.4)
IMM GRANULOCYTES NFR BLD AUTO: 0.3 % — SIGNIFICANT CHANGE UP (ref 0–0.9)
LYMPHOCYTES # BLD AUTO: 1.2 K/UL — SIGNIFICANT CHANGE UP (ref 1–3.3)
LYMPHOCYTES # BLD AUTO: 13 % — SIGNIFICANT CHANGE UP (ref 13–44)
MAGNESIUM SERPL-MCNC: 2.2 MG/DL — SIGNIFICANT CHANGE UP (ref 1.6–2.6)
MCHC RBC-ENTMCNC: 26 PG — LOW (ref 27–34)
MCHC RBC-ENTMCNC: 30.2 G/DL — LOW (ref 32–36)
MCV RBC AUTO: 86.1 FL — SIGNIFICANT CHANGE UP (ref 80–100)
MONOCYTES # BLD AUTO: 1.06 K/UL — HIGH (ref 0–0.9)
MONOCYTES NFR BLD AUTO: 11.4 % — SIGNIFICANT CHANGE UP (ref 2–14)
NEUTROPHILS # BLD AUTO: 5.1 K/UL — SIGNIFICANT CHANGE UP (ref 1.8–7.4)
NEUTROPHILS NFR BLD AUTO: 55.1 % — SIGNIFICANT CHANGE UP (ref 43–77)
NRBC # BLD AUTO: 0 K/UL — SIGNIFICANT CHANGE UP (ref 0–0)
NRBC # FLD: 0 K/UL — SIGNIFICANT CHANGE UP (ref 0–0)
NRBC BLD AUTO-RTO: 0 /100 WBCS — SIGNIFICANT CHANGE UP (ref 0–0)
PHOSPHATE SERPL-MCNC: 4.1 MG/DL — SIGNIFICANT CHANGE UP (ref 2.5–4.5)
PLATELET # BLD AUTO: 303 K/UL — SIGNIFICANT CHANGE UP (ref 150–400)
POTASSIUM SERPL-MCNC: 3.9 MMOL/L — SIGNIFICANT CHANGE UP (ref 3.5–5.3)
POTASSIUM SERPL-SCNC: 3.9 MMOL/L — SIGNIFICANT CHANGE UP (ref 3.5–5.3)
RBC # BLD: 3.38 M/UL — LOW (ref 4.2–5.8)
RBC # FLD: 19.7 % — HIGH (ref 10.3–14.5)
SODIUM SERPL-SCNC: 138 MMOL/L — SIGNIFICANT CHANGE UP (ref 135–145)
WBC # BLD: 9.26 K/UL — SIGNIFICANT CHANGE UP (ref 3.8–10.5)
WBC # FLD AUTO: 9.26 K/UL — SIGNIFICANT CHANGE UP (ref 3.8–10.5)

## 2025-02-13 PROCEDURE — 99222 1ST HOSP IP/OBS MODERATE 55: CPT

## 2025-02-13 PROCEDURE — G0545: CPT

## 2025-02-13 RX ADMIN — BUMETANIDE 2 MILLIGRAM(S): 1 TABLET ORAL at 13:25

## 2025-02-13 RX ADMIN — Medication 81 MILLIGRAM(S): at 13:28

## 2025-02-13 RX ADMIN — Medication 60 MILLIGRAM(S): at 08:03

## 2025-02-13 RX ADMIN — ISOSORBIDE MONONITRATE 60 MILLIGRAM(S): 60 TABLET, EXTENDED RELEASE ORAL at 13:24

## 2025-02-13 RX ADMIN — HEPARIN SODIUM 5000 UNIT(S): 1000 INJECTION INTRAVENOUS; SUBCUTANEOUS at 09:31

## 2025-02-13 RX ADMIN — Medication 667 MILLIGRAM(S): at 17:22

## 2025-02-13 RX ADMIN — CLOPIDOGREL BISULFATE 75 MILLIGRAM(S): 75 TABLET, FILM COATED ORAL at 13:26

## 2025-02-13 RX ADMIN — Medication 667 MILLIGRAM(S): at 13:26

## 2025-02-13 RX ADMIN — Medication 25 MICROGRAM(S): at 09:30

## 2025-02-13 RX ADMIN — Medication 667 MILLIGRAM(S): at 09:30

## 2025-02-13 RX ADMIN — Medication 1000 UNIT(S): at 13:25

## 2025-02-13 RX ADMIN — Medication 160 MILLIGRAM(S): at 08:03

## 2025-02-13 RX ADMIN — Medication 1 APPLICATION(S): at 13:29

## 2025-02-13 RX ADMIN — LABETALOL HYDROCHLORIDE 100 MILLIGRAM(S): 200 TABLET, FILM COATED ORAL at 17:22

## 2025-02-13 RX ADMIN — BUMETANIDE 2 MILLIGRAM(S): 1 TABLET ORAL at 22:36

## 2025-02-13 RX ADMIN — HEPARIN SODIUM 5000 UNIT(S): 1000 INJECTION INTRAVENOUS; SUBCUTANEOUS at 17:25

## 2025-02-13 RX ADMIN — ATORVASTATIN CALCIUM 40 MILLIGRAM(S): 80 TABLET, FILM COATED ORAL at 22:37

## 2025-02-13 RX ADMIN — LABETALOL HYDROCHLORIDE 100 MILLIGRAM(S): 200 TABLET, FILM COATED ORAL at 08:03

## 2025-02-14 ENCOUNTER — TRANSCRIPTION ENCOUNTER (OUTPATIENT)
Age: 70
End: 2025-02-14

## 2025-02-14 LAB
ANION GAP SERPL CALC-SCNC: 17 MMOL/L — HIGH (ref 7–14)
ANISOCYTOSIS BLD QL: SIGNIFICANT CHANGE UP
BASOPHILS # BLD AUTO: 0.08 K/UL — SIGNIFICANT CHANGE UP (ref 0–0.2)
BASOPHILS NFR BLD AUTO: 0.8 % — SIGNIFICANT CHANGE UP (ref 0–2)
BUN SERPL-MCNC: 57 MG/DL — HIGH (ref 7–23)
CALCIUM SERPL-MCNC: 9.4 MG/DL — SIGNIFICANT CHANGE UP (ref 8.4–10.5)
CHLORIDE SERPL-SCNC: 94 MMOL/L — LOW (ref 98–107)
CO2 SERPL-SCNC: 26 MMOL/L — SIGNIFICANT CHANGE UP (ref 22–31)
CREAT SERPL-MCNC: 6.02 MG/DL — HIGH (ref 0.5–1.3)
EGFR: 9 ML/MIN/1.73M2 — LOW
EGFR: 9 ML/MIN/1.73M2 — LOW
EOSINOPHIL # BLD AUTO: 1.14 K/UL — HIGH (ref 0–0.5)
EOSINOPHIL NFR BLD AUTO: 11.9 % — HIGH (ref 0–6)
GLUCOSE SERPL-MCNC: 132 MG/DL — HIGH (ref 70–99)
HCT VFR BLD CALC: 32.2 % — LOW (ref 39–50)
HGB BLD-MCNC: 10.1 G/DL — LOW (ref 13–17)
HYPOCHROMIA BLD QL: SLIGHT — SIGNIFICANT CHANGE UP
IANC: 4.75 K/UL — SIGNIFICANT CHANGE UP (ref 1.8–7.4)
LYMPHOCYTES # BLD AUTO: 1.21 K/UL — SIGNIFICANT CHANGE UP (ref 1–3.3)
LYMPHOCYTES # BLD AUTO: 12.7 % — LOW (ref 13–44)
MACROCYTES BLD QL: SLIGHT — SIGNIFICANT CHANGE UP
MAGNESIUM SERPL-MCNC: 2.2 MG/DL — SIGNIFICANT CHANGE UP (ref 1.6–2.6)
MCHC RBC-ENTMCNC: 26.6 PG — LOW (ref 27–34)
MCHC RBC-ENTMCNC: 31.4 G/DL — LOW (ref 32–36)
MCV RBC AUTO: 84.7 FL — SIGNIFICANT CHANGE UP (ref 80–100)
MONOCYTES # BLD AUTO: 0.65 K/UL — SIGNIFICANT CHANGE UP (ref 0–0.9)
MONOCYTES NFR BLD AUTO: 6.8 % — SIGNIFICANT CHANGE UP (ref 2–14)
NEUTROPHILS # BLD AUTO: 6.48 K/UL — SIGNIFICANT CHANGE UP (ref 1.8–7.4)
NEUTROPHILS NFR BLD AUTO: 67 % — SIGNIFICANT CHANGE UP (ref 43–77)
NEUTS BAND # BLD: 0.8 % — SIGNIFICANT CHANGE UP (ref 0–6)
NEUTS BAND NFR BLD: 0.8 % — SIGNIFICANT CHANGE UP (ref 0–6)
OVALOCYTES BLD QL SMEAR: SIGNIFICANT CHANGE UP
PHOSPHATE SERPL-MCNC: 4.4 MG/DL — SIGNIFICANT CHANGE UP (ref 2.5–4.5)
PLAT MORPH BLD: NORMAL — SIGNIFICANT CHANGE UP
PLATELET # BLD AUTO: 351 K/UL — SIGNIFICANT CHANGE UP (ref 150–400)
PLATELET COUNT - ESTIMATE: NORMAL — SIGNIFICANT CHANGE UP
POIKILOCYTOSIS BLD QL AUTO: SLIGHT — SIGNIFICANT CHANGE UP
POLYCHROMASIA BLD QL SMEAR: SIGNIFICANT CHANGE UP
POTASSIUM SERPL-MCNC: 4.6 MMOL/L — SIGNIFICANT CHANGE UP (ref 3.5–5.3)
POTASSIUM SERPL-SCNC: 4.6 MMOL/L — SIGNIFICANT CHANGE UP (ref 3.5–5.3)
RBC # BLD: 3.8 M/UL — LOW (ref 4.2–5.8)
RBC # FLD: 19.4 % — HIGH (ref 10.3–14.5)
RBC BLD AUTO: ABNORMAL
SODIUM SERPL-SCNC: 137 MMOL/L — SIGNIFICANT CHANGE UP (ref 135–145)
WBC # BLD: 9.56 K/UL — SIGNIFICANT CHANGE UP (ref 3.8–10.5)
WBC # FLD AUTO: 9.56 K/UL — SIGNIFICANT CHANGE UP (ref 3.8–10.5)

## 2025-02-14 PROCEDURE — 99232 SBSQ HOSP IP/OBS MODERATE 35: CPT

## 2025-02-14 PROCEDURE — G0545: CPT

## 2025-02-14 RX ORDER — NIFEDIPINE 30 MG
60 TABLET, EXTENDED RELEASE 24 HR ORAL
Refills: 0 | Status: DISCONTINUED | OUTPATIENT
Start: 2025-02-14 | End: 2025-02-15

## 2025-02-14 RX ORDER — ACETAMINOPHEN 500 MG/5ML
650 LIQUID (ML) ORAL ONCE
Refills: 0 | Status: DISCONTINUED | OUTPATIENT
Start: 2025-02-14 | End: 2025-02-14

## 2025-02-14 RX ORDER — ACETAMINOPHEN 500 MG/5ML
1000 LIQUID (ML) ORAL ONCE
Refills: 0 | Status: COMPLETED | OUTPATIENT
Start: 2025-02-14 | End: 2025-02-14

## 2025-02-14 RX ORDER — MELATONIN 5 MG
3 TABLET ORAL ONCE
Refills: 0 | Status: COMPLETED | OUTPATIENT
Start: 2025-02-14 | End: 2025-02-14

## 2025-02-14 RX ADMIN — BUMETANIDE 2 MILLIGRAM(S): 1 TABLET ORAL at 13:31

## 2025-02-14 RX ADMIN — Medication 60 MILLIGRAM(S): at 06:04

## 2025-02-14 RX ADMIN — ISOSORBIDE MONONITRATE 60 MILLIGRAM(S): 60 TABLET, EXTENDED RELEASE ORAL at 13:28

## 2025-02-14 RX ADMIN — LABETALOL HYDROCHLORIDE 100 MILLIGRAM(S): 200 TABLET, FILM COATED ORAL at 06:04

## 2025-02-14 RX ADMIN — Medication 81 MILLIGRAM(S): at 13:28

## 2025-02-14 RX ADMIN — Medication 667 MILLIGRAM(S): at 10:12

## 2025-02-14 RX ADMIN — Medication 667 MILLIGRAM(S): at 13:30

## 2025-02-14 RX ADMIN — Medication 3 MILLIGRAM(S): at 22:10

## 2025-02-14 RX ADMIN — Medication 667 MILLIGRAM(S): at 17:33

## 2025-02-14 RX ADMIN — ATORVASTATIN CALCIUM 40 MILLIGRAM(S): 80 TABLET, FILM COATED ORAL at 21:44

## 2025-02-14 RX ADMIN — HEPARIN SODIUM 5000 UNIT(S): 1000 INJECTION INTRAVENOUS; SUBCUTANEOUS at 17:33

## 2025-02-14 RX ADMIN — Medication 25 MICROGRAM(S): at 06:04

## 2025-02-14 RX ADMIN — Medication 160 MILLIGRAM(S): at 06:03

## 2025-02-14 RX ADMIN — LABETALOL HYDROCHLORIDE 100 MILLIGRAM(S): 200 TABLET, FILM COATED ORAL at 17:33

## 2025-02-14 RX ADMIN — BUMETANIDE 2 MILLIGRAM(S): 1 TABLET ORAL at 06:04

## 2025-02-14 RX ADMIN — Medication 1000 MILLIGRAM(S): at 11:00

## 2025-02-14 RX ADMIN — CLOPIDOGREL BISULFATE 75 MILLIGRAM(S): 75 TABLET, FILM COATED ORAL at 13:27

## 2025-02-14 RX ADMIN — Medication 160 MILLIGRAM(S): at 10:12

## 2025-02-14 RX ADMIN — Medication 400 MILLIGRAM(S): at 10:13

## 2025-02-14 RX ADMIN — Medication 60 MILLIGRAM(S): at 17:33

## 2025-02-14 RX ADMIN — HEPARIN SODIUM 5000 UNIT(S): 1000 INJECTION INTRAVENOUS; SUBCUTANEOUS at 06:05

## 2025-02-14 RX ADMIN — Medication 1 APPLICATION(S): at 13:46

## 2025-02-14 RX ADMIN — Medication 1000 UNIT(S): at 13:27

## 2025-02-15 ENCOUNTER — TRANSCRIPTION ENCOUNTER (OUTPATIENT)
Age: 70
End: 2025-02-15

## 2025-02-15 VITALS — SYSTOLIC BLOOD PRESSURE: 155 MMHG | HEART RATE: 61 BPM | DIASTOLIC BLOOD PRESSURE: 77 MMHG

## 2025-02-15 LAB
ADD ON TEST-SPECIMEN IN LAB: SIGNIFICANT CHANGE UP
ANION GAP SERPL CALC-SCNC: 21 MMOL/L — HIGH (ref 7–14)
BASOPHILS # BLD AUTO: 0.07 K/UL — SIGNIFICANT CHANGE UP (ref 0–0.2)
BASOPHILS NFR BLD AUTO: 0.8 % — SIGNIFICANT CHANGE UP (ref 0–2)
BUN SERPL-MCNC: 71 MG/DL — HIGH (ref 7–23)
CALCIUM SERPL-MCNC: 9.1 MG/DL — SIGNIFICANT CHANGE UP (ref 8.4–10.5)
CHLORIDE SERPL-SCNC: 94 MMOL/L — LOW (ref 98–107)
CO2 SERPL-SCNC: 22 MMOL/L — SIGNIFICANT CHANGE UP (ref 22–31)
CREAT SERPL-MCNC: 6.87 MG/DL — HIGH (ref 0.5–1.3)
CRYPTOC AG FLD QL: NEGATIVE — SIGNIFICANT CHANGE UP
EGFR: 8 ML/MIN/1.73M2 — LOW
EGFR: 8 ML/MIN/1.73M2 — LOW
EOSINOPHIL # BLD AUTO: 1.92 K/UL — HIGH (ref 0–0.5)
EOSINOPHIL NFR BLD AUTO: 21.5 % — HIGH (ref 0–6)
GLUCOSE SERPL-MCNC: 100 MG/DL — HIGH (ref 70–99)
HCT VFR BLD CALC: 31.1 % — LOW (ref 39–50)
HGB BLD-MCNC: 9.6 G/DL — LOW (ref 13–17)
IANC: 4.57 K/UL — SIGNIFICANT CHANGE UP (ref 1.8–7.4)
IMM GRANULOCYTES NFR BLD AUTO: 0.7 % — SIGNIFICANT CHANGE UP (ref 0–0.9)
LYMPHOCYTES # BLD AUTO: 1.41 K/UL — SIGNIFICANT CHANGE UP (ref 1–3.3)
LYMPHOCYTES # BLD AUTO: 15.8 % — SIGNIFICANT CHANGE UP (ref 13–44)
MAGNESIUM SERPL-MCNC: 2.4 MG/DL — SIGNIFICANT CHANGE UP (ref 1.6–2.6)
MCHC RBC-ENTMCNC: 26.3 PG — LOW (ref 27–34)
MCHC RBC-ENTMCNC: 30.9 G/DL — LOW (ref 32–36)
MCV RBC AUTO: 85.2 FL — SIGNIFICANT CHANGE UP (ref 80–100)
MONOCYTES # BLD AUTO: 0.9 K/UL — SIGNIFICANT CHANGE UP (ref 0–0.9)
MONOCYTES NFR BLD AUTO: 10.1 % — SIGNIFICANT CHANGE UP (ref 2–14)
NEUTROPHILS # BLD AUTO: 4.57 K/UL — SIGNIFICANT CHANGE UP (ref 1.8–7.4)
NEUTROPHILS NFR BLD AUTO: 51.1 % — SIGNIFICANT CHANGE UP (ref 43–77)
NRBC # BLD AUTO: 0 K/UL — SIGNIFICANT CHANGE UP (ref 0–0)
NRBC # FLD: 0 K/UL — SIGNIFICANT CHANGE UP (ref 0–0)
NRBC BLD AUTO-RTO: 0 /100 WBCS — SIGNIFICANT CHANGE UP (ref 0–0)
PHOSPHATE SERPL-MCNC: 5.1 MG/DL — HIGH (ref 2.5–4.5)
PLATELET # BLD AUTO: 328 K/UL — SIGNIFICANT CHANGE UP (ref 150–400)
POTASSIUM SERPL-MCNC: 5.1 MMOL/L — SIGNIFICANT CHANGE UP (ref 3.5–5.3)
POTASSIUM SERPL-SCNC: 5.1 MMOL/L — SIGNIFICANT CHANGE UP (ref 3.5–5.3)
RBC # BLD: 3.65 M/UL — LOW (ref 4.2–5.8)
RBC # FLD: 19.5 % — HIGH (ref 10.3–14.5)
SODIUM SERPL-SCNC: 137 MMOL/L — SIGNIFICANT CHANGE UP (ref 135–145)
WBC # BLD: 8.44 K/UL — SIGNIFICANT CHANGE UP (ref 3.8–10.5)
WBC # FLD AUTO: 8.44 K/UL — SIGNIFICANT CHANGE UP (ref 3.8–10.5)

## 2025-02-15 RX ORDER — EPOETIN ALFA 10000 [IU]/ML
10000 SOLUTION INTRAVENOUS; SUBCUTANEOUS ONCE
Refills: 0 | Status: DISCONTINUED | OUTPATIENT
Start: 2025-02-15 | End: 2025-02-15

## 2025-02-15 RX ORDER — LABETALOL HYDROCHLORIDE 200 MG/1
2 TABLET, FILM COATED ORAL
Refills: 0 | DISCHARGE
Start: 2025-02-15

## 2025-02-15 RX ORDER — NIFEDIPINE 30 MG
1 TABLET, EXTENDED RELEASE 24 HR ORAL
Qty: 0 | Refills: 0 | DISCHARGE
Start: 2025-02-15

## 2025-02-15 RX ORDER — NIFEDIPINE 30 MG
1 TABLET, EXTENDED RELEASE 24 HR ORAL
Qty: 60 | Refills: 0
Start: 2025-02-15 | End: 2025-03-16

## 2025-02-15 RX ORDER — NIFEDIPINE 30 MG
1 TABLET, EXTENDED RELEASE 24 HR ORAL
Refills: 0 | DISCHARGE

## 2025-02-15 RX ORDER — EPOETIN ALFA 10000 [IU]/ML
10000 SOLUTION INTRAVENOUS; SUBCUTANEOUS ONCE
Refills: 0 | Status: COMPLETED | OUTPATIENT
Start: 2025-02-15 | End: 2025-02-15

## 2025-02-15 RX ADMIN — Medication 81 MILLIGRAM(S): at 15:11

## 2025-02-15 RX ADMIN — Medication 667 MILLIGRAM(S): at 15:09

## 2025-02-15 RX ADMIN — Medication 320 MILLIGRAM(S): at 06:17

## 2025-02-15 RX ADMIN — Medication 1000 UNIT(S): at 15:10

## 2025-02-15 RX ADMIN — Medication 667 MILLIGRAM(S): at 17:17

## 2025-02-15 RX ADMIN — BUMETANIDE 2 MILLIGRAM(S): 1 TABLET ORAL at 06:16

## 2025-02-15 RX ADMIN — Medication 25 MICROGRAM(S): at 06:16

## 2025-02-15 RX ADMIN — LABETALOL HYDROCHLORIDE 100 MILLIGRAM(S): 200 TABLET, FILM COATED ORAL at 06:17

## 2025-02-15 RX ADMIN — Medication 60 MILLIGRAM(S): at 06:16

## 2025-02-15 RX ADMIN — EPOETIN ALFA 10000 UNIT(S): 10000 SOLUTION INTRAVENOUS; SUBCUTANEOUS at 13:24

## 2025-02-15 RX ADMIN — Medication 1 APPLICATION(S): at 15:50

## 2025-02-15 RX ADMIN — HEPARIN SODIUM 5000 UNIT(S): 1000 INJECTION INTRAVENOUS; SUBCUTANEOUS at 06:17

## 2025-02-15 RX ADMIN — LABETALOL HYDROCHLORIDE 100 MILLIGRAM(S): 200 TABLET, FILM COATED ORAL at 17:17

## 2025-02-15 RX ADMIN — HEPARIN SODIUM 5000 UNIT(S): 1000 INJECTION INTRAVENOUS; SUBCUTANEOUS at 17:18

## 2025-02-15 RX ADMIN — BUMETANIDE 2 MILLIGRAM(S): 1 TABLET ORAL at 15:10

## 2025-02-15 RX ADMIN — CLOPIDOGREL BISULFATE 75 MILLIGRAM(S): 75 TABLET, FILM COATED ORAL at 15:11

## 2025-02-15 RX ADMIN — Medication 667 MILLIGRAM(S): at 08:39

## 2025-02-15 RX ADMIN — Medication 60 MILLIGRAM(S): at 17:17

## 2025-02-15 RX ADMIN — ISOSORBIDE MONONITRATE 60 MILLIGRAM(S): 60 TABLET, EXTENDED RELEASE ORAL at 15:09

## 2025-02-17 LAB
C IMMITIS AB SER QL IA: NEGATIVE — SIGNIFICANT CHANGE UP
STRONGYLOIDES AB SER-ACNC: NEGATIVE — SIGNIFICANT CHANGE UP

## 2025-02-19 ENCOUNTER — APPOINTMENT (OUTPATIENT)
Dept: CARDIOLOGY | Facility: CLINIC | Age: 70
End: 2025-02-19
Payer: MEDICARE

## 2025-02-19 ENCOUNTER — NON-APPOINTMENT (OUTPATIENT)
Age: 70
End: 2025-02-19

## 2025-02-19 VITALS
HEART RATE: 75 BPM | OXYGEN SATURATION: 98 % | HEIGHT: 68 IN | BODY MASS INDEX: 18.19 KG/M2 | WEIGHT: 120 LBS | DIASTOLIC BLOOD PRESSURE: 58 MMHG | SYSTOLIC BLOOD PRESSURE: 122 MMHG

## 2025-02-19 DIAGNOSIS — Z01.818 ENCOUNTER FOR OTHER PREPROCEDURAL EXAMINATION: ICD-10-CM

## 2025-02-19 DIAGNOSIS — I10 ESSENTIAL (PRIMARY) HYPERTENSION: ICD-10-CM

## 2025-02-19 DIAGNOSIS — I44.0 ATRIOVENTRICULAR BLOCK, FIRST DEGREE: ICD-10-CM

## 2025-02-19 DIAGNOSIS — I25.10 ATHEROSCLEROTIC HEART DISEASE OF NATIVE CORONARY ARTERY W/OUT ANGINA PECTORIS: ICD-10-CM

## 2025-02-19 DIAGNOSIS — I50.31 ACUTE DIASTOLIC (CONGESTIVE) HEART FAILURE: ICD-10-CM

## 2025-02-19 LAB
GAMMA INTERFERON BACKGROUND BLD IA-ACNC: 0.03 IU/ML — SIGNIFICANT CHANGE UP
M TB IFN-G BLD-IMP: NEGATIVE — SIGNIFICANT CHANGE UP
M TB IFN-G CD4+ BCKGRND COR BLD-ACNC: 0.03 IU/ML — SIGNIFICANT CHANGE UP
M TB IFN-G CD4+CD8+ BCKGRND COR BLD-ACNC: 0.01 IU/ML — SIGNIFICANT CHANGE UP
QUANT TB PLUS MITOGEN MINUS NIL: 0.51 IU/ML — SIGNIFICANT CHANGE UP

## 2025-02-19 PROCEDURE — G2211 COMPLEX E/M VISIT ADD ON: CPT

## 2025-02-19 PROCEDURE — 99214 OFFICE O/P EST MOD 30 MIN: CPT

## 2025-02-19 PROCEDURE — 93000 ELECTROCARDIOGRAM COMPLETE: CPT

## 2025-02-24 ENCOUNTER — EMERGENCY (EMERGENCY)
Facility: HOSPITAL | Age: 70
LOS: 0 days | Discharge: HOME | End: 2025-02-25
Attending: STUDENT IN AN ORGANIZED HEALTH CARE EDUCATION/TRAINING PROGRAM
Payer: MEDICARE

## 2025-02-24 VITALS
HEART RATE: 68 BPM | HEIGHT: 68 IN | SYSTOLIC BLOOD PRESSURE: 174 MMHG | WEIGHT: 128.31 LBS | DIASTOLIC BLOOD PRESSURE: 90 MMHG | RESPIRATION RATE: 16 BRPM | TEMPERATURE: 97 F | OXYGEN SATURATION: 100 %

## 2025-02-24 DIAGNOSIS — Z98.890 OTHER SPECIFIED POSTPROCEDURAL STATES: Chronic | ICD-10-CM

## 2025-02-24 DIAGNOSIS — I10 ESSENTIAL (PRIMARY) HYPERTENSION: ICD-10-CM

## 2025-02-24 DIAGNOSIS — Z99.2 DEPENDENCE ON RENAL DIALYSIS: ICD-10-CM

## 2025-02-24 DIAGNOSIS — N18.6 END STAGE RENAL DISEASE: ICD-10-CM

## 2025-02-24 DIAGNOSIS — Z95.5 PRESENCE OF CORONARY ANGIOPLASTY IMPLANT AND GRAFT: Chronic | ICD-10-CM

## 2025-02-24 DIAGNOSIS — R51.9 HEADACHE, UNSPECIFIED: ICD-10-CM

## 2025-02-24 DIAGNOSIS — I12.0 HYPERTENSIVE CHRONIC KIDNEY DISEASE WITH STAGE 5 CHRONIC KIDNEY DISEASE OR END STAGE RENAL DISEASE: ICD-10-CM

## 2025-02-24 DIAGNOSIS — G44.89 OTHER HEADACHE SYNDROME: ICD-10-CM

## 2025-02-24 PROCEDURE — 99284 EMERGENCY DEPT VISIT MOD MDM: CPT

## 2025-02-24 NOTE — ED ADULT TRIAGE NOTE - CHIEF COMPLAINT QUOTE
As per daughter in law pt complains of high blood pressure 240/120, pt took Valsartan and Labetalol. Repeat BP was  226/99. Pt also complains of right sided headahce

## 2025-02-25 VITALS
HEART RATE: 60 BPM | DIASTOLIC BLOOD PRESSURE: 90 MMHG | SYSTOLIC BLOOD PRESSURE: 191 MMHG | OXYGEN SATURATION: 100 % | TEMPERATURE: 98 F | RESPIRATION RATE: 18 BRPM

## 2025-02-25 LAB
ALBUMIN SERPL ELPH-MCNC: 3.5 G/DL — SIGNIFICANT CHANGE UP (ref 3.3–5)
ALP SERPL-CCNC: 79 U/L — SIGNIFICANT CHANGE UP (ref 40–120)
ALT FLD-CCNC: 18 U/L — SIGNIFICANT CHANGE UP (ref 12–78)
ANION GAP SERPL CALC-SCNC: 7 MMOL/L — SIGNIFICANT CHANGE UP (ref 5–17)
ANISOCYTOSIS BLD QL: SLIGHT — SIGNIFICANT CHANGE UP
AST SERPL-CCNC: 20 U/L — SIGNIFICANT CHANGE UP (ref 15–37)
BASOPHILS # BLD AUTO: 0.07 K/UL — SIGNIFICANT CHANGE UP (ref 0–0.2)
BASOPHILS NFR BLD AUTO: 0.8 % — SIGNIFICANT CHANGE UP (ref 0–2)
BILIRUB SERPL-MCNC: 0.4 MG/DL — SIGNIFICANT CHANGE UP (ref 0.2–1.2)
BUN SERPL-MCNC: 63 MG/DL — HIGH (ref 7–23)
CALCIUM SERPL-MCNC: 9.5 MG/DL — SIGNIFICANT CHANGE UP (ref 8.5–10.1)
CHLORIDE SERPL-SCNC: 96 MMOL/L — SIGNIFICANT CHANGE UP (ref 96–108)
CO2 SERPL-SCNC: 32 MMOL/L — HIGH (ref 22–31)
CREAT SERPL-MCNC: 6.15 MG/DL — HIGH (ref 0.5–1.3)
EGFR: 9 ML/MIN/1.73M2 — LOW
EOSINOPHIL # BLD AUTO: 0.91 K/UL — HIGH (ref 0–0.5)
EOSINOPHIL NFR BLD AUTO: 10.1 % — HIGH (ref 0–6)
FLUAV AG NPH QL: SIGNIFICANT CHANGE UP
FLUBV AG NPH QL: SIGNIFICANT CHANGE UP
GLUCOSE SERPL-MCNC: 162 MG/DL — HIGH (ref 70–99)
HCT VFR BLD CALC: 34.6 % — LOW (ref 39–50)
HGB BLD-MCNC: 10.9 G/DL — LOW (ref 13–17)
IMM GRANULOCYTES NFR BLD AUTO: 0.8 % — SIGNIFICANT CHANGE UP (ref 0–0.9)
LYMPHOCYTES # BLD AUTO: 1.01 K/UL — SIGNIFICANT CHANGE UP (ref 1–3.3)
LYMPHOCYTES # BLD AUTO: 11.2 % — LOW (ref 13–44)
MACROCYTES BLD QL: SLIGHT — SIGNIFICANT CHANGE UP
MAGNESIUM SERPL-MCNC: 2.6 MG/DL — SIGNIFICANT CHANGE UP (ref 1.6–2.6)
MANUAL SMEAR VERIFICATION: SIGNIFICANT CHANGE UP
MCHC RBC-ENTMCNC: 26.5 PG — LOW (ref 27–34)
MCHC RBC-ENTMCNC: 31.5 G/DL — LOW (ref 32–36)
MCV RBC AUTO: 84.2 FL — SIGNIFICANT CHANGE UP (ref 80–100)
MONOCYTES # BLD AUTO: 0.85 K/UL — SIGNIFICANT CHANGE UP (ref 0–0.9)
MONOCYTES NFR BLD AUTO: 9.4 % — SIGNIFICANT CHANGE UP (ref 2–14)
NEUTROPHILS # BLD AUTO: 6.09 K/UL — SIGNIFICANT CHANGE UP (ref 1.8–7.4)
NEUTROPHILS NFR BLD AUTO: 67.7 % — SIGNIFICANT CHANGE UP (ref 43–77)
NRBC BLD AUTO-RTO: 0 /100 WBCS — SIGNIFICANT CHANGE UP (ref 0–0)
OVALOCYTES BLD QL SMEAR: SLIGHT — SIGNIFICANT CHANGE UP
PLAT MORPH BLD: NORMAL — SIGNIFICANT CHANGE UP
PLATELET # BLD AUTO: 476 K/UL — HIGH (ref 150–400)
POIKILOCYTOSIS BLD QL AUTO: SLIGHT — SIGNIFICANT CHANGE UP
POLYCHROMASIA BLD QL SMEAR: SIGNIFICANT CHANGE UP
POTASSIUM SERPL-MCNC: 4.5 MMOL/L — SIGNIFICANT CHANGE UP (ref 3.5–5.3)
POTASSIUM SERPL-SCNC: 4.5 MMOL/L — SIGNIFICANT CHANGE UP (ref 3.5–5.3)
PROT SERPL-MCNC: 8.3 GM/DL — SIGNIFICANT CHANGE UP (ref 6–8.3)
RBC # BLD: 4.11 M/UL — LOW (ref 4.2–5.8)
RBC # FLD: 20.5 % — HIGH (ref 10.3–14.5)
RBC BLD AUTO: ABNORMAL
RSV RNA NPH QL NAA+NON-PROBE: SIGNIFICANT CHANGE UP
SARS-COV-2 RNA SPEC QL NAA+PROBE: SIGNIFICANT CHANGE UP
SODIUM SERPL-SCNC: 135 MMOL/L — SIGNIFICANT CHANGE UP (ref 135–145)
WBC # BLD: 9 K/UL — SIGNIFICANT CHANGE UP (ref 3.8–10.5)
WBC # FLD AUTO: 9 K/UL — SIGNIFICANT CHANGE UP (ref 3.8–10.5)

## 2025-02-25 PROCEDURE — 70450 CT HEAD/BRAIN W/O DYE: CPT | Mod: 26

## 2025-02-25 RX ORDER — LABETALOL HYDROCHLORIDE 300 MG/1
200 TABLET, FILM COATED ORAL ONCE
Refills: 0 | Status: COMPLETED | OUTPATIENT
Start: 2025-02-25 | End: 2025-02-25

## 2025-02-25 RX ORDER — KETOROLAC TROMETHAMINE 10 MG
15 TABLET ORAL ONCE
Refills: 0 | Status: DISCONTINUED | OUTPATIENT
Start: 2025-02-25 | End: 2025-02-25

## 2025-02-25 RX ORDER — METOCLOPRAMIDE 10 MG/1
10 TABLET ORAL ONCE
Refills: 0 | Status: COMPLETED | OUTPATIENT
Start: 2025-02-25 | End: 2025-02-25

## 2025-02-25 RX ORDER — NIFEDIPINE 90 MG/1
60 TABLET, FILM COATED, EXTENDED RELEASE ORAL ONCE
Refills: 0 | Status: COMPLETED | OUTPATIENT
Start: 2025-02-25 | End: 2025-02-25

## 2025-02-25 RX ORDER — ACETAMINOPHEN 160 MG/5ML
650 SUSPENSION ORAL ONCE
Refills: 0 | Status: COMPLETED | OUTPATIENT
Start: 2025-02-25 | End: 2025-02-25

## 2025-02-25 RX ADMIN — Medication 15 MILLIGRAM(S): at 06:48

## 2025-02-25 RX ADMIN — LABETALOL HYDROCHLORIDE 200 MILLIGRAM(S): 300 TABLET, FILM COATED ORAL at 05:00

## 2025-02-25 RX ADMIN — ACETAMINOPHEN 650 MILLIGRAM(S): 160 SUSPENSION ORAL at 06:48

## 2025-02-25 RX ADMIN — METOCLOPRAMIDE 10 MILLIGRAM(S): 10 TABLET ORAL at 01:53

## 2025-02-25 RX ADMIN — NIFEDIPINE 60 MILLIGRAM(S): 90 TABLET, FILM COATED, EXTENDED RELEASE ORAL at 05:00

## 2025-02-25 NOTE — ED PROVIDER NOTE - PATIENT PORTAL LINK FT
You can access the FollowMyHealth Patient Portal offered by [facility] by registering at the following website: [email or web address]. By joining [employer]’s FollowMyHealth portal, you will also be able to view your health information using other applications (apps) compatible with our system.

## 2025-02-25 NOTE — ED PROVIDER NOTE - OBJECTIVE STATEMENT
69 M pmh HTN, ESRD (MWF) presenting to the ED for headache. Pt states that he completed dialysis today and in the evening he was having a worsening headache. He denies nausea, vomiting, loss of consciousness, chest pain, back pain

## 2025-02-25 NOTE — ED PROVIDER NOTE - CLINICAL SUMMARY MEDICAL DECISION MAKING FREE TEXT BOX
69 M pmh HTN, ESRD (MWF) presenting to the ED for headache x hours. Headache worsening throughout the day    concern for headache 2/2 elevated BP r/o ICH  labs  CT head  pain control  BP management (home meds)  patient feeling better, ambulatory. patient to continue home BP regiment and follow-up with primary doctor  return precautions given 69 M pmh HTN, ESRD (MWF) presenting to the ED for headache x hours. R side headache worsening throughout the day    concern for headache 2/2 elevated BP r/o ICH, low suspicion for SAH given gradual onset  labs  CT head  pain control  BP management (home meds)  patient feeling better, ambulatory. patient to continue home BP regiment and follow-up with primary doctor  return precautions given

## 2025-02-25 NOTE — ED PROVIDER NOTE - PHYSICAL EXAMINATION
General: Well appearing male in no acute distress  HEENT: Normocephalic, atraumatic. Moist mucous membranes. Oropharynx clear. No lymphadenopathy.  Eyes: No scleral icterus. EOMI. ALEJANDRO.  Neck:. Soft and supple. Full ROM without pain. No midline tenderness  Cardiac: Regular rate and regular rhythm. No murmurs, rubs, gallops. Peripheral pulses 2+ and symmetric. No LE edema. R chest wall dialysis access  Resp: Lungs CTAB. Speaking in full sentences. No wheezes, rales or rhonchi.  Abd: Soft, non-tender, non-distended. No guarding or rebound.  Back: Spine midline and non-tender. No CVA tenderness.    Skin: No rashes, abrasions, or lacerations.  Neuro: AO x 3. Moves all extremities symmetrically. Motor strength and sensation grossly intact. ambulatory w/ steady gait General: Well appearing male in no acute distress  HEENT: Normocephalic, atraumatic. Moist mucous membranes. Oropharynx clear. No lymphadenopathy.  Eyes: No scleral icterus. EOMI. ALEJANDRO.  Neck:. Soft and supple. Full ROM without pain. No midline tenderness  Cardiac: Regular rate and regular rhythm. Peripheral pulses 2+ and symmetric. No LE edema. R chest wall dialysis access  Resp: Lungs CTAB. Speaking in full sentences. No wheezes, rales or rhonchi.  Abd: Soft, non-tender, non-distended. No guarding or rebound.  Back: Spine midline and non-tender. No CVA tenderness.    Skin: No rashes, abrasions, or lacerations.  Neuro: AO x 3. Moves all extremities symmetrically. Motor strength and sensation grossly intact. ambulatory w/ steady gait

## 2025-02-25 NOTE — ED ADULT NURSE NOTE - OBJECTIVE STATEMENT
AAOx3 pt presents to ED c/o of right sided HA starting roughly 3 hours PTA. Pt also endorsing HTN at home SBP 220s+, pt complaint with HTN meds. Pt denies dizziness, blurry vision, N/V. Denies recent head trauma. PMH: ESRD, HTN

## 2025-02-25 NOTE — ED PROVIDER NOTE - CONSIDERATION OF ADMISSION OBSERVATION
considered admission if persistent pain, symptoms resolved. patient given BP meds, will d/c home w/ return precautions Consideration of Admission/Observation

## 2025-03-20 ENCOUNTER — APPOINTMENT (OUTPATIENT)
Dept: NEPHROLOGY | Facility: CLINIC | Age: 70
End: 2025-03-20
Payer: COMMERCIAL

## 2025-03-20 ENCOUNTER — NON-APPOINTMENT (OUTPATIENT)
Age: 70
End: 2025-03-20

## 2025-03-20 VITALS
WEIGHT: 176.37 LBS | BODY MASS INDEX: 26.73 KG/M2 | HEART RATE: 54 BPM | SYSTOLIC BLOOD PRESSURE: 165 MMHG | HEIGHT: 68 IN | TEMPERATURE: 97.5 F | DIASTOLIC BLOOD PRESSURE: 71 MMHG | OXYGEN SATURATION: 99 %

## 2025-03-20 DIAGNOSIS — E11.9 TYPE 2 DIABETES MELLITUS W/OUT COMPLICATIONS: ICD-10-CM

## 2025-03-20 DIAGNOSIS — Z01.818 ENCOUNTER FOR OTHER PREPROCEDURAL EXAMINATION: ICD-10-CM

## 2025-03-20 DIAGNOSIS — Z95.5 PRESENCE OF CORONARY ANGIOPLASTY IMPLANT AND GRAFT: ICD-10-CM

## 2025-03-20 DIAGNOSIS — I10 ESSENTIAL (PRIMARY) HYPERTENSION: ICD-10-CM

## 2025-03-20 LAB
ALBUMIN SERPL ELPH-MCNC: 5 G/DL
ALP BLD-CCNC: 93 U/L
ALT SERPL-CCNC: 13 U/L
ANION GAP SERPL CALC-SCNC: 21 MMOL/L
AST SERPL-CCNC: 19 U/L
BILIRUB SERPL-MCNC: 0.3 MG/DL
BUN SERPL-MCNC: 42 MG/DL
C PEPTIDE SERPL-MCNC: 11.5 NG/ML
CALCIUM SERPL-MCNC: 9.7 MG/DL
CHLORIDE SERPL-SCNC: 92 MMOL/L
CHOLEST SERPL-MCNC: 152 MG/DL
CK SERPL-CCNC: 70 U/L
CO2 SERPL-SCNC: 27 MMOL/L
COVID-19 SPIKE DOMAIN ANTIBODY INTERPRETATION: POSITIVE
CREAT SERPL-MCNC: 4.8 MG/DL
CRP SERPL-MCNC: 21 MG/L
EGFRCR SERPLBLD CKD-EPI 2021: 12 ML/MIN/1.73M2
ERYTHROCYTE [SEDIMENTATION RATE] IN BLOOD BY WESTERGREN METHOD: 120 MM/HR
ESTIMATED AVERAGE GLUCOSE: 108 MG/DL
GLUCOSE SERPL-MCNC: 143 MG/DL
HBA1C MFR BLD HPLC: 5.4 %
HCT VFR BLD CALC: 37.9 %
HDLC SERPL-MCNC: 59 MG/DL
HGB BLD-MCNC: 11.6 G/DL
LDLC SERPL-MCNC: 72 MG/DL
MAGNESIUM SERPL-MCNC: 2.4 MG/DL
MCHC RBC-ENTMCNC: 26.2 PG
MCHC RBC-ENTMCNC: 30.6 G/DL
MCV RBC AUTO: 85.6 FL
NONHDLC SERPL-MCNC: 93 MG/DL
PARATHYROID HORMONE INTACT: 259 PG/ML
PHOSPHATE SERPL-MCNC: 4.9 MG/DL
PLATELET # BLD AUTO: 388 K/UL
POTASSIUM SERPL-SCNC: 4.3 MMOL/L
PROT SERPL-MCNC: 8.4 G/DL
PSA SERPL-MCNC: 2.19 NG/ML
RBC # BLD: 4.43 M/UL
RBC # FLD: 18.9 %
SARS-COV-2 AB SERPL IA-ACNC: >250 U/ML
SODIUM SERPL-SCNC: 140 MMOL/L
T3 SERPL-MCNC: 80 NG/DL
T4 FREE SERPL-MCNC: 1.3 NG/DL
TRIGL SERPL-MCNC: 121 MG/DL
TSH SERPL-ACNC: 2.4 UIU/ML
URATE SERPL-MCNC: 3.8 MG/DL
WBC # FLD AUTO: 6.93 K/UL

## 2025-03-20 PROCEDURE — 99215 OFFICE O/P EST HI 40 MIN: CPT

## 2025-03-25 LAB
ABORH: NORMAL
CMV IGG SERPL QL: >10 U/ML
CMV IGG SERPL-IMP: POSITIVE
EBV DNA SERPL NAA+PROBE-ACNC: ABNORMAL IU/ML
EBV EA AB SER IA-ACNC: 6.28 U/ML
EBV EA AB TITR SER IF: POSITIVE
EBV EA IGG SER QL IA: >600 U/ML
EBV EA IGG SER-ACNC: NEGATIVE
EBV EA IGM SER IA-ACNC: NEGATIVE
EBV PATRN SPEC IB-IMP: NORMAL
EBV VCA IGG SER IA-ACNC: 495 U/ML
EBV VCA IGM SER QL IA: <10 U/ML
EBVPCR LOG: ABNORMAL LOG10IU/ML
EPSTEIN-BARR VIRUS CAPSID ANTIGEN IGG: POSITIVE
HBV CORE IGG+IGM SER QL: NONREACTIVE
HBV SURFACE AB SER QL: REACTIVE
HBV SURFACE AB SERPL IA-ACNC: 47.4 MIU/ML
HBV SURFACE AG SER QL: NONREACTIVE
HCV AB SER QL: NONREACTIVE
HCV S/CO RATIO: 0.12 S/CO
HEPATITIS A IGG ANTIBODY: REACTIVE
HIV1+2 AB SPEC QL IA.RAPID: NONREACTIVE
HSV 1+2 IGG SER IA-IMP: POSITIVE
HSV1 IGG SER QL: 24.4 INDEX
HSV1 IGG SER QL: 24.4 INDEX
HSV2 IGG SER QL: >23.6 INDEX
ISOAGGLUTININ TITER, ANTI-A, IGG: 32
ISOAGGLUTININ TITER, ANTI-A, IGM: 64
M TB IFN-G BLD-IMP: NEGATIVE
MEV IGG FLD QL IA: >300 AU/ML
MEV IGG FLD QL IA: >300 AU/ML
MEV IGG+IGM SER-IMP: POSITIVE
MEV IGG+IGM SER-IMP: POSITIVE
MUV AB SER-ACNC: POSITIVE
MUV IGG SER QL IA: 142 AU/ML
QUANTIFERON TB PLUS MITOGEN MINUS NIL: 7.02 IU/ML
QUANTIFERON TB PLUS NIL: 0.04 IU/ML
QUANTIFERON TB PLUS TB1 MINUS NIL: 0.15 IU/ML
QUANTIFERON TB PLUS TB2 MINUS NIL: 0.08 IU/ML
RUBV IGG FLD-ACNC: 12.4 INDEX
RUBV IGG FLD-ACNC: 12.4 INDEX
RUBV IGG SER-IMP: POSITIVE
RUBV IGG SER-IMP: POSITIVE
STRONGYLOIDES AB SER IA-ACNC: NEGATIVE
T CRUZI AB SER-ACNC: 0.1 IV
T GONDII AB SER-IMP: NEGATIVE
T GONDII IGG SER QL: <3 IU/ML
T PALLIDUM AB SER QL IA: NEGATIVE
VZV AB TITR SER: POSITIVE
VZV AB TITR SER: POSITIVE
VZV IGG SER IF-ACNC: 21.5 S/CO
VZV IGG SER IF-ACNC: 21.5 S/CO

## 2025-04-01 ENCOUNTER — OUTPATIENT (OUTPATIENT)
Dept: OUTPATIENT SERVICES | Facility: HOSPITAL | Age: 70
LOS: 1 days | End: 2025-04-01
Payer: COMMERCIAL

## 2025-04-01 ENCOUNTER — APPOINTMENT (OUTPATIENT)
Dept: CT IMAGING | Facility: CLINIC | Age: 70
End: 2025-04-01
Payer: COMMERCIAL

## 2025-04-01 DIAGNOSIS — Z01.818 ENCOUNTER FOR OTHER PREPROCEDURAL EXAMINATION: ICD-10-CM

## 2025-04-01 DIAGNOSIS — Z98.890 OTHER SPECIFIED POSTPROCEDURAL STATES: Chronic | ICD-10-CM

## 2025-04-01 DIAGNOSIS — Z95.5 PRESENCE OF CORONARY ANGIOPLASTY IMPLANT AND GRAFT: Chronic | ICD-10-CM

## 2025-04-01 PROCEDURE — 71250 CT THORAX DX C-: CPT

## 2025-04-01 PROCEDURE — 71250 CT THORAX DX C-: CPT | Mod: 26

## 2025-04-07 ENCOUNTER — INPATIENT (INPATIENT)
Facility: HOSPITAL | Age: 70
LOS: 15 days | Discharge: HOME HEALTH | DRG: 650 | End: 2025-04-23
Attending: TRANSPLANT SURGERY | Admitting: TRANSPLANT SURGERY
Payer: MEDICARE

## 2025-04-07 VITALS
OXYGEN SATURATION: 100 % | SYSTOLIC BLOOD PRESSURE: 223 MMHG | DIASTOLIC BLOOD PRESSURE: 80 MMHG | RESPIRATION RATE: 16 BRPM | TEMPERATURE: 98 F | HEART RATE: 53 BPM | WEIGHT: 126.1 LBS

## 2025-04-07 DIAGNOSIS — Z98.890 OTHER SPECIFIED POSTPROCEDURAL STATES: Chronic | ICD-10-CM

## 2025-04-07 DIAGNOSIS — Z95.5 PRESENCE OF CORONARY ANGIOPLASTY IMPLANT AND GRAFT: Chronic | ICD-10-CM

## 2025-04-07 DIAGNOSIS — Z94.0 KIDNEY TRANSPLANT STATUS: ICD-10-CM

## 2025-04-07 LAB
ALBUMIN SERPL ELPH-MCNC: 5.8 G/DL — HIGH (ref 3.3–5)
ALP SERPL-CCNC: 79 U/L — SIGNIFICANT CHANGE UP (ref 40–120)
ALT FLD-CCNC: 14 U/L — SIGNIFICANT CHANGE UP (ref 10–45)
ANION GAP SERPL CALC-SCNC: 21 MMOL/L — HIGH (ref 5–17)
ANISOCYTOSIS BLD QL: SIGNIFICANT CHANGE UP
APTT BLD: 32.4 SEC — SIGNIFICANT CHANGE UP (ref 24.5–35.6)
AST SERPL-CCNC: 21 U/L — SIGNIFICANT CHANGE UP (ref 10–40)
BASOPHILS # BLD AUTO: 0 K/UL — SIGNIFICANT CHANGE UP (ref 0–0.2)
BASOPHILS NFR BLD AUTO: 0 % — SIGNIFICANT CHANGE UP (ref 0–2)
BILIRUB SERPL-MCNC: 0.3 MG/DL — SIGNIFICANT CHANGE UP (ref 0.2–1.2)
BLD GP AB SCN SERPL QL: NEGATIVE — SIGNIFICANT CHANGE UP
BUN SERPL-MCNC: 36 MG/DL — HIGH (ref 7–23)
CALCIUM SERPL-MCNC: 9.8 MG/DL — SIGNIFICANT CHANGE UP (ref 8.4–10.5)
CHLORIDE SERPL-SCNC: 95 MMOL/L — LOW (ref 96–108)
CO2 SERPL-SCNC: 25 MMOL/L — SIGNIFICANT CHANGE UP (ref 22–31)
CREAT SERPL-MCNC: 4.86 MG/DL — HIGH (ref 0.5–1.3)
EGFR: 12 ML/MIN/1.73M2 — LOW
EGFR: 12 ML/MIN/1.73M2 — LOW
EOSINOPHIL # BLD AUTO: 0.7 K/UL — HIGH (ref 0–0.5)
EOSINOPHIL NFR BLD AUTO: 10.4 % — HIGH (ref 0–6)
FLUAV AG NPH QL: SIGNIFICANT CHANGE UP
FLUBV AG NPH QL: SIGNIFICANT CHANGE UP
GAS PNL BLDV: SIGNIFICANT CHANGE UP
GLUCOSE BLDC GLUCOMTR-MCNC: 82 MG/DL — SIGNIFICANT CHANGE UP (ref 70–99)
GLUCOSE SERPL-MCNC: 84 MG/DL — SIGNIFICANT CHANGE UP (ref 70–99)
HBV SURFACE AG SER-ACNC: SIGNIFICANT CHANGE UP
HCT VFR BLD CALC: 42.7 % — SIGNIFICANT CHANGE UP (ref 39–50)
HCV AB S/CO SERPL IA: 0.05 S/CO — SIGNIFICANT CHANGE UP
HCV AB SERPL-IMP: SIGNIFICANT CHANGE UP
HGB BLD-MCNC: 13.6 G/DL — SIGNIFICANT CHANGE UP (ref 13–17)
INR BLD: 0.95 RATIO — SIGNIFICANT CHANGE UP (ref 0.85–1.16)
LYMPHOCYTES # BLD AUTO: 0.65 K/UL — LOW (ref 1–3.3)
LYMPHOCYTES # BLD AUTO: 9.6 % — LOW (ref 13–44)
MACROCYTES BLD QL: SLIGHT — SIGNIFICANT CHANGE UP
MAGNESIUM SERPL-MCNC: 2.5 MG/DL — SIGNIFICANT CHANGE UP (ref 1.6–2.6)
MANUAL SMEAR VERIFICATION: SIGNIFICANT CHANGE UP
MCHC RBC-ENTMCNC: 26.9 PG — LOW (ref 27–34)
MCHC RBC-ENTMCNC: 31.9 G/DL — LOW (ref 32–36)
MCV RBC AUTO: 84.6 FL — SIGNIFICANT CHANGE UP (ref 80–100)
MONOCYTES # BLD AUTO: 1.06 K/UL — HIGH (ref 0–0.9)
MONOCYTES NFR BLD AUTO: 15.7 % — HIGH (ref 2–14)
NEUTROPHILS # BLD AUTO: 4.35 K/UL — SIGNIFICANT CHANGE UP (ref 1.8–7.4)
NEUTROPHILS NFR BLD AUTO: 64.3 % — SIGNIFICANT CHANGE UP (ref 43–77)
OVALOCYTES BLD QL SMEAR: SLIGHT — SIGNIFICANT CHANGE UP
PHOSPHATE SERPL-MCNC: 3.7 MG/DL — SIGNIFICANT CHANGE UP (ref 2.5–4.5)
PLAT MORPH BLD: NORMAL — SIGNIFICANT CHANGE UP
PLATELET # BLD AUTO: 231 K/UL — SIGNIFICANT CHANGE UP (ref 150–400)
POIKILOCYTOSIS BLD QL AUTO: SLIGHT — SIGNIFICANT CHANGE UP
POLYCHROMASIA BLD QL SMEAR: SLIGHT — SIGNIFICANT CHANGE UP
POTASSIUM SERPL-MCNC: 4.3 MMOL/L — SIGNIFICANT CHANGE UP (ref 3.5–5.3)
POTASSIUM SERPL-SCNC: 4.3 MMOL/L — SIGNIFICANT CHANGE UP (ref 3.5–5.3)
PROT SERPL-MCNC: 8.7 G/DL — HIGH (ref 6–8.3)
PROTHROM AB SERPL-ACNC: 10.9 SEC — SIGNIFICANT CHANGE UP (ref 9.9–13.4)
RBC # BLD: 5.05 M/UL — SIGNIFICANT CHANGE UP (ref 4.2–5.8)
RBC # FLD: 21.4 % — HIGH (ref 10.3–14.5)
RBC BLD AUTO: ABNORMAL
RH IG SCN BLD-IMP: POSITIVE — SIGNIFICANT CHANGE UP
RH IG SCN BLD-IMP: POSITIVE — SIGNIFICANT CHANGE UP
RSV RNA NPH QL NAA+NON-PROBE: SIGNIFICANT CHANGE UP
SARS-COV-2 RNA SPEC QL NAA+PROBE: SIGNIFICANT CHANGE UP
SODIUM SERPL-SCNC: 141 MMOL/L — SIGNIFICANT CHANGE UP (ref 135–145)
SOURCE RESPIRATORY: SIGNIFICANT CHANGE UP
WBC # BLD: 6.76 K/UL — SIGNIFICANT CHANGE UP (ref 3.8–10.5)
WBC # FLD AUTO: 6.76 K/UL — SIGNIFICANT CHANGE UP (ref 3.8–10.5)

## 2025-04-07 PROCEDURE — 71045 X-RAY EXAM CHEST 1 VIEW: CPT | Mod: 26

## 2025-04-07 RX ORDER — CEFAZOLIN SODIUM IN 0.9 % NACL 3 G/100 ML
2000 INTRAVENOUS SOLUTION, PIGGYBACK (ML) INTRAVENOUS ONCE
Refills: 0 | Status: COMPLETED | OUTPATIENT
Start: 2025-04-07 | End: 2025-04-07

## 2025-04-07 RX ORDER — METHYLPREDNISOLONE ACETATE 80 MG/ML
500 INJECTION, SUSPENSION INTRA-ARTICULAR; INTRALESIONAL; INTRAMUSCULAR; SOFT TISSUE ONCE
Refills: 0 | Status: DISCONTINUED | OUTPATIENT
Start: 2025-04-07 | End: 2025-04-08

## 2025-04-07 RX ORDER — NIFEDIPINE 30 MG
60 TABLET, EXTENDED RELEASE 24 HR ORAL
Refills: 0 | Status: DISCONTINUED | OUTPATIENT
Start: 2025-04-07 | End: 2025-04-08

## 2025-04-07 RX ORDER — DIPHENHYDRAMINE HCL 12.5MG/5ML
25 ELIXIR ORAL ONCE
Refills: 0 | Status: COMPLETED | OUTPATIENT
Start: 2025-04-07 | End: 2025-04-07

## 2025-04-07 RX ORDER — LABETALOL HYDROCHLORIDE 200 MG/1
10 TABLET, FILM COATED ORAL ONCE
Refills: 0 | Status: COMPLETED | OUTPATIENT
Start: 2025-04-07 | End: 2025-04-07

## 2025-04-07 RX ORDER — DOXAZOSIN MESYLATE 8 MG/1
4 TABLET ORAL AT BEDTIME
Refills: 0 | Status: DISCONTINUED | OUTPATIENT
Start: 2025-04-07 | End: 2025-04-08

## 2025-04-07 RX ORDER — LABETALOL HYDROCHLORIDE 200 MG/1
200 TABLET, FILM COATED ORAL
Refills: 0 | Status: DISCONTINUED | OUTPATIENT
Start: 2025-04-07 | End: 2025-04-08

## 2025-04-07 RX ADMIN — DOXAZOSIN MESYLATE 4 MILLIGRAM(S): 8 TABLET ORAL at 23:18

## 2025-04-07 RX ADMIN — LABETALOL HYDROCHLORIDE 200 MILLIGRAM(S): 200 TABLET, FILM COATED ORAL at 23:18

## 2025-04-07 RX ADMIN — Medication 25 MILLIGRAM(S): at 23:54

## 2025-04-07 RX ADMIN — Medication 100 MILLIGRAM(S): at 22:24

## 2025-04-07 RX ADMIN — LABETALOL HYDROCHLORIDE 10 MILLIGRAM(S): 200 TABLET, FILM COATED ORAL at 21:01

## 2025-04-07 NOTE — PATIENT PROFILE ADULT - FALL HARM RISK - HARM RISK INTERVENTIONS

## 2025-04-07 NOTE — PATIENT PROFILE ADULT - NSPROEXTENSIONSOFSELF_GEN_A_NUR
cane Bactrim Counseling:  I discussed with the patient the risks of sulfa antibiotics including but not limited to GI upset, allergic reaction, drug rash, diarrhea, dizziness, photosensitivity, and yeast infections.  Rarely, more serious reactions can occur including but not limited to aplastic anemia, agranulocytosis, methemoglobinemia, blood dyscrasias, liver or kidney failure, lung infiltrates or desquamative/blistering drug rashes.

## 2025-04-08 ENCOUNTER — APPOINTMENT (OUTPATIENT)
Dept: TRANSPLANT | Facility: HOSPITAL | Age: 70
End: 2025-04-08

## 2025-04-08 LAB
GAS PNL BLDA: SIGNIFICANT CHANGE UP
GAS PNL BLDV: SIGNIFICANT CHANGE UP
GLUCOSE BLDC GLUCOMTR-MCNC: 81 MG/DL — SIGNIFICANT CHANGE UP (ref 70–99)
GLUCOSE BLDC GLUCOMTR-MCNC: 81 MG/DL — SIGNIFICANT CHANGE UP (ref 70–99)
HBV CORE AB SER-ACNC: SIGNIFICANT CHANGE UP
HBV SURFACE AB SER-ACNC: 46 MIU/ML — SIGNIFICANT CHANGE UP
HCV RNA SPEC NAA+PROBE-LOG IU: SIGNIFICANT CHANGE UP
HCV RNA SPEC NAA+PROBE-LOG IU: SIGNIFICANT CHANGE UP LOGIU/ML
HIV 1+2 AB+HIV1 P24 AG SERPL QL IA: SIGNIFICANT CHANGE UP
LACTATE SERPL-SCNC: 0.8 MMOL/L — SIGNIFICANT CHANGE UP (ref 0.5–2)

## 2025-04-08 PROCEDURE — 50020 DRG PERIRNL/RENAL ABSC OPEN: CPT

## 2025-04-08 PROCEDURE — 99223 1ST HOSP IP/OBS HIGH 75: CPT

## 2025-04-08 PROCEDURE — 93010 ELECTROCARDIOGRAM REPORT: CPT

## 2025-04-08 PROCEDURE — 50360 RNL ALTRNSPLJ W/O RCP NFRCT: CPT

## 2025-04-08 DEVICE — VISTASEAL FIBRIN HUMAN 4ML: Type: IMPLANTABLE DEVICE | Site: RIGHT | Status: FUNCTIONAL

## 2025-04-08 DEVICE — SURGICEL NU-KNIT 6 X 9": Type: IMPLANTABLE DEVICE | Site: RIGHT | Status: FUNCTIONAL

## 2025-04-08 DEVICE — IMPLANTABLE DEVICE: Type: IMPLANTABLE DEVICE | Site: RIGHT | Status: FUNCTIONAL

## 2025-04-08 DEVICE — KIT A-LINE 1LUM 20G X 12CM SAFE KIT: Type: IMPLANTABLE DEVICE | Site: RIGHT | Status: FUNCTIONAL

## 2025-04-08 DEVICE — SURGICEL 2 X 14": Type: IMPLANTABLE DEVICE | Site: RIGHT | Status: FUNCTIONAL

## 2025-04-08 RX ORDER — LABETALOL HYDROCHLORIDE 200 MG/1
10 TABLET, FILM COATED ORAL ONCE
Refills: 0 | Status: COMPLETED | OUTPATIENT
Start: 2025-04-08 | End: 2025-04-08

## 2025-04-08 RX ORDER — DOXAZOSIN MESYLATE 8 MG/1
2 TABLET ORAL AT BEDTIME
Refills: 0 | Status: DISCONTINUED | OUTPATIENT
Start: 2025-04-08 | End: 2025-04-08

## 2025-04-08 RX ORDER — NIFEDIPINE 30 MG
60 TABLET, EXTENDED RELEASE 24 HR ORAL ONCE
Refills: 0 | Status: COMPLETED | OUTPATIENT
Start: 2025-04-08 | End: 2025-04-08

## 2025-04-08 RX ORDER — CEFAZOLIN SODIUM IN 0.9 % NACL 3 G/100 ML
2000 INTRAVENOUS SOLUTION, PIGGYBACK (ML) INTRAVENOUS ONCE
Refills: 0 | Status: DISCONTINUED | OUTPATIENT
Start: 2025-04-08 | End: 2025-04-08

## 2025-04-08 RX ADMIN — DOXAZOSIN MESYLATE 2 MILLIGRAM(S): 8 TABLET ORAL at 02:40

## 2025-04-08 RX ADMIN — LABETALOL HYDROCHLORIDE 10 MILLIGRAM(S): 200 TABLET, FILM COATED ORAL at 02:00

## 2025-04-08 RX ADMIN — LABETALOL HYDROCHLORIDE 10 MILLIGRAM(S): 200 TABLET, FILM COATED ORAL at 01:07

## 2025-04-08 RX ADMIN — Medication 60 MILLIGRAM(S): at 02:40

## 2025-04-08 RX ADMIN — Medication 1 APPLICATION(S): at 13:33

## 2025-04-08 NOTE — PRE-ANESTHESIA EVALUATION ADULT - NSRADCARDRESULTSFT_GEN_ALL_CORE
< from: TTE W or WO Ultrasound Enhancing Agent (11.28.24 @ 09:57) >    CONCLUSIONS:      1. Left ventricular cavity is normal in size. Left ventricular wall thickness is normal. Left ventricular systolic function is normal with an ejection fraction of 68 % by Wilburn's method of disks. There are no regional wall motion abnormalities seen.   2. There is mild (grade 1) left ventricular diastolic dysfunction.   3. Left atrium is severely dilated.   4. No significant valvular disease.   5. No pericardial effusion seen.   6. Right pleural effusion noted.   7. The inferior vena cava is normal in size (normal <2.1cm) with normal inspiratory collapse (normal >50%) consistent with normal right atrial pressure (~3, range 0-5mmHg).    < end of copied text >

## 2025-04-08 NOTE — H&P ADULT - HISTORY OF PRESENT ILLNESS
70 y/o M with PMHx of HLD, CAD s/p LAD PCI 7/24, HFpEF , HTN, hypothyroid, type 2 DM, ESRD on HD MWF (recently converted from PD in 1/2025) via permacath placed on 1/2025, h/o cva with no residual deficits, persistent right sided pleural effusion, and hx of hemorrhagic pericardial effusion (cytopathology negative) presents to [facility] for possible DDRT. He states he makes less than half cup of urine a day. Denies fever, chills, N/V/D/C, abdominal pain, CP, SOB, hemoptysis, and /GI complaints.

## 2025-04-08 NOTE — CONSULT NOTE ADULT - SUBJECTIVE AND OBJECTIVE BOX
[facility] DIVISION OF KIDNEY DISEASES AND HYPERTENSION -- INITIAL CONSULT NOTE  --------------------------------------------------------------------------------    HPI: 69M with PMHx of HLD, CAD s/p LAD PCI 7/24, HFpEF, HTN, hypothyroid, type 2 DM, ESRD on HD MWF presents to [facility] for possible DDRT. Transplant nephrology consulted for ESRD on Hemodialysis.     Pt. with ESRD on HD TIW (MWF schedule) via Rt tunneled dialysis catheter (recently converted from PD in 1/2025 and permacath placed on 1/2025). Been on KRT since last 3 years. Last outpatient HD treatment was on 4/7/25 with 1.5L UF. He states he makes less than half cup of urine a day.    Patient seen and examined earlier today.  Denies fever, chills, N/V/D/C, abdominal pain, CP, SOB, hemoptysis, and /GI complaints. BP remains elevated at 226/89 mm Hg with HR 45bpm. Patient received Doxazosin 2mg + 4mg, Labetalol, and Nifedipine 60 mg in the hospital. Clonidine held for bradycardia.     PAST HISTORY  --------------------------------------------------------------------------------  PAST MEDICAL & SURGICAL HISTORY:  Hypertension  ESRD on peritoneal dialysis  CVA (cerebrovascular accident)  2010 without residual effects  Hyperlipidemia  BPH (benign prostatic hyperplasia)  CAD (coronary artery disease)  T2DM (type 2 diabetes mellitus)  Hypothyroidism  History of peritoneal dialysis  s/p PD catheter placement  S/P coronary artery stent placement    FAMILY HISTORY:  FH: HTN (hypertension) (Mother, Father)    FH: type 2 diabetes (Mother, Father)      Social History:    ALLERGIES & MEDICATIONS  --------------------------------------------------------------------------------  Allergies    No Known Allergies    Intolerances    hydrALAZINE (Short breath)    Standing Inpatient Medications  antithymocyte globulin rabbit (peripheral) IVPB w/additives 75 milliGRAM(s) IV Intermittent once  chlorhexidine 2% Cloths 1 Application(s) Topical daily  doxazosin 2 milliGRAM(s) Oral at bedtime  doxazosin 4 milliGRAM(s) Oral at bedtime  labetalol 200 milliGRAM(s) Oral two times a day  methylPREDNISolone sodium succinate IVPB 500 milliGRAM(s) IV Intermittent once  NIFEdipine XL 60 milliGRAM(s) Oral two times a day    PRN Inpatient Medications  sodium chloride 0.9% Bolus. 100 milliLiter(s) IV Bolus every 5 minutes PRN      REVIEW OF SYSTEMS  --------------------------------------------------------------------------------  Gen: No fatigue, fevers/chills  Skin: No rashes  Head/Eyes/Ears/Mouth: No headache; Normal hearing; Normal vision   Respiratory: No dyspnea, cough  CV: No chest pain, PND, orthopnea  GI: No abdominal pain, diarrhea, constipation, nausea, vomiting  : No increased frequency, dysuria, hematuria, nocturia  MSK: No edema  Neuro: No dizziness/lightheadedness  Heme: No bruising or bleeding  Psych: No significant nervousness, anxiety, stress, depression    Rest of the ROS as stated in HPI    VITALS/PHYSICAL EXAM  --------------------------------------------------------------------------------  T(C): 36.8 (04-08-25 @ 05:38), Max: 36.9 (04-07-25 @ 22:19)  HR: 47 (04-08-25 @ 05:38) (46 - 53)  BP: 226/89 (04-08-25 @ 05:38) (223/80 - 244/78)  RR: 16 (04-08-25 @ 05:38) (16 - 18)  SpO2: 98% (04-08-25 @ 05:38) (97% - 100%)  Wt(kg): --    Physical Exam:  Gen: NAD, able to speak in full sentences   HEENT: PERRL, MMM   Pulm: CTA B/L, no crackles   CV: RRR, S1S2+  Abd: +BS, soft  Transplant: No tenderness, swelling  : No suprapubic tenderness  MSK: no edema   Psych: Normal affect and mood  Skin: Warm  Access: Rt Chest tunneled dialysis catheter     LABS/STUDIES  --------------------------------------------------------------------------------              13.6   6.76  >-----------<  231      [04-07-25 @ 21:39]              42.7     141  |  95  |  36  ----------------------------<  84      [04-07-25 @ 21:39]  4.3   |  25  |  4.86        Ca     9.8     [04-07-25 @ 21:39]      Mg     2.5     [04-07-25 @ 21:39]      Phos  3.7     [04-07-25 @ 21:39]    TPro  8.7  /  Alb  5.8  /  TBili  0.3  /  DBili  x   /  AST  21  /  ALT  14  /  AlkPhos  79  [04-07-25 @ 21:39]    PT/INR: PT 10.9 , INR 0.95       [04-07-25 @ 21:39]  PTT: 32.4       [04-07-25 @ 21:39]      Creatinine Trend:  SCr 4.86 [04-07 @ 21:39]    Urinalysis - [04-07-25 @ 21:39]      Color  / Appearance  / SG  / pH       Gluc 84 / Ketone   / Bili  / Urobili        Blood  / Protein  / Leuk Est  / Nitrite       RBC  / WBC  / Hyaline  / Gran  / Sq Epi  / Non Sq Epi  / Bacteria       Iron 17, TIBC 99, %sat 17      [01-08-25 @ 04:35]  PTH -- (Ca 7.9)      [12-30-24 @ 06:50]   229  PTH -- (Ca 7.6)      [11-27-24 @ 04:23]   250  Vitamin D (25OH) 17.1      [11-25-24 @ 06:50]  TSH 3.53      [12-30-24 @ 06:50]  Lipid: chol 112, TG 61, HDL 46, LDL --      [12-31-24 @ 04:27]    HBsAb 81.3      [01-06-25 @ 11:22]  HBsAb Reactive      [01-06-25 @ 11:22]  HBsAg Nonreact      [04-07-25 @ 21:39]  HBcAb Nonreact      [01-06-25 @ 11:22]  HCV 0.05, Nonreact      [04-07-25 @ 21:39]  HIV Nonreact      [04-07-24 @ 03:17]    HARVEY: titer Negative, pattern --      [10-27-22 @ 05:42]  dsDNA 69      [10-27-22 @ 05:42]  C3 Complement 128      [11-01-22 @ 05:25]  C4 Complement 32      [11-01-22 @ 05:25]  Rheumatoid Factor 17      [10-27-22 @ 05:42]  ANCA: cANCA Negative, pANCA Negative, atypical ANCA Negative      [10-27-22 @ 05:42]  SPEP Interpretation: Decrease albumin and increase in Alpha-2 fraction suggestive of acute  inflammation.  Iglesia Sagastume M.D.      [11-02-22 @ 06:45]    Tacrolimus  Cyclosporine  Sirolimus  Mycophenolate  BK PCR  CMV PCR  Parvo PCR  EBV PCR

## 2025-04-08 NOTE — CONSULT NOTE ADULT - SUBJECTIVE AND OBJECTIVE BOX
[facility] [place] [facility]   MD MESSI ROSE MD SAKIL BHUIYAN, MD MARIA SANTIAGO, NP        TEL:  OFFICE: [phone]  From 5pm-7am answering service [phone]    --- INITIAL RENAL CONSULT NOTE ---date of service 04-08-25 @ 12:15    HPI:  68 y/o M with PMHx of HLD, CAD s/p LAD PCI 7/24, HFpEF , HTN, hypothyroid, type 2 DM, ESRD on HD MWF (recently converted from PD in 1/2025) via permacath placed on 1/2025, h/o cva with no residual deficits, persistent right sided pleural effusion, and hx of hemorrhagic pericardial effusion (cytopathology negative) presents to [facility] for possible DDRT. He states he makes less than half cup of urine a day. Denies fever, chills, N/V/D/C, abdominal pain, CP, SOB, hemoptysis, and /GI complaints.         Allergies:  hydrALAZINE (Short breath)      PAST MEDICAL & SURGICAL HISTORY:  Hypertension      ESRD on peritoneal dialysis      CVA (cerebrovascular accident)  2010 without residual effects      Hyperlipidemia      BPH (benign prostatic hyperplasia)      CAD (coronary artery disease)      T2DM (type 2 diabetes mellitus)      Hypothyroidism      History of peritoneal dialysis  s/p PD catheter placement      S/P coronary artery stent placement          Home Medications Reviewed    Hospital Medications:   MEDICATIONS  (STANDING):  antithymocyte globulin rabbit (peripheral) IVPB w/additives 75 milliGRAM(s) IV Intermittent once  ceFAZolin   IVPB 2000 milliGRAM(s) IV Intermittent once  chlorhexidine 2% Cloths 1 Application(s) Topical daily  doxazosin 2 milliGRAM(s) Oral at bedtime  doxazosin 4 milliGRAM(s) Oral at bedtime  labetalol 200 milliGRAM(s) Oral two times a day  methylPREDNISolone sodium succinate IVPB 500 milliGRAM(s) IV Intermittent once  NIFEdipine XL 60 milliGRAM(s) Oral two times a day      SOCIAL HISTORY:  Denies ETOh, Smoking    FAMILY HISTORY:  FH: HTN (hypertension) (Mother, Father)    FH: type 2 diabetes (Mother, Father)        REVIEW OF SYSTEMS:  CONSTITUTIONAL: No weakness, fevers or chills  EYES/ENT: No visual changes;  No vertigo or throat pain   NECK: No pain or stiffness  RESPIRATORY: No cough, wheezing, hemoptysis; No shortness of breath  CARDIOVASCULAR: No chest pain or palpitations.  GASTROINTESTINAL: No abdominal or epigastric pain. No nausea, vomiting, or hematemesis; No diarrhea or constipation. No melena or hematochezia.  GENITOURINARY: No dysuria, frequency, foamy urine, urinary urgency, incontinence or hematuria  NEUROLOGICAL: No numbness or weakness  SKIN: No itching, burning, rashes, or lesions   VASCULAR: No bilateral lower extremity edema.   All other review of systems is negative unless indicated above.    VITALS:  T(F): 97.8 (04-08-25 @ 11:00), Max: 98.5 (04-08-25 @ 00:40)  HR: 51 (04-08-25 @ 11:00)  BP: 156/78 (04-08-25 @ 11:00)  RR: 18 (04-08-25 @ 11:00)  SpO2: 98% (04-08-25 @ 11:00)  Wt(kg): --    04-08 @ 07:01  -  04-08 @ 12:15  --------------------------------------------------------  IN: 0 mL / OUT: 1500 mL / NET: -1500 mL      Height (cm): 172.7 (04-08 @ 11:24)  Weight (kg): 55.6 (04-08 @ 11:24)  BMI (kg/m2): 18.6 (04-08 @ 11:24)  BSA (m2): 1.66 (04-08 @ 11:24)    PHYSICAL EXAM:  General: NAD  HEENT: anicteric sclera, oropharynx clear, MMM  Neck: No JVD  Respiratory: CTAB, no wheezes, rales or rhonchi  Cardiovascular: S1, S2, RRR  Gastrointestinal: BS+, soft, NT/ND  Extremities: No cyanosis or clubbing. No peripheral edema  Neurological: A/O x 3, no focal deficits  Psychiatric: Normal mood, normal affect  : No CVA tenderness. No brand.   Skin: No rashes  Vascular Access: R permacat    LABS:  04-07    141  |  95[L]  |  36[H]  ----------------------------<  84  4.3   |  25  |  4.86[H]    Ca    9.8      07 Apr 2025 21:39  Phos  3.7     04-07  Mg     2.5     04-07    TPro  8.7[H]  /  Alb  5.8[H]  /  TBili  0.3  /  DBili      /  AST  21  /  ALT  14  /  AlkPhos  79  04-07    Creatinine Trend: 4.86 <--                        13.6   6.76  )-----------( 231      ( 07 Apr 2025 21:39 )             42.7     Urine Studies:  Urinalysis Basic - ( 07 Apr 2025 21:39 )    Color:  / Appearance:  / SG:  / pH:   Gluc: 84 mg/dL / Ketone:   / Bili:  / Urobili:    Blood:  / Protein:  / Nitrite:    Leuk Esterase:  / RBC:  / WBC    Sq Epi:  / Non Sq Epi:  / Bacteria:           RADIOLOGY & ADDITIONAL STUDIES:

## 2025-04-08 NOTE — H&P ADULT - ASSESSMENT
68 y/o M with PMHx of HLD, CAD s/p LAD PCI 7/24, HFpEF , HTN, hypothyroid, type 2 DM, ESRD on HD MWF (recently converted from PD in 1/2025) via permacath placed on 1/2025, h/o cva with no residual deficits, persistent right sided pleural effusion, and hx of hemorrhagic pericardial effusion (cytopathology negative) presents to [facility] for possible DDRT.     []possible DDRT  -NPO  -OR today for DDRT, consent in chart  -Thymo and Ancef ordered: to go with pt to the OR  -Pre-op labs and EKG reviewed

## 2025-04-08 NOTE — CONSULT NOTE ADULT - ASSESSMENT
69M with PMHx of HLD, CAD s/p LAD PCI 7/24, HFpEF, HTN, hypothyroid, type 2 DM, ESRD on HD MWF presents to [facility] for possible DDRT. Transplant nephrology consulted for ESRD on Hemodialysis.     1. ESRD on Hemodialysis planned for DDRT:  Pt. with ESRD on HD TIW (MWF schedule) via Rt tunneled dialysis catheter  recently converted from PD in 1/2025 and permacath placed on 1/2025).   Last outpatient HD treatment was on 4/7/25 with 1.5L UF  Makes less than half cup of urine a day.  Pt. is euvolemic on exam. Pt. clinically stable. Labs reviewed.   Plan for maintenance HD treatment today with 1.5-2L UF  HD consent obtained and placed in pt's chart.   Monitor BP and labs.   Dose medications to ESRD/HD.     2 HTN:  Patient BP persistently elevated at 220/90  Home meds are: Labetalol 200 mg BID, Nifedipine 60 mg BID, Doxazosin 4 mg, Isosorbide mononitrate 60 mg  Patient received Doxazosin 2mg + 4mg, Labetalol, and Nifedipine 60 mg in the hospital.   Clonidine held for bradycardia.   Plan for Hemodialysis as above with 1.5-2L UF    3. MBD  On Calcitriol    If you have any questions, please feel free to contact me  Jessica Bright MD  Nephrology Fellow  Page [phone] / [employer] Teams (Preferred); Please PAGE for urgent consults only.  (After 5pm or on weekends please page the on-call fellow)              69M with PMHx of HLD, CAD s/p LAD PCI 7/24, HFpEF, HTN, hypothyroid, type 2 DM, ESRD on HD MWF presents to [facility] for possible DDRT. Transplant nephrology consulted for ESRD on Hemodialysis.     1. ESRD on Hemodialysis planned for DDRT:  Pt. with ESRD on HD TIW (MWF schedule) via Rt tunneled dialysis catheter  recently converted from PD in 1/2025 and permacath placed on 1/2025).   Last outpatient HD treatment was on 4/7/25 with 1.5L UF  Makes less than half cup of urine a day.  Pt. is euvolemic on exam. Pt. clinically stable. Labs reviewed.   Plan for HD treatment today with 1.5-2L UF  HD consent obtained and placed in pt's chart.   Monitor BP and labs.   Dose medications to ESRD/HD.     2 HTN:  Patient BP persistently elevated at 220/90  Home meds are: Labetalol 200 mg BID, Nifedipine 60 mg BID, Doxazosin 4 mg, Isosorbide mononitrate 60 mg  Patient received Doxazosin 2mg + 4mg, Labetalol, and Nifedipine 60 mg in the hospital.   Clonidine held for bradycardia.   Plan for Hemodialysis as above with 1.5-2L UF    3. MBD  On Calcitriol    If you have any questions, please feel free to contact me  Jessica Bright MD  Nephrology Fellow  Page [phone] / [employer] Teams (Preferred); Please PAGE for urgent consults only.  (After 5pm or on weekends please page the on-call fellow)

## 2025-04-08 NOTE — CONSULT NOTE ADULT - ASSESSMENT
68 y/o M with PMHx of HLD, CAD s/p LAD PCI 7/24, HFpEF , HTN, hypothyroid, type 2 DM, ESRD on HD MWF (recently converted from PD in 1/2025) via permacath placed on 1/2025, h/o cva with no residual deficits, persistent right sided pleural effusion, and hx of hemorrhagic pericardial effusion (cytopathology negative) presents to [facility] for possible DDRT. He states he makes less than half cup of urine a day. Denies fever, chills, N/V/D/C, abdominal pain, CP, SOB, hemoptysis, and /GI complaints.     A/P  ESRD on HD planned for DDRT   Nephrologist is Dr. Menjivar   Access is permacath   Was recently converted from PD to HD and on MWF schedule  Last HD 4/7  HD today as per transplant team   Possible DDRT today   Monitor bmp   Renally dose meds    HTN   Elevated   UF w/ HD   Managed per transplant team   Monitor bp     Anemia  Hb above goal   Monitor     CKD-MBD  On calcitriol at home   Check pth   Phos at goal   Monitor ca and phos daily

## 2025-04-08 NOTE — H&P ADULT - NS ATTEND AMEND GEN_ALL_CORE FT
Patient seen and examined. He is a 70 yo man with ESRD due to type 2 DM who is now on HD. He is admitted for a DDKT.  Plan for Thymo for induction. Tac/MMF/pred for maintenance.  Consents are signed.  Patient is marked.

## 2025-04-08 NOTE — H&P ADULT - NSHPPHYSICALEXAM_GEN_ALL_CORE
PHYSICAL EXAM:  Constitutional: Well developed / well nourished  Eyes: Anicteric, PERRLA  ENMT: nc/at  Neck: supple  Respiratory: CTA B/L, R permcath  Cardiovascular: RRR  Gastrointestinal: Soft abdomen, ND  Genitourinary: Voiding   Extremities: SCD's in place and working bilaterally  Vascular: Palpable dp pulses bilaterally  Neurological: A&O x3  Musculoskeletal: Moving all extremities  Psychiatric: Responsive

## 2025-04-08 NOTE — CONSULT NOTE ADULT - ATTENDING COMMENTS
ESRD from DM/HTN, CAD s/p stents admitted for DDRT  Hypertensive urgency not responding to BP meds  received urgent HD this AM with SBP from 220 to 150s  asymptoamtic  no contraindications for planned surgery   Donor:61 yo DCD KDPI 99% terminal cr 1.45  EBV pos CMV neg  Thymo induction   seen during dialysis this AM

## 2025-04-08 NOTE — H&P ADULT - NSHPREVIEWOFSYSTEMS_GEN_ALL_CORE
Review of systems  Gen: No weight changes, fatigue, fevers/chills, weakness  Skin: No rashes  Head/Eyes/Ears/Mouth: No headache; Normal hearing; Normal vision w/o blurriness; No sinus pain/discomfort, sore throat  Respiratory: No dyspnea, cough, wheezing, hemoptysis  CV: No chest pain, PND, orthopnea  GI: No diarrhea, constipation, nausea, vomiting, melena, hematochezia  : No increased frequency, dysuria, hematuria, nocturia  MSK: No joint pain/swelling; no back pain; no edema  Neuro: No dizziness/lightheadedness, weakness, seizures, numbness, tingling  Heme: No easy bruising or bleeding  Endo: No heat/cold intolerance  Psych: No significant nervousness, anxiety, stress, depression  All other systems were reviewed and are negative, except as noted.

## 2025-04-09 DIAGNOSIS — N18.6 END STAGE RENAL DISEASE: ICD-10-CM

## 2025-04-09 PROBLEM — Z94.0 DECEASED-DONOR KIDNEY TRANSPLANT: Status: ACTIVE | Noted: 2025-04-09

## 2025-04-09 LAB
ALBUMIN SERPL ELPH-MCNC: 2.9 G/DL — LOW (ref 3.3–5)
ALBUMIN SERPL ELPH-MCNC: 3.1 G/DL — LOW (ref 3.3–5)
ALBUMIN SERPL ELPH-MCNC: 3.2 G/DL — LOW (ref 3.3–5)
ALBUMIN SERPL ELPH-MCNC: 3.2 G/DL — LOW (ref 3.3–5)
ALBUMIN SERPL ELPH-MCNC: 3.3 G/DL — SIGNIFICANT CHANGE UP (ref 3.3–5)
ALP SERPL-CCNC: 47 U/L — SIGNIFICANT CHANGE UP (ref 40–120)
ALP SERPL-CCNC: 51 U/L — SIGNIFICANT CHANGE UP (ref 40–120)
ALP SERPL-CCNC: 54 U/L — SIGNIFICANT CHANGE UP (ref 40–120)
ALP SERPL-CCNC: 56 U/L — SIGNIFICANT CHANGE UP (ref 40–120)
ALP SERPL-CCNC: 58 U/L — SIGNIFICANT CHANGE UP (ref 40–120)
ALT FLD-CCNC: 10 U/L — SIGNIFICANT CHANGE UP (ref 10–45)
ALT FLD-CCNC: 12 U/L — SIGNIFICANT CHANGE UP (ref 10–45)
ALT FLD-CCNC: 12 U/L — SIGNIFICANT CHANGE UP (ref 10–45)
ALT FLD-CCNC: 14 U/L — SIGNIFICANT CHANGE UP (ref 10–45)
ALT FLD-CCNC: 17 U/L — SIGNIFICANT CHANGE UP (ref 10–45)
ANION GAP SERPL CALC-SCNC: 16 MMOL/L — SIGNIFICANT CHANGE UP (ref 5–17)
ANION GAP SERPL CALC-SCNC: 21 MMOL/L — HIGH (ref 5–17)
ANION GAP SERPL CALC-SCNC: 21 MMOL/L — HIGH (ref 5–17)
ANION GAP SERPL CALC-SCNC: 22 MMOL/L — HIGH (ref 5–17)
ANION GAP SERPL CALC-SCNC: 22 MMOL/L — HIGH (ref 5–17)
ANISOCYTOSIS BLD QL: SLIGHT — SIGNIFICANT CHANGE UP
APTT BLD: 27.4 SEC — SIGNIFICANT CHANGE UP (ref 24.5–35.6)
AST SERPL-CCNC: 71 U/L — HIGH (ref 10–40)
AST SERPL-CCNC: 78 U/L — HIGH (ref 10–40)
AST SERPL-CCNC: 86 U/L — HIGH (ref 10–40)
AST SERPL-CCNC: 94 U/L — HIGH (ref 10–40)
AST SERPL-CCNC: 96 U/L — HIGH (ref 10–40)
BASOPHILS # BLD AUTO: 0.01 K/UL — SIGNIFICANT CHANGE UP (ref 0–0.2)
BASOPHILS # BLD AUTO: 0.01 K/UL — SIGNIFICANT CHANGE UP (ref 0–0.2)
BASOPHILS # BLD AUTO: 0.02 K/UL — SIGNIFICANT CHANGE UP (ref 0–0.2)
BASOPHILS # BLD AUTO: 0.02 K/UL — SIGNIFICANT CHANGE UP (ref 0–0.2)
BASOPHILS # BLD AUTO: 0.09 K/UL — SIGNIFICANT CHANGE UP (ref 0–0.2)
BASOPHILS NFR BLD AUTO: 0.1 % — SIGNIFICANT CHANGE UP (ref 0–2)
BASOPHILS NFR BLD AUTO: 0.2 % — SIGNIFICANT CHANGE UP (ref 0–2)
BASOPHILS NFR BLD AUTO: 0.9 % — SIGNIFICANT CHANGE UP (ref 0–2)
BILIRUB SERPL-MCNC: 0.2 MG/DL — SIGNIFICANT CHANGE UP (ref 0.2–1.2)
BILIRUB SERPL-MCNC: 0.3 MG/DL — SIGNIFICANT CHANGE UP (ref 0.2–1.2)
BLD GP AB SCN SERPL QL: NEGATIVE — SIGNIFICANT CHANGE UP
BUN SERPL-MCNC: 17 MG/DL — SIGNIFICANT CHANGE UP (ref 7–23)
BUN SERPL-MCNC: 24 MG/DL — HIGH (ref 7–23)
BUN SERPL-MCNC: 33 MG/DL — HIGH (ref 7–23)
BUN SERPL-MCNC: 35 MG/DL — HIGH (ref 7–23)
BUN SERPL-MCNC: 36 MG/DL — HIGH (ref 7–23)
CALCIUM SERPL-MCNC: 8.3 MG/DL — LOW (ref 8.4–10.5)
CALCIUM SERPL-MCNC: 8.5 MG/DL — SIGNIFICANT CHANGE UP (ref 8.4–10.5)
CALCIUM SERPL-MCNC: 8.6 MG/DL — SIGNIFICANT CHANGE UP (ref 8.4–10.5)
CALCIUM SERPL-MCNC: 8.7 MG/DL — SIGNIFICANT CHANGE UP (ref 8.4–10.5)
CALCIUM SERPL-MCNC: 8.7 MG/DL — SIGNIFICANT CHANGE UP (ref 8.4–10.5)
CHLORIDE SERPL-SCNC: 90 MMOL/L — LOW (ref 96–108)
CHLORIDE SERPL-SCNC: 93 MMOL/L — LOW (ref 96–108)
CHLORIDE SERPL-SCNC: 94 MMOL/L — LOW (ref 96–108)
CHLORIDE SERPL-SCNC: 95 MMOL/L — LOW (ref 96–108)
CHLORIDE SERPL-SCNC: 96 MMOL/L — SIGNIFICANT CHANGE UP (ref 96–108)
CO2 SERPL-SCNC: 16 MMOL/L — LOW (ref 22–31)
CO2 SERPL-SCNC: 16 MMOL/L — LOW (ref 22–31)
CO2 SERPL-SCNC: 18 MMOL/L — LOW (ref 22–31)
CO2 SERPL-SCNC: 19 MMOL/L — LOW (ref 22–31)
CO2 SERPL-SCNC: 21 MMOL/L — LOW (ref 22–31)
CREAT SERPL-MCNC: 2.67 MG/DL — HIGH (ref 0.5–1.3)
CREAT SERPL-MCNC: 3.85 MG/DL — HIGH (ref 0.5–1.3)
CREAT SERPL-MCNC: 4.32 MG/DL — HIGH (ref 0.5–1.3)
CREAT SERPL-MCNC: 4.51 MG/DL — HIGH (ref 0.5–1.3)
CREAT SERPL-MCNC: 4.76 MG/DL — HIGH (ref 0.5–1.3)
DACRYOCYTES BLD QL SMEAR: SLIGHT — SIGNIFICANT CHANGE UP
EGFR: 12 ML/MIN/1.73M2 — LOW
EGFR: 12 ML/MIN/1.73M2 — LOW
EGFR: 13 ML/MIN/1.73M2 — LOW
EGFR: 13 ML/MIN/1.73M2 — LOW
EGFR: 14 ML/MIN/1.73M2 — LOW
EGFR: 14 ML/MIN/1.73M2 — LOW
EGFR: 16 ML/MIN/1.73M2 — LOW
EGFR: 16 ML/MIN/1.73M2 — LOW
EGFR: 25 ML/MIN/1.73M2 — LOW
EGFR: 25 ML/MIN/1.73M2 — LOW
EOSINOPHIL # BLD AUTO: 0 K/UL — SIGNIFICANT CHANGE UP (ref 0–0.5)
EOSINOPHIL # BLD AUTO: 0.01 K/UL — SIGNIFICANT CHANGE UP (ref 0–0.5)
EOSINOPHIL # BLD AUTO: 0.03 K/UL — SIGNIFICANT CHANGE UP (ref 0–0.5)
EOSINOPHIL # BLD AUTO: 0.05 K/UL — SIGNIFICANT CHANGE UP (ref 0–0.5)
EOSINOPHIL # BLD AUTO: 0.11 K/UL — SIGNIFICANT CHANGE UP (ref 0–0.5)
EOSINOPHIL NFR BLD AUTO: 0 % — SIGNIFICANT CHANGE UP (ref 0–6)
EOSINOPHIL NFR BLD AUTO: 0.1 % — SIGNIFICANT CHANGE UP (ref 0–6)
EOSINOPHIL NFR BLD AUTO: 0.2 % — SIGNIFICANT CHANGE UP (ref 0–6)
EOSINOPHIL NFR BLD AUTO: 0.4 % — SIGNIFICANT CHANGE UP (ref 0–6)
EOSINOPHIL NFR BLD AUTO: 0.8 % — SIGNIFICANT CHANGE UP (ref 0–6)
GAS PNL BLDA: SIGNIFICANT CHANGE UP
GLUCOSE BLDC GLUCOMTR-MCNC: 123 MG/DL — HIGH (ref 70–99)
GLUCOSE BLDC GLUCOMTR-MCNC: 176 MG/DL — HIGH (ref 70–99)
GLUCOSE BLDC GLUCOMTR-MCNC: 177 MG/DL — HIGH (ref 70–99)
GLUCOSE BLDC GLUCOMTR-MCNC: 214 MG/DL — HIGH (ref 70–99)
GLUCOSE BLDC GLUCOMTR-MCNC: 232 MG/DL — HIGH (ref 70–99)
GLUCOSE BLDC GLUCOMTR-MCNC: 281 MG/DL — HIGH (ref 70–99)
GLUCOSE SERPL-MCNC: 121 MG/DL — HIGH (ref 70–99)
GLUCOSE SERPL-MCNC: 185 MG/DL — HIGH (ref 70–99)
GLUCOSE SERPL-MCNC: 214 MG/DL — HIGH (ref 70–99)
GLUCOSE SERPL-MCNC: 233 MG/DL — HIGH (ref 70–99)
GLUCOSE SERPL-MCNC: 243 MG/DL — HIGH (ref 70–99)
HCT VFR BLD CALC: 31.2 % — LOW (ref 39–50)
HCT VFR BLD CALC: 31.4 % — LOW (ref 39–50)
HCT VFR BLD CALC: 31.7 % — LOW (ref 39–50)
HCT VFR BLD CALC: 33 % — LOW (ref 39–50)
HCT VFR BLD CALC: 35.9 % — LOW (ref 39–50)
HEPARINASE TEG R TIME: 4.8 MIN — SIGNIFICANT CHANGE UP (ref 4.3–8.3)
HGB BLD-MCNC: 10 G/DL — LOW (ref 13–17)
HGB BLD-MCNC: 10.6 G/DL — LOW (ref 13–17)
HGB BLD-MCNC: 11.2 G/DL — LOW (ref 13–17)
HGB BLD-MCNC: 9.6 G/DL — LOW (ref 13–17)
HGB BLD-MCNC: 9.9 G/DL — LOW (ref 13–17)
IMM GRANULOCYTES NFR BLD AUTO: 0.3 % — SIGNIFICANT CHANGE UP (ref 0–0.9)
IMM GRANULOCYTES NFR BLD AUTO: 0.5 % — SIGNIFICANT CHANGE UP (ref 0–0.9)
IMM GRANULOCYTES NFR BLD AUTO: 0.7 % — SIGNIFICANT CHANGE UP (ref 0–0.9)
IMM GRANULOCYTES NFR BLD AUTO: 0.8 % — SIGNIFICANT CHANGE UP (ref 0–0.9)
INR BLD: 1.07 RATIO — SIGNIFICANT CHANGE UP (ref 0.85–1.16)
LYMPHOCYTES # BLD AUTO: 0.02 K/UL — LOW (ref 1–3.3)
LYMPHOCYTES # BLD AUTO: 0.03 K/UL — LOW (ref 1–3.3)
LYMPHOCYTES # BLD AUTO: 0.05 K/UL — LOW (ref 1–3.3)
LYMPHOCYTES # BLD AUTO: 0.06 K/UL — LOW (ref 1–3.3)
LYMPHOCYTES # BLD AUTO: 0.09 K/UL — LOW (ref 1–3.3)
LYMPHOCYTES # BLD AUTO: 0.1 % — LOW (ref 13–44)
LYMPHOCYTES # BLD AUTO: 0.4 % — LOW (ref 13–44)
LYMPHOCYTES # BLD AUTO: 0.4 % — LOW (ref 13–44)
LYMPHOCYTES # BLD AUTO: 0.5 % — LOW (ref 13–44)
LYMPHOCYTES # BLD AUTO: 0.9 % — LOW (ref 13–44)
MACROCYTES BLD QL: SLIGHT — SIGNIFICANT CHANGE UP
MAGNESIUM SERPL-MCNC: 2 MG/DL — SIGNIFICANT CHANGE UP (ref 1.6–2.6)
MAGNESIUM SERPL-MCNC: 2.2 MG/DL — SIGNIFICANT CHANGE UP (ref 1.6–2.6)
MAGNESIUM SERPL-MCNC: 2.3 MG/DL — SIGNIFICANT CHANGE UP (ref 1.6–2.6)
MANUAL SMEAR VERIFICATION: SIGNIFICANT CHANGE UP
MCHC RBC-ENTMCNC: 26.7 PG — LOW (ref 27–34)
MCHC RBC-ENTMCNC: 26.9 PG — LOW (ref 27–34)
MCHC RBC-ENTMCNC: 27 PG — SIGNIFICANT CHANGE UP (ref 27–34)
MCHC RBC-ENTMCNC: 27.2 PG — SIGNIFICANT CHANGE UP (ref 27–34)
MCHC RBC-ENTMCNC: 27.7 PG — SIGNIFICANT CHANGE UP (ref 27–34)
MCHC RBC-ENTMCNC: 30.6 G/DL — LOW (ref 32–36)
MCHC RBC-ENTMCNC: 31.2 G/DL — LOW (ref 32–36)
MCHC RBC-ENTMCNC: 31.5 G/DL — LOW (ref 32–36)
MCHC RBC-ENTMCNC: 31.7 G/DL — LOW (ref 32–36)
MCHC RBC-ENTMCNC: 32.1 G/DL — SIGNIFICANT CHANGE UP (ref 32–36)
MCV RBC AUTO: 85.4 FL — SIGNIFICANT CHANGE UP (ref 80–100)
MCV RBC AUTO: 85.7 FL — SIGNIFICANT CHANGE UP (ref 80–100)
MCV RBC AUTO: 86.3 FL — SIGNIFICANT CHANGE UP (ref 80–100)
MCV RBC AUTO: 86.4 FL — SIGNIFICANT CHANGE UP (ref 80–100)
MCV RBC AUTO: 87.2 FL — SIGNIFICANT CHANGE UP (ref 80–100)
MONOCYTES # BLD AUTO: 0.09 K/UL — SIGNIFICANT CHANGE UP (ref 0–0.9)
MONOCYTES # BLD AUTO: 0.32 K/UL — SIGNIFICANT CHANGE UP (ref 0–0.9)
MONOCYTES # BLD AUTO: 0.52 K/UL — SIGNIFICANT CHANGE UP (ref 0–0.9)
MONOCYTES # BLD AUTO: 0.81 K/UL — SIGNIFICANT CHANGE UP (ref 0–0.9)
MONOCYTES # BLD AUTO: 0.95 K/UL — HIGH (ref 0–0.9)
MONOCYTES NFR BLD AUTO: 0.9 % — LOW (ref 2–14)
MONOCYTES NFR BLD AUTO: 2.7 % — SIGNIFICANT CHANGE UP (ref 2–14)
MONOCYTES NFR BLD AUTO: 6.1 % — SIGNIFICANT CHANGE UP (ref 2–14)
MONOCYTES NFR BLD AUTO: 6.5 % — SIGNIFICANT CHANGE UP (ref 2–14)
MONOCYTES NFR BLD AUTO: 7 % — SIGNIFICANT CHANGE UP (ref 2–14)
NEUTROPHILS # BLD AUTO: 10.16 K/UL — HIGH (ref 1.8–7.4)
NEUTROPHILS # BLD AUTO: 11.61 K/UL — HIGH (ref 1.8–7.4)
NEUTROPHILS # BLD AUTO: 12.24 K/UL — HIGH (ref 1.8–7.4)
NEUTROPHILS # BLD AUTO: 12.44 K/UL — HIGH (ref 1.8–7.4)
NEUTROPHILS # BLD AUTO: 7.35 K/UL — SIGNIFICANT CHANGE UP (ref 1.8–7.4)
NEUTROPHILS NFR BLD AUTO: 71.9 % — SIGNIFICANT CHANGE UP (ref 43–77)
NEUTROPHILS NFR BLD AUTO: 91.7 % — HIGH (ref 43–77)
NEUTROPHILS NFR BLD AUTO: 91.9 % — HIGH (ref 43–77)
NEUTROPHILS NFR BLD AUTO: 92.1 % — HIGH (ref 43–77)
NEUTROPHILS NFR BLD AUTO: 96.3 % — HIGH (ref 43–77)
NEUTS BAND # BLD: 25.4 % — HIGH (ref 0–8)
NEUTS BAND NFR BLD: 25.4 % — HIGH (ref 0–8)
NRBC BLD AUTO-RTO: 0 /100 WBCS — SIGNIFICANT CHANGE UP (ref 0–0)
OVALOCYTES BLD QL SMEAR: SLIGHT — SIGNIFICANT CHANGE UP
PHOSPHATE SERPL-MCNC: 4 MG/DL — SIGNIFICANT CHANGE UP (ref 2.5–4.5)
PHOSPHATE SERPL-MCNC: 4.7 MG/DL — HIGH (ref 2.5–4.5)
PHOSPHATE SERPL-MCNC: 5 MG/DL — HIGH (ref 2.5–4.5)
PHOSPHATE SERPL-MCNC: 5.4 MG/DL — HIGH (ref 2.5–4.5)
PHOSPHATE SERPL-MCNC: 5.8 MG/DL — HIGH (ref 2.5–4.5)
PLAT MORPH BLD: ABNORMAL
PLATELET # BLD AUTO: 142 K/UL — LOW (ref 150–400)
PLATELET # BLD AUTO: 146 K/UL — LOW (ref 150–400)
PLATELET # BLD AUTO: 158 K/UL — SIGNIFICANT CHANGE UP (ref 150–400)
PLATELET # BLD AUTO: 163 K/UL — SIGNIFICANT CHANGE UP (ref 150–400)
PLATELET # BLD AUTO: 167 K/UL — SIGNIFICANT CHANGE UP (ref 150–400)
POIKILOCYTOSIS BLD QL AUTO: SLIGHT — SIGNIFICANT CHANGE UP
POLYCHROMASIA BLD QL SMEAR: SIGNIFICANT CHANGE UP
POTASSIUM SERPL-MCNC: 4.4 MMOL/L — SIGNIFICANT CHANGE UP (ref 3.5–5.3)
POTASSIUM SERPL-MCNC: 4.5 MMOL/L — SIGNIFICANT CHANGE UP (ref 3.5–5.3)
POTASSIUM SERPL-MCNC: 5.1 MMOL/L — SIGNIFICANT CHANGE UP (ref 3.5–5.3)
POTASSIUM SERPL-MCNC: 6.3 MMOL/L — CRITICAL HIGH (ref 3.5–5.3)
POTASSIUM SERPL-MCNC: 6.5 MMOL/L — CRITICAL HIGH (ref 3.5–5.3)
POTASSIUM SERPL-SCNC: 4.4 MMOL/L — SIGNIFICANT CHANGE UP (ref 3.5–5.3)
POTASSIUM SERPL-SCNC: 4.5 MMOL/L — SIGNIFICANT CHANGE UP (ref 3.5–5.3)
POTASSIUM SERPL-SCNC: 5.1 MMOL/L — SIGNIFICANT CHANGE UP (ref 3.5–5.3)
POTASSIUM SERPL-SCNC: 6.3 MMOL/L — CRITICAL HIGH (ref 3.5–5.3)
POTASSIUM SERPL-SCNC: 6.5 MMOL/L — CRITICAL HIGH (ref 3.5–5.3)
PROT SERPL-MCNC: 4.8 G/DL — LOW (ref 6–8.3)
PROT SERPL-MCNC: 5.2 G/DL — LOW (ref 6–8.3)
PROT SERPL-MCNC: 5.5 G/DL — LOW (ref 6–8.3)
PROT SERPL-MCNC: 5.5 G/DL — LOW (ref 6–8.3)
PROT SERPL-MCNC: 5.8 G/DL — LOW (ref 6–8.3)
PROTHROM AB SERPL-ACNC: 12.2 SEC — SIGNIFICANT CHANGE UP (ref 9.9–13.4)
RAPIDTEG MAXIMUM AMPLITUDE: 57 MM — SIGNIFICANT CHANGE UP (ref 52–70)
RAPIDTEG MAXIMUM AMPLITUDE: 61.5 MM — SIGNIFICANT CHANGE UP (ref 52–70)
RBC # BLD: 3.6 M/UL — LOW (ref 4.2–5.8)
RBC # BLD: 3.64 M/UL — LOW (ref 4.2–5.8)
RBC # BLD: 3.71 M/UL — LOW (ref 4.2–5.8)
RBC # BLD: 3.82 M/UL — LOW (ref 4.2–5.8)
RBC # BLD: 4.16 M/UL — LOW (ref 4.2–5.8)
RBC # FLD: 19.7 % — HIGH (ref 10.3–14.5)
RBC # FLD: 19.8 % — HIGH (ref 10.3–14.5)
RBC # FLD: 19.8 % — HIGH (ref 10.3–14.5)
RBC # FLD: 19.9 % — HIGH (ref 10.3–14.5)
RBC # FLD: 20.6 % — HIGH (ref 10.3–14.5)
RBC BLD AUTO: ABNORMAL
RH IG SCN BLD-IMP: POSITIVE — SIGNIFICANT CHANGE UP
SODIUM SERPL-SCNC: 129 MMOL/L — LOW (ref 135–145)
SODIUM SERPL-SCNC: 131 MMOL/L — LOW (ref 135–145)
SODIUM SERPL-SCNC: 132 MMOL/L — LOW (ref 135–145)
SODIUM SERPL-SCNC: 133 MMOL/L — LOW (ref 135–145)
SODIUM SERPL-SCNC: 135 MMOL/L — SIGNIFICANT CHANGE UP (ref 135–145)
TARGETS BLD QL SMEAR: SLIGHT — SIGNIFICANT CHANGE UP
TEG FUNCTIONAL FIBRINOGEN: 18.3 MM — SIGNIFICANT CHANGE UP (ref 15–32)
TEG FUNCTIONAL FIBRINOGEN: 19.6 MM — SIGNIFICANT CHANGE UP (ref 15–32)
TEG LY30 (LYSIS): 0.2 % — SIGNIFICANT CHANGE UP (ref 0–2.6)
TEG MAXIMUM AMPLITUDE: 58.6 MM — SIGNIFICANT CHANGE UP (ref 52–69)
TEG REACTION TIME: 5 MIN — SIGNIFICANT CHANGE UP (ref 4.6–9.1)
TEG REACTION TIME: 5.4 MIN — SIGNIFICANT CHANGE UP (ref 4.6–9.1)
WBC # BLD: 10.44 K/UL — SIGNIFICANT CHANGE UP (ref 3.8–10.5)
WBC # BLD: 12.06 K/UL — HIGH (ref 3.8–10.5)
WBC # BLD: 13.31 K/UL — HIGH (ref 3.8–10.5)
WBC # BLD: 13.58 K/UL — HIGH (ref 3.8–10.5)
WBC # BLD: 7.98 K/UL — SIGNIFICANT CHANGE UP (ref 3.8–10.5)
WBC # FLD AUTO: 10.44 K/UL — SIGNIFICANT CHANGE UP (ref 3.8–10.5)
WBC # FLD AUTO: 12.06 K/UL — HIGH (ref 3.8–10.5)
WBC # FLD AUTO: 13.31 K/UL — HIGH (ref 3.8–10.5)
WBC # FLD AUTO: 13.58 K/UL — HIGH (ref 3.8–10.5)
WBC # FLD AUTO: 7.98 K/UL — SIGNIFICANT CHANGE UP (ref 3.8–10.5)

## 2025-04-09 PROCEDURE — 93010 ELECTROCARDIOGRAM REPORT: CPT

## 2025-04-09 PROCEDURE — 71045 X-RAY EXAM CHEST 1 VIEW: CPT | Mod: 26

## 2025-04-09 PROCEDURE — 99223 1ST HOSP IP/OBS HIGH 75: CPT

## 2025-04-09 PROCEDURE — 76776 US EXAM K TRANSPL W/DOPPLER: CPT | Mod: 26,RT

## 2025-04-09 PROCEDURE — 74018 RADEX ABDOMEN 1 VIEW: CPT | Mod: 26

## 2025-04-09 PROCEDURE — 99233 SBSQ HOSP IP/OBS HIGH 50: CPT

## 2025-04-09 PROCEDURE — 76776 US EXAM K TRANSPL W/DOPPLER: CPT | Mod: 26,RT,77

## 2025-04-09 RX ORDER — ACETAMINOPHEN 500 MG/5ML
650 LIQUID (ML) ORAL EVERY 6 HOURS
Refills: 0 | Status: DISCONTINUED | OUTPATIENT
Start: 2025-04-09 | End: 2025-04-23

## 2025-04-09 RX ORDER — NIFEDIPINE 30 MG
90 TABLET, EXTENDED RELEASE 24 HR ORAL DAILY
Refills: 0 | Status: DISCONTINUED | OUTPATIENT
Start: 2025-04-10 | End: 2025-04-10

## 2025-04-09 RX ORDER — DEXTROSE 50 % IN WATER 50 %
50 SYRINGE (ML) INTRAVENOUS ONCE
Refills: 0 | Status: COMPLETED | OUTPATIENT
Start: 2025-04-09 | End: 2025-04-09

## 2025-04-09 RX ORDER — INSULIN LISPRO 100 U/ML
4 INJECTION, SOLUTION INTRAVENOUS; SUBCUTANEOUS
Refills: 0 | Status: DISCONTINUED | OUTPATIENT
Start: 2025-04-09 | End: 2025-04-10

## 2025-04-09 RX ORDER — ONDANSETRON HCL/PF 4 MG/2 ML
4 VIAL (ML) INJECTION ONCE
Refills: 0 | Status: DISCONTINUED | OUTPATIENT
Start: 2025-04-09 | End: 2025-04-09

## 2025-04-09 RX ORDER — TACROLIMUS 4 MG/1
4 TABLET, EXTENDED RELEASE ORAL
Qty: 90 | Refills: 11 | Status: ACTIVE | COMMUNITY
Start: 2025-04-09 | End: 1900-01-01

## 2025-04-09 RX ORDER — ONDANSETRON HCL/PF 4 MG/2 ML
4 VIAL (ML) INJECTION EVERY 6 HOURS
Refills: 0 | Status: DISCONTINUED | OUTPATIENT
Start: 2025-04-09 | End: 2025-04-23

## 2025-04-09 RX ORDER — CALCIUM GLUCONATE 20 MG/ML
1 INJECTION, SOLUTION INTRAVENOUS DAILY
Refills: 0 | Status: DISCONTINUED | OUTPATIENT
Start: 2025-04-09 | End: 2025-04-09

## 2025-04-09 RX ORDER — ATORVASTATIN CALCIUM 40 MG/1
40 TABLET, FILM COATED ORAL
Qty: 1 | Refills: 11 | Status: ACTIVE | COMMUNITY
Start: 2025-04-09 | End: 1900-01-01

## 2025-04-09 RX ORDER — LABETALOL HYDROCHLORIDE 100 MG/1
100 TABLET, FILM COATED ORAL
Qty: 90 | Refills: 11 | Status: ACTIVE | COMMUNITY
Start: 2025-04-09 | End: 1900-01-01

## 2025-04-09 RX ORDER — SENNOSIDES 8.6 MG TABLETS 8.6 MG/1
8.6 TABLET ORAL
Qty: 1 | Refills: 3 | Status: ACTIVE | COMMUNITY
Start: 2025-04-09 | End: 1900-01-01

## 2025-04-09 RX ORDER — LABETALOL HYDROCHLORIDE 200 MG/1
100 TABLET, FILM COATED ORAL THREE TIMES A DAY
Refills: 0 | Status: DISCONTINUED | OUTPATIENT
Start: 2025-04-09 | End: 2025-04-09

## 2025-04-09 RX ORDER — SODIUM CHLORIDE 9 G/1000ML
1000 INJECTION, SOLUTION INTRAVENOUS
Refills: 0 | Status: DISCONTINUED | OUTPATIENT
Start: 2025-04-09 | End: 2025-04-09

## 2025-04-09 RX ORDER — FAMOTIDINE 20 MG/1
20 TABLET, FILM COATED ORAL
Qty: 30 | Refills: 11 | Status: ACTIVE | COMMUNITY
Start: 2025-04-09 | End: 1900-01-01

## 2025-04-09 RX ORDER — PREDNISONE 5 MG/1
5 TABLET ORAL
Qty: 44 | Refills: 0 | Status: ACTIVE | COMMUNITY
Start: 2025-04-09 | End: 1900-01-01

## 2025-04-09 RX ORDER — FUROSEMIDE 10 MG/ML
80 INJECTION INTRAMUSCULAR; INTRAVENOUS ONCE
Refills: 0 | Status: COMPLETED | OUTPATIENT
Start: 2025-04-09 | End: 2025-04-09

## 2025-04-09 RX ORDER — CLOPIDOGREL BISULFATE 75 MG/1
75 TABLET, FILM COATED ORAL
Qty: 90 | Refills: 3 | Status: ACTIVE | COMMUNITY
Start: 2025-04-09 | End: 1900-01-01

## 2025-04-09 RX ORDER — SULFAMETHOXAZOLE AND TRIMETHOPRIM 400; 80 MG/1; MG/1
400-80 TABLET ORAL
Qty: 30 | Refills: 5 | Status: ACTIVE | COMMUNITY
Start: 2025-04-09 | End: 1900-01-01

## 2025-04-09 RX ORDER — NICARDIPINE HCL 30 MG
2.5 CAPSULE ORAL
Qty: 40 | Refills: 0 | Status: DISCONTINUED | OUTPATIENT
Start: 2025-04-09 | End: 2025-04-09

## 2025-04-09 RX ORDER — DOXAZOSIN MESYLATE 8 MG/1
4 TABLET ORAL AT BEDTIME
Refills: 0 | Status: DISCONTINUED | OUTPATIENT
Start: 2025-04-09 | End: 2025-04-10

## 2025-04-09 RX ORDER — LABETALOL HYDROCHLORIDE 200 MG/1
100 TABLET, FILM COATED ORAL ONCE
Refills: 0 | Status: COMPLETED | OUTPATIENT
Start: 2025-04-09 | End: 2025-04-09

## 2025-04-09 RX ORDER — ACYCLOVIR 400 MG/1
400 TABLET ORAL TWICE DAILY
Qty: 60 | Refills: 3 | Status: ACTIVE | COMMUNITY
Start: 2025-04-09 | End: 1900-01-01

## 2025-04-09 RX ORDER — CALCIUM GLUCONATE 20 MG/ML
1 INJECTION, SOLUTION INTRAVENOUS ONCE
Refills: 0 | Status: COMPLETED | OUTPATIENT
Start: 2025-04-09 | End: 2025-04-09

## 2025-04-09 RX ORDER — DIPHENHYDRAMINE HCL 12.5MG/5ML
50 ELIXIR ORAL ONCE
Refills: 0 | Status: COMPLETED | OUTPATIENT
Start: 2025-04-09 | End: 2025-04-09

## 2025-04-09 RX ORDER — MYCOPHENOLATE MOFETIL 500 MG/1
500 TABLET ORAL TWICE DAILY
Qty: 120 | Refills: 5 | Status: ACTIVE | COMMUNITY
Start: 2025-04-09 | End: 1900-01-01

## 2025-04-09 RX ORDER — INSULIN LISPRO 100 U/ML
INJECTION, SOLUTION INTRAVENOUS; SUBCUTANEOUS
Refills: 0 | Status: DISCONTINUED | OUTPATIENT
Start: 2025-04-09 | End: 2025-04-23

## 2025-04-09 RX ORDER — ISOSORBIDE MONONITRATE 60 MG/1
60 TABLET, EXTENDED RELEASE ORAL DAILY
Refills: 0 | Status: DISCONTINUED | OUTPATIENT
Start: 2025-04-09 | End: 2025-04-10

## 2025-04-09 RX ORDER — DOXAZOSIN 4 MG/1
4 TABLET ORAL DAILY
Qty: 30 | Refills: 3 | Status: ACTIVE | COMMUNITY
Start: 2025-04-09 | End: 1900-01-01

## 2025-04-09 RX ORDER — NYSTATIN 100000 [USP'U]/ML
100000 SUSPENSION ORAL
Qty: 1 | Refills: 1 | Status: ACTIVE | COMMUNITY
Start: 2025-04-09 | End: 1900-01-01

## 2025-04-09 RX ORDER — METHYLPREDNISOLONE ACETATE 80 MG/ML
250 INJECTION, SUSPENSION INTRA-ARTICULAR; INTRALESIONAL; INTRAMUSCULAR; SOFT TISSUE ONCE
Refills: 0 | Status: COMPLETED | OUTPATIENT
Start: 2025-04-09 | End: 2025-04-09

## 2025-04-09 RX ORDER — ACETAMINOPHEN 500 MG/5ML
650 LIQUID (ML) ORAL ONCE
Refills: 0 | Status: DISCONTINUED | OUTPATIENT
Start: 2025-04-09 | End: 2025-04-09

## 2025-04-09 RX ORDER — LABETALOL HYDROCHLORIDE 200 MG/1
100 TABLET, FILM COATED ORAL THREE TIMES A DAY
Refills: 0 | Status: DISCONTINUED | OUTPATIENT
Start: 2025-04-09 | End: 2025-04-14

## 2025-04-09 RX ORDER — CALCIUM GLUCONATE 20 MG/ML
2 INJECTION, SOLUTION INTRAVENOUS ONCE
Refills: 0 | Status: COMPLETED | OUTPATIENT
Start: 2025-04-09 | End: 2025-04-09

## 2025-04-09 RX ORDER — HYDROMORPHONE/SOD CHLOR,ISO/PF 2 MG/10 ML
0.25 SYRINGE (ML) INJECTION
Refills: 0 | Status: DISCONTINUED | OUTPATIENT
Start: 2025-04-09 | End: 2025-04-09

## 2025-04-09 RX ORDER — NIFEDIPINE 30 MG
30 TABLET, EXTENDED RELEASE 24 HR ORAL ONCE
Refills: 0 | Status: COMPLETED | OUTPATIENT
Start: 2025-04-09 | End: 2025-04-09

## 2025-04-09 RX ORDER — LABETALOL HYDROCHLORIDE 200 MG/1
10 TABLET, FILM COATED ORAL EVERY 8 HOURS
Refills: 0 | Status: DISCONTINUED | OUTPATIENT
Start: 2025-04-09 | End: 2025-04-11

## 2025-04-09 RX ORDER — PREDNISONE 5 MG/1
5 TABLET ORAL
Qty: 1 | Refills: 11 | Status: ACTIVE | COMMUNITY
Start: 2025-04-09 | End: 1900-01-01

## 2025-04-09 RX ORDER — LEVOTHYROXINE SODIUM 0.03 MG/1
25 TABLET ORAL DAILY
Qty: 1 | Refills: 3 | Status: ACTIVE | COMMUNITY
Start: 2025-04-09 | End: 1900-01-01

## 2025-04-09 RX ORDER — SODIUM ZIRCONIUM CYCLOSILICATE 10 G/10G
10 POWDER, FOR SUSPENSION ORAL
Qty: 1 | Refills: 0 | Status: ACTIVE | COMMUNITY
Start: 2025-04-09 | End: 1900-01-01

## 2025-04-09 RX ORDER — LEVOTHYROXINE SODIUM 300 MCG
25 TABLET ORAL DAILY
Refills: 0 | Status: DISCONTINUED | OUTPATIENT
Start: 2025-04-09 | End: 2025-04-23

## 2025-04-09 RX ORDER — INSULIN GLARGINE-YFGN 100 [IU]/ML
8 INJECTION, SOLUTION SUBCUTANEOUS AT BEDTIME
Refills: 0 | Status: DISCONTINUED | OUTPATIENT
Start: 2025-04-09 | End: 2025-04-10

## 2025-04-09 RX ORDER — TACROLIMUS 1 MG/1
1 TABLET, EXTENDED RELEASE ORAL DAILY
Qty: 270 | Refills: 3 | Status: ACTIVE | COMMUNITY
Start: 2025-04-09 | End: 1900-01-01

## 2025-04-09 RX ORDER — NIFEDIPINE 30 MG
60 TABLET, EXTENDED RELEASE 24 HR ORAL ONCE
Refills: 0 | Status: COMPLETED | OUTPATIENT
Start: 2025-04-09 | End: 2025-04-09

## 2025-04-09 RX ORDER — MYCOPHENOLATE MOFETIL 500 MG/1
500 TABLET, FILM COATED ORAL
Refills: 0 | Status: DISCONTINUED | OUTPATIENT
Start: 2025-04-10 | End: 2025-04-10

## 2025-04-09 RX ORDER — ISOSORBIDE MONONITRATE 60 MG/1
60 TABLET, EXTENDED RELEASE ORAL DAILY
Qty: 1 | Refills: 3 | Status: ACTIVE | COMMUNITY
Start: 2025-04-09 | End: 1900-01-01

## 2025-04-09 RX ORDER — INSULIN LISPRO 100 U/ML
INJECTION, SOLUTION INTRAVENOUS; SUBCUTANEOUS AT BEDTIME
Refills: 0 | Status: DISCONTINUED | OUTPATIENT
Start: 2025-04-09 | End: 2025-04-23

## 2025-04-09 RX ORDER — HYPROMELLOSE 0.4 %
1 DROPS OPHTHALMIC (EYE) DAILY
Refills: 0 | Status: DISCONTINUED | OUTPATIENT
Start: 2025-04-09 | End: 2025-04-23

## 2025-04-09 RX ORDER — NIFEDIPINE 90 MG/1
90 TABLET, EXTENDED RELEASE ORAL DAILY
Qty: 90 | Refills: 0 | Status: ACTIVE | COMMUNITY
Start: 2025-04-09 | End: 1900-01-01

## 2025-04-09 RX ORDER — ATORVASTATIN CALCIUM 80 MG/1
40 TABLET, FILM COATED ORAL AT BEDTIME
Refills: 0 | Status: DISCONTINUED | OUTPATIENT
Start: 2025-04-09 | End: 2025-04-23

## 2025-04-09 RX ADMIN — LABETALOL HYDROCHLORIDE 100 MILLIGRAM(S): 200 TABLET, FILM COATED ORAL at 04:21

## 2025-04-09 RX ADMIN — FUROSEMIDE 80 MILLIGRAM(S): 10 INJECTION INTRAMUSCULAR; INTRAVENOUS at 10:43

## 2025-04-09 RX ADMIN — ATORVASTATIN CALCIUM 40 MILLIGRAM(S): 80 TABLET, FILM COATED ORAL at 21:09

## 2025-04-09 RX ADMIN — Medication 500000 UNIT(S): at 23:04

## 2025-04-09 RX ADMIN — Medication 0.25 MILLIGRAM(S): at 01:56

## 2025-04-09 RX ADMIN — Medication 5 UNIT(S): at 16:54

## 2025-04-09 RX ADMIN — Medication 5 UNIT(S): at 08:40

## 2025-04-09 RX ADMIN — DOXAZOSIN MESYLATE 4 MILLIGRAM(S): 8 TABLET ORAL at 21:09

## 2025-04-09 RX ADMIN — Medication 12.5 MG/HR: at 03:50

## 2025-04-09 RX ADMIN — Medication 650 MILLIGRAM(S): at 12:43

## 2025-04-09 RX ADMIN — LABETALOL HYDROCHLORIDE 100 MILLIGRAM(S): 200 TABLET, FILM COATED ORAL at 06:20

## 2025-04-09 RX ADMIN — INSULIN GLARGINE-YFGN 8 UNIT(S): 100 INJECTION, SOLUTION SUBCUTANEOUS at 21:10

## 2025-04-09 RX ADMIN — Medication 50 MILLILITER(S): at 08:40

## 2025-04-09 RX ADMIN — Medication 0.25 MILLIGRAM(S): at 02:11

## 2025-04-09 RX ADMIN — Medication 30 MILLIGRAM(S): at 10:43

## 2025-04-09 RX ADMIN — SODIUM CHLORIDE 70 MILLILITER(S): 9 INJECTION, SOLUTION INTRAVENOUS at 01:07

## 2025-04-09 RX ADMIN — LABETALOL HYDROCHLORIDE 100 MILLIGRAM(S): 200 TABLET, FILM COATED ORAL at 20:26

## 2025-04-09 RX ADMIN — Medication 50 MILLIGRAM(S): at 12:13

## 2025-04-09 RX ADMIN — Medication 650 MILLIGRAM(S): at 20:46

## 2025-04-09 RX ADMIN — SODIUM CHLORIDE 70 MILLILITER(S): 9 INJECTION, SOLUTION INTRAVENOUS at 10:33

## 2025-04-09 RX ADMIN — CALCIUM GLUCONATE 200 GRAM(S): 20 INJECTION, SOLUTION INTRAVENOUS at 16:17

## 2025-04-09 RX ADMIN — Medication 12.5 MG/HR: at 02:39

## 2025-04-09 RX ADMIN — Medication 400 MILLIGRAM(S): at 17:27

## 2025-04-09 RX ADMIN — Medication 60 MILLIGRAM(S): at 08:56

## 2025-04-09 RX ADMIN — Medication 12.5 MG/HR: at 10:33

## 2025-04-09 RX ADMIN — Medication 1 APPLICATION(S): at 12:14

## 2025-04-09 RX ADMIN — METHYLPREDNISOLONE ACETATE 100 MILLIGRAM(S): 80 INJECTION, SUSPENSION INTRA-ARTICULAR; INTRALESIONAL; INTRAMUSCULAR; SOFT TISSUE at 12:12

## 2025-04-09 RX ADMIN — Medication 50 MILLILITER(S): at 16:54

## 2025-04-09 RX ADMIN — Medication 650 MILLIGRAM(S): at 21:16

## 2025-04-09 RX ADMIN — Medication 12.5 MG/HR: at 17:27

## 2025-04-09 RX ADMIN — INSULIN LISPRO 4: 100 INJECTION, SOLUTION INTRAVENOUS; SUBCUTANEOUS at 16:23

## 2025-04-09 RX ADMIN — Medication 1 DROP(S): at 06:21

## 2025-04-09 RX ADMIN — ISOSORBIDE MONONITRATE 60 MILLIGRAM(S): 60 TABLET, EXTENDED RELEASE ORAL at 12:35

## 2025-04-09 RX ADMIN — CALCIUM GLUCONATE 100 GRAM(S): 20 INJECTION, SOLUTION INTRAVENOUS at 08:41

## 2025-04-09 RX ADMIN — Medication 650 MILLIGRAM(S): at 12:13

## 2025-04-09 NOTE — PHYSICAL THERAPY INITIAL EVALUATION ADULT - ADDITIONAL COMMENTS
Per care coordination note, pt lives in a pvt house with his family +2 MORENO and first floor setup once inside. Pt states that they were independent with all ADLs and mobility prior to admission. Pt lives in a pvt house with his family +2 MORENO and first floor setup once inside. Pt states that he was independent with all ADLs and mobility prior to admission however at times would ambulate with a straight cane.

## 2025-04-09 NOTE — PHYSICAL THERAPY INITIAL EVALUATION ADULT - LEVEL OF INDEPENDENCE: SIT/STAND, REHAB EVAL
TBA as appropriate, pt adamantly refusing OOB mobility at this time minimum assist (75% patients effort)

## 2025-04-09 NOTE — PHYSICAL THERAPY INITIAL EVALUATION ADULT - TRANSFER TRAINING, PT EVAL
GOAL: Patient will perform sit to stand transfers independently with no AD in 2 weeks. GOAL: Patient will perform sit to stand transfers independently with least restrictive AD in 2 weeks.

## 2025-04-09 NOTE — PROGRESS NOTE ADULT - SUBJECTIVE AND OBJECTIVE BOX
Transplant Surgery - Multi-disciplinary Rounds  --------------------------------------------------------------   Tx Date: 04/08/25         POD#1    HPI: 68 y/o M with PMHx of HLD, CAD s/p LAD PCI 7/24, HFpEF , HTN, hypothyroid, type 2 DM, ESRD on HD MWF (recently converted from PD in 1/2025) via R permacath placed on 1/2025, h/o cva with no residual deficits, persistent right sided pleural effusion, and hx of hemorrhagic pericardial effusion (cytopathology negative) presents to [facility] for possible DDRT. He states he makes less than half cup of urine a day. Denies fever, chills, N/V/D/C, abdominal pain, CP, SOB, hemoptysis, and /GI complaints. Now s/p DDRT  1a + 1 v + 1 u + stent under thymoglobulin induction on 04/08/25.     Interval Events:  - Afebrile  - Hypertensive on Cardine drip  - Bradycardic to the 50's.  - SICU care  - K - 6.2 shifted w/ dextrose & insulin    Immunosupression:  Induction: Thymo  Maintenance: FK/MMF/SST  Ongoing monitoring for signs of rejection     Potential Discharge date: TBD  Education:  Medications  Plan of care:  See Below      MEDICATIONS  (STANDING):  acyclovir   Oral Tab/Cap 400 milliGRAM(s) Oral two times a day  chlorhexidine 2% Cloths 1 Application(s) Topical daily  isosorbide   mononitrate ER Tablet (IMDUR) 60 milliGRAM(s) Oral daily  labetalol 100 milliGRAM(s) Oral three times a day  niCARdipine Infusion 2.5 mG/Hr (12.5 mL/Hr) IV Continuous <Continuous>    MEDICATIONS  (PRN):  acetaminophen     Tablet .. 650 milliGRAM(s) Oral every 6 hours PRN Mild Pain (1 - 3)  artificial tears (preservative free) Ophthalmic Solution 1 Drop(s) Left EYE daily PRN Dry Eyes  ondansetron Injectable 4 milliGRAM(s) IV Push every 6 hours PRN Nausea and/or Vomiting      PAST MEDICAL & SURGICAL HISTORY:  Hypertension      ESRD on peritoneal dialysis      CVA (cerebrovascular accident)  2010 without residual effects      Hyperlipidemia      BPH (benign prostatic hyperplasia)      CAD (coronary artery disease)      T2DM (type 2 diabetes mellitus)      Hypothyroidism      History of peritoneal dialysis  s/p PD catheter placement      S/P coronary artery stent placement          Vital Signs Last 24 Hrs  T(C): 36.8 (09 Apr 2025 11:00), Max: 36.8 (08 Apr 2025 16:32)  T(F): 98.2 (09 Apr 2025 11:00), Max: 98.2 (09 Apr 2025 10:07)  HR: 52 (09 Apr 2025 15:15) (51 - 66)  BP: 148/65 (09 Apr 2025 11:00) (135/62 - 177/78)  BP(mean): 94 (09 Apr 2025 11:00) (88 - 113)  RR: 18 (09 Apr 2025 15:15) (15 - 38)  SpO2: 100% (09 Apr 2025 15:15) (98% - 100%)    Parameters below as of 09 Apr 2025 10:07  Patient On (Oxygen Delivery Method): room air        I&O's Summary    08 Apr 2025 07:01  -  09 Apr 2025 07:00  --------------------------------------------------------  IN: 749.8 mL / OUT: 1777 mL / NET: -1027.2 mL    09 Apr 2025 07:01  -  09 Apr 2025 15:23  --------------------------------------------------------  IN: 557.4 mL / OUT: 90 mL / NET: 467.4 mL                              10.6   13.31 )-----------( 167      ( 09 Apr 2025 10:32 )             33.0     04-09    131[L]  |  93[L]  |  35[H]  ----------------------------<  233[H]  5.1   |  16[L]  |  4.51[H]    Ca    8.7      09 Apr 2025 10:32  Phos  5.8     04-09  Mg     2.3     04-09    TPro  5.2[L]  /  Alb  3.1[L]  /  TBili  0.2  /  DBili  x   /  AST  86[H]  /  ALT  12  /  AlkPhos  51  04-09                    Review of systems  Gen: No weight changes, fatigue, fevers/chills, weakness  Skin: No rashes  Head/Eyes/Ears/Mouth: No headache; Normal hearing; Normal vision w/o blurriness; No sinus pain/discomfort, sore throat  Respiratory: No dyspnea, cough, wheezing, hemoptysis  CV: No chest pain, PND, orthopnea  GI: Mild abdominal pain at surgical incision site; denies diarrhea, constipation, nausea, vomiting, melena, hematochezia  : No increased frequency, dysuria, hematuria, nocturia  MSK: No joint pain/swelling; no back pain; no edema  Neuro: No dizziness/lightheadedness, weakness, seizures, numbness, tingling  Heme: No easy bruising or bleeding  Endo: No heat/cold intolerance  Psych: No significant nervousness, anxiety, stress, depression  All other systems were reviewed and are negative, except as noted.      PHYSICAL EXAM:  Constitutional: Well developed / well nourished  Eyes: Anicteric, PERRLA  ENMT: nc/at  Neck: Supple  Respiratory: CTA B/L  Cardiovascular: RRR  Gastrointestinal: Soft, non distended, mild tenderness at the incision site; incision c/d/i; COSMO x 3 SS  Genitourinary: Urinary catheter in place oliguric  Extremities: SCD's in place and working bilaterally, R permcath  Vascular: Palpable dp pulses bilaterally  Neurological: A&O x3  Skin: no rashes, ulcerations or lesions;  Musculoskeletal: Moving all extremities  Psychiatric: Responsive     Transplant Surgery - Multi-disciplinary Rounds  --------------------------------------------------------------   Tx Date: 04/08/25         POD#1    HPI: 70 y/o M with PMHx of HLD, CAD s/p LAD PCI 7/24, HFpEF , HTN, hypothyroid, type 2 DM, ESRD on HD MWF (recently converted from PD in 1/2025) via R permacath placed on 1/2025, h/o cva with no residual deficits, persistent right sided pleural effusion, and hx of hemorrhagic pericardial effusion (cytopathology negative) presents to [facility] for possible DDRT. He states he makes less than half cup of urine a day. Denies fever, chills, N/V/D/C, abdominal pain, CP, SOB, hemoptysis, and /GI complaints. Now s/p DDRT  1a + 1 v + 1 u + stent under thymoglobulin induction on 04/08/25.     Interval Events:  - Afebrile  - Hypertensive on Cardine drip  - Bradycardic to the 50's.  - SICU care  - K - 6.2 shifted w/ dextrose & insulin    Immunosupression:  Induction: Thymo  Maintenance: FK/MMF/SST  Ongoing monitoring for signs of rejection     Potential Discharge date: TBD  Education:  Medications  Plan of care:  See Below      MEDICATIONS  (STANDING):  acyclovir   Oral Tab/Cap 400 milliGRAM(s) Oral two times a day  chlorhexidine 2% Cloths 1 Application(s) Topical daily  isosorbide   mononitrate ER Tablet (IMDUR) 60 milliGRAM(s) Oral daily  labetalol 100 milliGRAM(s) Oral three times a day  niCARdipine Infusion 2.5 mG/Hr (12.5 mL/Hr) IV Continuous <Continuous>    MEDICATIONS  (PRN):  acetaminophen     Tablet .. 650 milliGRAM(s) Oral every 6 hours PRN Mild Pain (1 - 3)  artificial tears (preservative free) Ophthalmic Solution 1 Drop(s) Left EYE daily PRN Dry Eyes  ondansetron Injectable 4 milliGRAM(s) IV Push every 6 hours PRN Nausea and/or Vomiting      PAST MEDICAL & SURGICAL HISTORY:  Hypertension      ESRD on peritoneal dialysis      CVA (cerebrovascular accident)  2010 without residual effects      Hyperlipidemia      BPH (benign prostatic hyperplasia)      CAD (coronary artery disease)      T2DM (type 2 diabetes mellitus)      Hypothyroidism      History of peritoneal dialysis  s/p PD catheter placement      S/P coronary artery stent placement          Vital Signs Last 24 Hrs  T(C): 36.8 (09 Apr 2025 11:00), Max: 36.8 (08 Apr 2025 16:32)  T(F): 98.2 (09 Apr 2025 11:00), Max: 98.2 (09 Apr 2025 10:07)  HR: 52 (09 Apr 2025 15:15) (51 - 66)  BP: 148/65 (09 Apr 2025 11:00) (135/62 - 177/78)  BP(mean): 94 (09 Apr 2025 11:00) (88 - 113)  RR: 18 (09 Apr 2025 15:15) (15 - 38)  SpO2: 100% (09 Apr 2025 15:15) (98% - 100%)    Parameters below as of 09 Apr 2025 10:07  Patient On (Oxygen Delivery Method): room air        I&O's Summary    08 Apr 2025 07:01  -  09 Apr 2025 07:00  --------------------------------------------------------  IN: 749.8 mL / OUT: 1777 mL / NET: -1027.2 mL    09 Apr 2025 07:01  -  09 Apr 2025 15:23  --------------------------------------------------------  IN: 557.4 mL / OUT: 90 mL / NET: 467.4 mL                              10.6   13.31 )-----------( 167      ( 09 Apr 2025 10:32 )             33.0     04-09    131[L]  |  93[L]  |  35[H]  ----------------------------<  233[H]  5.1   |  16[L]  |  4.51[H]    Ca    8.7      09 Apr 2025 10:32  Phos  5.8     04-09  Mg     2.3     04-09    TPro  5.2[L]  /  Alb  3.1[L]  /  TBili  0.2  /  DBili  x   /  AST  86[H]  /  ALT  12  /  AlkPhos  51  04-09                    Review of systems  Gen: No weight changes, fatigue, fevers/chills, weakness  Skin: No rashes  Head/Eyes/Ears/Mouth: No headache; Normal hearing; Normal vision w/o blurriness; No sinus pain/discomfort, sore throat  Respiratory: No dyspnea, cough, wheezing, hemoptysis  CV: No chest pain, PND, orthopnea  GI: Mild abdominal pain at surgical incision site; denies diarrhea, constipation, nausea, vomiting, melena, hematochezia  : No increased frequency, dysuria, hematuria, nocturia  MSK: No joint pain/swelling; no back pain; no edema  Neuro: No dizziness/lightheadedness, weakness, seizures, numbness, tingling  Heme: No easy bruising or bleeding  Endo: No heat/cold intolerance  Psych: No significant nervousness, anxiety, stress, depression  All other systems were reviewed and are negative, except as noted.      PHYSICAL EXAM:  Constitutional: Well developed / well nourished  Eyes: Anicteric, PERRLA  ENMT: nc/at  Neck: Supple  Respiratory: CTA B/L  Cardiovascular: RRR  Gastrointestinal: Soft, non distended, mild tenderness at the incision site; incision c/d/i; COSMO x 3 Sang  Genitourinary: Urinary catheter in place anuric  Extremities: SCD's in place and working bilaterally, R permcath  Vascular: Palpable dp pulses bilaterally  Neurological: A&O x3  Skin: no rashes, ulcerations or lesions;  Musculoskeletal: Moving all extremities  Psychiatric: Responsive

## 2025-04-09 NOTE — PROGRESS NOTE ADULT - ASSESSMENT
68 y/o M with PMHx of HLD, CAD s/p LAD PCI 7/24, HFpEF , HTN, hypothyroid, type 2 DM, ESRD on HD MWF (recently converted from PD in 1/2025) via R permacath s/p DDRT  1a + 1 v + 1 u under thymoglobulin induction on 04/08/25.   []s/p DDRT under Thymo induction   -Renal doppler: patent vasc  -Trends labs   -Strict I/O's, cathie, JPx3 SS  -NPO  -ADAT  -Maintenance and replacement IVF  -Bowel regimen  -IS, SCD, PT    [ ] Immunosuppression  - Thymogloubin induction  - ENV TBD, MMF Held , SST w/Thymo  - ppx: bactrim, nystatin, Valcyte     []HTN  -Weaning off cardene gtt  -Labetalol 100 mg BID (increase as needed)  -Restart home medications as able  68 y/o M with PMHx of HLD, CAD s/p LAD PCI 7/24, HFpEF , HTN, hypothyroid, type 2 DM, ESRD on HD MWF (recently converted from PD in 1/2025) via R permacath s/p DDRT  1a + 1 v + 1 u + stent under thymoglobulin induction on 04/08/25.   []s/p DDRT under thymo induction   -Renal doppler: patent vasc  -Trends labs   -Strict I/O's, brand, JPx3 SS  -NPO  -ADAT  -Maintenance and replacement IVF  -Bowel regimen  -IS, SCD, PT    [ ] Immunosuppression  - Thymoglobulin induction  - ENV TBD,  BID , SST w/Thymo  - ppx: bactrim, nystatin, Valcyte     []HTN  -Weaning off cardene gtt  -Labetalol 100 mg BID (increase as needed)  -Restart home medications as able

## 2025-04-09 NOTE — PHYSICAL THERAPY INITIAL EVALUATION ADULT - GAIT TRAINING, PT EVAL
GOAL: Patient will ambulate 300 feet independently with no AD in 2 weeks. GOAL: Patient will ambulate 300 feet independently with least restrictive AD in 2 weeks.

## 2025-04-09 NOTE — BRIEF OPERATIVE NOTE - OPERATION/FINDINGS
S/P DDRT: Right Kidney transplanted on the RLQ under steroid and Thymo induction    CIT  hours    1V / *A/ 1 U (stented)    1 COSMO drain beside the transplanted kidney  1 SC COSMO drain   S/P DDRT: Right Kidney transplanted on the RLQ under steroid and Thymo induction    CIT 38 hours    1V / 1A/ 1 U (stented)    2 COSMO drain beside the transplanted kidney  1 SC COSMO drain

## 2025-04-09 NOTE — PROGRESS NOTE ADULT - SUBJECTIVE AND OBJECTIVE BOX
LEONIDAS LARRY is a 69y Male s/p DDRT on 4/8/25. PMH is significant for PMHx of HLD, CAD s/p LAD PCI 7/24, HFpEF, HTN, hypothyroid, type 2 DM, ESRD on HD MWF (recently converted from PD in 1/2025) via permacath placed on 1/2025, h/o cva with no residual deficits, persistent right sided pleural effusion, and hx of hemorrhagic pericardial effusion (cytopathology negative).    Allergies: Hydralazine    CMV D-/R-  EBV D+/R+    Transplant Medications  Induction  -Thymoglobulin 1.5 mg/kg/dose x 3 doses (cumulative dose of 4.5 mg/kg)        Premedicate one hour prior to administration with                -Acetaminophen 650 mg PO/WY                -Diphenhydramine 50 mg IV/PO                -Steroid taper        Adjust dose for WBC < 3 or PLT < 75  -Methylprednisolone taper (switch to PO prednisone on POD 4)            POD 0: 500 mg IV in OR            POD 1: 250 mg IV x 1 dose            POD 2: 125 mg IV x 1 dose            POD 3: 60 mg IV x 1 dose        Maintenance Immunosuppression  -Tacrolimus (Envarsus) 0.14 mg/kg daily (Adjust for goal trough: 8-10)  -Mycophenolate 500 mg PO Q12H  -Prednisone             POD 4: 20 mg PO Q12H            POD 5: 10 mg PO Q12H            POD 6: 5 mg PO Q12H            POD 7-: 5 mg daily     Anti-infection   -Bactrim SS tablet (frequency based on renal function)  -Valganciclovir (dose based on CMV serostatus and frequency based on renal function)  -Nystatin swish and swallow 5 mL four times daily      Surgical prophylaxis pre- and intra-operative dosing  -Cefazolin    Prophylaxis  -GI ppx: famotidine 20 mg daily  -Bowel ppx: senna  -DVT: sequential compression device  -Pain:            Mild: Acetaminophen 650 mg every 6 hours PRN           Moderate: Tramadol 25 mg every 4 hours PRN (adjust for renal function)           Severe: Tramadol 50 mg every 4 hours PRN (adjust for renal function)    Home medications  aspirin 81 mg oral delayed release tablet: 1 tab(s) orally once a day (07 Apr 2025 20:46)  atorvastatin 40 mg oral tablet: 1 tab(s) orally once a day (07 Apr 2025 20:46)  bumetanide 2 mg oral tablet: 1 tab(s) orally once a day (current medication per family member, per pharmacy 90-day supply last dispensed 9/16/24) (07 Apr 2025 20:43)  calcitriol 0.25 mcg oral capsule: 1 cap(s) orally once a week on Wednesday (07 Apr 2025 20:46)  calcium acetate 667 mg oral tablet: 1 tab(s) orally 3 times a day (with meals) (07 Apr 2025 20:46)  clopidogrel 75 mg oral tablet: 1 tab(s) orally once a day (07 Apr 2025 20:46)  Diovan 320 mg oral tablet: 1 tab(s) orally once a day (07 Apr 2025 20:45)  doxazosin 4 mg oral tablet: 1 tab(s) orally once a day (at bedtime) (current medication per family member, per pharmacy 90-day supply last dispensed 9/2/24) (07 Apr 2025 20:46)  isosorbide mononitrate 60 mg oral tablet, extended release: 1 tab(s) orally once a day (current medication per family member, per pharmacy 30-day supply last dispensed 11/29/24) (07 Apr 2025 20:46)  labetalol 100 mg oral tablet: 2 tab(s) orally 2 times a day (home med) (07 Apr 2025 20:44)  levothyroxine 25 mcg (0.025 mg) oral tablet: 1 tab(s) orally once a day in the morning (current medication per family member, per pharmacy 30-day supply last dispensed 11/29/24) (07 Apr 2025 20:46)  Yee-Selena Rx oral tablet: 1 tab(s) orally once a day (07 Apr 2025 20:46)    Outpatient medication reconciliation reviewed and will be re-started appropriately.  Plan discussed with multidisciplinary team.    LEONIDAS LARRY is a 69y Male s/p DDRT on 4/8/25. PMH is significant for PMHx of HLD, CAD s/p LAD PCI 7/24, HFpEF, HTN, hypothyroid, type 2 DM, ESRD on HD MWF (recently converted from PD in 1/2025) via permacath placed on 1/2025, h/o cva with no residual deficits, persistent right sided pleural effusion, and hx of hemorrhagic pericardial effusion (cytopathology negative).    Allergies: Hydralazine    CMV D-/R-  EBV D+/R+    Transplant Medications  Induction  -Thymoglobulin 1.5 mg/kg/dose x 3 doses (cumulative dose of 4.5 mg/kg)        Premedicate one hour prior to administration with                -Acetaminophen 650 mg PO/IL                -Diphenhydramine 50 mg IV/PO                -Steroid taper        Adjust dose for WBC < 3 or PLT < 75  -Methylprednisolone taper (switch to PO prednisone on POD 4)            POD 0: 500 mg IV in OR            POD 1: 250 mg IV x 1 dose            POD 2: 125 mg IV x 1 dose            POD 3: 60 mg IV x 1 dose        Maintenance Immunosuppression  -Tacrolimus (Envarsus) 0.14 mg/kg daily (Adjust for goal trough: 8-10)  -Mycophenolate 500 mg PO Q12H  -Prednisone             POD 4: 20 mg PO Q12H            POD 5: 10 mg PO Q12H            POD 6: 5 mg PO Q12H            POD 7-: 5 mg daily     Anti-infection   -Bactrim SS tablet (frequency based on renal function)  -Valganciclovir (dose based on CMV serostatus and frequency based on renal function)  -Nystatin swish and swallow 5 mL four times daily    Surgical prophylaxis pre- and intra-operative dosing  -Cefazolin    Prophylaxis  -GI ppx: famotidine 20 mg daily  -Bowel ppx: senna  -DVT: sequential compression device  -Pain:            Mild: Acetaminophen 650 mg every 6 hours PRN           Moderate: Tramadol 25 mg every 4 hours PRN (adjust for renal function)           Severe: Tramadol 50 mg every 4 hours PRN (adjust for renal function)    Home medications  aspirin 81 mg oral delayed release tablet: 1 tab(s) orally once a day (07 Apr 2025 20:46)  atorvastatin 40 mg oral tablet: 1 tab(s) orally once a day (07 Apr 2025 20:46)  bumetanide 2 mg oral tablet: 1 tab(s) orally once a day (current medication per family member, per pharmacy 90-day supply last dispensed 9/16/24) (07 Apr 2025 20:43)  calcitriol 0.25 mcg oral capsule: 1 cap(s) orally once a week on Wednesday (07 Apr 2025 20:46)  calcium acetate 667 mg oral tablet: 1 tab(s) orally 3 times a day (with meals) (07 Apr 2025 20:46)  clopidogrel 75 mg oral tablet: 1 tab(s) orally once a day (07 Apr 2025 20:46)  Diovan 320 mg oral tablet: 1 tab(s) orally once a day (07 Apr 2025 20:45)  doxazosin 4 mg oral tablet: 1 tab(s) orally once a day (at bedtime) (current medication per family member, per pharmacy 90-day supply last dispensed 9/2/24) (07 Apr 2025 20:46)  isosorbide mononitrate 60 mg oral tablet, extended release: 1 tab(s) orally once a day (current medication per family member, per pharmacy 30-day supply last dispensed 11/29/24) (07 Apr 2025 20:46)  labetalol 100 mg oral tablet: 2 tab(s) orally 2 times a day (home med) (07 Apr 2025 20:44)  levothyroxine 25 mcg (0.025 mg) oral tablet: 1 tab(s) orally once a day in the morning (current medication per family member, per pharmacy 30-day supply last dispensed 11/29/24) (07 Apr 2025 20:46)  Yee-Selena Rx oral tablet: 1 tab(s) orally once a day (07 Apr 2025 20:46)    Outpatient medication reconciliation reviewed and will be re-started appropriately.  Plan discussed with multidisciplinary team.    LEONIDAS LARRY is a 69y Male s/p DDRT on 4/8/25. PMH is significant for PMHx of HLD, CAD s/p LAD PCI 7/24, HFpEF, HTN, hypothyroid, type 2 DM, ESRD on HD MWF (recently converted from PD in 1/2025) via permacath placed on 1/2025, h/o cva with no residual deficits, persistent right sided pleural effusion, and hx of hemorrhagic pericardial effusion (cytopathology negative).    Allergies: Hydralazine    CMV D-/R-  EBV D+/R+    Transplant Medications  Induction  -Thymoglobulin 1.5 mg/kg/dose x 3 doses (cumulative dose of 4.5 mg/kg)        Premedicate one hour prior to administration with                -Acetaminophen 650 mg PO/OR                -Diphenhydramine 50 mg IV/PO                -Steroid taper        Adjust dose for WBC < 3 or PLT < 75  -Methylprednisolone taper (switch to PO prednisone on POD 4)            POD 0: 500 mg IV in OR            POD 1: 250 mg IV x 1 dose            POD 2: 125 mg IV x 1 dose            POD 3: 60 mg IV x 1 dose        Maintenance Immunosuppression  -Tacrolimus (Envarsus) 0.14 mg/kg daily (Adjust for goal trough: 8-10)  -Mycophenolate 500 mg PO Q12H  -Prednisone             POD 4: 20 mg PO Q12H            POD 5: 10 mg PO Q12H            POD 6: 5 mg PO Q12H            POD 7-: 5 mg daily     Anti-infection   -Bactrim SS tablet (frequency based on renal function)  -Acyclovir (dose based on CMV serostatus and frequency based on renal function)  -Nystatin swish and swallow 5 mL four times daily    Surgical prophylaxis pre- and intra-operative dosing  -Cefazolin    Prophylaxis  -GI ppx: famotidine 20 mg daily  -Bowel ppx: senna  -DVT: sequential compression device  -Pain:            Mild: Acetaminophen 650 mg every 6 hours PRN           Moderate: Tramadol 25 mg every 4 hours PRN (adjust for renal function)           Severe: Tramadol 50 mg every 4 hours PRN (adjust for renal function)    Home medications  aspirin 81 mg oral delayed release tablet: 1 tab(s) orally once a day (07 Apr 2025 20:46)  atorvastatin 40 mg oral tablet: 1 tab(s) orally once a day (07 Apr 2025 20:46)  bumetanide 2 mg oral tablet: 1 tab(s) orally once a day (current medication per family member, per pharmacy 90-day supply last dispensed 9/16/24) (07 Apr 2025 20:43)  calcitriol 0.25 mcg oral capsule: 1 cap(s) orally once a week on Wednesday (07 Apr 2025 20:46)  calcium acetate 667 mg oral tablet: 1 tab(s) orally 3 times a day (with meals) (07 Apr 2025 20:46)  clopidogrel 75 mg oral tablet: 1 tab(s) orally once a day (07 Apr 2025 20:46)  Diovan 320 mg oral tablet: 1 tab(s) orally once a day (07 Apr 2025 20:45)  doxazosin 4 mg oral tablet: 1 tab(s) orally once a day (at bedtime) (current medication per family member, per pharmacy 90-day supply last dispensed 9/2/24) (07 Apr 2025 20:46)  isosorbide mononitrate 60 mg oral tablet, extended release: 1 tab(s) orally once a day (current medication per family member, per pharmacy 30-day supply last dispensed 11/29/24) (07 Apr 2025 20:46)  labetalol 100 mg oral tablet: 2 tab(s) orally 2 times a day (home med) (07 Apr 2025 20:44)  levothyroxine 25 mcg (0.025 mg) oral tablet: 1 tab(s) orally once a day in the morning (current medication per family member, per pharmacy 30-day supply last dispensed 11/29/24) (07 Apr 2025 20:46)  Yee-Selena Rx oral tablet: 1 tab(s) orally once a day (07 Apr 2025 20:46)    Outpatient medication reconciliation reviewed and will be re-started appropriately.  Plan discussed with multidisciplinary team.

## 2025-04-09 NOTE — PHYSICAL THERAPY INITIAL EVALUATION ADULT - PLANNED THERAPY INTERVENTIONS, PT EVAL
stair training: GOAL: Pt will negotiate up/down 3 steps with 1 handrail ascending independently in 2 weeks./bed mobility training/gait training/strengthening/transfer training

## 2025-04-09 NOTE — PROGRESS NOTE ADULT - ASSESSMENT
70 y/o M with PMHx of HLD, CAD s/p LAD PCI 7/24, HFpEF , HTN, hypothyroid, type 2 DM, ESRD on HD MWF (recently converted from PD in 1/2025) via permacath placed on 1/2025, h/o cva with no residual deficits, persistent right sided pleural effusion, and hx of hemorrhagic pericardial effusion (cytopathology negative) presents to [facility] for possible DDRT. He states he makes less than half cup of urine a day. Denies fever, chills, N/V/D/C, abdominal pain, CP, SOB, hemoptysis, and /GI complaints.     A/P  ESRD s/p DDRT 04/09  Steroid and thymo induction  Nephrologist is Dr. 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

## 2025-04-09 NOTE — PROGRESS NOTE ADULT - SUBJECTIVE AND OBJECTIVE BOX
[facility]    Dr. Tiara Gacria     Office [phone] (9 am to 5 pm)  Service: [phone] (5pm to 9am)  Also Available on TEAMS      RENAL PROGRESS NOTE: DATE OF SERVICE 04-09-25 @ 11:13    Patient is a 69y old  Male who presents with a chief complaint of s/p DDRT (09 Apr 2025 04:57)      Patient seen and examined at bedside. No chest pain/sob    VITALS:  T(F): 98.2 (04-09-25 @ 10:07), Max: 98.2 (04-08-25 @ 13:00)  HR: 61 (04-09-25 @ 10:45)  BP: 146/73 (04-09-25 @ 10:45)  RR: 35 (04-09-25 @ 10:45)  SpO2: 100% (04-09-25 @ 10:45)  Wt(kg): --    04-08 @ 07:01  -  04-09 @ 07:00  --------------------------------------------------------  IN: 749.8 mL / OUT: 1777 mL / NET: -1027.2 mL    04-09 @ 07:01  -  04-09 @ 11:13  --------------------------------------------------------  IN: 95 mL / OUT: 90 mL / NET: 5 mL      Height (cm): 172.7 (04-08 @ 16:32)  Weight (kg): 55.6 (04-08 @ 16:32)  BMI (kg/m2): 18.6 (04-08 @ 16:32)  BSA (m2): 1.66 (04-08 @ 16:32)    PHYSICAL EXAM:  Constitutional: NAD  Neck: No JVD  Respiratory: CTAB, no wheezes, rales or rhonchi  Cardiovascular: S1, S2, RRR  Gastrointestinal: BS+, soft, NT/ND  Extremities: No peripheral edema    Hospital Medications:   MEDICATIONS  (STANDING):  chlorhexidine 2% Cloths 1 Application(s) Topical daily  isosorbide   mononitrate ER Tablet (IMDUR) 60 milliGRAM(s) Oral daily  labetalol 100 milliGRAM(s) Oral three times a day  niCARdipine Infusion 2.5 mG/Hr (12.5 mL/Hr) IV Continuous <Continuous>      LABS:  04-09    131[L]  |  93[L]  |  35[H]  ----------------------------<  233[H]  5.1   |  16[L]  |  4.51[H]    Ca    8.7      09 Apr 2025 10:32  Phos  5.8     04-09  Mg     2.3     04-09    TPro  5.2[L]  /  Alb  3.1[L]  /  TBili  0.2  /  DBili      /  AST  86[H]  /  ALT  12  /  AlkPhos  51  04-09    Creatinine Trend: 4.51 <--, 4.32 <--, 3.85 <--, 4.86 <--    Albumin: 3.1 g/dL (04-09 @ 10:32)  Phosphorus: 5.8 mg/dL (04-09 @ 10:32)  Albumin: 3.2 g/dL (04-09 @ 06:51)  Phosphorus: 5.0 mg/dL (04-09 @ 06:51)  Albumin: 2.9 g/dL (04-09 @ 01:13)  Phosphorus: 4.7 mg/dL (04-09 @ 01:13)                              10.6   13.31 )-----------( 167      ( 09 Apr 2025 10:32 )             33.0     Urine Studies:  Urinalysis - [04-09-25 @ 10:32]      Color  / Appearance  / SG  / pH       Gluc 233 / Ketone   / Bili  / Urobili        Blood  / Protein  / Leuk Est  / Nitrite       RBC  / WBC  / Hyaline  / Gran  / Sq Epi  / Non Sq Epi  / Bacteria       Iron 17, TIBC 99, %sat 17      [01-08-25 @ 04:35]  PTH -- (Ca 7.9)      [12-30-24 @ 06:50]   229  PTH -- (Ca 7.6)      [11-27-24 @ 04:23]   250  Vitamin D (25OH) 17.1      [11-25-24 @ 06:50]  TSH 3.53      [12-30-24 @ 06:50]  Lipid: chol 112, TG 61, HDL 46, LDL --      [12-31-24 @ 04:27]    HBsAb 46.0      [04-07-25 @ 21:39]  HBsAg Nonreact      [04-07-25 @ 21:39]  HBcAb Nonreact      [04-07-25 @ 21:39]  HCV 0.05, Nonreact      [04-07-25 @ 21:39]  HIV Nonreact      [04-07-25 @ 21:39]      RADIOLOGY & ADDITIONAL STUDIES:

## 2025-04-09 NOTE — PHYSICAL THERAPY INITIAL EVALUATION ADULT - GENERAL OBSERVATIONS, REHAB EVAL
Pt rec'd supine in bed NAD, +UE IV, +ICU monitoring, +JPx3, +brand. Pt rec'd supine in bed NAD, +UE IV, +ICU monitoring, +JPx3, +brand, +a-line. Pt rec'd supine in bed NAD, +UE IV, +ICU monitoring, +JPx3, +brand, +a-line. (4/10): Rec'd semi-supine in bed in NAD, VSS, +COSMO x3, +IV, +ICU monitoring, +brand, A&O x3, following 100% of simple instructions.

## 2025-04-09 NOTE — CONSULT NOTE ADULT - SUBJECTIVE AND OBJECTIVE BOX
Seen at bedside, feels well.  Was in PACU post operative, remained on cardene, transferred to SICU for further care.    HISTORY OF PRESENT ILLNESS:  70 y/o M with PMHx of HLD, CAD s/p LAD PCI 7/24, HFpEF , HTN, hypothyroid, type 2 DM, ESRD on HD MWF (recently converted from PD in 1/2025) via permacath placed on 1/2025, h/o cva with no residual deficits, persistent right sided pleural effusion, and hx of hemorrhagic pericardial effusion (cytopathology negative) presents to [facility] for possible DDRT. now POD#1 4/9 for right kidney transplant     Refer to HPI for medical hx    VITAL SIGNS:  ICU Vital Signs Last 24 Hrs  T(C): 36.8 (09 Apr 2025 10:07), Max: 36.8 (08 Apr 2025 13:00)  T(F): 98.2 (09 Apr 2025 10:07), Max: 98.2 (08 Apr 2025 13:00)  HR: 62 (09 Apr 2025 10:15) (51 - 66)  BP: 152/67 (09 Apr 2025 10:15) (135/62 - 177/78)  BP(mean): 97 (09 Apr 2025 10:15) (88 - 113)  ABP: 157/51 (09 Apr 2025 10:15) (136/50 - 173/64)  ABP(mean): 81 (09 Apr 2025 10:15) (75 - 145)  RR: 20 (09 Apr 2025 10:15) (15 - 38)  SpO2: 100% (09 Apr 2025 10:15) (98% - 100%)    O2 Parameters below as of 09 Apr 2025 10:07  Patient On (Oxygen Delivery Method): room air    Exam:  AOx3, FC, PERRL, FC, FRIEND 5/5, incision clean dry and intact    MEDICATIONS  (STANDING):  chlorhexidine 2% Cloths 1 Application(s) Topical daily  labetalol 100 milliGRAM(s) Oral three times a day  multiple electrolytes Injection Type 1 1000 milliLiter(s) (50 mL/Hr) IV Continuous <Continuous>  multiple electrolytes Injection Type 1 1000 milliLiter(s) (70 mL/Hr) IV Continuous <Continuous>  niCARdipine Infusion 2.5 mG/Hr (12.5 mL/Hr) IV Continuous <Continuous>    MEDICATIONS  (PRN):  artificial tears (preservative free) Ophthalmic Solution 1 Drop(s) Left EYE daily PRN Dry Eyes  ondansetron Injectable 4 milliGRAM(s) IV Push every 6 hours PRN Nausea and/or Vomiting    Weight (kg): 55.6 (04-08 @ 16:32)  04-09    132[L]  |  94[L]  |  33[H]  ----------------------------<  185[H]  6.5[HH]   |  16[L]  |  4.32[H]    Ca    8.5      09 Apr 2025 06:51  Phos  5.0     04-09  Mg     2.2     04-09    TPro  5.5[L]  /  Alb  3.2[L]  /  TBili  0.2  /  DBili  x   /  AST  96[H]  /  ALT  14  /  AlkPhos  54  04-09                  10.6   13.31 )-----------( 167      ( 09 Apr 2025 10:32 )             33.0     PT/INR - ( 09 Apr 2025 01:13 )   PT: 12.2 sec;   INR: 1.07 ratio         PTT - ( 09 Apr 2025 01:13 )  PTT:27.4 sec  Transfusion: [ ] PRBC	[ ] Platelets	[ ] FFP	[ ] Cryoprecipitate    POCT Blood Glucose.: 177 mg/dL (09 Apr 2025 08:38)  POCT Blood Glucose.: 176 mg/dL (09 Apr 2025 06:45)  POCT Blood Glucose.: 123 mg/dL (09 Apr 2025 00:57)  POCT Blood Glucose.: 81 mg/dL (08 Apr 2025 13:00)   Seen at bedside, feels well.  Was in PACU post operative, remained on cardene, transferred to SICU for further care.    HISTORY OF PRESENT ILLNESS:  68 y/o M with PMHx of HLD, CAD s/p LAD PCI 7/24, HFpEF , HTN, hypothyroid, type 2 DM, ESRD on HD MWF (recently converted from PD in 1/2025) via permacath placed on 1/2025, h/o cva with no residual deficits, persistent right sided pleural effusion, and hx of hemorrhagic pericardial effusion (cytopathology negative) presents to [facility] for possible DDRT. now POD#1 4/9 for right kidney transplant     Refer to HPI for medical hx    VITAL SIGNS:  ICU Vital Signs Last 24 Hrs  T(C): 36.8 (09 Apr 2025 10:07), Max: 36.8 (08 Apr 2025 13:00)  T(F): 98.2 (09 Apr 2025 10:07), Max: 98.2 (08 Apr 2025 13:00)  HR: 62 (09 Apr 2025 10:15) (51 - 66)  BP: 152/67 (09 Apr 2025 10:15) (135/62 - 177/78)  BP(mean): 97 (09 Apr 2025 10:15) (88 - 113)  ABP: 157/51 (09 Apr 2025 10:15) (136/50 - 173/64)  ABP(mean): 81 (09 Apr 2025 10:15) (75 - 145)  RR: 20 (09 Apr 2025 10:15) (15 - 38)  SpO2: 100% (09 Apr 2025 10:15) (98% - 100%)    O2 Parameters below as of 09 Apr 2025 10:07  Patient On (Oxygen Delivery Method): room air    Exam:  AOx3, FC, PERRL, FC, FRIEND 5/5, incision clean dry and intact, abdominal soft and nontender    MEDICATIONS  (STANDING):  chlorhexidine 2% Cloths 1 Application(s) Topical daily  labetalol 100 milliGRAM(s) Oral three times a day  multiple electrolytes Injection Type 1 1000 milliLiter(s) (50 mL/Hr) IV Continuous <Continuous>  multiple electrolytes Injection Type 1 1000 milliLiter(s) (70 mL/Hr) IV Continuous <Continuous>  niCARdipine Infusion 2.5 mG/Hr (12.5 mL/Hr) IV Continuous <Continuous>    MEDICATIONS  (PRN):  artificial tears (preservative free) Ophthalmic Solution 1 Drop(s) Left EYE daily PRN Dry Eyes  ondansetron Injectable 4 milliGRAM(s) IV Push every 6 hours PRN Nausea and/or Vomiting    Weight (kg): 55.6 (04-08 @ 16:32)  04-09    132[L]  |  94[L]  |  33[H]  ----------------------------<  185[H]  6.5[HH]   |  16[L]  |  4.32[H]    Ca    8.5      09 Apr 2025 06:51  Phos  5.0     04-09  Mg     2.2     04-09    TPro  5.5[L]  /  Alb  3.2[L]  /  TBili  0.2  /  DBili  x   /  AST  96[H]  /  ALT  14  /  AlkPhos  54  04-09                  10.6   13.31 )-----------( 167      ( 09 Apr 2025 10:32 )             33.0     PT/INR - ( 09 Apr 2025 01:13 )   PT: 12.2 sec;   INR: 1.07 ratio         PTT - ( 09 Apr 2025 01:13 )  PTT:27.4 sec  Transfusion: [ ] PRBC	[ ] Platelets	[ ] FFP	[ ] Cryoprecipitate    POCT Blood Glucose.: 177 mg/dL (09 Apr 2025 08:38)  POCT Blood Glucose.: 176 mg/dL (09 Apr 2025 06:45)  POCT Blood Glucose.: 123 mg/dL (09 Apr 2025 00:57)  POCT Blood Glucose.: 81 mg/dL (08 Apr 2025 13:00)

## 2025-04-09 NOTE — PROGRESS NOTE ADULT - SUBJECTIVE AND OBJECTIVE BOX
Transplant Surgery - POC  --------------------------------------------------------------   Tx Date: 04/08/25         POD#1    HPI: 68 y/o M with PMHx of HLD, CAD s/p LAD PCI 7/24, HFpEF , HTN, hypothyroid, type 2 DM, ESRD on HD MWF (recently converted from PD in 1/2025) via R permacath placed on 1/2025, h/o cva with no residual deficits, persistent right sided pleural effusion, and hx of hemorrhagic pericardial effusion (cytopathology negative) presents to [facility] for possible DDRT. He states he makes less than half cup of urine a day. Denies fever, chills, N/V/D/C, abdominal pain, CP, SOB, hemoptysis, and /GI complaints. Now s/p DDRT  1a + 1 v + 1 u under thymoglobulin induction on 04/08/25.     Interval Events:  -s/p DDRT: 1a + 1 v + 1 u  - cc, 3L IVF, 1U PRBC   -Afebrile, HTN: on cardene gtt, currently weaning off started on labetalol 100mg TID   -post op labs set #1 good, K+ 4.5, Cr: 3.85  -Anuric   -COSMO x 3 SS    Immunosupression:  Induction: Thymo  Maintenance: FK/MMF/SST  Ongoing monitoring for signs of rejection     Potential Discharge date: TBD  Education:  Medications  Plan of care:  See Below    MEDICATIONS  (STANDING):  chlorhexidine 2% Cloths 1 Application(s) Topical daily  labetalol 100 milliGRAM(s) Oral three times a day  multiple electrolytes Injection Type 1 1000 milliLiter(s) (50 mL/Hr) IV Continuous <Continuous>  multiple electrolytes Injection Type 1 1000 milliLiter(s) (70 mL/Hr) IV Continuous <Continuous>  niCARdipine Infusion 2.5 mG/Hr (12.5 mL/Hr) IV Continuous <Continuous>    MEDICATIONS  (PRN):  HYDROmorphone  Injectable 0.25 milliGRAM(s) IV Push every 10 minutes PRN Moderate Pain (4 - 6)  ondansetron Injectable 4 milliGRAM(s) IV Push every 6 hours PRN Nausea and/or Vomiting  ondansetron Injectable 4 milliGRAM(s) IV Push once PRN Nausea and/or Vomiting    PAST MEDICAL & SURGICAL HISTORY:  Hypertension    ESRD on peritoneal dialysis    CVA (cerebrovascular accident)  2010 without residual effects    Hyperlipidemia    BPH (benign prostatic hyperplasia)    CAD (coronary artery disease)    T2DM (type 2 diabetes mellitus)    Hypothyroidism    History of peritoneal dialysis  s/p PD catheter placement    S/P coronary artery stent placement    Vital Signs Last 24 Hrs  T(C): 36.2 (09 Apr 2025 00:40), Max: 36.8 (08 Apr 2025 05:38)  T(F): 97.2 (09 Apr 2025 00:40), Max: 98.2 (08 Apr 2025 05:38)  HR: 53 (09 Apr 2025 03:30) (47 - 65)  BP: 151/70 (09 Apr 2025 01:45) (135/62 - 226/89)  BP(mean): 100 (09 Apr 2025 01:45) (88 - 100)  RR: 16 (09 Apr 2025 03:30) (15 - 18)  SpO2: 100% (09 Apr 2025 03:30) (98% - 100%)    Parameters below as of 09 Apr 2025 01:00  Patient On (Oxygen Delivery Method): room air    I&O's Summary    08 Apr 2025 07:01  -  09 Apr 2025 04:57  --------------------------------------------------------  IN: 497.3 mL / OUT: 1637 mL / NET: -1139.7 mL                          9.6    7.98  )-----------( 142      ( 09 Apr 2025 01:13 )             31.4     04-09    135  |  95[L]  |  24[H]  ----------------------------<  121[H]  4.5   |  19[L]  |  3.85[H]    Ca    8.3[L]      09 Apr 2025 01:13  Phos  4.7     04-09  Mg     2.3     04-09    TPro  4.8[L]  /  Alb  2.9[L]  /  TBili  0.2  /  DBili  x   /  AST  94[H]  /  ALT  17  /  AlkPhos  47  04-09    Review of systems  Gen: No weight changes, fatigue, fevers/chills, weakness  Skin: No rashes  Head/Eyes/Ears/Mouth: No headache; Normal hearing; Normal vision w/o blurriness; No sinus pain/discomfort, sore throat  Respiratory: No dyspnea, cough, wheezing, hemoptysis  CV: No chest pain, PND, orthopnea  GI: Mild abdominal pain at surgical incision site; denies diarrhea, constipation, nausea, vomiting, melena, hematochezia  : No increased frequency, dysuria, hematuria, nocturia  MSK: No joint pain/swelling; no back pain; no edema  Neuro: No dizziness/lightheadedness, weakness, seizures, numbness, tingling  Heme: No easy bruising or bleeding  Endo: No heat/cold intolerance  Psych: No significant nervousness, anxiety, stress, depression  All other systems were reviewed and are negative, except as noted.      PHYSICAL EXAM:  Constitutional: Well developed / well nourished  Eyes: Anicteric, PERRLA  ENMT: nc/at  Neck: Supple  Respiratory: CTA B/L  Cardiovascular: RRR  Gastrointestinal: Soft, non distended, mild tenderness at the incision site; incision c/d/i; COSMO x 3 SS  Genitourinary: Urinary catheter in place  Extremities: SCD's in place and working bilaterally, R permcath  Vascular: Palpable dp pulses bilaterally  Neurological: A&O x3  Skin: no rashes, ulcerations or lesions;  Musculoskeletal: Moving all extremities  Psychiatric: Responsive     Transplant Surgery - POC  --------------------------------------------------------------   Tx Date: 04/08/25         POD#1    HPI: 68 y/o M with PMHx of HLD, CAD s/p LAD PCI 7/24, HFpEF , HTN, hypothyroid, type 2 DM, ESRD on HD MWF (recently converted from PD in 1/2025) via R permacath placed on 1/2025, h/o cva with no residual deficits, persistent right sided pleural effusion, and hx of hemorrhagic pericardial effusion (cytopathology negative) presents to [facility] for possible DDRT. He states he makes less than half cup of urine a day. Denies fever, chills, N/V/D/C, abdominal pain, CP, SOB, hemoptysis, and /GI complaints. Now s/p DDRT  1a + 1 v + 1 u + stent under thymoglobulin induction on 04/08/25.     Interval Events:  -s/p DDRT: 1a + 1 v + 1 u + stent   - cc, 3L IVF, 1U PRBC   -Afebrile, HTN: on cardene gtt, currently weaning off started on labetalol 100mg TID   -post op labs set #1 good, K+ 4.5, Cr: 3.85  -Anuric   -COSMO x 3 SS    Immunosupression:  Induction: Thymo  Maintenance: FK/MMF/SST  Ongoing monitoring for signs of rejection     Potential Discharge date: TBD  Education:  Medications  Plan of care:  See Below    MEDICATIONS  (STANDING):  chlorhexidine 2% Cloths 1 Application(s) Topical daily  labetalol 100 milliGRAM(s) Oral three times a day  multiple electrolytes Injection Type 1 1000 milliLiter(s) (50 mL/Hr) IV Continuous <Continuous>  multiple electrolytes Injection Type 1 1000 milliLiter(s) (70 mL/Hr) IV Continuous <Continuous>  niCARdipine Infusion 2.5 mG/Hr (12.5 mL/Hr) IV Continuous <Continuous>    MEDICATIONS  (PRN):  HYDROmorphone  Injectable 0.25 milliGRAM(s) IV Push every 10 minutes PRN Moderate Pain (4 - 6)  ondansetron Injectable 4 milliGRAM(s) IV Push every 6 hours PRN Nausea and/or Vomiting  ondansetron Injectable 4 milliGRAM(s) IV Push once PRN Nausea and/or Vomiting    PAST MEDICAL & SURGICAL HISTORY:  Hypertension    ESRD on peritoneal dialysis    CVA (cerebrovascular accident)  2010 without residual effects    Hyperlipidemia    BPH (benign prostatic hyperplasia)    CAD (coronary artery disease)    T2DM (type 2 diabetes mellitus)    Hypothyroidism    History of peritoneal dialysis  s/p PD catheter placement    S/P coronary artery stent placement    Vital Signs Last 24 Hrs  T(C): 36.2 (09 Apr 2025 00:40), Max: 36.8 (08 Apr 2025 05:38)  T(F): 97.2 (09 Apr 2025 00:40), Max: 98.2 (08 Apr 2025 05:38)  HR: 53 (09 Apr 2025 03:30) (47 - 65)  BP: 151/70 (09 Apr 2025 01:45) (135/62 - 226/89)  BP(mean): 100 (09 Apr 2025 01:45) (88 - 100)  RR: 16 (09 Apr 2025 03:30) (15 - 18)  SpO2: 100% (09 Apr 2025 03:30) (98% - 100%)    Parameters below as of 09 Apr 2025 01:00  Patient On (Oxygen Delivery Method): room air    I&O's Summary    08 Apr 2025 07:01  -  09 Apr 2025 04:57  --------------------------------------------------------  IN: 497.3 mL / OUT: 1637 mL / NET: -1139.7 mL                          9.6    7.98  )-----------( 142      ( 09 Apr 2025 01:13 )             31.4     04-09    135  |  95[L]  |  24[H]  ----------------------------<  121[H]  4.5   |  19[L]  |  3.85[H]    Ca    8.3[L]      09 Apr 2025 01:13  Phos  4.7     04-09  Mg     2.3     04-09    TPro  4.8[L]  /  Alb  2.9[L]  /  TBili  0.2  /  DBili  x   /  AST  94[H]  /  ALT  17  /  AlkPhos  47  04-09    Review of systems  Gen: No weight changes, fatigue, fevers/chills, weakness  Skin: No rashes  Head/Eyes/Ears/Mouth: No headache; Normal hearing; Normal vision w/o blurriness; No sinus pain/discomfort, sore throat  Respiratory: No dyspnea, cough, wheezing, hemoptysis  CV: No chest pain, PND, orthopnea  GI: Mild abdominal pain at surgical incision site; denies diarrhea, constipation, nausea, vomiting, melena, hematochezia  : No increased frequency, dysuria, hematuria, nocturia  MSK: No joint pain/swelling; no back pain; no edema  Neuro: No dizziness/lightheadedness, weakness, seizures, numbness, tingling  Heme: No easy bruising or bleeding  Endo: No heat/cold intolerance  Psych: No significant nervousness, anxiety, stress, depression  All other systems were reviewed and are negative, except as noted.      PHYSICAL EXAM:  Constitutional: Well developed / well nourished  Eyes: Anicteric, PERRLA  ENMT: nc/at  Neck: Supple  Respiratory: CTA B/L  Cardiovascular: RRR  Gastrointestinal: Soft, non distended, mild tenderness at the incision site; incision c/d/i; COSMO x 3 SS  Genitourinary: Urinary catheter in place  Extremities: SCD's in place and working bilaterally, R permcath  Vascular: Palpable dp pulses bilaterally  Neurological: A&O x3  Skin: no rashes, ulcerations or lesions;  Musculoskeletal: Moving all extremities  Psychiatric: Responsive

## 2025-04-09 NOTE — CONSULT NOTE ADULT - ASSESSMENT
Assessment & Plan  70 y/o M with PMHx of HLD, CAD s/p LAD PCI 7/24, HFpEF , HTN, hypothyroid, type 2 DM, ESRD on HD MWF (recently converted from PD in 1/2025) via permacath placed on 1/2025, h/o cva with no residual deficits, persistent right sided pleural effusion, and hx of hemorrhagic pericardial effusion (cytopathology negative) presents to [facility] for possible DDRT. now POD#1 4/9 for right kidney transplant     Neuro  -AOx4  -Tylenol for pain control  -zofran prn for nausea    Pulm  -on RA, satting well  -Incentive spirometry postop to prevent atelectasis    Card  -home nifedipine 60mg, restarted and increased to nifedipine 90mg daily  -c/w labetalol 100mg q8  -wean cardene gtt, target 110-160  -LAD PCI 7/24 on ASA81, Plavix 75mg daily will f/u transplant when safe to restart    GI  -clear diet, advance as tolerated later today  -pepcid 20 daily per transplant    Renal  -IVL  -80 lasix per transplant to trial  -c/w cellcept 500 BID  -Potassium 6.5, discussed with nephro they plan to dialysis    Heme  -Dvt ppx on hold fresh post op, c/w SCDs B/L    ID  -postop vanganciclovir and nystatin swish and swallow per transplant  -postop ancef    Endo  FS Target 110-180

## 2025-04-09 NOTE — PROGRESS NOTE ADULT - ASSESSMENT
70 y/o M with PMHx of HLD, CAD s/p LAD PCI 7/24, HFpEF , HTN, hypothyroid, type 2 DM, ESRD on HD MWF (recently converted from PD in 1/2025) via R permacath s/p DDRT  1a + 1 v + 1 u + stent under thymoglobulin induction on 04/08/25.   []s/p DDRT under thymo induction   -Renal doppler: patent vasc  -Trends labs   -Strict I/O's, brand, JPx3 SS  -NPO  -ADAT  -Maintenance and replacement IVF  -Bowel regimen  -IS, SCD, PT    [ ] Immunosuppression  - Thymoglobulin induction  - ENV TBD,  BID , SST w/Thymo  - ppx: bactrim, nystatin, Valcyte     []HTN  -Weaning off cardene gtt  -Labetalol 100 mg BID (increase as needed)  -Restart home medications as able  68 y/o M with PMHx of HLD, CAD s/p LAD PCI 7/24, HFpEF , HTN, hypothyroid, type 2 DM, ESRD on HD MWF (recently converted from PD in 1/2025) via R permacath s/p DDRT  1a + 1 v + 1 u + stent under thymoglobulin induction on 04/08/25.   []s/p DDRT under thymo induction   -Renal doppler: patent vasc  -Anuric f/u repeat renal doppler   -Strict I/O's, cathie, JPx3 Sang  -ADAT  -d/c IVF  -Bowel regimen  -IS, SCD, PT  -HyperK shifted x1 & HTN->HD  - Lasix 80 IV x1 - no UO    [ ] Immunosuppression  - Thymoglobulin induction  - ENV TBD,  BID , SST w/Thymo  - ppx: bactrim, nystatin, Acyclovir    []HTN  -Weaning off cardene gtt  -Labetalol 100 mg BID (increase as needed)  -Holding home HTN meds

## 2025-04-09 NOTE — PHYSICAL THERAPY INITIAL EVALUATION ADULT - PERTINENT HX OF CURRENT PROBLEM, REHAB EVAL
68 y/o M with PMHx of HLD, CAD s/p LAD PCI 7/24, HFpEF , HTN, hypothyroid, type 2 DM, ESRD on HD MWF (recently converted from PD in 1/2025) via permacath placed on 1/2025, h/o cva with no residual deficits, persistent right sided pleural effusion, and hx of hemorrhagic pericardial effusion (cytopathology negative) presents to [facility] for possible DDRT. He states he makes less than half cup of urine a day. Denies fever, chills, N/V/D/C, abdominal pain, CP, SOB, hemoptysis, and /GI complaints. Now s/p R DDRT on 4/9.

## 2025-04-09 NOTE — PROGRESS NOTE ADULT - ASSESSMENT
69M with PMHx of HLD, CAD s/p LAD PCI 7/24, HFpEF, HTN, hypothyroid, type 2 DM, ESRD on HD MWF presents to [facility] for possible DDRT. Transplant nephrology consulted for ESRD on Hemodialysis.     1. S/p DDRT (4/8) with 1a/1v/1u + stent   Last outpatient HD treatment was on 4/7/25 with 1.5L UF  Resume on HD on 4/8 and another session on 4/9  Pt made about 2 cups of urine before surgery  He currently remains anuric  Plan to give lasix 80 x1  Monitor urine output  Renal doppler-patent vasculature, subcaps air around kidney, 2.3cm collection    IS  Thymo induction  MMF, solumedrol  Tac not started yet    2 HTN emergency  Home meds are: Labetalol 200 mg BID, Nifedipine 60 mg BID, Doxazosin 4 mg, Isosorbide mononitrate 60 mg  Pt received HD on 4/8 with improvement of BP, plan for additional session today with 1L removal   Pt was placed on cardene drip  Plan to increase Procardia to 90, depending on BP post HD  Give lasix 80 x1 today      3. MBD  On Calcitriol     69M with PMHx of HLD, CAD s/p LAD PCI 7/24, HFpEF, HTN, hypothyroid, type 2 DM, ESRD on HD MWF presents to [facility] for possible DDRT. Transplant nephrology consulted for ESRD on Hemodialysis.     S/p DDRT (4/8) with 1a/1v/1u + stent   Last outpatient HD treatment was on 4/7/25 with 1.5L UF  Resume on HD on 4/8 and another session on 4/9  Pt made about 2 cups of urine before surgery  He currently remains anuric  Plan to give lasix 80 x1  Renal doppler-patent vasculature, subcaps air around kidney, 2.3cm collection  Monitor urine output and renal function    IS  Thymo induction  MMF, solumedrol  Ppx-Bactrim, Nystatin, Acyclovir    HTN emergency  Home meds are: Labetalol 200 mg BID, Nifedipine 60 mg BID, Doxazosin 4 mg, Isosorbide mononitrate 60 mg  Pt currently on Nifedipine, Labetalol,   Pt was placed on cardene drip, wean as tolerated  Will consider increasing Procardia to 90, depending on BP post HD  Give lasix 80 x1 today  Pt received HD on 4/8 with improvement of BP, plan for additional session today with 1L removal   Continue to monitor    Hyperkalemia  Pt received hyperkalemia cocktail  Plan for additional session of HD today      Please call if you have any questions  Michael Palm, PGY4  Nephrology Fellow  [id number]/Teams preferred  (After 5PM or weekends, reach out to fellow on call)

## 2025-04-09 NOTE — PROGRESS NOTE ADULT - SUBJECTIVE AND OBJECTIVE BOX
[facility] DIVISION OF KIDNEY DISEASES AND HYPERTENSION -- FOLLOW UP NOTE  --------------------------------------------------------------------------------  Chief Complaint:    24 hour events/subjective:        PAST HISTORY  --------------------------------------------------------------------------------  No significant changes to PMH, PSH, FHx, SHx, unless otherwise noted    ALLERGIES & MEDICATIONS  --------------------------------------------------------------------------------  Allergies    hydrALAZINE (Short breath)    Intolerances      Standing Inpatient Medications  acyclovir   Oral Tab/Cap 400 milliGRAM(s) Oral two times a day  chlorhexidine 2% Cloths 1 Application(s) Topical daily  insulin lispro (ADMELOG) corrective regimen sliding scale   SubCutaneous three times a day before meals  insulin lispro (ADMELOG) corrective regimen sliding scale   SubCutaneous at bedtime  isosorbide   mononitrate ER Tablet (IMDUR) 60 milliGRAM(s) Oral daily  labetalol 100 milliGRAM(s) Oral three times a day  niCARdipine Infusion 2.5 mG/Hr IV Continuous <Continuous>    PRN Inpatient Medications  acetaminophen     Tablet .. 650 milliGRAM(s) Oral every 6 hours PRN  artificial tears (preservative free) Ophthalmic Solution 1 Drop(s) Left EYE daily PRN  ondansetron Injectable 4 milliGRAM(s) IV Push every 6 hours PRN      REVIEW OF SYSTEMS  --------------------------------------------------------------------------------  Gen: No fevers/chills  Respiratory: No dyspnea, cough,   CV: No chest pain, PND, orthopnea  GI: No abdominal pain, diarrhea, constipation, nausea, vomiting  Transplant: No pain  : No increased frequency, dysuria, hematuria   MSK: No edema  Neuro: No dizziness/lightheadedness    All other systems were reviewed and are negative, except as noted.    VITALS/PHYSICAL EXAM  --------------------------------------------------------------------------------  T(C): 36.9 (04-09-25 @ 15:00), Max: 36.9 (04-09-25 @ 15:00)  HR: 52 (04-09-25 @ 16:30) (52 - 66)  BP: 148/65 (04-09-25 @ 11:00) (135/62 - 177/78)  RR: 17 (04-09-25 @ 16:30) (15 - 38)  SpO2: 100% (04-09-25 @ 16:30) (100% - 100%)  Wt(kg): --  Height (cm): 172.7 (04-08-25 @ 16:32)  Weight (kg): 55.6 (04-08-25 @ 16:32)  BMI (kg/m2): 18.6 (04-08-25 @ 16:32)  BSA (m2): 1.66 (04-08-25 @ 16:32)      04-08-25 @ 07:01  -  04-09-25 @ 07:00  --------------------------------------------------------  IN: 749.8 mL / OUT: 1777 mL / NET: -1027.2 mL    04-09-25 @ 07:01  -  04-09-25 @ 17:22  --------------------------------------------------------  IN: 778.2 mL / OUT: 90 mL / NET: 688.2 mL      Physical Exam:  	Gen: NAD, able to speak in full sentences   	HEENT: PERRL, MMM   	Pulm: CTA B/L, no crackles   	CV: RRR, S1S2+  	Abd: +BS, soft          Transplant: No tenderness, swelling  	: No suprapubic tenderness  	MSK: no edema   	Psych: Normal affect and mood  	Skin: Warm          Access:    LABS/STUDIES  --------------------------------------------------------------------------------              10.0   12.06 >-----------<  158      [04-09-25 @ 15:43]              31.7     129  |  90  |  36  ----------------------------<  243      [04-09-25 @ 15:44]  6.3   |  18  |  4.76        Ca     8.6     [04-09-25 @ 15:44]      Mg     2.3     [04-09-25 @ 15:44]      Phos  5.4     [04-09-25 @ 15:44]    TPro  5.5  /  Alb  3.2  /  TBili  0.2  /  DBili  x   /  AST  78  /  ALT  10  /  AlkPhos  56  [04-09-25 @ 15:44]    PT/INR: PT 12.2 , INR 1.07       [04-09-25 @ 01:13]  PTT: 27.4       [04-09-25 @ 01:13]      Creatinine Trend:  SCr 4.76 [04-09 @ 15:44]  SCr 4.51 [04-09 @ 10:32]  SCr 4.32 [04-09 @ 06:51]  SCr 3.85 [04-09 @ 01:13]  SCr 4.86 [04-07 @ 21:39]              Urinalysis - [04-09-25 @ 15:44]      Color  / Appearance  / SG  / pH       Gluc 243 / Ketone   / Bili  / Urobili        Blood  / Protein  / Leuk Est  / Nitrite       RBC  / WBC  / Hyaline  / Gran  / Sq Epi  / Non Sq Epi  / Bacteria       Iron 17, TIBC 99, %sat 17      [01-08-25 @ 04:35]  PTH -- (Ca 7.9)      [12-30-24 @ 06:50]   229  PTH -- (Ca 7.6)      [11-27-24 @ 04:23]   250  Vitamin D (25OH) 17.1      [11-25-24 @ 06:50]  TSH 3.53      [12-30-24 @ 06:50]  Lipid: chol 112, TG 61, HDL 46, LDL --      [12-31-24 @ 04:27]    HBsAb 46.0      [04-07-25 @ 21:39]  HBsAg Nonreact      [04-07-25 @ 21:39]  HBcAb Nonreact      [04-07-25 @ 21:39]  HCV 0.05, Nonreact      [04-07-25 @ 21:39]  HIV Nonreact      [04-07-25 @ 21:39]       [facility] DIVISION OF KIDNEY DISEASES AND HYPERTENSION -- FOLLOW UP NOTE  --------------------------------------------------------------------------------  Chief Complaint: DDRT    24 hour events/subjective: Pt was hypertensive overnight, needed cardene drip.       PAST HISTORY  --------------------------------------------------------------------------------  No significant changes to PMH, PSH, FHx, SHx, unless otherwise noted    ALLERGIES & MEDICATIONS  --------------------------------------------------------------------------------  Allergies    hydrALAZINE (Short breath)    Intolerances      Standing Inpatient Medications  acyclovir   Oral Tab/Cap 400 milliGRAM(s) Oral two times a day  chlorhexidine 2% Cloths 1 Application(s) Topical daily  insulin lispro (ADMELOG) corrective regimen sliding scale   SubCutaneous three times a day before meals  insulin lispro (ADMELOG) corrective regimen sliding scale   SubCutaneous at bedtime  isosorbide   mononitrate ER Tablet (IMDUR) 60 milliGRAM(s) Oral daily  labetalol 100 milliGRAM(s) Oral three times a day  niCARdipine Infusion 2.5 mG/Hr IV Continuous <Continuous>    PRN Inpatient Medications  acetaminophen     Tablet .. 650 milliGRAM(s) Oral every 6 hours PRN  artificial tears (preservative free) Ophthalmic Solution 1 Drop(s) Left EYE daily PRN  ondansetron Injectable 4 milliGRAM(s) IV Push every 6 hours PRN      REVIEW OF SYSTEMS  --------------------------------------------------------------------------------  Gen: No fevers/chills  Respiratory: No dyspnea, cough,   CV: No chest pain, PND, orthopnea  GI: No abdominal pain, diarrhea, constipation, nausea, vomiting  Transplant: No pain  : No increased frequency, dysuria, hematuria   MSK: No edema  Neuro: No dizziness/lightheadedness    All other systems were reviewed and are negative, except as noted.    VITALS/PHYSICAL EXAM  --------------------------------------------------------------------------------  T(C): 36.9 (04-09-25 @ 15:00), Max: 36.9 (04-09-25 @ 15:00)  HR: 52 (04-09-25 @ 16:30) (52 - 66)  BP: 148/65 (04-09-25 @ 11:00) (135/62 - 177/78)  RR: 17 (04-09-25 @ 16:30) (15 - 38)  SpO2: 100% (04-09-25 @ 16:30) (100% - 100%)  Wt(kg): --  Height (cm): 172.7 (04-08-25 @ 16:32)  Weight (kg): 55.6 (04-08-25 @ 16:32)  BMI (kg/m2): 18.6 (04-08-25 @ 16:32)  BSA (m2): 1.66 (04-08-25 @ 16:32)      04-08-25 @ 07:01  -  04-09-25 @ 07:00  --------------------------------------------------------  IN: 749.8 mL / OUT: 1777 mL / NET: -1027.2 mL    04-09-25 @ 07:01  -  04-09-25 @ 17:22  --------------------------------------------------------  IN: 778.2 mL / OUT: 90 mL / NET: 688.2 mL      Physical Exam:  	Gen: NAD, able to speak in full sentences   	HEENT: PERRL, MMM   	Pulm: CTA B/L, no crackles   	CV: RRR, S1S2+  	Abd: +BS, soft          Transplant: No tenderness, swelling  	: No suprapubic tenderness  	MSK: no edema   	Psych: Normal affect and mood  	Skin: Warm          Access: [facility]    LABS/STUDIES  --------------------------------------------------------------------------------              10.0   12.06 >-----------<  158      [04-09-25 @ 15:43]              31.7     129  |  90  |  36  ----------------------------<  243      [04-09-25 @ 15:44]  6.3   |  18  |  4.76        Ca     8.6     [04-09-25 @ 15:44]      Mg     2.3     [04-09-25 @ 15:44]      Phos  5.4     [04-09-25 @ 15:44]    TPro  5.5  /  Alb  3.2  /  TBili  0.2  /  DBili  x   /  AST  78  /  ALT  10  /  AlkPhos  56  [04-09-25 @ 15:44]    PT/INR: PT 12.2 , INR 1.07       [04-09-25 @ 01:13]  PTT: 27.4       [04-09-25 @ 01:13]      Creatinine Trend:  SCr 4.76 [04-09 @ 15:44]  SCr 4.51 [04-09 @ 10:32]  SCr 4.32 [04-09 @ 06:51]  SCr 3.85 [04-09 @ 01:13]  SCr 4.86 [04-07 @ 21:39]              Urinalysis - [04-09-25 @ 15:44]      Color  / Appearance  / SG  / pH       Gluc 243 / Ketone   / Bili  / Urobili        Blood  / Protein  / Leuk Est  / Nitrite       RBC  / WBC  / Hyaline  / Gran  / Sq Epi  / Non Sq Epi  / Bacteria       Iron 17, TIBC 99, %sat 17      [01-08-25 @ 04:35]  PTH -- (Ca 7.9)      [12-30-24 @ 06:50]   229  PTH -- (Ca 7.6)      [11-27-24 @ 04:23]   250  Vitamin D (25OH) 17.1      [11-25-24 @ 06:50]  TSH 3.53      [12-30-24 @ 06:50]  Lipid: chol 112, TG 61, HDL 46, LDL --      [12-31-24 @ 04:27]    HBsAb 46.0      [04-07-25 @ 21:39]  HBsAg Nonreact      [04-07-25 @ 21:39]  HBcAb Nonreact      [04-07-25 @ 21:39]  HCV 0.05, Nonreact      [04-07-25 @ 21:39]  HIV Nonreact      [04-07-25 @ 21:39]

## 2025-04-10 LAB
ALBUMIN SERPL ELPH-MCNC: 3.1 G/DL — LOW (ref 3.3–5)
ALBUMIN SERPL ELPH-MCNC: 3.1 G/DL — LOW (ref 3.3–5)
ALBUMIN SERPL ELPH-MCNC: 3.3 G/DL — SIGNIFICANT CHANGE UP (ref 3.3–5)
ALBUMIN SERPL ELPH-MCNC: 3.3 G/DL — SIGNIFICANT CHANGE UP (ref 3.3–5)
ALP SERPL-CCNC: 50 U/L — SIGNIFICANT CHANGE UP (ref 40–120)
ALP SERPL-CCNC: 52 U/L — SIGNIFICANT CHANGE UP (ref 40–120)
ALP SERPL-CCNC: 52 U/L — SIGNIFICANT CHANGE UP (ref 40–120)
ALP SERPL-CCNC: 53 U/L — SIGNIFICANT CHANGE UP (ref 40–120)
ALT FLD-CCNC: 8 U/L — LOW (ref 10–45)
ALT FLD-CCNC: <5 U/L — LOW (ref 10–45)
ANION GAP SERPL CALC-SCNC: 18 MMOL/L — HIGH (ref 5–17)
ANION GAP SERPL CALC-SCNC: 18 MMOL/L — HIGH (ref 5–17)
ANION GAP SERPL CALC-SCNC: 19 MMOL/L — HIGH (ref 5–17)
ANION GAP SERPL CALC-SCNC: 19 MMOL/L — HIGH (ref 5–17)
APTT BLD: 26.4 SEC — SIGNIFICANT CHANGE UP (ref 24.5–35.6)
APTT BLD: 26.7 SEC — SIGNIFICANT CHANGE UP (ref 24.5–35.6)
APTT BLD: 27.1 SEC — SIGNIFICANT CHANGE UP (ref 24.5–35.6)
APTT BLD: >200 SEC — CRITICAL HIGH (ref 24.5–35.6)
AST SERPL-CCNC: 36 U/L — SIGNIFICANT CHANGE UP (ref 10–40)
AST SERPL-CCNC: 44 U/L — HIGH (ref 10–40)
AST SERPL-CCNC: 49 U/L — HIGH (ref 10–40)
AST SERPL-CCNC: 55 U/L — HIGH (ref 10–40)
BASOPHILS # BLD AUTO: 0 K/UL — SIGNIFICANT CHANGE UP (ref 0–0.2)
BASOPHILS # BLD AUTO: 0.01 K/UL — SIGNIFICANT CHANGE UP (ref 0–0.2)
BASOPHILS NFR BLD AUTO: 0 % — SIGNIFICANT CHANGE UP (ref 0–2)
BASOPHILS NFR BLD AUTO: 0.1 % — SIGNIFICANT CHANGE UP (ref 0–2)
BILIRUB SERPL-MCNC: 0.2 MG/DL — SIGNIFICANT CHANGE UP (ref 0.2–1.2)
BUN SERPL-MCNC: 29 MG/DL — HIGH (ref 7–23)
BUN SERPL-MCNC: 37 MG/DL — HIGH (ref 7–23)
BUN SERPL-MCNC: 42 MG/DL — HIGH (ref 7–23)
BUN SERPL-MCNC: 51 MG/DL — HIGH (ref 7–23)
CALCIUM SERPL-MCNC: 8.3 MG/DL — LOW (ref 8.4–10.5)
CALCIUM SERPL-MCNC: 8.4 MG/DL — SIGNIFICANT CHANGE UP (ref 8.4–10.5)
CALCIUM SERPL-MCNC: 8.4 MG/DL — SIGNIFICANT CHANGE UP (ref 8.4–10.5)
CALCIUM SERPL-MCNC: 8.5 MG/DL — SIGNIFICANT CHANGE UP (ref 8.4–10.5)
CHLORIDE SERPL-SCNC: 92 MMOL/L — LOW (ref 96–108)
CHLORIDE SERPL-SCNC: 93 MMOL/L — LOW (ref 96–108)
CHLORIDE SERPL-SCNC: 93 MMOL/L — LOW (ref 96–108)
CHLORIDE SERPL-SCNC: 95 MMOL/L — LOW (ref 96–108)
CO2 SERPL-SCNC: 19 MMOL/L — LOW (ref 22–31)
CO2 SERPL-SCNC: 19 MMOL/L — LOW (ref 22–31)
CO2 SERPL-SCNC: 20 MMOL/L — LOW (ref 22–31)
CO2 SERPL-SCNC: 20 MMOL/L — LOW (ref 22–31)
CREAT SERPL-MCNC: 3.42 MG/DL — HIGH (ref 0.5–1.3)
CREAT SERPL-MCNC: 4.06 MG/DL — HIGH (ref 0.5–1.3)
CREAT SERPL-MCNC: 4.53 MG/DL — HIGH (ref 0.5–1.3)
CREAT SERPL-MCNC: 4.8 MG/DL — HIGH (ref 0.5–1.3)
EGFR: 12 ML/MIN/1.73M2 — LOW
EGFR: 12 ML/MIN/1.73M2 — LOW
EGFR: 13 ML/MIN/1.73M2 — LOW
EGFR: 13 ML/MIN/1.73M2 — LOW
EGFR: 15 ML/MIN/1.73M2 — LOW
EGFR: 15 ML/MIN/1.73M2 — LOW
EGFR: 19 ML/MIN/1.73M2 — LOW
EGFR: 19 ML/MIN/1.73M2 — LOW
EOSINOPHIL # BLD AUTO: 0 K/UL — SIGNIFICANT CHANGE UP (ref 0–0.5)
EOSINOPHIL # BLD AUTO: 0.01 K/UL — SIGNIFICANT CHANGE UP (ref 0–0.5)
EOSINOPHIL NFR BLD AUTO: 0 % — SIGNIFICANT CHANGE UP (ref 0–6)
EOSINOPHIL NFR BLD AUTO: 0.1 % — SIGNIFICANT CHANGE UP (ref 0–6)
GAS PNL BLDV: SIGNIFICANT CHANGE UP
GLUCOSE BLDC GLUCOMTR-MCNC: 151 MG/DL — HIGH (ref 70–99)
GLUCOSE BLDC GLUCOMTR-MCNC: 248 MG/DL — HIGH (ref 70–99)
GLUCOSE BLDC GLUCOMTR-MCNC: 272 MG/DL — HIGH (ref 70–99)
GLUCOSE SERPL-MCNC: 158 MG/DL — HIGH (ref 70–99)
GLUCOSE SERPL-MCNC: 231 MG/DL — HIGH (ref 70–99)
GLUCOSE SERPL-MCNC: 247 MG/DL — HIGH (ref 70–99)
GLUCOSE SERPL-MCNC: 248 MG/DL — HIGH (ref 70–99)
HCT VFR BLD CALC: 23.9 % — LOW (ref 39–50)
HCT VFR BLD CALC: 25.4 % — LOW (ref 39–50)
HCT VFR BLD CALC: 27.2 % — LOW (ref 39–50)
HCT VFR BLD CALC: 27.9 % — LOW (ref 39–50)
HGB BLD-MCNC: 7.5 G/DL — LOW (ref 13–17)
HGB BLD-MCNC: 8.2 G/DL — LOW (ref 13–17)
HGB BLD-MCNC: 8.5 G/DL — LOW (ref 13–17)
HGB BLD-MCNC: 8.5 G/DL — LOW (ref 13–17)
IMM GRANULOCYTES NFR BLD AUTO: 0.3 % — SIGNIFICANT CHANGE UP (ref 0–0.9)
IMM GRANULOCYTES NFR BLD AUTO: 0.5 % — SIGNIFICANT CHANGE UP (ref 0–0.9)
IMM GRANULOCYTES NFR BLD AUTO: 0.6 % — SIGNIFICANT CHANGE UP (ref 0–0.9)
IMM GRANULOCYTES NFR BLD AUTO: 0.7 % — SIGNIFICANT CHANGE UP (ref 0–0.9)
INR BLD: 1.08 RATIO — SIGNIFICANT CHANGE UP (ref 0.85–1.16)
INR BLD: 1.1 RATIO — SIGNIFICANT CHANGE UP (ref 0.85–1.16)
INR BLD: 1.16 RATIO — SIGNIFICANT CHANGE UP (ref 0.85–1.16)
LDH SERPL L TO P-CCNC: 436 U/L — HIGH (ref 50–242)
LYMPHOCYTES # BLD AUTO: 0.07 K/UL — LOW (ref 1–3.3)
LYMPHOCYTES # BLD AUTO: 0.07 K/UL — LOW (ref 1–3.3)
LYMPHOCYTES # BLD AUTO: 0.08 K/UL — LOW (ref 1–3.3)
LYMPHOCYTES # BLD AUTO: 0.08 K/UL — LOW (ref 1–3.3)
LYMPHOCYTES # BLD AUTO: 0.8 % — LOW (ref 13–44)
LYMPHOCYTES # BLD AUTO: 0.9 % — LOW (ref 13–44)
LYMPHOCYTES # BLD AUTO: 1 % — LOW (ref 13–44)
LYMPHOCYTES # BLD AUTO: 1.1 % — LOW (ref 13–44)
MAGNESIUM SERPL-MCNC: 2 MG/DL — SIGNIFICANT CHANGE UP (ref 1.6–2.6)
MAGNESIUM SERPL-MCNC: 2.2 MG/DL — SIGNIFICANT CHANGE UP (ref 1.6–2.6)
MAGNESIUM SERPL-MCNC: 2.2 MG/DL — SIGNIFICANT CHANGE UP (ref 1.6–2.6)
MAGNESIUM SERPL-MCNC: 2.3 MG/DL — SIGNIFICANT CHANGE UP (ref 1.6–2.6)
MCHC RBC-ENTMCNC: 26.6 PG — LOW (ref 27–34)
MCHC RBC-ENTMCNC: 27 PG — SIGNIFICANT CHANGE UP (ref 27–34)
MCHC RBC-ENTMCNC: 27.2 PG — SIGNIFICANT CHANGE UP (ref 27–34)
MCHC RBC-ENTMCNC: 28.2 PG — SIGNIFICANT CHANGE UP (ref 27–34)
MCHC RBC-ENTMCNC: 30.5 G/DL — LOW (ref 32–36)
MCHC RBC-ENTMCNC: 31.3 G/DL — LOW (ref 32–36)
MCHC RBC-ENTMCNC: 31.4 G/DL — LOW (ref 32–36)
MCHC RBC-ENTMCNC: 32.3 G/DL — SIGNIFICANT CHANGE UP (ref 32–36)
MCV RBC AUTO: 86.3 FL — SIGNIFICANT CHANGE UP (ref 80–100)
MCV RBC AUTO: 86.6 FL — SIGNIFICANT CHANGE UP (ref 80–100)
MCV RBC AUTO: 87.2 FL — SIGNIFICANT CHANGE UP (ref 80–100)
MCV RBC AUTO: 87.3 FL — SIGNIFICANT CHANGE UP (ref 80–100)
MONOCYTES # BLD AUTO: 0.49 K/UL — SIGNIFICANT CHANGE UP (ref 0–0.9)
MONOCYTES # BLD AUTO: 0.5 K/UL — SIGNIFICANT CHANGE UP (ref 0–0.9)
MONOCYTES # BLD AUTO: 0.51 K/UL — SIGNIFICANT CHANGE UP (ref 0–0.9)
MONOCYTES # BLD AUTO: 0.51 K/UL — SIGNIFICANT CHANGE UP (ref 0–0.9)
MONOCYTES NFR BLD AUTO: 5.4 % — SIGNIFICANT CHANGE UP (ref 2–14)
MONOCYTES NFR BLD AUTO: 6 % — SIGNIFICANT CHANGE UP (ref 2–14)
MONOCYTES NFR BLD AUTO: 6.2 % — SIGNIFICANT CHANGE UP (ref 2–14)
MONOCYTES NFR BLD AUTO: 8 % — SIGNIFICANT CHANGE UP (ref 2–14)
NEUTROPHILS # BLD AUTO: 5.51 K/UL — SIGNIFICANT CHANGE UP (ref 1.8–7.4)
NEUTROPHILS # BLD AUTO: 7.38 K/UL — SIGNIFICANT CHANGE UP (ref 1.8–7.4)
NEUTROPHILS # BLD AUTO: 7.82 K/UL — HIGH (ref 1.8–7.4)
NEUTROPHILS # BLD AUTO: 8.7 K/UL — HIGH (ref 1.8–7.4)
NEUTROPHILS NFR BLD AUTO: 90.6 % — HIGH (ref 43–77)
NEUTROPHILS NFR BLD AUTO: 92.1 % — HIGH (ref 43–77)
NEUTROPHILS NFR BLD AUTO: 92.4 % — HIGH (ref 43–77)
NEUTROPHILS NFR BLD AUTO: 92.8 % — HIGH (ref 43–77)
NRBC BLD AUTO-RTO: 0 /100 WBCS — SIGNIFICANT CHANGE UP (ref 0–0)
PHOSPHATE SERPL-MCNC: 5.6 MG/DL — HIGH (ref 2.5–4.5)
PHOSPHATE SERPL-MCNC: 6 MG/DL — HIGH (ref 2.5–4.5)
PHOSPHATE SERPL-MCNC: 6 MG/DL — HIGH (ref 2.5–4.5)
PHOSPHATE SERPL-MCNC: 6.3 MG/DL — HIGH (ref 2.5–4.5)
PLATELET # BLD AUTO: 143 K/UL — LOW (ref 150–400)
PLATELET # BLD AUTO: 144 K/UL — LOW (ref 150–400)
PLATELET # BLD AUTO: 149 K/UL — LOW (ref 150–400)
PLATELET # BLD AUTO: 153 K/UL — SIGNIFICANT CHANGE UP (ref 150–400)
POTASSIUM SERPL-MCNC: 4.9 MMOL/L — SIGNIFICANT CHANGE UP (ref 3.5–5.3)
POTASSIUM SERPL-MCNC: 5 MMOL/L — SIGNIFICANT CHANGE UP (ref 3.5–5.3)
POTASSIUM SERPL-MCNC: 5.2 MMOL/L — SIGNIFICANT CHANGE UP (ref 3.5–5.3)
POTASSIUM SERPL-MCNC: 5.2 MMOL/L — SIGNIFICANT CHANGE UP (ref 3.5–5.3)
POTASSIUM SERPL-SCNC: 4.9 MMOL/L — SIGNIFICANT CHANGE UP (ref 3.5–5.3)
POTASSIUM SERPL-SCNC: 5 MMOL/L — SIGNIFICANT CHANGE UP (ref 3.5–5.3)
POTASSIUM SERPL-SCNC: 5.2 MMOL/L — SIGNIFICANT CHANGE UP (ref 3.5–5.3)
POTASSIUM SERPL-SCNC: 5.2 MMOL/L — SIGNIFICANT CHANGE UP (ref 3.5–5.3)
PROT SERPL-MCNC: 5.3 G/DL — LOW (ref 6–8.3)
PROT SERPL-MCNC: 5.3 G/DL — LOW (ref 6–8.3)
PROT SERPL-MCNC: 5.5 G/DL — LOW (ref 6–8.3)
PROT SERPL-MCNC: 5.6 G/DL — LOW (ref 6–8.3)
PROTHROM AB SERPL-ACNC: 12.3 SEC — SIGNIFICANT CHANGE UP (ref 9.9–13.4)
PROTHROM AB SERPL-ACNC: 12.5 SEC — SIGNIFICANT CHANGE UP (ref 9.9–13.4)
PROTHROM AB SERPL-ACNC: 13.3 SEC — SIGNIFICANT CHANGE UP (ref 9.9–13.4)
RBC # BLD: 2.76 M/UL — LOW (ref 4.2–5.8)
RBC # BLD: 2.91 M/UL — LOW (ref 4.2–5.8)
RBC # BLD: 3.15 M/UL — LOW (ref 4.2–5.8)
RBC # BLD: 3.2 M/UL — LOW (ref 4.2–5.8)
RBC # FLD: 19.5 % — HIGH (ref 10.3–14.5)
RBC # FLD: 19.7 % — HIGH (ref 10.3–14.5)
RBC # FLD: 19.8 % — HIGH (ref 10.3–14.5)
RBC # FLD: 19.9 % — HIGH (ref 10.3–14.5)
SODIUM SERPL-SCNC: 130 MMOL/L — LOW (ref 135–145)
SODIUM SERPL-SCNC: 130 MMOL/L — LOW (ref 135–145)
SODIUM SERPL-SCNC: 132 MMOL/L — LOW (ref 135–145)
SODIUM SERPL-SCNC: 133 MMOL/L — LOW (ref 135–145)
WBC # BLD: 6.09 K/UL — SIGNIFICANT CHANGE UP (ref 3.8–10.5)
WBC # BLD: 8.02 K/UL — SIGNIFICANT CHANGE UP (ref 3.8–10.5)
WBC # BLD: 8.47 K/UL — SIGNIFICANT CHANGE UP (ref 3.8–10.5)
WBC # BLD: 9.38 K/UL — SIGNIFICANT CHANGE UP (ref 3.8–10.5)
WBC # FLD AUTO: 6.09 K/UL — SIGNIFICANT CHANGE UP (ref 3.8–10.5)
WBC # FLD AUTO: 8.02 K/UL — SIGNIFICANT CHANGE UP (ref 3.8–10.5)
WBC # FLD AUTO: 8.47 K/UL — SIGNIFICANT CHANGE UP (ref 3.8–10.5)
WBC # FLD AUTO: 9.38 K/UL — SIGNIFICANT CHANGE UP (ref 3.8–10.5)

## 2025-04-10 PROCEDURE — 99233 SBSQ HOSP IP/OBS HIGH 50: CPT

## 2025-04-10 PROCEDURE — 99223 1ST HOSP IP/OBS HIGH 75: CPT

## 2025-04-10 PROCEDURE — G0545: CPT

## 2025-04-10 RX ORDER — NIFEDIPINE 30 MG
60 TABLET, EXTENDED RELEASE 24 HR ORAL DAILY
Refills: 0 | Status: DISCONTINUED | OUTPATIENT
Start: 2025-04-10 | End: 2025-04-11

## 2025-04-10 RX ORDER — ISOSORBIDE MONONITRATE 60 MG/1
30 TABLET, EXTENDED RELEASE ORAL DAILY
Refills: 0 | Status: DISCONTINUED | OUTPATIENT
Start: 2025-04-10 | End: 2025-04-13

## 2025-04-10 RX ORDER — DIPHENHYDRAMINE HCL 12.5MG/5ML
50 ELIXIR ORAL ONCE
Refills: 0 | Status: COMPLETED | OUTPATIENT
Start: 2025-04-10 | End: 2025-04-10

## 2025-04-10 RX ORDER — ISOPROPYL ALCOHOL 70 ML/100ML
SWAB TOPICAL
Qty: 1 | Refills: 11 | Status: ACTIVE | COMMUNITY
Start: 2025-04-10 | End: 1900-01-01

## 2025-04-10 RX ORDER — TAMSULOSIN HYDROCHLORIDE 0.4 MG/1
0.4 CAPSULE ORAL
Qty: 30 | Refills: 11 | Status: ACTIVE | COMMUNITY
Start: 2025-04-10 | End: 1900-01-01

## 2025-04-10 RX ORDER — MYCOPHENOLATE MOFETIL 500 MG/1
500 TABLET, FILM COATED ORAL
Refills: 0 | Status: DISCONTINUED | OUTPATIENT
Start: 2025-04-10 | End: 2025-04-23

## 2025-04-10 RX ORDER — FUROSEMIDE 10 MG/ML
80 INJECTION INTRAMUSCULAR; INTRAVENOUS ONCE
Refills: 0 | Status: COMPLETED | OUTPATIENT
Start: 2025-04-10 | End: 2025-04-10

## 2025-04-10 RX ORDER — BLOOD-GLUCOSE METER
W/DEVICE KIT MISCELLANEOUS
Qty: 1 | Refills: 0 | Status: ACTIVE | COMMUNITY
Start: 2025-04-10 | End: 1900-01-01

## 2025-04-10 RX ORDER — DOXAZOSIN MESYLATE 8 MG/1
4 TABLET ORAL AT BEDTIME
Refills: 0 | Status: DISCONTINUED | OUTPATIENT
Start: 2025-04-10 | End: 2025-04-10

## 2025-04-10 RX ORDER — SENNA 187 MG
2 TABLET ORAL AT BEDTIME
Refills: 0 | Status: DISCONTINUED | OUTPATIENT
Start: 2025-04-10 | End: 2025-04-23

## 2025-04-10 RX ORDER — VANCOMYCIN HCL IN 5 % DEXTROSE 1.5G/250ML
1000 PLASTIC BAG, INJECTION (ML) INTRAVENOUS ONCE
Refills: 0 | Status: COMPLETED | OUTPATIENT
Start: 2025-04-10 | End: 2025-04-10

## 2025-04-10 RX ORDER — ISOSORBIDE MONONITRATE 30 MG/1
30 TABLET, EXTENDED RELEASE ORAL DAILY
Qty: 30 | Refills: 11 | Status: ACTIVE | COMMUNITY
Start: 2025-04-10 | End: 1900-01-01

## 2025-04-10 RX ORDER — BELATACEPT 250 MG/1
562.5 INJECTION, POWDER, LYOPHILIZED, FOR SOLUTION INTRAVENOUS ONCE
Refills: 0 | Status: COMPLETED | OUTPATIENT
Start: 2025-04-10 | End: 2025-04-10

## 2025-04-10 RX ORDER — ACETAMINOPHEN 500 MG/5ML
650 LIQUID (ML) ORAL ONCE
Refills: 0 | Status: COMPLETED | OUTPATIENT
Start: 2025-04-10 | End: 2025-04-10

## 2025-04-10 RX ORDER — PEN NEEDLE, DIABETIC 29 G X1/2"
32G X 4 MM NEEDLE, DISPOSABLE MISCELLANEOUS
Qty: 1 | Refills: 11 | Status: ACTIVE | COMMUNITY
Start: 2025-04-10 | End: 1900-01-01

## 2025-04-10 RX ORDER — INSULIN LISPRO 100 U/ML
100 INJECTION, SOLUTION SUBCUTANEOUS
Qty: 2 | Refills: 11 | Status: ACTIVE | COMMUNITY
Start: 2025-04-10 | End: 1900-01-01

## 2025-04-10 RX ORDER — METHYLPREDNISOLONE ACETATE 80 MG/ML
125 INJECTION, SUSPENSION INTRA-ARTICULAR; INTRALESIONAL; INTRAMUSCULAR; SOFT TISSUE ONCE
Refills: 0 | Status: COMPLETED | OUTPATIENT
Start: 2025-04-10 | End: 2025-04-10

## 2025-04-10 RX ORDER — BLOOD SUGAR DIAGNOSTIC
STRIP MISCELLANEOUS 4 TIMES DAILY
Qty: 1 | Refills: 11 | Status: ACTIVE | COMMUNITY
Start: 2025-04-10 | End: 1900-01-01

## 2025-04-10 RX ORDER — NIFEDIPINE 60 MG/1
60 TABLET, EXTENDED RELEASE ORAL
Qty: 90 | Refills: 3 | Status: ACTIVE | COMMUNITY
Start: 2025-04-10 | End: 1900-01-01

## 2025-04-10 RX ORDER — MYCOPHENOLATE MOFETIL 500 MG/1
500 TABLET, FILM COATED ORAL ONCE
Refills: 0 | Status: COMPLETED | OUTPATIENT
Start: 2025-04-10 | End: 2025-04-10

## 2025-04-10 RX ORDER — TAMSULOSIN HYDROCHLORIDE 0.4 MG/1
0.4 CAPSULE ORAL AT BEDTIME
Refills: 0 | Status: DISCONTINUED | OUTPATIENT
Start: 2025-04-10 | End: 2025-04-13

## 2025-04-10 RX ORDER — ASPIRIN 325 MG
81 TABLET ORAL DAILY
Refills: 0 | Status: DISCONTINUED | OUTPATIENT
Start: 2025-04-10 | End: 2025-04-23

## 2025-04-10 RX ORDER — INSULIN LISPRO 100 U/ML
6 INJECTION, SOLUTION INTRAVENOUS; SUBCUTANEOUS
Refills: 0 | Status: DISCONTINUED | OUTPATIENT
Start: 2025-04-10 | End: 2025-04-18

## 2025-04-10 RX ORDER — INSULIN GLARGINE 100 [IU]/ML
100 INJECTION, SOLUTION SUBCUTANEOUS AT BEDTIME
Qty: 2 | Refills: 11 | Status: ACTIVE | COMMUNITY
Start: 2025-04-10 | End: 1900-01-01

## 2025-04-10 RX ORDER — INSULIN GLARGINE-YFGN 100 [IU]/ML
12 INJECTION, SOLUTION SUBCUTANEOUS AT BEDTIME
Refills: 0 | Status: DISCONTINUED | OUTPATIENT
Start: 2025-04-10 | End: 2025-04-16

## 2025-04-10 RX ORDER — NIFEDIPINE 30 MG
60 TABLET, EXTENDED RELEASE 24 HR ORAL
Refills: 0 | Status: DISCONTINUED | OUTPATIENT
Start: 2025-04-10 | End: 2025-04-10

## 2025-04-10 RX ORDER — POLYETHYLENE GLYCOL 3350 17 G/17G
17 POWDER, FOR SOLUTION ORAL DAILY
Refills: 0 | Status: DISCONTINUED | OUTPATIENT
Start: 2025-04-10 | End: 2025-04-16

## 2025-04-10 RX ADMIN — INSULIN LISPRO 2: 100 INJECTION, SOLUTION INTRAVENOUS; SUBCUTANEOUS at 12:26

## 2025-04-10 RX ADMIN — MYCOPHENOLATE MOFETIL 500 MILLIGRAM(S): 500 TABLET, FILM COATED ORAL at 19:49

## 2025-04-10 RX ADMIN — FUROSEMIDE 80 MILLIGRAM(S): 10 INJECTION INTRAMUSCULAR; INTRAVENOUS at 16:31

## 2025-04-10 RX ADMIN — Medication 500000 UNIT(S): at 18:11

## 2025-04-10 RX ADMIN — Medication 500000 UNIT(S): at 22:56

## 2025-04-10 RX ADMIN — ISOSORBIDE MONONITRATE 30 MILLIGRAM(S): 60 TABLET, EXTENDED RELEASE ORAL at 15:03

## 2025-04-10 RX ADMIN — LABETALOL HYDROCHLORIDE 100 MILLIGRAM(S): 200 TABLET, FILM COATED ORAL at 21:07

## 2025-04-10 RX ADMIN — Medication 1 APPLICATION(S): at 12:31

## 2025-04-10 RX ADMIN — INSULIN LISPRO 2: 100 INJECTION, SOLUTION INTRAVENOUS; SUBCUTANEOUS at 21:08

## 2025-04-10 RX ADMIN — Medication 20 MILLIGRAM(S): at 12:27

## 2025-04-10 RX ADMIN — Medication 4 MILLIGRAM(S): at 08:19

## 2025-04-10 RX ADMIN — INSULIN LISPRO 4: 100 INJECTION, SOLUTION INTRAVENOUS; SUBCUTANEOUS at 08:16

## 2025-04-10 RX ADMIN — INSULIN GLARGINE-YFGN 12 UNIT(S): 100 INJECTION, SOLUTION SUBCUTANEOUS at 21:17

## 2025-04-10 RX ADMIN — Medication 2 TABLET(S): at 21:06

## 2025-04-10 RX ADMIN — POLYETHYLENE GLYCOL 3350 17 GRAM(S): 17 POWDER, FOR SOLUTION ORAL at 12:25

## 2025-04-10 RX ADMIN — MYCOPHENOLATE MOFETIL 500 MILLIGRAM(S): 500 TABLET, FILM COATED ORAL at 09:42

## 2025-04-10 RX ADMIN — Medication 50 MILLIGRAM(S): at 13:00

## 2025-04-10 RX ADMIN — Medication 500000 UNIT(S): at 12:26

## 2025-04-10 RX ADMIN — Medication 650 MILLIGRAM(S): at 13:30

## 2025-04-10 RX ADMIN — METHYLPREDNISOLONE ACETATE 125 MILLIGRAM(S): 80 INJECTION, SUSPENSION INTRA-ARTICULAR; INTRALESIONAL; INTRAMUSCULAR; SOFT TISSUE at 13:00

## 2025-04-10 RX ADMIN — TAMSULOSIN HYDROCHLORIDE 0.4 MILLIGRAM(S): 0.4 CAPSULE ORAL at 21:07

## 2025-04-10 RX ADMIN — Medication 650 MILLIGRAM(S): at 13:00

## 2025-04-10 RX ADMIN — INSULIN LISPRO 6 UNIT(S): 100 INJECTION, SOLUTION INTRAVENOUS; SUBCUTANEOUS at 08:18

## 2025-04-10 RX ADMIN — Medication 60 MILLIGRAM(S): at 05:10

## 2025-04-10 RX ADMIN — INSULIN LISPRO 6 UNIT(S): 100 INJECTION, SOLUTION INTRAVENOUS; SUBCUTANEOUS at 12:26

## 2025-04-10 RX ADMIN — Medication 400 MILLIGRAM(S): at 18:11

## 2025-04-10 RX ADMIN — Medication 400 MILLIGRAM(S): at 05:04

## 2025-04-10 RX ADMIN — Medication 250 MILLIGRAM(S): at 19:22

## 2025-04-10 RX ADMIN — ATORVASTATIN CALCIUM 40 MILLIGRAM(S): 80 TABLET, FILM COATED ORAL at 21:07

## 2025-04-10 RX ADMIN — Medication 20 MILLIGRAM(S): at 00:10

## 2025-04-10 RX ADMIN — LABETALOL HYDROCHLORIDE 100 MILLIGRAM(S): 200 TABLET, FILM COATED ORAL at 05:04

## 2025-04-10 RX ADMIN — Medication 25 MICROGRAM(S): at 05:04

## 2025-04-10 RX ADMIN — Medication 4 MILLIGRAM(S): at 00:04

## 2025-04-10 RX ADMIN — Medication 500000 UNIT(S): at 05:04

## 2025-04-10 RX ADMIN — INSULIN LISPRO 6 UNIT(S): 100 INJECTION, SOLUTION INTRAVENOUS; SUBCUTANEOUS at 16:50

## 2025-04-10 RX ADMIN — BELATACEPT 200 MILLIGRAM(S): 250 INJECTION, POWDER, LYOPHILIZED, FOR SOLUTION INTRAVENOUS at 13:01

## 2025-04-10 NOTE — PROGRESS NOTE ADULT - SUBJECTIVE AND OBJECTIVE BOX
[facility] DIVISION OF KIDNEY DISEASES AND HYPERTENSION -- PROGRESS NOTE    SUBJECTIVE:   appears well. BP lower side. had HD yesterday     MEDICATIONS    Standing Inpatient Medications  acyclovir   Oral Tab/Cap 400 milliGRAM(s) Oral two times a day  antithymocyte globulin rabbit (peripheral) IVPB w/additives 75 milliGRAM(s) IV Intermittent once  aspirin enteric coated 81 milliGRAM(s) Oral daily  atorvastatin 40 milliGRAM(s) Oral at bedtime  chlorhexidine 2% Cloths 1 Application(s) Topical daily  famotidine    Tablet 20 milliGRAM(s) Oral daily  insulin glargine Injectable (LANTUS) 12 Unit(s) SubCutaneous at bedtime  insulin lispro (ADMELOG) corrective regimen sliding scale   SubCutaneous three times a day before meals  insulin lispro (ADMELOG) corrective regimen sliding scale   SubCutaneous at bedtime  insulin lispro Injectable (ADMELOG) 6 Unit(s) SubCutaneous three times a day before meals  isosorbide   mononitrate ER Tablet (IMDUR) 30 milliGRAM(s) Oral daily  labetalol 100 milliGRAM(s) Oral three times a day  levothyroxine 25 MICROGram(s) Oral daily  mycophenolate mofetil 500 milliGRAM(s) Oral <User Schedule>  NIFEdipine XL 60 milliGRAM(s) Oral daily  nystatin    Suspension 936881 Unit(s) Swish and Swallow four times a day  polyethylene glycol 3350 17 Gram(s) Oral daily  senna 2 Tablet(s) Oral at bedtime  tamsulosin 0.4 milliGRAM(s) Oral at bedtime    PRN Inpatient Medications  acetaminophen     Tablet .. 650 milliGRAM(s) Oral every 6 hours PRN  artificial tears (preservative free) Ophthalmic Solution 1 Drop(s) Left EYE daily PRN  labetalol Injectable 10 milliGRAM(s) IV Push every 8 hours PRN  ondansetron Injectable 4 milliGRAM(s) IV Push every 6 hours PRN      VITALS/PHYSICAL EXAM  --------------------------------------------------------------------------------  T(C): 36.6 (04-10-25 @ 07:00), Max: 36.9 (04-09-25 @ 15:00)  HR: 64 (04-10-25 @ 13:00) (52 - 80)  BP: 115/56 (04-10-25 @ 13:00) (95/50 - 137/67)  RR: 20 (04-10-25 @ 13:00) (14 - 31)  SpO2: 100% (04-10-25 @ 13:00) (95% - 100%)  Wt(kg): --  Height (cm): 172.7 (04-08-25 @ 16:32)  Weight (kg): 55.6 (04-08-25 @ 16:32)  BMI (kg/m2): 18.6 (04-08-25 @ 16:32)  BSA (m2): 1.66 (04-08-25 @ 16:32)      04-09-25 @ 07:01  -  04-10-25 @ 07:00  --------------------------------------------------------  IN: 1769.8 mL / OUT: 2035 mL / NET: -265.2 mL    04-10-25 @ 07:01  -  04-10-25 @ 14:00  --------------------------------------------------------  IN: 700 mL / OUT: 100 mL / NET: 600 mL      Physical Exam:  Alert and oriented x3   HEENT: normal  Pulm: CTA B/L  CV: normal S1S2; no murmur  Abd: soft, non-tender  Extremities- no edema  RIJ TCC+  RLQ scar with drains    LABS/STUDIES  --------------------------------------------------------------------------------  132  |  93  |  37  ----------------------------<  231      [04-10-25 @ 09:41]  5.2   |  20  |  4.06        Ca     8.4     [04-10-25 @ 09:41]      Mg     2.2     [04-10-25 @ 09:41]      Phos  6.0     [04-10-25 @ 09:41]    TPro  5.5  /  Alb  3.1  /  TBili  0.2  /  DBili  x   /  AST  49  /  ALT  <5  /  AlkPhos  52  [04-10-25 @ 09:41]    PT/INR: PT 13.3 , INR 1.16       [04-10-25 @ 03:13]  PTT: 27.1       [04-10-25 @ 03:13]      Creatinine Trend:  SCr 4.06 [04-10 @ 09:41]  SCr 3.42 [04-10 @ 03:13]  SCr 2.67 [04-09 @ 21:22]  SCr 4.76 [04-09 @ 15:44]  SCr 4.51 [04-09 @ 10:32]

## 2025-04-10 NOTE — PROGRESS NOTE ADULT - ASSESSMENT
Assessment: 68 y/o M with PMHx of HLD, CAD s/p LAD PCI 7/24, HFpEF , HTN, hypothyroid, type 2 DM, h/o cva with no residual deficits, persistent right sided pleural effusion, and hx of hemorrhagic pericardial effusion (cytopathology negative), ESRD on HD MWF (recently converted from PD in 1/2025) via permacath placed on 1/2025, now POD#1 4/9 for right kidney transplant. SICU for BP managmenet on cardene gtt.     Neuro  -AOx4  -Tylenol for pain control  -zofran prn for nausea    Pulm  -on RA, satting well  -Incentive spirometry postop to prevent atelectasis    Cardio: Hx HTN, HLD, CHF, CAD s/p PCI   - SBP goal 110-160  - off cardene  - procardia, labetelol, isosorbid   - home statin     GI  - CC diet  - pepcid     Renal  - anuric, prolonged ischemic downtime  - IVL  - HD 4/9    Heme  - holding chemical VTE ppx  - SCDs  - holding home ASA & plavix (LAD PCI 7/2024)    ID  - transplant ppx w/ nystatin & acyclovir  - immunosuppression w/ cellcept & steroids    Endo hx T2DM, hypothyroid   - monitor glucose  - 8u lantus, 4u pre-meal  - ISS  - synthroid

## 2025-04-10 NOTE — PROGRESS NOTE ADULT - ASSESSMENT
68 y/o M with PMHx of HLD, CAD s/p LAD PCI 7/24, HFpEF , HTN, hypothyroid, type 2 DM, ESRD on HD MWF (recently converted from PD in 1/2025) via permacath placed on 1/2025, h/o cva with no residual deficits, persistent right sided pleural effusion, and hx of hemorrhagic pericardial effusion (cytopathology negative) presents to [facility] for possible DDRT. He states he makes less than half cup of urine a day. Denies fever, chills, N/V/D/C, abdominal pain, CP, SOB, hemoptysis, and /GI complaints.     A/P  ESRD s/p DDRT 04/09  Steroid and thymo induction  Nephrologist is Dr. Menjivar   Access is permacath   Was recently converted from PD to HD and on MWF schedule  Last HD 4/7, s/p HD on 04/08 and 04/09  S/p DDRT on 04/09 POD 1  On MMF and solumedrol for now  Monitor bmp   Renally dose meds    HTN   Better was on cardene   Elevated planned to wean to home meds  UF w/ HD if needed  Managed per transplant team   Monitor bp     Anemia  Hb above goal   Monitor     CKD-MBD  Phos at goal   Monitor ca and phos daily

## 2025-04-10 NOTE — PROGRESS NOTE ADULT - SUBJECTIVE AND OBJECTIVE BOX
24 HOUR EVENTS:  - lantus 8u, admelog 4u  - HD after transplant (-1L)  - prn labetalol     NEURO  Exam: AOx4   Meds: acetaminophen     Tablet .. 650 milliGRAM(s) Oral every 6 hours PRN Mild Pain (1 - 3)  ondansetron Injectable 4 milliGRAM(s) IV Push every 6 hours PRN Nausea and/or Vomiting      RESPIRATORY  RR: 25 (04-10-25 @ 00:00) (15 - 38)  SpO2: 100% (04-10-25 @ 00:00) (100% - 100%)  Exam: Lungs CTA b/l  ABG - ( 09 Apr 2025 21:04 )  pH: 7.44  /  pCO2: 37    /  pO2: 126   / HCO3: 25    / Base Excess: 1.0   /  SaO2: 97.8    Lactate: x          CARDIOVASCULAR  HR: 63 (04-10-25 @ 00:00) (52 - 67)  BP: 137/67 (04-09-25 @ 20:00) (135/62 - 177/78)  BP(mean): 94 (04-09-25 @ 20:00) (89 - 113)  ABP: 161/51 (04-10-25 @ 00:00) (114/45 - 174/62)  ABP(mean): 86 (04-10-25 @ 00:00) (62 - 145)  VBG - ( 08 Apr 2025 16:21 )  pH: 7.41  /  pCO2: 51    /  pO2: 39    / HCO3: 32    / Base Excess: 6.2   /  SaO2: 67.5   Lactate: 1.9    Exam: S1/S2   Cardiac Rhythm: RRR  Perfusion     [x]Adequate   [ ]Inadequate  Mentation   [x]Normal       [ ]Reduced  Extremities  [x]Warm         [ ]Cool  Volume Status [ ]Hypervolemic [x]Euvolemic [ ]Hypovolemic  Meds: doxazosin 4 milliGRAM(s) Oral at bedtime  isosorbide   mononitrate ER Tablet (IMDUR) 60 milliGRAM(s) Oral daily  labetalol 100 milliGRAM(s) Oral three times a day  labetalol Injectable 10 milliGRAM(s) IV Push every 8 hours PRN SBP>160  NIFEdipine XL 90 milliGRAM(s) Oral daily      GI/NUTRITION  Exam: soft, NT/ND  Diet: Regular   Last Bowel Movement: 08-Apr-2025 (04-09-25 @ 10:10)  Meds: famotidine    Tablet 20 milliGRAM(s) Oral daily      GENITOURINARY  I&O's Detail    04-08 @ 07:01 - 04-09 @ 07:00  --------------------------------------------------------  IN:    IV PiggyBack: 154.8 mL    multiple electrolytes Injection Type 1.: 490 mL    NiCARdipine: 50 mL    NiCARdipine: 25 mL    Oral Fluid: 30 mL  Total IN: 749.8 mL    OUT:    Bulb (mL): 70 mL    Bulb (mL): 2 mL    Bulb (mL): 180 mL    Indwelling Catheter - Urethral (mL): 25 mL    multiple electrolytes Injection Type 1.: 0 mL    Other (mL): 1500 mL  Total OUT: 1777 mL    Total NET: -1027.2 mL      04-09 @ 07:01  -  04-10 @ 00:54  --------------------------------------------------------  IN:    IV PiggyBack: 100 mL    IV PiggyBack: 549.8 mL    multiple electrolytes Injection Type 1.: 70 mL    NiCARdipine: 250 mL    Other (mL): 800 mL  Total IN: 1769.8 mL    OUT:    Bulb (mL): 70 mL    Bulb (mL): 10 mL    Bulb (mL): 35 mL    Indwelling Catheter - Urethral (mL): 50 mL    multiple electrolytes Injection Type 1.: 0 mL    Other (mL): 1800 mL  Total OUT: 1965 mL    Total NET: -195.2 mL          04-09    133[L]  |  96  |  17  ----------------------------<  214[H]  4.4   |  21[L]  |  2.67[H]    Ca    8.7      09 Apr 2025 21:22  Phos  4.0     04-09  Mg     2.0     04-09    TPro  5.8[L]  /  Alb  3.3  /  TBili  0.3  /  DBili  x   /  AST  71[H]  /  ALT  12  /  AlkPhos  58  04-09    Meds:     HEMATOLOGIC  Meds:                         9.9    10.44 )-----------( 146      ( 09 Apr 2025 21:22 )             31.2     PT/INR - ( 09 Apr 2025 01:13 )   PT: 12.2 sec;   INR: 1.07 ratio         PTT - ( 09 Apr 2025 01:13 )  PTT:27.4 sec    INFECTIOUS DISEASES  T(C): 36.5 (04-09-25 @ 23:00), Max: 36.9 (04-09-25 @ 15:00)  Wt(kg): --  WBC Count: 10.44 K/uL (04-09 @ 21:22)  WBC Count: 12.06 K/uL (04-09 @ 15:43)  WBC Count: 13.31 K/uL (04-09 @ 10:32)  WBC Count: 13.58 K/uL (04-09 @ 06:52)  WBC Count: 7.98 K/uL (04-09 @ 01:13)    Recent Cultures:    Meds: acyclovir   Oral Tab/Cap 400 milliGRAM(s) Oral two times a day  mycophenolate mofetil 500 Gram(s) Oral <User Schedule>  nystatin    Suspension [id number] Unit(s) Swish and Swallow four times a day      ENDOCRINE  Capillary Blood Glucose    Meds: atorvastatin 40 milliGRAM(s) Oral at bedtime  insulin glargine Injectable (LANTUS) 8 Unit(s) SubCutaneous at bedtime  insulin lispro (ADMELOG) corrective regimen sliding scale   SubCutaneous three times a day before meals  insulin lispro (ADMELOG) corrective regimen sliding scale   SubCutaneous at bedtime  insulin lispro Injectable (ADMELOG) 4 Unit(s) SubCutaneous three times a day before meals  levothyroxine 25 MICROGram(s) Oral daily      OTHER MEDICATIONS:  artificial tears (preservative free) Ophthalmic Solution 1 Drop(s) Left EYE daily PRN  chlorhexidine 2% Cloths 1 Application(s) Topical daily

## 2025-04-10 NOTE — PROGRESS NOTE ADULT - SUBJECTIVE AND OBJECTIVE BOX
[facility]    Dr. Tiara Garcia     Office [phone] (9 am to 5 pm)  Service: [phone] (5pm to 9am)  Also Available on TEAMS      RENAL PROGRESS NOTE: DATE OF SERVICE 04-10-25 @ 11:51    Patient is a 69y old  Male who presents with a chief complaint of possible DDRT (10 Apr 2025 00:28)      Patient seen and examined at bedside. No chest pain/sob    VITALS:  T(F): 97.8 (04-10-25 @ 07:00), Max: 98.5 (04-09-25 @ 15:00)  HR: 59 (04-10-25 @ 11:00)  BP: 96/54 (04-10-25 @ 11:00)  RR: 27 (04-10-25 @ 11:00)  SpO2: 100% (04-10-25 @ 11:00)  Wt(kg): --    04-09 @ 07:01  -  04-10 @ 07:00  --------------------------------------------------------  IN: 1769.8 mL / OUT: 2035 mL / NET: -265.2 mL    04-10 @ 07:01  -  04-10 @ 11:51  --------------------------------------------------------  IN: 0 mL / OUT: 0 mL / NET: 0 mL          PHYSICAL EXAM:  Constitutional: NAD  Neck: No JVD  Respiratory: CTAB, no wheezes, rales or rhonchi  Cardiovascular: S1, S2, RRR  Gastrointestinal: BS+, soft, NT/ND  Extremities: No peripheral edema    Hospital Medications:   MEDICATIONS  (STANDING):  acetaminophen     Tablet .. 650 milliGRAM(s) Oral once  acyclovir   Oral Tab/Cap 400 milliGRAM(s) Oral two times a day  antithymocyte globulin rabbit (peripheral) IVPB w/additives 75 milliGRAM(s) IV Intermittent once  atorvastatin 40 milliGRAM(s) Oral at bedtime  belatacept IVPB 562.5 milliGRAM(s) IV Intermittent once  chlorhexidine 2% Cloths 1 Application(s) Topical daily  diphenhydrAMINE 50 milliGRAM(s) Oral once  doxazosin 4 milliGRAM(s) Oral at bedtime  famotidine    Tablet 20 milliGRAM(s) Oral daily  insulin glargine Injectable (LANTUS) 12 Unit(s) SubCutaneous at bedtime  insulin lispro (ADMELOG) corrective regimen sliding scale   SubCutaneous three times a day before meals  insulin lispro (ADMELOG) corrective regimen sliding scale   SubCutaneous at bedtime  insulin lispro Injectable (ADMELOG) 6 Unit(s) SubCutaneous three times a day before meals  isosorbide   mononitrate ER Tablet (IMDUR) 60 milliGRAM(s) Oral daily  labetalol 100 milliGRAM(s) Oral three times a day  levothyroxine 25 MICROGram(s) Oral daily  methylPREDNISolone sodium succinate Injectable 125 milliGRAM(s) IV Push once  mycophenolate mofetil 500 milliGRAM(s) Oral <User Schedule>  NIFEdipine XL 60 milliGRAM(s) Oral two times a day  nystatin    Suspension 646053 Unit(s) Swish and Swallow four times a day  polyethylene glycol 3350 17 Gram(s) Oral daily  senna 2 Tablet(s) Oral at bedtime      LABS:  04-10    132[L]  |  93[L]  |  37[H]  ----------------------------<  231[H]  5.2   |  20[L]  |  4.06[H]    Ca    8.4      10 Apr 2025 09:41  Phos  6.0     04-10  Mg     2.2     04-10    TPro  5.5[L]  /  Alb  3.1[L]  /  TBili  0.2  /  DBili      /  AST  49[H]  /  ALT  <5[L]  /  AlkPhos  52  04-10    Creatinine Trend: 4.06 <--, 3.42 <--, 2.67 <--, 4.76 <--, 4.51 <--, 4.32 <--, 3.85 <--, 4.86 <--    Albumin: 3.1 g/dL (04-10 @ 09:41)  Phosphorus: 6.0 mg/dL (04-10 @ 09:41)  Albumin: 3.1 g/dL (04-10 @ 03:13)  Phosphorus: 5.6 mg/dL (04-10 @ 03:13)  Albumin: 3.3 g/dL (04-09 @ 21:22)  Phosphorus: 4.0 mg/dL (04-09 @ 21:22)  Albumin: 3.2 g/dL (04-09 @ 15:44)  Phosphorus: 5.4 mg/dL (04-09 @ 15:44)                              8.5    8.47  )-----------( 144      ( 10 Apr 2025 09:41 )             27.2     Urine Studies:  Urinalysis - [04-10-25 @ 09:41]      Color  / Appearance  / SG  / pH       Gluc 231 / Ketone   / Bili  / Urobili        Blood  / Protein  / Leuk Est  / Nitrite       RBC  / WBC  / Hyaline  / Gran  / Sq Epi  / Non Sq Epi  / Bacteria       Iron 17, TIBC 99, %sat 17      [01-08-25 @ 04:35]  PTH -- (Ca 7.9)      [12-30-24 @ 06:50]   229  PTH -- (Ca 7.6)      [11-27-24 @ 04:23]   250  Vitamin D (25OH) 17.1      [11-25-24 @ 06:50]  TSH 3.53      [12-30-24 @ 06:50]  Lipid: chol 112, TG 61, HDL 46, LDL --      [12-31-24 @ 04:27]    HBsAb 46.0      [04-07-25 @ 21:39]  HBsAg Nonreact      [04-07-25 @ 21:39]  HBcAb Nonreact      [04-07-25 @ 21:39]  HCV 0.05, Nonreact      [04-07-25 @ 21:39]  HIV Nonreact      [04-07-25 @ 21:39]      RADIOLOGY & ADDITIONAL STUDIES:

## 2025-04-10 NOTE — CONSULT NOTE ADULT - SUBJECTIVE AND OBJECTIVE BOX
Patient is a 69y old  Male who presents with a chief complaint of possible DDRT (10 Apr 2025 11:51)      HPI:  70 y/o M with PMHx of HLD, CAD s/p LAD PCI 7/24, HFpEF , HTN, hypothyroid, type 2 DM, ESRD on HD MWF (recently converted from PD in 1/2025) via permacath placed on 1/2025, h/o cva with no residual deficits, persistent right sided pleural effusion, and hx of hemorrhagic pericardial effusion (cytopathology negative) presents to [facility] for possible DDRT. He states he makes less than half cup of urine a day. Denies fever, chills, N/V/D/C, abdominal pain, CP, SOB, hemoptysis, and /GI complaints.    (08 Apr 2025 00:42)     prior hospital charts reviewed [  ]  primary team notes reviewed [  ]  other consultant notes reviewed [  ]    PAST MEDICAL & SURGICAL HISTORY:  Hypertension      ESRD on peritoneal dialysis      CVA (cerebrovascular accident)  2010 without residual effects      Hyperlipidemia      BPH (benign prostatic hyperplasia)      CAD (coronary artery disease)      T2DM (type 2 diabetes mellitus)      Hypothyroidism      History of peritoneal dialysis  s/p PD catheter placement      S/P coronary artery stent placement          Allergies  hydrALAZINE (Short breath)    ANTIMICROBIALS (past 90 days)  MEDICATIONS  (STANDING):  acyclovir   Oral Tab/Cap   400 milliGRAM(s) Oral (04-10-25 @ 05:04)   400 milliGRAM(s) Oral (04-09-25 @ 17:27)    ceFAZolin   IVPB   100 mL/Hr IV Intermittent (04-07-25 @ 22:24)    nystatin    Suspension   [id number] Unit(s) Swish and Swallow (04-10-25 @ 05:04)   [id number] Unit(s) Swish and Swallow (04-09-25 @ 23:04)      ANTIMICROBIALS:    acyclovir   Oral Tab/Cap 400 two times a day  nystatin    Suspension [id number] four times a day    OTHER MEDS: MEDICATIONS  (STANDING):  acetaminophen     Tablet .. 650 every 6 hours PRN  acetaminophen     Tablet .. 650 once  antithymocyte globulin rabbit (peripheral) IVPB w/additives 75 once  atorvastatin 40 at bedtime  belatacept IVPB 562.5 once  diphenhydrAMINE 50 once  doxazosin 4 at bedtime  famotidine    Tablet 20 daily  insulin glargine Injectable (LANTUS) 12 at bedtime  insulin lispro (ADMELOG) corrective regimen sliding scale  three times a day before meals  insulin lispro (ADMELOG) corrective regimen sliding scale  at bedtime  insulin lispro Injectable (ADMELOG) 6 three times a day before meals  isosorbide   mononitrate ER Tablet (IMDUR) 60 daily  labetalol 100 three times a day  labetalol Injectable 10 every 8 hours PRN  levothyroxine 25 daily  methylPREDNISolone sodium succinate Injectable 125 once  mycophenolate mofetil 500 <User Schedule>  NIFEdipine XL 60 two times a day  ondansetron Injectable 4 every 6 hours PRN  polyethylene glycol 3350 17 daily  senna 2 at bedtime    SOCIAL HISTORY:   hx smoking  non-smoker    FAMILY HISTORY:  FH: HTN (hypertension) (Mother, Father)    FH: type 2 diabetes (Mother, Father)      REVIEW OF SYSTEMS  [  ] ROS unobtainable because:    [  ] All other systems negative except as noted below:	    Constitutional:  [ ] fever [ ] chills  [ ] weight loss  [ ] weakness  Skin:  [ ] rash [ ] phlebitis	  Eyes: [ ] icterus [ ] pain  [ ] discharge	  ENMT: [ ] sore throat  [ ] thrush [ ] ulcers [ ] exudates  Respiratory: [ ] dyspnea [ ] hemoptysis [ ] cough [ ] sputum	  Cardiovascular:  [ ] chest pain [ ] palpitations [ ] edema	  Gastrointestinal:  [ ] nausea [ ] vomiting [ ] diarrhea [ ] constipation [ ] pain	  Genitourinary:  [ ] dysuria [ ] frequency [ ] hematuria [ ] discharge [ ] flank pain  [ ] incontinence  Musculoskeletal:  [ ] myalgias [ ] arthralgias [ ] arthritis  [ ] back pain  Neurological:  [ ] headache [ ] seizures  [ ] confusion/altered mental status  Psychiatric:  [ ] anxiety [ ] depression	  Hematology/Lymphatics:  [ ] lymphadenopathy  Endocrine:  [ ] adrenal [ ] thyroid  Allergic/Immunologic:	 [ ] transplant [ ] seasonal    Vital Signs Last 24 Hrs  T(F): 97.8 (04-10-25 @ 07:00), Max: 98.5 (04-08-25 @ 00:40)  Vital Signs Last 24 Hrs  HR: 59 (04-10-25 @ 11:00) (52 - 80)  BP: 96/54 (04-10-25 @ 11:00) (95/50 - 137/67)  RR: 27 (04-10-25 @ 11:00)  SpO2: 100% (04-10-25 @ 11:00) (95% - 100%)  Wt(kg): --    PHYSICAL EXAMINATION:  General: Alert and Awake, NAD  HEENT: PERRL, EOMI, No subconjunctival hemorrhages, Oropharynx Clear, MMM  Neck: Supple, No ANTHONY  Cardiac: RRR, No M/R/G  Resp: CTAB, No Wh/Rh/Ra  Abdomen: NBS, NT/ND, No HSM, No rigidity or guarding  MSK: No LE edema. No stigmata of IE. No evidence of phlebitis. No evidence of synovitis.  : No brand  Skin: No rashes or lesions. Skin is warm and dry to the touch.   Neuro: Alert and Awake. CN 2-12 Grossly intact. Moves all four extremities spontaneously.  Psych: Calm, Pleasant, Cooperative                          8.5    8.47  )-----------( 144      ( 10 Apr 2025 09:41 )             27.2     04-10    132[L]  |  93[L]  |  37[H]  ----------------------------<  231[H]  5.2   |  20[L]  |  4.06[H]    Ca    8.4      10 Apr 2025 09:41  Phos  6.0     04-10  Mg     2.2     04-10    TPro  5.5[L]  /  Alb  3.1[L]  /  TBili  0.2  /  DBili  x   /  AST  49[H]  /  ALT  <5[L]  /  AlkPhos  52  04-10    Urinalysis Basic - ( 10 Apr 2025 09:41 )    Color: x / Appearance: x / SG: x / pH: x  Gluc: 231 mg/dL / Ketone: x  / Bili: x / Urobili: x   Blood: x / Protein: x / Nitrite: x   Leuk Esterase: x / RBC: x / WBC x   Sq Epi: x / Non Sq Epi: x / Bacteria: x    MICROBIOLOGY:                RADIOLOGY:    <The imaging below has been reviewed and visualized by me independently. Findings as detailed in report below>     Patient is a 69y old  Male who presents with a chief complaint of possible DDRT (10 Apr 2025 11:51)      HPI:  68 y/o M with PMHx of HLD, CAD s/p LAD PCI 7/24, HFpEF , HTN, hypothyroid, type 2 DM, ESRD on HD MWF (recently converted from PD in 1/2025) via permacath placed on 1/2025, h/o cva with no residual deficits, persistent right sided pleural effusion, and hx of hemorrhagic pericardial effusion (cytopathology negative) presents to [facility] for possible DDRT. He states he makes less than half cup of urine a day. Denies fever, chills, N/V/D/C, abdominal pain, CP, SOB, hemoptysis, and /GI complaints.   He underwent DDRT 4/8/25, transplant ID consulted for donor positive blood cultures with gram positive cocci in clusters. Seen and examined at bedside, denies any complaints, no fever, chills, cough, or myalgias.     prior hospital charts reviewed [ x ]  primary team notes reviewed [ x ]  other consultant notes reviewed [  x]    PAST MEDICAL & SURGICAL HISTORY:  Hypertension      ESRD on peritoneal dialysis      CVA (cerebrovascular accident)  2010 without residual effects      Hyperlipidemia      BPH (benign prostatic hyperplasia)      CAD (coronary artery disease)      T2DM (type 2 diabetes mellitus)      Hypothyroidism      History of peritoneal dialysis  s/p PD catheter placement      S/P coronary artery stent placement          Allergies  hydrALAZINE (Short breath)    ANTIMICROBIALS (past 90 days)  MEDICATIONS  (STANDING):  acyclovir   Oral Tab/Cap   400 milliGRAM(s) Oral (04-10-25 @ 05:04)   400 milliGRAM(s) Oral (04-09-25 @ 17:27)    ceFAZolin   IVPB   100 mL/Hr IV Intermittent (04-07-25 @ 22:24)    nystatin    Suspension   [id number] Unit(s) Swish and Swallow (04-10-25 @ 05:04)   [id number] Unit(s) Swish and Swallow (04-09-25 @ 23:04)      ANTIMICROBIALS:    acyclovir   Oral Tab/Cap 400 two times a day  nystatin    Suspension [id number] four times a day    OTHER MEDS: MEDICATIONS  (STANDING):  acetaminophen     Tablet .. 650 every 6 hours PRN  acetaminophen     Tablet .. 650 once  antithymocyte globulin rabbit (peripheral) IVPB w/additives 75 once  atorvastatin 40 at bedtime  belatacept IVPB 562.5 once  diphenhydrAMINE 50 once  doxazosin 4 at bedtime  famotidine    Tablet 20 daily  insulin glargine Injectable (LANTUS) 12 at bedtime  insulin lispro (ADMELOG) corrective regimen sliding scale  three times a day before meals  insulin lispro (ADMELOG) corrective regimen sliding scale  at bedtime  insulin lispro Injectable (ADMELOG) 6 three times a day before meals  isosorbide   mononitrate ER Tablet (IMDUR) 60 daily  labetalol 100 three times a day  labetalol Injectable 10 every 8 hours PRN  levothyroxine 25 daily  methylPREDNISolone sodium succinate Injectable 125 once  mycophenolate mofetil 500 <User Schedule>  NIFEdipine XL 60 two times a day  ondansetron Injectable 4 every 6 hours PRN  polyethylene glycol 3350 17 daily  senna 2 at bedtime    SOCIAL HISTORY:   hx smoking  non-smoker    FAMILY HISTORY:  FH: HTN (hypertension) (Mother, Father)    FH: type 2 diabetes (Mother, Father)      REVIEW OF SYSTEMS  [  ] ROS unobtainable because:    [ x ] All other systems negative except as noted below:	    Constitutional:  [ ] fever [ ] chills  [ ] weight loss  [x ] weakness  Skin:  [ ] rash [ ] phlebitis	  Eyes: [ ] icterus [ ] pain  [ ] discharge	  ENMT: [ ] sore throat  [ ] thrush [ ] ulcers [ ] exudates  Respiratory: [ ] dyspnea [ ] hemoptysis [ ] cough [ ] sputum	  Cardiovascular:  [ ] chest pain [ ] palpitations [ ] edema	  Gastrointestinal:  [ ] nausea [ ] vomiting [ ] diarrhea [ ] constipation [x ] pain	  Genitourinary:  [ ] dysuria [ ] frequency [ ] hematuria [ ] discharge [ ] flank pain  [ ] incontinence  Musculoskeletal:  [ ] myalgias [ ] arthralgias [ ] arthritis  [ ] back pain  Neurological:  [ ] headache [ ] seizures  [ ] confusion/altered mental status  Psychiatric:  [ ] anxiety [ ] depression	  Hematology/Lymphatics:  [ ] lymphadenopathy  Endocrine:  [ ] adrenal [ ] thyroid  Allergic/Immunologic:	 [ ] transplant [ ] seasonal    Vital Signs Last 24 Hrs  T(F): 97.8 (04-10-25 @ 07:00), Max: 98.5 (04-08-25 @ 00:40)  Vital Signs Last 24 Hrs  HR: 59 (04-10-25 @ 11:00) (52 - 80)  BP: 96/54 (04-10-25 @ 11:00) (95/50 - 137/67)  RR: 27 (04-10-25 @ 11:00)  SpO2: 100% (04-10-25 @ 11:00) (95% - 100%)  Wt(kg): --    PHYSICAL EXAMINATION:  General: Alert and Awake, NAD  HEENT: PERRL, EOMI, No subconjunctival hemorrhages, Oropharynx Clear, MMM  Neck: Supple, No ANTHONY, RCW HD cath  Cardiac: RRR, No M/R/G  Resp: CTAB, No Wh/Rh/Ra  Abdomen: NBS, No rigidity or guarding, Transplant site with staples & drains, no erythema  MSK: + LE edema. No stigmata of IE. No evidence of phlebitis. No evidence of synovitis.  : No [place]  Skin: No rashes or lesions. Skin is warm and dry to the touch.   Neuro: Alert and Awake. CN 2-12 Grossly intact. Moves all four extremities spontaneously.  Psych: Calm, Pleasant, Cooperative                          8.5    8.47  )-----------( 144      ( 10 Apr 2025 09:41 )             27.2     04-10    132[L]  |  93[L]  |  37[H]  ----------------------------<  231[H]  5.2   |  20[L]  |  4.06[H]    Ca    8.4      10 Apr 2025 09:41  Phos  6.0     04-10  Mg     2.2     04-10    TPro  5.5[L]  /  Alb  3.1[L]  /  TBili  0.2  /  DBili  x   /  AST  49[H]  /  ALT  <5[L]  /  AlkPhos  52  04-10    Urinalysis Basic - ( 10 Apr 2025 09:41 )    Color: x / Appearance: x / SG: x / pH: x  Gluc: 231 mg/dL / Ketone: x  / Bili: x / Urobili: x   Blood: x / Protein: x / Nitrite: x   Leuk Esterase: x / RBC: x / WBC x   Sq Epi: x / Non Sq Epi: x / Bacteria: x    MICROBIOLOGY:                RADIOLOGY:    <The imaging below has been reviewed and visualized by me independently. Findings as detailed in report below>        < from: US Trans Kidney w/ Doppler, Right (04.09.25 @ 16:23) >  ACC: [id number] EXAM:  US KIDNEY TRANSPLANT W DOPP RT   ORDERED BY:  ALO ESCAMILLA     PROCEDURE DATE:  04/09/2025          INTERPRETATION:  CLINICAL INFORMATION: Status post renal transplant   postop day 1, one artery and vein    COMPARISON: Renal transplant ultrasound 4/9/2025 1:10 AM.    TECHNIQUE: Grayscale, Color and spectral Doppler evaluation of a RIGHT   renal transplant.    FINDINGS:    Renal Transplant: 13.6 cm. No renal mass, hydronephrosis or calculi.   Subcapsular air surrounding the kidney, increased in size compared to   prior. Correlate with recent hematoma drainage. Additional heterogenous   perinephric fluid collection measuring 2.3 x 1.7 x 1.6 cm.  Urinary bladder: Within normal limits.    Color and spectral Doppler reveals homogeneous flow throughout the   transplant.    Peak iliac artery velocity is 163 cm/sec pre-anastomosis, 241 cm/sec at   the anastomosis, and 143 cm/sec post anastomosis.    Transplant Renal Artery:  Peak systolic velocity is 283 cm/sec anastomosis, 178 cm/sec proximal,   147 cm/sec mid, 121 cm/sec distal and 120 cm/sec hilum.  Resistive Indices Range: .80 to .90    Transplant Renal Vein: Patent.    IMPRESSION:    Status post right lower quadrant renal transplant with patent vasculature.  Subscapular air surrounding the kidney, correlate with recent hematoma   drainage. Recommend short-term sonographic follow-up to monitor   resolution.  Small perinephric collection measuring up to 2.3 cm.    Elevated velocities measuring up to 293 cm/s atthe renal transplant   anastomosis, which may represent postoperative edema/spasm and will also   require short-term sonographic follow-up.      Findings were discussed with Dr. ALO ESCAMILLA [phone] 4/9/2025 4:43   PM by Dr. Nicholson with read backconfirmation.    --- End of Report ---        < end of copied text >

## 2025-04-10 NOTE — PROVIDER CONTACT NOTE (CRITICAL VALUE NOTIFICATION) - RECOMMENDATIONS
pt shifted as per order, d50 insulin, ca gluconate , sicu consult and scheduled dialysis
Retake VS, repeat lactate
contact provider

## 2025-04-10 NOTE — PROGRESS NOTE ADULT - SUBJECTIVE AND OBJECTIVE BOX
Transplant Surgery - Multi-disciplinary Rounds  --------------------------------------------------------------   Tx Date: 04/08/25         POD#1    HPI: 70 y/o M with PMHx of HLD, CAD s/p LAD PCI 7/24, HFpEF , HTN, hypothyroid, type 2 DM, ESRD on HD MWF (recently converted from PD in 1/2025) via R permacath placed on 1/2025, h/o cva with no residual deficits, persistent right sided pleural effusion, and hx of hemorrhagic pericardial effusion (cytopathology negative) presents to [facility] for possible DDRT. He states he makes less than half cup of urine a day. Denies fever, chills, N/V/D/C, abdominal pain, CP, SOB, hemoptysis, and /GI complaints. Now s/p DDRT  1a + 1 v + 1 u + stent under thymoglobulin induction on 04/08/25.     Interval Events:  - Afebrile  - Off cardene drip.   - Anuric  - SICU care can list to floor    Immunosupression:  Induction: Thymo  Maintenance: FK TBD/ BID/SST  Ongoing monitoring for signs of rejection     Potential Discharge date: TBD  Education:  Medications  Plan of care:  See Below        MEDICATIONS  (STANDING):  acyclovir   Oral Tab/Cap 400 milliGRAM(s) Oral two times a day  aspirin enteric coated 81 milliGRAM(s) Oral daily  atorvastatin 40 milliGRAM(s) Oral at bedtime  chlorhexidine 2% Cloths 1 Application(s) Topical daily  famotidine    Tablet 20 milliGRAM(s) Oral daily  insulin glargine Injectable (LANTUS) 12 Unit(s) SubCutaneous at bedtime  insulin lispro (ADMELOG) corrective regimen sliding scale   SubCutaneous three times a day before meals  insulin lispro (ADMELOG) corrective regimen sliding scale   SubCutaneous at bedtime  insulin lispro Injectable (ADMELOG) 6 Unit(s) SubCutaneous three times a day before meals  isosorbide   mononitrate ER Tablet (IMDUR) 30 milliGRAM(s) Oral daily  labetalol 100 milliGRAM(s) Oral three times a day  levothyroxine 25 MICROGram(s) Oral daily  mycophenolate mofetil 500 milliGRAM(s) Oral <User Schedule>  NIFEdipine XL 60 milliGRAM(s) Oral daily  nystatin    Suspension [id number] Unit(s) Swish and Swallow four times a day  polyethylene glycol 3350 17 Gram(s) Oral daily  senna 2 Tablet(s) Oral at bedtime  tamsulosin 0.4 milliGRAM(s) Oral at bedtime    MEDICATIONS  (PRN):  acetaminophen     Tablet .. 650 milliGRAM(s) Oral every 6 hours PRN Mild Pain (1 - 3)  artificial tears (preservative free) Ophthalmic Solution 1 Drop(s) Left EYE daily PRN Dry Eyes  labetalol Injectable 10 milliGRAM(s) IV Push every 8 hours PRN SBP>160  ondansetron Injectable 4 milliGRAM(s) IV Push every 6 hours PRN Nausea and/or Vomiting      PAST MEDICAL & SURGICAL HISTORY:  Hypertension      ESRD on peritoneal dialysis      CVA (cerebrovascular accident)  2010 without residual effects      Hyperlipidemia      BPH (benign prostatic hyperplasia)      CAD (coronary artery disease)      T2DM (type 2 diabetes mellitus)      Hypothyroidism      History of peritoneal dialysis  s/p PD catheter placement      S/P coronary artery stent placement          Vital Signs Last 24 Hrs  T(C): 36.6 (10 Apr 2025 07:00), Max: 36.6 (09 Apr 2025 17:11)  T(F): 97.8 (10 Apr 2025 07:00), Max: 97.9 (09 Apr 2025 17:11)  HR: 60 (10 Apr 2025 15:00) (56 - 80)  BP: 131/60 (10 Apr 2025 15:00) (95/50 - 137/67)  BP(mean): 86 (10 Apr 2025 15:00) (68 - 95)  RR: 17 (10 Apr 2025 15:00) (14 - 31)  SpO2: 100% (10 Apr 2025 15:00) (95% - 100%)    Parameters below as of 10 Apr 2025 09:09  Patient On (Oxygen Delivery Method): room air        I&O's Summary    09 Apr 2025 07:01  -  10 Apr 2025 07:00  --------------------------------------------------------  IN: 1769.8 mL / OUT: 2035 mL / NET: -265.2 mL    10 Apr 2025 07:01  -  10 Apr 2025 17:01  --------------------------------------------------------  IN: 700 mL / OUT: 100 mL / NET: 600 mL                              8.2    8.02  )-----------( 143      ( 10 Apr 2025 16:27 )             25.4     04-10    132[L]  |  93[L]  |  37[H]  ----------------------------<  231[H]  5.2   |  20[L]  |  4.06[H]    Ca    8.4      10 Apr 2025 09:41  Phos  6.0     04-10  Mg     2.2     04-10    TPro  5.5[L]  /  Alb  3.1[L]  /  TBili  0.2  /  DBili  x   /  AST  49[H]  /  ALT  <5[L]  /  AlkPhos  52  04-10                Review of systems  Gen: No weight changes, fatigue, fevers/chills, weakness  Skin: No rashes  Head/Eyes/Ears/Mouth: No headache; Normal hearing; Normal vision w/o blurriness; No sinus pain/discomfort, sore throat  Respiratory: No dyspnea, cough, wheezing, hemoptysis  CV: No chest pain, PND, orthopnea  GI: Mild abdominal pain at surgical incision site; denies diarrhea, constipation, nausea, vomiting, melena, hematochezia  : No increased frequency, dysuria, hematuria, nocturia  MSK: No joint pain/swelling; no back pain; no edema  Neuro: No dizziness/lightheadedness, weakness, seizures, numbness, tingling  Heme: No easy bruising or bleeding  Endo: No heat/cold intolerance  Psych: No significant nervousness, anxiety, stress, depression  All other systems were reviewed and are negative, except as noted.      PHYSICAL EXAM:  Constitutional: Well developed / well nourished  Eyes: Anicteric, PERRLA  ENMT: nc/at  Neck: Supple  Respiratory: CTA B/L  Cardiovascular: RRR  Gastrointestinal: Soft, non distended, mild tenderness at the incision site; incision c/d/i; COSMO x 3 Sang  Genitourinary: Urinary catheter in place anuric  Extremities: SCD's in place and working bilaterally, R permcath  Vascular: Palpable dp pulses bilaterally  Neurological: A&O x3  Skin: no rashes, ulcerations or lesions;  Musculoskeletal: Moving all extremities  Psychiatric: Responsive

## 2025-04-10 NOTE — CONSULT NOTE ADULT - ASSESSMENT
70 y/o M with PMHx of HLD, CAD s/p LAD PCI 7/24, HFpEF , HTN, hypothyroid, type 2 DM, ESRD on HD MWF (recently converted from PD in 1/2025) via permacath placed on 1/2025, h/o cva with no residual deficits, persistent right sided pleural effusion, and hx of hemorrhagic pericardial effusion (cytopathology negative) presents to [facility] for possible DDRT. He states he makes less than half cup of urine a day. Denies fever, chills, N/V/D/C, abdominal pain, CP, SOB, hemoptysis, and /GI complaints.   He underwent DDRT 4/8/25, transplant ID consulted for donor positive blood cultures with gram positive cocci in clusters. Seen and examined at bedside, denies any complaints, no fever, chills, cough, or myalgias.      #S/p DDRT (4/8) with DGF (CMV D-/R-; EBV D+/R+)  #Donor positive blood  cultures ([facility])  #Immunosuppression   #Antibiotics prophylaxis  -Would give Vancomycin 1 gm IV x1 and check level tomorrow  -Please send blood cultures x2 sets  -Completed Surgical prophylaxis pre- and intra-operative - Cefazolin  -Bactrim SS tablet (frequency based on renal function)  -Acyclovir (dose based on CMV serostatus and frequency based on renal function)  -Nystatin swish and swallow 5 mL four times daily  -Trend CBC & chemistry  -Continue temperature curves

## 2025-04-10 NOTE — PROGRESS NOTE ADULT - ASSESSMENT
68 y/o M with PMHx of HLD, CAD s/p LAD PCI 7/24, HFpEF , HTN, hypothyroid, type 2 DM, ESRD on HD MWF (recently converted from PD in 1/2025) via R permacath s/p DDRT  1a + 1 v + 1 u + stent under thymoglobulin induction on 04/08/25.   []s/p DDRT under thymo induction   -Renal doppler: patent vasc  -repeat renal doppler - patent, Subscap air surrounding kidney, 2.3 cm collection, Elevated velocities 293 cm/s at the renal txp anastomosis  - Anuric   -Strict I/O's, brand, JPx3 Sang  -Reg diet  -Bowel regimen  -IS, SCD, PT  -HD - 4/9,   - Lasix 80 IV x1 - no UO  - Flomax    [ ] Immunosuppression  - Thymoglobulin induction  - ENV TBD,  BID , SST w/Thymo  - Beladacept 4/10, next dose 4/15  - ppx: bactrim, nystatin, Acyclovir    []HTN  -Weaning off cardene gtt  -Labetalol 100 mg BID, Nifed 60 qd, Imdur 30 qd

## 2025-04-10 NOTE — PROGRESS NOTE ADULT - ASSESSMENT
69M with PMHx of HLD, CAD s/p LAD PCI 7/24, HFpEF, HTN, hypothyroid, type 2 DM, ESRD on HD MWF presents to [facility] for possible DDRT. Transplant nephrology consulted for ESRD on Hemodialysis.     S/p DDRT (4/8) with DGF  63 yo DCD kidney with KDPI 99%  HD 4/9  Renal doppler-patent vasculature, subcaps air around kidney, 2.3cm collection  not much urine output  lasix again today     IS  Thymo induction  MMF, solumedrol  FK on holf  planned for belatacept today     Ppx-Bactrim, Nystatin, Acyclovir    HTN- labile  BP now lower side  stop doxazosin  continue labetalol  hold evening dose of nifedipine  flomax    Hyperkalemia  resolved

## 2025-04-11 LAB
ALBUMIN SERPL ELPH-MCNC: 3.4 G/DL — SIGNIFICANT CHANGE UP (ref 3.3–5)
ALP SERPL-CCNC: 56 U/L — SIGNIFICANT CHANGE UP (ref 40–120)
ALT FLD-CCNC: <5 U/L — LOW (ref 10–45)
ANION GAP SERPL CALC-SCNC: 21 MMOL/L — HIGH (ref 5–17)
APTT BLD: 24.8 SEC — SIGNIFICANT CHANGE UP (ref 24.5–35.6)
AST SERPL-CCNC: 33 U/L — SIGNIFICANT CHANGE UP (ref 10–40)
BASOPHILS # BLD AUTO: 0 K/UL — SIGNIFICANT CHANGE UP (ref 0–0.2)
BASOPHILS NFR BLD AUTO: 0 % — SIGNIFICANT CHANGE UP (ref 0–2)
BILIRUB SERPL-MCNC: 0.2 MG/DL — SIGNIFICANT CHANGE UP (ref 0.2–1.2)
BUN SERPL-MCNC: 54 MG/DL — HIGH (ref 7–23)
CALCIUM SERPL-MCNC: 8.5 MG/DL — SIGNIFICANT CHANGE UP (ref 8.4–10.5)
CHLORIDE SERPL-SCNC: 91 MMOL/L — LOW (ref 96–108)
CO2 SERPL-SCNC: 19 MMOL/L — LOW (ref 22–31)
CREAT SERPL-MCNC: 5.35 MG/DL — HIGH (ref 0.5–1.3)
EGFR: 11 ML/MIN/1.73M2 — LOW
EGFR: 11 ML/MIN/1.73M2 — LOW
EOSINOPHIL # BLD AUTO: 0 K/UL — SIGNIFICANT CHANGE UP (ref 0–0.5)
EOSINOPHIL NFR BLD AUTO: 0 % — SIGNIFICANT CHANGE UP (ref 0–6)
GLUCOSE BLDC GLUCOMTR-MCNC: 127 MG/DL — HIGH (ref 70–99)
GLUCOSE BLDC GLUCOMTR-MCNC: 130 MG/DL — HIGH (ref 70–99)
GLUCOSE BLDC GLUCOMTR-MCNC: 157 MG/DL — HIGH (ref 70–99)
GLUCOSE BLDC GLUCOMTR-MCNC: 210 MG/DL — HIGH (ref 70–99)
GLUCOSE BLDC GLUCOMTR-MCNC: 265 MG/DL — HIGH (ref 70–99)
GLUCOSE SERPL-MCNC: 201 MG/DL — HIGH (ref 70–99)
HAPTOGLOB SERPL-MCNC: 157 MG/DL — SIGNIFICANT CHANGE UP (ref 34–200)
HAPTOGLOB SERPL-MCNC: 173 MG/DL — SIGNIFICANT CHANGE UP (ref 34–200)
HCT VFR BLD CALC: 24.7 % — LOW (ref 39–50)
HGB BLD-MCNC: 7.8 G/DL — LOW (ref 13–17)
IMM GRANULOCYTES NFR BLD AUTO: 0.5 % — SIGNIFICANT CHANGE UP (ref 0–0.9)
INR BLD: 1.06 RATIO — SIGNIFICANT CHANGE UP (ref 0.85–1.16)
LDH SERPL L TO P-CCNC: 442 U/L — HIGH (ref 50–242)
LYMPHOCYTES # BLD AUTO: 0.09 K/UL — LOW (ref 1–3.3)
LYMPHOCYTES # BLD AUTO: 1.5 % — LOW (ref 13–44)
MAGNESIUM SERPL-MCNC: 2.4 MG/DL — SIGNIFICANT CHANGE UP (ref 1.6–2.6)
MCHC RBC-ENTMCNC: 27.1 PG — SIGNIFICANT CHANGE UP (ref 27–34)
MCHC RBC-ENTMCNC: 31.6 G/DL — LOW (ref 32–36)
MCV RBC AUTO: 85.8 FL — SIGNIFICANT CHANGE UP (ref 80–100)
MONOCYTES # BLD AUTO: 0.74 K/UL — SIGNIFICANT CHANGE UP (ref 0–0.9)
MONOCYTES NFR BLD AUTO: 12.3 % — SIGNIFICANT CHANGE UP (ref 2–14)
NEUTROPHILS # BLD AUTO: 5.16 K/UL — SIGNIFICANT CHANGE UP (ref 1.8–7.4)
NEUTROPHILS NFR BLD AUTO: 85.7 % — HIGH (ref 43–77)
NRBC BLD AUTO-RTO: 0 /100 WBCS — SIGNIFICANT CHANGE UP (ref 0–0)
PHOSPHATE SERPL-MCNC: 6.9 MG/DL — HIGH (ref 2.5–4.5)
PLATELET # BLD AUTO: 149 K/UL — LOW (ref 150–400)
POTASSIUM SERPL-MCNC: 4.9 MMOL/L — SIGNIFICANT CHANGE UP (ref 3.5–5.3)
POTASSIUM SERPL-SCNC: 4.9 MMOL/L — SIGNIFICANT CHANGE UP (ref 3.5–5.3)
PROT SERPL-MCNC: 5.6 G/DL — LOW (ref 6–8.3)
PROTHROM AB SERPL-ACNC: 12.1 SEC — SIGNIFICANT CHANGE UP (ref 9.9–13.4)
RBC # BLD: 2.88 M/UL — LOW (ref 4.2–5.8)
RBC # FLD: 19 % — HIGH (ref 10.3–14.5)
SODIUM SERPL-SCNC: 131 MMOL/L — LOW (ref 135–145)
TACROLIMUS SERPL-MCNC: <0.8 NG/ML — SIGNIFICANT CHANGE UP
VANCOMYCIN TROUGH SERPL-MCNC: 13.4 UG/ML — SIGNIFICANT CHANGE UP (ref 10–20)
WBC # BLD: 6.02 K/UL — SIGNIFICANT CHANGE UP (ref 3.8–10.5)
WBC # FLD AUTO: 6.02 K/UL — SIGNIFICANT CHANGE UP (ref 3.8–10.5)

## 2025-04-11 PROCEDURE — 99233 SBSQ HOSP IP/OBS HIGH 50: CPT

## 2025-04-11 PROCEDURE — G0545: CPT

## 2025-04-11 PROCEDURE — 99232 SBSQ HOSP IP/OBS MODERATE 35: CPT

## 2025-04-11 PROCEDURE — 71045 X-RAY EXAM CHEST 1 VIEW: CPT | Mod: 26

## 2025-04-11 RX ORDER — CHLORPROMAZINE HCL 10 MG
25 TABLET ORAL ONCE
Refills: 0 | Status: COMPLETED | OUTPATIENT
Start: 2025-04-11 | End: 2025-04-11

## 2025-04-11 RX ORDER — SODIUM ZIRCONIUM CYCLOSILICATE 5 G/5G
10 POWDER, FOR SUSPENSION ORAL ONCE
Refills: 0 | Status: COMPLETED | OUTPATIENT
Start: 2025-04-11 | End: 2025-04-11

## 2025-04-11 RX ORDER — DOXAZOSIN MESYLATE 8 MG/1
2 TABLET ORAL AT BEDTIME
Refills: 0 | Status: DISCONTINUED | OUTPATIENT
Start: 2025-04-11 | End: 2025-04-11

## 2025-04-11 RX ORDER — LABETALOL HYDROCHLORIDE 200 MG/1
10 TABLET, FILM COATED ORAL ONCE
Refills: 0 | Status: DISCONTINUED | OUTPATIENT
Start: 2025-04-11 | End: 2025-04-11

## 2025-04-11 RX ORDER — ISOSORBIDE MONONITRATE 60 MG/1
30 TABLET, EXTENDED RELEASE ORAL ONCE
Refills: 0 | Status: COMPLETED | OUTPATIENT
Start: 2025-04-11 | End: 2025-04-11

## 2025-04-11 RX ORDER — TRAMADOL HYDROCHLORIDE 50 MG/1
50 TABLET, FILM COATED ORAL EVERY 8 HOURS
Refills: 0 | Status: DISCONTINUED | OUTPATIENT
Start: 2025-04-11 | End: 2025-04-17

## 2025-04-11 RX ORDER — TACROLIMUS 0.5 MG/1
1 CAPSULE ORAL ONCE
Refills: 0 | Status: COMPLETED | OUTPATIENT
Start: 2025-04-11 | End: 2025-04-11

## 2025-04-11 RX ORDER — TRAMADOL HYDROCHLORIDE 50 MG/1
25 TABLET, FILM COATED ORAL EVERY 4 HOURS
Refills: 0 | Status: DISCONTINUED | OUTPATIENT
Start: 2025-04-11 | End: 2025-04-17

## 2025-04-11 RX ORDER — ACETAMINOPHEN 500 MG/5ML
1000 LIQUID (ML) ORAL ONCE
Refills: 0 | Status: COMPLETED | OUTPATIENT
Start: 2025-04-11 | End: 2025-04-11

## 2025-04-11 RX ORDER — LABETALOL HYDROCHLORIDE 200 MG/1
10 TABLET, FILM COATED ORAL ONCE
Refills: 0 | Status: COMPLETED | OUTPATIENT
Start: 2025-04-11 | End: 2025-04-11

## 2025-04-11 RX ORDER — TACROLIMUS 0.5 MG/1
1 CAPSULE ORAL
Refills: 0 | Status: DISCONTINUED | OUTPATIENT
Start: 2025-04-12 | End: 2025-04-13

## 2025-04-11 RX ORDER — PREDNISONE 20 MG/1
10 TABLET ORAL ONCE
Refills: 0 | Status: COMPLETED | OUTPATIENT
Start: 2025-04-13 | End: 2025-04-13

## 2025-04-11 RX ORDER — PREDNISONE 20 MG/1
20 TABLET ORAL ONCE
Refills: 0 | Status: COMPLETED | OUTPATIENT
Start: 2025-04-12 | End: 2025-04-12

## 2025-04-11 RX ORDER — PREDNISONE 20 MG/1
5 TABLET ORAL DAILY
Refills: 0 | Status: DISCONTINUED | OUTPATIENT
Start: 2025-04-14 | End: 2025-04-23

## 2025-04-11 RX ORDER — HEPARIN SODIUM 1000 [USP'U]/ML
5000 INJECTION INTRAVENOUS; SUBCUTANEOUS EVERY 8 HOURS
Refills: 0 | Status: DISCONTINUED | OUTPATIENT
Start: 2025-04-11 | End: 2025-04-15

## 2025-04-11 RX ORDER — NIFEDIPINE 30 MG
60 TABLET, EXTENDED RELEASE 24 HR ORAL
Refills: 0 | Status: DISCONTINUED | OUTPATIENT
Start: 2025-04-11 | End: 2025-04-14

## 2025-04-11 RX ORDER — PREDNISONE 20 MG/1
TABLET ORAL
Refills: 0 | Status: DISCONTINUED | OUTPATIENT
Start: 2025-04-12 | End: 2025-04-23

## 2025-04-11 RX ORDER — METHYLPREDNISOLONE ACETATE 80 MG/ML
60 INJECTION, SUSPENSION INTRA-ARTICULAR; INTRALESIONAL; INTRAMUSCULAR; SOFT TISSUE ONCE
Refills: 0 | Status: COMPLETED | OUTPATIENT
Start: 2025-04-11 | End: 2025-04-11

## 2025-04-11 RX ADMIN — INSULIN LISPRO 4: 100 INJECTION, SOLUTION INTRAVENOUS; SUBCUTANEOUS at 08:36

## 2025-04-11 RX ADMIN — INSULIN LISPRO 2: 100 INJECTION, SOLUTION INTRAVENOUS; SUBCUTANEOUS at 18:48

## 2025-04-11 RX ADMIN — LABETALOL HYDROCHLORIDE 100 MILLIGRAM(S): 200 TABLET, FILM COATED ORAL at 08:34

## 2025-04-11 RX ADMIN — SODIUM ZIRCONIUM CYCLOSILICATE 10 GRAM(S): 5 POWDER, FOR SUSPENSION ORAL at 00:52

## 2025-04-11 RX ADMIN — METHYLPREDNISOLONE ACETATE 60 MILLIGRAM(S): 80 INJECTION, SUSPENSION INTRA-ARTICULAR; INTRALESIONAL; INTRAMUSCULAR; SOFT TISSUE at 15:30

## 2025-04-11 RX ADMIN — LABETALOL HYDROCHLORIDE 100 MILLIGRAM(S): 200 TABLET, FILM COATED ORAL at 22:01

## 2025-04-11 RX ADMIN — Medication 500000 UNIT(S): at 05:43

## 2025-04-11 RX ADMIN — Medication 400 MILLIGRAM(S): at 05:40

## 2025-04-11 RX ADMIN — HEPARIN SODIUM 5000 UNIT(S): 1000 INJECTION INTRAVENOUS; SUBCUTANEOUS at 15:32

## 2025-04-11 RX ADMIN — Medication 25 MILLIGRAM(S): at 12:56

## 2025-04-11 RX ADMIN — Medication 1000 MILLIGRAM(S): at 17:23

## 2025-04-11 RX ADMIN — Medication 81 MILLIGRAM(S): at 12:06

## 2025-04-11 RX ADMIN — LABETALOL HYDROCHLORIDE 10 MILLIGRAM(S): 200 TABLET, FILM COATED ORAL at 19:38

## 2025-04-11 RX ADMIN — Medication 20 MILLIGRAM(S): at 04:45

## 2025-04-11 RX ADMIN — INSULIN LISPRO 6 UNIT(S): 100 INJECTION, SOLUTION INTRAVENOUS; SUBCUTANEOUS at 12:55

## 2025-04-11 RX ADMIN — INSULIN LISPRO 2: 100 INJECTION, SOLUTION INTRAVENOUS; SUBCUTANEOUS at 21:33

## 2025-04-11 RX ADMIN — Medication 60 MILLIGRAM(S): at 05:43

## 2025-04-11 RX ADMIN — Medication 400 MILLIGRAM(S): at 16:23

## 2025-04-11 RX ADMIN — ATORVASTATIN CALCIUM 40 MILLIGRAM(S): 80 TABLET, FILM COATED ORAL at 21:31

## 2025-04-11 RX ADMIN — ISOSORBIDE MONONITRATE 30 MILLIGRAM(S): 60 TABLET, EXTENDED RELEASE ORAL at 12:07

## 2025-04-11 RX ADMIN — Medication 400 MILLIGRAM(S): at 18:40

## 2025-04-11 RX ADMIN — INSULIN GLARGINE-YFGN 12 UNIT(S): 100 INJECTION, SOLUTION SUBCUTANEOUS at 21:32

## 2025-04-11 RX ADMIN — TAMSULOSIN HYDROCHLORIDE 0.4 MILLIGRAM(S): 0.4 CAPSULE ORAL at 21:32

## 2025-04-11 RX ADMIN — MYCOPHENOLATE MOFETIL 500 MILLIGRAM(S): 500 TABLET, FILM COATED ORAL at 20:33

## 2025-04-11 RX ADMIN — INSULIN LISPRO 6 UNIT(S): 100 INJECTION, SOLUTION INTRAVENOUS; SUBCUTANEOUS at 18:47

## 2025-04-11 RX ADMIN — HEPARIN SODIUM 5000 UNIT(S): 1000 INJECTION INTRAVENOUS; SUBCUTANEOUS at 21:31

## 2025-04-11 RX ADMIN — Medication 1 APPLICATION(S): at 12:06

## 2025-04-11 RX ADMIN — Medication 500000 UNIT(S): at 12:06

## 2025-04-11 RX ADMIN — MYCOPHENOLATE MOFETIL 500 MILLIGRAM(S): 500 TABLET, FILM COATED ORAL at 08:34

## 2025-04-11 RX ADMIN — INSULIN LISPRO 6 UNIT(S): 100 INJECTION, SOLUTION INTRAVENOUS; SUBCUTANEOUS at 08:35

## 2025-04-11 RX ADMIN — Medication 25 MICROGRAM(S): at 05:41

## 2025-04-11 RX ADMIN — LABETALOL HYDROCHLORIDE 100 MILLIGRAM(S): 200 TABLET, FILM COATED ORAL at 15:32

## 2025-04-11 RX ADMIN — TACROLIMUS 1 MILLIGRAM(S): 0.5 CAPSULE ORAL at 12:06

## 2025-04-11 RX ADMIN — Medication 60 MILLIGRAM(S): at 16:49

## 2025-04-11 RX ADMIN — Medication 2 TABLET(S): at 21:32

## 2025-04-11 RX ADMIN — ISOSORBIDE MONONITRATE 30 MILLIGRAM(S): 60 TABLET, EXTENDED RELEASE ORAL at 18:41

## 2025-04-11 NOTE — PROGRESS NOTE ADULT - SUBJECTIVE AND OBJECTIVE BOX
[facility]    Dr. Tiara Garcia     Office [phone] (9 am to 5 pm)  Service: [phone] (5pm to 9am)  Also Available on TEAMS      RENAL PROGRESS NOTE: DATE OF SERVICE 04-11-25 @ 12:35    Patient is a 69y old  Male who presents with a chief complaint of possible DDRT (10 Apr 2025 16:59)      Patient seen and examined at bedside. No chest pain/sob    VITALS:  T(F): 97.7 (04-11-25 @ 12:06), Max: 98.7 (04-10-25 @ 19:00)  HR: 65 (04-11-25 @ 12:06)  BP: 168/63 (04-11-25 @ 12:06)  RR: 18 (04-11-25 @ 12:06)  SpO2: 100% (04-11-25 @ 12:06)  Wt(kg): --    04-10 @ 07:01  -  04-11 @ 07:00  --------------------------------------------------------  IN: 1411.6 mL / OUT: 277 mL / NET: 1134.6 mL    04-11 @ 07:01  -  04-11 @ 12:35  --------------------------------------------------------  IN: 100 mL / OUT: 30 mL / NET: 70 mL          PHYSICAL EXAM:  Constitutional: NAD  Neck: No JVD  Respiratory: CTAB, no wheezes, rales or rhonchi  Cardiovascular: S1, S2, RRR  Gastrointestinal: BS+, soft, NT/ND  Extremities: No peripheral edema    Hospital Medications:   MEDICATIONS  (STANDING):  acyclovir   Oral Tab/Cap 400 milliGRAM(s) Oral two times a day  aspirin enteric coated 81 milliGRAM(s) Oral daily  atorvastatin 40 milliGRAM(s) Oral at bedtime  chlorhexidine 2% Cloths 1 Application(s) Topical daily  chlorproMAZINE    Tablet 25 milliGRAM(s) Oral once  famotidine    Tablet 20 milliGRAM(s) Oral daily  heparin   Injectable 5000 Unit(s) SubCutaneous every 8 hours  insulin glargine Injectable (LANTUS) 12 Unit(s) SubCutaneous at bedtime  insulin lispro (ADMELOG) corrective regimen sliding scale   SubCutaneous three times a day before meals  insulin lispro (ADMELOG) corrective regimen sliding scale   SubCutaneous at bedtime  insulin lispro Injectable (ADMELOG) 6 Unit(s) SubCutaneous three times a day before meals  isosorbide   mononitrate ER Tablet (IMDUR) 30 milliGRAM(s) Oral daily  labetalol 100 milliGRAM(s) Oral three times a day  levothyroxine 25 MICROGram(s) Oral daily  methylPREDNISolone sodium succinate Injectable 60 milliGRAM(s) IV Push once  mycophenolate mofetil 500 milliGRAM(s) Oral <User Schedule>  NIFEdipine XL 60 milliGRAM(s) Oral daily  nystatin    Suspension 746808 Unit(s) Swish and Swallow four times a day  polyethylene glycol 3350 17 Gram(s) Oral daily  senna 2 Tablet(s) Oral at bedtime  tamsulosin 0.4 milliGRAM(s) Oral at bedtime    Tacrolimus (), Serum: <0.8 ng/mL (04-11 @ 05:01)    LABS:  04-11    131[L]  |  91[L]  |  54[H]  ----------------------------<  201[H]  4.9   |  19[L]  |  5.35[H]    Ca    8.5      11 Apr 2025 05:01  Phos  6.9     04-11  Mg     2.4     04-11    TPro  5.6[L]  /  Alb  3.4  /  TBili  0.2  /  DBili      /  AST  33  /  ALT  <5[L]  /  AlkPhos  56  04-11    Creatinine Trend: 5.35 <--, 4.80 <--, 4.53 <--, 4.06 <--, 3.42 <--, 2.67 <--, 4.76 <--, 4.51 <--, 4.32 <--, 3.85 <--, 4.86 <--    Albumin: 3.4 g/dL (04-11 @ 05:01)  Phosphorus: 6.9 mg/dL (04-11 @ 05:01)  Albumin: 3.3 g/dL (04-10 @ 22:53)  Phosphorus: 6.3 mg/dL (04-10 @ 22:53)  Albumin: 3.3 g/dL (04-10 @ 16:27)  Phosphorus: 6.0 mg/dL (04-10 @ 16:27)                              7.8    6.02  )-----------( 149      ( 11 Apr 2025 05:01 )             24.7     Urine Studies:  Urinalysis - [04-11-25 @ 05:01]      Color  / Appearance  / SG  / pH       Gluc 201 / Ketone   / Bili  / Urobili        Blood  / Protein  / Leuk Est  / Nitrite       RBC  / WBC  / Hyaline  / Gran  / Sq Epi  / Non Sq Epi  / Bacteria       Iron 17, TIBC 99, %sat 17      [01-08-25 @ 04:35]  PTH -- (Ca 7.9)      [12-30-24 @ 06:50]   229  PTH -- (Ca 7.6)      [11-27-24 @ 04:23]   250  Vitamin D (25OH) 17.1      [11-25-24 @ 06:50]  TSH 3.53      [12-30-24 @ 06:50]  Lipid: chol 112, TG 61, HDL 46, LDL --      [12-31-24 @ 04:27]    HBsAb 46.0      [04-07-25 @ 21:39]  HBsAg Nonreact      [04-07-25 @ 21:39]  HBcAb Nonreact      [04-07-25 @ 21:39]  HCV 0.05, Nonreact      [04-07-25 @ 21:39]  HIV Nonreact      [04-07-25 @ 21:39]      RADIOLOGY & ADDITIONAL STUDIES:

## 2025-04-11 NOTE — OCCUPATIONAL THERAPY INITIAL EVALUATION ADULT - DIAGNOSIS, OT EVAL
Please reach out to patient to make an follow-up appointment with Minerva Manning per patient's request. Thank you.   
Pt presents with decreased functional mobility and independence with ADLs

## 2025-04-11 NOTE — PROVIDER CONTACT NOTE (CHANGE IN STATUS NOTIFICATION) - BACKGROUND
Pt admitted for DDRT POD#3, pmh HLD, CAD, LAD, HTN, hypothyroidism, type 2 DM, persistent right side pleural effusion, hx of hemorrhagic pericardial effusion, ESRD on HD, and BPH.

## 2025-04-11 NOTE — DIETITIAN INITIAL EVALUATION ADULT - NS FNS DIET ORDER
Diet, Consistent Carbohydrate Renal w/Evening Snack:   Supplement Feeding Modality:  Oral  Nepro Cans or Servings Per Day:  2       Frequency:  Daily (04-11-25 @ 13:10)

## 2025-04-11 NOTE — DIETITIAN INITIAL EVALUATION ADULT - OTHER INFO
Pt is s/p DDRT 4/8/25 POD#3:  -- Deltasone, Solu-medrol, Tacrolimus, Cellcept transplant meds ordered  -- BG management: Lantus 12 U at bedtime, Lispro 6U 3x/day, and  insulin sliding scale   -- Urine output per flow sheets 10 ml thus far (4/11), 12 ml (4/10), 55 ml (4/09), Low urine out put noted, likely related to delayed graft function (DGF), will monitor urine output during present admission.   -- Receiving iHD during RD visit

## 2025-04-11 NOTE — DIETITIAN INITIAL EVALUATION ADULT - REASON FOR ADMISSION
Chart Reviewed, Events Noted  " 70 y/o M with PMHx of HLD, CAD s/p LAD PCI 7/24, HFpEF , HTN, hypothyroid, type 2 DM, ESRD on HD admitted for DDRT"

## 2025-04-11 NOTE — DIETITIAN INITIAL EVALUATION ADULT - ORAL INTAKE PTA/DIET HISTORY
Limited information obtained about diet history and PO intake PTA. Per son, pt had a great appetite PTA. Pt noted with confusion and disoriented to time and place per nursing documentation. No baseline knowledge of: therapeutic diet adherence, protein-energy supplementation, or chewing/swallowing ability or GI distress.   Per chart,  no known food allergies/intolerances

## 2025-04-11 NOTE — DIETITIAN INITIAL EVALUATION ADULT - NSPROEDAREADYLEARNOTH_GEN_A_NUR
Pt's son want's to receive post-transplant nutrition education closer to pt's discharge date. RD to remain available/interest in learning

## 2025-04-11 NOTE — DIETITIAN INITIAL EVALUATION ADULT - REASON INDICATOR FOR ASSESSMENT
Pt seen for post kidney transplant recipient nutrition evaluation per department protocol.   Information obtained from: Review of pt's current medical record, Pt at his bed side on 8tower and son via telephone

## 2025-04-11 NOTE — DIETITIAN INITIAL EVALUATION ADULT - ENERGY INTAKE
-- Pt reports poor appetite and PO intake for the past 3-4 days (since admission to hospital for transplant)

## 2025-04-11 NOTE — DIETITIAN INITIAL EVALUATION ADULT - ADD RECOMMEND
1) Continue Consistent Carbohydrate Renal diet  2) Continue oral nutritional supplement of Nepro 2x/day    3) RD to review post-transplant nutrition therapy and food safety guidelines in-house and prior to discharge with Pt and/or his son.   4) Discharge diet: Continue as above. Recommend follow up visit with Transplant MD and outpatient RD for dietary modifications as warranted.  5) Monitor PO intake,  Urine Output, GI tolerance, skin integrity,and labs. RD remains available if needed, pt is aware.   6) Malnutrition Sticker placed in pt. chart

## 2025-04-11 NOTE — OCCUPATIONAL THERAPY INITIAL EVALUATION ADULT - ADDITIONAL COMMENTS
PTA pt lives in a pvt house with his family +2 [address] + first floor setup once inside. Pt states that he was independent with all ADLs and mobility prior to admission + ambulate with a straight cane as needed.

## 2025-04-11 NOTE — PROGRESS NOTE ADULT - ASSESSMENT
69M with PMHx of HLD, CAD s/p LAD PCI 7/24, HFpEF, HTN, hypothyroid, type 2 DM, ESRD on HD MWF presents to [facility] for possible DDRT. Transplant nephrology consulted for ESRD on Hemodialysis.     S/p DDRT (4/8) with DGF  65 yo DCD kidney with KDPI 99%  HD 4/9, 4/11  Renal doppler-patent vasculature, subcaps air around kidney, 2.3cm collection  not much urine output  Will do HD again today     IS  Thymo induction  MMF, solumedrol  FK low dose  denovo belatacept     Ppx-Bactrim, Nystatin, Acyclovir    HTN- labile  BP fluctuating  monitor with 1 L UF on HD    Hyperkalemia  resolved    Acidosis- should improve with HD    Donor culture growing GPC- vacomycin given- per ID

## 2025-04-11 NOTE — OCCUPATIONAL THERAPY INITIAL EVALUATION ADULT - NSOTDMEREC_GEN_A_CORE
be called. Narrative:      ORDER#: [id number]                          ORDERED BY: GISELL Iglesias  SOURCE: Blood Antecubital-Rig              COLLECTED:  08/21/23 11:01  ANTIBIOTICS AT [facility].:                      RECEIVED :  08/21/23 11:09  If child <=2 yrs old please draw pediatric bottle. ~Blood Culture #2    Blood Culture 1 [[id number]] Collected: 08/21/23 9269    Order Status: Completed Specimen: Blood Updated: 08/22/23 1115     Blood Culture, Routine No Growth to date. Any change in status will be called. Narrative:      ORDER#: [id number]                          ORDERED BY: Emma Haney  SOURCE: Blood Antecubital-Rig              COLLECTED:  08/21/23 09:07  ANTIBIOTICS AT [facility].:                      RECEIVED :  08/21/23 11:06  If child <=2 yrs old please draw pediatric bottle. ~Blood Culture 1              RADIOLOGY  XR CHEST PORTABLE   Final Result   1. Mild streaky opacities in the lung bases, left side greater than right,   likely representing atelectasis or pneumonia. 2. Mild vascular prominence may represent superimposed early CHF/volume   overload.                  Discharge Medications     Medication List        START taking these medications      albuterol sulfate  (90 Base) MCG/ACT inhaler  Commonly known as: Ventolin HFA  Inhale 2 puffs into the lungs 4 times daily as needed for Wheezing     amoxicillin-clavulanate 875-125 MG per tablet  Commonly known as: AUGMENTIN  Take 1 tablet by mouth 2 times daily for 7 days     apixaban 5 MG Tabs tablet  Commonly known as: Eliquis  Take 1 tablet by mouth 2 times daily     azithromycin 250 MG tablet  Commonly known as: Zithromax  Take 1 tablet by mouth daily for 4 days     fluticasone-umeclidin-vilant 200-62.5-25 MCG/ACT Aepb inhaler  Commonly known as: TRELEGY ELLIPTA  Inhale 1 puff into the lungs daily     guaiFENesin 600 MG extended release tablet  Commonly known as: MUCINEX  Take 1 tablet by mouth 2 times daily for 7 days     levETIRAcetam bathing

## 2025-04-11 NOTE — DIETITIAN INITIAL EVALUATION ADULT - NSICDXPASTMEDICALHX_GEN_ALL_CORE_FT
[FreeTextEntry1] : This patient has been diagnosed with a Viral Syndrome/fever x5 days baby has had loose stools and slightly decreased appetite Dad states she has been drinking well having wet diapers Possible viral gastroenteritis, rule out viral syndrome. Father is going to start to increase rice cereals bananas applesauce and toast to thicken stools.  Advised to use Pedialyte and keep patient hydrated.  States she is having wet diapers. Rapid strep negative Rapid COVID-negative  The Parent was  advised to use saline nose drops and symptomatic relief  if indicated to alleviate symptoms. May use OTC products if age appropriate. Advised to encourage fluids and to monitor for fever. Should temperature develop and symptoms worsen or fail to improve over the next 48-72 hours parents are to contact the office. For further evaluation. Father is aware if fever persists greater than 48 hours will need to go to ER for CBC urine analysis urine culture and further evaluation. Total time dedicated to this patient visit , including preparing to see the patient ( eg.. Review of chart, any pertinent labs ect  ) obtaining and/ or  reviewing separately obtained history, performing medical exam,  evaluation, counseling and educating patient and family member, ordering any needed medication or labs and documenting clinical information in  the electronic medical record to patient / parent ____30___ minutes.  PAST MEDICAL HISTORY:  BPH (benign prostatic hyperplasia)     CAD (coronary artery disease)     CVA (cerebrovascular accident) 2010 without residual effects    ESRD on peritoneal dialysis     Hyperlipidemia     Hypertension     Hypothyroidism     T2DM (type 2 diabetes mellitus)

## 2025-04-11 NOTE — DIETITIAN INITIAL EVALUATION ADULT - ETIOLOGY
Decreased ability to consume sufficient energy-protein energy intake in the setting of increased nutrient needs Increased demands for nutrients for surgical healing

## 2025-04-11 NOTE — OCCUPATIONAL THERAPY INITIAL EVALUATION ADULT - NSOTDISCHREC_GEN_A_CORE
Pt would benefit from home OT + shower chair to ensure independence and safety with all ADLs/mobility in home environment. Pt will require 24/7 assist as needed 2/2 mildly impaired cognition, and decreased overall balance./Home OT

## 2025-04-11 NOTE — OCCUPATIONAL THERAPY INITIAL EVALUATION ADULT - LEVEL OF INDEPENDENCE: STAND/SIT, REHAB EVAL
Chief Complaint   Patient presents with   • Office Visit   • Follow-up   • Neck   • Back Pain       Date of Injury:  and   Initial Treatment Date: 10/25/2021  Date Last Seen: 2021  Mechanism of Onset: Injury  Occupation: Deliver Newspapers  Referred by: Jie Maher MD  Primary Care Physician: Jie Maher MD  Date informed consent signed: 10/25/2021  Elizabeth  reports that she has never smoked. She has never used smokeless tobacco.  Elizabeth is allergic to toradol and bactrim.  Patient Emotional screening was completed 10/25/2021; [employer]/[place] Pain Supplemental Questionnaire score was 29/40.  Visit Number of Current Episode: 7  Initial Pain Ratin/10        Subjective: Elizabeth Travis is a 63 year old female who returns with an unimproved and main complaint of bilateral lower back pain and RT thigh pain. She reports increased pain in her thighs. She also returns with an unimproved complaint of neck and upper back pain. She rates her overall pain today 7/10. She is worse today and in pain.  Frequency/Quality: On and off neck and lower back pain described as achy.  Change in complaint/VAS: Complaint has worsened since her last visit and the pain scale is presently rated at:  At best 2/10; at worst 7/10.  Modifying factors: Relieved by prescribed pain medication and lying down.  Aggravated by bending forward and standing in one place.   Sleeping affected: Significantly.  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  Patient has a Grade 2 L4 degenerative spondylosisthesis.   HX of spinal stenosis and recommended surgical intervention in 2019.        Objective:  • Inspection/Observation: Neck and spine have no noted deformities or signs of swelling or inflammation.      • Postural Analysis: Bony features of the shoulders and hips are of equal height bilaterally.  Significant anterior head carriage and neck flexion with mild lumbar  hyperlordosis and anterior pelvic rotation  • Spinal Segmental Joint Restriction: C5-C6, T2-T3, L3-L4, and L5-S1 and bilateral SI joints exhibited limited passive joint motion and segmental restriction with tenderness upon palpation. Grade 2 Degenerative Spondylolisthesis at L4-L5.  • Motor pattern restrictions or accommodations include: Lumbar extension is limited by pain. No sensation of giving out in the legs was reported.  • Tissue Tone Changes:  Moderate spasm dominant in LT lumbar paraspinals.  • General Tenderness to Palpation is noted over LT L5-S1.   • Sensation: Hypoesthesia on the LT lower extremity  below the knee relative to RT  • Hypoesthesia LT upper extremity C5-C6 dermatomes  • Meng's Test performed bilaterally.  Positive at L5-S1 bilaterally without radiation.  • Max Foraminal Compression was positive at C5-C6 without radiating pain      Diagnostic tests reviewed: XR LUMBAR SPINE 2 OR 3 VIEWS 10/13/2021  FINDINGS:  There are 5, nonrib-bearing lumbar type vertebrae. The lateral  projection demonstrates grade 2 spondylolisthesis of L4 on L5. Vertebral  alignment is otherwise satisfactory. Demineralization of the visualized  osseous structures is also observed. No pars interarticularis defect is  visualized on these limited images. There is marked degree of disc space  narrowing at the L4-5 and L5-S1 levels with associated endplate hypertrophy  and facet sclerosis and hypertrophy.        IMPRESSION:  Degenerative changes primarily involving the L4-5 and L5-S1  levels, including grade 2 spondylolisthesis of L4 on L5 and marked disc  space narrowing at these levels.    Diagnostic tests reviewed: CERVICAL SPINE MRI  WITHOUT CONTRAST 11/06/2021  FINDINGS: Facet hypertrophy causes approximately 1 mm retrolisthesis of C5  relative to C6. No abnormal signal within the included brain parenchyma or  visualized spinal cord. Incidental vertebral body hemangiomas. Endplate  signal change is centered at  C5-C6.        C2-C3: No canal or foraminal narrowing.     C3-C4: The spinal canal is moderately narrowed by congenitally short  pedicles, mild ligamentum flavum hypertrophy, and minimal circumferential  disk bulging. CSF space is effaced partially and the cord is slightly  flattened. Mild right and moderate left neural foraminal narrowing.     C4-C5: Spinal canal is severely narrowed by posterior disc bulging that is  greatest on the left as well as congenital short pedicles and mild  ligamentum flavum hypertrophy. CSF space is effaced and the left portion of  the cord is flattened. Severe right and moderate left neural foraminal  narrowing.     C5-C6: Spinal canal is moderate to severely narrowed by mild  circumferential disk bulging, congenitally narrowed canal, and ligamentum  flavum hypertrophy with CSF space effacement and slight flattening of the  cord. Neural foramina are moderately narrowed.     C6-C7: Spinal canal is mildly narrowed by circumferential disc bulging.  Mild to moderate neural foraminal narrowing is greatest on the left.     C7-T1: Left-sided ligamentum flavum hypertrophy. No significant canal or  foraminal narrowing.        IMPRESSION: Significant multilevel degenerative change combined with a  congenitally narrowed canal secondary to short pedicles results in  significant spinal canal narrowing from C3-C4 through C5-C6 and multilevel  neural foraminal narrowing as outlined above.      LUMBAR SPINE MRI WITHOUT CONTRAST 11/06/2021  FINDINGS: Facet hypertrophy causes 9 mm anterolisthesis of L4 relative to  L5. Minimal rightward scoliosis. The conus medullaris is positioned  normally at L1. No abnormal signal within the visualized cord.     Endplate signal change is greatest at L4-L5 and L5-S1.        T12-L1: Spinal canal is mildly narrowed by circumferential disc bulging and  mild ligamentum flavum and facet hypertrophy. No significant neural  foraminal narrowing.     L1-L2: Spinal canal is  mildly narrowed by mild circumferential disk bulging  and ligamentum flavum and facet hypertrophy. No significant neural  foraminal narrowing.     L2-L3: Spinal canal is minimally narrowed by very mild circumferential disc  bulging and ligamentum flavum and facet hypertrophy. No significant neural  foraminal narrowing.     L3-L4: Spinal canal is mildly narrowed by circumferential disc bulging and  ligamentum flavum and facet hypertrophy. Mild left neural foraminal  narrowing.     L4-L5: The spinal canal severely narrowed by circumferential disc bulging  and extensive ligamentum flavum and facet hypertrophy. Nerve roots are  significantly crowded and CSF space is effaced entirely. Neural foramina  are moderately narrowed.     L5-S1: Posterior disc bulging and facet hypertrophy minimally narrow the  spinal canal. The right neural foramen is moderately narrowed.           IMPRESSION: Multilevel degenerative change as outlined above is greatest at  L4-L5 where there is severe spinal canal narrowin      Assessment:   [facility] demonstrates the following Complicating Factors/Comorbidities: multiple year complaint with Degenerative Grade 2 spondylolisthesis of L4 on L5. Hx of cervical and lumbar spinal stenosis.  DIAGNOSIS:  1. Segmental and somatic dysfunction of lumbar region    2. Segmental and somatic dysfunction of sacral region    3. Spinal stenosis of lumbar region without neurogenic claudication    4. Lumbosacral radiculopathy    5. Segmental and somatic dysfunction of cervical region    6. Cervical radiculopathy    7. Segmental and somatic dysfunction of thoracic region    8. Segmental and somatic dysfunction of pelvic region        Plan:  · Today;s treatment included CMT to the following segmental levels.  C5-C6, T2-T3 L3-L4, L5-S1 and bilateral SI joints.  • Manual deep tissue myofascial release of the lumbar parspinal muscles noted above as taut and tender in order to decrease spasm, muscular  restriction of motion, and pain.  • Spinal manipulation included Long Y-axis traction of the lumbar spine was performed at 1.25 inches for 5 minutes in order to decrease segmental restriction and pain, and improve neuromechanical function. Cervical traction was performed for 3 sets of 45 seconds.  • Assisted stretching of the musculature to decrease segmental restriction and pain, and improve neuromechanical function: Lumbar paraspinal musculature.     Goals of Care:  Short Term:  30% improved tolerance to flexion at waist, standing tolerance increased by 30% and frequency and intensity of pain improved by 30%.    Patient Instruction/Education/Home Exercises:   · Patient was instructed to apply ice for 15 minutes to area of soreness as needed for pain management.   · She was also asked to participate in daily 15 minute walks and perform 2 sets of 20 rep pelvic tilts with pillow between her knees daily      Response to Treatment: Post treatment pain scale was reported to be feeling improved at a 5/10.  No adverse effects were reported.   Other treatment options discussed with patient: None today.    Treatment Frequency:  2 times per week. Treatment will continue for one more week. Re-evaluation will be done following.    Follow-up: 2 weeks      On 12/30/2021, Mica BORRERO CT scribed the services personally performed by Dr. Brodie Padilla. The documentation recorded by the scribe accurately and completely reflects the service(s) I personally performed and the decisions made by me.                                               minimum assist (75% patients effort)

## 2025-04-11 NOTE — DIETITIAN INITIAL EVALUATION ADULT - SIGNS/SYMPTOMS
>/=5% weight loss X 1 month, </=50% of estimated energy needs for >/=5 days  Pt S/P kidney transplant recipient

## 2025-04-11 NOTE — PROGRESS NOTE ADULT - SUBJECTIVE AND OBJECTIVE BOX
Transplant Surgery - Multi-disciplinary Rounds  --------------------------------------------------------------   Tx Date: 04/08/25         POD#3    Present: Patient seen and examined with multidisciplinary Transplant team including Surgeon, Surgical fellow, ACP,  Pharmacist, Nutritionist,  and bedside RN during AM rounds.   Disciplines not in attendance will be notified of the plan.     HPI: 70 y/o M with PMHx of HLD, CAD s/p LAD PCI 7/24, HFpEF , HTN, hypothyroid, type 2 DM, ESRD on HD MWF (recently converted from PD in 1/2025) via R permacath placed on 1/2025, h/o cva with no residual deficits, persistent right sided pleural effusion, and hx of hemorrhagic pericardial effusion (cytopathology negative) presents to [facility] for possible DDRT. He states he makes less than half cup of urine a day. Denies fever, chills, N/V/D/C, abdominal pain, CP, SOB, hemoptysis, and /GI complaints. Now s/p DDRT  1a + 1 v + 1 u + stent under thymoglobulin induction on 04/08/25.     Interval Events:  - Afebrile  - Off cardene drip. transferred to floor  - Anuric      Immunosupression:  Induction: Thymo  Maintenance: FK TBD/ BID/SST  Ongoing monitoring for signs of rejection     Potential Discharge date: TBD  Education:  Medications  Plan of care:  See Below       MEDICATIONS  (STANDING):  acyclovir   Oral Tab/Cap 400 milliGRAM(s) Oral two times a day  aspirin enteric coated 81 milliGRAM(s) Oral daily  atorvastatin 40 milliGRAM(s) Oral at bedtime  chlorhexidine 2% Cloths 1 Application(s) Topical daily  doxazosin 2 milliGRAM(s) Oral at bedtime  famotidine    Tablet 20 milliGRAM(s) Oral daily  heparin   Injectable 5000 Unit(s) SubCutaneous every 8 hours  insulin glargine Injectable (LANTUS) 12 Unit(s) SubCutaneous at bedtime  insulin lispro (ADMELOG) corrective regimen sliding scale   SubCutaneous three times a day before meals  insulin lispro (ADMELOG) corrective regimen sliding scale   SubCutaneous at bedtime  insulin lispro Injectable (ADMELOG) 6 Unit(s) SubCutaneous three times a day before meals  isosorbide   mononitrate ER Tablet (IMDUR) 30 milliGRAM(s) Oral daily  isosorbide   mononitrate ER Tablet (IMDUR) 30 milliGRAM(s) Oral once  labetalol 100 milliGRAM(s) Oral three times a day  labetalol Injectable 10 milliGRAM(s) IV Push once  levothyroxine 25 MICROGram(s) Oral daily  mycophenolate mofetil 500 milliGRAM(s) Oral <User Schedule>  NIFEdipine XL 60 milliGRAM(s) Oral two times a day  nystatin    Suspension 928260 Unit(s) Swish and Swallow four times a day  polyethylene glycol 3350 17 Gram(s) Oral daily  senna 2 Tablet(s) Oral at bedtime  tamsulosin 0.4 milliGRAM(s) Oral at bedtime    MEDICATIONS  (PRN):  acetaminophen     Tablet .. 650 milliGRAM(s) Oral every 6 hours PRN Mild Pain (1 - 3)  artificial tears (preservative free) Ophthalmic Solution 1 Drop(s) Left EYE daily PRN Dry Eyes  ondansetron Injectable 4 milliGRAM(s) IV Push every 6 hours PRN Nausea and/or Vomiting  traMADol 25 milliGRAM(s) Oral every 4 hours PRN Moderate Pain (4 - 6)  traMADol 50 milliGRAM(s) Oral every 8 hours PRN Severe Pain (7 - 10)      PAST MEDICAL & SURGICAL HISTORY:  Hypertension      ESRD on peritoneal dialysis      CVA (cerebrovascular accident)  2010 without residual effects      Hyperlipidemia      BPH (benign prostatic hyperplasia)      CAD (coronary artery disease)      T2DM (type 2 diabetes mellitus)      Hypothyroidism      History of peritoneal dialysis  s/p PD catheter placement      S/P coronary artery stent placement          Vital Signs Last 24 Hrs  T(C): 36.6 (11 Apr 2025 18:00), Max: 37.1 (10 Apr 2025 19:00)  T(F): 97.9 (11 Apr 2025 18:00), Max: 98.7 (10 Apr 2025 19:00)  HR: 66 (11 Apr 2025 18:00) (65 - 68)  BP: 191/857 (11 Apr 2025 18:00) (139/69 - 195/77)  BP(mean): 122 (11 Apr 2025 18:00) (95 - 122)  RR: 18 (11 Apr 2025 18:00) (17 - 30)  SpO2: 100% (11 Apr 2025 18:00) (98% - 100%)    Parameters below as of 11 Apr 2025 18:00  Patient On (Oxygen Delivery Method): room air        I&O's Summary    10 Apr 2025 07:01  -  11 Apr 2025 07:00  --------------------------------------------------------  IN: 1411.6 mL / OUT: 277 mL / NET: 1134.6 mL    11 Apr 2025 07:01  -  11 Apr 2025 18:27  --------------------------------------------------------  IN: 740 mL / OUT: 1570 mL / NET: -830 mL                              7.8    6.02  )-----------( 149      ( 11 Apr 2025 05:01 )             24.7     04-11    131[L]  |  91[L]  |  54[H]  ----------------------------<  201[H]  4.9   |  19[L]  |  5.35[H]    Ca    8.5      11 Apr 2025 05:01  Phos  6.9     04-11  Mg     2.4     04-11    TPro  5.6[L]  /  Alb  3.4  /  TBili  0.2  /  DBili  x   /  AST  33  /  ALT  <5[L]  /  AlkPhos  56  04-11    Tacrolimus (), Serum: <0.8 ng/mL (04-11 @ 05:01)                Review of systems  Gen: No weight changes, fatigue, fevers/chills, weakness  Skin: No rashes  Head/Eyes/Ears/Mouth: No headache; Normal hearing; Normal vision w/o blurriness; No sinus pain/discomfort, sore throat  Respiratory: No dyspnea, cough, wheezing, hemoptysis  CV: No chest pain, PND, orthopnea  GI: Mild abdominal pain at surgical incision site; denies diarrhea, constipation, nausea, vomiting, melena, hematochezia  : No increased frequency, dysuria, hematuria, nocturia  MSK: No joint pain/swelling; no back pain; no edema  Neuro: No dizziness/lightheadedness, weakness, seizures, numbness, tingling  Heme: No easy bruising or bleeding  Endo: No heat/cold intolerance  Psych: No significant nervousness, anxiety, stress, depression  All other systems were reviewed and are negative, except as noted.      PHYSICAL EXAM:  Constitutional: Well developed / well nourished  Eyes: Anicteric, PERRLA  ENMT: nc/at  Neck: Supple  Respiratory: CTA B/L  Cardiovascular: RRR  Gastrointestinal: Soft, non distended, mild tenderness at the incision site; incision c/d/i; COSMO x 3 Sang  Genitourinary: Urinary catheter in place anuric  Extremities: SCD's in place and working bilaterally, R permcath  Vascular: Palpable dp pulses bilaterally  Neurological: A&O x3  Skin: no rashes, ulcerations or lesions;  Musculoskeletal: Moving all extremities  Psychiatric: Responsive

## 2025-04-11 NOTE — PROGRESS NOTE ADULT - SUBJECTIVE AND OBJECTIVE BOX
[facility] DIVISION OF KIDNEY DISEASES AND HYPERTENSION -- PROGRESS NOTE    SUBJECTIVE:   not much urine. transferred to the floor    MEDICATIONS    Standing Inpatient Medications  acyclovir   Oral Tab/Cap 400 milliGRAM(s) Oral two times a day  aspirin enteric coated 81 milliGRAM(s) Oral daily  atorvastatin 40 milliGRAM(s) Oral at bedtime  chlorhexidine 2% Cloths 1 Application(s) Topical daily  famotidine    Tablet 20 milliGRAM(s) Oral daily  heparin   Injectable 5000 Unit(s) SubCutaneous every 8 hours  insulin glargine Injectable (LANTUS) 12 Unit(s) SubCutaneous at bedtime  insulin lispro (ADMELOG) corrective regimen sliding scale   SubCutaneous three times a day before meals  insulin lispro (ADMELOG) corrective regimen sliding scale   SubCutaneous at bedtime  insulin lispro Injectable (ADMELOG) 6 Unit(s) SubCutaneous three times a day before meals  isosorbide   mononitrate ER Tablet (IMDUR) 30 milliGRAM(s) Oral daily  labetalol 100 milliGRAM(s) Oral three times a day  labetalol Injectable 10 milliGRAM(s) IV Push once  levothyroxine 25 MICROGram(s) Oral daily  mycophenolate mofetil 500 milliGRAM(s) Oral <User Schedule>  NIFEdipine XL 60 milliGRAM(s) Oral two times a day  nystatin    Suspension 493552 Unit(s) Swish and Swallow four times a day  polyethylene glycol 3350 17 Gram(s) Oral daily  senna 2 Tablet(s) Oral at bedtime  tamsulosin 0.4 milliGRAM(s) Oral at bedtime    PRN Inpatient Medications  acetaminophen     Tablet .. 650 milliGRAM(s) Oral every 6 hours PRN  artificial tears (preservative free) Ophthalmic Solution 1 Drop(s) Left EYE daily PRN  ondansetron Injectable 4 milliGRAM(s) IV Push every 6 hours PRN  traMADol 25 milliGRAM(s) Oral every 4 hours PRN  traMADol 50 milliGRAM(s) Oral every 8 hours PRN      VITALS/PHYSICAL EXAM  --------------------------------------------------------------------------------  T(C): 36.6 (04-11-25 @ 18:00), Max: 37.1 (04-10-25 @ 19:00)  HR: 66 (04-11-25 @ 18:00) (65 - 68)  BP: 191/76 (04-11-25 @ 18:00) (139/69 - 195/77)  RR: 18 (04-11-25 @ 18:00) (17 - 30)  SpO2: 100% (04-11-25 @ 18:00) (98% - 100%)  Wt(kg): --        04-10-25 @ 07:01  -  04-11-25 @ 07:00  --------------------------------------------------------  IN: 1411.6 mL / OUT: 277 mL / NET: 1134.6 mL    04-11-25 @ 07:01  -  04-11-25 @ 18:55  --------------------------------------------------------  IN: 740 mL / OUT: 1570 mL / NET: -830 mL      Physical Exam:  Alert and oriented x3   HEENT: normal  Pulm: CTA B/L  CV: normal S1S2; no murmur  Abd: soft, non-tender R surgical scar  Extremities- no edema  [facility]    LABS/STUDIES  --------------------------------------------------------------------------------  131  |  91  |  54  ----------------------------<  201      [04-11-25 @ 05:01]  4.9   |  19  |  5.35        Ca     8.5     [04-11-25 @ 05:01]      Mg     2.4     [04-11-25 @ 05:01]      Phos  6.9     [04-11-25 @ 05:01]    TPro  5.6  /  Alb  3.4  /  TBili  0.2  /  DBili  x   /  AST  33  /  ALT  <5  /  AlkPhos  56  [04-11-25 @ 05:01]    PT/INR: PT 12.1 , INR 1.06       [04-11-25 @ 05:01]  PTT: 24.8       [04-11-25 @ 05:01]          [04-11-25 @ 05:01]    Creatinine Trend:  SCr 5.35 [04-11 @ 05:01]  SCr 4.80 [04-10 @ 22:53]  SCr 4.53 [04-10 @ 16:27]  SCr 4.06 [04-10 @ 09:41]  SCr 3.42 [04-10 @ 03:13]

## 2025-04-11 NOTE — PROVIDER CONTACT NOTE (CHANGE IN STATUS NOTIFICATION) - SITUATION
Pt had change of mental status upon beginning of shift head to toe assessment. Pt AA0X2, disoriented to time and place.

## 2025-04-11 NOTE — PROGRESS NOTE ADULT - ASSESSMENT
70 y/o M with PMHx of HLD, CAD s/p LAD PCI 7/24, HFpEF , HTN, hypothyroid, type 2 DM, ESRD on HD MWF (recently converted from PD in 1/2025) via R permacath s/p DDRT  1a + 1 v + 1 u + stent under thymoglobulin induction on 04/08/25.   []s/p DDRT under thymo induction   -Renal doppler: patent vasc  -repeat renal doppler - patent, Subscap air surrounding kidney, 2.3 cm collection, Elevated velocities 293 cm/s at the renal txp anastomosis  - Anuric   -Strict I/O's, brand, JPx3 Sang  -Reg diet  -Bowel regimen  -IS, SCD, PT  -HD - 4/9, today 4/11  - c/w Flomax  - SQH    [ ] Immunosuppression  - Thymoglobulin induction  - ENV TBD,  BID , SST w/Thymo  - Beladacept 4/10, next dose 4/15  - ppx: bactrim, nystatin, Acyclovir    []HTN  - off cardene gtt  - Remains hypertensive, if continue to be hypertensive may need to resume cardene drip  - Imdur 30mg x1, cardura 2mg, labetolol 10 mg ivp x1  - Labetalol 100 mg BID, inc Nifed 60 BID, Imdur 30 qd

## 2025-04-11 NOTE — OCCUPATIONAL THERAPY INITIAL EVALUATION ADULT - PERTINENT HX OF CURRENT PROBLEM, REHAB EVAL
70 y/o M with PMHx of HLD, CAD s/p LAD PCI 7/24, HFpEF , HTN, hypothyroid, type 2 DM, ESRD on HD MWF (recently converted from PD in 1/2025) via R permacath placed on 1/2025, h/o cva with no residual deficits, persistent right sided pleural effusion, and hx of hemorrhagic pericardial effusion (cytopathology negative) presents to [facility] for possible DDRT. He states he makes less than half cup of urine a day. Denies fever, chills, N/V/D/C, abdominal pain, CP, SOB, hemoptysis, and /GI complaints. Now s/p DDRT  1a + 1 v + 1 u + stent under thymoglobulin induction on 04/08/25. US KIDNEY:Status post right lower quadrant renal transplant with patent vasculature.Subscapular air surrounding the kidney, correlate with recent hematoma drainage. Recommend short-term sonographic follow-up to monitor resolution.Small perinephric collection measuring up to 2.3 cm.Elevated velocities measuring up to 293 cm/s at the renal transplant anastomosis, which may represent postoperative edema/spasm and will also require short-term sonographic follow-up.  CT HEAD: No evidence of acute intracranial hemorrhage, midline shift or CT evidence of acute territorial infarct.  XRAY CHEST: Small right pleural effusion, significantly decreased.XRAY R HAND: Globular calcification and small erosion at the radial base of the small   finger proximal phalanx. Nonspecific but may reflect calcium hydroxyapatite deposition

## 2025-04-11 NOTE — PROGRESS NOTE ADULT - ASSESSMENT
70 y/o M with PMHx of HLD, CAD s/p LAD PCI 7/24, HFpEF , HTN, hypothyroid, type 2 DM, ESRD on HD MWF (recently converted from PD in 1/2025) via permacath placed on 1/2025, h/o cva with no residual deficits, persistent right sided pleural effusion, and hx of hemorrhagic pericardial effusion (cytopathology negative) presents to [facility] for possible DDRT. He states he makes less than half cup of urine a day. Denies fever, chills, N/V/D/C, abdominal pain, CP, SOB, hemoptysis, and /GI complaints.   He underwent DDRT 4/8/25, transplant ID consulted for donor positive blood cultures with gram positive cocci in clusters. Seen and examined at bedside, denies any complaints, no fever, chills, cough, or myalgias.      #S/p DDRT (4/8) with DGF (CMV D-/R-; EBV D+/R+)  #Donor positive blood  cultures ([facility])  #Immunosuppression   #Antibiotics prophylaxis  -Would give Vancomycin 1 gm IV x1 additional dose tonight, repeat level tomorrow night  -Please send blood cultures x2 sets  -Completed Surgical prophylaxis pre- and intra-operative - Cefazolin  -Bactrim SS tablet (frequency based on renal function)  -Acyclovir (dose based on CMV serostatus and frequency based on renal function)  -Nystatin swish and swallow 5 mL four times daily  -Trend CBC & chemistry  -Continue temperature curves      Carlos Nuñez MD  Can be called via Teams  After 5pm/weekends [phone]

## 2025-04-11 NOTE — DIETITIAN INITIAL EVALUATION ADULT - NS FNS REASON FOR WEIGHT CHANG
Weights:    Current Admission Weights:  - Dosing weight: 55.6 kg  (25)  - Daily weight:  61.9kg  (25), 61.9 kg (25), Weight in k.7 (4/10/25), Weight in k.6 (25), Weight in k.1 (25)  Weight in k.1 (25)Weight in k.2 (25)    [facility]:  [facility] (3/20/25), 65.8 kg (25), 68.2 kg (10/1/24)    Weight Change:  - Significant weight loss noted X 1 month, likely in setting of fluid shifts, however can no rule out some degree of inadequate protein-energy intake   - Weight fluctuations in setting of fluid shifts (IVF, intraoperative fluids). Will continue to monitor weight trends as available/able.     IBW: 154 pounds   %IBW: 88%

## 2025-04-11 NOTE — PROGRESS NOTE ADULT - ASSESSMENT
68 y/o M with PMHx of HLD, CAD s/p LAD PCI 7/24, HFpEF , HTN, hypothyroid, type 2 DM, ESRD on HD MWF (recently converted from PD in 1/2025) via permacath placed on 1/2025, h/o cva with no residual deficits, persistent right sided pleural effusion, and hx of hemorrhagic pericardial effusion (cytopathology negative) presents to [facility] for possible DDRT. He states he makes less than half cup of urine a day. Denies fever, chills, N/V/D/C, abdominal pain, CP, SOB, hemoptysis, and /GI complaints.     A/P  ESRD s/p DDRT 04/09  Steroid and thymo induction  Nephrologist is Dr. 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

## 2025-04-11 NOTE — DIETITIAN INITIAL EVALUATION ADULT - PERTINENT MEDS FT
MEDICATIONS  (STANDING):  acyclovir   Oral Tab/Cap 400 milliGRAM(s) Oral two times a day  aspirin enteric coated 81 milliGRAM(s) Oral daily  atorvastatin 40 milliGRAM(s) Oral at bedtime  chlorhexidine 2% Cloths 1 Application(s) Topical daily  famotidine    Tablet 20 milliGRAM(s) Oral daily  heparin   Injectable 5000 Unit(s) SubCutaneous every 8 hours  insulin glargine Injectable (LANTUS) 12 Unit(s) SubCutaneous at bedtime  insulin lispro (ADMELOG) corrective regimen sliding scale   SubCutaneous three times a day before meals  insulin lispro (ADMELOG) corrective regimen sliding scale   SubCutaneous at bedtime  insulin lispro Injectable (ADMELOG) 6 Unit(s) SubCutaneous three times a day before meals  isosorbide   mononitrate ER Tablet (IMDUR) 30 milliGRAM(s) Oral daily  labetalol 100 milliGRAM(s) Oral three times a day  levothyroxine 25 MICROGram(s) Oral daily  methylPREDNISolone sodium succinate Injectable 60 milliGRAM(s) IV Push once  mycophenolate mofetil 500 milliGRAM(s) Oral <User Schedule>  NIFEdipine XL 60 milliGRAM(s) Oral daily  nystatin    Suspension 778155 Unit(s) Swish and Swallow four times a day  polyethylene glycol 3350 17 Gram(s) Oral daily  senna 2 Tablet(s) Oral at bedtime  tamsulosin 0.4 milliGRAM(s) Oral at bedtime    MEDICATIONS  (PRN):  acetaminophen     Tablet .. 650 milliGRAM(s) Oral every 6 hours PRN Mild Pain (1 - 3)  artificial tears (preservative free) Ophthalmic Solution 1 Drop(s) Left EYE daily PRN Dry Eyes  ondansetron Injectable 4 milliGRAM(s) IV Push every 6 hours PRN Nausea and/or Vomiting

## 2025-04-11 NOTE — DIETITIAN INITIAL EVALUATION ADULT - NSFNSGIIOFT_GEN_A_CORE
-- Last bowel movement on 4/8/25 per flow sheets, ordered for senna and Miralax for bowel regimen

## 2025-04-11 NOTE — PROVIDER CONTACT NOTE (OTHER) - ACTION/TREATMENT ORDERED:
Provider notified and aware. Ordered labetalol IV push, and parameters stated to hold for HR less than 60.

## 2025-04-11 NOTE — PROVIDER CONTACT NOTE (CHANGE IN STATUS NOTIFICATION) - ASSESSMENT
Pt AAOx2, disoriented to time and place, knows he is in hospital but not the name, vital signs stable, no complaints of pain, brand intact and in place, COSMO drains on right side x3 with small output.

## 2025-04-11 NOTE — DIETITIAN INITIAL EVALUATION ADULT - PERTINENT LABORATORY DATA
04-11    131[L]  |  91[L]  |  54[H]  ----------------------------<  201[H]  4.9   |  19[L]  |  5.35[H]    Ca    8.5      11 Apr 2025 05:01  Phos  6.9     04-11  Mg     2.4     04-11    TPro  5.6[L]  /  Alb  3.4  /  TBili  0.2  /  DBili  x   /  AST  33  /  ALT  <5[L]  /  AlkPhos  56  04-11    POCT Blood Glucose.: 127 mg/dL (04-11-25 @ 11:56)  POCT Blood Glucose.: 210 mg/dL (04-11-25 @ 08:10)  POCT Blood Glucose.: 272 mg/dL (04-10-25 @ 21:05)    A1C with Estimated Average Glucose Result: 5.0 % (01-26-25 @ 06:24)  A1C with Estimated Average Glucose Result: 4.9 % (11-25-24 @ 06:50)

## 2025-04-11 NOTE — PROGRESS NOTE ADULT - SUBJECTIVE AND OBJECTIVE BOX
INFECTIOUS DISEASES FOLLOW UP-- Cary Nuñez  [phone]    This is a follow up note for this  69yMale with  Status post kidney transplant        ROS:  CONSTITUTIONAL:  No fever, good appetite  CARDIOVASCULAR:  No chest pain or palpitations  RESPIRATORY:  No dyspnea  GASTROINTESTINAL:  No nausea, vomiting, diarrhea, or abdominal pain  GENITOURINARY:  No dysuria  NEUROLOGIC:  No headache,     Allergies    hydrALAZINE (Short breath)    Intolerances        ANTIBIOTICS/RELEVANT:  antimicrobials  acyclovir   Oral Tab/Cap 400 milliGRAM(s) Oral two times a day  nystatin    Suspension 143606 Unit(s) Swish and Swallow four times a day    immunologic:  mycophenolate mofetil 500 milliGRAM(s) Oral <User Schedule>    OTHER:  acetaminophen     Tablet .. 650 milliGRAM(s) Oral every 6 hours PRN  artificial tears (preservative free) Ophthalmic Solution 1 Drop(s) Left EYE daily PRN  aspirin enteric coated 81 milliGRAM(s) Oral daily  atorvastatin 40 milliGRAM(s) Oral at bedtime  chlorhexidine 2% Cloths 1 Application(s) Topical daily  doxazosin 2 milliGRAM(s) Oral at bedtime  famotidine    Tablet 20 milliGRAM(s) Oral daily  heparin   Injectable 5000 Unit(s) SubCutaneous every 8 hours  insulin glargine Injectable (LANTUS) 12 Unit(s) SubCutaneous at bedtime  insulin lispro (ADMELOG) corrective regimen sliding scale   SubCutaneous three times a day before meals  insulin lispro (ADMELOG) corrective regimen sliding scale   SubCutaneous at bedtime  insulin lispro Injectable (ADMELOG) 6 Unit(s) SubCutaneous three times a day before meals  isosorbide   mononitrate ER Tablet (IMDUR) 30 milliGRAM(s) Oral daily  isosorbide   mononitrate ER Tablet (IMDUR) 30 milliGRAM(s) Oral once  labetalol 100 milliGRAM(s) Oral three times a day  labetalol Injectable 10 milliGRAM(s) IV Push once  levothyroxine 25 MICROGram(s) Oral daily  NIFEdipine XL 60 milliGRAM(s) Oral two times a day  ondansetron Injectable 4 milliGRAM(s) IV Push every 6 hours PRN  polyethylene glycol 3350 17 Gram(s) Oral daily  senna 2 Tablet(s) Oral at bedtime  tamsulosin 0.4 milliGRAM(s) Oral at bedtime  traMADol 25 milliGRAM(s) Oral every 4 hours PRN  traMADol 50 milliGRAM(s) Oral every 8 hours PRN      Objective:  Vital Signs Last 24 Hrs  T(C): 36.6 (11 Apr 2025 18:00), Max: 37.1 (10 Apr 2025 19:00)  T(F): 97.9 (11 Apr 2025 18:00), Max: 98.7 (10 Apr 2025 19:00)  HR: 66 (11 Apr 2025 18:00) (65 - 68)  BP: 191/76 (11 Apr 2025 18:00) (139/69 - 195/77)  BP(mean): 101 (11 Apr 2025 08:11) (95 - 103)  RR: 18 (11 Apr 2025 18:00) (17 - 30)  SpO2: 100% (11 Apr 2025 18:00) (98% - 100%)    Parameters below as of 11 Apr 2025 18:00  Patient On (Oxygen Delivery Method): room air        PHYSICAL EXAM:  Constitutional:no acute distress  Eyes:ALEJANDRO, EOMI  Ear/Nose/Throat: no oral lesions, 	  Respiratory: clear BL  Cardiovascular: S1S2  Gastrointestinal:soft, RLQ renal transplant  Extremities:no e/e/c  No Lymphadenopathy  IV sites not inflammed.    LABS:                        7.8    6.02  )-----------( 149      ( 11 Apr 2025 05:01 )             24.7     04-11    131[L]  |  91[L]  |  54[H]  ----------------------------<  201[H]  4.9   |  19[L]  |  5.35[H]    Ca    8.5      11 Apr 2025 05:01  Phos  6.9     04-11  Mg     2.4     04-11    TPro  5.6[L]  /  Alb  3.4  /  TBili  0.2  /  DBili  x   /  AST  33  /  ALT  <5[L]  /  AlkPhos  56  04-11    PT/INR - ( 11 Apr 2025 05:01 )   PT: 12.1 sec;   INR: 1.06 ratio         PTT - ( 11 Apr 2025 05:01 )  PTT:24.8 sec  Urinalysis Basic - ( 11 Apr 2025 05:01 )    Color: x / Appearance: x / SG: x / pH: x  Gluc: 201 mg/dL / Ketone: x  / Bili: x / Urobili: x   Blood: x / Protein: x / Nitrite: x   Leuk Esterase: x / RBC: x / WBC x   Sq Epi: x / Non Sq Epi: x / Bacteria: x        MICROBIOLOGY:    Hepatitis C RNA, Interpretation: NotDetec: Results                                    Interpretation  Not Detected                          HCV RNA not detected        Vanco trough 4/11=13.4 therapeutic      RECENT CULTURES:      RADIOLOGY & ADDITIONAL STUDIES:    < from: US Trans Kidney w/ Doppler, Right (04.09.25 @ 16:23) >  IMPRESSION:    Status post right lower quadrant renal transplant with patent vasculature.  Subscapular air surrounding the kidney, correlate with recent hematoma   drainage. Recommend short-term sonographic follow-up to monitor   resolution.  Small perinephric collection measuring up to 2.3 cm.    Elevated velocities measuring up to 293 cm/s atthe renal transplant   anastomosis, which may represent postoperative edema/spasm and will also   require short-term sonographic follow-up.      Findings were discussed with Dr. ALO ESCAMILLA [phone] 4/9/2025 4:43   PM by Dr. Nicholson with read backconfirmation.    < end of copied text >

## 2025-04-12 LAB
ALBUMIN SERPL ELPH-MCNC: 3.3 G/DL — SIGNIFICANT CHANGE UP (ref 3.3–5)
ALP SERPL-CCNC: 61 U/L — SIGNIFICANT CHANGE UP (ref 40–120)
ALT FLD-CCNC: <5 U/L — LOW (ref 10–45)
ANION GAP SERPL CALC-SCNC: 15 MMOL/L — SIGNIFICANT CHANGE UP (ref 5–17)
ANISOCYTOSIS BLD QL: SLIGHT — SIGNIFICANT CHANGE UP
APTT BLD: 26.7 SEC — SIGNIFICANT CHANGE UP (ref 24.5–35.6)
AST SERPL-CCNC: 26 U/L — SIGNIFICANT CHANGE UP (ref 10–40)
BASOPHILS # BLD AUTO: 0 K/UL — SIGNIFICANT CHANGE UP (ref 0–0.2)
BASOPHILS NFR BLD AUTO: 0 % — SIGNIFICANT CHANGE UP (ref 0–2)
BILIRUB SERPL-MCNC: 0.2 MG/DL — SIGNIFICANT CHANGE UP (ref 0.2–1.2)
BLD GP AB SCN SERPL QL: NEGATIVE — SIGNIFICANT CHANGE UP
BUN SERPL-MCNC: 37 MG/DL — HIGH (ref 7–23)
CALCIUM SERPL-MCNC: 8.1 MG/DL — LOW (ref 8.4–10.5)
CHLORIDE SERPL-SCNC: 91 MMOL/L — LOW (ref 96–108)
CO2 SERPL-SCNC: 25 MMOL/L — SIGNIFICANT CHANGE UP (ref 22–31)
CREAT SERPL-MCNC: 3.85 MG/DL — HIGH (ref 0.5–1.3)
DACRYOCYTES BLD QL SMEAR: SLIGHT — SIGNIFICANT CHANGE UP
EGFR: 16 ML/MIN/1.73M2 — LOW
EGFR: 16 ML/MIN/1.73M2 — LOW
ELLIPTOCYTES BLD QL SMEAR: SLIGHT — SIGNIFICANT CHANGE UP
EOSINOPHIL # BLD AUTO: 0.07 K/UL — SIGNIFICANT CHANGE UP (ref 0–0.5)
EOSINOPHIL NFR BLD AUTO: 1.8 % — SIGNIFICANT CHANGE UP (ref 0–6)
GIANT PLATELETS BLD QL SMEAR: PRESENT — SIGNIFICANT CHANGE UP
GLUCOSE BLDC GLUCOMTR-MCNC: 154 MG/DL — HIGH (ref 70–99)
GLUCOSE BLDC GLUCOMTR-MCNC: 202 MG/DL — HIGH (ref 70–99)
GLUCOSE BLDC GLUCOMTR-MCNC: 214 MG/DL — HIGH (ref 70–99)
GLUCOSE BLDC GLUCOMTR-MCNC: 220 MG/DL — HIGH (ref 70–99)
GLUCOSE SERPL-MCNC: 211 MG/DL — HIGH (ref 70–99)
HAPTOGLOB SERPL-MCNC: 195 MG/DL — SIGNIFICANT CHANGE UP (ref 34–200)
HCT VFR BLD CALC: 25.3 % — LOW (ref 39–50)
HGB BLD-MCNC: 8 G/DL — LOW (ref 13–17)
INR BLD: 1.04 RATIO — SIGNIFICANT CHANGE UP (ref 0.85–1.16)
LDH SERPL L TO P-CCNC: 392 U/L — HIGH (ref 50–242)
LYMPHOCYTES # BLD AUTO: 0 % — LOW (ref 13–44)
LYMPHOCYTES # BLD AUTO: 0 K/UL — LOW (ref 1–3.3)
MACROCYTES BLD QL: SLIGHT — SIGNIFICANT CHANGE UP
MAGNESIUM SERPL-MCNC: 2.2 MG/DL — SIGNIFICANT CHANGE UP (ref 1.6–2.6)
MANUAL SMEAR VERIFICATION: SIGNIFICANT CHANGE UP
MCHC RBC-ENTMCNC: 27 PG — SIGNIFICANT CHANGE UP (ref 27–34)
MCHC RBC-ENTMCNC: 31.6 G/DL — LOW (ref 32–36)
MCV RBC AUTO: 85.5 FL — SIGNIFICANT CHANGE UP (ref 80–100)
METAMYELOCYTES # FLD: 1.8 % — HIGH (ref 0–0)
METAMYELOCYTES NFR BLD: 1.8 % — HIGH (ref 0–0)
MONOCYTES # BLD AUTO: 0.11 K/UL — SIGNIFICANT CHANGE UP (ref 0–0.9)
MONOCYTES NFR BLD AUTO: 2.7 % — SIGNIFICANT CHANGE UP (ref 2–14)
NEUTROPHILS # BLD AUTO: 3.82 K/UL — SIGNIFICANT CHANGE UP (ref 1.8–7.4)
NEUTROPHILS NFR BLD AUTO: 91.9 % — HIGH (ref 43–77)
NEUTS BAND # BLD: 1.8 % — SIGNIFICANT CHANGE UP (ref 0–8)
NEUTS BAND NFR BLD: 1.8 % — SIGNIFICANT CHANGE UP (ref 0–8)
PHOSPHATE SERPL-MCNC: 3.7 MG/DL — SIGNIFICANT CHANGE UP (ref 2.5–4.5)
PLAT MORPH BLD: ABNORMAL
PLATELET # BLD AUTO: 161 K/UL — SIGNIFICANT CHANGE UP (ref 150–400)
POIKILOCYTOSIS BLD QL AUTO: SLIGHT — SIGNIFICANT CHANGE UP
POLYCHROMASIA BLD QL SMEAR: SLIGHT — SIGNIFICANT CHANGE UP
POTASSIUM SERPL-MCNC: 4.4 MMOL/L — SIGNIFICANT CHANGE UP (ref 3.5–5.3)
POTASSIUM SERPL-SCNC: 4.4 MMOL/L — SIGNIFICANT CHANGE UP (ref 3.5–5.3)
PROT SERPL-MCNC: 5.6 G/DL — LOW (ref 6–8.3)
PROTHROM AB SERPL-ACNC: 12 SEC — SIGNIFICANT CHANGE UP (ref 9.9–13.4)
RBC # BLD: 2.96 M/UL — LOW (ref 4.2–5.8)
RBC # FLD: 18.5 % — HIGH (ref 10.3–14.5)
RBC BLD AUTO: ABNORMAL
RH IG SCN BLD-IMP: POSITIVE — SIGNIFICANT CHANGE UP
SODIUM SERPL-SCNC: 131 MMOL/L — LOW (ref 135–145)
TACROLIMUS SERPL-MCNC: <0.8 NG/ML — SIGNIFICANT CHANGE UP
WBC # BLD: 4.08 K/UL — SIGNIFICANT CHANGE UP (ref 3.8–10.5)
WBC # FLD AUTO: 4.08 K/UL — SIGNIFICANT CHANGE UP (ref 3.8–10.5)

## 2025-04-12 PROCEDURE — 99233 SBSQ HOSP IP/OBS HIGH 50: CPT

## 2025-04-12 RX ADMIN — HEPARIN SODIUM 5000 UNIT(S): 1000 INJECTION INTRAVENOUS; SUBCUTANEOUS at 14:49

## 2025-04-12 RX ADMIN — POLYETHYLENE GLYCOL 3350 17 GRAM(S): 17 POWDER, FOR SOLUTION ORAL at 12:58

## 2025-04-12 RX ADMIN — Medication 20 MILLIGRAM(S): at 12:58

## 2025-04-12 RX ADMIN — Medication 400 MILLIGRAM(S): at 17:01

## 2025-04-12 RX ADMIN — Medication 500000 UNIT(S): at 05:02

## 2025-04-12 RX ADMIN — INSULIN LISPRO 6 UNIT(S): 100 INJECTION, SOLUTION INTRAVENOUS; SUBCUTANEOUS at 12:57

## 2025-04-12 RX ADMIN — INSULIN LISPRO 6 UNIT(S): 100 INJECTION, SOLUTION INTRAVENOUS; SUBCUTANEOUS at 17:02

## 2025-04-12 RX ADMIN — PREDNISONE 20 MILLIGRAM(S): 20 TABLET ORAL at 05:02

## 2025-04-12 RX ADMIN — Medication 500000 UNIT(S): at 12:57

## 2025-04-12 RX ADMIN — HEPARIN SODIUM 5000 UNIT(S): 1000 INJECTION INTRAVENOUS; SUBCUTANEOUS at 21:38

## 2025-04-12 RX ADMIN — TACROLIMUS 1 MILLIGRAM(S): 0.5 CAPSULE ORAL at 08:50

## 2025-04-12 RX ADMIN — INSULIN LISPRO 6 UNIT(S): 100 INJECTION, SOLUTION INTRAVENOUS; SUBCUTANEOUS at 08:51

## 2025-04-12 RX ADMIN — TRAMADOL HYDROCHLORIDE 50 MILLIGRAM(S): 50 TABLET, FILM COATED ORAL at 18:01

## 2025-04-12 RX ADMIN — INSULIN GLARGINE-YFGN 12 UNIT(S): 100 INJECTION, SOLUTION SUBCUTANEOUS at 21:38

## 2025-04-12 RX ADMIN — Medication 400 MILLIGRAM(S): at 05:02

## 2025-04-12 RX ADMIN — Medication 25 MICROGRAM(S): at 05:02

## 2025-04-12 RX ADMIN — Medication 2 TABLET(S): at 21:37

## 2025-04-12 RX ADMIN — Medication 500000 UNIT(S): at 17:01

## 2025-04-12 RX ADMIN — LABETALOL HYDROCHLORIDE 100 MILLIGRAM(S): 200 TABLET, FILM COATED ORAL at 05:02

## 2025-04-12 RX ADMIN — TRAMADOL HYDROCHLORIDE 50 MILLIGRAM(S): 50 TABLET, FILM COATED ORAL at 17:01

## 2025-04-12 RX ADMIN — Medication 81 MILLIGRAM(S): at 12:59

## 2025-04-12 RX ADMIN — LABETALOL HYDROCHLORIDE 100 MILLIGRAM(S): 200 TABLET, FILM COATED ORAL at 14:51

## 2025-04-12 RX ADMIN — INSULIN LISPRO 4: 100 INJECTION, SOLUTION INTRAVENOUS; SUBCUTANEOUS at 17:01

## 2025-04-12 RX ADMIN — MYCOPHENOLATE MOFETIL 500 MILLIGRAM(S): 500 TABLET, FILM COATED ORAL at 20:15

## 2025-04-12 RX ADMIN — Medication 1 APPLICATION(S): at 12:57

## 2025-04-12 RX ADMIN — Medication 500000 UNIT(S): at 00:54

## 2025-04-12 RX ADMIN — HEPARIN SODIUM 5000 UNIT(S): 1000 INJECTION INTRAVENOUS; SUBCUTANEOUS at 05:02

## 2025-04-12 RX ADMIN — ISOSORBIDE MONONITRATE 30 MILLIGRAM(S): 60 TABLET, EXTENDED RELEASE ORAL at 13:00

## 2025-04-12 RX ADMIN — INSULIN LISPRO 4: 100 INJECTION, SOLUTION INTRAVENOUS; SUBCUTANEOUS at 08:51

## 2025-04-12 RX ADMIN — ATORVASTATIN CALCIUM 40 MILLIGRAM(S): 80 TABLET, FILM COATED ORAL at 21:38

## 2025-04-12 RX ADMIN — INSULIN LISPRO 4: 100 INJECTION, SOLUTION INTRAVENOUS; SUBCUTANEOUS at 12:57

## 2025-04-12 RX ADMIN — MYCOPHENOLATE MOFETIL 500 MILLIGRAM(S): 500 TABLET, FILM COATED ORAL at 08:50

## 2025-04-12 RX ADMIN — Medication 60 MILLIGRAM(S): at 05:02

## 2025-04-12 RX ADMIN — Medication 60 MILLIGRAM(S): at 17:01

## 2025-04-12 RX ADMIN — TAMSULOSIN HYDROCHLORIDE 0.4 MILLIGRAM(S): 0.4 CAPSULE ORAL at 21:37

## 2025-04-12 NOTE — PROVIDER CONTACT NOTE (OTHER) - ACTION/TREATMENT ORDERED:
Provider Hilary Aj made aware. Action: Provider will come take a look at brand. Nursing care continues

## 2025-04-12 NOTE — PROGRESS NOTE ADULT - SUBJECTIVE AND OBJECTIVE BOX
[facility]   MD MESSI ROSE MD RUORU WONG, PA    TEL:  FROM 9 AM to 5 PM ---OFFICE: [phone]  AVAILABLE ON TEAMS    FROM 5 PM - 9 AM PLEASE CALL ANSWERING SERVICE: [phone]    RENAL FOLLOW UP NOTE--Date of Service 04-12-25 @ 08:45  --------------------------------------------------------------------------------  HPI:      Pt seen and examined at bedside.       PAST HISTORY  --------------------------------------------------------------------------------  No significant changes to PMH, PSH, FHx, SHx, unless otherwise noted    ALLERGIES & MEDICATIONS  --------------------------------------------------------------------------------  Allergies    hydrALAZINE (Short breath)    Intolerances      Standing Inpatient Medications  acyclovir   Oral Tab/Cap 400 milliGRAM(s) Oral two times a day  aspirin enteric coated 81 milliGRAM(s) Oral daily  atorvastatin 40 milliGRAM(s) Oral at bedtime  chlorhexidine 2% Cloths 1 Application(s) Topical daily  famotidine    Tablet 20 milliGRAM(s) Oral daily  heparin   Injectable 5000 Unit(s) SubCutaneous every 8 hours  insulin glargine Injectable (LANTUS) 12 Unit(s) SubCutaneous at bedtime  insulin lispro (ADMELOG) corrective regimen sliding scale   SubCutaneous three times a day before meals  insulin lispro (ADMELOG) corrective regimen sliding scale   SubCutaneous at bedtime  insulin lispro Injectable (ADMELOG) 6 Unit(s) SubCutaneous three times a day before meals  isosorbide   mononitrate ER Tablet (IMDUR) 30 milliGRAM(s) Oral daily  labetalol 100 milliGRAM(s) Oral three times a day  levothyroxine 25 MICROGram(s) Oral daily  mycophenolate mofetil 500 milliGRAM(s) Oral <User Schedule>  NIFEdipine XL 60 milliGRAM(s) Oral two times a day  nystatin    Suspension 744763 Unit(s) Swish and Swallow four times a day  polyethylene glycol 3350 17 Gram(s) Oral daily  predniSONE   Tablet   Oral   senna 2 Tablet(s) Oral at bedtime  tacrolimus ER Tablet (ENVARSUS XR) 1 milliGRAM(s) Oral <User Schedule>  tamsulosin 0.4 milliGRAM(s) Oral at bedtime    PRN Inpatient Medications  acetaminophen     Tablet .. 650 milliGRAM(s) Oral every 6 hours PRN  artificial tears (preservative free) Ophthalmic Solution 1 Drop(s) Left EYE daily PRN  ondansetron Injectable 4 milliGRAM(s) IV Push every 6 hours PRN  traMADol 25 milliGRAM(s) Oral every 4 hours PRN  traMADol 50 milliGRAM(s) Oral every 8 hours PRN      REVIEW OF SYSTEMS  --------------------------------------------------------------------------------  General: no fever  ,  MSK: no edema     VITALS/PHYSICAL EXAM  --------------------------------------------------------------------------------  T(C): 36.8 (04-12-25 @ 04:46), Max: 36.8 (04-11-25 @ 20:46)  HR: 57 (04-12-25 @ 07:11) (56 - 69)  BP: 120/58 (04-12-25 @ 07:11) (107/56 - 195/77)  RR: 18 (04-12-25 @ 04:46) (18 - 18)  SpO2: 100% (04-12-25 @ 04:46) (100% - 100%)  Wt(kg): --        04-11-25 @ 07:01  -  04-12-25 @ 07:00  --------------------------------------------------------  IN: 990 mL / OUT: 1713 mL / NET: -723 mL      Physical Exam:  	Constitutional: NAD  Neck: No JVD  Respiratory: CTAB, no wheezes, rales or rhonchi  Cardiovascular: S1, S2, RRR  Gastrointestinal: BS+, soft, NT/ND  Extremities: No peripheral edema  LABS/STUDIES  --------------------------------------------------------------------------------              8.0    4.08  >-----------<  161      [04-12-25 @ 06:31]              25.3     131  |  91  |  37  ----------------------------<  211      [04-12-25 @ 06:31]  4.4   |  25  |  3.85        Ca     8.1     [04-12-25 @ 06:31]      Mg     2.2     [04-12-25 @ 06:31]      Phos  3.7     [04-12-25 @ 06:31]    TPro  5.6  /  Alb  3.3  /  TBili  0.2  /  DBili  x   /  AST  26  /  ALT  <5  /  AlkPhos  61  [04-12-25 @ 06:31]    PT/INR: PT 12.0 , INR 1.04       [04-12-25 @ 06:31]  PTT: 26.7       [04-12-25 @ 06:31]          [04-12-25 @ 06:31]    Creatinine Trend:  SCr 3.85 [04-12 @ 06:31]  SCr 5.35 [04-11 @ 05:01]  SCr 4.80 [04-10 @ 22:53]  SCr 4.53 [04-10 @ 16:27]  SCr 4.06 [04-10 @ 09:41]    Urinalysis - [04-12-25 @ 06:31]      Color  / Appearance  / SG  / pH       Gluc 211 / Ketone   / Bili  / Urobili        Blood  / Protein  / Leuk Est  / Nitrite       RBC  / WBC  / Hyaline  / Gran  / Sq Epi  / Non Sq Epi  / Bacteria       Iron 17, TIBC 99, %sat 17      [01-08-25 @ 04:35]  PTH -- (Ca 7.9)      [12-30-24 @ 06:50]   229  PTH -- (Ca 7.6)      [11-27-24 @ 04:23]   250  Vitamin D (25OH) 17.1      [11-25-24 @ 06:50]  TSH 3.53      [12-30-24 @ 06:50]  Lipid: chol 112, TG 61, HDL 46, LDL --      [12-31-24 @ 04:27]    HBsAb 46.0      [04-07-25 @ 21:39]  HBsAg Nonreact      [04-07-25 @ 21:39]  HBcAb Nonreact      [04-07-25 @ 21:39]  HCV 0.05, Nonreact      [04-07-25 @ 21:39]  HIV Nonreact      [04-07-25 @ 21:39]

## 2025-04-12 NOTE — PROGRESS NOTE ADULT - SUBJECTIVE AND OBJECTIVE BOX
Transplant Surgery - Multi-disciplinary Rounds  --------------------------------------------------------------   Tx Date: 04/08/25         POD#4    Present: Patient seen and examined with multidisciplinary Transplant team including Surgeon Dr. Bernal, nephrologist Dr. Jonas, [facility]son, and bedside RN during AM rounds.   Disciplines not in attendance will be notified of the plan.     HPI: 70 y/o M with PMHx of HLD, CAD s/p LAD PCI 7/24, HFpEF , HTN, hypothyroid, type 2 DM, ESRD on HD MWF (recently converted from PD in 1/2025) via R permacath placed on 1/2025, h/o cva with no residual deficits, persistent right sided pleural effusion, and hx of hemorrhagic pericardial effusion (cytopathology negative) presents to [facility] for possible DDRT. He states he makes less than half cup of urine a day. Denies fever, chills, N/V/D/C, abdominal pain, CP, SOB, hemoptysis, and /GI complaints. Now s/p DDRT  1a + 1 v + 1 u + stent under thymoglobulin induction on 04/08/25.     Interval Events:  - Afebrile  - S/P extra dose of imdur, labetalol for BP now improved  - Anuric      Immunosupression:  Induction: Thymo  Maintenance: FK TBD/ BID/SST  Ongoing monitoring for signs of rejection     Potential Discharge date: TBD  Education:  Medications  Plan of care:  See Below     MEDICATIONS  (STANDING):  acyclovir   Oral Tab/Cap 400 milliGRAM(s) Oral two times a day  aspirin enteric coated 81 milliGRAM(s) Oral daily  atorvastatin 40 milliGRAM(s) Oral at bedtime  chlorhexidine 2% Cloths 1 Application(s) Topical daily  famotidine    Tablet 20 milliGRAM(s) Oral daily  heparin   Injectable 5000 Unit(s) SubCutaneous every 8 hours  insulin glargine Injectable (LANTUS) 12 Unit(s) SubCutaneous at bedtime  insulin lispro (ADMELOG) corrective regimen sliding scale   SubCutaneous three times a day before meals  insulin lispro (ADMELOG) corrective regimen sliding scale   SubCutaneous at bedtime  insulin lispro Injectable (ADMELOG) 6 Unit(s) SubCutaneous three times a day before meals  isosorbide   mononitrate ER Tablet (IMDUR) 30 milliGRAM(s) Oral daily  labetalol 100 milliGRAM(s) Oral three times a day  levothyroxine 25 MICROGram(s) Oral daily  mycophenolate mofetil 500 milliGRAM(s) Oral <User Schedule>  NIFEdipine XL 60 milliGRAM(s) Oral two times a day  nystatin    Suspension 404212 Unit(s) Swish and Swallow four times a day  polyethylene glycol 3350 17 Gram(s) Oral daily  predniSONE   Tablet   Oral   senna 2 Tablet(s) Oral at bedtime  tacrolimus ER Tablet (ENVARSUS XR) 1 milliGRAM(s) Oral <User Schedule>  tamsulosin 0.4 milliGRAM(s) Oral at bedtime    MEDICATIONS  (PRN):  acetaminophen     Tablet .. 650 milliGRAM(s) Oral every 6 hours PRN Mild Pain (1 - 3)  artificial tears (preservative free) Ophthalmic Solution 1 Drop(s) Left EYE daily PRN Dry Eyes  ondansetron Injectable 4 milliGRAM(s) IV Push every 6 hours PRN Nausea and/or Vomiting  traMADol 25 milliGRAM(s) Oral every 4 hours PRN Moderate Pain (4 - 6)  traMADol 50 milliGRAM(s) Oral every 8 hours PRN Severe Pain (7 - 10)      PAST MEDICAL & SURGICAL HISTORY:  Hypertension      ESRD on peritoneal dialysis      CVA (cerebrovascular accident)  2010 without residual effects      Hyperlipidemia      BPH (benign prostatic hyperplasia)      CAD (coronary artery disease)      T2DM (type 2 diabetes mellitus)      Hypothyroidism      History of peritoneal dialysis  s/p PD catheter placement      S/P coronary artery stent placement          Vital Signs Last 24 Hrs  T(C): 36.7 (12 Apr 2025 08:05), Max: 36.8 (11 Apr 2025 20:46)  T(F): 98.1 (12 Apr 2025 08:05), Max: 98.3 (11 Apr 2025 20:46)  HR: 56 (12 Apr 2025 08:05) (56 - 69)  BP: 127/58 (12 Apr 2025 08:05) (107/56 - 195/77)  BP(mean): 84 (12 Apr 2025 08:05) (84 - 94)  RR: 18 (12 Apr 2025 08:05) (18 - 18)  SpO2: 100% (12 Apr 2025 08:05) (100% - 100%)    Parameters below as of 12 Apr 2025 08:05  Patient On (Oxygen Delivery Method): room air        I&O's Summary    11 Apr 2025 07:01  -  12 Apr 2025 07:00  --------------------------------------------------------  IN: 990 mL / OUT: 1713 mL / NET: -723 mL    12 Apr 2025 07:01  -  12 Apr 2025 13:31  --------------------------------------------------------  IN: 0 mL / OUT: 45 mL / NET: -45 mL                        8.0    4.08  )-----------( 161      ( 12 Apr 2025 06:31 )             25.3     04-12    131[L]  |  91[L]  |  37[H]  ----------------------------<  211[H]  4.4   |  25  |  3.85[H]    Ca    8.1[L]      12 Apr 2025 06:31  Phos  3.7     04-12  Mg     2.2     04-12    TPro  5.6[L]  /  Alb  3.3  /  TBili  0.2  /  DBili  x   /  AST  26  /  ALT  <5[L]  /  AlkPhos  61  04-12    Tacrolimus (), Serum: <0.8 ng/mL (04-12 @ 06:31)    Review of systems  Gen: No weight changes, fatigue, fevers/chills, weakness  Skin: No rashes  Head/Eyes/Ears/Mouth: No headache; Normal hearing; Normal vision w/o blurriness; No sinus pain/discomfort, sore throat  Respiratory: No dyspnea, cough, wheezing, hemoptysis  CV: No chest pain, PND, orthopnea  GI: Mild abdominal pain at surgical incision site; denies diarrhea, constipation, nausea, vomiting, melena, hematochezia  : No increased frequency, dysuria, hematuria, nocturia  MSK: No joint pain/swelling; no back pain; no edema  Neuro: No dizziness/lightheadedness, weakness, seizures, numbness, tingling  Heme: No easy bruising or bleeding  Endo: No heat/cold intolerance  Psych: No significant nervousness, anxiety, stress, depression  All other systems were reviewed and are negative, except as noted.      PHYSICAL EXAM:  Constitutional: Well developed / well nourished  Eyes: Anicteric, PERRLA  ENMT: nc/at  Neck: Supple  Respiratory: CTA B/L  Cardiovascular: RRR  Gastrointestinal: Soft, non distended, mild tenderness at the incision site; incision c/d/i; COSMO x 3 Sang  Genitourinary: Urinary catheter in place anuric  Extremities: SCD's in place and working bilaterally, R permcath  Vascular: Palpable dp pulses bilaterally  Neurological: A&O x3  Skin: no rashes, ulcerations or lesions;  Musculoskeletal: Moving all extremities  Psychiatric: Responsive

## 2025-04-12 NOTE — PROGRESS NOTE ADULT - ASSESSMENT
68 y/o M with PMHx of HLD, CAD s/p LAD PCI 7/24, HFpEF , HTN, hypothyroid, type 2 DM, ESRD on HD MWF (recently converted from PD in 1/2025) via R permacath s/p DDRT  1a + 1 v + 1 u + stent under thymoglobulin induction on 04/08/25.   []s/p DDRT under thymo induction   -Renal doppler: patent vasc  -repeat renal doppler - patent, Subscap air surrounding kidney, 2.3 cm collection, Elevated velocities 293 cm/s at the renal txp anastomosis  - Anuric   -Strict I/O's, brand, JPx3 Sang  -Reg diet  -Bowel regimen  -IS, SCD, PT  -HD - 4/9, 4/11  - c/w Flomax  - Send TSH    [ ] Immunosuppression  - Thymoglobulin induction  - ENV TBD,  BID , SST w/Thymo  - Beladacept 4/10, next dose 4/15  - ppx: bactrim, nystatin, Acyclovir    []HTN  - off cardene gtt  - Remains hypertensive, if continue to be hypertensive may need to resume cardene drip  - Labetalol 100 mg BID, inc Nifed 60 BID, Imdur 30 qd

## 2025-04-12 NOTE — PROGRESS NOTE ADULT - ASSESSMENT
70 y/o M with PMHx of HLD, CAD s/p LAD PCI 7/24, HFpEF , HTN, hypothyroid, type 2 DM, ESRD on HD MWF (recently converted from PD in 1/2025) via permacath placed on 1/2025, h/o cva with no residual deficits, persistent right sided pleural effusion, and hx of hemorrhagic pericardial effusion (cytopathology negative) presents to [facility] for possible DDRT. He states he makes less than half cup of urine a day. Denies fever, chills, N/V/D/C, abdominal pain, CP, SOB, hemoptysis, and /GI complaints.     A/P  ESRD s/p DDRT 04/09  Steroid and thymo induction  Nephrologist is Dr. 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

## 2025-04-12 NOTE — PROGRESS NOTE ADULT - ASSESSMENT
69M with PMHx of HLD, CAD s/p LAD PCI 7/24, HFpEF, HTN, hypothyroid, type 2 DM, ESRD on HD MWF presents to [facility] for possible DDRT. Transplant nephrology consulted for ESRD on Hemodialysis.     S/p DDRT (4/8) with DGF  65 yo DCD kidney with KDPI 99%  HD 4/9, 4/11  Renal doppler-patent vasculature, subcaps air around kidney, 2.3cm collection  not much urine output  HD on 4/11  no need for HD today. not much urine output     IS  Thymo induction  MMF, solumedrol  FK low dose 1 mg daily   denovo belatacept. next dose on 4/14    Ppx-Bactrim, Nystatin, Acyclovir    HTN- labile  BP fluctuating  moniotor closely  need to cut back meds  check orthostatic VS    Hyperkalemia  resolved    Acidosis- should improve with HD    Donor culture growing GPC- vacomycin given- per ID

## 2025-04-13 LAB
ALBUMIN SERPL ELPH-MCNC: 3.2 G/DL — LOW (ref 3.3–5)
ALP SERPL-CCNC: 52 U/L — SIGNIFICANT CHANGE UP (ref 40–120)
ALT FLD-CCNC: 5 U/L — LOW (ref 10–45)
ANION GAP SERPL CALC-SCNC: 18 MMOL/L — HIGH (ref 5–17)
ANISOCYTOSIS BLD QL: SLIGHT — SIGNIFICANT CHANGE UP
APTT BLD: 28.8 SEC — SIGNIFICANT CHANGE UP (ref 24.5–35.6)
AST SERPL-CCNC: 20 U/L — SIGNIFICANT CHANGE UP (ref 10–40)
BASOPHILS # BLD AUTO: 0 K/UL — SIGNIFICANT CHANGE UP (ref 0–0.2)
BASOPHILS NFR BLD AUTO: 0 % — SIGNIFICANT CHANGE UP (ref 0–2)
BILIRUB SERPL-MCNC: 0.3 MG/DL — SIGNIFICANT CHANGE UP (ref 0.2–1.2)
BUN SERPL-MCNC: 59 MG/DL — HIGH (ref 7–23)
CALCIUM SERPL-MCNC: 7.9 MG/DL — LOW (ref 8.4–10.5)
CHLORIDE SERPL-SCNC: 93 MMOL/L — LOW (ref 96–108)
CO2 SERPL-SCNC: 22 MMOL/L — SIGNIFICANT CHANGE UP (ref 22–31)
CREAT SERPL-MCNC: 5.45 MG/DL — HIGH (ref 0.5–1.3)
EGFR: 11 ML/MIN/1.73M2 — LOW
EGFR: 11 ML/MIN/1.73M2 — LOW
ELLIPTOCYTES BLD QL SMEAR: SLIGHT — SIGNIFICANT CHANGE UP
EOSINOPHIL # BLD AUTO: 0.49 K/UL — SIGNIFICANT CHANGE UP (ref 0–0.5)
EOSINOPHIL NFR BLD AUTO: 10.7 % — HIGH (ref 0–6)
GLUCOSE BLDC GLUCOMTR-MCNC: 103 MG/DL — HIGH (ref 70–99)
GLUCOSE BLDC GLUCOMTR-MCNC: 106 MG/DL — HIGH (ref 70–99)
GLUCOSE BLDC GLUCOMTR-MCNC: 111 MG/DL — HIGH (ref 70–99)
GLUCOSE BLDC GLUCOMTR-MCNC: 160 MG/DL — HIGH (ref 70–99)
GLUCOSE BLDC GLUCOMTR-MCNC: 212 MG/DL — HIGH (ref 70–99)
GLUCOSE SERPL-MCNC: 101 MG/DL — HIGH (ref 70–99)
HAPTOGLOB SERPL-MCNC: 192 MG/DL — SIGNIFICANT CHANGE UP (ref 34–200)
HCT VFR BLD CALC: 24.1 % — LOW (ref 39–50)
HGB BLD-MCNC: 7.8 G/DL — LOW (ref 13–17)
HYPOCHROMIA BLD QL: SIGNIFICANT CHANGE UP
INR BLD: 0.9 RATIO — SIGNIFICANT CHANGE UP (ref 0.85–1.16)
LDH SERPL L TO P-CCNC: 340 U/L — HIGH (ref 50–242)
LYMPHOCYTES # BLD AUTO: 0.08 K/UL — LOW (ref 1–3.3)
LYMPHOCYTES # BLD AUTO: 1.7 % — LOW (ref 13–44)
MACROCYTES BLD QL: SLIGHT — SIGNIFICANT CHANGE UP
MAGNESIUM SERPL-MCNC: 2.3 MG/DL — SIGNIFICANT CHANGE UP (ref 1.6–2.6)
MANUAL SMEAR VERIFICATION: SIGNIFICANT CHANGE UP
MCHC RBC-ENTMCNC: 27.5 PG — SIGNIFICANT CHANGE UP (ref 27–34)
MCHC RBC-ENTMCNC: 32.4 G/DL — SIGNIFICANT CHANGE UP (ref 32–36)
MCV RBC AUTO: 84.9 FL — SIGNIFICANT CHANGE UP (ref 80–100)
MONOCYTES # BLD AUTO: 0.56 K/UL — SIGNIFICANT CHANGE UP (ref 0–0.9)
MONOCYTES NFR BLD AUTO: 12.4 % — SIGNIFICANT CHANGE UP (ref 2–14)
NEUTROPHILS # BLD AUTO: 3.42 K/UL — SIGNIFICANT CHANGE UP (ref 1.8–7.4)
NEUTROPHILS NFR BLD AUTO: 74.4 % — SIGNIFICANT CHANGE UP (ref 43–77)
NEUTS BAND # BLD: 0.8 % — SIGNIFICANT CHANGE UP (ref 0–8)
NEUTS BAND NFR BLD: 0.8 % — SIGNIFICANT CHANGE UP (ref 0–8)
NRBC # BLD: 2 /100 WBCS — HIGH (ref 0–0)
NRBC BLD-RTO: 2 /100 WBCS — HIGH (ref 0–0)
PHOSPHATE SERPL-MCNC: 3.6 MG/DL — SIGNIFICANT CHANGE UP (ref 2.5–4.5)
PLAT MORPH BLD: NORMAL — SIGNIFICANT CHANGE UP
PLATELET # BLD AUTO: 168 K/UL — SIGNIFICANT CHANGE UP (ref 150–400)
POIKILOCYTOSIS BLD QL AUTO: SLIGHT — SIGNIFICANT CHANGE UP
POTASSIUM SERPL-MCNC: 4.2 MMOL/L — SIGNIFICANT CHANGE UP (ref 3.5–5.3)
POTASSIUM SERPL-SCNC: 4.2 MMOL/L — SIGNIFICANT CHANGE UP (ref 3.5–5.3)
PROT SERPL-MCNC: 5.3 G/DL — LOW (ref 6–8.3)
PROTHROM AB SERPL-ACNC: 10.4 SEC — SIGNIFICANT CHANGE UP (ref 9.9–13.4)
RBC # BLD: 2.84 M/UL — LOW (ref 4.2–5.8)
RBC # FLD: 18 % — HIGH (ref 10.3–14.5)
RBC BLD AUTO: ABNORMAL
SODIUM SERPL-SCNC: 133 MMOL/L — LOW (ref 135–145)
TACROLIMUS SERPL-MCNC: <0.8 NG/ML — SIGNIFICANT CHANGE UP
TSH SERPL-MCNC: 2.48 UIU/ML — SIGNIFICANT CHANGE UP (ref 0.27–4.2)
VANCOMYCIN FLD-MCNC: 8.2 UG/ML — SIGNIFICANT CHANGE UP
WBC # BLD: 4.55 K/UL — SIGNIFICANT CHANGE UP (ref 3.8–10.5)
WBC # FLD AUTO: 4.55 K/UL — SIGNIFICANT CHANGE UP (ref 3.8–10.5)

## 2025-04-13 PROCEDURE — 76776 US EXAM K TRANSPL W/DOPPLER: CPT | Mod: 26,RT

## 2025-04-13 PROCEDURE — 99233 SBSQ HOSP IP/OBS HIGH 50: CPT

## 2025-04-13 RX ORDER — FUROSEMIDE 10 MG/ML
80 INJECTION INTRAMUSCULAR; INTRAVENOUS ONCE
Refills: 0 | Status: COMPLETED | OUTPATIENT
Start: 2025-04-13 | End: 2025-04-13

## 2025-04-13 RX ORDER — VANCOMYCIN HCL IN 5 % DEXTROSE 1.5G/250ML
1000 PLASTIC BAG, INJECTION (ML) INTRAVENOUS ONCE
Refills: 0 | Status: COMPLETED | OUTPATIENT
Start: 2025-04-13 | End: 2025-04-13

## 2025-04-13 RX ORDER — LABETALOL HYDROCHLORIDE 200 MG/1
10 TABLET, FILM COATED ORAL ONCE
Refills: 0 | Status: COMPLETED | OUTPATIENT
Start: 2025-04-13 | End: 2025-04-13

## 2025-04-13 RX ORDER — TACROLIMUS 0.5 MG/1
2 CAPSULE ORAL ONCE
Refills: 0 | Status: COMPLETED | OUTPATIENT
Start: 2025-04-13 | End: 2025-04-13

## 2025-04-13 RX ORDER — ISOSORBIDE MONONITRATE 60 MG/1
30 TABLET, EXTENDED RELEASE ORAL ONCE
Refills: 0 | Status: COMPLETED | OUTPATIENT
Start: 2025-04-13 | End: 2025-04-13

## 2025-04-13 RX ORDER — TACROLIMUS 0.5 MG/1
3 CAPSULE ORAL
Refills: 0 | Status: DISCONTINUED | OUTPATIENT
Start: 2025-04-14 | End: 2025-04-14

## 2025-04-13 RX ORDER — FUROSEMIDE 10 MG/ML
5 INJECTION INTRAMUSCULAR; INTRAVENOUS
Qty: 500 | Refills: 0 | Status: DISCONTINUED | OUTPATIENT
Start: 2025-04-13 | End: 2025-04-14

## 2025-04-13 RX ORDER — ISOSORBIDE MONONITRATE 60 MG/1
60 TABLET, EXTENDED RELEASE ORAL DAILY
Refills: 0 | Status: DISCONTINUED | OUTPATIENT
Start: 2025-04-14 | End: 2025-04-14

## 2025-04-13 RX ORDER — DOXAZOSIN MESYLATE 8 MG/1
4 TABLET ORAL DAILY
Refills: 0 | Status: DISCONTINUED | OUTPATIENT
Start: 2025-04-13 | End: 2025-04-14

## 2025-04-13 RX ADMIN — Medication 1 APPLICATION(S): at 13:17

## 2025-04-13 RX ADMIN — Medication 60 MILLIGRAM(S): at 17:00

## 2025-04-13 RX ADMIN — TRAMADOL HYDROCHLORIDE 25 MILLIGRAM(S): 50 TABLET, FILM COATED ORAL at 13:17

## 2025-04-13 RX ADMIN — INSULIN LISPRO 4: 100 INJECTION, SOLUTION INTRAVENOUS; SUBCUTANEOUS at 13:14

## 2025-04-13 RX ADMIN — Medication 20 MILLIGRAM(S): at 13:14

## 2025-04-13 RX ADMIN — TRAMADOL HYDROCHLORIDE 50 MILLIGRAM(S): 50 TABLET, FILM COATED ORAL at 17:00

## 2025-04-13 RX ADMIN — TRAMADOL HYDROCHLORIDE 25 MILLIGRAM(S): 50 TABLET, FILM COATED ORAL at 14:17

## 2025-04-13 RX ADMIN — HEPARIN SODIUM 5000 UNIT(S): 1000 INJECTION INTRAVENOUS; SUBCUTANEOUS at 21:13

## 2025-04-13 RX ADMIN — TRAMADOL HYDROCHLORIDE 50 MILLIGRAM(S): 50 TABLET, FILM COATED ORAL at 18:00

## 2025-04-13 RX ADMIN — Medication 81 MILLIGRAM(S): at 13:14

## 2025-04-13 RX ADMIN — TACROLIMUS 2 MILLIGRAM(S): 0.5 CAPSULE ORAL at 13:14

## 2025-04-13 RX ADMIN — LABETALOL HYDROCHLORIDE 100 MILLIGRAM(S): 200 TABLET, FILM COATED ORAL at 21:12

## 2025-04-13 RX ADMIN — MYCOPHENOLATE MOFETIL 500 MILLIGRAM(S): 500 TABLET, FILM COATED ORAL at 20:24

## 2025-04-13 RX ADMIN — Medication 500000 UNIT(S): at 17:01

## 2025-04-13 RX ADMIN — Medication 500000 UNIT(S): at 00:55

## 2025-04-13 RX ADMIN — MYCOPHENOLATE MOFETIL 500 MILLIGRAM(S): 500 TABLET, FILM COATED ORAL at 07:33

## 2025-04-13 RX ADMIN — Medication 60 MILLIGRAM(S): at 05:15

## 2025-04-13 RX ADMIN — TACROLIMUS 1 MILLIGRAM(S): 0.5 CAPSULE ORAL at 07:33

## 2025-04-13 RX ADMIN — ISOSORBIDE MONONITRATE 30 MILLIGRAM(S): 60 TABLET, EXTENDED RELEASE ORAL at 13:31

## 2025-04-13 RX ADMIN — HEPARIN SODIUM 5000 UNIT(S): 1000 INJECTION INTRAVENOUS; SUBCUTANEOUS at 05:12

## 2025-04-13 RX ADMIN — Medication 2 TABLET(S): at 21:12

## 2025-04-13 RX ADMIN — Medication 400 MILLIGRAM(S): at 17:00

## 2025-04-13 RX ADMIN — POLYETHYLENE GLYCOL 3350 17 GRAM(S): 17 POWDER, FOR SOLUTION ORAL at 13:14

## 2025-04-13 RX ADMIN — ATORVASTATIN CALCIUM 40 MILLIGRAM(S): 80 TABLET, FILM COATED ORAL at 21:12

## 2025-04-13 RX ADMIN — INSULIN LISPRO 6 UNIT(S): 100 INJECTION, SOLUTION INTRAVENOUS; SUBCUTANEOUS at 09:27

## 2025-04-13 RX ADMIN — Medication 500000 UNIT(S): at 05:16

## 2025-04-13 RX ADMIN — ISOSORBIDE MONONITRATE 30 MILLIGRAM(S): 60 TABLET, EXTENDED RELEASE ORAL at 10:06

## 2025-04-13 RX ADMIN — LABETALOL HYDROCHLORIDE 100 MILLIGRAM(S): 200 TABLET, FILM COATED ORAL at 13:14

## 2025-04-13 RX ADMIN — Medication 250 MILLIGRAM(S): at 17:41

## 2025-04-13 RX ADMIN — TRAMADOL HYDROCHLORIDE 50 MILLIGRAM(S): 50 TABLET, FILM COATED ORAL at 08:33

## 2025-04-13 RX ADMIN — Medication 400 MILLIGRAM(S): at 05:15

## 2025-04-13 RX ADMIN — Medication 25 MICROGRAM(S): at 05:12

## 2025-04-13 RX ADMIN — INSULIN LISPRO 6 UNIT(S): 100 INJECTION, SOLUTION INTRAVENOUS; SUBCUTANEOUS at 13:15

## 2025-04-13 RX ADMIN — Medication 500000 UNIT(S): at 13:13

## 2025-04-13 RX ADMIN — INSULIN LISPRO 2: 100 INJECTION, SOLUTION INTRAVENOUS; SUBCUTANEOUS at 17:01

## 2025-04-13 RX ADMIN — DOXAZOSIN MESYLATE 4 MILLIGRAM(S): 8 TABLET ORAL at 10:06

## 2025-04-13 RX ADMIN — FUROSEMIDE 80 MILLIGRAM(S): 10 INJECTION INTRAMUSCULAR; INTRAVENOUS at 10:06

## 2025-04-13 RX ADMIN — FUROSEMIDE 2.5 MG/HR: 10 INJECTION INTRAMUSCULAR; INTRAVENOUS at 10:32

## 2025-04-13 RX ADMIN — TRAMADOL HYDROCHLORIDE 50 MILLIGRAM(S): 50 TABLET, FILM COATED ORAL at 07:33

## 2025-04-13 RX ADMIN — LABETALOL HYDROCHLORIDE 100 MILLIGRAM(S): 200 TABLET, FILM COATED ORAL at 05:17

## 2025-04-13 RX ADMIN — PREDNISONE 10 MILLIGRAM(S): 20 TABLET ORAL at 05:15

## 2025-04-13 RX ADMIN — LABETALOL HYDROCHLORIDE 10 MILLIGRAM(S): 200 TABLET, FILM COATED ORAL at 01:07

## 2025-04-13 RX ADMIN — HEPARIN SODIUM 5000 UNIT(S): 1000 INJECTION INTRAVENOUS; SUBCUTANEOUS at 13:14

## 2025-04-13 NOTE — PROGRESS NOTE ADULT - SUBJECTIVE AND OBJECTIVE BOX
[facility] DIVISION OF KIDNEY DISEASES AND HYPERTENSION -- PROGRESS NOTE    SUBJECTIVE:   not much urine     MEDICATIONS    Standing Inpatient Medications  acyclovir   Oral Tab/Cap 400 milliGRAM(s) Oral two times a day  aspirin enteric coated 81 milliGRAM(s) Oral daily  atorvastatin 40 milliGRAM(s) Oral at bedtime  chlorhexidine 2% Cloths 1 Application(s) Topical daily  doxazosin 4 milliGRAM(s) Oral daily  famotidine    Tablet 20 milliGRAM(s) Oral daily  furosemide Infusion 5 mG/Hr IV Continuous <Continuous>  heparin   Injectable 5000 Unit(s) SubCutaneous every 8 hours  insulin glargine Injectable (LANTUS) 12 Unit(s) SubCutaneous at bedtime  insulin lispro (ADMELOG) corrective regimen sliding scale   SubCutaneous three times a day before meals  insulin lispro (ADMELOG) corrective regimen sliding scale   SubCutaneous at bedtime  insulin lispro Injectable (ADMELOG) 6 Unit(s) SubCutaneous three times a day before meals  labetalol 100 milliGRAM(s) Oral three times a day  levothyroxine 25 MICROGram(s) Oral daily  mycophenolate mofetil 500 milliGRAM(s) Oral <User Schedule>  NIFEdipine XL 60 milliGRAM(s) Oral two times a day  nystatin    Suspension 988849 Unit(s) Swish and Swallow four times a day  polyethylene glycol 3350 17 Gram(s) Oral daily  predniSONE   Tablet   Oral   senna 2 Tablet(s) Oral at bedtime    PRN Inpatient Medications  acetaminophen     Tablet .. 650 milliGRAM(s) Oral every 6 hours PRN  artificial tears (preservative free) Ophthalmic Solution 1 Drop(s) Left EYE daily PRN  ondansetron Injectable 4 milliGRAM(s) IV Push every 6 hours PRN  traMADol 25 milliGRAM(s) Oral every 4 hours PRN  traMADol 50 milliGRAM(s) Oral every 8 hours PRN      VITALS/PHYSICAL EXAM  --------------------------------------------------------------------------------  T(C): 36.4 (04-13-25 @ 12:38), Max: 36.7 (04-13-25 @ 00:15)  HR: 61 (04-13-25 @ 12:38) (56 - 65)  BP: 168/65 (04-13-25 @ 12:38) (133/61 - 190/62)  RR: 18 (04-13-25 @ 12:38) (17 - 18)  SpO2: 97% (04-13-25 @ 12:38) (97% - 100%)  Wt(kg): --        04-12-25 @ 07:01  -  04-13-25 @ 07:00  --------------------------------------------------------  IN: 780 mL / OUT: 226 mL / NET: 554 mL    04-13-25 @ 07:01  -  04-13-25 @ 13:34  --------------------------------------------------------  IN: 240 mL / OUT: 82 mL / NET: 158 mL      Physical Exam:  Alert and oriented x3   HEENT: normal  Pulm: CTA B/L  CV: normal S1S2; no murmur  Abd: soft, non-tender  Extremities- no edema  RIJ TCC  RLQ surgical scar    LABS/STUDIES  --------------------------------------------------------------------------------  133  |  93  |  59  ----------------------------<  101      [04-13-25 @ 07:22]  4.2   |  22  |  5.45        Ca     7.9     [04-13-25 @ 07:22]      Mg     2.3     [04-13-25 @ 07:22]      Phos  3.6     [04-13-25 @ 07:22]    TPro  5.3  /  Alb  3.2  /  TBili  0.3  /  DBili  x   /  AST  20  /  ALT  5   /  AlkPhos  52  [04-13-25 @ 07:22]    PT/INR: PT 10.4 , INR 0.90       [04-13-25 @ 07:21]  PTT: 28.8       [04-13-25 @ 07:21]          [04-13-25 @ 07:22]    Creatinine Trend:  SCr 5.45 [04-13 @ 07:22]  SCr 3.85 [04-12 @ 06:31]  SCr 5.35 [04-11 @ 05:01]  SCr 4.80 [04-10 @ 22:53]  SCr 4.53 [04-10 @ 16:27]

## 2025-04-13 NOTE — PROGRESS NOTE ADULT - SUBJECTIVE AND OBJECTIVE BOX
Transplant Surgery - Multi-disciplinary Rounds  --------------------------------------------------------------   Tx Date: 04/08/25         POD#5    Present: Patient seen and examined with multidisciplinary Transplant team including Surgeon Dr. Bernal, nephrologist Dr. Jonas, [facility] Bryson, and bedside RN during AM rounds.   Disciplines not in attendance will be notified of the plan.     HPI: 70 y/o M with PMHx of HLD, CAD s/p LAD PCI 7/24, HFpEF , HTN, hypothyroid, type 2 DM, ESRD on HD MWF (recently converted from PD in 1/2025) via R permacath placed on 1/2025, h/o cva with no residual deficits, persistent right sided pleural effusion, and hx of hemorrhagic pericardial effusion (cytopathology negative) presents to [facility] for possible DDRT. He states he makes less than half cup of urine a day. Denies fever, chills, N/V/D/C, abdominal pain, CP, SOB, hemoptysis, and /GI complaints. Now s/p DDRT  1a + 1 v + 1 u + stent under thymoglobulin induction on 04/08/25.     Interval Events:  - Afebrile  - S/P extra dose of labetalol for BP now improved  - Anuric      Immunosupression:  Induction: Thymo  Maintenance: FK TBD/ BID/SST  Ongoing monitoring for signs of rejection     Potential Discharge date: TBD  Education:  Medications  Plan of care:  See Below      MEDICATIONS  (STANDING):  acyclovir   Oral Tab/Cap 400 milliGRAM(s) Oral two times a day  aspirin enteric coated 81 milliGRAM(s) Oral daily  atorvastatin 40 milliGRAM(s) Oral at bedtime  chlorhexidine 2% Cloths 1 Application(s) Topical daily  doxazosin 4 milliGRAM(s) Oral daily  famotidine    Tablet 20 milliGRAM(s) Oral daily  furosemide Infusion 5 mG/Hr (2.5 mL/Hr) IV Continuous <Continuous>  heparin   Injectable 5000 Unit(s) SubCutaneous every 8 hours  insulin glargine Injectable (LANTUS) 12 Unit(s) SubCutaneous at bedtime  insulin lispro (ADMELOG) corrective regimen sliding scale   SubCutaneous three times a day before meals  insulin lispro (ADMELOG) corrective regimen sliding scale   SubCutaneous at bedtime  insulin lispro Injectable (ADMELOG) 6 Unit(s) SubCutaneous three times a day before meals  labetalol 100 milliGRAM(s) Oral three times a day  levothyroxine 25 MICROGram(s) Oral daily  mycophenolate mofetil 500 milliGRAM(s) Oral <User Schedule>  NIFEdipine XL 60 milliGRAM(s) Oral two times a day  nystatin    Suspension 217575 Unit(s) Swish and Swallow four times a day  polyethylene glycol 3350 17 Gram(s) Oral daily  predniSONE   Tablet   Oral   senna 2 Tablet(s) Oral at bedtime    MEDICATIONS  (PRN):  acetaminophen     Tablet .. 650 milliGRAM(s) Oral every 6 hours PRN Mild Pain (1 - 3)  artificial tears (preservative free) Ophthalmic Solution 1 Drop(s) Left EYE daily PRN Dry Eyes  ondansetron Injectable 4 milliGRAM(s) IV Push every 6 hours PRN Nausea and/or Vomiting  traMADol 25 milliGRAM(s) Oral every 4 hours PRN Moderate Pain (4 - 6)  traMADol 50 milliGRAM(s) Oral every 8 hours PRN Severe Pain (7 - 10)      PAST MEDICAL & SURGICAL HISTORY:  Hypertension      ESRD on peritoneal dialysis      CVA (cerebrovascular accident)  2010 without residual effects      Hyperlipidemia      BPH (benign prostatic hyperplasia)      CAD (coronary artery disease)      T2DM (type 2 diabetes mellitus)      Hypothyroidism      History of peritoneal dialysis  s/p PD catheter placement      S/P coronary artery stent placement          Vital Signs Last 24 Hrs  T(C): 36.4 (13 Apr 2025 12:38), Max: 36.7 (13 Apr 2025 00:15)  T(F): 97.6 (13 Apr 2025 12:38), Max: 98.1 (13 Apr 2025 04:41)  HR: 59 (13 Apr 2025 15:00) (56 - 65)  BP: 164/83 (13 Apr 2025 15:00) (133/61 - 190/62)  BP(mean): 100 (13 Apr 2025 12:38) (88 - 121)  RR: 17 (13 Apr 2025 15:00) (17 - 18)  SpO2: 99% (13 Apr 2025 15:00) (97% - 100%)    Parameters below as of 13 Apr 2025 15:00  Patient On (Oxygen Delivery Method): room air        I&O's Summary    12 Apr 2025 07:01  -  13 Apr 2025 07:00  --------------------------------------------------------  IN: 780 mL / OUT: 226 mL / NET: 554 mL    13 Apr 2025 07:01  -  13 Apr 2025 15:48  --------------------------------------------------------  IN: 497.5 mL / OUT: 92 mL / NET: 405.5 mL                              7.8    4.55  )-----------( 168      ( 13 Apr 2025 07:22 )             24.1     04-13    133[L]  |  93[L]  |  59[H]  ----------------------------<  101[H]  4.2   |  22  |  5.45[H]    Ca    7.9[L]      13 Apr 2025 07:22  Phos  3.6     04-13  Mg     2.3     04-13    TPro  5.3[L]  /  Alb  3.2[L]  /  TBili  0.3  /  DBili  x   /  AST  20  /  ALT  5[L]  /  AlkPhos  52  04-13    Tacrolimus (), Serum: <0.8 ng/mL (04-13 @ 07:20)                  Review of systems  Gen: No weight changes, fatigue, fevers/chills, weakness  Skin: No rashes  Head/Eyes/Ears/Mouth: No headache; Normal hearing; Normal vision w/o blurriness; No sinus pain/discomfort, sore throat  Respiratory: No dyspnea, cough, wheezing, hemoptysis  CV: No chest pain, PND, orthopnea  GI: Mild abdominal pain at surgical incision site; denies diarrhea, constipation, nausea, vomiting, melena, hematochezia  : No increased frequency, dysuria, hematuria, nocturia  MSK: No joint pain/swelling; no back pain; no edema  Neuro: No dizziness/lightheadedness, weakness, seizures, numbness, tingling  Heme: No easy bruising or bleeding  Endo: No heat/cold intolerance  Psych: No significant nervousness, anxiety, stress, depression  All other systems were reviewed and are negative, except as noted.      PHYSICAL EXAM:  Constitutional: Well developed / well nourished  Eyes: Anicteric, PERRLA  ENMT: nc/at  Neck: Supple  Respiratory: CTA B/L  Cardiovascular: RRR  Gastrointestinal: Soft, non distended, mild tenderness at the incision site; incision c/d/i; COSMO x 3 Sang  Genitourinary: Urinary catheter in place anuric  Extremities: SCD's in place and working bilaterally, R permcath  Vascular: Palpable dp pulses bilaterally  Neurological: A&O x3  Skin: no rashes, ulcerations or lesions;  Musculoskeletal: Moving all extremities  Psychiatric: Responsive

## 2025-04-13 NOTE — PROGRESS NOTE ADULT - SUBJECTIVE AND OBJECTIVE BOX
[facility]   MD MESSI ROSE MD RUORU WONG, PA    TEL:  FROM 9 AM to 5 PM ---OFFICE: [phone]  AVAILABLE ON TEAMS    FROM 5 PM - 9 AM PLEASE CALL ANSWERING SERVICE: [phone]    RENAL FOLLOW UP NOTE--Date of Service 04-13-25 @ 08:56  --------------------------------------------------------------------------------  HPI:      Pt seen and examined at bedside.   Denies SOB, chest pain     PAST HISTORY  --------------------------------------------------------------------------------  No significant changes to PMH, PSH, FHx, SHx, unless otherwise noted    ALLERGIES & MEDICATIONS  --------------------------------------------------------------------------------  Allergies    hydrALAZINE (Short breath)    Intolerances      Standing Inpatient Medications  acyclovir   Oral Tab/Cap 400 milliGRAM(s) Oral two times a day  aspirin enteric coated 81 milliGRAM(s) Oral daily  atorvastatin 40 milliGRAM(s) Oral at bedtime  chlorhexidine 2% Cloths 1 Application(s) Topical daily  famotidine    Tablet 20 milliGRAM(s) Oral daily  heparin   Injectable 5000 Unit(s) SubCutaneous every 8 hours  insulin glargine Injectable (LANTUS) 12 Unit(s) SubCutaneous at bedtime  insulin lispro (ADMELOG) corrective regimen sliding scale   SubCutaneous three times a day before meals  insulin lispro (ADMELOG) corrective regimen sliding scale   SubCutaneous at bedtime  insulin lispro Injectable (ADMELOG) 6 Unit(s) SubCutaneous three times a day before meals  isosorbide   mononitrate ER Tablet (IMDUR) 30 milliGRAM(s) Oral daily  labetalol 100 milliGRAM(s) Oral three times a day  levothyroxine 25 MICROGram(s) Oral daily  mycophenolate mofetil 500 milliGRAM(s) Oral <User Schedule>  NIFEdipine XL 60 milliGRAM(s) Oral two times a day  nystatin    Suspension 251424 Unit(s) Swish and Swallow four times a day  polyethylene glycol 3350 17 Gram(s) Oral daily  predniSONE   Tablet   Oral   senna 2 Tablet(s) Oral at bedtime  tacrolimus ER Tablet (ENVARSUS XR) 1 milliGRAM(s) Oral <User Schedule>  tamsulosin 0.4 milliGRAM(s) Oral at bedtime    PRN Inpatient Medications  acetaminophen     Tablet .. 650 milliGRAM(s) Oral every 6 hours PRN  artificial tears (preservative free) Ophthalmic Solution 1 Drop(s) Left EYE daily PRN  ondansetron Injectable 4 milliGRAM(s) IV Push every 6 hours PRN  traMADol 25 milliGRAM(s) Oral every 4 hours PRN  traMADol 50 milliGRAM(s) Oral every 8 hours PRN      REVIEW OF SYSTEMS  --------------------------------------------------------------------------------  General: no fever    MSK: no edema     VITALS/PHYSICAL EXAM  --------------------------------------------------------------------------------  T(C): 36.7 (04-13-25 @ 04:41), Max: 36.7 (04-13-25 @ 00:15)  HR: 58 (04-13-25 @ 06:40) (56 - 65)  BP: 152/61 (04-13-25 @ 06:40) (133/61 - 177/74)  RR: 18 (04-13-25 @ 04:41) (18 - 18)  SpO2: 98% (04-13-25 @ 04:41) (97% - 100%)  Wt(kg): --        04-12-25 @ 07:01  -  04-13-25 @ 07:00  --------------------------------------------------------  IN: 780 mL / OUT: 226 mL / NET: 554 mL      Physical Exam:  	Gen: NAD  	HEENT: MMM  	Pulm: CTA B/L  	CV: S1S2  	Abd: Soft, +BS  	Ext: No LE edema B/L                      Neuro: Awake   	Skin: Warm and Dry   	Vascular access: NO HD catheter            brand  LABS/STUDIES  --------------------------------------------------------------------------------              7.8    4.55  >-----------<  168      [04-13-25 @ 07:22]              24.1     133  |  93  |  59  ----------------------------<  101      [04-13-25 @ 07:22]  4.2   |  22  |  5.45        Ca     7.9     [04-13-25 @ 07:22]      Mg     2.3     [04-13-25 @ 07:22]      Phos  3.6     [04-13-25 @ 07:22]    TPro  5.3  /  Alb  3.2  /  TBili  0.3  /  DBili  x   /  AST  20  /  ALT  5   /  AlkPhos  52  [04-13-25 @ 07:22]    PT/INR: PT 10.4 , INR 0.90       [04-13-25 @ 07:21]  PTT: 28.8       [04-13-25 @ 07:21]          [04-13-25 @ 07:22]    Creatinine Trend:  SCr 5.45 [04-13 @ 07:22]  SCr 3.85 [04-12 @ 06:31]  SCr 5.35 [04-11 @ 05:01]  SCr 4.80 [04-10 @ 22:53]  SCr 4.53 [04-10 @ 16:27]    Urinalysis - [04-13-25 @ 07:22]      Color  / Appearance  / SG  / pH       Gluc 101 / Ketone   / Bili  / Urobili        Blood  / Protein  / Leuk Est  / Nitrite       RBC  / WBC  / Hyaline  / Gran  / Sq Epi  / Non Sq Epi  / Bacteria       Iron 17, TIBC 99, %sat 17      [01-08-25 @ 04:35]  PTH -- (Ca 7.9)      [12-30-24 @ 06:50]   229  PTH -- (Ca 7.6)      [11-27-24 @ 04:23]   250  Vitamin D (25OH) 17.1      [11-25-24 @ 06:50]  TSH 3.53      [12-30-24 @ 06:50]  Lipid: chol 112, TG 61, HDL 46, LDL --      [12-31-24 @ 04:27]    HBsAb 46.0      [04-07-25 @ 21:39]  HBsAg Nonreact      [04-07-25 @ 21:39]  HBcAb Nonreact      [04-07-25 @ 21:39]  HCV 0.05, Nonreact      [04-07-25 @ 21:39]  HIV Nonreact      [04-07-25 @ 21:39]

## 2025-04-13 NOTE — PROGRESS NOTE ADULT - ASSESSMENT
69M with PMHx of HLD, CAD s/p LAD PCI 7/24, HFpEF, HTN, hypothyroid, type 2 DM, ESRD on HD MWF presents to [facility] for possible DDRT. Transplant nephrology consulted for ESRD on Hemodialysis.     S/p DDRT (4/8) with DGF  63 yo DCD kidney with KDPI 99%  HD 4/9, 4/11  Renal doppler-patent vasculature, subcaps air around kidney, 2.3cm collection. repeat USG today   not much urine output  HD on 4/11  will try diuresis today. lytes ok  hold HD today   Patient very sensitive to volume     IS  Thymo induction  MMF, solumedrol  FK low dose 1 mg daily   denovo belatacept. next dose on 4/14    Ppx-Bactrim, Nystatin, Acyclovir    HTN- labile  BP fluctuating  moniotor closely  add doxz\azosin 4 mg daily.     Hyperkalemia  resolved    Acidosis- better     Donor blood culture growing GPC- vacomycin one dose given- per ID

## 2025-04-13 NOTE — PROGRESS NOTE ADULT - ASSESSMENT
68 y/o M with PMHx of HLD, CAD s/p LAD PCI 7/24, HFpEF , HTN, hypothyroid, type 2 DM, ESRD on HD MWF (recently converted from PD in 1/2025) via permacath placed on 1/2025, h/o cva with no residual deficits, persistent right sided pleural effusion, and hx of hemorrhagic pericardial effusion (cytopathology negative) presents to [facility] for possible DDRT. He states he makes less than half cup of urine a day. Denies fever, chills, N/V/D/C, abdominal pain, CP, SOB, hemoptysis, and /GI complaints.     A/P  ESRD s/p DDRT 04/09  Steroid and thymo induction  Nephrologist is Dr. Menjivar   Access is PermCath   Was recently converted from PD to HD and on MWF schedule  Last HD 4/7, s/p HD on 04/08 and 04/09, and 04/11  S/p DDRT on 04/09 POD 2  On MMF and solumedrol , Started on Tac follow levels  On acyclovir and nsytatin prophylaxis  Monitor bmp   Renally dose meds    HTN   Now on labetalol and nifedipine  UF w/ HD if needed  Managed per transplant team   Monitor bp     Anemia  Hb below goal  Monitor     CKD-MBD    Monitor ca and phos daily

## 2025-04-13 NOTE — PROGRESS NOTE ADULT - ASSESSMENT
70 y/o M with PMHx of HLD, CAD s/p LAD PCI 7/24, HFpEF , HTN, hypothyroid, type 2 DM, ESRD on HD MWF (recently converted from PD in 1/2025) via R permacath s/p DDRT  1a + 1 v + 1 u + stent under thymoglobulin induction on 04/08/25.   []s/p DDRT under thymo induction   -Renal doppler: patent vasc  -repeat renal doppler - patent, Subscap air surrounding kidney, 2.3 cm collection, Elevated velocities 293 cm/s at the renal txp anastomosis  - Anuric , DC brand   -Strict I/O's, brand, JPx3 Sang  -Reg diet  -Bowel regimen  -IS, SCD, PT  -HD - 4/9, 4/11  - c/w Flomax  - US today  - Lasix 80mg ivp x1, zaroxolyn 5mg x1, lasix drip @5ml/hr    [ ] Immunosuppression  - Thymoglobulin induction  - ENV TBD,  BID , SST w/Thymo  - Beladacept 4/10, next dose 4/15  - ppx: bactrim, nystatin, Acyclovir    []HTN  - off cardene gtt  - Remains hypertensive, if continue to be hypertensive may need to resume cardene drip  - Labetalol 100 mg BID, Nifed 60 BID, inc Imdur to 60 qd  - DC folmax start cardura 4mg QD

## 2025-04-14 DIAGNOSIS — Z94.0 KIDNEY TRANSPLANT STATUS: ICD-10-CM

## 2025-04-14 LAB
ALBUMIN SERPL ELPH-MCNC: 3.1 G/DL — LOW (ref 3.3–5)
ALBUMIN SERPL ELPH-MCNC: 3.1 G/DL — LOW (ref 3.3–5)
ALP SERPL-CCNC: 55 U/L — SIGNIFICANT CHANGE UP (ref 40–120)
ALP SERPL-CCNC: 62 U/L — SIGNIFICANT CHANGE UP (ref 40–120)
ALT FLD-CCNC: <5 U/L — LOW (ref 10–45)
ALT FLD-CCNC: <5 U/L — LOW (ref 10–45)
ANION GAP SERPL CALC-SCNC: 19 MMOL/L — HIGH (ref 5–17)
ANION GAP SERPL CALC-SCNC: 19 MMOL/L — HIGH (ref 5–17)
APTT BLD: 26.6 SEC — SIGNIFICANT CHANGE UP (ref 24.5–35.6)
AST SERPL-CCNC: 20 U/L — SIGNIFICANT CHANGE UP (ref 10–40)
AST SERPL-CCNC: 24 U/L — SIGNIFICANT CHANGE UP (ref 10–40)
BASOPHILS # BLD AUTO: 0.02 K/UL — SIGNIFICANT CHANGE UP (ref 0–0.2)
BASOPHILS # BLD AUTO: 0.02 K/UL — SIGNIFICANT CHANGE UP (ref 0–0.2)
BASOPHILS NFR BLD AUTO: 0.3 % — SIGNIFICANT CHANGE UP (ref 0–2)
BASOPHILS NFR BLD AUTO: 0.3 % — SIGNIFICANT CHANGE UP (ref 0–2)
BILIRUB SERPL-MCNC: 0.2 MG/DL — SIGNIFICANT CHANGE UP (ref 0.2–1.2)
BILIRUB SERPL-MCNC: 0.3 MG/DL — SIGNIFICANT CHANGE UP (ref 0.2–1.2)
BUN SERPL-MCNC: 77 MG/DL — HIGH (ref 7–23)
BUN SERPL-MCNC: 78 MG/DL — HIGH (ref 7–23)
CALCIUM SERPL-MCNC: 7.6 MG/DL — LOW (ref 8.4–10.5)
CALCIUM SERPL-MCNC: 7.9 MG/DL — LOW (ref 8.4–10.5)
CHLORIDE SERPL-SCNC: 89 MMOL/L — LOW (ref 96–108)
CHLORIDE SERPL-SCNC: 90 MMOL/L — LOW (ref 96–108)
CO2 SERPL-SCNC: 21 MMOL/L — LOW (ref 22–31)
CO2 SERPL-SCNC: 22 MMOL/L — SIGNIFICANT CHANGE UP (ref 22–31)
CREAT SERPL-MCNC: 6.73 MG/DL — HIGH (ref 0.5–1.3)
CREAT SERPL-MCNC: 7.04 MG/DL — HIGH (ref 0.5–1.3)
EGFR: 8 ML/MIN/1.73M2 — LOW
EOSINOPHIL # BLD AUTO: 0.77 K/UL — HIGH (ref 0–0.5)
EOSINOPHIL # BLD AUTO: 0.82 K/UL — HIGH (ref 0–0.5)
EOSINOPHIL NFR BLD AUTO: 12.5 % — HIGH (ref 0–6)
EOSINOPHIL NFR BLD AUTO: 13.4 % — HIGH (ref 0–6)
GAS PNL BLDV: SIGNIFICANT CHANGE UP
GAS PNL BLDV: SIGNIFICANT CHANGE UP
GLUCOSE BLDC GLUCOMTR-MCNC: 101 MG/DL — HIGH (ref 70–99)
GLUCOSE BLDC GLUCOMTR-MCNC: 104 MG/DL — HIGH (ref 70–99)
GLUCOSE BLDC GLUCOMTR-MCNC: 115 MG/DL — HIGH (ref 70–99)
GLUCOSE BLDC GLUCOMTR-MCNC: 130 MG/DL — HIGH (ref 70–99)
GLUCOSE BLDC GLUCOMTR-MCNC: 208 MG/DL — HIGH (ref 70–99)
GLUCOSE BLDC GLUCOMTR-MCNC: 209 MG/DL — HIGH (ref 70–99)
GLUCOSE BLDC GLUCOMTR-MCNC: 81 MG/DL — SIGNIFICANT CHANGE UP (ref 70–99)
GLUCOSE SERPL-MCNC: 104 MG/DL — HIGH (ref 70–99)
GLUCOSE SERPL-MCNC: 127 MG/DL — HIGH (ref 70–99)
HCT VFR BLD CALC: 22.9 % — LOW (ref 39–50)
HCT VFR BLD CALC: 24.3 % — LOW (ref 39–50)
HGB BLD-MCNC: 7.5 G/DL — LOW (ref 13–17)
HGB BLD-MCNC: 7.7 G/DL — LOW (ref 13–17)
IMM GRANULOCYTES NFR BLD AUTO: 3.4 % — HIGH (ref 0–0.9)
IMM GRANULOCYTES NFR BLD AUTO: 3.4 % — HIGH (ref 0–0.9)
INR BLD: 0.9 RATIO — SIGNIFICANT CHANGE UP (ref 0.85–1.16)
LYMPHOCYTES # BLD AUTO: 0.21 K/UL — LOW (ref 1–3.3)
LYMPHOCYTES # BLD AUTO: 0.22 K/UL — LOW (ref 1–3.3)
LYMPHOCYTES # BLD AUTO: 3.4 % — LOW (ref 13–44)
LYMPHOCYTES # BLD AUTO: 3.6 % — LOW (ref 13–44)
MAGNESIUM SERPL-MCNC: 2.3 MG/DL — SIGNIFICANT CHANGE UP (ref 1.6–2.6)
MAGNESIUM SERPL-MCNC: 2.3 MG/DL — SIGNIFICANT CHANGE UP (ref 1.6–2.6)
MCHC RBC-ENTMCNC: 26.6 PG — LOW (ref 27–34)
MCHC RBC-ENTMCNC: 27.5 PG — SIGNIFICANT CHANGE UP (ref 27–34)
MCHC RBC-ENTMCNC: 31.7 G/DL — LOW (ref 32–36)
MCHC RBC-ENTMCNC: 32.8 G/DL — SIGNIFICANT CHANGE UP (ref 32–36)
MCV RBC AUTO: 83.9 FL — SIGNIFICANT CHANGE UP (ref 80–100)
MCV RBC AUTO: 84.1 FL — SIGNIFICANT CHANGE UP (ref 80–100)
MONOCYTES # BLD AUTO: 0.77 K/UL — SIGNIFICANT CHANGE UP (ref 0–0.9)
MONOCYTES # BLD AUTO: 0.93 K/UL — HIGH (ref 0–0.9)
MONOCYTES NFR BLD AUTO: 12.5 % — SIGNIFICANT CHANGE UP (ref 2–14)
MONOCYTES NFR BLD AUTO: 15.2 % — HIGH (ref 2–14)
NEUTROPHILS # BLD AUTO: 3.91 K/UL — SIGNIFICANT CHANGE UP (ref 1.8–7.4)
NEUTROPHILS # BLD AUTO: 4.19 K/UL — SIGNIFICANT CHANGE UP (ref 1.8–7.4)
NEUTROPHILS NFR BLD AUTO: 64.1 % — SIGNIFICANT CHANGE UP (ref 43–77)
NEUTROPHILS NFR BLD AUTO: 67.9 % — SIGNIFICANT CHANGE UP (ref 43–77)
NRBC BLD AUTO-RTO: 0 /100 WBCS — SIGNIFICANT CHANGE UP (ref 0–0)
NRBC BLD AUTO-RTO: 0 /100 WBCS — SIGNIFICANT CHANGE UP (ref 0–0)
PHOSPHATE SERPL-MCNC: 3.9 MG/DL — SIGNIFICANT CHANGE UP (ref 2.5–4.5)
PHOSPHATE SERPL-MCNC: 4.1 MG/DL — SIGNIFICANT CHANGE UP (ref 2.5–4.5)
PLATELET # BLD AUTO: 177 K/UL — SIGNIFICANT CHANGE UP (ref 150–400)
PLATELET # BLD AUTO: 187 K/UL — SIGNIFICANT CHANGE UP (ref 150–400)
POTASSIUM SERPL-MCNC: 4.2 MMOL/L — SIGNIFICANT CHANGE UP (ref 3.5–5.3)
POTASSIUM SERPL-MCNC: 4.2 MMOL/L — SIGNIFICANT CHANGE UP (ref 3.5–5.3)
POTASSIUM SERPL-SCNC: 4.2 MMOL/L — SIGNIFICANT CHANGE UP (ref 3.5–5.3)
POTASSIUM SERPL-SCNC: 4.2 MMOL/L — SIGNIFICANT CHANGE UP (ref 3.5–5.3)
PROT SERPL-MCNC: 5.1 G/DL — LOW (ref 6–8.3)
PROT SERPL-MCNC: 5.3 G/DL — LOW (ref 6–8.3)
PROTHROM AB SERPL-ACNC: 10.4 SEC — SIGNIFICANT CHANGE UP (ref 9.9–13.4)
RBC # BLD: 2.73 M/UL — LOW (ref 4.2–5.8)
RBC # BLD: 2.89 M/UL — LOW (ref 4.2–5.8)
RBC # FLD: 17.6 % — HIGH (ref 10.3–14.5)
RBC # FLD: 17.6 % — HIGH (ref 10.3–14.5)
SODIUM SERPL-SCNC: 129 MMOL/L — LOW (ref 135–145)
SODIUM SERPL-SCNC: 131 MMOL/L — LOW (ref 135–145)
TACROLIMUS SERPL-MCNC: 1.2 NG/ML — SIGNIFICANT CHANGE UP
WBC # BLD: 6.11 K/UL — SIGNIFICANT CHANGE UP (ref 3.8–10.5)
WBC # BLD: 6.17 K/UL — SIGNIFICANT CHANGE UP (ref 3.8–10.5)
WBC # FLD AUTO: 6.11 K/UL — SIGNIFICANT CHANGE UP (ref 3.8–10.5)
WBC # FLD AUTO: 6.17 K/UL — SIGNIFICANT CHANGE UP (ref 3.8–10.5)

## 2025-04-14 PROCEDURE — 76776 US EXAM K TRANSPL W/DOPPLER: CPT | Mod: 26,RT

## 2025-04-14 PROCEDURE — G0545: CPT

## 2025-04-14 PROCEDURE — 71045 X-RAY EXAM CHEST 1 VIEW: CPT | Mod: 26

## 2025-04-14 PROCEDURE — 99233 SBSQ HOSP IP/OBS HIGH 50: CPT | Mod: GC

## 2025-04-14 PROCEDURE — 99232 SBSQ HOSP IP/OBS MODERATE 35: CPT

## 2025-04-14 PROCEDURE — 99233 SBSQ HOSP IP/OBS HIGH 50: CPT

## 2025-04-14 RX ORDER — NICARDIPINE HCL 30 MG
5 CAPSULE ORAL
Qty: 40 | Refills: 0 | Status: DISCONTINUED | OUTPATIENT
Start: 2025-04-14 | End: 2025-04-15

## 2025-04-14 RX ORDER — LABETALOL HYDROCHLORIDE 200 MG/1
100 TABLET, FILM COATED ORAL EVERY 8 HOURS
Refills: 0 | Status: DISCONTINUED | OUTPATIENT
Start: 2025-04-14 | End: 2025-04-20

## 2025-04-14 RX ORDER — SULFAMETHOXAZOLE/TRIMETHOPRIM 800-160 MG
1 TABLET ORAL
Refills: 0 | Status: DISCONTINUED | OUTPATIENT
Start: 2025-04-14 | End: 2025-04-23

## 2025-04-14 RX ORDER — NIFEDIPINE 30 MG
60 TABLET, EXTENDED RELEASE 24 HR ORAL
Refills: 0 | Status: DISCONTINUED | OUTPATIENT
Start: 2025-04-14 | End: 2025-04-18

## 2025-04-14 RX ORDER — CALCIUM GLUCONATE 20 MG/ML
2 INJECTION, SOLUTION INTRAVENOUS ONCE
Refills: 0 | Status: COMPLETED | OUTPATIENT
Start: 2025-04-14 | End: 2025-04-14

## 2025-04-14 RX ORDER — BELATACEPT 250 MG/1
562.5 INJECTION, POWDER, LYOPHILIZED, FOR SOLUTION INTRAVENOUS ONCE
Refills: 0 | Status: COMPLETED | OUTPATIENT
Start: 2025-04-14 | End: 2025-04-14

## 2025-04-14 RX ORDER — BELATACEPT 250 MG/1
250 INJECTION, POWDER, LYOPHILIZED, FOR SOLUTION INTRAVENOUS
Refills: 0 | Status: ACTIVE | OUTPATIENT
Start: 2025-04-14

## 2025-04-14 RX ORDER — LABETALOL HYDROCHLORIDE 200 MG/1
10 TABLET, FILM COATED ORAL ONCE
Refills: 0 | Status: DISCONTINUED | OUTPATIENT
Start: 2025-04-14 | End: 2025-04-14

## 2025-04-14 RX ORDER — TACROLIMUS 0.5 MG/1
1 CAPSULE ORAL ONCE
Refills: 0 | Status: COMPLETED | OUTPATIENT
Start: 2025-04-14 | End: 2025-04-14

## 2025-04-14 RX ORDER — LABETALOL HYDROCHLORIDE 200 MG/1
10 TABLET, FILM COATED ORAL ONCE
Refills: 0 | Status: COMPLETED | OUTPATIENT
Start: 2025-04-14 | End: 2025-04-14

## 2025-04-14 RX ORDER — DOXAZOSIN MESYLATE 8 MG/1
8 TABLET ORAL AT BEDTIME
Refills: 0 | Status: DISCONTINUED | OUTPATIENT
Start: 2025-04-14 | End: 2025-04-14

## 2025-04-14 RX ORDER — PATIROMER 8.4 G/1
8.4 POWDER, FOR SUSPENSION ORAL
Qty: 30 | Refills: 2 | Status: ACTIVE | COMMUNITY
Start: 2025-04-14 | End: 1900-01-01

## 2025-04-14 RX ORDER — DOXAZOSIN MESYLATE 8 MG/1
4 TABLET ORAL AT BEDTIME
Refills: 0 | Status: DISCONTINUED | OUTPATIENT
Start: 2025-04-14 | End: 2025-04-15

## 2025-04-14 RX ORDER — ISOSORBIDE MONONITRATE 60 MG/1
60 TABLET, EXTENDED RELEASE ORAL DAILY
Refills: 0 | Status: DISCONTINUED | OUTPATIENT
Start: 2025-04-14 | End: 2025-04-23

## 2025-04-14 RX ORDER — DOXAZOSIN MESYLATE 8 MG/1
4 TABLET ORAL ONCE
Refills: 0 | Status: COMPLETED | OUTPATIENT
Start: 2025-04-14 | End: 2025-04-14

## 2025-04-14 RX ORDER — QUETIAPINE FUMARATE 25 MG/1
12.5 TABLET ORAL ONCE
Refills: 0 | Status: COMPLETED | OUTPATIENT
Start: 2025-04-14 | End: 2025-04-14

## 2025-04-14 RX ORDER — BELATACEPT 250 MG/1
250 INJECTION, POWDER, LYOPHILIZED, FOR SOLUTION INTRAVENOUS
Qty: 3 | Refills: 11 | Status: ACTIVE | COMMUNITY
Start: 2025-04-14 | End: 1900-01-01

## 2025-04-14 RX ORDER — TACROLIMUS 0.5 MG/1
4 CAPSULE ORAL
Refills: 0 | Status: DISCONTINUED | OUTPATIENT
Start: 2025-04-15 | End: 2025-04-21

## 2025-04-14 RX ORDER — B1/B2/B3/B5/B6/B12/VIT C/FOLIC 500-0.5 MG
1 TABLET ORAL DAILY
Refills: 0 | Status: DISCONTINUED | OUTPATIENT
Start: 2025-04-14 | End: 2025-04-23

## 2025-04-14 RX ORDER — BUMETANIDE 1 MG/1
2 TABLET ORAL ONCE
Refills: 0 | Status: COMPLETED | OUTPATIENT
Start: 2025-04-14 | End: 2025-04-14

## 2025-04-14 RX ORDER — LABETALOL HYDROCHLORIDE 200 MG/1
100 TABLET, FILM COATED ORAL THREE TIMES A DAY
Refills: 0 | Status: DISCONTINUED | OUTPATIENT
Start: 2025-04-14 | End: 2025-04-14

## 2025-04-14 RX ADMIN — Medication 2 TABLET(S): at 21:50

## 2025-04-14 RX ADMIN — DOXAZOSIN MESYLATE 4 MILLIGRAM(S): 8 TABLET ORAL at 01:19

## 2025-04-14 RX ADMIN — BUMETANIDE 2 MILLIGRAM(S): 1 TABLET ORAL at 08:32

## 2025-04-14 RX ADMIN — ATORVASTATIN CALCIUM 40 MILLIGRAM(S): 80 TABLET, FILM COATED ORAL at 21:50

## 2025-04-14 RX ADMIN — Medication 1 TABLET(S): at 20:32

## 2025-04-14 RX ADMIN — Medication 25 MG/HR: at 04:49

## 2025-04-14 RX ADMIN — Medication 500000 UNIT(S): at 01:19

## 2025-04-14 RX ADMIN — ISOSORBIDE MONONITRATE 60 MILLIGRAM(S): 60 TABLET, EXTENDED RELEASE ORAL at 13:00

## 2025-04-14 RX ADMIN — TACROLIMUS 3 MILLIGRAM(S): 0.5 CAPSULE ORAL at 08:31

## 2025-04-14 RX ADMIN — MYCOPHENOLATE MOFETIL 500 MILLIGRAM(S): 500 TABLET, FILM COATED ORAL at 20:32

## 2025-04-14 RX ADMIN — Medication 650 MILLIGRAM(S): at 05:01

## 2025-04-14 RX ADMIN — HEPARIN SODIUM 5000 UNIT(S): 1000 INJECTION INTRAVENOUS; SUBCUTANEOUS at 21:50

## 2025-04-14 RX ADMIN — Medication 0.1 MILLIGRAM(S): at 02:03

## 2025-04-14 RX ADMIN — Medication 650 MILLIGRAM(S): at 21:00

## 2025-04-14 RX ADMIN — Medication 1 APPLICATION(S): at 12:08

## 2025-04-14 RX ADMIN — QUETIAPINE FUMARATE 12.5 MILLIGRAM(S): 25 TABLET ORAL at 23:22

## 2025-04-14 RX ADMIN — Medication 500000 UNIT(S): at 11:35

## 2025-04-14 RX ADMIN — Medication 81 MILLIGRAM(S): at 11:35

## 2025-04-14 RX ADMIN — DOXAZOSIN MESYLATE 4 MILLIGRAM(S): 8 TABLET ORAL at 21:50

## 2025-04-14 RX ADMIN — Medication 25 MICROGRAM(S): at 05:01

## 2025-04-14 RX ADMIN — Medication 650 MILLIGRAM(S): at 20:31

## 2025-04-14 RX ADMIN — LABETALOL HYDROCHLORIDE 10 MILLIGRAM(S): 200 TABLET, FILM COATED ORAL at 02:03

## 2025-04-14 RX ADMIN — Medication 60 MILLIGRAM(S): at 17:51

## 2025-04-14 RX ADMIN — TRAMADOL HYDROCHLORIDE 50 MILLIGRAM(S): 50 TABLET, FILM COATED ORAL at 13:24

## 2025-04-14 RX ADMIN — TACROLIMUS 1 MILLIGRAM(S): 0.5 CAPSULE ORAL at 13:01

## 2025-04-14 RX ADMIN — BELATACEPT 200 MILLIGRAM(S): 250 INJECTION, POWDER, LYOPHILIZED, FOR SOLUTION INTRAVENOUS at 13:38

## 2025-04-14 RX ADMIN — Medication 500000 UNIT(S): at 23:22

## 2025-04-14 RX ADMIN — Medication 500000 UNIT(S): at 05:01

## 2025-04-14 RX ADMIN — Medication 400 MILLIGRAM(S): at 05:02

## 2025-04-14 RX ADMIN — TRAMADOL HYDROCHLORIDE 50 MILLIGRAM(S): 50 TABLET, FILM COATED ORAL at 14:25

## 2025-04-14 RX ADMIN — CALCIUM GLUCONATE 200 GRAM(S): 20 INJECTION, SOLUTION INTRAVENOUS at 08:31

## 2025-04-14 RX ADMIN — INSULIN LISPRO 6 UNIT(S): 100 INJECTION, SOLUTION INTRAVENOUS; SUBCUTANEOUS at 11:36

## 2025-04-14 RX ADMIN — HEPARIN SODIUM 5000 UNIT(S): 1000 INJECTION INTRAVENOUS; SUBCUTANEOUS at 05:01

## 2025-04-14 RX ADMIN — INSULIN LISPRO 4: 100 INJECTION, SOLUTION INTRAVENOUS; SUBCUTANEOUS at 11:36

## 2025-04-14 RX ADMIN — Medication 60 MILLIGRAM(S): at 05:01

## 2025-04-14 RX ADMIN — Medication 200 MILLIGRAM(S): at 17:51

## 2025-04-14 RX ADMIN — Medication 650 MILLIGRAM(S): at 05:30

## 2025-04-14 RX ADMIN — MYCOPHENOLATE MOFETIL 500 MILLIGRAM(S): 500 TABLET, FILM COATED ORAL at 09:00

## 2025-04-14 RX ADMIN — Medication 25 MG/HR: at 20:31

## 2025-04-14 RX ADMIN — Medication 500000 UNIT(S): at 17:50

## 2025-04-14 RX ADMIN — HEPARIN SODIUM 5000 UNIT(S): 1000 INJECTION INTRAVENOUS; SUBCUTANEOUS at 13:01

## 2025-04-14 RX ADMIN — Medication 20 MILLIGRAM(S): at 11:36

## 2025-04-14 NOTE — PROGRESS NOTE ADULT - SUBJECTIVE AND OBJECTIVE BOX
[facility]    Dr. Tiara Garcia     Office [phone] (9 am to 5 pm)  Service: [phone] (5pm to 9am)  Also Available on TEAMS      RENAL PROGRESS NOTE: DATE OF SERVICE 04-14-25 @ 11:14    Patient is a 69y old  Male who presents with a chief complaint of possible DDRT (14 Apr 2025 10:23)      Patient seen and examined at bedside. No chest pain/sob    VITALS:  T(F): 97.2 (04-14-25 @ 07:00), Max: 98.1 (04-14-25 @ 04:09)  HR: 54 (04-14-25 @ 09:00)  BP: 111/59 (04-14-25 @ 09:00)  RR: 45 (04-14-25 @ 09:00)  SpO2: 99% (04-14-25 @ 09:00)  Wt(kg): --    04-13 @ 07:01  -  04-14 @ 07:00  --------------------------------------------------------  IN: 1382.5 mL / OUT: 215 mL / NET: 1167.5 mL          PHYSICAL EXAM:  Constitutional: NAD  Neck: No JVD  Respiratory: CTAB, no wheezes, rales or rhonchi  Cardiovascular: S1, S2, RRR  Gastrointestinal: BS+, soft, NT/ND  Extremities: No peripheral edema    Hospital Medications:   MEDICATIONS  (STANDING):  acyclovir   Oral Tab/Cap 400 milliGRAM(s) Oral two times a day  aspirin enteric coated 81 milliGRAM(s) Oral daily  atorvastatin 40 milliGRAM(s) Oral at bedtime  chlorhexidine 2% Cloths 1 Application(s) Topical daily  famotidine    Tablet 20 milliGRAM(s) Oral daily  heparin   Injectable 5000 Unit(s) SubCutaneous every 8 hours  insulin glargine Injectable (LANTUS) 12 Unit(s) SubCutaneous at bedtime  insulin lispro (ADMELOG) corrective regimen sliding scale   SubCutaneous three times a day before meals  insulin lispro (ADMELOG) corrective regimen sliding scale   SubCutaneous at bedtime  insulin lispro Injectable (ADMELOG) 6 Unit(s) SubCutaneous three times a day before meals  isosorbide   mononitrate ER Tablet (IMDUR) 60 milliGRAM(s) Oral daily  levothyroxine 25 MICROGram(s) Oral daily  mycophenolate mofetil 500 milliGRAM(s) Oral <User Schedule>  niCARdipine Infusion 5 mG/Hr (25 mL/Hr) IV Continuous <Continuous>  NIFEdipine XL 60 milliGRAM(s) Oral two times a day  nystatin    Suspension 452110 Unit(s) Swish and Swallow four times a day  polyethylene glycol 3350 17 Gram(s) Oral daily  predniSONE   Tablet 5 milliGRAM(s) Oral daily  predniSONE   Tablet   Oral   senna 2 Tablet(s) Oral at bedtime  tacrolimus ER Tablet (ENVARSUS XR) 3 milliGRAM(s) Oral <User Schedule>    Tacrolimus (), Serum: 1.2 ng/mL (04-14 @ 06:07)    LABS:  04-14    131[L]  |  90[L]  |  77[H]  ----------------------------<  104[H]  4.2   |  22  |  6.73[H]    Ca    7.6[L]      14 Apr 2025 06:07  Phos  3.9     04-14  Mg     2.3     04-14    TPro  5.3[L]  /  Alb  3.1[L]  /  TBili  0.3  /  DBili      /  AST  20  /  ALT  <5[L]  /  AlkPhos  55  04-14    Creatinine Trend: 6.73 <--, 5.45 <--, 3.85 <--, 5.35 <--, 4.80 <--, 4.53 <--, 4.06 <--, 3.42 <--, 2.67 <--, 4.76 <--, 4.51 <--, 4.32 <--, 3.85 <--, 4.86 <--    Albumin: 3.1 g/dL (04-14 @ 06:07)  Phosphorus: 3.9 mg/dL (04-14 @ 06:07)                              7.7    6.11  )-----------( 177      ( 14 Apr 2025 06:07 )             24.3     Urine Studies:  Urinalysis - [04-14-25 @ 06:07]      Color  / Appearance  / SG  / pH       Gluc 104 / Ketone   / Bili  / Urobili        Blood  / Protein  / Leuk Est  / Nitrite       RBC  / WBC  / Hyaline  / Gran  / Sq Epi  / Non Sq Epi  / Bacteria       Iron 17, TIBC 99, %sat 17      [01-08-25 @ 04:35]  PTH -- (Ca 7.9)      [12-30-24 @ 06:50]   229  PTH -- (Ca 7.6)      [11-27-24 @ 04:23]   250  Vitamin D (25OH) 17.1      [11-25-24 @ 06:50]  TSH 2.48      [04-13-25 @ 07:24]  Lipid: chol 112, TG 61, HDL 46, LDL --      [12-31-24 @ 04:27]    HBsAb 46.0      [04-07-25 @ 21:39]  HBsAg Nonreact      [04-07-25 @ 21:39]  HBcAb Nonreact      [04-07-25 @ 21:39]  HCV 0.05, Nonreact      [04-07-25 @ 21:39]  HIV Nonreact      [04-07-25 @ 21:39]      RADIOLOGY & ADDITIONAL STUDIES:

## 2025-04-14 NOTE — CONSULT NOTE ADULT - ASSESSMENT
Assessment: 68 y/o M with PMHx of HLD, CAD s/p LAD PCI 7/24, HFpEF , HTN, hypothyroid, type 2 DM, h/o cva with no residual deficits, persistent right sided pleural effusion, and hx of hemorrhagic pericardial effusion (cytopathology negative), ESRD on HD MWF (recently converted from PD in 1/2025) via permacath placed on 1/2025, now POD#5 for right kidney transplant (4/9). SICU for refractory HTN management.     Neuro  -AOx3-4  -Pain: tylenol, tramadol PRN  -zofran prn for nausea    Pulm  -on RA, satting well  -Incentive spirometry postop to prevent atelectasis    Cardio: Hx HTN, HLD, CHF, CAD s/p PCI   - SBP goal 110-160  - start cardene gtt  - c/w procardia, labetelol, isosorbid, doxazosin  - home statin   - f/u AM CXR to screen volume status     GI  - CC diet  - pepcid   - zofran PRN for nausea  - Miralax/senna bowel regimen    Renal  - anuric, prolonged ischemic downtime  - lasix gtt at 2.5  - HD MWF schedule, last HD 4/9, 4/11  - Monitor UOP    Heme  - hep subQ for DVT prophylaxis  - SCDs  - c/w home ASA (LAD PCI 7/2024)    ID:  - transplant ppx w/ nystatin & acyclovir  - immunosuppression w/ cellcept & steroids  - tacro by level    Endo hx T2DM, hypothyroid   - monitor glucose  - 12u lantus qHS, 6u pre-meal  - ISS  - synthroid (V5) oriented Assessment: 70 y/o M with PMHx of HLD, CAD s/p LAD PCI 7/24, HFpEF , HTN, hypothyroid, type 2 DM, h/o cva with no residual deficits, persistent right sided pleural effusion, and hx of hemorrhagic pericardial effusion (cytopathology negative), ESRD on HD MWF (recently converted from PD in 1/2025) via permacath placed on 1/2025, now POD#5 for right kidney transplant (4/9). SICU for refractory HTN management.     Neuro  -AOx3-4  -Pain: tylenol, tramadol PRN  -zofran prn for nausea    Pulm  -on RA, satting well  -Incentive spirometry postop to prevent atelectasis    Cardio: Hx HTN, HLD, CHF, CAD s/p PCI   - SBP goal 110-160  - start cardene gtt  - c/w procardia, labetelol, isosorbid, doxazosin  - home statin   - f/u AM CXR to screen volume status     GI  - CC diet  - pepcid   - zofran PRN for nausea  - Miralax/senna bowel regimen    Renal  - anuric, prolonged ischemic downtime  - HD MWF schedule, last HD 4/9, 4/11  - Monitor UOP    Heme  - hep subQ for DVT prophylaxis  - SCDs  - c/w home ASA (LAD PCI 7/2024)    ID:  - transplant ppx w/ nystatin & acyclovir  - immunosuppression w/ cellcept & steroids  - tacro by level    Endo hx T2DM, hypothyroid   - monitor glucose  - 12u lantus qHS, 6u pre-meal  - ISS  - synthroid

## 2025-04-14 NOTE — PROGRESS NOTE ADULT - SUBJECTIVE AND OBJECTIVE BOX
[facility] DIVISION OF KIDNEY DISEASES AND HYPERTENSION -- FOLLOW UP NOTE  --------------------------------------------------------------------------------  Chief Complaint:    24 hour events/subjective: Pt was hypertensive overnight, requiring cardene drip, now off. Pt remains anuric. He denies any complaints this morning.       PAST HISTORY  --------------------------------------------------------------------------------  No significant changes to PMH, PSH, FHx, SHx, unless otherwise noted    ALLERGIES & MEDICATIONS  --------------------------------------------------------------------------------  Allergies    hydrALAZINE (Short breath)    Intolerances      Standing Inpatient Medications  acyclovir   Oral Tab/Cap 200 milliGRAM(s) Oral two times a day  aspirin enteric coated 81 milliGRAM(s) Oral daily  atorvastatin 40 milliGRAM(s) Oral at bedtime  chlorhexidine 2% Cloths 1 Application(s) Topical daily  famotidine    Tablet 20 milliGRAM(s) Oral daily  heparin   Injectable 5000 Unit(s) SubCutaneous every 8 hours  insulin glargine Injectable (LANTUS) 12 Unit(s) SubCutaneous at bedtime  insulin lispro (ADMELOG) corrective regimen sliding scale   SubCutaneous three times a day before meals  insulin lispro (ADMELOG) corrective regimen sliding scale   SubCutaneous at bedtime  insulin lispro Injectable (ADMELOG) 6 Unit(s) SubCutaneous three times a day before meals  isosorbide   mononitrate ER Tablet (IMDUR) 60 milliGRAM(s) Oral daily  levothyroxine 25 MICROGram(s) Oral daily  mycophenolate mofetil 500 milliGRAM(s) Oral <User Schedule>  Nephro-brenda 1 Tablet(s) Oral daily  niCARdipine Infusion 5 mG/Hr IV Continuous <Continuous>  NIFEdipine XL 60 milliGRAM(s) Oral two times a day  nystatin    Suspension 844028 Unit(s) Swish and Swallow four times a day  polyethylene glycol 3350 17 Gram(s) Oral daily  predniSONE   Tablet 5 milliGRAM(s) Oral daily  predniSONE   Tablet   Oral   senna 2 Tablet(s) Oral at bedtime  trimethoprim   80 mG/sulfamethoxazole 400 mG 1 Tablet(s) Oral <User Schedule>    PRN Inpatient Medications  acetaminophen     Tablet .. 650 milliGRAM(s) Oral every 6 hours PRN  artificial tears (preservative free) Ophthalmic Solution 1 Drop(s) Left EYE daily PRN  ondansetron Injectable 4 milliGRAM(s) IV Push every 6 hours PRN  traMADol 25 milliGRAM(s) Oral every 4 hours PRN  traMADol 50 milliGRAM(s) Oral every 8 hours PRN      REVIEW OF SYSTEMS  --------------------------------------------------------------------------------  Gen: No fevers/chills  Respiratory: No dyspnea, cough,   CV: No chest pain, PND, orthopnea  GI: No abdominal pain, diarrhea, constipation, nausea, vomiting  Transplant: No pain  : No increased frequency, dysuria, hematuria   MSK: No edema  Neuro: No dizziness/lightheadedness    All other systems were reviewed and are negative, except as noted.    VITALS/PHYSICAL EXAM  --------------------------------------------------------------------------------  T(C): 36.4 (04-14-25 @ 15:10), Max: 37.1 (04-14-25 @ 11:00)  HR: 57 (04-14-25 @ 15:15) (49 - 62)  BP: 143/68 (04-14-25 @ 15:15) (111/59 - 221/92)  RR: 16 (04-14-25 @ 15:15) (15 - 53)  SpO2: 99% (04-14-25 @ 15:15) (97% - 100%)  Wt(kg): --        04-13-25 @ 07:01  -  04-14-25 @ 07:00  --------------------------------------------------------  IN: 1382.5 mL / OUT: 215 mL / NET: 1167.5 mL      Physical Exam:  Alert and oriented x3   HEENT: normal  Pulm: CTA B/L  CV: normal S1S2; no murmur  Abd: soft, non-tender  Extremities- no edema  RIJ TCC  RLQ surgical scar    LABS/STUDIES  --------------------------------------------------------------------------------              7.7    6.11  >-----------<  177      [04-14-25 @ 06:07]              24.3     129  |  89  |  78  ----------------------------<  127      [04-14-25 @ 15:31]  4.2   |  21  |  7.04        Ca     7.9     [04-14-25 @ 15:31]      Mg     2.3     [04-14-25 @ 15:31]      Phos  4.1     [04-14-25 @ 15:31]    TPro  5.1  /  Alb  3.1  /  TBili  0.2  /  DBili  x   /  AST  24  /  ALT  <5  /  AlkPhos  62  [04-14-25 @ 15:31]    PT/INR: PT 10.4 , INR 0.90       [04-14-25 @ 06:07]  PTT: 26.6       [04-14-25 @ 06:07]          [04-13-25 @ 07:22]    Creatinine Trend:  SCr 7.04 [04-14 @ 15:31]  SCr 6.73 [04-14 @ 06:07]  SCr 5.45 [04-13 @ 07:22]  SCr 3.85 [04-12 @ 06:31]  SCr 5.35 [04-11 @ 05:01]    Tacrolimus (), Serum: 1.2 ng/mL (04-14 @ 06:07)  Tacrolimus (), Serum: <0.8 ng/mL (04-13 @ 07:20)  Tacrolimus (), Serum: <0.8 ng/mL (04-12 @ 06:31)  Tacrolimus (), Serum: <0.8 ng/mL (04-11 @ 05:01)            Urinalysis - [04-14-25 @ 15:31]      Color  / Appearance  / SG  / pH       Gluc 127 / Ketone   / Bili  / Urobili        Blood  / Protein  / Leuk Est  / Nitrite       RBC  / WBC  / Hyaline  / Gran  / Sq Epi  / Non Sq Epi  / Bacteria       Iron 17, TIBC 99, %sat 17      [01-08-25 @ 04:35]  PTH -- (Ca 7.9)      [12-30-24 @ 06:50]   229  PTH -- (Ca 7.6)      [11-27-24 @ 04:23]   250  Vitamin D (25OH) 17.1      [11-25-24 @ 06:50]  TSH 2.48      [04-13-25 @ 07:24]  Lipid: chol 112, TG 61, HDL 46, LDL --      [12-31-24 @ 04:27]    HBsAb 46.0      [04-07-25 @ 21:39]  HBsAg Nonreact      [04-07-25 @ 21:39]  HBcAb Nonreact      [04-07-25 @ 21:39]  HCV 0.05, Nonreact      [04-07-25 @ 21:39]  HIV Nonreact      [04-07-25 @ 21:39]

## 2025-04-14 NOTE — CONSULT NOTE ADULT - SUBJECTIVE AND OBJECTIVE BOX
SICU Consultation Note  =====================================================  HPI: 69y Male  70 y/o M with PMHx of HLD, CAD s/p LAD PCI 7/24, HFpEF , HTN, hypothyroid, type 2 DM, ESRD on HD MWF (recently converted from PD in 1/2025) via permacath placed on 1/2025, h/o cva with no residual deficits, persistent right sided pleural effusion, and hx of hemorrhagic pericardial effusion (cytopathology negative) presents to [facility] for possible DDRT. He states he makes less than half cup of urine a day. hemoptysis, and /GI complaints. Now POD #5 notified by primary team for refractory HTN to home oral agents and IV push agents. Patient without acute compliant. Denies headache, vision changes, chest pain, dyspnea acute pain, abd pain, NVD, fevers/chills, acute focal weakness.    (08 Apr 2025 00:42)        PAST MEDICAL & SURGICAL HISTORY:  Hypertension      ESRD on peritoneal dialysis      CVA (cerebrovascular accident)  2010 without residual effects      Hyperlipidemia      BPH (benign prostatic hyperplasia)      CAD (coronary artery disease)      T2DM (type 2 diabetes mellitus)      Hypothyroidism      History of peritoneal dialysis  s/p PD catheter placement      S/P coronary artery stent placement        Home Meds: Home Medications:  aspirin 81 mg oral delayed release tablet: 1 tab(s) orally once a day (07 Apr 2025 20:46)  atorvastatin 40 mg oral tablet: 1 tab(s) orally once a day (07 Apr 2025 20:46)  bumetanide 2 mg oral tablet: 1 tab(s) orally once a day (current medication per family member, per pharmacy 90-day supply last dispensed 9/16/24) (07 Apr 2025 20:43)  calcitriol 0.25 mcg oral capsule: 1 cap(s) orally once a week on Wednesday (07 Apr 2025 20:46)  calcium acetate 667 mg oral tablet: 1 tab(s) orally 3 times a day (with meals) (07 Apr 2025 20:46)  clopidogrel 75 mg oral tablet: 1 tab(s) orally once a day (07 Apr 2025 20:46)  Diovan 320 mg oral tablet: 1 tab(s) orally once a day (07 Apr 2025 20:45)  doxazosin 4 mg oral tablet: 1 tab(s) orally once a day (at bedtime) (current medication per family member, per pharmacy 90-day supply last dispensed 9/2/24) (07 Apr 2025 20:46)  isosorbide mononitrate 60 mg oral tablet, extended release: 1 tab(s) orally once a day (current medication per family member, per pharmacy 30-day supply last dispensed 11/29/24) (07 Apr 2025 20:46)  labetalol 100 mg oral tablet: 2 tab(s) orally 2 times a day (home med) (07 Apr 2025 20:44)  levothyroxine 25 mcg (0.025 mg) oral tablet: 1 tab(s) orally once a day in the morning (current medication per family member, per pharmacy 30-day supply last dispensed 11/29/24) (07 Apr 2025 20:46)  Yee-Selena Rx oral tablet: 1 tab(s) orally once a day (07 Apr 2025 20:46)    Allergies: Allergies    hydrALAZINE (Short breath)    Intolerances      Soc:   Advanced Directives: Presumed Full Code     ROS:    REVIEW OF SYSTEMS    [ x ] A ten-point review of systems was otherwise negative except as noted.      CURRENT MEDICATIONS:   --------------------------------------------------------------------------------------  Neurologic Medications  acetaminophen     Tablet .. 650 milliGRAM(s) Oral every 6 hours PRN Mild Pain (1 - 3)  ondansetron Injectable 4 milliGRAM(s) IV Push every 6 hours PRN Nausea and/or Vomiting  traMADol 25 milliGRAM(s) Oral every 4 hours PRN Moderate Pain (4 - 6)  traMADol 50 milliGRAM(s) Oral every 8 hours PRN Severe Pain (7 - 10)    Respiratory Medications    Cardiovascular Medications  doxazosin 8 milliGRAM(s) Oral at bedtime  furosemide Infusion 5 mG/Hr IV Continuous <Continuous>  isosorbide   mononitrate ER Tablet (IMDUR) 60 milliGRAM(s) Oral daily  labetalol 100 milliGRAM(s) Oral three times a day  NIFEdipine XL 60 milliGRAM(s) Oral two times a day    Gastrointestinal Medications  famotidine    Tablet 20 milliGRAM(s) Oral daily  polyethylene glycol 3350 17 Gram(s) Oral daily  senna 2 Tablet(s) Oral at bedtime    Genitourinary Medications    Hematologic/Oncologic Medications  aspirin enteric coated 81 milliGRAM(s) Oral daily  heparin   Injectable 5000 Unit(s) SubCutaneous every 8 hours  mycophenolate mofetil 500 milliGRAM(s) Oral <User Schedule>  tacrolimus ER Tablet (ENVARSUS XR) 3 milliGRAM(s) Oral <User Schedule>    Antimicrobial/Immunologic Medications  acyclovir   Oral Tab/Cap 400 milliGRAM(s) Oral two times a day  nystatin    Suspension 701061 Unit(s) Swish and Swallow four times a day    Endocrine/Metabolic Medications  atorvastatin 40 milliGRAM(s) Oral at bedtime  insulin glargine Injectable (LANTUS) 12 Unit(s) SubCutaneous at bedtime  insulin lispro (ADMELOG) corrective regimen sliding scale   SubCutaneous three times a day before meals  insulin lispro (ADMELOG) corrective regimen sliding scale   SubCutaneous at bedtime  insulin lispro Injectable (ADMELOG) 6 Unit(s) SubCutaneous three times a day before meals  levothyroxine 25 MICROGram(s) Oral daily  predniSONE   Tablet 5 milliGRAM(s) Oral daily  predniSONE   Tablet   Oral     Topical/Other Medications  artificial tears (preservative free) Ophthalmic Solution 1 Drop(s) Left EYE daily PRN Dry Eyes  chlorhexidine 2% Cloths 1 Application(s) Topical daily    --------------------------------------------------------------------------------------    VITAL SIGNS, INS/OUTS (last 24 hours):  --------------------------------------------------------------------------------------  ICU Vital Signs Last 24 Hrs  T(C): 36.7 (14 Apr 2025 04:09), Max: 36.7 (13 Apr 2025 04:41)  T(F): 98.1 (14 Apr 2025 04:09), Max: 98.1 (13 Apr 2025 04:41)  HR: 59 (14 Apr 2025 04:09) (54 - 62)  BP: 199/91 (14 Apr 2025 04:09) (152/61 - 221/92)  BP(mean): 130 (14 Apr 2025 04:09) (100 - 133)  ABP: --  ABP(mean): --  RR: 44 (14 Apr 2025 04:09) (17 - 44)  SpO2: 98% (14 Apr 2025 04:09) (97% - 100%)    O2 Parameters below as of 14 Apr 2025 04:09  Patient On (Oxygen Delivery Method): room air          I&O's Summary    12 Apr 2025 07:01  -  13 Apr 2025 07:00  --------------------------------------------------------  IN: 780 mL / OUT: 226 mL / NET: 554 mL    13 Apr 2025 07:01  -  14 Apr 2025 04:11  --------------------------------------------------------  IN: 1107.5 mL / OUT: 180 mL / NET: 927.5 mL      --------------------------------------------------------------------------------------    EXAM:  General/Neuro  GCS: 15  Exam: Normal, NAD, alert, oriented x 3, no focal deficits. PERRLA    Respiratory  Exam: Lungs clear to auscultation, Normal expansion/effort.        Cardiovascular  Exam: S1, S2.  Regular rate and rhythm.  Hypertensive  Cardiac Rhythm: Normal Sinus Rhythm      GI  Exam: Abdomen soft, Non-tender, Non-distended.  RLQ incision site c/d/i  Current Diet:  Carb/Renal diet    Extremities  Exam: Extremities warm, pink, well-perfused.        Derm:  Exam: Good skin turgor, no skin breakdown.        LABS  --------------------------------------------------------------------------------------  Labs:  CAPILLARY BLOOD GLUCOSE      POCT Blood Glucose.: 115 mg/dL (14 Apr 2025 01:44)  POCT Blood Glucose.: 81 mg/dL (14 Apr 2025 01:12)  POCT Blood Glucose.: 103 mg/dL (13 Apr 2025 22:17)  POCT Blood Glucose.: 111 mg/dL (13 Apr 2025 20:59)  POCT Blood Glucose.: 160 mg/dL (13 Apr 2025 16:24)  POCT Blood Glucose.: 212 mg/dL (13 Apr 2025 12:42)  POCT Blood Glucose.: 106 mg/dL (13 Apr 2025 08:52)                          7.8    4.55  )-----------( 168      ( 13 Apr 2025 07:22 )             24.1         04-13    133[L]  |  93[L]  |  59[H]  ----------------------------<  101[H]  4.2   |  22  |  5.45[H]      Calcium: 7.9 mg/dL (04-13-25 @ 07:22)      LFTs:             5.3  | 0.3  | 20       ------------------[52      ( 13 Apr 2025 07:22 )  3.2  | x    | 5           Lipase:x      Amylase:x         Blood Gas Venous - Lactate: 0.8 mmol/L (04-14-25 @ 01:14)    ABG - ( 09 Apr 2025 21:04 )  pH: 7.44  /  pCO2: 37    /  pO2: 126   / HCO3: 25    / Base Excess: 1.0   /  SaO2: 97.8            ABG - ( 09 Apr 2025 15:30 )  pH: 7.40  /  pCO2: 32    /  pO2: 124   / HCO3: 20    / Base Excess: -4.3  /  SaO2: 97.8            ABG - ( 09 Apr 2025 00:01 )  pH: 7.41  /  pCO2: 35    /  pO2: 123   / HCO3: 22    / Base Excess: -2.1  /  SaO2: 98.1              Coags:     10.4   ----< 0.90    ( 13 Apr 2025 07:21 )     28.8                Urinalysis Basic - ( 13 Apr 2025 07:22 )    Color: x / Appearance: x / SG: x / pH: x  Gluc: 101 mg/dL / Ketone: x  / Bili: x / Urobili: x   Blood: x / Protein: x / Nitrite: x   Leuk Esterase: x / RBC: x / WBC x   Sq Epi: x / Non Sq Epi: x / Bacteria: x

## 2025-04-14 NOTE — PROGRESS NOTE ADULT - ASSESSMENT
69M with PMHx of HLD, CAD s/p LAD PCI 7/24, HFpEF, HTN, hypothyroid, type 2 DM, ESRD on HD MWF presents to [facility] for possible DDRT. Transplant nephrology consulted for ESRD on Hemodialysis.     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 Repeat USG showed increasing collection size.   not much urine output  HD on 4/11, plan for HD today  Patient very sensitive to volume     IS  Thymo induction  MMF, solumedrol  FK low dose 1 mg daily   denovo belatacept. next dose today on 4/14  Ppx-Bactrim, Nystatin, Acyclovir    HTN- labile  BP fluctuating  monitor closely  On Nifedipine 60 bid, Imdur  add doxzazosin 4 mg daily     Hyperkalemia  resolved      Please call if you have any questions  Michael Palm, PGY4  Nephrology Fellow  [id number]/Teams preferred  (After 5PM or weekends, reach out to fellow on call)

## 2025-04-14 NOTE — PROGRESS NOTE ADULT - SUBJECTIVE AND OBJECTIVE BOX
Transplant Surgery - Multi-disciplinary Rounds  --------------------------------------------------------------   Tx Date: 04/08/25         POD#6    Present: Patient seen and examined with multidisciplinary Transplant team including Surgeon Dr. Bernal, nephrologist Dr. Dunn, LÁZARO Gregg/Audra, pharmacy, and bedside RN during AM rounds.   Disciplines not in attendance will be notified of the plan.     HPI: 68 y/o M with PMHx of HLD, CAD s/p LAD PCI 7/24, HFpEF , HTN, hypothyroid, type 2 DM, ESRD on HD MWF (recently converted from PD in 1/2025) via R permacath placed on 1/2025, h/o cva with no residual deficits, persistent right sided pleural effusion, and hx of hemorrhagic pericardial effusion (cytopathology negative) presents to [facility] for possible DDRT. He states he makes less than half cup of urine a day. Denies fever, chills, N/V/D/C, abdominal pain, CP, SOB, hemoptysis, and /GI complaints. Now s/p DDRT  1a + 1 v + 1 u + stent under thymoglobulin induction on 04/08/25.     Interval Events:  - Afebrile, hypertensive   - Minimal urine  - Diuretics with no response  - US kidney - patent vasculature, 2.3 x 1.4 x 6.3 cm perinephric collection  - Brought to SICU, started on cardene drip   - Campo removed, passed TOV      Immunosuppression:  Induction: Thymo  Maintenance: ENV per level/ BID/SST  Ongoing monitoring for signs of rejection     Potential Discharge date: TBD  Education:  Medications  Plan of care:  See Below        MEDICATIONS  (STANDING):  acyclovir   Oral Tab/Cap 400 milliGRAM(s) Oral two times a day  aspirin enteric coated 81 milliGRAM(s) Oral daily  atorvastatin 40 milliGRAM(s) Oral at bedtime  chlorhexidine 2% Cloths 1 Application(s) Topical daily  famotidine    Tablet 20 milliGRAM(s) Oral daily  heparin   Injectable 5000 Unit(s) SubCutaneous every 8 hours  insulin glargine Injectable (LANTUS) 12 Unit(s) SubCutaneous at bedtime  insulin lispro (ADMELOG) corrective regimen sliding scale   SubCutaneous three times a day before meals  insulin lispro (ADMELOG) corrective regimen sliding scale   SubCutaneous at bedtime  insulin lispro Injectable (ADMELOG) 6 Unit(s) SubCutaneous three times a day before meals  isosorbide   mononitrate ER Tablet (IMDUR) 60 milliGRAM(s) Oral daily  levothyroxine 25 MICROGram(s) Oral daily  mycophenolate mofetil 500 milliGRAM(s) Oral <User Schedule>  niCARdipine Infusion 5 mG/Hr (25 mL/Hr) IV Continuous <Continuous>  NIFEdipine XL 60 milliGRAM(s) Oral two times a day  nystatin    Suspension 451367 Unit(s) Swish and Swallow four times a day  polyethylene glycol 3350 17 Gram(s) Oral daily  predniSONE   Tablet   Oral   predniSONE   Tablet 5 milliGRAM(s) Oral daily  senna 2 Tablet(s) Oral at bedtime  tacrolimus ER Tablet (ENVARSUS XR) 1 milliGRAM(s) Oral once    MEDICATIONS  (PRN):  acetaminophen     Tablet .. 650 milliGRAM(s) Oral every 6 hours PRN Mild Pain (1 - 3)  artificial tears (preservative free) Ophthalmic Solution 1 Drop(s) Left EYE daily PRN Dry Eyes  ondansetron Injectable 4 milliGRAM(s) IV Push every 6 hours PRN Nausea and/or Vomiting  traMADol 25 milliGRAM(s) Oral every 4 hours PRN Moderate Pain (4 - 6)  traMADol 50 milliGRAM(s) Oral every 8 hours PRN Severe Pain (7 - 10)      PAST MEDICAL & SURGICAL HISTORY:  Hypertension      ESRD on peritoneal dialysis      CVA (cerebrovascular accident)  2010 without residual effects      Hyperlipidemia      BPH (benign prostatic hyperplasia)      CAD (coronary artery disease)      T2DM (type 2 diabetes mellitus)      Hypothyroidism      History of peritoneal dialysis  s/p PD catheter placement      S/P coronary artery stent placement          Vital Signs Last 24 Hrs  T(C): 36.2 (14 Apr 2025 07:00), Max: 36.7 (14 Apr 2025 04:09)  T(F): 97.2 (14 Apr 2025 07:00), Max: 98.1 (14 Apr 2025 04:09)  HR: 54 (14 Apr 2025 09:00) (49 - 61)  BP: 111/59 (14 Apr 2025 09:00) (111/59 - 221/92)  BP(mean): 81 (14 Apr 2025 09:00) (81 - 133)  RR: 45 (14 Apr 2025 09:00) (15 - 53)  SpO2: 99% (14 Apr 2025 09:00) (97% - 100%)    Parameters below as of 14 Apr 2025 08:00  Patient On (Oxygen Delivery Method): room air        I&O's Summary    13 Apr 2025 07:01  -  14 Apr 2025 07:00  --------------------------------------------------------  IN: 1382.5 mL / OUT: 215 mL / NET: 1167.5 mL                              7.7    6.11  )-----------( 177      ( 14 Apr 2025 06:07 )             24.3     04-14    131[L]  |  90[L]  |  77[H]  ----------------------------<  104[H]  4.2   |  22  |  6.73[H]    Ca    7.6[L]      14 Apr 2025 06:07  Phos  3.9     04-14  Mg     2.3     04-14    TPro  5.3[L]  /  Alb  3.1[L]  /  TBili  0.3  /  DBili  x   /  AST  20  /  ALT  <5[L]  /  AlkPhos  55  04-14    Tacrolimus (), Serum: 1.2 ng/mL (04-14 @ 06:07)          Review of systems  Gen: No weight changes, fatigue, fevers/chills, weakness  Skin: No rashes  Head/Eyes/Ears/Mouth: No headache; Normal hearing; Normal vision w/o blurriness; No sinus pain/discomfort, sore throat  Respiratory: No dyspnea, cough, wheezing, hemoptysis  CV: No chest pain, PND, orthopnea  GI: Mild diffuse abdominal pain; denies diarrhea, constipation, nausea, vomiting, melena, hematochezia  : No increased frequency, dysuria, hematuria, nocturia  MSK: No joint pain/swelling; no back pain; no edema  Neuro: No dizziness/lightheadedness, weakness, seizures, numbness, tingling  Heme: No easy bruising or bleeding  Endo: No heat/cold intolerance  Psych: No significant nervousness, anxiety, stress, depression  All other systems were reviewed and are negative, except as noted.      PHYSICAL EXAM:  Constitutional: Well developed / well nourished  Eyes: Anicteric, PERRLA  ENMT: nc/at  Neck: Supple  Respiratory: CTA B/L  Cardiovascular: RRR  Gastrointestinal: Soft, slightly distended, diffuse abdominal pain to palpation; incision c/d/i; COSMO x 3 SS  Genitourinary: voiding  Extremities: SCD's in place and working bilaterally, R permcath  Vascular: Palpable dp pulses bilaterally  Neurological: A&O x3  Skin: no rashes, ulcerations or lesions;  Musculoskeletal: Moving all extremities  Psychiatric: Responsive

## 2025-04-14 NOTE — PROGRESS NOTE ADULT - ASSESSMENT
68 y/o M with PMHx of HLD, CAD s/p LAD PCI 7/24, HFpEF , HTN, hypothyroid, type 2 DM, ESRD on HD MWF (recently converted from PD in 1/2025) via permacath placed on 1/2025, h/o cva with no residual deficits, persistent right sided pleural effusion, and hx of hemorrhagic pericardial effusion (cytopathology negative) presents to [facility] for possible DDRT. He states he makes less than half cup of urine a day. Denies fever, chills, N/V/D/C, abdominal pain, CP, SOB, hemoptysis, and /GI complaints.   He underwent DDRT 4/8/25, transplant ID consulted for donor positive blood cultures with gram positive cocci in clusters. Seen and examined at bedside, denies any complaints, no fever, chills, cough, or myalgias.      #S/p DDRT (4/8) with DGF (CMV D-/R-; EBV D+/R+)  #Donor positive blood  cultures ([facility])  #Immunosuppression   #Antibiotics prophylaxis  -Continue Vancomycin by level for a total of 7 days (Day 5 today)  -Completed Surgical prophylaxis pre- and intra-operative - Cefazolin  -Bactrim SS tablet (frequency based on renal function)  -Acyclovir (dose based on CMV serostatus and frequency based on renal function)  -Nystatin swish and swallow 5 mL four times daily  -Trend CBC & chemistry  -Continue temperature curves   68 y/o M with PMHx of HLD, CAD s/p LAD PCI 7/24, HFpEF , HTN, hypothyroid, type 2 DM, ESRD on HD MWF (recently converted from PD in 1/2025) via permacath placed on 1/2025, h/o cva with no residual deficits, persistent right sided pleural effusion, and hx of hemorrhagic pericardial effusion (cytopathology negative) presents to [facility] for possible DDRT. He states he makes less than half cup of urine a day. Denies fever, chills, N/V/D/C, abdominal pain, CP, SOB, hemoptysis, and /GI complaints.   He underwent DDRT 4/8/25, transplant ID consulted for donor positive blood cultures with gram positive cocci in clusters. Seen and examined at bedside, denies any complaints, no fever, chills, cough, or myalgias.      #S/p DDRT (4/8) with DGF (CMV D-/R-; EBV D+/R+)  #Donor positive blood cultures ([facility])  #Immunosuppression   #Antibiotics prophylaxis  -Continue Vancomycin by level for a total of 7 days (4/11 > 4/17)  -Completed Surgical prophylaxis pre- and intra-operative - Cefazolin  -Bactrim SS tablet (frequency based on renal function)  -Acyclovir (dose based on CMV serostatus and frequency based on renal function)  -Nystatin swish and swallow 5 mL four times daily  -Trend CBC & chemistry  -Continue temperature curves

## 2025-04-14 NOTE — PROVIDER CONTACT NOTE (OTHER) - ACTION/TREATMENT ORDERED:
Provider notified. CW Dressing change done at bedside by RNx2. Doxazosin given. Juice and pudding given. As per PA, continue to monitor.

## 2025-04-14 NOTE — PROGRESS NOTE ADULT - SUBJECTIVE AND OBJECTIVE BOX
Follow Up:      Interval History:    REVIEW OF SYSTEMS  [  ] ROS unobtainable because:    [  ] All other systems negative except as noted below    Constitutional:  [ ] fever [ ] chills  [ ] weight loss  [ ] weakness  Skin:  [ ] rash [ ] phlebitis	  Eyes: [ ] icterus [ ] pain  [ ] discharge	  ENMT: [ ] sore throat  [ ] thrush [ ] ulcers [ ] exudates  Respiratory: [ ] dyspnea [ ] hemoptysis [ ] cough [ ] sputum	  Cardiovascular:  [ ] chest pain [ ] palpitations [ ] edema	  Gastrointestinal:  [ ] nausea [ ] vomiting [ ] diarrhea [ ] constipation [ ] pain	  Genitourinary:  [ ] dysuria [ ] frequency [ ] hematuria [ ] discharge [ ] flank pain  [ ] incontinence  Musculoskeletal:  [ ] myalgias [ ] arthralgias [ ] arthritis  [ ] back pain  Neurological:  [ ] headache [ ] seizures  [ ] confusion/altered mental status    Allergies  hydrALAZINE (Short breath)        ANTIMICROBIALS:  acyclovir   Oral Tab/Cap 400 two times a day  nystatin    Suspension [id number] four times a day      OTHER MEDS:  MEDICATIONS  (STANDING):  acetaminophen     Tablet .. 650 every 6 hours PRN  aspirin enteric coated 81 daily  atorvastatin 40 at bedtime  famotidine    Tablet 20 daily  heparin   Injectable 5000 every 8 hours  insulin glargine Injectable (LANTUS) 12 at bedtime  insulin lispro (ADMELOG) corrective regimen sliding scale  three times a day before meals  insulin lispro (ADMELOG) corrective regimen sliding scale  at bedtime  insulin lispro Injectable (ADMELOG) 6 three times a day before meals  isosorbide   mononitrate ER Tablet (IMDUR) 60 daily  levothyroxine 25 daily  mycophenolate mofetil 500 <User Schedule>  niCARdipine Infusion 5 <Continuous>  NIFEdipine XL 60 two times a day  ondansetron Injectable 4 every 6 hours PRN  polyethylene glycol 3350 17 daily  predniSONE   Tablet 5 daily  predniSONE   Tablet    senna 2 at bedtime  tacrolimus ER Tablet (ENVARSUS XR) 3 <User Schedule>  traMADol 25 every 4 hours PRN  traMADol 50 every 8 hours PRN      Vital Signs Last 24 Hrs  T(C): 36.2 (14 Apr 2025 07:00), Max: 36.7 (14 Apr 2025 04:09)  T(F): 97.2 (14 Apr 2025 07:00), Max: 98.1 (14 Apr 2025 04:09)  HR: 54 (14 Apr 2025 09:00) (49 - 62)  BP: 111/59 (14 Apr 2025 09:00) (111/59 - 221/92)  BP(mean): 81 (14 Apr 2025 09:00) (81 - 133)  RR: 45 (14 Apr 2025 09:00) (15 - 53)  SpO2: 99% (14 Apr 2025 09:00) (97% - 100%)    Parameters below as of 14 Apr 2025 08:00  Patient On (Oxygen Delivery Method): room air        PHYSICAL EXAMINATION:  General: Alert and Awake, NAD  HEENT: PERRL, EOMI  Neck: Supple  Cardiac: RRR, No M/R/G  Resp: CTAB, No Wh/Rh/Ra  Abdomen: NBS, NT/ND, No HSM, No rigidity or guarding  MSK: No LE edema. No Calf tenderness  : No brand  Skin: No rashes or lesions. Skin is warm and dry to the touch.   Neuro: Alert and Awake. CN 2-12 Grossly intact. Moves all four extremities spontaneously.  Psych: Calm, Pleasant, Cooperative                          7.7    6.11  )-----------( 177      ( 14 Apr 2025 06:07 )             24.3       04-14    131[L]  |  90[L]  |  77[H]  ----------------------------<  104[H]  4.2   |  22  |  6.73[H]    Ca    7.6[L]      14 Apr 2025 06:07  Phos  3.9     04-14  Mg     2.3     04-14    TPro  5.3[L]  /  Alb  3.1[L]  /  TBili  0.3  /  DBili  x   /  AST  20  /  ALT  <5[L]  /  AlkPhos  55  04-14      Urinalysis Basic - ( 14 Apr 2025 06:07 )    Color: x / Appearance: x / SG: x / pH: x  Gluc: 104 mg/dL / Ketone: x  / Bili: x / Urobili: x   Blood: x / Protein: x / Nitrite: x   Leuk Esterase: x / RBC: x / WBC x   Sq Epi: x / Non Sq Epi: x / Bacteria: x        MICROBIOLOGY:  Vancomycin Level, Random: 8.2 ug/mL (04-13-25 @ 15:09)  v            RADIOLOGY:    <The imaging below has been reviewed and visualized by me independently. Findings as detailed in report below> Follow Up: Status post kidney transplant    Interval History:  Afebrile, no leukocytosis    REVIEW OF SYSTEMS  CONSTITUTIONAL:  No fever, good appetite  CARDIOVASCULAR:  No chest pain or palpitations  RESPIRATORY:  No dyspnea  GASTROINTESTINAL:  No nausea, vomiting, diarrhea, or abdominal pain  GENITOURINARY:  No dysuria  NEUROLOGIC:  No headache, No dizziness      Allergies  hydrALAZINE (Short breath)        ANTIMICROBIALS:  acyclovir   Oral Tab/Cap 400 two times a day  nystatin    Suspension [id number] four times a day      OTHER MEDS:  MEDICATIONS  (STANDING):  acetaminophen     Tablet .. 650 every 6 hours PRN  aspirin enteric coated 81 daily  atorvastatin 40 at bedtime  famotidine    Tablet 20 daily  heparin   Injectable 5000 every 8 hours  insulin glargine Injectable (LANTUS) 12 at bedtime  insulin lispro (ADMELOG) corrective regimen sliding scale  three times a day before meals  insulin lispro (ADMELOG) corrective regimen sliding scale  at bedtime  insulin lispro Injectable (ADMELOG) 6 three times a day before meals  isosorbide   mononitrate ER Tablet (IMDUR) 60 daily  levothyroxine 25 daily  mycophenolate mofetil 500 <User Schedule>  niCARdipine Infusion 5 <Continuous>  NIFEdipine XL 60 two times a day  ondansetron Injectable 4 every 6 hours PRN  polyethylene glycol 3350 17 daily  predniSONE   Tablet 5 daily  predniSONE   Tablet    senna 2 at bedtime  tacrolimus ER Tablet (ENVARSUS XR) 3 <User Schedule>  traMADol 25 every 4 hours PRN  traMADol 50 every 8 hours PRN      Vital Signs Last 24 Hrs  T(C): 36.2 (14 Apr 2025 07:00), Max: 36.7 (14 Apr 2025 04:09)  T(F): 97.2 (14 Apr 2025 07:00), Max: 98.1 (14 Apr 2025 04:09)  HR: 54 (14 Apr 2025 09:00) (49 - 62)  BP: 111/59 (14 Apr 2025 09:00) (111/59 - 221/92)  BP(mean): 81 (14 Apr 2025 09:00) (81 - 133)  RR: 45 (14 Apr 2025 09:00) (15 - 53)  SpO2: 99% (14 Apr 2025 09:00) (97% - 100%)    Parameters below as of 14 Apr 2025 08:00  Patient On (Oxygen Delivery Method): room air        PHYSICAL EXAMINATION:  General: Alert and Awake, NAD  HEENT: PERRL, EOMI  Neck: Supple  Cardiac: RRR, No M/R/G  Resp: CTAB, No Wh/Rh/Ra  Abdomen: NBS, soft, RLQ renal transplant  MSK: No LE edema. No Calf tenderness  : No Campo  Skin: No rashes or lesions. Skin is warm and dry to the touch.   Neuro: Alert and Awake. CN 2-12 Grossly intact. Moves all four extremities spontaneously.  Psych: Calm, Pleasant, Cooperative                          7.7    6.11  )-----------( 177      ( 14 Apr 2025 06:07 )             24.3       04-14    131[L]  |  90[L]  |  77[H]  ----------------------------<  104[H]  4.2   |  22  |  6.73[H]    Ca    7.6[L]      14 Apr 2025 06:07  Phos  3.9     04-14  Mg     2.3     04-14    TPro  5.3[L]  /  Alb  3.1[L]  /  TBili  0.3  /  DBili  x   /  AST  20  /  ALT  <5[L]  /  AlkPhos  55  04-14      Urinalysis Basic - ( 14 Apr 2025 06:07 )    Color: x / Appearance: x / SG: x / pH: x  Gluc: 104 mg/dL / Ketone: x  / Bili: x / Urobili: x   Blood: x / Protein: x / Nitrite: x   Leuk Esterase: x / RBC: x / WBC x   Sq Epi: x / Non Sq Epi: x / Bacteria: x        MICROBIOLOGY:  Vancomycin Level, Random: 8.2 ug/mL (04-13-25 @ 15:09)  v            RADIOLOGY:    <The imaging below has been reviewed and visualized by me independently. Findings as detailed in report below>        < from: US Trans Kidney w/ Doppler, Right (04.13.25 @ 14:48) >  ACC: [id number] EXAM:  US KIDNEY TRANSPLANT W DOPP RT   ORDERED BY:    BIRTTANY CHACON     PROCEDURE DATE:  04/13/2025          INTERPRETATION:  CLINICAL INFORMATION: Renal transplant POD #5    COMPARISON: 4/9/2025.    TECHNIQUE: Grayscale, Color and spectral Doppler evaluation of a RIGHT   renal transplant.    FINDINGS:    Renal Transplant: 13.9 cm. No renal mass, hydronephrosis or calculi.  Urinary bladder: Bladder is decompressed with a Campo catheter.  2.3 x 1.4 x 6.3 cm perinephric fluid collection.    Color and spectral Doppler reveals homogeneous flow throughout the   transplant.    Peak iliac artery velocity is 159 cm/sec pre-anastomosis, 255 cm/sec at   the anastomosis, and 149 cm/sec post anastomosis.    Transplant Renal Artery:  Peak systolic velocity is 291 cm/sec anastomosis, 198 cm/sec proximal,   168 cm/sec mid, 109 cm/sec distal and 93 cm/sec hilum.  Resistive Indices Range: 0.82-0.92    Transplant Renal Vein: Patent.    IMPRESSION:    Persistent elevated velocities at the anastomosis with elevated resistive   indices. Recommend close follow-up.    2.3 x 1.4 x 6.3 cm perinephric collection.        --- End of Report ---        < end of copied text >

## 2025-04-14 NOTE — PROGRESS NOTE ADULT - ASSESSMENT
70 y/o M with PMHx of HLD, CAD s/p LAD PCI 7/24, HFpEF , HTN, hypothyroid, type 2 DM, ESRD on HD MWF (recently converted from PD in 1/2025) via permacath placed on 1/2025, h/o cva with no residual deficits, persistent right sided pleural effusion, and hx of hemorrhagic pericardial effusion (cytopathology negative) presents to [facility] for possible DDRT. He states he makes less than half cup of urine a day. Denies fever, chills, N/V/D/C, abdominal pain, CP, SOB, hemoptysis, and /GI complaints.     A/P  ESRD s/p DDRT 04/09  Steroid and thymo induction  Nephrologist is Dr. Menjivar   Access is PermCath   Was recently converted from PD to HD and on MWF schedule  Last HD 4/7, s/p HD on 04/08 and 04/09, and 04/11  S/p DDRT on 04/09 POD 2  On MMF and solumedrol , Started on Tac follow levels  On acyclovir and nsytatin prophylaxis  Monitor bmp   Renally dose meds    HTN   Now on labetalol and nifedipine + nicardipine drip  Refractory HTN so transferred back to SICU  UF w/ HD if needed  Managed per transplant team   Monitor bp     Anemia  Hb below goal  Monitor     CKD-MBD  Monitor ca and phos daily

## 2025-04-14 NOTE — CHART NOTE - NSCHARTNOTEFT_GEN_A_CORE
Nutrition Follow Up Note  Patient seen for: Nutrition Department protocol for post kidney transplant recipient follow up   Pt is s/p DDRT POD#6    Chart reviewed, events noted.    Source: [x] Patient       [x] EMR        [x] RN        [] Family at bedside       [] Other:    -If unable to interview patient: [] Trach/Vent/BiPAP  [] Disoriented/confused/inappropriate to interview  - Minimal information from     Diet Order:   Diet, Consistent Carbohydrate Renal w/Evening Snack:   Supplement Feeding Modality:  Oral  Nepro Cans or Servings Per Day:  2       Frequency:  Daily (25)    - Is current order appropriate/adequate? [] Yes  []  No:     - PO intake :   [] >75%  Adequate    [] 50-75%  Fair       [] <50%  Poor    - Nutrition-related concerns:      - Ordered for transplant meds:      -  BG Management:       -      - Pt provided with post-transplant education on initial assessment, able to teach back 1-2 points at time of RD visit     GI:  Last BM .   Bowel Regimen? [] Yes   [] No    UOP: ml thus far (), ml (), ml     Weights:   Daily Weight in k.9 (), Weight in k.4 (), Weight in k (), Weight in k.9 (), Weight in k.9 (), Weight in k.9 (), Weight in k.7 (04-10)    Nutritionally Pertinent MEDICATIONS  (STANDING):  acyclovir   Oral Tab/Cap  atorvastatin  famotidine    Tablet  insulin glargine Injectable (LANTUS)  insulin lispro (ADMELOG) corrective regimen sliding scale  insulin lispro (ADMELOG) corrective regimen sliding scale  insulin lispro Injectable (ADMELOG)  isosorbide   mononitrate ER Tablet (IMDUR)  levothyroxine  niCARdipine Infusion  NIFEdipine XL  nystatin    Suspension  polyethylene glycol 3350  predniSONE   Tablet  predniSONE   Tablet  senna  trimethoprim   80 mG/sulfamethoxazole 400 mG    Pertinent Labs:  @ 06:07: Na 131[L], BUN 77[H], Cr 6.73[H], [H], K+ 4.2, Phos 3.9, Mg 2.3, Alk Phos 55, ALT/SGPT <5[L], AST/SGOT 20, HbA1c --    A1C with Estimated Average Glucose Result: 5.0 % (25 @ 06:24)  A1C with Estimated Average Glucose Result: 4.9 % (24 @ 06:50)    Finger Sticks:  POCT Blood Glucose.: 208 mg/dL ( @ 11:34)  POCT Blood Glucose.: 209 mg/dL ( @ 11:33)  POCT Blood Glucose.: 101 mg/dL ( @ 08:31)  POCT Blood Glucose.: 115 mg/dL ( @ 01:44)  POCT Blood Glucose.: 81 mg/dL ( @ 01:12)  POCT Blood Glucose.: 103 mg/dL ( @ 22:17)  POCT Blood Glucose.: 111 mg/dL ( @ 20:59)  POCT Blood Glucose.: 160 mg/dL ( @ 16:24)      Skin per nursing documentation:   Edema:     Estimated Needs:   [] no change since previous assessment  [] recalculated:     Previous Nutrition Diagnosis:   1. Increased Nutrient Needs  2. Food and Nutrition related knowledge deficit   Nutrition Diagnosis is: [] ongoing  [] resolved [] not applicable     New Nutrition Diagnosis: [] Not applicable    Nutrition Care Plan:  [] In Progress  [] Achieved  [] Not applicable    Nutrition Interventions:     Education Provided:       [] Yes:  [] No:        Recommendations:         1. Conitnue diet as ordered/tolerated upon discharge:     2. Reccomend follow up visit with Transplant MD & outpatient RD as warranted      3. Provide encouragement with PO intakes, menu selections and assistance with meals as needed      4. Provided education on post-transplant nutrition therapy & food safety gudielines for transplant recipents with nutrition package before discharge- made aware RD remains available      Monitoring and Evaluation:   Continue to monitor patient's weights, labs, PO intakes, urine output, blood glucose levels, and bowel movements.     RD remains available upon request and will follow up per protocol  Janiya Barraza MS, RDM, CDN Nutrition Follow Up Note  Patient seen for: Nutrition Department protocol for post kidney transplant recipient follow up   Pt is s/p DDRT POD#6    Chart reviewed, events noted.    Source: [x] Patient       [x] EMR        [x] RN        [] Family at bedside       [] Other:    -If unable to interview patient: [] Trach/Vent/BiPAP  [] Disoriented/confused/inappropriate to interview  - Minimal information from pt as he is very lethargic (falling asleep in the middle of interview). Called son (Rosario) but he denies nutrition education at present, states prefers to get the education closer to discharge.    Diet Order:   Diet, Consistent Carbohydrate Renal w/Evening Snack:   Supplement Feeding Modality:  Oral  Nepro Cans or Servings Per Day:  2       Frequency:  Daily (25)    - Is current order appropriate/adequate? [] Yes  [x]  No: see below for recommendation     - PO intake :   [] >75%  Adequate    [] 50-75%  Fair       [x] <50%  Poor  -- Pt reports PO intake not so great as he does not feel hungry, only has few bites of egg + 1 protein supplements for breakfast this morning, prefers more protein supplements if possible. RN confirms pt with decreased PO intake. RD unable to obtain more food preferences (fell asleep).     - Nutrition-related concerns:      - Ordered for transplant meds: Prednisone taper, Tacrolimus, Cellcept       -  BG Management: ordered for Lantus 12 units QHS, Lispro 6 units TID and ISS to regulate blood glucose       - iHD  and       - s/p Bumex and Calcium gluconate today        - Brought to SICU      GI: no GI distress noted, Last BM  per flowsheet.   Bowel Regimen? [x] Yes, Miralax and Senna   [] No    UOP: 22 ml (), 23ml ()    Weights:   Drug Dosing Weight  Height (cm): 172.7 (2025 16:32)  Weight (kg): 55.6 (2025 16:32)  BMI (kg/m2): 18.6 (2025 16:32)  Daily Weight in k.9 (), Weight in k.4 (), Weight in k (), Weight in k.9 (), Weight in k.9 (), Weight in k.9 (), Weight in k.7 (04-10)  Weight fluctuations in setting of fluid shifts (IVF, intraoperative fluids). Will continue to monitor weight trends as available/able.     Nutritionally Pertinent MEDICATIONS  (STANDING):  acyclovir   Oral Tab/Cap  atorvastatin  famotidine    Tablet  insulin glargine Injectable (LANTUS)  insulin lispro (ADMELOG) corrective regimen sliding scale  insulin lispro (ADMELOG) corrective regimen sliding scale  insulin lispro Injectable (ADMELOG)  isosorbide   mononitrate ER Tablet (IMDUR)  levothyroxine  niCARdipine Infusion  NIFEdipine XL  nystatin    Suspension  polyethylene glycol 3350  predniSONE   Tablet  predniSONE   Tablet  senna  trimethoprim   80 mG/sulfamethoxazole 400 mG    Pertinent Labs:  @ 06:07: Na 131[L], BUN 77[H], Cr 6.73[H], [H], K+ 4.2, Phos 3.9, Mg 2.3, Alk Phos 55, ALT/SGPT <5[L], AST/SGOT 20, HbA1c --    A1C with Estimated Average Glucose Result: 5.0 % (25 @ 06:24)  A1C with Estimated Average Glucose Result: 4.9 % (24 @ 06:50)    Finger Sticks:  POCT Blood Glucose.: 208 mg/dL ( @ 11:34)  POCT Blood Glucose.: 209 mg/dL ( @ 11:33)  POCT Blood Glucose.: 101 mg/dL ( @ 08:31)  POCT Blood Glucose.: 115 mg/dL ( @ 01:44)  POCT Blood Glucose.: 81 mg/dL ( @ 01:12)  POCT Blood Glucose.: 103 mg/dL ( @ 22:17)  POCT Blood Glucose.: 111 mg/dL ( @ 20:59)  POCT Blood Glucose.: 160 mg/dL ( @ 16:24)      Skin per nursing documentation: no pressure injury per nursing flowsheet     Edema: no noted edema per nursing flowsheet     Estimated Needs:   [x] no change since previous assessment  [] recalculated:   Estimated needs based on dosing weight of 55.6kg  Calories: 2303-8860 (35-40 kcal/kg BW)   Proteins: 83-111g (1.5-2.0 g/kg BW)   Fluids: defer to team     Previous Nutrition Diagnosis:   1. Increased Nutrient Needs  2. Food and Nutrition related knowledge deficit   Nutrition Diagnosis is: [x] ongoing  [] resolved [] not applicable     New Nutrition Diagnosis: [x] Not applicable    Nutrition Care Plan:  [x] In Progress  [] Achieved  [] Not applicable    Nutrition Interventions:     Education Provided:       [] Yes:  [x] No: unable to provide nutrition education to pt upon visit (very fatigue), son prefers to get nutrition education closer to discharge. Will follow-up with nutrition education as able       Recommendations:         1. Continue diet as ordered/tolerated upon discharge: Consistent Carbohydrate Renal diet as ordered.       - Recommend increase Nepro to 3x daily (1,275 kcals, 57 g protein) due to decreased PO intake now.        - Recommend Nephro-Selena supplement, pending no medical contraindication, for micronutrient support      2. Recommend follow up visit with Transplant MD & outpatient RD as warranted      3. Provide encouragement with PO intakes, menu selections and assistance with meals as needed      Monitoring and Evaluation:   Continue to monitor patient's weights, labs, PO intakes, urine output, blood glucose levels, and bowel movements.     RD remains available upon request and will follow up per protocol  Janiya Barraza MS, RDN, CDN

## 2025-04-14 NOTE — CONSULT NOTE ADULT - NS ATTEND AMEND GEN_ALL_CORE FT
68 y/o M with PMHx of CAD s/p LAD PCI 7/24, HFpEF , HTN, hypothyroid, type 2 DM, ESRD on HD s/p  DDRT. now POD#1.  Post op dev uncontrolled HTN requiring Cardene drip transferred to floor dev uncontrolled HTN again transferred back.    Awake and alert, pain well controlled  Saturating well, CXR mild pul vascular congestion  Add Clonidine, wean off Cardene drip, continue Procardia  Tolerating PO  Needs HD today.  ISS/lantus/premeal  Pharm DVT PPx, low  Hb stable     PPx abx: Nystatin, acyclovir, Cellcept, Envarusus  Antirej: Mycophenolate, steroid    OOB/mobilization
Patient discussed in details, plan as above
Patient seen and examined  Case discussed with MARGUERITE Yang    I agree with her history exam and plans as noted above  Patient is s/p DDRTx and improving clinically  Received standard periop antibitoics with Cefazolin  Notified by the OPO that donor had gram + cocci in clusters in a single blood culture- no further ID of organism was performed    Will treat recipient preemptively with Vancomycin x1 dose 1000mg and obtain a Vanco level on 4/11  Will continue Vancomycin dosing based upon level<15ucg/ml to complete 7days of antibiotics  Prior to administering antibiotics dose please send blood cultures x2 sets    Discussed with SICU PA    Carlos Nuñez MD  Can be called via Teams  After 5pm/weekends [phone]
68 y/o M with PMHx of CAD s/p LAD PCI 7/24, HFpEF , HTN, hypothyroid, type 2 DM, ESRD on HD s/p  DDRT. now POD#1.  Has uncontrolled HTN requiring Cardene drip gtt.    Awake and alert, pain well controlled  Saturating well  Start his home meds Labetalol, Procardia and Isosorbide to wean off Cardene drip.  Clears  One dose Lasix with Metolazone, for HD today with fluid removal, will help with HTN control also  Mech DVT PPx, Hb stable after one U transfusion in OR    PPx abx: Nystatin, acyclovir, Cellcept,   Antirej: Mycophenolate, steroid     Discussed with Dr Little

## 2025-04-14 NOTE — PROGRESS NOTE ADULT - ASSESSMENT
68 y/o M with PMHx of HLD, CAD s/p LAD PCI 7/24, HFpEF , HTN, hypothyroid, type 2 DM, ESRD on HD MWF (recently converted from PD in 1/2025) via R permacath s/p DDRT  1a + 1 v + 1 u + stent under thymoglobulin induction on 04/08/25.   []s/p DDRT under thymo induction, POD #6  -Renal doppler: patent vasc, Subscap air surrounding kidney, 2.3 cm collection, Elevated velocities 293 cm/s at the renal txp anastomosis  -repeat renal doppler - patent vasc, persistent elevated velocities at anastomosis, 2.3 x 1.4 x 6.3 cm perinephric collection  -Strict I/O's, JPx3 SS  - Consistent carb diet  -Bowel regimen  -IS, SCD, PT  -DGF: HD -first 4/8, HD today  - Diuresis daily     [ ] Immunosuppression  - Thymoglobulin induction completed  - ENV per level,  BID, SST  - Belatacept 4/10, next dose 4/14 today  - ppx: bactrim TIW, nystatin, Acyclovir 200 BID    []HTN  - off cardene gtt  - Remains hypertensive, if continue to be hypertensive may need to resume cardene drip  - Nifed 60 BID, inc Imdur to 60 qd  - Resume doxazosin 4 BID and Coreg prn 68 y/o M with PMHx of HLD, CAD s/p LAD PCI 7/24, HFpEF , HTN, hypothyroid, type 2 DM, ESRD on HD MWF (recently converted from PD in 1/2025) via R permacath s/p DDRT  1a + 1 v + 1 u + stent under thymoglobulin induction on 04/08/25.   []s/p DDRT under thymo induction, POD #6  -Renal doppler: patent vasc, Subscap air surrounding kidney, 2.3 cm collection, Elevated velocities 293 cm/s at the renal txp anastomosis  -repeat renal doppler - patent vasc, persistent elevated velocities at anastomosis, 2.3 x 1.4 x 6.3 cm perinephric collection  -Strict I/O's, JPx3 SS  - Consistent carb diet  -Bowel regimen  -IS, SCD, PT  -DGF: HD -first 4/8, HD today  - Diuresis daily as needed     [ ] Immunosuppression  - Thymoglobulin induction completed  - ENV per level,  BID, SST  - Belatacept 4/10, next dose 4/14 today  - ppx: bactrim TIW, nystatin, Acyclovir 200 BID    []HTN  - off cardene gtt  - Remains hypertensive, if continue to be hypertensive may need to resume cardene drip  - Nifed 60 BID, inc Imdur to 60 qd  - Resume doxazosin 4 BID and Coreg prn 68 y/o M with PMHx of HLD, CAD s/p LAD PCI 7/24, HFpEF , HTN, hypothyroid, type 2 DM, ESRD on HD MWF (recently converted from PD in 1/2025) via R permacath s/p DDRT  1a + 1 v + 1 u + stent under thymoglobulin induction on 04/08/25.   []s/p DDRT under thymo induction, POD #6  -Renal doppler: patent vasc, Subscap air surrounding kidney, 2.3 cm collection, Elevated velocities 293 cm/s at the renal txp anastomosis  -repeat renal doppler - patent vasc, persistent elevated velocities at anastomosis, 2.3 x 1.4 x 6.3 cm perinephric collection  -Strict I/O's, JPx3 SS  - Consistent carb diet  -Bowel regimen  -IS, SCD, PT  -DGF: HD -first 4/8, HD today  - Diuresis daily as needed     [ ] Immunosuppression  - Thymoglobulin induction completed  - ENV per level,  BID, SST  - Belatacept 4/10, next dose 4/14 today  - ppx: bactrim TIW, nystatin, Acyclovir 200 BID    []HTN  - off cardene gtt  - Remains hypertensive, if continue to be hypertensive may need to resume cardene drip  - Nifed 60 BID, inc Imdur to 60 qd  - Resume doxazosin 4 BID and Coreg prn    [] DVT ppx  - on SQH TID, SCD

## 2025-04-14 NOTE — CHART NOTE - NSCHARTNOTEFT_GEN_A_CORE
SICU attending am rounds.  68 yo m, s/p DDKT. Hypertensive requiring Cardene gtt.  N Multimodal pain management.  P RA sat >90.  C Monitor hemodynamics. Cardene gtt off.  G Amanda PO. Pepcid.  R HD today.   H Hgb 7.7. Trend CBC.  DVT SQH, SCDs.  I AF.  IS belatacept today.  E ISS.

## 2025-04-15 LAB
ALBUMIN SERPL ELPH-MCNC: 2.7 G/DL — LOW (ref 3.3–5)
ALBUMIN SERPL ELPH-MCNC: 2.9 G/DL — LOW (ref 3.3–5)
ALBUMIN SERPL ELPH-MCNC: 3 G/DL — LOW (ref 3.3–5)
ALBUMIN SERPL ELPH-MCNC: 3.2 G/DL — LOW (ref 3.3–5)
ALP SERPL-CCNC: 55 U/L — SIGNIFICANT CHANGE UP (ref 40–120)
ALP SERPL-CCNC: 58 U/L — SIGNIFICANT CHANGE UP (ref 40–120)
ALP SERPL-CCNC: 59 U/L — SIGNIFICANT CHANGE UP (ref 40–120)
ALP SERPL-CCNC: 61 U/L — SIGNIFICANT CHANGE UP (ref 40–120)
ALT FLD-CCNC: 6 U/L — LOW (ref 10–45)
ALT FLD-CCNC: 6 U/L — LOW (ref 10–45)
ALT FLD-CCNC: 7 U/L — LOW (ref 10–45)
ALT FLD-CCNC: 7 U/L — LOW (ref 10–45)
ANION GAP SERPL CALC-SCNC: 13 MMOL/L — SIGNIFICANT CHANGE UP (ref 5–17)
ANION GAP SERPL CALC-SCNC: 13 MMOL/L — SIGNIFICANT CHANGE UP (ref 5–17)
ANION GAP SERPL CALC-SCNC: 15 MMOL/L — SIGNIFICANT CHANGE UP (ref 5–17)
ANION GAP SERPL CALC-SCNC: 15 MMOL/L — SIGNIFICANT CHANGE UP (ref 5–17)
APTT BLD: 30.9 SEC — SIGNIFICANT CHANGE UP (ref 24.5–35.6)
AST SERPL-CCNC: 19 U/L — SIGNIFICANT CHANGE UP (ref 10–40)
AST SERPL-CCNC: 24 U/L — SIGNIFICANT CHANGE UP (ref 10–40)
AST SERPL-CCNC: 24 U/L — SIGNIFICANT CHANGE UP (ref 10–40)
AST SERPL-CCNC: 29 U/L — SIGNIFICANT CHANGE UP (ref 10–40)
BILIRUB SERPL-MCNC: 0.2 MG/DL — SIGNIFICANT CHANGE UP (ref 0.2–1.2)
BILIRUB SERPL-MCNC: 0.2 MG/DL — SIGNIFICANT CHANGE UP (ref 0.2–1.2)
BILIRUB SERPL-MCNC: 0.3 MG/DL — SIGNIFICANT CHANGE UP (ref 0.2–1.2)
BILIRUB SERPL-MCNC: 0.3 MG/DL — SIGNIFICANT CHANGE UP (ref 0.2–1.2)
BLD GP AB SCN SERPL QL: NEGATIVE — SIGNIFICANT CHANGE UP
BUN SERPL-MCNC: 33 MG/DL — HIGH (ref 7–23)
BUN SERPL-MCNC: 37 MG/DL — HIGH (ref 7–23)
BUN SERPL-MCNC: 45 MG/DL — HIGH (ref 7–23)
BUN SERPL-MCNC: 48 MG/DL — HIGH (ref 7–23)
CALCIUM SERPL-MCNC: 6.9 MG/DL — LOW (ref 8.4–10.5)
CALCIUM SERPL-MCNC: 7.6 MG/DL — LOW (ref 8.4–10.5)
CALCIUM SERPL-MCNC: 7.7 MG/DL — LOW (ref 8.4–10.5)
CALCIUM SERPL-MCNC: 7.8 MG/DL — LOW (ref 8.4–10.5)
CHLORIDE SERPL-SCNC: 93 MMOL/L — LOW (ref 96–108)
CHLORIDE SERPL-SCNC: 94 MMOL/L — LOW (ref 96–108)
CHLORIDE SERPL-SCNC: 94 MMOL/L — LOW (ref 96–108)
CHLORIDE SERPL-SCNC: 98 MMOL/L — SIGNIFICANT CHANGE UP (ref 96–108)
CO2 SERPL-SCNC: 22 MMOL/L — SIGNIFICANT CHANGE UP (ref 22–31)
CO2 SERPL-SCNC: 22 MMOL/L — SIGNIFICANT CHANGE UP (ref 22–31)
CO2 SERPL-SCNC: 24 MMOL/L — SIGNIFICANT CHANGE UP (ref 22–31)
CO2 SERPL-SCNC: 25 MMOL/L — SIGNIFICANT CHANGE UP (ref 22–31)
CREAT SERPL-MCNC: 3.9 MG/DL — HIGH (ref 0.5–1.3)
CREAT SERPL-MCNC: 4.43 MG/DL — HIGH (ref 0.5–1.3)
CREAT SERPL-MCNC: 4.83 MG/DL — HIGH (ref 0.5–1.3)
CREAT SERPL-MCNC: 4.85 MG/DL — HIGH (ref 0.5–1.3)
EGFR: 12 ML/MIN/1.73M2 — LOW
EGFR: 14 ML/MIN/1.73M2 — LOW
EGFR: 14 ML/MIN/1.73M2 — LOW
EGFR: 16 ML/MIN/1.73M2 — LOW
EGFR: 16 ML/MIN/1.73M2 — LOW
GAMMA INTERFERON BACKGROUND BLD IA-ACNC: 0.03 IU/ML — SIGNIFICANT CHANGE UP
GLUCOSE BLDC GLUCOMTR-MCNC: 141 MG/DL — HIGH (ref 70–99)
GLUCOSE BLDC GLUCOMTR-MCNC: 175 MG/DL — HIGH (ref 70–99)
GLUCOSE BLDC GLUCOMTR-MCNC: 192 MG/DL — HIGH (ref 70–99)
GLUCOSE SERPL-MCNC: 135 MG/DL — HIGH (ref 70–99)
GLUCOSE SERPL-MCNC: 143 MG/DL — HIGH (ref 70–99)
GLUCOSE SERPL-MCNC: 164 MG/DL — HIGH (ref 70–99)
GLUCOSE SERPL-MCNC: 91 MG/DL — SIGNIFICANT CHANGE UP (ref 70–99)
INR BLD: 0.89 RATIO — SIGNIFICANT CHANGE UP (ref 0.85–1.16)
M TB IFN-G BLD-IMP: ABNORMAL
M TB IFN-G CD4+ BCKGRND COR BLD-ACNC: 0 IU/ML — SIGNIFICANT CHANGE UP
M TB IFN-G CD4+CD8+ BCKGRND COR BLD-ACNC: 0 IU/ML — SIGNIFICANT CHANGE UP
MAGNESIUM SERPL-MCNC: 2 MG/DL — SIGNIFICANT CHANGE UP (ref 1.6–2.6)
MAGNESIUM SERPL-MCNC: 2 MG/DL — SIGNIFICANT CHANGE UP (ref 1.6–2.6)
MAGNESIUM SERPL-MCNC: 2.1 MG/DL — SIGNIFICANT CHANGE UP (ref 1.6–2.6)
MAGNESIUM SERPL-MCNC: 2.1 MG/DL — SIGNIFICANT CHANGE UP (ref 1.6–2.6)
PHOSPHATE SERPL-MCNC: 2.9 MG/DL — SIGNIFICANT CHANGE UP (ref 2.5–4.5)
PHOSPHATE SERPL-MCNC: 3.3 MG/DL — SIGNIFICANT CHANGE UP (ref 2.5–4.5)
PHOSPHATE SERPL-MCNC: 3.6 MG/DL — SIGNIFICANT CHANGE UP (ref 2.5–4.5)
PHOSPHATE SERPL-MCNC: 3.8 MG/DL — SIGNIFICANT CHANGE UP (ref 2.5–4.5)
POTASSIUM SERPL-MCNC: 3.7 MMOL/L — SIGNIFICANT CHANGE UP (ref 3.5–5.3)
POTASSIUM SERPL-MCNC: 3.7 MMOL/L — SIGNIFICANT CHANGE UP (ref 3.5–5.3)
POTASSIUM SERPL-MCNC: 3.8 MMOL/L — SIGNIFICANT CHANGE UP (ref 3.5–5.3)
POTASSIUM SERPL-MCNC: 4.4 MMOL/L — SIGNIFICANT CHANGE UP (ref 3.5–5.3)
POTASSIUM SERPL-SCNC: 3.7 MMOL/L — SIGNIFICANT CHANGE UP (ref 3.5–5.3)
POTASSIUM SERPL-SCNC: 3.7 MMOL/L — SIGNIFICANT CHANGE UP (ref 3.5–5.3)
POTASSIUM SERPL-SCNC: 3.8 MMOL/L — SIGNIFICANT CHANGE UP (ref 3.5–5.3)
POTASSIUM SERPL-SCNC: 4.4 MMOL/L — SIGNIFICANT CHANGE UP (ref 3.5–5.3)
PROT SERPL-MCNC: 4.6 G/DL — LOW (ref 6–8.3)
PROT SERPL-MCNC: 5 G/DL — LOW (ref 6–8.3)
PROT SERPL-MCNC: 5.3 G/DL — LOW (ref 6–8.3)
PROT SERPL-MCNC: 5.3 G/DL — LOW (ref 6–8.3)
PROTHROM AB SERPL-ACNC: 10.2 SEC — SIGNIFICANT CHANGE UP (ref 9.9–13.4)
QUANT TB PLUS MITOGEN MINUS NIL: 0 IU/ML — SIGNIFICANT CHANGE UP
RH IG SCN BLD-IMP: POSITIVE — SIGNIFICANT CHANGE UP
SODIUM SERPL-SCNC: 131 MMOL/L — LOW (ref 135–145)
SODIUM SERPL-SCNC: 132 MMOL/L — LOW (ref 135–145)
SODIUM SERPL-SCNC: 132 MMOL/L — LOW (ref 135–145)
SODIUM SERPL-SCNC: 133 MMOL/L — LOW (ref 135–145)
TACROLIMUS SERPL-MCNC: 2.8 NG/ML — SIGNIFICANT CHANGE UP
VANCOMYCIN FLD-MCNC: 15.1 UG/ML — SIGNIFICANT CHANGE UP

## 2025-04-15 PROCEDURE — 74176 CT ABD & PELVIS W/O CONTRAST: CPT | Mod: 26

## 2025-04-15 PROCEDURE — 99232 SBSQ HOSP IP/OBS MODERATE 35: CPT | Mod: GC

## 2025-04-15 PROCEDURE — 99233 SBSQ HOSP IP/OBS HIGH 50: CPT

## 2025-04-15 RX ORDER — HEPARIN SODIUM 1000 [USP'U]/ML
5000 INJECTION INTRAVENOUS; SUBCUTANEOUS EVERY 12 HOURS
Refills: 0 | Status: DISCONTINUED | OUTPATIENT
Start: 2025-04-15 | End: 2025-04-23

## 2025-04-15 RX ORDER — DOXAZOSIN MESYLATE 8 MG/1
8 TABLET ORAL AT BEDTIME
Refills: 0 | Status: DISCONTINUED | OUTPATIENT
Start: 2025-04-15 | End: 2025-04-17

## 2025-04-15 RX ORDER — MELATONIN 5 MG
3 TABLET ORAL AT BEDTIME
Refills: 0 | Status: DISCONTINUED | OUTPATIENT
Start: 2025-04-15 | End: 2025-04-16

## 2025-04-15 RX ADMIN — DOXAZOSIN MESYLATE 8 MILLIGRAM(S): 8 TABLET ORAL at 21:21

## 2025-04-15 RX ADMIN — MYCOPHENOLATE MOFETIL 500 MILLIGRAM(S): 500 TABLET, FILM COATED ORAL at 20:03

## 2025-04-15 RX ADMIN — INSULIN LISPRO 2: 100 INJECTION, SOLUTION INTRAVENOUS; SUBCUTANEOUS at 11:24

## 2025-04-15 RX ADMIN — TRAMADOL HYDROCHLORIDE 50 MILLIGRAM(S): 50 TABLET, FILM COATED ORAL at 08:45

## 2025-04-15 RX ADMIN — Medication 25 MG/HR: at 08:44

## 2025-04-15 RX ADMIN — Medication 1 APPLICATION(S): at 12:45

## 2025-04-15 RX ADMIN — INSULIN LISPRO 6 UNIT(S): 100 INJECTION, SOLUTION INTRAVENOUS; SUBCUTANEOUS at 11:24

## 2025-04-15 RX ADMIN — LABETALOL HYDROCHLORIDE 100 MILLIGRAM(S): 200 TABLET, FILM COATED ORAL at 05:52

## 2025-04-15 RX ADMIN — Medication 500000 UNIT(S): at 17:34

## 2025-04-15 RX ADMIN — ATORVASTATIN CALCIUM 40 MILLIGRAM(S): 80 TABLET, FILM COATED ORAL at 21:21

## 2025-04-15 RX ADMIN — POLYETHYLENE GLYCOL 3350 17 GRAM(S): 17 POWDER, FOR SOLUTION ORAL at 12:39

## 2025-04-15 RX ADMIN — HEPARIN SODIUM 5000 UNIT(S): 1000 INJECTION INTRAVENOUS; SUBCUTANEOUS at 17:34

## 2025-04-15 RX ADMIN — Medication 650 MILLIGRAM(S): at 07:00

## 2025-04-15 RX ADMIN — MYCOPHENOLATE MOFETIL 500 MILLIGRAM(S): 500 TABLET, FILM COATED ORAL at 08:43

## 2025-04-15 RX ADMIN — Medication 500000 UNIT(S): at 23:15

## 2025-04-15 RX ADMIN — TRAMADOL HYDROCHLORIDE 50 MILLIGRAM(S): 50 TABLET, FILM COATED ORAL at 19:57

## 2025-04-15 RX ADMIN — ISOSORBIDE MONONITRATE 60 MILLIGRAM(S): 60 TABLET, EXTENDED RELEASE ORAL at 12:39

## 2025-04-15 RX ADMIN — Medication 25 MG/HR: at 05:51

## 2025-04-15 RX ADMIN — Medication 60 MILLIGRAM(S): at 05:52

## 2025-04-15 RX ADMIN — Medication 500000 UNIT(S): at 05:51

## 2025-04-15 RX ADMIN — HEPARIN SODIUM 5000 UNIT(S): 1000 INJECTION INTRAVENOUS; SUBCUTANEOUS at 05:51

## 2025-04-15 RX ADMIN — TRAMADOL HYDROCHLORIDE 50 MILLIGRAM(S): 50 TABLET, FILM COATED ORAL at 09:30

## 2025-04-15 RX ADMIN — Medication 25 MICROGRAM(S): at 05:51

## 2025-04-15 RX ADMIN — Medication 60 MILLIGRAM(S): at 17:34

## 2025-04-15 RX ADMIN — Medication 81 MILLIGRAM(S): at 12:40

## 2025-04-15 RX ADMIN — PREDNISONE 5 MILLIGRAM(S): 20 TABLET ORAL at 05:52

## 2025-04-15 RX ADMIN — Medication 3 MILLIGRAM(S): at 21:20

## 2025-04-15 RX ADMIN — TRAMADOL HYDROCHLORIDE 50 MILLIGRAM(S): 50 TABLET, FILM COATED ORAL at 20:27

## 2025-04-15 RX ADMIN — Medication 1 TABLET(S): at 12:39

## 2025-04-15 RX ADMIN — Medication 650 MILLIGRAM(S): at 05:52

## 2025-04-15 RX ADMIN — Medication 2 TABLET(S): at 21:20

## 2025-04-15 RX ADMIN — INSULIN GLARGINE-YFGN 12 UNIT(S): 100 INJECTION, SOLUTION SUBCUTANEOUS at 21:25

## 2025-04-15 RX ADMIN — Medication 500000 UNIT(S): at 12:41

## 2025-04-15 RX ADMIN — INSULIN LISPRO 6 UNIT(S): 100 INJECTION, SOLUTION INTRAVENOUS; SUBCUTANEOUS at 17:05

## 2025-04-15 RX ADMIN — Medication 20 MILLIGRAM(S): at 12:55

## 2025-04-15 RX ADMIN — Medication 200 MILLIGRAM(S): at 17:34

## 2025-04-15 RX ADMIN — Medication 200 MILLIGRAM(S): at 05:51

## 2025-04-15 RX ADMIN — TACROLIMUS 4 MILLIGRAM(S): 0.5 CAPSULE ORAL at 08:43

## 2025-04-15 NOTE — PROGRESS NOTE ADULT - SUBJECTIVE AND OBJECTIVE BOX
[facility]   MD MESSI ROSE MD SAKIL BHUIYAN, MD REYES THOMASON, NP    TEL:  OFFICE: [phone]  From 5pm-7am Answering Service [phone]    -- RENAL FOLLOW UP NOTE ---Date of Service 04-15-25 @ 13:49    Patient is a 69y old  Male who presents with a chief complaint of possible DDRT       Patient seen and examined at bedside. No chest pain/sob    VITALS:  T(F): 97.9 (04-15-25 @ 11:00), Max: 98.7 (04-14-25 @ 23:00)  HR: 56 (04-15-25 @ 13:00)  BP: 145/67 (04-15-25 @ 13:00)  RR: 17 (04-15-25 @ 13:00)  SpO2: 100% (04-15-25 @ 13:00)  Wt(kg): --    04-14 @ 07:01  -  04-15 @ 07:00  --------------------------------------------------------  IN: 661 mL / OUT: 1190 mL / NET: -529 mL    04-15 @ 07:01  -  04-15 @ 13:49  --------------------------------------------------------  IN: 192.5 mL / OUT: 0 mL / NET: 192.5 mL          PHYSICAL EXAM:  General: NAD  Neck: No JVD  Respiratory: CTAB, no wheezes, rales or rhonchi  Cardiovascular: S1, S2, RRR  Gastrointestinal: BS+, soft, NT/ND  Extremities: No peripheral edema    Hospital Medications:   MEDICATIONS  (STANDING):  acyclovir   Oral Tab/Cap 200 milliGRAM(s) Oral two times a day  aspirin enteric coated 81 milliGRAM(s) Oral daily  atorvastatin 40 milliGRAM(s) Oral at bedtime  chlorhexidine 2% Cloths 1 Application(s) Topical daily  doxazosin 4 milliGRAM(s) Oral <User Schedule>  famotidine    Tablet 20 milliGRAM(s) Oral daily  heparin   Injectable 5000 Unit(s) SubCutaneous every 12 hours  insulin glargine Injectable (LANTUS) 12 Unit(s) SubCutaneous at bedtime  insulin lispro (ADMELOG) corrective regimen sliding scale   SubCutaneous three times a day before meals  insulin lispro (ADMELOG) corrective regimen sliding scale   SubCutaneous at bedtime  insulin lispro Injectable (ADMELOG) 6 Unit(s) SubCutaneous three times a day before meals  isosorbide   mononitrate ER Tablet (IMDUR) 60 milliGRAM(s) Oral daily  labetalol 100 milliGRAM(s) Oral every 8 hours  levothyroxine 25 MICROGram(s) Oral daily  mycophenolate mofetil 500 milliGRAM(s) Oral <User Schedule>  Nephro-brenda 1 Tablet(s) Oral daily  NIFEdipine XL 60 milliGRAM(s) Oral two times a day  nystatin    Suspension 026286 Unit(s) Swish and Swallow four times a day  polyethylene glycol 3350 17 Gram(s) Oral daily  predniSONE   Tablet   Oral   predniSONE   Tablet 5 milliGRAM(s) Oral daily  senna 2 Tablet(s) Oral at bedtime  tacrolimus ER Tablet (ENVARSUS XR) 4 milliGRAM(s) Oral <User Schedule>  trimethoprim   80 mG/sulfamethoxazole 400 mG 1 Tablet(s) Oral <User Schedule>    Tacrolimus (), Serum: 2.8 ng/mL (04-15 @ 06:26)    LABS:  04-15    132[L]  |  93[L]  |  37[H]  ----------------------------<  91  3.7   |  24  |  4.43[H]    Ca    7.8[L]      15 Apr 2025 06:26  Phos  3.3     04-15  Mg     2.1     04-15    TPro  5.3[L]  /  Alb  3.0[L]  /  TBili  0.3  /  DBili      /  AST  24  /  ALT  6[L]  /  AlkPhos  58  04-15    Creatinine Trend: 4.43 <--, 3.90 <--, 7.04 <--, 6.73 <--, 5.45 <--, 3.85 <--, 5.35 <--, 4.80 <--, 4.53 <--, 4.06 <--, 3.42 <--, 2.67 <--, 4.76 <--, 4.51 <--, 4.32 <--, 3.85 <--    Albumin: 3.0 g/dL (04-15 @ 06:26)  Phosphorus: 3.3 mg/dL (04-15 @ 06:26)  Albumin: 3.2 g/dL (04-14 @ 23:52)  Phosphorus: 2.9 mg/dL (04-14 @ 23:52)  Albumin: 3.1 g/dL (04-14 @ 15:31)  Phosphorus: 4.1 mg/dL (04-14 @ 15:31)                              7.5    6.17  )-----------( 187      ( 14 Apr 2025 23:52 )             22.9     Urine Studies:  Urinalysis - [04-15-25 @ 06:26]      Color  / Appearance  / SG  / pH       Gluc 91 / Ketone   / Bili  / Urobili        Blood  / Protein  / Leuk Est  / Nitrite       RBC  / WBC  / Hyaline  / Gran  / Sq Epi  / Non Sq Epi  / Bacteria       Iron 17, TIBC 99, %sat 17      [01-08-25 @ 04:35]  PTH -- (Ca 7.9)      [12-30-24 @ 06:50]   229  PTH -- (Ca 7.6)      [11-27-24 @ 04:23]   250  Vitamin D (25OH) 17.1      [11-25-24 @ 06:50]  TSH 2.48      [04-13-25 @ 07:24]  Lipid: chol 112, TG 61, HDL 46, LDL --      [12-31-24 @ 04:27]    HBsAb 46.0      [04-07-25 @ 21:39]  HBsAg Nonreact      [04-07-25 @ 21:39]  HBcAb Nonreact      [04-07-25 @ 21:39]  HCV 0.05, Nonreact      [04-07-25 @ 21:39]  HIV Nonreact      [04-07-25 @ 21:39]      RADIOLOGY & ADDITIONAL STUDIES:

## 2025-04-15 NOTE — PROGRESS NOTE ADULT - SUBJECTIVE AND OBJECTIVE BOX
Transplant Surgery - Multi-disciplinary Rounds  --------------------------------------------------------------   Tx Date: 04/08/25         POD#7    Present: Patient seen and examined with multidisciplinary Transplant team including Surgeon Dr. Bernal, nephrologist Dr. Dunn, LÁZARO Gregg/Audra, pharmacy, and bedside RN during AM rounds.   Disciplines not in attendance will be notified of the plan.     HPI: 70 y/o M with PMHx of HLD, CAD s/p LAD PCI 7/24, HFpEF , HTN, hypothyroid, type 2 DM, ESRD on HD MWF (recently converted from PD in 1/2025) via R permacath placed on 1/2025, h/o cva with no residual deficits, persistent right sided pleural effusion, and hx of hemorrhagic pericardial effusion (cytopathology negative) presents to [facility] for possible DDRT. He states he makes less than half cup of urine a day. Denies fever, chills, N/V/D/C, abdominal pain, CP, SOB, hemoptysis, and /GI complaints. Now s/p DDRT  1a + 1 v + 1 u + stent under thymoglobulin induction on 04/08/25.     Interval Events:  - Afebrile, hypertensive   - Anuric  - HD   - Seroquel 12.5 for lack of sleep/agitation  - Lantus held 2/2 not eating and BGL WNL  - c/w cardene       Immunosuppression:  Induction: Thymo  Maintenance: ENV per level/ BID/SST  Ongoing monitoring for signs of rejection     Potential Discharge date: TBD  Education:  Medications  Plan of care:  See Below          MEDICATIONS  (STANDING):  acyclovir   Oral Tab/Cap 200 milliGRAM(s) Oral two times a day  aspirin enteric coated 81 milliGRAM(s) Oral daily  atorvastatin 40 milliGRAM(s) Oral at bedtime  chlorhexidine 2% Cloths 1 Application(s) Topical daily  doxazosin 4 milliGRAM(s) Oral at bedtime  famotidine    Tablet 20 milliGRAM(s) Oral daily  heparin   Injectable 5000 Unit(s) SubCutaneous every 8 hours  insulin glargine Injectable (LANTUS) 12 Unit(s) SubCutaneous at bedtime  insulin lispro (ADMELOG) corrective regimen sliding scale   SubCutaneous three times a day before meals  insulin lispro (ADMELOG) corrective regimen sliding scale   SubCutaneous at bedtime  insulin lispro Injectable (ADMELOG) 6 Unit(s) SubCutaneous three times a day before meals  isosorbide   mononitrate ER Tablet (IMDUR) 60 milliGRAM(s) Oral daily  labetalol 100 milliGRAM(s) Oral every 8 hours  levothyroxine 25 MICROGram(s) Oral daily  mycophenolate mofetil 500 milliGRAM(s) Oral <User Schedule>  Nephro-brenda 1 Tablet(s) Oral daily  niCARdipine Infusion 5 mG/Hr (25 mL/Hr) IV Continuous <Continuous>  NIFEdipine XL 60 milliGRAM(s) Oral two times a day  nystatin    Suspension 023533 Unit(s) Swish and Swallow four times a day  polyethylene glycol 3350 17 Gram(s) Oral daily  predniSONE   Tablet   Oral   predniSONE   Tablet 5 milliGRAM(s) Oral daily  senna 2 Tablet(s) Oral at bedtime  tacrolimus ER Tablet (ENVARSUS XR) 4 milliGRAM(s) Oral <User Schedule>  trimethoprim   80 mG/sulfamethoxazole 400 mG 1 Tablet(s) Oral <User Schedule>    MEDICATIONS  (PRN):  acetaminophen     Tablet .. 650 milliGRAM(s) Oral every 6 hours PRN Mild Pain (1 - 3)  artificial tears (preservative free) Ophthalmic Solution 1 Drop(s) Left EYE daily PRN Dry Eyes  ondansetron Injectable 4 milliGRAM(s) IV Push every 6 hours PRN Nausea and/or Vomiting  traMADol 25 milliGRAM(s) Oral every 4 hours PRN Moderate Pain (4 - 6)  traMADol 50 milliGRAM(s) Oral every 8 hours PRN Severe Pain (7 - 10)      PAST MEDICAL & SURGICAL HISTORY:  Hypertension      ESRD on peritoneal dialysis      CVA (cerebrovascular accident)  2010 without residual effects      Hyperlipidemia      BPH (benign prostatic hyperplasia)      CAD (coronary artery disease)      T2DM (type 2 diabetes mellitus)      Hypothyroidism      History of peritoneal dialysis  s/p PD catheter placement      S/P coronary artery stent placement          Vital Signs Last 24 Hrs  T(C): 36.6 (15 Apr 2025 11:00), Max: 37.1 (14 Apr 2025 23:00)  T(F): 97.9 (15 Apr 2025 11:00), Max: 98.7 (14 Apr 2025 23:00)  HR: 56 (15 Apr 2025 11:00) (51 - 66)  BP: 133/61 (15 Apr 2025 11:00) (125/61 - 197/88)  BP(mean): 88 (15 Apr 2025 11:00) (84 - 126)  RR: 16 (15 Apr 2025 11:00) (14 - 49)  SpO2: 98% (15 Apr 2025 11:00) (97% - 100%)    Parameters below as of 15 Apr 2025 11:00  Patient On (Oxygen Delivery Method): room air        I&O's Summary    14 Apr 2025 07:01  -  15 Apr 2025 07:00  --------------------------------------------------------  IN: 661 mL / OUT: 1190 mL / NET: -529 mL    15 Apr 2025 07:01  -  15 Apr 2025 11:19  --------------------------------------------------------  IN: 132.5 mL / OUT: 0 mL / NET: 132.5 mL                              7.5    6.17  )-----------( 187      ( 14 Apr 2025 23:52 )             22.9     04-15    132[L]  |  93[L]  |  37[H]  ----------------------------<  91  3.7   |  24  |  4.43[H]    Ca    7.8[L]      15 Apr 2025 06:26  Phos  3.3     04-15  Mg     2.1     04-15    TPro  5.3[L]  /  Alb  3.0[L]  /  TBili  0.3  /  DBili  x   /  AST  24  /  ALT  6[L]  /  AlkPhos  58  04-15    Tacrolimus (), Serum: 2.8 ng/mL (04-15 @ 06:26)          Review of systems  Gen: No weight changes, fatigue, fevers/chills, weakness  Skin: No rashes  Head/Eyes/Ears/Mouth: No headache; Normal hearing; Normal vision w/o blurriness; No sinus pain/discomfort, sore throat  Respiratory: No dyspnea, cough, wheezing, hemoptysis  CV: No chest pain, PND, orthopnea  GI: Mild abdominal pain at incision site; denies diarrhea, constipation, nausea, vomiting, melena, hematochezia  : No increased frequency, dysuria, hematuria, nocturia  MSK: No joint pain/swelling; no back pain; no edema  Neuro: No dizziness/lightheadedness, weakness, seizures, numbness, tingling  Heme: No easy bruising or bleeding  Endo: No heat/cold intolerance  Psych: No significant nervousness, anxiety, stress, depression  All other systems were reviewed and are negative, except as noted.      PHYSICAL EXAM:  Constitutional: Well developed / well nourished  Eyes: Anicteric, PERRLA  ENMT: nc/at  Neck: Supple  Respiratory: CTA B/L  Cardiovascular: RRR  Gastrointestinal: Soft, non-distended, mild abdominal pain to palpation at incision site; incision c/d/i; COSMO x 3 SS  Genitourinary: voiding  Extremities: SCD's in place and working bilaterally, R permcath  Vascular: Palpable dp pulses bilaterally  Neurological: A&O x3  Skin: no rashes, ulcerations or lesions;  Musculoskeletal: Moving all extremities  Psychiatric: Responsive     Transplant Surgery - Multi-disciplinary Rounds  --------------------------------------------------------------   Tx Date: 04/08/25         POD#7    Present: Patient seen and examined with multidisciplinary Transplant team including Surgeon Dr. Bernal, nephrologist Dr. Dunn, LÁZARO Gregg/Audra, pharmacy, and bedside RN during AM rounds.   Disciplines not in attendance will be notified of the plan.     HPI: 68 y/o M with PMHx of HLD, CAD s/p LAD PCI 7/24, HFpEF , HTN, hypothyroid, type 2 DM, ESRD on HD MWF (recently converted from PD in 1/2025) via R permacath placed on 1/2025, h/o cva with no residual deficits, persistent right sided pleural effusion, and hx of hemorrhagic pericardial effusion (cytopathology negative) presents to [facility] for possible DDRT. He states he makes less than half cup of urine a day. Denies fever, chills, N/V/D/C, abdominal pain, CP, SOB, hemoptysis, and /GI complaints. Now s/p DDRT  1a + 1 v + 1 u + stent under thymoglobulin induction on 04/08/25.     Interval Events:  - Afebrile, hypertensive   - HD 4/14 1L removed   - Lantus held 2/2 not eating and BGL WNL  - US kidney increase in perinephric fluid collection  - resumed cardura 4 qhs and labetolol 100 q8h      Immunosuppression:  Induction: Thymo completed  Maintenance: Petra last dose 4/14/ENV per level/ BID/ pred 5  Ongoing monitoring for signs of rejection     Potential Discharge date: TBD  Education:  Medications  Plan of care:  See Below          MEDICATIONS  (STANDING):  acyclovir   Oral Tab/Cap 200 milliGRAM(s) Oral two times a day  aspirin enteric coated 81 milliGRAM(s) Oral daily  atorvastatin 40 milliGRAM(s) Oral at bedtime  chlorhexidine 2% Cloths 1 Application(s) Topical daily  doxazosin 4 milliGRAM(s) Oral at bedtime  famotidine    Tablet 20 milliGRAM(s) Oral daily  heparin   Injectable 5000 Unit(s) SubCutaneous every 8 hours  insulin glargine Injectable (LANTUS) 12 Unit(s) SubCutaneous at bedtime  insulin lispro (ADMELOG) corrective regimen sliding scale   SubCutaneous three times a day before meals  insulin lispro (ADMELOG) corrective regimen sliding scale   SubCutaneous at bedtime  insulin lispro Injectable (ADMELOG) 6 Unit(s) SubCutaneous three times a day before meals  isosorbide   mononitrate ER Tablet (IMDUR) 60 milliGRAM(s) Oral daily  labetalol 100 milliGRAM(s) Oral every 8 hours  levothyroxine 25 MICROGram(s) Oral daily  mycophenolate mofetil 500 milliGRAM(s) Oral <User Schedule>  Nephro-brenda 1 Tablet(s) Oral daily  niCARdipine Infusion 5 mG/Hr (25 mL/Hr) IV Continuous <Continuous>  NIFEdipine XL 60 milliGRAM(s) Oral two times a day  nystatin    Suspension 065695 Unit(s) Swish and Swallow four times a day  polyethylene glycol 3350 17 Gram(s) Oral daily  predniSONE   Tablet   Oral   predniSONE   Tablet 5 milliGRAM(s) Oral daily  senna 2 Tablet(s) Oral at bedtime  tacrolimus ER Tablet (ENVARSUS XR) 4 milliGRAM(s) Oral <User Schedule>  trimethoprim   80 mG/sulfamethoxazole 400 mG 1 Tablet(s) Oral <User Schedule>    MEDICATIONS  (PRN):  acetaminophen     Tablet .. 650 milliGRAM(s) Oral every 6 hours PRN Mild Pain (1 - 3)  artificial tears (preservative free) Ophthalmic Solution 1 Drop(s) Left EYE daily PRN Dry Eyes  ondansetron Injectable 4 milliGRAM(s) IV Push every 6 hours PRN Nausea and/or Vomiting  traMADol 25 milliGRAM(s) Oral every 4 hours PRN Moderate Pain (4 - 6)  traMADol 50 milliGRAM(s) Oral every 8 hours PRN Severe Pain (7 - 10)      PAST MEDICAL & SURGICAL HISTORY:  Hypertension      ESRD on peritoneal dialysis      CVA (cerebrovascular accident)  2010 without residual effects      Hyperlipidemia      BPH (benign prostatic hyperplasia)      CAD (coronary artery disease)      T2DM (type 2 diabetes mellitus)      Hypothyroidism      History of peritoneal dialysis  s/p PD catheter placement      S/P coronary artery stent placement          Vital Signs Last 24 Hrs  T(C): 36.6 (15 Apr 2025 11:00), Max: 37.1 (14 Apr 2025 23:00)  T(F): 97.9 (15 Apr 2025 11:00), Max: 98.7 (14 Apr 2025 23:00)  HR: 56 (15 Apr 2025 11:00) (51 - 66)  BP: 133/61 (15 Apr 2025 11:00) (125/61 - 197/88)  BP(mean): 88 (15 Apr 2025 11:00) (84 - 126)  RR: 16 (15 Apr 2025 11:00) (14 - 49)  SpO2: 98% (15 Apr 2025 11:00) (97% - 100%)    Parameters below as of 15 Apr 2025 11:00  Patient On (Oxygen Delivery Method): room air        I&O's Summary    14 Apr 2025 07:01  -  15 Apr 2025 07:00  --------------------------------------------------------  IN: 661 mL / OUT: 1190 mL / NET: -529 mL    15 Apr 2025 07:01  -  15 Apr 2025 11:19  --------------------------------------------------------  IN: 132.5 mL / OUT: 0 mL / NET: 132.5 mL                              7.5    6.17  )-----------( 187      ( 14 Apr 2025 23:52 )             22.9     04-15    132[L]  |  93[L]  |  37[H]  ----------------------------<  91  3.7   |  24  |  4.43[H]    Ca    7.8[L]      15 Apr 2025 06:26  Phos  3.3     04-15  Mg     2.1     04-15    TPro  5.3[L]  /  Alb  3.0[L]  /  TBili  0.3  /  DBili  x   /  AST  24  /  ALT  6[L]  /  AlkPhos  58  04-15    Tacrolimus (), Serum: 2.8 ng/mL (04-15 @ 06:26)          Review of systems  Gen: No weight changes, fatigue, fevers/chills, weakness  Skin: No rashes  Head/Eyes/Ears/Mouth: No headache; Normal hearing; Normal vision w/o blurriness; No sinus pain/discomfort, sore throat  Respiratory: No dyspnea, cough, wheezing, hemoptysis  CV: No chest pain, PND, orthopnea  GI: Mild abdominal pain at incision site; denies diarrhea, constipation, nausea, vomiting, melena, hematochezia  : No increased frequency, dysuria, hematuria, nocturia  MSK: No joint pain/swelling; no back pain; no edema  Neuro: No dizziness/lightheadedness, weakness, seizures, numbness, tingling  Heme: No easy bruising or bleeding  Endo: No heat/cold intolerance  Psych: No significant nervousness, anxiety, stress, depression  All other systems were reviewed and are negative, except as noted.      PHYSICAL EXAM:  Constitutional: Well developed / well nourished  Eyes: Anicteric, PERRLA  ENMT: nc/at  Neck: Supple  Respiratory: CTA B/L  Cardiovascular: RRR  Gastrointestinal: Soft, non-distended, mild abdominal pain to palpation at incision site; incision c/d/i; ISHMAEL x #1/2 perinephric ss and #3 is subcutaneous ishmael  Genitourinary: voiding  Extremities: SCD's in place and working bilaterally, R permcath  Vascular: Palpable dp pulses bilaterally  Neurological: A&O x3  Skin: no rashes, ulcerations or lesions;  Musculoskeletal: Moving all extremities  Psychiatric: Responsive

## 2025-04-15 NOTE — PROGRESS NOTE ADULT - ASSESSMENT
Assessment: 68 y/o M with PMHx of HLD, CAD s/p LAD PCI 7/24, HFpEF , HTN, hypothyroid, type 2 DM, h/o cva with no residual deficits, persistent right sided pleural effusion, and hx of hemorrhagic pericardial effusion (cytopathology negative), ESRD on HD MWF (recently converted from PD in 1/2025) via permacath placed on 1/2025, now POD#5 for right kidney transplant (4/9). SICU for refractory HTN management.     Neuro  -AOx3-4  -Pain: tylenol, tramadol PRN  -zofran prn for nausea  - seroquel qHS PRN for agitation    Pulm  -on RA, satting well  -Incentive spirometry postop to prevent atelectasis    Cardio: Hx HTN, HLD, CHF, CAD s/p PCI   - SBP goal 110-160  - c/w cardene gtt  - c/w procardia, labetalol isosorbid, doxazosin  - home statin   - f/u AM CXR to screen volume status     GI  - CC diet, monitor calorie intake  - pepcid   - zofran PRN for nausea  - Miralax/senna bowel regimen  - COSMO drain management per surgical team    Renal  - anuric, prolonged ischemic downtime  - HD MWF schedule, last HD 4/14  - Monitor UOP    Heme  - hep subQ for DVT prophylaxis  - SCDs  - c/w home ASA (LAD PCI 7/2024)    ID:  - transplant ppx w/ nystatin & acyclovir  - immunosuppression w/ cellcept & steroids  - tacro by level    Endo hx T2DM, hypothyroid   - monitor glucose  - 12u lantus qHS, 6u pre-meal if eating  - ISS  - synthroid

## 2025-04-15 NOTE — PROGRESS NOTE ADULT - SUBJECTIVE AND OBJECTIVE BOX
[facility] DIVISION OF KIDNEY DISEASES AND HYPERTENSION -- FOLLOW UP NOTE  --------------------------------------------------------------------------------  Chief Complaint:    24 hour events/subjective: Pt s/p HD yesterday with 1L removed. He was hypertensive overnight, requiring cardene drip. He is not making urine output.       PAST HISTORY  --------------------------------------------------------------------------------  No significant changes to PMH, PSH, FHx, SHx, unless otherwise noted    ALLERGIES & MEDICATIONS  --------------------------------------------------------------------------------  Allergies    hydrALAZINE (Short breath)    Intolerances      Standing Inpatient Medications  acyclovir   Oral Tab/Cap 200 milliGRAM(s) Oral two times a day  aspirin enteric coated 81 milliGRAM(s) Oral daily  atorvastatin 40 milliGRAM(s) Oral at bedtime  chlorhexidine 2% Cloths 1 Application(s) Topical daily  doxazosin 4 milliGRAM(s) Oral <User Schedule>  famotidine    Tablet 20 milliGRAM(s) Oral daily  heparin   Injectable 5000 Unit(s) SubCutaneous every 12 hours  insulin glargine Injectable (LANTUS) 12 Unit(s) SubCutaneous at bedtime  insulin lispro (ADMELOG) corrective regimen sliding scale   SubCutaneous three times a day before meals  insulin lispro (ADMELOG) corrective regimen sliding scale   SubCutaneous at bedtime  insulin lispro Injectable (ADMELOG) 6 Unit(s) SubCutaneous three times a day before meals  isosorbide   mononitrate ER Tablet (IMDUR) 60 milliGRAM(s) Oral daily  labetalol 100 milliGRAM(s) Oral every 8 hours  levothyroxine 25 MICROGram(s) Oral daily  mycophenolate mofetil 500 milliGRAM(s) Oral <User Schedule>  Nephro-brenda 1 Tablet(s) Oral daily  NIFEdipine XL 60 milliGRAM(s) Oral two times a day  nystatin    Suspension 332912 Unit(s) Swish and Swallow four times a day  polyethylene glycol 3350 17 Gram(s) Oral daily  predniSONE   Tablet   Oral   predniSONE   Tablet 5 milliGRAM(s) Oral daily  senna 2 Tablet(s) Oral at bedtime  tacrolimus ER Tablet (ENVARSUS XR) 4 milliGRAM(s) Oral <User Schedule>  trimethoprim   80 mG/sulfamethoxazole 400 mG 1 Tablet(s) Oral <User Schedule>    PRN Inpatient Medications  acetaminophen     Tablet .. 650 milliGRAM(s) Oral every 6 hours PRN  artificial tears (preservative free) Ophthalmic Solution 1 Drop(s) Left EYE daily PRN  ondansetron Injectable 4 milliGRAM(s) IV Push every 6 hours PRN  traMADol 25 milliGRAM(s) Oral every 4 hours PRN  traMADol 50 milliGRAM(s) Oral every 8 hours PRN      REVIEW OF SYSTEMS  --------------------------------------------------------------------------------  Gen: No fevers/chills  Respiratory: No dyspnea, cough,   CV: No chest pain, PND, orthopnea  GI: No abdominal pain, diarrhea, constipation, nausea, vomiting  Transplant: No pain  : No increased frequency, dysuria, hematuria   MSK: No edema  Neuro: No dizziness/lightheadedness    All other systems were reviewed and are negative, except as noted.    VITALS/PHYSICAL EXAM  --------------------------------------------------------------------------------  T(C): 36.6 (04-15-25 @ 11:00), Max: 37.1 (04-14-25 @ 23:00)  HR: 56 (04-15-25 @ 14:00) (51 - 66)  BP: 147/68 (04-15-25 @ 14:00) (125/61 - 197/88)  RR: 19 (04-15-25 @ 14:00) (14 - 49)  SpO2: 100% (04-15-25 @ 14:00) (97% - 100%)  Wt(kg): --        04-14-25 @ 07:01  -  04-15-25 @ 07:00  --------------------------------------------------------  IN: 661 mL / OUT: 1190 mL / NET: -529 mL    04-15-25 @ 07:01  -  04-15-25 @ 14:25  --------------------------------------------------------  IN: 192.5 mL / OUT: 0 mL / NET: 192.5 mL      Physical Exam:  Alert and oriented x3   HEENT: normal  Pulm: CTA B/L  CV: normal S1S2; no murmur  Abd: soft, non-tender  Extremities- no edema  RIJ TCC  RLQ surgical scar    LABS/STUDIES  --------------------------------------------------------------------------------              7.5    6.17  >-----------<  187      [04-14-25 @ 23:52]              22.9     132  |  93  |  37  ----------------------------<  91      [04-15-25 @ 06:26]  3.7   |  24  |  4.43        Ca     7.8     [04-15-25 @ 06:26]      Mg     2.1     [04-15-25 @ 06:26]      Phos  3.3     [04-15-25 @ 06:26]    TPro  5.3  /  Alb  3.0  /  TBili  0.3  /  DBili  x   /  AST  24  /  ALT  6   /  AlkPhos  58  [04-15-25 @ 06:26]    PT/INR: PT 10.2 , INR 0.89       [04-14-25 @ 23:52]  PTT: 30.9       [04-14-25 @ 23:52]      Creatinine Trend:  SCr 4.43 [04-15 @ 06:26]  SCr 3.90 [04-14 @ 23:52]  SCr 7.04 [04-14 @ 15:31]  SCr 6.73 [04-14 @ 06:07]  SCr 5.45 [04-13 @ 07:22]    Tacrolimus (), Serum: 2.8 ng/mL (04-15 @ 06:26)  Tacrolimus (), Serum: 1.2 ng/mL (04-14 @ 06:07)  Tacrolimus (), Serum: <0.8 ng/mL (04-13 @ 07:20)  Tacrolimus (), Serum: <0.8 ng/mL (04-12 @ 06:31)            Urinalysis - [04-15-25 @ 06:26]      Color  / Appearance  / SG  / pH       Gluc 91 / Ketone   / Bili  / Urobili        Blood  / Protein  / Leuk Est  / Nitrite       RBC  / WBC  / Hyaline  / Gran  / Sq Epi  / Non Sq Epi  / Bacteria       Iron 17, TIBC 99, %sat 17      [01-08-25 @ 04:35]  PTH -- (Ca 7.9)      [12-30-24 @ 06:50]   229  PTH -- (Ca 7.6)      [11-27-24 @ 04:23]   250  Vitamin D (25OH) 17.1      [11-25-24 @ 06:50]  TSH 2.48      [04-13-25 @ 07:24]  Lipid: chol 112, TG 61, HDL 46, LDL --      [12-31-24 @ 04:27]    HBsAb 46.0      [04-07-25 @ 21:39]  HBsAg Nonreact      [04-07-25 @ 21:39]  HBcAb Nonreact      [04-07-25 @ 21:39]  HCV 0.05, Nonreact      [04-07-25 @ 21:39]  HIV Nonreact      [04-07-25 @ 21:39]

## 2025-04-15 NOTE — PROGRESS NOTE ADULT - ASSESSMENT
69M with PMHx of HLD, CAD s/p LAD PCI 7/24, HFpEF, HTN, hypothyroid, type 2 DM, ESRD on HD MWF presents to [facility] for possible DDRT. Transplant nephrology consulted for ESRD on Hemodialysis.     S/p DDRT (4/8) with DGF  63 yo DCD kidney with KDPI 99%  HD 4/9, 4/11, 4/14  Patient very sensitive to volume on HD   Renal doppler-patent vasculature, subcaps air around kidney, 2.3x1.4x6.3 cm collection. Repeat USG showed increasing collection size  Check DSA  not much urine output  Continue to monitor UO and renal function    IS  Thymo induction  On Env. Check Tac level in AM (30 min prior to dose)   MMF, Pred taper  denovo belatacept. Last dose on 4/14. Next dose on 4/24.  Ppx-Bactrim, Nystatin, Acyclovir    HTN- labile  On Nifedipine 60 bid  Imdur 60 qd  add doxzazosin 4 mg bid  Labetalol 100 q8h   Requiring cardene drip  BP fluctuating, monitor closely    Hyperkalemia  resolved      Please call if you have any questions  Michael Palm, PGY4  Nephrology Fellow  [phone]/Teams preferred  (After 5PM or weekends, reach out to fellow on call)

## 2025-04-15 NOTE — PROGRESS NOTE ADULT - ASSESSMENT
70 y/o M with PMHx of HLD, CAD s/p LAD PCI 7/24, HFpEF , HTN, hypothyroid, type 2 DM, ESRD on HD MWF (recently converted from PD in 1/2025) via permacath placed on 1/2025, h/o cva with no residual deficits, persistent right sided pleural effusion, and hx of hemorrhagic pericardial effusion (cytopathology negative) presents to [facility] for possible DDRT. He states he makes less than half cup of urine a day. Denies fever, chills, N/V/D/C, abdominal pain, CP, SOB, hemoptysis, and /GI complaints.     A/P  ESRD s/p DDRT 04/09  Steroid and thymo induction  Nephrologist is Dr. Menjivar   Access is PermCath   Was recently converted from PD to HD and on MWF schedule  Last HD 4/7, s/p HD on 04/08 and 04/09, and 04/11  S/p DDRT on 04/09 POD 2  On MMF and solumedrol , Started on Tac follow levels  On acyclovir and nsytatin prophylaxis  On belatacept, next dose 4/24  Monitor bmp   Renally dose meds    HTN   Now on labetalol and nifedipine + nicardipine drip  Refractory HTN so transferred back to SICU  UF w/ HD if needed  Managed per transplant team   Monitor bp     Anemia  Hb below goal  Monitor     CKD-MBD  Monitor ca and phos daily

## 2025-04-15 NOTE — PROGRESS NOTE ADULT - ASSESSMENT
68 y/o M with PMHx of HLD, CAD s/p LAD PCI 7/24, HFpEF, HTN, hypothyroid, type 2 DM, ESRD on HD MWF (recently converted from PD in 1/2025) via R permacath s/p DDRT  1a + 1 v + 1 u + stent under thymoglobulin induction on 04/08/25.   []s/p DDRT under thymo induction, POD #7  -Renal doppler: patent vasc, Subscap air surrounding kidney, 2.3 cm collection, Elevated velocities 293 cm/s at the renal txp anastomosis  - Repeat renal doppler - patent vasc, persistent elevated velocities at anastomosis, 2.3 x 1.4 x 6.3 cm perinephric collection  - Strict I/O's, JPx3 SS  - Consistent carb diet  -Bowel regimen  -IS, SCD, PT  -DGF: HD - first 4/8, HD - most recent 4/14  - Consult cardiology about resuming Plavix   - DSA  - Noncontrast CT A/P      [ ] Immunosuppression  - Thymoglobulin induction completed  - ENV per level,  BID, SST  - Belatacept 4/10, 4/14, next dose 4/24  - ppx: bactrim TIW, nystatin, Acyclovir 200 BID    []HTN  - off cardene gtt, resumed cardene gtt 4/14  - Nifed 60 BID, Imdur 60 qd, labetalol 100mg PO q8hr  - Resume doxazosin 4mg BID  68 y/o M with PMHx of HLD, CAD s/p LAD PCI 7/24, HFpEF, HTN, hypothyroid, type 2 DM, ESRD on HD MWF (recently converted from PD in 1/2025) via R permacath s/p DDRT  1a + 1 v + 1 u + stent under thymoglobulin induction on 04/08/25.   []s/p DDRT under thymo induction, POD #7  - most recent doppler with patent vasculature, increased perinephric collection 2.5x4.5x7.2cm  - DGF: HD - first 4/8, HD - last HD 4/14  - Donor + GPC, vanc by level unti. 4/17  - Strict I/O's, JPx3 SS  - Consistent carb diet  - Bowel regimen  - IS, SCD, PT  - Consult cardiology about resuming Plavix   - send DSA  - Noncontrast CT A/P to evaluate collection    [ ] Immunosuppression  - Thymoglobulin induction completed  - ENV per level,  BID, SST  - Belatacept 4/10, 4/14, next dose 4/24  - ppx: bactrim TIW, nystatin, Acyclovir 200 BID    []HTN  - off cardene gtt, resumed cardene gtt 4/14  - Nifed 60 BID, Imdur 60 qd, labetalol 100mg PO q8hr, increase doxazosin 4mg BID, can go upto 8 mg BID  - wean off cardene gtt  68 y/o M with PMHx of HLD, CAD s/p LAD PCI 7/24, HFpEF, HTN, hypothyroid, type 2 DM, ESRD on HD MWF (recently converted from PD in 1/2025) via R permacath s/p DDRT  1a + 1 v + 1 u + stent under thymoglobulin induction on 04/08/25.   []s/p DDRT under thymo induction, POD #7  - most recent doppler with patent vasculature, increased perinephric collection 2.5x4.5x7.2cm  - DGF: HD - first 4/8, HD - last HD 4/14  - Donor + GPC, vanc by level unti. 4/17  - Strict I/O's, JPx3 SS  - Consistent carb diet  - Bowel regimen  - IS, PT  - Consult cardiology about resuming Plavix   - send DSA  - Noncontrast CT A/P to evaluate collection    [ ] Immunosuppression  - Thymoglobulin induction completed  - ENV per level,  BID, SST  - Belatacept 4/10, 4/14, next dose 4/24  - ppx: bactrim TIW, nystatin, Acyclovir 200 BID    []HTN  - off cardene gtt, resumed cardene gtt 4/14  - Nifed 60 BID, Imdur 60 qd, labetalol 100mg PO q8hr, increase doxazosin 4mg BID, can go upto 8 mg BID  - wean off cardene gtt     [] DVT ppx  - on SQH TID, SCD 68 y/o M with PMHx of HLD, CAD s/p LAD PCI 7/24, HFpEF, HTN, hypothyroid, type 2 DM, ESRD on HD MWF (recently converted from PD in 1/2025) via R permacath s/p DDRT  1a + 1 v + 1 u + stent under thymoglobulin induction on 04/08/25.   []s/p DDRT under thymo induction, POD #7  - most recent doppler with patent vasculature, increased perinephric collection 2.5x4.5x7.2cm  - DGF: HD - first 4/8, HD - last HD 4/14  - Donor + GPC, vanc by level unti. 4/17  - Strict I/O's, JPx3 SS  - Consistent carb diet  - Bowel regimen  - IS, PT  - Consult cardiology about resuming Plavix   - send DSA  - Noncontrast CT A/P to evaluate collection    [ ] Immunosuppression  - Thymoglobulin induction completed  - ENV per level,  BID, SST  - Belatacept 4/10, 4/14, next dose 4/24  - ppx: bactrim TIW, nystatin, Acyclovir 200 BID    []HTN  - off cardene gtt, resumed cardene gtt 4/14  - Nifed 60 BID, Imdur 60 qd, labetalol 100mg PO q8hr, increase doxazosin 4mg BID, can go upto 8 mg BID  - wean off cardene gtt     [] DVT ppx  - on SQH BID, SCD 70 y/o M with PMHx of HLD, CAD s/p LAD PCI 7/24, HFpEF, HTN, hypothyroid, type 2 DM, ESRD on HD MWF (recently converted from PD in 1/2025) via R permacath s/p DDRT  1a + 1 v + 1 u + stent under thymoglobulin induction on 04/08/2    []s/p DDRT under thymo induction, POD #7  - most recent doppler with patent vasculature, increased perinephric collection 2.5x4.5x7.2cm  - DGF: HD - first 4/8, HD - last HD 4/14  - Donor + GPC, vanc by level unti. 4/17  - Strict I/O's, JPx3 SS  - Consistent carb diet  - Bowel regimen  - IS, PT  - Consult cardiology about resuming Plavix   - send DSA  - Noncontrast CT A/P to evaluate collection    [ ] Immunosuppression  - Thymoglobulin induction completed  - ENV per level,  BID, SST  - Belatacept 4/10, 4/14, next dose 4/24  - ppx: bactrim TIW, nystatin, Acyclovir 200 BID    []HTN  - off cardene gtt, resumed cardene gtt 4/14  - Nifed 60 BID, Imdur 60 qd, labetalol 100mg PO q8hr, increase doxazosin 4mg BID, can go upto 8 mg BID  - wean off cardene gtt     [] DVT ppx  - on SQH BID, SCD

## 2025-04-15 NOTE — PROGRESS NOTE ADULT - SUBJECTIVE AND OBJECTIVE BOX
24 HOUR EVENTS:  - Bumex x2 mg with no response  - Bactrim MWF added  - HD today with -1L volume removal, patient volume sensitive  - Doxazosin started at 4mg  - Continued on nicardipine gtt  - Lantus held overnight due to lack of oral intake  - Seroquel 12.5 for night restlessness, mild agitation    NEURO  Exam: AxO4, mildly agitated. No focal deficits. Pain well controlled.   Meds: acetaminophen     Tablet .. 650 milliGRAM(s) Oral every 6 hours PRN Mild Pain (1 - 3)  ondansetron Injectable 4 milliGRAM(s) IV Push every 6 hours PRN Nausea and/or Vomiting  traMADol 50 milliGRAM(s) Oral every 8 hours PRN Severe Pain (7 - 10)  traMADol 25 milliGRAM(s) Oral every 4 hours PRN Moderate Pain (4 - 6)      RESPIRATORY  RR: 21 (04-14-25 @ 23:15) (15 - 53)  SpO2: 99% (04-14-25 @ 23:15) (97% - 100%)  Wt(kg): --  Exam: Lungs clear to auscultation, Normal expansion/effort.      Meds:     CARDIOVASCULAR  HR: 59 (04-14-25 @ 23:15) (49 - 66)  BP: 138/65 (04-14-25 @ 23:15) (111/59 - 221/92)  BP(mean): 95 (04-14-25 @ 23:15) (81 - 133)  ABP: --  ABP(mean): --  Wt(kg): --  CVP(cm H2O): --  VBG - ( 14 Apr 2025 06:05 )  pH: 7.34  /  pCO2: 45    /  pO2: 42    / HCO3: 24    / Base Excess: -1.5  /  SaO2: 72.6   Lactate: 0.9        Exam: Normal S1/S2 w/o murmurs or rubs  Cardiac Rhythm: NSR  Perfusion     [ x ]Adequate   [ ]Inadequate  Mentation   [ x ]Normal       [ ]Reduced  Extremities  [ x ]Warm         [ ]Cool  Volume Status [ ]Hypervolemic [ x ]Euvolemic [ ]Hypovolemic  Meds: doxazosin 4 milliGRAM(s) Oral at bedtime  isosorbide   mononitrate ER Tablet (IMDUR) 60 milliGRAM(s) Oral daily  labetalol 100 milliGRAM(s) Oral every 8 hours  niCARdipine Infusion 5 mG/Hr IV Continuous <Continuous>  NIFEdipine XL 60 milliGRAM(s) Oral two times a day      GI/NUTRITION  Exam: Abdomen soft, Non-tender, Non-distended.  RLQ incision site c/d/i  Diet: Carb/Renal diet  Last Bowel Movement: 13-Apr-2025 (04-14-25 @ 19:00)  Last Bowel Movement: 13-Apr-2025 (04-14-25 @ 07:00)  Last Bowel Movement: 13-Apr-2025 (04-14-25 @ 04:09)  Last Bowel Movement: 13-Apr-2025 (04-13-25 @ 21:12)  Last Bowel Movement: 08-Apr-2025 (04-13-25 @ 07:33)  Last Bowel Movement: 08-Apr-2025 (04-12-25 @ 19:40)  Last Bowel Movement: 12-Apr-2025 (04-12-25 @ 09:10)  Last Bowel Movement: 08-Apr-2025 (04-11-25 @ 20:00)  Last Bowel Movement: 08-Apr-2025 (04-09-25 @ 10:10)  Last Bowel Movement: 08-Apr-2025 (04-08-25 @ 08:05)  Last Bowel Movement: 07-Apr-2025 (04-07-25 @ 23:59)    Meds: famotidine    Tablet 20 milliGRAM(s) Oral daily  polyethylene glycol 3350 17 Gram(s) Oral daily  senna 2 Tablet(s) Oral at bedtime      GENITOURINARY  I&O's Detail    04-13 @ 07:01  -  04-14 @ 07:00  --------------------------------------------------------  IN:    Furosemide: 47.5 mL    NiCARdipine: 75 mL    Oral Fluid: 1260 mL  Total IN: 1382.5 mL    OUT:    Bulb (mL): 78 mL    Bulb (mL): 40 mL    Bulb (mL): 75 mL    Indwelling Catheter - Urethral (mL): 12 mL    Voided (mL): 10 mL  Total OUT: 215 mL    Total NET: 1167.5 mL      04-14 @ 07:01  -  04-15 @ 00:34  --------------------------------------------------------  IN:    NiCARdipine: 287.5 mL    Oral Fluid: 118 mL  Total IN: 405.5 mL    OUT:    Bulb (mL): 100 mL    Bulb (mL): 20 mL    Bulb (mL): 0 mL    Other (mL): 1000 mL  Total OUT: 1120 mL    Total NET: -714.5 mL          04-14    132[L]  |  94[L]  |  33[H]  ----------------------------<  135[H]  3.8   |  25  |  3.90[H]    Ca    7.6[L]      14 Apr 2025 23:52  Phos  2.9     04-14  Mg     2.0     04-14    TPro  5.0[L]  /  Alb  3.2[L]  /  TBili  0.3  /  DBili  x   /  AST  24  /  ALT  7[L]  /  AlkPhos  59  04-14    Meds: Nephro-brenda 1 Tablet(s) Oral daily      HEMATOLOGIC  Meds: aspirin enteric coated 81 milliGRAM(s) Oral daily  heparin   Injectable 5000 Unit(s) SubCutaneous every 8 hours                          7.5    6.17  )-----------( 187      ( 14 Apr 2025 23:52 )             22.9     PT/INR - ( 14 Apr 2025 23:52 )   PT: 10.2 sec;   INR: 0.89 ratio         PTT - ( 14 Apr 2025 23:52 )  PTT:30.9 sec    INFECTIOUS DISEASES  T(C): 36.8 (04-14-25 @ 19:00), Max: 37.1 (04-14-25 @ 11:00)  Wt(kg): --  WBC Count: 6.17 K/uL (04-14 @ 23:52)  WBC Count: 6.11 K/uL (04-14 @ 06:07)    Recent Cultures:    Meds: acyclovir   Oral Tab/Cap 200 milliGRAM(s) Oral two times a day  mycophenolate mofetil 500 milliGRAM(s) Oral <User Schedule>  nystatin    Suspension 130677 Unit(s) Swish and Swallow four times a day  tacrolimus ER Tablet (ENVARSUS XR) 4 milliGRAM(s) Oral <User Schedule>  trimethoprim   80 mG/sulfamethoxazole 400 mG 1 Tablet(s) Oral <User Schedule>      ENDOCRINE  Capillary Blood Glucose    Meds: atorvastatin 40 milliGRAM(s) Oral at bedtime  insulin glargine Injectable (LANTUS) 12 Unit(s) SubCutaneous at bedtime  insulin lispro (ADMELOG) corrective regimen sliding scale   SubCutaneous three times a day before meals  insulin lispro (ADMELOG) corrective regimen sliding scale   SubCutaneous at bedtime  insulin lispro Injectable (ADMELOG) 6 Unit(s) SubCutaneous three times a day before meals  levothyroxine 25 MICROGram(s) Oral daily  predniSONE   Tablet   Oral   predniSONE   Tablet 5 milliGRAM(s) Oral daily    OTHER MEDICATIONS:  artificial tears (preservative free) Ophthalmic Solution 1 Drop(s) Left EYE daily PRN  chlorhexidine 2% Cloths 1 Application(s) Topical daily      IMAGING:

## 2025-04-16 LAB
ALBUMIN SERPL ELPH-MCNC: 3 G/DL — LOW (ref 3.3–5)
ALBUMIN SERPL ELPH-MCNC: 3.3 G/DL — SIGNIFICANT CHANGE UP (ref 3.3–5)
ALP SERPL-CCNC: 58 U/L — SIGNIFICANT CHANGE UP (ref 40–120)
ALP SERPL-CCNC: 70 U/L — SIGNIFICANT CHANGE UP (ref 40–120)
ALT FLD-CCNC: 6 U/L — LOW (ref 10–45)
ALT FLD-CCNC: 7 U/L — LOW (ref 10–45)
ANION GAP SERPL CALC-SCNC: 14 MMOL/L — SIGNIFICANT CHANGE UP (ref 5–17)
ANION GAP SERPL CALC-SCNC: 14 MMOL/L — SIGNIFICANT CHANGE UP (ref 5–17)
ANISOCYTOSIS BLD QL: SLIGHT — SIGNIFICANT CHANGE UP
APTT BLD: 28.9 SEC — SIGNIFICANT CHANGE UP (ref 24.5–35.6)
AST SERPL-CCNC: 21 U/L — SIGNIFICANT CHANGE UP (ref 10–40)
AST SERPL-CCNC: 27 U/L — SIGNIFICANT CHANGE UP (ref 10–40)
BASOPHILS # BLD AUTO: 0 K/UL — SIGNIFICANT CHANGE UP (ref 0–0.2)
BASOPHILS NFR BLD AUTO: 0 % — SIGNIFICANT CHANGE UP (ref 0–2)
BILIRUB SERPL-MCNC: 0.2 MG/DL — SIGNIFICANT CHANGE UP (ref 0.2–1.2)
BILIRUB SERPL-MCNC: 0.2 MG/DL — SIGNIFICANT CHANGE UP (ref 0.2–1.2)
BUN SERPL-MCNC: 23 MG/DL — SIGNIFICANT CHANGE UP (ref 7–23)
BUN SERPL-MCNC: 55 MG/DL — HIGH (ref 7–23)
CALCIUM SERPL-MCNC: 7.9 MG/DL — LOW (ref 8.4–10.5)
CALCIUM SERPL-MCNC: 8.2 MG/DL — LOW (ref 8.4–10.5)
CHLORIDE SERPL-SCNC: 94 MMOL/L — LOW (ref 96–108)
CHLORIDE SERPL-SCNC: 95 MMOL/L — LOW (ref 96–108)
CO2 SERPL-SCNC: 24 MMOL/L — SIGNIFICANT CHANGE UP (ref 22–31)
CO2 SERPL-SCNC: 25 MMOL/L — SIGNIFICANT CHANGE UP (ref 22–31)
CREAT SERPL-MCNC: 3.22 MG/DL — HIGH (ref 0.5–1.3)
CREAT SERPL-MCNC: 5.78 MG/DL — HIGH (ref 0.5–1.3)
EGFR: 10 ML/MIN/1.73M2 — LOW
EGFR: 10 ML/MIN/1.73M2 — LOW
EGFR: 20 ML/MIN/1.73M2 — LOW
EGFR: 20 ML/MIN/1.73M2 — LOW
ELLIPTOCYTES BLD QL SMEAR: SLIGHT — SIGNIFICANT CHANGE UP
EOSINOPHIL # BLD AUTO: 0.82 K/UL — HIGH (ref 0–0.5)
EOSINOPHIL NFR BLD AUTO: 12.9 % — HIGH (ref 0–6)
GIANT PLATELETS BLD QL SMEAR: PRESENT — SIGNIFICANT CHANGE UP
GLUCOSE BLDC GLUCOMTR-MCNC: 206 MG/DL — HIGH (ref 70–99)
GLUCOSE BLDC GLUCOMTR-MCNC: 217 MG/DL — HIGH (ref 70–99)
GLUCOSE BLDC GLUCOMTR-MCNC: 76 MG/DL — SIGNIFICANT CHANGE UP (ref 70–99)
GLUCOSE BLDC GLUCOMTR-MCNC: 87 MG/DL — SIGNIFICANT CHANGE UP (ref 70–99)
GLUCOSE SERPL-MCNC: 160 MG/DL — HIGH (ref 70–99)
GLUCOSE SERPL-MCNC: 67 MG/DL — LOW (ref 70–99)
HCT VFR BLD CALC: 22.9 % — LOW (ref 39–50)
HGB BLD-MCNC: 7.4 G/DL — LOW (ref 13–17)
HYPOCHROMIA BLD QL: SLIGHT — SIGNIFICANT CHANGE UP
INR BLD: 0.88 RATIO — SIGNIFICANT CHANGE UP (ref 0.85–1.16)
LYMPHOCYTES # BLD AUTO: 0.33 K/UL — LOW (ref 1–3.3)
LYMPHOCYTES # BLD AUTO: 5.2 % — LOW (ref 13–44)
MACROCYTES BLD QL: SLIGHT — SIGNIFICANT CHANGE UP
MAGNESIUM SERPL-MCNC: 2.1 MG/DL — SIGNIFICANT CHANGE UP (ref 1.6–2.6)
MAGNESIUM SERPL-MCNC: 2.3 MG/DL — SIGNIFICANT CHANGE UP (ref 1.6–2.6)
MANUAL SMEAR VERIFICATION: SIGNIFICANT CHANGE UP
MCHC RBC-ENTMCNC: 27.4 PG — SIGNIFICANT CHANGE UP (ref 27–34)
MCHC RBC-ENTMCNC: 32.3 G/DL — SIGNIFICANT CHANGE UP (ref 32–36)
MCV RBC AUTO: 84.8 FL — SIGNIFICANT CHANGE UP (ref 80–100)
MICROCYTES BLD QL: SLIGHT — SIGNIFICANT CHANGE UP
MONOCYTES # BLD AUTO: 0.55 K/UL — SIGNIFICANT CHANGE UP (ref 0–0.9)
MONOCYTES NFR BLD AUTO: 8.6 % — SIGNIFICANT CHANGE UP (ref 2–14)
MYELOCYTES NFR BLD: 0.9 % — HIGH (ref 0–0)
NEUTROPHILS # BLD AUTO: 4.56 K/UL — SIGNIFICANT CHANGE UP (ref 1.8–7.4)
NEUTROPHILS NFR BLD AUTO: 70.7 % — SIGNIFICANT CHANGE UP (ref 43–77)
NEUTS BAND # BLD: 0.8 % — SIGNIFICANT CHANGE UP (ref 0–8)
NEUTS BAND NFR BLD: 0.8 % — SIGNIFICANT CHANGE UP (ref 0–8)
PHOSPHATE SERPL-MCNC: 2.7 MG/DL — SIGNIFICANT CHANGE UP (ref 2.5–4.5)
PHOSPHATE SERPL-MCNC: 4.8 MG/DL — HIGH (ref 2.5–4.5)
PLAT MORPH BLD: NORMAL — SIGNIFICANT CHANGE UP
PLATELET # BLD AUTO: 241 K/UL — SIGNIFICANT CHANGE UP (ref 150–400)
POLYCHROMASIA BLD QL SMEAR: SLIGHT — SIGNIFICANT CHANGE UP
POTASSIUM SERPL-MCNC: 3.8 MMOL/L — SIGNIFICANT CHANGE UP (ref 3.5–5.3)
POTASSIUM SERPL-MCNC: 3.9 MMOL/L — SIGNIFICANT CHANGE UP (ref 3.5–5.3)
POTASSIUM SERPL-SCNC: 3.8 MMOL/L — SIGNIFICANT CHANGE UP (ref 3.5–5.3)
POTASSIUM SERPL-SCNC: 3.9 MMOL/L — SIGNIFICANT CHANGE UP (ref 3.5–5.3)
PROT SERPL-MCNC: 5.3 G/DL — LOW (ref 6–8.3)
PROT SERPL-MCNC: 5.9 G/DL — LOW (ref 6–8.3)
PROTHROM AB SERPL-ACNC: 10.1 SEC — SIGNIFICANT CHANGE UP (ref 9.9–13.4)
RBC # BLD: 2.7 M/UL — LOW (ref 4.2–5.8)
RBC # FLD: 17.9 % — HIGH (ref 10.3–14.5)
RBC BLD AUTO: ABNORMAL
SCHISTOCYTES BLD QL AUTO: SLIGHT — SIGNIFICANT CHANGE UP
SODIUM SERPL-SCNC: 132 MMOL/L — LOW (ref 135–145)
SODIUM SERPL-SCNC: 134 MMOL/L — LOW (ref 135–145)
TACROLIMUS SERPL-MCNC: 3.3 NG/ML — SIGNIFICANT CHANGE UP
VANCOMYCIN FLD-MCNC: 9.3 UG/ML — SIGNIFICANT CHANGE UP
VARIANT LYMPHS # BLD: 0.9 % — SIGNIFICANT CHANGE UP (ref 0–6)
VARIANT LYMPHS NFR BLD MANUAL: 0.9 % — SIGNIFICANT CHANGE UP (ref 0–6)
WBC # BLD: 6.38 K/UL — SIGNIFICANT CHANGE UP (ref 3.8–10.5)
WBC # FLD AUTO: 6.38 K/UL — SIGNIFICANT CHANGE UP (ref 3.8–10.5)

## 2025-04-16 PROCEDURE — 99232 SBSQ HOSP IP/OBS MODERATE 35: CPT | Mod: GC

## 2025-04-16 PROCEDURE — 99233 SBSQ HOSP IP/OBS HIGH 50: CPT

## 2025-04-16 PROCEDURE — ZZZZZ: CPT

## 2025-04-16 PROCEDURE — 71045 X-RAY EXAM CHEST 1 VIEW: CPT | Mod: 26

## 2025-04-16 RX ORDER — VANCOMYCIN HCL IN 5 % DEXTROSE 1.5G/250ML
750 PLASTIC BAG, INJECTION (ML) INTRAVENOUS ONCE
Refills: 0 | Status: COMPLETED | OUTPATIENT
Start: 2025-04-16 | End: 2025-04-16

## 2025-04-16 RX ORDER — POLYETHYLENE GLYCOL 3350 17 G/17G
17 POWDER, FOR SOLUTION ORAL EVERY 12 HOURS
Refills: 0 | Status: DISCONTINUED | OUTPATIENT
Start: 2025-04-16 | End: 2025-04-23

## 2025-04-16 RX ORDER — TAMSULOSIN HYDROCHLORIDE 0.4 MG/1
0.4 CAPSULE ORAL AT BEDTIME
Refills: 0 | Status: DISCONTINUED | OUTPATIENT
Start: 2025-04-16 | End: 2025-04-23

## 2025-04-16 RX ORDER — INSULIN GLARGINE-YFGN 100 [IU]/ML
10 INJECTION, SOLUTION SUBCUTANEOUS AT BEDTIME
Refills: 0 | Status: DISCONTINUED | OUTPATIENT
Start: 2025-04-16 | End: 2025-04-17

## 2025-04-16 RX ADMIN — Medication 500000 UNIT(S): at 17:06

## 2025-04-16 RX ADMIN — DOXAZOSIN MESYLATE 8 MILLIGRAM(S): 8 TABLET ORAL at 21:47

## 2025-04-16 RX ADMIN — MYCOPHENOLATE MOFETIL 500 MILLIGRAM(S): 500 TABLET, FILM COATED ORAL at 19:25

## 2025-04-16 RX ADMIN — ATORVASTATIN CALCIUM 40 MILLIGRAM(S): 80 TABLET, FILM COATED ORAL at 21:47

## 2025-04-16 RX ADMIN — TACROLIMUS 4 MILLIGRAM(S): 0.5 CAPSULE ORAL at 08:16

## 2025-04-16 RX ADMIN — TRAMADOL HYDROCHLORIDE 50 MILLIGRAM(S): 50 TABLET, FILM COATED ORAL at 08:22

## 2025-04-16 RX ADMIN — Medication 1 APPLICATION(S): at 11:51

## 2025-04-16 RX ADMIN — ISOSORBIDE MONONITRATE 60 MILLIGRAM(S): 60 TABLET, EXTENDED RELEASE ORAL at 11:57

## 2025-04-16 RX ADMIN — TRAMADOL HYDROCHLORIDE 50 MILLIGRAM(S): 50 TABLET, FILM COATED ORAL at 17:45

## 2025-04-16 RX ADMIN — POLYETHYLENE GLYCOL 3350 17 GRAM(S): 17 POWDER, FOR SOLUTION ORAL at 17:06

## 2025-04-16 RX ADMIN — PREDNISONE 5 MILLIGRAM(S): 20 TABLET ORAL at 06:04

## 2025-04-16 RX ADMIN — Medication 500000 UNIT(S): at 23:14

## 2025-04-16 RX ADMIN — Medication 20 MILLIGRAM(S): at 11:50

## 2025-04-16 RX ADMIN — Medication 2 TABLET(S): at 21:46

## 2025-04-16 RX ADMIN — Medication 500000 UNIT(S): at 06:04

## 2025-04-16 RX ADMIN — TAMSULOSIN HYDROCHLORIDE 0.4 MILLIGRAM(S): 0.4 CAPSULE ORAL at 21:47

## 2025-04-16 RX ADMIN — INSULIN LISPRO 4: 100 INJECTION, SOLUTION INTRAVENOUS; SUBCUTANEOUS at 11:43

## 2025-04-16 RX ADMIN — INSULIN LISPRO 6 UNIT(S): 100 INJECTION, SOLUTION INTRAVENOUS; SUBCUTANEOUS at 11:43

## 2025-04-16 RX ADMIN — TRAMADOL HYDROCHLORIDE 50 MILLIGRAM(S): 50 TABLET, FILM COATED ORAL at 17:06

## 2025-04-16 RX ADMIN — Medication 650 MILLIGRAM(S): at 09:00

## 2025-04-16 RX ADMIN — Medication 60 MILLIGRAM(S): at 06:04

## 2025-04-16 RX ADMIN — Medication 60 MILLIGRAM(S): at 17:07

## 2025-04-16 RX ADMIN — HEPARIN SODIUM 5000 UNIT(S): 1000 INJECTION INTRAVENOUS; SUBCUTANEOUS at 17:06

## 2025-04-16 RX ADMIN — INSULIN GLARGINE-YFGN 10 UNIT(S): 100 INJECTION, SOLUTION SUBCUTANEOUS at 21:50

## 2025-04-16 RX ADMIN — Medication 81 MILLIGRAM(S): at 11:51

## 2025-04-16 RX ADMIN — Medication 1 TABLET(S): at 11:50

## 2025-04-16 RX ADMIN — Medication 25 MICROGRAM(S): at 05:12

## 2025-04-16 RX ADMIN — HEPARIN SODIUM 5000 UNIT(S): 1000 INJECTION INTRAVENOUS; SUBCUTANEOUS at 06:04

## 2025-04-16 RX ADMIN — Medication 250 MILLIGRAM(S): at 20:53

## 2025-04-16 RX ADMIN — LABETALOL HYDROCHLORIDE 100 MILLIGRAM(S): 200 TABLET, FILM COATED ORAL at 20:06

## 2025-04-16 RX ADMIN — Medication 500000 UNIT(S): at 11:43

## 2025-04-16 RX ADMIN — Medication 1 TABLET(S): at 08:16

## 2025-04-16 RX ADMIN — Medication 200 MILLIGRAM(S): at 17:07

## 2025-04-16 RX ADMIN — MYCOPHENOLATE MOFETIL 500 MILLIGRAM(S): 500 TABLET, FILM COATED ORAL at 08:13

## 2025-04-16 RX ADMIN — TRAMADOL HYDROCHLORIDE 50 MILLIGRAM(S): 50 TABLET, FILM COATED ORAL at 09:00

## 2025-04-16 RX ADMIN — Medication 650 MILLIGRAM(S): at 08:21

## 2025-04-16 RX ADMIN — Medication 200 MILLIGRAM(S): at 06:04

## 2025-04-16 NOTE — PROGRESS NOTE ADULT - SUBJECTIVE AND OBJECTIVE BOX
Transplant Surgery - Multi-disciplinary Rounds  --------------------------------------------------------------   Tx Date: 04/08/25         POD#8    Present: Patient seen and examined with multidisciplinary Transplant team including Surgeon Dr. Bernal, nephrologist Dr. Dunn, LÁZARO Gregg/Audra, pharmacy, and bedside RN during AM rounds.   Disciplines not in attendance will be notified of the plan.     HPI: 68 y/o M with PMHx of HLD, CAD s/p LAD PCI 7/24, HFpEF , HTN, hypothyroid, type 2 DM, ESRD on HD MWF (recently converted from PD in 1/2025) via R permacath placed on 1/2025, h/o cva with no residual deficits, persistent right sided pleural effusion, and hx of hemorrhagic pericardial effusion (cytopathology negative) presents to [facility] for possible DDRT. He states he makes less than half cup of urine a day. Denies fever, chills, N/V/D/C, abdominal pain, CP, SOB, hemoptysis, and /GI complaints. Now s/p DDRT  1a + 1 v + 1 u + stent under thymoglobulin induction on 04/08/25.     Interval Events:  - Afebrile, hypertensive   - Off Cardene drip on PO medications  - Voids not saved      Immunosuppression:  Induction: Thymo completed  Maintenance: Petra last dose 4/14/ENV per level/ BID/ pred 5  Ongoing monitoring for signs of rejection     Potential Discharge date: TBD  Education:  Medications  Plan of care:  See Below          MEDICATIONS  (STANDING):  acyclovir   Oral Tab/Cap 200 milliGRAM(s) Oral two times a day  aspirin enteric coated 81 milliGRAM(s) Oral daily  atorvastatin 40 milliGRAM(s) Oral at bedtime  chlorhexidine 2% Cloths 1 Application(s) Topical daily  doxazosin 8 milliGRAM(s) Oral at bedtime  famotidine    Tablet 20 milliGRAM(s) Oral daily  heparin   Injectable 5000 Unit(s) SubCutaneous every 12 hours  insulin glargine Injectable (LANTUS) 10 Unit(s) SubCutaneous at bedtime  insulin lispro (ADMELOG) corrective regimen sliding scale   SubCutaneous three times a day before meals  insulin lispro (ADMELOG) corrective regimen sliding scale   SubCutaneous at bedtime  insulin lispro Injectable (ADMELOG) 6 Unit(s) SubCutaneous three times a day before meals  isosorbide   mononitrate ER Tablet (IMDUR) 60 milliGRAM(s) Oral daily  labetalol 100 milliGRAM(s) Oral every 8 hours  levothyroxine 25 MICROGram(s) Oral daily  mycophenolate mofetil 500 milliGRAM(s) Oral <User Schedule>  Nephro-brenda 1 Tablet(s) Oral daily  NIFEdipine XL 60 milliGRAM(s) Oral two times a day  nystatin    Suspension 476987 Unit(s) Swish and Swallow four times a day  polyethylene glycol 3350 17 Gram(s) Oral every 12 hours  predniSONE   Tablet   Oral   predniSONE   Tablet 5 milliGRAM(s) Oral daily  senna 2 Tablet(s) Oral at bedtime  tacrolimus ER Tablet (ENVARSUS XR) 4 milliGRAM(s) Oral <User Schedule>  tamsulosin 0.4 milliGRAM(s) Oral at bedtime  trimethoprim   80 mG/sulfamethoxazole 400 mG 1 Tablet(s) Oral <User Schedule>    MEDICATIONS  (PRN):  acetaminophen     Tablet .. 650 milliGRAM(s) Oral every 6 hours PRN Mild Pain (1 - 3)  artificial tears (preservative free) Ophthalmic Solution 1 Drop(s) Left EYE daily PRN Dry Eyes  ondansetron Injectable 4 milliGRAM(s) IV Push every 6 hours PRN Nausea and/or Vomiting  traMADol 25 milliGRAM(s) Oral every 4 hours PRN Moderate Pain (4 - 6)  traMADol 50 milliGRAM(s) Oral every 8 hours PRN Severe Pain (7 - 10)      PAST MEDICAL & SURGICAL HISTORY:  Hypertension      ESRD on peritoneal dialysis      CVA (cerebrovascular accident)  2010 without residual effects      Hyperlipidemia      BPH (benign prostatic hyperplasia)      CAD (coronary artery disease)      T2DM (type 2 diabetes mellitus)      Hypothyroidism      History of peritoneal dialysis  s/p PD catheter placement      S/P coronary artery stent placement          Vital Signs Last 24 Hrs  T(C): 36.3 (16 Apr 2025 13:03), Max: 36.7 (15 Apr 2025 15:00)  T(F): 97.4 (16 Apr 2025 13:03), Max: 98.1 (15 Apr 2025 15:00)  HR: 61 (16 Apr 2025 13:03) (51 - 62)  BP: 152/71 (16 Apr 2025 13:03) (141/66 - 161/76)  BP(mean): 104 (16 Apr 2025 13:00) (95 - 109)  RR: 14 (16 Apr 2025 13:03) (12 - 25)  SpO2: 100% (16 Apr 2025 13:03) (97% - 100%)    Parameters below as of 16 Apr 2025 13:03  Patient On (Oxygen Delivery Method): room air        I&O's Summary    15 Apr 2025 07:01  -  16 Apr 2025 07:00  --------------------------------------------------------  IN: 1062.5 mL / OUT: 220 mL / NET: 842.5 mL    16 Apr 2025 07:01  -  16 Apr 2025 13:48  --------------------------------------------------------  IN: 300 mL / OUT: 0 mL / NET: 300 mL                              7.4    6.38  )-----------( 241      ( 16 Apr 2025 05:28 )             22.9     04-16    132[L]  |  94[L]  |  55[H]  ----------------------------<  67[L]  3.9   |  24  |  5.78[H]    Ca    7.9[L]      16 Apr 2025 05:28  Phos  4.8     04-16  Mg     2.3     04-16    TPro  5.3[L]  /  Alb  3.0[L]  /  TBili  0.2  /  DBili  x   /  AST  21  /  ALT  6[L]  /  AlkPhos  58  04-16    Tacrolimus (), Serum: 3.3 ng/mL (04-16 @ 05:28)                Review of systems  Gen: +fatigue, +insomnia No weight changes, fevers/chills, weakness  Skin: No rashes  Head/Eyes/Ears/Mouth: No headache; Normal hearing; Normal vision w/o blurriness; No sinus pain/discomfort, sore throat  Respiratory: No dyspnea, cough, wheezing, hemoptysis  CV: No chest pain, PND, orthopnea  GI: Mild abdominal pain at incision site; denies diarrhea, constipation, nausea, vomiting, melena, hematochezia  : No increased frequency, dysuria, hematuria, nocturia  MSK: No joint pain/swelling; no back pain; no edema  Neuro: No dizziness/lightheadedness, weakness, seizures, numbness, tingling  Heme: No easy bruising or bleeding  Endo: No heat/cold intolerance  Psych: Denies Hallucinations No significant nervousness, anxiety, stress, depression  All other systems were reviewed and are negative, except as noted.      PHYSICAL EXAM:  Constitutional: Well developed / well nourished  Eyes: Anicteric, PERRLA  ENMT: nc/at  Neck: Supple  Respiratory: CTA B/L  Cardiovascular: RRR  Gastrointestinal: Soft, non-distended, mild abdominal pain to palpation at incision site; incision c/d/i; ISHMAEL x #1/2 perinephric ss and #3 is subcutaneous ishmael ss  Genitourinary: voiding  Extremities: SCD's in place and working bilaterally, R permcath  Vascular: Palpable dp pulses bilaterally  Neurological: A&O x3  Skin: no rashes, ulcerations or lesions;  Musculoskeletal: Moving all extremities  Psychiatric: Responsive

## 2025-04-16 NOTE — PROGRESS NOTE ADULT - SUBJECTIVE AND OBJECTIVE BOX
[facility]    Dr. Tiara Garcia     Office [phone] (9 am to 5 pm)  Service: [phone] (5pm to 9am)  Also Available on TEAMS      RENAL PROGRESS NOTE: DATE OF SERVICE 04-16-25 @ 11:12    Patient is a 69y old  Male who presents with a chief complaint of possible DDRT (16 Apr 2025 07:50)      Patient seen and examined at bedside. No chest pain/sob    VITALS:  T(F): 97.7 (04-16-25 @ 07:00), Max: 98.1 (04-15-25 @ 15:00)  HR: 59 (04-16-25 @ 09:14)  BP: 146/67 (04-16-25 @ 09:14)  RR: 18 (04-16-25 @ 09:00)  SpO2: 100% (04-16-25 @ 09:14)  Wt(kg): --    04-15 @ 07:01  -  04-16 @ 07:00  --------------------------------------------------------  IN: 1062.5 mL / OUT: 220 mL / NET: 842.5 mL    04-16 @ 07:01  -  04-16 @ 11:12  --------------------------------------------------------  IN: 240 mL / OUT: 0 mL / NET: 240 mL          PHYSICAL EXAM:  Constitutional: NAD  Neck: No JVD  Respiratory: CTAB, no wheezes, rales or rhonchi  Cardiovascular: S1, S2, RRR  Gastrointestinal: BS+, soft, NT/ND  Extremities: No peripheral edema    Hospital Medications:   MEDICATIONS  (STANDING):  acyclovir   Oral Tab/Cap 200 milliGRAM(s) Oral two times a day  aspirin enteric coated 81 milliGRAM(s) Oral daily  atorvastatin 40 milliGRAM(s) Oral at bedtime  chlorhexidine 2% Cloths 1 Application(s) Topical daily  doxazosin 8 milliGRAM(s) Oral at bedtime  famotidine    Tablet 20 milliGRAM(s) Oral daily  heparin   Injectable 5000 Unit(s) SubCutaneous every 12 hours  insulin glargine Injectable (LANTUS) 10 Unit(s) SubCutaneous at bedtime  insulin lispro (ADMELOG) corrective regimen sliding scale   SubCutaneous three times a day before meals  insulin lispro (ADMELOG) corrective regimen sliding scale   SubCutaneous at bedtime  insulin lispro Injectable (ADMELOG) 6 Unit(s) SubCutaneous three times a day before meals  isosorbide   mononitrate ER Tablet (IMDUR) 60 milliGRAM(s) Oral daily  labetalol 100 milliGRAM(s) Oral every 8 hours  levothyroxine 25 MICROGram(s) Oral daily  mycophenolate mofetil 500 milliGRAM(s) Oral <User Schedule>  Nephro-brenda 1 Tablet(s) Oral daily  NIFEdipine XL 60 milliGRAM(s) Oral two times a day  nystatin    Suspension [id number] Unit(s) Swish and Swallow four times a day  polyethylene glycol 3350 17 Gram(s) Oral daily  predniSONE   Tablet   Oral   predniSONE   Tablet 5 milliGRAM(s) Oral daily  senna 2 Tablet(s) Oral at bedtime  tacrolimus ER Tablet (ENVARSUS XR) 4 milliGRAM(s) Oral <User Schedule>  trimethoprim   80 mG/sulfamethoxazole 400 mG 1 Tablet(s) Oral <User Schedule>    Tacrolimus (), Serum: 3.3 ng/mL (04-16 @ 05:28)    LABS:  04-16    132[L]  |  94[L]  |  55[H]  ----------------------------<  67[L]  3.9   |  24  |  5.78[H]    Ca    7.9[L]      16 Apr 2025 05:28  Phos  4.8     04-16  Mg     2.3     04-16    TPro  5.3[L]  /  Alb  3.0[L]  /  TBili  0.2  /  DBili      /  AST  21  /  ALT  6[L]  /  AlkPhos  58  04-16    Creatinine Trend: 5.78 <--, 4.83 <--, 4.85 <--, 4.43 <--, 3.90 <--, 7.04 <--, 6.73 <--, 5.45 <--, 3.85 <--, 5.35 <--, 4.80 <--, 4.53 <--, 4.06 <--, 3.42 <--, 2.67 <--, 4.76 <--    Albumin: 3.0 g/dL (04-16 @ 05:28)  Phosphorus: 4.8 mg/dL (04-16 @ 05:28)  Albumin: 2.7 g/dL (04-15 @ 23:11)  Phosphorus: 3.8 mg/dL (04-15 @ 23:11)  Albumin: 2.9 g/dL (04-15 @ 14:05)  Phosphorus: 3.6 mg/dL (04-15 @ 14:05)                              7.4    6.38  )-----------( 241      ( 16 Apr 2025 05:28 )             22.9     Urine Studies:  Urinalysis - [04-16-25 @ 05:28]      Color  / Appearance  / SG  / pH       Gluc 67 / Ketone   / Bili  / Urobili        Blood  / Protein  / Leuk Est  / Nitrite       RBC  / WBC  / Hyaline  / Gran  / Sq Epi  / Non Sq Epi  / Bacteria       Iron 17, TIBC 99, %sat 17      [01-08-25 @ 04:35]  PTH -- (Ca 7.9)      [12-30-24 @ 06:50]   229  PTH -- (Ca 7.6)      [11-27-24 @ 04:23]   250  Vitamin D (25OH) 17.1      [11-25-24 @ 06:50]  TSH 2.48      [04-13-25 @ 07:24]  Lipid: chol 112, TG 61, HDL 46, LDL --      [12-31-24 @ 04:27]    HBsAb 46.0      [04-07-25 @ 21:39]  HBsAg Nonreact      [04-07-25 @ 21:39]  HBcAb Nonreact      [04-07-25 @ 21:39]  HCV 0.05, Nonreact      [04-07-25 @ 21:39]  HIV Nonreact      [04-07-25 @ 21:39]      RADIOLOGY & ADDITIONAL STUDIES:

## 2025-04-16 NOTE — PROGRESS NOTE ADULT - ASSESSMENT
69M with PMHx of HLD, CAD s/p LAD PCI 7/24, HFpEF, HTN, hypothyroid, type 2 DM, ESRD on HD MWF presents to [facility] for possible DDRT. Transplant nephrology consulted for ESRD on Hemodialysis.     S/p DDRT (4/8) with DGF  63 yo DCD kidney with KDPI 99%  HD 4/9, 4/11, 4/14  Patient very sensitive to volume on HD   Renal doppler-patent vasculature, subcaps air around kidney, 2.3x1.4x6.3 cm collection. Repeat USG (4/14) showed increasing collection size  CT A/P (4/15)-complex perinephric collection 1.6x5.9x3.0 with slight enhanced septation. No hydro  Check DSA  Pt had no UOP overnight  Plan for session of HD today  Continue to monitor UO and renal function    IS  Thymo induction  On Env. Check Tac level in AM (30 min prior to dose)   MMF, Pred taper  Denovo belatacept. Last dose on 4/14. Next dose on 4/24.  Ppx-Bactrim, Nystatin, Acyclovir    HTN- labile  On Nifedipine 60 bid  Imdur 60 qd  add doxzazosin 4 mg bid  Labetalol 100 q8h   BP fluctuating, monitor closely    Hyperkalemia  resolved      Please call if you have any questions  Michael Palm, PGY4  Nephrology Fellow  [phone]/Teams preferred  (After 5PM or weekends, reach out to fellow on call)

## 2025-04-16 NOTE — PROGRESS NOTE ADULT - ASSESSMENT
Assessment: 68 y/o M with PMHx of HLD, CAD s/p LAD PCI 7/24, HFpEF , HTN, hypothyroid, type 2 DM, h/o cva with no residual deficits, persistent right sided pleural effusion, and hx of hemorrhagic pericardial effusion (cytopathology negative), ESRD on HD MWF (recently converted from PD in 1/2025) via permacath placed on 1/2025, now POD#5 for right kidney transplant (4/9). SICU for refractory HTN management.     Neuro  -AOx3-4  -Pain: tylenol, tramadol PRN  -zofran prn for nausea    Pulm  -on RA, satting well  -Incentive spirometry postop to prevent atelectasis    Cardio: Hx HTN, HLD, CHF, CAD s/p PCI   - SBP goal 110-160  - off cardene gtt   - c/w procardia, labetalol isosorbid, doxazosin  - home statin     GI  - CC diet, monitor calorie intake  - pepcid   - zofran PRN for nausea  - Miralax/senna bowel regimen  - COSMO drain management per surgical team    Renal  - anuric, prolonged ischemic downtime  - HD MWF schedule, last HD 4/14  - Monitor UOP    Heme  - hep subQ for DVT prophylaxis  - SCDs  - c/w home ASA (LAD PCI 7/2024)    ID:  - transplant ppx w/ nystatin & acyclovir  - immunosuppression w/ cellcept & steroids  - tacro by level    Endo hx T2DM, hypothyroid   - monitor glucose  - 12u lantus qHS, 6u pre-meal if eating  - ISS  - synthroid

## 2025-04-16 NOTE — CHART NOTE - NSCHARTNOTEFT_GEN_A_CORE
Please check Vancomycin level today post HD. Anticipate patient will require 1 gram dose of Vancomycin today.     Patient needs treatment of donor positive blood culture with gram positive cocci through 4/17/25    Lencho Rueda M.D.  [facility] Division of Infectious Disease  8AM-5PM Monday - Friday: Available on [employer] Teams  After Hours and Holidays (or if no response on [employer] Teams): Please contact the Infectious Diseases Office at [phone]     The above assessment and plan were discussed with SICU Team

## 2025-04-16 NOTE — PROGRESS NOTE ADULT - ASSESSMENT
68 y/o M with PMHx of HLD, CAD s/p LAD PCI 7/24, HFpEF, HTN, hypothyroid, type 2 DM, ESRD on HD MWF (recently converted from PD in 1/2025) via R permacath s/p DDRT  1a + 1 v + 1 u + stent under thymoglobulin induction on 04/08/2    []s/p DDRT under thymo induction, POD #8  - most recent doppler with patent vasculature, increased perinephric collection 2.5x4.5x7.2cm  - DGF: HD - first 4/8, HD - last HD 4/16  - Donor + GPC, vanc by level unti. 4/17  - Strict I/O's, JPx3 SS  - Consistent carb diet  - Incr. Bowel regimen for dilated bowel loops on CT  - IS, PT  - Flomax  - Per Cardiology - can resume plavix when no bleed risk  - f/u 4/16 DSA  - Noncontrast CT A/P - collection improving drain in place, dilated bowel loops    [ ] Immunosuppression  - Thymoglobulin induction completed  - ENV per level,  BID, SST  - Belatacept 4/10, 4/14, next dose 4/24  - ppx: bactrim TIW, nystatin, Acyclovir 200 BID    []HTN  - off cardene gtt, resumed cardene gtt 4/14 - 4/15  - Nifed 60 BID, Imdur 60 qd, labetalol 100mg PO q8hr, doxazosin 8mg qhs can inc. to 8mg BID if needed    [] DVT ppx  - on SQH BID/ASA, SCD

## 2025-04-16 NOTE — PROGRESS NOTE ADULT - SUBJECTIVE AND OBJECTIVE BOX
[facility] DIVISION OF KIDNEY DISEASES AND HYPERTENSION -- FOLLOW UP NOTE  --------------------------------------------------------------------------------  Chief Complaint: DDRT    24 hour events/subjective: Pt sitting in chair this morning, admits to having severe right sided abdominal pain that began this morning, worse on palpation. He made no UOP overnight. BP was improved overnight.       PAST HISTORY  --------------------------------------------------------------------------------  No significant changes to PMH, PSH, FHx, SHx, unless otherwise noted    ALLERGIES & MEDICATIONS  --------------------------------------------------------------------------------  Allergies    hydrALAZINE (Short breath)    Intolerances      Standing Inpatient Medications  acyclovir   Oral Tab/Cap 200 milliGRAM(s) Oral two times a day  aspirin enteric coated 81 milliGRAM(s) Oral daily  atorvastatin 40 milliGRAM(s) Oral at bedtime  chlorhexidine 2% Cloths 1 Application(s) Topical daily  doxazosin 8 milliGRAM(s) Oral at bedtime  famotidine    Tablet 20 milliGRAM(s) Oral daily  heparin   Injectable 5000 Unit(s) SubCutaneous every 12 hours  insulin glargine Injectable (LANTUS) 10 Unit(s) SubCutaneous at bedtime  insulin lispro (ADMELOG) corrective regimen sliding scale   SubCutaneous three times a day before meals  insulin lispro (ADMELOG) corrective regimen sliding scale   SubCutaneous at bedtime  insulin lispro Injectable (ADMELOG) 6 Unit(s) SubCutaneous three times a day before meals  isosorbide   mononitrate ER Tablet (IMDUR) 60 milliGRAM(s) Oral daily  labetalol 100 milliGRAM(s) Oral every 8 hours  levothyroxine 25 MICROGram(s) Oral daily  melatonin 3 milliGRAM(s) Oral at bedtime  mycophenolate mofetil 500 milliGRAM(s) Oral <User Schedule>  Nephro-brenda 1 Tablet(s) Oral daily  NIFEdipine XL 60 milliGRAM(s) Oral two times a day  nystatin    Suspension [id number] Unit(s) Swish and Swallow four times a day  polyethylene glycol 3350 17 Gram(s) Oral daily  predniSONE   Tablet   Oral   predniSONE   Tablet 5 milliGRAM(s) Oral daily  senna 2 Tablet(s) Oral at bedtime  tacrolimus ER Tablet (ENVARSUS XR) 4 milliGRAM(s) Oral <User Schedule>  trimethoprim   80 mG/sulfamethoxazole 400 mG 1 Tablet(s) Oral <User Schedule>    PRN Inpatient Medications  acetaminophen     Tablet .. 650 milliGRAM(s) Oral every 6 hours PRN  artificial tears (preservative free) Ophthalmic Solution 1 Drop(s) Left EYE daily PRN  ondansetron Injectable 4 milliGRAM(s) IV Push every 6 hours PRN  traMADol 25 milliGRAM(s) Oral every 4 hours PRN  traMADol 50 milliGRAM(s) Oral every 8 hours PRN      REVIEW OF SYSTEMS  --------------------------------------------------------------------------------  Gen: No fevers/chills  Respiratory: No dyspnea, cough,   CV: No chest pain, PND, orthopnea  GI: Abdominal pain  Transplant: No pain  : No increased frequency, dysuria, hematuria   MSK: No edema  Neuro: No dizziness/lightheadedness    All other systems were reviewed and are negative, except as noted.    VITALS/PHYSICAL EXAM  --------------------------------------------------------------------------------  T(C): 36.3 (04-16-25 @ 03:00), Max: 36.7 (04-15-25 @ 15:00)  HR: 52 (04-16-25 @ 07:00) (51 - 61)  BP: 150/69 (04-16-25 @ 07:00) (125/61 - 161/76)  RR: 14 (04-16-25 @ 07:00) (12 - 25)  SpO2: 97% (04-16-25 @ 07:00) (97% - 100%)  Wt(kg): --        04-15-25 @ 07:01  -  04-16-25 @ 07:00  --------------------------------------------------------  IN: 1062.5 mL / OUT: 220 mL / NET: 842.5 mL      Physical Exam:  Alert and oriented x3   HEENT: normal  Pulm: CTA B/L  CV: normal S1S2; no murmur  Transplant Abd: Tender upon palpation in RLQ, 3 drains in place  Extremities- no edema  RIJ TCC  RLQ surgical scar    LABS/STUDIES  --------------------------------------------------------------------------------              7.4    6.38  >-----------<  241      [04-16-25 @ 05:28]              22.9     132  |  94  |  55  ----------------------------<  67      [04-16-25 @ 05:28]  3.9   |  24  |  5.78        Ca     7.9     [04-16-25 @ 05:28]      Mg     2.3     [04-16-25 @ 05:28]      Phos  4.8     [04-16-25 @ 05:28]    TPro  5.3  /  Alb  3.0  /  TBili  0.2  /  DBili  x   /  AST  21  /  ALT  6   /  AlkPhos  58  [04-16-25 @ 05:28]    PT/INR: PT 10.1 , INR 0.88       [04-16-25 @ 05:28]  PTT: 28.9       [04-16-25 @ 05:28]      Creatinine Trend:  SCr 5.78 [04-16 @ 05:28]  SCr 4.83 [04-15 @ 23:11]  SCr 4.85 [04-15 @ 14:05]  SCr 4.43 [04-15 @ 06:26]  SCr 3.90 [04-14 @ 23:52]    Tacrolimus (), Serum: 2.8 ng/mL (04-15 @ 06:26)  Tacrolimus (), Serum: 1.2 ng/mL (04-14 @ 06:07)  Tacrolimus (), Serum: <0.8 ng/mL (04-13 @ 07:20)  Tacrolimus (), Serum: <0.8 ng/mL (04-12 @ 06:31)            Urinalysis - [04-16-25 @ 05:28]      Color  / Appearance  / SG  / pH       Gluc 67 / Ketone   / Bili  / Urobili        Blood  / Protein  / Leuk Est  / Nitrite       RBC  / WBC  / Hyaline  / Gran  / Sq Epi  / Non Sq Epi  / Bacteria       Iron 17, TIBC 99, %sat 17      [01-08-25 @ 04:35]  PTH -- (Ca 7.9)      [12-30-24 @ 06:50]   229  PTH -- (Ca 7.6)      [11-27-24 @ 04:23]   250  Vitamin D (25OH) 17.1      [11-25-24 @ 06:50]  TSH 2.48      [04-13-25 @ 07:24]  Lipid: chol 112, TG 61, HDL 46, LDL --      [12-31-24 @ 04:27]    HBsAb 46.0      [04-07-25 @ 21:39]  HBsAg Nonreact      [04-07-25 @ 21:39]  HBcAb Nonreact      [04-07-25 @ 21:39]  HCV 0.05, Nonreact      [04-07-25 @ 21:39]  HIV Nonreact      [04-07-25 @ 21:39]

## 2025-04-16 NOTE — PROGRESS NOTE ADULT - ASSESSMENT
68 y/o M with PMHx of HLD, CAD s/p LAD PCI 7/24, HFpEF , HTN, hypothyroid, type 2 DM, ESRD on HD MWF (recently converted from PD in 1/2025) via permacath placed on 1/2025, h/o cva with no residual deficits, persistent right sided pleural effusion, and hx of hemorrhagic pericardial effusion (cytopathology negative) presents to [facility] for possible DDRT. He states he makes less than half cup of urine a day. Denies fever, chills, N/V/D/C, abdominal pain, CP, SOB, hemoptysis, and /GI complaints.     A/P  ESRD s/p DDRT 04/09  Steroid and thymo induction  Nephrologist is Dr. Menjivar   Access is PermCath   Was recently converted from PD to HD and on MWF schedule  Last HD 4/7, s/p HD on 04/08 and 04/09, and 04/11  S/p DDRT on 04/09 POD 2  Planned for HD today  On MMF and  Tac follow levels, On prednisone taper  On acyclovir and nsytatin prophylaxis  On belatacept, next dose 4/24  Monitor bmp   Renally dose meds    HTN   Refractory HTN so transferred back to SICU  UF w/ HD if needed  Managed per transplant team   Monitor bp     Anemia  Hb below goal  Monitor     CKD-MBD  Monitor ca and phos daily

## 2025-04-16 NOTE — PROGRESS NOTE ADULT - SUBJECTIVE AND OBJECTIVE BOX
24 HOUR EVENTS:  - Off cardene gtt, HDS on oral agents  - Continues to have poor appetite, PO intake  - CTAP non-con given collection seen on US    NEURO  Exam: AxO4. No focal deficits. Pain well controlled.   Meds: acetaminophen     Tablet .. 650 milliGRAM(s) Oral every 6 hours PRN Mild Pain (1 - 3)  melatonin 3 milliGRAM(s) Oral at bedtime  ondansetron Injectable 4 milliGRAM(s) IV Push every 6 hours PRN Nausea and/or Vomiting  traMADol 25 milliGRAM(s) Oral every 4 hours PRN Moderate Pain (4 - 6)  traMADol 50 milliGRAM(s) Oral every 8 hours PRN Severe Pain (7 - 10)      RESPIRATORY  RR: 15 (04-15-25 @ 23:00) (14 - 43)  SpO2: 100% (04-15-25 @ 23:00) (97% - 100%)  Wt(kg): --  Exam: Lungs clear to auscultation, Normal expansion/effort.    Meds:     CARDIOVASCULAR  HR: 55 (04-15-25 @ 23:00) (51 - 62)  BP: 150/73 (04-15-25 @ 23:00) (125/61 - 197/88)  BP(mean): 104 (04-15-25 @ 23:00) (84 - 126)  ABP: --  ABP(mean): --  Wt(kg): --  CVP(cm H2O): --  VBG - ( 14 Apr 2025 06:05 )  pH: 7.34  /  pCO2: 45    /  pO2: 42    / HCO3: 24    / Base Excess: -1.5  /  SaO2: 72.6   Lactate: 0.9      Exam: Normal S1/S2 w/o murmurs or rubs  Cardiac Rhythm: NSR  Perfusion     [ x ]Adequate   [ ]Inadequate  Mentation   [ x ]Normal       [ ]Reduced  Extremities  [ x ]Warm         [ ]Cool  Volume Status [ ]Hypervolemic [ x ]Euvolemic [ ]Hypovolemic    Meds: doxazosin 8 milliGRAM(s) Oral at bedtime  isosorbide   mononitrate ER Tablet (IMDUR) 60 milliGRAM(s) Oral daily  labetalol 100 milliGRAM(s) Oral every 8 hours  NIFEdipine XL 60 milliGRAM(s) Oral two times a day      GI/NUTRITION  Exam: Abdomen soft, Non-tender, Non-distended.  RLQ incision site c/d/i  Diet: Carb/Renal diet  Last Bowel Movement: 15-Apr-2025 (04-15-25 @ 19:00)  Last Bowel Movement: 15-Apr-2025 (04-15-25 @ 15:00)  Last Bowel Movement: 13-Apr-2025 (04-14-25 @ 19:00)  Last Bowel Movement: 13-Apr-2025 (04-14-25 @ 07:00)  Last Bowel Movement: 13-Apr-2025 (04-14-25 @ 04:09)  Last Bowel Movement: 13-Apr-2025 (04-13-25 @ 21:12)  Last Bowel Movement: 08-Apr-2025 (04-13-25 @ 07:33)  Last Bowel Movement: 08-Apr-2025 (04-12-25 @ 19:40)  Last Bowel Movement: 12-Apr-2025 (04-12-25 @ 09:10)  Last Bowel Movement: 08-Apr-2025 (04-11-25 @ 20:00)  Last Bowel Movement: 08-Apr-2025 (04-09-25 @ 10:10)  Last Bowel Movement: 08-Apr-2025 (04-08-25 @ 08:05)  Last Bowel Movement: 07-Apr-2025 (04-07-25 @ 23:59)    Meds: famotidine    Tablet 20 milliGRAM(s) Oral daily  polyethylene glycol 3350 17 Gram(s) Oral daily  senna 2 Tablet(s) Oral at bedtime      GENITOURINARY  I&O's Detail    04-14 @ 07:01  -  04-15 @ 07:00  --------------------------------------------------------  IN:    NiCARdipine: 425 mL    Oral Fluid: 236 mL  Total IN: 661 mL    OUT:    Bulb (mL): 30 mL    Bulb (mL): 10 mL    Bulb (mL): 150 mL    Other (mL): 1000 mL  Total OUT: 1190 mL    Total NET: -529 mL      04-15 @ 07:01  -  04-16 @ 00:28  --------------------------------------------------------  IN:    NiCARdipine: 12.5 mL    Oral Fluid: 570 mL  Total IN: 582.5 mL    OUT:    Bulb (mL): 40 mL    Bulb (mL): 60 mL    Bulb (mL): 40 mL  Total OUT: 140 mL    Total NET: 442.5 mL          04-15    133[L]  |  98  |  48[H]  ----------------------------<  143[H]  3.7   |  22  |  4.83[H]    Ca    6.9[L]      15 Apr 2025 23:11  Phos  3.8     04-15  Mg     2.0     04-15    TPro  4.6[L]  /  Alb  2.7[L]  /  TBili  0.2  /  DBili  x   /  AST  19  /  ALT  6[L]  /  AlkPhos  55  04-15    Meds: Nephro-brenda 1 Tablet(s) Oral daily      HEMATOLOGIC  Meds: aspirin enteric coated 81 milliGRAM(s) Oral daily  heparin   Injectable 5000 Unit(s) SubCutaneous every 12 hours                          7.5    6.17  )-----------( 187      ( 14 Apr 2025 23:52 )             22.9     PT/INR - ( 14 Apr 2025 23:52 )   PT: 10.2 sec;   INR: 0.89 ratio         PTT - ( 14 Apr 2025 23:52 )  PTT:30.9 sec    INFECTIOUS DISEASES  T(C): 36.4 (04-15-25 @ 23:00), Max: 36.8 (04-15-25 @ 07:00)  Wt(kg): --    Recent Cultures:    Meds: acyclovir   Oral Tab/Cap 200 milliGRAM(s) Oral two times a day  mycophenolate mofetil 500 milliGRAM(s) Oral <User Schedule>  nystatin    Suspension 473998 Unit(s) Swish and Swallow four times a day  tacrolimus ER Tablet (ENVARSUS XR) 4 milliGRAM(s) Oral <User Schedule>  trimethoprim   80 mG/sulfamethoxazole 400 mG 1 Tablet(s) Oral <User Schedule>      ENDOCRINE  Capillary Blood Glucose    Meds: atorvastatin 40 milliGRAM(s) Oral at bedtime  insulin glargine Injectable (LANTUS) 12 Unit(s) SubCutaneous at bedtime  insulin lispro (ADMELOG) corrective regimen sliding scale   SubCutaneous three times a day before meals  insulin lispro (ADMELOG) corrective regimen sliding scale   SubCutaneous at bedtime  insulin lispro Injectable (ADMELOG) 6 Unit(s) SubCutaneous three times a day before meals  levothyroxine 25 MICROGram(s) Oral daily  predniSONE   Tablet 5 milliGRAM(s) Oral daily  predniSONE   Tablet   Oral       OTHER MEDICATIONS:  artificial tears (preservative free) Ophthalmic Solution 1 Drop(s) Left EYE daily PRN  chlorhexidine 2% Cloths 1 Application(s) Topical daily      IMAGING:  No interval imaging.

## 2025-04-17 LAB
ALBUMIN SERPL ELPH-MCNC: 3.1 G/DL — LOW (ref 3.3–5)
ALP SERPL-CCNC: 61 U/L — SIGNIFICANT CHANGE UP (ref 40–120)
ALT FLD-CCNC: 10 U/L — SIGNIFICANT CHANGE UP (ref 10–45)
ANION GAP SERPL CALC-SCNC: 14 MMOL/L — SIGNIFICANT CHANGE UP (ref 5–17)
APTT BLD: 28.6 SEC — SIGNIFICANT CHANGE UP (ref 24.5–35.6)
AST SERPL-CCNC: 26 U/L — SIGNIFICANT CHANGE UP (ref 10–40)
BASOPHILS # BLD AUTO: 0.01 K/UL — SIGNIFICANT CHANGE UP (ref 0–0.2)
BASOPHILS NFR BLD AUTO: 0.2 % — SIGNIFICANT CHANGE UP (ref 0–2)
BILIRUB SERPL-MCNC: 0.2 MG/DL — SIGNIFICANT CHANGE UP (ref 0.2–1.2)
BLD GP AB SCN SERPL QL: NEGATIVE — SIGNIFICANT CHANGE UP
BUN SERPL-MCNC: 29 MG/DL — HIGH (ref 7–23)
CALCIUM SERPL-MCNC: 8.2 MG/DL — LOW (ref 8.4–10.5)
CHLORIDE SERPL-SCNC: 96 MMOL/L — SIGNIFICANT CHANGE UP (ref 96–108)
CO2 SERPL-SCNC: 24 MMOL/L — SIGNIFICANT CHANGE UP (ref 22–31)
CREAT SERPL-MCNC: 4.16 MG/DL — HIGH (ref 0.5–1.3)
EGFR: 15 ML/MIN/1.73M2 — LOW
EGFR: 15 ML/MIN/1.73M2 — LOW
EOSINOPHIL # BLD AUTO: 0.52 K/UL — HIGH (ref 0–0.5)
EOSINOPHIL NFR BLD AUTO: 8.2 % — HIGH (ref 0–6)
FLUAV AG NPH QL: SIGNIFICANT CHANGE UP
FLUBV AG NPH QL: SIGNIFICANT CHANGE UP
GLUCOSE BLDC GLUCOMTR-MCNC: 108 MG/DL — HIGH (ref 70–99)
GLUCOSE BLDC GLUCOMTR-MCNC: 128 MG/DL — HIGH (ref 70–99)
GLUCOSE BLDC GLUCOMTR-MCNC: 128 MG/DL — HIGH (ref 70–99)
GLUCOSE BLDC GLUCOMTR-MCNC: 135 MG/DL — HIGH (ref 70–99)
GLUCOSE BLDC GLUCOMTR-MCNC: 137 MG/DL — HIGH (ref 70–99)
GLUCOSE BLDC GLUCOMTR-MCNC: 89 MG/DL — SIGNIFICANT CHANGE UP (ref 70–99)
GLUCOSE SERPL-MCNC: 75 MG/DL — SIGNIFICANT CHANGE UP (ref 70–99)
HCT VFR BLD CALC: 24.1 % — LOW (ref 39–50)
HGB BLD-MCNC: 7.5 G/DL — LOW (ref 13–17)
IMM GRANULOCYTES NFR BLD AUTO: 3.8 % — HIGH (ref 0–0.9)
INR BLD: 0.9 RATIO — SIGNIFICANT CHANGE UP (ref 0.85–1.16)
LYMPHOCYTES # BLD AUTO: 0.2 K/UL — LOW (ref 1–3.3)
LYMPHOCYTES # BLD AUTO: 3.1 % — LOW (ref 13–44)
MAGNESIUM SERPL-MCNC: 2 MG/DL — SIGNIFICANT CHANGE UP (ref 1.6–2.6)
MCHC RBC-ENTMCNC: 27.3 PG — SIGNIFICANT CHANGE UP (ref 27–34)
MCHC RBC-ENTMCNC: 31.1 G/DL — LOW (ref 32–36)
MCV RBC AUTO: 87.6 FL — SIGNIFICANT CHANGE UP (ref 80–100)
MONOCYTES # BLD AUTO: 0.98 K/UL — HIGH (ref 0–0.9)
MONOCYTES NFR BLD AUTO: 15.4 % — HIGH (ref 2–14)
NEUTROPHILS # BLD AUTO: 4.4 K/UL — SIGNIFICANT CHANGE UP (ref 1.8–7.4)
NEUTROPHILS NFR BLD AUTO: 69.3 % — SIGNIFICANT CHANGE UP (ref 43–77)
NRBC BLD AUTO-RTO: 0 /100 WBCS — SIGNIFICANT CHANGE UP (ref 0–0)
PHOSPHATE SERPL-MCNC: 4.1 MG/DL — SIGNIFICANT CHANGE UP (ref 2.5–4.5)
PLATELET # BLD AUTO: 277 K/UL — SIGNIFICANT CHANGE UP (ref 150–400)
POTASSIUM SERPL-MCNC: 3.7 MMOL/L — SIGNIFICANT CHANGE UP (ref 3.5–5.3)
POTASSIUM SERPL-SCNC: 3.7 MMOL/L — SIGNIFICANT CHANGE UP (ref 3.5–5.3)
PROT SERPL-MCNC: 5.4 G/DL — LOW (ref 6–8.3)
PROTHROM AB SERPL-ACNC: 10.3 SEC — SIGNIFICANT CHANGE UP (ref 9.9–13.4)
RBC # BLD: 2.75 M/UL — LOW (ref 4.2–5.8)
RBC # FLD: 18.7 % — HIGH (ref 10.3–14.5)
RH IG SCN BLD-IMP: POSITIVE — SIGNIFICANT CHANGE UP
RSV RNA NPH QL NAA+NON-PROBE: SIGNIFICANT CHANGE UP
SARS-COV-2 RNA SPEC QL NAA+PROBE: SIGNIFICANT CHANGE UP
SODIUM SERPL-SCNC: 134 MMOL/L — LOW (ref 135–145)
SOURCE RESPIRATORY: SIGNIFICANT CHANGE UP
TACROLIMUS SERPL-MCNC: 4 NG/ML — SIGNIFICANT CHANGE UP
WBC # BLD: 6.35 K/UL — SIGNIFICANT CHANGE UP (ref 3.8–10.5)
WBC # FLD AUTO: 6.35 K/UL — SIGNIFICANT CHANGE UP (ref 3.8–10.5)

## 2025-04-17 PROCEDURE — 71045 X-RAY EXAM CHEST 1 VIEW: CPT | Mod: 26

## 2025-04-17 PROCEDURE — 99232 SBSQ HOSP IP/OBS MODERATE 35: CPT | Mod: FS,GC

## 2025-04-17 PROCEDURE — 99232 SBSQ HOSP IP/OBS MODERATE 35: CPT | Mod: GC

## 2025-04-17 RX ORDER — INSULIN GLARGINE-YFGN 100 [IU]/ML
8 INJECTION, SOLUTION SUBCUTANEOUS AT BEDTIME
Refills: 0 | Status: DISCONTINUED | OUTPATIENT
Start: 2025-04-17 | End: 2025-04-18

## 2025-04-17 RX ORDER — DOXAZOSIN MESYLATE 8 MG/1
4 TABLET ORAL
Refills: 0 | Status: DISCONTINUED | OUTPATIENT
Start: 2025-04-17 | End: 2025-04-18

## 2025-04-17 RX ORDER — DOXAZOSIN MESYLATE 8 MG/1
8 TABLET ORAL AT BEDTIME
Refills: 0 | Status: DISCONTINUED | OUTPATIENT
Start: 2025-04-17 | End: 2025-04-18

## 2025-04-17 RX ADMIN — Medication 500000 UNIT(S): at 17:35

## 2025-04-17 RX ADMIN — Medication 60 MILLIGRAM(S): at 17:35

## 2025-04-17 RX ADMIN — Medication 25 MICROGRAM(S): at 05:00

## 2025-04-17 RX ADMIN — Medication 81 MILLIGRAM(S): at 12:07

## 2025-04-17 RX ADMIN — ISOSORBIDE MONONITRATE 60 MILLIGRAM(S): 60 TABLET, EXTENDED RELEASE ORAL at 12:07

## 2025-04-17 RX ADMIN — Medication 1 APPLICATION(S): at 12:12

## 2025-04-17 RX ADMIN — LABETALOL HYDROCHLORIDE 100 MILLIGRAM(S): 200 TABLET, FILM COATED ORAL at 21:29

## 2025-04-17 RX ADMIN — INSULIN GLARGINE-YFGN 8 UNIT(S): 100 INJECTION, SOLUTION SUBCUTANEOUS at 21:29

## 2025-04-17 RX ADMIN — TRAMADOL HYDROCHLORIDE 25 MILLIGRAM(S): 50 TABLET, FILM COATED ORAL at 08:32

## 2025-04-17 RX ADMIN — ATORVASTATIN CALCIUM 40 MILLIGRAM(S): 80 TABLET, FILM COATED ORAL at 21:29

## 2025-04-17 RX ADMIN — MYCOPHENOLATE MOFETIL 500 MILLIGRAM(S): 500 TABLET, FILM COATED ORAL at 08:32

## 2025-04-17 RX ADMIN — INSULIN LISPRO 6 UNIT(S): 100 INJECTION, SOLUTION INTRAVENOUS; SUBCUTANEOUS at 08:54

## 2025-04-17 RX ADMIN — DOXAZOSIN MESYLATE 8 MILLIGRAM(S): 8 TABLET ORAL at 21:29

## 2025-04-17 RX ADMIN — Medication 200 MILLIGRAM(S): at 17:36

## 2025-04-17 RX ADMIN — INSULIN LISPRO 6 UNIT(S): 100 INJECTION, SOLUTION INTRAVENOUS; SUBCUTANEOUS at 14:46

## 2025-04-17 RX ADMIN — Medication 500000 UNIT(S): at 12:08

## 2025-04-17 RX ADMIN — TAMSULOSIN HYDROCHLORIDE 0.4 MILLIGRAM(S): 0.4 CAPSULE ORAL at 21:29

## 2025-04-17 RX ADMIN — TRAMADOL HYDROCHLORIDE 50 MILLIGRAM(S): 50 TABLET, FILM COATED ORAL at 06:41

## 2025-04-17 RX ADMIN — Medication 1 TABLET(S): at 12:08

## 2025-04-17 RX ADMIN — Medication 60 MILLIGRAM(S): at 06:02

## 2025-04-17 RX ADMIN — Medication 500000 UNIT(S): at 06:01

## 2025-04-17 RX ADMIN — MYCOPHENOLATE MOFETIL 500 MILLIGRAM(S): 500 TABLET, FILM COATED ORAL at 19:36

## 2025-04-17 RX ADMIN — TACROLIMUS 4 MILLIGRAM(S): 0.5 CAPSULE ORAL at 08:32

## 2025-04-17 RX ADMIN — PREDNISONE 5 MILLIGRAM(S): 20 TABLET ORAL at 06:02

## 2025-04-17 RX ADMIN — Medication 20 MILLIGRAM(S): at 12:07

## 2025-04-17 RX ADMIN — TRAMADOL HYDROCHLORIDE 50 MILLIGRAM(S): 50 TABLET, FILM COATED ORAL at 06:11

## 2025-04-17 RX ADMIN — HEPARIN SODIUM 5000 UNIT(S): 1000 INJECTION INTRAVENOUS; SUBCUTANEOUS at 06:02

## 2025-04-17 RX ADMIN — POLYETHYLENE GLYCOL 3350 17 GRAM(S): 17 POWDER, FOR SOLUTION ORAL at 06:02

## 2025-04-17 RX ADMIN — LABETALOL HYDROCHLORIDE 100 MILLIGRAM(S): 200 TABLET, FILM COATED ORAL at 14:47

## 2025-04-17 RX ADMIN — HEPARIN SODIUM 5000 UNIT(S): 1000 INJECTION INTRAVENOUS; SUBCUTANEOUS at 17:35

## 2025-04-17 RX ADMIN — Medication 200 MILLIGRAM(S): at 06:02

## 2025-04-17 RX ADMIN — TRAMADOL HYDROCHLORIDE 25 MILLIGRAM(S): 50 TABLET, FILM COATED ORAL at 09:32

## 2025-04-17 RX ADMIN — DOXAZOSIN MESYLATE 4 MILLIGRAM(S): 8 TABLET ORAL at 12:07

## 2025-04-17 NOTE — PROGRESS NOTE ADULT - ASSESSMENT
68 y/o M with PMHx of HLD, CAD s/p LAD PCI 7/24, HFpEF, HTN, hypothyroid, type 2 DM, ESRD on HD MWF (recently converted from PD in 1/2025) via R permacath s/p DDRT  1a + 1 v + 1 u + stent under thymoglobulin induction on 04/08/2    []s/p DDRT under thymo induction, POD #9  - most recent doppler with patent vasculature, increased perinephric collection 2.5x4.5x7.2cm  - DGF: HD - first 4/8, HD - last HD 4/16  - Donor + GPC, vanc by level unti. 4/17  - Strict I/O's, JPx2 SS; d/c'd JP3  - Consistent carb diet  - Incr. Bowel regimen for dilated bowel loops on CT  - IS, PT  - Flomax  - Per Cardiology - can resume plavix when no bleed risk  - f/u 4/16 DSA  - Noncontrast CT A/P - collection improving drain in place, dilated bowel loops    [ ] Immunosuppression  - Thymoglobulin induction completed  - ENV per level,  BID, SST  - Belatacept 4/10, 4/14, next dose 4/24  - ppx: bactrim TIW, nystatin, Acyclovir 200 BID    []HTN  - off cardene gtt, resumed cardene gtt 4/14 - 4/15  - Nifed 60 BID, Imdur 60 qd, labetalol 100mg PO q8hr, doxazosin 4mg in AM and 8mg QHS    [] DVT ppx  - on SQH BID/ASA, SCD    [ ] hyperglycemia   - lantus 8

## 2025-04-17 NOTE — PROGRESS NOTE ADULT - ASSESSMENT
70 y/o M with PMHx of HLD, CAD s/p LAD PCI 7/24, HFpEF , HTN, hypothyroid, type 2 DM, ESRD on HD MWF (recently converted from PD in 1/2025) via permacath placed on 1/2025, h/o cva with no residual deficits, persistent right sided pleural effusion, and hx of hemorrhagic pericardial effusion (cytopathology negative) presents to [facility] for possible DDRT. He states he makes less than half cup of urine a day. Denies fever, chills, N/V/D/C, abdominal pain, CP, SOB, hemoptysis, and /GI complaints.     A/P  ESRD s/p DDRT 04/09  Steroid and thymo induction  Nephrologist is Dr. Menjivar   Access is PermCath   Was recently converted from PD to HD and on MWF schedule  Last HD 4/7, s/p HD on 04/08 and 04/09, and 04/11  On MMF and  Tac follow levels, On prednisone taper  On acyclovir and nsytatin prophylaxis  On belatacept, next dose 4/24  Monitor bmp   Renally dose meds    HTN   On doxazosin labetalol tamsulosin and nifedipine  UF w/ HD if needed  Managed per transplant team   Monitor bp     Anemia  Hb below goal  Monitor     CKD-MBD  Monitor ca and phos daily

## 2025-04-17 NOTE — PROGRESS NOTE ADULT - ASSESSMENT
68 y/o M with PMHx of HLD, CAD s/p LAD PCI 7/24, HFpEF , HTN, hypothyroid, type 2 DM, h/o cva with no residual deficits, persistent right sided pleural effusion, and hx of hemorrhagic pericardial effusion (cytopathology negative), ESRD on HD MWF (recently converted from PD in 1/2025) via permacath placed on 1/2025, now POD#5 for right kidney transplant (4/9). SICU for refractory HTN management now off titratable medication and hemodynamically stable on oral antihypertensives.     Neuro  -AOx3-4  -Pain: tylenol, tramadol PRN  -zofran prn for nausea    Pulm  -on RA, satting well  -Incentive spirometry postop to prevent atelectasis    Cardio: Hx HTN, HLD, CHF, CAD s/p PCI   - SBP goal 110-160  - off cardene gtt   - c/w procardia, labetalol isosorbid, doxazosin  - home statin     GI  - CC diet, monitor calorie intake  - pepcid   - zofran PRN for nausea  - Miralax/senna bowel regimen  - COSMO drain management per surgical team    Renal  - anuric, prolonged ischemic downtime  - HD MWF schedule, last HD 4/16  - Monitor UOP    Heme  - hep subQ for DVT prophylaxis  - SCDs  - c/w home ASA (LAD PCI 7/2024)    ID:  - F/u RVP for cough  - transplant ppx w/ nystatin & acyclovir, bactrim  - immunosuppression w/ cellcept & steroids  - tacro by level  - On vancomycin by level for donor positive blood culture with gram positive cocci through 4/17/25      Endo hx T2DM, hypothyroid   - monitor glucose  - 10u lantus qHS, 6u pre-meal if eating  - ISS  - synthroid

## 2025-04-17 NOTE — PROGRESS NOTE ADULT - SUBJECTIVE AND OBJECTIVE BOX
[facility] DIVISION OF KIDNEY DISEASES AND HYPERTENSION -- FOLLOW UP NOTE  --------------------------------------------------------------------------------  Chief Complaint: DDRT    24 hour events/subjective: Pt. seen and examined, resting in chair. No fever, chills, CP, SOB. Underwent HD yesterday.    PAST HISTORY  --------------------------------------------------------------------------------  No significant changes to PMH, PSH, FHx, SHx, unless otherwise noted    ALLERGIES & MEDICATIONS  --------------------------------------------------------------------------------  Allergies    hydrALAZINE (Short breath)    Intolerances    Standing Inpatient Medications  acyclovir   Oral Tab/Cap 200 milliGRAM(s) Oral two times a day  aspirin enteric coated 81 milliGRAM(s) Oral daily  atorvastatin 40 milliGRAM(s) Oral at bedtime  chlorhexidine 2% Cloths 1 Application(s) Topical daily  doxazosin 8 milliGRAM(s) Oral at bedtime  doxazosin 4 milliGRAM(s) Oral with breakfast  famotidine    Tablet 20 milliGRAM(s) Oral daily  heparin   Injectable 5000 Unit(s) SubCutaneous every 12 hours  insulin glargine Injectable (LANTUS) 8 Unit(s) SubCutaneous at bedtime  insulin lispro (ADMELOG) corrective regimen sliding scale   SubCutaneous three times a day before meals  insulin lispro (ADMELOG) corrective regimen sliding scale   SubCutaneous at bedtime  insulin lispro Injectable (ADMELOG) 6 Unit(s) SubCutaneous three times a day before meals  isosorbide   mononitrate ER Tablet (IMDUR) 60 milliGRAM(s) Oral daily  labetalol 100 milliGRAM(s) Oral every 8 hours  levothyroxine 25 MICROGram(s) Oral daily  mycophenolate mofetil 500 milliGRAM(s) Oral <User Schedule>  Nephro-brenda 1 Tablet(s) Oral daily  NIFEdipine XL 60 milliGRAM(s) Oral two times a day  nystatin    Suspension 744902 Unit(s) Swish and Swallow four times a day  polyethylene glycol 3350 17 Gram(s) Oral every 12 hours  predniSONE   Tablet   Oral   predniSONE   Tablet 5 milliGRAM(s) Oral daily  senna 2 Tablet(s) Oral at bedtime  tacrolimus ER Tablet (ENVARSUS XR) 4 milliGRAM(s) Oral <User Schedule>  tamsulosin 0.4 milliGRAM(s) Oral at bedtime  trimethoprim   80 mG/sulfamethoxazole 400 mG 1 Tablet(s) Oral <User Schedule>    PRN Inpatient Medications  acetaminophen     Tablet .. 650 milliGRAM(s) Oral every 6 hours PRN  artificial tears (preservative free) Ophthalmic Solution 1 Drop(s) Left EYE daily PRN  ondansetron Injectable 4 milliGRAM(s) IV Push every 6 hours PRN  traMADol 25 milliGRAM(s) Oral every 4 hours PRN  traMADol 50 milliGRAM(s) Oral every 8 hours PRN    REVIEW OF SYSTEMS  --------------------------------------------------------------------------------  Gen: No fevers/chills  Respiratory: No dyspnea, cough,   CV: No chest pain, PND, orthopnea  GI: Abdominal pain  Transplant: No pain  : No increased frequency, dysuria, hematuria   MSK: No edema  Neuro: No dizziness/lightheadedness    All other systems were reviewed and are negative, except as noted.    VITALS/PHYSICAL EXAM  --------------------------------------------------------------------------------  T(C): 36.5 (04-17-25 @ 12:34), Max: 36.7 (04-16-25 @ 16:03)  HR: 75 (04-17-25 @ 12:34) (54 - 75)  BP: 120/58 (04-17-25 @ 12:34) (120/58 - 186/87)  RR: 20 (04-17-25 @ 12:34) (13 - 26)  SpO2: 99% (04-17-25 @ 12:34) (94% - 100%)  Wt(kg): --    04-16-25 @ 07:01  -  04-17-25 @ 07:00  --------------------------------------------------------  IN: 1340 mL / OUT: 1190 mL / NET: 150 mL    04-17-25 @ 07:01  -  04-17-25 @ 14:45  --------------------------------------------------------  IN: 100 mL / OUT: 40 mL / NET: 60 mL    Physical Exam:  Alert and oriented x3   HEENT: normal  Pulm: CTA B/L  CV: normal S1S2; no murmur  Transplant Abd: Tender upon palpation in RLQ, 3 drains in place  Extremities- no edema  RLQ surgical scar    LABS/STUDIES  --------------------------------------------------------------------------------              7.5    6.35  >-----------<  277      [04-17-25 @ 05:42]              24.1     134  |  96  |  29  ----------------------------<  75      [04-17-25 @ 05:42]  3.7   |  24  |  4.16        Ca     8.2     [04-17-25 @ 05:42]      Mg     2.0     [04-17-25 @ 05:42]      Phos  4.1     [04-17-25 @ 05:42]    TPro  5.4  /  Alb  3.1  /  TBili  0.2  /  DBili  x   /  AST  26  /  ALT  10  /  AlkPhos  61  [04-17-25 @ 05:42]    PT/INR: PT 10.3 , INR 0.90       [04-17-25 @ 05:42]  PTT: 28.6       [04-17-25 @ 05:42]    Creatinine Trend:  SCr 4.16 [04-17 @ 05:42]  SCr 3.22 [04-16 @ 18:19]  SCr 5.78 [04-16 @ 05:28]  SCr 4.83 [04-15 @ 23:11]  SCr 4.85 [04-15 @ 14:05]    Tacrolimus (), Serum: 4.0 ng/mL (04-17 @ 05:42)  Tacrolimus (), Serum: 3.3 ng/mL (04-16 @ 05:28)  Tacrolimus (), Serum: 2.8 ng/mL (04-15 @ 06:26)  Tacrolimus (), Serum: 1.2 ng/mL (04-14 @ 06:07)    Iron 17, TIBC 99, %sat 17      [01-08-25 @ 04:35]  PTH -- (Ca 7.9)      [12-30-24 @ 06:50]   229  PTH -- (Ca 7.6)      [11-27-24 @ 04:23]   250  Vitamin D (25OH) 17.1      [11-25-24 @ 06:50]  TSH 2.48      [04-13-25 @ 07:24]  Lipid: chol 112, TG 61, HDL 46, LDL --      [12-31-24 @ 04:27]    HBsAb 46.0      [04-07-25 @ 21:39]  HBsAg Nonreact      [04-07-25 @ 21:39]  HBcAb Nonreact      [04-07-25 @ 21:39]  HCV 0.05, Nonreact      [04-07-25 @ 21:39]  HIV Nonreact      [04-07-25 @ 21:39]

## 2025-04-17 NOTE — PROGRESS NOTE ADULT - ASSESSMENT
69M with PMHx of HLD, CAD s/p LAD PCI 7/24, HFpEF, HTN, hypothyroid, type 2 DM, ESRD on HD MWF presents to [facility] for possible DDRT. Transplant nephrology consulted for ESRD on Hemodialysis.     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 Repeat USG (4/14) showed increasing collection size  CT A/P (4/15)-complex perinephric collection 1.6x5.9x3.0 with slight enhanced septation. No hydro  Check DSA  50 UOP overnight  Continue to monitor UO and renal function  Remove Subq drain    IS  Thymo induction  On Env. Check Tac level in AM (30 min prior to dose)   MMF, Pred taper  Denovo belatacept. Last dose on 4/14. Next dose on 4/24.  Ppx-Bactrim, Nystatin, Acyclovir    HTN- labile  On Nifedipine 60 bid  Imdur 60 qd  doxzazosin 4 mg bid  Increase doxazosin to 4mg AM and 8mg HS  Labetalol 100 q8h   BP fluctuating, monitor closely    Hyperkalemia  resolved    Hyperglycemia  - C/w lantus 8u    **Recommendations preliminary until attending attestation**  If you have any questions, please feel free to contact me  Hardeep Kowalski  Nephrology Fellow  Page [phone] / [employer] Teams (Preferred)  (After 4pm or on weekends please page the on-call fellow)

## 2025-04-17 NOTE — PROGRESS NOTE ADULT - SUBJECTIVE AND OBJECTIVE BOX
24 HOUR EVENTS:  - Post HD -1L  - Vanc level post HD and dosed accordingly  - Increased dose of miralax  - Mild intermittent cough, RVP sent    NEURO  Exam: AxO4. No focal deficits. Pain well controlled.   Meds: acetaminophen     Tablet .. 650 milliGRAM(s) Oral every 6 hours PRN Mild Pain (1 - 3)  ondansetron Injectable 4 milliGRAM(s) IV Push every 6 hours PRN Nausea and/or Vomiting  traMADol 25 milliGRAM(s) Oral every 4 hours PRN Moderate Pain (4 - 6)  traMADol 50 milliGRAM(s) Oral every 8 hours PRN Severe Pain (7 - 10)      RESPIRATORY  RR: 17 (04-16-25 @ 23:00) (12 - 25)  SpO2: 100% (04-16-25 @ 23:00) (97% - 100%)  Wt(kg): --  Exam: Lungs clear to auscultation, Normal expansion/effort.  Mechanical Ventilation:     Meds:     CARDIOVASCULAR  HR: 58 (04-16-25 @ 23:00) (51 - 62)  BP: 186/87 (04-16-25 @ 23:00) (134/67 - 186/87)  BP(mean): 125 (04-16-25 @ 23:00) (95 - 125)  ABP: --  ABP(mean): --  Wt(kg): --  CVP(cm H2O): --      Exam: Normal S1/S2 w/o murmurs or rubs  Cardiac Rhythm: NSR  Perfusion     [ x ]Adequate   [ ]Inadequate  Mentation   [ x ]Normal       [ ]Reduced  Extremities  [ x ]Warm         [ ]Cool  Volume Status [ ]Hypervolemic [ x ]Euvolemic [ ]Hypovolemic  Meds: doxazosin 8 milliGRAM(s) Oral at bedtime  isosorbide   mononitrate ER Tablet (IMDUR) 60 milliGRAM(s) Oral daily  labetalol 100 milliGRAM(s) Oral every 8 hours  NIFEdipine XL 60 milliGRAM(s) Oral two times a day      GI/NUTRITION  Exam: Abdomen soft, Non-tender, Non-distended.  RLQ incision site c/d/i  Diet: Carb/Renal diet  Last Bowel Movement: 16-Apr-2025 (04-16-25 @ 19:00)  Last Bowel Movement: 15-Apr-2025 (04-15-25 @ 19:00)  Last Bowel Movement: 15-Apr-2025 (04-15-25 @ 15:00)  Last Bowel Movement: 13-Apr-2025 (04-14-25 @ 19:00)  Last Bowel Movement: 13-Apr-2025 (04-14-25 @ 07:00)  Last Bowel Movement: 13-Apr-2025 (04-14-25 @ 04:09)  Last Bowel Movement: 13-Apr-2025 (04-13-25 @ 21:12)  Last Bowel Movement: 08-Apr-2025 (04-13-25 @ 07:33)  Last Bowel Movement: 08-Apr-2025 (04-12-25 @ 19:40)  Last Bowel Movement: 12-Apr-2025 (04-12-25 @ 09:10)  Last Bowel Movement: 08-Apr-2025 (04-11-25 @ 20:00)  Last Bowel Movement: 08-Apr-2025 (04-09-25 @ 10:10)  Last Bowel Movement: 08-Apr-2025 (04-08-25 @ 08:05)  Last Bowel Movement: 07-Apr-2025 (04-07-25 @ 23:59)    Meds: famotidine    Tablet 20 milliGRAM(s) Oral daily  polyethylene glycol 3350 17 Gram(s) Oral every 12 hours  senna 2 Tablet(s) Oral at bedtime      GENITOURINARY  I&O's Detail    04-15 @ 07:01  -  04-16 @ 07:00  --------------------------------------------------------  IN:    NiCARdipine: 12.5 mL    Oral Fluid: 1050 mL  Total IN: 1062.5 mL    OUT:    Bulb (mL): 55 mL    Bulb (mL): 115 mL    Bulb (mL): 50 mL  Total OUT: 220 mL    Total NET: 842.5 mL      04-16 @ 07:01  -  04-17 @ 00:35  --------------------------------------------------------  IN:    IV PiggyBack: 250 mL    Oral Fluid: 870 mL  Total IN: 1120 mL    OUT:    Bulb (mL): 20 mL    Bulb (mL): 5 mL    Bulb (mL): 80 mL    Other (mL): 1000 mL  Total OUT: 1105 mL    Total NET: 15 mL          04-16    134[L]  |  95[L]  |  23  ----------------------------<  160[H]  3.8   |  25  |  3.22[H]    Ca    8.2[L]      16 Apr 2025 18:19  Phos  2.7     04-16  Mg     2.1     04-16    TPro  5.9[L]  /  Alb  3.3  /  TBili  0.2  /  DBili  x   /  AST  27  /  ALT  7[L]  /  AlkPhos  70  04-16    Meds: Nephro-brenda 1 Tablet(s) Oral daily  tamsulosin 0.4 milliGRAM(s) Oral at bedtime      HEMATOLOGIC  Meds: aspirin enteric coated 81 milliGRAM(s) Oral daily  heparin   Injectable 5000 Unit(s) SubCutaneous every 12 hours                          7.4    6.38  )-----------( 241      ( 16 Apr 2025 05:28 )             22.9     PT/INR - ( 16 Apr 2025 05:28 )   PT: 10.1 sec;   INR: 0.88 ratio         PTT - ( 16 Apr 2025 05:28 )  PTT:28.9 sec    INFECTIOUS DISEASES  T(C): 36.6 (04-16-25 @ 23:00), Max: 36.7 (04-16-25 @ 16:03)  Wt(kg): --  WBC Count: 6.38 K/uL (04-16 @ 05:28)    Recent Cultures:    Meds: acyclovir   Oral Tab/Cap 200 milliGRAM(s) Oral two times a day  mycophenolate mofetil 500 milliGRAM(s) Oral <User Schedule>  nystatin    Suspension 075306 Unit(s) Swish and Swallow four times a day  tacrolimus ER Tablet (ENVARSUS XR) 4 milliGRAM(s) Oral <User Schedule>  trimethoprim   80 mG/sulfamethoxazole 400 mG 1 Tablet(s) Oral <User Schedule>      ENDOCRINE  Capillary Blood Glucose    Meds: atorvastatin 40 milliGRAM(s) Oral at bedtime  insulin glargine Injectable (LANTUS) 10 Unit(s) SubCutaneous at bedtime  insulin lispro (ADMELOG) corrective regimen sliding scale   SubCutaneous three times a day before meals  insulin lispro (ADMELOG) corrective regimen sliding scale   SubCutaneous at bedtime  insulin lispro Injectable (ADMELOG) 6 Unit(s) SubCutaneous three times a day before meals  levothyroxine 25 MICROGram(s) Oral daily  predniSONE   Tablet   Oral   predniSONE   Tablet 5 milliGRAM(s) Oral daily    OTHER MEDICATIONS:  artificial tears (preservative free) Ophthalmic Solution 1 Drop(s) Left EYE daily PRN  chlorhexidine 2% Cloths 1 Application(s) Topical daily    IMAGING:    IMAGING: < from: CT Abdomen and Pelvis No Cont (04.15.25 @ 22:53) >  ACC: [id number] EXAM:  CT ABDOMEN AND PELVIS   ORDERED BY: LUIS GLOVER       IMPRESSION:  *  Curvilinear hyperdensity along the posterior capsule of the right   lower quadrant transplant kidney, representing a small subcapsular   hematoma. Fluidcollection posterior to the transplant kidney measures as   simple fluid and communicates with the adjacent surgical drains.   Transplant ureteral stent is in place with distal tip terminating in the   bladder.  *  Mildly dilated loops of large bowel without discrete transition point,   suggestive of postoperative ileus.

## 2025-04-17 NOTE — PROGRESS NOTE ADULT - SUBJECTIVE AND OBJECTIVE BOX
Transplant Surgery - Multi-disciplinary Rounds  --------------------------------------------------------------   Tx Date: 04/08/25         POD#9    Present: Patient seen and examined with multidisciplinary Transplant team including Surgeon Dr. Bernal, nephrologist Dr. Dunn, LÁZARO Cevallos, pharmacy, and bedside RN during AM rounds.   Disciplines not in attendance will be notified of the plan.     HPI: 70 y/o M with PMHx of HLD, CAD s/p LAD PCI 7/24, HFpEF , HTN, hypothyroid, type 2 DM, ESRD on HD MWF (recently converted from PD in 1/2025) via R permacath placed on 1/2025, h/o cva with no residual deficits, persistent right sided pleural effusion, and hx of hemorrhagic pericardial effusion (cytopathology negative) presents to [facility] for possible DDRT. He states he makes less than half cup of urine a day. Denies fever, chills, N/V/D/C, abdominal pain, CP, SOB, hemoptysis, and /GI complaints. Now s/p DDRT  1a + 1 v + 1 u + stent under thymoglobulin induction on 04/08/25.     Interval Events:  - Afebrile, hypertensive   - HD session   - added flomax  - transferred to floor     Immunosuppression:  Induction: Thymo completed  Maintenance: Petra last dose 4/14/ENV per level/ BID/ pred 5  Ongoing monitoring for signs of rejection     Potential Discharge date: TBD  Education:  Medications  Plan of care:  See Below                     MEDICATIONS  (STANDING):  acyclovir   Oral Tab/Cap 200 milliGRAM(s) Oral two times a day  aspirin enteric coated 81 milliGRAM(s) Oral daily  atorvastatin 40 milliGRAM(s) Oral at bedtime  chlorhexidine 2% Cloths 1 Application(s) Topical daily  doxazosin 8 milliGRAM(s) Oral at bedtime  doxazosin 4 milliGRAM(s) Oral with breakfast  famotidine    Tablet 20 milliGRAM(s) Oral daily  heparin   Injectable 5000 Unit(s) SubCutaneous every 12 hours  insulin glargine Injectable (LANTUS) 8 Unit(s) SubCutaneous at bedtime  insulin lispro (ADMELOG) corrective regimen sliding scale   SubCutaneous three times a day before meals  insulin lispro (ADMELOG) corrective regimen sliding scale   SubCutaneous at bedtime  insulin lispro Injectable (ADMELOG) 6 Unit(s) SubCutaneous three times a day before meals  isosorbide   mononitrate ER Tablet (IMDUR) 60 milliGRAM(s) Oral daily  labetalol 100 milliGRAM(s) Oral every 8 hours  levothyroxine 25 MICROGram(s) Oral daily  mycophenolate mofetil 500 milliGRAM(s) Oral <User Schedule>  Nephro-brenda 1 Tablet(s) Oral daily  NIFEdipine XL 60 milliGRAM(s) Oral two times a day  nystatin    Suspension 838712 Unit(s) Swish and Swallow four times a day  polyethylene glycol 3350 17 Gram(s) Oral every 12 hours  predniSONE   Tablet   Oral   predniSONE   Tablet 5 milliGRAM(s) Oral daily  senna 2 Tablet(s) Oral at bedtime  tacrolimus ER Tablet (ENVARSUS XR) 4 milliGRAM(s) Oral <User Schedule>  tamsulosin 0.4 milliGRAM(s) Oral at bedtime  trimethoprim   80 mG/sulfamethoxazole 400 mG 1 Tablet(s) Oral <User Schedule>    MEDICATIONS  (PRN):  acetaminophen     Tablet .. 650 milliGRAM(s) Oral every 6 hours PRN Mild Pain (1 - 3)  artificial tears (preservative free) Ophthalmic Solution 1 Drop(s) Left EYE daily PRN Dry Eyes  ondansetron Injectable 4 milliGRAM(s) IV Push every 6 hours PRN Nausea and/or Vomiting  traMADol 25 milliGRAM(s) Oral every 4 hours PRN Moderate Pain (4 - 6)  traMADol 50 milliGRAM(s) Oral every 8 hours PRN Severe Pain (7 - 10)      PAST MEDICAL & SURGICAL HISTORY:  Hypertension      ESRD on peritoneal dialysis      CVA (cerebrovascular accident)  2010 without residual effects      Hyperlipidemia      BPH (benign prostatic hyperplasia)      CAD (coronary artery disease)      T2DM (type 2 diabetes mellitus)      Hypothyroidism      History of peritoneal dialysis  s/p PD catheter placement      S/P coronary artery stent placement          Vital Signs Last 24 Hrs  T(C): 36.5 (17 Apr 2025 12:34), Max: 36.7 (16 Apr 2025 16:03)  T(F): 97.7 (17 Apr 2025 12:34), Max: 98 (16 Apr 2025 16:03)  HR: 75 (17 Apr 2025 12:34) (54 - 75)  BP: 120/58 (17 Apr 2025 12:34) (120/58 - 186/87)  BP(mean): 114 (17 Apr 2025 06:00) (95 - 125)  RR: 20 (17 Apr 2025 12:34) (12 - 26)  SpO2: 99% (17 Apr 2025 12:34) (94% - 100%)    Parameters below as of 17 Apr 2025 12:34  Patient On (Oxygen Delivery Method): room air        I&O's Summary    16 Apr 2025 07:01  -  17 Apr 2025 07:00  --------------------------------------------------------  IN: 1340 mL / OUT: 1190 mL / NET: 150 mL    17 Apr 2025 07:01  -  17 Apr 2025 13:59  --------------------------------------------------------  IN: 100 mL / OUT: 40 mL / NET: 60 mL                              7.5    6.35  )-----------( 277      ( 17 Apr 2025 05:42 )             24.1     04-17    134[L]  |  96  |  29[H]  ----------------------------<  75  3.7   |  24  |  4.16[H]    Ca    8.2[L]      17 Apr 2025 05:42  Phos  4.1     04-17  Mg     2.0     04-17    TPro  5.4[L]  /  Alb  3.1[L]  /  TBili  0.2  /  DBili  x   /  AST  26  /  ALT  10  /  AlkPhos  61  04-17    Tacrolimus (), Serum: 4.0 ng/mL (04-17 @ 05:42)          Review of systems  Gen: +fatigue, +insomnia No weight changes, fevers/chills, weakness  Skin: No rashes  Head/Eyes/Ears/Mouth: No headache; Normal hearing; Normal vision w/o blurriness; No sinus pain/discomfort, sore throat  Respiratory: No dyspnea, cough, wheezing, hemoptysis  CV: No chest pain, PND, orthopnea  GI: Mild abdominal pain at incision site; denies diarrhea, constipation, nausea, vomiting, melena, hematochezia  : No increased frequency, dysuria, hematuria, nocturia  MSK: No joint pain/swelling; no back pain; no edema  Neuro: No dizziness/lightheadedness, weakness, seizures, numbness, tingling  Heme: No easy bruising or bleeding  Endo: No heat/cold intolerance  Psych: Denies Hallucinations No significant nervousness, anxiety, stress, depression  All other systems were reviewed and are negative, except as noted.      PHYSICAL EXAM:  Constitutional: Well developed / well nourished  Eyes: Anicteric, PERRLA  ENMT: nc/at  Neck: Supple  Respiratory: CTA B/L  Cardiovascular: RRR  Gastrointestinal: Soft, non-distended, mild abdominal pain to palpation at incision site; incision c/d/i; COSMO x #1/2 perinephric ss   Genitourinary: voiding  Extremities: SCD's in place and working bilaterally, R permcath  Vascular: Palpable dp pulses bilaterally  Neurological: A&O x3  Skin: no rashes, ulcerations or lesions;  Musculoskeletal: Moving all extremities  Psychiatric: Responsive

## 2025-04-17 NOTE — PROGRESS NOTE ADULT - SUBJECTIVE AND OBJECTIVE BOX
[facility]    Dr. Tiara Garcia     Office [phone] (9 am to 5 pm)  Service: [phone] (5pm to 9am)  Also Available on TEAMS      RENAL PROGRESS NOTE: DATE OF SERVICE 04-17-25 @ 12:23    Patient is a 69y old  Male who presents with a chief complaint of possible DDRT (17 Apr 2025 00:35)      Patient seen and examined at bedside. No chest pain/sob    VITALS:  T(F): 97.7 (04-17-25 @ 08:57), Max: 98 (04-16-25 @ 16:03)  HR: 60 (04-17-25 @ 08:57)  BP: 168/68 (04-17-25 @ 08:57)  RR: 20 (04-17-25 @ 08:57)  SpO2: 99% (04-17-25 @ 08:57)  Wt(kg): --    04-16 @ 07:01  -  04-17 @ 07:00  --------------------------------------------------------  IN: 1340 mL / OUT: 1190 mL / NET: 150 mL    04-17 @ 07:01  -  04-17 @ 12:23  --------------------------------------------------------  IN: 100 mL / OUT: 40 mL / NET: 60 mL          PHYSICAL EXAM:  Constitutional: NAD  Neck: No JVD  Respiratory: CTAB, no wheezes, rales or rhonchi  Cardiovascular: S1, S2, RRR  Gastrointestinal: BS+, soft, NT/ND  Extremities: No peripheral edema    Hospital Medications:   MEDICATIONS  (STANDING):  acyclovir   Oral Tab/Cap 200 milliGRAM(s) Oral two times a day  aspirin enteric coated 81 milliGRAM(s) Oral daily  atorvastatin 40 milliGRAM(s) Oral at bedtime  chlorhexidine 2% Cloths 1 Application(s) Topical daily  doxazosin 8 milliGRAM(s) Oral at bedtime  doxazosin 4 milliGRAM(s) Oral with breakfast  famotidine    Tablet 20 milliGRAM(s) Oral daily  heparin   Injectable 5000 Unit(s) SubCutaneous every 12 hours  insulin glargine Injectable (LANTUS) 8 Unit(s) SubCutaneous at bedtime  insulin lispro (ADMELOG) corrective regimen sliding scale   SubCutaneous three times a day before meals  insulin lispro (ADMELOG) corrective regimen sliding scale   SubCutaneous at bedtime  insulin lispro Injectable (ADMELOG) 6 Unit(s) SubCutaneous three times a day before meals  isosorbide   mononitrate ER Tablet (IMDUR) 60 milliGRAM(s) Oral daily  labetalol 100 milliGRAM(s) Oral every 8 hours  levothyroxine 25 MICROGram(s) Oral daily  mycophenolate mofetil 500 milliGRAM(s) Oral <User Schedule>  Nephro-brenda 1 Tablet(s) Oral daily  NIFEdipine XL 60 milliGRAM(s) Oral two times a day  nystatin    Suspension 316181 Unit(s) Swish and Swallow four times a day  polyethylene glycol 3350 17 Gram(s) Oral every 12 hours  predniSONE   Tablet   Oral   predniSONE   Tablet 5 milliGRAM(s) Oral daily  senna 2 Tablet(s) Oral at bedtime  tacrolimus ER Tablet (ENVARSUS XR) 4 milliGRAM(s) Oral <User Schedule>  tamsulosin 0.4 milliGRAM(s) Oral at bedtime  trimethoprim   80 mG/sulfamethoxazole 400 mG 1 Tablet(s) Oral <User Schedule>    Tacrolimus (), Serum: 4.0 ng/mL (04-17 @ 05:42)    LABS:  04-17    134[L]  |  96  |  29[H]  ----------------------------<  75  3.7   |  24  |  4.16[H]    Ca    8.2[L]      17 Apr 2025 05:42  Phos  4.1     04-17  Mg     2.0     04-17    TPro  5.4[L]  /  Alb  3.1[L]  /  TBili  0.2  /  DBili      /  AST  26  /  ALT  10  /  AlkPhos  61  04-17    Creatinine Trend: 4.16 <--, 3.22 <--, 5.78 <--, 4.83 <--, 4.85 <--, 4.43 <--, 3.90 <--, 7.04 <--, 6.73 <--, 5.45 <--, 3.85 <--, 5.35 <--, 4.80 <--, 4.53 <--    Albumin: 3.1 g/dL (04-17 @ 05:42)  Phosphorus: 4.1 mg/dL (04-17 @ 05:42)  Albumin: 3.3 g/dL (04-16 @ 18:19)  Phosphorus: 2.7 mg/dL (04-16 @ 18:19)                              7.5    6.35  )-----------( 277      ( 17 Apr 2025 05:42 )             24.1     Urine Studies:  Urinalysis - [04-17-25 @ 05:42]      Color  / Appearance  / SG  / pH       Gluc 75 / Ketone   / Bili  / Urobili        Blood  / Protein  / Leuk Est  / Nitrite       RBC  / WBC  / Hyaline  / Gran  / Sq Epi  / Non Sq Epi  / Bacteria       Iron 17, TIBC 99, %sat 17      [01-08-25 @ 04:35]  PTH -- (Ca 7.9)      [12-30-24 @ 06:50]   229  PTH -- (Ca 7.6)      [11-27-24 @ 04:23]   250  Vitamin D (25OH) 17.1      [11-25-24 @ 06:50]  TSH 2.48      [04-13-25 @ 07:24]  Lipid: chol 112, TG 61, HDL 46, LDL --      [12-31-24 @ 04:27]    HBsAb 46.0      [04-07-25 @ 21:39]  HBsAg Nonreact      [04-07-25 @ 21:39]  HBcAb Nonreact      [04-07-25 @ 21:39]  HCV 0.05, Nonreact      [04-07-25 @ 21:39]  HIV Nonreact      [04-07-25 @ 21:39]      RADIOLOGY & ADDITIONAL STUDIES:

## 2025-04-18 LAB
ALBUMIN SERPL ELPH-MCNC: 3 G/DL — LOW (ref 3.3–5)
ALP SERPL-CCNC: 62 U/L — SIGNIFICANT CHANGE UP (ref 40–120)
ALT FLD-CCNC: 13 U/L — SIGNIFICANT CHANGE UP (ref 10–45)
ANION GAP SERPL CALC-SCNC: 14 MMOL/L — SIGNIFICANT CHANGE UP (ref 5–17)
APTT BLD: 24.3 SEC — LOW (ref 24.5–35.6)
AST SERPL-CCNC: 31 U/L — SIGNIFICANT CHANGE UP (ref 10–40)
BASOPHILS # BLD AUTO: 0.02 K/UL — SIGNIFICANT CHANGE UP (ref 0–0.2)
BASOPHILS NFR BLD AUTO: 0.3 % — SIGNIFICANT CHANGE UP (ref 0–2)
BILIRUB SERPL-MCNC: 0.2 MG/DL — SIGNIFICANT CHANGE UP (ref 0.2–1.2)
BUN SERPL-MCNC: 46 MG/DL — HIGH (ref 7–23)
CALCIUM SERPL-MCNC: 8.4 MG/DL — SIGNIFICANT CHANGE UP (ref 8.4–10.5)
CHLORIDE SERPL-SCNC: 95 MMOL/L — LOW (ref 96–108)
CO2 SERPL-SCNC: 23 MMOL/L — SIGNIFICANT CHANGE UP (ref 22–31)
CREAT SERPL-MCNC: 5.35 MG/DL — HIGH (ref 0.5–1.3)
EGFR: 11 ML/MIN/1.73M2 — LOW
EGFR: 11 ML/MIN/1.73M2 — LOW
EOSINOPHIL # BLD AUTO: 0.5 K/UL — SIGNIFICANT CHANGE UP (ref 0–0.5)
EOSINOPHIL NFR BLD AUTO: 7.2 % — HIGH (ref 0–6)
GLUCOSE BLDC GLUCOMTR-MCNC: 103 MG/DL — HIGH (ref 70–99)
GLUCOSE BLDC GLUCOMTR-MCNC: 134 MG/DL — HIGH (ref 70–99)
GLUCOSE BLDC GLUCOMTR-MCNC: 148 MG/DL — HIGH (ref 70–99)
GLUCOSE BLDC GLUCOMTR-MCNC: 150 MG/DL — HIGH (ref 70–99)
GLUCOSE BLDC GLUCOMTR-MCNC: 386 MG/DL — HIGH (ref 70–99)
GLUCOSE SERPL-MCNC: 79 MG/DL — SIGNIFICANT CHANGE UP (ref 70–99)
HCT VFR BLD CALC: 23.4 % — LOW (ref 39–50)
HGB BLD-MCNC: 7.4 G/DL — LOW (ref 13–17)
IMM GRANULOCYTES NFR BLD AUTO: 2.2 % — HIGH (ref 0–0.9)
INR BLD: 0.86 RATIO — SIGNIFICANT CHANGE UP (ref 0.85–1.16)
LYMPHOCYTES # BLD AUTO: 0.28 K/UL — LOW (ref 1–3.3)
LYMPHOCYTES # BLD AUTO: 4 % — LOW (ref 13–44)
MAGNESIUM SERPL-MCNC: 2.2 MG/DL — SIGNIFICANT CHANGE UP (ref 1.6–2.6)
MCHC RBC-ENTMCNC: 27.6 PG — SIGNIFICANT CHANGE UP (ref 27–34)
MCHC RBC-ENTMCNC: 31.6 G/DL — LOW (ref 32–36)
MCV RBC AUTO: 87.3 FL — SIGNIFICANT CHANGE UP (ref 80–100)
MONOCYTES # BLD AUTO: 0.87 K/UL — SIGNIFICANT CHANGE UP (ref 0–0.9)
MONOCYTES NFR BLD AUTO: 12.6 % — SIGNIFICANT CHANGE UP (ref 2–14)
MRSA PCR RESULT.: SIGNIFICANT CHANGE UP
NEUTROPHILS # BLD AUTO: 5.1 K/UL — SIGNIFICANT CHANGE UP (ref 1.8–7.4)
NEUTROPHILS NFR BLD AUTO: 73.7 % — SIGNIFICANT CHANGE UP (ref 43–77)
NRBC BLD AUTO-RTO: 0 /100 WBCS — SIGNIFICANT CHANGE UP (ref 0–0)
PHOSPHATE SERPL-MCNC: 4.6 MG/DL — HIGH (ref 2.5–4.5)
PLATELET # BLD AUTO: 333 K/UL — SIGNIFICANT CHANGE UP (ref 150–400)
POTASSIUM SERPL-MCNC: 3.9 MMOL/L — SIGNIFICANT CHANGE UP (ref 3.5–5.3)
POTASSIUM SERPL-SCNC: 3.9 MMOL/L — SIGNIFICANT CHANGE UP (ref 3.5–5.3)
PROT SERPL-MCNC: 5.5 G/DL — LOW (ref 6–8.3)
PROTHROM AB SERPL-ACNC: 9.9 SEC — SIGNIFICANT CHANGE UP (ref 9.9–13.4)
RBC # BLD: 2.68 M/UL — LOW (ref 4.2–5.8)
RBC # FLD: 19 % — HIGH (ref 10.3–14.5)
S AUREUS DNA NOSE QL NAA+PROBE: SIGNIFICANT CHANGE UP
SODIUM SERPL-SCNC: 132 MMOL/L — LOW (ref 135–145)
TACROLIMUS SERPL-MCNC: 3.7 NG/ML — SIGNIFICANT CHANGE UP
WBC # BLD: 6.92 K/UL — SIGNIFICANT CHANGE UP (ref 3.8–10.5)
WBC # FLD AUTO: 6.92 K/UL — SIGNIFICANT CHANGE UP (ref 3.8–10.5)

## 2025-04-18 PROCEDURE — 99232 SBSQ HOSP IP/OBS MODERATE 35: CPT | Mod: GC

## 2025-04-18 PROCEDURE — 99232 SBSQ HOSP IP/OBS MODERATE 35: CPT | Mod: FS,GC

## 2025-04-18 RX ORDER — DOXAZOSIN MESYLATE 8 MG/1
8 TABLET ORAL
Refills: 0 | Status: DISCONTINUED | OUTPATIENT
Start: 2025-04-19 | End: 2025-04-23

## 2025-04-18 RX ORDER — FUROSEMIDE 10 MG/ML
5 INJECTION INTRAMUSCULAR; INTRAVENOUS
Qty: 500 | Refills: 0 | Status: DISCONTINUED | OUTPATIENT
Start: 2025-04-18 | End: 2025-04-20

## 2025-04-18 RX ORDER — INSULIN GLARGINE-YFGN 100 [IU]/ML
6 INJECTION, SOLUTION SUBCUTANEOUS AT BEDTIME
Refills: 0 | Status: DISCONTINUED | OUTPATIENT
Start: 2025-04-18 | End: 2025-04-23

## 2025-04-18 RX ORDER — INSULIN LISPRO 100 U/ML
4 INJECTION, SOLUTION INTRAVENOUS; SUBCUTANEOUS
Refills: 0 | Status: DISCONTINUED | OUTPATIENT
Start: 2025-04-18 | End: 2025-04-23

## 2025-04-18 RX ORDER — NIFEDIPINE 30 MG
60 TABLET, EXTENDED RELEASE 24 HR ORAL
Refills: 0 | Status: DISCONTINUED | OUTPATIENT
Start: 2025-04-18 | End: 2025-04-23

## 2025-04-18 RX ORDER — FUROSEMIDE 10 MG/ML
80 INJECTION INTRAMUSCULAR; INTRAVENOUS ONCE
Refills: 0 | Status: COMPLETED | OUTPATIENT
Start: 2025-04-18 | End: 2025-04-18

## 2025-04-18 RX ORDER — DOXAZOSIN MESYLATE 8 MG/1
8 TABLET ORAL AT BEDTIME
Refills: 0 | Status: DISCONTINUED | OUTPATIENT
Start: 2025-04-18 | End: 2025-04-23

## 2025-04-18 RX ADMIN — Medication 10 MILLIGRAM(S): at 01:30

## 2025-04-18 RX ADMIN — TAMSULOSIN HYDROCHLORIDE 0.4 MILLIGRAM(S): 0.4 CAPSULE ORAL at 21:26

## 2025-04-18 RX ADMIN — FUROSEMIDE 80 MILLIGRAM(S): 10 INJECTION INTRAMUSCULAR; INTRAVENOUS at 10:17

## 2025-04-18 RX ADMIN — TACROLIMUS 4 MILLIGRAM(S): 0.5 CAPSULE ORAL at 08:45

## 2025-04-18 RX ADMIN — Medication 60 MILLIGRAM(S): at 04:48

## 2025-04-18 RX ADMIN — Medication 60 MILLIGRAM(S): at 17:45

## 2025-04-18 RX ADMIN — Medication 500000 UNIT(S): at 17:46

## 2025-04-18 RX ADMIN — LABETALOL HYDROCHLORIDE 100 MILLIGRAM(S): 200 TABLET, FILM COATED ORAL at 06:43

## 2025-04-18 RX ADMIN — ISOSORBIDE MONONITRATE 60 MILLIGRAM(S): 60 TABLET, EXTENDED RELEASE ORAL at 13:48

## 2025-04-18 RX ADMIN — INSULIN LISPRO 4 UNIT(S): 100 INJECTION, SOLUTION INTRAVENOUS; SUBCUTANEOUS at 17:46

## 2025-04-18 RX ADMIN — Medication 500000 UNIT(S): at 01:01

## 2025-04-18 RX ADMIN — LABETALOL HYDROCHLORIDE 100 MILLIGRAM(S): 200 TABLET, FILM COATED ORAL at 21:26

## 2025-04-18 RX ADMIN — Medication 81 MILLIGRAM(S): at 13:48

## 2025-04-18 RX ADMIN — DOXAZOSIN MESYLATE 4 MILLIGRAM(S): 8 TABLET ORAL at 08:45

## 2025-04-18 RX ADMIN — FUROSEMIDE 2.5 MG/HR: 10 INJECTION INTRAMUSCULAR; INTRAVENOUS at 19:48

## 2025-04-18 RX ADMIN — INSULIN GLARGINE-YFGN 6 UNIT(S): 100 INJECTION, SOLUTION SUBCUTANEOUS at 21:26

## 2025-04-18 RX ADMIN — Medication 20 MILLIGRAM(S): at 14:01

## 2025-04-18 RX ADMIN — POLYETHYLENE GLYCOL 3350 17 GRAM(S): 17 POWDER, FOR SOLUTION ORAL at 06:43

## 2025-04-18 RX ADMIN — MYCOPHENOLATE MOFETIL 500 MILLIGRAM(S): 500 TABLET, FILM COATED ORAL at 08:45

## 2025-04-18 RX ADMIN — FUROSEMIDE 2.5 MG/HR: 10 INJECTION INTRAMUSCULAR; INTRAVENOUS at 13:48

## 2025-04-18 RX ADMIN — HEPARIN SODIUM 5000 UNIT(S): 1000 INJECTION INTRAVENOUS; SUBCUTANEOUS at 17:45

## 2025-04-18 RX ADMIN — ATORVASTATIN CALCIUM 40 MILLIGRAM(S): 80 TABLET, FILM COATED ORAL at 21:26

## 2025-04-18 RX ADMIN — Medication 1 TABLET(S): at 13:48

## 2025-04-18 RX ADMIN — Medication 1 APPLICATION(S): at 14:01

## 2025-04-18 RX ADMIN — Medication 1 TABLET(S): at 08:51

## 2025-04-18 RX ADMIN — Medication 200 MILLIGRAM(S): at 17:46

## 2025-04-18 RX ADMIN — Medication 25 MICROGRAM(S): at 04:48

## 2025-04-18 RX ADMIN — MYCOPHENOLATE MOFETIL 500 MILLIGRAM(S): 500 TABLET, FILM COATED ORAL at 19:48

## 2025-04-18 RX ADMIN — Medication 500000 UNIT(S): at 13:48

## 2025-04-18 RX ADMIN — Medication 200 MILLIGRAM(S): at 06:43

## 2025-04-18 RX ADMIN — INSULIN LISPRO 6 UNIT(S): 100 INJECTION, SOLUTION INTRAVENOUS; SUBCUTANEOUS at 08:50

## 2025-04-18 RX ADMIN — DOXAZOSIN MESYLATE 8 MILLIGRAM(S): 8 TABLET ORAL at 21:26

## 2025-04-18 RX ADMIN — Medication 500000 UNIT(S): at 06:43

## 2025-04-18 RX ADMIN — PREDNISONE 5 MILLIGRAM(S): 20 TABLET ORAL at 06:43

## 2025-04-18 RX ADMIN — HEPARIN SODIUM 5000 UNIT(S): 1000 INJECTION INTRAVENOUS; SUBCUTANEOUS at 06:43

## 2025-04-18 NOTE — PROGRESS NOTE ADULT - ASSESSMENT
68 y/o M with PMHx of HLD, CAD s/p LAD PCI 7/24, HFpEF , HTN, hypothyroid, type 2 DM, ESRD on HD MWF (recently converted from PD in 1/2025) via permacath placed on 1/2025, h/o cva with no residual deficits, persistent right sided pleural effusion, and hx of hemorrhagic pericardial effusion (cytopathology negative) presents to [facility] for possible DDRT. He states he makes less than half cup of urine a day. Denies fever, chills, N/V/D/C, abdominal pain, CP, SOB, hemoptysis, and /GI complaints.     A/P  ESRD s/p DDRT 04/09  Complicated for HTN urgency and DGF  Steroid and thymo induction  Nephrologist is Dr. Menjivar   Access is PermCath   Was recently converted from PD to HD and on MWF schedule  Last HD 4/7, s/p HD on 04/08 and 04/09, and 04/11  Planned for HD today  On MMF and  Tac follow levels, On prednisone taper  On acyclovir and nsytatin prophylaxis  On belatacept, next dose 4/24  Monitor bmp   Renally dose meds    HTN   On doxazosin labetalol tamsulosin and nifedipine  UF w/ HD if needed  Managed per transplant team   Monitor bp     Anemia  Hb below goal  Monitor     CKD-MBD  Monitor ca and phos daily

## 2025-04-18 NOTE — PROGRESS NOTE ADULT - SUBJECTIVE AND OBJECTIVE BOX
[facility] DIVISION OF KIDNEY DISEASES AND HYPERTENSION -- FOLLOW UP NOTE  --------------------------------------------------------------------------------  Chief Complaint: DDRT    24 hour events/subjective: Pt. seen and examined earlier today, resting comfortably in bed. Still not making much urine. Hypertensive overnight requiring IV hydral and received AM nifedipine early. No fever, chills, CP, SOB, or LE swelling.        PAST HISTORY  --------------------------------------------------------------------------------  No significant changes to PMH, PSH, FHx, SHx, unless otherwise noted    ALLERGIES & MEDICATIONS  --------------------------------------------------------------------------------  Allergies    hydrALAZINE (Short breath)    Intolerances    Standing Inpatient Medications  acyclovir   Oral Tab/Cap 200 milliGRAM(s) Oral two times a day  aspirin enteric coated 81 milliGRAM(s) Oral daily  atorvastatin 40 milliGRAM(s) Oral at bedtime  chlorhexidine 2% Cloths 1 Application(s) Topical daily  doxazosin 8 milliGRAM(s) Oral with breakfast  doxazosin 8 milliGRAM(s) Oral at bedtime  famotidine    Tablet 20 milliGRAM(s) Oral daily  furosemide Infusion 5 mG/Hr IV Continuous <Continuous>  heparin   Injectable 5000 Unit(s) SubCutaneous every 12 hours  insulin glargine Injectable (LANTUS) 6 Unit(s) SubCutaneous at bedtime  insulin lispro (ADMELOG) corrective regimen sliding scale   SubCutaneous three times a day before meals  insulin lispro (ADMELOG) corrective regimen sliding scale   SubCutaneous at bedtime  insulin lispro Injectable (ADMELOG) 4 Unit(s) SubCutaneous three times a day before meals  isosorbide   mononitrate ER Tablet (IMDUR) 60 milliGRAM(s) Oral daily  labetalol 100 milliGRAM(s) Oral every 8 hours  levothyroxine 25 MICROGram(s) Oral daily  mycophenolate mofetil 500 milliGRAM(s) Oral <User Schedule>  Nephro-brenda 1 Tablet(s) Oral daily  NIFEdipine XL 60 milliGRAM(s) Oral two times a day  nystatin    Suspension 150610 Unit(s) Swish and Swallow four times a day  polyethylene glycol 3350 17 Gram(s) Oral every 12 hours  predniSONE   Tablet 5 milliGRAM(s) Oral daily  predniSONE   Tablet   Oral   senna 2 Tablet(s) Oral at bedtime  tacrolimus ER Tablet (ENVARSUS XR) 4 milliGRAM(s) Oral <User Schedule>  tamsulosin 0.4 milliGRAM(s) Oral at bedtime  trimethoprim   80 mG/sulfamethoxazole 400 mG 1 Tablet(s) Oral <User Schedule>    PRN Inpatient Medications  acetaminophen     Tablet .. 650 milliGRAM(s) Oral every 6 hours PRN  artificial tears (preservative free) Ophthalmic Solution 1 Drop(s) Left EYE daily PRN  ondansetron Injectable 4 milliGRAM(s) IV Push every 6 hours PRN  traMADol 25 milliGRAM(s) Oral every 4 hours PRN  traMADol 50 milliGRAM(s) Oral every 8 hours PRN    REVIEW OF SYSTEMS  --------------------------------------------------------------------------------  Gen: No fevers/chills  Respiratory: No dyspnea, cough,   CV: No chest pain, PND, orthopnea  GI: Abdominal pain  Transplant: No pain  : No increased frequency, dysuria, hematuria   MSK: No edema  Neuro: No dizziness/lightheadedness    All other systems were reviewed and are negative, except as noted.    VITALS/PHYSICAL EXAM  --------------------------------------------------------------------------------  T(C): 36.5 (04-18-25 @ 13:00), Max: 36.8 (04-18-25 @ 06:01)  HR: 64 (04-18-25 @ 13:00) (56 - 78)  BP: 176/62 (04-18-25 @ 13:00) (127/60 - 176/78)  RR: 18 (04-18-25 @ 13:00) (18 - 20)  SpO2: 100% (04-18-25 @ 13:00) (99% - 100%)  Wt(kg): --    04-17-25 @ 07:01  -  04-18-25 @ 07:00  --------------------------------------------------------  IN: 440 mL / OUT: 200 mL / NET: 240 mL    04-18-25 @ 07:01  -  04-18-25 @ 13:55  --------------------------------------------------------  IN: 0 mL / OUT: 90 mL / NET: -90 mL    Physical Exam:  Alert and oriented x3   HEENT: normal  Pulm: CTA B/L  CV: normal S1S2; no murmur  Transplant Abd: Tender upon palpation in RLQ, 3 drains in place  Extremities- no edema  RLQ surgical scar    LABS/STUDIES  --------------------------------------------------------------------------------              7.4    6.92  >-----------<  333      [04-18-25 @ 07:16]              23.4     132  |  95  |  46  ----------------------------<  79      [04-18-25 @ 07:12]  3.9   |  23  |  5.35        Ca     8.4     [04-18-25 @ 07:12]      Mg     2.2     [04-18-25 @ 07:12]      Phos  4.6     [04-18-25 @ 07:12]    TPro  5.5  /  Alb  3.0  /  TBili  0.2  /  DBili  x   /  AST  31  /  ALT  13  /  AlkPhos  62  [04-18-25 @ 07:12]    PT/INR: PT 9.9  , INR 0.86       [04-18-25 @ 07:14]  PTT: 24.3       [04-18-25 @ 07:14]    Creatinine Trend:  SCr 5.35 [04-18 @ 07:12]  SCr 4.16 [04-17 @ 05:42]  SCr 3.22 [04-16 @ 18:19]  SCr 5.78 [04-16 @ 05:28]  SCr 4.83 [04-15 @ 23:11]    Tacrolimus (), Serum: 3.7 ng/mL (04-18 @ 07:18)  Tacrolimus (), Serum: 4.0 ng/mL (04-17 @ 05:42)  Tacrolimus (), Serum: 3.3 ng/mL (04-16 @ 05:28)  Tacrolimus (), Serum: 2.8 ng/mL (04-15 @ 06:26)    Iron 17, TIBC 99, %sat 17      [01-08-25 @ 04:35]  PTH -- (Ca 7.9)      [12-30-24 @ 06:50]   229  PTH -- (Ca 7.6)      [11-27-24 @ 04:23]   250  Vitamin D (25OH) 17.1      [11-25-24 @ 06:50]  TSH 2.48      [04-13-25 @ 07:24]  Lipid: chol 112, TG 61, HDL 46, LDL --      [12-31-24 @ 04:27]    HBsAb 46.0      [04-07-25 @ 21:39]  HBsAg Nonreact      [04-07-25 @ 21:39]  HBcAb Nonreact      [04-07-25 @ 21:39]  HCV 0.05, Nonreact      [04-07-25 @ 21:39]  HIV Nonreact      [04-07-25 @ 21:39]

## 2025-04-18 NOTE — CHART NOTE - NSCHARTNOTEFT_GEN_A_CORE
Upon further chart review, patient was admitted 10/27/22 with pericardial effusion with an unknown etiology (uremic vs drug induced vs malignancy vs autoimmune). Patient was on hydralazine PO 75mg TID which was discontinued as a possible source for pericardial effusion. Anti-histone Ab was sent and resulted negative confirming a low suspicion of hydralazine induced lupus syndrome as a cause of pericardial effusion, and more likely uremic as patient had ESRD on PD at the time. Given HTN despite multiple agents, will attemp 10mg IV hydralazine x1 dose and monitor closely. Upon further chart review, patient was admitted 10/27/22 with pericardial effusion with an unknown etiology (uremic vs drug induced vs malignancy vs autoimmune). Patient was on hydralazine PO 75mg TID which was discontinued as a possible source for pericardial effusion. Anti-histone Ab was sent and resulted negative confirming a low suspicion of hydralazine induced lupus syndrome as a cause of pericardial effusion given the high sensitivity of Anti-histone Ab for drug-induced lupus. It is more likely patient had uremic pericardial effusion vs infectious pericardial effusion as patient had ESRD on PD at the time.     Currently, given HTN despite multiple agents and bradycardia (on labetolol), will attempt 10mg IV hydralazine x1 dose and monitor closely.

## 2025-04-18 NOTE — PROGRESS NOTE ADULT - ASSESSMENT
69M with PMHx of HLD, CAD s/p LAD PCI 7/24, HFpEF, HTN, hypothyroid, type 2 DM, ESRD on HD MWF presents to [facility] for possible DDRT. Transplant nephrology consulted for ESRD on Hemodialysis.     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 Repeat USG (4/14) showed increasing collection size  CT A/P (4/15)-complex perinephric collection 1.6x5.9x3.0 with slight enhanced septation. No hydro  No UOP today  Trial lasix IV 80mg x1 and metolazone 5mg. Then place on lasix gtt today at 5mg/hr  Continue to monitor UO and renal function      IS  Thymo induction  Tacro level 3.7 today  Continue Env 4mg  Check Tac level in AM (30 min prior to dose)   MMF, Pred taper  Denovo belatacept. Last dose on 4/14. Next dose on 4/24.  Ppx-Bactrim, Nystatin, Acyclovir    HTN- labile  On Nifedipine 60 bid  Imdur 60 qd  Increase doxazosin to 8mg AM and 8mg HS  Labetalol 100 q8h   May need to add hydral for better BP control, documented allergy but tolerated well. Need to re-clarify allergy  BP fluctuating, monitor closely    Hyperkalemia  resolved    Hyperglycemia  - C/w lantus 6u HS, sliding scale    **Recommendations preliminary until attending attestation**  If you have any questions, please feel free to contact me  Hardeep Kowalski  Nephrology Fellow  Page [phone] / [employer] Teams (Preferred)  (After 4pm or on weekends please page the on-call fellow)

## 2025-04-18 NOTE — PROGRESS NOTE ADULT - SUBJECTIVE AND OBJECTIVE BOX
[facility]    Dr. Tiara Garcia     Office [phone] (9 am to 5 pm)  Service: [phone] (5pm to 9am)  Also Available on TEAMS      RENAL PROGRESS NOTE: DATE OF SERVICE 04-18-25 @ 10:41    Patient is a 69y old  Male who presents with a chief complaint of possible DDRT (17 Apr 2025 14:43)      Patient seen and examined at bedside. No chest pain/sob    VITALS:  T(F): 97.7 (04-18-25 @ 09:54), Max: 98.2 (04-18-25 @ 06:01)  HR: 56 (04-18-25 @ 09:54)  BP: 161/73 (04-18-25 @ 09:54)  RR: 18 (04-18-25 @ 09:54)  SpO2: 100% (04-18-25 @ 09:54)  Wt(kg): --    04-17 @ 07:01  -  04-18 @ 07:00  --------------------------------------------------------  IN: 440 mL / OUT: 200 mL / NET: 240 mL    04-18 @ 07:01  -  04-18 @ 10:41  --------------------------------------------------------  IN: 0 mL / OUT: 30 mL / NET: -30 mL          PHYSICAL EXAM:  Constitutional: NAD  Neck: No JVD  Respiratory: CTAB, no wheezes, rales or rhonchi  Cardiovascular: S1, S2, RRR  Gastrointestinal: BS+, soft, NT/ND  Extremities: No peripheral edema    Hospital Medications:   MEDICATIONS  (STANDING):  acyclovir   Oral Tab/Cap 200 milliGRAM(s) Oral two times a day  aspirin enteric coated 81 milliGRAM(s) Oral daily  atorvastatin 40 milliGRAM(s) Oral at bedtime  chlorhexidine 2% Cloths 1 Application(s) Topical daily  doxazosin 8 milliGRAM(s) Oral with breakfast  doxazosin 8 milliGRAM(s) Oral at bedtime  famotidine    Tablet 20 milliGRAM(s) Oral daily  furosemide Infusion 5 mG/Hr (2.5 mL/Hr) IV Continuous <Continuous>  heparin   Injectable 5000 Unit(s) SubCutaneous every 12 hours  insulin glargine Injectable (LANTUS) 6 Unit(s) SubCutaneous at bedtime  insulin lispro (ADMELOG) corrective regimen sliding scale   SubCutaneous three times a day before meals  insulin lispro (ADMELOG) corrective regimen sliding scale   SubCutaneous at bedtime  insulin lispro Injectable (ADMELOG) 6 Unit(s) SubCutaneous three times a day before meals  isosorbide   mononitrate ER Tablet (IMDUR) 60 milliGRAM(s) Oral daily  labetalol 100 milliGRAM(s) Oral every 8 hours  levothyroxine 25 MICROGram(s) Oral daily  mycophenolate mofetil 500 milliGRAM(s) Oral <User Schedule>  Nephro-brenda 1 Tablet(s) Oral daily  NIFEdipine XL 60 milliGRAM(s) Oral two times a day  nystatin    Suspension 847060 Unit(s) Swish and Swallow four times a day  polyethylene glycol 3350 17 Gram(s) Oral every 12 hours  predniSONE   Tablet 5 milliGRAM(s) Oral daily  predniSONE   Tablet   Oral   senna 2 Tablet(s) Oral at bedtime  tacrolimus ER Tablet (ENVARSUS XR) 4 milliGRAM(s) Oral <User Schedule>  tamsulosin 0.4 milliGRAM(s) Oral at bedtime  trimethoprim   80 mG/sulfamethoxazole 400 mG 1 Tablet(s) Oral <User Schedule>    Tacrolimus (), Serum: 3.7 ng/mL (04-18 @ 07:18)    LABS:  04-18    132[L]  |  95[L]  |  46[H]  ----------------------------<  79  3.9   |  23  |  5.35[H]    Ca    8.4      18 Apr 2025 07:12  Phos  4.6     04-18  Mg     2.2     04-18    TPro  5.5[L]  /  Alb  3.0[L]  /  TBili  0.2  /  DBili      /  AST  31  /  ALT  13  /  AlkPhos  62  04-18    Creatinine Trend: 5.35 <--, 4.16 <--, 3.22 <--, 5.78 <--, 4.83 <--, 4.85 <--, 4.43 <--, 3.90 <--, 7.04 <--, 6.73 <--, 5.45 <--, 3.85 <--    Albumin: 3.0 g/dL (04-18 @ 07:12)  Phosphorus: 4.6 mg/dL (04-18 @ 07:12)                              7.4    6.92  )-----------( 333      ( 18 Apr 2025 07:16 )             23.4     Urine Studies:  Urinalysis - [04-18-25 @ 07:12]      Color  / Appearance  / SG  / pH       Gluc 79 / Ketone   / Bili  / Urobili        Blood  / Protein  / Leuk Est  / Nitrite       RBC  / WBC  / Hyaline  / Gran  / Sq Epi  / Non Sq Epi  / Bacteria       Iron 17, TIBC 99, %sat 17      [01-08-25 @ 04:35]  PTH -- (Ca 7.9)      [12-30-24 @ 06:50]   229  PTH -- (Ca 7.6)      [11-27-24 @ 04:23]   250  Vitamin D (25OH) 17.1      [11-25-24 @ 06:50]  TSH 2.48      [04-13-25 @ 07:24]  Lipid: chol 112, TG 61, HDL 46, LDL --      [12-31-24 @ 04:27]    HBsAb 46.0      [04-07-25 @ 21:39]  HBsAg Nonreact      [04-07-25 @ 21:39]  HBcAb Nonreact      [04-07-25 @ 21:39]  HCV 0.05, Nonreact      [04-07-25 @ 21:39]  HIV Nonreact      [04-07-25 @ 21:39]      RADIOLOGY & ADDITIONAL STUDIES:

## 2025-04-18 NOTE — PROGRESS NOTE ADULT - SUBJECTIVE AND OBJECTIVE BOX
Transplant Surgery - Multi-disciplinary Rounds  --------------------------------------------------------------   Tx Date: 04/08/25         POD#10    Present: Patient seen and examined with multidisciplinary Transplant team including Surgeon Dr. Bernal, nephrologist Dr. Dunn, LÁZARO Cevallos, pharmacy, and bedside RN during AM rounds.   Disciplines not in attendance will be notified of the plan.     HPI: 68 y/o M with PMHx of HLD, CAD s/p LAD PCI 7/24, HFpEF , HTN, hypothyroid, type 2 DM, ESRD on HD MWF (recently converted from PD in 1/2025) via R permacath placed on 1/2025, h/o cva with no residual deficits, persistent right sided pleural effusion, and hx of hemorrhagic pericardial effusion (cytopathology negative) presents to [facility] for possible DDRT. He states he makes less than half cup of urine a day. Denies fever, chills, N/V/D/C, abdominal pain, CP, SOB, hemoptysis, and /GI complaints. Now s/p DDRT  1a + 1 v + 1 u + stent under thymoglobulin induction on 04/08/25.     Interval Events:  - Afebrile, hypertensive- doxazosin 4mg AM and 8mg QHS   - d/c'd JP3  - ON: hypertensive to 170s given IV hydral 10 and gave nifed earlier     Immunosuppression:  Induction: Thymo completed  Maintenance: Petra last dose 4/14/ENV per level/ BID/ pred 5  Ongoing monitoring for signs of rejection     Potential Discharge date: TBD  Education:  Medications  Plan of care:  See Below            MEDICATIONS  (STANDING):  acyclovir   Oral Tab/Cap 200 milliGRAM(s) Oral two times a day  aspirin enteric coated 81 milliGRAM(s) Oral daily  atorvastatin 40 milliGRAM(s) Oral at bedtime  chlorhexidine 2% Cloths 1 Application(s) Topical daily  doxazosin 8 milliGRAM(s) Oral with breakfast  doxazosin 8 milliGRAM(s) Oral at bedtime  famotidine    Tablet 20 milliGRAM(s) Oral daily  furosemide Infusion 5 mG/Hr (2.5 mL/Hr) IV Continuous <Continuous>  heparin   Injectable 5000 Unit(s) SubCutaneous every 12 hours  insulin glargine Injectable (LANTUS) 6 Unit(s) SubCutaneous at bedtime  insulin lispro (ADMELOG) corrective regimen sliding scale   SubCutaneous three times a day before meals  insulin lispro (ADMELOG) corrective regimen sliding scale   SubCutaneous at bedtime  insulin lispro Injectable (ADMELOG) 4 Unit(s) SubCutaneous three times a day before meals  isosorbide   mononitrate ER Tablet (IMDUR) 60 milliGRAM(s) Oral daily  labetalol 100 milliGRAM(s) Oral every 8 hours  levothyroxine 25 MICROGram(s) Oral daily  mycophenolate mofetil 500 milliGRAM(s) Oral <User Schedule>  Nephro-brenda 1 Tablet(s) Oral daily  NIFEdipine XL 60 milliGRAM(s) Oral two times a day  nystatin    Suspension 409807 Unit(s) Swish and Swallow four times a day  polyethylene glycol 3350 17 Gram(s) Oral every 12 hours  predniSONE   Tablet 5 milliGRAM(s) Oral daily  predniSONE   Tablet   Oral   senna 2 Tablet(s) Oral at bedtime  tacrolimus ER Tablet (ENVARSUS XR) 4 milliGRAM(s) Oral <User Schedule>  tamsulosin 0.4 milliGRAM(s) Oral at bedtime  trimethoprim   80 mG/sulfamethoxazole 400 mG 1 Tablet(s) Oral <User Schedule>    MEDICATIONS  (PRN):  acetaminophen     Tablet .. 650 milliGRAM(s) Oral every 6 hours PRN Mild Pain (1 - 3)  artificial tears (preservative free) Ophthalmic Solution 1 Drop(s) Left EYE daily PRN Dry Eyes  ondansetron Injectable 4 milliGRAM(s) IV Push every 6 hours PRN Nausea and/or Vomiting  traMADol 25 milliGRAM(s) Oral every 4 hours PRN Moderate Pain (4 - 6)  traMADol 50 milliGRAM(s) Oral every 8 hours PRN Severe Pain (7 - 10)      PAST MEDICAL & SURGICAL HISTORY:  Hypertension      ESRD on peritoneal dialysis      CVA (cerebrovascular accident)  2010 without residual effects      Hyperlipidemia      BPH (benign prostatic hyperplasia)      CAD (coronary artery disease)      T2DM (type 2 diabetes mellitus)      Hypothyroidism      History of peritoneal dialysis  s/p PD catheter placement      S/P coronary artery stent placement          Vital Signs Last 24 Hrs  T(C): 36.5 (18 Apr 2025 13:00), Max: 36.8 (18 Apr 2025 06:01)  T(F): 97.7 (18 Apr 2025 13:00), Max: 98.2 (18 Apr 2025 06:01)  HR: 64 (18 Apr 2025 13:00) (56 - 78)  BP: 176/62 (18 Apr 2025 13:00) (137/65 - 176/78)  BP(mean): 107 (18 Apr 2025 10:19) (107 - 112)  RR: 18 (18 Apr 2025 13:00) (18 - 20)  SpO2: 100% (18 Apr 2025 13:00) (99% - 100%)    Parameters below as of 18 Apr 2025 13:00  Patient On (Oxygen Delivery Method): room air        I&O's Summary    17 Apr 2025 07:01  -  18 Apr 2025 07:00  --------------------------------------------------------  IN: 440 mL / OUT: 200 mL / NET: 240 mL    18 Apr 2025 07:01  -  18 Apr 2025 15:37  --------------------------------------------------------  IN: 0 mL / OUT: 190 mL / NET: -190 mL                              7.4    6.92  )-----------( 333      ( 18 Apr 2025 07:16 )             23.4     04-18    132[L]  |  95[L]  |  46[H]  ----------------------------<  79  3.9   |  23  |  5.35[H]    Ca    8.4      18 Apr 2025 07:12  Phos  4.6     04-18  Mg     2.2     04-18    TPro  5.5[L]  /  Alb  3.0[L]  /  TBili  0.2  /  DBili  x   /  AST  31  /  ALT  13  /  AlkPhos  62  04-18    Tacrolimus (), Serum: 3.7 ng/mL (04-18 @ 07:18)                        Review of systems  Gen: +fatigue, +insomnia No weight changes, fevers/chills, weakness  Skin: No rashes  Head/Eyes/Ears/Mouth: No headache; Normal hearing; Normal vision w/o blurriness; No sinus pain/discomfort, sore throat  Respiratory: No dyspnea, cough, wheezing, hemoptysis  CV: No chest pain, PND, orthopnea  GI: Mild abdominal pain at incision site; denies diarrhea, constipation, nausea, vomiting, melena, hematochezia  : No increased frequency, dysuria, hematuria, nocturia  MSK: No joint pain/swelling; no back pain; no edema  Neuro: No dizziness/lightheadedness, weakness, seizures, numbness, tingling  Heme: No easy bruising or bleeding  Endo: No heat/cold intolerance  Psych: Denies Hallucinations No significant nervousness, anxiety, stress, depression  All other systems were reviewed and are negative, except as noted.      PHYSICAL EXAM:  Constitutional: Well developed / well nourished  Eyes: Anicteric, PERRLA  ENMT: nc/at  Neck: Supple  Respiratory: CTA B/L  Cardiovascular: RRR  Gastrointestinal: Soft, non-distended, mild abdominal pain to palpation at incision site; incision c/d/i; COSMO x #1/2 perinephric ss   Genitourinary: voiding  Extremities: SCD's in place and working bilaterally, R permcath  Vascular: Palpable dp pulses bilaterally  Neurological: A&O x3  Skin: no rashes, ulcerations or lesions;  Musculoskeletal: Moving all extremities  Psychiatric: Responsive

## 2025-04-18 NOTE — PROGRESS NOTE ADULT - ASSESSMENT
70 y/o M with PMHx of HLD, CAD s/p LAD PCI 7/24, HFpEF, HTN, hypothyroid, type 2 DM, ESRD on HD MWF (recently converted from PD in 1/2025) via R permacath s/p DDRT  1a + 1 v + 1 u + stent under thymoglobulin induction on 04/08/2    []s/p DDRT under thymo induction, POD #10  - most recent doppler with patent vasculature, increased perinephric collection 2.5x4.5x7.2cm  - DGF: HD - first 4/8, HD - last HD 4/16  - Donor + GPC, vanc by level unti. 4/17  - Strict I/O's, JPx2 SS; d/c'd JP3  - Consistent carb diet  - Incr. Bowel regimen for dilated bowel loops on CT  - IS, PT  - Flomax  - Per Cardiology - can resume plavix when no bleed risk  - f/u 4/16 DSA  - Noncontrast CT A/P - collection improving drain in place, dilated bowel loops    [ ] Immunosuppression  - Thymoglobulin induction completed  - ENV per level,  BID, SST  - Belatacept 4/10, 4/14, next dose 4/24  - ppx: bactrim TIW, nystatin, Acyclovir 200 BID    []HTN  - off cardene gtt, resumed cardene gtt 4/14 - 4/15  - Nifed 60 BID, Imdur 60 qd, labetalol 100mg PO q8hr, doxazosin 8mg in AM and 8mg QHS  - IV lasix 80 x1, metalazone 5mg x1, started on lasix gtt 5mg/hr    [] DVT ppx  - on SQH BID/ASA, SCD    [ ] hyperglycemia   - lantus 6u and lispro 4u

## 2025-04-19 LAB
ALBUMIN SERPL ELPH-MCNC: 3 G/DL — LOW (ref 3.3–5)
ALP SERPL-CCNC: 68 U/L — SIGNIFICANT CHANGE UP (ref 40–120)
ALT FLD-CCNC: 15 U/L — SIGNIFICANT CHANGE UP (ref 10–45)
ANION GAP SERPL CALC-SCNC: 18 MMOL/L — HIGH (ref 5–17)
APTT BLD: 29.3 SEC — SIGNIFICANT CHANGE UP (ref 24.5–35.6)
AST SERPL-CCNC: 31 U/L — SIGNIFICANT CHANGE UP (ref 10–40)
BASOPHILS # BLD AUTO: 0.01 K/UL — SIGNIFICANT CHANGE UP (ref 0–0.2)
BASOPHILS NFR BLD AUTO: 0.1 % — SIGNIFICANT CHANGE UP (ref 0–2)
BILIRUB SERPL-MCNC: 0.2 MG/DL — SIGNIFICANT CHANGE UP (ref 0.2–1.2)
BUN SERPL-MCNC: 59 MG/DL — HIGH (ref 7–23)
CALCIUM SERPL-MCNC: 8.4 MG/DL — SIGNIFICANT CHANGE UP (ref 8.4–10.5)
CHLORIDE SERPL-SCNC: 94 MMOL/L — LOW (ref 96–108)
CO2 SERPL-SCNC: 22 MMOL/L — SIGNIFICANT CHANGE UP (ref 22–31)
CREAT SERPL-MCNC: 6.66 MG/DL — HIGH (ref 0.5–1.3)
EGFR: 8 ML/MIN/1.73M2 — LOW
EGFR: 8 ML/MIN/1.73M2 — LOW
EOSINOPHIL # BLD AUTO: 0.45 K/UL — SIGNIFICANT CHANGE UP (ref 0–0.5)
EOSINOPHIL NFR BLD AUTO: 6.4 % — HIGH (ref 0–6)
FERRITIN SERPL-MCNC: 1344 NG/ML — HIGH (ref 30–400)
GLUCOSE BLDC GLUCOMTR-MCNC: 103 MG/DL — HIGH (ref 70–99)
GLUCOSE BLDC GLUCOMTR-MCNC: 109 MG/DL — HIGH (ref 70–99)
GLUCOSE BLDC GLUCOMTR-MCNC: 213 MG/DL — HIGH (ref 70–99)
GLUCOSE SERPL-MCNC: 66 MG/DL — LOW (ref 70–99)
HCT VFR BLD CALC: 23.7 % — LOW (ref 39–50)
HGB BLD-MCNC: 7.3 G/DL — LOW (ref 13–17)
IMM GRANULOCYTES NFR BLD AUTO: 1.6 % — HIGH (ref 0–0.9)
INR BLD: 0.89 RATIO — SIGNIFICANT CHANGE UP (ref 0.85–1.16)
IRON SATN MFR SERPL: 50 UG/DL — SIGNIFICANT CHANGE UP (ref 45–165)
LYMPHOCYTES # BLD AUTO: 0.27 K/UL — LOW (ref 1–3.3)
LYMPHOCYTES # BLD AUTO: 3.8 % — LOW (ref 13–44)
MAGNESIUM SERPL-MCNC: 2.4 MG/DL — SIGNIFICANT CHANGE UP (ref 1.6–2.6)
MCHC RBC-ENTMCNC: 27.2 PG — SIGNIFICANT CHANGE UP (ref 27–34)
MCHC RBC-ENTMCNC: 30.8 G/DL — LOW (ref 32–36)
MCV RBC AUTO: 88.4 FL — SIGNIFICANT CHANGE UP (ref 80–100)
MONOCYTES # BLD AUTO: 0.76 K/UL — SIGNIFICANT CHANGE UP (ref 0–0.9)
MONOCYTES NFR BLD AUTO: 10.7 % — SIGNIFICANT CHANGE UP (ref 2–14)
NEUTROPHILS # BLD AUTO: 5.47 K/UL — SIGNIFICANT CHANGE UP (ref 1.8–7.4)
NEUTROPHILS NFR BLD AUTO: 77.4 % — HIGH (ref 43–77)
NRBC BLD AUTO-RTO: 0 /100 WBCS — SIGNIFICANT CHANGE UP (ref 0–0)
PHOSPHATE SERPL-MCNC: 5.4 MG/DL — HIGH (ref 2.5–4.5)
PLATELET # BLD AUTO: 388 K/UL — SIGNIFICANT CHANGE UP (ref 150–400)
POTASSIUM SERPL-MCNC: 4.1 MMOL/L — SIGNIFICANT CHANGE UP (ref 3.5–5.3)
POTASSIUM SERPL-SCNC: 4.1 MMOL/L — SIGNIFICANT CHANGE UP (ref 3.5–5.3)
PROT SERPL-MCNC: 5.5 G/DL — LOW (ref 6–8.3)
PROTHROM AB SERPL-ACNC: 10.3 SEC — SIGNIFICANT CHANGE UP (ref 9.9–13.4)
RBC # BLD: 2.68 M/UL — LOW (ref 4.2–5.8)
RBC # FLD: 19.3 % — HIGH (ref 10.3–14.5)
SODIUM SERPL-SCNC: 134 MMOL/L — LOW (ref 135–145)
TACROLIMUS SERPL-MCNC: 5 NG/ML — SIGNIFICANT CHANGE UP
WBC # BLD: 7.07 K/UL — SIGNIFICANT CHANGE UP (ref 3.8–10.5)
WBC # FLD AUTO: 7.07 K/UL — SIGNIFICANT CHANGE UP (ref 3.8–10.5)

## 2025-04-19 PROCEDURE — 99232 SBSQ HOSP IP/OBS MODERATE 35: CPT | Mod: FS,GC

## 2025-04-19 PROCEDURE — 99232 SBSQ HOSP IP/OBS MODERATE 35: CPT

## 2025-04-19 RX ORDER — MAGNESIUM HYDROXIDE 400 MG/5ML
30 SUSPENSION ORAL ONCE
Refills: 0 | Status: COMPLETED | OUTPATIENT
Start: 2025-04-19 | End: 2025-04-19

## 2025-04-19 RX ORDER — OXYCODONE HYDROCHLORIDE 30 MG/1
2.5 TABLET ORAL ONCE
Refills: 0 | Status: DISCONTINUED | OUTPATIENT
Start: 2025-04-19 | End: 2025-04-19

## 2025-04-19 RX ADMIN — ISOSORBIDE MONONITRATE 60 MILLIGRAM(S): 60 TABLET, EXTENDED RELEASE ORAL at 12:39

## 2025-04-19 RX ADMIN — MYCOPHENOLATE MOFETIL 500 MILLIGRAM(S): 500 TABLET, FILM COATED ORAL at 20:27

## 2025-04-19 RX ADMIN — MYCOPHENOLATE MOFETIL 500 MILLIGRAM(S): 500 TABLET, FILM COATED ORAL at 08:31

## 2025-04-19 RX ADMIN — DOXAZOSIN MESYLATE 8 MILLIGRAM(S): 8 TABLET ORAL at 21:27

## 2025-04-19 RX ADMIN — Medication 500000 UNIT(S): at 12:40

## 2025-04-19 RX ADMIN — Medication 60 MILLIGRAM(S): at 05:46

## 2025-04-19 RX ADMIN — POLYETHYLENE GLYCOL 3350 17 GRAM(S): 17 POWDER, FOR SOLUTION ORAL at 05:47

## 2025-04-19 RX ADMIN — Medication 500000 UNIT(S): at 23:43

## 2025-04-19 RX ADMIN — Medication 200 MILLIGRAM(S): at 05:46

## 2025-04-19 RX ADMIN — Medication 200 MILLIGRAM(S): at 18:34

## 2025-04-19 RX ADMIN — TACROLIMUS 4 MILLIGRAM(S): 0.5 CAPSULE ORAL at 08:31

## 2025-04-19 RX ADMIN — ATORVASTATIN CALCIUM 40 MILLIGRAM(S): 80 TABLET, FILM COATED ORAL at 21:27

## 2025-04-19 RX ADMIN — TAMSULOSIN HYDROCHLORIDE 0.4 MILLIGRAM(S): 0.4 CAPSULE ORAL at 21:27

## 2025-04-19 RX ADMIN — Medication 25 MICROGRAM(S): at 05:18

## 2025-04-19 RX ADMIN — Medication 1 TABLET(S): at 12:38

## 2025-04-19 RX ADMIN — Medication 1 APPLICATION(S): at 12:40

## 2025-04-19 RX ADMIN — FUROSEMIDE 2.5 MG/HR: 10 INJECTION INTRAMUSCULAR; INTRAVENOUS at 08:32

## 2025-04-19 RX ADMIN — INSULIN LISPRO 4 UNIT(S): 100 INJECTION, SOLUTION INTRAVENOUS; SUBCUTANEOUS at 08:32

## 2025-04-19 RX ADMIN — Medication 500000 UNIT(S): at 01:20

## 2025-04-19 RX ADMIN — DOXAZOSIN MESYLATE 8 MILLIGRAM(S): 8 TABLET ORAL at 08:30

## 2025-04-19 RX ADMIN — PREDNISONE 5 MILLIGRAM(S): 20 TABLET ORAL at 05:46

## 2025-04-19 RX ADMIN — Medication 500000 UNIT(S): at 05:47

## 2025-04-19 RX ADMIN — HEPARIN SODIUM 5000 UNIT(S): 1000 INJECTION INTRAVENOUS; SUBCUTANEOUS at 18:26

## 2025-04-19 RX ADMIN — Medication 81 MILLIGRAM(S): at 12:40

## 2025-04-19 RX ADMIN — POLYETHYLENE GLYCOL 3350 17 GRAM(S): 17 POWDER, FOR SOLUTION ORAL at 18:27

## 2025-04-19 RX ADMIN — Medication 20 MILLIGRAM(S): at 12:40

## 2025-04-19 RX ADMIN — Medication 500000 UNIT(S): at 18:26

## 2025-04-19 RX ADMIN — LABETALOL HYDROCHLORIDE 100 MILLIGRAM(S): 200 TABLET, FILM COATED ORAL at 21:27

## 2025-04-19 RX ADMIN — FUROSEMIDE 2.5 MG/HR: 10 INJECTION INTRAMUSCULAR; INTRAVENOUS at 18:27

## 2025-04-19 RX ADMIN — Medication 60 MILLIGRAM(S): at 18:26

## 2025-04-19 RX ADMIN — HEPARIN SODIUM 5000 UNIT(S): 1000 INJECTION INTRAVENOUS; SUBCUTANEOUS at 05:47

## 2025-04-19 RX ADMIN — MAGNESIUM HYDROXIDE 30 MILLILITER(S): 400 SUSPENSION ORAL at 08:31

## 2025-04-19 RX ADMIN — LABETALOL HYDROCHLORIDE 100 MILLIGRAM(S): 200 TABLET, FILM COATED ORAL at 05:46

## 2025-04-19 RX ADMIN — LABETALOL HYDROCHLORIDE 100 MILLIGRAM(S): 200 TABLET, FILM COATED ORAL at 13:40

## 2025-04-19 NOTE — PROGRESS NOTE ADULT - SUBJECTIVE AND OBJECTIVE BOX
Transplant Surgery - Multi-disciplinary Rounds  --------------------------------------------------------------   Tx Date: 04/08/25         POD#11    Present: Patient seen and examined with multidisciplinary Transplant team including Surgeon Dr. Bernal, nephrologist Dr. Dunn, LÁZARO Cevallos/Fahad, pharmacy, and bedside RN during AM rounds.   Disciplines not in attendance will be notified of the plan.     HPI: 68 y/o M with PMHx of HLD, CAD s/p LAD PCI 7/24, HFpEF , HTN, hypothyroid, type 2 DM, ESRD on HD MWF (recently converted from PD in 1/2025) via R permacath placed on 1/2025, h/o cva with no residual deficits, persistent right sided pleural effusion, and hx of hemorrhagic pericardial effusion (cytopathology negative) presents to [facility] for possible DDRT. He states he makes less than half cup of urine a day. Denies fever, chills, N/V/D/C, abdominal pain, CP, SOB, hemoptysis, and /GI complaints. Now s/p DDRT  1a + 1 v + 1 u + stent under thymoglobulin induction on 04/08/25.     Interval Events:  - started lasix ggt, UOP responding   -no ON events   - VSS       MEDICATIONS  (STANDING):  acyclovir   Oral Tab/Cap 200 milliGRAM(s) Oral two times a day  aspirin enteric coated 81 milliGRAM(s) Oral daily  atorvastatin 40 milliGRAM(s) Oral at bedtime  chlorhexidine 2% Cloths 1 Application(s) Topical daily  doxazosin 8 milliGRAM(s) Oral with breakfast  doxazosin 8 milliGRAM(s) Oral at bedtime  famotidine    Tablet 20 milliGRAM(s) Oral daily  furosemide Infusion 5 mG/Hr (2.5 mL/Hr) IV Continuous <Continuous>  heparin   Injectable 5000 Unit(s) SubCutaneous every 12 hours  insulin glargine Injectable (LANTUS) 6 Unit(s) SubCutaneous at bedtime  insulin lispro (ADMELOG) corrective regimen sliding scale   SubCutaneous three times a day before meals  insulin lispro (ADMELOG) corrective regimen sliding scale   SubCutaneous at bedtime  insulin lispro Injectable (ADMELOG) 4 Unit(s) SubCutaneous three times a day before meals  isosorbide   mononitrate ER Tablet (IMDUR) 60 milliGRAM(s) Oral daily  labetalol 100 milliGRAM(s) Oral every 8 hours  levothyroxine 25 MICROGram(s) Oral daily  mycophenolate mofetil 500 milliGRAM(s) Oral <User Schedule>  Nephro-brenda 1 Tablet(s) Oral daily  NIFEdipine XL 60 milliGRAM(s) Oral two times a day  nystatin    Suspension 344727 Unit(s) Swish and Swallow four times a day  polyethylene glycol 3350 17 Gram(s) Oral every 12 hours  predniSONE   Tablet 5 milliGRAM(s) Oral daily  predniSONE   Tablet   Oral   senna 2 Tablet(s) Oral at bedtime  tacrolimus ER Tablet (ENVARSUS XR) 4 milliGRAM(s) Oral <User Schedule>  tamsulosin 0.4 milliGRAM(s) Oral at bedtime  trimethoprim   80 mG/sulfamethoxazole 400 mG 1 Tablet(s) Oral <User Schedule>    MEDICATIONS  (PRN):  acetaminophen     Tablet .. 650 milliGRAM(s) Oral every 6 hours PRN Mild Pain (1 - 3)  artificial tears (preservative free) Ophthalmic Solution 1 Drop(s) Left EYE daily PRN Dry Eyes  ondansetron Injectable 4 milliGRAM(s) IV Push every 6 hours PRN Nausea and/or Vomiting      PAST MEDICAL & SURGICAL HISTORY:  Hypertension      ESRD on peritoneal dialysis      CVA (cerebrovascular accident)  2010 without residual effects      Hyperlipidemia      BPH (benign prostatic hyperplasia)      CAD (coronary artery disease)      T2DM (type 2 diabetes mellitus)      Hypothyroidism      History of peritoneal dialysis  s/p PD catheter placement      S/P coronary artery stent placement          Vital Signs Last 24 Hrs  T(C): 36.6 (19 Apr 2025 21:00), Max: 36.8 (19 Apr 2025 00:48)  T(F): 97.8 (19 Apr 2025 21:00), Max: 98.3 (19 Apr 2025 00:48)  HR: 60 (19 Apr 2025 21:00) (57 - 63)  BP: 164/67 (19 Apr 2025 21:00) (121/58 - 168/73)  BP(mean): 99 (19 Apr 2025 13:42) (84 - 113)  RR: 18 (19 Apr 2025 21:00) (18 - 20)  SpO2: 100% (19 Apr 2025 21:00) (100% - 100%)    Parameters below as of 19 Apr 2025 21:00  Patient On (Oxygen Delivery Method): room air        I&O's Summary    18 Apr 2025 07:01  -  19 Apr 2025 07:00  --------------------------------------------------------  IN: 810 mL / OUT: 447 mL / NET: 363 mL    19 Apr 2025 07:01  -  19 Apr 2025 21:53  --------------------------------------------------------  IN: 720 mL / OUT: 1220 mL / NET: -500 mL                              7.3    7.07  )-----------( 388      ( 19 Apr 2025 07:13 )             23.7     04-19    134[L]  |  94[L]  |  59[H]  ----------------------------<  66[L]  4.1   |  22  |  6.66[H]    Ca    8.4      19 Apr 2025 07:16  Phos  5.4     04-19  Mg     2.4     04-19    TPro  5.5[L]  /  Alb  3.0[L]  /  TBili  0.2  /  DBili  x   /  AST  31  /  ALT  15  /  AlkPhos  68  04-19    Tacrolimus (), Serum: 5.0 ng/mL (04-19 @ 07:14)        Culture - Blood (collected 04-17-25 @ 10:17)  Source: Blood Blood-Peripheral  Preliminary Report (04-19-25 @ 18:01):    No growth at 48 Hours    Culture - Blood (collected 04-17-25 @ 10:17)  Source: Blood Blood-Peripheral  Preliminary Report (04-19-25 @ 18:01):    No growth at 48 Hours            Immunosuppression:  Induction: Thymo completed  Maintenance: Petra last dose 4/14/ENV per level/ BID/ pred 5  Ongoing monitoring for signs of rejection     Potential Discharge date: TBD  Education:  Medications  Plan of care:  See Below            Review of systems  All other systems were reviewed and are negative, except as noted.      PHYSICAL EXAM:  Constitutional: Well developed / well nourished  Eyes: Anicteric, PERRLA  ENMT: nc/at  Neck: Supple  Respiratory: CTA B/L  Cardiovascular: RRR  Gastrointestinal: Soft, non-distended, mild abdominal pain to palpation at incision site; incision c/d/i; COSMO x #1/2 perinephric ss   Genitourinary: voiding  Extremities: SCD's in place and working bilaterally, R permcath  Vascular: Palpable dp pulses bilaterally  Neurological: A&O x3  Skin: no rashes, ulcerations or lesions;  Musculoskeletal: Moving all extremities  Psychiatric: Responsive

## 2025-04-19 NOTE — PROGRESS NOTE ADULT - ASSESSMENT
69M with PMHx of HLD, CAD s/p LAD PCI 7/24, HFpEF, HTN, hypothyroid, type 2 DM, ESRD on HD MWF s/p DDRT      -s/p DDRT 4/8/2025 complicated intraop course with subcapsular hematoma, long CIT ~ 36 hours c/b delayed graft function- hyperkalemia- remains on HD MWF-now with improving UOP on lasix gtt- to continue, will plan for HD today with no more than 1L UF  -IS- thymo induction- plan for low dose tac goal 2-4, belatacept denovo- s/p dose 4/14, next due 4/24/2025, con't MMF 500mg BID, and pred 5mg daily- DSA neg 4/16/2025  -HTN urgency- was placed again on cardene gtt 4/15- now off but BPs still improved now withdoxazocin to 8mg BID, con't labetalol, and nifedipine 60mg mg BID, imdur 60mg daily, may consider coreg instead of labetalol- monitor BPs today  -Anemia- multifactorial- pending iron panel and ferritin .

## 2025-04-19 NOTE — PROGRESS NOTE ADULT - SUBJECTIVE AND OBJECTIVE BOX
Dr. Menjivar  Office [phone] (9 am to 5 pm)  Service: [phone] (5pm to 9am)  Cori ELIZONDO      RENAL PROGRESS NOTE: DATE OF SERVICE 04-19-25 @ 11:04    Patient is a 69y old  Male who presents with a chief complaint of possible DDRT (18 Apr 2025 15:34)      Patient seen and examined at bedside. No chest pain/sob    VITALS:  T(F): 97.8 (04-19-25 @ 09:53), Max: 98.4 (04-18-25 @ 21:16)  HR: 59 (04-19-25 @ 09:53)  BP: 121/58 (04-19-25 @ 09:53)  RR: 18 (04-19-25 @ 09:53)  SpO2: 100% (04-19-25 @ 09:53)  Wt(kg): --    04-18 @ 07:01  -  04-19 @ 07:00  --------------------------------------------------------  IN: 810 mL / OUT: 447 mL / NET: 363 mL    04-19 @ 07:01  -  04-19 @ 11:04  --------------------------------------------------------  IN: 240 mL / OUT: 30 mL / NET: 210 mL          PHYSICAL EXAM:  Constitutional: NAD  Neck: No JVD  Respiratory: CTAB, no wheezes, rales or rhonchi  Cardiovascular: S1, S2, RRR  Gastrointestinal: BS+, soft, NT/ND  Extremities: No peripheral edema    Hospital Medications:   MEDICATIONS  (STANDING):  acyclovir   Oral Tab/Cap 200 milliGRAM(s) Oral two times a day  aspirin enteric coated 81 milliGRAM(s) Oral daily  atorvastatin 40 milliGRAM(s) Oral at bedtime  chlorhexidine 2% Cloths 1 Application(s) Topical daily  doxazosin 8 milliGRAM(s) Oral with breakfast  doxazosin 8 milliGRAM(s) Oral at bedtime  famotidine    Tablet 20 milliGRAM(s) Oral daily  furosemide Infusion 5 mG/Hr (2.5 mL/Hr) IV Continuous <Continuous>  heparin   Injectable 5000 Unit(s) SubCutaneous every 12 hours  insulin glargine Injectable (LANTUS) 6 Unit(s) SubCutaneous at bedtime  insulin lispro (ADMELOG) corrective regimen sliding scale   SubCutaneous three times a day before meals  insulin lispro (ADMELOG) corrective regimen sliding scale   SubCutaneous at bedtime  insulin lispro Injectable (ADMELOG) 4 Unit(s) SubCutaneous three times a day before meals  isosorbide   mononitrate ER Tablet (IMDUR) 60 milliGRAM(s) Oral daily  labetalol 100 milliGRAM(s) Oral every 8 hours  levothyroxine 25 MICROGram(s) Oral daily  mycophenolate mofetil 500 milliGRAM(s) Oral <User Schedule>  Nephro-brenda 1 Tablet(s) Oral daily  NIFEdipine XL 60 milliGRAM(s) Oral two times a day  nystatin    Suspension [id number] Unit(s) Swish and Swallow four times a day  polyethylene glycol 3350 17 Gram(s) Oral every 12 hours  predniSONE   Tablet   Oral   predniSONE   Tablet 5 milliGRAM(s) Oral daily  senna 2 Tablet(s) Oral at bedtime  tacrolimus ER Tablet (ENVARSUS XR) 4 milliGRAM(s) Oral <User Schedule>  tamsulosin 0.4 milliGRAM(s) Oral at bedtime  trimethoprim   80 mG/sulfamethoxazole 400 mG 1 Tablet(s) Oral <User Schedule>    Tacrolimus (), Serum: 5.0 ng/mL (04-19 @ 07:14)    LABS:  04-19    134[L]  |  94[L]  |  59[H]  ----------------------------<  66[L]  4.1   |  22  |  6.66[H]    Ca    8.4      19 Apr 2025 07:16  Phos  5.4     04-19  Mg     2.4     04-19    TPro  5.5[L]  /  Alb  3.0[L]  /  TBili  0.2  /  DBili      /  AST  31  /  ALT  15  /  AlkPhos  68  04-19    Creatinine Trend: 6.66 <--, 5.35 <--, 4.16 <--, 3.22 <--, 5.78 <--, 4.83 <--, 4.85 <--, 4.43 <--, 3.90 <--, 7.04 <--, 6.73 <--, 5.45 <--    Ferritin: 1344 ng/mL (04-19 @ 07:19)  Iron Total: 50 ug/dL (04-19 @ 07:16)  Albumin: 3.0 g/dL (04-19 @ 07:16)  Phosphorus: 5.4 mg/dL (04-19 @ 07:16)                              7.3    7.07  )-----------( 388      ( 19 Apr 2025 07:13 )             23.7     Urine Studies:  Urinalysis - [04-19-25 @ 07:16]      Color  / Appearance  / SG  / pH       Gluc 66 / Ketone   / Bili  / Urobili        Blood  / Protein  / Leuk Est  / Nitrite       RBC  / WBC  / Hyaline  / Gran  / Sq Epi  / Non Sq Epi  / Bacteria       Iron 50, TIBC --, %sat --      [04-19-25 @ 07:16]  Ferritin 1344      [04-19-25 @ 07:19]  PTH -- (Ca 7.9)      [12-30-24 @ 06:50]   229  PTH -- (Ca 7.6)      [11-27-24 @ 04:23]   250  Vitamin D (25OH) 17.1      [11-25-24 @ 06:50]  TSH 2.48      [04-13-25 @ 07:24]  Lipid: chol 112, TG 61, HDL 46, LDL --      [12-31-24 @ 04:27]    HBsAb 46.0      [04-07-25 @ 21:39]  HBsAg Nonreact      [04-07-25 @ 21:39]  HBcAb Nonreact      [04-07-25 @ 21:39]  HCV 0.05, Nonreact      [04-07-25 @ 21:39]  HIV Nonreact      [04-07-25 @ 21:39]      RADIOLOGY & ADDITIONAL STUDIES:

## 2025-04-19 NOTE — PROGRESS NOTE ADULT - ASSESSMENT
68 y/o M with PMHx of HLD, CAD s/p LAD PCI 7/24, HFpEF, HTN, hypothyroid, type 2 DM, ESRD on HD MWF (recently converted from PD in 1/2025) via R permacath s/p DDRT  1a + 1 v + 1 u + stent under thymoglobulin induction on 04/08/2    []s/p DDRT under thymo induction, POD #11  - most recent doppler with patent vasculature, increased perinephric collection 2.5x4.5x7.2cm  - DGF: HD - 4/9, 4/11, 4/14, 4/16, 4/18  - Donor + GPC, vanc by level unti. 4/17  - Strict I/O's, JPx2 SS; d/c'd JP3  - Consistent carb diet  - Incr. Bowel regimen for dilated bowel loops on CT  - IS, PT  - Flomax  - Per Cardiology - can resume plavix when no bleed risk  - f/u 4/16 DSA  - Noncontrast CT A/P - collection improving drain in place, dilated bowel loops    [ ] Immunosuppression  - Thymoglobulin induction completed  - ENV per level,  BID, SST  - Belatacept 4/10, 4/14, next dose 4/24  - ppx: bactrim TIW, nystatin, Acyclovir 200 BID    []HTN  - off cardene gtt, resumed cardene gtt 4/14 - 4/15  - Nifed 60 BID, Imdur 60 qd, labetalol 100mg PO q8hr, doxazosin 8mg in AM and 8mg QHS  - IV lasix 80 x1, metalazone 5mg x1, now on lasix gtt 5mg/hr    [] DVT ppx  - on SQH BID/ASA, SCD    [ ] hyperglycemia   - lantus &lispro, adjusted PRN

## 2025-04-19 NOTE — PROGRESS NOTE ADULT - ASSESSMENT
70 y/o M with PMHx of HLD, CAD s/p LAD PCI 7/24, HFpEF , HTN, hypothyroid, type 2 DM, ESRD on HD MWF (recently converted from PD in 1/2025) via permacath placed on 1/2025, h/o cva with no residual deficits, persistent right sided pleural effusion, and hx of hemorrhagic pericardial effusion (cytopathology negative) presents to [facility] for possible DDRT. He states he makes less than half cup of urine a day. Denies fever, chills, N/V/D/C, abdominal pain, CP, SOB, hemoptysis, and /GI complaints.     A/P  ESRD s/p DDRT 04/09  Complicated for HTN urgency and DGF  Steroid and thymo induction  Nephrologist is Dr. Menjivar   Access is PermCath   Was recently converted from PD to HD, on MMF schedule as outpatient    Last HD 4/7, s/p HD on 04/08 and 04/09, and 04/11, 04/14, 04/16  Planned for HD today  On MMF and  Tac follow levels, On prednisone taper  On acyclovir and nsytatin prophylaxis  On belatacept, next dose 4/24  Monitor bmp   Renally dose meds    HTN   On doxazosin labetalol tamsulosin and nifedipine  UF w/ HD if needed  Managed per transplant team   Monitor bp     Anemia  Hb below goal  Monitor     CKD-MBD  Monitor ca and phos daily

## 2025-04-19 NOTE — PROGRESS NOTE ADULT - SUBJECTIVE AND OBJECTIVE BOX
[facility] DIVISION OF KIDNEY DISEASES AND HYPERTENSION -- FOLLOW UP NOTE  --------------------------------------------------------------------------------  Chief Complaint:    24 hour events/subjective:        PAST HISTORY  --------------------------------------------------------------------------------  No significant changes to PMH, PSH, FHx, SHx, unless otherwise noted    ALLERGIES & MEDICATIONS  --------------------------------------------------------------------------------  Allergies    hydrALAZINE (Short breath)    Intolerances      Standing Inpatient Medications  acyclovir   Oral Tab/Cap 200 milliGRAM(s) Oral two times a day  aspirin enteric coated 81 milliGRAM(s) Oral daily  atorvastatin 40 milliGRAM(s) Oral at bedtime  chlorhexidine 2% Cloths 1 Application(s) Topical daily  doxazosin 8 milliGRAM(s) Oral with breakfast  doxazosin 8 milliGRAM(s) Oral at bedtime  famotidine    Tablet 20 milliGRAM(s) Oral daily  furosemide Infusion 5 mG/Hr IV Continuous <Continuous>  heparin   Injectable 5000 Unit(s) SubCutaneous every 12 hours  insulin glargine Injectable (LANTUS) 6 Unit(s) SubCutaneous at bedtime  insulin lispro (ADMELOG) corrective regimen sliding scale   SubCutaneous three times a day before meals  insulin lispro (ADMELOG) corrective regimen sliding scale   SubCutaneous at bedtime  insulin lispro Injectable (ADMELOG) 4 Unit(s) SubCutaneous three times a day before meals  isosorbide   mononitrate ER Tablet (IMDUR) 60 milliGRAM(s) Oral daily  labetalol 100 milliGRAM(s) Oral every 8 hours  levothyroxine 25 MICROGram(s) Oral daily  mycophenolate mofetil 500 milliGRAM(s) Oral <User Schedule>  Nephro-brenda 1 Tablet(s) Oral daily  NIFEdipine XL 60 milliGRAM(s) Oral two times a day  nystatin    Suspension 084487 Unit(s) Swish and Swallow four times a day  polyethylene glycol 3350 17 Gram(s) Oral every 12 hours  predniSONE   Tablet 5 milliGRAM(s) Oral daily  predniSONE   Tablet   Oral   senna 2 Tablet(s) Oral at bedtime  tacrolimus ER Tablet (ENVARSUS XR) 4 milliGRAM(s) Oral <User Schedule>  tamsulosin 0.4 milliGRAM(s) Oral at bedtime  trimethoprim   80 mG/sulfamethoxazole 400 mG 1 Tablet(s) Oral <User Schedule>    PRN Inpatient Medications  acetaminophen     Tablet .. 650 milliGRAM(s) Oral every 6 hours PRN  artificial tears (preservative free) Ophthalmic Solution 1 Drop(s) Left EYE daily PRN  ondansetron Injectable 4 milliGRAM(s) IV Push every 6 hours PRN      REVIEW OF SYSTEMS  --------------------------------------------------------------------------------  Gen: No fatigue, fevers/chills, weakness  Skin: No rashes  Head/Eyes/Ears/Mouth: No headache;No sore throat  Respiratory: No dyspnea, cough,   CV: No chest pain, PND, orthopnea  GI: No abdominal pain, diarrhea, constipation, nausea, vomiting  Transplant: No pain  : No increased frequency, dysuria, hematuria, nocturia  MSK: No joint pain/swelling; no back pain; no edema  Neuro: No dizziness/lightheadedness, weakness, seizures, numbness, tingling  Psych: No significant nervousness, anxiety, stress, depression    All other systems were reviewed and are negative, except as noted.    VITALS/PHYSICAL EXAM  --------------------------------------------------------------------------------  T(C): 36.6 (04-19-25 @ 13:03), Max: 36.9 (04-18-25 @ 21:16)  HR: 62 (04-19-25 @ 13:42) (57 - 66)  BP: 154/69 (04-19-25 @ 13:42) (121/58 - 175/82)  RR: 18 (04-19-25 @ 13:42) (18 - 20)  SpO2: 100% (04-19-25 @ 13:42) (100% - 100%)  Wt(kg): --        04-18-25 @ 07:01  -  04-19-25 @ 07:00  --------------------------------------------------------  IN: 810 mL / OUT: 447 mL / NET: 363 mL    04-19-25 @ 07:01  -  04-19-25 @ 14:42  --------------------------------------------------------  IN: 480 mL / OUT: 130 mL / NET: 350 mL      Physical Exam:  	Gen: NAD  	HEENT: PERRL, supple neck, clear oropharynx  	Pulm: CTA B/L  	CV: RRR, S1S2; no rub  	Back: No spinal or CVA tenderness; no sacral edema  	Abd: +BS, soft, nontender/nondistended                      Transplant: No tenderness, swelling  	: No suprapubic tenderness  	UE: Warm, FROM; no edema; no asterixis  	LE: Warm, FROM; no edema  	Neuro: No focal deficits  	Psych: Normal affect and mood  	Skin: Warm, without rashes      LABS/STUDIES  --------------------------------------------------------------------------------              7.3    7.07  >-----------<  388      [04-19-25 @ 07:13]              23.7     134  |  94  |  59  ----------------------------<  66      [04-19-25 @ 07:16]  4.1   |  22  |  6.66        Ca     8.4     [04-19-25 @ 07:16]      Mg     2.4     [04-19-25 @ 07:16]      Phos  5.4     [04-19-25 @ 07:16]    TPro  5.5  /  Alb  3.0  /  TBili  0.2  /  DBili  x   /  AST  31  /  ALT  15  /  AlkPhos  68  [04-19-25 @ 07:16]    PT/INR: PT 10.3 , INR 0.89       [04-19-25 @ 07:09]  PTT: 29.3       [04-19-25 @ 07:09]      Creatinine Trend:  SCr 6.66 [04-19 @ 07:16]  SCr 5.35 [04-18 @ 07:12]  SCr 4.16 [04-17 @ 05:42]  SCr 3.22 [04-16 @ 18:19]  SCr 5.78 [04-16 @ 05:28]    Tacrolimus (), Serum: 5.0 ng/mL (04-19 @ 07:14)  Tacrolimus (), Serum: 3.7 ng/mL (04-18 @ 07:18)  Tacrolimus (), Serum: 4.0 ng/mL (04-17 @ 05:42)  Tacrolimus (), Serum: 3.3 ng/mL (04-16 @ 05:28)            Urinalysis - [04-19-25 @ 07:16]      Color  / Appearance  / SG  / pH       Gluc 66 / Ketone   / Bili  / Urobili        Blood  / Protein  / Leuk Est  / Nitrite       RBC  / WBC  / Hyaline  / Gran  / Sq Epi  / Non Sq Epi  / Bacteria       Iron 50, TIBC --, %sat --      [04-19-25 @ 07:16]  Ferritin 1344      [04-19-25 @ 07:19]  PTH -- (Ca 7.9)      [12-30-24 @ 06:50]   229  PTH -- (Ca 7.6)      [11-27-24 @ 04:23]   250  Vitamin D (25OH) 17.1      [11-25-24 @ 06:50]  TSH 2.48      [04-13-25 @ 07:24]  Lipid: chol 112, TG 61, HDL 46, LDL --      [12-31-24 @ 04:27]    HBsAb 46.0      [04-07-25 @ 21:39]  HBsAg Nonreact      [04-07-25 @ 21:39]  HBcAb Nonreact      [04-07-25 @ 21:39]  HCV 0.05, Nonreact      [04-07-25 @ 21:39]  HIV Nonreact      [04-07-25 @ 21:39]       [facility] DIVISION OF KIDNEY DISEASES AND HYPERTENSION -- FOLLOW UP NOTE  --------------------------------------------------------------------------------  Chief Complaint: DDRT    24 hour events/subjective:  Lying in bed, sleeping comfortably. UOP improved with lasix gtt- 215cc + 2 not counted  Patient has some soreness at area of transplant with drains. No other complaints.  BPs are improved  No fever, chills, CP, SOB, or LE swelling.    PAST HISTORY  --------------------------------------------------------------------------------  No significant changes to PMH, PSH, FHx, SHx, unless otherwise noted    ALLERGIES & MEDICATIONS  --------------------------------------------------------------------------------  Allergies    hydrALAZINE (Short breath)    Intolerances      Standing Inpatient Medications  acyclovir   Oral Tab/Cap 200 milliGRAM(s) Oral two times a day  aspirin enteric coated 81 milliGRAM(s) Oral daily  atorvastatin 40 milliGRAM(s) Oral at bedtime  chlorhexidine 2% Cloths 1 Application(s) Topical daily  doxazosin 8 milliGRAM(s) Oral with breakfast  doxazosin 8 milliGRAM(s) Oral at bedtime  famotidine    Tablet 20 milliGRAM(s) Oral daily  furosemide Infusion 5 mG/Hr IV Continuous <Continuous>  heparin   Injectable 5000 Unit(s) SubCutaneous every 12 hours  insulin glargine Injectable (LANTUS) 6 Unit(s) SubCutaneous at bedtime  insulin lispro (ADMELOG) corrective regimen sliding scale   SubCutaneous three times a day before meals  insulin lispro (ADMELOG) corrective regimen sliding scale   SubCutaneous at bedtime  insulin lispro Injectable (ADMELOG) 4 Unit(s) SubCutaneous three times a day before meals  isosorbide   mononitrate ER Tablet (IMDUR) 60 milliGRAM(s) Oral daily  labetalol 100 milliGRAM(s) Oral every 8 hours  levothyroxine 25 MICROGram(s) Oral daily  mycophenolate mofetil 500 milliGRAM(s) Oral <User Schedule>  Nephro-brenda 1 Tablet(s) Oral daily  NIFEdipine XL 60 milliGRAM(s) Oral two times a day  nystatin    Suspension [id number] Unit(s) Swish and Swallow four times a day  polyethylene glycol 3350 17 Gram(s) Oral every 12 hours  predniSONE   Tablet 5 milliGRAM(s) Oral daily  predniSONE   Tablet   Oral   senna 2 Tablet(s) Oral at bedtime  tacrolimus ER Tablet (ENVARSUS XR) 4 milliGRAM(s) Oral <User Schedule>  tamsulosin 0.4 milliGRAM(s) Oral at bedtime  trimethoprim   80 mG/sulfamethoxazole 400 mG 1 Tablet(s) Oral <User Schedule>    PRN Inpatient Medications  acetaminophen     Tablet .. 650 milliGRAM(s) Oral every 6 hours PRN  artificial tears (preservative free) Ophthalmic Solution 1 Drop(s) Left EYE daily PRN  ondansetron Injectable 4 milliGRAM(s) IV Push every 6 hours PRN      REVIEW OF SYSTEMS  --------------------------------------------------------------------------------  Gen: No fevers/chills  Respiratory: No dyspnea, cough,   CV: No chest pain, PND, orthopnea  GI: Abdominal pain  Transplant: No pain  : No increased frequency, dysuria, hematuria   MSK: No edema  Neuro: No dizziness/lightheadedness      All other systems were reviewed and are negative, except as noted.    VITALS/PHYSICAL EXAM  --------------------------------------------------------------------------------  T(C): 36.6 (04-19-25 @ 13:03), Max: 36.9 (04-18-25 @ 21:16)  HR: 62 (04-19-25 @ 13:42) (57 - 66)  BP: 154/69 (04-19-25 @ 13:42) (121/58 - 175/82)  RR: 18 (04-19-25 @ 13:42) (18 - 20)  SpO2: 100% (04-19-25 @ 13:42) (100% - 100%)  Wt(kg): --        04-18-25 @ 07:01  -  04-19-25 @ 07:00  --------------------------------------------------------  IN: 810 mL / OUT: 447 mL / NET: 363 mL    04-19-25 @ 07:01  -  04-19-25 @ 14:42  --------------------------------------------------------  IN: 480 mL / OUT: 130 mL / NET: 350 mL      Physical Exam:  	Alert and oriented x3   HEENT: normal  Pulm: CTA B/L  CV: normal S1S2; no murmur  Transplant Abd: Tender upon palpation in RLQ, 2 drains in place  Extremities- no edema  RLQ surgical scar        LABS/STUDIES  --------------------------------------------------------------------------------              7.3    7.07  >-----------<  388      [04-19-25 @ 07:13]              23.7     134  |  94  |  59  ----------------------------<  66      [04-19-25 @ 07:16]  4.1   |  22  |  6.66        Ca     8.4     [04-19-25 @ 07:16]      Mg     2.4     [04-19-25 @ 07:16]      Phos  5.4     [04-19-25 @ 07:16]    TPro  5.5  /  Alb  3.0  /  TBili  0.2  /  DBili  x   /  AST  31  /  ALT  15  /  AlkPhos  68  [04-19-25 @ 07:16]    PT/INR: PT 10.3 , INR 0.89       [04-19-25 @ 07:09]  PTT: 29.3       [04-19-25 @ 07:09]      Creatinine Trend:  SCr 6.66 [04-19 @ 07:16]  SCr 5.35 [04-18 @ 07:12]  SCr 4.16 [04-17 @ 05:42]  SCr 3.22 [04-16 @ 18:19]  SCr 5.78 [04-16 @ 05:28]    Tacrolimus (), Serum: 5.0 ng/mL (04-19 @ 07:14)  Tacrolimus (), Serum: 3.7 ng/mL (04-18 @ 07:18)  Tacrolimus (), Serum: 4.0 ng/mL (04-17 @ 05:42)  Tacrolimus (), Serum: 3.3 ng/mL (04-16 @ 05:28)            Urinalysis - [04-19-25 @ 07:16]      Color  / Appearance  / SG  / pH       Gluc 66 / Ketone   / Bili  / Urobili        Blood  / Protein  / Leuk Est  / Nitrite       RBC  / WBC  / Hyaline  / Gran  / Sq Epi  / Non Sq Epi  / Bacteria       Iron 50, TIBC --, %sat --      [04-19-25 @ 07:16]  Ferritin 1344      [04-19-25 @ 07:19]  PTH -- (Ca 7.9)      [12-30-24 @ 06:50]   229  PTH -- (Ca 7.6)      [11-27-24 @ 04:23]   250  Vitamin D (25OH) 17.1      [11-25-24 @ 06:50]  TSH 2.48      [04-13-25 @ 07:24]  Lipid: chol 112, TG 61, HDL 46, LDL --      [12-31-24 @ 04:27]    HBsAb 46.0      [04-07-25 @ 21:39]  HBsAg Nonreact      [04-07-25 @ 21:39]  HBcAb Nonreact      [04-07-25 @ 21:39]  HCV 0.05, Nonreact      [04-07-25 @ 21:39]  HIV Nonreact      [04-07-25 @ 21:39]

## 2025-04-20 LAB
ALBUMIN SERPL ELPH-MCNC: 3.2 G/DL — LOW (ref 3.3–5)
ALP SERPL-CCNC: 72 U/L — SIGNIFICANT CHANGE UP (ref 40–120)
ALT FLD-CCNC: 20 U/L — SIGNIFICANT CHANGE UP (ref 10–45)
ANION GAP SERPL CALC-SCNC: 13 MMOL/L — SIGNIFICANT CHANGE UP (ref 5–17)
APTT BLD: 30.2 SEC — SIGNIFICANT CHANGE UP (ref 24.5–35.6)
AST SERPL-CCNC: 30 U/L — SIGNIFICANT CHANGE UP (ref 10–40)
BASOPHILS # BLD AUTO: 0.02 K/UL — SIGNIFICANT CHANGE UP (ref 0–0.2)
BASOPHILS NFR BLD AUTO: 0.2 % — SIGNIFICANT CHANGE UP (ref 0–2)
BILIRUB SERPL-MCNC: 0.2 MG/DL — SIGNIFICANT CHANGE UP (ref 0.2–1.2)
BLD GP AB SCN SERPL QL: NEGATIVE — SIGNIFICANT CHANGE UP
BUN SERPL-MCNC: 29 MG/DL — HIGH (ref 7–23)
CALCIUM SERPL-MCNC: 8.4 MG/DL — SIGNIFICANT CHANGE UP (ref 8.4–10.5)
CHLORIDE SERPL-SCNC: 95 MMOL/L — LOW (ref 96–108)
CO2 SERPL-SCNC: 27 MMOL/L — SIGNIFICANT CHANGE UP (ref 22–31)
CREAT SERPL-MCNC: 4 MG/DL — HIGH (ref 0.5–1.3)
EGFR: 15 ML/MIN/1.73M2 — LOW
EGFR: 15 ML/MIN/1.73M2 — LOW
EOSINOPHIL # BLD AUTO: 0.39 K/UL — SIGNIFICANT CHANGE UP (ref 0–0.5)
EOSINOPHIL NFR BLD AUTO: 4.8 % — SIGNIFICANT CHANGE UP (ref 0–6)
GLUCOSE BLDC GLUCOMTR-MCNC: 117 MG/DL — HIGH (ref 70–99)
GLUCOSE BLDC GLUCOMTR-MCNC: 122 MG/DL — HIGH (ref 70–99)
GLUCOSE BLDC GLUCOMTR-MCNC: 137 MG/DL — HIGH (ref 70–99)
GLUCOSE BLDC GLUCOMTR-MCNC: 180 MG/DL — HIGH (ref 70–99)
GLUCOSE BLDC GLUCOMTR-MCNC: 182 MG/DL — HIGH (ref 70–99)
GLUCOSE SERPL-MCNC: 149 MG/DL — HIGH (ref 70–99)
HCT VFR BLD CALC: 22.7 % — LOW (ref 39–50)
HGB BLD-MCNC: 7.3 G/DL — LOW (ref 13–17)
IMM GRANULOCYTES NFR BLD AUTO: 1 % — HIGH (ref 0–0.9)
INR BLD: 0.93 RATIO — SIGNIFICANT CHANGE UP (ref 0.85–1.16)
LYMPHOCYTES # BLD AUTO: 0.27 K/UL — LOW (ref 1–3.3)
LYMPHOCYTES # BLD AUTO: 3.3 % — LOW (ref 13–44)
MAGNESIUM SERPL-MCNC: 2.1 MG/DL — SIGNIFICANT CHANGE UP (ref 1.6–2.6)
MCHC RBC-ENTMCNC: 28.1 PG — SIGNIFICANT CHANGE UP (ref 27–34)
MCHC RBC-ENTMCNC: 32.2 G/DL — SIGNIFICANT CHANGE UP (ref 32–36)
MCV RBC AUTO: 87.3 FL — SIGNIFICANT CHANGE UP (ref 80–100)
MONOCYTES # BLD AUTO: 0.88 K/UL — SIGNIFICANT CHANGE UP (ref 0–0.9)
MONOCYTES NFR BLD AUTO: 10.7 % — SIGNIFICANT CHANGE UP (ref 2–14)
NEUTROPHILS # BLD AUTO: 6.57 K/UL — SIGNIFICANT CHANGE UP (ref 1.8–7.4)
NEUTROPHILS NFR BLD AUTO: 80 % — HIGH (ref 43–77)
NRBC BLD AUTO-RTO: 0 /100 WBCS — SIGNIFICANT CHANGE UP (ref 0–0)
PHOSPHATE SERPL-MCNC: 3.2 MG/DL — SIGNIFICANT CHANGE UP (ref 2.5–4.5)
PLATELET # BLD AUTO: 411 K/UL — HIGH (ref 150–400)
POTASSIUM SERPL-MCNC: 3.2 MMOL/L — LOW (ref 3.5–5.3)
POTASSIUM SERPL-SCNC: 3.2 MMOL/L — LOW (ref 3.5–5.3)
PROT SERPL-MCNC: 5.7 G/DL — LOW (ref 6–8.3)
PROTHROM AB SERPL-ACNC: 10.6 SEC — SIGNIFICANT CHANGE UP (ref 9.9–13.4)
RBC # BLD: 2.6 M/UL — LOW (ref 4.2–5.8)
RBC # FLD: 19.3 % — HIGH (ref 10.3–14.5)
RH IG SCN BLD-IMP: POSITIVE — SIGNIFICANT CHANGE UP
SODIUM SERPL-SCNC: 135 MMOL/L — SIGNIFICANT CHANGE UP (ref 135–145)
TACROLIMUS SERPL-MCNC: 4.3 NG/ML — SIGNIFICANT CHANGE UP
WBC # BLD: 8.21 K/UL — SIGNIFICANT CHANGE UP (ref 3.8–10.5)
WBC # FLD AUTO: 8.21 K/UL — SIGNIFICANT CHANGE UP (ref 3.8–10.5)

## 2025-04-20 PROCEDURE — 99232 SBSQ HOSP IP/OBS MODERATE 35: CPT

## 2025-04-20 PROCEDURE — 99232 SBSQ HOSP IP/OBS MODERATE 35: CPT | Mod: FS,GC

## 2025-04-20 PROCEDURE — 93010 ELECTROCARDIOGRAM REPORT: CPT

## 2025-04-20 RX ORDER — FUROSEMIDE 10 MG/ML
80 INJECTION INTRAMUSCULAR; INTRAVENOUS
Refills: 0 | Status: DISCONTINUED | OUTPATIENT
Start: 2025-04-20 | End: 2025-04-23

## 2025-04-20 RX ORDER — CARVEDILOL 3.12 MG/1
12.5 TABLET, FILM COATED ORAL EVERY 12 HOURS
Refills: 0 | Status: DISCONTINUED | OUTPATIENT
Start: 2025-04-20 | End: 2025-04-23

## 2025-04-20 RX ADMIN — TAMSULOSIN HYDROCHLORIDE 0.4 MILLIGRAM(S): 0.4 CAPSULE ORAL at 21:22

## 2025-04-20 RX ADMIN — Medication 1 APPLICATION(S): at 12:44

## 2025-04-20 RX ADMIN — INSULIN LISPRO 2: 100 INJECTION, SOLUTION INTRAVENOUS; SUBCUTANEOUS at 08:59

## 2025-04-20 RX ADMIN — MYCOPHENOLATE MOFETIL 500 MILLIGRAM(S): 500 TABLET, FILM COATED ORAL at 20:38

## 2025-04-20 RX ADMIN — ATORVASTATIN CALCIUM 40 MILLIGRAM(S): 80 TABLET, FILM COATED ORAL at 21:22

## 2025-04-20 RX ADMIN — TACROLIMUS 4 MILLIGRAM(S): 0.5 CAPSULE ORAL at 09:01

## 2025-04-20 RX ADMIN — HEPARIN SODIUM 5000 UNIT(S): 1000 INJECTION INTRAVENOUS; SUBCUTANEOUS at 17:44

## 2025-04-20 RX ADMIN — Medication 500000 UNIT(S): at 05:51

## 2025-04-20 RX ADMIN — LABETALOL HYDROCHLORIDE 100 MILLIGRAM(S): 200 TABLET, FILM COATED ORAL at 14:57

## 2025-04-20 RX ADMIN — ISOSORBIDE MONONITRATE 60 MILLIGRAM(S): 60 TABLET, EXTENDED RELEASE ORAL at 12:43

## 2025-04-20 RX ADMIN — INSULIN GLARGINE-YFGN 6 UNIT(S): 100 INJECTION, SOLUTION SUBCUTANEOUS at 21:22

## 2025-04-20 RX ADMIN — DOXAZOSIN MESYLATE 8 MILLIGRAM(S): 8 TABLET ORAL at 09:02

## 2025-04-20 RX ADMIN — Medication 500000 UNIT(S): at 12:32

## 2025-04-20 RX ADMIN — Medication 200 MILLIGRAM(S): at 17:45

## 2025-04-20 RX ADMIN — LABETALOL HYDROCHLORIDE 100 MILLIGRAM(S): 200 TABLET, FILM COATED ORAL at 05:50

## 2025-04-20 RX ADMIN — CARVEDILOL 12.5 MILLIGRAM(S): 3.12 TABLET, FILM COATED ORAL at 20:38

## 2025-04-20 RX ADMIN — MYCOPHENOLATE MOFETIL 500 MILLIGRAM(S): 500 TABLET, FILM COATED ORAL at 09:02

## 2025-04-20 RX ADMIN — HEPARIN SODIUM 5000 UNIT(S): 1000 INJECTION INTRAVENOUS; SUBCUTANEOUS at 04:57

## 2025-04-20 RX ADMIN — Medication 25 MICROGRAM(S): at 04:57

## 2025-04-20 RX ADMIN — INSULIN LISPRO 4 UNIT(S): 100 INJECTION, SOLUTION INTRAVENOUS; SUBCUTANEOUS at 12:43

## 2025-04-20 RX ADMIN — Medication 500000 UNIT(S): at 17:46

## 2025-04-20 RX ADMIN — Medication 200 MILLIGRAM(S): at 05:50

## 2025-04-20 RX ADMIN — FUROSEMIDE 2.5 MG/HR: 10 INJECTION INTRAMUSCULAR; INTRAVENOUS at 09:03

## 2025-04-20 RX ADMIN — INSULIN LISPRO 4 UNIT(S): 100 INJECTION, SOLUTION INTRAVENOUS; SUBCUTANEOUS at 09:00

## 2025-04-20 RX ADMIN — PREDNISONE 5 MILLIGRAM(S): 20 TABLET ORAL at 05:50

## 2025-04-20 RX ADMIN — Medication 60 MILLIGRAM(S): at 05:50

## 2025-04-20 RX ADMIN — Medication 81 MILLIGRAM(S): at 12:43

## 2025-04-20 RX ADMIN — DOXAZOSIN MESYLATE 8 MILLIGRAM(S): 8 TABLET ORAL at 21:22

## 2025-04-20 RX ADMIN — FUROSEMIDE 80 MILLIGRAM(S): 10 INJECTION INTRAMUSCULAR; INTRAVENOUS at 12:44

## 2025-04-20 RX ADMIN — Medication 10 MILLIGRAM(S): at 19:20

## 2025-04-20 RX ADMIN — Medication 40 MILLIEQUIVALENT(S): at 08:59

## 2025-04-20 RX ADMIN — Medication 60 MILLIGRAM(S): at 17:45

## 2025-04-20 RX ADMIN — Medication 20 MILLIGRAM(S): at 12:43

## 2025-04-20 RX ADMIN — Medication 1 TABLET(S): at 12:43

## 2025-04-20 RX ADMIN — POLYETHYLENE GLYCOL 3350 17 GRAM(S): 17 POWDER, FOR SOLUTION ORAL at 17:44

## 2025-04-20 NOTE — PROGRESS NOTE ADULT - ASSESSMENT
69M with PMHx of HLD, CAD s/p LAD PCI 7/24, HFpEF, HTN, hypothyroid, type 2 DM, ESRD on HD MWF s/p DDRT      -s/p DDRT 4/8/2025 complicated intraop course with subcapsular hematoma, long CIT ~ 36 hours c/b delayed graft function- hyperkalemia- remains on HD MWF-now with improving UOP on lasix gtt- to continue, s/p HD 4/19 with 1L UF  -IS- thymo induction- plan for low dose tac goal 2-4, belatacept denovo- s/p dose 4/14, next due 4/24/2025, con't MMF 500mg BID, and pred 5mg daily- DSA neg 4/16/2025  -HTN urgency- was placed again on cardene gtt 4/15- now off but BPs still improved now withdoxazocin to 8mg BID, con't labetalol, and nifedipine 60mg mg BID, imdur 60mg daily, may consider coreg instead of labetalol- monitor BPs today  -Anemia- multifactorial- pending iron panel and ferritin .     69M with PMHx of HLD, CAD s/p LAD PCI 7/24, HFpEF, HTN, hypothyroid, type 2 DM, ESRD on HD MWF s/p DDRT      -s/p DDRT 4/8/2025 complicated intraop course with subcapsular hematoma, long CIT ~ 36 hours c/b delayed graft function- hyperkalemia- remains on HD MWF-now with improving UOP on lasix gtt- to switch to lasix 80mg IV BID, s/p HD 4/19 with 1L UF  -IS- thymo induction- plan for low dose tac goal 2-4, belatacept denovo- s/p dose 4/14, next due 4/24/2025, con't MMF 500mg BID, and pred 5mg daily- DSA neg 4/16/2025  -HTN urgency- was placed again on cardene gtt 4/15- now off but BPs still improved now withdoxazocin to 8mg BID, con't labetalol, and nifedipine 60mg mg BID, imdur 60mg daily, may consider coreg instead of labetalol- monitor BPs today  -Anemia- multifactorial- pending iron panel and ferritin .

## 2025-04-20 NOTE — PROGRESS NOTE ADULT - SUBJECTIVE AND OBJECTIVE BOX
Transplant Surgery - Multi-disciplinary Rounds  --------------------------------------------------------------   Tx Date: 04/08/25         POD#12    Present: Patient seen and examined with multidisciplinary Transplant team including Surgeon Dr. Bernal, nephrologist Dr. Dunn, ACP MIKHAIL Aleman, pharmacy, and bedside RN during AM rounds.   Disciplines not in attendance will be notified of the plan.     HPI: 68 y/o M with PMHx of HLD, CAD s/p LAD PCI 7/24, HFpEF , HTN, hypothyroid, type 2 DM, ESRD on HD MWF (recently converted from PD in 1/2025) via R permacath placed on 1/2025, h/o cva with no residual deficits, persistent right sided pleural effusion, and hx of hemorrhagic pericardial effusion (cytopathology negative) presents to [facility] for possible DDRT. He states he makes less than half cup of urine a day. Denies fever, chills, N/V/D/C, abdominal pain, CP, SOB, hemoptysis, and /GI complaints. Now s/p DDRT  1a + 1 v + 1 u + stent under thymoglobulin induction on 04/08/25.     Interval Events:          Immunosuppression:  Induction: Thymo completed  Maintenance: Petra last dose 4/14/ENV per level/ BID/ pred 5  Ongoing monitoring for signs of rejection     Potential Discharge date: TBD  Education:  Medications  Plan of care:  See Below                                    Review of systems  All other systems were reviewed and are negative, except as noted.      PHYSICAL EXAM:  Constitutional: Well developed / well nourished  Eyes: Anicteric, PERRLA  ENMT: nc/at  Neck: Supple  Respiratory: CTA B/L  Cardiovascular: RRR  Gastrointestinal: Soft, non-distended, mild abdominal pain to palpation at incision site; incision c/d/i; COSMO x #1/2 perinephric ss   Genitourinary: voiding  Extremities: SCD's in place and working bilaterally, R permcath  Vascular: Palpable dp pulses bilaterally  Neurological: A&O x3  Skin: no rashes, ulcerations or lesions;  Musculoskeletal: Moving all extremities  Psychiatric: Responsive     Transplant Surgery - Multi-disciplinary Rounds  --------------------------------------------------------------   Tx Date: 04/08/25         POD#12    Present: Patient seen and examined with multidisciplinary Transplant team including Surgeon Dr. Bernal, nephrologist Dr. Dunn, ACP MIKHAIL Aleman, pharmacy, and bedside RN during AM rounds.   Disciplines not in attendance will be notified of the plan.     HPI: 70 y/o M with PMHx of HLD, CAD s/p LAD PCI 7/24, HFpEF , HTN, hypothyroid, type 2 DM, ESRD on HD MWF (recently converted from PD in 1/2025) via R permacath placed on 1/2025, h/o cva with no residual deficits, persistent right sided pleural effusion, and hx of hemorrhagic pericardial effusion (cytopathology negative) presents to [facility] for possible DDRT. He states he makes less than half cup of urine a day. Denies fever, chills, N/V/D/C, abdominal pain, CP, SOB, hemoptysis, and /GI complaints. Now s/p DDRT  1a + 1 v + 1 u + stent under thymoglobulin induction on 04/08/25.     Interval Events:  -ON: no acute events  -Afebrile, HTN  -low UOP  -Abnormal EKG: inverted T waves: cards c/s: recs TTE, change labetalol to coreg  -off lasix gtt, started lasix 80 IV     Immunosuppression:  Induction: Thymo completed  Maintenance: Petra last dose 4/14/ENV per level/ BID/ pred 5  Ongoing monitoring for signs of rejection     Potential Discharge date: TBD  Education:  Medications  Plan of care:  See Below        MEDICATIONS  (STANDING):  acyclovir   Oral Tab/Cap 200 milliGRAM(s) Oral two times a day  aspirin enteric coated 81 milliGRAM(s) Oral daily  atorvastatin 40 milliGRAM(s) Oral at bedtime  chlorhexidine 2% Cloths 1 Application(s) Topical daily  doxazosin 8 milliGRAM(s) Oral with breakfast  doxazosin 8 milliGRAM(s) Oral at bedtime  famotidine    Tablet 20 milliGRAM(s) Oral daily  furosemide   Injectable 80 milliGRAM(s) IV Push two times a day  heparin   Injectable 5000 Unit(s) SubCutaneous every 12 hours  insulin glargine Injectable (LANTUS) 6 Unit(s) SubCutaneous at bedtime  insulin lispro (ADMELOG) corrective regimen sliding scale   SubCutaneous three times a day before meals  insulin lispro (ADMELOG) corrective regimen sliding scale   SubCutaneous at bedtime  insulin lispro Injectable (ADMELOG) 4 Unit(s) SubCutaneous three times a day before meals  isosorbide   mononitrate ER Tablet (IMDUR) 60 milliGRAM(s) Oral daily  labetalol 100 milliGRAM(s) Oral every 8 hours  levothyroxine 25 MICROGram(s) Oral daily  mycophenolate mofetil 500 milliGRAM(s) Oral <User Schedule>  Nephro-brenda 1 Tablet(s) Oral daily  NIFEdipine XL 60 milliGRAM(s) Oral two times a day  nystatin    Suspension 135836 Unit(s) Swish and Swallow four times a day  polyethylene glycol 3350 17 Gram(s) Oral every 12 hours  predniSONE   Tablet   Oral   predniSONE   Tablet 5 milliGRAM(s) Oral daily  senna 2 Tablet(s) Oral at bedtime  tacrolimus ER Tablet (ENVARSUS XR) 4 milliGRAM(s) Oral <User Schedule>  tamsulosin 0.4 milliGRAM(s) Oral at bedtime  trimethoprim   80 mG/sulfamethoxazole 400 mG 1 Tablet(s) Oral <User Schedule>    MEDICATIONS  (PRN):  acetaminophen     Tablet .. 650 milliGRAM(s) Oral every 6 hours PRN Mild Pain (1 - 3)  artificial tears (preservative free) Ophthalmic Solution 1 Drop(s) Left EYE daily PRN Dry Eyes  ondansetron Injectable 4 milliGRAM(s) IV Push every 6 hours PRN Nausea and/or Vomiting    PAST MEDICAL & SURGICAL HISTORY:  Hypertension    ESRD on peritoneal dialysis    CVA (cerebrovascular accident)  2010 without residual effects    Hyperlipidemia    BPH (benign prostatic hyperplasia)    CAD (coronary artery disease)    T2DM (type 2 diabetes mellitus)    Hypothyroidism    History of peritoneal dialysis  s/p PD catheter placement    S/P coronary artery stent placement    Vital Signs Last 24 Hrs  T(C): 36.6 (20 Apr 2025 17:00), Max: 36.8 (20 Apr 2025 05:58)  T(F): 97.8 (20 Apr 2025 17:00), Max: 98.3 (20 Apr 2025 05:58)  HR: 55 (20 Apr 2025 17:00) (55 - 66)  BP: 180/81 (20 Apr 2025 17:00) (152/69 - 180/81)  BP(mean): 104 (20 Apr 2025 15:00) (99 - 119)  RR: 18 (20 Apr 2025 17:00) (16 - 20)  SpO2: 100% (20 Apr 2025 17:00) (99% - 100%)    Parameters below as of 20 Apr 2025 17:00  Patient On (Oxygen Delivery Method): room air    I&O's Summary    19 Apr 2025 07:01  -  20 Apr 2025 07:00  --------------------------------------------------------  IN: 960 mL / OUT: 1345 mL / NET: -385 mL    20 Apr 2025 07:01  -  20 Apr 2025 17:52  --------------------------------------------------------  IN: 247.5 mL / OUT: 105 mL / NET: 142.5 mL                        7.3    8.21  )-----------( 411      ( 20 Apr 2025 07:23 )             22.7     04-20    135  |  95[L]  |  29[H]  ----------------------------<  149[H]  3.2[L]   |  27  |  4.00[H]    Ca    8.4      20 Apr 2025 07:20  Phos  3.2     04-20  Mg     2.1     04-20    TPro  5.7[L]  /  Alb  3.2[L]  /  TBili  0.2  /  DBili  x   /  AST  30  /  ALT  20  /  AlkPhos  72  04-20    Tacrolimus (), Serum: 4.3 ng/mL (04-20 @ 07:26)    Culture - Blood (collected 04-17-25 @ 10:17)  Source: Blood Blood-Peripheral  Preliminary Report (04-19-25 @ 18:01):    No growth at 48 Hours    Culture - Blood (collected 04-17-25 @ 10:17)  Source: Blood Blood-Peripheral  Preliminary Report (04-19-25 @ 18:01):    No growth at 48 Hours    Review of systems  All other systems were reviewed and are negative, except as noted.    PHYSICAL EXAM:  Constitutional: Well developed / well nourished  Eyes: Anicteric, PERRLA  ENMT: nc/at  Neck: Supple  Respiratory: CTA B/L  Cardiovascular: RRR  Gastrointestinal: Soft, non-distended, mild abdominal pain to palpation at incision site; incision c/d/i; COSMO x #1/2 perinephric ss   Genitourinary: voiding  Extremities: SCD's in place and working bilaterally, R permcath  Vascular: Palpable dp pulses bilaterally  Neurological: A&O x3  Skin: no rashes, ulcerations or lesions;  Musculoskeletal: Moving all extremities  Psychiatric: Responsive     Transplant Surgery - Multi-disciplinary Rounds  --------------------------------------------------------------   Tx Date: 04/08/25         POD#12    Present: Patient seen and examined with multidisciplinary Transplant team including Surgeon Dr. Bernal, nephrologist Dr. Dunn, ACP MIKHAIL Aleman, pharmacy, and bedside RN during AM rounds.   Disciplines not in attendance will be notified of the plan.     HPI: 68 y/o M with PMHx of HLD, CAD s/p LAD PCI 7/24, HFpEF , HTN, hypothyroid, type 2 DM, ESRD on HD MWF (recently converted from PD in 1/2025) via R permacath placed on 1/2025, h/o cva with no residual deficits, persistent right sided pleural effusion, and hx of hemorrhagic pericardial effusion (cytopathology negative) presents to [facility] for possible DDRT. He states he makes less than half cup of urine a day. Denies fever, chills, N/V/D/C, abdominal pain, CP, SOB, hemoptysis, and /GI complaints. Now s/p DDRT  1a + 1 v + 1 u + stent under thymoglobulin induction on 04/08/25.     Interval Events:  -ON: no acute events  -Afebrile, HTN  -low UOP  -Abnormal EKG: inverted T waves: cards c/s: recs TTE, change labetalol to coreg  -off lasix gtt, started lasix 80 IV BID    Immunosuppression:  Induction: Thymo completed  Maintenance: Petra last dose 4/14/ENV per level/ BID/ pred 5  Ongoing monitoring for signs of rejection     Potential Discharge date: TBD  Education:  Medications  Plan of care:  See Below        MEDICATIONS  (STANDING):  acyclovir   Oral Tab/Cap 200 milliGRAM(s) Oral two times a day  aspirin enteric coated 81 milliGRAM(s) Oral daily  atorvastatin 40 milliGRAM(s) Oral at bedtime  chlorhexidine 2% Cloths 1 Application(s) Topical daily  doxazosin 8 milliGRAM(s) Oral with breakfast  doxazosin 8 milliGRAM(s) Oral at bedtime  famotidine    Tablet 20 milliGRAM(s) Oral daily  furosemide   Injectable 80 milliGRAM(s) IV Push two times a day  heparin   Injectable 5000 Unit(s) SubCutaneous every 12 hours  insulin glargine Injectable (LANTUS) 6 Unit(s) SubCutaneous at bedtime  insulin lispro (ADMELOG) corrective regimen sliding scale   SubCutaneous three times a day before meals  insulin lispro (ADMELOG) corrective regimen sliding scale   SubCutaneous at bedtime  insulin lispro Injectable (ADMELOG) 4 Unit(s) SubCutaneous three times a day before meals  isosorbide   mononitrate ER Tablet (IMDUR) 60 milliGRAM(s) Oral daily  labetalol 100 milliGRAM(s) Oral every 8 hours  levothyroxine 25 MICROGram(s) Oral daily  mycophenolate mofetil 500 milliGRAM(s) Oral <User Schedule>  Nephro-brenda 1 Tablet(s) Oral daily  NIFEdipine XL 60 milliGRAM(s) Oral two times a day  nystatin    Suspension 970387 Unit(s) Swish and Swallow four times a day  polyethylene glycol 3350 17 Gram(s) Oral every 12 hours  predniSONE   Tablet   Oral   predniSONE   Tablet 5 milliGRAM(s) Oral daily  senna 2 Tablet(s) Oral at bedtime  tacrolimus ER Tablet (ENVARSUS XR) 4 milliGRAM(s) Oral <User Schedule>  tamsulosin 0.4 milliGRAM(s) Oral at bedtime  trimethoprim   80 mG/sulfamethoxazole 400 mG 1 Tablet(s) Oral <User Schedule>    MEDICATIONS  (PRN):  acetaminophen     Tablet .. 650 milliGRAM(s) Oral every 6 hours PRN Mild Pain (1 - 3)  artificial tears (preservative free) Ophthalmic Solution 1 Drop(s) Left EYE daily PRN Dry Eyes  ondansetron Injectable 4 milliGRAM(s) IV Push every 6 hours PRN Nausea and/or Vomiting    PAST MEDICAL & SURGICAL HISTORY:  Hypertension    ESRD on peritoneal dialysis    CVA (cerebrovascular accident)  2010 without residual effects    Hyperlipidemia    BPH (benign prostatic hyperplasia)    CAD (coronary artery disease)    T2DM (type 2 diabetes mellitus)    Hypothyroidism    History of peritoneal dialysis  s/p PD catheter placement    S/P coronary artery stent placement    Vital Signs Last 24 Hrs  T(C): 36.6 (20 Apr 2025 17:00), Max: 36.8 (20 Apr 2025 05:58)  T(F): 97.8 (20 Apr 2025 17:00), Max: 98.3 (20 Apr 2025 05:58)  HR: 55 (20 Apr 2025 17:00) (55 - 66)  BP: 180/81 (20 Apr 2025 17:00) (152/69 - 180/81)  BP(mean): 104 (20 Apr 2025 15:00) (99 - 119)  RR: 18 (20 Apr 2025 17:00) (16 - 20)  SpO2: 100% (20 Apr 2025 17:00) (99% - 100%)    Parameters below as of 20 Apr 2025 17:00  Patient On (Oxygen Delivery Method): room air    I&O's Summary    19 Apr 2025 07:01  -  20 Apr 2025 07:00  --------------------------------------------------------  IN: 960 mL / OUT: 1345 mL / NET: -385 mL    20 Apr 2025 07:01  -  20 Apr 2025 17:52  --------------------------------------------------------  IN: 247.5 mL / OUT: 105 mL / NET: 142.5 mL                        7.3    8.21  )-----------( 411      ( 20 Apr 2025 07:23 )             22.7     04-20    135  |  95[L]  |  29[H]  ----------------------------<  149[H]  3.2[L]   |  27  |  4.00[H]    Ca    8.4      20 Apr 2025 07:20  Phos  3.2     04-20  Mg     2.1     04-20    TPro  5.7[L]  /  Alb  3.2[L]  /  TBili  0.2  /  DBili  x   /  AST  30  /  ALT  20  /  AlkPhos  72  04-20    Tacrolimus (), Serum: 4.3 ng/mL (04-20 @ 07:26)    Culture - Blood (collected 04-17-25 @ 10:17)  Source: Blood Blood-Peripheral  Preliminary Report (04-19-25 @ 18:01):    No growth at 48 Hours    Culture - Blood (collected 04-17-25 @ 10:17)  Source: Blood Blood-Peripheral  Preliminary Report (04-19-25 @ 18:01):    No growth at 48 Hours    Review of systems  All other systems were reviewed and are negative, except as noted.    PHYSICAL EXAM:  Constitutional: Well developed / well nourished  Eyes: Anicteric, PERRLA  ENMT: nc/at  Neck: Supple  Respiratory: CTA B/L  Cardiovascular: RRR  Gastrointestinal: Soft, non-distended, mild abdominal pain to palpation at incision site; incision c/d/i; COSMO x #1/2 perinephric ss   Genitourinary: voiding  Extremities: SCD's in place and working bilaterally, R permcath  Vascular: Palpable dp pulses bilaterally  Neurological: A&O x3  Skin: no rashes, ulcerations or lesions;  Musculoskeletal: Moving all extremities  Psychiatric: Responsive

## 2025-04-20 NOTE — PROGRESS NOTE ADULT - SUBJECTIVE AND OBJECTIVE BOX
Dr. Menjivar  Office [phone] (9 am to 5 pm)  Service: [phone] (5pm to 9am)  Cori ELIZONDO      RENAL PROGRESS NOTE: DATE OF SERVICE 04-20-25 @ 11:30    Patient is a 69y old  Male who presents with a chief complaint of possible DDRT (20 Apr 2025 05:30)      Patient seen and examined at bedside. No chest pain/sob    VITALS:  T(F): 98.2 (04-20-25 @ 08:42), Max: 98.3 (04-20-25 @ 05:58)  HR: 62 (04-20-25 @ 10:00)  BP: 152/69 (04-20-25 @ 10:00)  RR: 20 (04-20-25 @ 08:42)  SpO2: 100% (04-20-25 @ 10:00)  Wt(kg): --    04-19 @ 07:01  -  04-20 @ 07:00  --------------------------------------------------------  IN: 960 mL / OUT: 1345 mL / NET: -385 mL    04-20 @ 07:01  -  04-20 @ 11:30  --------------------------------------------------------  IN: 247.5 mL / OUT: 35 mL / NET: 212.5 mL          PHYSICAL EXAM:  Constitutional: NAD  Neck: No JVD  Respiratory: CTAB, no wheezes, rales or rhonchi  Cardiovascular: S1, S2, RRR  Gastrointestinal: BS+, soft, NT/ND  Extremities: No peripheral edema    Hospital Medications:   MEDICATIONS  (STANDING):  acyclovir   Oral Tab/Cap 200 milliGRAM(s) Oral two times a day  aspirin enteric coated 81 milliGRAM(s) Oral daily  atorvastatin 40 milliGRAM(s) Oral at bedtime  chlorhexidine 2% Cloths 1 Application(s) Topical daily  doxazosin 8 milliGRAM(s) Oral with breakfast  doxazosin 8 milliGRAM(s) Oral at bedtime  famotidine    Tablet 20 milliGRAM(s) Oral daily  furosemide   Injectable 80 milliGRAM(s) IV Push two times a day  heparin   Injectable 5000 Unit(s) SubCutaneous every 12 hours  insulin glargine Injectable (LANTUS) 6 Unit(s) SubCutaneous at bedtime  insulin lispro (ADMELOG) corrective regimen sliding scale   SubCutaneous three times a day before meals  insulin lispro (ADMELOG) corrective regimen sliding scale   SubCutaneous at bedtime  insulin lispro Injectable (ADMELOG) 4 Unit(s) SubCutaneous three times a day before meals  isosorbide   mononitrate ER Tablet (IMDUR) 60 milliGRAM(s) Oral daily  labetalol 100 milliGRAM(s) Oral every 8 hours  levothyroxine 25 MICROGram(s) Oral daily  mycophenolate mofetil 500 milliGRAM(s) Oral <User Schedule>  Nephro-brenda 1 Tablet(s) Oral daily  NIFEdipine XL 60 milliGRAM(s) Oral two times a day  nystatin    Suspension [id number] Unit(s) Swish and Swallow four times a day  polyethylene glycol 3350 17 Gram(s) Oral every 12 hours  predniSONE   Tablet   Oral   predniSONE   Tablet 5 milliGRAM(s) Oral daily  senna 2 Tablet(s) Oral at bedtime  tacrolimus ER Tablet (ENVARSUS XR) 4 milliGRAM(s) Oral <User Schedule>  tamsulosin 0.4 milliGRAM(s) Oral at bedtime  trimethoprim   80 mG/sulfamethoxazole 400 mG 1 Tablet(s) Oral <User Schedule>    Tacrolimus (), Serum: 4.3 ng/mL (04-20 @ 07:26)    LABS:  04-20    135  |  95[L]  |  29[H]  ----------------------------<  149[H]  3.2[L]   |  27  |  4.00[H]    Ca    8.4      20 Apr 2025 07:20  Phos  3.2     04-20  Mg     2.1     04-20    TPro  5.7[L]  /  Alb  3.2[L]  /  TBili  0.2  /  DBili      /  AST  30  /  ALT  20  /  AlkPhos  72  04-20    Creatinine Trend: 4.00 <--, 6.66 <--, 5.35 <--, 4.16 <--, 3.22 <--, 5.78 <--, 4.83 <--, 4.85 <--, 4.43 <--, 3.90 <--, 7.04 <--, 6.73 <--    Albumin: 3.2 g/dL (04-20 @ 07:20)  Phosphorus: 3.2 mg/dL (04-20 @ 07:20)                              7.3    8.21  )-----------( 411      ( 20 Apr 2025 07:23 )             22.7     Urine Studies:  Urinalysis - [04-20-25 @ 07:20]      Color  / Appearance  / SG  / pH       Gluc 149 / Ketone   / Bili  / Urobili        Blood  / Protein  / Leuk Est  / Nitrite       RBC  / WBC  / Hyaline  / Gran  / Sq Epi  / Non Sq Epi  / Bacteria       Iron 50, TIBC --, %sat --      [04-19-25 @ 07:16]  Ferritin 1344      [04-19-25 @ 07:19]  PTH -- (Ca 7.9)      [12-30-24 @ 06:50]   229  PTH -- (Ca 7.6)      [11-27-24 @ 04:23]   250  Vitamin D (25OH) 17.1      [11-25-24 @ 06:50]  TSH 2.48      [04-13-25 @ 07:24]  Lipid: chol 112, TG 61, HDL 46, LDL --      [12-31-24 @ 04:27]    HBsAb 46.0      [04-07-25 @ 21:39]  HBsAg Nonreact      [04-07-25 @ 21:39]  HBcAb Nonreact      [04-07-25 @ 21:39]  HCV 0.05, Nonreact      [04-07-25 @ 21:39]  HIV Nonreact      [04-07-25 @ 21:39]      RADIOLOGY & ADDITIONAL STUDIES:

## 2025-04-20 NOTE — PROGRESS NOTE ADULT - TIME BILLING
Face-to-face encounter, review of extensive medical records in this and prior charts, laboratory findings, radiographic findings, review of immunosuppression, review of medication regimen for comorbidities; documentation as noted above and discussion of diagnostic impressions and plan with the patient and team.

## 2025-04-20 NOTE — PROGRESS NOTE ADULT - ASSESSMENT
68 y/o M with PMHx of HLD, CAD s/p LAD PCI 7/24, HFpEF, HTN, hypothyroid, type 2 DM, ESRD on HD MWF (recently converted from PD in 1/2025) via R permacath s/p DDRT  1a + 1 v + 1 u + stent under thymoglobulin induction on 04/08/2    []s/p DDRT under thymo induction, POD #12  - most recent doppler with patent vasculature, increased perinephric collection 2.5x4.5x7.2cm  - DGF: HD - 4/9, 4/11, 4/14, 4/16, 4/18  - Donor + GPC, vanc by level unti. 4/17  - Strict I/O's, JPx2 SS; d/c'd JP3  - Consistent carb diet  - Incr. Bowel regimen for dilated bowel loops on CT  - IS, PT  - Flomax  - Per Cardiology - can resume plavix when no bleed risk  - f/u 4/16 DSA  - Noncontrast CT A/P - collection improving drain in place, dilated bowel loops    [ ] Immunosuppression  - Thymoglobulin induction completed  - ENV per level,  BID, SST  - Belatacept 4/10, 4/14, next dose 4/24  - ppx: bactrim TIW, nystatin, Acyclovir 200 BID    []HTN  - off cardene gtt, resumed cardene gtt 4/14 - 4/15  - Nifed 60 BID, Imdur 60 qd, labetalol 100mg PO q8hr, doxazosin 8mg in AM and 8mg QHS  - IV lasix 80 x1, metalazone 5mg x1, now on lasix gtt 5mg/hr    [] DVT ppx  - on SQH BID/ASA, SCD    [ ] hyperglycemia   - lantus &lispro, adjusted PRN 68 y/o M with PMHx of HLD, CAD s/p LAD PCI 7/24, HFpEF, HTN, hypothyroid, type 2 DM, ESRD on HD MWF (recently converted from PD in 1/2025) via R permacath s/p DDRT  1a + 1 v + 1 u + stent under thymoglobulin induction on 04/08/2    []s/p DDRT under thymo induction, POD #12  - most recent doppler with patent vasculature, increased perinephric collection 2.5x4.5x7.2cm  - DGF: HD - 4/9, 4/11, 4/14, 4/16, 4/18  - Donor + GPC, vanc by level until 4/17: no more antibotics needed as per txp ID  - Strict I/O's, JPx2 SS; d/c'd JP3  - Consistent carb diet  - Incr. Bowel regimen for dilated bowel loops on CT  - IS, PT  - Flomax  - Per Cardiology - can resume plavix when no bleed risk  - f/u 4/16 DSA  - Noncontrast CT A/P - collection improving drain in place, dilated bowel loops    [ ] Immunosuppression  - Thymoglobulin induction completed  - ENV per level,  BID, SST  - Belatacept 4/10, 4/14, next dose 4/24  - ppx: bactrim TIW, nystatin, Acyclovir 200 BID    []HTN  - off cardene gtt, resumed cardene gtt 4/14 - 4/15  - Nifed 60 BID, Imdur 60 qd, labetalol 100mg PO q8hr, doxazosin 8mg in AM and 8mg QHS  - IV lasix 80 x1, metalazone 5mg x1,   -off lnow on lasix gtt 5mg/hr    [] DVT ppx  - on SQH BID/ASA, SCD    [ ] hyperglycemia   - lantus &lispro, adjusted PRN 68 y/o M with PMHx of HLD, CAD s/p LAD PCI 7/24, HFpEF, HTN, hypothyroid, type 2 DM, ESRD on HD MWF (recently converted from PD in 1/2025) via R permacath s/p DDRT  1a + 1 v + 1 u + stent under thymoglobulin induction on 04/08/2    []s/p DDRT under thymo induction, POD #12  - most recent doppler with patent vasculature, increased perinephric collection 2.5x4.5x7.2cm  - DGF: HD - 4/9, 4/11, 4/14, 4/16, 4/18  - Donor + GPC, vanc by level until 4/17: no more antibotics needed as per txp ID  - Strict I/O's, JPx2 SS; d/c'd JP3  - Consistent carb diet  - Incr. Bowel regimen for dilated bowel loops on CT  - IS, PT  - Flomax  - Per Cardiology - can resume plavix when no bleed risk  - f/u 4/16 DSA  - Noncontrast CT A/P - collection improving drain in place, dilated bowel loops    [ ] Immunosuppression  - Thymoglobulin induction completed  - ENV per level,  BID, SST  - Belatacept 4/10, 4/14, next dose 4/24  - ppx: bactrim TIW, nystatin, Acyclovir 200 BID    []HTN  - off cardene gtt, resumed cardene gtt 4/14 - 4/15  - Nifed 60 BID, Imdur 60 qd, labetalol 100mg PO q8hr, doxazosin 8mg in AM and 8mg QHS   -off lasix gtt 5mg/hr, started lasix 80 IV BID     [] DVT ppx  - on SQH BID/ASA, SCD    [ ] hyperglycemia   - lantus &lispro, adjusted PRN

## 2025-04-20 NOTE — PROGRESS NOTE ADULT - ASSESSMENT
68 y/o M with PMHx of HLD, CAD s/p LAD PCI 7/24, HFpEF , HTN, hypothyroid, type 2 DM, ESRD on HD MWF (recently converted from PD in 1/2025) via permacath placed on 1/2025, h/o cva with no residual deficits, persistent right sided pleural effusion, and hx of hemorrhagic pericardial effusion (cytopathology negative) presents to [facility] for possible DDRT. He states he makes less than half cup of urine a day. Denies fever, chills, N/V/D/C, abdominal pain, CP, SOB, hemoptysis, and /GI complaints.     A/P  ESRD s/p DDRT 04/09  Complicated for HTN urgency and DGF  Steroid and thymo induction  Nephrologist is Dr. Menjivar   Access is PermCath   Was recently converted from PD to HD, on [facility] schedule as outpatient    Last HD 4/7, s/p HD on 04/08 and 04/09, and 04/11, 04/14, 04/16, 04/19  HD as needed  On MMF and  Tac follow levels, On prednisone taper  On acyclovir and nsytatin prophylaxis  On belatacept, next dose 4/24  Monitor bmp   Renally dose meds    HTN   On doxazosin labetalol tamsulosin and nifedipine  UF w/ HD if needed  Managed per transplant team   Monitor bp     Anemia  Hb below goal  Monitor     CKD-MBD  Monitor ca and phos daily

## 2025-04-20 NOTE — PROGRESS NOTE ADULT - SUBJECTIVE AND OBJECTIVE BOX
[facility] DIVISION OF KIDNEY DISEASES AND HYPERTENSION -- FOLLOW UP NOTE  --------------------------------------------------------------------------------  Chief Complaint:    24 hour events/subjective:        PAST HISTORY  --------------------------------------------------------------------------------  No significant changes to PMH, PSH, FHx, SHx, unless otherwise noted    ALLERGIES & MEDICATIONS  --------------------------------------------------------------------------------  Allergies    hydrALAZINE (Short breath)    Intolerances      Standing Inpatient Medications  acyclovir   Oral Tab/Cap 200 milliGRAM(s) Oral two times a day  aspirin enteric coated 81 milliGRAM(s) Oral daily  atorvastatin 40 milliGRAM(s) Oral at bedtime  chlorhexidine 2% Cloths 1 Application(s) Topical daily  doxazosin 8 milliGRAM(s) Oral with breakfast  doxazosin 8 milliGRAM(s) Oral at bedtime  famotidine    Tablet 20 milliGRAM(s) Oral daily  furosemide   Injectable 80 milliGRAM(s) IV Push two times a day  heparin   Injectable 5000 Unit(s) SubCutaneous every 12 hours  insulin glargine Injectable (LANTUS) 6 Unit(s) SubCutaneous at bedtime  insulin lispro (ADMELOG) corrective regimen sliding scale   SubCutaneous three times a day before meals  insulin lispro (ADMELOG) corrective regimen sliding scale   SubCutaneous at bedtime  insulin lispro Injectable (ADMELOG) 4 Unit(s) SubCutaneous three times a day before meals  isosorbide   mononitrate ER Tablet (IMDUR) 60 milliGRAM(s) Oral daily  labetalol 100 milliGRAM(s) Oral every 8 hours  levothyroxine 25 MICROGram(s) Oral daily  mycophenolate mofetil 500 milliGRAM(s) Oral <User Schedule>  Nephro-brenda 1 Tablet(s) Oral daily  NIFEdipine XL 60 milliGRAM(s) Oral two times a day  nystatin    Suspension 712011 Unit(s) Swish and Swallow four times a day  polyethylene glycol 3350 17 Gram(s) Oral every 12 hours  predniSONE   Tablet   Oral   predniSONE   Tablet 5 milliGRAM(s) Oral daily  senna 2 Tablet(s) Oral at bedtime  tacrolimus ER Tablet (ENVARSUS XR) 4 milliGRAM(s) Oral <User Schedule>  tamsulosin 0.4 milliGRAM(s) Oral at bedtime  trimethoprim   80 mG/sulfamethoxazole 400 mG 1 Tablet(s) Oral <User Schedule>    PRN Inpatient Medications  acetaminophen     Tablet .. 650 milliGRAM(s) Oral every 6 hours PRN  artificial tears (preservative free) Ophthalmic Solution 1 Drop(s) Left EYE daily PRN  ondansetron Injectable 4 milliGRAM(s) IV Push every 6 hours PRN      REVIEW OF SYSTEMS  --------------------------------------------------------------------------------  Gen: No fatigue, fevers/chills, weakness  Skin: No rashes  Head/Eyes/Ears/Mouth: No headache;No sore throat  Respiratory: No dyspnea, cough,   CV: No chest pain, PND, orthopnea  GI: No abdominal pain, diarrhea, constipation, nausea, vomiting  Transplant: No pain  : No increased frequency, dysuria, hematuria, nocturia  MSK: No joint pain/swelling; no back pain; no edema  Neuro: No dizziness/lightheadedness, weakness, seizures, numbness, tingling  Psych: No significant nervousness, anxiety, stress, depression    All other systems were reviewed and are negative, except as noted.    VITALS/PHYSICAL EXAM  --------------------------------------------------------------------------------  T(C): 36.6 (04-20-25 @ 13:00), Max: 36.8 (04-20-25 @ 05:58)  HR: 66 (04-20-25 @ 13:00) (57 - 66)  BP: 169/77 (04-20-25 @ 13:00) (143/67 - 180/76)  RR: 20 (04-20-25 @ 13:00) (16 - 20)  SpO2: 100% (04-20-25 @ 13:00) (99% - 100%)  Wt(kg): --        04-19-25 @ 07:01  -  04-20-25 @ 07:00  --------------------------------------------------------  IN: 960 mL / OUT: 1345 mL / NET: -385 mL    04-20-25 @ 07:01  -  04-20-25 @ 14:08  --------------------------------------------------------  IN: 247.5 mL / OUT: 35 mL / NET: 212.5 mL      Physical Exam:  	Gen: NAD  	HEENT: PERRL, supple neck, clear oropharynx  	Pulm: CTA B/L  	CV: RRR, S1S2; no rub  	Back: No spinal or CVA tenderness; no sacral edema  	Abd: +BS, soft, nontender/nondistended                      Transplant: No tenderness, swelling  	: No suprapubic tenderness  	UE: Warm, FROM; no edema; no asterixis  	LE: Warm, FROM; no edema  	Neuro: No focal deficits  	Psych: Normal affect and mood  	Skin: Warm, without rashes      LABS/STUDIES  --------------------------------------------------------------------------------              7.3    8.21  >-----------<  411      [04-20-25 @ 07:23]              22.7     135  |  95  |  29  ----------------------------<  149      [04-20-25 @ 07:20]  3.2   |  27  |  4.00        Ca     8.4     [04-20-25 @ 07:20]      Mg     2.1     [04-20-25 @ 07:20]      Phos  3.2     [04-20-25 @ 07:20]    TPro  5.7  /  Alb  3.2  /  TBili  0.2  /  DBili  x   /  AST  30  /  ALT  20  /  AlkPhos  72  [04-20-25 @ 07:20]    PT/INR: PT 10.6 , INR 0.93       [04-20-25 @ 07:24]  PTT: 30.2       [04-20-25 @ 07:24]      Creatinine Trend:  SCr 4.00 [04-20 @ 07:20]  SCr 6.66 [04-19 @ 07:16]  SCr 5.35 [04-18 @ 07:12]  SCr 4.16 [04-17 @ 05:42]  SCr 3.22 [04-16 @ 18:19]    Tacrolimus (), Serum: 4.3 ng/mL (04-20 @ 07:26)  Tacrolimus (), Serum: 5.0 ng/mL (04-19 @ 07:14)  Tacrolimus (), Serum: 3.7 ng/mL (04-18 @ 07:18)  Tacrolimus (), Serum: 4.0 ng/mL (04-17 @ 05:42)            Urinalysis - [04-20-25 @ 07:20]      Color  / Appearance  / SG  / pH       Gluc 149 / Ketone   / Bili  / Urobili        Blood  / Protein  / Leuk Est  / Nitrite       RBC  / WBC  / Hyaline  / Gran  / Sq Epi  / Non Sq Epi  / Bacteria       Iron 50, TIBC --, %sat --      [04-19-25 @ 07:16]  Ferritin 1344      [04-19-25 @ 07:19]  PTH -- (Ca 7.9)      [12-30-24 @ 06:50]   229  PTH -- (Ca 7.6)      [11-27-24 @ 04:23]   250  Vitamin D (25OH) 17.1      [11-25-24 @ 06:50]  TSH 2.48      [04-13-25 @ 07:24]  Lipid: chol 112, TG 61, HDL 46, LDL --      [12-31-24 @ 04:27]    HBsAb 46.0      [04-07-25 @ 21:39]  HBsAg Nonreact      [04-07-25 @ 21:39]  HBcAb Nonreact      [04-07-25 @ 21:39]  HCV 0.05, Nonreact      [04-07-25 @ 21:39]  HIV Nonreact      [04-07-25 @ 21:39]       [facility] DIVISION OF KIDNEY DISEASES AND HYPERTENSION -- FOLLOW UP NOTE  --------------------------------------------------------------------------------  Chief Complaint: DDRT    24 hour events/subjective:  Lying in bed comfortably. UOP with lasix gtt-150cc, got HD yesterday with 1L UF.  No other complaints.  BPs are variable  No fever, chills, CP, SOB, or LE swelling.        PAST HISTORY  --------------------------------------------------------------------------------  No significant changes to PMH, PSH, FHx, SHx, unless otherwise noted    ALLERGIES & MEDICATIONS  --------------------------------------------------------------------------------  Allergies    hydrALAZINE (Short breath)    Intolerances      Standing Inpatient Medications  acyclovir   Oral Tab/Cap 200 milliGRAM(s) Oral two times a day  aspirin enteric coated 81 milliGRAM(s) Oral daily  atorvastatin 40 milliGRAM(s) Oral at bedtime  chlorhexidine 2% Cloths 1 Application(s) Topical daily  doxazosin 8 milliGRAM(s) Oral with breakfast  doxazosin 8 milliGRAM(s) Oral at bedtime  famotidine    Tablet 20 milliGRAM(s) Oral daily  furosemide   Injectable 80 milliGRAM(s) IV Push two times a day  heparin   Injectable 5000 Unit(s) SubCutaneous every 12 hours  insulin glargine Injectable (LANTUS) 6 Unit(s) SubCutaneous at bedtime  insulin lispro (ADMELOG) corrective regimen sliding scale   SubCutaneous three times a day before meals  insulin lispro (ADMELOG) corrective regimen sliding scale   SubCutaneous at bedtime  insulin lispro Injectable (ADMELOG) 4 Unit(s) SubCutaneous three times a day before meals  isosorbide   mononitrate ER Tablet (IMDUR) 60 milliGRAM(s) Oral daily  labetalol 100 milliGRAM(s) Oral every 8 hours  levothyroxine 25 MICROGram(s) Oral daily  mycophenolate mofetil 500 milliGRAM(s) Oral <User Schedule>  Nephro-brenda 1 Tablet(s) Oral daily  NIFEdipine XL 60 milliGRAM(s) Oral two times a day  nystatin    Suspension 148700 Unit(s) Swish and Swallow four times a day  polyethylene glycol 3350 17 Gram(s) Oral every 12 hours  predniSONE   Tablet   Oral   predniSONE   Tablet 5 milliGRAM(s) Oral daily  senna 2 Tablet(s) Oral at bedtime  tacrolimus ER Tablet (ENVARSUS XR) 4 milliGRAM(s) Oral <User Schedule>  tamsulosin 0.4 milliGRAM(s) Oral at bedtime  trimethoprim   80 mG/sulfamethoxazole 400 mG 1 Tablet(s) Oral <User Schedule>    PRN Inpatient Medications  acetaminophen     Tablet .. 650 milliGRAM(s) Oral every 6 hours PRN  artificial tears (preservative free) Ophthalmic Solution 1 Drop(s) Left EYE daily PRN  ondansetron Injectable 4 milliGRAM(s) IV Push every 6 hours PRN      REVIEW OF SYSTEMS  --------------------------------------------------------------------------------  Gen: No fevers/chills  Respiratory: No dyspnea, cough,   CV: No chest pain, PND, orthopnea  GI: Abdominal pain  Transplant: No pain  : No increased frequency, dysuria, hematuria   MSK: No edema  Neuro: No dizziness/lightheadedness    All other systems were reviewed and are negative, except as noted.    VITALS/PHYSICAL EXAM  --------------------------------------------------------------------------------  T(C): 36.6 (04-20-25 @ 13:00), Max: 36.8 (04-20-25 @ 05:58)  HR: 66 (04-20-25 @ 13:00) (57 - 66)  BP: 169/77 (04-20-25 @ 13:00) (143/67 - 180/76)  RR: 20 (04-20-25 @ 13:00) (16 - 20)  SpO2: 100% (04-20-25 @ 13:00) (99% - 100%)  Wt(kg): --        04-19-25 @ 07:01  -  04-20-25 @ 07:00  --------------------------------------------------------  IN: 960 mL / OUT: 1345 mL / NET: -385 mL    04-20-25 @ 07:01  -  04-20-25 @ 14:08  --------------------------------------------------------  IN: 247.5 mL / OUT: 35 mL / NET: 212.5 mL      Physical Exam:  	Alert and oriented x3   HEENT: normal  Pulm: CTA B/L  CV: normal S1S2; no murmur  Transplant Abd: Tender upon palpation in RLQ, 2 drains in place  Extremities- no edema  RLQ surgical scar        LABS/STUDIES  --------------------------------------------------------------------------------              7.3    8.21  >-----------<  411      [04-20-25 @ 07:23]              22.7     135  |  95  |  29  ----------------------------<  149      [04-20-25 @ 07:20]  3.2   |  27  |  4.00        Ca     8.4     [04-20-25 @ 07:20]      Mg     2.1     [04-20-25 @ 07:20]      Phos  3.2     [04-20-25 @ 07:20]    TPro  5.7  /  Alb  3.2  /  TBili  0.2  /  DBili  x   /  AST  30  /  ALT  20  /  AlkPhos  72  [04-20-25 @ 07:20]    PT/INR: PT 10.6 , INR 0.93       [04-20-25 @ 07:24]  PTT: 30.2       [04-20-25 @ 07:24]      Creatinine Trend:  SCr 4.00 [04-20 @ 07:20]  SCr 6.66 [04-19 @ 07:16]  SCr 5.35 [04-18 @ 07:12]  SCr 4.16 [04-17 @ 05:42]  SCr 3.22 [04-16 @ 18:19]    Tacrolimus (), Serum: 4.3 ng/mL (04-20 @ 07:26)  Tacrolimus (), Serum: 5.0 ng/mL (04-19 @ 07:14)  Tacrolimus (), Serum: 3.7 ng/mL (04-18 @ 07:18)  Tacrolimus (), Serum: 4.0 ng/mL (04-17 @ 05:42)            Urinalysis - [04-20-25 @ 07:20]      Color  / Appearance  / SG  / pH       Gluc 149 / Ketone   / Bili  / Urobili        Blood  / Protein  / Leuk Est  / Nitrite       RBC  / WBC  / Hyaline  / Gran  / Sq Epi  / Non Sq Epi  / Bacteria       Iron 50, TIBC --, %sat --      [04-19-25 @ 07:16]  Ferritin 1344      [04-19-25 @ 07:19]  PTH -- (Ca 7.9)      [12-30-24 @ 06:50]   229  PTH -- (Ca 7.6)      [11-27-24 @ 04:23]   250  Vitamin D (25OH) 17.1      [11-25-24 @ 06:50]  TSH 2.48      [04-13-25 @ 07:24]  Lipid: chol 112, TG 61, HDL 46, LDL --      [12-31-24 @ 04:27]    HBsAb 46.0      [04-07-25 @ 21:39]  HBsAg Nonreact      [04-07-25 @ 21:39]  HBcAb Nonreact      [04-07-25 @ 21:39]  HCV 0.05, Nonreact      [04-07-25 @ 21:39]  HIV Nonreact      [04-07-25 @ 21:39]

## 2025-04-21 ENCOUNTER — RESULT REVIEW (OUTPATIENT)
Age: 70
End: 2025-04-21

## 2025-04-21 LAB
ALBUMIN SERPL ELPH-MCNC: 3.3 G/DL — SIGNIFICANT CHANGE UP (ref 3.3–5)
ALP SERPL-CCNC: 74 U/L — SIGNIFICANT CHANGE UP (ref 40–120)
ALT FLD-CCNC: 20 U/L — SIGNIFICANT CHANGE UP (ref 10–45)
ANION GAP SERPL CALC-SCNC: 14 MMOL/L — SIGNIFICANT CHANGE UP (ref 5–17)
AST SERPL-CCNC: 32 U/L — SIGNIFICANT CHANGE UP (ref 10–40)
BASOPHILS # BLD AUTO: 0.03 K/UL — SIGNIFICANT CHANGE UP (ref 0–0.2)
BASOPHILS NFR BLD AUTO: 0.3 % — SIGNIFICANT CHANGE UP (ref 0–2)
BILIRUB SERPL-MCNC: 0.2 MG/DL — SIGNIFICANT CHANGE UP (ref 0.2–1.2)
BUN SERPL-MCNC: 49 MG/DL — HIGH (ref 7–23)
CALCIUM SERPL-MCNC: 8.7 MG/DL — SIGNIFICANT CHANGE UP (ref 8.4–10.5)
CHLORIDE SERPL-SCNC: 98 MMOL/L — SIGNIFICANT CHANGE UP (ref 96–108)
CO2 SERPL-SCNC: 25 MMOL/L — SIGNIFICANT CHANGE UP (ref 22–31)
CREAT SERPL-MCNC: 5.3 MG/DL — HIGH (ref 0.5–1.3)
EGFR: 11 ML/MIN/1.73M2 — LOW
EGFR: 11 ML/MIN/1.73M2 — LOW
EOSINOPHIL # BLD AUTO: 0.37 K/UL — SIGNIFICANT CHANGE UP (ref 0–0.5)
EOSINOPHIL NFR BLD AUTO: 4.2 % — SIGNIFICANT CHANGE UP (ref 0–6)
FERRITIN SERPL-MCNC: 1496 NG/ML — HIGH (ref 30–400)
GLUCOSE BLDC GLUCOMTR-MCNC: 123 MG/DL — HIGH (ref 70–99)
GLUCOSE BLDC GLUCOMTR-MCNC: 124 MG/DL — HIGH (ref 70–99)
GLUCOSE BLDC GLUCOMTR-MCNC: 198 MG/DL — HIGH (ref 70–99)
GLUCOSE BLDC GLUCOMTR-MCNC: 238 MG/DL — HIGH (ref 70–99)
GLUCOSE BLDC GLUCOMTR-MCNC: 92 MG/DL — SIGNIFICANT CHANGE UP (ref 70–99)
GLUCOSE SERPL-MCNC: 105 MG/DL — HIGH (ref 70–99)
HCT VFR BLD CALC: 24.3 % — LOW (ref 39–50)
HGB BLD-MCNC: 7.7 G/DL — LOW (ref 13–17)
IMM GRANULOCYTES NFR BLD AUTO: 0.7 % — SIGNIFICANT CHANGE UP (ref 0–0.9)
IRON SATN MFR SERPL: 32 % — SIGNIFICANT CHANGE UP (ref 16–55)
IRON SATN MFR SERPL: 67 UG/DL — SIGNIFICANT CHANGE UP (ref 45–165)
LYMPHOCYTES # BLD AUTO: 0.25 K/UL — LOW (ref 1–3.3)
LYMPHOCYTES # BLD AUTO: 2.9 % — LOW (ref 13–44)
MAGNESIUM SERPL-MCNC: 2.2 MG/DL — SIGNIFICANT CHANGE UP (ref 1.6–2.6)
MCHC RBC-ENTMCNC: 28.4 PG — SIGNIFICANT CHANGE UP (ref 27–34)
MCHC RBC-ENTMCNC: 31.7 G/DL — LOW (ref 32–36)
MCV RBC AUTO: 89.7 FL — SIGNIFICANT CHANGE UP (ref 80–100)
MONOCYTES # BLD AUTO: 0.84 K/UL — SIGNIFICANT CHANGE UP (ref 0–0.9)
MONOCYTES NFR BLD AUTO: 9.6 % — SIGNIFICANT CHANGE UP (ref 2–14)
NEUTROPHILS # BLD AUTO: 7.19 K/UL — SIGNIFICANT CHANGE UP (ref 1.8–7.4)
NEUTROPHILS NFR BLD AUTO: 82.3 % — HIGH (ref 43–77)
NRBC BLD AUTO-RTO: 0 /100 WBCS — SIGNIFICANT CHANGE UP (ref 0–0)
PHOSPHATE SERPL-MCNC: 3.9 MG/DL — SIGNIFICANT CHANGE UP (ref 2.5–4.5)
PLATELET # BLD AUTO: 384 K/UL — SIGNIFICANT CHANGE UP (ref 150–400)
POTASSIUM SERPL-MCNC: 3.7 MMOL/L — SIGNIFICANT CHANGE UP (ref 3.5–5.3)
POTASSIUM SERPL-SCNC: 3.7 MMOL/L — SIGNIFICANT CHANGE UP (ref 3.5–5.3)
PROT SERPL-MCNC: 6 G/DL — SIGNIFICANT CHANGE UP (ref 6–8.3)
RBC # BLD: 2.71 M/UL — LOW (ref 4.2–5.8)
RBC # FLD: 19.9 % — HIGH (ref 10.3–14.5)
SODIUM SERPL-SCNC: 137 MMOL/L — SIGNIFICANT CHANGE UP (ref 135–145)
TACROLIMUS SERPL-MCNC: 1.7 NG/ML — SIGNIFICANT CHANGE UP
TIBC SERPL-MCNC: 210 UG/DL — LOW (ref 220–430)
TRANSFERRIN SERPL-MCNC: 159 MG/DL — LOW (ref 200–360)
UIBC SERPL-MCNC: 143 UG/DL — SIGNIFICANT CHANGE UP (ref 110–370)
WBC # BLD: 8.74 K/UL — SIGNIFICANT CHANGE UP (ref 3.8–10.5)
WBC # FLD AUTO: 8.74 K/UL — SIGNIFICANT CHANGE UP (ref 3.8–10.5)

## 2025-04-21 PROCEDURE — 76776 US EXAM K TRANSPL W/DOPPLER: CPT | Mod: 26,RT

## 2025-04-21 PROCEDURE — 99232 SBSQ HOSP IP/OBS MODERATE 35: CPT | Mod: FS,24

## 2025-04-21 PROCEDURE — 93306 TTE W/DOPPLER COMPLETE: CPT | Mod: 26

## 2025-04-21 PROCEDURE — 99232 SBSQ HOSP IP/OBS MODERATE 35: CPT | Mod: GC

## 2025-04-21 RX ORDER — FUROSEMIDE 10 MG/ML
1 INJECTION INTRAMUSCULAR; INTRAVENOUS
Qty: 60 | Refills: 0
Start: 2025-04-21 | End: 2025-05-20

## 2025-04-21 RX ORDER — TACROLIMUS 0.5 MG/1
1 CAPSULE ORAL ONCE
Refills: 0 | Status: COMPLETED | OUTPATIENT
Start: 2025-04-21 | End: 2025-04-21

## 2025-04-21 RX ORDER — TACROLIMUS 0.5 MG/1
5 CAPSULE ORAL
Refills: 0 | Status: DISCONTINUED | OUTPATIENT
Start: 2025-04-22 | End: 2025-04-23

## 2025-04-21 RX ORDER — DOXAZOSIN MESYLATE 8 MG/1
1 TABLET ORAL
Qty: 60 | Refills: 0
Start: 2025-04-21 | End: 2025-05-20

## 2025-04-21 RX ORDER — ISOSORBIDE MONONITRATE 60 MG/1
1 TABLET, EXTENDED RELEASE ORAL
Qty: 30 | Refills: 0
Start: 2025-04-21 | End: 2025-05-20

## 2025-04-21 RX ORDER — CARVEDILOL 3.12 MG/1
1 TABLET, FILM COATED ORAL
Qty: 60 | Refills: 0
Start: 2025-04-21 | End: 2025-05-20

## 2025-04-21 RX ADMIN — DOXAZOSIN MESYLATE 8 MILLIGRAM(S): 8 TABLET ORAL at 22:11

## 2025-04-21 RX ADMIN — Medication 20 MILLIGRAM(S): at 14:23

## 2025-04-21 RX ADMIN — Medication 200 MILLIGRAM(S): at 18:30

## 2025-04-21 RX ADMIN — TACROLIMUS 4 MILLIGRAM(S): 0.5 CAPSULE ORAL at 09:05

## 2025-04-21 RX ADMIN — CARVEDILOL 12.5 MILLIGRAM(S): 3.12 TABLET, FILM COATED ORAL at 20:59

## 2025-04-21 RX ADMIN — INSULIN LISPRO 4 UNIT(S): 100 INJECTION, SOLUTION INTRAVENOUS; SUBCUTANEOUS at 14:24

## 2025-04-21 RX ADMIN — HEPARIN SODIUM 5000 UNIT(S): 1000 INJECTION INTRAVENOUS; SUBCUTANEOUS at 18:30

## 2025-04-21 RX ADMIN — Medication 60 MILLIGRAM(S): at 05:14

## 2025-04-21 RX ADMIN — POLYETHYLENE GLYCOL 3350 17 GRAM(S): 17 POWDER, FOR SOLUTION ORAL at 09:06

## 2025-04-21 RX ADMIN — Medication 500000 UNIT(S): at 05:14

## 2025-04-21 RX ADMIN — FUROSEMIDE 80 MILLIGRAM(S): 10 INJECTION INTRAMUSCULAR; INTRAVENOUS at 05:11

## 2025-04-21 RX ADMIN — HEPARIN SODIUM 5000 UNIT(S): 1000 INJECTION INTRAVENOUS; SUBCUTANEOUS at 05:11

## 2025-04-21 RX ADMIN — Medication 200 MILLIGRAM(S): at 05:13

## 2025-04-21 RX ADMIN — DOXAZOSIN MESYLATE 8 MILLIGRAM(S): 8 TABLET ORAL at 09:06

## 2025-04-21 RX ADMIN — Medication 60 MILLIGRAM(S): at 18:30

## 2025-04-21 RX ADMIN — CARVEDILOL 12.5 MILLIGRAM(S): 3.12 TABLET, FILM COATED ORAL at 05:14

## 2025-04-21 RX ADMIN — Medication 500000 UNIT(S): at 14:23

## 2025-04-21 RX ADMIN — MYCOPHENOLATE MOFETIL 500 MILLIGRAM(S): 500 TABLET, FILM COATED ORAL at 09:06

## 2025-04-21 RX ADMIN — INSULIN LISPRO 4: 100 INJECTION, SOLUTION INTRAVENOUS; SUBCUTANEOUS at 14:25

## 2025-04-21 RX ADMIN — Medication 25 MICROGRAM(S): at 05:13

## 2025-04-21 RX ADMIN — FUROSEMIDE 80 MILLIGRAM(S): 10 INJECTION INTRAMUSCULAR; INTRAVENOUS at 14:24

## 2025-04-21 RX ADMIN — Medication 1 APPLICATION(S): at 14:24

## 2025-04-21 RX ADMIN — Medication 81 MILLIGRAM(S): at 14:23

## 2025-04-21 RX ADMIN — Medication 1 TABLET(S): at 14:23

## 2025-04-21 RX ADMIN — PREDNISONE 5 MILLIGRAM(S): 20 TABLET ORAL at 05:13

## 2025-04-21 RX ADMIN — Medication 500000 UNIT(S): at 18:30

## 2025-04-21 RX ADMIN — Medication 1 TABLET(S): at 09:05

## 2025-04-21 RX ADMIN — Medication 500000 UNIT(S): at 00:20

## 2025-04-21 RX ADMIN — Medication 0.1 MILLIGRAM(S): at 20:59

## 2025-04-21 RX ADMIN — ISOSORBIDE MONONITRATE 60 MILLIGRAM(S): 60 TABLET, EXTENDED RELEASE ORAL at 14:23

## 2025-04-21 RX ADMIN — TACROLIMUS 1 MILLIGRAM(S): 0.5 CAPSULE ORAL at 14:23

## 2025-04-21 RX ADMIN — ATORVASTATIN CALCIUM 40 MILLIGRAM(S): 80 TABLET, FILM COATED ORAL at 21:00

## 2025-04-21 RX ADMIN — INSULIN LISPRO 4 UNIT(S): 100 INJECTION, SOLUTION INTRAVENOUS; SUBCUTANEOUS at 09:08

## 2025-04-21 RX ADMIN — INSULIN GLARGINE-YFGN 6 UNIT(S): 100 INJECTION, SOLUTION SUBCUTANEOUS at 22:10

## 2025-04-21 RX ADMIN — TAMSULOSIN HYDROCHLORIDE 0.4 MILLIGRAM(S): 0.4 CAPSULE ORAL at 20:59

## 2025-04-21 RX ADMIN — MYCOPHENOLATE MOFETIL 500 MILLIGRAM(S): 500 TABLET, FILM COATED ORAL at 20:59

## 2025-04-21 NOTE — PROGRESS NOTE ADULT - NUTRITIONAL ASSESSMENT
This patient has been assessed with a concern for Malnutrition and has been determined to have a diagnosis/diagnoses of Severe protein-calorie malnutrition and Underweight (BMI < 19).    This patient is being managed with:   Diet Consistent Carbohydrate Renal w/Evening Snack-  Supplement Feeding Modality:  Oral  Nepro Cans or Servings Per Day:  2       Frequency:  Daily  Entered: Apr 11 2025  1:10PM  
This patient has been assessed with a concern for Malnutrition and has been determined to have a diagnosis/diagnoses of Severe protein-calorie malnutrition and Underweight (BMI < 19).    This patient is being managed with:   Diet Consistent Carbohydrate Renal w/Evening Snack-  Supplement Feeding Modality:  Oral  Nepro Cans or Servings Per Day:  2       Frequency:  Daily  Entered: Apr 11 2025  1:10PM  
This patient has been assessed with a concern for Malnutrition and has been determined to have a diagnosis/diagnoses of Severe protein-calorie malnutrition and Underweight (BMI < 19).    This patient is being managed with:   Diet Consistent Carbohydrate Renal w/Evening Snack-  Supplement Feeding Modality:  Oral  Nepro Cans or Servings Per Day:  3       Frequency:  Daily  Entered: Apr 14 2025  2:19PM    The following pending diet order is being considered for treatment of Severe protein-calorie malnutrition and Underweight (BMI < 19):  Diet Consistent Carbohydrate Renal w/Evening Snack-  Supplement Feeding Modality:  Oral  Nepro Cans or Servings Per Day:  3       Frequency:  Daily  Entered: Apr 14 2025  2:15PM  
This patient has been assessed with a concern for Malnutrition and has been determined to have a diagnosis/diagnoses of Severe protein-calorie malnutrition and Underweight (BMI < 19).    This patient is being managed with:   Diet Consistent Carbohydrate Renal w/Evening Snack-  Supplement Feeding Modality:  Oral  Nepro Cans or Servings Per Day:  3       Frequency:  Daily  Entered: Apr 14 2025  2:19PM    The following pending diet order is being considered for treatment of Severe protein-calorie malnutrition and Underweight (BMI < 19):  Diet Consistent Carbohydrate Renal w/Evening Snack-  Supplement Feeding Modality:  Oral  Nepro Cans or Servings Per Day:  3       Frequency:  Daily  Entered: Apr 14 2025  2:15PM  
This patient has been assessed with a concern for Malnutrition and has been determined to have a diagnosis/diagnoses of Severe protein-calorie malnutrition and Underweight (BMI < 19).    This patient is being managed with:   Diet Consistent Carbohydrate Renal w/Evening Snack-  Supplement Feeding Modality:  Oral  Nepro Cans or Servings Per Day:  2       Frequency:  Daily  Entered: Apr 11 2025  1:10PM  
This patient has been assessed with a concern for Malnutrition and has been determined to have a diagnosis/diagnoses of Severe protein-calorie malnutrition and Underweight (BMI < 19).    This patient is being managed with:   Diet Consistent Carbohydrate Renal w/Evening Snack-  Supplement Feeding Modality:  Oral  Nepro Cans or Servings Per Day:  3       Frequency:  Daily  Entered: Apr 14 2025  2:19PM    The following pending diet order is being considered for treatment of Severe protein-calorie malnutrition and Underweight (BMI < 19):  Diet Consistent Carbohydrate Renal w/Evening Snack-  Supplement Feeding Modality:  Oral  Nepro Cans or Servings Per Day:  3       Frequency:  Daily  Entered: Apr 14 2025  2:15PM  
This patient has been assessed with a concern for Malnutrition and has been determined to have a diagnosis/diagnoses of Severe protein-calorie malnutrition and Underweight (BMI < 19).    This patient is being managed with:   Diet Consistent Carbohydrate Renal w/Evening Snack-  Supplement Feeding Modality:  Oral  Nepro Cans or Servings Per Day:  3       Frequency:  Daily  Entered: Apr 14 2025  2:19PM    The following pending diet order is being considered for treatment of Severe protein-calorie malnutrition and Underweight (BMI < 19):  Diet Consistent Carbohydrate Renal w/Evening Snack-  Supplement Feeding Modality:  Oral  Nepro Cans or Servings Per Day:  3       Frequency:  Daily  Entered: Apr 14 2025  2:15PM  
This patient has been assessed with a concern for Malnutrition and has been determined to have a diagnosis/diagnoses of Severe protein-calorie malnutrition and Underweight (BMI < 19).    This patient is being managed with:   Diet Consistent Carbohydrate Renal w/Evening Snack-  Supplement Feeding Modality:  Oral  Nepro Cans or Servings Per Day:  2       Frequency:  Daily  Entered: Apr 11 2025  1:10PM  
This patient has been assessed with a concern for Malnutrition and has been determined to have a diagnosis/diagnoses of Severe protein-calorie malnutrition and Underweight (BMI < 19).    This patient is being managed with:   Diet Consistent Carbohydrate Renal w/Evening Snack-  Supplement Feeding Modality:  Oral  Nepro Cans or Servings Per Day:  2       Frequency:  Daily  Entered: Apr 11 2025  1:10PM  
This patient has been assessed with a concern for Malnutrition and has been determined to have a diagnosis/diagnoses of Severe protein-calorie malnutrition and Underweight (BMI < 19).    This patient is being managed with:   Diet Consistent Carbohydrate Renal w/Evening Snack-  Supplement Feeding Modality:  Oral  Nepro Cans or Servings Per Day:  3       Frequency:  Daily  Entered: Apr 14 2025  2:19PM    The following pending diet order is being considered for treatment of Severe protein-calorie malnutrition and Underweight (BMI < 19):  Diet Consistent Carbohydrate Renal w/Evening Snack-  Supplement Feeding Modality:  Oral  Nepro Cans or Servings Per Day:  3       Frequency:  Daily  Entered: Apr 14 2025  2:15PM  
This patient has been assessed with a concern for Malnutrition and has been determined to have a diagnosis/diagnoses of Severe protein-calorie malnutrition and Underweight (BMI < 19).    This patient is being managed with:   Diet Consistent Carbohydrate Renal w/Evening Snack-  Supplement Feeding Modality:  Oral  Nepro Cans or Servings Per Day:  3       Frequency:  Daily  Entered: Apr 14 2025  2:19PM    The following pending diet order is being considered for treatment of Severe protein-calorie malnutrition and Underweight (BMI < 19):  Diet Consistent Carbohydrate Renal w/Evening Snack-  Supplement Feeding Modality:  Oral  Nepro Cans or Servings Per Day:  3       Frequency:  Daily  Entered: Apr 14 2025  2:15PM

## 2025-04-21 NOTE — PROGRESS NOTE ADULT - ASSESSMENT
69M with PMHx of HLD, CAD s/p LAD PCI 7/24, HFpEF, HTN, hypothyroid, type 2 DM, ESRD on HD MWF presents to [facility] for possible DDRT. Transplant nephrology consulted for ESRD on Hemodialysis.     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 Repeat USG (4/14) showed increasing collection size  CT A/P (4/15)-complex perinephric collection 1.6x5.9x3.0 with slight enhanced septation. No hydro  C/w IV lasix 80mg BID   cc in the last 24 hours  Plan for HD tomorrow      IS  Thymo induction  Tacro level 1.7 today  Tacro 5mg starting tomorrow  Check Tac level in AM (30 min prior to dose)   MMF, Pred taper  Denovo belatacept. Last dose on 4/14. Next dose on 4/24.  Ppx-Bactrim, Nystatin, Acyclovir    HTN- labile  On Nifedipine 60 bid  Imdur 60 qd  Increase doxazosin to 8mg AM and 8mg HS  Labetalol 100 q8h   Start clonidine 0.1mg BID    Hyperkalemia  resolved    Hyperglycemia  - C/w lantus, sliding scale    **Recommendations preliminary until attending attestation**  If you have any questions, please feel free to contact me  Hardeep Kowalski  Nephrology Fellow  Page [phone] / [employer] Teams (Preferred)  (After 4pm or on weekends please page the on-call fellow)   69M with PMHx of HLD, CAD s/p LAD PCI 7/24, HFpEF, HTN, hypothyroid, type 2 DM, ESRD on HD MWF presents to [facility] for possible DDRT. Transplant nephrology consulted for ESRD on Hemodialysis.     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 Repeat USG (4/14) showed increasing collection size  CT A/P (4/15)-complex perinephric collection 1.6x5.9x3.0 with slight enhanced septation. No hydro  C/w IV lasix 80mg BID   cc in the last 24 hours  Obtain repeat renal US  Plan for HD tomorrow    IS  Thymo induction  Tacro level 1.7 today  Tacro 5mg starting tomorrow  Check Tac level in AM (30 min prior to dose)   MMF, Pred taper  Denovo belatacept. Last dose on 4/14. Next dose on 4/24.  Ppx-Bactrim, Nystatin, Acyclovir    HTN- labile  On Nifedipine 60 bid  Imdur 60 qd  Increase doxazosin to 8mg AM and 8mg HS  Labetalol 100 q8h   Start clonidine 0.1mg BID    Hyperkalemia  resolved    Hyperglycemia  - C/w lantus, sliding scale    **Recommendations preliminary until attending attestation**  If you have any questions, please feel free to contact me  Hardeep Kowalski  Nephrology Fellow  Page [phone] / [employer] Teams (Preferred)  (After 4pm or on weekends please page the on-call fellow)

## 2025-04-21 NOTE — PHARMACY EDUCATION NOTE - EDUCATION SUMMARY
Discharge immunosuppressant medications and prophylatic anti-infective agents reviewed with the patient. Outpatient medication schedule was discussed in detail including: medication name, indication, dose, administration times, treatment duration, side effects, drug interactions, and special instructions. Mycophenolate REMS program was discussed and information brochure was provided. Patient questions and concerns were answered and addressed. Patient demonstrated understanding. Stent education was provided to patient.

## 2025-04-21 NOTE — PROGRESS NOTE ADULT - ASSESSMENT
68 y/o M with PMHx of HLD, CAD s/p LAD PCI 7/24, HFpEF, HTN, hypothyroid, type 2 DM, ESRD on HD MWF (recently converted from [facility] in 1/2025) via R permacath s/p DDRT  1a + 1 v + 1 u + stent under thymoglobulin induction on 04/08/2    []s/p DDRT under thymo induction, POD #13  - most recent doppler with patent vasculature, increased perinephric collection 2.5x4.5x7.2cm  - f/u rpt US renal   - DGF: HD - 4/9, 4/11, 4/14, 4/16, 4/18  - Donor + GPC, vanc by level until 4/17: no more antibotics needed as per txp ID  - Strict I/O's, JPx2 SS  - Consistent carb diet  - Incr. Bowel regimen for dilated bowel loops on CT  - IS, PT  - Flomax  - f/u 4/16 DSA  - Noncontrast CT A/P - collection improving drain in place, dilated bowel loops  - per Cards, plavix not needed anymore      [ ] Immunosuppression  - Thymoglobulin induction completed  - ENV per level,  BID, SST  - Belatacept 4/10, 4/14, next dose 4/24  - ppx: bactrim TIW, nystatin, Acyclovir 200 BID    []HTN  - off cardene gtt, resumed cardene gtt 4/14 - 4/15  - Nifed 60 BID, Imdur 60 qd, labetalol 100mg PO q8hr, doxazosin 8mg in AM and 8mg QHS   -off lasix gtt 5mg/hr, started lasix 80 IV BID   - clonidine 0.1 BID    [] DVT ppx  - on SQH BID/ASA, SCD    [ ] hyperglycemia   - lantus &lispro, adjusted PRN

## 2025-04-21 NOTE — PROVIDER CONTACT NOTE (CRITICAL VALUE NOTIFICATION) - SITUATION
Pt ptt resulted >200
6hour labbs drawn following ddrt
Notification: tacro 1.7
VS as noted, asymptomatic, no complaints offered at this time.
Clotted PTT, PT, INR

## 2025-04-21 NOTE — PROGRESS NOTE ADULT - ASSESSMENT
70 y/o M with PMHx of HLD, CAD s/p LAD PCI 7/24, HFpEF , HTN, hypothyroid, type 2 DM, ESRD on HD MWF (recently converted from PD in 1/2025) via permacath placed on 1/2025, h/o cva with no residual deficits, persistent right sided pleural effusion, and hx of hemorrhagic pericardial effusion (cytopathology negative) presents to [facility] for possible DDRT. He states he makes less than half cup of urine a day. Denies fever, chills, N/V/D/C, abdominal pain, CP, SOB, hemoptysis, and /GI complaints.     A/P  ESRD s/p DDRT 04/09  Complicated for HTN urgency and DGF  Steroid and thymo induction  Nephrologist is Dr. Menjivar   Access is PermCath   Was recently converted from PD to HD, on MMF schedule as outpatient    Last HD 4/7, s/p HD on 04/08 and 04/09, and 04/11, 04/14, 04/16, 04/19  HD as needed  On MMF and  Tac follow levels, On prednisone taper  On acyclovir and nsytatin prophylaxis  On belatacept, next dose 4/24  Monitor bmp   Renally dose meds    HTN   On doxazosin labetalol tamsulosin and nifedipine  UF w/ HD if needed  Managed per transplant team   Monitor bp     Anemia  Hb below goal  Monitor     CKD-MBD  Monitor ca and phos daily

## 2025-04-21 NOTE — PROGRESS NOTE ADULT - SUBJECTIVE AND OBJECTIVE BOX
[facility]    Dr. Tiara Garcia     Office [phone] (9 am to 5 pm)  Service: [phone] (5pm to 9am)  Also Available on TEAMS      RENAL PROGRESS NOTE: DATE OF SERVICE 04-21-25 @ 13:42    Patient is a 69y old  Male who presents with a chief complaint of possible DDRT (20 Apr 2025 14:08)      Patient seen and examined at bedside. No chest pain/sob    VITALS:  T(F): 98.1 (04-21-25 @ 13:00), Max: 98.2 (04-21-25 @ 08:56)  HR: 62 (04-21-25 @ 13:00)  BP: 152/69 (04-21-25 @ 13:00)  RR: 20 (04-21-25 @ 13:00)  SpO2: 100% (04-21-25 @ 13:00)  Wt(kg): --    04-20 @ 07:01  -  04-21 @ 07:00  --------------------------------------------------------  IN: 487.5 mL / OUT: 365 mL / NET: 122.5 mL          PHYSICAL EXAM:  Constitutional: NAD  Neck: No JVD  Respiratory: CTAB, no wheezes, rales or rhonchi  Cardiovascular: S1, S2, RRR  Gastrointestinal: BS+, soft, NT/ND  Extremities: No peripheral edema    Hospital Medications:   MEDICATIONS  (STANDING):  acyclovir   Oral Tab/Cap 200 milliGRAM(s) Oral two times a day  aspirin enteric coated 81 milliGRAM(s) Oral daily  atorvastatin 40 milliGRAM(s) Oral at bedtime  carvedilol 12.5 milliGRAM(s) Oral every 12 hours  chlorhexidine 2% Cloths 1 Application(s) Topical daily  cloNIDine 0.1 milliGRAM(s) Oral two times a day  doxazosin 8 milliGRAM(s) Oral with breakfast  doxazosin 8 milliGRAM(s) Oral at bedtime  famotidine    Tablet 20 milliGRAM(s) Oral daily  furosemide   Injectable 80 milliGRAM(s) IV Push two times a day  heparin   Injectable 5000 Unit(s) SubCutaneous every 12 hours  insulin glargine Injectable (LANTUS) 6 Unit(s) SubCutaneous at bedtime  insulin lispro (ADMELOG) corrective regimen sliding scale   SubCutaneous three times a day before meals  insulin lispro (ADMELOG) corrective regimen sliding scale   SubCutaneous at bedtime  insulin lispro Injectable (ADMELOG) 4 Unit(s) SubCutaneous three times a day before meals  isosorbide   mononitrate ER Tablet (IMDUR) 60 milliGRAM(s) Oral daily  levothyroxine 25 MICROGram(s) Oral daily  mycophenolate mofetil 500 milliGRAM(s) Oral <User Schedule>  Nephro-brenda 1 Tablet(s) Oral daily  NIFEdipine XL 60 milliGRAM(s) Oral two times a day  nystatin    Suspension 142468 Unit(s) Swish and Swallow four times a day  polyethylene glycol 3350 17 Gram(s) Oral every 12 hours  predniSONE   Tablet   Oral   predniSONE   Tablet 5 milliGRAM(s) Oral daily  senna 2 Tablet(s) Oral at bedtime  tacrolimus ER Tablet (ENVARSUS XR) 1 milliGRAM(s) Oral once  tamsulosin 0.4 milliGRAM(s) Oral at bedtime  trimethoprim   80 mG/sulfamethoxazole 400 mG 1 Tablet(s) Oral <User Schedule>    Tacrolimus (), Serum: 1.7 ng/mL (04-21 @ 07:08)    LABS:  04-21    137  |  98  |  49[H]  ----------------------------<  105[H]  3.7   |  25  |  5.30[H]    Ca    8.7      21 Apr 2025 07:08  Phos  3.9     04-21  Mg     2.2     04-21    TPro  6.0  /  Alb  3.3  /  TBili  0.2  /  DBili      /  AST  32  /  ALT  20  /  AlkPhos  74  04-21    Creatinine Trend: 5.30 <--, 4.00 <--, 6.66 <--, 5.35 <--, 4.16 <--, 3.22 <--, 5.78 <--, 4.83 <--, 4.85 <--, 4.43 <--, 3.90 <--, 7.04 <--    Iron Total: 67 ug/dL (04-21 @ 07:08)  Iron - Total Binding Capacity.: 210 ug/dL (04-21 @ 07:08)  Ferritin: 1496 ng/mL (04-21 @ 07:08)  Albumin: 3.3 g/dL (04-21 @ 07:08)  Phosphorus: 3.9 mg/dL (04-21 @ 07:08)                              7.7    8.74  )-----------( 384      ( 21 Apr 2025 07:08 )             24.3     Urine Studies:  Urinalysis - [04-21-25 @ 07:08]      Color  / Appearance  / SG  / pH       Gluc 105 / Ketone   / Bili  / Urobili        Blood  / Protein  / Leuk Est  / Nitrite       RBC  / WBC  / Hyaline  / Gran  / Sq Epi  / Non Sq Epi  / Bacteria       Iron 67, TIBC 210, %sat 32      [04-21-25 @ 07:08]  Ferritin 1496      [04-21-25 @ 07:08]  PTH -- (Ca 7.9)      [12-30-24 @ 06:50]   229  PTH -- (Ca 7.6)      [11-27-24 @ 04:23]   250  Vitamin D (25OH) 17.1      [11-25-24 @ 06:50]  TSH 2.48      [04-13-25 @ 07:24]  Lipid: chol 112, TG 61, HDL 46, LDL --      [12-31-24 @ 04:27]    HBsAb 46.0      [04-07-25 @ 21:39]  HBsAg Nonreact      [04-07-25 @ 21:39]  HBcAb Nonreact      [04-07-25 @ 21:39]  HCV 0.05, Nonreact      [04-07-25 @ 21:39]  HIV Nonreact      [04-07-25 @ 21:39]      RADIOLOGY & ADDITIONAL STUDIES:

## 2025-04-21 NOTE — PROVIDER CONTACT NOTE (CRITICAL VALUE NOTIFICATION) - ACTION/TREATMENT ORDERED:
NP notified and made aware - per NP, repeat labs not needed today
Repeat lactate, pending results
per NP, NP aware of tacro level - no further interventions at this time
noted above
Per MIKHAIL Oliveira redraw coags

## 2025-04-21 NOTE — PROGRESS NOTE ADULT - SUBJECTIVE AND OBJECTIVE BOX
[facility] DIVISION OF KIDNEY DISEASES AND HYPERTENSION -- FOLLOW UP NOTE  --------------------------------------------------------------------------------  Chief Complaint: DDRT    24 hour events/subjective: Pt. seen and examined, resting comfortably in bed. Noted to be hypertensive overnight and received IV hydralazine x1. Started on lasix IV 80mg BID.    PAST HISTORY  --------------------------------------------------------------------------------  No significant changes to PMH, PSH, FHx, SHx, unless otherwise noted    ALLERGIES & MEDICATIONS  --------------------------------------------------------------------------------  Allergies    hydrALAZINE (Short breath)    Intolerances    Standing Inpatient Medications  acyclovir   Oral Tab/Cap 200 milliGRAM(s) Oral two times a day  aspirin enteric coated 81 milliGRAM(s) Oral daily  atorvastatin 40 milliGRAM(s) Oral at bedtime  carvedilol 12.5 milliGRAM(s) Oral every 12 hours  chlorhexidine 2% Cloths 1 Application(s) Topical daily  cloNIDine 0.1 milliGRAM(s) Oral two times a day  doxazosin 8 milliGRAM(s) Oral with breakfast  doxazosin 8 milliGRAM(s) Oral at bedtime  famotidine    Tablet 20 milliGRAM(s) Oral daily  furosemide   Injectable 80 milliGRAM(s) IV Push two times a day  heparin   Injectable 5000 Unit(s) SubCutaneous every 12 hours  insulin glargine Injectable (LANTUS) 6 Unit(s) SubCutaneous at bedtime  insulin lispro (ADMELOG) corrective regimen sliding scale   SubCutaneous three times a day before meals  insulin lispro (ADMELOG) corrective regimen sliding scale   SubCutaneous at bedtime  insulin lispro Injectable (ADMELOG) 4 Unit(s) SubCutaneous three times a day before meals  isosorbide   mononitrate ER Tablet (IMDUR) 60 milliGRAM(s) Oral daily  levothyroxine 25 MICROGram(s) Oral daily  mycophenolate mofetil 500 milliGRAM(s) Oral <User Schedule>  Nephro-brenda 1 Tablet(s) Oral daily  NIFEdipine XL 60 milliGRAM(s) Oral two times a day  nystatin    Suspension 630051 Unit(s) Swish and Swallow four times a day  polyethylene glycol 3350 17 Gram(s) Oral every 12 hours  predniSONE   Tablet 5 milliGRAM(s) Oral daily  predniSONE   Tablet   Oral   senna 2 Tablet(s) Oral at bedtime  tamsulosin 0.4 milliGRAM(s) Oral at bedtime  trimethoprim   80 mG/sulfamethoxazole 400 mG 1 Tablet(s) Oral <User Schedule>    PRN Inpatient Medications  acetaminophen     Tablet .. 650 milliGRAM(s) Oral every 6 hours PRN  artificial tears (preservative free) Ophthalmic Solution 1 Drop(s) Left EYE daily PRN  ondansetron Injectable 4 milliGRAM(s) IV Push every 6 hours PRN    REVIEW OF SYSTEMS  --------------------------------------------------------------------------------  Gen: No fevers/chills  Respiratory: No dyspnea, cough,   CV: No chest pain, PND, orthopnea  GI: Abdominal pain  Transplant: No pain  : No increased frequency, dysuria, hematuria   MSK: No edema  Neuro: No dizziness/lightheadedness    All other systems were reviewed and are negative, except as noted.    VITALS/PHYSICAL EXAM  --------------------------------------------------------------------------------  T(C): 36.7 (04-21-25 @ 13:00), Max: 36.8 (04-21-25 @ 08:56)  HR: 62 (04-21-25 @ 13:00) (50 - 62)  BP: 152/69 (04-21-25 @ 13:00) (152/69 - 200/88)  RR: 20 (04-21-25 @ 13:00) (16 - 20)  SpO2: 100% (04-21-25 @ 13:00) (100% - 100%)  Wt(kg): --    04-20-25 @ 07:01  -  04-21-25 @ 07:00  --------------------------------------------------------  IN: 487.5 mL / OUT: 365 mL / NET: 122.5 mL    04-21-25 @ 07:01  -  04-21-25 @ 15:02  --------------------------------------------------------  IN: 380 mL / OUT: 0 mL / NET: 380 mL    Physical Exam:  Alert and oriented x3   HEENT: normal  Pulm: CTA B/L  CV: normal S1S2; no murmur  Transplant Abd: Tender upon palpation in RLQ, +drains  Extremities- no edema  RLQ surgical scar    LABS/STUDIES  --------------------------------------------------------------------------------              7.7    8.74  >-----------<  384      [04-21-25 @ 07:08]              24.3     137  |  98  |  49  ----------------------------<  105      [04-21-25 @ 07:08]  3.7   |  25  |  5.30        Ca     8.7     [04-21-25 @ 07:08]      Mg     2.2     [04-21-25 @ 07:08]      Phos  3.9     [04-21-25 @ 07:08]    TPro  6.0  /  Alb  3.3  /  TBili  0.2  /  DBili  x   /  AST  32  /  ALT  20  /  AlkPhos  74  [04-21-25 @ 07:08]    PT/INR: PT 10.6 , INR 0.93       [04-20-25 @ 07:24]  PTT: 30.2       [04-20-25 @ 07:24]    Creatinine Trend:  SCr 5.30 [04-21 @ 07:08]  SCr 4.00 [04-20 @ 07:20]  SCr 6.66 [04-19 @ 07:16]  SCr 5.35 [04-18 @ 07:12]  SCr 4.16 [04-17 @ 05:42]    Tacrolimus (), Serum: 1.7 ng/mL (04-21 @ 07:08)  Tacrolimus (), Serum: 4.3 ng/mL (04-20 @ 07:26)  Tacrolimus (), Serum: 5.0 ng/mL (04-19 @ 07:14)  Tacrolimus (), Serum: 3.7 ng/mL (04-18 @ 07:18)    Iron 67, TIBC 210, %sat 32      [04-21-25 @ 07:08]  Ferritin 1496      [04-21-25 @ 07:08]  PTH -- (Ca 7.9)      [12-30-24 @ 06:50]   229  PTH -- (Ca 7.6)      [11-27-24 @ 04:23]   250  Vitamin D (25OH) 17.1      [11-25-24 @ 06:50]  TSH 2.48      [04-13-25 @ 07:24]  Lipid: chol 112, TG 61, HDL 46, LDL --      [12-31-24 @ 04:27]    HBsAb 46.0      [04-07-25 @ 21:39]  HBsAg Nonreact      [04-07-25 @ 21:39]  HBcAb Nonreact      [04-07-25 @ 21:39]  HCV 0.05, Nonreact      [04-07-25 @ 21:39]  HIV Nonreact      [04-07-25 @ 21:39]

## 2025-04-21 NOTE — PROGRESS NOTE ADULT - SUBJECTIVE AND OBJECTIVE BOX
Transplant Surgery - Multi-disciplinary Rounds  --------------------------------------------------------------   Tx Date: 04/08/25         POD#13    Present: Patient seen and examined with multidisciplinary Transplant team including Surgeon Dr. Phipps, nephrologist Dr. Garcia, LÁZARO Cevallos, pharmacy, and bedside RN during AM rounds.   Disciplines not in attendance will be notified of the plan.     HPI: 70 y/o M with PMHx of HLD, CAD s/p LAD PCI 7/24, HFpEF , HTN, hypothyroid, type 2 DM, ESRD on HD MWF (recently converted from PD in 1/2025) via R permacath placed on 1/2025, h/o cva with no residual deficits, persistent right sided pleural effusion, and hx of hemorrhagic pericardial effusion (cytopathology negative) presents to [facility] for possible DDRT. He states he makes less than half cup of urine a day. Denies fever, chills, N/V/D/C, abdominal pain, CP, SOB, hemoptysis, and /GI complaints. Now s/p DDRT  1a + 1 v + 1 u + stent under thymoglobulin induction on 04/08/25.     Interval Events:  -ON: HTN to 170s, got AM BP meds earlier   -Afebrile, HTN to 200s, IV hydral 10 x1   -low UOP  - Cardio c/s; repeat TTE   - d/c'd lasix gtt, started on IV lasix 80 BID      Immunosuppression:  Induction: Thymo completed  Maintenance: Petra last dose 4/14/ENV per level/ BID/ pred 5  Ongoing monitoring for signs of rejection     Potential Discharge date: TBD  Education:  Medications  Plan of care:  See Below            MEDICATIONS  (STANDING):  acyclovir   Oral Tab/Cap 200 milliGRAM(s) Oral two times a day  aspirin enteric coated 81 milliGRAM(s) Oral daily  atorvastatin 40 milliGRAM(s) Oral at bedtime  carvedilol 12.5 milliGRAM(s) Oral every 12 hours  chlorhexidine 2% Cloths 1 Application(s) Topical daily  cloNIDine 0.1 milliGRAM(s) Oral two times a day  doxazosin 8 milliGRAM(s) Oral with breakfast  doxazosin 8 milliGRAM(s) Oral at bedtime  famotidine    Tablet 20 milliGRAM(s) Oral daily  furosemide   Injectable 80 milliGRAM(s) IV Push two times a day  heparin   Injectable 5000 Unit(s) SubCutaneous every 12 hours  insulin glargine Injectable (LANTUS) 6 Unit(s) SubCutaneous at bedtime  insulin lispro (ADMELOG) corrective regimen sliding scale   SubCutaneous three times a day before meals  insulin lispro (ADMELOG) corrective regimen sliding scale   SubCutaneous at bedtime  insulin lispro Injectable (ADMELOG) 4 Unit(s) SubCutaneous three times a day before meals  isosorbide   mononitrate ER Tablet (IMDUR) 60 milliGRAM(s) Oral daily  levothyroxine 25 MICROGram(s) Oral daily  mycophenolate mofetil 500 milliGRAM(s) Oral <User Schedule>  Nephro-brenda 1 Tablet(s) Oral daily  NIFEdipine XL 60 milliGRAM(s) Oral two times a day  nystatin    Suspension 866290 Unit(s) Swish and Swallow four times a day  polyethylene glycol 3350 17 Gram(s) Oral every 12 hours  predniSONE   Tablet   Oral   predniSONE   Tablet 5 milliGRAM(s) Oral daily  senna 2 Tablet(s) Oral at bedtime  tacrolimus ER Tablet (ENVARSUS XR) 1 milliGRAM(s) Oral once  tamsulosin 0.4 milliGRAM(s) Oral at bedtime  trimethoprim   80 mG/sulfamethoxazole 400 mG 1 Tablet(s) Oral <User Schedule>    MEDICATIONS  (PRN):  acetaminophen     Tablet .. 650 milliGRAM(s) Oral every 6 hours PRN Mild Pain (1 - 3)  artificial tears (preservative free) Ophthalmic Solution 1 Drop(s) Left EYE daily PRN Dry Eyes  ondansetron Injectable 4 milliGRAM(s) IV Push every 6 hours PRN Nausea and/or Vomiting      PAST MEDICAL & SURGICAL HISTORY:  Hypertension      ESRD on peritoneal dialysis      CVA (cerebrovascular accident)  2010 without residual effects      Hyperlipidemia      BPH (benign prostatic hyperplasia)      CAD (coronary artery disease)      T2DM (type 2 diabetes mellitus)      Hypothyroidism      History of peritoneal dialysis  s/p PD catheter placement      S/P coronary artery stent placement          Vital Signs Last 24 Hrs  T(C): 36.7 (21 Apr 2025 13:00), Max: 36.8 (21 Apr 2025 08:56)  T(F): 98.1 (21 Apr 2025 13:00), Max: 98.2 (21 Apr 2025 08:56)  HR: 62 (21 Apr 2025 13:00) (50 - 62)  BP: 152/69 (21 Apr 2025 13:00) (152/69 - 200/88)  BP(mean): 116 (21 Apr 2025 05:16) (104 - 138)  RR: 20 (21 Apr 2025 13:00) (16 - 20)  SpO2: 100% (21 Apr 2025 13:00) (100% - 100%)    Parameters below as of 21 Apr 2025 13:00  Patient On (Oxygen Delivery Method): room air        I&O's Summary    20 Apr 2025 07:01  -  21 Apr 2025 07:00  --------------------------------------------------------  IN: 487.5 mL / OUT: 365 mL / NET: 122.5 mL                              7.7    8.74  )-----------( 384      ( 21 Apr 2025 07:08 )             24.3     04-21    137  |  98  |  49[H]  ----------------------------<  105[H]  3.7   |  25  |  5.30[H]    Ca    8.7      21 Apr 2025 07:08  Phos  3.9     04-21  Mg     2.2     04-21    TPro  6.0  /  Alb  3.3  /  TBili  0.2  /  DBili  x   /  AST  32  /  ALT  20  /  AlkPhos  74  04-21    Tacrolimus (), Serum: 1.7 ng/mL (04-21 @ 07:08)        Culture - Blood (collected 04-17-25 @ 10:17)  Source: Blood Blood-Peripheral  Preliminary Report (04-20-25 @ 18:00):    No growth at 72 Hours    Culture - Blood (collected 04-17-25 @ 10:17)  Source: Blood Blood-Peripheral  Preliminary Report (04-20-25 @ 18:00):    No growth at 72 Hours        Review of systems  All other systems were reviewed and are negative, except as noted.    PHYSICAL EXAM:  Constitutional: Well developed / well nourished  Eyes: Anicteric, PERRLA  ENMT: nc/at  Neck: Supple  Respiratory: CTA B/L  Cardiovascular: RRR  Gastrointestinal: Soft, non-distended, mild abdominal pain to palpation at incision site; incision c/d/i; COSMO x #1/2 perinephric ss   Genitourinary: voiding  Extremities: SCD's in place and working bilaterally, R permcath  Vascular: Palpable dp pulses bilaterally  Neurological: A&O x3  Skin: no rashes, ulcerations or lesions;  Musculoskeletal: Moving all extremities  Psychiatric: Responsive

## 2025-04-21 NOTE — PROVIDER CONTACT NOTE (CRITICAL VALUE NOTIFICATION) - ASSESSMENT
VS as per flowsheet
VS as per flowsheet
A&Ox4, see chart for recent VS
a&ox4, no comaplints at this time, asymptomatic
vss

## 2025-04-22 LAB
ALBUMIN SERPL ELPH-MCNC: 3.3 G/DL — SIGNIFICANT CHANGE UP (ref 3.3–5)
ALP SERPL-CCNC: 75 U/L — SIGNIFICANT CHANGE UP (ref 40–120)
ALT FLD-CCNC: 19 U/L — SIGNIFICANT CHANGE UP (ref 10–45)
ANION GAP SERPL CALC-SCNC: 16 MMOL/L — SIGNIFICANT CHANGE UP (ref 5–17)
APTT BLD: 29.9 SEC — SIGNIFICANT CHANGE UP (ref 24.5–35.6)
AST SERPL-CCNC: 29 U/L — SIGNIFICANT CHANGE UP (ref 10–40)
BASOPHILS # BLD AUTO: 0.04 K/UL — SIGNIFICANT CHANGE UP (ref 0–0.2)
BASOPHILS NFR BLD AUTO: 0.5 % — SIGNIFICANT CHANGE UP (ref 0–2)
BILIRUB SERPL-MCNC: 0.3 MG/DL — SIGNIFICANT CHANGE UP (ref 0.2–1.2)
BUN SERPL-MCNC: 60 MG/DL — HIGH (ref 7–23)
CALCIUM SERPL-MCNC: 8.8 MG/DL — SIGNIFICANT CHANGE UP (ref 8.4–10.5)
CHLORIDE SERPL-SCNC: 99 MMOL/L — SIGNIFICANT CHANGE UP (ref 96–108)
CO2 SERPL-SCNC: 24 MMOL/L — SIGNIFICANT CHANGE UP (ref 22–31)
CREAT FLD-MCNC: 8.47 MG/DL — SIGNIFICANT CHANGE UP
CREAT SERPL-MCNC: 6.1 MG/DL — HIGH (ref 0.5–1.3)
CULTURE RESULTS: SIGNIFICANT CHANGE UP
CULTURE RESULTS: SIGNIFICANT CHANGE UP
EGFR: 9 ML/MIN/1.73M2 — LOW
EGFR: 9 ML/MIN/1.73M2 — LOW
EOSINOPHIL # BLD AUTO: 0.36 K/UL — SIGNIFICANT CHANGE UP (ref 0–0.5)
EOSINOPHIL NFR BLD AUTO: 4.7 % — SIGNIFICANT CHANGE UP (ref 0–6)
GLUCOSE BLDC GLUCOMTR-MCNC: 108 MG/DL — HIGH (ref 70–99)
GLUCOSE BLDC GLUCOMTR-MCNC: 124 MG/DL — HIGH (ref 70–99)
GLUCOSE BLDC GLUCOMTR-MCNC: 151 MG/DL — HIGH (ref 70–99)
GLUCOSE BLDC GLUCOMTR-MCNC: 81 MG/DL — SIGNIFICANT CHANGE UP (ref 70–99)
GLUCOSE SERPL-MCNC: 74 MG/DL — SIGNIFICANT CHANGE UP (ref 70–99)
HCT VFR BLD CALC: 22.8 % — LOW (ref 39–50)
HGB BLD-MCNC: 7.1 G/DL — LOW (ref 13–17)
IMM GRANULOCYTES NFR BLD AUTO: 0.8 % — SIGNIFICANT CHANGE UP (ref 0–0.9)
INR BLD: 0.96 RATIO — SIGNIFICANT CHANGE UP (ref 0.85–1.16)
LYMPHOCYTES # BLD AUTO: 0.32 K/UL — LOW (ref 1–3.3)
LYMPHOCYTES # BLD AUTO: 4.2 % — LOW (ref 13–44)
MAGNESIUM SERPL-MCNC: 2.4 MG/DL — SIGNIFICANT CHANGE UP (ref 1.6–2.6)
MCHC RBC-ENTMCNC: 27.7 PG — SIGNIFICANT CHANGE UP (ref 27–34)
MCHC RBC-ENTMCNC: 31.1 G/DL — LOW (ref 32–36)
MCV RBC AUTO: 89.1 FL — SIGNIFICANT CHANGE UP (ref 80–100)
MONOCYTES # BLD AUTO: 0.71 K/UL — SIGNIFICANT CHANGE UP (ref 0–0.9)
MONOCYTES NFR BLD AUTO: 9.3 % — SIGNIFICANT CHANGE UP (ref 2–14)
NEUTROPHILS # BLD AUTO: 6.15 K/UL — SIGNIFICANT CHANGE UP (ref 1.8–7.4)
NEUTROPHILS NFR BLD AUTO: 80.5 % — HIGH (ref 43–77)
NRBC BLD AUTO-RTO: 0 /100 WBCS — SIGNIFICANT CHANGE UP (ref 0–0)
PHOSPHATE SERPL-MCNC: 4.4 MG/DL — SIGNIFICANT CHANGE UP (ref 2.5–4.5)
PLATELET # BLD AUTO: 403 K/UL — HIGH (ref 150–400)
POTASSIUM SERPL-MCNC: 3.6 MMOL/L — SIGNIFICANT CHANGE UP (ref 3.5–5.3)
POTASSIUM SERPL-SCNC: 3.6 MMOL/L — SIGNIFICANT CHANGE UP (ref 3.5–5.3)
PROT SERPL-MCNC: 6.1 G/DL — SIGNIFICANT CHANGE UP (ref 6–8.3)
PROTHROM AB SERPL-ACNC: 11.1 SEC — SIGNIFICANT CHANGE UP (ref 9.9–13.4)
RBC # BLD: 2.56 M/UL — LOW (ref 4.2–5.8)
RBC # FLD: 19.7 % — HIGH (ref 10.3–14.5)
SODIUM SERPL-SCNC: 139 MMOL/L — SIGNIFICANT CHANGE UP (ref 135–145)
SPECIMEN SOURCE: SIGNIFICANT CHANGE UP
SPECIMEN SOURCE: SIGNIFICANT CHANGE UP
TACROLIMUS SERPL-MCNC: 2.1 NG/ML — SIGNIFICANT CHANGE UP
WBC # BLD: 7.64 K/UL — SIGNIFICANT CHANGE UP (ref 3.8–10.5)
WBC # FLD AUTO: 7.64 K/UL — SIGNIFICANT CHANGE UP (ref 3.8–10.5)

## 2025-04-22 PROCEDURE — 99232 SBSQ HOSP IP/OBS MODERATE 35: CPT | Mod: FS,24

## 2025-04-22 PROCEDURE — 99232 SBSQ HOSP IP/OBS MODERATE 35: CPT | Mod: GC

## 2025-04-22 PROCEDURE — 93010 ELECTROCARDIOGRAM REPORT: CPT

## 2025-04-22 RX ORDER — LIDOCAINE HCL/PF 10 MG/ML
5 VIAL (ML) INJECTION ONCE
Refills: 0 | Status: COMPLETED | OUTPATIENT
Start: 2025-04-22 | End: 2025-04-22

## 2025-04-22 RX ORDER — TRAMADOL HYDROCHLORIDE 50 MG/1
50 TABLET, FILM COATED ORAL EVERY 8 HOURS
Refills: 0 | Status: DISCONTINUED | OUTPATIENT
Start: 2025-04-22 | End: 2025-04-23

## 2025-04-22 RX ORDER — TRAMADOL HYDROCHLORIDE 50 MG/1
25 TABLET, FILM COATED ORAL EVERY 4 HOURS
Refills: 0 | Status: DISCONTINUED | OUTPATIENT
Start: 2025-04-22 | End: 2025-04-23

## 2025-04-22 RX ADMIN — HEPARIN SODIUM 5000 UNIT(S): 1000 INJECTION INTRAVENOUS; SUBCUTANEOUS at 06:07

## 2025-04-22 RX ADMIN — Medication 650 MILLIGRAM(S): at 18:06

## 2025-04-22 RX ADMIN — Medication 500000 UNIT(S): at 00:08

## 2025-04-22 RX ADMIN — Medication 81 MILLIGRAM(S): at 13:14

## 2025-04-22 RX ADMIN — Medication 50 MILLIGRAM(S): at 21:22

## 2025-04-22 RX ADMIN — Medication 5 MILLILITER(S): at 19:35

## 2025-04-22 RX ADMIN — CARVEDILOL 12.5 MILLIGRAM(S): 3.12 TABLET, FILM COATED ORAL at 09:31

## 2025-04-22 RX ADMIN — PREDNISONE 5 MILLIGRAM(S): 20 TABLET ORAL at 06:07

## 2025-04-22 RX ADMIN — INSULIN LISPRO 4 UNIT(S): 100 INJECTION, SOLUTION INTRAVENOUS; SUBCUTANEOUS at 17:53

## 2025-04-22 RX ADMIN — Medication 200 MILLIGRAM(S): at 06:07

## 2025-04-22 RX ADMIN — Medication 500000 UNIT(S): at 13:14

## 2025-04-22 RX ADMIN — INSULIN LISPRO 4 UNIT(S): 100 INJECTION, SOLUTION INTRAVENOUS; SUBCUTANEOUS at 13:15

## 2025-04-22 RX ADMIN — Medication 60 MILLIGRAM(S): at 06:07

## 2025-04-22 RX ADMIN — Medication 0.1 MILLIGRAM(S): at 06:08

## 2025-04-22 RX ADMIN — Medication 60 MILLIGRAM(S): at 18:30

## 2025-04-22 RX ADMIN — ATORVASTATIN CALCIUM 40 MILLIGRAM(S): 80 TABLET, FILM COATED ORAL at 21:22

## 2025-04-22 RX ADMIN — HEPARIN SODIUM 5000 UNIT(S): 1000 INJECTION INTRAVENOUS; SUBCUTANEOUS at 17:50

## 2025-04-22 RX ADMIN — Medication 2 TABLET(S): at 21:22

## 2025-04-22 RX ADMIN — ISOSORBIDE MONONITRATE 60 MILLIGRAM(S): 60 TABLET, EXTENDED RELEASE ORAL at 13:14

## 2025-04-22 RX ADMIN — TAMSULOSIN HYDROCHLORIDE 0.4 MILLIGRAM(S): 0.4 CAPSULE ORAL at 21:21

## 2025-04-22 RX ADMIN — INSULIN LISPRO 2: 100 INJECTION, SOLUTION INTRAVENOUS; SUBCUTANEOUS at 17:52

## 2025-04-22 RX ADMIN — Medication 1 TABLET(S): at 13:14

## 2025-04-22 RX ADMIN — TACROLIMUS 5 MILLIGRAM(S): 0.5 CAPSULE ORAL at 09:29

## 2025-04-22 RX ADMIN — Medication 500000 UNIT(S): at 06:08

## 2025-04-22 RX ADMIN — DOXAZOSIN MESYLATE 8 MILLIGRAM(S): 8 TABLET ORAL at 09:33

## 2025-04-22 RX ADMIN — FUROSEMIDE 80 MILLIGRAM(S): 10 INJECTION INTRAMUSCULAR; INTRAVENOUS at 06:08

## 2025-04-22 RX ADMIN — FUROSEMIDE 80 MILLIGRAM(S): 10 INJECTION INTRAMUSCULAR; INTRAVENOUS at 18:29

## 2025-04-22 RX ADMIN — MYCOPHENOLATE MOFETIL 500 MILLIGRAM(S): 500 TABLET, FILM COATED ORAL at 19:36

## 2025-04-22 RX ADMIN — MYCOPHENOLATE MOFETIL 500 MILLIGRAM(S): 500 TABLET, FILM COATED ORAL at 09:30

## 2025-04-22 RX ADMIN — TRAMADOL HYDROCHLORIDE 50 MILLIGRAM(S): 50 TABLET, FILM COATED ORAL at 18:29

## 2025-04-22 RX ADMIN — Medication 650 MILLIGRAM(S): at 19:00

## 2025-04-22 RX ADMIN — Medication 500000 UNIT(S): at 21:21

## 2025-04-22 RX ADMIN — Medication 1 APPLICATION(S): at 13:14

## 2025-04-22 RX ADMIN — Medication 25 MICROGRAM(S): at 05:07

## 2025-04-22 RX ADMIN — Medication 500000 UNIT(S): at 17:51

## 2025-04-22 RX ADMIN — Medication 200 MILLIGRAM(S): at 17:56

## 2025-04-22 RX ADMIN — INSULIN LISPRO 4 UNIT(S): 100 INJECTION, SOLUTION INTRAVENOUS; SUBCUTANEOUS at 09:34

## 2025-04-22 RX ADMIN — DOXAZOSIN MESYLATE 8 MILLIGRAM(S): 8 TABLET ORAL at 21:22

## 2025-04-22 RX ADMIN — Medication 20 MILLIGRAM(S): at 13:13

## 2025-04-22 NOTE — PROVIDER CONTACT NOTE (OTHER) - REASON
/56 HR 56
Campo output color change: blood tinged
Pt removed tele leads
Pt had period of confusion - removed tele leads and CW permecath dressing
Pt hypertensive /88
Pt momentarily altered
Pt complaining of persistent hiccups
Pt hypertensive /76
BP elevated
Bp remains hypertensive upon reassessment
Pt htn bp 186/74, hr 69
Pt htn during OT session
bp elevated, complaint of pain and verbal complaints
Pt had new onset   blocked PAC event
Tele event - ectopic atrial

## 2025-04-22 NOTE — PROGRESS NOTE ADULT - SUBJECTIVE AND OBJECTIVE BOX
[facility] NEPHROLOGY PRACTICE   MD MESSI ROSE MD SAKIL BHUIYAN, MD REYES THOMASON, NP    TEL:  OFFICE: [phone]  From 5pm-7am Answering Service [phone]    -- RENAL FOLLOW UP NOTE ---Date of Service 04-22-25 @ 13:34    Patient is a 69y old  Male who presents with a chief complaint of possible DDRT       Patient seen and examined at bedside. No chest pain/sob    VITALS:  T(F): 97.9 (04-22-25 @ 10:15), Max: 98.3 (04-22-25 @ 02:17)  HR: 60 (04-22-25 @ 10:15)  BP: 122/58 (04-22-25 @ 10:15)  RR: 18 (04-22-25 @ 10:15)  SpO2: 100% (04-22-25 @ 10:15)  Wt(kg): --    04-21 @ 07:01  -  04-22 @ 07:00  --------------------------------------------------------  IN: 1080 mL / OUT: 1105 mL / NET: -25 mL    04-22 @ 07:01  -  04-22 @ 13:34  --------------------------------------------------------  IN: 360 mL / OUT: 1740 mL / NET: -1380 mL          PHYSICAL EXAM:  General: NAD  Neck: No JVD  Respiratory: CTAB, no wheezes, rales or rhonchi  Cardiovascular: S1, S2, RRR  Gastrointestinal: BS+, soft, NT/ND  Extremities: No peripheral edema    Hospital Medications:   MEDICATIONS  (STANDING):  acyclovir   Oral Tab/Cap 200 milliGRAM(s) Oral two times a day  aspirin enteric coated 81 milliGRAM(s) Oral daily  atorvastatin 40 milliGRAM(s) Oral at bedtime  carvedilol 12.5 milliGRAM(s) Oral every 12 hours  chlorhexidine 2% Cloths 1 Application(s) Topical daily  doxazosin 8 milliGRAM(s) Oral with breakfast  doxazosin 8 milliGRAM(s) Oral at bedtime  famotidine    Tablet 20 milliGRAM(s) Oral daily  furosemide   Injectable 80 milliGRAM(s) IV Push two times a day  heparin   Injectable 5000 Unit(s) SubCutaneous every 12 hours  hydrALAZINE 50 milliGRAM(s) Oral three times a day  insulin glargine Injectable (LANTUS) 6 Unit(s) SubCutaneous at bedtime  insulin lispro (ADMELOG) corrective regimen sliding scale   SubCutaneous three times a day before meals  insulin lispro (ADMELOG) corrective regimen sliding scale   SubCutaneous at bedtime  insulin lispro Injectable (ADMELOG) 4 Unit(s) SubCutaneous three times a day before meals  isosorbide   mononitrate ER Tablet (IMDUR) 60 milliGRAM(s) Oral daily  levothyroxine 25 MICROGram(s) Oral daily  mycophenolate mofetil 500 milliGRAM(s) Oral <User Schedule>  Nephro-brenda 1 Tablet(s) Oral daily  NIFEdipine XL 60 milliGRAM(s) Oral two times a day  nystatin    Suspension 514102 Unit(s) Swish and Swallow four times a day  polyethylene glycol 3350 17 Gram(s) Oral every 12 hours  predniSONE   Tablet   Oral   predniSONE   Tablet 5 milliGRAM(s) Oral daily  senna 2 Tablet(s) Oral at bedtime  tacrolimus ER Tablet (ENVARSUS XR) 5 milliGRAM(s) Oral <User Schedule>  tamsulosin 0.4 milliGRAM(s) Oral at bedtime  trimethoprim   80 mG/sulfamethoxazole 400 mG 1 Tablet(s) Oral <User Schedule>    Tacrolimus (), Serum: 2.1 ng/mL (04-22 @ 07:12)    LABS:  04-22    139  |  99  |  60[H]  ----------------------------<  74  3.6   |  24  |  6.10[H]    Ca    8.8      22 Apr 2025 07:12  Phos  4.4     04-22  Mg     2.4     04-22    TPro  6.1  /  Alb  3.3  /  TBili  0.3  /  DBili      /  AST  29  /  ALT  19  /  AlkPhos  75  04-22    Creatinine Trend: 6.10 <--, 5.30 <--, 4.00 <--, 6.66 <--, 5.35 <--, 4.16 <--, 3.22 <--, 5.78 <--, 4.83 <--, 4.85 <--    Albumin: 3.3 g/dL (04-22 @ 07:12)  Phosphorus: 4.4 mg/dL (04-22 @ 07:12)                              7.1    7.64  )-----------( 403      ( 22 Apr 2025 07:12 )             22.8     Urine Studies:  Urinalysis - [04-22-25 @ 07:12]      Color  / Appearance  / SG  / pH       Gluc 74 / Ketone   / Bili  / Urobili        Blood  / Protein  / Leuk Est  / Nitrite       RBC  / WBC  / Hyaline  / Gran  / Sq Epi  / Non Sq Epi  / Bacteria       Iron 67, TIBC 210, %sat 32      [04-21-25 @ 07:08]  Ferritin 1496      [04-21-25 @ 07:08]  PTH -- (Ca 7.9)      [12-30-24 @ 06:50]   229  PTH -- (Ca 7.6)      [11-27-24 @ 04:23]   250  Vitamin D (25OH) 17.1      [11-25-24 @ 06:50]  TSH 2.48      [04-13-25 @ 07:24]  Lipid: chol 112, TG 61, HDL 46, LDL --      [12-31-24 @ 04:27]    HBsAb 46.0      [04-07-25 @ 21:39]  HBsAg Nonreact      [04-07-25 @ 21:39]  HBcAb Nonreact      [04-07-25 @ 21:39]  HCV 0.05, Nonreact      [04-07-25 @ 21:39]  HIV Nonreact      [04-07-25 @ 21:39]      RADIOLOGY & ADDITIONAL STUDIES:

## 2025-04-22 NOTE — PROGRESS NOTE ADULT - ASSESSMENT
68 y/o M with PMHx of HLD, CAD s/p LAD PCI 7/24, HFpEF , HTN, hypothyroid, type 2 DM, ESRD on HD MWF (recently converted from PD in 1/2025) via permacath placed on 1/2025, h/o cva with no residual deficits, persistent right sided pleural effusion, and hx of hemorrhagic pericardial effusion (cytopathology negative) presents to [facility] for possible DDRT. He states he makes less than half cup of urine a day. Denies fever, chills, N/V/D/C, abdominal pain, CP, SOB, hemoptysis, and /GI complaints.     A/P  ESRD s/p DDRT 04/09  Complicated for HTN urgency and DGF  Steroid and thymo induction  Nephrologist is Dr. Menjivar   Access is PermCath   Was recently converted from PD to HD, on MMF schedule as outpatient    Last HD 4/7, s/p HD on 04/08 and 04/09, and 04/11, 04/14, 04/16, 04/19  HD today as per transplant  HD as needed  On MMF and  Tac follow levels, On prednisone taper  On acyclovir and nsytatin prophylaxis  On belatacept, next dose 4/24  Monitor bmp   Renally dose meds    HTN   On doxazosin labetalol tamsulosin and nifedipine  Clonidine changed to hydralazine 50 mg tid as per transplant  UF w/ HD if needed  Managed per transplant team   Monitor bp     Anemia  Hb below goal  Monitor     CKD-MBD  Monitor ca and phos daily

## 2025-04-22 NOTE — PROVIDER CONTACT NOTE (OTHER) - RECOMMENDATIONS
Notify provider. Do an EKG
Pt is asymptomatic, care to continue
notify provider, cardiology consult
Notify provider
Notify provider
Notify provider Baldev ELIZONDO. Give night bp medications.
Notify provider
Notify provider
contact provider
Give am dose bp meds. Notify provider Roxanne ELIZONDO
Notify provider
Provider notified.

## 2025-04-22 NOTE — PROVIDER CONTACT NOTE (OTHER) - SITUATION
Pt htn bp 193/76, hr 66, complaining of pain in throat, chest, abdomen, and penial region. Pt also states verbal complaints of hopelessness and feeling powerless.
Pt had new onset   blocked PAC event notified by tele tech
Pt is ectopic atrial. P waves are downward in lead 1 and 2
Pt HTN during OT session. BP was 178/79 and hr 64.
Pt htn bp 191/76, hr 66 upon reassessment
Pt hypertensive /76
Campo output color change: blood tinged
Pt htn bp 186/74 and hr 69
/56 HR 56
Pt complaining of persistent on and off hiccups
Pt had an episode of confusion. Pt removed tele leads.
Pt had period of confusion - removed tele leads and CW permecath dressing
Pt is DDRT POD 3 wit elevated BP, last reading is 195/77 HR 67.
Pt became confused, took tele leads and chest wall permacath dressing off. RN able to reorient him to location and situation.
Pt hypertensive /88

## 2025-04-22 NOTE — PROVIDER CONTACT NOTE (OTHER) - NAME OF MD/NP/PA/DO NOTIFIED:
Kait Massey
Kait Massey
Hilary Aj
Roxanne ELIZONDO
Baldev ELIZONDO
Hilary Aj
Juanpablo ELIZONDO
MARGUERITE Massey
Concha Cevallos
MIKHAIL Blair
MIKHAIL Dimas
Kait Massey
Kait Massey

## 2025-04-22 NOTE — PROGRESS NOTE ADULT - SUBJECTIVE AND OBJECTIVE BOX
Transplant Surgery - Multi-disciplinary Rounds  --------------------------------------------------------------   Tx Date: 04/08/25         POD#14    Present: Patient seen and examined with multidisciplinary Transplant team including Surgeon Dr. Phipps, nephrologist Dr. Garcia, LÁZARO Zhu, pharmacy, and bedside RN during AM rounds.   Disciplines not in attendance will be notified of the plan.     HPI: 70 y/o M with PMHx of HLD, CAD s/p LAD PCI 7/24, HFpEF , HTN, hypothyroid, type 2 DM, ESRD on HD MWF (recently converted from PD in 1/2025) via R permacath placed on 1/2025, h/o cva with no residual deficits, persistent right sided pleural effusion, and hx of hemorrhagic pericardial effusion (cytopathology negative) presents to [facility] for possible DDRT. He states he makes less than half cup of urine a day. Denies fever, chills, N/V/D/C, abdominal pain, CP, SOB, hemoptysis, and /GI complaints. Now s/p DDRT  1a + 1 v + 1 u + stent under thymoglobulin induction on 04/08/25.     Interval Events:  -ON: PAC Block seen on tele? -> f/u EKG  -Afebrile, HTN      Immunosuppression:  Induction: Thymo completed  Maintenance: Petra last dose 4/14/ENV per level/ BID/ pred 5  Ongoing monitoring for signs of rejection     Potential Discharge date: TBD  Education:  Medications  Plan of care:  See Below        TX DATA HERE    Review of systems  All other systems were reviewed and are negative, except as noted.    PHYSICAL EXAM:  Constitutional: Well developed / well nourished  Eyes: Anicteric, PERRLA  ENMT: nc/at  Neck: Supple  Respiratory: CTA B/L  Cardiovascular: RRR  Gastrointestinal: Soft, non-distended, mild abdominal pain to palpation at incision site; incision c/d/i; COSMO x #1/2 perinephric ss   Genitourinary: voiding  Extremities: SCD's in place and working bilaterally, R permcath  Vascular: Palpable dp pulses bilaterally  Neurological: A&O x3  Skin: no rashes, ulcerations or lesions;  Musculoskeletal: Moving all extremities  Psychiatric: Responsive     Transplant Surgery - Multi-disciplinary Rounds  --------------------------------------------------------------   Tx Date: 04/08/25         POD#14    Present: Patient seen and examined with multidisciplinary Transplant team including Surgeon Dr. Phipps, nephrologist Dr. Garcia, LÁZARO Zhu, pharmacy, and bedside RN during AM rounds.   Disciplines not in attendance will be notified of the plan.     HPI: 68 y/o M with PMHx of HLD, CAD s/p LAD PCI 7/24, HFpEF , HTN, hypothyroid, type 2 DM, ESRD on HD MWF (recently converted from PD in 1/2025) via R permacath placed on 1/2025, h/o cva with no residual deficits, persistent right sided pleural effusion, and hx of hemorrhagic pericardial effusion (cytopathology negative) presents to [facility] for possible DDRT. He states he makes less than half cup of urine a day. Denies fever, chills, N/V/D/C, abdominal pain, CP, SOB, hemoptysis, and /GI complaints. Now s/p DDRT  1a + 1 v + 1 u + stent under thymoglobulin induction on 04/08/25.     Interval Events:  -ON: PAC Block seen on tele? EKG: sinus kathrin, 1st HB, asymptomatic  -Afebrile, HTN      Immunosuppression:  Induction: Thymo completed  Maintenance: Petra last dose 4/14/ENV per level/ BID/ pred 5  Ongoing monitoring for signs of rejection     Potential Discharge date: TBD  Education:  Medications  Plan of care:  See Below        TX DATA HERE    Review of systems  All other systems were reviewed and are negative, except as noted.    PHYSICAL EXAM:  Constitutional: Well developed / well nourished  Eyes: Anicteric, PERRLA  ENMT: nc/at  Neck: Supple  Respiratory: CTA B/L  Cardiovascular: RRR  Gastrointestinal: Soft, non-distended, mild abdominal pain to palpation at incision site; incision c/d/i; COSMO x #1/2 perinephric ss   Genitourinary: voiding  Extremities: SCD's in place and working bilaterally, R permcath  Vascular: Palpable dp pulses bilaterally  Neurological: A&O x3  Skin: no rashes, ulcerations or lesions;  Musculoskeletal: Moving all extremities  Psychiatric: Responsive     Transplant Surgery - Multi-disciplinary Rounds  --------------------------------------------------------------   Tx Date: 04/08/25         POD#14    Present: Patient seen and examined with multidisciplinary Transplant team including Surgeon Dr. Phipps, nephrologist Dr. Garcia, LÁZARO Zhu, pharmacy, and bedside RN during AM rounds.   Disciplines not in attendance will be notified of the plan.     HPI: 68 y/o M with PMHx of HLD, CAD s/p LAD PCI 7/24, HFpEF , HTN, hypothyroid, type 2 DM, ESRD on HD MWF (recently converted from PD in 1/2025) via R permacath placed on 1/2025, h/o cva with no residual deficits, persistent right sided pleural effusion, and hx of hemorrhagic pericardial effusion (cytopathology negative) presents to [facility] for possible DDRT. He states he makes less than half cup of urine a day. Denies fever, chills, N/V/D/C, abdominal pain, CP, SOB, hemoptysis, and /GI complaints. Now s/p DDRT  1a + 1 v + 1 u + stent under thymoglobulin induction on 04/08/25.     Interval Events:  -ON: PAC Block seen on tele? EKG: sinus kathrin, 1st HB, asymptomatic  -Afebrile, HTN      Immunosuppression:  Induction: Thymo completed  Maintenance: Petra last dose 4/14/ENV per level/ BID/ pred 5  Ongoing monitoring for signs of rejection     Potential Discharge date: TBD  Education:  Medications  Plan of care:  See Below          MEDICATIONS  (STANDING):  acyclovir   Oral Tab/Cap 200 milliGRAM(s) Oral two times a day  aspirin enteric coated 81 milliGRAM(s) Oral daily  atorvastatin 40 milliGRAM(s) Oral at bedtime  carvedilol 12.5 milliGRAM(s) Oral every 12 hours  chlorhexidine 2% Cloths 1 Application(s) Topical daily  doxazosin 8 milliGRAM(s) Oral at bedtime  doxazosin 8 milliGRAM(s) Oral with breakfast  famotidine    Tablet 20 milliGRAM(s) Oral daily  furosemide   Injectable 80 milliGRAM(s) IV Push two times a day  heparin   Injectable 5000 Unit(s) SubCutaneous every 12 hours  hydrALAZINE 50 milliGRAM(s) Oral three times a day  insulin glargine Injectable (LANTUS) 6 Unit(s) SubCutaneous at bedtime  insulin lispro (ADMELOG) corrective regimen sliding scale   SubCutaneous three times a day before meals  insulin lispro (ADMELOG) corrective regimen sliding scale   SubCutaneous at bedtime  insulin lispro Injectable (ADMELOG) 4 Unit(s) SubCutaneous three times a day before meals  isosorbide   mononitrate ER Tablet (IMDUR) 60 milliGRAM(s) Oral daily  levothyroxine 25 MICROGram(s) Oral daily  mycophenolate mofetil 500 milliGRAM(s) Oral <User Schedule>  Nephro-brenda 1 Tablet(s) Oral daily  NIFEdipine XL 60 milliGRAM(s) Oral two times a day  nystatin    Suspension 818770 Unit(s) Swish and Swallow four times a day  polyethylene glycol 3350 17 Gram(s) Oral every 12 hours  predniSONE   Tablet   Oral   predniSONE   Tablet 5 milliGRAM(s) Oral daily  senna 2 Tablet(s) Oral at bedtime  tacrolimus ER Tablet (ENVARSUS XR) 5 milliGRAM(s) Oral <User Schedule>  tamsulosin 0.4 milliGRAM(s) Oral at bedtime  trimethoprim   80 mG/sulfamethoxazole 400 mG 1 Tablet(s) Oral <User Schedule>    MEDICATIONS  (PRN):  acetaminophen     Tablet .. 650 milliGRAM(s) Oral every 6 hours PRN Mild Pain (1 - 3)  artificial tears (preservative free) Ophthalmic Solution 1 Drop(s) Left EYE daily PRN Dry Eyes  ondansetron Injectable 4 milliGRAM(s) IV Push every 6 hours PRN Nausea and/or Vomiting  traMADol 25 milliGRAM(s) Oral every 4 hours PRN Moderate Pain (4 - 6)  traMADol 50 milliGRAM(s) Oral every 8 hours PRN Severe Pain (7 - 10)      PAST MEDICAL & SURGICAL HISTORY:  Hypertension      ESRD on peritoneal dialysis      CVA (cerebrovascular accident)  2010 without residual effects      Hyperlipidemia      BPH (benign prostatic hyperplasia)      CAD (coronary artery disease)      T2DM (type 2 diabetes mellitus)      Hypothyroidism      History of peritoneal dialysis  s/p PD catheter placement      S/P coronary artery stent placement          Vital Signs Last 24 Hrs  T(C): 36.5 (22 Apr 2025 19:34), Max: 36.8 (22 Apr 2025 02:17)  T(F): 97.7 (22 Apr 2025 19:34), Max: 98.3 (22 Apr 2025 02:17)  HR: 52 (22 Apr 2025 19:34) (52 - 63)  BP: 163/72 (22 Apr 2025 19:34) (122/58 - 179/70)  BP(mean): 103 (22 Apr 2025 19:34) (89 - 125)  RR: 17 (22 Apr 2025 19:34) (16 - 18)  SpO2: 100% (22 Apr 2025 19:34) (99% - 100%)    Parameters below as of 22 Apr 2025 19:34  Patient On (Oxygen Delivery Method): room air        I&O's Summary    21 Apr 2025 07:01  -  22 Apr 2025 07:00  --------------------------------------------------------  IN: 1080 mL / OUT: 1105 mL / NET: -25 mL    22 Apr 2025 07:01  -  22 Apr 2025 20:29  --------------------------------------------------------  IN: 460 mL / OUT: 1975 mL / NET: -1515 mL                              7.1    7.64  )-----------( 403      ( 22 Apr 2025 07:12 )             22.8     04-22    139  |  99  |  60[H]  ----------------------------<  74  3.6   |  24  |  6.10[H]    Ca    8.8      22 Apr 2025 07:12  Phos  4.4     04-22  Mg     2.4     04-22    TPro  6.1  /  Alb  3.3  /  TBili  0.3  /  DBili  x   /  AST  29  /  ALT  19  /  AlkPhos  75  04-22    Tacrolimus (), Serum: 2.1 ng/mL (04-22 @ 07:12)        Culture - Blood (collected 04-17-25 @ 10:17)  Source: Blood Blood-Peripheral  Final Report (04-22-25 @ 18:00):    No growth at 5 days    Culture - Blood (collected 04-17-25 @ 10:17)  Source: Blood Blood-Peripheral  Final Report (04-22-25 @ 18:00):    No growth at 5 days                Review of systems  All other systems were reviewed and are negative, except as noted.    PHYSICAL EXAM:  Constitutional: Well developed / well nourished  Eyes: Anicteric, PERRLA  ENMT: nc/at  Neck: Supple  Respiratory: CTA B/L  Cardiovascular: RRR  Gastrointestinal: Soft, non-distended, mild abdominal pain to palpation at incision site; incision c/d/i; COSMO x #1/2 perinephric ss   Genitourinary: voiding  Extremities: SCD's in place and working bilaterally, R permcath  Vascular: Palpable dp pulses bilaterally  Neurological: A&O x3  Skin: no rashes, ulcerations or lesions;  Musculoskeletal: Moving all extremities  Psychiatric: Responsive

## 2025-04-22 NOTE — CHART NOTE - NSCHARTNOTEFT_GEN_A_CORE
NUTRITION FOLLOW UP NOTE    PATIENT SEEN FOR: malnutrition and post kidney transplant recipient nutrition follow up    SOURCE: [x] Patient  [x] Current Medical Record  [x] RN  [] Family/support person at bedside  [] Patient unavailable/inappropriate  [] Other:    CHART REVIEWED/EVENTS NOTED.  [] No changes to nutrition care plan to note  [x] Nutrition Status:  - DDRT 25 POD#14  - s/p iHD , , , ordered for session     DIET ORDER:   Diet, Consistent Carbohydrate Renal w/Evening Snack:   Supplement Feeding Modality:  Oral  Nepro Cans or Servings Per Day:  3       Frequency:  Daily (25)      CURRENT DIET ORDER IS:  [x] Appropriate:  [] Inadequate:  [] Other:    NUTRITION INTAKE/PROVISION:  [x] PO: pt reports improving PO intake, observed breakfast tray left at bedside with >75% completion, also reports drinking most of the Nepro shake provided.   [] Enteral Nutrition:  [] Parenteral Nutrition:    ANTHROPOMETRICS:  Drug Dosing Weight  Height (cm): 172.7 (2025 16:32)  Weight (kg): 55.6 (2025 16:32)  BMI (kg/m2): 18.6 (2025 16:32)  Weights:   Daily Weight in k.1 (-), Weight in k.1 (-), Weight in k.2 (-), Weight in k (-), Weight in k.2 (-), Weight in k.2 (-), Weight in k.5 (-18)   - Weight fluctuations in setting of fluid shifts (IVF, diuresis, intraoperative fluids). Will continue to monitor weight trends as available/able.      MEDICATIONS:  MEDICATIONS  (STANDING):  acyclovir   Oral Tab/Cap 200 milliGRAM(s) Oral two times a day  aspirin enteric coated 81 milliGRAM(s) Oral daily  atorvastatin 40 milliGRAM(s) Oral at bedtime  carvedilol 12.5 milliGRAM(s) Oral every 12 hours  chlorhexidine 2% Cloths 1 Application(s) Topical daily  doxazosin 8 milliGRAM(s) Oral with breakfast  doxazosin 8 milliGRAM(s) Oral at bedtime  famotidine    Tablet 20 milliGRAM(s) Oral daily  furosemide   Injectable 80 milliGRAM(s) IV Push two times a day  heparin   Injectable 5000 Unit(s) SubCutaneous every 12 hours  hydrALAZINE 50 milliGRAM(s) Oral three times a day  insulin glargine Injectable (LANTUS) 6 Unit(s) SubCutaneous at bedtime  insulin lispro (ADMELOG) corrective regimen sliding scale   SubCutaneous three times a day before meals  insulin lispro (ADMELOG) corrective regimen sliding scale   SubCutaneous at bedtime  insulin lispro Injectable (ADMELOG) 4 Unit(s) SubCutaneous three times a day before meals  isosorbide   mononitrate ER Tablet (IMDUR) 60 milliGRAM(s) Oral daily  levothyroxine 25 MICROGram(s) Oral daily  mycophenolate mofetil 500 milliGRAM(s) Oral <User Schedule>  Nephro-brenda 1 Tablet(s) Oral daily  NIFEdipine XL 60 milliGRAM(s) Oral two times a day  nystatin    Suspension 671230 Unit(s) Swish and Swallow four times a day  polyethylene glycol 3350 17 Gram(s) Oral every 12 hours  predniSONE   Tablet   Oral   predniSONE   Tablet 5 milliGRAM(s) Oral daily  senna 2 Tablet(s) Oral at bedtime  tacrolimus ER Tablet (ENVARSUS XR) 5 milliGRAM(s) Oral <User Schedule>  tamsulosin 0.4 milliGRAM(s) Oral at bedtime  trimethoprim   80 mG/sulfamethoxazole 400 mG 1 Tablet(s) Oral <User Schedule>    MEDICATIONS  (PRN):  acetaminophen     Tablet .. 650 milliGRAM(s) Oral every 6 hours PRN Mild Pain (1 - 3)  artificial tears (preservative free) Ophthalmic Solution 1 Drop(s) Left EYE daily PRN Dry Eyes  ondansetron Injectable 4 milliGRAM(s) IV Push every 6 hours PRN Nausea and/or Vomiting      NUTRITIONALLY PERTINENT LABS:   Na139 mmol/L Glu 74 mg/dL K+ 3.6 mmol/L Cr  6.10 mg/dL[H] BUN 60 mg/dL[H]  Phos 4.4 mg/dL  Alb 3.3 g/dL ALT 19 U/L AST 29 U/L Alkaline Phosphatase 75 U/L    A1C with Estimated Average Glucose Result: 5.0 % (25 @ 06:24)  A1C with Estimated Average Glucose Result: 4.9 % (24 @ 06:50)      Finger Sticks:  POCT Blood Glucose.: 108 mg/dL ( @ 09:10)  POCT Blood Glucose.: 124 mg/dL ( @ 21:40)  POCT Blood Glucose.: 92 mg/dL ( @ 16:47)  POCT Blood Glucose.: 238 mg/dL ( @ 14:20)      NUTRITIONALLY PERTINENT MEDICATIONS/LABS:  [x] Reviewed  [x] Relevant notes on medications/labs:  - ordered for Tacrolimus, Cellcept, Prednisone taper   - DM: ordered for Lantus 6 units QHS, Lispro 4 units TID and ISS to regulate blood glucose in house  - CV: Lasix   - Micronutrient/Other supplementation: Nephro-Brenda supplement   - Urine output per flow sheets 300ml thus far (, 740ml (), 150ml ()    EDEMA:  [x] Reviewed  [x] Relevant notes: No edema noted per flow sheets     GI/ I&O:  [x] Reviewed  [x] Relevant notes: denies GI distress at present, last BM on  per flowsheet, ordered for Miralax and Senna   [x] Other: Zofran, Pepcid     SKIN:   [x] No pressure injuries documented, per nursing flowsheet  [] Pressure injury previously noted  [] Change in pressure injury documentation:  [] Other:    ESTIMATED NEEDS:  [X] No change:  [] Updated:  Calories: 4924-7901 (35-40 kcal/kg BW)   Proteins: 83-111g (1.5-2.0 g/kg BW)   Fluids: defer to team   Estimated needs based on dosing weight of 55.6kg    NUTRITION DIAGNOSIS:  [x] Prior Dx:  Increased Nutrient Needs; Food and Nutrition Related Knowledge Deficit;  Acute Severe Malnutrition  [] New Dx:    EDUCATION:  [x] Yes: Provided education on post transplant nutrition therapy and food safety guidelines for transplant recipients. Discussed importance of thoroughly washing all fresh fruits/vegetables, importance of avoiding uncooked/raw/unpasteurized foods, avoiding pre-made deli/buffet/salad bar meals. Foods recommended as healthy well balanced diet and importance of adequate protein intakes for proper post-surgical healing discussed. Reviewed recommendations to avoid grapefruit, pomegranate and star fruit while taking immunosuppressant medication. Reviewed recommendations for moderate intake of sodium and carbohydrates with transplant medications. Reviewed effect of steroids on BG levels and importance of limiting concentrated sweets. Pt was receptive and expressed understanding. All questions answered.   - Provided nutrition package including: USDA Food Safety for Transplant Recipients booklet; Type 2 Diabetes Nutrition Therapy handout;  food safety and BG control handouts, fridge magnet with cooking temperatures, and RD information card.   [] Not appropriate/warranted    NUTRITION CARE PLAN:  1. Diet: continue Consistent Carbohydrate Renal diet as ordered  2. Supplements: continue Nepro 3x daily (1,275 kcals, 57 g protein)   3. Multivitamin/mineral supplementation: Nephro-Brenda supplement     [x] Achieved - Continue current nutrition intervention(s)  [] Current medical condition precludes nutrition intervention at this time.    MONITORING AND EVALUATION:   RD remains available upon request and will follow up per protocol:   Janiya Barraza, MS, RDN, CDN   Available on [facility] TEAMS

## 2025-04-22 NOTE — PROGRESS NOTE ADULT - ASSESSMENT
70 y/o M with PMHx of HLD, CAD s/p LAD PCI 7/24, HFpEF, HTN, hypothyroid, type 2 DM, ESRD on HD MWF (recently converted from PD in 1/2025) via R permacath s/p DDRT  1a + 1 v + 1 u + stent under thymoglobulin induction on 04/08/2    []s/p DDRT under thymo induction, POD #14  - Renal doppler: US renal patent vasc with elevated velocities  - DGF: HD - 4/9, 4/11, 4/14, 4/16, 4/18  - Donor + GPC, vanc by level until 4/17: no more antibotics needed as per txp ID  - Strict I/O's, JPx2 SS  - Consistent carb diet  - Incr. Bowel regimen for dilated bowel loops on CT  - IS, PT  - Flomax  - f/u 4/16 DSA  - Noncontrast CT A/P - collection improving drain in place, dilated bowel loops  - per Cards, plavix not needed anymore      [ ] Immunosuppression  - Thymoglobulin induction completed  - ENV per level,  BID, SST  - Belatacept 4/10, 4/14, next dose 4/24  - ppx: bactrim TIW, nystatin, Acyclovir 200 BID    []HTN  - off cardene gtt, resumed cardene gtt 4/14 - 4/15  - Nifed 60 BID, Imdur 60 qd, labetalol 100mg PO q8hr, doxazosin 8mg in AM and 8mg QHS   -off lasix gtt 5mg/hr, started lasix 80 IV BID   - clonidine 0.1 BID    [] DVT ppx  - on SQH BID/ASA, SCD    [ ] hyperglycemia   - lantus &lispro, adjusted PRN 68 y/o M with PMHx of HLD, CAD s/p LAD PCI 7/24, HFpEF, HTN, hypothyroid, type 2 DM, ESRD on HD MWF (recently converted from PD in 1/2025) via R permacath s/p DDRT  1a + 1 v + 1 u + stent under thymoglobulin induction on 04/08/2    []s/p DDRT under thymo induction, POD #14  - Renal doppler: US renal patent vasc with elevated velocities  - DGF: HD - 4/9, 4/11, 4/14, 4/16, 4/18, 4/22  - Donor + GPC, vanc by level until 4/17: no more antibotics needed as per txp ID  - Strict I/O's, JPx2 SS  - Consistent carb diet  - Incr. Bowel regimen for dilated bowel loops on CT  - IS, PT  - Flomax  - f/u 4/16 DSA  - Noncontrast CT A/P - collection improving drain in place, dilated bowel loops  - per Cards, plavix not needed anymore  - COSMO#2 Cr - 8.47 vs. SCr - 6.10, replaced Campo      [ ] Immunosuppression  - Thymoglobulin induction completed  - ENV per level,  BID, SST  - Belatacept 4/10, 4/14, next dose 4/24  - ppx: bactrim TIW, nystatin, Acyclovir 200 BID    []HTN  - off cardene gtt, resumed cardene gtt 4/14 - 4/15  - Nifed 60 BID, Imdur 60 qd, labetalol 100mg PO q8hr, doxazosin 8mg in AM and 8mg QHS   -off lasix gtt 5mg/hr, started lasix 80 IV BID   - Hydralazine 50 TID    [] DVT ppx  - on SQH BID/ASA, SCD    [ ] hyperglycemia   - lantus &lispro, adjusted PRN

## 2025-04-22 NOTE — PROVIDER CONTACT NOTE (OTHER) - DATE AND TIME:
11-Apr-2025 19:24
14-Apr-2025 03:31
20-Apr-2025 20:35
11-Apr-2025 04:01
11-Apr-2025 10:46
18-Apr-2025 09:18
20-Apr-2025 05:58
11-Apr-2025 16:46
12-Apr-2025 00:43
12-Apr-2025 06:21
11-Apr-2025 15:48
11-Apr-2025 18:10
12-Apr-2025 05:09
22-Apr-2025 02:06
11-Apr-2025 12:15

## 2025-04-22 NOTE — CHART NOTE - NSCHARTNOTESELECT_GEN_ALL_CORE
Nutrition Services
Nutrition Services
Allergy Hydralazine/Event Note
Event Note
ID/Event Note
Nutrition Services

## 2025-04-22 NOTE — PROVIDER CONTACT NOTE (OTHER) - BACKGROUND
pt s/p DDRT
68 y/o M with PMHx of HLD, CAD s/p LAD PCI 7/24, HFpEF , HTN, hypothyroid, type 2 DM, h/o cva with no residual deficits, persistent right sided pleural effusion. DDRT POD 4
68 y/o M with PMHx of HLD, CAD s/p LAD PCI 7/24, HFpEF , HTN, hypothyroid, type 2 DM, h/o cva with no residual deficits, persistent right sided pleural effusion. DDRT POD 4
69m pmh HLD, HTN, type 2 DM s/p DDRT POD 13
Pt admitted for DDRT POD #3, pmh HLD, CAD, HTN, hypothyroidism, T2DM, CVA with no residual effects, persistent right sided pleural effusion, hx of hemorrhagic pericardial effusion, ESRD on HD, and BP
Pt s/p DDRT 4/8/25, pod#3. Pmh ESRD on HD, HTN, HLD, CAD, persistent right sided pleural effusion, CVA. Pt is allergic to hydralazine
Pt s/p DDRT 4/8/25, pod#3. Pmh ESRD on HD, HTN, HLD, CAD, persistent right sided pleural effusion, CVA. Pt is allergic to hydralazine
70 y/o M with PMHx of HLD, CAD s/p LAD PCI 7/24, HFpEF , HTN, hypothyroid, type 2 DM, h/o cva with no residual deficits DDRT POD 10
70 y/o M with PMHx of HLD, CAD s/p LAD PCI 7/24, HFpEF , HTN, hypothyroid, type 2 DM. DDRT POD 4
Pt is allergic to hydralazine, and cannot give lopressor IV considering HR in the 60's
68 y/o M with PMHx of HLD, CAD s/p LAD PCI 7/24, HFpEF , HTN, hypothyroid, type 2 DM, h/o cva with no residual deficits, persistent right sided pleural effusion. DDRT POD 4
Pt POD# DDRT 4/8/25, PMH hypothyroidism, HLD, CAD, HTN, type 2 DM, h/o cva with no residual defects, persisent right side pleural effusion, hx of hemorrhagic pericardial effusion, ESRD on HD.
68 y/o m, pmh HTN, CAD, type 2 DM. ESRD s/p DDRT  POD 14
69m pmh HTN, HLD, type 2 DM s/p DDRT POD 12
Pt s/p DDRT POD#3, pmh hypothyroidism, CAD, HLD, HTN, CVA, ESRD on HD, and hx of right sided pleural effusion.

## 2025-04-22 NOTE — PROVIDER CONTACT NOTE (OTHER) - ASSESSMENT
Pt AA0X2-3, bp 193/76, hr 66, complaining of pain in throat, chest, abdomen, and penial region. Pt has had altered mental status since SICU. Pt states "I feel I am going to die", and "I feel powerless".
Pt AA0X4, vital signs as charted, no complaints of pain, complaints of persistent on and off hiccups, brand intact and in place, 3 OCSMO drains in place with small output.
Will give stat nifedipine
Pt AAOX2-3, /74, HR 69, no complaints of pain, no ss of infection, brand intact.
Pt AAOX2-3, disoriented to time and place, vital signs bp 178/79, hr 64, no complaints of pain, no s/s of infection, brand intact and in place.
Pt laying in bed /92 Hr 54. Pt asymptomatic and denies any chest pain, headache, dizziness.
VS as charted. Pt lethargic and disoriented to place and situation. FS 82. Pt removed dressing for R CW Permecath and telemetry leads
Pt A&O4. VSS as charted. Pt states that he was dreaming and forgot he was in hospital. Pt states he had a moment like this the night prior.
VS as charted. Pt AO4. No c/o chest pain or SOB.
pt typically A&Ox4 however at the time of incident pt confused to location and situation. RN able to reorient patient and patient apologized for taking off leads and dressing
Pt AOx4. VS as charted. Pt has no c/o at this time.
Pt laying in bed asymptomatic. Pt denies any headaches, dizziness, blurred vision.
pt a&ox4, no complaints of pain or discomfort, VSS
Pt AA0X2-3, bp 191/76, hr 66, no complaints of pain, no s/s of infection.
Pt laying in bed asymptomatic. Pt denies any headache, dizziness blurred vision.

## 2025-04-22 NOTE — PROGRESS NOTE ADULT - SUBJECTIVE AND OBJECTIVE BOX
[facility] DIVISION OF KIDNEY DISEASES AND HYPERTENSION -- FOLLOW UP NOTE  --------------------------------------------------------------------------------  Chief Complaint: DDRT    24 hour events/subjective: Pt. seen and examined, resting comfortably in bed. Still hypertensive overnight. Pt. states he feels well. No fever, chills, CP, SOB, or LE swelling.    PAST HISTORY  --------------------------------------------------------------------------------  No significant changes to PMH, PSH, FHx, SHx, unless otherwise noted    ALLERGIES & MEDICATIONS  --------------------------------------------------------------------------------  Allergies    hydrALAZINE (Short breath)    Intolerances    Standing Inpatient Medications  acyclovir   Oral Tab/Cap 200 milliGRAM(s) Oral two times a day  aspirin enteric coated 81 milliGRAM(s) Oral daily  atorvastatin 40 milliGRAM(s) Oral at bedtime  carvedilol 12.5 milliGRAM(s) Oral every 12 hours  chlorhexidine 2% Cloths 1 Application(s) Topical daily  doxazosin 8 milliGRAM(s) Oral with breakfast  doxazosin 8 milliGRAM(s) Oral at bedtime  famotidine    Tablet 20 milliGRAM(s) Oral daily  furosemide   Injectable 80 milliGRAM(s) IV Push two times a day  heparin   Injectable 5000 Unit(s) SubCutaneous every 12 hours  hydrALAZINE 50 milliGRAM(s) Oral three times a day  insulin glargine Injectable (LANTUS) 6 Unit(s) SubCutaneous at bedtime  insulin lispro (ADMELOG) corrective regimen sliding scale   SubCutaneous three times a day before meals  insulin lispro (ADMELOG) corrective regimen sliding scale   SubCutaneous at bedtime  insulin lispro Injectable (ADMELOG) 4 Unit(s) SubCutaneous three times a day before meals  isosorbide   mononitrate ER Tablet (IMDUR) 60 milliGRAM(s) Oral daily  levothyroxine 25 MICROGram(s) Oral daily  mycophenolate mofetil 500 milliGRAM(s) Oral <User Schedule>  Nephro-brenda 1 Tablet(s) Oral daily  NIFEdipine XL 60 milliGRAM(s) Oral two times a day  nystatin    Suspension 425608 Unit(s) Swish and Swallow four times a day  polyethylene glycol 3350 17 Gram(s) Oral every 12 hours  predniSONE   Tablet   Oral   predniSONE   Tablet 5 milliGRAM(s) Oral daily  senna 2 Tablet(s) Oral at bedtime  tacrolimus ER Tablet (ENVARSUS XR) 5 milliGRAM(s) Oral <User Schedule>  tamsulosin 0.4 milliGRAM(s) Oral at bedtime  trimethoprim   80 mG/sulfamethoxazole 400 mG 1 Tablet(s) Oral <User Schedule>    PRN Inpatient Medications  acetaminophen     Tablet .. 650 milliGRAM(s) Oral every 6 hours PRN  artificial tears (preservative free) Ophthalmic Solution 1 Drop(s) Left EYE daily PRN  ondansetron Injectable 4 milliGRAM(s) IV Push every 6 hours PRN    REVIEW OF SYSTEMS  --------------------------------------------------------------------------------  Gen: No fevers/chills  Respiratory: No dyspnea, cough,   CV: No chest pain, PND, orthopnea  GI: Abdominal pain  Transplant: No pain  : No increased frequency, dysuria, hematuria   MSK: No edema  Neuro: No dizziness/lightheadedness    All other systems were reviewed and are negative, except as noted.    VITALS/PHYSICAL EXAM  --------------------------------------------------------------------------------  T(C): 36.6 (04-22-25 @ 10:15), Max: 36.8 (04-22-25 @ 02:17)  HR: 60 (04-22-25 @ 10:15) (52 - 63)  BP: 122/58 (04-22-25 @ 10:15) (122/58 - 179/70)  RR: 18 (04-22-25 @ 10:15) (16 - 20)  SpO2: 100% (04-22-25 @ 10:15) (99% - 100%)  Wt(kg): --    04-21-25 @ 07:01  -  04-22-25 @ 07:00  --------------------------------------------------------  IN: 1080 mL / OUT: 1105 mL / NET: -25 mL    04-22-25 @ 07:01  -  04-22-25 @ 11:36  --------------------------------------------------------  IN: 0 mL / OUT: 300 mL / NET: -300 mL    Physical Exam:  Alert and oriented x3   HEENT: normal  Pulm: CTA B/L  CV: normal S1S2; no murmur  Transplant Abd: Tender upon palpation in RLQ  Extremities- no edema  RLQ surgical scar    LABS/STUDIES  --------------------------------------------------------------------------------              7.1    7.64  >-----------<  403      [04-22-25 @ 07:12]              22.8     139  |  99  |  60  ----------------------------<  74      [04-22-25 @ 07:12]  3.6   |  24  |  6.10        Ca     8.8     [04-22-25 @ 07:12]      Mg     2.4     [04-22-25 @ 07:12]      Phos  4.4     [04-22-25 @ 07:12]    TPro  6.1  /  Alb  3.3  /  TBili  0.3  /  DBili  x   /  AST  29  /  ALT  19  /  AlkPhos  75  [04-22-25 @ 07:12]    PT/INR: PT 11.1 , INR 0.96       [04-22-25 @ 07:12]  PTT: 29.9       [04-22-25 @ 07:12]    Creatinine Trend:  SCr 6.10 [04-22 @ 07:12]  SCr 5.30 [04-21 @ 07:08]  SCr 4.00 [04-20 @ 07:20]  SCr 6.66 [04-19 @ 07:16]  SCr 5.35 [04-18 @ 07:12]    Tacrolimus (), Serum: 2.1 ng/mL (04-22 @ 07:12)  Tacrolimus (), Serum: 1.7 ng/mL (04-21 @ 07:08)  Tacrolimus (), Serum: 4.3 ng/mL (04-20 @ 07:26)  Tacrolimus (), Serum: 5.0 ng/mL (04-19 @ 07:14)    Iron 67, TIBC 210, %sat 32      [04-21-25 @ 07:08]  Ferritin 1496      [04-21-25 @ 07:08]  PTH -- (Ca 7.9)      [12-30-24 @ 06:50]   229  PTH -- (Ca 7.6)      [11-27-24 @ 04:23]   250  Vitamin D (25OH) 17.1      [11-25-24 @ 06:50]  TSH 2.48      [04-13-25 @ 07:24]  Lipid: chol 112, TG 61, HDL 46, LDL --      [12-31-24 @ 04:27]    HBsAb 46.0      [04-07-25 @ 21:39]  HBsAg Nonreact      [04-07-25 @ 21:39]  HBcAb Nonreact      [04-07-25 @ 21:39]  HCV 0.05, Nonreact      [04-07-25 @ 21:39]  HIV Nonreact      [04-07-25 @ 21:39]

## 2025-04-22 NOTE — PROGRESS NOTE ADULT - ASSESSMENT
69M with PMHx of HLD, CAD s/p LAD PCI 7/24, HFpEF, HTN, hypothyroid, type 2 DM, ESRD on HD MWF presents to [facility] for possible DDRT. Transplant nephrology consulted for ESRD on Hemodialysis.     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 Repeat USG (4/14) showed increasing collection size  CT A/P (4/15)-complex perinephric collection 1.6x5.9x3.0 with slight enhanced septation. No hydro  C/w IV lasix 80mg BID   cc in the last 24 hours  Plan for HD today  Send COSMO drain creatinine    IS  Thymo induction  Tacro level 2.1 today  C/w Tacro 5mg   Check Tac level in AM (30 min prior to dose)   MMF, Pred taper  Denovo belatacept. Last dose on 4/14. Next dose on 4/24.  Ppx-Bactrim, Nystatin, Acyclovir    HTN- labile  On Nifedipine 60 bid  Imdur 60 qd  Doxazosin 8mg AM and 8mg HS  Labetalol 100 q8h   Change clonidine to hydralazine 50mg TID    Hyperkalemia  resolved    Hyperglycemia  - C/w lantus, sliding scale    **Recommendations preliminary until attending attestation**  If you have any questions, please feel free to contact me  Hardeep Kowalski  Nephrology Fellow  Page [phone] / [employer] Teams (Preferred)  (After 4pm or on weekends please page the on-call fellow)

## 2025-04-23 ENCOUNTER — TRANSCRIPTION ENCOUNTER (OUTPATIENT)
Age: 70
End: 2025-04-23

## 2025-04-23 VITALS
RESPIRATION RATE: 18 BRPM | HEART RATE: 62 BPM | SYSTOLIC BLOOD PRESSURE: 126 MMHG | OXYGEN SATURATION: 100 % | DIASTOLIC BLOOD PRESSURE: 60 MMHG

## 2025-04-23 LAB
ALBUMIN SERPL ELPH-MCNC: 3.3 G/DL — SIGNIFICANT CHANGE UP (ref 3.3–5)
ALP SERPL-CCNC: 69 U/L — SIGNIFICANT CHANGE UP (ref 40–120)
ALT FLD-CCNC: 20 U/L — SIGNIFICANT CHANGE UP (ref 10–45)
ANION GAP SERPL CALC-SCNC: 14 MMOL/L — SIGNIFICANT CHANGE UP (ref 5–17)
APTT BLD: 28.5 SEC — SIGNIFICANT CHANGE UP (ref 26.1–36.8)
AST SERPL-CCNC: 29 U/L — SIGNIFICANT CHANGE UP (ref 10–40)
BASOPHILS # BLD AUTO: 0.04 K/UL — SIGNIFICANT CHANGE UP (ref 0–0.2)
BASOPHILS NFR BLD AUTO: 0.5 % — SIGNIFICANT CHANGE UP (ref 0–2)
BILIRUB SERPL-MCNC: 0.2 MG/DL — SIGNIFICANT CHANGE UP (ref 0.2–1.2)
BLD GP AB SCN SERPL QL: NEGATIVE — SIGNIFICANT CHANGE UP
BUN SERPL-MCNC: 39 MG/DL — HIGH (ref 7–23)
CALCIUM SERPL-MCNC: 8.6 MG/DL — SIGNIFICANT CHANGE UP (ref 8.4–10.5)
CHLORIDE SERPL-SCNC: 99 MMOL/L — SIGNIFICANT CHANGE UP (ref 96–108)
CO2 SERPL-SCNC: 26 MMOL/L — SIGNIFICANT CHANGE UP (ref 22–31)
CREAT FLD-MCNC: 4.07 MG/DL — SIGNIFICANT CHANGE UP
CREAT FLD-MCNC: 4.08 MG/DL — SIGNIFICANT CHANGE UP
CREAT SERPL-MCNC: 3.75 MG/DL — HIGH (ref 0.5–1.3)
EGFR: 17 ML/MIN/1.73M2 — LOW
EGFR: 17 ML/MIN/1.73M2 — LOW
EOSINOPHIL # BLD AUTO: 0.35 K/UL — SIGNIFICANT CHANGE UP (ref 0–0.5)
EOSINOPHIL NFR BLD AUTO: 4.4 % — SIGNIFICANT CHANGE UP (ref 0–6)
GLUCOSE BLDC GLUCOMTR-MCNC: 123 MG/DL — HIGH (ref 70–99)
GLUCOSE BLDC GLUCOMTR-MCNC: 246 MG/DL — HIGH (ref 70–99)
GLUCOSE SERPL-MCNC: 104 MG/DL — HIGH (ref 70–99)
HCT VFR BLD CALC: 22.6 % — LOW (ref 39–50)
HGB BLD-MCNC: 7.1 G/DL — LOW (ref 13–17)
IMM GRANULOCYTES NFR BLD AUTO: 0.5 % — SIGNIFICANT CHANGE UP (ref 0–0.9)
INR BLD: 0.84 RATIO — LOW (ref 0.85–1.16)
LYMPHOCYTES # BLD AUTO: 0.38 K/UL — LOW (ref 1–3.3)
LYMPHOCYTES # BLD AUTO: 4.8 % — LOW (ref 13–44)
MAGNESIUM SERPL-MCNC: 2 MG/DL — SIGNIFICANT CHANGE UP (ref 1.6–2.6)
MCHC RBC-ENTMCNC: 27.7 PG — SIGNIFICANT CHANGE UP (ref 27–34)
MCHC RBC-ENTMCNC: 31.4 G/DL — LOW (ref 32–36)
MCV RBC AUTO: 88.3 FL — SIGNIFICANT CHANGE UP (ref 80–100)
MONOCYTES # BLD AUTO: 0.81 K/UL — SIGNIFICANT CHANGE UP (ref 0–0.9)
MONOCYTES NFR BLD AUTO: 10.3 % — SIGNIFICANT CHANGE UP (ref 2–14)
NEUTROPHILS # BLD AUTO: 6.25 K/UL — SIGNIFICANT CHANGE UP (ref 1.8–7.4)
NEUTROPHILS NFR BLD AUTO: 79.5 % — HIGH (ref 43–77)
NRBC BLD AUTO-RTO: 0 /100 WBCS — SIGNIFICANT CHANGE UP (ref 0–0)
PHOSPHATE SERPL-MCNC: 4 MG/DL — SIGNIFICANT CHANGE UP (ref 2.5–4.5)
PLATELET # BLD AUTO: 400 K/UL — SIGNIFICANT CHANGE UP (ref 150–400)
POTASSIUM SERPL-MCNC: 3.8 MMOL/L — SIGNIFICANT CHANGE UP (ref 3.5–5.3)
POTASSIUM SERPL-SCNC: 3.8 MMOL/L — SIGNIFICANT CHANGE UP (ref 3.5–5.3)
PROT SERPL-MCNC: 5.7 G/DL — LOW (ref 6–8.3)
PROTHROM AB SERPL-ACNC: 9.6 SEC — LOW (ref 9.9–13.4)
RBC # BLD: 2.56 M/UL — LOW (ref 4.2–5.8)
RBC # FLD: 19.7 % — HIGH (ref 10.3–14.5)
RH IG SCN BLD-IMP: POSITIVE — SIGNIFICANT CHANGE UP
SODIUM SERPL-SCNC: 139 MMOL/L — SIGNIFICANT CHANGE UP (ref 135–145)
TACROLIMUS SERPL-MCNC: 2.1 NG/ML — SIGNIFICANT CHANGE UP
WBC # BLD: 7.87 K/UL — SIGNIFICANT CHANGE UP (ref 3.8–10.5)
WBC # FLD AUTO: 7.87 K/UL — SIGNIFICANT CHANGE UP (ref 3.8–10.5)

## 2025-04-23 PROCEDURE — 84295 ASSAY OF SERUM SODIUM: CPT

## 2025-04-23 PROCEDURE — 97116 GAIT TRAINING THERAPY: CPT

## 2025-04-23 PROCEDURE — 82947 ASSAY GLUCOSE BLOOD QUANT: CPT

## 2025-04-23 PROCEDURE — 86704 HEP B CORE ANTIBODY TOTAL: CPT

## 2025-04-23 PROCEDURE — C1769: CPT

## 2025-04-23 PROCEDURE — 97530 THERAPEUTIC ACTIVITIES: CPT

## 2025-04-23 PROCEDURE — 82330 ASSAY OF CALCIUM: CPT

## 2025-04-23 PROCEDURE — 80197 ASSAY OF TACROLIMUS: CPT

## 2025-04-23 PROCEDURE — 99261: CPT

## 2025-04-23 PROCEDURE — 82728 ASSAY OF FERRITIN: CPT

## 2025-04-23 PROCEDURE — 87641 MR-STAPH DNA AMP PROBE: CPT

## 2025-04-23 PROCEDURE — 85018 HEMOGLOBIN: CPT

## 2025-04-23 PROCEDURE — 99232 SBSQ HOSP IP/OBS MODERATE 35: CPT | Mod: GC

## 2025-04-23 PROCEDURE — 97162 PT EVAL MOD COMPLEX 30 MIN: CPT

## 2025-04-23 PROCEDURE — 80053 COMPREHEN METABOLIC PANEL: CPT

## 2025-04-23 PROCEDURE — 83540 ASSAY OF IRON: CPT

## 2025-04-23 PROCEDURE — 85014 HEMATOCRIT: CPT

## 2025-04-23 PROCEDURE — 84466 ASSAY OF TRANSFERRIN: CPT

## 2025-04-23 PROCEDURE — 87340 HEPATITIS B SURFACE AG IA: CPT

## 2025-04-23 PROCEDURE — 83605 ASSAY OF LACTIC ACID: CPT

## 2025-04-23 PROCEDURE — 97535 SELF CARE MNGMENT TRAINING: CPT

## 2025-04-23 PROCEDURE — 83010 ASSAY OF HAPTOGLOBIN QUANT: CPT

## 2025-04-23 PROCEDURE — 86901 BLOOD TYPING SEROLOGIC RH(D): CPT

## 2025-04-23 PROCEDURE — 85730 THROMBOPLASTIN TIME PARTIAL: CPT

## 2025-04-23 PROCEDURE — 84100 ASSAY OF PHOSPHORUS: CPT

## 2025-04-23 PROCEDURE — 83550 IRON BINDING TEST: CPT

## 2025-04-23 PROCEDURE — 74018 RADEX ABDOMEN 1 VIEW: CPT

## 2025-04-23 PROCEDURE — C1889: CPT

## 2025-04-23 PROCEDURE — 99232 SBSQ HOSP IP/OBS MODERATE 35: CPT | Mod: FS,GC

## 2025-04-23 PROCEDURE — 86923 COMPATIBILITY TEST ELECTRIC: CPT

## 2025-04-23 PROCEDURE — 86803 HEPATITIS C AB TEST: CPT

## 2025-04-23 PROCEDURE — 36415 COLL VENOUS BLD VENIPUNCTURE: CPT

## 2025-04-23 PROCEDURE — 97166 OT EVAL MOD COMPLEX 45 MIN: CPT

## 2025-04-23 PROCEDURE — 86900 BLOOD TYPING SEROLOGIC ABO: CPT

## 2025-04-23 PROCEDURE — 87389 HIV-1 AG W/HIV-1&-2 AB AG IA: CPT

## 2025-04-23 PROCEDURE — 86480 TB TEST CELL IMMUN MEASURE: CPT

## 2025-04-23 PROCEDURE — P9016: CPT

## 2025-04-23 PROCEDURE — 86850 RBC ANTIBODY SCREEN: CPT

## 2025-04-23 PROCEDURE — 82962 GLUCOSE BLOOD TEST: CPT

## 2025-04-23 PROCEDURE — 85025 COMPLETE CBC W/AUTO DIFF WBC: CPT

## 2025-04-23 PROCEDURE — 93306 TTE W/DOPPLER COMPLETE: CPT

## 2025-04-23 PROCEDURE — 80202 ASSAY OF VANCOMYCIN: CPT

## 2025-04-23 PROCEDURE — 97110 THERAPEUTIC EXERCISES: CPT

## 2025-04-23 PROCEDURE — 86706 HEP B SURFACE ANTIBODY: CPT

## 2025-04-23 PROCEDURE — C2617: CPT

## 2025-04-23 PROCEDURE — 85396 CLOTTING ASSAY WHOLE BLOOD: CPT

## 2025-04-23 PROCEDURE — 71045 X-RAY EXAM CHEST 1 VIEW: CPT

## 2025-04-23 PROCEDURE — 82570 ASSAY OF URINE CREATININE: CPT

## 2025-04-23 PROCEDURE — 93005 ELECTROCARDIOGRAM TRACING: CPT

## 2025-04-23 PROCEDURE — 87522 HEPATITIS C REVRS TRNSCRPJ: CPT

## 2025-04-23 PROCEDURE — 83735 ASSAY OF MAGNESIUM: CPT

## 2025-04-23 PROCEDURE — 84443 ASSAY THYROID STIM HORMONE: CPT

## 2025-04-23 PROCEDURE — 0241U: CPT

## 2025-04-23 PROCEDURE — 83615 LACTATE (LD) (LDH) ENZYME: CPT

## 2025-04-23 PROCEDURE — 87640 STAPH A DNA AMP PROBE: CPT

## 2025-04-23 PROCEDURE — 76776 US EXAM K TRANSPL W/DOPPLER: CPT

## 2025-04-23 PROCEDURE — 82803 BLOOD GASES ANY COMBINATION: CPT

## 2025-04-23 PROCEDURE — 74176 CT ABD & PELVIS W/O CONTRAST: CPT | Mod: MC

## 2025-04-23 PROCEDURE — 82435 ASSAY OF BLOOD CHLORIDE: CPT

## 2025-04-23 PROCEDURE — 87040 BLOOD CULTURE FOR BACTERIA: CPT

## 2025-04-23 PROCEDURE — 85610 PROTHROMBIN TIME: CPT

## 2025-04-23 PROCEDURE — 84132 ASSAY OF SERUM POTASSIUM: CPT

## 2025-04-23 RX ORDER — CALCITRIOL 0.5 UG/1
1 CAPSULE, GELATIN COATED ORAL
Refills: 0 | DISCHARGE

## 2025-04-23 RX ORDER — ISOSORBIDE MONONITRATE 60 MG/1
1 TABLET, EXTENDED RELEASE ORAL
Refills: 0 | DISCHARGE

## 2025-04-23 RX ORDER — ASPIRIN 325 MG
1 TABLET ORAL
Qty: 0 | Refills: 0 | DISCHARGE
Start: 2025-04-23

## 2025-04-23 RX ORDER — TAMSULOSIN HYDROCHLORIDE 0.4 MG/1
1 CAPSULE ORAL
Qty: 0 | Refills: 0 | DISCHARGE
Start: 2025-04-23

## 2025-04-23 RX ORDER — FUROSEMIDE 10 MG/ML
80 INJECTION INTRAMUSCULAR; INTRAVENOUS
Refills: 0 | Status: DISCONTINUED | OUTPATIENT
Start: 2025-04-23 | End: 2025-04-23

## 2025-04-23 RX ORDER — B1/B2/B3/B5/B6/B12/VIT C/FOLIC 500-0.5 MG
1 TABLET ORAL
Qty: 0 | Refills: 0 | DISCHARGE
Start: 2025-04-23

## 2025-04-23 RX ORDER — INSULIN GLARGINE-YFGN 100 [IU]/ML
6 INJECTION, SOLUTION SUBCUTANEOUS
Qty: 0 | Refills: 0 | DISCHARGE
Start: 2025-04-23

## 2025-04-23 RX ORDER — INSULIN LISPRO 100 U/ML
4 INJECTION, SOLUTION INTRAVENOUS; SUBCUTANEOUS
Qty: 0 | Refills: 0 | DISCHARGE
Start: 2025-04-23

## 2025-04-23 RX ORDER — BELATACEPT 250 MG/1
562.5 INJECTION, POWDER, LYOPHILIZED, FOR SOLUTION INTRAVENOUS ONCE
Refills: 0 | Status: COMPLETED | OUTPATIENT
Start: 2025-04-23 | End: 2025-04-23

## 2025-04-23 RX ORDER — SULFAMETHOXAZOLE/TRIMETHOPRIM 800-160 MG
1 TABLET ORAL
Qty: 0 | Refills: 0 | DISCHARGE
Start: 2025-04-23

## 2025-04-23 RX ORDER — TACROLIMUS 0.5 MG/1
1 CAPSULE ORAL
Qty: 0 | Refills: 0 | DISCHARGE

## 2025-04-23 RX ORDER — CLOPIDOGREL BISULFATE 75 MG/1
1 TABLET, FILM COATED ORAL
Refills: 0 | DISCHARGE

## 2025-04-23 RX ORDER — LEVOTHYROXINE SODIUM 300 MCG
1 TABLET ORAL
Qty: 0 | Refills: 0 | DISCHARGE
Start: 2025-04-23

## 2025-04-23 RX ORDER — TACROLIMUS 0.5 MG/1
1 CAPSULE ORAL
Qty: 0 | Refills: 0 | DISCHARGE
Start: 2025-04-23

## 2025-04-23 RX ORDER — BUMETANIDE 1 MG/1
1 TABLET ORAL
Refills: 0 | DISCHARGE

## 2025-04-23 RX ORDER — LEVOTHYROXINE SODIUM 300 MCG
1 TABLET ORAL
Refills: 0 | DISCHARGE

## 2025-04-23 RX ORDER — ATORVASTATIN CALCIUM 80 MG/1
1 TABLET, FILM COATED ORAL
Qty: 0 | Refills: 0 | DISCHARGE
Start: 2025-04-23

## 2025-04-23 RX ORDER — MYCOPHENOLATE MOFETIL 500 MG/1
500 TABLET, FILM COATED ORAL
Qty: 0 | Refills: 0 | DISCHARGE
Start: 2025-04-23

## 2025-04-23 RX ORDER — DOXAZOSIN MESYLATE 8 MG/1
1 TABLET ORAL
Refills: 0 | DISCHARGE

## 2025-04-23 RX ORDER — PREDNISONE 20 MG/1
5 TABLET ORAL
Qty: 0 | Refills: 0 | DISCHARGE
Start: 2025-04-23

## 2025-04-23 RX ORDER — ATORVASTATIN CALCIUM 80 MG/1
1 TABLET, FILM COATED ORAL
Refills: 0 | DISCHARGE

## 2025-04-23 RX ADMIN — Medication 25 MICROGRAM(S): at 05:13

## 2025-04-23 RX ADMIN — TACROLIMUS 5 MILLIGRAM(S): 0.5 CAPSULE ORAL at 07:55

## 2025-04-23 RX ADMIN — Medication 1 TABLET(S): at 11:40

## 2025-04-23 RX ADMIN — FUROSEMIDE 80 MILLIGRAM(S): 10 INJECTION INTRAMUSCULAR; INTRAVENOUS at 06:30

## 2025-04-23 RX ADMIN — Medication 1 APPLICATION(S): at 11:42

## 2025-04-23 RX ADMIN — Medication 20 MILLIGRAM(S): at 11:40

## 2025-04-23 RX ADMIN — Medication 500000 UNIT(S): at 06:29

## 2025-04-23 RX ADMIN — FUROSEMIDE 80 MILLIGRAM(S): 10 INJECTION INTRAMUSCULAR; INTRAVENOUS at 14:13

## 2025-04-23 RX ADMIN — POLYETHYLENE GLYCOL 3350 17 GRAM(S): 17 POWDER, FOR SOLUTION ORAL at 06:30

## 2025-04-23 RX ADMIN — Medication 60 MILLIGRAM(S): at 06:31

## 2025-04-23 RX ADMIN — TRAMADOL HYDROCHLORIDE 25 MILLIGRAM(S): 50 TABLET, FILM COATED ORAL at 02:00

## 2025-04-23 RX ADMIN — Medication 81 MILLIGRAM(S): at 11:40

## 2025-04-23 RX ADMIN — INSULIN LISPRO 4 UNIT(S): 100 INJECTION, SOLUTION INTRAVENOUS; SUBCUTANEOUS at 11:42

## 2025-04-23 RX ADMIN — PREDNISONE 5 MILLIGRAM(S): 20 TABLET ORAL at 06:30

## 2025-04-23 RX ADMIN — TRAMADOL HYDROCHLORIDE 25 MILLIGRAM(S): 50 TABLET, FILM COATED ORAL at 01:00

## 2025-04-23 RX ADMIN — Medication 500000 UNIT(S): at 11:41

## 2025-04-23 RX ADMIN — DOXAZOSIN MESYLATE 8 MILLIGRAM(S): 8 TABLET ORAL at 07:55

## 2025-04-23 RX ADMIN — Medication 50 MILLIGRAM(S): at 06:31

## 2025-04-23 RX ADMIN — INSULIN LISPRO 4: 100 INJECTION, SOLUTION INTRAVENOUS; SUBCUTANEOUS at 11:42

## 2025-04-23 RX ADMIN — BELATACEPT 200 MILLIGRAM(S): 250 INJECTION, POWDER, LYOPHILIZED, FOR SOLUTION INTRAVENOUS at 11:41

## 2025-04-23 RX ADMIN — Medication 200 MILLIGRAM(S): at 06:30

## 2025-04-23 RX ADMIN — HEPARIN SODIUM 5000 UNIT(S): 1000 INJECTION INTRAVENOUS; SUBCUTANEOUS at 06:31

## 2025-04-23 RX ADMIN — MYCOPHENOLATE MOFETIL 500 MILLIGRAM(S): 500 TABLET, FILM COATED ORAL at 07:55

## 2025-04-23 RX ADMIN — ISOSORBIDE MONONITRATE 60 MILLIGRAM(S): 60 TABLET, EXTENDED RELEASE ORAL at 11:41

## 2025-04-23 RX ADMIN — Medication 1 TABLET(S): at 07:56

## 2025-04-23 NOTE — PROGRESS NOTE ADULT - PROVIDER SPECIALTY LIST ADULT
Nephrology
Nephrology
Transplant Nephrology
Transplant Surgery
Nephrology
Pharmacy
SICU
SICU
Transplant ID
Transplant Nephrology
Transplant Nephrology
Transplant Surgery
Nephrology
Nephrology
SICU
SICU
Transplant Nephrology
Transplant Surgery
Transplant Surgery
Nephrology
Transplant ID
Transplant Nephrology
Transplant Surgery

## 2025-04-23 NOTE — PROGRESS NOTE ADULT - NS ATTEND AMEND GEN_ALL_CORE FT
70 y/o M with PMHx of CAD s/p LAD PCI 7/24, HFpEF , HTN, hypothyroid, type 2 DM, ESRD on HD s/p  DDRT. now POD#1.  Has uncontrolled HTN requiring Cardene drip gtt.    Awake and alert, pain well controlled  Saturating well  Became borderline hypotensive, received all antihypertensives once, will space out antihypertensives, mentating very well  Advance diet as tolerated  Did not received to diuresis s/p HD yesterday.   Mech DVT PPx, Hb stable     PPx abx: Nystatin, acyclovir, Cellcept,   Antirej: Mycophenolate, steroid
70 y/o M with PMHx of HLD, CAD s/p LAD PCI 7/24, HFpEF , HTN, hypothyroid, type 2 DM, ESRD on HD MWF (recently converted from PD in 1/2025) via permacath placed on 1/2025, h/o cva with no residual deficits, persistent right sided pleural effusion, and hx of hemorrhagic pericardial effusion (cytopathology negative) presents to [facility] for possible DDRT.     s/p DDRT 4/8/25    Transplant ID consulted for donor positive blood cultures with gram positive cocci in clusters (not identified further)(    Continue Vancomycin by level for a total of 7 days (4/11 > 4/17)    Transplant ID will not follow patient regularly going forward. Please feel free to reach out with any further questions or concerns.    Lencho Rueda M.D.  [facility] Division of Infectious Disease  8AM-5PM Monday - Friday: Available on [facility] Teams  After Hours and Holidays (or if no response on [employer] Teams): Please contact the Infectious Diseases Office at [phone]
agree with above  post  donor kidney transplant with delayed graft function  remains on dialysis as needed  blood pressure better controlled  immunosuppression; completed thymo. on belatacept, low dose tacro, mmf and steroids  will monitor closely
post  donor kidney transplant with delayed graft function  oliguric but uop slowly improving  remains on dialysis as needed  blood pressure better controlled  US ; stable collection. good perfusion  immunosuppression; completed thymo. on belatacept, low dose tacro, mmf and steroids  adjust tacro dose to level  will monitor closely
POD#15 s/p DDRT with DGF  making more urine but not clearing yet. HD as per nephrology  Immunosuppression - Completed thymoglobulin, Cont low dose envarsus, belatacept dose today; MMF 500mg bid; Pred 5  Prophylaxis with bactrim, nystatin, acyclovir  HTN slightly better -Started on hydralazine instead of clonidine; Cont Nifedipine 60 BID, Imdur 60 qd, labetalol 100mg PO q8hr, doxazosin 8mg in AM and 8mg QHS   Medication teaching  d/c home today, f/u in office next week
Patient discussed in details, plan as above
Patient discussed in details, plan as above
POD#13 s/p DDRT with DGF  making small amount of urine. HD as per nephrology  Immunosuppression - Completed thymoglobulin, Cont low dose envarsus, belatacept, due for dose 4/24; MMF 500mg bid; Pred 5  Prophylaxis with bactrim, nystatin, acyclovir  HTN uncontrolled - Add clonidine 0.1mg bid; Cont Nifedipine 60 BID, Imdur 60 qd, labetalol 100mg PO q8hr, doxazosin 8mg in AM and 8mg QHS   Medication teaching
agree with above  post  donor kidney transplant with delayed graft function  feels good  oliguric  remains on dialysis as needed  blood pressure better controlled  US good perfusion, stable collection  immunosuppression; completed thymo. on belatacept, low dose tacro, mmf and steroids. adjust tacro dose to level  will monitor closely
agree with above  post  donor kidney transplant with delayed graft function  feels good  oliguric  remains on dialysis as needed  blood pressure better controlled  one drain removed  immunosuppression; completed thymo. on belatacept, low dose tacro, mmf and steroids  will monitor closely
POD#14 s/p DDRT with DGF  making small amount of urine. HD as per nephrology  Immunosuppression - Completed thymoglobulin, Cont low dose envarsus, belatacept, due for next dose 4/24; MMF 500mg bid; Pred 5  Prophylaxis with bactrim, nystatin, acyclovir  HTN slightly better - Cont clonidine 0.1mg bid; Nifedipine 60 BID, Imdur 60 qd, labetalol 100mg PO q8hr, doxazosin 8mg in AM and 8mg QHS   Consider hydralazine if needed  Medication teaching
post  donor kidney transplant with delayed graft function  uop slowly improving; but remains oliguric  remains on dialysis as needed  blood pressure better controlled  US ; stable collection. good perfusion  immunosuppression; completed thymo. on belatacept, low dose tacro, mmf and steroids  adjust tacro dose to level  check DSA  will monitor closely

## 2025-04-23 NOTE — DISCHARGE NOTE PROVIDER - HOSPITAL COURSE
70 y/o M with PMHx of HLD, CAD s/p LAD PCI 7/24, HFpEF , HTN, hypothyroid, type 2 DM, h/o cva with no residual deficits, persistent right sided pleural effusion, and hx of hemorrhagic pericardial effusion (cytopathology negative), ESRD on HD MWF (recently converted from PD in 1/2025) via permacath placed on 1/2025, now s/p DDRT on 4/8/25, c/b HTN requiring 2 SICU admissions, now stable on oral hypertensives      []s/p DDRT  - CMV (-/-) on Acyclovir  - HD on 4/8, 4/9, 4/11, 4/14, 4/16, 4/19, 4/22  - 4/14 doppler - Increase in renal cortical echogenicity, incr collection 2.5 x 4.5 x 7.2 cm  - 4/21 doppler: patent and elevated velocities, decrease in collection size    - DSA neg      [] Donor cultures +GPC  - Vanc 4/9-4/16  - BCx x2 (NGTD)      []HTN  - Coreg 12.5 BID, Nifed 60 BID, Imdur 60 qd, cardura 8 BID, Hydralazine 50 TID  - 2 SICU admissions requiring Cardene ggt on 4/9-4/10 and 4/14-4/15       68 y/o M with PMHx of HLD, CAD s/p LAD PCI 7/24, HFpEF , HTN, hypothyroid, type 2 DM, h/o cva with no residual deficits, persistent right sided pleural effusion, and hx of hemorrhagic pericardial effusion (cytopathology negative), ESRD on HD MWF (recently converted from PD in 1/2025) via permacath placed on 1/2025, now s/p DDRT on 4/8/25, c/b HTN requiring 2 SICU admissions, now stable on oral hypertensives      []s/p DDRT c/b DGF   - HD on 4/8, 4/9, 4/11, 4/14, 4/16, 4/19, 4/22  - 4/14 doppler - Increase in renal cortical echogenicity, incr collection 2.5 x 4.5 x 7.2 cm  - 4/21 doppler: patent w/ elevated velocities decreased from prior, decrease in collection size    - DSA neg  - discharged w/ 2 COSMO's in place     [] Immuno  - Env 5 w/ with low dose belatacept, dosed on 4/10, 4/14, 4/23, next dose 5/8  - cellcept 500 BID, pred 5  - acyclovir (CMV -/-), valcyte, bactrim    [] Donor cultures +GPC  - Vanc 4/9-4/16  - BCx x2 (NGTD)    []HTN  - Coreg 12.5 BID, Nifed 60 BID, Imdur 60 qd, cardura 8 BID, Hydralazine 50 TID  - 2 SICU admissions requiring Cardene ggt on 4/9-4/10 and 4/14-4/15    - Follow up with Transplant clinic in one week   - follow up with Dr. RUBEN Garcia nephrology in one week   - Follow up in 4-6 weeks for removal of ureteral stent   - follow up for labs on friday        68 y/o M with PMHx of HLD, CAD s/p LAD PCI 7/24, HFpEF , HTN, hypothyroid, type 2 DM, h/o cva with no residual deficits, persistent right sided pleural effusion, and hx of hemorrhagic pericardial effusion (cytopathology negative), ESRD on HD MWF (recently converted from PD in 1/2025) via permacath placed on 1/2025, now s/p DDRT on 4/8/25, c/b HTN requiring 2 SICU admissions, now stable on oral hypertensives      []s/p DDRT c/b DGF   - HD on 4/8, 4/9, 4/11, 4/14, 4/16, 4/19, 4/22, plan for HD outpatient MWF  - 4/14 doppler - Increase in renal cortical echogenicity, incr collection 2.5 x 4.5 x 7.2 cm  - 4/21 doppler: patent w/ elevated velocities decreased from prior, decrease in collection size    - DSA neg  - discharged w/ 2 COSMO's in place     [] Immuno  - Env 5 w/ with low dose belatacept, dosed on 4/10, 4/14, 4/23, next dose 5/8  - cellcept 500 BID, pred 5  - acyclovir (CMV -/-), valcyte, bactrim    [] Donor cultures +GPC  - Vanc 4/9-4/16  - BCx x2 (NGTD)    []HTN  - Coreg 12.5 BID, Nifed 60 BID, Imdur 60 qd, cardura 8 BID, Hydralazine 50 TID  - 2 SICU admissions requiring Cardene ggt on 4/9-4/10 and 4/14-4/15    - Follow up with Transplant surgeon in one week   - follow up with Dr. Ansley Dunn nephrology on tuesday 4/29/25  - Follow up in 4-6 weeks for removal of ureteral stent   - follow up for labs on friday        68 y/o M with PMHx of HLD, CAD s/p LAD PCI 7/24, HFpEF , HTN, hypothyroid, type 2 DM, h/o cva with no residual deficits, persistent right sided pleural effusion, and hx of hemorrhagic pericardial effusion (cytopathology negative), ESRD on HD MWF (recently converted from PD in 1/2025) via permacath placed on 1/2025, now s/p DDRT on 4/8/25, c/b HTN requiring 2 SICU admissions, now stable on oral hypertensives      []s/p DDRT c/b DGF   - HD on 4/8, 4/9, 4/11, 4/14, 4/16, 4/19, 4/22, plan for HD outpatient MWF, next dialiysis Friday 4/25  - 4/14 doppler - Increase in renal cortical echogenicity, incr collection 2.5 x 4.5 x 7.2 cm  - 4/21 doppler: patent w/ elevated velocities decreased from prior, decrease in collection size    - DSA neg  - discharged w/ 2 COSMO's in place     [] Immuno  - Env 5 w/ with low dose belatacept, dosed on 4/10, 4/14, 4/23, next dose 5/8  - cellcept 500 BID, pred 5  - acyclovir (CMV -/-), valcyte, bactrim    [] Donor cultures +GPC  - Vanc 4/9-4/16  - BCx x2 (NGTD)    []HTN  - Coreg 12.5 BID, Nifed 60 BID, Imdur 60 qd, cardura 8 BID, Hydralazine 50 TID  - 2 SICU admissions requiring Cardene ggt on 4/9-4/10 and 4/14-4/15    - Follow up with Transplant surgeon in one week   - follow up with Dr. Ansley Dunn nephrology on tuesday 4/29/25  - Follow up in 4-6 weeks for removal of ureteral stent   - follow up for labs on friday        70 y/o M with PMHx of HLD, CAD s/p LAD PCI 7/24, HFpEF , HTN, hypothyroid, type 2 DM, h/o cva with no residual deficits, persistent right sided pleural effusion, and hx of hemorrhagic pericardial effusion (cytopathology negative), ESRD on HD MWF (recently converted from PD in 1/2025) via permacath placed on 1/2025, now s/p DDRT on 4/8/25, c/b HTN requiring 2 SICU admissions, now stable on oral hypertensives      []s/p DDRT c/b DGF   - HD on 4/8, 4/9, 4/11, 4/14, 4/16, 4/19, 4/22, plan for HD outpatient MWF, next dialiysis Friday 4/25  - 4/14 doppler - Increase in renal cortical echogenicity, incr collection 2.5 x 4.5 x 7.2 cm  - 4/21 doppler: patent w/ elevated velocities decreased from prior, decrease in collection size    - DSA neg  - discharged w/ 2 COSMO's in place     [] Immuno  - Env 5 w/ with low dose belatacept, dosed on 4/10, 4/14, 4/23, next dose 5/8  - cellcept 500 BID, pred 5  - acyclovir (CMV -/-), valcyte, bactrim    [] Donor cultures +GPC  - Vanc 4/9-4/16  - BCx x2 (NGTD)    []HTN  - Coreg 12.5 BID, Nifed 60 BID, Imdur 60 qd, cardura 8 BID, Hydralazine 25 BID  - 2 SICU admissions requiring Cardene ggt on 4/9-4/10 and 4/14-4/15    - Follow up with Transplant surgeon in one week   - follow up with Dr. Ansley Dunn nephrology on tuesday 4/29/25  - Follow up in 4-6 weeks for removal of ureteral stent   - follow up for labs on friday

## 2025-04-23 NOTE — PROGRESS NOTE ADULT - ASSESSMENT
70 y/o M with PMHx of HLD, CAD s/p LAD PCI 7/24, HFpEF , HTN, hypothyroid, type 2 DM, ESRD on HD MWF (recently converted from PD in 1/2025) via permacath placed on 1/2025, h/o cva with no residual deficits, persistent right sided pleural effusion, and hx of hemorrhagic pericardial effusion (cytopathology negative) presents to [facility] for possible DDRT. He states he makes less than half cup of urine a day. Denies fever, chills, N/V/D/C, abdominal pain, CP, SOB, hemoptysis, and /GI complaints.     A/P  ESRD s/p DDRT 04/09  Complicated for HTN urgency and DGF  Steroid and thymo induction  Nephrologist is Dr. Menjivar   Access is PermCath   Was recently converted from PD to HD, on MMF schedule as outpatient    Last HD 4/7, s/p HD on 04/08 and 04/09, and 04/11, 04/14, 04/16, 04/19 and 04/21  HD as needed, planned for OP HD  On MMF and  Tac follow levels, On prednisone taper  On acyclovir and nsytatin prophylaxis  On belatacept, next dose 4/24  Monitor bmp   Renally dose meds    HTN   On doxazosin labetalol tamsulosin and nifedipine  Clonidine changed to hydralazine 50 mg tid as per transplant  UF w/ HD if needed  Managed per transplant team   Monitor bp     Anemia  Hb below goal  Monitor     CKD-MBD  Monitor ca and phos daily

## 2025-04-23 NOTE — DISCHARGE NOTE NURSING/CASE MANAGEMENT/SOCIAL WORK - FINANCIAL ASSISTANCE
[facility] provides services at a reduced cost to those who are determined to be eligible through [facility]’s financial assistance program. Information regarding [facility]’s financial assistance program can be found by going to [email or web address] or by calling [phone].

## 2025-04-23 NOTE — PROGRESS NOTE ADULT - NS ATTEND OPT1 GEN_ALL_CORE
Patient needs vascepa unithroid   Send to [id number] - [place], [place] - [address] AT [address] - [phone]  - [phone] (Phone)  [phone] (Fax)       
I attest my time as attending is greater than 50% of the total combined time spent on qualifying patient care activities by the PA/NP and attending.
I independently performed the documented:
I attest my time as attending is greater than 50% of the total combined time spent on qualifying patient care activities by the PA/NP and attending.
I independently performed the documented:
I attest my time as attending is greater than 50% of the total combined time spent on qualifying patient care activities by the PA/NP and attending.

## 2025-04-23 NOTE — DISCHARGE NOTE PROVIDER - DETAILS OF MALNUTRITION DIAGNOSIS/DIAGNOSES
This patient has been assessed with a concern for Malnutrition and was treated during this hospitalization for the following Nutrition diagnosis/diagnoses:     -  04/11/2025: Severe protein-calorie malnutrition   -  04/11/2025: Underweight (BMI < 19)

## 2025-04-23 NOTE — DISCHARGE NOTE PROVIDER - CARE PROVIDERS DIRECT ADDRESSES
[email or web address]

## 2025-04-23 NOTE — PROGRESS NOTE ADULT - ATTENDING COMMENTS
-s/p DDRT 4/8/2025 complicated intraop course with subcapsular hematoma, long CIT ~ 36 hours c/b delayed graft function- hyperkalemia- remains on HD MWF- will try lasix gtt with zaroxlyn and plan for HD tomorrow  -IS- thymo induction- plan for low dose tac goal 2-4, belatacept denovo- s/p dose 4/14, next due 4/24/2025, con't MMF 500mg BID, and pred 5mg daily- DSA neg 4/16/2025  -HTN urgency- was placed again on cardene gtt 4/15- now off but BPs still elevated, increase doxazocin to 8mg BID, con't labetalol, and nifedipine 60mg mg BID, imdur 60mg daily, may consider coreg instead of labetalol- monitor BPs today  -Anemia- multifactorial- check iron panel and ferritin .
S/p DDKT, DGF, HTN.  N Multimodal pain management. AO4.  C Off cardene gtt. Lactate cleared.  P RA sat >90.   G Amanda renal diet.  R Cr 4.4, iHD yest.  I Monitor. Ppx per protocol.  IS rATG, MMF, , steroid taper.  H Hgb 7.5. Trend CBC.  DVT SQH, SCDs.  E ISS.
S/p DDKT, HTN.  N Multimodal pain management. MS at baseline.  P RA sat >90.  C Off pressors. Monitor hemodynamics. ASA.  G Amanda PO, poor PO intake. Bowel regimen.  R Cr 5.78, tolerating iHD, net goal -1L.  H Hgb 7.4, trend CBC.  DVT SCDs/SQH.  I AF. Valcyte, bactrim ppx.  IS MMF, , steroid.  E Monitor glycemia.
S/p DDRT on 4/8/25 - Allograft function with expected DGF- IHD done today on 4/22/24.   Donor- 66 yo DCD kidney with KDPI 99%  HTN - BP now wnl
S/p DDRT on 4/8/25 - Allograft function with expected DGF- IHD today ( 4/22/24)   Donor- 64 yo DCD kidney with KDPI 99%    HTN - BP still above goal. will d/c clonidine as HR is low and start hydralazine 50 mg po tid  instead.   (Allergy reported is not real , and he had a pericardial effusion that was attributed to hydralazine use in the past )
-s/p DDRT 4/8/2025 complicated intraop course with subcapsular hematoma, long CIT ~ 36 hours c/b delayed graft function- hyperkalemia- remains on HD MWF- plan for HD today- plan for 1L UF  -IS- thymo induction- plan for low dose tac goal 2-4, belatacept denovo- planned for dose today, con't MMF 500mg BID, and pred 5mg daily  -HTN urgency- was placed again on cardene gtt and transferred to ICU- now off- restart doxazocin- and dose 4mg BID, keep nifedipine 60mg mg BID, con't imdur 60mg daily, may consider coreg instead of labetalol, or hydralazine  -Anemia- multifactorial- check iron panel and ferritin    -patient to remain in ICU for HD today and then re-assess for transfer to floor- d/w txp surgery and ICU team
-s/p DDRT 4/8/2025 complicated intraop course with subcapsular hematoma, long CIT ~ 36 hours c/b delayed graft function- hyperkalemia- remains on HD MWF- tolerated treatment well yesterday  -IS- thymo induction- plan for low dose tac goal 2-4, belatacept denovo- s/p dose 4/14, next due 4/24/2025, con't MMF 500mg BID, and pred 5mg daily- DSA neg 4/16/2025  -HTN urgency- was placed again on cardene gtt 4/15- now off but BPs still elevated, increase doxazocin to 8mg BID, con't labetalol, and nifedipine 60mg mg BID, imdur 60mg daily, may consider coreg instead of labetalol, or hydralazine- monitor BPs today  -Anemia- multifactorial- check iron panel and ferritin
S/p DDRT on 4/8/25 - Allograft function with expected DGF- plan for IHD tomw.   Donor- 66 yo DCD kidney with KDPI 99%    HTN - BP still above goal. adding clonidine 0.1 mg po bid today
-s/p DDRT 4/8/2025 complicated intraop course with subcapsular hematoma, long CIT ~ 36 hours c/b delayed graft function- hyperkalemia- remains on HD MWF- completed yesterday with 1L UF  -IS- thymo induction- plan for low dose tac goal 2-4, belatacept denovo- s/p dose 4/14, con't MMF 500mg BID, and pred 5mg daily- check DSA  -HTN urgency- was placed again on cardene gtt- restarted on doxazocin, labetalol, and kept nifedipine 60mg mg BID, con't imdur 60mg daily, may consider coreg instead of labetalol, or hydralazine, please increase doxazocin to 4mg BID  -Anemia- multifactorial- check iron panel and ferritin    -patient to remain in ICU today for BP control and then re-assess for transfer to floor- d/w txp surgery and ICU team .
s/p DDRT with Acute Kidney Injury and hyperkalemia  HD today  on cardene gtt  try 1 L UF  thymo induction. FK holding. MMF and steroids  long CIT ~ 36 hours  expected DGF  complicated surgery.
-s/p DDRT 4/8/2025 complicated intraop course with subcapsular hematoma, long CIT ~ 36 hours c/b delayed graft function- hyperkalemia- remains on HD MWF- HD planned for today  -IS- thymo induction- plan for low dose tac goal 2-4, belatacept denovo- s/p dose 4/14, con't MMF 500mg BID, and pred 5mg daily- check DSA  -HTN urgency- was placed again on cardene gtt 4/15- increased doxazocin, con't labetalol, and nifedipine 60mg mg BID, imdur 60mg daily, may consider coreg instead of labetalol, or hydralazine, or increase doxazocin to 8mg BID- monitor BPs today  -Anemia- multifactorial- check iron panel and ferritin    -patient to remain in ICU today for BP control and then re-assess for transfer to floor- d/w txp surgery and ICU team .

## 2025-04-23 NOTE — PROGRESS NOTE ADULT - SUBJECTIVE AND OBJECTIVE BOX
[facility]    Dr. Tiara Garcia     Office [phone] (9 am to 5 pm)  Service: [phone] (5pm to 9am)  Also Available on TEAMS      RENAL PROGRESS NOTE: DATE OF SERVICE 04-23-25 @ 12:28    Patient is a 69y old  Male who presents with a chief complaint of possible DDRT (23 Apr 2025 05:09)      Patient seen and examined at bedside. No chest pain/sob    VITALS:  T(F): 97.9 (04-23-25 @ 12:24), Max: 98 (04-23-25 @ 05:12)  HR: 100 (04-23-25 @ 12:24)  BP: 141/84 (04-23-25 @ 12:24)  RR: 20 (04-23-25 @ 12:24)  SpO2: 97% (04-23-25 @ 12:24)  Wt(kg): --    04-22 @ 07:01  -  04-23 @ 07:00  --------------------------------------------------------  IN: 860 mL / OUT: 2275 mL / NET: -1415 mL    04-23 @ 07:01  -  04-23 @ 12:28  --------------------------------------------------------  IN: 480 mL / OUT: 320 mL / NET: 160 mL          PHYSICAL EXAM:  Constitutional: NAD  Neck: No JVD  Respiratory: CTAB, no wheezes, rales or rhonchi  Cardiovascular: S1, S2, RRR  Gastrointestinal: BS+, soft, NT/ND  Extremities: No peripheral edema    Hospital Medications:   MEDICATIONS  (STANDING):  acyclovir   Oral Tab/Cap 200 milliGRAM(s) Oral two times a day  aspirin enteric coated 81 milliGRAM(s) Oral daily  atorvastatin 40 milliGRAM(s) Oral at bedtime  carvedilol 12.5 milliGRAM(s) Oral every 12 hours  chlorhexidine 2% Cloths 1 Application(s) Topical daily  doxazosin 8 milliGRAM(s) Oral with breakfast  doxazosin 8 milliGRAM(s) Oral at bedtime  famotidine    Tablet 20 milliGRAM(s) Oral daily  furosemide    Tablet 80 milliGRAM(s) Oral two times a day  heparin   Injectable 5000 Unit(s) SubCutaneous every 12 hours  hydrALAZINE 50 milliGRAM(s) Oral three times a day  insulin glargine Injectable (LANTUS) 6 Unit(s) SubCutaneous at bedtime  insulin lispro (ADMELOG) corrective regimen sliding scale   SubCutaneous three times a day before meals  insulin lispro (ADMELOG) corrective regimen sliding scale   SubCutaneous at bedtime  insulin lispro Injectable (ADMELOG) 4 Unit(s) SubCutaneous three times a day before meals  isosorbide   mononitrate ER Tablet (IMDUR) 60 milliGRAM(s) Oral daily  levothyroxine 25 MICROGram(s) Oral daily  mycophenolate mofetil 500 milliGRAM(s) Oral <User Schedule>  Nephro-brenda 1 Tablet(s) Oral daily  NIFEdipine XL 60 milliGRAM(s) Oral two times a day  nystatin    Suspension 969443 Unit(s) Swish and Swallow four times a day  polyethylene glycol 3350 17 Gram(s) Oral every 12 hours  predniSONE   Tablet   Oral   predniSONE   Tablet 5 milliGRAM(s) Oral daily  senna 2 Tablet(s) Oral at bedtime  tacrolimus ER Tablet (ENVARSUS XR) 5 milliGRAM(s) Oral <User Schedule>  tamsulosin 0.4 milliGRAM(s) Oral at bedtime  trimethoprim   80 mG/sulfamethoxazole 400 mG 1 Tablet(s) Oral <User Schedule>    Tacrolimus (), Serum: 2.1 ng/mL (04-23 @ 06:35)    LABS:  04-23    139  |  99  |  39[H]  ----------------------------<  104[H]  3.8   |  26  |  3.75[H]    Ca    8.6      23 Apr 2025 06:35  Phos  4.0     04-23  Mg     2.0     04-23    TPro  5.7[L]  /  Alb  3.3  /  TBili  0.2  /  DBili      /  AST  29  /  ALT  20  /  AlkPhos  69  04-23    Creatinine Trend: 3.75 <--, 6.10 <--, 5.30 <--, 4.00 <--, 6.66 <--, 5.35 <--, 4.16 <--, 3.22 <--    Albumin: 3.3 g/dL (04-23 @ 06:35)  Phosphorus: 4.0 mg/dL (04-23 @ 06:35)                              7.1    7.87  )-----------( 400      ( 23 Apr 2025 06:35 )             22.6     Urine Studies:  Urinalysis - [04-23-25 @ 06:35]      Color  / Appearance  / SG  / pH       Gluc 104 / Ketone   / Bili  / Urobili        Blood  / Protein  / Leuk Est  / Nitrite       RBC  / WBC  / Hyaline  / Gran  / Sq Epi  / Non Sq Epi  / Bacteria       Iron 67, TIBC 210, %sat 32      [04-21-25 @ 07:08]  Ferritin 1496      [04-21-25 @ 07:08]  PTH -- (Ca 7.9)      [12-30-24 @ 06:50]   229  PTH -- (Ca 7.6)      [11-27-24 @ 04:23]   250  Vitamin D (25OH) 17.1      [11-25-24 @ 06:50]  TSH 2.48      [04-13-25 @ 07:24]  Lipid: chol 112, TG 61, HDL 46, LDL --      [12-31-24 @ 04:27]    HBsAb 46.0      [04-07-25 @ 21:39]  HBsAg Nonreact      [04-07-25 @ 21:39]  HBcAb Nonreact      [04-07-25 @ 21:39]  HCV 0.05, Nonreact      [04-07-25 @ 21:39]  HIV Nonreact      [04-07-25 @ 21:39]      RADIOLOGY & ADDITIONAL STUDIES:

## 2025-04-23 NOTE — PROGRESS NOTE ADULT - SUBJECTIVE AND OBJECTIVE BOX
Transplant Surgery - Multi-disciplinary Rounds  --------------------------------------------------------------   Tx Date: 04/08/25         POD#15    Present: Patient seen and examined with multidisciplinary Transplant team including Surgeon Dr. Phipps, nephrologist Dr. Garcia, LÁZARO Forbes, pharmacy, and bedside RN during AM rounds.   Disciplines not in attendance will be notified of the plan.     HPI: 70 y/o M with PMHx of HLD, CAD s/p LAD PCI 7/24, HFpEF , HTN, hypothyroid, type 2 DM, ESRD on HD MWF (recently converted from PD in 1/2025) via R permacath placed on 1/2025, h/o cva with no residual deficits, persistent right sided pleural effusion, and hx of hemorrhagic pericardial effusion (cytopathology negative) presents to [facility] for possible DDRT. He states he makes less than half cup of urine a day. Denies fever, chills, N/V/D/C, abdominal pain, CP, SOB, hemoptysis, and /GI complaints. Now s/p DDRT  1a + 1 v + 1 u + stent under thymoglobulin induction on 04/08/25.     Interval Events:  -ON: no acute events   -Afebrile, VSS      Immunosuppression:  Induction: Thymo completed  Maintenance: Petra last dose 4/14/ENV per level/ BID/ pred 5  Ongoing monitoring for signs of rejection     Potential Discharge date: TBD  Education:  Medications  Plan of care:  See Below          TX DATA HERE      Review of systems  All other systems were reviewed and are negative, except as noted.    PHYSICAL EXAM:  Constitutional: Well developed / well nourished  Eyes: Anicteric, PERRLA  ENMT: nc/at  Neck: Supple  Respiratory: CTA B/L  Cardiovascular: RRR  Gastrointestinal: Soft, non-distended, mild abdominal pain to palpation at incision site; incision c/d/i; COSMO x #1/2 perinephric ss   Genitourinary: voiding  Extremities: SCD's in place and working bilaterally, R permcath  Vascular: Palpable dp pulses bilaterally  Neurological: A&O x3  Skin: no rashes, ulcerations or lesions;  Musculoskeletal: Moving all extremities  Psychiatric: Responsive     Transplant Surgery - Multi-disciplinary Rounds  --------------------------------------------------------------   Tx Date: 04/08/25         POD#15    Present: Patient seen and examined with multidisciplinary Transplant team including Surgeon Dr. Phipps, nephrologist Dr. Garcia, LÁZARO Forbes, pharmacy Tiffanie, and bedside RN during AM rounds.   Disciplines not in attendance will be notified of the plan.     HPI: 70 y/o M with PMHx of HLD, CAD s/p LAD PCI 7/24, HFpEF , HTN, hypothyroid, type 2 DM, ESRD on HD MWF (recently converted from PD in 1/2025) via R permacath placed on 1/2025, h/o cva with no residual deficits, persistent right sided pleural effusion, and hx of hemorrhagic pericardial effusion (cytopathology negative) presents to [facility] for possible DDRT. He states he makes less than half cup of urine a day. Denies fever, chills, N/V/D/C, abdominal pain, CP, SOB, hemoptysis, and /GI complaints. Now s/p DDRT  1a + 1 v + 1 u + stent under thymoglobulin induction on 04/08/25.     Interval Events:  -Afebrile, VSS  - HTN much improved s/p hydralazine initiation  - brand reinserted for elevated COSMO cr  - s/p HD w/ 1L removed       Immunosuppression:  Induction: Thymo completed  Maintenance: Petra last dose 4/14/ENV per level/ BID/ pred 5  Ongoing monitoring for signs of rejection     Potential Discharge date: TBD  Education:  Medications  Plan of care:  See Below          MEDICATIONS  (STANDING):  acyclovir   Oral Tab/Cap 200 milliGRAM(s) Oral two times a day  aspirin enteric coated 81 milliGRAM(s) Oral daily  atorvastatin 40 milliGRAM(s) Oral at bedtime  carvedilol 12.5 milliGRAM(s) Oral every 12 hours  chlorhexidine 2% Cloths 1 Application(s) Topical daily  doxazosin 8 milliGRAM(s) Oral with breakfast  doxazosin 8 milliGRAM(s) Oral at bedtime  famotidine    Tablet 20 milliGRAM(s) Oral daily  furosemide    Tablet 80 milliGRAM(s) Oral two times a day  heparin   Injectable 5000 Unit(s) SubCutaneous every 12 hours  hydrALAZINE 50 milliGRAM(s) Oral three times a day  insulin glargine Injectable (LANTUS) 6 Unit(s) SubCutaneous at bedtime  insulin lispro (ADMELOG) corrective regimen sliding scale   SubCutaneous three times a day before meals  insulin lispro (ADMELOG) corrective regimen sliding scale   SubCutaneous at bedtime  insulin lispro Injectable (ADMELOG) 4 Unit(s) SubCutaneous three times a day before meals  isosorbide   mononitrate ER Tablet (IMDUR) 60 milliGRAM(s) Oral daily  levothyroxine 25 MICROGram(s) Oral daily  mycophenolate mofetil 500 milliGRAM(s) Oral <User Schedule>  Nephro-brenda 1 Tablet(s) Oral daily  NIFEdipine XL 60 milliGRAM(s) Oral two times a day  nystatin    Suspension 194093 Unit(s) Swish and Swallow four times a day  polyethylene glycol 3350 17 Gram(s) Oral every 12 hours  predniSONE   Tablet 5 milliGRAM(s) Oral daily  predniSONE   Tablet   Oral   senna 2 Tablet(s) Oral at bedtime  tacrolimus ER Tablet (ENVARSUS XR) 5 milliGRAM(s) Oral <User Schedule>  tamsulosin 0.4 milliGRAM(s) Oral at bedtime  trimethoprim   80 mG/sulfamethoxazole 400 mG 1 Tablet(s) Oral <User Schedule>    MEDICATIONS  (PRN):  acetaminophen     Tablet .. 650 milliGRAM(s) Oral every 6 hours PRN Mild Pain (1 - 3)  artificial tears (preservative free) Ophthalmic Solution 1 Drop(s) Left EYE daily PRN Dry Eyes  ondansetron Injectable 4 milliGRAM(s) IV Push every 6 hours PRN Nausea and/or Vomiting  traMADol 25 milliGRAM(s) Oral every 4 hours PRN Moderate Pain (4 - 6)  traMADol 50 milliGRAM(s) Oral every 8 hours PRN Severe Pain (7 - 10)      PAST MEDICAL & SURGICAL HISTORY:  Hypertension      ESRD on peritoneal dialysis      CVA (cerebrovascular accident)  2010 without residual effects      Hyperlipidemia      BPH (benign prostatic hyperplasia)      CAD (coronary artery disease)      T2DM (type 2 diabetes mellitus)      Hypothyroidism      History of peritoneal dialysis  s/p PD catheter placement      S/P coronary artery stent placement          Vital Signs Last 24 Hrs  T(C): 36.6 (23 Apr 2025 12:24), Max: 36.7 (23 Apr 2025 05:12)  T(F): 97.9 (23 Apr 2025 12:24), Max: 98 (23 Apr 2025 05:12)  HR: 62 (23 Apr 2025 14:12) (51 - 100)  BP: 126/60 (23 Apr 2025 14:12) (126/60 - 168/77)  BP(mean): 87 (23 Apr 2025 14:12) (87 - 110)  RR: 18 (23 Apr 2025 14:12) (17 - 20)  SpO2: 100% (23 Apr 2025 14:12) (97% - 100%)    Parameters below as of 23 Apr 2025 14:12  Patient On (Oxygen Delivery Method): room air        I&O's Summary    22 Apr 2025 07:01  -  23 Apr 2025 07:00  --------------------------------------------------------  IN: 860 mL / OUT: 2275 mL / NET: -1415 mL    23 Apr 2025 07:01  -  23 Apr 2025 14:37  --------------------------------------------------------  IN: 480 mL / OUT: 320 mL / NET: 160 mL                              7.1    7.87  )-----------( 400      ( 23 Apr 2025 06:35 )             22.6     04-23    139  |  99  |  39[H]  ----------------------------<  104[H]  3.8   |  26  |  3.75[H]    Ca    8.6      23 Apr 2025 06:35  Phos  4.0     04-23  Mg     2.0     04-23    TPro  5.7[L]  /  Alb  3.3  /  TBili  0.2  /  DBili  x   /  AST  29  /  ALT  20  /  AlkPhos  69  04-23    Tacrolimus (), Serum: 2.1 ng/mL (04-23 @ 06:35)        Culture - Blood (collected 04-17-25 @ 10:17)  Source: Blood Blood-Peripheral  Final Report (04-22-25 @ 18:00):    No growth at 5 days    Culture - Blood (collected 04-17-25 @ 10:17)  Source: Blood Blood-Peripheral  Final Report (04-22-25 @ 18:00):    No growth at 5 days                  Review of systems  All other systems were reviewed and are negative, except as noted.    PHYSICAL EXAM:  Constitutional: Well developed / well nourished  Eyes: Anicteric, PERRLA  ENMT: nc/at  Neck: Supple  Respiratory: CTA B/L  Cardiovascular: RRR  Gastrointestinal: Soft, non-distended, mild abdominal pain to palpation at incision site; incision c/d/i; COSMO x #1/2 perinephric ss   Genitourinary: voiding  Extremities: SCD's in place and working bilaterally, R permcath  Vascular: Palpable dp pulses bilaterally  Neurological: A&O x3  Skin: no rashes, ulcerations or lesions;  Musculoskeletal: Moving all extremities  Psychiatric: Responsive

## 2025-04-23 NOTE — PROGRESS NOTE ADULT - ASSESSMENT
68 y/o M with PMHx of HLD, CAD s/p LAD PCI 7/24, HFpEF, HTN, hypothyroid, type 2 DM, ESRD on HD MWF (recently converted from PD in 1/2025) via R permacath s/p DDRT  1a + 1 v + 1 u + stent under thymoglobulin induction on 04/08/2    []s/p DDRT under thymo induction, POD #15  - Renal doppler: US renal patent vasc with elevated velocities  - DGF: HD - 4/9, 4/11, 4/14, 4/16, 4/18, 4/22  - COSMO #2 Cr: 8.47 (SCr: 6.10), brand reinserted  - Donor + GPC, vanc by level until 4/17: no more antibotics needed as per txp ID  - Strict I/O's, JPx2 SS  - Consistent carb diet  - Incr. Bowel regimen for dilated bowel loops on CT  - IS, PT  - Flomax  - f/u 4/16 DSA  - Noncontrast CT A/P - collection improving drain in place, dilated bowel loops  - per Cards, plavix not needed anymore        [ ] Immunosuppression  - Thymoglobulin induction completed  - ENV per level,  BID, SST  - Belatacept 4/10, 4/14, next dose 4/24  - ppx: bactrim TIW, nystatin, Acyclovir 200 BID    []HTN  - off cardene gtt, resumed cardene gtt 4/14 - 4/15  - Nifed 60 BID, Imdur 60 qd, labetalol 100mg PO q8hr, doxazosin 8mg in AM and 8mg QHS   -off lasix gtt 5mg/hr, started lasix 80 IV BID   - Hydralazine 50 TID    [] DVT ppx  - on SQH BID/ASA, SCD    [ ] hyperglycemia   - lantus &lispro, adjusted PRN 68 y/o M with PMHx of HLD, CAD s/p LAD PCI 7/24, HFpEF, HTN, hypothyroid, type 2 DM, ESRD on HD MWF (recently converted from PD in 1/2025) via R permacath s/p DDRT  1a + 1 v + 1 u + stent under thymoglobulin induction on 04/08/2    []s/p DDRT under thymo induction, POD #15  - Renal doppler: US renal patent vasc with elevated velocities  - DGF: HD - 4/9, 4/11, 4/14, 4/16, 4/18, 4/22  - COSMO #2 Cr: 8.47 (SCr: 6.10): repeat COSMO Cr 4/23 4.03, brand now removed  - Donor + GPC, vanc by level until 4/17: no more antibotics needed as per txp ID  - Strict I/O's, JPx2 SS  - Consistent carb diet  - Incr. Bowel regimen for dilated bowel loops on CT  - IS, PT  - Flomax  - f/u 4/16 DSA  - Noncontrast CT A/P - collection improving drain in place, dilated bowel loops  - per Cards, plavix not needed anymore        [ ] Immunosuppression  - Thymoglobulin induction completed  - ENV per level,  BID, SST  - Belatacept 4/10, 4/14, 4/23. next dose 5/8  - ppx: bactrim TIW, nystatin, Acyclovir 200 BID    []HTN  - off cardene gtt, resumed cardene gtt 4/14 - 4/15  - Nifed 60 BID, Imdur 60 qd, coreg 12.5 BID , doxazosin 8mg in AM and 8mg QHS, hydral 50 TID   -off lasix gtt 5mg/hr, now on lasix 80 PO BID       [] DVT ppx  - on SQH BID/ASA, SCD    [ ] hyperglycemia   - lantus &lispro, adjusted PRN

## 2025-04-23 NOTE — DISCHARGE NOTE PROVIDER - NSDCFUSCHEDAPPT_GEN_ALL_CORE_FT
[facility] Physician [facility] [address]  Scheduled Appointment: 04/24/2025    [facility] [address]  Scheduled Appointment: 05/08/2025

## 2025-04-23 NOTE — DISCHARGE NOTE PROVIDER - CARE PROVIDER_API CALL
Amara Garcia  Nephrology  [address] [place] [id number]  Phone: [phone]  Fax: [phone]  Follow Up Time:    Ansley Dunn  Nephrology  [address] [place] [id number]  Phone: [phone]  Fax: [phone]  Follow Up Time:

## 2025-04-23 NOTE — DISCHARGE NOTE PROVIDER - NSDCCPCAREPLAN_GEN_ALL_CORE_FT
PRINCIPAL DISCHARGE DIAGNOSIS  Diagnosis: Renal transplant, status post  Assessment and Plan of Treatment:   Renal txp recipient:   - Pain: You will have some pain after surgery. Take pain medication as prescribed. Avoid Aspirin, Motrin and Ibuprofen, unless intstructed by the team. You should NOT drive while on narcotic medications.   -Incision: Do NOT take bath until your incision is healed and you are cleared by your surgeon. You may shower. Gently clense the area around your insciion with only soap and water and pat dry. Check it daily.  Keep incision uncoverfed and open to air.   -Activity: avoid strenouous activity/excercise and heavy lifting for the first 6-8 weeks after surgery. You should not lift anything over 10 lbs. You should not drive until cleared by your transplant team. Avoid straining. Use stool softners as needed. Stop stool softners if diarrhea develops.   -Follow FOOD SAFETY instruction provided to you in your patient education guide bookelet   -Women:  using adequate contraception is highly recommended. In [facility], women who receive a transplant should not become pregnant for at least 18 months after transplant.   -Skin care: Transplant medication can increase your risk for skin cancer. Avoid extended exposure to the sun and wear SPH 30 sunscreen or higher. Visit a dermologist at least once a year.   -You should have an annual eye exam.   -You should never smoke.   Call  the Translant team if  you experience any of the following:   [] Fever of 100.3F (38C) or above  [] Surgical incision is bleeding, red or warm to touch or there's discharge from the incision  [] Worsening abdominal pain, nausea or vomiting  [] Shortness of breath  [] Diffuculty with urinating such as burning, frequency or urgency, blood in urine   Immunosupression  - Transplant recipients are at risk for developing infection because immune system is lowered due to transplant medications.   - Avoid contact with sick people; practice good hand hygiene;   - Take medications as directed by your translant team. Never stop taking medications unless instructed by your transplant physician.  - Do NOT double

## 2025-04-23 NOTE — DISCHARGE NOTE PROVIDER - NSDCFUADDAPPT_GEN_ALL_CORE_FT
- Follow up with Transplant clinic in one week   - follow up with Dr. RUBEN Garcia nephrology in one week   - Follow up in 4-6 weeks for removal of ureteral stent   - follow up for labs on friday  - Follow up with Transplant surgeon in one week   - follow up with Dr. Ansley Dunn nephrology on tuesday 4/29/25  - Follow up in 4-6 weeks for removal of ureteral stent   - follow up for labs on friday

## 2025-04-23 NOTE — DISCHARGE NOTE NURSING/CASE MANAGEMENT/SOCIAL WORK - PATIENT PORTAL LINK FT
You can access the FollowMyHealth Patient Portal offered by [facility] by registering at the following website: [email or web address]. By joining [employer]’s FollowMyHealth portal, you will also be able to view your health information using other applications (apps) compatible with our system.

## 2025-04-23 NOTE — DISCHARGE NOTE PROVIDER - NSDCMRMEDTOKEN_GEN_ALL_CORE_FT
acyclovir 200 mg oral capsule: 1 cap(s) orally 2 times a day  aspirin 81 mg oral delayed release tablet: 1 tab(s) orally once a day  atorvastatin 40 mg oral tablet: 1 tab(s) orally once a day (at bedtime)  Coreg 12.5 mg oral tablet: 1 tab(s) orally 2 times a day  doxazosin 8 mg oral tablet: 1 tab(s) orally 2 times a day  Envarsus XR 4 mg oral tablet, extended release: 1 tab(s) orally once a day (in the morning)  famotidine 20 mg oral tablet: 1 tab(s) orally once a day  furosemide 80 mg oral tablet: 1 tab(s) orally 2 times a day  hydrALAZINE 50 mg oral tablet: 1 tab(s) orally 3 times a day  insulin glargine 100 units/mL subcutaneous solution: 6 unit(s) subcutaneous once a day (at bedtime)  insulin lispro 100 units/mL injectable solution: 4 unit(s) injectable 3 times a day (before meals)  isosorbide mononitrate 60 mg oral tablet, extended release: 1 tab(s) orally once a day  levothyroxine 25 mcg (0.025 mg) oral tablet: 1 tab(s) orally once a day  multivitamin: 1 tab(s) orally once a day  mycophenolate mofetil 250 mg oral capsule: 500 milligram(s) orally 2 times a day  NIFEdipine 60 mg oral tablet, extended release: 1 tab(s) orally 2 times a day  nystatin 100,000 units/mL oral suspension: 5 milliliter(s) orally 4 times a day  predniSONE: 5 milligram(s) orally once a day  sulfamethoxazole-trimethoprim 400 mg-80 mg oral tablet: 1 tab(s) orally Monday, Wednesday, and Friday  tacrolimus 1 mg oral tablet, extended release: 1 tab(s) orally once a day (in the morning)  tamsulosin 0.4 mg oral capsule: 1 cap(s) orally once a day (at bedtime)   acyclovir 200 mg oral capsule: 1 cap(s) orally 2 times a day  aspirin 81 mg oral delayed release tablet: 1 tab(s) orally once a day  atorvastatin 40 mg oral tablet: 1 tab(s) orally once a day (at bedtime)  Coreg 12.5 mg oral tablet: 1 tab(s) orally 2 times a day  doxazosin 8 mg oral tablet: 1 tab(s) orally 2 times a day  Envarsus XR 4 mg oral tablet, extended release: 1 tab(s) orally once a day (in the morning)  famotidine 20 mg oral tablet: 1 tab(s) orally once a day  furosemide 80 mg oral tablet: 1 tab(s) orally 2 times a day  hydrALAZINE 50 mg oral tablet: 0.5 tab(s) orally 2 times a day  insulin glargine 100 units/mL subcutaneous solution: 6 unit(s) subcutaneous once a day (at bedtime)  insulin lispro 100 units/mL injectable solution: 4 unit(s) injectable 3 times a day (before meals)  isosorbide mononitrate 60 mg oral tablet, extended release: 1 tab(s) orally once a day  levothyroxine 25 mcg (0.025 mg) oral tablet: 1 tab(s) orally once a day  multivitamin: 1 tab(s) orally once a day  mycophenolate mofetil 250 mg oral capsule: 500 milligram(s) orally 2 times a day  NIFEdipine 60 mg oral tablet, extended release: 1 tab(s) orally 2 times a day  nystatin 100,000 units/mL oral suspension: 5 milliliter(s) orally 4 times a day  predniSONE: 5 milligram(s) orally once a day  sulfamethoxazole-trimethoprim 400 mg-80 mg oral tablet: 1 tab(s) orally Monday, Wednesday, and Friday  tacrolimus 1 mg oral tablet, extended release: 1 tab(s) orally once a day (in the morning)  tamsulosin 0.4 mg oral capsule: 1 cap(s) orally once a day (at bedtime)

## 2025-04-23 NOTE — PROGRESS NOTE ADULT - SUBJECTIVE AND OBJECTIVE BOX
[facility] DIVISION OF KIDNEY DISEASES AND HYPERTENSION -- FOLLOW UP NOTE  --------------------------------------------------------------------------------  Chief Complaint: DDRT    24 hour events/subjective:    PAST HISTORY  --------------------------------------------------------------------------------  No significant changes to PMH, PSH, FHx, SHx, unless otherwise noted    ALLERGIES & MEDICATIONS  --------------------------------------------------------------------------------  Allergies    No Known Allergies    Intolerances    Standing Inpatient Medications  acyclovir   Oral Tab/Cap 200 milliGRAM(s) Oral two times a day  aspirin enteric coated 81 milliGRAM(s) Oral daily  atorvastatin 40 milliGRAM(s) Oral at bedtime  carvedilol 12.5 milliGRAM(s) Oral every 12 hours  chlorhexidine 2% Cloths 1 Application(s) Topical daily  doxazosin 8 milliGRAM(s) Oral with breakfast  doxazosin 8 milliGRAM(s) Oral at bedtime  famotidine    Tablet 20 milliGRAM(s) Oral daily  furosemide    Tablet 80 milliGRAM(s) Oral two times a day  heparin   Injectable 5000 Unit(s) SubCutaneous every 12 hours  hydrALAZINE 50 milliGRAM(s) Oral three times a day  insulin glargine Injectable (LANTUS) 6 Unit(s) SubCutaneous at bedtime  insulin lispro (ADMELOG) corrective regimen sliding scale   SubCutaneous three times a day before meals  insulin lispro (ADMELOG) corrective regimen sliding scale   SubCutaneous at bedtime  insulin lispro Injectable (ADMELOG) 4 Unit(s) SubCutaneous three times a day before meals  isosorbide   mononitrate ER Tablet (IMDUR) 60 milliGRAM(s) Oral daily  levothyroxine 25 MICROGram(s) Oral daily  mycophenolate mofetil 500 milliGRAM(s) Oral <User Schedule>  Nephro-brenda 1 Tablet(s) Oral daily  NIFEdipine XL 60 milliGRAM(s) Oral two times a day  nystatin    Suspension 094998 Unit(s) Swish and Swallow four times a day  polyethylene glycol 3350 17 Gram(s) Oral every 12 hours  predniSONE   Tablet   Oral   predniSONE   Tablet 5 milliGRAM(s) Oral daily  senna 2 Tablet(s) Oral at bedtime  tacrolimus ER Tablet (ENVARSUS XR) 5 milliGRAM(s) Oral <User Schedule>  tamsulosin 0.4 milliGRAM(s) Oral at bedtime  trimethoprim   80 mG/sulfamethoxazole 400 mG 1 Tablet(s) Oral <User Schedule>    PRN Inpatient Medications  acetaminophen     Tablet .. 650 milliGRAM(s) Oral every 6 hours PRN  artificial tears (preservative free) Ophthalmic Solution 1 Drop(s) Left EYE daily PRN  ondansetron Injectable 4 milliGRAM(s) IV Push every 6 hours PRN  traMADol 25 milliGRAM(s) Oral every 4 hours PRN  traMADol 50 milliGRAM(s) Oral every 8 hours PRN    REVIEW OF SYSTEMS  --------------------------------------------------------------------------------  Gen: No fevers/chills  Respiratory: No dyspnea, cough,   CV: No chest pain, PND, orthopnea  GI: Abdominal pain  Transplant: No pain  : No increased frequency, dysuria, hematuria   MSK: No edema  Neuro: No dizziness/lightheadedness    All other systems were reviewed and are negative, except as noted.    VITALS/PHYSICAL EXAM  --------------------------------------------------------------------------------  T(C): 36.6 (04-23-25 @ 12:24), Max: 36.7 (04-23-25 @ 05:12)  HR: 62 (04-23-25 @ 14:12) (51 - 100)  BP: 126/60 (04-23-25 @ 14:12) (126/60 - 168/77)  RR: 18 (04-23-25 @ 14:12) (17 - 20)  SpO2: 100% (04-23-25 @ 14:12) (97% - 100%)  Wt(kg): --        04-22-25 @ 07:01  -  04-23-25 @ 07:00  --------------------------------------------------------  IN: 860 mL / OUT: 2275 mL / NET: -1415 mL    04-23-25 @ 07:01  -  04-23-25 @ 15:10  --------------------------------------------------------  IN: 480 mL / OUT: 320 mL / NET: 160 mL    Physical Exam:  Alert and oriented x3   HEENT: normal  Pulm: CTA B/L  CV: normal S1S2; no murmur  Transplant Abd: Tender upon palpation in RLQ  Extremities- no edema  RLQ surgical scar    LABS/STUDIES  --------------------------------------------------------------------------------              7.1    7.87  >-----------<  400      [04-23-25 @ 06:35]              22.6     139  |  99  |  39  ----------------------------<  104      [04-23-25 @ 06:35]  3.8   |  26  |  3.75        Ca     8.6     [04-23-25 @ 06:35]      Mg     2.0     [04-23-25 @ 06:35]      Phos  4.0     [04-23-25 @ 06:35]    TPro  5.7  /  Alb  3.3  /  TBili  0.2  /  DBili  x   /  AST  29  /  ALT  20  /  AlkPhos  69  [04-23-25 @ 06:35]    PT/INR: PT 9.6  , INR 0.84       [04-23-25 @ 06:35]  PTT: 28.5       [04-23-25 @ 06:35]    Creatinine Trend:  SCr 3.75 [04-23 @ 06:35]  SCr 6.10 [04-22 @ 07:12]  SCr 5.30 [04-21 @ 07:08]  SCr 4.00 [04-20 @ 07:20]  SCr 6.66 [04-19 @ 07:16]    Tacrolimus (), Serum: 2.1 ng/mL (04-23 @ 06:35)  Tacrolimus (), Serum: 2.1 ng/mL (04-22 @ 07:12)  Tacrolimus (), Serum: 1.7 ng/mL (04-21 @ 07:08)  Tacrolimus (), Serum: 4.3 ng/mL (04-20 @ 07:26)    Iron 67, TIBC 210, %sat 32      [04-21-25 @ 07:08]  Ferritin 1496      [04-21-25 @ 07:08]  PTH -- (Ca 7.9)      [12-30-24 @ 06:50]   229  PTH -- (Ca 7.6)      [11-27-24 @ 04:23]   250  Vitamin D (25OH) 17.1      [11-25-24 @ 06:50]  TSH 2.48      [04-13-25 @ 07:24]  Lipid: chol 112, TG 61, HDL 46, LDL --      [12-31-24 @ 04:27]    HBsAb 46.0      [04-07-25 @ 21:39]  HBsAg Nonreact      [04-07-25 @ 21:39]  HBcAb Nonreact      [04-07-25 @ 21:39]  HCV 0.05, Nonreact      [04-07-25 @ 21:39]  HIV Nonreact      [04-07-25 @ 21:39]

## 2025-04-23 NOTE — DISCHARGE NOTE NURSING/CASE MANAGEMENT/SOCIAL WORK - NSDCPEFALRISK_GEN_ALL_CORE
For information on Fall & Injury Prevention, visit: [email or web address] OR  [email or web address] OR  https://www.cdc.gov/steadi/patient.html

## 2025-04-23 NOTE — PROGRESS NOTE ADULT - ASSESSMENT
69M with PMHx of HLD, CAD s/p LAD PCI 7/24, HFpEF, HTN, hypothyroid, type 2 DM, ESRD on HD MWF presents to [facility] for possible DDRT. Transplant nephrology consulted for ESRD on Hemodialysis.     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 Repeat USG (4/14) showed increasing collection size  CT A/P (4/15)-complex perinephric collection 1.6x5.9x3.0 with slight enhanced septation. No hydro  C/w PO lasix 80mg BID   cc in the last 24 hours  Repeat COSMO creatinine improved, cathie d/c'ed    IS  Thymo induction  Tacro level 2.1 today  C/w Tacro 5mg   Check Tac level in AM (30 min prior to dose)   MMF, Pred taper  Denovo belatacept. Last dose on 4/14. Received belatacept today 4/23, next dose 5/8  Ppx-Bactrim, Nystatin, Acyclovir    HTN- labile  On Nifedipine 60 bid  Imdur 60 qd  Coreg 12.5mg BID BID  Doxazosin 8mg AM and 8mg HS  Hydralazine 50mg TID  Lasix 80mg PO BID    Hyperkalemia  resolved    Hyperglycemia  - C/w lantus, sliding scale    d/c planning    **Recommendations preliminary until attending attestation**  If you have any questions, please feel free to contact me  Hardeep Kowalski  Nephrology Fellow  Page [phone] / [employer] Teams (Preferred)  (After 4pm or on weekends please page the on-call fellow)

## 2025-04-24 ENCOUNTER — APPOINTMENT (OUTPATIENT)
Dept: RHEUMATOLOGY | Facility: CLINIC | Age: 70
End: 2025-04-24

## 2025-04-25 ENCOUNTER — NON-APPOINTMENT (OUTPATIENT)
Age: 70
End: 2025-04-25

## 2025-04-26 ENCOUNTER — NON-APPOINTMENT (OUTPATIENT)
Age: 70
End: 2025-04-26

## 2025-04-26 ENCOUNTER — EMERGENCY (EMERGENCY)
Facility: HOSPITAL | Age: 70
LOS: 1 days | End: 2025-04-26
Attending: STUDENT IN AN ORGANIZED HEALTH CARE EDUCATION/TRAINING PROGRAM
Payer: MEDICARE

## 2025-04-26 VITALS
HEART RATE: 63 BPM | DIASTOLIC BLOOD PRESSURE: 63 MMHG | OXYGEN SATURATION: 100 % | WEIGHT: 136.69 LBS | RESPIRATION RATE: 18 BRPM | SYSTOLIC BLOOD PRESSURE: 107 MMHG | TEMPERATURE: 98 F | HEIGHT: 68 IN

## 2025-04-26 DIAGNOSIS — Z95.5 PRESENCE OF CORONARY ANGIOPLASTY IMPLANT AND GRAFT: Chronic | ICD-10-CM

## 2025-04-26 DIAGNOSIS — Z98.890 OTHER SPECIFIED POSTPROCEDURAL STATES: Chronic | ICD-10-CM

## 2025-04-26 LAB
ALBUMIN SERPL ELPH-MCNC: 4 G/DL — SIGNIFICANT CHANGE UP (ref 3.3–5)
ALP SERPL-CCNC: 82 U/L — SIGNIFICANT CHANGE UP (ref 40–120)
ALT FLD-CCNC: 15 U/L — SIGNIFICANT CHANGE UP (ref 10–45)
ANION GAP SERPL CALC-SCNC: 18 MMOL/L — HIGH (ref 5–17)
APPEARANCE UR: CLEAR — SIGNIFICANT CHANGE UP
APTT BLD: 29.3 SEC — SIGNIFICANT CHANGE UP (ref 26.1–36.8)
AST SERPL-CCNC: 17 U/L — SIGNIFICANT CHANGE UP (ref 10–40)
BACTERIA # UR AUTO: NEGATIVE /HPF — SIGNIFICANT CHANGE UP
BASOPHILS # BLD AUTO: 0 K/UL — SIGNIFICANT CHANGE UP (ref 0–0.2)
BASOPHILS NFR BLD AUTO: 0 % — SIGNIFICANT CHANGE UP (ref 0–2)
BILIRUB SERPL-MCNC: 0.2 MG/DL — SIGNIFICANT CHANGE UP (ref 0.2–1.2)
BILIRUB UR-MCNC: NEGATIVE — SIGNIFICANT CHANGE UP
BLD GP AB SCN SERPL QL: NEGATIVE — SIGNIFICANT CHANGE UP
BUN SERPL-MCNC: 53 MG/DL — HIGH (ref 7–23)
CALCIUM SERPL-MCNC: 8.7 MG/DL — SIGNIFICANT CHANGE UP (ref 8.4–10.5)
CAST: 4 /LPF — SIGNIFICANT CHANGE UP (ref 0–4)
CHLORIDE SERPL-SCNC: 93 MMOL/L — LOW (ref 96–108)
CO2 SERPL-SCNC: 27 MMOL/L — SIGNIFICANT CHANGE UP (ref 22–31)
COLOR SPEC: YELLOW — SIGNIFICANT CHANGE UP
CREAT SERPL-MCNC: 3.72 MG/DL — HIGH (ref 0.5–1.3)
DIFF PNL FLD: NEGATIVE — SIGNIFICANT CHANGE UP
EGFR: 17 ML/MIN/1.73M2 — LOW
EGFR: 17 ML/MIN/1.73M2 — LOW
EOSINOPHIL # BLD AUTO: 0.21 K/UL — SIGNIFICANT CHANGE UP (ref 0–0.5)
EOSINOPHIL NFR BLD AUTO: 3.5 % — SIGNIFICANT CHANGE UP (ref 0–6)
GAS PNL BLDV: SIGNIFICANT CHANGE UP
GLUCOSE SERPL-MCNC: 310 MG/DL — HIGH (ref 70–99)
GLUCOSE UR QL: 250 MG/DL
HCT VFR BLD CALC: 24.6 % — LOW (ref 39–50)
HGB BLD-MCNC: 7.6 G/DL — LOW (ref 13–17)
INR BLD: 0.96 RATIO — SIGNIFICANT CHANGE UP (ref 0.85–1.16)
KETONES UR-MCNC: NEGATIVE MG/DL — SIGNIFICANT CHANGE UP
LEUKOCYTE ESTERASE UR-ACNC: NEGATIVE — SIGNIFICANT CHANGE UP
LYMPHOCYTES # BLD AUTO: 0.1 K/UL — LOW (ref 1–3.3)
LYMPHOCYTES # BLD AUTO: 1.7 % — LOW (ref 13–44)
MAGNESIUM SERPL-MCNC: 1.9 MG/DL — SIGNIFICANT CHANGE UP (ref 1.6–2.6)
MANUAL SMEAR VERIFICATION: SIGNIFICANT CHANGE UP
MCHC RBC-ENTMCNC: 28.5 PG — SIGNIFICANT CHANGE UP (ref 27–34)
MCHC RBC-ENTMCNC: 30.9 G/DL — LOW (ref 32–36)
MCV RBC AUTO: 92.1 FL — SIGNIFICANT CHANGE UP (ref 80–100)
MONOCYTES # BLD AUTO: 0.27 K/UL — SIGNIFICANT CHANGE UP (ref 0–0.9)
MONOCYTES NFR BLD AUTO: 4.4 % — SIGNIFICANT CHANGE UP (ref 2–14)
NEUTROPHILS # BLD AUTO: 5.52 K/UL — SIGNIFICANT CHANGE UP (ref 1.8–7.4)
NEUTROPHILS NFR BLD AUTO: 90.4 % — HIGH (ref 43–77)
NITRITE UR-MCNC: NEGATIVE — SIGNIFICANT CHANGE UP
PH UR: 6.5 — SIGNIFICANT CHANGE UP (ref 5–8)
PHOSPHATE SERPL-MCNC: 3.7 MG/DL — SIGNIFICANT CHANGE UP (ref 2.5–4.5)
PLAT MORPH BLD: NORMAL — SIGNIFICANT CHANGE UP
PLATELET # BLD AUTO: 323 K/UL — SIGNIFICANT CHANGE UP (ref 150–400)
POTASSIUM SERPL-MCNC: 3.5 MMOL/L — SIGNIFICANT CHANGE UP (ref 3.5–5.3)
POTASSIUM SERPL-SCNC: 3.5 MMOL/L — SIGNIFICANT CHANGE UP (ref 3.5–5.3)
PROT SERPL-MCNC: 6.6 G/DL — SIGNIFICANT CHANGE UP (ref 6–8.3)
PROT UR-MCNC: 30 MG/DL
PROTHROM AB SERPL-ACNC: 11 SEC — SIGNIFICANT CHANGE UP (ref 9.9–13.4)
RBC # BLD: 2.67 M/UL — LOW (ref 4.2–5.8)
RBC # FLD: 19.9 % — HIGH (ref 10.3–14.5)
RBC BLD AUTO: SIGNIFICANT CHANGE UP
RBC CASTS # UR COMP ASSIST: 4 /HPF — SIGNIFICANT CHANGE UP (ref 0–4)
RH IG SCN BLD-IMP: POSITIVE — SIGNIFICANT CHANGE UP
SODIUM SERPL-SCNC: 138 MMOL/L — SIGNIFICANT CHANGE UP (ref 135–145)
SP GR SPEC: 1.01 — SIGNIFICANT CHANGE UP (ref 1–1.03)
SQUAMOUS # UR AUTO: 2 /HPF — SIGNIFICANT CHANGE UP (ref 0–5)
UROBILINOGEN FLD QL: 0.2 MG/DL — SIGNIFICANT CHANGE UP (ref 0.2–1)
WBC # BLD: 6.11 K/UL — SIGNIFICANT CHANGE UP (ref 3.8–10.5)
WBC # FLD AUTO: 6.11 K/UL — SIGNIFICANT CHANGE UP (ref 3.8–10.5)
WBC UR QL: 3 /HPF — SIGNIFICANT CHANGE UP (ref 0–5)

## 2025-04-26 PROCEDURE — 93010 ELECTROCARDIOGRAM REPORT: CPT

## 2025-04-26 PROCEDURE — 99285 EMERGENCY DEPT VISIT HI MDM: CPT | Mod: GC

## 2025-04-26 PROCEDURE — 74176 CT ABD & PELVIS W/O CONTRAST: CPT | Mod: 26

## 2025-04-26 RX ORDER — ACETAMINOPHEN 500 MG/5ML
1000 LIQUID (ML) ORAL ONCE
Refills: 0 | Status: COMPLETED | OUTPATIENT
Start: 2025-04-26 | End: 2025-04-26

## 2025-04-26 RX ADMIN — Medication 400 MILLIGRAM(S): at 19:26

## 2025-04-26 NOTE — ED PROVIDER NOTE - PROGRESS NOTE DETAILS
Seth Phan MD PGY3 labs consistent with prior. ct pending. transplant np seen pt, adjusted drain, now working well. recommended if results nonactionable ok for discharge and follow up. ED sign out, pending CT and final Transplant Surgery recs for tx / dispo decisions -- Merrill Stacy MD Seth Phan MD PGY3: ct with likely post instrumental changes, urine without infectious findings. otherwise results unchanged from prior, nonactionable. discussed with transplant, no further workup or imaging recommended. recommended discharge. Discussed with patient all results including any incidentals. Discussed discharge, follow up, return precautions, medications. Patient verbalizes understanding and is amenable at this time. Answered patient questions. Seth Phan MD PGY3: ct with likely post instrumental changes, urine without infectious findings. otherwise results unchanged from prior, nonactionable. discussed with transplant NP Concha Cevallos, no further workup or imaging recommended. recommended discharge. Discussed with patient all results including any incidentals. Discussed discharge, follow up, return precautions, medications. Patient verbalizes understanding and is amenable at this time. Answered patient questions.

## 2025-04-26 NOTE — ED PROVIDER NOTE - NSICDXPASTSURGICALHX_GEN_ALL_CORE_FT
The risks of circumcision were reviewed with the mother including risk of infection, bleeding, damage to surrounding tissues, and poor cosmetic outcome. The elective nature of the procedure was emphasized.  Her questions were answered, and she gave informed PAST SURGICAL HISTORY:  History of peritoneal dialysis s/p PD catheter placement    S/P coronary artery stent placement

## 2025-04-26 NOTE — ED ADULT TRIAGE NOTE - CHIEF COMPLAINT QUOTE
kidney transplant 2 weeks ago. Son states drain in his back is clogged and MD referred them to ED to unclog drain.

## 2025-04-26 NOTE — ED CLERICAL - NS ED CLERK NOTE PRE-ARRIVAL INFORMATION; ADDITIONAL PRE-ARRIVAL INFORMATION
CC/Reason For referral: s/p vannaey transplant on 4/8/25 coming with a clogged J-P drain.  Preferred Consultant(if applicable): MIKHAIL Stover on [facility]  Who admits for you (if needed): Dr Dane Phipps  Do you have documents you would like to fax over?  Would you still like to speak to an ED attending? no, contact the transplant team

## 2025-04-26 NOTE — ED PROVIDER NOTE - ATTENDING CONTRIBUTION TO CARE
I have personally performed a face to face medical and diagnostic evaluation of the patient. I have discussed with and reviewed the Resident's and/or ACP's and/or Medical/PA/NP student's note and agree with the History, ROS, Physical Exam and MDM unless otherwise indicated. A brief summary of my personal evaluation and impression can be found below.     69F PMH HLD, CAD s/p LAD PCI 7/24, HFpEF , HTN, hypothyroid, type 2 DM, h/o cva with no residual deficits, persistent right sided pleural effusion, and hx of hemorrhagic pericardial effusion (cytopathology negative), ESRD on HD MWF (recently converted from PD in 1/2025) via permacath placed on 1/2025, now s/p DDRT on 4/8/25, c/b HTN requiring 2 SICU admissions presents to ED  with concern for blockage of COSMO drain as well as right-sided abdominal pain over transplant region, no pus or discharge from the incision site.  No fever or chills.  No nausea or vomiting.  Patient is having normal bowel movements.  Denies dysuria or hematuria.  Reports normal urine output.  Patient otherwise without complaints.    Physical exam shows healing surgical incision with 2 COSMO drains in place, mild fluid leaking from COSMO drain site, no pus seen.  no erythema, induration or edema.  Abdomen soft and nontender.  Regular rate and rhythm, lungs clear to auscultation.  No pitting edema lower extremities, distal pulses intact.  Neuro intact.    Given hx and physical, ddx includes but is not limited to COSMO drain clogged, post op complic, abscess less likely (abd soft/nt, no pus/redness/swelling at site). Plan for ecg labs ct ua transplant recs, reassess.     EKG showing sinus bradycardia, heart rate 53, AR interval 206, , QTc 452 no acute ischemic changes.

## 2025-04-26 NOTE — ED PROVIDER NOTE - NSFOLLOWUPINSTRUCTIONS_ED_ALL_ED_FT
You were seen in the ED for COSMO drain malfunction    Your evaluation including blood tests, urine tests, ct scan showed no findings requiring further evaluation or treatment in the hospital at this time. You were seen by Transplant Nephrology and your drains were adjusted and improved.    Please follow up with your PCP and nephrologist as discussed within 1 week. Discuss your symptoms, medications, and results in this packet.    Seek immediate medical attention if you experience new or worsening symptoms

## 2025-04-26 NOTE — ED PROVIDER NOTE - PATIENT PORTAL LINK FT
You can access the FollowMyHealth Patient Portal offered by [facility] by registering at the following website: [email or web address]. By joining [employer]’s FollowMyHealth portal, you will also be able to view your health information using other applications (apps) compatible with our system.

## 2025-04-26 NOTE — ED ADULT NURSE NOTE - NSFALLUNIVINTERV_ED_ALL_ED
Bed/Stretcher in lowest position, wheels locked, appropriate side rails in place/Call bell, personal items and telephone in reach/Instruct patient to call for assistance before getting out of bed/chair/stretcher/Non-slip footwear applied when patient is off stretcher/[employer] to call system/Physically safe environment - no spills, clutter or unnecessary equipment/Purposeful proactive rounding/Room/bathroom lighting operational, light cord in reach

## 2025-04-26 NOTE — ED ADULT NURSE NOTE - OBJECTIVE STATEMENT
Pt is a 69 y male PMHX CAD, HTN, HLD, ESRD, kidney tx 2 weeks ago, presents to the ED with c/o leaking drain. Pt endorsing pain from L drain, decreased drainage and serosanguinous drainage leaking from drains. Pt denies, fevers, chills. chest pain, and recent sick contacts. Upon examination, abdomen nontender, staples in place, no redness to site. Pt A&Ox4 and ambulates independently at baseline. Pt placed on continuous cardiac monitoring. Safety and comfort provided. Bed locked and in lowest position, blanket given and call bell within reach.

## 2025-04-27 VITALS
RESPIRATION RATE: 19 BRPM | TEMPERATURE: 98 F | DIASTOLIC BLOOD PRESSURE: 74 MMHG | HEART RATE: 53 BPM | SYSTOLIC BLOOD PRESSURE: 178 MMHG | OXYGEN SATURATION: 100 %

## 2025-04-27 LAB
CULTURE RESULTS: SIGNIFICANT CHANGE UP
SPECIMEN SOURCE: SIGNIFICANT CHANGE UP
TACROLIMUS SERPL-MCNC: 6.7 NG/ML — SIGNIFICANT CHANGE UP

## 2025-04-27 NOTE — ED ADULT NURSE REASSESSMENT NOTE - NS ED NURSE REASSESS COMMENT FT1
Pt d/enio. Unable to get in contact with his children or emergency contact for a ride home. Pt states he does not have money for a cab. Pt will stay in room [address] until ride is obtained.

## 2025-04-28 ENCOUNTER — LABORATORY RESULT (OUTPATIENT)
Age: 70
End: 2025-04-28

## 2025-04-28 ENCOUNTER — APPOINTMENT (OUTPATIENT)
Dept: NEPHROLOGY | Facility: CLINIC | Age: 70
End: 2025-04-28
Payer: MEDICARE

## 2025-04-28 ENCOUNTER — APPOINTMENT (OUTPATIENT)
Dept: TRANSPLANT | Facility: CLINIC | Age: 70
End: 2025-04-28
Payer: MEDICARE

## 2025-04-28 ENCOUNTER — INPATIENT (INPATIENT)
Facility: HOSPITAL | Age: 70
LOS: 7 days | Discharge: HOME HEALTH | DRG: 316 | End: 2025-05-06
Attending: TRANSPLANT SURGERY | Admitting: TRANSPLANT SURGERY
Payer: MEDICARE

## 2025-04-28 VITALS
RESPIRATION RATE: 17 BRPM | SYSTOLIC BLOOD PRESSURE: 97 MMHG | OXYGEN SATURATION: 97 % | HEART RATE: 58 BPM | HEIGHT: 68 IN | WEIGHT: 125 LBS | TEMPERATURE: 97 F | DIASTOLIC BLOOD PRESSURE: 58 MMHG

## 2025-04-28 VITALS
DIASTOLIC BLOOD PRESSURE: 54 MMHG | SYSTOLIC BLOOD PRESSURE: 96 MMHG | HEART RATE: 60 BPM | RESPIRATION RATE: 16 BRPM | HEIGHT: 68 IN | WEIGHT: 123 LBS | BODY MASS INDEX: 18.64 KG/M2 | TEMPERATURE: 98.3 F | OXYGEN SATURATION: 100 %

## 2025-04-28 DIAGNOSIS — T86.19 OTHER COMPLICATION OF KIDNEY TRANSPLANT: ICD-10-CM

## 2025-04-28 DIAGNOSIS — I95.9 HYPOTENSION, UNSPECIFIED: ICD-10-CM

## 2025-04-28 DIAGNOSIS — Z95.5 PRESENCE OF CORONARY ANGIOPLASTY IMPLANT AND GRAFT: Chronic | ICD-10-CM

## 2025-04-28 DIAGNOSIS — Z98.890 OTHER SPECIFIED POSTPROCEDURAL STATES: Chronic | ICD-10-CM

## 2025-04-28 DIAGNOSIS — Z94.0 KIDNEY TRANSPLANT STATUS: ICD-10-CM

## 2025-04-28 DIAGNOSIS — D64.9 ANEMIA, UNSPECIFIED: ICD-10-CM

## 2025-04-28 DIAGNOSIS — Z79.60 LONG TERM (CURRENT) USE OF UNSPECIFIED IMMUNOMODULATORS AND IMMUNOSUPPRESSANTS: ICD-10-CM

## 2025-04-28 LAB
ALBUMIN SERPL ELPH-MCNC: 3.6 G/DL — SIGNIFICANT CHANGE UP (ref 3.3–5)
ALBUMIN SERPL ELPH-MCNC: 3.9 G/DL
ALP BLD-CCNC: 80 U/L
ALP SERPL-CCNC: 73 U/L — SIGNIFICANT CHANGE UP (ref 40–120)
ALT FLD-CCNC: 12 U/L — SIGNIFICANT CHANGE UP (ref 10–45)
ALT SERPL-CCNC: 14 U/L
ANION GAP SERPL CALC-SCNC: 18 MMOL/L
ANION GAP SERPL CALC-SCNC: 20 MMOL/L — HIGH (ref 5–17)
APPEARANCE UR: ABNORMAL
APPEARANCE: CLEAR
APTT BLD: 26.6 SEC — SIGNIFICANT CHANGE UP (ref 26.1–36.8)
AST SERPL-CCNC: 14 U/L
AST SERPL-CCNC: 15 U/L — SIGNIFICANT CHANGE UP (ref 10–40)
BACTERIA # UR AUTO: NEGATIVE /HPF — SIGNIFICANT CHANGE UP
BASOPHILS # BLD AUTO: 0.05 K/UL — SIGNIFICANT CHANGE UP (ref 0–0.2)
BASOPHILS NFR BLD AUTO: 0.7 % — SIGNIFICANT CHANGE UP (ref 0–2)
BILIRUB SERPL-MCNC: 0.2 MG/DL
BILIRUB SERPL-MCNC: 0.2 MG/DL — SIGNIFICANT CHANGE UP (ref 0.2–1.2)
BILIRUB UR-MCNC: NEGATIVE — SIGNIFICANT CHANGE UP
BILIRUBIN URINE: NEGATIVE
BLOOD URINE: NEGATIVE
BUN SERPL-MCNC: 70 MG/DL
BUN SERPL-MCNC: 81 MG/DL — HIGH (ref 7–23)
CALCIUM SERPL-MCNC: 8.4 MG/DL — SIGNIFICANT CHANGE UP (ref 8.4–10.5)
CALCIUM SERPL-MCNC: 8.7 MG/DL
CAST: 14 /LPF — HIGH (ref 0–4)
CHLORIDE SERPL-SCNC: 93 MMOL/L — LOW (ref 96–108)
CHLORIDE SERPL-SCNC: 95 MMOL/L
CO2 SERPL-SCNC: 22 MMOL/L — SIGNIFICANT CHANGE UP (ref 22–31)
CO2 SERPL-SCNC: 26 MMOL/L
COLOR SPEC: SIGNIFICANT CHANGE UP
COLOR: YELLOW
CREAT SERPL-MCNC: 4.56 MG/DL
CREAT SERPL-MCNC: 4.74 MG/DL — HIGH (ref 0.5–1.3)
CREAT SPEC-SCNC: 119 MG/DL
CREAT/PROT UR: 0.8 RATIO
DIFF PNL FLD: NEGATIVE — SIGNIFICANT CHANGE UP
EGFR: 13 ML/MIN/1.73M2 — LOW
EGFR: 13 ML/MIN/1.73M2 — LOW
EGFRCR SERPLBLD CKD-EPI 2021: 13 ML/MIN/1.73M2
EOSINOPHIL # BLD AUTO: 0.07 K/UL — SIGNIFICANT CHANGE UP (ref 0–0.5)
EOSINOPHIL NFR BLD AUTO: 1 % — SIGNIFICANT CHANGE UP (ref 0–6)
FLUAV AG NPH QL: SIGNIFICANT CHANGE UP
FLUBV AG NPH QL: SIGNIFICANT CHANGE UP
GAS PNL BLDV: SIGNIFICANT CHANGE UP
GLUCOSE BLDC GLUCOMTR-MCNC: 141 MG/DL — HIGH (ref 70–99)
GLUCOSE QUALITATIVE U: NEGATIVE MG/DL
GLUCOSE SERPL-MCNC: 220 MG/DL
GLUCOSE SERPL-MCNC: 328 MG/DL — HIGH (ref 70–99)
GLUCOSE UR QL: 100 MG/DL
HCT VFR BLD CALC: 23.9 % — LOW (ref 39–50)
HCT VFR BLD CALC: 25 %
HGB BLD-MCNC: 7.4 G/DL — LOW (ref 13–17)
HGB BLD-MCNC: 7.7 G/DL
IMM GRANULOCYTES NFR BLD AUTO: 1.1 % — HIGH (ref 0–0.9)
INR BLD: 1.01 RATIO — SIGNIFICANT CHANGE UP (ref 0.85–1.16)
KETONES UR-MCNC: ABNORMAL MG/DL
KETONES URINE: NEGATIVE MG/DL
LEUKOCYTE ESTERASE UR-ACNC: ABNORMAL
LEUKOCYTE ESTERASE URINE: ABNORMAL
LYMPHOCYTES # BLD AUTO: 0.18 K/UL — LOW (ref 1–3.3)
LYMPHOCYTES # BLD AUTO: 2.6 % — LOW (ref 13–44)
MAGNESIUM SERPL-MCNC: 1.8 MG/DL
MCHC RBC-ENTMCNC: 28.1 PG
MCHC RBC-ENTMCNC: 28.1 PG — SIGNIFICANT CHANGE UP (ref 27–34)
MCHC RBC-ENTMCNC: 30.8 G/DL
MCHC RBC-ENTMCNC: 31 G/DL — LOW (ref 32–36)
MCV RBC AUTO: 90.9 FL — SIGNIFICANT CHANGE UP (ref 80–100)
MCV RBC AUTO: 91.2 FL
MONOCYTES # BLD AUTO: 0.31 K/UL — SIGNIFICANT CHANGE UP (ref 0–0.9)
MONOCYTES NFR BLD AUTO: 4.4 % — SIGNIFICANT CHANGE UP (ref 2–14)
NEUTROPHILS # BLD AUTO: 6.35 K/UL — SIGNIFICANT CHANGE UP (ref 1.8–7.4)
NEUTROPHILS NFR BLD AUTO: 90.2 % — HIGH (ref 43–77)
NITRITE UR-MCNC: NEGATIVE — SIGNIFICANT CHANGE UP
NITRITE URINE: NEGATIVE
NRBC BLD AUTO-RTO: 0 /100 WBCS — SIGNIFICANT CHANGE UP (ref 0–0)
PH UR: 5 — SIGNIFICANT CHANGE UP (ref 5–8)
PH URINE: 6
PHOSPHATE SERPL-MCNC: 4.8 MG/DL
PLATELET # BLD AUTO: 317 K/UL — SIGNIFICANT CHANGE UP (ref 150–400)
PLATELET # BLD AUTO: 334 K/UL
POTASSIUM SERPL-MCNC: 4.2 MMOL/L — SIGNIFICANT CHANGE UP (ref 3.5–5.3)
POTASSIUM SERPL-SCNC: 3.7 MMOL/L
POTASSIUM SERPL-SCNC: 4.2 MMOL/L — SIGNIFICANT CHANGE UP (ref 3.5–5.3)
PROT SERPL-MCNC: 6.2 G/DL
PROT SERPL-MCNC: 6.3 G/DL — SIGNIFICANT CHANGE UP (ref 6–8.3)
PROT UR-MCNC: 100 MG/DL
PROT UR-MCNC: 94 MG/DL
PROTEIN URINE: 100 MG/DL
PROTHROM AB SERPL-ACNC: 11.5 SEC — SIGNIFICANT CHANGE UP (ref 9.9–13.4)
RBC # BLD: 2.63 M/UL — LOW (ref 4.2–5.8)
RBC # BLD: 2.74 M/UL
RBC # FLD: 19.9 % — HIGH (ref 10.3–14.5)
RBC # FLD: 20.1 %
RBC CASTS # UR COMP ASSIST: 14 /HPF — HIGH (ref 0–4)
REVIEW: SIGNIFICANT CHANGE UP
RSV RNA NPH QL NAA+NON-PROBE: SIGNIFICANT CHANGE UP
SARS-COV-2 RNA SPEC QL NAA+PROBE: SIGNIFICANT CHANGE UP
SODIUM SERPL-SCNC: 135 MMOL/L — SIGNIFICANT CHANGE UP (ref 135–145)
SODIUM SERPL-SCNC: 139 MMOL/L
SOURCE RESPIRATORY: SIGNIFICANT CHANGE UP
SP GR SPEC: 1.02 — SIGNIFICANT CHANGE UP (ref 1–1.03)
SPECIFIC GRAVITY URINE: 1.02
SQUAMOUS # UR AUTO: 5 /HPF — SIGNIFICANT CHANGE UP (ref 0–5)
TACROLIMUS SERPL-MCNC: 3.2 NG/ML
UROBILINOGEN FLD QL: 0.2 MG/DL — SIGNIFICANT CHANGE UP (ref 0.2–1)
UROBILINOGEN URINE: 0.2 MG/DL
WBC # BLD: 7.04 K/UL — SIGNIFICANT CHANGE UP (ref 3.8–10.5)
WBC # FLD AUTO: 7.04 K/UL — SIGNIFICANT CHANGE UP (ref 3.8–10.5)
WBC # FLD AUTO: 7.51 K/UL
WBC UR QL: 4 /HPF — SIGNIFICANT CHANGE UP (ref 0–5)

## 2025-04-28 PROCEDURE — 76776 US EXAM K TRANSPL W/DOPPLER: CPT | Mod: 26,RT

## 2025-04-28 PROCEDURE — 99285 EMERGENCY DEPT VISIT HI MDM: CPT | Mod: GC

## 2025-04-28 PROCEDURE — 93010 ELECTROCARDIOGRAM REPORT: CPT

## 2025-04-28 PROCEDURE — 99215 OFFICE O/P EST HI 40 MIN: CPT

## 2025-04-28 PROCEDURE — 93308 TTE F-UP OR LMTD: CPT | Mod: 26

## 2025-04-28 PROCEDURE — 99222 1ST HOSP IP/OBS MODERATE 55: CPT | Mod: GC

## 2025-04-28 PROCEDURE — 71045 X-RAY EXAM CHEST 1 VIEW: CPT | Mod: 26

## 2025-04-28 PROCEDURE — 71250 CT THORAX DX C-: CPT | Mod: 26

## 2025-04-28 PROCEDURE — 99215 OFFICE O/P EST HI 40 MIN: CPT | Mod: 24

## 2025-04-28 RX ORDER — DEXTROSE 50 % IN WATER 50 %
12.5 SYRINGE (ML) INTRAVENOUS ONCE
Refills: 0 | Status: DISCONTINUED | OUTPATIENT
Start: 2025-04-28 | End: 2025-05-06

## 2025-04-28 RX ORDER — INSULIN GLARGINE-YFGN 100 [IU]/ML
6 INJECTION, SOLUTION SUBCUTANEOUS AT BEDTIME
Refills: 0 | Status: DISCONTINUED | OUTPATIENT
Start: 2025-04-28 | End: 2025-05-06

## 2025-04-28 RX ORDER — SODIUM CHLORIDE 9 G/1000ML
1000 INJECTION, SOLUTION INTRAVENOUS
Refills: 0 | Status: DISCONTINUED | OUTPATIENT
Start: 2025-04-28 | End: 2025-05-01

## 2025-04-28 RX ORDER — DEXTROSE 50 % IN WATER 50 %
15 SYRINGE (ML) INTRAVENOUS ONCE
Refills: 0 | Status: DISCONTINUED | OUTPATIENT
Start: 2025-04-28 | End: 2025-05-06

## 2025-04-28 RX ORDER — ACETAMINOPHEN 500 MG/5ML
1000 LIQUID (ML) ORAL ONCE
Refills: 0 | Status: COMPLETED | OUTPATIENT
Start: 2025-04-28 | End: 2025-04-28

## 2025-04-28 RX ORDER — SODIUM CHLORIDE 9 G/1000ML
1000 INJECTION, SOLUTION INTRAVENOUS
Refills: 0 | Status: DISCONTINUED | OUTPATIENT
Start: 2025-04-28 | End: 2025-05-06

## 2025-04-28 RX ORDER — ATORVASTATIN CALCIUM 80 MG/1
40 TABLET, FILM COATED ORAL AT BEDTIME
Refills: 0 | Status: DISCONTINUED | OUTPATIENT
Start: 2025-04-28 | End: 2025-05-06

## 2025-04-28 RX ORDER — ACETAMINOPHEN 500 MG/5ML
650 LIQUID (ML) ORAL ONCE
Refills: 0 | Status: COMPLETED | OUTPATIENT
Start: 2025-04-28 | End: 2025-04-28

## 2025-04-28 RX ORDER — HEPARIN SODIUM 1000 [USP'U]/ML
5000 INJECTION INTRAVENOUS; SUBCUTANEOUS EVERY 12 HOURS
Refills: 0 | Status: DISCONTINUED | OUTPATIENT
Start: 2025-04-28 | End: 2025-05-04

## 2025-04-28 RX ORDER — ASPIRIN 325 MG
81 TABLET ORAL DAILY
Refills: 0 | Status: DISCONTINUED | OUTPATIENT
Start: 2025-04-29 | End: 2025-05-04

## 2025-04-28 RX ORDER — TACROLIMUS 0.5 MG/1
5 CAPSULE ORAL
Refills: 0 | Status: DISCONTINUED | OUTPATIENT
Start: 2025-04-29 | End: 2025-04-30

## 2025-04-28 RX ORDER — LEVOTHYROXINE SODIUM 300 MCG
25 TABLET ORAL DAILY
Refills: 0 | Status: DISCONTINUED | OUTPATIENT
Start: 2025-04-29 | End: 2025-05-06

## 2025-04-28 RX ORDER — INSULIN LISPRO 100 U/ML
4 INJECTION, SOLUTION INTRAVENOUS; SUBCUTANEOUS
Refills: 0 | Status: DISCONTINUED | OUTPATIENT
Start: 2025-04-28 | End: 2025-05-06

## 2025-04-28 RX ORDER — SULFAMETHOXAZOLE/TRIMETHOPRIM 800-160 MG
1 TABLET ORAL
Refills: 0 | Status: DISCONTINUED | OUTPATIENT
Start: 2025-04-29 | End: 2025-05-06

## 2025-04-28 RX ORDER — GLUCAGON 3 MG/1
1 POWDER NASAL ONCE
Refills: 0 | Status: DISCONTINUED | OUTPATIENT
Start: 2025-04-28 | End: 2025-05-06

## 2025-04-28 RX ORDER — TAMSULOSIN HYDROCHLORIDE 0.4 MG/1
0.4 CAPSULE ORAL AT BEDTIME
Refills: 0 | Status: DISCONTINUED | OUTPATIENT
Start: 2025-04-28 | End: 2025-05-06

## 2025-04-28 RX ORDER — DEXTROSE 50 % IN WATER 50 %
25 SYRINGE (ML) INTRAVENOUS ONCE
Refills: 0 | Status: DISCONTINUED | OUTPATIENT
Start: 2025-04-28 | End: 2025-05-06

## 2025-04-28 RX ORDER — PREDNISONE 20 MG/1
5 TABLET ORAL DAILY
Refills: 0 | Status: DISCONTINUED | OUTPATIENT
Start: 2025-04-29 | End: 2025-05-03

## 2025-04-28 RX ADMIN — Medication 650 MILLIGRAM(S): at 22:05

## 2025-04-28 RX ADMIN — INSULIN GLARGINE-YFGN 6 UNIT(S): 100 INJECTION, SOLUTION SUBCUTANEOUS at 22:06

## 2025-04-28 RX ADMIN — Medication 400 MILLIGRAM(S): at 13:50

## 2025-04-28 RX ADMIN — Medication 200 MILLIGRAM(S): at 19:03

## 2025-04-28 RX ADMIN — Medication 250 MILLILITER(S): at 13:53

## 2025-04-28 RX ADMIN — Medication 500000 UNIT(S): at 19:03

## 2025-04-28 RX ADMIN — Medication 1000 MILLIGRAM(S): at 17:41

## 2025-04-28 RX ADMIN — ATORVASTATIN CALCIUM 40 MILLIGRAM(S): 80 TABLET, FILM COATED ORAL at 22:05

## 2025-04-28 RX ADMIN — HEPARIN SODIUM 5000 UNIT(S): 1000 INJECTION INTRAVENOUS; SUBCUTANEOUS at 19:05

## 2025-04-28 RX ADMIN — Medication 650 MILLIGRAM(S): at 23:10

## 2025-04-28 RX ADMIN — TAMSULOSIN HYDROCHLORIDE 0.4 MILLIGRAM(S): 0.4 CAPSULE ORAL at 22:05

## 2025-04-28 NOTE — ED CLERICAL - NS ED CLERK NOTE PRE-ARRIVAL INFORMATION; ADDITIONAL PRE-ARRIVAL INFORMATION
CC/Reason For referral: 4/8/25 Kidney Tx; lightheadness; low b/p; dizziness  Preferred Consultant(if applicable):  Who admits for you (if needed):  Do you have documents you would like to fax over?  Would you still like to speak to an ED attending? YES

## 2025-04-28 NOTE — ED ADULT NURSE REASSESSMENT NOTE - NS ED NURSE REASSESS COMMENT FT1
AOx4, breathing spontaneous. Speaking in clear and full sentences. Reports partial relief from pain in lower abd. Pt denies CP, SOB, headache, numbness, tingling. Safety and comfort measures provided. Pending CT.

## 2025-04-28 NOTE — CONSULT NOTE ADULT - ASSESSMENT
69 y.o. M w/ PMHx HTN, HLD, CAD s/p LAD PCI 7/24, HFpEF, HTN, hypothyroid, type 2 DM, ESRD on HD MWF s/p DDRT 4/8/25 who presents to [facility] for hypotension. Transplant nephrology consulted for DDRT and IS management.    1. 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 IS  - Env 5 w/ with low dose belatacept, dosed on 4/10, 4/14, 4/23, next dose 5/8  - cellcept 500 BID, pred 5  - acyclovir (CMV -/-), valcyte, bactrim  - Obtain FK level, dose Env per level  - Hold MMF prophylactically in case of infection  - Continue pred    3. HTN  - Hold home medications in setting of hypotension    **Recommendations preliminary until attending attestation**  If you have any questions, please feel free to contact me  Hardeep Kowalski  Nephrology Fellow  Page [phone] / [employer] Teams (Preferred)  (After 4pm or on weekends please page the on-call fellow) 69 y.o. M w/ PMHx HTN, HLD, CAD s/p LAD PCI 7/24, HFpEF, HTN, hypothyroid, type 2 DM, ESRD on HD MWF s/p DDRT 4/8/25 who presents to [facility] for hypotension. Transplant nephrology consulted for DDRT and IS management.    1. 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 IS  - Env 5 w/ with low dose belatacept, dosed on 4/10, 4/14, 4/23, next dose 5/8  - cellcept 500 BID, pred 5  - acyclovir (CMV -/-), valcyte, bactrim  - Obtain FK level, dose Env per level  - Hold MMF prophylactically in case of infection  - Continue pred    3. HTN  - Hold home medications in setting of hypotension    **Recommendations preliminary until attending attestation**  If you have any questions, please feel free to contact me  Hardeep Kowalski  Nephrology Fellow  Page [phone] / [employer] Teams (Preferred)  (After 4pm or on weekends please page the on-call fellow)

## 2025-04-28 NOTE — H&P ADULT - ASSESSMENT
70 y/o M with PMHx of HLD, CAD s/p LAD PCI 7/24, HFpEF , HTN, hypothyroid, type 2 DM, h/o cva with no residual deficits, persistent right sided pleural effusion, and hx of hemorrhagic pericardial effusion (cytopathology negative), ESRD on HD MWF (recently converted from PD in 1/2025) via permacath placed on 1/2025, now s/p DDRT on 4/8/25, c/b HTN requiring 2 SICU admissions.     Patient presented to the ED urgently from outpatient. States he was having output from drain area and stitch was placed in the outpatient office. Patient afterward had Hypotension episode to 80s/50s and on ED admit has pain from drain site when drains are being manipulated. Denies any other sx aside from a dry cough.      [] s/p DDRT, readmit hypotension / cough  - trend labs, VS  - Strict I's & O's, JPx2, send COSMO Cr  - renal Doppler  - CT chest non contrast for dry cough  - gentle IVF 75cc/hr      [] IMMUNO   - Env by level, hold MMF, pred 5  - PPX nystatin, bactrim, valcyte, ASA, PPI

## 2025-04-28 NOTE — ED PROVIDER NOTE - ATTENDING CONTRIBUTION TO CARE
I, Jesus Quiroz MD, Emergency Medicine Attending Physician, personally saw and examined the patient and I personally made/approve the management plan and take responsibility for the patient management.    MDM: 69-year-old male with history as seen in HPI above, who presents from nephrology office for hypotension with SBP of 90s, but patient without any acute symptoms.  Of note patient with similar presentation 2 days ago, when he was seen at the ED and had labs and CT abdomen and was subsequently discharged.    On examination, patient with stable vitals, afebrile rectally, well-appearing, in no acute distress. Cardiac examination RRR, lungs CTAB, chest wall permacath site without erythema or discharge.  Abdomen is soft and nondistended, (+) right lower quadrant with surgical site C/D/I, COSMO drain in place, tender to the RLQ.  Neurovascularly intact in all 4 extremities.     Will obtain labs to evaluate for hematologic disorder, metabolic derangements, hepatic and renal function, and screen for infection.  Will obtain chest x-ray to evaluate for acute cardiopulmonary pathology.  Consulted with nephrology/transplant team at 1:26 PM.    Labs with anemia with hemoglobin 7.4, elevated creatinine 4.74, glucose 328, chest x-ray with small right pleural effusion, unchanged.  Bedside ultrasound showed findings of possible pneumonia, will follow-up with CT scan of the chest.  Signed out at 1530 pending labs/imaging, renal/transplant consultation and close reassessments for further treatment and likely admission.

## 2025-04-28 NOTE — H&P ADULT - NSHPPHYSICALEXAM_GEN_ALL_CORE
PHYSICAL EXAM:  Constitutional: Well developed / well nourished  Eyes: Anicteric, PERRLA  ENMT: nc/at  Neck: supple   Respiratory: CTA B/L  Cardiovascular: RRR  Gastrointestinal: Soft, non distended, nontender. JPx2 ss   Genitourinary: voiding spontaneously   Extremities: warm b/l upper and lower, no edema  Vascular: Palpable dp pulses bilaterally  Neurological: A&O x3  Skin: no rashes, ulcerations or lesions;  Musculoskeletal: Moving all extremities  Psychiatric: Responsive

## 2025-04-28 NOTE — ED PROVIDER NOTE - CLINICAL SUMMARY MEDICAL DECISION MAKING FREE TEXT BOX
Patient presents emergency department complaining of hypotension.  Patient is alert and oriented upon presentation.  Patient received 250 cc after arrival to the hospital.  Patient repeat blood pressure over the 100 systolic.  Given patient patient patient and history of heart failure we will hold off on IV fluids at this time.  Will obtain lab work and imaging to evaluate for acute pathologies.  Transplant nephrology has been paged.

## 2025-04-28 NOTE — H&P ADULT - HISTORY OF PRESENT ILLNESS
70 y/o M with PMHx of HLD, CAD s/p LAD PCI 7/24, HFpEF , HTN, hypothyroid, type 2 DM, h/o cva with no residual deficits, persistent right sided pleural effusion, and hx of hemorrhagic pericardial effusion (cytopathology negative), ESRD on HD MWF (recently converted from PD in 1/2025) via permacath placed on 1/2025, now s/p DDRT on 4/8/25, c/b HTN requiring 2 SICU admissions.     Patient presented to the ED urgently from outpatient. States he was having output from drain area and stitch was placed in the outpatient office. Patient afterward had Hypotension episode to 80s/50s and on ED admit has pain from drain site when drains are being manipulated. Denies any other sx aside from a dry cough.

## 2025-04-28 NOTE — H&P ADULT - NSHPREVIEWOFSYSTEMS_GEN_ALL_CORE
Review of systems  Gen: No weight changes, fatigue, fevers/chills, weakness  Skin: No rashes  Head/Eyes/Ears/Mouth: No headache; Normal hearing; Normal vision w/o blurriness; No sinus pain/discomfort, sore throat  Respiratory: No dyspnea,+ dry cough, no wheezing, hemoptysis  CV: No chest pain, PND, orthopnea  GI: No abdominal pain; denies diarrhea, constipation, nausea, vomiting, melena, hematochezia  : No increased frequency, dysuria, hematuria, nocturia  MSK: No joint pain/swelling; no back pain; no edema  Neuro: No dizziness/lightheadedness, weakness, seizures, numbness, tingling  Heme: No easy bruising or bleeding  Endo: No heat/cold intolerance  Psych: No significant nervousness, anxiety, stress, depression  All other systems were reviewed and are negative, except as noted.

## 2025-04-28 NOTE — ED PROVIDER NOTE - PROGRESS NOTE DETAILS
Dr. Bobbi Sethi, ATTENDING: I have assumed care of this patient. I have fully participated in the care of this patient. I have made amendments to the documentation where appropriate and have supervised the ongoing plan of care enacted by the Fellow/Resident/ACP/Student.

## 2025-04-28 NOTE — ED ADULT NURSE NOTE - OBJECTIVE STATEMENT
Patient is a 69y male complaining of low BP's, sent by MD. [facility] kidney transplant April 8, 2025. Per EMS pt was at nephrologist  when BP was 80-90s. Pt complains of feeling dizzy and having pain 10/10 at incision site. Site is lower right abd. staples clean, dry, intact. 2 COSMO drains on the right side incision. Pt is supposed to receive dialysis today. Denies CP, SOB, headache, numbness, tingling, n/v/d, fever, chills. Breathing spontaneous, chest rise symmetrical. Abd soft, nondistended. Safety and comfort measures provided. Patient is a 69y male complaining of low BP's, sent by MD. [facility] kidney transplant April 8, 2025. Permacath for dialysis M/W/F. Per EMS pt was at nephrologist  when BP was 80-90s. Pt complains of feeling dizzy and having pain 10/10 at incision site. Site is lower right abd. staples clean, dry, intact. 2 COSMO drains on the right side incision. Reports no increase or decrease in drainage from COSMO drains. Pt is supposed to receive dialysis today. Denies CP, SOB, headache, numbness, tingling, n/v/d, fever, chills. Breathing spontaneous, chest rise symmetrical. Abd soft, nondistended. Safety and comfort measures provided.

## 2025-04-28 NOTE — PATIENT PROFILE ADULT - FUNCTIONAL ASSESSMENT - DAILY ACTIVITY 3.
Problem: Adult Inpatient Plan of Care  Goal: Plan of Care Review  Outcome: Progressing  Goal: Patient-Specific Goal (Individualized)  Outcome: Progressing  Goal: Absence of Hospital-Acquired Illness or Injury  Outcome: Progressing  Goal: Optimal Comfort and Wellbeing  Outcome: Progressing  Goal: Readiness for Transition of Care  Outcome: Progressing     Problem: Fall Injury Risk  Goal: Absence of Fall and Fall-Related Injury  Outcome: Progressing      4 = No assist / stand by assistance

## 2025-04-28 NOTE — ED PROVIDER NOTE - CHILD ABUSE FACILITY
Tech to kindly call patient an inquire what type of comfort issues/address concerns. She had titration recently and setting of 7 controlled events well.    Will need download as well [facility]

## 2025-04-28 NOTE — CONSULT NOTE ADULT - SUBJECTIVE AND OBJECTIVE BOX
[facility] DIVISION OF KIDNEY DISEASES AND HYPERTENSION -- INITIAL CONSULT NOTE  --------------------------------------------------------------------------------  HPI: Pt. is a 69 y.o. M w/ PMHx HTN, HLD, CAD s/p LAD PCI 7/24, HFpEF, HTN, hypothyroid, type 2 DM, ESRD on HD MWF s/p DDRT 4/8/25 who presents to [facility] for hypotension. Transplant nephrology consulted for DDRT and IS management.    Pt. seen and examined earlier today in the ED. Pt. was at clinic when he was found to have low BP of 90s systolic and was sent to the ED for further evaluation. Pt. states he was feeling dizzy at home. Pt. states that he has been compliant with his home medication regimen. His systolic BP was 150s in the morning and 130s in the afternoon most days. Pt. last had HD on friday 4/25/25. He denies any fever, dysuria, diarrhea, chills, or sick contacts. Pt. does endorse some mild right lower quadrant abdominal pain. Pt.'s son states that pt. has had increased output in his COSMO drains.    PAST HISTORY  --------------------------------------------------------------------------------  PAST MEDICAL & SURGICAL HISTORY:  Hypertension    ESRD on peritoneal dialysis    CVA (cerebrovascular accident)  2010 without residual effects    Hyperlipidemia    BPH (benign prostatic hyperplasia)    CAD (coronary artery disease)    T2DM (type 2 diabetes mellitus)    Hypothyroidism    History of peritoneal dialysis  s/p PD catheter placement    S/P coronary artery stent placement    FAMILY HISTORY:  FH: HTN (hypertension) (Mother, Father)    FH: type 2 diabetes (Mother, Father)    Social History:    ALLERGIES & MEDICATIONS  --------------------------------------------------------------------------------  Allergies    No Known Allergies    Intolerances    Standing Inpatient Medications    PRN Inpatient Medications    REVIEW OF SYSTEMS  --------------------------------------------------------------------------------  Gen: No fevers/chills  Skin: No rash  Head/Eyes/Ears/Mouth: No headache  Respiratory: No dyspnea  CV: No chest pain  GI: + abdominal pain  : No dysuria  MSK: No edema  Neuro: No dizziness/lightheadedness  Heme: No easy bruising or bleeding  Endo: No heat/cold intolerance  Psych: No significant nervousness    VITALS/PHYSICAL EXAM  --------------------------------------------------------------------------------  T(C): 36.4 (04-28-25 @ 15:53), Max: 36.4 (04-28-25 @ 13:28)  HR: 54 (04-28-25 @ 15:53) (54 - 58)  BP: 118/58 (04-28-25 @ 15:53) (97/58 - 120/56)  RR: 18 (04-28-25 @ 15:53) (16 - 18)  SpO2: 100% (04-28-25 @ 15:53) (97% - 100%)  Wt(kg): --  Height (cm): 172.7 (04-28-25 @ 13:04)  Weight (kg): 56.7 (04-28-25 @ 13:04)  BMI (kg/m2): 19 (04-28-25 @ 13:04)  BSA (m2): 1.67 (04-28-25 @ 13:04)    Physical Exam:  Gen: Not in distress, well-appearing  HEENT: No scelral icterus, moist oral mucosa  Pulm: Lungs clear to auscultation bilaterally   CV: regular rate and rhythm, S1 and S2 normal, no murmur   Abd: Tenderness near transplant site, +COSMO drain x2  : No suprapubic tenderness  Back: No spinal or CVA tenderness  Extremities: No edema. Distal pulses 2+ bilaterally   Skin: Warm  Neuro: Alert and oriented to person, place and time. Normal speech.   Access: [facility] TDC without signs of infection    LABS/STUDIES  --------------------------------------------------------------------------------              7.4    7.04  >-----------<  317      [04-28-25 @ 14:12]              23.9     135  |  93  |  81  ----------------------------<  328      [04-28-25 @ 14:12]  4.2   |  22  |  4.74        Ca     8.4     [04-28-25 @ 14:12]      Mg     1.9     [04-26-25 @ 19:18]      Phos  3.7     [04-26-25 @ 19:18]    TPro  6.3  /  Alb  3.6  /  TBili  0.2  /  DBili  x   /  AST  15  /  ALT  12  /  AlkPhos  73  [04-28-25 @ 14:12]    PT/INR: PT 11.5 , INR 1.01       [04-28-25 @ 14:12]  PTT: 26.6       [04-28-25 @ 14:12]    Creatinine Trend:  SCr 4.74 [04-28 @ 14:12]  SCr 3.72 [04-26 @ 19:18]  SCr 3.75 [04-23 @ 06:35]  SCr 6.10 [04-22 @ 07:12]  SCr 5.30 [04-21 @ 07:08]    Iron 67, TIBC 210, %sat 32      [04-21-25 @ 07:08]  Ferritin 1496      [04-21-25 @ 07:08]  PTH -- (Ca 7.9)      [12-30-24 @ 06:50]   229  PTH -- (Ca 7.6)      [11-27-24 @ 04:23]   250  Vitamin D (25OH) 17.1      [11-25-24 @ 06:50]  TSH 2.48      [04-13-25 @ 07:24]  Lipid: chol 112, TG 61, HDL 46, LDL --      [12-31-24 @ 04:27]    HBsAb 46.0      [04-07-25 @ 21:39]  HBsAb Reactive      [01-06-25 @ 11:22]  HBsAg Nonreact      [04-07-25 @ 21:39]  HBcAb Nonreact      [04-07-25 @ 21:39]  HCV 0.05, Nonreact      [04-07-25 @ 21:39]  HIV Nonreact      [04-07-25 @ 21:39]    HARVEY: titer Negative, pattern --      [10-27-22 @ 05:42]  dsDNA 69      [10-27-22 @ 05:42]  C3 Complement 128      [11-01-22 @ 05:25]  C4 Complement 32      [11-01-22 @ 05:25]  Rheumatoid Factor 17      [10-27-22 @ 05:42]  ANCA: cANCA Negative, pANCA Negative, atypical ANCA Negative      [10-27-22 @ 05:42]  SPEP Interpretation: Decrease albumin and increase in Alpha-2 fraction suggestive of acute  inflammation.  Iglesia Sagastume M.D.      [11-02-22 @ 06:45]    TacrolimusTacrolimus (), Serum: 6.7 ng/mL (04-26 @ 19:18)    Cyclosporine  Sirolimus  Mycophenolate  BK PCR  CMV PCR  Parvo PCR  EBV PCR [facility] DIVISION OF KIDNEY DISEASES AND HYPERTENSION -- INITIAL CONSULT NOTE  --------------------------------------------------------------------------------  HPI: Pt. is a 69 y.o. M w/ PMHx HTN, HLD, CAD s/p LAD PCI 7/24, HFpEF, HTN, hypothyroid, type 2 DM, ESRD on HD MWF s/p DDRT 4/8/25 who presents to [facility] for hypotension. Transplant nephrology consulted for DDRT and IS management.    Pt. seen and examined earlier today in the ED. Pt. was at clinic when he was found to have low BP of 90s systolic and was sent to the ED for further evaluation. Pt. states he was feeling dizzy at home. Pt. states that he has been compliant with his home medication regimen. His systolic BP was 150s in the morning and 130s in the afternoon most days. Pt. last had HD on friday 4/25/25. He denies any fever, dysuria, diarrhea, chills, or sick contacts. Pt. does endorse some mild right lower quadrant abdominal pain. Pt.'s son states that pt. has had increased output in his COSMO drains.    PAST HISTORY  --------------------------------------------------------------------------------  PAST MEDICAL & SURGICAL HISTORY:  Hypertension    ESRD on peritoneal dialysis    CVA (cerebrovascular accident)  2010 without residual effects    Hyperlipidemia    BPH (benign prostatic hyperplasia)    CAD (coronary artery disease)    T2DM (type 2 diabetes mellitus)    Hypothyroidism    History of peritoneal dialysis  s/p PD catheter placement    S/P coronary artery stent placement    FAMILY HISTORY:  FH: HTN (hypertension) (Mother, Father)    FH: type 2 diabetes (Mother, Father)    Social History:    ALLERGIES & MEDICATIONS  --------------------------------------------------------------------------------  Allergies    No Known Allergies    Intolerances    Standing Inpatient Medications    PRN Inpatient Medications    REVIEW OF SYSTEMS  --------------------------------------------------------------------------------  Gen: No fevers/chills  Skin: No rash  Head/Eyes/Ears/Mouth: No headache  Respiratory: No dyspnea  CV: No chest pain  GI: + abdominal pain  : No dysuria  MSK: No edema  Neuro: No dizziness/lightheadedness  Heme: No easy bruising or bleeding  Endo: No heat/cold intolerance  Psych: No significant nervousness    VITALS/PHYSICAL EXAM  --------------------------------------------------------------------------------  T(C): 36.4 (04-28-25 @ 15:53), Max: 36.4 (04-28-25 @ 13:28)  HR: 54 (04-28-25 @ 15:53) (54 - 58)  BP: 118/58 (04-28-25 @ 15:53) (97/58 - 120/56)  RR: 18 (04-28-25 @ 15:53) (16 - 18)  SpO2: 100% (04-28-25 @ 15:53) (97% - 100%)  Wt(kg): --  Height (cm): 172.7 (04-28-25 @ 13:04)  Weight (kg): 56.7 (04-28-25 @ 13:04)  BMI (kg/m2): 19 (04-28-25 @ 13:04)  BSA (m2): 1.67 (04-28-25 @ 13:04)    Physical Exam:  Gen: Not in distress, well-appearing  HEENT: No scelral icterus, moist oral mucosa  Pulm: Lungs clear to auscultation bilaterally   CV: regular rate and rhythm, S1 and S2 normal, no murmur   Abd: Tenderness near transplant site, +COSMO drain x2  : No suprapubic tenderness  Back: No spinal or CVA tenderness  Extremities: No edema. Distal pulses 2+ bilaterally   Skin: Warm  Neuro: Alert and oriented to person, place and time. Normal speech.   Access: [facility] TDC without signs of infection    LABS/STUDIES  --------------------------------------------------------------------------------              7.4    7.04  >-----------<  317      [04-28-25 @ 14:12]              23.9     135  |  93  |  81  ----------------------------<  328      [04-28-25 @ 14:12]  4.2   |  22  |  4.74        Ca     8.4     [04-28-25 @ 14:12]      Mg     1.9     [04-26-25 @ 19:18]      Phos  3.7     [04-26-25 @ 19:18]    TPro  6.3  /  Alb  3.6  /  TBili  0.2  /  DBili  x   /  AST  15  /  ALT  12  /  AlkPhos  73  [04-28-25 @ 14:12]    PT/INR: PT 11.5 , INR 1.01       [04-28-25 @ 14:12]  PTT: 26.6       [04-28-25 @ 14:12]    Creatinine Trend:  SCr 4.74 [04-28 @ 14:12]  SCr 3.72 [04-26 @ 19:18]  SCr 3.75 [04-23 @ 06:35]  SCr 6.10 [04-22 @ 07:12]  SCr 5.30 [04-21 @ 07:08]    Iron 67, TIBC 210, %sat 32      [04-21-25 @ 07:08]  Ferritin 1496      [04-21-25 @ 07:08]  PTH -- (Ca 7.9)      [12-30-24 @ 06:50]   229  PTH -- (Ca 7.6)      [11-27-24 @ 04:23]   250  Vitamin D (25OH) 17.1      [11-25-24 @ 06:50]  TSH 2.48      [04-13-25 @ 07:24]  Lipid: chol 112, TG 61, HDL 46, LDL --      [12-31-24 @ 04:27]    HBsAb 46.0      [04-07-25 @ 21:39]  HBsAb Reactive      [01-06-25 @ 11:22]  HBsAg Nonreact      [04-07-25 @ 21:39]  HBcAb Nonreact      [04-07-25 @ 21:39]  HCV 0.05, Nonreact      [04-07-25 @ 21:39]  HIV Nonreact      [04-07-25 @ 21:39]    HARVEY: titer Negative, pattern --      [10-27-22 @ 05:42]  dsDNA 69      [10-27-22 @ 05:42]  C3 Complement 128      [11-01-22 @ 05:25]  C4 Complement 32      [11-01-22 @ 05:25]  Rheumatoid Factor 17      [10-27-22 @ 05:42]  ANCA: cANCA Negative, pANCA Negative, atypical ANCA Negative      [10-27-22 @ 05:42]  SPEP Interpretation: Decrease albumin and increase in Alpha-2 fraction suggestive of acute  inflammation.  Iglesia Sagastume M.D.      [11-02-22 @ 06:45]    TacrolimusTacrolimus (), Serum: 6.7 ng/mL (04-26 @ 19:18)

## 2025-04-28 NOTE — PATIENT PROFILE ADULT - FALL HARM RISK - HARM RISK INTERVENTIONS

## 2025-04-28 NOTE — ED ADULT NURSE NOTE - NS ED NURSE LEVEL OF CONSCIOUSNESS MENTAL STATUS
Contacted patient to discuss procedure scheduled on Thursday, June 29th, 2023.     The patient has not been ill. Denies, fever, runny nose, cough, vomiting, diarrhea, or rash.    Discussed:    Arrival time: per PAN    NPO times: per PAN    History & Physical : Completed and in chart    Medications: Patient/family instructed to take nadalol with a small sip of water prior to procedure and to not take any other medications morning of procedure    No special soap is needed prior to the procedure    PAN will be calling the family as well    All patient's questions were answered. Encouraged patient to call us back on the [facility] Cath Lab Nurse Line [phone] with any questions or concerns prior to the procedure.   Awake/Alert

## 2025-04-28 NOTE — ED ADULT NURSE NOTE - GASTROINTESTINAL ASSESSMENT
"Subjective:          Patient ID: Cem Meyers is a 62 y.o. female who presents today for a routine virtual visit for MS.  She was last seen in September. The history has been provided by the patient.     The patient location is: her home   The chief complaint leading to consultation is: MS     Visit type: audiovisual    Face to Face time with patient: 19 minutes   35 minutes of total time spent on the encounter, which includes face to face time and non-face to face time preparing to see the patient (eg, review of tests), Obtaining and/or reviewing separately obtained history, Documenting clinical information in the electronic or other health record, Independently interpreting results (not separately reported) and communicating results to the patient/family/caregiver, or Care coordination (not separately reported).         Each patient to whom he or she provides medical services by telemedicine is:  (1) informed of the relationship between the physician and patient and the respective role of any other health care provider with respect to management of the patient; and (2) notified that he or she may decline to receive medical services by telemedicine and may withdraw from such care at any time.      MS HPI:  DMT: None   Taking vitamin D3 as recommended? Yes   She developed some episodes with impaired balance, brain fog, weakness, dizziness, and fatigue in late November/early December. She felt like these coincided with Rebif injections. She took a week off from injecting and felt back to baseline. She restarted, and the symptoms returned. She has been off of Rebif for the past 4-5 weeks. She has been feeling well.   She denies any recent infections.   She is trying to stay active.   She is working part-time, about 12-15 hours a week at the [employer].   She feels like her mobility is "pretty good." She did have a fall, but fell over something in her house. She also tripped on carpet.   Energy level has been "pretty " "decent."   Sleep quality has improved with CPAP.   She has some urinary frequency, but does not have accidents. She is not sure if she has had UTIs.   Bowels are ok right now.   She is having pain at times--she describes this as a spasm in the lower gut with certain position. This pain lasts 1-2 minutes.   She feels somewhat stiffer. She takes 70mg of baclofen per day. She has right trigger finger (ring finger). This doesn't hurt, but finger just sticks.   Vision has been stable. She needs to get a new eye doctor.   She "loses words" more often than she used to, but has no issues managing medications, finances, etc.  She denies any equipment needs. She has a good support system.   She has been on Avonex, Copaxone, Tysabri, Rebif, Aubagio, Plegridy, Novantrone, and Ocrevus.     Medications:  Current Outpatient Medications   Medication Sig    baclofen (LIORESAL) 10 MG tablet TAKE 2 TABS ONE TIME DAILY IN THE MORNING, 2 TABS IN THE AFTERNOON, AND UP TO 3 TABS AT BEDTIME AS NEEDED    calcium citrate (CALCITRATE) 200 mg (950 mg) tablet Take 2 tablets by mouth once daily.    cholecalciferol, vitamin D3, 100 mcg (4,000 unit) Cap Take 4,000 Units by mouth once daily.     denosumab (PROLIA SUBQ) Inject into the skin. Every 6 months    gabapentin (NEURONTIN) 800 MG tablet Take 1 tablet (800 mg total) by mouth 3 (three) times daily.    interferon beta-1a, albumin, (REBIF) 44 mcg/0.5 mL injection Inject 0.5 mLs (44 mcg total) into the skin 3 (three) times a week.    ketoconazole (NIZORAL) 2 % cream Apply topically 2 (two) times daily.    levothyroxine (SYNTHROID) 75 MCG tablet Take 1 tablet (75 mcg total) by mouth before breakfast.    lubiprostone (AMITIZA) 24 MCG Cap Take 1 capsule (24 mcg total) by mouth 2 (two) times daily.    multivitamin capsule Take 1 capsule by mouth once daily.    paroxetine (PAXIL) 40 MG tablet Take 1 tablet (40 mg total) by mouth every morning.    simvastatin (ZOCOR) 40 MG tablet Take 1 tablet (40 mg " total) by mouth once daily.    triamcinolone acetonide 0.025% (KENALOG) 0.025 % cream AAA bid     No current facility-administered medications for this visit.       SOCIAL HISTORY  Social History     Tobacco Use    Smoking status: Never     Passive exposure: Never    Smokeless tobacco: Never   Substance Use Topics    Alcohol use: Yes     Comment: occasional    Drug use: No       Living arrangements - the patient lives alone              Objective:        1. 25 foot timed walk:      3/10/2021     1:40 PM 3/4/2024     1:00 PM   Timed 25 Foot Walk:   Did patient wear an AFO? No No   Was assistive device used? No    Assistive device used (debbie one):  Unilateral Assistance   Unilateral device used  Cane   Time for 25 Foot Walk (seconds) 6.3 10.45   Time for 25 Foot Walk (seconds) 6.6      Neurological Exam    Deferred   Imaging:     Results for orders placed during the hospital encounter of 02/28/24    MRI Brain Demyelinating Without Contrast    Impression  Similar appearance of demyelinating plaques within both cerebral hemispheres and the posterior fossa.  No definite new demyelinating plaques as compared to the prior MRI 09/08/2022.      Electronically signed by: Jean-Paul Castellon  Date:    02/28/2024  Time:    09:58    Labs:     Lab Results   Component Value Date    PIFPBASU66NE 86 08/07/2024    [id number] 67 01/31/2024    [id number] 63 07/24/2023       Lab Results   Component Value Date    WBC 4.54 12/05/2024    RBC 4.05 12/05/2024    HGB 12.6 12/05/2024    HCT 41.1 12/05/2024     (H) 12/05/2024    MCH 31.1 (H) 12/05/2024    MCHC 30.7 (L) 12/05/2024    RDW 13.0 12/05/2024     12/05/2024    MPV 11.1 12/05/2024    GRAN 1.7 (L) 12/05/2024    GRAN 38.0 12/05/2024    LYMPH 2.3 12/05/2024    LYMPH 51.1 (H) 12/05/2024    MONO 0.4 12/05/2024    MONO 9.3 12/05/2024    EOS 0.0 12/05/2024    BASO 0.03 12/05/2024    EOSINOPHIL 0.9 12/05/2024    BASOPHIL 0.7 12/05/2024     Sodium   Date Value Ref Range Status    12/05/2024 141 136 - 145 mmol/L Final     Potassium   Date Value Ref Range Status   12/05/2024 4.4 3.5 - 5.1 mmol/L Final     Chloride   Date Value Ref Range Status   12/05/2024 105 95 - 110 mmol/L Final     CO2   Date Value Ref Range Status   12/05/2024 27 23 - 29 mmol/L Final     Glucose   Date Value Ref Range Status   12/05/2024 79 70 - 110 mg/dL Final     BUN   Date Value Ref Range Status   12/05/2024 14 8 - 23 mg/dL Final     Creatinine   Date Value Ref Range Status   12/05/2024 0.9 0.5 - 1.4 mg/dL Final     Calcium   Date Value Ref Range Status   12/05/2024 10.0 8.7 - 10.5 mg/dL Final     Total Protein   Date Value Ref Range Status   12/05/2024 7.6 6.0 - 8.4 g/dL Final     Albumin   Date Value Ref Range Status   12/05/2024 3.8 3.5 - 5.2 g/dL Final     Total Bilirubin   Date Value Ref Range Status   12/05/2024 0.4 0.1 - 1.0 mg/dL Final     Comment:     For infants and newborns, interpretation of results should be based  on gestational age, weight and in agreement with clinical  observations.    Premature Infant recommended reference ranges:  Up to 24 hours.............<8.0 mg/dL  Up to 48 hours............<12.0 mg/dL  3-5 days..................<15.0 mg/dL  6-29 days.................<15.0 mg/dL       Alkaline Phosphatase   Date Value Ref Range Status   12/05/2024 68 40 - 150 U/L Final     AST   Date Value Ref Range Status   12/05/2024 36 10 - 40 U/L Final     ALT   Date Value Ref Range Status   12/05/2024 24 10 - 44 U/L Final     Anion Gap   Date Value Ref Range Status   12/05/2024 9 8 - 16 mmol/L Final     eGFR if    Date Value Ref Range Status   06/17/2022 >60 >60 mL/min/1.73 m^2 Final     eGFR if non    Date Value Ref Range Status   06/17/2022 >60 >60 mL/min/1.73 m^2 Final     Comment:     Calculation used to obtain the estimated glomerular filtration  rate (eGFR) is the CKD-EPI equation.        Lab Results   Component Value Date    HEPBSAG Negative 07/17/2019    HEPBSAB  Negative 07/17/2019    HEPBCAB Negative 07/17/2019       MS Impression and Plan:     NEURO MULTIPLE SCLEROSIS IMPRESSION:   Number of relapses in the past year?:  0  Clinical Progression:  Clinically Stable  MS Classification:  Secondarily Progressive MS  Current DMT comment: She recently had significant episodes of dizziness, fatigue, and brain fog that she attributes to Rebif. She has felt well off of Rebif for the past month. She was diagnosed with MS in 1989, and she is now 62 years old. She has lived with MS for most of her adult life. MRIs have been stable for the last several years. We discussed holding DMT right now and getting new MRIs of brain and spine next month, then seeing her back in clinic to evaluate for clinical change. If she is stable, we can consider continuing to hold DMT. If she is worsening, [facility] is an option for her.   DMT:  Discontinue DMT  Symptom Management:  No change in symptom management    MRIs are planned for February.   She will follow up with Dr. Smith in May.   The visit today is associated with current or anticipated ongoing medical care related to this patient's single serious condition/complex condition of multiple sclerosis.        REBECCA Ryan, CNS    Problem List Items Addressed This Visit    None  Visit Diagnoses       Multiple sclerosis    -  Primary    Counseling regarding goals of care        Muscle spasm                   - - -

## 2025-04-28 NOTE — ED PROVIDER NOTE - OBJECTIVE STATEMENT
patient is a 69-year-old male with a past medical history of hypertension, hyperlipidemia, CAD status post LAD PCI, heart failure, hypertension, hypothyroid, type 2 diabetes, CVA with no deficits, recent kidney transplant on 4 8 who presents to the emergency room complaining of hypotension.  Patient was at nephrologist office when he was found to have blood pressure of 90 systolic.  Patient was sent into the emergency department for further evaluation.  Per EMS they given 250 cc.  At this time patient is complaining of some dizziness and lightheadedness.  Otherwise denies any fevers, chills, chest pain, shortness of breath.  Patient is complaining of some mild right lower quadrant abdominal pain.  According to the son, the patient has an increased output in his COSMO drains..

## 2025-04-28 NOTE — CONSULT NOTE ADULT - TIME BILLING
Recent DDRT recipient readmitted with low blood pressure noted in clinic , improved with IVF. Patient has been on dialysis support.  reviewed clinical, lab data, post transplant course and recent events.   Reviewed immunosuppression, prophylaxis, comorbidities, management regimen for comorbidities.  Patient has known CAD, HLD, HFpEF, h/o CVA , DM, HTN, pleural effusion  On dariel based regimen (next Dariel 5/8/25) with Tac/MMF/steroids. Noted prior antibiotic therapy for donor GPC cultures, was on Vanco till 4/16.  Was on Coreg/Hydralazine/Nifedipine/Cardura and Imdur.  DSA was negative predischarge  Suggestions:  Will work up for etiology for low blood pressure , monitor blood pressure  blood and urine Cultures, TTE, cardiology evaluation, abd/pelvis imaging.  Monitor for dialysis need, will hold off today unless urgent indication develops  Reintroduce antihypertensives as needed  Continue low target trough level for Tac/steroids, hold MMF until cultures are negative  If low lood pressure recurs, SICU monitoring  Will follow  I was present during and reviewed clinical and lab data as well as assessment and plan as documented by the house staff as noted. Please contact if any additional questions with any change in clinical condition or on availability of any additional information or reports.

## 2025-04-28 NOTE — ED ADULT NURSE NOTE - NSFALLUNIVINTERV_ED_ALL_ED
Bed/Stretcher in lowest position, wheels locked, appropriate side rails in place/Call bell, personal items and telephone in reach/Instruct patient to call for assistance before getting out of bed/chair/stretcher/Non-slip footwear applied when patient is off stretcher/[place] to call system/Physically safe environment - no spills, clutter or unnecessary equipment/Purposeful proactive rounding/Room/bathroom lighting operational, light cord in reach

## 2025-04-29 ENCOUNTER — APPOINTMENT (OUTPATIENT)
Dept: NEPHROLOGY | Facility: CLINIC | Age: 70
End: 2025-04-29

## 2025-04-29 LAB
-  STAPHYLOCOCCUS EPIDERMIDIS, METHICILLIN RESISTANT: SIGNIFICANT CHANGE UP
ALBUMIN SERPL ELPH-MCNC: 3.8 G/DL — SIGNIFICANT CHANGE UP (ref 3.3–5)
ALP SERPL-CCNC: 74 U/L — SIGNIFICANT CHANGE UP (ref 40–120)
ALT FLD-CCNC: 11 U/L — SIGNIFICANT CHANGE UP (ref 10–45)
ANION GAP SERPL CALC-SCNC: 20 MMOL/L — HIGH (ref 5–17)
AST SERPL-CCNC: 13 U/L — SIGNIFICANT CHANGE UP (ref 10–40)
BASOPHILS # BLD AUTO: 0.05 K/UL — SIGNIFICANT CHANGE UP (ref 0–0.2)
BASOPHILS NFR BLD AUTO: 0.9 % — SIGNIFICANT CHANGE UP (ref 0–2)
BILIRUB SERPL-MCNC: 0.2 MG/DL — SIGNIFICANT CHANGE UP (ref 0.2–1.2)
BLD GP AB SCN SERPL QL: NEGATIVE — SIGNIFICANT CHANGE UP
BUN SERPL-MCNC: 86 MG/DL — HIGH (ref 7–23)
CALCIUM SERPL-MCNC: 8.7 MG/DL — SIGNIFICANT CHANGE UP (ref 8.4–10.5)
CHLORIDE SERPL-SCNC: 96 MMOL/L — SIGNIFICANT CHANGE UP (ref 96–108)
CO2 SERPL-SCNC: 23 MMOL/L — SIGNIFICANT CHANGE UP (ref 22–31)
CREAT FLD-MCNC: 6.68 MG/DL — SIGNIFICANT CHANGE UP
CREAT SERPL-MCNC: 4.63 MG/DL — HIGH (ref 0.5–1.3)
EGFR: 13 ML/MIN/1.73M2 — LOW
EGFR: 13 ML/MIN/1.73M2 — LOW
EOSINOPHIL # BLD AUTO: 0.32 K/UL — SIGNIFICANT CHANGE UP (ref 0–0.5)
EOSINOPHIL NFR BLD AUTO: 5.5 % — SIGNIFICANT CHANGE UP (ref 0–6)
GLUCOSE BLDC GLUCOMTR-MCNC: 112 MG/DL — HIGH (ref 70–99)
GLUCOSE BLDC GLUCOMTR-MCNC: 113 MG/DL — HIGH (ref 70–99)
GLUCOSE BLDC GLUCOMTR-MCNC: 120 MG/DL — HIGH (ref 70–99)
GLUCOSE BLDC GLUCOMTR-MCNC: 146 MG/DL — HIGH (ref 70–99)
GLUCOSE BLDC GLUCOMTR-MCNC: 169 MG/DL — HIGH (ref 70–99)
GLUCOSE BLDC GLUCOMTR-MCNC: 180 MG/DL — HIGH (ref 70–99)
GLUCOSE SERPL-MCNC: 93 MG/DL — SIGNIFICANT CHANGE UP (ref 70–99)
GRAM STN FLD: ABNORMAL
HCT VFR BLD CALC: 23.2 % — LOW (ref 39–50)
HGB BLD-MCNC: 7.4 G/DL — LOW (ref 13–17)
IMM GRANULOCYTES NFR BLD AUTO: 0.5 % — SIGNIFICANT CHANGE UP (ref 0–0.9)
LYMPHOCYTES # BLD AUTO: 0.48 K/UL — LOW (ref 1–3.3)
LYMPHOCYTES # BLD AUTO: 8.3 % — LOW (ref 13–44)
MAGNESIUM SERPL-MCNC: 1.9 MG/DL — SIGNIFICANT CHANGE UP (ref 1.6–2.6)
MCHC RBC-ENTMCNC: 28.5 PG — SIGNIFICANT CHANGE UP (ref 27–34)
MCHC RBC-ENTMCNC: 31.9 G/DL — LOW (ref 32–36)
MCV RBC AUTO: 89.2 FL — SIGNIFICANT CHANGE UP (ref 80–100)
METHOD TYPE: SIGNIFICANT CHANGE UP
MONOCYTES # BLD AUTO: 0.7 K/UL — SIGNIFICANT CHANGE UP (ref 0–0.9)
MONOCYTES NFR BLD AUTO: 12.1 % — SIGNIFICANT CHANGE UP (ref 2–14)
NEUTROPHILS # BLD AUTO: 4.21 K/UL — SIGNIFICANT CHANGE UP (ref 1.8–7.4)
NEUTROPHILS NFR BLD AUTO: 72.7 % — SIGNIFICANT CHANGE UP (ref 43–77)
NRBC BLD AUTO-RTO: 0 /100 WBCS — SIGNIFICANT CHANGE UP (ref 0–0)
PHOSPHATE SERPL-MCNC: 4.9 MG/DL — HIGH (ref 2.5–4.5)
PLATELET # BLD AUTO: 342 K/UL — SIGNIFICANT CHANGE UP (ref 150–400)
POTASSIUM SERPL-MCNC: 3.1 MMOL/L — LOW (ref 3.5–5.3)
POTASSIUM SERPL-SCNC: 3.1 MMOL/L — LOW (ref 3.5–5.3)
PROT SERPL-MCNC: 6.4 G/DL — SIGNIFICANT CHANGE UP (ref 6–8.3)
RBC # BLD: 2.6 M/UL — LOW (ref 4.2–5.8)
RBC # FLD: 19.9 % — HIGH (ref 10.3–14.5)
RH IG SCN BLD-IMP: POSITIVE — SIGNIFICANT CHANGE UP
SODIUM SERPL-SCNC: 139 MMOL/L — SIGNIFICANT CHANGE UP (ref 135–145)
SPECIMEN SOURCE: SIGNIFICANT CHANGE UP
TACROLIMUS SERPL-MCNC: 1.7 NG/ML — SIGNIFICANT CHANGE UP
WBC # BLD: 5.79 K/UL — SIGNIFICANT CHANGE UP (ref 3.8–10.5)
WBC # FLD AUTO: 5.79 K/UL — SIGNIFICANT CHANGE UP (ref 3.8–10.5)

## 2025-04-29 PROCEDURE — 99232 SBSQ HOSP IP/OBS MODERATE 35: CPT | Mod: GC

## 2025-04-29 RX ORDER — NIFEDIPINE 30 MG
30 TABLET, EXTENDED RELEASE 24 HR ORAL ONCE
Refills: 0 | Status: COMPLETED | OUTPATIENT
Start: 2025-04-29 | End: 2025-04-29

## 2025-04-29 RX ORDER — ISOSORBIDE MONONITRATE 60 MG/1
30 TABLET, EXTENDED RELEASE ORAL DAILY
Refills: 0 | Status: DISCONTINUED | OUTPATIENT
Start: 2025-04-29 | End: 2025-05-06

## 2025-04-29 RX ORDER — NIFEDIPINE 30 MG
60 TABLET, EXTENDED RELEASE 24 HR ORAL EVERY 12 HOURS
Refills: 0 | Status: DISCONTINUED | OUTPATIENT
Start: 2025-04-29 | End: 2025-05-06

## 2025-04-29 RX ORDER — LABETALOL HYDROCHLORIDE 200 MG/1
10 TABLET, FILM COATED ORAL ONCE
Refills: 0 | Status: COMPLETED | OUTPATIENT
Start: 2025-04-29 | End: 2025-04-29

## 2025-04-29 RX ORDER — LINEZOLID 2 MG/ML
600 INJECTION, SOLUTION INTRAVENOUS EVERY 12 HOURS
Refills: 0 | Status: DISCONTINUED | OUTPATIENT
Start: 2025-04-29 | End: 2025-05-02

## 2025-04-29 RX ORDER — CARVEDILOL 3.12 MG/1
6.25 TABLET, FILM COATED ORAL EVERY 12 HOURS
Refills: 0 | Status: DISCONTINUED | OUTPATIENT
Start: 2025-04-29 | End: 2025-05-03

## 2025-04-29 RX ORDER — MYCOPHENOLATE MOFETIL 500 MG/1
500 TABLET, FILM COATED ORAL
Refills: 0 | Status: DISCONTINUED | OUTPATIENT
Start: 2025-04-29 | End: 2025-05-06

## 2025-04-29 RX ORDER — EPOETIN ALFA 10000 [IU]/ML
10000 SOLUTION INTRAVENOUS; SUBCUTANEOUS ONCE
Refills: 0 | Status: COMPLETED | OUTPATIENT
Start: 2025-04-29 | End: 2025-04-30

## 2025-04-29 RX ORDER — FUROSEMIDE 10 MG/ML
80 INJECTION INTRAMUSCULAR; INTRAVENOUS
Refills: 0 | Status: DISCONTINUED | OUTPATIENT
Start: 2025-04-29 | End: 2025-04-29

## 2025-04-29 RX ADMIN — Medication 60 MILLIGRAM(S): at 17:09

## 2025-04-29 RX ADMIN — MYCOPHENOLATE MOFETIL 500 MILLIGRAM(S): 500 TABLET, FILM COATED ORAL at 17:10

## 2025-04-29 RX ADMIN — HEPARIN SODIUM 5000 UNIT(S): 1000 INJECTION INTRAVENOUS; SUBCUTANEOUS at 17:10

## 2025-04-29 RX ADMIN — Medication 25 MICROGRAM(S): at 07:06

## 2025-04-29 RX ADMIN — Medication 30 MILLIGRAM(S): at 22:59

## 2025-04-29 RX ADMIN — Medication 40 MILLIEQUIVALENT(S): at 12:28

## 2025-04-29 RX ADMIN — Medication 200 MILLIGRAM(S): at 17:09

## 2025-04-29 RX ADMIN — Medication 20 MILLIGRAM(S): at 12:27

## 2025-04-29 RX ADMIN — Medication 500000 UNIT(S): at 01:57

## 2025-04-29 RX ADMIN — Medication 500000 UNIT(S): at 17:09

## 2025-04-29 RX ADMIN — INSULIN LISPRO 4 UNIT(S): 100 INJECTION, SOLUTION INTRAVENOUS; SUBCUTANEOUS at 17:10

## 2025-04-29 RX ADMIN — Medication 10 MILLIGRAM(S): at 05:18

## 2025-04-29 RX ADMIN — LABETALOL HYDROCHLORIDE 10 MILLIGRAM(S): 200 TABLET, FILM COATED ORAL at 03:43

## 2025-04-29 RX ADMIN — INSULIN LISPRO 4 UNIT(S): 100 INJECTION, SOLUTION INTRAVENOUS; SUBCUTANEOUS at 08:56

## 2025-04-29 RX ADMIN — LINEZOLID 600 MILLIGRAM(S): 2 INJECTION, SOLUTION INTRAVENOUS at 17:09

## 2025-04-29 RX ADMIN — Medication 81 MILLIGRAM(S): at 12:27

## 2025-04-29 RX ADMIN — CARVEDILOL 6.25 MILLIGRAM(S): 3.12 TABLET, FILM COATED ORAL at 17:09

## 2025-04-29 RX ADMIN — Medication 40 MILLIEQUIVALENT(S): at 08:12

## 2025-04-29 RX ADMIN — Medication 200 MILLIGRAM(S): at 05:19

## 2025-04-29 RX ADMIN — SODIUM CHLORIDE 75 MILLILITER(S): 9 INJECTION, SOLUTION INTRAVENOUS at 01:57

## 2025-04-29 RX ADMIN — INSULIN GLARGINE-YFGN 6 UNIT(S): 100 INJECTION, SOLUTION SUBCUTANEOUS at 22:54

## 2025-04-29 RX ADMIN — Medication 500000 UNIT(S): at 12:28

## 2025-04-29 RX ADMIN — HEPARIN SODIUM 5000 UNIT(S): 1000 INJECTION INTRAVENOUS; SUBCUTANEOUS at 05:19

## 2025-04-29 RX ADMIN — TAMSULOSIN HYDROCHLORIDE 0.4 MILLIGRAM(S): 0.4 CAPSULE ORAL at 22:53

## 2025-04-29 RX ADMIN — Medication 500000 UNIT(S): at 05:19

## 2025-04-29 RX ADMIN — TACROLIMUS 5 MILLIGRAM(S): 0.5 CAPSULE ORAL at 08:12

## 2025-04-29 RX ADMIN — ATORVASTATIN CALCIUM 40 MILLIGRAM(S): 80 TABLET, FILM COATED ORAL at 22:53

## 2025-04-29 RX ADMIN — INSULIN LISPRO 4 UNIT(S): 100 INJECTION, SOLUTION INTRAVENOUS; SUBCUTANEOUS at 14:27

## 2025-04-29 RX ADMIN — PREDNISONE 5 MILLIGRAM(S): 20 TABLET ORAL at 05:19

## 2025-04-29 RX ADMIN — ISOSORBIDE MONONITRATE 30 MILLIGRAM(S): 60 TABLET, EXTENDED RELEASE ORAL at 12:28

## 2025-04-29 RX ADMIN — Medication 60 MILLIGRAM(S): at 01:57

## 2025-04-29 NOTE — PROGRESS NOTE ADULT - ASSESSMENT
70 y/o M with PMHx of HLD, CAD s/p LAD PCI 7/24, HFpEF , HTN, hypothyroid, type 2 DM, h/o cva with no residual deficits, persistent right sided pleural effusion, and hx of hemorrhagic pericardial effusion (cytopathology negative), ESRD on HD MWF (recently converted from PD in 1/2025) via permacath placed on 1/2025, now s/p DDRT on 4/8/25, c/b HTN requiring 2 SICU admissions who presents to [facility] w/ cough+ hypotension      [] s/p DDRT, readmit hypotension / cough  - trend labs, VS  - Strict I's & O's, JPx2,   - renal Doppler w/ arvin RI's patent vasculature   - CT chest non contrast w/ stable pleural effusion  - gentle IVF 75cc/hr  - BCx+ MRSE in 1/2 bottles, TID following, on Linezolid 600 BID       [] IMMUNO   - Env by level,  BID, pred 5  - PPX nystatin, bactrim, valcyte, ASA, PPI     [] HTN  - Home: coreg 12.5 bid, hydral 25 BID, cardura 8mg BID, nifed 60 BID, imdur 60 QD  - Here: Coreg 6.25 BID, nifed 60 BID, imdur 30 qd, hydral 25 qd

## 2025-04-29 NOTE — PROVIDER CONTACT NOTE (CRITICAL VALUE NOTIFICATION) - TEST AND RESULT REPORTED:
blood culture collected from 4/28/2025 preliminary results: growth in aerobic bottle: gram positive cocci in cluster. Direct identification is available within approx 3-5 hrs by PCR

## 2025-04-29 NOTE — PROGRESS NOTE ADULT - SUBJECTIVE AND OBJECTIVE BOX
[facility] DIVISION OF KIDNEY DISEASES AND HYPERTENSION -- FOLLOW UP NOTE  --------------------------------------------------------------------------------  Chief Complaint: DDRT    24 hour events/subjective: Pt. seen and examined, resting in bed comfortably. No fever, chills, CP, SOB, or LE swelling. +Mild abdominal tenderness.    PAST HISTORY  --------------------------------------------------------------------------------  No significant changes to PMH, PSH, FHx, SHx, unless otherwise noted    ALLERGIES & MEDICATIONS  --------------------------------------------------------------------------------  Allergies    No Known Allergies    Intolerances    Standing Inpatient Medications  acyclovir   Oral Tab/Cap 200 milliGRAM(s) Oral two times a day  aspirin enteric coated 81 milliGRAM(s) Oral daily  atorvastatin 40 milliGRAM(s) Oral at bedtime  carvedilol 6.25 milliGRAM(s) Oral every 12 hours  dextrose 5%. 1000 milliLiter(s) IV Continuous <Continuous>  dextrose 5%. 1000 milliLiter(s) IV Continuous <Continuous>  dextrose 50% Injectable 25 Gram(s) IV Push once  dextrose 50% Injectable 12.5 Gram(s) IV Push once  dextrose 50% Injectable 25 Gram(s) IV Push once  dextrose Oral Gel 15 Gram(s) Oral once  epoetin heidi (PROCRIT) Injectable 04102 Unit(s) SubCutaneous once  famotidine    Tablet 20 milliGRAM(s) Oral daily  glucagon  Injectable 1 milliGRAM(s) IntraMuscular once  heparin   Injectable 5000 Unit(s) SubCutaneous every 12 hours  insulin glargine Injectable (LANTUS) 6 Unit(s) SubCutaneous at bedtime  insulin lispro Injectable (ADMELOG) 4 Unit(s) SubCutaneous three times a day before meals  isosorbide   mononitrate ER Tablet (IMDUR) 30 milliGRAM(s) Oral daily  levothyroxine 25 MICROGram(s) Oral daily  linezolid    Tablet 600 milliGRAM(s) Oral every 12 hours  multiple electrolytes Injection Type 1 1000 milliLiter(s) IV Continuous <Continuous>  mycophenolate mofetil 500 milliGRAM(s) Oral two times a day  NIFEdipine XL 60 milliGRAM(s) Oral every 12 hours  nystatin    Suspension 401947 Unit(s) Oral four times a day  predniSONE   Tablet 5 milliGRAM(s) Oral daily  tacrolimus ER Tablet (ENVARSUS XR) 5 milliGRAM(s) Oral <User Schedule>  tamsulosin 0.4 milliGRAM(s) Oral at bedtime  trimethoprim   80 mG/sulfamethoxazole 400 mG 1 Tablet(s) Oral <User Schedule>    PRN Inpatient Medications    REVIEW OF SYSTEMS  --------------------------------------------------------------------------------  Gen: No fevers/chills  Skin: No rash  Head/Eyes/Ears/Mouth: No headache  Respiratory: No dyspnea  CV: No chest pain  GI: + abdominal pain  : No dysuria  MSK: No edema  Neuro: No dizziness/lightheadedness  Heme: No easy bruising or bleeding  Endo: No heat/cold intolerance  Psych: No significant nervousness    All other systems were reviewed and are negative, except as noted.    VITALS/PHYSICAL EXAM  --------------------------------------------------------------------------------  T(C): 37 (04-29-25 @ 12:59), Max: 37 (04-29-25 @ 12:59)  HR: 63 (04-29-25 @ 12:59) (54 - 63)  BP: 149/67 (04-29-25 @ 12:59) (118/58 - 189/76)  RR: 18 (04-29-25 @ 12:59) (18 - 18)  SpO2: 100% (04-29-25 @ 12:59) (99% - 100%)  Wt(kg): --  Height (cm): 172.7 (04-28-25 @ 13:04)  Weight (kg): 56.7 (04-28-25 @ 13:04)  BMI (kg/m2): 19 (04-28-25 @ 13:04)  BSA (m2): 1.67 (04-28-25 @ 13:04)    04-28-25 @ 07:01  -  04-29-25 @ 07:00  --------------------------------------------------------  IN: 370 mL / OUT: 420 mL / NET: -50 mL    04-29-25 @ 07:01  -  04-29-25 @ 15:19  --------------------------------------------------------  IN: 0 mL / OUT: 620 mL / NET: -620 mL    Physical Exam:  Gen: Not in distress, well-appearing  HEENT: No scelral icterus, moist oral mucosa  Pulm: Lungs clear to auscultation bilaterally   CV: regular rate and rhythm, S1 and S2 normal, no murmur   Abd: Tenderness near transplant site, +COSMO drain x2  : No suprapubic tenderness  Back: No spinal or CVA tenderness  Extremities: No edema. Distal pulses 2+ bilaterally   Skin: Warm  Neuro: Alert and oriented to person, place and time. Normal speech.   Access: RIJ TDC without signs of infection    LABS/STUDIES  --------------------------------------------------------------------------------              7.4    5.79  >-----------<  342      [04-29-25 @ 06:48]              23.2     139  |  96  |  86  ----------------------------<  93      [04-29-25 @ 06:49]  3.1   |  23  |  4.63        Ca     8.7     [04-29-25 @ 06:49]      Mg     1.9     [04-29-25 @ 06:49]      Phos  4.9     [04-29-25 @ 06:49]    TPro  6.4  /  Alb  3.8  /  TBili  0.2  /  DBili  x   /  AST  13  /  ALT  11  /  AlkPhos  74  [04-29-25 @ 06:49]    PT/INR: PT 11.5 , INR 1.01       [04-28-25 @ 14:12]  PTT: 26.6       [04-28-25 @ 14:12]    Creatinine Trend:  SCr 4.63 [04-29 @ 06:49]  SCr 4.74 [04-28 @ 14:12]  SCr 3.72 [04-26 @ 19:18]  SCr 3.75 [04-23 @ 06:35]  SCr 6.10 [04-22 @ 07:12]    Tacrolimus (), Serum: 1.7 ng/mL (04-29 @ 06:46)  Tacrolimus (), Serum: 6.7 ng/mL (04-26 @ 19:18)  Tacrolimus (), Serum: 2.1 ng/mL (04-23 @ 06:35)    Iron 67, TIBC 210, %sat 32      [04-21-25 @ 07:08]  Ferritin 1496      [04-21-25 @ 07:08]  PTH -- (Ca 7.9)      [12-30-24 @ 06:50]   229  PTH -- (Ca 7.6)      [11-27-24 @ 04:23]   250  Vitamin D (25OH) 17.1      [11-25-24 @ 06:50]  TSH 2.48      [04-13-25 @ 07:24]  Lipid: chol 112, TG 61, HDL 46, LDL --      [12-31-24 @ 04:27]    HBsAb 46.0      [04-07-25 @ 21:39]  HBsAg Nonreact      [04-07-25 @ 21:39]  HBcAb Nonreact      [04-07-25 @ 21:39]  HCV 0.05, Nonreact      [04-07-25 @ 21:39]  HIV Nonreact      [04-07-25 @ 21:39] [facility] DIVISION OF KIDNEY DISEASES AND HYPERTENSION -- FOLLOW UP NOTE  --------------------------------------------------------------------------------  Chief Complaint: DDRT    24 hour events/subjective: Pt. seen and examined, resting in bed comfortably. No fever, chills, CP, SOB, or LE swelling. +Mild abdominal tenderness.    PAST HISTORY  --------------------------------------------------------------------------------  No significant changes to PMH, PSH, FHx, SHx, unless otherwise noted    ALLERGIES & MEDICATIONS  --------------------------------------------------------------------------------  Allergies    No Known Allergies    Intolerances    Standing Inpatient Medications  acyclovir   Oral Tab/Cap 200 milliGRAM(s) Oral two times a day  aspirin enteric coated 81 milliGRAM(s) Oral daily  atorvastatin 40 milliGRAM(s) Oral at bedtime  carvedilol 6.25 milliGRAM(s) Oral every 12 hours  dextrose 5%. 1000 milliLiter(s) IV Continuous <Continuous>  dextrose 5%. 1000 milliLiter(s) IV Continuous <Continuous>  dextrose 50% Injectable 25 Gram(s) IV Push once  dextrose 50% Injectable 12.5 Gram(s) IV Push once  dextrose 50% Injectable 25 Gram(s) IV Push once  dextrose Oral Gel 15 Gram(s) Oral once  epoetin heidi (PROCRIT) Injectable 07299 Unit(s) SubCutaneous once  famotidine    Tablet 20 milliGRAM(s) Oral daily  glucagon  Injectable 1 milliGRAM(s) IntraMuscular once  heparin   Injectable 5000 Unit(s) SubCutaneous every 12 hours  insulin glargine Injectable (LANTUS) 6 Unit(s) SubCutaneous at bedtime  insulin lispro Injectable (ADMELOG) 4 Unit(s) SubCutaneous three times a day before meals  isosorbide   mononitrate ER Tablet (IMDUR) 30 milliGRAM(s) Oral daily  levothyroxine 25 MICROGram(s) Oral daily  linezolid    Tablet 600 milliGRAM(s) Oral every 12 hours  multiple electrolytes Injection Type 1 1000 milliLiter(s) IV Continuous <Continuous>  mycophenolate mofetil 500 milliGRAM(s) Oral two times a day  NIFEdipine XL 60 milliGRAM(s) Oral every 12 hours  nystatin    Suspension 821564 Unit(s) Oral four times a day  predniSONE   Tablet 5 milliGRAM(s) Oral daily  tacrolimus ER Tablet (ENVARSUS XR) 5 milliGRAM(s) Oral <User Schedule>  tamsulosin 0.4 milliGRAM(s) Oral at bedtime  trimethoprim   80 mG/sulfamethoxazole 400 mG 1 Tablet(s) Oral <User Schedule>    PRN Inpatient Medications    REVIEW OF SYSTEMS  --------------------------------------------------------------------------------  Gen: No fevers/chills  Skin: No rash  Head/Eyes/Ears/Mouth: No headache  Respiratory: No dyspnea  CV: No chest pain  GI: + abdominal pain, post op   : No dysuria  MSK: No edema  Neuro: No dizziness/lightheadedness  Heme: No easy bruising or bleeding  Endo: No heat/cold intolerance  Psych: No significant nervousness    All other systems were reviewed and are negative, except as noted.    VITALS/PHYSICAL EXAM  --------------------------------------------------------------------------------  T(C): 37 (04-29-25 @ 12:59), Max: 37 (04-29-25 @ 12:59)  HR: 63 (04-29-25 @ 12:59) (54 - 63)  BP: 149/67 (04-29-25 @ 12:59) (118/58 - 189/76)  RR: 18 (04-29-25 @ 12:59) (18 - 18)  SpO2: 100% (04-29-25 @ 12:59) (99% - 100%)  Wt(kg): --  Height (cm): 172.7 (04-28-25 @ 13:04)  Weight (kg): 56.7 (04-28-25 @ 13:04)  BMI (kg/m2): 19 (04-28-25 @ 13:04)  BSA (m2): 1.67 (04-28-25 @ 13:04)    04-28-25 @ 07:01  -  04-29-25 @ 07:00  --------------------------------------------------------  IN: 370 mL / OUT: 420 mL / NET: -50 mL    04-29-25 @ 07:01  -  04-29-25 @ 15:19  --------------------------------------------------------  IN: 0 mL / OUT: 620 mL / NET: -620 mL    Physical Exam:  Gen: Not in distress, well-appearing  HEENT: No scelral icterus, moist oral mucosa  Pulm: Lungs clear to auscultation bilaterally   CV: regular rate and rhythm, S1 and S2 normal, no murmur   Abd: Tenderness near transplant site, +COSMO drain x2  : No suprapubic tenderness  Back: No spinal or CVA tenderness  Extremities: No edema. Distal pulses 2+ bilaterally   Skin: Warm  Neuro: Alert and oriented to person, place and time. Normal speech.   Access: RI TDC without signs of infection    LABS/STUDIES  --------------------------------------------------------------------------------              7.4    5.79  >-----------<  342      [04-29-25 @ 06:48]              23.2     139  |  96  |  86  ----------------------------<  93      [04-29-25 @ 06:49]  3.1   |  23  |  4.63        Ca     8.7     [04-29-25 @ 06:49]      Mg     1.9     [04-29-25 @ 06:49]      Phos  4.9     [04-29-25 @ 06:49]    TPro  6.4  /  Alb  3.8  /  TBili  0.2  /  DBili  x   /  AST  13  /  ALT  11  /  AlkPhos  74  [04-29-25 @ 06:49]    PT/INR: PT 11.5 , INR 1.01       [04-28-25 @ 14:12]  PTT: 26.6       [04-28-25 @ 14:12]    Creatinine Trend:  SCr 4.63 [04-29 @ 06:49]  SCr 4.74 [04-28 @ 14:12]  SCr 3.72 [04-26 @ 19:18]  SCr 3.75 [04-23 @ 06:35]  SCr 6.10 [04-22 @ 07:12]    Tacrolimus (), Serum: 1.7 ng/mL (04-29 @ 06:46)  Tacrolimus (), Serum: 6.7 ng/mL (04-26 @ 19:18)  Tacrolimus (), Serum: 2.1 ng/mL (04-23 @ 06:35)    Iron 67, TIBC 210, %sat 32      [04-21-25 @ 07:08]  Ferritin 1496      [04-21-25 @ 07:08]  PTH -- (Ca 7.9)      [12-30-24 @ 06:50]   229  PTH -- (Ca 7.6)      [11-27-24 @ 04:23]   250  Vitamin D (25OH) 17.1      [11-25-24 @ 06:50]  TSH 2.48      [04-13-25 @ 07:24]  Lipid: chol 112, TG 61, HDL 46, LDL --      [12-31-24 @ 04:27]    HBsAb 46.0      [04-07-25 @ 21:39]  HBsAg Nonreact      [04-07-25 @ 21:39]  HBcAb Nonreact      [04-07-25 @ 21:39]  HCV 0.05, Nonreact      [04-07-25 @ 21:39]  HIV Nonreact      [04-07-25 @ 21:39]

## 2025-04-29 NOTE — CONSULT NOTE ADULT - SUBJECTIVE AND OBJECTIVE BOX
[facility] [place] [facility]   MD MESSI ROSE MD SAKIL BHUIYAN, MD MARIA SANTIAGO, NP        TEL:  OFFICE: [phone]  From 5pm-7am answering service [phone]    --- INITIAL RENAL CONSULT NOTE ---date of service 04-29-25 @ 13:21    HPI:  68 y/o M with PMHx of HLD, CAD s/p LAD PCI 7/24, HFpEF , HTN, hypothyroid, type 2 DM, h/o cva with no residual deficits, persistent right sided pleural effusion, and hx of hemorrhagic pericardial effusion (cytopathology negative), ESRD on HD MWF (recently converted from PD in 1/2025) via permacath placed on 1/2025, now s/p DDRT on 4/8/25, c/b HTN requiring 2 SICU admissions. Patient presented to the ED urgently from outpatient. States he was having output from drain area and stitch was placed in the outpatient office. Patient afterward had Hypotension episode to 80s/50s and on ED admit has pain from drain site when drains are being manipulated. Denies any other sx aside from a dry cough.          Allergies:  No Known Allergies      PAST MEDICAL & SURGICAL HISTORY:  Hypertension      ESRD on peritoneal dialysis      CVA (cerebrovascular accident)  2010 without residual effects      Hyperlipidemia      BPH (benign prostatic hyperplasia)      CAD (coronary artery disease)      T2DM (type 2 diabetes mellitus)      Hypothyroidism      History of peritoneal dialysis  s/p PD catheter placement      S/P coronary artery stent placement          Home Medications Reviewed    Hospital Medications:   MEDICATIONS  (STANDING):  acyclovir   Oral Tab/Cap 200 milliGRAM(s) Oral two times a day  aspirin enteric coated 81 milliGRAM(s) Oral daily  atorvastatin 40 milliGRAM(s) Oral at bedtime  carvedilol 6.25 milliGRAM(s) Oral every 12 hours  dextrose 5%. 1000 milliLiter(s) (100 mL/Hr) IV Continuous <Continuous>  dextrose 5%. 1000 milliLiter(s) (50 mL/Hr) IV Continuous <Continuous>  dextrose 50% Injectable 25 Gram(s) IV Push once  dextrose 50% Injectable 12.5 Gram(s) IV Push once  dextrose 50% Injectable 25 Gram(s) IV Push once  dextrose Oral Gel 15 Gram(s) Oral once  epoetin heidi (PROCRIT) Injectable 34566 Unit(s) SubCutaneous once  famotidine    Tablet 20 milliGRAM(s) Oral daily  glucagon  Injectable 1 milliGRAM(s) IntraMuscular once  heparin   Injectable 5000 Unit(s) SubCutaneous every 12 hours  insulin glargine Injectable (LANTUS) 6 Unit(s) SubCutaneous at bedtime  insulin lispro Injectable (ADMELOG) 4 Unit(s) SubCutaneous three times a day before meals  isosorbide   mononitrate ER Tablet (IMDUR) 30 milliGRAM(s) Oral daily  levothyroxine 25 MICROGram(s) Oral daily  multiple electrolytes Injection Type 1 1000 milliLiter(s) (75 mL/Hr) IV Continuous <Continuous>  mycophenolate mofetil 500 milliGRAM(s) Oral two times a day  NIFEdipine XL 60 milliGRAM(s) Oral every 12 hours  nystatin    Suspension 074188 Unit(s) Oral four times a day  predniSONE   Tablet 5 milliGRAM(s) Oral daily  tacrolimus ER Tablet (ENVARSUS XR) 5 milliGRAM(s) Oral <User Schedule>  tamsulosin 0.4 milliGRAM(s) Oral at bedtime  trimethoprim   80 mG/sulfamethoxazole 400 mG 1 Tablet(s) Oral <User Schedule>      SOCIAL HISTORY:  Denies ETOh, Smoking,     FAMILY HISTORY:  FH: HTN (hypertension) (Mother, Father)    FH: type 2 diabetes (Mother, Father)        REVIEW OF SYSTEMS:  CONSTITUTIONAL: No weakness, fevers or chills  EYES/ENT: No visual changes;  No vertigo or throat pain   NECK: No pain or stiffness  RESPIRATORY: as per hpi  CARDIOVASCULAR: No chest pain or palpitations.  GASTROINTESTINAL: No abdominal or epigastric pain. No nausea, vomiting, or hematemesis; No diarrhea or constipation. No melena or hematochezia.  GENITOURINARY: No dysuria, frequency, foamy urine, urinary urgency, incontinence or hematuria  NEUROLOGICAL: No numbness or weakness  SKIN: No itching, burning, rashes, or lesions   VASCULAR: No bilateral lower extremity edema.   All other review of systems is negative unless indicated above.    VITALS:  T(F): 98.6 (04-29-25 @ 12:59), Max: 98.6 (04-29-25 @ 12:59)  HR: 63 (04-29-25 @ 12:59)  BP: 149/67 (04-29-25 @ 12:59)  RR: 18 (04-29-25 @ 12:59)  SpO2: 100% (04-29-25 @ 12:59)  Wt(kg): --    04-28 @ 07:01  -  04-29 @ 07:00  --------------------------------------------------------  IN: 370 mL / OUT: 420 mL / NET: -50 mL    04-29 @ 07:01  -  04-29 @ 13:21  --------------------------------------------------------  IN: 0 mL / OUT: 620 mL / NET: -620 mL          PHYSICAL EXAM:  General: NAD  HEENT: anicteric sclera, oropharynx clear, MMM  Neck: No JVD  Respiratory: CTAB, no wheezes, rales or rhonchi  Cardiovascular: S1, S2, RRR  Gastrointestinal: BS+, soft, NT/ND  Extremities: No cyanosis or clubbing. No peripheral edema  Neurological: A/O x 3, no focal deficits  Psychiatric: Normal mood, normal affect  : No CVA tenderness. No brand.   Skin: No rashes  Vascular Access: permacath    LABS:  04-29    139  |  96  |  86[H]  ----------------------------<  93  3.1[L]   |  23  |  4.63[H]    Ca    8.7      29 Apr 2025 06:49  Phos  4.9     04-29  Mg     1.9     04-29    TPro  6.4  /  Alb  3.8  /  TBili  0.2  /  DBili      /  AST  13  /  ALT  11  /  AlkPhos  74  04-29    Creatinine Trend: 4.63 <--, 4.74 <--, 3.72 <--, 3.75 <--                        7.4    5.79  )-----------( 342      ( 29 Apr 2025 06:48 )             23.2     Urine Studies:  Urinalysis Basic - ( 29 Apr 2025 06:49 )    Color:  / Appearance:  / SG:  / pH:   Gluc: 93 mg/dL / Ketone:   / Bili:  / Urobili:    Blood:  / Protein:  / Nitrite:    Leuk Esterase:  / RBC:  / WBC    Sq Epi:  / Non Sq Epi:  / Bacteria:           RADIOLOGY & ADDITIONAL STUDIES:

## 2025-04-29 NOTE — PROGRESS NOTE ADULT - ASSESSMENT
69 y.o. M w/ PMHx HTN, HLD, CAD s/p LAD PCI 7/24, HFpEF, HTN, hypothyroid, type 2 DM, ESRD on HD MWF s/p DDRT 4/8/25 who presents to [facility] for hypotension. Transplant nephrology consulted for DDRT and IS management.    1. S/p DDRT (4/8) with DGF  63 yo DCD kidney with KDPI 99%  - Scr 4.63, BUN 86 today  - Scr 3.72, BUN 53 on 4/26/25  - Still HD dependent, no urgent indication for HD  - HD consent obtained and placed in chart  - F/u sepsis w/u, f/u cultures  - Obtain BK and CMV serologies  - Monitor I & Os  - Hold lasix in setting of hypotension  - Give procrit 10K    2. IS  - Env 5 w/ with low dose belatacept, dosed on 4/10, 4/14, 4/23, next dose 5/8  - cellcept 500 BID, pred 5  - acyclovir (CMV -/-), valcyte, bactrim  - Tacro level 1.7 today, likely did not take medication prior  - Obtain FK level, dose Env per level  - Hold MMF prophylactically in case of infection  - Continue pred    3. HTN  - Restart BP medications, start with imdur 30mg, coreg 6.25mg BID    **Recommendations preliminary until attending attestation**  If you have any questions, please feel free to contact me  Hardeep Kowalski  Nephrology Fellow  Page [phone] / [employer] Teams (Preferred)  (After 4pm or on weekends please page the on-call fellow)

## 2025-04-29 NOTE — CONSULT NOTE ADULT - ASSESSMENT
70 y/o M with PMHx of HLD, CAD s/p LAD PCI 7/24, HFpEF , HTN, hypothyroid, type 2 DM, h/o cva with no residual deficits, persistent right sided pleural effusion, and hx of hemorrhagic pericardial effusion (cytopathology negative), ESRD on HD MWF (recently converted from PD in 1/2025) via permacath placed on 1/2025, now s/p DDRT on 4/8/25, c/b HTN requiring 2 SICU admissions. Patient presented to the ED urgently from outpatient. States he was having output from drain area and stitch was placed in the outpatient office. Patient afterward had Hypotension episode to 80s/50s and on ED admit has pain from drain site when drains are being manipulated. Denies any other sx aside from a dry cough.      A/P  s/p DDRT 4/2024 with DGF   Nephrologist is Dr. Menjivar  Was PD then converted to HD, has permacath  Still HD dependent  Scr 4.6 today   HD and IS as per transplant   On envarsus 5 mg qd w/ belatacept, next dose 5/8  On cellcept 500 mg bid and prednisone 5 mg daily   Monitor UO and bmp     HTN/Hypotension  BP fluctuating; suboptimal  BP meds was on hold  Now on nifedipine 60 mg bid, coreg 6.25 mg bid  Monitor bp     Hypokalemia   Supplemented with 40 meq KCl   Monitor K     Anemia  On epo   Transfuse prn to keep hgb >8  Monitor hgb

## 2025-04-29 NOTE — PROGRESS NOTE ADULT - SUBJECTIVE AND OBJECTIVE BOX
Transplant Surgery - Multidisciplinary Rounds  --------------------------------------------------------------      Present: Patient seen and examined with multidisciplinary Transplant team including Surgeon   Nephrologist: Marianne. fellow Dr. Duncan, LÁZARO Forbes,  Pharmacist,  and bedside RN during AM rounds.   Disciplines not in attendance will be notified of the plan.     HPI: 68 y/o M with PMHx of HLD, CAD s/p LAD PCI 7/24, HFpEF , HTN, hypothyroid, type 2 DM, h/o cva with no residual deficits, persistent right sided pleural effusion, and hx of hemorrhagic pericardial effusion (cytopathology negative), ESRD on HD MWF (recently converted from PD in 1/2025) via permacath placed on 1/2025, now s/p DDRT on 4/8/25, c/b HTN requiring 2 SICU admissions who presents to [facility] w/ cough+ hypotension    Interval Events:  - admitted for Hypotension  - CT chest w/ stable pleural effusion  - Doppler w/ high RI's mild hydro      Immunosupression:  Induction: Thymo  Maintenance: FK by level/ BID/SST  Ongoing monitoring for signs of rejection     Potential Discharge date: TBD  Education:  Medications  Plan of care:  See Below    MEDICATIONS  (STANDING):  acyclovir   Oral Tab/Cap 200 milliGRAM(s) Oral two times a day  aspirin enteric coated 81 milliGRAM(s) Oral daily  atorvastatin 40 milliGRAM(s) Oral at bedtime  carvedilol 6.25 milliGRAM(s) Oral every 12 hours  dextrose 5%. 1000 milliLiter(s) (100 mL/Hr) IV Continuous <Continuous>  dextrose 5%. 1000 milliLiter(s) (50 mL/Hr) IV Continuous <Continuous>  dextrose 50% Injectable 25 Gram(s) IV Push once  dextrose 50% Injectable 12.5 Gram(s) IV Push once  dextrose 50% Injectable 25 Gram(s) IV Push once  dextrose Oral Gel 15 Gram(s) Oral once  epoetin heidi (PROCRIT) Injectable 67203 Unit(s) SubCutaneous once  famotidine    Tablet 20 milliGRAM(s) Oral daily  glucagon  Injectable 1 milliGRAM(s) IntraMuscular once  heparin   Injectable 5000 Unit(s) SubCutaneous every 12 hours  hydrALAZINE 25 milliGRAM(s) Oral daily  insulin glargine Injectable (LANTUS) 6 Unit(s) SubCutaneous at bedtime  insulin lispro Injectable (ADMELOG) 4 Unit(s) SubCutaneous three times a day before meals  isosorbide   mononitrate ER Tablet (IMDUR) 30 milliGRAM(s) Oral daily  levothyroxine 25 MICROGram(s) Oral daily  linezolid    Tablet 600 milliGRAM(s) Oral every 12 hours  multiple electrolytes Injection Type 1 1000 milliLiter(s) (75 mL/Hr) IV Continuous <Continuous>  mycophenolate mofetil 500 milliGRAM(s) Oral two times a day  NIFEdipine XL 60 milliGRAM(s) Oral every 12 hours  nystatin    Suspension 718761 Unit(s) Oral four times a day  predniSONE   Tablet 5 milliGRAM(s) Oral daily  tacrolimus ER Tablet (ENVARSUS XR) 5 milliGRAM(s) Oral <User Schedule>  tamsulosin 0.4 milliGRAM(s) Oral at bedtime  trimethoprim   80 mG/sulfamethoxazole 400 mG 1 Tablet(s) Oral <User Schedule>    MEDICATIONS  (PRN):      PAST MEDICAL & SURGICAL HISTORY:  Hypertension      ESRD on peritoneal dialysis      CVA (cerebrovascular accident)  2010 without residual effects      Hyperlipidemia      BPH (benign prostatic hyperplasia)      CAD (coronary artery disease)      T2DM (type 2 diabetes mellitus)      Hypothyroidism      History of peritoneal dialysis  s/p PD catheter placement      S/P coronary artery stent placement          Vital Signs Last 24 Hrs  T(C): 36.7 (30 Apr 2025 04:59), Max: 37.1 (29 Apr 2025 17:03)  T(F): 98 (30 Apr 2025 04:59), Max: 98.8 (29 Apr 2025 17:03)  HR: 60 (30 Apr 2025 06:41) (55 - 63)  BP: 173/80 (30 Apr 2025 06:41) (138/70 - 182/75)  BP(mean): --  RR: 17 (30 Apr 2025 04:59) (17 - 18)  SpO2: 100% (30 Apr 2025 04:59) (99% - 100%)    Parameters below as of 30 Apr 2025 04:59  Patient On (Oxygen Delivery Method): room air        I&O's Summary    29 Apr 2025 07:01  -  30 Apr 2025 07:00  --------------------------------------------------------  IN: 300 mL / OUT: 1692 mL / NET: -1392 mL                              7.8    5.26  )-----------( 347      ( 30 Apr 2025 06:59 )             24.9     04-29    139  |  96  |  86[H]  ----------------------------<  93  3.1[L]   |  23  |  4.63[H]    Ca    8.7      29 Apr 2025 06:49  Phos  4.9     04-29  Mg     1.9     04-29    TPro  6.4  /  Alb  3.8  /  TBili  0.2  /  DBili  x   /  AST  13  /  ALT  11  /  AlkPhos  74  04-29    Tacrolimus (), Serum: 1.7 ng/mL (04-29 @ 06:46)            All other systems were reviewed and are negative, except as noted.      PHYSICAL EXAM:  Constitutional: Well developed / well nourished  Eyes: Anicteric, PERRLA  ENMT: nc/at  Neck: supple   Respiratory: CTA B/L  Cardiovascular: RRR  Gastrointestinal: Soft, non distended, nontender. JPx2 ss   Genitourinary: voiding spontaneously   Extremities: warm b/l upper and lower, no edema  Vascular: Palpable dp pulses bilaterally  Neurological: A&O x3  Skin: no rashes, ulcerations or lesions;  Musculoskeletal: Moving all extremities  Psychiatric: Responsive

## 2025-04-30 LAB
ALBUMIN SERPL ELPH-MCNC: 3.7 G/DL — SIGNIFICANT CHANGE UP (ref 3.3–5)
ALP SERPL-CCNC: 74 U/L — SIGNIFICANT CHANGE UP (ref 40–120)
ALT FLD-CCNC: 8 U/L — LOW (ref 10–45)
ANION GAP SERPL CALC-SCNC: 18 MMOL/L — HIGH (ref 5–17)
AST SERPL-CCNC: 12 U/L — SIGNIFICANT CHANGE UP (ref 10–40)
BASOPHILS # BLD AUTO: 0.06 K/UL — SIGNIFICANT CHANGE UP (ref 0–0.2)
BASOPHILS NFR BLD AUTO: 1.1 % — SIGNIFICANT CHANGE UP (ref 0–2)
BILIRUB SERPL-MCNC: 0.2 MG/DL — SIGNIFICANT CHANGE UP (ref 0.2–1.2)
BUN SERPL-MCNC: 80 MG/DL — HIGH (ref 7–23)
CALCIUM SERPL-MCNC: 9 MG/DL — SIGNIFICANT CHANGE UP (ref 8.4–10.5)
CHLORIDE SERPL-SCNC: 103 MMOL/L — SIGNIFICANT CHANGE UP (ref 96–108)
CO2 SERPL-SCNC: 21 MMOL/L — LOW (ref 22–31)
CREAT FLD-MCNC: 10.19 MG/DL — SIGNIFICANT CHANGE UP
CREAT FLD-MCNC: 7.73 MG/DL — SIGNIFICANT CHANGE UP
CREAT SERPL-MCNC: 4.41 MG/DL — HIGH (ref 0.5–1.3)
CULTURE RESULTS: ABNORMAL
CULTURE RESULTS: ABNORMAL
EGFR: 14 ML/MIN/1.73M2 — LOW
EGFR: 14 ML/MIN/1.73M2 — LOW
EOSINOPHIL # BLD AUTO: 0.34 K/UL — SIGNIFICANT CHANGE UP (ref 0–0.5)
EOSINOPHIL NFR BLD AUTO: 6.5 % — HIGH (ref 0–6)
GLUCOSE BLDC GLUCOMTR-MCNC: 127 MG/DL — HIGH (ref 70–99)
GLUCOSE BLDC GLUCOMTR-MCNC: 153 MG/DL — HIGH (ref 70–99)
GLUCOSE BLDC GLUCOMTR-MCNC: 166 MG/DL — HIGH (ref 70–99)
GLUCOSE BLDC GLUCOMTR-MCNC: 97 MG/DL — SIGNIFICANT CHANGE UP (ref 70–99)
GLUCOSE SERPL-MCNC: 96 MG/DL — SIGNIFICANT CHANGE UP (ref 70–99)
GRAM STN FLD: ABNORMAL
HCT VFR BLD CALC: 24.9 % — LOW (ref 39–50)
HGB BLD-MCNC: 7.8 G/DL — LOW (ref 13–17)
IMM GRANULOCYTES NFR BLD AUTO: 0.6 % — SIGNIFICANT CHANGE UP (ref 0–0.9)
LYMPHOCYTES # BLD AUTO: 0.46 K/UL — LOW (ref 1–3.3)
LYMPHOCYTES # BLD AUTO: 8.7 % — LOW (ref 13–44)
MAGNESIUM SERPL-MCNC: 1.8 MG/DL — SIGNIFICANT CHANGE UP (ref 1.6–2.6)
MCHC RBC-ENTMCNC: 28.9 PG — SIGNIFICANT CHANGE UP (ref 27–34)
MCHC RBC-ENTMCNC: 31.3 G/DL — LOW (ref 32–36)
MCV RBC AUTO: 92.2 FL — SIGNIFICANT CHANGE UP (ref 80–100)
MONOCYTES # BLD AUTO: 0.65 K/UL — SIGNIFICANT CHANGE UP (ref 0–0.9)
MONOCYTES NFR BLD AUTO: 12.4 % — SIGNIFICANT CHANGE UP (ref 2–14)
NEUTROPHILS # BLD AUTO: 3.72 K/UL — SIGNIFICANT CHANGE UP (ref 1.8–7.4)
NEUTROPHILS NFR BLD AUTO: 70.7 % — SIGNIFICANT CHANGE UP (ref 43–77)
NRBC BLD AUTO-RTO: 0 /100 WBCS — SIGNIFICANT CHANGE UP (ref 0–0)
ORGANISM # SPEC MICROSCOPIC CNT: ABNORMAL
PHOSPHATE SERPL-MCNC: 3.5 MG/DL — SIGNIFICANT CHANGE UP (ref 2.5–4.5)
PLATELET # BLD AUTO: 347 K/UL — SIGNIFICANT CHANGE UP (ref 150–400)
POTASSIUM SERPL-MCNC: 4 MMOL/L — SIGNIFICANT CHANGE UP (ref 3.5–5.3)
POTASSIUM SERPL-SCNC: 4 MMOL/L — SIGNIFICANT CHANGE UP (ref 3.5–5.3)
PROT SERPL-MCNC: 6.4 G/DL — SIGNIFICANT CHANGE UP (ref 6–8.3)
RBC # BLD: 2.7 M/UL — LOW (ref 4.2–5.8)
RBC # FLD: 20.8 % — HIGH (ref 10.3–14.5)
SODIUM SERPL-SCNC: 142 MMOL/L — SIGNIFICANT CHANGE UP (ref 135–145)
SPECIMEN SOURCE: SIGNIFICANT CHANGE UP
TACROLIMUS SERPL-MCNC: 6 NG/ML — SIGNIFICANT CHANGE UP
WBC # BLD: 5.26 K/UL — SIGNIFICANT CHANGE UP (ref 3.8–10.5)
WBC # FLD AUTO: 5.26 K/UL — SIGNIFICANT CHANGE UP (ref 3.8–10.5)

## 2025-04-30 PROCEDURE — G0545: CPT

## 2025-04-30 PROCEDURE — 99232 SBSQ HOSP IP/OBS MODERATE 35: CPT | Mod: 24

## 2025-04-30 PROCEDURE — 99223 1ST HOSP IP/OBS HIGH 75: CPT | Mod: GC

## 2025-04-30 PROCEDURE — 99232 SBSQ HOSP IP/OBS MODERATE 35: CPT | Mod: GC

## 2025-04-30 RX ORDER — INSULIN LISPRO 100 U/ML
INJECTION, SOLUTION INTRAVENOUS; SUBCUTANEOUS
Refills: 0 | Status: DISCONTINUED | OUTPATIENT
Start: 2025-04-30 | End: 2025-05-06

## 2025-04-30 RX ORDER — LIDOCAINE HCL/PF 10 MG/ML
5 VIAL (ML) INJECTION ONCE
Refills: 0 | Status: COMPLETED | OUTPATIENT
Start: 2025-04-30 | End: 2025-04-30

## 2025-04-30 RX ORDER — TRAMADOL HYDROCHLORIDE 50 MG/1
50 TABLET, FILM COATED ORAL EVERY 8 HOURS
Refills: 0 | Status: DISCONTINUED | OUTPATIENT
Start: 2025-04-30 | End: 2025-04-30

## 2025-04-30 RX ORDER — ACETAMINOPHEN 500 MG/5ML
1000 LIQUID (ML) ORAL ONCE
Refills: 0 | Status: COMPLETED | OUTPATIENT
Start: 2025-04-30 | End: 2025-04-30

## 2025-04-30 RX ORDER — INSULIN LISPRO 100 U/ML
INJECTION, SOLUTION INTRAVENOUS; SUBCUTANEOUS AT BEDTIME
Refills: 0 | Status: DISCONTINUED | OUTPATIENT
Start: 2025-04-30 | End: 2025-05-06

## 2025-04-30 RX ADMIN — CARVEDILOL 6.25 MILLIGRAM(S): 3.12 TABLET, FILM COATED ORAL at 09:07

## 2025-04-30 RX ADMIN — Medication 200 MILLIGRAM(S): at 05:10

## 2025-04-30 RX ADMIN — Medication 200 MILLIGRAM(S): at 17:29

## 2025-04-30 RX ADMIN — Medication 500000 UNIT(S): at 05:09

## 2025-04-30 RX ADMIN — INSULIN LISPRO 4 UNIT(S): 100 INJECTION, SOLUTION INTRAVENOUS; SUBCUTANEOUS at 12:20

## 2025-04-30 RX ADMIN — Medication 60 MILLIGRAM(S): at 17:30

## 2025-04-30 RX ADMIN — INSULIN LISPRO 4 UNIT(S): 100 INJECTION, SOLUTION INTRAVENOUS; SUBCUTANEOUS at 17:30

## 2025-04-30 RX ADMIN — Medication 20 MILLIGRAM(S): at 12:20

## 2025-04-30 RX ADMIN — LINEZOLID 600 MILLIGRAM(S): 2 INJECTION, SOLUTION INTRAVENOUS at 17:29

## 2025-04-30 RX ADMIN — Medication 500000 UNIT(S): at 23:07

## 2025-04-30 RX ADMIN — Medication 25 MILLIGRAM(S): at 03:32

## 2025-04-30 RX ADMIN — HEPARIN SODIUM 5000 UNIT(S): 1000 INJECTION INTRAVENOUS; SUBCUTANEOUS at 17:30

## 2025-04-30 RX ADMIN — Medication 400 MILLIGRAM(S): at 17:28

## 2025-04-30 RX ADMIN — Medication 500000 UNIT(S): at 17:29

## 2025-04-30 RX ADMIN — Medication 10 MILLIGRAM(S): at 01:29

## 2025-04-30 RX ADMIN — INSULIN LISPRO 2: 100 INJECTION, SOLUTION INTRAVENOUS; SUBCUTANEOUS at 09:08

## 2025-04-30 RX ADMIN — ISOSORBIDE MONONITRATE 30 MILLIGRAM(S): 60 TABLET, EXTENDED RELEASE ORAL at 12:20

## 2025-04-30 RX ADMIN — ATORVASTATIN CALCIUM 40 MILLIGRAM(S): 80 TABLET, FILM COATED ORAL at 20:14

## 2025-04-30 RX ADMIN — Medication 1000 MILLIGRAM(S): at 18:28

## 2025-04-30 RX ADMIN — CARVEDILOL 6.25 MILLIGRAM(S): 3.12 TABLET, FILM COATED ORAL at 17:29

## 2025-04-30 RX ADMIN — LINEZOLID 600 MILLIGRAM(S): 2 INJECTION, SOLUTION INTRAVENOUS at 05:10

## 2025-04-30 RX ADMIN — Medication 25 MILLIGRAM(S): at 17:29

## 2025-04-30 RX ADMIN — Medication 60 MILLIGRAM(S): at 05:15

## 2025-04-30 RX ADMIN — HEPARIN SODIUM 5000 UNIT(S): 1000 INJECTION INTRAVENOUS; SUBCUTANEOUS at 05:09

## 2025-04-30 RX ADMIN — PREDNISONE 5 MILLIGRAM(S): 20 TABLET ORAL at 05:10

## 2025-04-30 RX ADMIN — INSULIN LISPRO 4 UNIT(S): 100 INJECTION, SOLUTION INTRAVENOUS; SUBCUTANEOUS at 09:07

## 2025-04-30 RX ADMIN — Medication 25 MICROGRAM(S): at 05:09

## 2025-04-30 RX ADMIN — Medication 5 MILLILITER(S): at 21:39

## 2025-04-30 RX ADMIN — TACROLIMUS 5 MILLIGRAM(S): 0.5 CAPSULE ORAL at 09:07

## 2025-04-30 RX ADMIN — EPOETIN ALFA 10000 UNIT(S): 10000 SOLUTION INTRAVENOUS; SUBCUTANEOUS at 16:55

## 2025-04-30 RX ADMIN — TAMSULOSIN HYDROCHLORIDE 0.4 MILLIGRAM(S): 0.4 CAPSULE ORAL at 20:13

## 2025-04-30 RX ADMIN — INSULIN GLARGINE-YFGN 6 UNIT(S): 100 INJECTION, SOLUTION SUBCUTANEOUS at 20:14

## 2025-04-30 RX ADMIN — Medication 81 MILLIGRAM(S): at 12:20

## 2025-04-30 RX ADMIN — Medication 500000 UNIT(S): at 00:07

## 2025-04-30 RX ADMIN — MYCOPHENOLATE MOFETIL 500 MILLIGRAM(S): 500 TABLET, FILM COATED ORAL at 05:10

## 2025-04-30 RX ADMIN — MYCOPHENOLATE MOFETIL 500 MILLIGRAM(S): 500 TABLET, FILM COATED ORAL at 17:29

## 2025-04-30 RX ADMIN — Medication 1 TABLET(S): at 09:07

## 2025-04-30 RX ADMIN — Medication 500000 UNIT(S): at 12:20

## 2025-04-30 NOTE — PROGRESS NOTE ADULT - ASSESSMENT
70 y/o M with PMHx of HLD, CAD s/p LAD PCI 7/24, HFpEF , HTN, hypothyroid, type 2 DM, h/o cva with no residual deficits, persistent right sided pleural effusion, and hx of hemorrhagic pericardial effusion (cytopathology negative), ESRD on HD MWF (recently converted from PD in 1/2025) via permacath placed on 1/2025, now s/p DDRT on 4/8/25, c/b HTN requiring 2 SICU admissions. Patient presented to the ED urgently from outpatient. States he was having output from drain area and stitch was placed in the outpatient office. Patient afterward had Hypotension episode to 80s/50s and on ED admit has pain from drain site when drains are being manipulated. Denies any other sx aside from a dry cough.      A/P  s/p DDRT 4/2024 with F   Nephrologist is Dr. Menjivar  Was PD then converted to HD, has permacath  Was on HD prior to admission now off HD  SCR downtrending off HD  HD and IS as per transplant   On envarsus 5 mg qd w/ belatacept, next dose 5/8  On cellcept 500 mg bid and prednisone 5 mg daily   Monitor UO and bmp     HTN/Hypotension  BP fluctuating; suboptimal  BP meds was on hold  Now on nifedipine 60 mg bid, coreg 6.25 mg bid  Monitor bp     Hypokalemia   Supplemented with 40 meq KCl   Monitor K     Anemia  On epo   Transfuse prn to keep hgb >8  Monitor hgb

## 2025-04-30 NOTE — PROGRESS NOTE ADULT - ASSESSMENT
68 y/o M with PMHx of HLD, CAD s/p LAD PCI 7/24, HFpEF , HTN, hypothyroid, type 2 DM, h/o cva with no residual deficits, persistent right sided pleural effusion, and hx of hemorrhagic pericardial effusion (cytopathology negative), ESRD on HD MWF (recently converted from PD in 1/2025) via permacath placed on 1/2025, now s/p DDRT on 4/8/25, c/b HTN requiring 2 SICU admissions who presents to [facility] w/ cough+ hypotension      [] s/p DDRT, readmit hypotension / cough  - trend labs, VS  - Strict I's & O's, JPx2,   - renal Doppler w/ arvin RI's patent vasculature   - CT chest non contrast w/ stable pleural effusion  - gentle IVF 75cc/hr  - BCx+ MRSE in 1/2 bottles, TID following, on Linezolid 600 BID   - COSMO Cr 4/30 JP1 (7.7), COSMO 2 (10), brand replaced 2/2 possible urine leak      [] IMMUNO   - Env by level,  BID, pred 5  - PPX nystatin, bactrim, valcyte, ASA, PPI     [] HTN  - Home: coreg 12.5 bid, hydral 25 BID, cardura 8mg BID, nifed 60 BID, imdur 60 QD  - Here: Coreg 6.25 BID, nifed 60 BID, imdur 30 qd, hydral 25 BID

## 2025-04-30 NOTE — PROGRESS NOTE ADULT - SUBJECTIVE AND OBJECTIVE BOX
Transplant Surgery - Multidisciplinary Rounds  --------------------------------------------------------------      Present: Patient seen and examined with multidisciplinary Transplant team including Surgeon Dr. Little  Nephrologist: Marianne. fellow Dr. Duncan, LÁZARO Forbes,  Pharmacist,  and bedside RN during AM rounds.   Disciplines not in attendance will be notified of the plan.     HPI: 70 y/o M with PMHx of HLD, CAD s/p LAD PCI 7/24, HFpEF , HTN, hypothyroid, type 2 DM, h/o cva with no residual deficits, persistent right sided pleural effusion, and hx of hemorrhagic pericardial effusion (cytopathology negative), ESRD on HD MWF (recently converted from PD in 1/2025) via permacath placed on 1/2025, now s/p DDRT on 4/8/25, c/b HTN requiring 2 SICU admissions who presents to [facility] w/ cough+ hypotension    Interval Events:  - afebrile, hypertensive, inc hydral dosing  - procrit x1  - MRSE+ in bcx, started linezolid per TID       Immunosupression:  Induction: Thymo  Maintenance: FK by level/ BID/SST  Ongoing monitoring for signs of rejection     Potential Discharge date: TBD  Education:  Medications  Plan of care:  See Below      MEDICATIONS  (STANDING):  acyclovir   Oral Tab/Cap 200 milliGRAM(s) Oral two times a day  aspirin enteric coated 81 milliGRAM(s) Oral daily  atorvastatin 40 milliGRAM(s) Oral at bedtime  carvedilol 6.25 milliGRAM(s) Oral every 12 hours  dextrose 5%. 1000 milliLiter(s) (100 mL/Hr) IV Continuous <Continuous>  dextrose 5%. 1000 milliLiter(s) (50 mL/Hr) IV Continuous <Continuous>  dextrose 50% Injectable 25 Gram(s) IV Push once  dextrose 50% Injectable 12.5 Gram(s) IV Push once  dextrose 50% Injectable 25 Gram(s) IV Push once  dextrose Oral Gel 15 Gram(s) Oral once  epoetin heidi (PROCRIT) Injectable 96173 Unit(s) SubCutaneous once  famotidine    Tablet 20 milliGRAM(s) Oral daily  glucagon  Injectable 1 milliGRAM(s) IntraMuscular once  heparin   Injectable 5000 Unit(s) SubCutaneous every 12 hours  hydrALAZINE 25 milliGRAM(s) Oral two times a day  insulin glargine Injectable (LANTUS) 6 Unit(s) SubCutaneous at bedtime  insulin lispro (ADMELOG) corrective regimen sliding scale   SubCutaneous three times a day before meals  insulin lispro (ADMELOG) corrective regimen sliding scale   SubCutaneous at bedtime  insulin lispro Injectable (ADMELOG) 4 Unit(s) SubCutaneous three times a day before meals  isosorbide   mononitrate ER Tablet (IMDUR) 30 milliGRAM(s) Oral daily  levothyroxine 25 MICROGram(s) Oral daily  linezolid    Tablet 600 milliGRAM(s) Oral every 12 hours  multiple electrolytes Injection Type 1 1000 milliLiter(s) (75 mL/Hr) IV Continuous <Continuous>  mycophenolate mofetil 500 milliGRAM(s) Oral two times a day  NIFEdipine XL 60 milliGRAM(s) Oral every 12 hours  nystatin    Suspension 727202 Unit(s) Oral four times a day  predniSONE   Tablet 5 milliGRAM(s) Oral daily  tamsulosin 0.4 milliGRAM(s) Oral at bedtime  trimethoprim   80 mG/sulfamethoxazole 400 mG 1 Tablet(s) Oral <User Schedule>    MEDICATIONS  (PRN):      PAST MEDICAL & SURGICAL HISTORY:  Hypertension      ESRD on peritoneal dialysis      CVA (cerebrovascular accident)  2010 without residual effects      Hyperlipidemia      BPH (benign prostatic hyperplasia)      CAD (coronary artery disease)      T2DM (type 2 diabetes mellitus)      Hypothyroidism      History of peritoneal dialysis  s/p PD catheter placement      S/P coronary artery stent placement          Vital Signs Last 24 Hrs  T(C): 36.6 (30 Apr 2025 13:10), Max: 37.1 (29 Apr 2025 17:03)  T(F): 97.8 (30 Apr 2025 13:10), Max: 98.8 (29 Apr 2025 17:03)  HR: 53 (30 Apr 2025 13:10) (53 - 60)  BP: 166/66 (30 Apr 2025 13:10) (148/66 - 182/75)  BP(mean): --  RR: 18 (30 Apr 2025 13:10) (17 - 18)  SpO2: 100% (30 Apr 2025 13:10) (100% - 100%)    Parameters below as of 30 Apr 2025 13:10  Patient On (Oxygen Delivery Method): room air        I&O's Summary    29 Apr 2025 07:01  -  30 Apr 2025 07:00  --------------------------------------------------------  IN: 300 mL / OUT: 1692 mL / NET: -1392 mL    30 Apr 2025 07:01  -  30 Apr 2025 13:58  --------------------------------------------------------  IN: 0 mL / OUT: 271 mL / NET: -271 mL                              7.8    5.26  )-----------( 347      ( 30 Apr 2025 06:59 )             24.9     04-30    142  |  103  |  80[H]  ----------------------------<  96  4.0   |  21[L]  |  4.41[H]    Ca    9.0      30 Apr 2025 06:58  Phos  3.5     04-30  Mg     1.8     04-30    TPro  6.4  /  Alb  3.7  /  TBili  0.2  /  DBili  x   /  AST  12  /  ALT  8[L]  /  AlkPhos  74  04-30    Tacrolimus (), Serum: 6.0 ng/mL (04-30 @ 06:59)        Urinalysis with Rflx Culture (collected 04-28-25 @ 15:34)    Culture - Blood (collected 04-28-25 @ 13:40)  Source: Blood Blood-Peripheral  Gram Stain (04-29-25 @ 13:36):    Growth in aerobic bottle: Gram Positive Cocci in Clusters  Preliminary Report (04-29-25 @ 13:36):    Growth in aerobic bottle: Gram Positive Cocci in Clusters    Direct identification is available within approximately 3-5    hours either by Blood Panel Multiplexed PCR or Direct    MALDI-TOF. Details: [email or web address]  Organism: Blood Culture PCR (04-29-25 @ 15:05)  Organism: Blood Culture PCR (04-29-25 @ 15:05)    Culture - Blood (collected 04-28-25 @ 13:30)  Source: Blood Blood-Peripheral  Preliminary Report (04-29-25 @ 18:01):    No growth at 24 hours    Culture - Urine (collected 04-26-25 @ 22:39)  Source: Clean Catch Clean Catch (Midstream)  Final Report (04-27-25 @ 23:45):    <10,000 CFU/mL Normal Urogenital Vickie                  All other systems were reviewed and are negative, except as noted.      PHYSICAL EXAM:  Constitutional: Well developed / well nourished  Eyes: Anicteric, PERRLA  ENMT: nc/at  Neck: supple   Respiratory: CTA B/L  Cardiovascular: RRR  Gastrointestinal: Soft, non distended, nontender. JPx2 ss   Genitourinary: voiding spontaneously   Extremities: warm b/l upper and lower, no edema  Vascular: Palpable dp pulses bilaterally  Neurological: A&O x3  Skin: no rashes, ulcerations or lesions;  Musculoskeletal: Moving all extremities  Psychiatric: Responsive

## 2025-04-30 NOTE — PHYSICAL THERAPY INITIAL EVALUATION ADULT - ADDITIONAL COMMENTS
Patient lives with family in a private house with 3STE w/B/L handrails and first floor set up. PTA, pt. was independent in ambulation using SC, also owns RW but seldom uses it. Pt. has walk-in shower

## 2025-04-30 NOTE — CONSULT NOTE ADULT - NS ATTEND AMEND GEN_ALL_CORE FT
Patient discussed in details, plan  as above
Patient seen and examined  Case discussed with MARGUERITE Ortez    I agree with her history exam and plans as noted above.  Patient s/p renal tx, denies fevers, but noted episode of hypotension and chills  Blood cultures sent with 1 set growing staph epidermidis MRSE  repeat blood cultures sent but drawn after starting antibotics  Full RVP sent and negative  of note, pt has a hemodialysis catheter in right chest- area appears CDI  Also with RLQ kidney site without erythema    follow up culture results  image abdomen and pelvis  continue linezolid  consider removing HD catheter    Carlos Nuñez MD  Can be called via Teams  After 5pm/weekends [phone]

## 2025-04-30 NOTE — PROGRESS NOTE ADULT - ASSESSMENT
69 y.o. M w/ PMHx HTN, HLD, CAD s/p LAD PCI 7/24, HFpEF, HTN, hypothyroid, type 2 DM, ESRD on HD MWF s/p DDRT 4/8/25 who presents to [facility] for hypotension. Transplant nephrology consulted for DDRT and IS management.    1. 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 IS  - Env 5 w/ with low dose belatacept, dosed on 4/10, 4/14, 4/23, next dose 5/8  - cellcept 500 BID, pred 5  - acyclovir (CMV -/-), valcyte, bactrim  - Tacro level 6.0 today  - Hold Tacro today  - Continue pred    3. HTN  - Restart BP medications, start with imdur 30mg, coreg 6.25mg BID, add hydralazine 25mg BID    **Recommendations preliminary until attending attestation**  If you have any questions, please feel free to contact me  Hardeep Kowalski  Nephrology Fellow  Page [phone] / [employer] Teams (Preferred)  (After 4pm or on weekends please page the on-call fellow)

## 2025-04-30 NOTE — PHYSICAL THERAPY INITIAL EVALUATION ADULT - PERTINENT HX OF CURRENT PROBLEM, REHAB EVAL
68 y/o M with PMHx of HLD, CAD s/p LAD PCI 7/24, HFpEF , HTN, hypothyroid, type 2 DM, h/o cva with no residual deficits, persistent right sided pleural effusion, and hx of hemorrhagic pericardial effusion (cytopathology negative), ESRD on HD MWF (recently converted from PD in 1/2025) via PermCath placed on 1/2025, now s/p DDRT on 4/8/25, c/b HTN requiring 2 SICU admissions.

## 2025-04-30 NOTE — PHYSICAL THERAPY INITIAL EVALUATION ADULT - GAIT DEVIATIONS NOTED, PT EVAL
decreased aisha/decreased velocity of limb motion/decreased step length/decreased weight-shifting ability

## 2025-04-30 NOTE — PROGRESS NOTE ADULT - SUBJECTIVE AND OBJECTIVE BOX
[facility] DIVISION OF KIDNEY DISEASES AND HYPERTENSION -- FOLLOW UP NOTE  --------------------------------------------------------------------------------  Chief Complaint: DDRT    24 hour events/subjective: Pt. seen and examined, resting in bed. Still with mild abdominal tenderness. No fever, chills, CP, SOB, or LE swelling.    PAST HISTORY  --------------------------------------------------------------------------------  No significant changes to PMH, PSH, FHx, SHx, unless otherwise noted    ALLERGIES & MEDICATIONS  --------------------------------------------------------------------------------  Allergies    No Known Allergies    Intolerances    Standing Inpatient Medications  acyclovir   Oral Tab/Cap 200 milliGRAM(s) Oral two times a day  aspirin enteric coated 81 milliGRAM(s) Oral daily  atorvastatin 40 milliGRAM(s) Oral at bedtime  carvedilol 6.25 milliGRAM(s) Oral every 12 hours  dextrose 5%. 1000 milliLiter(s) IV Continuous <Continuous>  dextrose 5%. 1000 milliLiter(s) IV Continuous <Continuous>  dextrose 50% Injectable 25 Gram(s) IV Push once  dextrose 50% Injectable 12.5 Gram(s) IV Push once  dextrose 50% Injectable 25 Gram(s) IV Push once  dextrose Oral Gel 15 Gram(s) Oral once  epoetin heidi (PROCRIT) Injectable 24311 Unit(s) SubCutaneous once  famotidine    Tablet 20 milliGRAM(s) Oral daily  glucagon  Injectable 1 milliGRAM(s) IntraMuscular once  heparin   Injectable 5000 Unit(s) SubCutaneous every 12 hours  hydrALAZINE 25 milliGRAM(s) Oral two times a day  insulin glargine Injectable (LANTUS) 6 Unit(s) SubCutaneous at bedtime  insulin lispro (ADMELOG) corrective regimen sliding scale   SubCutaneous three times a day before meals  insulin lispro (ADMELOG) corrective regimen sliding scale   SubCutaneous at bedtime  insulin lispro Injectable (ADMELOG) 4 Unit(s) SubCutaneous three times a day before meals  isosorbide   mononitrate ER Tablet (IMDUR) 30 milliGRAM(s) Oral daily  levothyroxine 25 MICROGram(s) Oral daily  linezolid    Tablet 600 milliGRAM(s) Oral every 12 hours  multiple electrolytes Injection Type 1 1000 milliLiter(s) IV Continuous <Continuous>  mycophenolate mofetil 500 milliGRAM(s) Oral two times a day  NIFEdipine XL 60 milliGRAM(s) Oral every 12 hours  nystatin    Suspension 004594 Unit(s) Oral four times a day  predniSONE   Tablet 5 milliGRAM(s) Oral daily  tamsulosin 0.4 milliGRAM(s) Oral at bedtime  trimethoprim   80 mG/sulfamethoxazole 400 mG 1 Tablet(s) Oral <User Schedule>    PRN Inpatient Medications    REVIEW OF SYSTEMS  --------------------------------------------------------------------------------  Gen: No fevers/chills  Skin: No rash  Head/Eyes/Ears/Mouth: No headache  Respiratory: No dyspnea  CV: No chest pain  GI: + abdominal pain, post op   : No dysuria  MSK: No edema  Neuro: No dizziness/lightheadedness  Heme: No easy bruising or bleeding  Endo: No heat/cold intolerance  Psych: No significant nervousness    All other systems were reviewed and are negative, except as noted.    VITALS/PHYSICAL EXAM  --------------------------------------------------------------------------------  T(C): 36.6 (04-30-25 @ 13:10), Max: 37.1 (04-29-25 @ 17:03)  HR: 53 (04-30-25 @ 13:10) (53 - 60)  BP: 166/66 (04-30-25 @ 13:10) (148/66 - 182/75)  RR: 18 (04-30-25 @ 13:10) (17 - 18)  SpO2: 100% (04-30-25 @ 13:10) (100% - 100%)  Wt(kg): --    04-29-25 @ 07:01  -  04-30-25 @ 07:00  --------------------------------------------------------  IN: 300 mL / OUT: 1692 mL / NET: -1392 mL    04-30-25 @ 07:01  -  04-30-25 @ 14:14  --------------------------------------------------------  IN: 0 mL / OUT: 271 mL / NET: -271 mL    Physical Exam:  Gen: Not in distress, well-appearing  HEENT: No scelral icterus, moist oral mucosa  Pulm: Lungs clear to auscultation bilaterally   CV: regular rate and rhythm, S1 and S2 normal, no murmur   Abd: Tenderness near transplant site, +COSMO drain x2  : No suprapubic tenderness  Back: No spinal or CVA tenderness  Extremities: No edema. Distal pulses 2+ bilaterally   Skin: Warm  Neuro: Alert and oriented to person, place and time. Normal speech.   Access: [facility] TDC without signs of infection    LABS/STUDIES  --------------------------------------------------------------------------------              7.8    5.26  >-----------<  347      [04-30-25 @ 06:59]              24.9     142  |  103  |  80  ----------------------------<  96      [04-30-25 @ 06:58]  4.0   |  21  |  4.41        Ca     9.0     [04-30-25 @ 06:58]      Mg     1.8     [04-30-25 @ 06:58]      Phos  3.5     [04-30-25 @ 06:58]    TPro  6.4  /  Alb  3.7  /  TBili  0.2  /  DBili  x   /  AST  12  /  ALT  8   /  AlkPhos  74  [04-30-25 @ 06:58]    Creatinine Trend:  SCr 4.41 [04-30 @ 06:58]  SCr 4.63 [04-29 @ 06:49]  SCr 4.74 [04-28 @ 14:12]  SCr 3.72 [04-26 @ 19:18]  SCr 3.75 [04-23 @ 06:35]    Tacrolimus (), Serum: 6.0 ng/mL (04-30 @ 06:59)  Tacrolimus (), Serum: 1.7 ng/mL (04-29 @ 06:46)  Tacrolimus (), Serum: 6.7 ng/mL (04-26 @ 19:18)    Iron 67, TIBC 210, %sat 32      [04-21-25 @ 07:08]  Ferritin 1496      [04-21-25 @ 07:08]  PTH -- (Ca 7.9)      [12-30-24 @ 06:50]   229  PTH -- (Ca 7.6)      [11-27-24 @ 04:23]   250  Vitamin D (25OH) 17.1      [11-25-24 @ 06:50]  TSH 2.48      [04-13-25 @ 07:24]  Lipid: chol 112, TG 61, HDL 46, LDL --      [12-31-24 @ 04:27]    HBsAb 46.0      [04-07-25 @ 21:39]  HBsAg Nonreact      [04-07-25 @ 21:39]  HBcAb Nonreact      [04-07-25 @ 21:39]  HCV 0.05, Nonreact      [04-07-25 @ 21:39]  HIV Nonreact      [04-07-25 @ 21:39]

## 2025-04-30 NOTE — PROGRESS NOTE ADULT - SUBJECTIVE AND OBJECTIVE BOX
[facility]    Dr. Tiara Garcia     Office [phone] (9 am to 5 pm)  Service: [phone] (5pm to 9am)  Also Available on TEAMS      RENAL PROGRESS NOTE: DATE OF SERVICE 04-30-25 @ 12:56    Patient is a 69y old  Male who presents with a chief complaint of s/p DDRT, readmit w/ hypotension & c/f sepsis (30 Apr 2025 12:49)      Patient seen and examined at bedside. No chest pain/sob    VITALS:  T(F): 97.8 (04-30-25 @ 09:32), Max: 98.8 (04-29-25 @ 17:03)  HR: 55 (04-30-25 @ 09:32)  BP: 163/82 (04-30-25 @ 09:32)  RR: 18 (04-30-25 @ 09:32)  SpO2: 100% (04-30-25 @ 09:32)  Wt(kg): --    04-29 @ 07:01  -  04-30 @ 07:00  --------------------------------------------------------  IN: 300 mL / OUT: 1692 mL / NET: -1392 mL    04-30 @ 07:01  -  04-30 @ 12:56  --------------------------------------------------------  IN: 0 mL / OUT: 271 mL / NET: -271 mL          PHYSICAL EXAM:  Constitutional: NAD  Neck: No JVD  Respiratory: CTAB, no wheezes, rales or rhonchi  Cardiovascular: S1, S2, RRR  Gastrointestinal: BS+, soft, NT/ND  Extremities: No peripheral edema    Hospital Medications:   MEDICATIONS  (STANDING):  acyclovir   Oral Tab/Cap 200 milliGRAM(s) Oral two times a day  aspirin enteric coated 81 milliGRAM(s) Oral daily  atorvastatin 40 milliGRAM(s) Oral at bedtime  carvedilol 6.25 milliGRAM(s) Oral every 12 hours  dextrose 5%. 1000 milliLiter(s) (100 mL/Hr) IV Continuous <Continuous>  dextrose 5%. 1000 milliLiter(s) (50 mL/Hr) IV Continuous <Continuous>  dextrose 50% Injectable 25 Gram(s) IV Push once  dextrose 50% Injectable 12.5 Gram(s) IV Push once  dextrose 50% Injectable 25 Gram(s) IV Push once  dextrose Oral Gel 15 Gram(s) Oral once  epoetin heidi (PROCRIT) Injectable 35168 Unit(s) SubCutaneous once  famotidine    Tablet 20 milliGRAM(s) Oral daily  glucagon  Injectable 1 milliGRAM(s) IntraMuscular once  heparin   Injectable 5000 Unit(s) SubCutaneous every 12 hours  hydrALAZINE 25 milliGRAM(s) Oral two times a day  insulin glargine Injectable (LANTUS) 6 Unit(s) SubCutaneous at bedtime  insulin lispro (ADMELOG) corrective regimen sliding scale   SubCutaneous three times a day before meals  insulin lispro (ADMELOG) corrective regimen sliding scale   SubCutaneous at bedtime  insulin lispro Injectable (ADMELOG) 4 Unit(s) SubCutaneous three times a day before meals  isosorbide   mononitrate ER Tablet (IMDUR) 30 milliGRAM(s) Oral daily  levothyroxine 25 MICROGram(s) Oral daily  linezolid    Tablet 600 milliGRAM(s) Oral every 12 hours  multiple electrolytes Injection Type 1 1000 milliLiter(s) (75 mL/Hr) IV Continuous <Continuous>  mycophenolate mofetil 500 milliGRAM(s) Oral two times a day  NIFEdipine XL 60 milliGRAM(s) Oral every 12 hours  nystatin    Suspension 613406 Unit(s) Oral four times a day  predniSONE   Tablet 5 milliGRAM(s) Oral daily  tamsulosin 0.4 milliGRAM(s) Oral at bedtime  trimethoprim   80 mG/sulfamethoxazole 400 mG 1 Tablet(s) Oral <User Schedule>    Tacrolimus (), Serum: 6.0 ng/mL (04-30 @ 06:59)    LABS:  04-30    142  |  103  |  80[H]  ----------------------------<  96  4.0   |  21[L]  |  4.41[H]    Ca    9.0      30 Apr 2025 06:58  Phos  3.5     04-30  Mg     1.8     04-30    TPro  6.4  /  Alb  3.7  /  TBili  0.2  /  DBili      /  AST  12  /  ALT  8[L]  /  AlkPhos  74  04-30    Creatinine Trend: 4.41 <--, 4.63 <--, 4.74 <--, 3.72 <--    Albumin: 3.7 g/dL (04-30 @ 06:58)  Phosphorus: 3.5 mg/dL (04-30 @ 06:58)                              7.8    5.26  )-----------( 347      ( 30 Apr 2025 06:59 )             24.9     Urine Studies:  Urinalysis - [04-30-25 @ 06:58]      Color  / Appearance  / SG  / pH       Gluc 96 / Ketone   / Bili  / Urobili        Blood  / Protein  / Leuk Est  / Nitrite       RBC  / WBC  / Hyaline  / Gran  / Sq Epi  / Non Sq Epi  / Bacteria       Iron 67, TIBC 210, %sat 32      [04-21-25 @ 07:08]  Ferritin 1496      [04-21-25 @ 07:08]  PTH -- (Ca 7.9)      [12-30-24 @ 06:50]   229  PTH -- (Ca 7.6)      [11-27-24 @ 04:23]   250  Vitamin D (25OH) 17.1      [11-25-24 @ 06:50]  TSH 2.48      [04-13-25 @ 07:24]  Lipid: chol 112, TG 61, HDL 46, LDL --      [12-31-24 @ 04:27]    HBsAb 46.0      [04-07-25 @ 21:39]  HBsAg Nonreact      [04-07-25 @ 21:39]  HBcAb Nonreact      [04-07-25 @ 21:39]  HCV 0.05, Nonreact      [04-07-25 @ 21:39]  HIV Nonreact      [04-07-25 @ 21:39]      RADIOLOGY & ADDITIONAL STUDIES:

## 2025-04-30 NOTE — CONSULT NOTE ADULT - SUBJECTIVE AND OBJECTIVE BOX
Patient is a 69y old  Male who presents with a chief complaint of s/p DDRT, readmit w/ hypotension & c/f sepsis (29 Apr 2025 19:18)      HPI:  68 y/o M with PMHx of HLD, CAD s/p LAD PCI 7/24, HFpEF , HTN, hypothyroid, type 2 DM, h/o cva with no residual deficits, persistent right sided pleural effusion, and hx of hemorrhagic pericardial effusion (cytopathology negative), ESRD on HD MWF (recently converted from PD in 1/2025) via permacath placed on 1/2025, now s/p DDRT on 4/8/25, c/b HTN requiring 2 SICU admissions.     Patient presented to the ED urgently from outpatient. States he was having output from drain area and stitch was placed in the outpatient office. Patient afterward had Hypotension episode to 80s/50s and on ED admit has pain from drain site when drains are being manipulated. Denies any other sx aside from a dry cough.      (28 Apr 2025 20:27)     prior hospital charts reviewed [  ]  primary team notes reviewed [  ]  other consultant notes reviewed [  ]    PAST MEDICAL & SURGICAL HISTORY:  Hypertension      ESRD on peritoneal dialysis      CVA (cerebrovascular accident)  2010 without residual effects      Hyperlipidemia      BPH (benign prostatic hyperplasia)      CAD (coronary artery disease)      T2DM (type 2 diabetes mellitus)      Hypothyroidism      History of peritoneal dialysis  s/p PD catheter placement      S/P coronary artery stent placement          Allergies  No Known Allergies    ANTIMICROBIALS (past 90 days)  MEDICATIONS  (STANDING):  acyclovir   Oral Tab/Cap   200 milliGRAM(s) Oral (04-30-25 @ 05:10)   200 milliGRAM(s) Oral (04-29-25 @ 17:09)   200 milliGRAM(s) Oral (04-29-25 @ 05:19)   200 milliGRAM(s) Oral (04-28-25 @ 19:03)    linezolid    Tablet   600 milliGRAM(s) Oral (04-30-25 @ 05:10)   600 milliGRAM(s) Oral (04-29-25 @ 17:09)    nystatin    Suspension   056999 Unit(s) Oral (04-30-25 @ 12:20)   121723 Unit(s) Oral (04-30-25 @ 05:09)   016832 Unit(s) Oral (04-30-25 @ 00:07)   237823 Unit(s) Oral (04-29-25 @ 17:09)   271545 Unit(s) Oral (04-29-25 @ 12:28)   585654 Unit(s) Oral (04-29-25 @ 05:19)   [id number] Unit(s) Oral (04-29-25 @ 01:57)   135466 Unit(s) Oral (04-28-25 @ 19:03)    trimethoprim   80 mG/sulfamethoxazole 400 mG   1 Tablet(s) Oral (04-30-25 @ 09:07)      ANTIMICROBIALS:    acyclovir   Oral Tab/Cap 200 two times a day  linezolid    Tablet 600 every 12 hours  nystatin    Suspension 360499 four times a day  trimethoprim   80 mG/sulfamethoxazole 400 mG 1 <User Schedule>    OTHER MEDS: MEDICATIONS  (STANDING):  aspirin enteric coated 81 daily  atorvastatin 40 at bedtime  carvedilol 6.25 every 12 hours  dextrose 50% Injectable 25 once  dextrose 50% Injectable 12.5 once  dextrose 50% Injectable 25 once  dextrose Oral Gel 15 once  epoetin heidi (PROCRIT) Injectable 40729 once  famotidine    Tablet 20 daily  glucagon  Injectable 1 once  heparin   Injectable 5000 every 12 hours  hydrALAZINE 25 two times a day  insulin glargine Injectable (LANTUS) 6 at bedtime  insulin lispro (ADMELOG) corrective regimen sliding scale  three times a day before meals  insulin lispro (ADMELOG) corrective regimen sliding scale  at bedtime  insulin lispro Injectable (ADMELOG) 4 three times a day before meals  isosorbide   mononitrate ER Tablet (IMDUR) 30 daily  levothyroxine 25 daily  mycophenolate mofetil 500 two times a day  NIFEdipine XL 60 every 12 hours  predniSONE   Tablet 5 daily  tamsulosin 0.4 at bedtime    SOCIAL HISTORY:   hx smoking  non-smoker    FAMILY HISTORY:  FH: HTN (hypertension) (Mother, Father)    FH: type 2 diabetes (Mother, Father)      REVIEW OF SYSTEMS  [  ] ROS unobtainable because:    [  ] All other systems negative except as noted below:	    Constitutional:  [ ] fever [ ] chills  [ ] weight loss  [ ] weakness  Skin:  [ ] rash [ ] phlebitis	  Eyes: [ ] icterus [ ] pain  [ ] discharge	  ENMT: [ ] sore throat  [ ] thrush [ ] ulcers [ ] exudates  Respiratory: [ ] dyspnea [ ] hemoptysis [ ] cough [ ] sputum	  Cardiovascular:  [ ] chest pain [ ] palpitations [ ] edema	  Gastrointestinal:  [ ] nausea [ ] vomiting [ ] diarrhea [ ] constipation [ ] pain	  Genitourinary:  [ ] dysuria [ ] frequency [ ] hematuria [ ] discharge [ ] flank pain  [ ] incontinence  Musculoskeletal:  [ ] myalgias [ ] arthralgias [ ] arthritis  [ ] back pain  Neurological:  [ ] headache [ ] seizures  [ ] confusion/altered mental status  Psychiatric:  [ ] anxiety [ ] depression	  Hematology/Lymphatics:  [ ] lymphadenopathy  Endocrine:  [ ] adrenal [ ] thyroid  Allergic/Immunologic:	 [ ] transplant [ ] seasonal    Vital Signs Last 24 Hrs  T(F): 97.8 (04-30-25 @ 09:32), Max: 98.8 (04-29-25 @ 17:03)  Vital Signs Last 24 Hrs  HR: 55 (04-30-25 @ 09:32) (55 - 63)  BP: 163/82 (04-30-25 @ 09:32) (148/66 - 182/75)  RR: 18 (04-30-25 @ 09:32)  SpO2: 100% (04-30-25 @ 09:32) (100% - 100%)  Wt(kg): --    PHYSICAL EXAMINATION:  General: Alert and Awake, NAD  HEENT: PERRL, EOMI, No subconjunctival hemorrhages, Oropharynx Clear, MMM  Neck: Supple, No ANTHONY  Cardiac: RRR, No M/R/G  Resp: CTAB, No Wh/Rh/Ra  Abdomen: NBS, NT/ND, No HSM, No rigidity or guarding  MSK: No LE edema. No stigmata of IE. No evidence of phlebitis. No evidence of synovitis.  : No brand  Skin: No rashes or lesions. Skin is warm and dry to the touch.   Neuro: Alert and Awake. CN 2-12 Grossly intact. Moves all four extremities spontaneously.  Psych: Calm, Pleasant, Cooperative                          7.8    5.26  )-----------( 347      ( 30 Apr 2025 06:59 )             24.9     04-30    142  |  103  |  80[H]  ----------------------------<  96  4.0   |  21[L]  |  4.41[H]    Ca    9.0      30 Apr 2025 06:58  Phos  3.5     04-30  Mg     1.8     04-30    TPro  6.4  /  Alb  3.7  /  TBili  0.2  /  DBili  x   /  AST  12  /  ALT  8[L]  /  AlkPhos  74  04-30    Urinalysis Basic - ( 30 Apr 2025 06:58 )    Color: x / Appearance: x / SG: x / pH: x  Gluc: 96 mg/dL / Ketone: x  / Bili: x / Urobili: x   Blood: x / Protein: x / Nitrite: x   Leuk Esterase: x / RBC: x / WBC x   Sq Epi: x / Non Sq Epi: x / Bacteria: x    MICROBIOLOGY:Vancomycin Level, Random: 9.3 (04-16 @ 18:19)  Vancomycin Level, Random: 15.1 (04-14 @ 23:52)  Vancomycin Level, Random: 8.2 (04-13 @ 15:09)  Vancomycin Level, Trough: 13.4 (04-11 @ 05:01)    Urinalysis with Rflx Culture (collected 28 Apr 2025 15:34)    Culture - Blood (collected 28 Apr 2025 13:40)  Source: Blood Blood-Peripheral  Gram Stain (29 Apr 2025 13:36):    Growth in aerobic bottle: Gram Positive Cocci in Clusters  Preliminary Report (29 Apr 2025 13:36):    Growth in aerobic bottle: Gram Positive Cocci in Clusters    Direct identification is available within approximately 3-5    hours either by Blood Panel Multiplexed PCR or Direct    MALDI-TOF. Details: [email or web address]  Organism: Blood Culture PCR (29 Apr 2025 15:05)  Organism: Blood Culture PCR (29 Apr 2025 15:05)      -  Staphylococcus epidermidis, Methicillin resistant: Detec      Method Type: PCR    Culture - Blood (collected 28 Apr 2025 13:30)  Source: Blood Blood-Peripheral  Preliminary Report (29 Apr 2025 18:01):    No growth at 24 hours    Culture - Urine (collected 26 Apr 2025 22:39)  Source: Clean Catch Clean Catch (Midstream)  Final Report (27 Apr 2025 23:45):    <10,000 CFU/mL Normal Urogenital Vickie                    RADIOLOGY:    <The imaging below has been reviewed and visualized by me independently. Findings as detailed in report below>     Patient is a 69y old  Male who presents with a chief complaint of s/p DDRT, readmit w/ hypotension & c/f sepsis (29 Apr 2025 19:18)      HPI:  70 y/o M with PMHx of HLD, CAD s/p LAD PCI 7/24, HFpEF , HTN, hypothyroid, type 2 DM, h/o cva with no residual deficits, persistent right sided pleural effusion, and hx of hemorrhagic pericardial effusion (cytopathology negative), ESRD on HD MWF (recently converted from PD in 1/2025) via permacath placed on 1/2025, now s/p DDRT on 4/8/25, c/b HTN requiring 2 SICU admissions.     Patient presented to the ED urgently from outpatient. States he was having output from drain area and stitch was placed in the outpatient office. Patient afterward had Hypotension episode to 80s/50s and on ED admit has pain from drain site when drains are being manipulated. Denies any other sx aside from a dry cough.   Seen and examined at bedside, endorsing pain at the site of drains, x2 COSMO drains with serosanguinous output, RLQ transplant site with staples and no inflammation.      prior hospital charts reviewed [ x]  primary team notes reviewed [ x ]  other consultant notes reviewed [  x]    PAST MEDICAL & SURGICAL HISTORY:  Hypertension      ESRD on peritoneal dialysis      CVA (cerebrovascular accident)  2010 without residual effects      Hyperlipidemia      BPH (benign prostatic hyperplasia)      CAD (coronary artery disease)      T2DM (type 2 diabetes mellitus)      Hypothyroidism      History of peritoneal dialysis  s/p PD catheter placement      S/P coronary artery stent placement          Allergies  No Known Allergies    ANTIMICROBIALS (past 90 days)  MEDICATIONS  (STANDING):  acyclovir   Oral Tab/Cap   200 milliGRAM(s) Oral (04-30-25 @ 05:10)   200 milliGRAM(s) Oral (04-29-25 @ 17:09)   200 milliGRAM(s) Oral (04-29-25 @ 05:19)   200 milliGRAM(s) Oral (04-28-25 @ 19:03)    linezolid    Tablet   600 milliGRAM(s) Oral (04-30-25 @ 05:10)   600 milliGRAM(s) Oral (04-29-25 @ 17:09)    nystatin    Suspension   150698 Unit(s) Oral (04-30-25 @ 12:20)   864172 Unit(s) Oral (04-30-25 @ 05:09)   404946 Unit(s) Oral (04-30-25 @ 00:07)   057978 Unit(s) Oral (04-29-25 @ 17:09)   264208 Unit(s) Oral (04-29-25 @ 12:28)   328502 Unit(s) Oral (04-29-25 @ 05:19)   [id number] Unit(s) Oral (04-29-25 @ 01:57)   284850 Unit(s) Oral (04-28-25 @ 19:03)    trimethoprim   80 mG/sulfamethoxazole 400 mG   1 Tablet(s) Oral (04-30-25 @ 09:07)      ANTIMICROBIALS:    acyclovir   Oral Tab/Cap 200 two times a day  linezolid    Tablet 600 every 12 hours  nystatin    Suspension 862432 four times a day  trimethoprim   80 mG/sulfamethoxazole 400 mG 1 <User Schedule>    OTHER MEDS: MEDICATIONS  (STANDING):  aspirin enteric coated 81 daily  atorvastatin 40 at bedtime  carvedilol 6.25 every 12 hours  dextrose 50% Injectable 25 once  dextrose 50% Injectable 12.5 once  dextrose 50% Injectable 25 once  dextrose Oral Gel 15 once  epoetin heidi (PROCRIT) Injectable 81221 once  famotidine    Tablet 20 daily  glucagon  Injectable 1 once  heparin   Injectable 5000 every 12 hours  hydrALAZINE 25 two times a day  insulin glargine Injectable (LANTUS) 6 at bedtime  insulin lispro (ADMELOG) corrective regimen sliding scale  three times a day before meals  insulin lispro (ADMELOG) corrective regimen sliding scale  at bedtime  insulin lispro Injectable (ADMELOG) 4 three times a day before meals  isosorbide   mononitrate ER Tablet (IMDUR) 30 daily  levothyroxine 25 daily  mycophenolate mofetil 500 two times a day  NIFEdipine XL 60 every 12 hours  predniSONE   Tablet 5 daily  tamsulosin 0.4 at bedtime    SOCIAL HISTORY:   hx smoking  non-smoker    FAMILY HISTORY:  FH: HTN (hypertension) (Mother, Father)    FH: type 2 diabetes (Mother, Father)      REVIEW OF SYSTEMS  [  ] ROS unobtainable because:    [ x ] All other systems negative except as noted below:	    Constitutional:  [ ] fever [ ] chills  [ ] weight loss  [x ] weakness  Skin:  [ ] rash [ ] phlebitis	  Eyes: [ ] icterus [ ] pain  [ ] discharge	  ENMT: [ ] sore throat  [ ] thrush [ ] ulcers [ ] exudates  Respiratory: [ ] dyspnea [ ] hemoptysis [ ] cough [ ] sputum	  Cardiovascular:  [ ] chest pain [ ] palpitations [ ] edema	  Gastrointestinal:  [ ] nausea [ ] vomiting [ ] diarrhea [ ] constipation [x ] pain	  Genitourinary:  [ ] dysuria [ ] frequency [ ] hematuria [ ] discharge [ ] flank pain  [ ] incontinence  Musculoskeletal:  [ ] myalgias [ ] arthralgias [ ] arthritis  [ ] back pain  Neurological:  [ ] headache [ ] seizures  [ ] confusion/altered mental status  Psychiatric:  [ ] anxiety [ ] depression	  Hematology/Lymphatics:  [ ] lymphadenopathy  Endocrine:  [ ] adrenal [ ] thyroid  Allergic/Immunologic:	 [ ] transplant [ ] seasonal    Vital Signs Last 24 Hrs  T(F): 97.8 (04-30-25 @ 09:32), Max: 98.8 (04-29-25 @ 17:03)  Vital Signs Last 24 Hrs  HR: 55 (04-30-25 @ 09:32) (55 - 63)  BP: 163/82 (04-30-25 @ 09:32) (148/66 - 182/75)  RR: 18 (04-30-25 @ 09:32)  SpO2: 100% (04-30-25 @ 09:32) (100% - 100%)  Wt(kg): --    PHYSICAL EXAMINATION:  General: Alert and Awake, NAD  HEENT: PERRL, EOMI, No subconjunctival hemorrhages, Oropharynx Clear, MMM  Neck: Supple, No ANTHONY  Cardiac: RRR, No M/R/G  Resp: CTAB, No Wh/Rh/Ra  Abdomen: NBS, NT/ND, No HSM, No rigidity or guarding  MSK: No LE edema. No stigmata of IE. No evidence of phlebitis. No evidence of synovitis.  : + [place]  Skin: No rashes or lesions. Skin is warm and dry to the touch.   Neuro: Alert and Awake. CN 2-12 Grossly intact. Moves all four extremities spontaneously.  Psych: Calm, Pleasant, Cooperative                          7.8    5.26  )-----------( 347      ( 30 Apr 2025 06:59 )             24.9     04-30    142  |  103  |  80[H]  ----------------------------<  96  4.0   |  21[L]  |  4.41[H]    Ca    9.0      30 Apr 2025 06:58  Phos  3.5     04-30  Mg     1.8     04-30    TPro  6.4  /  Alb  3.7  /  TBili  0.2  /  DBili  x   /  AST  12  /  ALT  8[L]  /  AlkPhos  74  04-30    Urinalysis Basic - ( 30 Apr 2025 06:58 )    Color: x / Appearance: x / SG: x / pH: x  Gluc: 96 mg/dL / Ketone: x  / Bili: x / Urobili: x   Blood: x / Protein: x / Nitrite: x   Leuk Esterase: x / RBC: x / WBC x   Sq Epi: x / Non Sq Epi: x / Bacteria: x    MICROBIOLOGY:Vancomycin Level, Random: 9.3 (04-16 @ 18:19)  Vancomycin Level, Random: 15.1 (04-14 @ 23:52)  Vancomycin Level, Random: 8.2 (04-13 @ 15:09)  Vancomycin Level, Trough: 13.4 (04-11 @ 05:01)    Urinalysis with Rflx Culture (collected 28 Apr 2025 15:34)    Culture - Blood (collected 28 Apr 2025 13:40)  Source: Blood Blood-Peripheral  Gram Stain (29 Apr 2025 13:36):    Growth in aerobic bottle: Gram Positive Cocci in Clusters  Preliminary Report (29 Apr 2025 13:36):    Growth in aerobic bottle: Gram Positive Cocci in Clusters    Direct identification is available within approximately 3-5    hours either by Blood Panel Multiplexed PCR or Direct    MALDI-TOF. Details: [email or web address]  Organism: Blood Culture PCR (29 Apr 2025 15:05)  Organism: Blood Culture PCR (29 Apr 2025 15:05)      -  Staphylococcus epidermidis, Methicillin resistant: Detec      Method Type: PCR    Culture - Blood (collected 28 Apr 2025 13:30)  Source: Blood Blood-Peripheral  Preliminary Report (29 Apr 2025 18:01):    No growth at 24 hours    Culture - Urine (collected 26 Apr 2025 22:39)  Source: Clean Catch Clean Catch (Midstream)  Final Report (27 Apr 2025 23:45):    <10,000 CFU/mL Normal Urogenital Vickie                    RADIOLOGY:    <The imaging below has been reviewed and visualized by me independently. Findings as detailed in report below>        < from: CT Chest No Cont (04.28.25 @ 18:43) >  ACC: [id number] EXAM:  CT CHEST   ORDERED BY:  JOSS HERRING     PROCEDURE DATE:  04/28/2025          INTERPRETATION:  CLINICAL INFORMATION: Dizziness. Hypotension. History of   renal transplant    COMPARISON: CT chest 4/1/2025 and 10/27/2022. CT abdomen and pelvis   4/26/2025 and 7/26/2022    CONTRAST/COMPLICATIONS:  IV Contrast: NONE  Oral Contrast: NONE    PROCEDURE:  CT of the Chest was performed.  Sagittal and coronal reformats were performed.    FINDINGS:    LUNGS AND LARGE AIRWAYS: Patent central airways. Unchanged right lower   and middle lobe subpleural opacities with curvilinear opacities branching   suggestive of comet tail sign compatible with round atelectasis.   Unchanged 6 mm nodule adjacent to the left oblique fissure.  PLEURA:Unchanged right pleural thickening and trace right pleural   effusion.  VESSELS: Right-sided central venous catheter terminates in the right   atrium. Aortic and coronary calcifications.  HEART: Heart size is normal. No pericardial effusion. Aortic valvular   calcifications.  MEDIASTINUM AND JUSTINE: Unchanged small and mildly enlarged mediastinal   lymph nodes, some of which are calcified.  CHEST WALL AND LOWER NECK: Heterogeneous right thyroid lobe with   calcification unchanged when compared to prior CT chest dated 10/27/2022.   Left thyroid lobe is unremarkable.  VISUALIZED UPPER ABDOMEN: Bilateral atrophic native kidneys. Unchanged   left renal cyst and a small exophytic left renal lesion that cannot be   further characterized on this study. Peripheral calcifications in the   spleen.  BONES: Mild degenerative changes.    IMPRESSION:    Unchanged trace right pleural effusion, pleural thickening, and   associated round atelectasis of the right lower and middle lobes.    Unchanged 6 mm nodule adjacent to the left oblique fissure.    No pneumonia.    --- End of Report ---        < end of copied text >

## 2025-04-30 NOTE — CONSULT NOTE ADULT - ASSESSMENT
70 y/o M with PMHx of HLD, CAD s/p LAD PCI 7/24, HFpEF , HTN, hypothyroid, type 2 DM, h/o cva with no residual deficits, persistent right sided pleural effusion, and hx of hemorrhagic pericardial effusion (cytopathology negative), ESRD on HD MWF (recently converted from PD in 1/2025) via permacath placed on 1/2025, now s/p DDRT on 4/8/25, c/b HTN requiring 2 SICU admissions.     Patient presented to the ED urgently from outpatient. States he was having output from drain area and stitch was placed in the outpatient office. Patient afterward had Hypotension episode to 80s/50s and on ED admit has pain from drain site when drains are being manipulated. Denies any other sx aside from a dry cough.   Seen and examined at bedside, endorsing pain at the site of drains, x2 COSMO drains with serosanguinous output, RLQ transplant site with staples and no inflammation.       #Renal transplant recipient  #Hypotension  #Cough  -Urinalysis: 4 wbc, negative bacteria  -RVP: Negative  -1/4 blood culture: Staphylococcus epidermidis, started on linezolid ( Likely contaminant).  -Urine Culture: Normal Urogenital Vickie  -Lactate, Blood: 2.3 (04.26.25)      Recommendations:  Would send repeat blood cultures  Trend CBC & CMP  Continue temperature curves   Can continue Linezolid for now.  70 y/o M with PMHx of HLD, CAD s/p LAD PCI 7/24, HFpEF , HTN, hypothyroid, type 2 DM, h/o cva with no residual deficits, persistent right sided pleural effusion, and hx of hemorrhagic pericardial effusion (cytopathology negative), ESRD on HD MWF (recently converted from PD in 1/2025) via permacath placed on 1/2025, now s/p DDRT on 4/8/25, c/b HTN requiring 2 SICU admissions.     Patient presented to the ED urgently from outpatient. States he was having output from drain area and stitch was placed in the outpatient office. Patient afterward had Hypotension episode to 80s/50s and on ED admit has pain from drain site when drains are being manipulated. Denies any other sx aside from a dry cough.   Seen and examined at bedside, endorsing pain at the site of drains, x2 COSMO drains with serosanguinous output, RLQ transplant site with staples and no inflammation.       #Renal transplant recipient  #Hypotension  #Cough  -Urinalysis: 4 wbc, negative bacteria  -RVP: Negative  -1/4 blood culture: Staphylococcus epidermidis, started on linezolid   -Urine Culture: Normal Urogenital Vickie  -Lactate, Blood: 2.3 (04.26.25)      Recommendations:  Would send repeat blood cultures  Trend CBC & CMP  Continue temperature curves   Can continue Linezolid for now.

## 2025-05-01 LAB
ALBUMIN SERPL ELPH-MCNC: 3.7 G/DL — SIGNIFICANT CHANGE UP (ref 3.3–5)
ALP SERPL-CCNC: 73 U/L — SIGNIFICANT CHANGE UP (ref 40–120)
ALT FLD-CCNC: 10 U/L — SIGNIFICANT CHANGE UP (ref 10–45)
ANION GAP SERPL CALC-SCNC: 16 MMOL/L — SIGNIFICANT CHANGE UP (ref 5–17)
APPEARANCE UR: CLEAR — SIGNIFICANT CHANGE UP
AST SERPL-CCNC: 13 U/L — SIGNIFICANT CHANGE UP (ref 10–40)
BACTERIA # UR AUTO: NEGATIVE /HPF — SIGNIFICANT CHANGE UP
BASOPHILS # BLD AUTO: 0.05 K/UL — SIGNIFICANT CHANGE UP (ref 0–0.2)
BASOPHILS NFR BLD AUTO: 0.9 % — SIGNIFICANT CHANGE UP (ref 0–2)
BILIRUB SERPL-MCNC: 0.2 MG/DL — SIGNIFICANT CHANGE UP (ref 0.2–1.2)
BILIRUB UR-MCNC: NEGATIVE — SIGNIFICANT CHANGE UP
BUN SERPL-MCNC: 73 MG/DL — HIGH (ref 7–23)
CALCIUM SERPL-MCNC: 8.8 MG/DL — SIGNIFICANT CHANGE UP (ref 8.4–10.5)
CAST: 2 /LPF — SIGNIFICANT CHANGE UP (ref 0–4)
CHLORIDE SERPL-SCNC: 105 MMOL/L — SIGNIFICANT CHANGE UP (ref 96–108)
CO2 SERPL-SCNC: 20 MMOL/L — LOW (ref 22–31)
COLOR SPEC: YELLOW — SIGNIFICANT CHANGE UP
CREAT FLD-MCNC: 9.93 MG/DL — SIGNIFICANT CHANGE UP
CREAT SERPL-MCNC: 3.62 MG/DL — HIGH (ref 0.5–1.3)
CULTURE RESULTS: ABNORMAL
DIFF PNL FLD: ABNORMAL
EGFR: 17 ML/MIN/1.73M2 — LOW
EGFR: 17 ML/MIN/1.73M2 — LOW
EOSINOPHIL # BLD AUTO: 0.3 K/UL — SIGNIFICANT CHANGE UP (ref 0–0.5)
EOSINOPHIL NFR BLD AUTO: 5.3 % — SIGNIFICANT CHANGE UP (ref 0–6)
GLUCOSE BLDC GLUCOMTR-MCNC: 153 MG/DL — HIGH (ref 70–99)
GLUCOSE BLDC GLUCOMTR-MCNC: 174 MG/DL — HIGH (ref 70–99)
GLUCOSE BLDC GLUCOMTR-MCNC: 218 MG/DL — HIGH (ref 70–99)
GLUCOSE BLDC GLUCOMTR-MCNC: 96 MG/DL — SIGNIFICANT CHANGE UP (ref 70–99)
GLUCOSE SERPL-MCNC: 102 MG/DL — HIGH (ref 70–99)
GLUCOSE UR QL: 100 MG/DL
HCT VFR BLD CALC: 25.5 % — LOW (ref 39–50)
HGB BLD-MCNC: 7.9 G/DL — LOW (ref 13–17)
IMM GRANULOCYTES NFR BLD AUTO: 0.7 % — SIGNIFICANT CHANGE UP (ref 0–0.9)
KETONES UR-MCNC: NEGATIVE MG/DL — SIGNIFICANT CHANGE UP
LEUKOCYTE ESTERASE UR-ACNC: NEGATIVE — SIGNIFICANT CHANGE UP
LYMPHOCYTES # BLD AUTO: 0.44 K/UL — LOW (ref 1–3.3)
LYMPHOCYTES # BLD AUTO: 7.7 % — LOW (ref 13–44)
MAGNESIUM SERPL-MCNC: 1.8 MG/DL — SIGNIFICANT CHANGE UP (ref 1.6–2.6)
MCHC RBC-ENTMCNC: 28.4 PG — SIGNIFICANT CHANGE UP (ref 27–34)
MCHC RBC-ENTMCNC: 31 G/DL — LOW (ref 32–36)
MCV RBC AUTO: 91.7 FL — SIGNIFICANT CHANGE UP (ref 80–100)
MONOCYTES # BLD AUTO: 0.68 K/UL — SIGNIFICANT CHANGE UP (ref 0–0.9)
MONOCYTES NFR BLD AUTO: 11.9 % — SIGNIFICANT CHANGE UP (ref 2–14)
MRSA PCR RESULT.: SIGNIFICANT CHANGE UP
MYCOPHENOLATE SERPL-MCNC: 5.5 UG/ML — HIGH (ref 1–3.5)
MYCOPHENOLIC ACID GLUCURONIDE: 125 UG/ML — SIGNIFICANT CHANGE UP (ref 15–125)
NEUTROPHILS # BLD AUTO: 4.2 K/UL — SIGNIFICANT CHANGE UP (ref 1.8–7.4)
NEUTROPHILS NFR BLD AUTO: 73.5 % — SIGNIFICANT CHANGE UP (ref 43–77)
NITRITE UR-MCNC: NEGATIVE — SIGNIFICANT CHANGE UP
NRBC BLD AUTO-RTO: 0 /100 WBCS — SIGNIFICANT CHANGE UP (ref 0–0)
PH UR: 7 — SIGNIFICANT CHANGE UP (ref 5–8)
PHOSPHATE SERPL-MCNC: 3.3 MG/DL — SIGNIFICANT CHANGE UP (ref 2.5–4.5)
PLATELET # BLD AUTO: 380 K/UL — SIGNIFICANT CHANGE UP (ref 150–400)
POTASSIUM SERPL-MCNC: 4.1 MMOL/L — SIGNIFICANT CHANGE UP (ref 3.5–5.3)
POTASSIUM SERPL-SCNC: 4.1 MMOL/L — SIGNIFICANT CHANGE UP (ref 3.5–5.3)
PROT SERPL-MCNC: 6.4 G/DL — SIGNIFICANT CHANGE UP (ref 6–8.3)
PROT UR-MCNC: 30 MG/DL
RBC # BLD: 2.78 M/UL — LOW (ref 4.2–5.8)
RBC # FLD: 21.1 % — HIGH (ref 10.3–14.5)
RBC CASTS # UR COMP ASSIST: 8 /HPF — HIGH (ref 0–4)
S AUREUS DNA NOSE QL NAA+PROBE: SIGNIFICANT CHANGE UP
SODIUM SERPL-SCNC: 141 MMOL/L — SIGNIFICANT CHANGE UP (ref 135–145)
SP GR SPEC: 1.01 — SIGNIFICANT CHANGE UP (ref 1–1.03)
SQUAMOUS # UR AUTO: 0 /HPF — SIGNIFICANT CHANGE UP (ref 0–5)
TACROLIMUS SERPL-MCNC: 5.2 NG/ML — SIGNIFICANT CHANGE UP
UROBILINOGEN FLD QL: 0.2 MG/DL — SIGNIFICANT CHANGE UP (ref 0.2–1)
WBC # BLD: 5.71 K/UL — SIGNIFICANT CHANGE UP (ref 3.8–10.5)
WBC # FLD AUTO: 5.71 K/UL — SIGNIFICANT CHANGE UP (ref 3.8–10.5)
WBC UR QL: 1 /HPF — SIGNIFICANT CHANGE UP (ref 0–5)

## 2025-05-01 PROCEDURE — 99233 SBSQ HOSP IP/OBS HIGH 50: CPT

## 2025-05-01 PROCEDURE — G0545: CPT

## 2025-05-01 PROCEDURE — 99232 SBSQ HOSP IP/OBS MODERATE 35: CPT | Mod: GC

## 2025-05-01 RX ORDER — ACETAMINOPHEN 500 MG/5ML
1000 LIQUID (ML) ORAL ONCE
Refills: 0 | Status: COMPLETED | OUTPATIENT
Start: 2025-05-01 | End: 2025-05-01

## 2025-05-01 RX ORDER — TRAMADOL HYDROCHLORIDE 50 MG/1
50 TABLET, FILM COATED ORAL EVERY 6 HOURS
Refills: 0 | Status: DISCONTINUED | OUTPATIENT
Start: 2025-05-01 | End: 2025-05-01

## 2025-05-01 RX ORDER — TACROLIMUS 0.5 MG/1
4 CAPSULE ORAL
Refills: 0 | Status: DISCONTINUED | OUTPATIENT
Start: 2025-05-02 | End: 2025-05-02

## 2025-05-01 RX ORDER — TACROLIMUS 0.5 MG/1
4 CAPSULE ORAL ONCE
Refills: 0 | Status: COMPLETED | OUTPATIENT
Start: 2025-05-01 | End: 2025-05-01

## 2025-05-01 RX ORDER — MAGNESIUM SULFATE 500 MG/ML
2 SYRINGE (ML) INJECTION ONCE
Refills: 0 | Status: COMPLETED | OUTPATIENT
Start: 2025-05-01 | End: 2025-05-01

## 2025-05-01 RX ORDER — TRAMADOL HYDROCHLORIDE 50 MG/1
25 TABLET, FILM COATED ORAL EVERY 4 HOURS
Refills: 0 | Status: DISCONTINUED | OUTPATIENT
Start: 2025-05-01 | End: 2025-05-01

## 2025-05-01 RX ADMIN — MYCOPHENOLATE MOFETIL 500 MILLIGRAM(S): 500 TABLET, FILM COATED ORAL at 17:35

## 2025-05-01 RX ADMIN — Medication 25 MILLIGRAM(S): at 11:42

## 2025-05-01 RX ADMIN — HEPARIN SODIUM 5000 UNIT(S): 1000 INJECTION INTRAVENOUS; SUBCUTANEOUS at 17:35

## 2025-05-01 RX ADMIN — Medication 25 GRAM(S): at 11:42

## 2025-05-01 RX ADMIN — Medication 500000 UNIT(S): at 11:42

## 2025-05-01 RX ADMIN — Medication 500000 UNIT(S): at 05:20

## 2025-05-01 RX ADMIN — TACROLIMUS 4 MILLIGRAM(S): 0.5 CAPSULE ORAL at 11:43

## 2025-05-01 RX ADMIN — PREDNISONE 5 MILLIGRAM(S): 20 TABLET ORAL at 05:19

## 2025-05-01 RX ADMIN — Medication 400 MILLIGRAM(S): at 08:53

## 2025-05-01 RX ADMIN — Medication 25 MILLIGRAM(S): at 05:20

## 2025-05-01 RX ADMIN — Medication 81 MILLIGRAM(S): at 11:42

## 2025-05-01 RX ADMIN — INSULIN LISPRO 4 UNIT(S): 100 INJECTION, SOLUTION INTRAVENOUS; SUBCUTANEOUS at 13:25

## 2025-05-01 RX ADMIN — Medication 200 MILLIGRAM(S): at 05:19

## 2025-05-01 RX ADMIN — INSULIN LISPRO 2: 100 INJECTION, SOLUTION INTRAVENOUS; SUBCUTANEOUS at 08:55

## 2025-05-01 RX ADMIN — MYCOPHENOLATE MOFETIL 500 MILLIGRAM(S): 500 TABLET, FILM COATED ORAL at 08:54

## 2025-05-01 RX ADMIN — HEPARIN SODIUM 5000 UNIT(S): 1000 INJECTION INTRAVENOUS; SUBCUTANEOUS at 05:20

## 2025-05-01 RX ADMIN — Medication 500000 UNIT(S): at 23:36

## 2025-05-01 RX ADMIN — ISOSORBIDE MONONITRATE 30 MILLIGRAM(S): 60 TABLET, EXTENDED RELEASE ORAL at 11:42

## 2025-05-01 RX ADMIN — INSULIN LISPRO 4 UNIT(S): 100 INJECTION, SOLUTION INTRAVENOUS; SUBCUTANEOUS at 08:54

## 2025-05-01 RX ADMIN — INSULIN GLARGINE-YFGN 6 UNIT(S): 100 INJECTION, SOLUTION SUBCUTANEOUS at 20:39

## 2025-05-01 RX ADMIN — Medication 1000 MILLIGRAM(S): at 09:53

## 2025-05-01 RX ADMIN — Medication 50 MILLIGRAM(S): at 17:35

## 2025-05-01 RX ADMIN — TAMSULOSIN HYDROCHLORIDE 0.4 MILLIGRAM(S): 0.4 CAPSULE ORAL at 20:39

## 2025-05-01 RX ADMIN — Medication 60 MILLIGRAM(S): at 17:36

## 2025-05-01 RX ADMIN — Medication 25 MICROGRAM(S): at 05:20

## 2025-05-01 RX ADMIN — Medication 200 MILLIGRAM(S): at 17:35

## 2025-05-01 RX ADMIN — Medication 60 MILLIGRAM(S): at 05:19

## 2025-05-01 RX ADMIN — Medication 1 APPLICATION(S): at 13:29

## 2025-05-01 RX ADMIN — ATORVASTATIN CALCIUM 40 MILLIGRAM(S): 80 TABLET, FILM COATED ORAL at 20:39

## 2025-05-01 RX ADMIN — Medication 20 MILLIGRAM(S): at 11:43

## 2025-05-01 RX ADMIN — LINEZOLID 600 MILLIGRAM(S): 2 INJECTION, SOLUTION INTRAVENOUS at 17:35

## 2025-05-01 RX ADMIN — CARVEDILOL 6.25 MILLIGRAM(S): 3.12 TABLET, FILM COATED ORAL at 05:20

## 2025-05-01 RX ADMIN — Medication 500000 UNIT(S): at 17:36

## 2025-05-01 RX ADMIN — LINEZOLID 600 MILLIGRAM(S): 2 INJECTION, SOLUTION INTRAVENOUS at 05:19

## 2025-05-01 RX ADMIN — INSULIN LISPRO 4: 100 INJECTION, SOLUTION INTRAVENOUS; SUBCUTANEOUS at 13:25

## 2025-05-01 NOTE — PROGRESS NOTE ADULT - SUBJECTIVE AND OBJECTIVE BOX
[facility] DIVISION OF KIDNEY DISEASES AND HYPERTENSION -- FOLLOW UP NOTE  --------------------------------------------------------------------------------  Chief Complaint: DDRT    24 hour events/subjective: Pt. seen and examined, resting in bed. States the     PAST HISTORY  --------------------------------------------------------------------------------  No significant changes to PMH, PSH, FHx, SHx, unless otherwise noted    ALLERGIES & MEDICATIONS  --------------------------------------------------------------------------------  Allergies    No Known Allergies    Intolerances    Standing Inpatient Medications  acyclovir   Oral Tab/Cap 200 milliGRAM(s) Oral two times a day  aspirin enteric coated 81 milliGRAM(s) Oral daily  atorvastatin 40 milliGRAM(s) Oral at bedtime  carvedilol 6.25 milliGRAM(s) Oral every 12 hours  chlorhexidine 2% Cloths 1 Application(s) Topical <User Schedule>  dextrose 5%. 1000 milliLiter(s) IV Continuous <Continuous>  dextrose 5%. 1000 milliLiter(s) IV Continuous <Continuous>  dextrose 50% Injectable 25 Gram(s) IV Push once  dextrose 50% Injectable 12.5 Gram(s) IV Push once  dextrose 50% Injectable 25 Gram(s) IV Push once  dextrose Oral Gel 15 Gram(s) Oral once  famotidine    Tablet 20 milliGRAM(s) Oral daily  glucagon  Injectable 1 milliGRAM(s) IntraMuscular once  heparin   Injectable 5000 Unit(s) SubCutaneous every 12 hours  hydrALAZINE 50 milliGRAM(s) Oral two times a day  insulin glargine Injectable (LANTUS) 6 Unit(s) SubCutaneous at bedtime  insulin lispro (ADMELOG) corrective regimen sliding scale   SubCutaneous three times a day before meals  insulin lispro (ADMELOG) corrective regimen sliding scale   SubCutaneous at bedtime  insulin lispro Injectable (ADMELOG) 4 Unit(s) SubCutaneous three times a day before meals  isosorbide   mononitrate ER Tablet (IMDUR) 30 milliGRAM(s) Oral daily  levothyroxine 25 MICROGram(s) Oral daily  linezolid    Tablet 600 milliGRAM(s) Oral every 12 hours  mycophenolate mofetil 500 milliGRAM(s) Oral two times a day  NIFEdipine XL 60 milliGRAM(s) Oral every 12 hours  nystatin    Suspension 958199 Unit(s) Oral four times a day  predniSONE   Tablet 5 milliGRAM(s) Oral daily  tamsulosin 0.4 milliGRAM(s) Oral at bedtime  trimethoprim   80 mG/sulfamethoxazole 400 mG 1 Tablet(s) Oral <User Schedule>    PRN Inpatient Medications    REVIEW OF SYSTEMS  --------------------------------------------------------------------------------  Gen: No fevers/chills  Respiratory: No dyspnea, cough,   CV: No chest pain, PND, orthopnea  GI: No abdominal pain, diarrhea, constipation, nausea, vomiting  Transplant: No pain  : No increased frequency, dysuria, hematuria   MSK: No edema  Neuro: No dizziness/lightheadedness    All other systems were reviewed and are negative, except as noted.    VITALS/PHYSICAL EXAM  --------------------------------------------------------------------------------  T(C): 36.6 (05-01-25 @ 13:20), Max: 36.9 (05-01-25 @ 05:15)  HR: 54 (05-01-25 @ 13:20) (51 - 62)  BP: 176/71 (05-01-25 @ 13:31) (145/61 - 181/76)  RR: 18 (05-01-25 @ 13:20) (18 - 18)  SpO2: 100% (05-01-25 @ 13:20) (100% - 100%)  Wt(kg): --    04-30-25 @ 07:01  -  05-01-25 @ 07:00  --------------------------------------------------------  IN: 730 mL / OUT: 1726 mL / NET: -996 mL    05-01-25 @ 07:01  -  05-01-25 @ 14:41  --------------------------------------------------------  IN: 0 mL / OUT: 420 mL / NET: -420 mL    Physical Exam:  	Gen: NAD, able to speak in full sentences   	HEENT: PERRL, MMM   	Pulm: CTA B/L, no crackles   	CV: RRR, S1S2+  	Abd: +BS, soft          Transplant: No tenderness, swelling  	: No suprapubic tenderness  	MSK: no edema   	Psych: Normal affect and mood  	Skin: Warm          Access:    LABS/STUDIES  --------------------------------------------------------------------------------              7.9    5.71  >-----------<  380      [05-01-25 @ 06:47]              25.5     141  |  105  |  73  ----------------------------<  102      [05-01-25 @ 06:45]  4.1   |  20  |  3.62        Ca     8.8     [05-01-25 @ 06:45]      Mg     1.8     [05-01-25 @ 06:45]      Phos  3.3     [05-01-25 @ 06:45]    TPro  6.4  /  Alb  3.7  /  TBili  0.2  /  DBili  x   /  AST  13  /  ALT  10  /  AlkPhos  73  [05-01-25 @ 06:45]    Creatinine Trend:  SCr 3.62 [05-01 @ 06:45]  SCr 4.41 [04-30 @ 06:58]  SCr 4.63 [04-29 @ 06:49]  SCr 4.74 [04-28 @ 14:12]  SCr 3.72 [04-26 @ 19:18]    Tacrolimus (), Serum: 5.2 ng/mL (05-01 @ 06:48)  Tacrolimus (), Serum: 6.0 ng/mL (04-30 @ 06:59)  Tacrolimus (), Serum: 1.7 ng/mL (04-29 @ 06:46)  Tacrolimus (), Serum: 6.7 ng/mL (04-26 @ 19:18)    Iron 67, TIBC 210, %sat 32      [04-21-25 @ 07:08]  Ferritin 1496      [04-21-25 @ 07:08]  PTH -- (Ca 7.9)      [12-30-24 @ 06:50]   229  PTH -- (Ca 7.6)      [11-27-24 @ 04:23]   250  Vitamin D (25OH) 17.1      [11-25-24 @ 06:50]  TSH 2.48      [04-13-25 @ 07:24]  Lipid: chol 112, TG 61, HDL 46, LDL --      [12-31-24 @ 04:27]    HBsAb 46.0      [04-07-25 @ 21:39]  HBsAg Nonreact      [04-07-25 @ 21:39]  HBcAb Nonreact      [04-07-25 @ 21:39]  HCV 0.05, Nonreact      [04-07-25 @ 21:39]  HIV Nonreact      [04-07-25 @ 21:39] [facility] DIVISION OF KIDNEY DISEASES AND HYPERTENSION -- FOLLOW UP NOTE  --------------------------------------------------------------------------------  Chief Complaint: DDRT    24 hour events/subjective: Pt. seen and examined, resting in bed. States the catheter is bothering him. No fever, chillls, CP, SOB, or LE swelling.    PAST HISTORY  --------------------------------------------------------------------------------  No significant changes to PMH, PSH, FHx, SHx, unless otherwise noted    ALLERGIES & MEDICATIONS  --------------------------------------------------------------------------------  Allergies    No Known Allergies    Intolerances    Standing Inpatient Medications  acyclovir   Oral Tab/Cap 200 milliGRAM(s) Oral two times a day  aspirin enteric coated 81 milliGRAM(s) Oral daily  atorvastatin 40 milliGRAM(s) Oral at bedtime  carvedilol 6.25 milliGRAM(s) Oral every 12 hours  chlorhexidine 2% Cloths 1 Application(s) Topical <User Schedule>  dextrose 5%. 1000 milliLiter(s) IV Continuous <Continuous>  dextrose 5%. 1000 milliLiter(s) IV Continuous <Continuous>  dextrose 50% Injectable 25 Gram(s) IV Push once  dextrose 50% Injectable 12.5 Gram(s) IV Push once  dextrose 50% Injectable 25 Gram(s) IV Push once  dextrose Oral Gel 15 Gram(s) Oral once  famotidine    Tablet 20 milliGRAM(s) Oral daily  glucagon  Injectable 1 milliGRAM(s) IntraMuscular once  heparin   Injectable 5000 Unit(s) SubCutaneous every 12 hours  hydrALAZINE 50 milliGRAM(s) Oral two times a day  insulin glargine Injectable (LANTUS) 6 Unit(s) SubCutaneous at bedtime  insulin lispro (ADMELOG) corrective regimen sliding scale   SubCutaneous three times a day before meals  insulin lispro (ADMELOG) corrective regimen sliding scale   SubCutaneous at bedtime  insulin lispro Injectable (ADMELOG) 4 Unit(s) SubCutaneous three times a day before meals  isosorbide   mononitrate ER Tablet (IMDUR) 30 milliGRAM(s) Oral daily  levothyroxine 25 MICROGram(s) Oral daily  linezolid    Tablet 600 milliGRAM(s) Oral every 12 hours  mycophenolate mofetil 500 milliGRAM(s) Oral two times a day  NIFEdipine XL 60 milliGRAM(s) Oral every 12 hours  nystatin    Suspension [id number] Unit(s) Oral four times a day  predniSONE   Tablet 5 milliGRAM(s) Oral daily  tamsulosin 0.4 milliGRAM(s) Oral at bedtime  trimethoprim   80 mG/sulfamethoxazole 400 mG 1 Tablet(s) Oral <User Schedule>    PRN Inpatient Medications    REVIEW OF SYSTEMS  --------------------------------------------------------------------------------  Gen: No fevers/chills  Skin: No rash  Head/Eyes/Ears/Mouth: No headache  Respiratory: No dyspnea  CV: No chest pain  GI: + abdominal pain   : +Alber  MSK: No edema  Neuro: No dizziness/lightheadedness  Heme: No easy bruising or bleeding  Endo: No heat/cold intolerance  Psych: No significant nervousness    All other systems were reviewed and are negative, except as noted.    VITALS/PHYSICAL EXAM  --------------------------------------------------------------------------------  T(C): 36.6 (05-01-25 @ 13:20), Max: 36.9 (05-01-25 @ 05:15)  HR: 54 (05-01-25 @ 13:20) (51 - 62)  BP: 176/71 (05-01-25 @ 13:31) (145/61 - 181/76)  RR: 18 (05-01-25 @ 13:20) (18 - 18)  SpO2: 100% (05-01-25 @ 13:20) (100% - 100%)  Wt(kg): --    04-30-25 @ 07:01  -  05-01-25 @ 07:00  --------------------------------------------------------  IN: 730 mL / OUT: 1726 mL / NET: -996 mL    05-01-25 @ 07:01  -  05-01-25 @ 14:41  --------------------------------------------------------  IN: 0 mL / OUT: 420 mL / NET: -420 mL    Physical Exam:  Gen: Not in distress, well-appearing  HEENT: No scelral icterus, moist oral mucosa  Pulm: Lungs clear to auscultation bilaterally   CV: regular rate and rhythm, S1 and S2 normal, no murmur   Abd: Tenderness near transplant site, +COSMO drain  : +Campo  Back: No spinal or CVA tenderness  Extremities: No edema. Distal pulses 2+ bilaterally   Skin: Warm  Neuro: Alert and oriented to person, place and time. Normal speech.   Access: [facility] TDC without signs of infection    LABS/STUDIES  --------------------------------------------------------------------------------              7.9    5.71  >-----------<  380      [05-01-25 @ 06:47]              25.5     141  |  105  |  73  ----------------------------<  102      [05-01-25 @ 06:45]  4.1   |  20  |  3.62        Ca     8.8     [05-01-25 @ 06:45]      Mg     1.8     [05-01-25 @ 06:45]      Phos  3.3     [05-01-25 @ 06:45]    TPro  6.4  /  Alb  3.7  /  TBili  0.2  /  DBili  x   /  AST  13  /  ALT  10  /  AlkPhos  73  [05-01-25 @ 06:45]    Creatinine Trend:  SCr 3.62 [05-01 @ 06:45]  SCr 4.41 [04-30 @ 06:58]  SCr 4.63 [04-29 @ 06:49]  SCr 4.74 [04-28 @ 14:12]  SCr 3.72 [04-26 @ 19:18]    Tacrolimus (), Serum: 5.2 ng/mL (05-01 @ 06:48)  Tacrolimus (), Serum: 6.0 ng/mL (04-30 @ 06:59)  Tacrolimus (), Serum: 1.7 ng/mL (04-29 @ 06:46)  Tacrolimus (), Serum: 6.7 ng/mL (04-26 @ 19:18)    Iron 67, TIBC 210, %sat 32      [04-21-25 @ 07:08]  Ferritin 1496      [04-21-25 @ 07:08]  PTH -- (Ca 7.9)      [12-30-24 @ 06:50]   229  PTH -- (Ca 7.6)      [11-27-24 @ 04:23]   250  Vitamin D (25OH) 17.1      [11-25-24 @ 06:50]  TSH 2.48      [04-13-25 @ 07:24]  Lipid: chol 112, TG 61, HDL 46, LDL --      [12-31-24 @ 04:27]    HBsAb 46.0      [04-07-25 @ 21:39]  HBsAg Nonreact      [04-07-25 @ 21:39]  HBcAb Nonreact      [04-07-25 @ 21:39]  HCV 0.05, Nonreact      [04-07-25 @ 21:39]  HIV Nonreact      [04-07-25 @ 21:39]

## 2025-05-01 NOTE — PROGRESS NOTE ADULT - SUBJECTIVE AND OBJECTIVE BOX
Follow Up:      Interval History:    REVIEW OF SYSTEMS  [  ] ROS unobtainable because:    [  ] All other systems negative except as noted below    Constitutional:  [ ] fever [ ] chills  [ ] weight loss  [ ] weakness  Skin:  [ ] rash [ ] phlebitis	  Eyes: [ ] icterus [ ] pain  [ ] discharge	  ENMT: [ ] sore throat  [ ] thrush [ ] ulcers [ ] exudates  Respiratory: [ ] dyspnea [ ] hemoptysis [ ] cough [ ] sputum	  Cardiovascular:  [ ] chest pain [ ] palpitations [ ] edema	  Gastrointestinal:  [ ] nausea [ ] vomiting [ ] diarrhea [ ] constipation [ ] pain	  Genitourinary:  [ ] dysuria [ ] frequency [ ] hematuria [ ] discharge [ ] flank pain  [ ] incontinence  Musculoskeletal:  [ ] myalgias [ ] arthralgias [ ] arthritis  [ ] back pain  Neurological:  [ ] headache [ ] seizures  [ ] confusion/altered mental status    Allergies  No Known Allergies        ANTIMICROBIALS:  acyclovir   Oral Tab/Cap 200 two times a day  linezolid    Tablet 600 every 12 hours  nystatin    Suspension [id number] four times a day  trimethoprim   80 mG/sulfamethoxazole 400 mG 1 <User Schedule>      OTHER MEDS:  MEDICATIONS  (STANDING):  aspirin enteric coated 81 daily  atorvastatin 40 at bedtime  carvedilol 6.25 every 12 hours  dextrose 50% Injectable 25 once  dextrose 50% Injectable 12.5 once  dextrose 50% Injectable 25 once  dextrose Oral Gel 15 once  famotidine    Tablet 20 daily  glucagon  Injectable 1 once  heparin   Injectable 5000 every 12 hours  hydrALAZINE 50 two times a day  insulin glargine Injectable (LANTUS) 6 at bedtime  insulin lispro (ADMELOG) corrective regimen sliding scale  three times a day before meals  insulin lispro (ADMELOG) corrective regimen sliding scale  at bedtime  insulin lispro Injectable (ADMELOG) 4 three times a day before meals  isosorbide   mononitrate ER Tablet (IMDUR) 30 daily  levothyroxine 25 daily  mycophenolate mofetil 500 two times a day  NIFEdipine XL 60 every 12 hours  predniSONE   Tablet 5 daily  tamsulosin 0.4 at bedtime      Vital Signs Last 24 Hrs  T(C): 36.4 (01 May 2025 09:33), Max: 36.9 (01 May 2025 05:15)  T(F): 97.6 (01 May 2025 09:33), Max: 98.5 (01 May 2025 05:15)  HR: 60 (01 May 2025 11:30) (51 - 62)  BP: 176/63 (01 May 2025 11:30) (145/61 - 181/76)  BP(mean): --  RR: 18 (01 May 2025 11:30) (18 - 18)  SpO2: 100% (01 May 2025 11:30) (100% - 100%)    Parameters below as of 01 May 2025 11:30  Patient On (Oxygen Delivery Method): room air        PHYSICAL EXAMINATION:  General: Alert and Awake, NAD  HEENT: PERRL, EOMI  Neck: Supple  Cardiac: RRR, No M/R/G  Resp: CTAB, No Wh/Rh/Ra  Abdomen: NBS, NT/ND, No HSM, No rigidity or guarding  MSK: No LE edema. No Calf tenderness  : No brand  Skin: No rashes or lesions. Skin is warm and dry to the touch.   Neuro: Alert and Awake. CN 2-12 Grossly intact. Moves all four extremities spontaneously.  Psych: Calm, Pleasant, Cooperative                          7.9    5.71  )-----------( 380      ( 01 May 2025 06:47 )             25.5       05-01    141  |  105  |  73[H]  ----------------------------<  102[H]  4.1   |  20[L]  |  3.62[H]    Ca    8.8      01 May 2025 06:45  Phos  3.3     05-  Mg     1.8     05    TPro  6.4  /  Alb  3.7  /  TBili  0.2  /  DBili  x   /  AST  13  /  ALT  10  /  AlkPhos  73  05-      Urinalysis Basic - ( 01 May 2025 12:05 )    Color: Yellow / Appearance: Clear / S.013 / pH: x  Gluc: x / Ketone: Negative mg/dL  / Bili: Negative / Urobili: 0.2 mg/dL   Blood: x / Protein: 30 mg/dL / Nitrite: Negative   Leuk Esterase: Negative / RBC: 8 /HPF / WBC 1 /HPF   Sq Epi: x / Non Sq Epi: 0 /HPF / Bacteria: Negative /HPF        MICROBIOLOGY:  v  Blood Blood-Peripheral  25   Growth in aerobic bottle: Staphylococcus epidermidis  Isolation of Coagulase negative Staphylococcus from single blood culture  sets may represent  contamination. Contact the Microbiology Department at [phone] if  susceptibility testing is needed.  clinically indicated.  Direct identification is available within approximately 3-5  hours either by Blood Panel Multiplexed PCR or Direct  MALDI-TOF. Details: [email or web address]  ANAEROBIC blood culture bottle turned positiveafter the final report was  released.  Growth in anaerobic bottle: Gram Positive Cocci in Clusters  --  Blood Culture PCR      Blood Blood-Peripheral  25   No growth at 48 Hours  --  --      Clean Catch Clean Catch (Midstream)  25   <10,000 CFU/mL Normal Urogenital Vickie  --  --      Blood Blood-Peripheral  25   No growth at 5 days  --  --                RADIOLOGY:    <The imaging below has been reviewed and visualized by me independently. Findings as detailed in report below> Follow Up: Positive cultures      Interval History:  Reports some improvement in pain  Denies acute complaints      REVIEW OF SYSTEMS  [  ] ROS unobtainable because:    [ x ] All other systems negative except as noted below    Constitutional:  [ ] fever [ ] chills  [ ] weight loss  [ ] weakness  Skin:  [ ] rash [ ] phlebitis	  Eyes: [ ] icterus [ ] pain  [ ] discharge	  ENMT: [ ] sore throat  [ ] thrush [ ] ulcers [ ] exudates  Respiratory: [ ] dyspnea [ ] hemoptysis [ ] cough [ ] sputum	  Cardiovascular:  [ ] chest pain [ ] palpitations [ ] edema	  Gastrointestinal:  [ ] nausea [ ] vomiting [ ] diarrhea [ ] constipation [ x] pain	  Genitourinary:  [ ] dysuria [ ] frequency [ ] hematuria [ ] discharge [ ] flank pain  [ ] incontinence  Musculoskeletal:  [ ] myalgias [ ] arthralgias [ ] arthritis  [ ] back pain  Neurological:  [ ] headache [ ] seizures  [ ] confusion/altered mental status    Allergies  No Known Allergies        ANTIMICROBIALS:  acyclovir   Oral Tab/Cap 200 two times a day  linezolid    Tablet 600 every 12 hours  nystatin    Suspension [id number] four times a day  trimethoprim   80 mG/sulfamethoxazole 400 mG 1 <User Schedule>      OTHER MEDS:  MEDICATIONS  (STANDING):  aspirin enteric coated 81 daily  atorvastatin 40 at bedtime  carvedilol 6.25 every 12 hours  dextrose 50% Injectable 25 once  dextrose 50% Injectable 12.5 once  dextrose 50% Injectable 25 once  dextrose Oral Gel 15 once  famotidine    Tablet 20 daily  glucagon  Injectable 1 once  heparin   Injectable 5000 every 12 hours  hydrALAZINE 50 two times a day  insulin glargine Injectable (LANTUS) 6 at bedtime  insulin lispro (ADMELOG) corrective regimen sliding scale  three times a day before meals  insulin lispro (ADMELOG) corrective regimen sliding scale  at bedtime  insulin lispro Injectable (ADMELOG) 4 three times a day before meals  isosorbide   mononitrate ER Tablet (IMDUR) 30 daily  levothyroxine 25 daily  mycophenolate mofetil 500 two times a day  NIFEdipine XL 60 every 12 hours  predniSONE   Tablet 5 daily  tamsulosin 0.4 at bedtime      Vital Signs Last 24 Hrs  T(C): 36.4 (01 May 2025 09:33), Max: 36.9 (01 May 2025 05:15)  T(F): 97.6 (01 May 2025 09:33), Max: 98.5 (01 May 2025 05:15)  HR: 60 (01 May 2025 11:30) (51 - 62)  BP: 176/63 (01 May 2025 11:30) (145/61 - 181/76)  BP(mean): --  RR: 18 (01 May 2025 11:30) (18 - 18)  SpO2: 100% (01 May 2025 11:30) (100% - 100%)    Parameters below as of 01 May 2025 11:30  Patient On (Oxygen Delivery Method): room air        PHYSICAL EXAMINATION:  General: Alert and Awake, NAD  HEENT: PERRL, EOMI  Neck: Supple  Cardiac: RRR, No M/R/G, hemodialysis catheter in right chest- area appears CDI  Resp: CTAB, No Wh/Rh/Ra  Abdomen: NBS, NT/ND, No rigidity or guarding, RLQ incision intact, x2 COSMO drains with output  MSK: No LE edema. No Calf tenderness  : + Campo  Skin: No rashes or lesions. Skin is warm and dry to the touch.   Neuro: Alert and Awake. CN 2-12 Grossly intact. Moves all four extremities spontaneously.  Psych: Calm, Pleasant, Cooperative                          7.9    5.71  )-----------( 380      ( 01 May 2025 06:47 )             25.5       05-    141  |  105  |  73[H]  ----------------------------<  102[H]  4.1   |  20[L]  |  3.62[H]    Ca    8.8      01 May 2025 06:45  Phos  3.3     05-  Mg     1.8     05-    TPro  6.4  /  Alb  3.7  /  TBili  0.2  /  DBili  x   /  AST  13  /  ALT  10  /  AlkPhos  73  05-      Urinalysis Basic - ( 01 May 2025 12:05 )    Color: Yellow / Appearance: Clear / S.013 / pH: x  Gluc: x / Ketone: Negative mg/dL  / Bili: Negative / Urobili: 0.2 mg/dL   Blood: x / Protein: 30 mg/dL / Nitrite: Negative   Leuk Esterase: Negative / RBC: 8 /HPF / WBC 1 /HPF   Sq Epi: x / Non Sq Epi: 0 /HPF / Bacteria: Negative /HPF        MICROBIOLOGY:  v  Blood Blood-Peripheral  25   Growth in aerobic bottle: Staphylococcus epidermidis  Isolation of Coagulase negative Staphylococcus from single blood culture  sets may represent  contamination. Contact the Microbiology Department at [phone] if  susceptibility testing is needed.  clinically indicated.  Direct identification is available within approximately 3-5  hours either by Blood Panel Multiplexed PCR or Direct  MALDI-TOF. Details: [email or web address]  ANAEROBIC blood culture bottle turned positiveafter the final report was  released.  Growth in anaerobic bottle: Gram Positive Cocci in Clusters  --  Blood Culture PCR      Blood Blood-Peripheral  25   No growth at 48 Hours  --  --      Clean Catch Clean Catch (Midstream)  25   <10,000 CFU/mL Normal Urogenital Vickie  --  --      Blood Blood-Peripheral  25   No growth at 5 days  --  --                RADIOLOGY:    <The imaging below has been reviewed and visualized by me independently. Findings as detailed in report below>        < from: CT Chest No Cont (25 @ 18:43) >  ACC: [id number] EXAM:  CT CHEST   ORDERED BY:  JOSS HERRING     PROCEDURE DATE:  2025          INTERPRETATION:  CLINICAL INFORMATION: Dizziness. Hypotension. History of   renal transplant    COMPARISON: CT chest 2025 and 10/27/2022. CT abdomen and pelvis   2025 and 2022    CONTRAST/COMPLICATIONS:  IV Contrast: NONE  Oral Contrast: NONE    PROCEDURE:  CT of the Chest was performed.  Sagittal and coronal reformats were performed.    FINDINGS:    LUNGS AND LARGE AIRWAYS: Patent central airways. Unchanged right lower   and middle lobe subpleural opacities with curvilinear opacities branching   suggestive of comet tail sign compatible with round atelectasis.   Unchanged 6 mm nodule adjacent to the left oblique fissure.  PLEURA:Unchanged right pleural thickening and trace right pleural   effusion.  VESSELS: Right-sided central venous catheter terminates in the right   atrium. Aortic and coronary calcifications.  HEART: Heart size is normal. No pericardial effusion. Aortic valvular   calcifications.  MEDIASTINUM AND JUSTINE: Unchanged small and mildly enlarged mediastinal   lymph nodes, some of which are calcified.  CHEST WALL AND LOWER NECK: Heterogeneous right thyroid lobe with   calcification unchanged when compared to prior CT chest dated 10/27/2022.   Left thyroid lobe is unremarkable.  VISUALIZED UPPER ABDOMEN: Bilateral atrophic native kidneys. Unchanged   left renal cyst and a small exophytic left renal lesion that cannot be   further characterized on this study. Peripheral calcifications in the   spleen.  BONES: Mild degenerative changes.    IMPRESSION:    Unchanged trace right pleural effusion, pleural thickening, and   associated round atelectasis of the right lower and middle lobes.    Unchanged 6 mm nodule adjacent to the left oblique fissure.    No pneumonia.    --- End of Report ---      < end of copied text >

## 2025-05-01 NOTE — CONSULT NOTE ADULT - ASSESSMENT
69y male with a past medical history/ocular history of *** consulted for vision loss, found to have ***    Seen and discussed with ***.    Outpatient Follow-up: Patient should follow-up with his/her ophthalmologist or with [facility] Department of Ophthalmology within 1 week of after discharge at:    [address]  [place] [address]  [phone]    Juan English MD, PGY-3  Also available on Microsoft Teams     69y male with a past medical history/ocular history of HTN and DM consulted for R vision loss, found to have chronic unchanged R vision loss.     #Diabetic Retinopathy OD > OS s/p PRP  - VA CF OD, 20/30 OS; with APD OD; IOP OD mildly elevated to 25 OD, OS wnl; EOMs full and intact; VF unable OS, full OS; Color plates unable OD, full OS.  - Right vision loss likely 2/2 chronic diabetic retinopathy given hx of DM and retinal scars in peripherally s/p PRP. Patient's daughter in law shares that he has had chronic right vision loss for years and lost follow up with his retina specialist.    - No intervention needed at this time.   - Patient can follow up outpatient after dc for chronic management.    Seen and discussed with Dr. English PGY 3.     Outpatient Follow-up: Patient should follow-up with his/her ophthalmologist or with [facility] Department of Ophthalmology within 1 week of after discharge at:    [address]  [place], [place] [id number] [phone]    Pippa Bean MD, PGY-1  Juan English MD, PGY-3  Also available on Microsoft Teams 69y male with a past medical history/ocular history of HTN and DM consulted for R vision loss, found to have chronic unchanged R vision loss.     #Diabetic Retinopathy OU  #Prior CRVO OD  - VA CF OD, 20/30 OS; with APD OD; IOP OD mildly elevated to 25 OD, OS wnl; EOMs full and intact; VF unable OD, full OS; Color plates unable OD, full OS.  - Right vision loss in the right eye 2/2 prior CRVO event, confirmed by pt's daughter in law  - No ophtho intervention needed at this time.   - Patient can follow up outpatient after dc for chronic management.    Seen and discussed with Dr. English PGY 3.   Discussed with Dr. Tillman, consult attending.    Outpatient Follow-up: Patient should follow-up with his/her ophthalmologist or with [facility] Department of Ophthalmology within 1 week of after discharge at:    [address]  [place] [address] [phone]    Pippa Bean MD, PGY-1  Juan English MD, PGY-3  Also available on [employer] Teams

## 2025-05-01 NOTE — PROGRESS NOTE ADULT - SUBJECTIVE AND OBJECTIVE BOX
Transplant Surgery - Multidisciplinary Rounds  --------------------------------------------------------------      Patient seen and evaluated by multidisciplinary team including Surgeon Dr Little, Nephrologist Dr Lopes, Nephrology/surgery fellow, NP Bryson, Pharmacist Venice, PA Student Gabrielle, and bedside RN. All members not present will be notified of the plan.      HPI: 70 y/o M with PMHx of HLD, CAD s/p LAD PCI 7/24, HFpEF , HTN, hypothyroid, type 2 DM, h/o cva with no residual deficits, persistent right sided pleural effusion, and hx of hemorrhagic pericardial effusion (cytopathology negative), ESRD on HD MWF (recently converted from PD in 1/2025) via permacath placed on 1/2025, now s/p DDRT on 4/8/25, c/b HTN requiring 2 SICU admissions who presents to [facility] w/ cough+ hypotension    Interval Events:  - afebrile, hypertensive, inc hydral dosing  - COSMO cr 10 yesterday brand placed for possible leak      Immunosupression:  Induction: Thymo  Maintenance: FK by level/ BID/SST  Ongoing monitoring for signs of rejection     Potential Discharge date: TBD  Education:  Medications  Plan of care:  See Below     MEDICATIONS  (STANDING):  acyclovir   Oral Tab/Cap 200 milliGRAM(s) Oral two times a day  aspirin enteric coated 81 milliGRAM(s) Oral daily  atorvastatin 40 milliGRAM(s) Oral at bedtime  carvedilol 6.25 milliGRAM(s) Oral every 12 hours  chlorhexidine 2% Cloths 1 Application(s) Topical <User Schedule>  dextrose 5%. 1000 milliLiter(s) (100 mL/Hr) IV Continuous <Continuous>  dextrose 5%. 1000 milliLiter(s) (50 mL/Hr) IV Continuous <Continuous>  dextrose 50% Injectable 25 Gram(s) IV Push once  dextrose 50% Injectable 12.5 Gram(s) IV Push once  dextrose 50% Injectable 25 Gram(s) IV Push once  dextrose Oral Gel 15 Gram(s) Oral once  famotidine    Tablet 20 milliGRAM(s) Oral daily  glucagon  Injectable 1 milliGRAM(s) IntraMuscular once  heparin   Injectable 5000 Unit(s) SubCutaneous every 12 hours  hydrALAZINE 50 milliGRAM(s) Oral two times a day  insulin glargine Injectable (LANTUS) 6 Unit(s) SubCutaneous at bedtime  insulin lispro (ADMELOG) corrective regimen sliding scale   SubCutaneous three times a day before meals  insulin lispro (ADMELOG) corrective regimen sliding scale   SubCutaneous at bedtime  insulin lispro Injectable (ADMELOG) 4 Unit(s) SubCutaneous three times a day before meals  isosorbide   mononitrate ER Tablet (IMDUR) 30 milliGRAM(s) Oral daily  levothyroxine 25 MICROGram(s) Oral daily  linezolid    Tablet 600 milliGRAM(s) Oral every 12 hours  mycophenolate mofetil 500 milliGRAM(s) Oral two times a day  NIFEdipine XL 60 milliGRAM(s) Oral every 12 hours  nystatin    Suspension 042384 Unit(s) Oral four times a day  predniSONE   Tablet 5 milliGRAM(s) Oral daily  tamsulosin 0.4 milliGRAM(s) Oral at bedtime  trimethoprim   80 mG/sulfamethoxazole 400 mG 1 Tablet(s) Oral <User Schedule>    MEDICATIONS  (PRN):      PAST MEDICAL & SURGICAL HISTORY:  Hypertension      ESRD on peritoneal dialysis      CVA (cerebrovascular accident)  2010 without residual effects      Hyperlipidemia      BPH (benign prostatic hyperplasia)      CAD (coronary artery disease)      T2DM (type 2 diabetes mellitus)      Hypothyroidism      History of peritoneal dialysis  s/p PD catheter placement      S/P coronary artery stent placement          Vital Signs Last 24 Hrs  T(C): 36.6 (01 May 2025 16:16), Max: 36.9 (01 May 2025 05:15)  T(F): 97.8 (01 May 2025 16:16), Max: 98.5 (01 May 2025 05:15)  HR: 51 (01 May 2025 16:16) (51 - 62)  BP: 151/63 (01 May 2025 16:16) (145/61 - 181/76)  BP(mean): --  RR: 18 (01 May 2025 16:16) (18 - 18)  SpO2: 100% (01 May 2025 16:16) (100% - 100%)    Parameters below as of 01 May 2025 16:16  Patient On (Oxygen Delivery Method): room air        I&O's Summary    30 Apr 2025 07:01  -  01 May 2025 07:00  --------------------------------------------------------  IN: 730 mL / OUT: 1726 mL / NET: -996 mL    01 May 2025 07:01  -  01 May 2025 16:37  --------------------------------------------------------  IN: 0 mL / OUT: 420 mL / NET: -420 mL                              7.9    5.71  )-----------( 380      ( 01 May 2025 06:47 )             25.5     05-01    141  |  105  |  73[H]  ----------------------------<  102[H]  4.1   |  20[L]  |  3.62[H]    Ca    8.8      01 May 2025 06:45  Phos  3.3     05-01  Mg     1.8     05-01    TPro  6.4  /  Alb  3.7  /  TBili  0.2  /  DBili  x   /  AST  13  /  ALT  10  /  AlkPhos  73  05-01    Tacrolimus (), Serum: 5.2 ng/mL (05-01 @ 06:48)        Urinalysis with Rflx Culture (collected 04-28-25 @ 15:34)    Culture - Blood (collected 04-28-25 @ 13:40)  Source: Blood Blood-Peripheral  Gram Stain (04-30-25 @ 18:43):    Growth in aerobic bottle: Gram Positive Cocci in Clusters    Growth in anaerobic bottle: Gram Positive Cocci in Clusters  Final Report (05-01-25 @ 14:50):    Growth in aerobic and anaerobic bottles: Staphylococcus epidermidis    Isolation of Coagulase negative Staphylococcus from single blood culture    sets may represent    contamination. Contact the Microbiology Department at [phone] if    susceptibilitytesting is needed.    clinically indicated.    Direct identification is available within approximately 3-5    hours either by Blood Panel Multiplexed PCR or Direct    MALDI-TOF. Details: [email or web address]    ANAEROBIC blood culture bottle turned positive after the final report was    released.  Organism: Blood Culture PCR (04-30-25 @ 18:46)  Organism: Blood Culture PCR (04-30-25 @ 14:21)    Culture - Blood (collected 04-28-25 @ 13:30)  Source: Blood Blood-Peripheral  Preliminary Report (04-30-25 @ 18:01):    No growth at 48 Hours    Culture - Urine (collected 04-26-25 @ 22:39)  Source: Clean Catch Clean Catch (Midstream)  Final Report (04-27-25 @ 23:45):    <10,000 CFU/mL Normal Urogenital Vickie    NO chest pain, SOB, GERMAIN, PND, orthopnea, palpitations, diaphoresis, lightheadedness, dizziness, syncope, increased lowr extremity edema, fever chills, malaise, myalgias, anorexia, weight changes ( loss or gain), nightsweats, generalized fatigue abdominal pain, N/V/C/D BRBPR, melena, urinary symptoms, cough, and wheezing.  Health Care Proxy=  Primary Care doctor=:  All other systems were reviewed and are negative, except as noted.      PHYSICAL EXAM:  Constitutional: Well developed / well nourished  Eyes: Anicteric, PERRLA  ENMT: nc/at  Neck: supple   Respiratory: CTA B/L  Cardiovascular: RRR  Gastrointestinal: Soft, non distended, nontender. JPx2 ss   Genitourinary: voiding spontaneously   Extremities: warm b/l upper and lower, no edema  Vascular: Palpable dp pulses bilaterally  Neurological: A&O x3  Skin: no rashes, ulcerations or lesions;  Musculoskeletal: Moving all extremities  Psychiatric: Responsive

## 2025-05-01 NOTE — PROGRESS NOTE ADULT - ASSESSMENT
70 y/o M with PMHx of HLD, CAD s/p LAD PCI 7/24, HFpEF , HTN, hypothyroid, type 2 DM, h/o cva with no residual deficits, persistent right sided pleural effusion, and hx of hemorrhagic pericardial effusion (cytopathology negative), ESRD on HD MWF (recently converted from PD in 1/2025) via permacath placed on 1/2025, now s/p DDRT on 4/8/25, c/b HTN requiring 2 SICU admissions. Patient presented to the ED urgently from outpatient. States he was having output from drain area and stitch was placed in the outpatient office. Patient afterward had Hypotension episode to 80s/50s and on ED admit has pain from drain site when drains are being manipulated. Denies any other sx aside from a dry cough.      A/P  s/p DDRT 4/2024 with F   Nephrologist is Dr. Menjivar  Was PD then converted to HD, has permacath  Was on HD prior to admission now off HD  SCR downtrending off HD  HD and IS as per transplant   On envarsus 5 mg qd w/ belatacept, next dose 5/8  On cellcept 500 mg bid and prednisone 5 mg daily   Monitor UO and bmp   Planned for anterior post transplant renal collection aspiration via iR tomm    HTN/Hypotension  BP fluctuating; suboptimal  BP meds was on hold  Now on nifedipine 60 mg bid, coreg 6.25 mg bid  Monitor bp     Hypokalemia   Supplemented with 40 meq KCl   Monitor K     Anemia  On epo   Transfuse prn to keep hgb >8  Monitor hgb

## 2025-05-01 NOTE — PROGRESS NOTE ADULT - SUBJECTIVE AND OBJECTIVE BOX
[facility]    Dr. Tiara Garcia     Office [phone] (9 am to 5 pm)  Service: [phone] (5pm to 9am)  Also Available on TEAMS      RENAL PROGRESS NOTE: DATE OF SERVICE 05-01-25 @ 14:24    Patient is a 69y old  Male who presents with a chief complaint of s/p DDRT, readmit w/ hypotension & c/f sepsis (01 May 2025 13:04)      Patient seen and examined at bedside. No chest pain/sob    VITALS:  T(F): 97.8 (05-01-25 @ 13:20), Max: 98.5 (05-01-25 @ 05:15)  HR: 54 (05-01-25 @ 13:20)  BP: 176/71 (05-01-25 @ 13:31)  RR: 18 (05-01-25 @ 13:20)  SpO2: 100% (05-01-25 @ 13:20)  Wt(kg): --    04-30 @ 07:01  -  05-01 @ 07:00  --------------------------------------------------------  IN: 730 mL / OUT: 1726 mL / NET: -996 mL    05-01 @ 07:01  -  05-01 @ 14:24  --------------------------------------------------------  IN: 0 mL / OUT: 420 mL / NET: -420 mL          PHYSICAL EXAM:  Constitutional: NAD  Neck: No JVD  Respiratory: CTAB, no wheezes, rales or rhonchi  Cardiovascular: S1, S2, RRR  Gastrointestinal: BS+, soft, NT/ND  Extremities: No peripheral edema    Hospital Medications:   MEDICATIONS  (STANDING):  acyclovir   Oral Tab/Cap 200 milliGRAM(s) Oral two times a day  aspirin enteric coated 81 milliGRAM(s) Oral daily  atorvastatin 40 milliGRAM(s) Oral at bedtime  carvedilol 6.25 milliGRAM(s) Oral every 12 hours  chlorhexidine 2% Cloths 1 Application(s) Topical <User Schedule>  dextrose 5%. 1000 milliLiter(s) (100 mL/Hr) IV Continuous <Continuous>  dextrose 5%. 1000 milliLiter(s) (50 mL/Hr) IV Continuous <Continuous>  dextrose 50% Injectable 25 Gram(s) IV Push once  dextrose 50% Injectable 12.5 Gram(s) IV Push once  dextrose 50% Injectable 25 Gram(s) IV Push once  dextrose Oral Gel 15 Gram(s) Oral once  famotidine    Tablet 20 milliGRAM(s) Oral daily  glucagon  Injectable 1 milliGRAM(s) IntraMuscular once  heparin   Injectable 5000 Unit(s) SubCutaneous every 12 hours  hydrALAZINE 50 milliGRAM(s) Oral two times a day  insulin glargine Injectable (LANTUS) 6 Unit(s) SubCutaneous at bedtime  insulin lispro (ADMELOG) corrective regimen sliding scale   SubCutaneous three times a day before meals  insulin lispro (ADMELOG) corrective regimen sliding scale   SubCutaneous at bedtime  insulin lispro Injectable (ADMELOG) 4 Unit(s) SubCutaneous three times a day before meals  isosorbide   mononitrate ER Tablet (IMDUR) 30 milliGRAM(s) Oral daily  levothyroxine 25 MICROGram(s) Oral daily  linezolid    Tablet 600 milliGRAM(s) Oral every 12 hours  mycophenolate mofetil 500 milliGRAM(s) Oral two times a day  NIFEdipine XL 60 milliGRAM(s) Oral every 12 hours  nystatin    Suspension 587774 Unit(s) Oral four times a day  predniSONE   Tablet 5 milliGRAM(s) Oral daily  tamsulosin 0.4 milliGRAM(s) Oral at bedtime  trimethoprim   80 mG/sulfamethoxazole 400 mG 1 Tablet(s) Oral <User Schedule>    Tacrolimus (), Serum: 5.2 ng/mL (05-01 @ 06:48)    LABS:  05-01    141  |  105  |  73[H]  ----------------------------<  102[H]  4.1   |  20[L]  |  3.62[H]    Ca    8.8      01 May 2025 06:45  Phos  3.3     05-01  Mg     1.8     05-01    TPro  6.4  /  Alb  3.7  /  TBili  0.2  /  DBili      /  AST  13  /  ALT  10  /  AlkPhos  73  05-01    Creatinine Trend: 3.62 <--, 4.41 <--, 4.63 <--, 4.74 <--, 3.72 <--    Albumin: 3.7 g/dL (05-01 @ 06:45)  Phosphorus: 3.3 mg/dL (05-01 @ 06:45)                              7.9    5.71  )-----------( 380      ( 01 May 2025 06:47 )             25.5     Urine Studies:  Urinalysis - [05-01-25 @ 12:05]      Color Yellow / Appearance Clear / SG 1.013 / pH 7.0      Gluc 100 / Ketone Negative  / Bili Negative / Urobili 0.2       Blood Trace / Protein 30 / Leuk Est Negative / Nitrite Negative      RBC 8 / WBC 1 / Hyaline  / Gran  / Sq Epi  / Non Sq Epi 0 / Bacteria Negative      Iron 67, TIBC 210, %sat 32      [04-21-25 @ 07:08]  Ferritin 1496      [04-21-25 @ 07:08]  PTH -- (Ca 7.9)      [12-30-24 @ 06:50]   229  PTH -- (Ca 7.6)      [11-27-24 @ 04:23]   250  Vitamin D (25OH) 17.1      [11-25-24 @ 06:50]  TSH 2.48      [04-13-25 @ 07:24]  Lipid: chol 112, TG 61, HDL 46, LDL --      [12-31-24 @ 04:27]    HBsAb 46.0      [04-07-25 @ 21:39]  HBsAg Nonreact      [04-07-25 @ 21:39]  HBcAb Nonreact      [04-07-25 @ 21:39]  HCV 0.05, Nonreact      [04-07-25 @ 21:39]  HIV Nonreact      [04-07-25 @ 21:39]      RADIOLOGY & ADDITIONAL STUDIES:

## 2025-05-01 NOTE — CONSULT NOTE ADULT - SUBJECTIVE AND OBJECTIVE BOX
[facility] DEPARTMENT OF OPHTHALMOLOGY - INITIAL ADULT CONSULT  ----------------------------------------------------------------------------------------------------  Pippa Bean MD PGY-1  Available on teams  ----------------------------------------------------------------------------------------------------  note started prior to seeing pt*****    HPI:  70 y/o M with PMHx of HLD, CAD s/p LAD PCI 7/24, HFpEF , HTN, hypothyroid, type 2 DM, h/o cva with no residual deficits, persistent right sided pleural effusion, and hx of hemorrhagic pericardial effusion (cytopathology negative), ESRD on HD MWF (recently converted from PD in 1/2025) via permacath placed on 1/2025, now s/p DDRT on 4/8/25, c/b HTN requiring 2 SICU admissions.     Patient presented to the ED urgently from outpatient. States he was having output from drain area and stitch was placed in the outpatient office. Patient afterward had Hypotension episode to 80s/50s and on ED admit has pain from drain site when drains are being manipulated. Denies any other sx aside from a dry cough.      (28 Apr 2025 20:27)    Interval History: ***    PAST MEDICAL & SURGICAL HISTORY:  Hypertension      ESRD on peritoneal dialysis      CVA (cerebrovascular accident)  2010 without residual effects      Hyperlipidemia      BPH (benign prostatic hyperplasia)      CAD (coronary artery disease)      T2DM (type 2 diabetes mellitus)      Hypothyroidism      History of peritoneal dialysis  s/p PD catheter placement      S/P coronary artery stent placement        Past Ocular History: ***  Ophthalmic Medications: ***  FAMILY HISTORY:  FH: HTN (hypertension) (Mother, Father)    FH: type 2 diabetes (Mother, Father)      Social History: ***    MEDICATIONS  (STANDING):  acyclovir   Oral Tab/Cap 200 milliGRAM(s) Oral two times a day  aspirin enteric coated 81 milliGRAM(s) Oral daily  atorvastatin 40 milliGRAM(s) Oral at bedtime  carvedilol 6.25 milliGRAM(s) Oral every 12 hours  dextrose 5%. 1000 milliLiter(s) (100 mL/Hr) IV Continuous <Continuous>  dextrose 5%. 1000 milliLiter(s) (50 mL/Hr) IV Continuous <Continuous>  dextrose 50% Injectable 25 Gram(s) IV Push once  dextrose 50% Injectable 12.5 Gram(s) IV Push once  dextrose 50% Injectable 25 Gram(s) IV Push once  dextrose Oral Gel 15 Gram(s) Oral once  famotidine    Tablet 20 milliGRAM(s) Oral daily  glucagon  Injectable 1 milliGRAM(s) IntraMuscular once  heparin   Injectable 5000 Unit(s) SubCutaneous every 12 hours  hydrALAZINE 50 milliGRAM(s) Oral two times a day  hydrALAZINE 25 milliGRAM(s) Oral once  insulin glargine Injectable (LANTUS) 6 Unit(s) SubCutaneous at bedtime  insulin lispro (ADMELOG) corrective regimen sliding scale   SubCutaneous three times a day before meals  insulin lispro (ADMELOG) corrective regimen sliding scale   SubCutaneous at bedtime  insulin lispro Injectable (ADMELOG) 4 Unit(s) SubCutaneous three times a day before meals  isosorbide   mononitrate ER Tablet (IMDUR) 30 milliGRAM(s) Oral daily  levothyroxine 25 MICROGram(s) Oral daily  linezolid    Tablet 600 milliGRAM(s) Oral every 12 hours  magnesium sulfate  IVPB 2 Gram(s) IV Intermittent once  multiple electrolytes Injection Type 1 1000 milliLiter(s) (75 mL/Hr) IV Continuous <Continuous>  mycophenolate mofetil 500 milliGRAM(s) Oral two times a day  NIFEdipine XL 60 milliGRAM(s) Oral every 12 hours  nystatin    Suspension 914121 Unit(s) Oral four times a day  predniSONE   Tablet 5 milliGRAM(s) Oral daily  tacrolimus ER Tablet (ENVARSUS XR) 4 milliGRAM(s) Oral once  tamsulosin 0.4 milliGRAM(s) Oral at bedtime  trimethoprim   80 mG/sulfamethoxazole 400 mG 1 Tablet(s) Oral <User Schedule>    MEDICATIONS  (PRN):    Allergies & Intolerances:   No Known Allergies    Review of Systems:  Constitutional: No fever, chills  Eyes: No blurry vision, flashes, floaters, FBS, erythema, discharge, double vision, OU  Neuro: No tremors  Cardiovascular: No chest pain, palpitations  Respiratory: No SOB, no cough  GI: No nausea, vomiting, abdominal pain  : No dysuria  Skin: no rash  Psych: no depression  Endocrine: no polyuria, polydipsia  Heme/lymph: no swelling    VITALS: T(C): 36.4 (05-01-25 @ 09:33)  T(F): 97.6 (05-01-25 @ 09:33), Max: 98.5 (05-01-25 @ 05:15)  HR: 52 (05-01-25 @ 09:33) (51 - 62)  BP: 181/71 (05-01-25 @ 09:33) (145/61 - 181/76)  RR:  (18 - 18)  SpO2:  (100% - 100%)  Wt(kg): --  General: AAO x 3, appropriate mood and affect    note started prior to seeing pt*****    Ophthalmology Exam:  Visual acuity (sc): 20/20 OD, 20/20 OS  Pupils: PERRL OU, no APD  Ttono: 16 OD, 16 OS  Extraocular movements (EOMs): Full OU, no pain, no diplopia  Confrontational Visual Field (CVF): Full OU  Color Plates: 12/12 OD, 12/12 OS    Pen Light Exam (PLE)  External: Flat OU  Lids/Lashes/Lacrimal Ducts: Flat OU    Sclera/Conjunctiva: W+Q OU  Cornea: Cl OU  Anterior Chamber: D+F OU    Iris: Flat OU  Lens: Cl OU    Fundus Exam: dilated with 1% tropicamide and 2.5% phenylephrine  Approval obtained from *** at *** for dilation  Patient aware that pupils can remained dilated for at least 4-6 hours  Exam performed with 20D lens    Vitreous: wnl OU  Disc, cup/disc: sharp and pink, 0.4 OU  Macula: wnl OU  Vessels: wnl OU  Periphery: wnl OU    Labs/Imaging:  ***   [facility] DEPARTMENT OF OPHTHALMOLOGY - INITIAL ADULT CONSULT  ----------------------------------------------------------------------------------------------------  Pippa Bean MD PGY-1  Available on teams  ----------------------------------------------------------------------------------------------------    HPI:  68 y/o M with PMHx of HLD, CAD s/p LAD PCI 7/24, HFpEF , HTN, hypothyroid, type 2 DM, h/o cva with no residual deficits, persistent right sided pleural effusion, and hx of hemorrhagic pericardial effusion (cytopathology negative), ESRD on HD MWF (recently converted from PD in 1/2025) via permacath placed on 1/2025, now s/p DDRT on 4/8/25, c/b HTN requiring 2 SICU admissions.     Patient presented to the ED urgently from outpatient. States he was having output from drain area and stitch was placed in the outpatient office. Patient afterward had Hypotension episode to 80s/50s and on ED admit has pain from drain site when drains are being manipulated. Denies any other sx aside from a dry cough.  (28 Apr 2025 20:27)    Interval History: Patient states he has had decreased in vision for 1 month and denies any new changes, pain, discharge, swelling or any other complaints. Per pt's daughter, patient has had vision loss for the past couple years and was followed by a retina specialist prior to moving to [place].     PAST MEDICAL & SURGICAL HISTORY:  Hypertension  ESRD on peritoneal dialysis  CVA (cerebrovascular accident)  2010 without residual effects  Hyperlipidemia  BPH (benign prostatic hyperplasia)  CAD (coronary artery disease)  T2DM (type 2 diabetes mellitus)  Hypothyroidism    History of peritoneal dialysis  s/p PD catheter placement      S/P coronary artery stent placement    Past Ocular History: diabetic retinopathy s/p PRP  Ophthalmic Medications: denies  FAMILY HISTORY:   FH: HTN (hypertension) (Mother, Father)    FH: type 2 diabetes (Mother, Father)      MEDICATIONS  (STANDING):  acyclovir   Oral Tab/Cap 200 milliGRAM(s) Oral two times a day  aspirin enteric coated 81 milliGRAM(s) Oral daily  atorvastatin 40 milliGRAM(s) Oral at bedtime  carvedilol 6.25 milliGRAM(s) Oral every 12 hours  dextrose 5%. 1000 milliLiter(s) (100 mL/Hr) IV Continuous <Continuous>  dextrose 5%. 1000 milliLiter(s) (50 mL/Hr) IV Continuous <Continuous>  dextrose 50% Injectable 25 Gram(s) IV Push once  dextrose 50% Injectable 12.5 Gram(s) IV Push once  dextrose 50% Injectable 25 Gram(s) IV Push once  dextrose Oral Gel 15 Gram(s) Oral once  famotidine    Tablet 20 milliGRAM(s) Oral daily  glucagon  Injectable 1 milliGRAM(s) IntraMuscular once  heparin   Injectable 5000 Unit(s) SubCutaneous every 12 hours  hydrALAZINE 50 milliGRAM(s) Oral two times a day  hydrALAZINE 25 milliGRAM(s) Oral once  insulin glargine Injectable (LANTUS) 6 Unit(s) SubCutaneous at bedtime  insulin lispro (ADMELOG) corrective regimen sliding scale   SubCutaneous three times a day before meals  insulin lispro (ADMELOG) corrective regimen sliding scale   SubCutaneous at bedtime  insulin lispro Injectable (ADMELOG) 4 Unit(s) SubCutaneous three times a day before meals  isosorbide   mononitrate ER Tablet (IMDUR) 30 milliGRAM(s) Oral daily  levothyroxine 25 MICROGram(s) Oral daily  linezolid    Tablet 600 milliGRAM(s) Oral every 12 hours  magnesium sulfate  IVPB 2 Gram(s) IV Intermittent once  multiple electrolytes Injection Type 1 1000 milliLiter(s) (75 mL/Hr) IV Continuous <Continuous>  mycophenolate mofetil 500 milliGRAM(s) Oral two times a day  NIFEdipine XL 60 milliGRAM(s) Oral every 12 hours  nystatin    Suspension 100757 Unit(s) Oral four times a day  predniSONE   Tablet 5 milliGRAM(s) Oral daily  tacrolimus ER Tablet (ENVARSUS XR) 4 milliGRAM(s) Oral once  tamsulosin 0.4 milliGRAM(s) Oral at bedtime  trimethoprim   80 mG/sulfamethoxazole 400 mG 1 Tablet(s) Oral <User Schedule>    MEDICATIONS  (PRN):    Allergies & Intolerances:   No Known Allergies    Review of Systems:  Constitutional: No fever, chills  Eyes: +blurry vision, no flashes, floaters, FBS, erythema, discharge, double vision, OU  Neuro: No tremors  Cardiovascular: No chest pain, palpitations  Respiratory: No SOB, no cough  GI: No nausea, vomiting, abdominal pain  : No dysuria  Skin: no rash  Psych: no depression  Endocrine: no polyuria, polydipsia  Heme/lymph: no swelling    VITALS: T(C): 36.4 (05-01-25 @ 09:33)  T(F): 97.6 (05-01-25 @ 09:33), Max: 98.5 (05-01-25 @ 05:15)  HR: 52 (05-01-25 @ 09:33) (51 - 62)  BP: 181/71 (05-01-25 @ 09:33) (145/61 - 181/76)  RR:  (18 - 18)  SpO2:  (100% - 100%)  Wt(kg): --  General: AAO x 3, appropriate mood and affect    Ophthalmology Exam:  Visual acuity (sc): CF OD, 20/30 OS  Pupils: surgically fixed pupil, not reactive OD, round and reactive to light OS  Ttono: 25 OD, 11 OS  Extraocular movements (EOMs): Full OU, no pain, no diplopia  Confrontational Visual Field (CVF): OD unable, OS full  Color Plates: OD unable, 12/12 OS    Pen Light Exam (PLE)  External: Flat OU  Lids/Lashes/Lacrimal Ducts: Flat OU    Sclera/Conjunctiva: W+Q OU  Cornea: Cl OU  Anterior Chamber: D+F OU    Iris: Flat OU  Lens: Cl OU    Fundus Exam: dilated with 1% tropicamide and 2.5% phenylephrine  Approval obtained from self for dilation  Patient aware that pupils can remained dilated for at least 4-6 hours  Exam performed with 20D lens    Vitreous: wnl OU  Disc, cup/disc: Nerve pallor OD, 0.4 OU  Macula: exudates OS  Vessels: ischemic vessels OD, Tortuous OS  Periphery: PRP scars OD, PRP scars with MA and exudates OS     [facility] DEPARTMENT OF OPHTHALMOLOGY - INITIAL ADULT CONSULT  ----------------------------------------------------------------------------------------------------  Pippa Bean MD PGY-1  Available on teams  ----------------------------------------------------------------------------------------------------    HPI:  68 y/o M with PMHx of HLD, CAD s/p LAD PCI 7/24, HFpEF , HTN, hypothyroid, type 2 DM, h/o cva with no residual deficits, persistent right sided pleural effusion, and hx of hemorrhagic pericardial effusion (cytopathology negative), ESRD on HD MWF (recently converted from PD in 1/2025) via permacath placed on 1/2025, now s/p DDRT on 4/8/25, c/b HTN requiring 2 SICU admissions.     Patient presented to the ED urgently from outpatient. States he was having output from drain area and stitch was placed in the outpatient office. Patient afterward had Hypotension episode to 80s/50s and on ED admit has pain from drain site when drains are being manipulated. Denies any other sx aside from a dry cough.  (28 Apr 2025 20:27)    Interval History: Patient states he has had decreased in vision for 1 month and denies any new changes, pain, discharge, swelling or any other complaints. Per pt's daughter, patient has had vision loss for the past couple years and was followed by a retina specialist prior to moving to [place].     PAST MEDICAL & SURGICAL HISTORY:  Hypertension  ESRD on peritoneal dialysis  CVA (cerebrovascular accident)  2010 without residual effects  Hyperlipidemia  BPH (benign prostatic hyperplasia)  CAD (coronary artery disease)  T2DM (type 2 diabetes mellitus)  Hypothyroidism    History of peritoneal dialysis  s/p PD catheter placement      S/P coronary artery stent placement    Past Ocular History: diabetic retinopathy s/p PRP  Ophthalmic Medications: denies  FAMILY HISTORY:   FH: HTN (hypertension) (Mother, Father)    FH: type 2 diabetes (Mother, Father)      MEDICATIONS  (STANDING):  acyclovir   Oral Tab/Cap 200 milliGRAM(s) Oral two times a day  aspirin enteric coated 81 milliGRAM(s) Oral daily  atorvastatin 40 milliGRAM(s) Oral at bedtime  carvedilol 6.25 milliGRAM(s) Oral every 12 hours  dextrose 5%. 1000 milliLiter(s) (100 mL/Hr) IV Continuous <Continuous>  dextrose 5%. 1000 milliLiter(s) (50 mL/Hr) IV Continuous <Continuous>  dextrose 50% Injectable 25 Gram(s) IV Push once  dextrose 50% Injectable 12.5 Gram(s) IV Push once  dextrose 50% Injectable 25 Gram(s) IV Push once  dextrose Oral Gel 15 Gram(s) Oral once  famotidine    Tablet 20 milliGRAM(s) Oral daily  glucagon  Injectable 1 milliGRAM(s) IntraMuscular once  heparin   Injectable 5000 Unit(s) SubCutaneous every 12 hours  hydrALAZINE 50 milliGRAM(s) Oral two times a day  hydrALAZINE 25 milliGRAM(s) Oral once  insulin glargine Injectable (LANTUS) 6 Unit(s) SubCutaneous at bedtime  insulin lispro (ADMELOG) corrective regimen sliding scale   SubCutaneous three times a day before meals  insulin lispro (ADMELOG) corrective regimen sliding scale   SubCutaneous at bedtime  insulin lispro Injectable (ADMELOG) 4 Unit(s) SubCutaneous three times a day before meals  isosorbide   mononitrate ER Tablet (IMDUR) 30 milliGRAM(s) Oral daily  levothyroxine 25 MICROGram(s) Oral daily  linezolid    Tablet 600 milliGRAM(s) Oral every 12 hours  magnesium sulfate  IVPB 2 Gram(s) IV Intermittent once  multiple electrolytes Injection Type 1 1000 milliLiter(s) (75 mL/Hr) IV Continuous <Continuous>  mycophenolate mofetil 500 milliGRAM(s) Oral two times a day  NIFEdipine XL 60 milliGRAM(s) Oral every 12 hours  nystatin    Suspension 453064 Unit(s) Oral four times a day  predniSONE   Tablet 5 milliGRAM(s) Oral daily  tacrolimus ER Tablet (ENVARSUS XR) 4 milliGRAM(s) Oral once  tamsulosin 0.4 milliGRAM(s) Oral at bedtime  trimethoprim   80 mG/sulfamethoxazole 400 mG 1 Tablet(s) Oral <User Schedule>    MEDICATIONS  (PRN):    Allergies & Intolerances:   No Known Allergies    Review of Systems:  Constitutional: No fever, chills  Eyes: +blurry vision, no flashes, floaters, FBS, erythema, discharge, double vision, OU  Neuro: No tremors  Cardiovascular: No chest pain, palpitations  Respiratory: No SOB, no cough  GI: No nausea, vomiting, abdominal pain  : No dysuria  Skin: no rash  Psych: no depression  Endocrine: no polyuria, polydipsia  Heme/lymph: no swelling    VITALS: T(C): 36.4 (05-01-25 @ 09:33)  T(F): 97.6 (05-01-25 @ 09:33), Max: 98.5 (05-01-25 @ 05:15)  HR: 52 (05-01-25 @ 09:33) (51 - 62)  BP: 181/71 (05-01-25 @ 09:33) (145/61 - 181/76)  RR:  (18 - 18)  SpO2:  (100% - 100%)  Wt(kg): --  General: AAO x 3, appropriate mood and affect    Ophthalmology Exam:  Visual acuity (sc): CF OD, 20/30 OS  Pupils: surgically fixed pupil, not reactive OD, round and reactive to light OS  Ttono: 25 OD, 11 OS  Extraocular movements (EOMs): Full OU, no pain, no diplopia  Confrontational Visual Field (CVF): OD unable, OS full  Color Plates: OD unable, 12/12 OS    Pen Light Exam (PLE)  External: Flat OU  Lids/Lashes/Lacrimal Ducts: Flat OU    Sclera/Conjunctiva: W+Q OU  Cornea: Cl OU  Anterior Chamber: D+F OU    Iris: Flat OU  Lens: Cl OU    Fundus Exam: dilated with 1% tropicamide and 2.5% phenylephrine  Approval obtained from self for dilation  Patient aware that pupils can remained dilated for at least 4-6 hours  Exam performed with 20D lens    Vitreous: wnl OU  Disc, cup/disc: Nerve pallor OD, 0.4 OU  Macula: exudates OS  Vessels: ischemic vessels OD, Tortuous OS  Periphery: PRP OU, DBH OU

## 2025-05-01 NOTE — PROGRESS NOTE ADULT - ASSESSMENT
70 y/o M with PMHx of HLD, CAD s/p LAD PCI 7/24, HFpEF , HTN, hypothyroid, type 2 DM, h/o cva with no residual deficits, persistent right sided pleural effusion, and hx of hemorrhagic pericardial effusion (cytopathology negative), ESRD on HD MWF (recently converted from PD in 1/2025) via permacath placed on 1/2025, now s/p DDRT on 4/8/25, c/b HTN requiring 2 SICU admissions.     Patient presented to the ED urgently from outpatient. States he was having output from drain area and stitch was placed in the outpatient office. Patient afterward had Hypotension episode to 80s/50s and on ED admit has pain from drain site when drains are being manipulated. Denies any other sx aside from a dry cough.   Seen and examined at bedside, endorsing pain at the site of drains, x2 COSMO drains with serosanguinous output, RLQ transplant site with staples and no inflammation.       #Renal transplant recipient  #Hypotension  #Cough  -Urinalysis: 4 wbc, negative bacteria  -RVP: Negative  -1/4 blood culture: Staphylococcus epidermidis, started on linezolid   -Urine Culture: Normal Urogenital Vickie  -Lactate, Blood: 2.3 (04.26.25)      Recommendations:  Follow up repeat blood cultures  Trend CBC & CMP  Continue temperature curves   Can continue Linezolid for now.

## 2025-05-01 NOTE — PROGRESS NOTE ADULT - ASSESSMENT
69 y.o. M w/ PMHx HTN, HLD, CAD s/p LAD PCI 7/24, HFpEF, HTN, hypothyroid, type 2 DM, ESRD on HD MWF s/p DDRT 4/8/25 who presents to [facility] for hypotension. Transplant nephrology consulted for DDRT and IS management.    1. 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 IS  - Env 5 w/ with low dose belatacept, dosed on 4/10, 4/14, 4/23, next dose 5/8  - cellcept 500 BID, pred 5  - acyclovir (CMV -/-), valcyte, bactrim  - Tacro level 5.2 today  - Start Env 4mg qd  - Continue pred    3. HTN  - Imdur 30mg, coreg 6.25mg BID, increase hydral to 50mg BID    4. Anemia  - Give procrit 10K today    **Recommendations preliminary until attending attestation**  If you have any questions, please feel free to contact me  Hardeep Kowalski  Nephrology Fellow  Page [phone] / [employer] Teams (Preferred)  (After 4pm or on weekends please page the on-call fellow)

## 2025-05-01 NOTE — PROGRESS NOTE ADULT - ASSESSMENT
68 y/o M with PMHx of HLD, CAD s/p LAD PCI 7/24, HFpEF , HTN, hypothyroid, type 2 DM, h/o cva with no residual deficits, persistent right sided pleural effusion, and hx of hemorrhagic pericardial effusion (cytopathology negative), ESRD on HD MWF (recently converted from PD in 1/2025) via permacath placed on 1/2025, now s/p DDRT on 4/8/25, c/b HTN requiring 2 SICU admissions who presents to [facility] w/ cough+ hypotension      [] s/p DDRT, readmit hypotension / cough  - trend labs, VS  - Strict I's & O's, JPx2, brand  - Repeat COSMO Cr  - renal Doppler w/ arvin RI's patent vasculature   - CT chest non contrast w/ stable pleural effusion  - Hypertensive DC IVF   - BCx+ MRSE in 1/2 bottles, TID following, on Linezolid 600 BID   - COSMO Cr 4/30 JP1 (7.7), COSMO 2 (10), brand replaced 2/2 possible urine leak  - Ophthalmology consulted for blurry vision no intervention for now rec outpt fu  - Send CMV PCR    [] IMMUNO   - Env by level,  BID, pred 5  - PPX nystatin, bactrim, valcyte, ASA, PPI     [] HTN  - Home: coreg 12.5 bid,hydral 25 BID, cardura 8mg BID, nifed 60 BID, imdur 60 QD  - Here: Coreg 6.25 BID, nifed 60 BID, imdur 30 qd, increase hydral to 50 BID

## 2025-05-01 NOTE — PROGRESS NOTE ADULT - NS ATTEND AMEND GEN_ALL_CORE FT
Patient seen and examined    Case discussed with MARGUERITE Ortez    I agree with her interval history exam and plans as noted above  IF repeat blood cultures are negative then can discontinue the linezolid and observe    Carlos Nuñez MD  Can be called via Teams  After 5pm/weekends [phone]

## 2025-05-02 LAB
ALBUMIN SERPL ELPH-MCNC: 4 G/DL — SIGNIFICANT CHANGE UP (ref 3.3–5)
ALP SERPL-CCNC: 83 U/L — SIGNIFICANT CHANGE UP (ref 40–120)
ALT FLD-CCNC: 8 U/L — LOW (ref 10–45)
ANION GAP SERPL CALC-SCNC: 15 MMOL/L — SIGNIFICANT CHANGE UP (ref 5–17)
AST SERPL-CCNC: 16 U/L — SIGNIFICANT CHANGE UP (ref 10–40)
BASOPHILS # BLD AUTO: 0.05 K/UL — SIGNIFICANT CHANGE UP (ref 0–0.2)
BASOPHILS NFR BLD AUTO: 0.9 % — SIGNIFICANT CHANGE UP (ref 0–2)
BILIRUB SERPL-MCNC: 0.2 MG/DL — SIGNIFICANT CHANGE UP (ref 0.2–1.2)
BUN SERPL-MCNC: 68 MG/DL — HIGH (ref 7–23)
CALCIUM SERPL-MCNC: 9.6 MG/DL — SIGNIFICANT CHANGE UP (ref 8.4–10.5)
CHLORIDE SERPL-SCNC: 104 MMOL/L — SIGNIFICANT CHANGE UP (ref 96–108)
CO2 SERPL-SCNC: 21 MMOL/L — LOW (ref 22–31)
CREAT SERPL-MCNC: 3.3 MG/DL — HIGH (ref 0.5–1.3)
CULTURE RESULTS: NO GROWTH — SIGNIFICANT CHANGE UP
EGFR: 19 ML/MIN/1.73M2 — LOW
EGFR: 19 ML/MIN/1.73M2 — LOW
EOSINOPHIL # BLD AUTO: 0.42 K/UL — SIGNIFICANT CHANGE UP (ref 0–0.5)
EOSINOPHIL NFR BLD AUTO: 7.7 % — HIGH (ref 0–6)
GLUCOSE BLDC GLUCOMTR-MCNC: 117 MG/DL — HIGH (ref 70–99)
GLUCOSE BLDC GLUCOMTR-MCNC: 122 MG/DL — HIGH (ref 70–99)
GLUCOSE BLDC GLUCOMTR-MCNC: 130 MG/DL — HIGH (ref 70–99)
GLUCOSE BLDC GLUCOMTR-MCNC: 165 MG/DL — HIGH (ref 70–99)
GLUCOSE BLDC GLUCOMTR-MCNC: 238 MG/DL — HIGH (ref 70–99)
GLUCOSE SERPL-MCNC: 106 MG/DL — HIGH (ref 70–99)
HCT VFR BLD CALC: 27.9 % — LOW (ref 39–50)
HGB BLD-MCNC: 8.6 G/DL — LOW (ref 13–17)
IMM GRANULOCYTES NFR BLD AUTO: 1.1 % — HIGH (ref 0–0.9)
LYMPHOCYTES # BLD AUTO: 0.43 K/UL — LOW (ref 1–3.3)
LYMPHOCYTES # BLD AUTO: 7.8 % — LOW (ref 13–44)
MAGNESIUM SERPL-MCNC: 2.4 MG/DL — SIGNIFICANT CHANGE UP (ref 1.6–2.6)
MCHC RBC-ENTMCNC: 28.5 PG — SIGNIFICANT CHANGE UP (ref 27–34)
MCHC RBC-ENTMCNC: 30.8 G/DL — LOW (ref 32–36)
MCV RBC AUTO: 92.4 FL — SIGNIFICANT CHANGE UP (ref 80–100)
MONOCYTES # BLD AUTO: 0.52 K/UL — SIGNIFICANT CHANGE UP (ref 0–0.9)
MONOCYTES NFR BLD AUTO: 9.5 % — SIGNIFICANT CHANGE UP (ref 2–14)
NEUTROPHILS # BLD AUTO: 4 K/UL — SIGNIFICANT CHANGE UP (ref 1.8–7.4)
NEUTROPHILS NFR BLD AUTO: 73 % — SIGNIFICANT CHANGE UP (ref 43–77)
NRBC BLD AUTO-RTO: 0 /100 WBCS — SIGNIFICANT CHANGE UP (ref 0–0)
PHOSPHATE SERPL-MCNC: 3.4 MG/DL — SIGNIFICANT CHANGE UP (ref 2.5–4.5)
PLATELET # BLD AUTO: 404 K/UL — HIGH (ref 150–400)
POTASSIUM SERPL-MCNC: 4.4 MMOL/L — SIGNIFICANT CHANGE UP (ref 3.5–5.3)
POTASSIUM SERPL-SCNC: 4.4 MMOL/L — SIGNIFICANT CHANGE UP (ref 3.5–5.3)
PROT SERPL-MCNC: 6.9 G/DL — SIGNIFICANT CHANGE UP (ref 6–8.3)
RBC # BLD: 3.02 M/UL — LOW (ref 4.2–5.8)
RBC # FLD: 21 % — HIGH (ref 10.3–14.5)
SODIUM SERPL-SCNC: 140 MMOL/L — SIGNIFICANT CHANGE UP (ref 135–145)
SPECIMEN SOURCE: SIGNIFICANT CHANGE UP
TACROLIMUS SERPL-MCNC: 5.6 NG/ML — SIGNIFICANT CHANGE UP
WBC # BLD: 5.48 K/UL — SIGNIFICANT CHANGE UP (ref 3.8–10.5)
WBC # FLD AUTO: 5.48 K/UL — SIGNIFICANT CHANGE UP (ref 3.8–10.5)

## 2025-05-02 PROCEDURE — G0545: CPT

## 2025-05-02 PROCEDURE — 99232 SBSQ HOSP IP/OBS MODERATE 35: CPT

## 2025-05-02 PROCEDURE — 99232 SBSQ HOSP IP/OBS MODERATE 35: CPT | Mod: GC

## 2025-05-02 RX ADMIN — INSULIN LISPRO 4 UNIT(S): 100 INJECTION, SOLUTION INTRAVENOUS; SUBCUTANEOUS at 17:55

## 2025-05-02 RX ADMIN — MYCOPHENOLATE MOFETIL 500 MILLIGRAM(S): 500 TABLET, FILM COATED ORAL at 08:13

## 2025-05-02 RX ADMIN — HEPARIN SODIUM 5000 UNIT(S): 1000 INJECTION INTRAVENOUS; SUBCUTANEOUS at 17:54

## 2025-05-02 RX ADMIN — INSULIN LISPRO 4: 100 INJECTION, SOLUTION INTRAVENOUS; SUBCUTANEOUS at 12:20

## 2025-05-02 RX ADMIN — INSULIN LISPRO 4 UNIT(S): 100 INJECTION, SOLUTION INTRAVENOUS; SUBCUTANEOUS at 08:14

## 2025-05-02 RX ADMIN — ATORVASTATIN CALCIUM 40 MILLIGRAM(S): 80 TABLET, FILM COATED ORAL at 21:50

## 2025-05-02 RX ADMIN — TAMSULOSIN HYDROCHLORIDE 0.4 MILLIGRAM(S): 0.4 CAPSULE ORAL at 21:50

## 2025-05-02 RX ADMIN — LINEZOLID 600 MILLIGRAM(S): 2 INJECTION, SOLUTION INTRAVENOUS at 05:32

## 2025-05-02 RX ADMIN — PREDNISONE 5 MILLIGRAM(S): 20 TABLET ORAL at 05:32

## 2025-05-02 RX ADMIN — INSULIN GLARGINE-YFGN 6 UNIT(S): 100 INJECTION, SOLUTION SUBCUTANEOUS at 21:49

## 2025-05-02 RX ADMIN — Medication 200 MILLIGRAM(S): at 05:32

## 2025-05-02 RX ADMIN — Medication 1 TABLET(S): at 08:14

## 2025-05-02 RX ADMIN — Medication 25 MICROGRAM(S): at 05:32

## 2025-05-02 RX ADMIN — INSULIN LISPRO 4 UNIT(S): 100 INJECTION, SOLUTION INTRAVENOUS; SUBCUTANEOUS at 12:20

## 2025-05-02 RX ADMIN — HEPARIN SODIUM 5000 UNIT(S): 1000 INJECTION INTRAVENOUS; SUBCUTANEOUS at 05:32

## 2025-05-02 RX ADMIN — MYCOPHENOLATE MOFETIL 500 MILLIGRAM(S): 500 TABLET, FILM COATED ORAL at 17:55

## 2025-05-02 RX ADMIN — Medication 500000 UNIT(S): at 12:14

## 2025-05-02 RX ADMIN — Medication 60 MILLIGRAM(S): at 17:54

## 2025-05-02 RX ADMIN — Medication 50 MILLIGRAM(S): at 05:32

## 2025-05-02 RX ADMIN — Medication 500000 UNIT(S): at 05:33

## 2025-05-02 RX ADMIN — Medication 60 MILLIGRAM(S): at 05:32

## 2025-05-02 RX ADMIN — Medication 81 MILLIGRAM(S): at 12:14

## 2025-05-02 RX ADMIN — Medication 50 MILLIGRAM(S): at 20:26

## 2025-05-02 RX ADMIN — Medication 500000 UNIT(S): at 17:54

## 2025-05-02 RX ADMIN — Medication 1 APPLICATION(S): at 05:53

## 2025-05-02 RX ADMIN — ISOSORBIDE MONONITRATE 30 MILLIGRAM(S): 60 TABLET, EXTENDED RELEASE ORAL at 12:15

## 2025-05-02 RX ADMIN — Medication 20 MILLIGRAM(S): at 12:14

## 2025-05-02 RX ADMIN — Medication 200 MILLIGRAM(S): at 17:54

## 2025-05-02 RX ADMIN — TACROLIMUS 4 MILLIGRAM(S): 0.5 CAPSULE ORAL at 08:14

## 2025-05-02 RX ADMIN — CARVEDILOL 6.25 MILLIGRAM(S): 3.12 TABLET, FILM COATED ORAL at 05:32

## 2025-05-02 NOTE — PROGRESS NOTE ADULT - SUBJECTIVE AND OBJECTIVE BOX
[facility]    Dr. Tiara Garcia     Office [phone] (9 am to 5 pm)  Service: [phone] (5pm to 9am)  Also Available on TEAMS      RENAL PROGRESS NOTE: DATE OF SERVICE 05-02-25 @ 11:12    Patient is a 69y old  Male who presents with a chief complaint of s/p DDRT, readmit w/ hypotension & c/f sepsis (01 May 2025 16:35)      Patient seen and examined at bedside. No chest pain/sob    VITALS:  T(F): 98.3 (05-02-25 @ 09:03), Max: 98.3 (05-02-25 @ 09:03)  HR: 55 (05-02-25 @ 09:03)  BP: 155/61 (05-02-25 @ 09:03)  RR: 18 (05-02-25 @ 09:03)  SpO2: 100% (05-02-25 @ 09:03)  Wt(kg): --    05-01 @ 07:01  -  05-02 @ 07:00  --------------------------------------------------------  IN: 420 mL / OUT: 1945 mL / NET: -1525 mL    05-02 @ 07:01  -  05-02 @ 11:12  --------------------------------------------------------  IN: 240 mL / OUT: 445 mL / NET: -205 mL          PHYSICAL EXAM:  Constitutional: NAD  Neck: No JVD  Respiratory: CTAB, no wheezes, rales or rhonchi  Cardiovascular: S1, S2, RRR  Gastrointestinal: BS+, soft, NT/ND  Extremities: No peripheral edema    Hospital Medications:   MEDICATIONS  (STANDING):  acyclovir   Oral Tab/Cap 200 milliGRAM(s) Oral two times a day  aspirin enteric coated 81 milliGRAM(s) Oral daily  atorvastatin 40 milliGRAM(s) Oral at bedtime  carvedilol 6.25 milliGRAM(s) Oral every 12 hours  chlorhexidine 2% Cloths 1 Application(s) Topical <User Schedule>  dextrose 5%. 1000 milliLiter(s) (100 mL/Hr) IV Continuous <Continuous>  dextrose 5%. 1000 milliLiter(s) (50 mL/Hr) IV Continuous <Continuous>  dextrose 50% Injectable 25 Gram(s) IV Push once  dextrose 50% Injectable 12.5 Gram(s) IV Push once  dextrose 50% Injectable 25 Gram(s) IV Push once  dextrose Oral Gel 15 Gram(s) Oral once  famotidine    Tablet 20 milliGRAM(s) Oral daily  glucagon  Injectable 1 milliGRAM(s) IntraMuscular once  heparin   Injectable 5000 Unit(s) SubCutaneous every 12 hours  hydrALAZINE 50 milliGRAM(s) Oral two times a day  insulin glargine Injectable (LANTUS) 6 Unit(s) SubCutaneous at bedtime  insulin lispro (ADMELOG) corrective regimen sliding scale   SubCutaneous three times a day before meals  insulin lispro (ADMELOG) corrective regimen sliding scale   SubCutaneous at bedtime  insulin lispro Injectable (ADMELOG) 4 Unit(s) SubCutaneous three times a day before meals  isosorbide   mononitrate ER Tablet (IMDUR) 30 milliGRAM(s) Oral daily  levothyroxine 25 MICROGram(s) Oral daily  linezolid    Tablet 600 milliGRAM(s) Oral every 12 hours  mycophenolate mofetil 500 milliGRAM(s) Oral two times a day  NIFEdipine XL 60 milliGRAM(s) Oral every 12 hours  nystatin    Suspension 397133 Unit(s) Oral four times a day  predniSONE   Tablet 5 milliGRAM(s) Oral daily  tacrolimus ER Tablet (ENVARSUS XR) 4 milliGRAM(s) Oral <User Schedule>  tamsulosin 0.4 milliGRAM(s) Oral at bedtime  trimethoprim   80 mG/sulfamethoxazole 400 mG 1 Tablet(s) Oral <User Schedule>    Tacrolimus (), Serum: 5.6 ng/mL (05-02 @ 06:48)    LABS:  05-02    140  |  104  |  68[H]  ----------------------------<  106[H]  4.4   |  21[L]  |  3.30[H]    Ca    9.6      02 May 2025 06:47  Phos  3.4     05-02  Mg     2.4     05-02    TPro  6.9  /  Alb  4.0  /  TBili  0.2  /  DBili      /  AST  16  /  ALT  8[L]  /  AlkPhos  83  05-02    Creatinine Trend: 3.30 <--, 3.62 <--, 4.41 <--, 4.63 <--, 4.74 <--, 3.72 <--    Albumin: 4.0 g/dL (05-02 @ 06:47)  Phosphorus: 3.4 mg/dL (05-02 @ 06:47)                              8.6    5.48  )-----------( 404      ( 02 May 2025 06:47 )             27.9     Urine Studies:  Urinalysis - [05-02-25 @ 06:47]      Color  / Appearance  / SG  / pH       Gluc 106 / Ketone   / Bili  / Urobili        Blood  / Protein  / Leuk Est  / Nitrite       RBC  / WBC  / Hyaline  / Gran  / Sq Epi  / Non Sq Epi  / Bacteria       Iron 67, TIBC 210, %sat 32      [04-21-25 @ 07:08]  Ferritin 1496      [04-21-25 @ 07:08]  PTH -- (Ca 7.9)      [12-30-24 @ 06:50]   229  PTH -- (Ca 7.6)      [11-27-24 @ 04:23]   250  Vitamin D (25OH) 17.1      [11-25-24 @ 06:50]  TSH 2.48      [04-13-25 @ 07:24]  Lipid: chol 112, TG 61, HDL 46, LDL --      [12-31-24 @ 04:27]    HBsAb 46.0      [04-07-25 @ 21:39]  HBsAg Nonreact      [04-07-25 @ 21:39]  HBcAb Nonreact      [04-07-25 @ 21:39]  HCV 0.05, Nonreact      [04-07-25 @ 21:39]  HIV Nonreact      [04-07-25 @ 21:39]      RADIOLOGY & ADDITIONAL STUDIES:

## 2025-05-02 NOTE — PROGRESS NOTE ADULT - ASSESSMENT
68 y/o M with PMHx of HLD, CAD s/p LAD PCI 7/24, HFpEF , HTN, hypothyroid, type 2 DM, h/o cva with no residual deficits, persistent right sided pleural effusion, and hx of hemorrhagic pericardial effusion (cytopathology negative), ESRD on HD MWF (recently converted from PD in 1/2025) via permacath placed on 1/2025, now s/p DDRT on 4/8/25, c/b HTN requiring 2 SICU admissions. Patient presented to the ED urgently from outpatient. States he was having output from drain area and stitch was placed in the outpatient office. Patient afterward had Hypotension episode to 80s/50s and on ED admit has pain from drain site when drains are being manipulated. Denies any other sx aside from a dry cough.      A/P  s/p DDRT 4/2024 with F   Nephrologist is Dr. Menjivar  Was PD then converted to HD, has permacath  Was on HD prior to admission now off HD  SCR downtrending off HD  HD and IS as per transplant   On envarsus 4 mg qd w/ belatacept, next dose 5/8  On cellcept 500 mg bid and prednisone 5 mg daily   Monitor UO and bmp     HTN/Hypotension  BP fluctuating; suboptimal  BP meds was on hold  Now on nifedipine 60 mg bid, coreg 6.25 mg bid  Monitor bp     Hypokalemia   Supplemented with 40 meq KCl   Monitor K     Anemia  On epo   Transfuse prn to keep hgb >8  Monitor hgb

## 2025-05-02 NOTE — PROGRESS NOTE ADULT - SUBJECTIVE AND OBJECTIVE BOX
INFECTIOUS DISEASES FOLLOW UP-- Cary Nuñez  [phone]    This is a follow up note for this  69yMale with  Hypotension        ROS:  CONSTITUTIONAL:  No fever, good appetite  CARDIOVASCULAR:  No chest pain or palpitations  RESPIRATORY:  No dyspnea  GASTROINTESTINAL:  No nausea, vomiting, diarrhea, or abdominal pain  GENITOURINARY:  No dysuria  NEUROLOGIC:  No headache,     Allergies    No Known Allergies    Intolerances        ANTIBIOTICS/RELEVANT:  antimicrobials  acyclovir   Oral Tab/Cap 200 milliGRAM(s) Oral two times a day  nystatin    Suspension 191102 Unit(s) Oral four times a day  trimethoprim   80 mG/sulfamethoxazole 400 mG 1 Tablet(s) Oral <User Schedule>    immunologic:  mycophenolate mofetil 500 milliGRAM(s) Oral two times a day    OTHER:  aspirin enteric coated 81 milliGRAM(s) Oral daily  atorvastatin 40 milliGRAM(s) Oral at bedtime  carvedilol 6.25 milliGRAM(s) Oral every 12 hours  chlorhexidine 2% Cloths 1 Application(s) Topical <User Schedule>  dextrose 5%. 1000 milliLiter(s) IV Continuous <Continuous>  dextrose 5%. 1000 milliLiter(s) IV Continuous <Continuous>  dextrose 50% Injectable 25 Gram(s) IV Push once  dextrose 50% Injectable 12.5 Gram(s) IV Push once  dextrose 50% Injectable 25 Gram(s) IV Push once  dextrose Oral Gel 15 Gram(s) Oral once  famotidine    Tablet 20 milliGRAM(s) Oral daily  glucagon  Injectable 1 milliGRAM(s) IntraMuscular once  heparin   Injectable 5000 Unit(s) SubCutaneous every 12 hours  hydrALAZINE 50 milliGRAM(s) Oral two times a day  insulin glargine Injectable (LANTUS) 6 Unit(s) SubCutaneous at bedtime  insulin lispro (ADMELOG) corrective regimen sliding scale   SubCutaneous three times a day before meals  insulin lispro (ADMELOG) corrective regimen sliding scale   SubCutaneous at bedtime  insulin lispro Injectable (ADMELOG) 4 Unit(s) SubCutaneous three times a day before meals  isosorbide   mononitrate ER Tablet (IMDUR) 30 milliGRAM(s) Oral daily  levothyroxine 25 MICROGram(s) Oral daily  NIFEdipine XL 60 milliGRAM(s) Oral every 12 hours  oxycodone    5 mG/acetaminophen 325 mG 1 Tablet(s) Oral every 8 hours PRN  predniSONE   Tablet 5 milliGRAM(s) Oral daily  tamsulosin 0.4 milliGRAM(s) Oral at bedtime      Objective:  Vital Signs Last 24 Hrs  T(C): 36.7 (02 May 2025 16:41), Max: 36.8 (02 May 2025 09:03)  T(F): 98.1 (02 May 2025 16:41), Max: 98.3 (02 May 2025 09:03)  HR: 55 (02 May 2025 16:41) (52 - 60)  BP: 170/69 (02 May 2025 16:41) (130/54 - 182/69)  BP(mean): --  RR: 18 (02 May 2025 16:41) (17 - 18)  SpO2: 100% (02 May 2025 16:41) (99% - 100%)    Parameters below as of 02 May 2025 16:41  Patient On (Oxygen Delivery Method): room air        PHYSICAL EXAM:  Constitutional:no acute distress  Eyes:ALEJANDRO, EOMI  Ear/Nose/Throat: no oral lesions, 	  Respiratory: clear BL  Cardiovascular: S1S2  Gastrointestinal:soft, (+) BS, no tenderness  RLQ drain  Extremities:no e/e/c  No Lymphadenopathy  IV sites not inflammed.    LABS:                        8.6    5.48  )-----------( 404      ( 02 May 2025 06:47 )             27.9     05-02    140  |  104  |  68[H]  ----------------------------<  106[H]  4.4   |  21[L]  |  3.30[H]    Ca    9.6      02 May 2025 06:47  Phos  3.4     05-02  Mg     2.4     05-02    TPro  6.9  /  Alb  4.0  /  TBili  0.2  /  DBili  x   /  AST  16  /  ALT  8[L]  /  AlkPhos  83  05-02      Urinalysis Basic - ( 02 May 2025 06:47 )    Color: x / Appearance: x / SG: x / pH: x  Gluc: 106 mg/dL / Ketone: x  / Bili: x / Urobili: x   Blood: x / Protein: x / Nitrite: x   Leuk Esterase: x / RBC: x / WBC x   Sq Epi: x / Non Sq Epi: x / Bacteria: x        MICROBIOLOGY:            RECENT CULTURES:  05-01 @ 12:05  Clean Catch Clean Catch (Midstream)  --  --  --    No growth  --  04-30 @ 18:20  Blood Blood  --  --  --    No growth at 24 hours  --  04-30 @ 18:00  Blood Blood  --  --  --    No growth at 24 hours  --  04-28 @ 13:40  Blood Blood-Peripheral  Blood Culture PCR  Blood Culture PCR  PCR    Growth in aerobic and anaerobic bottles: Staphylococcus epidermidis  Isolation of Coagulase negative Staphylococcus from single blood culture  sets may represent  contamination. Contact the Microbiology Department at [phone] if  susceptibilitytesting is needed.  clinically indicated.  Direct identification is available within approximately 3-5  hours either by Blood Panel Multiplexed PCR or Direct  MALDI-TOF. Details: [email or web address]  ANAEROBIC blood culture bottle turned positive after the final report was  released.  --  04-28 @ 13:30  Blood Blood-Peripheral  --  --  --    No growth at 72 Hours  --  04-26 @ 22:39  Clean Catch Clean Catch (Midstream)  --  --  --    <10,000 CFU/mL Normal Urogenital Vickie  --      RADIOLOGY & ADDITIONAL STUDIES:    < from: CT Chest No Cont (04.28.25 @ 18:43) >  IMPRESSION:    Unchanged trace right pleural effusion, pleural thickening, and   associated round atelectasis of the right lower and middle lobes.    Unchanged 6 mm nodule adjacent to the left oblique fissure.    No pneumonia.    < end of copied text >  < from: US Trans Kidney w/ Doppler, Right (04.28.25 @ 18:11) >  IMPRESSION:    1.  Interval increase of transplant renal vein velocity measuring up to   275 cm/s, previously measuring 144 cm/s, reflect extrinsic compression or   kinking. No diastolic reversal of flow or parvus tardus in the renal   arteries.  2.  Interval slight decrease of peak systolic velocity with high   resistive waveform of the renal artery at the level of the anastomosis   measuring 362 cm/s, previously measuring 390 cm/s. There is unchanged   elevated resistivity indices of the intrarenal arteries.  3.  Mild hydronephrosis with ureteral stent visualized in the urinary   bladder.    < end of copied text >   INFECTIOUS DISEASES FOLLOW UP-- Cary Nuñez  [phone]    This is a follow up note for this  69yMale with  Hypotension        ROS:  CONSTITUTIONAL:  No fever, good appetite  CARDIOVASCULAR:  No chest pain or palpitations  RESPIRATORY:  No dyspnea  GASTROINTESTINAL:  No nausea, vomiting, diarrhea, or abdominal pain  GENITOURINARY:  No dysuria  NEUROLOGIC:  No headache,     Allergies    No Known Allergies    Intolerances        ANTIBIOTICS/RELEVANT:  antimicrobials  acyclovir   Oral Tab/Cap 200 milliGRAM(s) Oral two times a day  nystatin    Suspension [id number] Unit(s) Oral four times a day  trimethoprim   80 mG/sulfamethoxazole 400 mG 1 Tablet(s) Oral <User Schedule>    immunologic:  mycophenolate mofetil 500 milliGRAM(s) Oral two times a day    OTHER:  aspirin enteric coated 81 milliGRAM(s) Oral daily  atorvastatin 40 milliGRAM(s) Oral at bedtime  carvedilol 6.25 milliGRAM(s) Oral every 12 hours  chlorhexidine 2% Cloths 1 Application(s) Topical <User Schedule>  dextrose 5%. 1000 milliLiter(s) IV Continuous <Continuous>  dextrose 5%. 1000 milliLiter(s) IV Continuous <Continuous>  dextrose 50% Injectable 25 Gram(s) IV Push once  dextrose 50% Injectable 12.5 Gram(s) IV Push once  dextrose 50% Injectable 25 Gram(s) IV Push once  dextrose Oral Gel 15 Gram(s) Oral once  famotidine    Tablet 20 milliGRAM(s) Oral daily  glucagon  Injectable 1 milliGRAM(s) IntraMuscular once  heparin   Injectable 5000 Unit(s) SubCutaneous every 12 hours  hydrALAZINE 50 milliGRAM(s) Oral two times a day  insulin glargine Injectable (LANTUS) 6 Unit(s) SubCutaneous at bedtime  insulin lispro (ADMELOG) corrective regimen sliding scale   SubCutaneous three times a day before meals  insulin lispro (ADMELOG) corrective regimen sliding scale   SubCutaneous at bedtime  insulin lispro Injectable (ADMELOG) 4 Unit(s) SubCutaneous three times a day before meals  isosorbide   mononitrate ER Tablet (IMDUR) 30 milliGRAM(s) Oral daily  levothyroxine 25 MICROGram(s) Oral daily  NIFEdipine XL 60 milliGRAM(s) Oral every 12 hours  oxycodone    5 mG/acetaminophen 325 mG 1 Tablet(s) Oral every 8 hours PRN  predniSONE   Tablet 5 milliGRAM(s) Oral daily  tamsulosin 0.4 milliGRAM(s) Oral at bedtime      Objective:  Vital Signs Last 24 Hrs  T(C): 36.7 (02 May 2025 16:41), Max: 36.8 (02 May 2025 09:03)  T(F): 98.1 (02 May 2025 16:41), Max: 98.3 (02 May 2025 09:03)  HR: 55 (02 May 2025 16:41) (52 - 60)  BP: 170/69 (02 May 2025 16:41) (130/54 - 182/69)  BP(mean): --  RR: 18 (02 May 2025 16:41) (17 - 18)  SpO2: 100% (02 May 2025 16:41) (99% - 100%)    Parameters below as of 02 May 2025 16:41  Patient On (Oxygen Delivery Method): room air        PHYSICAL EXAM:  Constitutional:no acute distress  Eyes:ALEJANDRO, EOMI  Ear/Nose/Throat: no oral lesions, 	  Respiratory: clear BL  Cardiovascular: S1S2  Gastrointestinal:soft, (+) BS, no tenderness  RLQ drain 2  Extremities:no e/e/c  No Lymphadenopathy  IV sites not inflammed.    LABS:                        8.6    5.48  )-----------( 404      ( 02 May 2025 06:47 )             27.9     05-02    140  |  104  |  68[H]  ----------------------------<  106[H]  4.4   |  21[L]  |  3.30[H]    Ca    9.6      02 May 2025 06:47  Phos  3.4     05-02  Mg     2.4     05-02    TPro  6.9  /  Alb  4.0  /  TBili  0.2  /  DBili  x   /  AST  16  /  ALT  8[L]  /  AlkPhos  83  05-02      Urinalysis Basic - ( 02 May 2025 06:47 )    Color: x / Appearance: x / SG: x / pH: x  Gluc: 106 mg/dL / Ketone: x  / Bili: x / Urobili: x   Blood: x / Protein: x / Nitrite: x   Leuk Esterase: x / RBC: x / WBC x   Sq Epi: x / Non Sq Epi: x / Bacteria: x        MICROBIOLOGY:            RECENT CULTURES:  05-01 @ 12:05  Clean Catch Clean Catch (Midstream)  --  --  --    No growth  --  04-30 @ 18:20  Blood Blood  --  --  --    No growth at 24 hours  --  04-30 @ 18:00  Blood Blood  --  --  --    No growth at 24 hours  --  04-28 @ 13:40  Blood Blood-Peripheral  Blood Culture PCR  Blood Culture PCR  PCR    Growth in aerobic and anaerobic bottles: Staphylococcus epidermidis  Isolation of Coagulase negative Staphylococcus from single blood culture  sets may represent  contamination. Contact the Microbiology Department at [phone] if  susceptibilitytesting is needed.  clinically indicated.  Direct identification is available within approximately 3-5  hours either by Blood Panel Multiplexed PCR or Direct  MALDI-TOF. Details: [email or web address]  ANAEROBIC blood culture bottle turned positive after the final report was  released.  --  04-28 @ 13:30  Blood Blood-Peripheral  --  --  --    No growth at 72 Hours  --  04-26 @ 22:39  Clean Catch Clean Catch (Midstream)  --  --  --    <10,000 CFU/mL Normal Urogenital Vickie  --      RADIOLOGY & ADDITIONAL STUDIES:    < from: CT Chest No Cont (04.28.25 @ 18:43) >  IMPRESSION:    Unchanged trace right pleural effusion, pleural thickening, and   associated round atelectasis of the right lower and middle lobes.    Unchanged 6 mm nodule adjacent to the left oblique fissure.    No pneumonia.    < end of copied text >  < from: US Trans Kidney w/ Doppler, Right (04.28.25 @ 18:11) >  IMPRESSION:    1.  Interval increase of transplant renal vein velocity measuring up to   275 cm/s, previously measuring 144 cm/s, reflect extrinsic compression or   kinking. No diastolic reversal of flow or parvus tardus in the renal   arteries.  2.  Interval slight decrease of peak systolic velocity with high   resistive waveform of the renal artery at the level of the anastomosis   measuring 362 cm/s, previously measuring 390 cm/s. There is unchanged   elevated resistivity indices of the intrarenal arteries.  3.  Mild hydronephrosis with ureteral stent visualized in the urinary   bladder.    < end of copied text >

## 2025-05-02 NOTE — PROGRESS NOTE ADULT - SUBJECTIVE AND OBJECTIVE BOX
Transplant Surgery - Multidisciplinary Rounds  --------------------------------------------------------------      Present: Patient seen and examined with multidisciplinary transplant team including Surgeon Dr. Little, Nephrologist Dr. Lopes, LÁZARO Massey/Audra  Pharmacist Ashley, and bedside RN during rounds. Disciplines not in attendance will be notified of plan.     HPI: 68 y/o M with PMHx of HLD, CAD s/p LAD PCI 7/24, HFpEF , HTN, hypothyroid, type 2 DM, h/o cva with no residual deficits, persistent right sided pleural effusion, and hx of hemorrhagic pericardial effusion (cytopathology negative), ESRD on HD MWF (recently converted from PD in 1/2025) via permacath placed on 1/2025, now s/p DDRT on 4/8/25, c/b HTN requiring 2 SICU admissions who presents to [facility] w/ cough+ hypotension    Interval Events:  - afebrile, hypertensive, inc hydral dosing  - COSMO cr remains high brand in place  - Ophto consulted no intervention      Immunosupression:  Induction: Thymo  Maintenance: FK by level/ BID/SST  Ongoing monitoring for signs of rejection     Potential Discharge date: TBD  Education:  Medications  Plan of care:  See Below     MEDICATIONS  (STANDING):  acyclovir   Oral Tab/Cap 200 milliGRAM(s) Oral two times a day  aspirin enteric coated 81 milliGRAM(s) Oral daily  atorvastatin 40 milliGRAM(s) Oral at bedtime  carvedilol 6.25 milliGRAM(s) Oral every 12 hours  chlorhexidine 2% Cloths 1 Application(s) Topical <User Schedule>  dextrose 5%. 1000 milliLiter(s) (100 mL/Hr) IV Continuous <Continuous>  dextrose 5%. 1000 milliLiter(s) (50 mL/Hr) IV Continuous <Continuous>  dextrose 50% Injectable 25 Gram(s) IV Push once  dextrose 50% Injectable 12.5 Gram(s) IV Push once  dextrose 50% Injectable 25 Gram(s) IV Push once  dextrose Oral Gel 15 Gram(s) Oral once  famotidine    Tablet 20 milliGRAM(s) Oral daily  glucagon  Injectable 1 milliGRAM(s) IntraMuscular once  heparin   Injectable 5000 Unit(s) SubCutaneous every 12 hours  hydrALAZINE 50 milliGRAM(s) Oral two times a day  insulin glargine Injectable (LANTUS) 6 Unit(s) SubCutaneous at bedtime  insulin lispro (ADMELOG) corrective regimen sliding scale   SubCutaneous three times a day before meals  insulin lispro (ADMELOG) corrective regimen sliding scale   SubCutaneous at bedtime  insulin lispro Injectable (ADMELOG) 4 Unit(s) SubCutaneous three times a day before meals  isosorbide   mononitrate ER Tablet (IMDUR) 30 milliGRAM(s) Oral daily  levothyroxine 25 MICROGram(s) Oral daily  linezolid    Tablet 600 milliGRAM(s) Oral every 12 hours  mycophenolate mofetil 500 milliGRAM(s) Oral two times a day  NIFEdipine XL 60 milliGRAM(s) Oral every 12 hours  nystatin    Suspension 965672 Unit(s) Oral four times a day  predniSONE   Tablet 5 milliGRAM(s) Oral daily  tamsulosin 0.4 milliGRAM(s) Oral at bedtime  trimethoprim   80 mG/sulfamethoxazole 400 mG 1 Tablet(s) Oral <User Schedule>    MEDICATIONS  (PRN):  oxycodone    5 mG/acetaminophen 325 mG 1 Tablet(s) Oral every 8 hours PRN Moderate Pain (4 - 6)      PAST MEDICAL & SURGICAL HISTORY:  Hypertension      ESRD on peritoneal dialysis      CVA (cerebrovascular accident)  2010 without residual effects      Hyperlipidemia      BPH (benign prostatic hyperplasia)      CAD (coronary artery disease)      T2DM (type 2 diabetes mellitus)      Hypothyroidism      History of peritoneal dialysis  s/p PD catheter placement      S/P coronary artery stent placement          Vital Signs Last 24 Hrs  T(C): 36.4 (02 May 2025 12:41), Max: 36.8 (02 May 2025 09:03)  T(F): 97.5 (02 May 2025 12:41), Max: 98.3 (02 May 2025 09:03)  HR: 57 (02 May 2025 12:41) (51 - 60)  BP: 130/54 (02 May 2025 12:41) (130/54 - 182/69)  BP(mean): --  RR: 18 (02 May 2025 12:41) (17 - 18)  SpO2: 100% (02 May 2025 12:41) (99% - 100%)    Parameters below as of 02 May 2025 12:41  Patient On (Oxygen Delivery Method): room air        I&O's Summary    01 May 2025 07:01  -  02 May 2025 07:00  --------------------------------------------------------  IN: 420 mL / OUT: 1945 mL / NET: -1525 mL    02 May 2025 07:01  -  02 May 2025 13:03  --------------------------------------------------------  IN: 480 mL / OUT: 570 mL / NET: -90 mL                              8.6    5.48  )-----------( 404      ( 02 May 2025 06:47 )             27.9     05-02    140  |  104  |  68[H]  ----------------------------<  106[H]  4.4   |  21[L]  |  3.30[H]    Ca    9.6      02 May 2025 06:47  Phos  3.4     05-02  Mg     2.4     05-02    TPro  6.9  /  Alb  4.0  /  TBili  0.2  /  DBili  x   /  AST  16  /  ALT  8[L]  /  AlkPhos  83  05-02    Tacrolimus (), Serum: 5.6 ng/mL (05-02 @ 06:48)        Culture - Blood (collected 04-30-25 @ 18:20)  Source: Blood Blood  Preliminary Report (05-01-25 @ 23:01):    No growth at 24 hours    Culture - Blood (collected 04-30-25 @ 18:00)  Source: Blood Blood  Preliminary Report (05-01-25 @ 23:01):    No growth at 24 hours    Urinalysis with Rflx Culture (collected 04-28-25 @ 15:34)    Culture - Blood (collected 04-28-25 @ 13:40)  Source: Blood Blood-Peripheral  Gram Stain (04-30-25 @ 18:43):    Growth in aerobic bottle: Gram Positive Cocci in Clusters    Growth in anaerobic bottle: Gram Positive Cocci in Clusters  Final Report (05-01-25 @ 14:50):    Growth in aerobic and anaerobic bottles: Staphylococcus epidermidis    Isolation of Coagulase negative Staphylococcus from single blood culture    sets may represent    contamination. Contact the Microbiology Department at [phone] if    susceptibilitytesting is needed.    clinically indicated.    Direct identification is available within approximately 3-5    hours either by Blood Panel Multiplexed PCR or Direct    MALDI-TOF. Details: [email or web address]    ANAEROBIC blood culture bottle turned positive after the final report was    released.  Organism: Blood Culture PCR (04-30-25 @ 18:46)  Organism: Blood Culture PCR (04-30-25 @ 14:21)    Culture - Blood (collected 04-28-25 @ 13:30)  Source: Blood Blood-Peripheral  Preliminary Report (05-01-25 @ 18:01):    No growth at 72 Hours    Culture - Urine (collected 04-26-25 @ 22:39)  Source: Clean Catch Clean Catch (Midstream)  Final Report (04-27-25 @ 23:45):    <10,000 CFU/mL Normal Urogenital Vickie      ROS  All other systems were reviewed and are negative, except as noted.      PHYSICAL EXAM:  Constitutional: Well developed / well nourished  Eyes: Anicteric, PERRLA  ENMT: nc/at  Neck: supple   Respiratory: CTA B/L  Cardiovascular: RRR  Gastrointestinal: Soft, non distended, nontender. JPx2 ss   Genitourinary: voiding spontaneously   Extremities: warm b/l upper and lower, no edema  Vascular: Palpable dp pulses bilaterally  Neurological: A&O x3  Skin: no rashes, ulcerations or lesions;  Musculoskeletal: Moving all extremities  Psychiatric: Responsive

## 2025-05-02 NOTE — PROGRESS NOTE ADULT - ASSESSMENT
69 y.o. M w/ PMHx HTN, HLD, CAD s/p LAD PCI 7/24, HFpEF, HTN, hypothyroid, type 2 DM, ESRD on HD MWF s/p DDRT 4/8/25 who presents to [facility] for hypotension. Transplant nephrology consulted for DDRT and IS management.    1. S/p DDRT (4/8) with DGF  65 yo DCD kidney with KDPI 99%  - Scr improved at 3.30 today  - HD consent obtained and placed in chart  - Pending BK and CMV serologies  - Monitor I & Os  - Brand replaced due to urine leak  - Repeat COSMO creatinine  - Give lidocaine jelly for irritation  - Maintain brand, will need on d/c  - F/u with ID regarding linezolid abx for MRSE in 1/2 bottles  - Optho recommended outpatient f/u for blurry vision    2. IS  - Env 5 w/ with low dose belatacept, dosed on 4/10, 4/14, 4/23, next dose 5/8  - cellcept 500 BID, pred 5  - acyclovir (CMV -/-), valcyte, bactrim  - Tacro level 5.6 today  - Hold Env 4mg qd pending level tomorrow  - Continue pred    3. HTN  - Imdur 30mg, coreg 6.25mg BID, hydral 50mg BID    4. Anemia  - Give procrit 10K today    **Recommendations preliminary until attending attestation**  If you have any questions, please feel free to contact me  Hardeep Kowalski  Nephrology Fellow  Page [phone] / Microsoft Teams (Preferred)  (After 4pm or on weekends please page the on-call fellow)

## 2025-05-02 NOTE — PROGRESS NOTE ADULT - ASSESSMENT
70 y/o M with PMHx of HLD, CAD s/p LAD PCI 7/24, HFpEF , HTN, hypothyroid, type 2 DM, h/o cva with no residual deficits, persistent right sided pleural effusion, and hx of hemorrhagic pericardial effusion (cytopathology negative), ESRD on HD MWF (recently converted from PD in 1/2025) via permacath placed on 1/2025, now s/p DDRT on 4/8/25, c/b HTN requiring 2 SICU admissions who presents to [facility] w/ cough+ hypotension      [] s/p DDRT, readmit hypotension / cough  - trend labs, VS  - Strict I's & O's, JPx2, brand  - Repeat COSMO Cr  - renal Doppler w/ arvin RI's patent vasculature   - CT chest non contrast w/ stable pleural effusion  - Hypertensive DC IVF   - BCx+ MRSE in 1/2 bottles, TID following, on Linezolid 600 BID   - COSMO Cr 5/1 COSMO (9.9), brand replaced 2/2 possible urine leak  - Ophthalmology consulted for blurry vision no intervention for now rec outpt fu  - Send CMV PCR    [] IMMUNO   - Env by level,  BID, pred 5  - PPX nystatin, bactrim, valcyte, ASA, PPI     [] HTN  - Home: coreg 12.5 bid,hydral 25 BID, cardura 8mg BID, nifed 60 BID, imdur 60 QD  - Here: Coreg 6.25 BID, nifed 60 BID, imdur 30 qd, increase hydral to 50 BID

## 2025-05-02 NOTE — PROGRESS NOTE ADULT - SUBJECTIVE AND OBJECTIVE BOX
[facility] DIVISION OF KIDNEY DISEASES AND HYPERTENSION -- FOLLOW UP NOTE  --------------------------------------------------------------------------------  Chief Complaint: DDRT    24 hour events/subjective: Pt. seen and examined, resting in bed. Hypertensive overnight. No fever, chillls, CP, SOB, or LE swelling.    PAST HISTORY  --------------------------------------------------------------------------------  No significant changes to PMH, PSH, FHx, SHx, unless otherwise noted    ALLERGIES & MEDICATIONS  --------------------------------------------------------------------------------  Allergies    No Known Allergies    Intolerances    Standing Inpatient Medications  acyclovir   Oral Tab/Cap 200 milliGRAM(s) Oral two times a day  aspirin enteric coated 81 milliGRAM(s) Oral daily  atorvastatin 40 milliGRAM(s) Oral at bedtime  carvedilol 6.25 milliGRAM(s) Oral every 12 hours  chlorhexidine 2% Cloths 1 Application(s) Topical <User Schedule>  dextrose 5%. 1000 milliLiter(s) IV Continuous <Continuous>  dextrose 5%. 1000 milliLiter(s) IV Continuous <Continuous>  dextrose 50% Injectable 25 Gram(s) IV Push once  dextrose 50% Injectable 12.5 Gram(s) IV Push once  dextrose 50% Injectable 25 Gram(s) IV Push once  dextrose Oral Gel 15 Gram(s) Oral once  famotidine    Tablet 20 milliGRAM(s) Oral daily  glucagon  Injectable 1 milliGRAM(s) IntraMuscular once  heparin   Injectable 5000 Unit(s) SubCutaneous every 12 hours  hydrALAZINE 50 milliGRAM(s) Oral two times a day  insulin glargine Injectable (LANTUS) 6 Unit(s) SubCutaneous at bedtime  insulin lispro (ADMELOG) corrective regimen sliding scale   SubCutaneous three times a day before meals  insulin lispro (ADMELOG) corrective regimen sliding scale   SubCutaneous at bedtime  insulin lispro Injectable (ADMELOG) 4 Unit(s) SubCutaneous three times a day before meals  isosorbide   mononitrate ER Tablet (IMDUR) 30 milliGRAM(s) Oral daily  levothyroxine 25 MICROGram(s) Oral daily  linezolid    Tablet 600 milliGRAM(s) Oral every 12 hours  mycophenolate mofetil 500 milliGRAM(s) Oral two times a day  NIFEdipine XL 60 milliGRAM(s) Oral every 12 hours  nystatin    Suspension 900994 Unit(s) Oral four times a day  predniSONE   Tablet 5 milliGRAM(s) Oral daily  tamsulosin 0.4 milliGRAM(s) Oral at bedtime  trimethoprim   80 mG/sulfamethoxazole 400 mG 1 Tablet(s) Oral <User Schedule>    PRN Inpatient Medications  oxycodone    5 mG/acetaminophen 325 mG 1 Tablet(s) Oral every 8 hours PRN    REVIEW OF SYSTEMS  --------------------------------------------------------------------------------  Gen: No fevers/chills  Skin: No rash  Head/Eyes/Ears/Mouth: No headache  Respiratory: No dyspnea  CV: No chest pain  GI: + abdominal pain   : +Campo  MSK: No edema  Neuro: No dizziness/lightheadedness  Heme: No easy bruising or bleeding  Endo: No heat/cold intolerance  Psych: No significant nervousness  All other systems were reviewed and are negative, except as noted.    VITALS/PHYSICAL EXAM  --------------------------------------------------------------------------------  T(C): 36.4 (05-02-25 @ 12:41), Max: 36.8 (05-02-25 @ 09:03)  HR: 57 (05-02-25 @ 12:41) (51 - 60)  BP: 130/54 (05-02-25 @ 12:41) (130/54 - 182/69)  RR: 18 (05-02-25 @ 12:41) (17 - 18)  SpO2: 100% (05-02-25 @ 12:41) (99% - 100%)  Wt(kg): --    05-01-25 @ 07:01  -  05-02-25 @ 07:00  --------------------------------------------------------  IN: 420 mL / OUT: 1945 mL / NET: -1525 mL    05-02-25 @ 07:01  -  05-02-25 @ 13:08  --------------------------------------------------------  IN: 480 mL / OUT: 570 mL / NET: -90 mL    Physical Exam:  Gen: Not in distress, well-appearing  HEENT: No scelral icterus, moist oral mucosa  Pulm: Lungs clear to auscultation bilaterally   CV: regular rate and rhythm, S1 and S2 normal, no murmur   Abd: Tenderness near transplant site, +COSMO drain  : +Campo  Back: No spinal or CVA tenderness  Extremities: No edema. Distal pulses 2+ bilaterally   Skin: Warm  Neuro: Alert and oriented to person, place and time. Normal speech.   Access: RI TDC without signs of infection    LABS/STUDIES  --------------------------------------------------------------------------------              8.6    5.48  >-----------<  404      [05-02-25 @ 06:47]              27.9     140  |  104  |  68  ----------------------------<  106      [05-02-25 @ 06:47]  4.4   |  21  |  3.30        Ca     9.6     [05-02-25 @ 06:47]      Mg     2.4     [05-02-25 @ 06:47]      Phos  3.4     [05-02-25 @ 06:47]    TPro  6.9  /  Alb  4.0  /  TBili  0.2  /  DBili  x   /  AST  16  /  ALT  8   /  AlkPhos  83  [05-02-25 @ 06:47]    Creatinine Trend:  SCr 3.30 [05-02 @ 06:47]  SCr 3.62 [05-01 @ 06:45]  SCr 4.41 [04-30 @ 06:58]  SCr 4.63 [04-29 @ 06:49]  SCr 4.74 [04-28 @ 14:12]    Tacrolimus (), Serum: 5.6 ng/mL (05-02 @ 06:48)  Tacrolimus (), Serum: 5.2 ng/mL (05-01 @ 06:48)  Tacrolimus (), Serum: 6.0 ng/mL (04-30 @ 06:59)  Tacrolimus (), Serum: 1.7 ng/mL (04-29 @ 06:46)    Iron 67, TIBC 210, %sat 32      [04-21-25 @ 07:08]  Ferritin 1496      [04-21-25 @ 07:08]  PTH -- (Ca 7.9)      [12-30-24 @ 06:50]   229  PTH -- (Ca 7.6)      [11-27-24 @ 04:23]   250  Vitamin D (25OH) 17.1      [11-25-24 @ 06:50]  TSH 2.48      [04-13-25 @ 07:24]  Lipid: chol 112, TG 61, HDL 46, LDL --      [12-31-24 @ 04:27]    HBsAb 46.0      [04-07-25 @ 21:39]  HBsAg Nonreact      [04-07-25 @ 21:39]  HBcAb Nonreact      [04-07-25 @ 21:39]  HCV 0.05, Nonreact      [04-07-25 @ 21:39]  HIV Nonreact      [04-07-25 @ 21:39]

## 2025-05-03 LAB
ALBUMIN SERPL ELPH-MCNC: 3.6 G/DL — SIGNIFICANT CHANGE UP (ref 3.3–5)
ALP SERPL-CCNC: 75 U/L — SIGNIFICANT CHANGE UP (ref 40–120)
ALT FLD-CCNC: 7 U/L — LOW (ref 10–45)
ANION GAP SERPL CALC-SCNC: 16 MMOL/L — SIGNIFICANT CHANGE UP (ref 5–17)
AST SERPL-CCNC: 15 U/L — SIGNIFICANT CHANGE UP (ref 10–40)
BASOPHILS # BLD AUTO: 0.03 K/UL — SIGNIFICANT CHANGE UP (ref 0–0.2)
BASOPHILS NFR BLD AUTO: 0.6 % — SIGNIFICANT CHANGE UP (ref 0–2)
BILIRUB SERPL-MCNC: 0.2 MG/DL — SIGNIFICANT CHANGE UP (ref 0.2–1.2)
BLD GP AB SCN SERPL QL: NEGATIVE — SIGNIFICANT CHANGE UP
BUN SERPL-MCNC: 62 MG/DL — HIGH (ref 7–23)
CALCIUM SERPL-MCNC: 9.1 MG/DL — SIGNIFICANT CHANGE UP (ref 8.4–10.5)
CHLORIDE SERPL-SCNC: 106 MMOL/L — SIGNIFICANT CHANGE UP (ref 96–108)
CO2 SERPL-SCNC: 19 MMOL/L — LOW (ref 22–31)
CREAT SERPL-MCNC: 3.09 MG/DL — HIGH (ref 0.5–1.3)
CULTURE RESULTS: SIGNIFICANT CHANGE UP
EGFR: 21 ML/MIN/1.73M2 — LOW
EGFR: 21 ML/MIN/1.73M2 — LOW
EOSINOPHIL # BLD AUTO: 0.4 K/UL — SIGNIFICANT CHANGE UP (ref 0–0.5)
EOSINOPHIL NFR BLD AUTO: 7.4 % — HIGH (ref 0–6)
GLUCOSE BLDC GLUCOMTR-MCNC: 107 MG/DL — HIGH (ref 70–99)
GLUCOSE BLDC GLUCOMTR-MCNC: 115 MG/DL — HIGH (ref 70–99)
GLUCOSE BLDC GLUCOMTR-MCNC: 159 MG/DL — HIGH (ref 70–99)
GLUCOSE BLDC GLUCOMTR-MCNC: 161 MG/DL — HIGH (ref 70–99)
GLUCOSE BLDC GLUCOMTR-MCNC: 217 MG/DL — HIGH (ref 70–99)
GLUCOSE SERPL-MCNC: 127 MG/DL — HIGH (ref 70–99)
HCT VFR BLD CALC: 26.3 % — LOW (ref 39–50)
HGB BLD-MCNC: 8.2 G/DL — LOW (ref 13–17)
IMM GRANULOCYTES NFR BLD AUTO: 1.3 % — HIGH (ref 0–0.9)
LYMPHOCYTES # BLD AUTO: 0.56 K/UL — LOW (ref 1–3.3)
LYMPHOCYTES # BLD AUTO: 10.4 % — LOW (ref 13–44)
MAGNESIUM SERPL-MCNC: 2.1 MG/DL — SIGNIFICANT CHANGE UP (ref 1.6–2.6)
MCHC RBC-ENTMCNC: 28.6 PG — SIGNIFICANT CHANGE UP (ref 27–34)
MCHC RBC-ENTMCNC: 31.2 G/DL — LOW (ref 32–36)
MCV RBC AUTO: 91.6 FL — SIGNIFICANT CHANGE UP (ref 80–100)
MONOCYTES # BLD AUTO: 0.61 K/UL — SIGNIFICANT CHANGE UP (ref 0–0.9)
MONOCYTES NFR BLD AUTO: 11.4 % — SIGNIFICANT CHANGE UP (ref 2–14)
NEUTROPHILS # BLD AUTO: 3.7 K/UL — SIGNIFICANT CHANGE UP (ref 1.8–7.4)
NEUTROPHILS NFR BLD AUTO: 68.9 % — SIGNIFICANT CHANGE UP (ref 43–77)
NRBC BLD AUTO-RTO: 0 /100 WBCS — SIGNIFICANT CHANGE UP (ref 0–0)
PHOSPHATE SERPL-MCNC: 3.3 MG/DL — SIGNIFICANT CHANGE UP (ref 2.5–4.5)
PLATELET # BLD AUTO: 393 K/UL — SIGNIFICANT CHANGE UP (ref 150–400)
POTASSIUM SERPL-MCNC: 4.2 MMOL/L — SIGNIFICANT CHANGE UP (ref 3.5–5.3)
POTASSIUM SERPL-SCNC: 4.2 MMOL/L — SIGNIFICANT CHANGE UP (ref 3.5–5.3)
PROT SERPL-MCNC: 6.4 G/DL — SIGNIFICANT CHANGE UP (ref 6–8.3)
RBC # BLD: 2.87 M/UL — LOW (ref 4.2–5.8)
RBC # FLD: 21.3 % — HIGH (ref 10.3–14.5)
RH IG SCN BLD-IMP: POSITIVE — SIGNIFICANT CHANGE UP
SODIUM SERPL-SCNC: 141 MMOL/L — SIGNIFICANT CHANGE UP (ref 135–145)
SPECIMEN SOURCE: SIGNIFICANT CHANGE UP
TACROLIMUS SERPL-MCNC: 4.8 NG/ML — SIGNIFICANT CHANGE UP
WBC # BLD: 5.37 K/UL — SIGNIFICANT CHANGE UP (ref 3.8–10.5)
WBC # FLD AUTO: 5.37 K/UL — SIGNIFICANT CHANGE UP (ref 3.8–10.5)

## 2025-05-03 PROCEDURE — 99232 SBSQ HOSP IP/OBS MODERATE 35: CPT

## 2025-05-03 RX ORDER — LIDOCAINE HCL/PF 10 MG/ML
5 VIAL (ML) INJECTION ONCE
Refills: 0 | Status: COMPLETED | OUTPATIENT
Start: 2025-05-03 | End: 2025-05-03

## 2025-05-03 RX ORDER — TACROLIMUS 0.5 MG/1
4 CAPSULE ORAL ONCE
Refills: 0 | Status: COMPLETED | OUTPATIENT
Start: 2025-05-03 | End: 2025-05-03

## 2025-05-03 RX ORDER — PREDNISONE 20 MG/1
2.5 TABLET ORAL DAILY
Refills: 0 | Status: DISCONTINUED | OUTPATIENT
Start: 2025-05-04 | End: 2025-05-06

## 2025-05-03 RX ORDER — CARVEDILOL 3.12 MG/1
6.25 TABLET, FILM COATED ORAL EVERY 12 HOURS
Refills: 0 | Status: DISCONTINUED | OUTPATIENT
Start: 2025-05-03 | End: 2025-05-06

## 2025-05-03 RX ORDER — OXYBUTYNIN CHLORIDE 5 MG/1
5 TABLET, FILM COATED, EXTENDED RELEASE ORAL DAILY
Refills: 0 | Status: DISCONTINUED | OUTPATIENT
Start: 2025-05-03 | End: 2025-05-06

## 2025-05-03 RX ORDER — ACETAMINOPHEN 500 MG/5ML
650 LIQUID (ML) ORAL ONCE
Refills: 0 | Status: COMPLETED | OUTPATIENT
Start: 2025-05-03 | End: 2025-05-03

## 2025-05-03 RX ORDER — CARVEDILOL 3.12 MG/1
3.12 TABLET, FILM COATED ORAL EVERY 12 HOURS
Refills: 0 | Status: DISCONTINUED | OUTPATIENT
Start: 2025-05-03 | End: 2025-05-03

## 2025-05-03 RX ORDER — TACROLIMUS 0.5 MG/1
4 CAPSULE ORAL
Refills: 0 | Status: DISCONTINUED | OUTPATIENT
Start: 2025-05-04 | End: 2025-05-05

## 2025-05-03 RX ADMIN — Medication 25 MICROGRAM(S): at 05:17

## 2025-05-03 RX ADMIN — Medication 20 MILLIGRAM(S): at 12:12

## 2025-05-03 RX ADMIN — INSULIN LISPRO 4 UNIT(S): 100 INJECTION, SOLUTION INTRAVENOUS; SUBCUTANEOUS at 17:03

## 2025-05-03 RX ADMIN — CARVEDILOL 6.25 MILLIGRAM(S): 3.12 TABLET, FILM COATED ORAL at 17:01

## 2025-05-03 RX ADMIN — Medication 60 MILLIGRAM(S): at 17:00

## 2025-05-03 RX ADMIN — TACROLIMUS 4 MILLIGRAM(S): 0.5 CAPSULE ORAL at 15:30

## 2025-05-03 RX ADMIN — INSULIN GLARGINE-YFGN 6 UNIT(S): 100 INJECTION, SOLUTION SUBCUTANEOUS at 22:32

## 2025-05-03 RX ADMIN — HEPARIN SODIUM 5000 UNIT(S): 1000 INJECTION INTRAVENOUS; SUBCUTANEOUS at 17:00

## 2025-05-03 RX ADMIN — Medication 81 MILLIGRAM(S): at 12:12

## 2025-05-03 RX ADMIN — MYCOPHENOLATE MOFETIL 500 MILLIGRAM(S): 500 TABLET, FILM COATED ORAL at 08:38

## 2025-05-03 RX ADMIN — OXYBUTYNIN CHLORIDE 5 MILLIGRAM(S): 5 TABLET, FILM COATED, EXTENDED RELEASE ORAL at 15:30

## 2025-05-03 RX ADMIN — PREDNISONE 5 MILLIGRAM(S): 20 TABLET ORAL at 05:16

## 2025-05-03 RX ADMIN — Medication 200 MILLIGRAM(S): at 17:00

## 2025-05-03 RX ADMIN — Medication 200 MILLIGRAM(S): at 05:16

## 2025-05-03 RX ADMIN — Medication 5 MILLILITER(S): at 19:57

## 2025-05-03 RX ADMIN — Medication 650 MILLIGRAM(S): at 20:57

## 2025-05-03 RX ADMIN — Medication 650 MILLIGRAM(S): at 19:57

## 2025-05-03 RX ADMIN — Medication 500000 UNIT(S): at 17:00

## 2025-05-03 RX ADMIN — MYCOPHENOLATE MOFETIL 500 MILLIGRAM(S): 500 TABLET, FILM COATED ORAL at 17:01

## 2025-05-03 RX ADMIN — Medication 75 MILLIGRAM(S): at 17:02

## 2025-05-03 RX ADMIN — ISOSORBIDE MONONITRATE 30 MILLIGRAM(S): 60 TABLET, EXTENDED RELEASE ORAL at 12:12

## 2025-05-03 RX ADMIN — Medication 1 APPLICATION(S): at 05:17

## 2025-05-03 RX ADMIN — Medication 60 MILLIGRAM(S): at 05:17

## 2025-05-03 RX ADMIN — HEPARIN SODIUM 5000 UNIT(S): 1000 INJECTION INTRAVENOUS; SUBCUTANEOUS at 05:17

## 2025-05-03 RX ADMIN — Medication 500000 UNIT(S): at 00:39

## 2025-05-03 RX ADMIN — Medication 500000 UNIT(S): at 12:11

## 2025-05-03 RX ADMIN — TAMSULOSIN HYDROCHLORIDE 0.4 MILLIGRAM(S): 0.4 CAPSULE ORAL at 21:36

## 2025-05-03 RX ADMIN — INSULIN LISPRO 4 UNIT(S): 100 INJECTION, SOLUTION INTRAVENOUS; SUBCUTANEOUS at 08:38

## 2025-05-03 RX ADMIN — Medication 10 MILLIGRAM(S): at 01:04

## 2025-05-03 RX ADMIN — Medication 500000 UNIT(S): at 05:16

## 2025-05-03 RX ADMIN — INSULIN LISPRO 4: 100 INJECTION, SOLUTION INTRAVENOUS; SUBCUTANEOUS at 17:03

## 2025-05-03 RX ADMIN — ATORVASTATIN CALCIUM 40 MILLIGRAM(S): 80 TABLET, FILM COATED ORAL at 21:37

## 2025-05-03 RX ADMIN — Medication 50 MILLIGRAM(S): at 05:16

## 2025-05-03 RX ADMIN — INSULIN LISPRO 2: 100 INJECTION, SOLUTION INTRAVENOUS; SUBCUTANEOUS at 08:39

## 2025-05-03 RX ADMIN — INSULIN LISPRO 4 UNIT(S): 100 INJECTION, SOLUTION INTRAVENOUS; SUBCUTANEOUS at 12:12

## 2025-05-03 NOTE — PROGRESS NOTE ADULT - SUBJECTIVE AND OBJECTIVE BOX
Transplant Surgery - Multidisciplinary Rounds  --------------------------------------------------------------      Present: Patient seen and examined with multidisciplinary transplant team including Surgeon Dr. Little, Nephrologist Dr. Lopes, [facility] Audra  Pharmacist Ashley, and bedside RN during rounds. Disciplines not in attendance will be notified of plan.     HPI: 68 y/o M with PMHx of HLD, CAD s/p LAD PCI 7/24, HFpEF , HTN, hypothyroid, type 2 DM, h/o cva with no residual deficits, persistent right sided pleural effusion, and hx of hemorrhagic pericardial effusion (cytopathology negative), ESRD on HD MWF (recently converted from PD in 1/2025) via permacath placed on 1/2025, now s/p DDRT on 4/8/25, c/b HTN requiring 2 SICU admissions who presents to [facility] w/ cough+ hypotension    Interval Events:          Immunosupression:  Induction: Thymo  Maintenance: FK by level/ BID/SST  Ongoing monitoring for signs of rejection     Potential Discharge date: TBD  Education:  Medications  Plan of care:  See Below                                   ROS  All other systems were reviewed and are negative, except as noted.      PHYSICAL EXAM:  Constitutional: Well developed / well nourished  Eyes: Anicteric, PERRLA  ENMT: nc/at  Neck: supple   Respiratory: CTA B/L  Cardiovascular: RRR  Gastrointestinal: Soft, non distended, nontender. JPx2 ss   Genitourinary: voiding spontaneously   Extremities: warm b/l upper and lower, no edema  Vascular: Palpable dp pulses bilaterally  Neurological: A&O x3  Skin: no rashes, ulcerations or lesions;  Musculoskeletal: Moving all extremities  Psychiatric: Responsive     Transplant Surgery - Multidisciplinary Rounds  --------------------------------------------------------------      Present: Patient seen and examined with multidisciplinary transplant team including Surgeon Dr. Little, Nephrologist Dr. Lopes, [facility] Audra  Pharmacist Ashley, and bedside RN during rounds. Disciplines not in attendance will be notified of plan.     HPI: 68 y/o M with PMHx of HLD, CAD s/p LAD PCI 7/24, HFpEF , HTN, hypothyroid, type 2 DM, h/o cva with no residual deficits, persistent right sided pleural effusion, and hx of hemorrhagic pericardial effusion (cytopathology negative), ESRD on HD MWF (recently converted from PD in 1/2025) via permacath placed on 1/2025, now s/p DDRT on 4/8/25, c/b HTN requiring 2 SICU admissions who presents to [facility] w/ cough+ hypotension    Interval Events:  - HTN 180s -> given Hydral 10 IVP  - HR 53 and sustaining -> d/c Coreg  - 6 beats WCT on telemetry -> EKG - kathrin w/ 1st degree AV block        Immunosupression:  Induction: Thymo  Maintenance: FK by level/ BID/SST  Ongoing monitoring for signs of rejection     Potential Discharge date: TBD  Education:  Medications  Plan of care:  See Below         MEDICATIONS  (STANDING):  acyclovir   Oral Tab/Cap 200 milliGRAM(s) Oral two times a day  aspirin enteric coated 81 milliGRAM(s) Oral daily  atorvastatin 40 milliGRAM(s) Oral at bedtime  carvedilol 6.25 milliGRAM(s) Oral every 12 hours  chlorhexidine 2% Cloths 1 Application(s) Topical <User Schedule>  dextrose 5%. 1000 milliLiter(s) (100 mL/Hr) IV Continuous <Continuous>  dextrose 5%. 1000 milliLiter(s) (50 mL/Hr) IV Continuous <Continuous>  dextrose 50% Injectable 25 Gram(s) IV Push once  dextrose 50% Injectable 12.5 Gram(s) IV Push once  dextrose 50% Injectable 25 Gram(s) IV Push once  dextrose Oral Gel 15 Gram(s) Oral once  famotidine    Tablet 20 milliGRAM(s) Oral daily  glucagon  Injectable 1 milliGRAM(s) IntraMuscular once  heparin   Injectable 5000 Unit(s) SubCutaneous every 12 hours  hydrALAZINE 75 milliGRAM(s) Oral two times a day  insulin glargine Injectable (LANTUS) 6 Unit(s) SubCutaneous at bedtime  insulin lispro (ADMELOG) corrective regimen sliding scale   SubCutaneous three times a day before meals  insulin lispro (ADMELOG) corrective regimen sliding scale   SubCutaneous at bedtime  insulin lispro Injectable (ADMELOG) 4 Unit(s) SubCutaneous three times a day before meals  isosorbide   mononitrate ER Tablet (IMDUR) 30 milliGRAM(s) Oral daily  levothyroxine 25 MICROGram(s) Oral daily  mycophenolate mofetil 500 milliGRAM(s) Oral two times a day  NIFEdipine XL 60 milliGRAM(s) Oral every 12 hours  nystatin    Suspension 967693 Unit(s) Oral four times a day  oxybutynin 5 milliGRAM(s) Oral daily  tamsulosin 0.4 milliGRAM(s) Oral at bedtime  trimethoprim   80 mG/sulfamethoxazole 400 mG 1 Tablet(s) Oral <User Schedule>    MEDICATIONS  (PRN):  oxycodone    5 mG/acetaminophen 325 mG 1 Tablet(s) Oral every 8 hours PRN Moderate Pain (4 - 6)      PAST MEDICAL & SURGICAL HISTORY:  Hypertension      ESRD on peritoneal dialysis      CVA (cerebrovascular accident)  2010 without residual effects      Hyperlipidemia      BPH (benign prostatic hyperplasia)      CAD (coronary artery disease)      T2DM (type 2 diabetes mellitus)      Hypothyroidism      History of peritoneal dialysis  s/p PD catheter placement      S/P coronary artery stent placement          Vital Signs Last 24 Hrs  T(C): 36.6 (03 May 2025 17:00), Max: 36.8 (02 May 2025 21:49)  T(F): 97.8 (03 May 2025 17:00), Max: 98.2 (02 May 2025 21:49)  HR: 66 (03 May 2025 17:00) (55 - 67)  BP: 179/70 (03 May 2025 17:00) (138/65 - 181/72)  BP(mean): --  RR: 18 (03 May 2025 17:00) (17 - 20)  SpO2: 100% (03 May 2025 17:00) (98% - 100%)    Parameters below as of 03 May 2025 17:00  Patient On (Oxygen Delivery Method): room air        I&O's Summary    02 May 2025 07:01  -  03 May 2025 07:00  --------------------------------------------------------  IN: 1020 mL / OUT: 1611 mL / NET: -591 mL    03 May 2025 07:01  -  03 May 2025 18:52  --------------------------------------------------------  IN: 720 mL / OUT: 685 mL / NET: 35 mL                              8.2    5.37  )-----------( 393      ( 03 May 2025 06:51 )             26.3     05-03    141  |  106  |  62[H]  ----------------------------<  127[H]  4.2   |  19[L]  |  3.09[H]    Ca    9.1      03 May 2025 06:55  Phos  3.3     05-03  Mg     2.1     05-03    TPro  6.4  /  Alb  3.6  /  TBili  0.2  /  DBili  x   /  AST  15  /  ALT  7[L]  /  AlkPhos  75  05-03    Tacrolimus (), Serum: 4.8 ng/mL (05-03 @ 06:51)        Culture - Urine (collected 05-01-25 @ 12:05)  Source: Clean Catch Clean Catch (Midstream)  Final Report (05-02-25 @ 16:11):    No growth    Culture - Blood (collected 04-30-25 @ 18:20)  Source: Blood Blood  Preliminary Report (05-02-25 @ 23:01):    No growth at 48 Hours    Culture - Blood (collected 04-30-25 @ 18:00)  Source: Blood Blood  Preliminary Report (05-02-25 @ 23:01):    No growth at 48 Hours    Urinalysis with Rflx Culture (collected 04-28-25 @ 15:34)    Culture - Blood (collected 04-28-25 @ 13:40)  Source: Blood Blood-Peripheral  Gram Stain (04-30-25 @ 18:43):    Growth in aerobic bottle: Gram Positive Cocci in Clusters    Growth in anaerobic bottle: Gram Positive Cocci in Clusters  Final Report (05-01-25 @ 14:50):    Growth in aerobic and anaerobic bottles: Staphylococcus epidermidis    Isolation of Coagulase negative Staphylococcus from single blood culture    sets may represent    contamination. Contact the Microbiology Department at [phone] if    susceptibilitytesting is needed.    clinically indicated.    Direct identification is available within approximately 3-5    hours either by Blood Panel Multiplexed PCR or Direct    MALDI-TOF. Details: [email or web address]    ANAEROBIC blood culture bottle turned positive after the final report was    released.  Organism: Blood Culture PCR (04-30-25 @ 18:46)  Organism: Blood Culture PCR (04-30-25 @ 14:21)    Culture - Blood (collected 04-28-25 @ 13:30)  Source: Blood Blood-Peripheral  Final Report (05-03-25 @ 18:00):    No growth at 5 days    Culture - Urine (collected 04-26-25 @ 22:39)  Source: Clean Catch Clean Catch (Midstream)  Final Report (04-27-25 @ 23:45):    <10,000 CFU/mL Normal Urogenital Vickie            ROS  All other systems were reviewed and are negative, except as noted. +pain when urinating      PHYSICAL EXAM:  Constitutional: Well developed / well nourished  Eyes: Anicteric, PERRLA  ENMT: nc/at  Neck: supple   Respiratory: CTA B/L  Cardiovascular: RRR  Gastrointestinal: Soft, non distended, nontender. JPx2 ss   Genitourinary: Campo  Extremities: warm b/l upper and lower, no edema  Vascular: Palpable dp pulses bilaterally  Neurological: A&O x3  Skin: no rashes, ulcerations or lesions;  Musculoskeletal: Moving all extremities  Psychiatric: Responsive

## 2025-05-03 NOTE — PROGRESS NOTE ADULT - SUBJECTIVE AND OBJECTIVE BOX
[facility] [place] [facility]   MD MESSI ROSE MD RUORU WONG, PA    TEL:  FROM 9 AM to 5 PM ---OFFICE: [phone]  AVAILABLE ON TEAMS    FROM 5 PM - 9 AM PLEASE CALL ANSWERING SERVICE: [phone]    RENAL FOLLOW UP NOTE--Date of Service 05-03-25 @ 08:31  --------------------------------------------------------------------------------  HPI:      Pt seen and examined at bedside.       PAST HISTORY  --------------------------------------------------------------------------------  No significant changes to PMH, PSH, FHx, SHx, unless otherwise noted    ALLERGIES & MEDICATIONS  --------------------------------------------------------------------------------  Allergies    No Known Allergies    Intolerances      Standing Inpatient Medications  acyclovir   Oral Tab/Cap 200 milliGRAM(s) Oral two times a day  aspirin enteric coated 81 milliGRAM(s) Oral daily  atorvastatin 40 milliGRAM(s) Oral at bedtime  chlorhexidine 2% Cloths 1 Application(s) Topical <User Schedule>  dextrose 5%. 1000 milliLiter(s) IV Continuous <Continuous>  dextrose 5%. 1000 milliLiter(s) IV Continuous <Continuous>  dextrose 50% Injectable 25 Gram(s) IV Push once  dextrose 50% Injectable 12.5 Gram(s) IV Push once  dextrose 50% Injectable 25 Gram(s) IV Push once  dextrose Oral Gel 15 Gram(s) Oral once  famotidine    Tablet 20 milliGRAM(s) Oral daily  glucagon  Injectable 1 milliGRAM(s) IntraMuscular once  heparin   Injectable 5000 Unit(s) SubCutaneous every 12 hours  hydrALAZINE 50 milliGRAM(s) Oral two times a day  insulin glargine Injectable (LANTUS) 6 Unit(s) SubCutaneous at bedtime  insulin lispro (ADMELOG) corrective regimen sliding scale   SubCutaneous three times a day before meals  insulin lispro (ADMELOG) corrective regimen sliding scale   SubCutaneous at bedtime  insulin lispro Injectable (ADMELOG) 4 Unit(s) SubCutaneous three times a day before meals  isosorbide   mononitrate ER Tablet (IMDUR) 30 milliGRAM(s) Oral daily  levothyroxine 25 MICROGram(s) Oral daily  mycophenolate mofetil 500 milliGRAM(s) Oral two times a day  NIFEdipine XL 60 milliGRAM(s) Oral every 12 hours  nystatin    Suspension 953621 Unit(s) Oral four times a day  predniSONE   Tablet 5 milliGRAM(s) Oral daily  tamsulosin 0.4 milliGRAM(s) Oral at bedtime  trimethoprim   80 mG/sulfamethoxazole 400 mG 1 Tablet(s) Oral <User Schedule>    PRN Inpatient Medications  oxycodone    5 mG/acetaminophen 325 mG 1 Tablet(s) Oral every 8 hours PRN      REVIEW OF SYSTEMS  --------------------------------------------------------------------------------  General: no fever  MSK: no edema     VITALS/PHYSICAL EXAM  --------------------------------------------------------------------------------  T(C): 36.6 (05-03-25 @ 05:18), Max: 36.8 (05-02-25 @ 09:03)  HR: 67 (05-03-25 @ 06:39) (55 - 67)  BP: 163/68 (05-03-25 @ 06:39) (130/54 - 181/72)  RR: 17 (05-03-25 @ 05:18) (17 - 18)  SpO2: 99% (05-03-25 @ 05:18) (98% - 100%)  Wt(kg): --        05-02-25 @ 07:01  -  05-03-25 @ 07:00  --------------------------------------------------------  IN: 1020 mL / OUT: 1611 mL / NET: -591 mL      Physical Exam:  	Constitutional: NAD  Neck: No JVD  Respiratory: CTAB, no wheezes, rales or rhonchi  Cardiovascular: S1, S2, RRR  Gastrointestinal: BS+, soft, NT/ND  Extremities: No peripheral edema  LABS/STUDIES  --------------------------------------------------------------------------------              8.2    5.37  >-----------<  393      [05-03-25 @ 06:51]              26.3     141  |  106  |  62  ----------------------------<  127      [05-03-25 @ 06:55]  4.2   |  19  |  3.09        Ca     9.1     [05-03-25 @ 06:55]      Mg     2.1     [05-03-25 @ 06:55]      Phos  3.3     [05-03-25 @ 06:55]    TPro  6.4  /  Alb  3.6  /  TBili  0.2  /  DBili  x   /  AST  15  /  ALT  7   /  AlkPhos  75  [05-03-25 @ 06:55]          Creatinine Trend:  SCr 3.09 [05-03 @ 06:55]  SCr 3.30 [05-02 @ 06:47]  SCr 3.62 [05-01 @ 06:45]  SCr 4.41 [04-30 @ 06:58]  SCr 4.63 [04-29 @ 06:49]    Urinalysis - [05-03-25 @ 06:55]      Color  / Appearance  / SG  / pH       Gluc 127 / Ketone   / Bili  / Urobili        Blood  / Protein  / Leuk Est  / Nitrite       RBC  / WBC  / Hyaline  / Gran  / Sq Epi  / Non Sq Epi  / Bacteria       Iron 67, TIBC 210, %sat 32      [04-21-25 @ 07:08]  Ferritin 1496      [04-21-25 @ 07:08]  PTH -- (Ca 7.9)      [12-30-24 @ 06:50]   229  PTH -- (Ca 7.6)      [11-27-24 @ 04:23]   250  Vitamin D (25OH) 17.1      [11-25-24 @ 06:50]  TSH 2.48      [04-13-25 @ 07:24]  Lipid: chol 112, TG 61, HDL 46, LDL --      [12-31-24 @ 04:27]    HBsAb 46.0      [04-07-25 @ 21:39]  HBsAg Nonreact      [04-07-25 @ 21:39]  HBcAb Nonreact      [04-07-25 @ 21:39]  HCV 0.05, Nonreact      [04-07-25 @ 21:39]  HIV Nonreact      [04-07-25 @ 21:39]

## 2025-05-03 NOTE — PROGRESS NOTE ADULT - SUBJECTIVE AND OBJECTIVE BOX
DDRT recipient with DGF, now off dialysis. Has Brand catheter, drains and stent since he has fluid collection with elevated creatinine. Noted events    S: Would prefer brand removal, but convinced after explanation for  continued bladder drainage with Brand catheter    O:    MEDICATIONS  (STANDING):  acyclovir   Oral Tab/Cap 200 milliGRAM(s) Oral two times a day  aspirin enteric coated 81 milliGRAM(s) Oral daily  atorvastatin 40 milliGRAM(s) Oral at bedtime  carvedilol 6.25 milliGRAM(s) Oral every 12 hours  chlorhexidine 2% Cloths 1 Application(s) Topical <User Schedule>  dextrose 5%. 1000 milliLiter(s) (50 mL/Hr) IV Continuous <Continuous>  dextrose 5%. 1000 milliLiter(s) (100 mL/Hr) IV Continuous <Continuous>  dextrose 50% Injectable 25 Gram(s) IV Push once  dextrose 50% Injectable 12.5 Gram(s) IV Push once  dextrose 50% Injectable 25 Gram(s) IV Push once  dextrose Oral Gel 15 Gram(s) Oral once  famotidine    Tablet 20 milliGRAM(s) Oral daily  glucagon  Injectable 1 milliGRAM(s) IntraMuscular once  heparin   Injectable 5000 Unit(s) SubCutaneous every 12 hours  hydrALAZINE 75 milliGRAM(s) Oral two times a day  insulin glargine Injectable (LANTUS) 6 Unit(s) SubCutaneous at bedtime  insulin lispro (ADMELOG) corrective regimen sliding scale   SubCutaneous three times a day before meals  insulin lispro (ADMELOG) corrective regimen sliding scale   SubCutaneous at bedtime  insulin lispro Injectable (ADMELOG) 4 Unit(s) SubCutaneous three times a day before meals  isosorbide   mononitrate ER Tablet (IMDUR) 30 milliGRAM(s) Oral daily  levothyroxine 25 MICROGram(s) Oral daily  mycophenolate mofetil 500 milliGRAM(s) Oral two times a day  NIFEdipine XL 60 milliGRAM(s) Oral every 12 hours  nystatin    Suspension 064685 Unit(s) Oral four times a day  oxybutynin 5 milliGRAM(s) Oral daily  predniSONE   Tablet 2.5 milliGRAM(s) Oral daily  tacrolimus ER Tablet (ENVARSUS XR) 4 milliGRAM(s) Oral <User Schedule>  tamsulosin 0.4 milliGRAM(s) Oral at bedtime  trimethoprim   80 mG/sulfamethoxazole 400 mG 1 Tablet(s) Oral <User Schedule>    MEDICATIONS  (PRN):    VS noted I/O reviewed  On exam:  HENT: No acute signs  Neck: no JVD  Chest/CV: no acute signs  No rub/gallop  Abd: Drains noted  Brand noted  Ext: No acute signs  Neuro: no acute focal signs  A,O X3      LABS/STUDIES  --------------------------------------------------------------------------------              8.2    5.37  >-----------<  393      [05-03-25 @ 06:51]              26.3     141  |  106  |  62  ----------------------------<  127      [05-03-25 @ 06:55]  4.2   |  19  |  3.09        Ca     9.1     [05-03-25 @ 06:55]      Mg     2.1     [05-03-25 @ 06:55]      Phos  3.3     [05-03-25 @ 06:55]    TPro  6.4  /  Alb  3.6  /  TBili  0.2  /  DBili  x   /  AST  15  /  ALT  7   /  AlkPhos  75  [05-03-25 @ 06:55]          Creatinine Trend:  SCr 3.09 [05-03 @ 06:55]  SCr 3.30 [05-02 @ 06:47]  SCr 3.62 [05-01 @ 06:45]  SCr 4.41 [04-30 @ 06:58]  SCr 4.63 [04-29 @ 06:49]    Tacrolimus (), Serum: 4.8 ng/mL (05-03 @ 06:51)  Tacrolimus (), Serum: 5.6 ng/mL (05-02 @ 06:48)  Tacrolimus (), Serum: 5.2 ng/mL (05-01 @ 06:48)  Tacrolimus (), Serum: 6.0 ng/mL (04-30 @ 06:59)

## 2025-05-03 NOTE — PROGRESS NOTE ADULT - ASSESSMENT
70 y/o M with PMHx of HLD, CAD s/p LAD PCI 7/24, HFpEF , HTN, hypothyroid, type 2 DM, h/o cva with no residual deficits, persistent right sided pleural effusion, and hx of hemorrhagic pericardial effusion (cytopathology negative), ESRD on HD MWF (recently converted from PD in 1/2025) via permacath placed on 1/2025, now s/p DDRT on 4/8/25, c/b HTN requiring 2 SICU admissions. Patient presented to the ED urgently from outpatient. States he was having output from drain area and stitch was placed in the outpatient office. Patient afterward had Hypotension episode to 80s/50s and on ED admit has pain from drain site when drains are being manipulated. Denies any other sx aside from a dry cough.      A/P  s/p DDRT 4/2024 with F   Nephrologist is Dr. Menjivar  Was PD then converted to HD, has permacath  Was on HD prior to admission now off HD  SCR downtrending off HD  HD and IS as per transplant   On envarsus 4 mg qd w/ belatacept, next dose 5/8  On cellcept 500 mg bid and prednisone 5 mg daily   Monitor UO and bmp     HTN/Hypotension  BP fluctuating; suboptimal  Now on nifedipine 60 mg bid, hydralazie 50mg BID --consider increasing to TID  Monitor bp     Hypokalemia   Supplemented as needed  Monitor K     Anemia  On epo   Transfuse prn to keep hgb >8  Monitor hgb

## 2025-05-03 NOTE — PROGRESS NOTE ADULT - ASSESSMENT
68 y/o M with PMHx of HLD, CAD s/p LAD PCI 7/24, HFpEF , HTN, hypothyroid, type 2 DM, h/o cva with no residual deficits, persistent right sided pleural effusion, and hx of hemorrhagic pericardial effusion (cytopathology negative), ESRD on HD MWF (recently converted from PD in 1/2025) via permacath placed on 1/2025, now s/p DDRT on 4/8/25, c/b HTN requiring 2 SICU admissions who presents [facility] w/ cough+ hypotension      [] s/p DDRT, readmit hypotension / cough  - trend labs, VS  - Strict I's & O's, JPx2, brand  - Repeat COSMO Cr  - renal Doppler w/ arvin RI's patent vasculature   - CT chest non contrast w/ stable pleural effusion  - Hypertensive DC IVF   - BCx+ MRSE in 1/2 bottles, TID following, on Linezolid 600 BID   - COSMO Cr 5/1 COSMO (9.9), brand replaced 2/2 possible urine leak  - Ophthalmology consulted for blurry vision no intervention for now rec outpt fu  - Send CMV PCR    [] IMMUNO   - Env by level,  BID, pred 5  - PPX nystatin, bactrim, valcyte, ASA, PPI     [] HTN  - Home: coreg 12.5 bid,hydral 25 BID, cardura 8mg BID, nifed 60 BID, imdur 60 QD  - Here: Coreg 6.25 BID, nifed 60 BID, imdur 30 qd, increase hydral to 50 BID 68 y/o M with PMHx of HLD, CAD s/p LAD PCI 7/24, HFpEF , HTN, hypothyroid, type 2 DM, h/o cva with no residual deficits, persistent right sided pleural effusion, and hx of hemorrhagic pericardial effusion (cytopathology negative), ESRD on HD MWF (recently converted from PD in 1/2025) via permacath placed on 1/2025, now s/p DDRT on 4/8/25, c/b HTN requiring 2 SICU admissions who presents to [facility] w/ cough+ hypotension      [] s/p DDRT, readmit hypotension / cough  - trend labs, VS  - Strict I's & O's, JPx2, brand  - Repeat COSMO Cr 9.93 on 5/1.  - renal Doppler w/ arvin RI's patent vasculature   - CT chest non contrast w/ stable pleural effusion  - Hypertensive DC IVF   - BCx+ MRSE in 1/2 bottles, TID following, on Linezolid 600 BID   - COSMO Cr 5/1 COSMO (9.9), brand replaced 2/2 possible urine leak  - Ophthalmology consulted for blurry vision no intervention for now rec outpt fu  - Send CMV PCR  - Oxybutinin    [] IMMUNO   - Env by level,  BID, pred 2.5  - PPX nystatin, bactrim, valcyte, ASA, PPI     [] HTN  - Home: coreg 12.5 bid,hydral 25 BID, cardura 8mg BID, nifed 60 BID, imdur 60 QD  - Here: Coreg 6.25 BID, nifed 60 BID, imdur 30 qd, increase hydral to 75 BID

## 2025-05-04 DIAGNOSIS — Z94.0 KIDNEY TRANSPLANT STATUS: ICD-10-CM

## 2025-05-04 DIAGNOSIS — D84.9 IMMUNODEFICIENCY, UNSPECIFIED: ICD-10-CM

## 2025-05-04 DIAGNOSIS — N17.9 ACUTE KIDNEY FAILURE, UNSPECIFIED: ICD-10-CM

## 2025-05-04 DIAGNOSIS — I10 ESSENTIAL (PRIMARY) HYPERTENSION: ICD-10-CM

## 2025-05-04 LAB
ALBUMIN SERPL ELPH-MCNC: 3.6 G/DL — SIGNIFICANT CHANGE UP (ref 3.3–5)
ALP SERPL-CCNC: 79 U/L — SIGNIFICANT CHANGE UP (ref 40–120)
ALT FLD-CCNC: 9 U/L — LOW (ref 10–45)
ANION GAP SERPL CALC-SCNC: 14 MMOL/L — SIGNIFICANT CHANGE UP (ref 5–17)
AST SERPL-CCNC: 19 U/L — SIGNIFICANT CHANGE UP (ref 10–40)
BASOPHILS # BLD AUTO: 0.05 K/UL — SIGNIFICANT CHANGE UP (ref 0–0.2)
BASOPHILS NFR BLD AUTO: 1 % — SIGNIFICANT CHANGE UP (ref 0–2)
BILIRUB SERPL-MCNC: 0.2 MG/DL — SIGNIFICANT CHANGE UP (ref 0.2–1.2)
BUN SERPL-MCNC: 62 MG/DL — HIGH (ref 7–23)
CALCIUM SERPL-MCNC: 9.5 MG/DL — SIGNIFICANT CHANGE UP (ref 8.4–10.5)
CHLORIDE SERPL-SCNC: 105 MMOL/L — SIGNIFICANT CHANGE UP (ref 96–108)
CO2 SERPL-SCNC: 20 MMOL/L — LOW (ref 22–31)
CREAT FLD-MCNC: 10.35 MG/DL — SIGNIFICANT CHANGE UP
CREAT FLD-MCNC: 3.41 MG/DL — SIGNIFICANT CHANGE UP
CREAT SERPL-MCNC: 2.82 MG/DL — HIGH (ref 0.5–1.3)
EGFR: 23 ML/MIN/1.73M2 — LOW
EGFR: 23 ML/MIN/1.73M2 — LOW
EOSINOPHIL # BLD AUTO: 0.39 K/UL — SIGNIFICANT CHANGE UP (ref 0–0.5)
EOSINOPHIL NFR BLD AUTO: 7.6 % — HIGH (ref 0–6)
GLUCOSE BLDC GLUCOMTR-MCNC: 155 MG/DL — HIGH (ref 70–99)
GLUCOSE BLDC GLUCOMTR-MCNC: 166 MG/DL — HIGH (ref 70–99)
GLUCOSE BLDC GLUCOMTR-MCNC: 184 MG/DL — HIGH (ref 70–99)
GLUCOSE BLDC GLUCOMTR-MCNC: 184 MG/DL — HIGH (ref 70–99)
GLUCOSE SERPL-MCNC: 189 MG/DL — HIGH (ref 70–99)
HCT VFR BLD CALC: 27.6 % — LOW (ref 39–50)
HGB BLD-MCNC: 8.4 G/DL — LOW (ref 13–17)
IMM GRANULOCYTES NFR BLD AUTO: 1.2 % — HIGH (ref 0–0.9)
LYMPHOCYTES # BLD AUTO: 0.55 K/UL — LOW (ref 1–3.3)
LYMPHOCYTES # BLD AUTO: 10.7 % — LOW (ref 13–44)
MAGNESIUM SERPL-MCNC: 2.2 MG/DL — SIGNIFICANT CHANGE UP (ref 1.6–2.6)
MCHC RBC-ENTMCNC: 28.4 PG — SIGNIFICANT CHANGE UP (ref 27–34)
MCHC RBC-ENTMCNC: 30.4 G/DL — LOW (ref 32–36)
MCV RBC AUTO: 93.2 FL — SIGNIFICANT CHANGE UP (ref 80–100)
MONOCYTES # BLD AUTO: 0.61 K/UL — SIGNIFICANT CHANGE UP (ref 0–0.9)
MONOCYTES NFR BLD AUTO: 11.8 % — SIGNIFICANT CHANGE UP (ref 2–14)
NEUTROPHILS # BLD AUTO: 3.5 K/UL — SIGNIFICANT CHANGE UP (ref 1.8–7.4)
NEUTROPHILS NFR BLD AUTO: 67.7 % — SIGNIFICANT CHANGE UP (ref 43–77)
NRBC BLD AUTO-RTO: 0 /100 WBCS — SIGNIFICANT CHANGE UP (ref 0–0)
PHOSPHATE SERPL-MCNC: 3.3 MG/DL — SIGNIFICANT CHANGE UP (ref 2.5–4.5)
PLATELET # BLD AUTO: 447 K/UL — HIGH (ref 150–400)
POTASSIUM SERPL-MCNC: 4.4 MMOL/L — SIGNIFICANT CHANGE UP (ref 3.5–5.3)
POTASSIUM SERPL-SCNC: 4.4 MMOL/L — SIGNIFICANT CHANGE UP (ref 3.5–5.3)
PROT SERPL-MCNC: 6.2 G/DL — SIGNIFICANT CHANGE UP (ref 6–8.3)
RBC # BLD: 2.96 M/UL — LOW (ref 4.2–5.8)
RBC # FLD: 21.7 % — HIGH (ref 10.3–14.5)
SODIUM SERPL-SCNC: 139 MMOL/L — SIGNIFICANT CHANGE UP (ref 135–145)
TACROLIMUS SERPL-MCNC: 5.7 NG/ML — SIGNIFICANT CHANGE UP
WBC # BLD: 5.16 K/UL — SIGNIFICANT CHANGE UP (ref 3.8–10.5)
WBC # FLD AUTO: 5.16 K/UL — SIGNIFICANT CHANGE UP (ref 3.8–10.5)

## 2025-05-04 PROCEDURE — 99223 1ST HOSP IP/OBS HIGH 75: CPT

## 2025-05-04 PROCEDURE — 99232 SBSQ HOSP IP/OBS MODERATE 35: CPT

## 2025-05-04 RX ORDER — ASPIRIN 325 MG
81 TABLET ORAL DAILY
Refills: 0 | Status: DISCONTINUED | OUTPATIENT
Start: 2025-05-05 | End: 2025-05-06

## 2025-05-04 RX ORDER — ACETAMINOPHEN 500 MG/5ML
650 LIQUID (ML) ORAL ONCE
Refills: 0 | Status: COMPLETED | OUTPATIENT
Start: 2025-05-04 | End: 2025-05-04

## 2025-05-04 RX ORDER — SENNA 187 MG
2 TABLET ORAL DAILY
Refills: 0 | Status: DISCONTINUED | OUTPATIENT
Start: 2025-05-04 | End: 2025-05-06

## 2025-05-04 RX ORDER — LIDOCAINE HCL/PF 10 MG/ML
10 VIAL (ML) INJECTION ONCE
Refills: 0 | Status: COMPLETED | OUTPATIENT
Start: 2025-05-04 | End: 2025-05-04

## 2025-05-04 RX ORDER — POLYETHYLENE GLYCOL 3350 17 G/17G
17 POWDER, FOR SOLUTION ORAL DAILY
Refills: 0 | Status: DISCONTINUED | OUTPATIENT
Start: 2025-05-04 | End: 2025-05-06

## 2025-05-04 RX ORDER — BISACODYL 5 MG
10 TABLET, DELAYED RELEASE (ENTERIC COATED) ORAL ONCE
Refills: 0 | Status: DISCONTINUED | OUTPATIENT
Start: 2025-05-04 | End: 2025-05-06

## 2025-05-04 RX ADMIN — MYCOPHENOLATE MOFETIL 500 MILLIGRAM(S): 500 TABLET, FILM COATED ORAL at 08:42

## 2025-05-04 RX ADMIN — Medication 75 MILLIGRAM(S): at 05:06

## 2025-05-04 RX ADMIN — Medication 81 MILLIGRAM(S): at 11:46

## 2025-05-04 RX ADMIN — HEPARIN SODIUM 5000 UNIT(S): 1000 INJECTION INTRAVENOUS; SUBCUTANEOUS at 17:27

## 2025-05-04 RX ADMIN — Medication 650 MILLIGRAM(S): at 02:13

## 2025-05-04 RX ADMIN — Medication 650 MILLIGRAM(S): at 01:13

## 2025-05-04 RX ADMIN — Medication 200 MILLIGRAM(S): at 17:26

## 2025-05-04 RX ADMIN — INSULIN LISPRO 2: 100 INJECTION, SOLUTION INTRAVENOUS; SUBCUTANEOUS at 17:27

## 2025-05-04 RX ADMIN — Medication 500000 UNIT(S): at 11:46

## 2025-05-04 RX ADMIN — POLYETHYLENE GLYCOL 3350 17 GRAM(S): 17 POWDER, FOR SOLUTION ORAL at 12:32

## 2025-05-04 RX ADMIN — TAMSULOSIN HYDROCHLORIDE 0.4 MILLIGRAM(S): 0.4 CAPSULE ORAL at 21:40

## 2025-05-04 RX ADMIN — HEPARIN SODIUM 5000 UNIT(S): 1000 INJECTION INTRAVENOUS; SUBCUTANEOUS at 05:06

## 2025-05-04 RX ADMIN — Medication 500000 UNIT(S): at 05:06

## 2025-05-04 RX ADMIN — CARVEDILOL 6.25 MILLIGRAM(S): 3.12 TABLET, FILM COATED ORAL at 17:27

## 2025-05-04 RX ADMIN — ISOSORBIDE MONONITRATE 30 MILLIGRAM(S): 60 TABLET, EXTENDED RELEASE ORAL at 11:46

## 2025-05-04 RX ADMIN — Medication 200 MILLIGRAM(S): at 05:06

## 2025-05-04 RX ADMIN — INSULIN LISPRO 4 UNIT(S): 100 INJECTION, SOLUTION INTRAVENOUS; SUBCUTANEOUS at 17:27

## 2025-05-04 RX ADMIN — Medication 500000 UNIT(S): at 17:27

## 2025-05-04 RX ADMIN — Medication 500000 UNIT(S): at 00:24

## 2025-05-04 RX ADMIN — CARVEDILOL 6.25 MILLIGRAM(S): 3.12 TABLET, FILM COATED ORAL at 08:39

## 2025-05-04 RX ADMIN — PREDNISONE 2.5 MILLIGRAM(S): 20 TABLET ORAL at 05:09

## 2025-05-04 RX ADMIN — Medication 10 MILLILITER(S): at 11:55

## 2025-05-04 RX ADMIN — Medication 1 APPLICATION(S): at 05:07

## 2025-05-04 RX ADMIN — Medication 75 MILLIGRAM(S): at 17:26

## 2025-05-04 RX ADMIN — Medication 25 MICROGRAM(S): at 05:05

## 2025-05-04 RX ADMIN — INSULIN LISPRO 2: 100 INJECTION, SOLUTION INTRAVENOUS; SUBCUTANEOUS at 11:47

## 2025-05-04 RX ADMIN — TACROLIMUS 4 MILLIGRAM(S): 0.5 CAPSULE ORAL at 08:38

## 2025-05-04 RX ADMIN — ATORVASTATIN CALCIUM 40 MILLIGRAM(S): 80 TABLET, FILM COATED ORAL at 21:40

## 2025-05-04 RX ADMIN — INSULIN LISPRO 2: 100 INJECTION, SOLUTION INTRAVENOUS; SUBCUTANEOUS at 08:40

## 2025-05-04 RX ADMIN — MYCOPHENOLATE MOFETIL 500 MILLIGRAM(S): 500 TABLET, FILM COATED ORAL at 17:29

## 2025-05-04 RX ADMIN — INSULIN LISPRO 4 UNIT(S): 100 INJECTION, SOLUTION INTRAVENOUS; SUBCUTANEOUS at 11:47

## 2025-05-04 RX ADMIN — Medication 60 MILLIGRAM(S): at 05:05

## 2025-05-04 RX ADMIN — OXYBUTYNIN CHLORIDE 5 MILLIGRAM(S): 5 TABLET, FILM COATED, EXTENDED RELEASE ORAL at 11:46

## 2025-05-04 RX ADMIN — INSULIN GLARGINE-YFGN 6 UNIT(S): 100 INJECTION, SOLUTION SUBCUTANEOUS at 21:40

## 2025-05-04 RX ADMIN — Medication 60 MILLIGRAM(S): at 17:26

## 2025-05-04 RX ADMIN — Medication 2 TABLET(S): at 17:26

## 2025-05-04 RX ADMIN — INSULIN LISPRO 4 UNIT(S): 100 INJECTION, SOLUTION INTRAVENOUS; SUBCUTANEOUS at 08:40

## 2025-05-04 RX ADMIN — Medication 20 MILLIGRAM(S): at 11:46

## 2025-05-04 NOTE — DISCHARGE NOTE PROVIDER - HOSPITAL COURSE
70 y/o M with PMHx of HLD, CAD s/p LAD PCI 7/24, HFpEF , HTN, hypothyroid, type 2 DM, h/o cva with no residual deficits, persistent right sided pleural effusion, and hx of hemorrhagic pericardial effusion (cytopathology negative), ESRD on HD MWF (recently converted from PD in 1/2025) via permacath placed on 1/2025, now s/p DDRT on 4/8/25, c/b HTN requiring 2 SICU admissions.     Patient presented to the ED urgently from outpatient. States he was having output from drain area and stitch was placed in the outpatient office. Patient afterward had Hypotension episode to 80s/50s and on ED admit has pain from drain site when drains are being manipulated. Denies any other sx aside from a dry cough.     70 y/o M with PMHx of HLD, CAD s/p LAD PCI 7/24, HFpEF , HTN, hypothyroid, type 2 DM, h/o cva with no residual deficits, persistent right sided pleural effusion, and hx of hemorrhagic pericardial effusion (cytopathology negative), ESRD on HD MWF (recently converted from PD in 1/2025) via permacath placed on 1/2025, now s/p DDRT on 4/8/25, c/b HTN requiring 2 SICU admissions.     Patient presented to the ED urgently from outpatient. States he was having output from drain area and stitch was placed in the outpatient office. Patient afterward had Hypotension episode to 80s/50s and on ED admit has pain from drain site when drains are being manipulated. Denies any other sx aside from a dry cough.       [] DDRT, +urine leak  - Renal Doppler: some inc velocities, some high RIs, mild hydro  - gentle IVF  - COSMO Cr 4/30: Cosmo 1 7, Jp2 (10), brand replaced 4/30  - Cosmo Cr repeat --> 9.93 on 5/1   - Oxybutinin     [] Ophto cx  - No ophtho intervention needed at this time.   - Patient can follow up outpatient after dc for chronic management.      [] cough/hypotension  - 4/28: Bcx+ MRSE 1/2 bottles, per TID Linezolid 600 BID  - repeat bcx sent 4/30  - RVP (-)  - CT chest 4/28: unchanged trace R. pleural effusion, pleural thickening, & associated round atelectasis of RLL & RML. Unchanged 6mm nodule L. oblique fissue      [] HTN  - HOME - coreg 12.5 bid, hydral 25 BID, cardura 8mg BID, nifed 60 BID, imdur 60 QD  - Here: Coreg 6.25 BID, nifed 60 BID, imdur 30 qd, hydral 75 BID        Follow up:    1. Follow up in Transplant Clinic _______  2. Follow up with Surgeon for removal of ureteral stent 68 y/o M with PMHx of HLD, CAD s/p LAD PCI 7/24, HFpEF , HTN, hypothyroid, type 2 DM, h/o cva with no residual deficits, persistent right sided pleural effusion, and hx of hemorrhagic pericardial effusion (cytopathology negative), ESRD on HD MWF (recently converted from PD in 1/2025) via permacath placed on 1/2025, now s/p DDRT on 4/8/25, c/b HTN requiring 2 SICU admissions.   Now presented to ED w/ drain pain, found to have mild urine leak, with IR drain placed, as well as hypotension and cough on admission       [] DDRT, +urine leak  - Renal Doppler: some inc velocities, some high RIs, mild hydro  - COSMO Creatinine from 4/30: COSMO#1: 7.73, COSMO#2 10.19, brand replaced 4/30, to be discharged with brand in place  - Discharged with one COSMO in place   - Immuno: Envarsus 5,  BID, pred 2.5  - PPx: nystatin, bactrim TIW, acyclovir, famotidine       [] cough/hypotension  - 4/28: Bcx+ MRSE 1/2 bottles, tx w/ linezolid x 3 days.   - repeat bcx sent 4/30 negative  - RVP (-)  - CT chest 4/28: unchanged trace R. pleural effusion, pleural thickening, & associated round atelectasis of RLL & RML. Unchanged 6mm nodule L. oblique fissue      [] HTN  - HOME - coreg 12.5 bid, hydral 25 BID, cardura 8mg BID, nifed 60 BID, imdur 60 QD  - Here: Coreg 6.25 BID, nifed 60 BID, imdur 30 qd, hydral 75 BID        Follow up:    1. Follow up in Transplant Clinic  in one week   2. Follow up with Surgeon for removal of ureteral stent outpatient    68 y/o M with PMHx of HLD, CAD s/p LAD PCI 7/24, HFpEF , HTN, hypothyroid, type 2 DM, h/o cva with no residual deficits, persistent right sided pleural effusion, and hx of hemorrhagic pericardial effusion (cytopathology negative), ESRD on HD MWF (recently converted from PD in 1/2025) via permacath placed on 1/2025, now s/p DDRT on 4/8/25, c/b HTN requiring 2 SICU admissions.   Now presented to ED w/ drain pain, found to have mild urine leak, with IR drain placed, as well as hypotension and cough on admission.  Sepsis was not present on admission.  Etiology of hypotension was due to patient having too much anti-hypertensive medications in the setting of a kidney transplant with resolving ATN.       [] DDRT, +urine leak  - Renal Doppler: some inc velocities, some high RIs, mild hydro  - COSMO Creatinine from 4/30: COSMO#1: 7.73, COSMO#2 10.19, brand replaced 4/30, to be discharged with brand in place  - Discharged with one COSMO in place   - Immuno: Envarsus 5,  BID, pred 2.5  - PPx: nystatin, bactrim TIW, acyclovir, famotidine       [] cough/hypotension  - 4/28: Bcx+ MRSE 1/2 bottles, tx w/ linezolid x 3 days.   - repeat bcx sent 4/30 negative  - RVP (-)  - CT chest 4/28: unchanged trace R. pleural effusion, pleural thickening, & associated round atelectasis of RLL & RML. Unchanged 6mm nodule L. oblique fissue      [] HTN  - HOME - coreg 12.5 bid, hydral 25 BID, cardura 8mg BID, nifed 60 BID, imdur 60 QD  - Here: Coreg 6.25 BID, nifed 60 BID, imdur 30 qd, hydral 75 BID        Follow up:    1. Follow up in Transplant Clinic  in one week   2. Follow up with Surgeon for removal of ureteral stent outpatient

## 2025-05-04 NOTE — PROVIDER CONTACT NOTE (OTHER) - ASSESSMENT
Pt AOx4, VS as charted, pt R COSMO drain sites are tender, swollen, and crusted over
Pt is A&Ox4. VS as per flowsheet.
Pt AOx4, VS as charted, pt asymptomatic. Pt w/ no c/o chest pain, no SOB, no palpitations, no headache, and no dizziness.
Pt is A&Ox4, denies sob, cp or discomfort @ this time.

## 2025-05-04 NOTE — PROVIDER CONTACT NOTE (OTHER) - NAME OF MD/NP/PA/DO NOTIFIED:
MARGUERITE Carballo
MIKHAIL Dimas
MIKHAIL Forbes
MIKHAIL Dimas
How Many Skin Cancers Have You Had?: more than one
What Is The Reason For Today's Visit?: History of Non-Melanoma Skin Cancer

## 2025-05-04 NOTE — PROVIDER CONTACT NOTE (OTHER) - ACTION/TREATMENT ORDERED:
MIKHAIL Dimas at bedside, no actions ordered at this time, nursing care continues.
MIKHAIL Dimas aware, morning medications to be given early, EKG
Provider made aware.
Provider made aware.

## 2025-05-04 NOTE — DISCHARGE NOTE PROVIDER - CARE PROVIDER_API CALL
Ansley Dunn  Nephrology  [address] [place] [id number]  Phone: [phone]  Fax: [phone]  Follow Up Time:

## 2025-05-04 NOTE — DISCHARGE NOTE PROVIDER - NSDCMRMEDTOKEN_GEN_ALL_CORE_FT
acyclovir 200 mg oral capsule: 1 cap(s) orally 2 times a day  aspirin 81 mg oral delayed release tablet: 1 tab(s) orally once a day  atorvastatin 40 mg oral tablet: 1 tab(s) orally once a day (at bedtime)  Coreg 12.5 mg oral tablet: 1 tab(s) orally 2 times a day  doxazosin 8 mg oral tablet: 1 tab(s) orally 2 times a day  Envarsus XR 4 mg oral tablet, extended release: 1 tab(s) orally once a day (in the morning)  famotidine 20 mg oral tablet: 1 tab(s) orally once a day  furosemide 80 mg oral tablet: 1 tab(s) orally 2 times a day  hydrALAZINE 50 mg oral tablet: 0.5 tab(s) orally 2 times a day  insulin glargine 100 units/mL subcutaneous solution: 6 unit(s) subcutaneous once a day (at bedtime)  insulin lispro 100 units/mL injectable solution: 4 unit(s) injectable 3 times a day (before meals)  isosorbide mononitrate 60 mg oral tablet, extended release: 1 tab(s) orally once a day  levothyroxine 25 mcg (0.025 mg) oral tablet: 1 tab(s) orally once a day  multivitamin: 1 tab(s) orally once a day  mycophenolate mofetil 250 mg oral capsule: 500 milligram(s) orally 2 times a day  NIFEdipine 60 mg oral tablet, extended release: 1 tab(s) orally 2 times a day  nystatin 100,000 units/mL oral suspension: 5 milliliter(s) orally 4 times a day  predniSONE: 5 milligram(s) orally once a day  sulfamethoxazole-trimethoprim 400 mg-80 mg oral tablet: 1 tab(s) orally Monday, Wednesday, and Friday  tacrolimus 1 mg oral tablet, extended release: 1 tab(s) orally once a day (in the morning)  tamsulosin 0.4 mg oral capsule: 1 cap(s) orally once a day (at bedtime)   acyclovir 200 mg oral capsule: 1 cap(s) orally 2 times a day  aspirin 81 mg oral delayed release tablet: 1 tab(s) orally once a day  atorvastatin 40 mg oral tablet: 1 tab(s) orally once a day (at bedtime)  Coreg 6.25 mg oral tablet: 1 tab(s) orally 2 times a day  Envarsus XR 4 mg oral tablet, extended release: 1 tab(s) orally once a day (in the morning)  famotidine 20 mg oral tablet: 1 tab(s) orally once a day  hydrALAZINE 50 mg oral tablet: 1.5 tab(s) orally once a day  insulin glargine 100 units/mL subcutaneous solution: 6 unit(s) subcutaneous once a day (at bedtime)  insulin lispro 100 units/mL injectable solution: 4 unit(s) injectable 3 times a day (before meals)  isosorbide mononitrate 30 mg oral tablet, extended release: 1 tab(s) orally once a day  levothyroxine 25 mcg (0.025 mg) oral tablet: 1 tab(s) orally once a day  mycophenolate mofetil 250 mg oral capsule: 500 milligram(s) orally 2 times a day  NIFEdipine 60 mg oral tablet, extended release: 1 tab(s) orally every 12 hours  nystatin 100,000 units/mL oral suspension: 5 milliliter(s) orally 4 times a day  oxyBUTYnin 5 mg oral tablet: 1 tab(s) orally once a day  predniSONE 2.5 mg oral tablet: 1 tab(s) orally once a day  sulfamethoxazole-trimethoprim 400 mg-80 mg oral tablet: 1 tab(s) orally Monday, Wednesday, and Friday  tacrolimus 1 mg oral tablet, extended release: 1 tab(s) orally once a day (in the morning)  tamsulosin 0.4 mg oral capsule: 1 cap(s) orally once a day (at bedtime)   acyclovir 200 mg oral capsule: 1 cap(s) orally 2 times a day  aspirin 81 mg oral delayed release tablet: 1 tab(s) orally once a day  atorvastatin 40 mg oral tablet: 1 tab(s) orally once a day (at bedtime)  Coreg 6.25 mg oral tablet: 1 tab(s) orally 2 times a day  Envarsus XR 4 mg oral tablet, extended release: 1 tab(s) orally once a day (in the morning)  famotidine 20 mg oral tablet: 1 tab(s) orally once a day  hydrALAZINE 50 mg oral tablet: 1.5 tab(s) orally once a day  insulin glargine 100 units/mL subcutaneous solution: 6 unit(s) subcutaneous once a day (at bedtime)  insulin lispro 100 units/mL injectable solution: 4 unit(s) injectable 3 times a day (before meals)  isosorbide mononitrate 30 mg oral tablet, extended release: 1 tab(s) orally once a day  levothyroxine 25 mcg (0.025 mg) oral tablet: 1 tab(s) orally once a day  mycophenolate mofetil 250 mg oral capsule: 500 milligram(s) orally 2 times a day  NIFEdipine 60 mg oral tablet, extended release: 1 tab(s) orally every 12 hours  nystatin 100,000 units/mL oral suspension: 5 milliliter(s) orally 4 times a day  Outpatient PT : To get back to functional status, and physical goals  oxyBUTYnin 5 mg oral tablet: 1 tab(s) orally once a day  predniSONE 2.5 mg oral tablet: 1 tab(s) orally once a day  sulfamethoxazole-trimethoprim 400 mg-80 mg oral tablet: 1 tab(s) orally Monday, Wednesday, and Friday  tacrolimus 1 mg oral tablet, extended release: 1 tab(s) orally once a day (in the morning)  tamsulosin 0.4 mg oral capsule: 1 cap(s) orally once a day (at bedtime)

## 2025-05-04 NOTE — PROGRESS NOTE ADULT - SUBJECTIVE AND OBJECTIVE BOX
DDRT recipient with DGF, now off dialysis. Has Campo catheter, drains and stent since he has fluid collection with elevated creatinine. Noted events    S: comfortable    O:    MEDICATIONS  (STANDING):    acyclovir   Oral Tab/Cap 200 milliGRAM(s) Oral two times a day  aspirin enteric coated 81 milliGRAM(s) Oral daily  atorvastatin 40 milliGRAM(s) Oral at bedtime  carvedilol 6.25 milliGRAM(s) Oral every 12 hours  chlorhexidine 2% Cloths 1 Application(s) Topical <User Schedule>  dextrose 5%. 1000 milliLiter(s) (100 mL/Hr) IV Continuous <Continuous>  dextrose 5%. 1000 milliLiter(s) (50 mL/Hr) IV Continuous <Continuous>  dextrose 50% Injectable 25 Gram(s) IV Push once  dextrose 50% Injectable 12.5 Gram(s) IV Push once  dextrose 50% Injectable 25 Gram(s) IV Push once  dextrose Oral Gel 15 Gram(s) Oral once  famotidine    Tablet 20 milliGRAM(s) Oral daily  glucagon  Injectable 1 milliGRAM(s) IntraMuscular once  heparin   Injectable 5000 Unit(s) SubCutaneous every 12 hours  hydrALAZINE 75 milliGRAM(s) Oral two times a day  insulin glargine Injectable (LANTUS) 6 Unit(s) SubCutaneous at bedtime  insulin lispro (ADMELOG) corrective regimen sliding scale   SubCutaneous three times a day before meals  insulin lispro (ADMELOG) corrective regimen sliding scale   SubCutaneous at bedtime  insulin lispro Injectable (ADMELOG) 4 Unit(s) SubCutaneous three times a day before meals  isosorbide   mononitrate ER Tablet (IMDUR) 30 milliGRAM(s) Oral daily  levothyroxine 25 MICROGram(s) Oral daily  mycophenolate mofetil 500 milliGRAM(s) Oral two times a day  NIFEdipine XL 60 milliGRAM(s) Oral every 12 hours  nystatin    Suspension 856603 Unit(s) Oral four times a day  oxybutynin 5 milliGRAM(s) Oral daily  polyethylene glycol 3350 17 Gram(s) Oral daily  predniSONE   Tablet 2.5 milliGRAM(s) Oral daily  tacrolimus ER Tablet (ENVARSUS XR) 4 milliGRAM(s) Oral <User Schedule>  tamsulosin 0.4 milliGRAM(s) Oral at bedtime  trimethoprim   80 mG/sulfamethoxazole 400 mG 1 Tablet(s) Oral <User Schedule>    MEDICATIONS  (PRN):  bisacodyl Suppository 10 milliGRAM(s) Rectal once PRN Constipation  senna 2 Tablet(s) Oral daily PRN Constipation      MEDICATIONS  (PRN):    VS noted I/O reviewed      Vital Signs Last 24 Hrs  T(C): 36.6 (05-04-25 @ 09:09)  T(F): 97.8 (05-04-25 @ 09:09), Max: 98.1 (05-04-25 @ 00:30)  HR: 63 (05-04-25 @ 09:09) (56 - 66)  BP: 136/65 (05-04-25 @ 09:09)  BP(mean): --  RR: 20 (05-04-25 @ 09:09) (18 - 20)  SpO2: 100% (05-04-25 @ 09:09) (100% - 100%)  Wt(kg): --    05-03 @ 07:01  -  05-04 @ 07:00  --------------------------------------------------------  IN: 1020 mL / OUT: 1435 mL / NET: -415 mL    05-04 @ 07:01  -  05-04 @ 15:53  --------------------------------------------------------  IN: 480 mL / OUT: 230 mL / NET: 250 mL      On exam:  HENT: No acute signs  Neck: no JVD right I/J catheter noted  Chest/CV: no acute signs  No rub/gallop  Abd: Drains noted  Campo noted  Ext: No acute signs  Neuro: no acute focal signs  A,O X3        LABS/STUDIES  --------------------------------------------------------------------------------              8.4    5.16  >-----------<  447      [05-04-25 @ 06:35]              27.6     139  |  105  |  62  ----------------------------<  189      [05-04-25 @ 06:33]  4.4   |  20  |  2.82        Ca     9.5     [05-04-25 @ 06:33]      Mg     2.2     [05-04-25 @ 06:33]      Phos  3.3     [05-04-25 @ 06:33]    TPro  6.2  /  Alb  3.6  /  TBili  0.2  /  DBili  x   /  AST  19  /  ALT  9   /  AlkPhos  79  [05-04-25 @ 06:33]          Creatinine Trend:  SCr 2.82 [05-04 @ 06:33]  SCr 3.09 [05-03 @ 06:55]  SCr 3.30 [05-02 @ 06:47]  SCr 3.62 [05-01 @ 06:45]  SCr 4.41 [04-30 @ 06:58]    Tacrolimus (), Serum: 5.7 ng/mL (05-04 @ 06:34)  Tacrolimus (), Serum: 4.8 ng/mL (05-03 @ 06:51)  Tacrolimus (), Serum: 5.6 ng/mL (05-02 @ 06:48)  Tacrolimus (), Serum: 5.2 ng/mL (05-01 @ 06:48)

## 2025-05-04 NOTE — PROGRESS NOTE ADULT - SUBJECTIVE AND OBJECTIVE BOX
[facility]   MD MESSI ROSE MD RUORU WONG, PA    TEL:  FROM 9 AM to 5 PM ---OFFICE: [phone]  AVAILABLE ON TEAMS    FROM 5 PM - 9 AM PLEASE CALL ANSWERING SERVICE: [phone]    RENAL FOLLOW UP NOTE--Date of Service 05-04-25 @ 09:19  --------------------------------------------------------------------------------  HPI:      Pt seen and examined at bedside.       PAST HISTORY  --------------------------------------------------------------------------------  No significant changes to PMH, PSH, FHx, SHx, unless otherwise noted    ALLERGIES & MEDICATIONS  --------------------------------------------------------------------------------  Allergies    No Known Allergies    Intolerances      Standing Inpatient Medications  acyclovir   Oral Tab/Cap 200 milliGRAM(s) Oral two times a day  aspirin enteric coated 81 milliGRAM(s) Oral daily  atorvastatin 40 milliGRAM(s) Oral at bedtime  carvedilol 6.25 milliGRAM(s) Oral every 12 hours  chlorhexidine 2% Cloths 1 Application(s) Topical <User Schedule>  dextrose 5%. 1000 milliLiter(s) IV Continuous <Continuous>  dextrose 5%. 1000 milliLiter(s) IV Continuous <Continuous>  dextrose 50% Injectable 25 Gram(s) IV Push once  dextrose 50% Injectable 12.5 Gram(s) IV Push once  dextrose 50% Injectable 25 Gram(s) IV Push once  dextrose Oral Gel 15 Gram(s) Oral once  famotidine    Tablet 20 milliGRAM(s) Oral daily  glucagon  Injectable 1 milliGRAM(s) IntraMuscular once  heparin   Injectable 5000 Unit(s) SubCutaneous every 12 hours  hydrALAZINE 75 milliGRAM(s) Oral two times a day  insulin glargine Injectable (LANTUS) 6 Unit(s) SubCutaneous at bedtime  insulin lispro (ADMELOG) corrective regimen sliding scale   SubCutaneous three times a day before meals  insulin lispro (ADMELOG) corrective regimen sliding scale   SubCutaneous at bedtime  insulin lispro Injectable (ADMELOG) 4 Unit(s) SubCutaneous three times a day before meals  isosorbide   mononitrate ER Tablet (IMDUR) 30 milliGRAM(s) Oral daily  levothyroxine 25 MICROGram(s) Oral daily  mycophenolate mofetil 500 milliGRAM(s) Oral two times a day  NIFEdipine XL 60 milliGRAM(s) Oral every 12 hours  nystatin    Suspension 610555 Unit(s) Oral four times a day  oxybutynin 5 milliGRAM(s) Oral daily  predniSONE   Tablet 2.5 milliGRAM(s) Oral daily  tacrolimus ER Tablet (ENVARSUS XR) 4 milliGRAM(s) Oral <User Schedule>  tamsulosin 0.4 milliGRAM(s) Oral at bedtime  trimethoprim   80 mG/sulfamethoxazole 400 mG 1 Tablet(s) Oral <User Schedule>    PRN Inpatient Medications      REVIEW OF SYSTEMS  --------------------------------------------------------------------------------  General: no fever  MSK: no edema     VITALS/PHYSICAL EXAM  --------------------------------------------------------------------------------  T(C): 36.6 (05-04-25 @ 09:09), Max: 36.7 (05-04-25 @ 00:30)  HR: 63 (05-04-25 @ 09:09) (56 - 66)  BP: 136/65 (05-04-25 @ 09:09) (132/67 - 179/70)  RR: 20 (05-04-25 @ 09:09) (18 - 20)  SpO2: 100% (05-04-25 @ 09:09) (100% - 100%)  Wt(kg): --        05-03-25 @ 07:01  -  05-04-25 @ 07:00  --------------------------------------------------------  IN: 1020 mL / OUT: 1435 mL / NET: -415 mL    05-04-25 @ 07:01  -  05-04-25 @ 09:19  --------------------------------------------------------  IN: 240 mL / OUT: 130 mL / NET: 110 mL      Physical Exam:  	Gen: NAD  	HEENT: MMM  	Pulm: CTA B/L  	CV: S1S2  	Abd: Soft, +BS  	Ext: No LE edema B/L                      Neuro: Awake   	Skin: Warm and Dry   	Vascular access: NO HD catheter            no cathie  LABS/STUDIES  --------------------------------------------------------------------------------              8.4    5.16  >-----------<  447      [05-04-25 @ 06:35]              27.6     139  |  105  |  62  ----------------------------<  189      [05-04-25 @ 06:33]  4.4   |  20  |  2.82        Ca     9.5     [05-04-25 @ 06:33]      Mg     2.2     [05-04-25 @ 06:33]      Phos  3.3     [05-04-25 @ 06:33]    TPro  6.2  /  Alb  3.6  /  TBili  0.2  /  DBili  x   /  AST  19  /  ALT  9   /  AlkPhos  79  [05-04-25 @ 06:33]          Creatinine Trend:  SCr 2.82 [05-04 @ 06:33]  SCr 3.09 [05-03 @ 06:55]  SCr 3.30 [05-02 @ 06:47]  SCr 3.62 [05-01 @ 06:45]  SCr 4.41 [04-30 @ 06:58]    Urinalysis - [05-04-25 @ 06:33]      Color  / Appearance  / SG  / pH       Gluc 189 / Ketone   / Bili  / Urobili        Blood  / Protein  / Leuk Est  / Nitrite       RBC  / WBC  / Hyaline  / Gran  / Sq Epi  / Non Sq Epi  / Bacteria       Iron 67, TIBC 210, %sat 32      [04-21-25 @ 07:08]  Ferritin 1496      [04-21-25 @ 07:08]  PTH -- (Ca 7.9)      [12-30-24 @ 06:50]   229  PTH -- (Ca 7.6)      [11-27-24 @ 04:23]   250  Vitamin D (25OH) 17.1      [11-25-24 @ 06:50]  TSH 2.48      [04-13-25 @ 07:24]  Lipid: chol 112, TG 61, HDL 46, LDL --      [12-31-24 @ 04:27]    HBsAb 46.0      [04-07-25 @ 21:39]  HBsAg Nonreact      [04-07-25 @ 21:39]  HBcAb Nonreact      [04-07-25 @ 21:39]  HCV 0.05, Nonreact      [04-07-25 @ 21:39]  HIV Nonreact      [04-07-25 @ 21:39]

## 2025-05-04 NOTE — PROGRESS NOTE ADULT - ASSESSMENT
68 y/o M with PMHx of HLD, CAD s/p LAD PCI 7/24, HFpEF , HTN, hypothyroid, type 2 DM, h/o cva with no residual deficits, persistent right sided pleural effusion, and hx of hemorrhagic pericardial effusion (cytopathology negative), ESRD on HD MWF (recently converted from PD in 1/2025) via permacath placed on 1/2025, now s/p DDRT on 4/8/25, c/b HTN requiring 2 SICU admissions. Patient presented to the ED urgently from outpatient. States he was having output from drain area and stitch was placed in the outpatient office. Patient afterward had Hypotension episode to 80s/50s and on ED admit has pain from drain site when drains are being manipulated. Denies any other sx aside from a dry cough.      A/P  s/p DDRT 4/2024 with F   Nephrologist is Dr. Menjivar  Was PD then converted to HD, has permacath  Was on HD prior to admission now off HD  SCR downtrending off HD  HD and IS as per transplant   On envarsus 4 mg qd w/ belatacept, next dose 5/8  On cellcept 500 mg bid and prednisone 5 mg daily   Monitor UO and bmp     HTN/Hypotension  BP fluctuating; suboptimal  Now on nifedipine 60 mg bid, hydralazie 50mg BID  Monitor bp     Hypokalemia   Supplemented as needed  Monitor K     Anemia  On epo   Transfuse prn to keep hgb >8  Monitor hgb

## 2025-05-04 NOTE — CONSULT NOTE ADULT - ASSESSMENT
69 year-old with known coronary artery disease status post DDRT.  Continue ASA and high intensity statin therapy.  Continue beta-blocker.    No further cardiovascular testing is necessary at this time.  Discussed with Transplant Team on rounds.

## 2025-05-04 NOTE — CONSULT NOTE ADULT - SUBJECTIVE AND OBJECTIVE BOX
Patient seen and evaluated @ 12 pm on   Chief Complaint: DDRT complicated by urine leak    HPI:  68 y/o M with PMHx of HLD, CAD s/p LAD PCI , HFpEF , HTN, hypothyroid, type 2 DM, h/o cva with no residual deficits, persistent right sided pleural effusion, and hx of hemorrhagic pericardial effusion (cytopathology negative), ESRD on HD MWF (recently converted from PD in 2025) via permacath placed on 2025, now s/p DDRT on 25, c/b HTN requiring 2 SICU admissions.     Patient presented to the ED urgently from outpatient. States he was having output from drain area and stitch was placed in the outpatient office. Patient afterward had Hypotension episode to 80s/50s and on ED admit has pain from drain site when drains are being manipulated. Denies any other sx aside from a dry cough.      (2025 20:27)    PMH:   Hypertension    ESRD on peritoneal dialysis    CVA (cerebrovascular accident)    Hyperlipidemia    Type 2 diabetes mellitus, with long-term current use of insulin    BPH (benign prostatic hyperplasia)    S/P coronary artery stent placement    Stented coronary artery    CAD (coronary artery disease)    T2DM (type 2 diabetes mellitus)    Hypothyroidism      PSH:   History of peritoneal dialysis    S/P coronary artery stent placement      Medications:   acyclovir   Oral Tab/Cap 200 milliGRAM(s) Oral two times a day  atorvastatin 40 milliGRAM(s) Oral at bedtime  bisacodyl Suppository 10 milliGRAM(s) Rectal once PRN  carvedilol 6.25 milliGRAM(s) Oral every 12 hours  chlorhexidine 2% Cloths 1 Application(s) Topical <User Schedule>  dextrose 5%. 1000 milliLiter(s) IV Continuous <Continuous>  dextrose 5%. 1000 milliLiter(s) IV Continuous <Continuous>  dextrose 50% Injectable 25 Gram(s) IV Push once  dextrose 50% Injectable 12.5 Gram(s) IV Push once  dextrose 50% Injectable 25 Gram(s) IV Push once  dextrose Oral Gel 15 Gram(s) Oral once  famotidine    Tablet 20 milliGRAM(s) Oral daily  glucagon  Injectable 1 milliGRAM(s) IntraMuscular once  hydrALAZINE 75 milliGRAM(s) Oral two times a day  insulin glargine Injectable (LANTUS) 6 Unit(s) SubCutaneous at bedtime  insulin lispro (ADMELOG) corrective regimen sliding scale   SubCutaneous three times a day before meals  insulin lispro (ADMELOG) corrective regimen sliding scale   SubCutaneous at bedtime  insulin lispro Injectable (ADMELOG) 4 Unit(s) SubCutaneous three times a day before meals  isosorbide   mononitrate ER Tablet (IMDUR) 30 milliGRAM(s) Oral daily  levothyroxine 25 MICROGram(s) Oral daily  mycophenolate mofetil 500 milliGRAM(s) Oral two times a day  NIFEdipine XL 60 milliGRAM(s) Oral every 12 hours  nystatin    Suspension 054264 Unit(s) Oral four times a day  oxybutynin 5 milliGRAM(s) Oral daily  polyethylene glycol 3350 17 Gram(s) Oral daily  predniSONE   Tablet 2.5 milliGRAM(s) Oral daily  senna 2 Tablet(s) Oral daily PRN  tacrolimus ER Tablet (ENVARSUS XR) 4 milliGRAM(s) Oral <User Schedule>  tamsulosin 0.4 milliGRAM(s) Oral at bedtime  trimethoprim   80 mG/sulfamethoxazole 400 mG 1 Tablet(s) Oral <User Schedule>    Allergies:  No Known Allergies    FAMILY HISTORY:  FH: HTN (hypertension) (Mother, Father)    FH: type 2 diabetes (Mother, Father)      Social History:  Smoking:  Alcohol:  Drugs:    Review of Systems:  [ ]Unable to obtain  Constitutional: No fever, chills, fatigue, or changes in weight  HEENT: No blurry vision, eye pain, headache, runny nose, or sore throat  Respiratory: No shortness of breath, cough, or wheezing  Cardiovascular: No chest pain or palpitations  Gastrointestinal: No nausea, vomiting, diarrhea, or abdominal pain  Genitourinary: No dysuria or incontinence  Extremities: No lower extremity swelling  Neurologic: No focal findings  Lymphatic: No lymphadenopathy   Skin: No rash  Psychiatry: No anxiety or depression  10 point review of systems is otherwise negative except as mentioned above            Physical Exam:  T(C): 37 (25 @ 21:36), Max: 37 (25 @ 21:36)  HR: 85 (25 @ 21:36) (56 - 85)  BP: 155/66 (25 @ 21:36) (132/67 - 162/75)  RR: 18 (25 @ 21:36) (18 - 20)  SpO2: 97% (25 @ 21:36) (97% - 100%)  Wt(kg): --     @ 07:01  -   @ 07:00  --------------------------------------------------------  IN: 1020 mL / OUT: 1435 mL / NET: -415 mL     @ 07:  -   @ 23:30  --------------------------------------------------------  IN: 840 mL / OUT: 812.5 mL / NET: 27.5 mL      Daily     Daily Weight in k.3 (04 May 2025 04:54)    Appearance: Normal, well groomed, NAD  Eyes: PERRLA, EOMI, pink conjunctiva, no scleral icterus   HENT: Normal oral mucosa  Cardiovascular: RRR, S1, S2, no murmur, rub, or gallop; no edema; no JVD  Procedural Access Site: Clean, dry, intact, without hematoma  Respiratory: Clear to auscultation bilaterally  Gastrointestinal: Soft, non-tender, non-distended, BS+  Musculoskeletal: No clubbing or joint deformity   Neurologic: No focal weakness  Lymphatic: No lymphadenopathy  Psychiatry: AAOx3 with appropriate mood and affect  Skin: No rashes, ecchymoses, or cyanosis    Cardiovascular Diagnostic Testing:  ECG:    Echo:    Stress Testing:    Cath:    Interpretation of Telemetry:    Imaging:    Labs:                        8.4    5.16  )-----------( 447      ( 04 May 2025 06:35 )             27.6         139  |  105  |  62[H]  ----------------------------<  189[H]  4.4   |  20[L]  |  2.82[H]    Ca    9.5      04 May 2025 06:33  Phos  3.3       Mg     2.2         TPro  6.2  /  Alb  3.6  /  TBili  0.2  /  DBili  x   /  AST  19  /  ALT  9[L]  /  AlkPhos  79

## 2025-05-04 NOTE — PROVIDER CONTACT NOTE (OTHER) - REASON
+BCX
Pt R COSMO drain sites are tender, swollen, and crusted over
Amended Blood cultures
Pt w/ episode of 6 beats WCT

## 2025-05-04 NOTE — CONSULT NOTE ADULT - CONSULT REASON
Permcath removal
Coronary artery disease
Hypotension & c/f sepsis
s/p DDRT w/ hypotension
vision loss
Hypotension

## 2025-05-04 NOTE — DISCHARGE NOTE PROVIDER - NSDCFUSCHEDAPPT_GEN_ALL_CORE_FT
Dane Phipps  [facility] Physician [facility]  TRANSPL[address]   Scheduled Appointment: 05/05/2025    [facility] Physician [place] [id number] [facility]  Scheduled Appointment: 05/08/2025    Ansley Dunn  [facility]  [address]  Scheduled Appointment: 05/15/2025    Ansley Dunn  [facility]  [address]  Scheduled Appointment: 05/22/2025    Ansley Dunn  [facility] Physician [facility]  [address]  Scheduled Appointment: 06/03/2025    Ansley Dunn  [facility]  [address]  Scheduled Appointment: 06/24/2025    Ansley Dunn  [facility]  [address]  Scheduled Appointment: 07/10/2025     [facility] Physician [place] [id number] [facility]  Scheduled Appointment: 05/08/2025    Ansley Dunn  [facility]  [address]  Scheduled Appointment: 05/15/2025    Ansley Dunn  [facility]  [address]  Scheduled Appointment: 05/22/2025    Ansley Dunn  [facility]  [address]  Scheduled Appointment: 06/03/2025    Ansley Dunn  [facility]  [address]  Scheduled Appointment: 06/24/2025    Ansley Dunn  [facility]  [address]  Scheduled Appointment: 07/10/2025     Ansley Dunn  [facility]  [address]  Scheduled Appointment: 06/03/2025    [facility] [address] [facility]  Scheduled Appointment: 06/04/2025    Ansley Dunn  [facility]  [address]  Scheduled Appointment: 06/24/2025    Ansley Dunn  [facility]  [address]  Scheduled Appointment: 07/10/2025    Ansley Dunn  [facility]  [address]  Scheduled Appointment: 08/12/2025    Félix Galan  [facility]  CARDIOLOGY [address] [facility]   Scheduled Appointment: 08/13/2025

## 2025-05-04 NOTE — CONSULT NOTE ADULT - REASON FOR ADMISSION
s/p DDRT, readmit w/ hypotension & c/f sepsis

## 2025-05-04 NOTE — PROGRESS NOTE ADULT - SUBJECTIVE AND OBJECTIVE BOX
Transplant Surgery - Multidisciplinary Rounds  --------------------------------------------------------------      Present: Patient seen and examined with multidisciplinary transplant team including Surgeon Dr. Little, Nephrologist Dr. Lopes, LÁZARO Zhu, and bedside RN during rounds. Disciplines not in attendance will be notified of plan.     HPI: 70 y/o M with PMHx of HLD, CAD s/p LAD PCI 7/24, HFpEF , HTN, hypothyroid, type 2 DM, h/o cva with no residual deficits, persistent right sided pleural effusion, and hx of hemorrhagic pericardial effusion (cytopathology negative), ESRD on HD MWF (recently converted from PD in 1/2025) via permacath placed on 1/2025, now s/p DDRT on 4/8/25, c/b HTN requiring 2 SICU admissions who presents [facility] w/ cough+ hypotension    Interval Events:          Immunosupression:  Induction: Thymo  Maintenance: FK by level/ BID/SST  Ongoing monitoring for signs of rejection     Potential Discharge date: TBD  Education:  Medications  Plan of care:  See Below                                           ROS  All other systems were reviewed and are negative, except as noted. +pain when urinating      PHYSICAL EXAM:  Constitutional: Well developed / well nourished  Eyes: Anicteric, PERRLA  ENMT: nc/at  Neck: supple   Respiratory: CTA B/L  Cardiovascular: RRR  Gastrointestinal: Soft, non distended, nontender. JPx2 ss   Genitourinary: [place]  Extremities: warm b/l upper and lower, no edema  Vascular: Palpable dp pulses bilaterally  Neurological: A&O x3  Skin: no rashes, ulcerations or lesions;  Musculoskeletal: Moving all extremities  Psychiatric: Responsive     Transplant Surgery - Multidisciplinary Rounds  --------------------------------------------------------------      Present: Patient seen and examined with multidisciplinary transplant team including Surgeon Dr. Little, Nephrologist Dr. Lopes, LÁZARO Zhu, and bedside RN during rounds. Disciplines not in attendance will be notified of plan.     HPI: 68 y/o M with PMHx of HLD, CAD s/p LAD PCI 7/24, HFpEF , HTN, hypothyroid, type 2 DM, h/o cva with no residual deficits, persistent right sided pleural effusion, and hx of hemorrhagic pericardial effusion (cytopathology negative), ESRD on HD MWF (recently converted from PD in 1/2025) via permacath placed on 1/2025, now s/p DDRT on 4/8/25, c/b HTN requiring 2 SICU admissions who presents to [facility] w/ cough+ hypotension    Interval Events:  - Afebrile, HTN - possibly from pain  - C/O pain w/ brand ->Tylenol/[place]get.   - UO - 1.2L/day        Immunosupression:  Induction: Thymo  Maintenance: FK by level/ BID/Pred 2.5  Ongoing monitoring for signs of rejection     Potential Discharge date: TBD  Education:  Medications  Plan of care:  See Below         MEDICATIONS  (STANDING):  acyclovir   Oral Tab/Cap 200 milliGRAM(s) Oral two times a day  aspirin enteric coated 81 milliGRAM(s) Oral daily  atorvastatin 40 milliGRAM(s) Oral at bedtime  carvedilol 6.25 milliGRAM(s) Oral every 12 hours  chlorhexidine 2% Cloths 1 Application(s) Topical <User Schedule>  dextrose 5%. 1000 milliLiter(s) (50 mL/Hr) IV Continuous <Continuous>  dextrose 5%. 1000 milliLiter(s) (100 mL/Hr) IV Continuous <Continuous>  dextrose 50% Injectable 25 Gram(s) IV Push once  dextrose 50% Injectable 12.5 Gram(s) IV Push once  dextrose 50% Injectable 25 Gram(s) IV Push once  dextrose Oral Gel 15 Gram(s) Oral once  famotidine    Tablet 20 milliGRAM(s) Oral daily  glucagon  Injectable 1 milliGRAM(s) IntraMuscular once  heparin   Injectable 5000 Unit(s) SubCutaneous every 12 hours  hydrALAZINE 75 milliGRAM(s) Oral two times a day  insulin glargine Injectable (LANTUS) 6 Unit(s) SubCutaneous at bedtime  insulin lispro (ADMELOG) corrective regimen sliding scale   SubCutaneous three times a day before meals  insulin lispro (ADMELOG) corrective regimen sliding scale   SubCutaneous at bedtime  insulin lispro Injectable (ADMELOG) 4 Unit(s) SubCutaneous three times a day before meals  isosorbide   mononitrate ER Tablet (IMDUR) 30 milliGRAM(s) Oral daily  levothyroxine 25 MICROGram(s) Oral daily  mycophenolate mofetil 500 milliGRAM(s) Oral two times a day  NIFEdipine XL 60 milliGRAM(s) Oral every 12 hours  nystatin    Suspension 399870 Unit(s) Oral four times a day  oxybutynin 5 milliGRAM(s) Oral daily  polyethylene glycol 3350 17 Gram(s) Oral daily  predniSONE   Tablet 2.5 milliGRAM(s) Oral daily  tacrolimus ER Tablet (ENVARSUS XR) 4 milliGRAM(s) Oral <User Schedule>  tamsulosin 0.4 milliGRAM(s) Oral at bedtime  trimethoprim   80 mG/sulfamethoxazole 400 mG 1 Tablet(s) Oral <User Schedule>    MEDICATIONS  (PRN):  bisacodyl Suppository 10 milliGRAM(s) Rectal once PRN Constipation  senna 2 Tablet(s) Oral daily PRN Constipation      PAST MEDICAL & SURGICAL HISTORY:  Hypertension      ESRD on peritoneal dialysis      CVA (cerebrovascular accident)  2010 without residual effects      Hyperlipidemia      BPH (benign prostatic hyperplasia)      CAD (coronary artery disease)      T2DM (type 2 diabetes mellitus)      Hypothyroidism      History of peritoneal dialysis  s/p PD catheter placement      S/P coronary artery stent placement          Vital Signs Last 24 Hrs  T(C): 36.8 (04 May 2025 17:14), Max: 36.8 (04 May 2025 17:14)  T(F): 98.3 (04 May 2025 17:14), Max: 98.3 (04 May 2025 17:14)  HR: 69 (04 May 2025 17:14) (56 - 69)  BP: 151/57 (04 May 2025 17:14) (132/67 - 170/72)  BP(mean): --  RR: 18 (04 May 2025 17:14) (18 - 20)  SpO2: 100% (04 May 2025 17:14) (100% - 100%)    Parameters below as of 04 May 2025 17:14  Patient On (Oxygen Delivery Method): room air        I&O's Summary    03 May 2025 07:01  -  04 May 2025 07:00  --------------------------------------------------------  IN: 1020 mL / OUT: 1435 mL / NET: -415 mL    04 May 2025 07:01  -  04 May 2025 18:49  --------------------------------------------------------  IN: 720 mL / OUT: 525 mL / NET: 195 mL                              8.4    5.16  )-----------( 447      ( 04 May 2025 06:35 )             27.6     05-04    139  |  105  |  62[H]  ----------------------------<  189[H]  4.4   |  20[L]  |  2.82[H]    Ca    9.5      04 May 2025 06:33  Phos  3.3     05-04  Mg     2.2     05-04    TPro  6.2  /  Alb  3.6  /  TBili  0.2  /  DBili  x   /  AST  19  /  ALT  9[L]  /  AlkPhos  79  05-04    Tacrolimus (), Serum: 5.7 ng/mL (05-04 @ 06:34)        Culture - Urine (collected 05-01-25 @ 12:05)  Source: Clean Catch Clean Catch (Midstream)  Final Report (05-02-25 @ 16:11):    No growth    Culture - Blood (collected 04-30-25 @ 18:20)  Source: Blood Blood  Preliminary Report (05-03-25 @ 23:07):    No growth at 72 Hours    Culture - Blood (collected 04-30-25 @ 18:00)  Source: Blood Blood  Preliminary Report (05-03-25 @ 23:07):    No growth at 72 Hours    Urinalysis with Rflx Culture (collected 04-28-25 @ 15:34)    Culture - Blood (collected 04-28-25 @ 13:40)  Source: Blood Blood-Peripheral  Gram Stain (04-30-25 @ 18:43):    Growth in aerobic bottle: Gram Positive Cocci in Clusters    Growth in anaerobic bottle: Gram Positive Cocci in Clusters  Final Report (05-01-25 @ 14:50):    Growth in aerobic and anaerobic bottles: Staphylococcus epidermidis    Isolation of Coagulase negative Staphylococcus from single blood culture    sets may represent    contamination. Contact the Microbiology Department at [phone] if    susceptibilitytesting is needed.    clinically indicated.    Direct identification is available within approximately 3-5    hours either by Blood Panel Multiplexed PCR or Direct    MALDI-TOF. Details: [email or web address]    ANAEROBIC blood culture bottle turned positive after the final report was    released.  Organism: Blood Culture PCR (04-30-25 @ 18:46)  Organism: Blood Culture PCR (04-30-25 @ 14:21)    Culture - Blood (collected 04-28-25 @ 13:30)  Source: Blood Blood-Peripheral  Final Report (05-03-25 @ 18:00):    No growth at 5 days                ROS  All other systems were reviewed and are negative, except as noted. +pain at drain sites      PHYSICAL EXAM:  Constitutional: Well developed / well nourished  Eyes: Anicteric, PERRLA  ENMT: nc/at  Neck: supple   Respiratory: CTA B/L  Cardiovascular: RRR  Gastrointestinal: Soft, non distended, nontender. JPx2 ss   Genitourinary: Brand  Extremities: warm b/l upper and lower, no edema  Vascular: Palpable dp pulses bilaterally  Neurological: A&O x3  Skin: no rashes, ulcerations or lesions;  Musculoskeletal: Moving all extremities  Psychiatric: Responsive

## 2025-05-04 NOTE — PROVIDER CONTACT NOTE (OTHER) - SITUATION
Pt blood cultures amended report now to growth in both bottles w/ staphylococcus epidermis.
Pt BCX positive for final - growth in aerobic bottle, staphococcus epidermilis.
Pt w/ episode of 6 beats WCT
Pt R COSMO drain sites are tender, swollen, and crusted over

## 2025-05-04 NOTE — DISCHARGE NOTE PROVIDER - NSDCCPCAREPLAN_GEN_ALL_CORE_FT
PRINCIPAL DISCHARGE DIAGNOSIS  Diagnosis: Kidney transplant recipient  Assessment and Plan of Treatment: Follow up with Transplant clinic in one week   Call  the Transplant team if  you experience any of the following:   [] Fever of 100.3F (38C) or above  [] Surgical incision is bleeding, red or warm to touch or there's discharge from the incision  [] Worsening abdominal pain, nausea or vomiting  [] Shortness of breath  [] Diffuculty with urinating such as burning, frequency or urgency, blood in urine   Immunosupression  - Transplant recipients are at risk for developing infection because immune system is lowered due to transplant medications.   - Avoid contact with sick people; practice good hand hygiene;   - Take medications as directed by your translant team. Never stop taking medications unless instructed by your transplant physician.  - Do NOT double up medication dose if you missed your dose; call the transplant office for instructions.    HTN  Be sure to follow a low salt diet, avoid caffeine, reduce alcohol intake.  If you have been prescribed antihypertensive medications to control your blood pressure, be sure to take them every day as prescribed and do not miss any doses, the medications do not work if they are not taken consistently. Follow up with your Primary Care Doctor and have your Blood Pressure checked regularly.   DM  If you have diabetes, you may need to monitor your blood sugar more frequently after transplant. Uncongrolled blood sugar levels can lead to poor wound healing and other complications. Follow a low carb and low sugar diet. Continue to take all anti-diabetic medications/insulin as prescribed. Follow up with your Primary Care Doctor regularly for blood sugar/A1c checks. Follow up with an opthalmologist and a podiatrist on an annual basis.

## 2025-05-04 NOTE — PROVIDER CONTACT NOTE (OTHER) - BACKGROUND
70 y/o M with PMHx of HLD, CAD s/p LAD PCI 7/24, HFpEF , HTN, hypothyroid, type 2 DM, h/o cva with no residual deficits, persistent right sided pleural effusion,
Pt admitted for hypotension.
Previous report, states only 1 bottle was contaminated not both.
70 y/o M with PMHx of HLD, CAD s/p LAD PCI 7/24, HFpEF , HTN, hypothyroid, type 2 DM, h/o cva with no residual deficits, persistent right sided pleural effusion

## 2025-05-04 NOTE — PROGRESS NOTE ADULT - ASSESSMENT
68 y/o M with PMHx of HLD, CAD s/p LAD PCI 7/24, HFpEF , HTN, hypothyroid, type 2 DM, h/o cva with no residual deficits, persistent right sided pleural effusion, and hx of hemorrhagic pericardial effusion (cytopathology negative), ESRD on HD MWF (recently converted from PD in 1/2025) via permacath placed on 1/2025, now s/p DDRT on 4/8/25, c/b HTN requiring 2 SICU admissions who presents to [facility] w/ cough+ hypotension      [] s/p DDRT, readmit hypotension / cough  - trend labs, VS  - Strict I's & O's, JPx2, brand  - Repeat COSMO Cr 9.93 on 5/1.  - renal Doppler w/ arvin RI's patent vasculature   - CT chest non contrast w/ stable pleural effusion  - Hypertensive DC IVF   - BCx+ MRSE in 1/2 bottles, TID following, on Linezolid 600 BID   - COSMO Cr 5/1 COSMO (9.9), bradn replaced 2/2 possible urine leak  - Ophthalmology consulted for blurry vision no intervention for now rec outpt fu  - Send CMV PCR  - Oxybutinin    [] IMMUNO   - Env by level,  BID, pred 2.5  - PPX nystatin, bactrim, valcyte, ASA, PPI     [] HTN  - Home: coreg 12.5 bid,hydral 25 BID, cardura 8mg BID, nifed 60 BID, imdur 60 QD  - Here: Coreg 6.25 BID, nifed 60 BID, imdur 30 qd, increase hydral to 75 BID 68 y/o M with PMHx of HLD, CAD s/p LAD PCI 7/24, HFpEF , HTN, hypothyroid, type 2 DM, h/o cva with no residual deficits, persistent right sided pleural effusion, and hx of hemorrhagic pericardial effusion (cytopathology negative), ESRD on HD MWF (recently converted from PD in 1/2025) via permacath placed on 1/2025, now s/p DDRT on 4/8/25, c/b HTN requiring 2 SICU admissions who presents to [facility] w/ cough+ hypotension      [] s/p DDRT, readmit hypotension / cough  - trend labs, VS  - Strict I's & O's, JPx2, brand  - Repeat COSMO Cr 9.93 on 5/1.  - Repeat JP1 Cr - 3.41, Repeat JP2 Cr - 10.35 on 5/4  - renal Doppler w/ arvin RI's patent vasculature   - CT chest non contrast w/ stable pleural effusion  - Hypertensive DC IVF   - BCx+ MRSE in 1/2 bottles, TID following, on Linezolid 600 BID   - COSMO Cr 5/1 COSMO (9.9), brand replaced 2/2 possible urine leak  - Ophthalmology consulted for blurry vision no intervention for now rec outpt fu  - Send CMV PCR  - Oxybutinin  - c/s IR to remove permcath  - Bowel Reg for constipation  - Removed staples, restitched drain site    [] IMMUNO   - Env by level,  BID, pred 2.5  - PPX nystatin, bactrim, valcyte, ASA, PPI     [] HTN  - Home: coreg 12.5 bid,hydral 25 BID, cardura 8mg BID, nifed 60 BID, imdur 60 QD  - Here: Coreg 6.25 BID, nifed 60 BID, imdur 30 qd, increase hydral to 75 BID  - c/s Dr. Dwyer cardiology ---

## 2025-05-04 NOTE — DISCHARGE NOTE PROVIDER - NSDCFUADDAPPT_GEN_ALL_CORE_FT
Follow up:    1. Follow up in Transplant Clinic _______  2. Follow up with Surgeon for removal of ureteral stent  1. Follow up in Transplant Clinic  in one week   2. Follow up with Surgeon for removal of ureteral stent outpatient

## 2025-05-04 NOTE — CONSULT NOTE ADULT - SUBJECTIVE AND OBJECTIVE BOX
Interventional Radiology    HPI: 68 y/o M with PMHx of HLD, CAD s/p LAD PCI 7/24, HFpEF , HTN, hypothyroid, type 2 DM, h/o cva with no residual deficits, persistent right sided pleural effusion, and hx of hemorrhagic pericardial effusion (cytopathology negative), ESRD on HD MWF (recently converted from PD in 1/2025) via permacath placed on 1/2025, now s/p DDRT on 4/8/25.    Allergies: No Known Allergies    Medications (Abx/Cardiac/Anticoagulation/Blood Products)  acyclovir   Oral Tab/Cap: 200 milliGRAM(s) Oral (05-04 @ 17:26)  aspirin enteric coated: 81 milliGRAM(s) Oral (05-04 @ 11:46)  carvedilol: 6.25 milliGRAM(s) Oral (05-04 @ 17:27)  heparin   Injectable: 5000 Unit(s) SubCutaneous (05-04 @ 17:27)  hydrALAZINE: 50 milliGRAM(s) Oral (05-03 @ 05:16)  hydrALAZINE: 75 milliGRAM(s) Oral (05-04 @ 17:26)  hydrALAZINE Injectable: 10 milliGRAM(s) IV Push (05-03 @ 01:04)  isosorbide   mononitrate ER Tablet (IMDUR): 30 milliGRAM(s) Oral (05-04 @ 11:46)  NIFEdipine XL: 60 milliGRAM(s) Oral (05-04 @ 17:26)  nystatin    Suspension: 680747 Unit(s) Oral (05-04 @ 17:27)    Data:    T(C): 36.8  HR: 69  BP: 151/57  RR: 18  SpO2: 100%    -WBC 5.16 / HgB 8.4 / Hct 27.6 / Plt 447  -Na 139 / Cl 105 / BUN 62 / Glucose 189  -K 4.4 / CO2 20 / Cr 2.82  -ALT 9 / Alk Phos 79 / T.Bili 0.2  -INR 1.01 / PTT 26.6      Imaging: reveiwed.     -----------------------------------------------------------------------------------------------------------------------------------------------------------------------    Assessment/Plan: 69M CAD s/p PCI, ESRD on HD (permcath placed 1/2025) s/p DDRT (4/2025) now off HD. IR consulted for Permcath removal.    -- Case discussed with IR attending  . IR will plan to perform   -- NPO at midnight on   -- hold AM prophylactic anticoagulation on. If on therapeutic AC or new medication started after this consult date, please call or page to verify how long it needs to be held if not already addressed.  -- AM coags and labs, active T&S  -- Please place "IR procedure" order under Dr. Corby Berg D.O.  Radiology Resident (PGY-5)     -Available on [employer] TEAMS for all non-urgent questions  -Emergent issues: [facility]-p.[phone]; [facility]-p.[id number] ([phone])  -Non-emergent consults: Please place a [place] order "Consult-Interventional Radiology" with an appropriate callback number  -Scheduling questions: [facility]: [phone]; [facility]: [phone]  -Clinic/Outpatient booking: [facility]: [phone]; [facility]: [phone]   Interventional Radiology    HPI: 70 y/o M with PMHx of HLD, CAD s/p LAD PCI 7/24, HFpEF , HTN, hypothyroid, type 2 DM, h/o cva with no residual deficits, persistent right sided pleural effusion, and hx of hemorrhagic pericardial effusion (cytopathology negative), ESRD on HD MWF (recently converted from PD in 1/2025) via permacath placed on 1/2025, now s/p DDRT on 4/8/25.    Allergies: No Known Allergies    Medications (Abx/Cardiac/Anticoagulation/Blood Products)  acyclovir   Oral Tab/Cap: 200 milliGRAM(s) Oral (05-04 @ 17:26)  aspirin enteric coated: 81 milliGRAM(s) Oral (05-04 @ 11:46)  carvedilol: 6.25 milliGRAM(s) Oral (05-04 @ 17:27)  heparin   Injectable: 5000 Unit(s) SubCutaneous (05-04 @ 17:27)  hydrALAZINE: 50 milliGRAM(s) Oral (05-03 @ 05:16)  hydrALAZINE: 75 milliGRAM(s) Oral (05-04 @ 17:26)  hydrALAZINE Injectable: 10 milliGRAM(s) IV Push (05-03 @ 01:04)  isosorbide   mononitrate ER Tablet (IMDUR): 30 milliGRAM(s) Oral (05-04 @ 11:46)  NIFEdipine XL: 60 milliGRAM(s) Oral (05-04 @ 17:26)  nystatin    Suspension: 008048 Unit(s) Oral (05-04 @ 17:27)    Data:    T(C): 36.8  HR: 69  BP: 151/57  RR: 18  SpO2: 100%    -WBC 5.16 / HgB 8.4 / Hct 27.6 / Plt 447  -Na 139 / Cl 105 / BUN 62 / Glucose 189  -K 4.4 / CO2 20 / Cr 2.82  -ALT 9 / Alk Phos 79 / T.Bili 0.2  -INR 1.01 / PTT 26.6      Imaging: reveiwed.     -----------------------------------------------------------------------------------------------------------------------------------------------------------------------    Assessment/Plan: 69M ESRD on HD (permcath placed 1/2025) s/p DDRT (4/2025) now off HD. IR consulted for Permcath removal.    -- Case discussed with IR attending Dr. Briggs. IR will plan to perform tunneled HD catheter removal on Monday 5/5.   -- NPO at midnight on   -- hold AM prophylactic anticoagulation on. If on therapeutic AC or new medication started after this consult date, please call or page to verify how long it needs to be held if not already addressed.  -- AM coags and labs, active T&S  -- Please place "IR procedure" order under Dr. Briggs.    Luciana Berg D.O.  Radiology Resident (PGY-5)     -Available on [employer] TEAMS for all non-urgent questions  -Emergent issues: [facility]-p.[phone]; [facility]-p.[id number] ([phone])  -Non-emergent consults: Please place a [place] order "Consult-Interventional Radiology" with an appropriate callback number  -Scheduling questions: [facility]: [phone]; [facility]: [phone]  -Clinic/Outpatient booking: [facility]: [phone]; [facility]: [phone]   Interventional Radiology    HPI: 68 y/o M with PMHx of HLD, CAD s/p LAD PCI 7/24, HFpEF , HTN, hypothyroid, type 2 DM, h/o cva with no residual deficits, persistent right sided pleural effusion, and hx of hemorrhagic pericardial effusion (cytopathology negative), ESRD on HD MWF (recently converted from PD in 1/2025) via permacath placed on 1/2025, now s/p DDRT on 4/8/25.    Allergies: No Known Allergies    Medications (Abx/Cardiac/Anticoagulation/Blood Products)  acyclovir   Oral Tab/Cap: 200 milliGRAM(s) Oral (05-04 @ 17:26)  aspirin enteric coated: 81 milliGRAM(s) Oral (05-04 @ 11:46)  carvedilol: 6.25 milliGRAM(s) Oral (05-04 @ 17:27)  heparin   Injectable: 5000 Unit(s) SubCutaneous (05-04 @ 17:27)  hydrALAZINE: 50 milliGRAM(s) Oral (05-03 @ 05:16)  hydrALAZINE: 75 milliGRAM(s) Oral (05-04 @ 17:26)  hydrALAZINE Injectable: 10 milliGRAM(s) IV Push (05-03 @ 01:04)  isosorbide   mononitrate ER Tablet (IMDUR): 30 milliGRAM(s) Oral (05-04 @ 11:46)  NIFEdipine XL: 60 milliGRAM(s) Oral (05-04 @ 17:26)  nystatin    Suspension: 640928 Unit(s) Oral (05-04 @ 17:27)    Data:    T(C): 36.8  HR: 69  BP: 151/57  RR: 18  SpO2: 100%    -WBC 5.16 / HgB 8.4 / Hct 27.6 / Plt 447  -Na 139 / Cl 105 / BUN 62 / Glucose 189  -K 4.4 / CO2 20 / Cr 2.82  -ALT 9 / Alk Phos 79 / T.Bili 0.2  -INR 1.01 / PTT 26.6      Imaging: reveiwed.     -----------------------------------------------------------------------------------------------------------------------------------------------------------------------    Assessment/Plan: 69M ESRD on HD (permcath placed 1/2025) s/p DDRT (4/2025) now off HD. IR consulted for Permcath removal.    -- Case discussed with IR attending Dr. Briggs. IR will plan to perform tunneled HD catheter removal on Monday 5/5.   -- hold AM prophylactic anticoagulation on Mon 5/5. ASA can be continued. If on therapeutic AC or new medication started after this consult date, please call or page to verify how long it needs to be held if not already addressed.  -- AM coags and labs  -- Please place "IR procedure" order under Dr. Briggs.    Luciana Berg D.O.  Radiology Resident (PGY-5)     -Available on [employer] TEAMS for all non-urgent questions  -Emergent issues: [facility]-p.[phone]; [facility]-p.[id number] ([phone])  -Non-emergent consults: Please place a [place] order "Consult-Interventional Radiology" with an appropriate callback number  -Scheduling questions: [facility]: [phone]; [facility]: [phone]  -Clinic/Outpatient booking: [facility]: [phone]; [facility]: [phone]   Interventional Radiology    HPI: 68 y/o M with PMHx of HLD, CAD s/p LAD PCI 7/24, HFpEF , HTN, hypothyroid, type 2 DM, h/o cva with no residual deficits, persistent right sided pleural effusion, and hx of hemorrhagic pericardial effusion (cytopathology negative), ESRD on HD MWF (recently converted from PD in 1/2025) via permacath placed on 1/2025, now s/p DDRT on 4/8/25.    Allergies: No Known Allergies    Medications (Abx/Cardiac/Anticoagulation/Blood Products)  acyclovir   Oral Tab/Cap: 200 milliGRAM(s) Oral (05-04 @ 17:26)  aspirin enteric coated: 81 milliGRAM(s) Oral (05-04 @ 11:46)  carvedilol: 6.25 milliGRAM(s) Oral (05-04 @ 17:27)  heparin   Injectable: 5000 Unit(s) SubCutaneous (05-04 @ 17:27)  hydrALAZINE: 50 milliGRAM(s) Oral (05-03 @ 05:16)  hydrALAZINE: 75 milliGRAM(s) Oral (05-04 @ 17:26)  hydrALAZINE Injectable: 10 milliGRAM(s) IV Push (05-03 @ 01:04)  isosorbide   mononitrate ER Tablet (IMDUR): 30 milliGRAM(s) Oral (05-04 @ 11:46)  NIFEdipine XL: 60 milliGRAM(s) Oral (05-04 @ 17:26)  nystatin    Suspension: 880713 Unit(s) Oral (05-04 @ 17:27)    Data:    T(C): 36.8  HR: 69  BP: 151/57  RR: 18  SpO2: 100%    -WBC 5.16 / HgB 8.4 / Hct 27.6 / Plt 447  -Na 139 / Cl 105 / BUN 62 / Glucose 189  -K 4.4 / CO2 20 / Cr 2.82  -ALT 9 / Alk Phos 79 / T.Bili 0.2  -INR 1.01 / PTT 26.6      Imaging: reveiwed.     -----------------------------------------------------------------------------------------------------------------------------------------------------------------------    Assessment/Plan: 69M ESRD on HD (permcath placed 1/2025) s/p DDRT (4/2025) now off HD. IR consulted for Permcath removal.    -- Case discussed with IR attending Dr. Briggs. IR will plan to perform tunneled HD catheter removal on Monday 5/5.   -- hold AM prophylactic anticoagulation on Mon 5/5. ASA can be continued. If on therapeutic AC or new medication started after this consult date, please call or page to verify how long it needs to be held if not already addressed.  -- AM coags and labs  -- Please place "IR procedure" order under Dr. Briggs.  -- Discussed with MIKHAIL Dimas.    Luciana Berg D.O.  Radiology Resident (PGY-5)     -Available on [employer] TEAMS for all non-urgent questions  -Emergent issues: [facility]-p.[phone]; [facility]-p.[phone] ([phone])  -Non-emergent consults: Please place a [place] order "Consult-Interventional Radiology" with an appropriate callback number  -Scheduling questions: [facility]: [phone]; [facility]: [phone]  -Clinic/Outpatient booking: [facility]: [phone]; [facility]: [phone]

## 2025-05-05 ENCOUNTER — APPOINTMENT (OUTPATIENT)
Dept: TRANSPLANT | Facility: CLINIC | Age: 70
End: 2025-05-05

## 2025-05-05 LAB
ALBUMIN SERPL ELPH-MCNC: 3.6 G/DL — SIGNIFICANT CHANGE UP (ref 3.3–5)
ALP SERPL-CCNC: 74 U/L — SIGNIFICANT CHANGE UP (ref 40–120)
ALT FLD-CCNC: 10 U/L — SIGNIFICANT CHANGE UP (ref 10–45)
ANION GAP SERPL CALC-SCNC: 14 MMOL/L — SIGNIFICANT CHANGE UP (ref 5–17)
APTT BLD: 27.2 SEC — SIGNIFICANT CHANGE UP (ref 26.1–36.8)
AST SERPL-CCNC: 15 U/L — SIGNIFICANT CHANGE UP (ref 10–40)
BASOPHILS # BLD AUTO: 0.04 K/UL — SIGNIFICANT CHANGE UP (ref 0–0.2)
BASOPHILS NFR BLD AUTO: 0.7 % — SIGNIFICANT CHANGE UP (ref 0–2)
BILIRUB SERPL-MCNC: 0.2 MG/DL — SIGNIFICANT CHANGE UP (ref 0.2–1.2)
BUN SERPL-MCNC: 59 MG/DL — HIGH (ref 7–23)
CALCIUM SERPL-MCNC: 9.7 MG/DL — SIGNIFICANT CHANGE UP (ref 8.4–10.5)
CHLORIDE SERPL-SCNC: 106 MMOL/L — SIGNIFICANT CHANGE UP (ref 96–108)
CO2 SERPL-SCNC: 19 MMOL/L — LOW (ref 22–31)
CREAT SERPL-MCNC: 2.5 MG/DL — HIGH (ref 0.5–1.3)
CULTURE RESULTS: SIGNIFICANT CHANGE UP
CULTURE RESULTS: SIGNIFICANT CHANGE UP
EGFR: 27 ML/MIN/1.73M2 — LOW
EGFR: 27 ML/MIN/1.73M2 — LOW
EOSINOPHIL # BLD AUTO: 0.5 K/UL — SIGNIFICANT CHANGE UP (ref 0–0.5)
EOSINOPHIL NFR BLD AUTO: 8.6 % — HIGH (ref 0–6)
GLUCOSE BLDC GLUCOMTR-MCNC: 110 MG/DL — HIGH (ref 70–99)
GLUCOSE BLDC GLUCOMTR-MCNC: 176 MG/DL — HIGH (ref 70–99)
GLUCOSE BLDC GLUCOMTR-MCNC: 193 MG/DL — HIGH (ref 70–99)
GLUCOSE BLDC GLUCOMTR-MCNC: 213 MG/DL — HIGH (ref 70–99)
GLUCOSE SERPL-MCNC: 162 MG/DL — HIGH (ref 70–99)
HCT VFR BLD CALC: 27.5 % — LOW (ref 39–50)
HGB BLD-MCNC: 8.4 G/DL — LOW (ref 13–17)
IMM GRANULOCYTES NFR BLD AUTO: 0.5 % — SIGNIFICANT CHANGE UP (ref 0–0.9)
INR BLD: 0.97 RATIO — SIGNIFICANT CHANGE UP (ref 0.85–1.16)
LYMPHOCYTES # BLD AUTO: 0.47 K/UL — LOW (ref 1–3.3)
LYMPHOCYTES # BLD AUTO: 8 % — LOW (ref 13–44)
MAGNESIUM SERPL-MCNC: 2 MG/DL — SIGNIFICANT CHANGE UP (ref 1.6–2.6)
MCHC RBC-ENTMCNC: 28.7 PG — SIGNIFICANT CHANGE UP (ref 27–34)
MCHC RBC-ENTMCNC: 30.5 G/DL — LOW (ref 32–36)
MCV RBC AUTO: 93.9 FL — SIGNIFICANT CHANGE UP (ref 80–100)
MONOCYTES # BLD AUTO: 0.73 K/UL — SIGNIFICANT CHANGE UP (ref 0–0.9)
MONOCYTES NFR BLD AUTO: 12.5 % — SIGNIFICANT CHANGE UP (ref 2–14)
NEUTROPHILS # BLD AUTO: 4.07 K/UL — SIGNIFICANT CHANGE UP (ref 1.8–7.4)
NEUTROPHILS NFR BLD AUTO: 69.7 % — SIGNIFICANT CHANGE UP (ref 43–77)
NRBC BLD AUTO-RTO: 0 /100 WBCS — SIGNIFICANT CHANGE UP (ref 0–0)
PHOSPHATE SERPL-MCNC: 3 MG/DL — SIGNIFICANT CHANGE UP (ref 2.5–4.5)
PLATELET # BLD AUTO: 448 K/UL — HIGH (ref 150–400)
POTASSIUM SERPL-MCNC: 4.7 MMOL/L — SIGNIFICANT CHANGE UP (ref 3.5–5.3)
POTASSIUM SERPL-SCNC: 4.7 MMOL/L — SIGNIFICANT CHANGE UP (ref 3.5–5.3)
PROT SERPL-MCNC: 6.3 G/DL — SIGNIFICANT CHANGE UP (ref 6–8.3)
PROTHROM AB SERPL-ACNC: 11.1 SEC — SIGNIFICANT CHANGE UP (ref 9.9–13.4)
RBC # BLD: 2.93 M/UL — LOW (ref 4.2–5.8)
RBC # FLD: 22.1 % — HIGH (ref 10.3–14.5)
SODIUM SERPL-SCNC: 139 MMOL/L — SIGNIFICANT CHANGE UP (ref 135–145)
SPECIMEN SOURCE: SIGNIFICANT CHANGE UP
SPECIMEN SOURCE: SIGNIFICANT CHANGE UP
T3 SERPL-MCNC: 115 NG/DL — SIGNIFICANT CHANGE UP (ref 80–200)
T4 AB SER-ACNC: 10.9 UG/DL — SIGNIFICANT CHANGE UP (ref 4.6–12)
T4 FREE SERPL-MCNC: 1.6 NG/DL — SIGNIFICANT CHANGE UP (ref 0.9–1.8)
TACROLIMUS SERPL-MCNC: 6.7 NG/ML — SIGNIFICANT CHANGE UP
TSH SERPL-MCNC: 1.96 UIU/ML — SIGNIFICANT CHANGE UP (ref 0.27–4.2)
WBC # BLD: 5.84 K/UL — SIGNIFICANT CHANGE UP (ref 3.8–10.5)
WBC # FLD AUTO: 5.84 K/UL — SIGNIFICANT CHANGE UP (ref 3.8–10.5)

## 2025-05-05 PROCEDURE — 99232 SBSQ HOSP IP/OBS MODERATE 35: CPT | Mod: GC

## 2025-05-05 PROCEDURE — 76776 US EXAM K TRANSPL W/DOPPLER: CPT | Mod: 26,RT

## 2025-05-05 PROCEDURE — 36589 REMOVAL TUNNELED CV CATH: CPT

## 2025-05-05 RX ORDER — TACROLIMUS 0.5 MG/1
1 CAPSULE ORAL ONCE
Refills: 0 | Status: COMPLETED | OUTPATIENT
Start: 2025-05-05 | End: 2025-05-05

## 2025-05-05 RX ORDER — LIDOCAINE HCL/PF 10 MG/ML
3 VIAL (ML) INJECTION ONCE
Refills: 0 | Status: COMPLETED | OUTPATIENT
Start: 2025-05-05 | End: 2025-05-06

## 2025-05-05 RX ORDER — ACETAMINOPHEN 500 MG/5ML
650 LIQUID (ML) ORAL ONCE
Refills: 0 | Status: COMPLETED | OUTPATIENT
Start: 2025-05-05 | End: 2025-05-05

## 2025-05-05 RX ORDER — LIDOCAINE HYDROCHLORIDE 20 MG/ML
10 JELLY TOPICAL
Qty: 100 | Refills: 0
Start: 2025-05-05 | End: 2025-05-14

## 2025-05-05 RX ORDER — TACROLIMUS 0.5 MG/1
5 CAPSULE ORAL
Refills: 0 | Status: DISCONTINUED | OUTPATIENT
Start: 2025-05-06 | End: 2025-05-06

## 2025-05-05 RX ORDER — LIDOCAINE HCL/PF 10 MG/ML
5 VIAL (ML) INJECTION ONCE
Refills: 0 | Status: COMPLETED | OUTPATIENT
Start: 2025-05-05 | End: 2025-05-05

## 2025-05-05 RX ADMIN — INSULIN GLARGINE-YFGN 6 UNIT(S): 100 INJECTION, SOLUTION SUBCUTANEOUS at 21:49

## 2025-05-05 RX ADMIN — MYCOPHENOLATE MOFETIL 500 MILLIGRAM(S): 500 TABLET, FILM COATED ORAL at 17:19

## 2025-05-05 RX ADMIN — Medication 200 MILLIGRAM(S): at 05:44

## 2025-05-05 RX ADMIN — Medication 500000 UNIT(S): at 12:56

## 2025-05-05 RX ADMIN — Medication 500000 UNIT(S): at 05:44

## 2025-05-05 RX ADMIN — Medication 650 MILLIGRAM(S): at 01:09

## 2025-05-05 RX ADMIN — INSULIN LISPRO 4: 100 INJECTION, SOLUTION INTRAVENOUS; SUBCUTANEOUS at 11:50

## 2025-05-05 RX ADMIN — ATORVASTATIN CALCIUM 40 MILLIGRAM(S): 80 TABLET, FILM COATED ORAL at 21:49

## 2025-05-05 RX ADMIN — Medication 81 MILLIGRAM(S): at 12:57

## 2025-05-05 RX ADMIN — Medication 5 MILLILITER(S): at 06:42

## 2025-05-05 RX ADMIN — MYCOPHENOLATE MOFETIL 500 MILLIGRAM(S): 500 TABLET, FILM COATED ORAL at 05:45

## 2025-05-05 RX ADMIN — Medication 60 MILLIGRAM(S): at 17:19

## 2025-05-05 RX ADMIN — Medication 500000 UNIT(S): at 17:19

## 2025-05-05 RX ADMIN — CARVEDILOL 6.25 MILLIGRAM(S): 3.12 TABLET, FILM COATED ORAL at 08:48

## 2025-05-05 RX ADMIN — INSULIN LISPRO 4 UNIT(S): 100 INJECTION, SOLUTION INTRAVENOUS; SUBCUTANEOUS at 12:49

## 2025-05-05 RX ADMIN — OXYBUTYNIN CHLORIDE 5 MILLIGRAM(S): 5 TABLET, FILM COATED, EXTENDED RELEASE ORAL at 12:57

## 2025-05-05 RX ADMIN — TACROLIMUS 4 MILLIGRAM(S): 0.5 CAPSULE ORAL at 08:48

## 2025-05-05 RX ADMIN — PREDNISONE 2.5 MILLIGRAM(S): 20 TABLET ORAL at 05:45

## 2025-05-05 RX ADMIN — ISOSORBIDE MONONITRATE 30 MILLIGRAM(S): 60 TABLET, EXTENDED RELEASE ORAL at 12:57

## 2025-05-05 RX ADMIN — Medication 75 MILLIGRAM(S): at 05:45

## 2025-05-05 RX ADMIN — Medication 1 APPLICATION(S): at 05:45

## 2025-05-05 RX ADMIN — INSULIN LISPRO 2: 100 INJECTION, SOLUTION INTRAVENOUS; SUBCUTANEOUS at 08:48

## 2025-05-05 RX ADMIN — TACROLIMUS 1 MILLIGRAM(S): 0.5 CAPSULE ORAL at 12:57

## 2025-05-05 RX ADMIN — TAMSULOSIN HYDROCHLORIDE 0.4 MILLIGRAM(S): 0.4 CAPSULE ORAL at 21:49

## 2025-05-05 RX ADMIN — Medication 500000 UNIT(S): at 21:49

## 2025-05-05 RX ADMIN — Medication 25 MICROGRAM(S): at 05:44

## 2025-05-05 RX ADMIN — Medication 500000 UNIT(S): at 00:54

## 2025-05-05 RX ADMIN — Medication 75 MILLIGRAM(S): at 17:19

## 2025-05-05 RX ADMIN — Medication 60 MILLIGRAM(S): at 05:45

## 2025-05-05 RX ADMIN — POLYETHYLENE GLYCOL 3350 17 GRAM(S): 17 POWDER, FOR SOLUTION ORAL at 12:57

## 2025-05-05 RX ADMIN — CARVEDILOL 6.25 MILLIGRAM(S): 3.12 TABLET, FILM COATED ORAL at 17:19

## 2025-05-05 RX ADMIN — Medication 20 MILLIGRAM(S): at 12:57

## 2025-05-05 RX ADMIN — Medication 1 TABLET(S): at 08:47

## 2025-05-05 RX ADMIN — Medication 650 MILLIGRAM(S): at 02:09

## 2025-05-05 RX ADMIN — INSULIN LISPRO 4 UNIT(S): 100 INJECTION, SOLUTION INTRAVENOUS; SUBCUTANEOUS at 17:20

## 2025-05-05 RX ADMIN — Medication 200 MILLIGRAM(S): at 17:19

## 2025-05-05 RX ADMIN — INSULIN LISPRO 4 UNIT(S): 100 INJECTION, SOLUTION INTRAVENOUS; SUBCUTANEOUS at 08:49

## 2025-05-05 NOTE — PROCEDURE NOTE - PROCEDURE FINDINGS AND DETAILS
Right neck/chest and existing catheter prepped and draped under usual sterile condition. The catheter was removed with a combination of traction and manual tissue dissection. Direct jugular pressure applied x10min, hemostasis achieved. A dry sterile dressing applied.

## 2025-05-05 NOTE — PROGRESS NOTE ADULT - SUBJECTIVE AND OBJECTIVE BOX
Transplant Surgery - Multidisciplinary Rounds  --------------------------------------------------------------      Present: Patient seen and examined with multidisciplinary transplant team including Surgeon Dr. Ortega, Nephrologist Dr. Dunn, LÁZARO Forbes, and bedside RN during rounds. Disciplines not in attendance will be notified of plan.     HPI: 68 y/o M with PMHx of HLD, CAD s/p LAD PCI 7/24, HFpEF , HTN, hypothyroid, type 2 DM, h/o cva with no residual deficits, persistent right sided pleural effusion, and hx of hemorrhagic pericardial effusion (cytopathology negative), ESRD on HD MWF (recently converted from PD in 1/2025) via permacath placed on 1/2025, now s/p DDRT on 4/8/25, c/b HTN requiring 2 SICU admissions who presents to [facility] w/ cough+ hypotension    Interval Events:  - Afebrile, HTN   - C/O pain w/ brand ->Tylenol/[place].   - COSMO drain re-stiched         Immunosupression:  Induction: Thymo  Maintenance: FK by level/ BID/Pred 2.5  Ongoing monitoring for signs of rejection     Potential Discharge date: TBD  Education:  Medications  Plan of care:  See Below         MEDICATIONS  (STANDING):  acyclovir   Oral Tab/Cap 200 milliGRAM(s) Oral two times a day  aspirin enteric coated 81 milliGRAM(s) Oral daily  atorvastatin 40 milliGRAM(s) Oral at bedtime  carvedilol 6.25 milliGRAM(s) Oral every 12 hours  chlorhexidine 2% Cloths 1 Application(s) Topical <User Schedule>  dextrose 5%. 1000 milliLiter(s) (100 mL/Hr) IV Continuous <Continuous>  dextrose 5%. 1000 milliLiter(s) (50 mL/Hr) IV Continuous <Continuous>  dextrose 50% Injectable 25 Gram(s) IV Push once  dextrose 50% Injectable 12.5 Gram(s) IV Push once  dextrose 50% Injectable 25 Gram(s) IV Push once  dextrose Oral Gel 15 Gram(s) Oral once  famotidine    Tablet 20 milliGRAM(s) Oral daily  glucagon  Injectable 1 milliGRAM(s) IntraMuscular once  hydrALAZINE 75 milliGRAM(s) Oral two times a day  insulin glargine Injectable (LANTUS) 6 Unit(s) SubCutaneous at bedtime  insulin lispro (ADMELOG) corrective regimen sliding scale   SubCutaneous three times a day before meals  insulin lispro (ADMELOG) corrective regimen sliding scale   SubCutaneous at bedtime  insulin lispro Injectable (ADMELOG) 4 Unit(s) SubCutaneous three times a day before meals  isosorbide   mononitrate ER Tablet (IMDUR) 30 milliGRAM(s) Oral daily  levothyroxine 25 MICROGram(s) Oral daily  lidocaine 2% Jelly 3 milliLiter(s) Topical once  mycophenolate mofetil 500 milliGRAM(s) Oral two times a day  NIFEdipine XL 60 milliGRAM(s) Oral every 12 hours  nystatin    Suspension 007716 Unit(s) Oral four times a day  oxybutynin 5 milliGRAM(s) Oral daily  polyethylene glycol 3350 17 Gram(s) Oral daily  predniSONE   Tablet 2.5 milliGRAM(s) Oral daily  tamsulosin 0.4 milliGRAM(s) Oral at bedtime  trimethoprim   80 mG/sulfamethoxazole 400 mG 1 Tablet(s) Oral <User Schedule>    MEDICATIONS  (PRN):  bisacodyl Suppository 10 milliGRAM(s) Rectal once PRN Constipation  senna 2 Tablet(s) Oral daily PRN Constipation      PAST MEDICAL & SURGICAL HISTORY:  Hypertension      ESRD on peritoneal dialysis      CVA (cerebrovascular accident)  2010 without residual effects      Hyperlipidemia      BPH (benign prostatic hyperplasia)      CAD (coronary artery disease)      T2DM (type 2 diabetes mellitus)      Hypothyroidism      History of peritoneal dialysis  s/p PD catheter placement      S/P coronary artery stent placement          Vital Signs Last 24 Hrs  T(C): 36.6 (05 May 2025 12:17), Max: 37.2 (05 May 2025 01:01)  T(F): 97.9 (05 May 2025 12:17), Max: 99 (05 May 2025 01:01)  HR: 67 (05 May 2025 12:17) (65 - 85)  BP: 151/63 (05 May 2025 12:17) (146/68 - 174/63)  BP(mean): --  RR: 20 (05 May 2025 12:17) (16 - 20)  SpO2: 100% (05 May 2025 12:17) (96% - 100%)    Parameters below as of 05 May 2025 12:17  Patient On (Oxygen Delivery Method): room air        I&O's Summary    04 May 2025 07:01  -  05 May 2025 07:00  --------------------------------------------------------  IN: 1020 mL / OUT: 1442.5 mL / NET: -422.5 mL    05 May 2025 07:01  -  05 May 2025 14:50  --------------------------------------------------------  IN: 480 mL / OUT: 510 mL / NET: -30 mL                              8.4    5.84  )-----------( 448      ( 05 May 2025 06:57 )             27.5     05-05    139  |  106  |  59[H]  ----------------------------<  162[H]  4.7   |  19[L]  |  2.50[H]    Ca    9.7      05 May 2025 06:55  Phos  3.0     05-05  Mg     2.0     05-05    TPro  6.3  /  Alb  3.6  /  TBili  0.2  /  DBili  x   /  AST  15  /  ALT  10  /  AlkPhos  74  05-05    Tacrolimus (), Serum: 6.7 ng/mL (05-05 @ 06:57)        Culture - Urine (collected 05-01-25 @ 12:05)  Source: Clean Catch Clean Catch (Midstream)  Final Report (05-02-25 @ 16:11):    No growth    Culture - Blood (collected 04-30-25 @ 18:20)  Source: Blood Blood  Preliminary Report (05-04-25 @ 23:01):    No growth at 4 days    Culture - Blood (collected 04-30-25 @ 18:00)  Source: Blood Blood  Preliminary Report (05-04-25 @ 23:01):    No growth at 4 days    Urinalysis with Rflx Culture (collected 04-28-25 @ 15:34)                ROS  All other systems were reviewed and are negative, except as noted. +pain at drain sites      PHYSICAL EXAM:  Constitutional: Well developed / well nourished  Eyes: Anicteric, PERRLA  ENMT: nc/at  Neck: supple   Respiratory: CTA B/L  Cardiovascular: RRR  Gastrointestinal: Soft, non distended, nontender. JPx2 ss   Genitourinary: Brand  Extremities: warm b/l upper and lower, no edema  Vascular: Palpable dp pulses bilaterally  Neurological: A&O x3  Skin: no rashes, ulcerations or lesions;  Musculoskeletal: Moving all extremities  Psychiatric: Responsive

## 2025-05-05 NOTE — PROGRESS NOTE ADULT - ASSESSMENT
70 y/o M with PMHx of HLD, CAD s/p LAD PCI 7/24, HFpEF , HTN, hypothyroid, type 2 DM, h/o cva with no residual deficits, persistent right sided pleural effusion, and hx of hemorrhagic pericardial effusion (cytopathology negative), ESRD on HD MWF (recently converted from PD in 1/2025) via permacath placed on 1/2025, now s/p DDRT on 4/8/25, c/b HTN requiring 2 SICU admissions who presents to [facility] w/ cough+ hypotension      [] s/p DDRT, readmit hypotension / cough  - trend labs, VS  - Strict I's & O's, JPx2, brand  - Repeat COSMO Cr 9.93 on 5/1.  - Repeat JP1 Cr - 3.41, Repeat JP2 Cr - 10.35 on 5/4  - renal Doppler w/ arvin RI's patent vasculature   - CT chest non contrast w/ stable pleural effusion  - Hypertensive DC IVF   - BCx+ MRSE in 1/2 bottles, TID following, on Linezolid 600 BID   - Removed staples, re-stitched drain site 5/4  - IR permcath removed 5/5   - repeat doppler 5/5       [] IMMUNO   - Env by level,  BID, pred 2.5  - PPX nystatin, bactrim, valcyte, ASA, PPI     [] HTN  - Home: coreg 12.5 bid,hydral 25 BID, cardura 8mg BID, nifed 60 BID, imdur 60 QD  - Here: Coreg 6.25 BID, nifed 60 BID, imdur 30 qd, hydral to 75 BID

## 2025-05-05 NOTE — PROGRESS NOTE ADULT - SUBJECTIVE AND OBJECTIVE BOX
[facility]    Dr. Tiara Garcia     Office [phone] (9 am to 5 pm)  Service: [phone] (5pm to 9am)  Also Available on TEAMS      RENAL PROGRESS NOTE: DATE OF SERVICE 05-05-25 @ 11:39    Patient is a 69y old  Male who presents with a chief complaint of s/p DDRT, readmit w/ hypotension & c/f sepsis (04 May 2025 17:38)      Patient seen and examined at bedside. No chest pain/sob    VITALS:  T(F): 97.2 (05-05-25 @ 10:04), Max: 99 (05-05-25 @ 01:01)  HR: 72 (05-05-25 @ 10:04)  BP: 151/67 (05-05-25 @ 10:04)  RR: 16 (05-05-25 @ 10:04)  SpO2: 100% (05-05-25 @ 10:04)  Wt(kg): --    05-04 @ 07:01  -  05-05 @ 07:00  --------------------------------------------------------  IN: 1020 mL / OUT: 1442.5 mL / NET: -422.5 mL          PHYSICAL EXAM:  Constitutional: NAD  Neck: No JVD  Respiratory: CTAB, no wheezes, rales or rhonchi  Cardiovascular: S1, S2, RRR  Gastrointestinal: BS+, soft, NT/ND  Extremities: No peripheral edema    Hospital Medications:   MEDICATIONS  (STANDING):  acyclovir   Oral Tab/Cap 200 milliGRAM(s) Oral two times a day  aspirin enteric coated 81 milliGRAM(s) Oral daily  atorvastatin 40 milliGRAM(s) Oral at bedtime  carvedilol 6.25 milliGRAM(s) Oral every 12 hours  chlorhexidine 2% Cloths 1 Application(s) Topical <User Schedule>  dextrose 5%. 1000 milliLiter(s) (100 mL/Hr) IV Continuous <Continuous>  dextrose 5%. 1000 milliLiter(s) (50 mL/Hr) IV Continuous <Continuous>  dextrose 50% Injectable 25 Gram(s) IV Push once  dextrose 50% Injectable 12.5 Gram(s) IV Push once  dextrose 50% Injectable 25 Gram(s) IV Push once  dextrose Oral Gel 15 Gram(s) Oral once  famotidine    Tablet 20 milliGRAM(s) Oral daily  glucagon  Injectable 1 milliGRAM(s) IntraMuscular once  hydrALAZINE 75 milliGRAM(s) Oral two times a day  insulin glargine Injectable (LANTUS) 6 Unit(s) SubCutaneous at bedtime  insulin lispro (ADMELOG) corrective regimen sliding scale   SubCutaneous three times a day before meals  insulin lispro (ADMELOG) corrective regimen sliding scale   SubCutaneous at bedtime  insulin lispro Injectable (ADMELOG) 4 Unit(s) SubCutaneous three times a day before meals  isosorbide   mononitrate ER Tablet (IMDUR) 30 milliGRAM(s) Oral daily  levothyroxine 25 MICROGram(s) Oral daily  mycophenolate mofetil 500 milliGRAM(s) Oral two times a day  NIFEdipine XL 60 milliGRAM(s) Oral every 12 hours  nystatin    Suspension 249489 Unit(s) Oral four times a day  oxybutynin 5 milliGRAM(s) Oral daily  polyethylene glycol 3350 17 Gram(s) Oral daily  predniSONE   Tablet 2.5 milliGRAM(s) Oral daily  tacrolimus ER Tablet (ENVARSUS XR) 4 milliGRAM(s) Oral <User Schedule>  tamsulosin 0.4 milliGRAM(s) Oral at bedtime  trimethoprim   80 mG/sulfamethoxazole 400 mG 1 Tablet(s) Oral <User Schedule>    Tacrolimus (), Serum: 6.7 ng/mL (05-05 @ 06:57)    LABS:  05-05    139  |  106  |  59[H]  ----------------------------<  162[H]  4.7   |  19[L]  |  2.50[H]    Ca    9.7      05 May 2025 06:55  Phos  3.0     05-05  Mg     2.0     05-05    TPro  6.3  /  Alb  3.6  /  TBili  0.2  /  DBili      /  AST  15  /  ALT  10  /  AlkPhos  74  05-05    Creatinine Trend: 2.50 <--, 2.82 <--, 3.09 <--, 3.30 <--, 3.62 <--, 4.41 <--, 4.63 <--, 4.74 <--    Phosphorus: 3.0 mg/dL (05-05 @ 06:55)  Albumin: 3.6 g/dL (05-05 @ 06:55)                              8.4    5.84  )-----------( 448      ( 05 May 2025 06:57 )             27.5     Urine Studies:  Urinalysis - [05-05-25 @ 06:55]      Color  / Appearance  / SG  / pH       Gluc 162 / Ketone   / Bili  / Urobili        Blood  / Protein  / Leuk Est  / Nitrite       RBC  / WBC  / Hyaline  / Gran  / Sq Epi  / Non Sq Epi  / Bacteria       Iron 67, TIBC 210, %sat 32      [04-21-25 @ 07:08]  Ferritin 1496      [04-21-25 @ 07:08]  PTH -- (Ca 7.9)      [12-30-24 @ 06:50]   229  PTH -- (Ca 7.6)      [11-27-24 @ 04:23]   250  Vitamin D (25OH) 17.1      [11-25-24 @ 06:50]  TSH 1.96      [05-05-25 @ 07:03]  Lipid: chol 112, TG 61, HDL 46, LDL --      [12-31-24 @ 04:27]    HBsAb 46.0      [04-07-25 @ 21:39]  HBsAg Nonreact      [04-07-25 @ 21:39]  HBcAb Nonreact      [04-07-25 @ 21:39]  HCV 0.05, Nonreact      [04-07-25 @ 21:39]  HIV Nonreact      [04-07-25 @ 21:39]      RADIOLOGY & ADDITIONAL STUDIES:

## 2025-05-05 NOTE — PROGRESS NOTE ADULT - ASSESSMENT
70 y/o M with PMHx of HLD, CAD s/p LAD PCI 7/24, HFpEF , HTN, hypothyroid, type 2 DM, h/o cva with no residual deficits, persistent right sided pleural effusion, and hx of hemorrhagic pericardial effusion (cytopathology negative), ESRD on HD MWF (recently converted from PD in 1/2025) via permacath placed on 1/2025, now s/p DDRT on 4/8/25, c/b HTN requiring 2 SICU admissions. Patient presented to the ED urgently from outpatient. States he was having output from drain area and stitch was placed in the outpatient office. Patient afterward had Hypotension episode to 80s/50s and on ED admit has pain from drain site when drains are being manipulated. Denies any other sx aside from a dry cough.      A/P  s/p DDRT 4/2024 with F   Nephrologist is Dr. Menjivar  Was PD then converted to HD, has permacath  Was on HD prior to admission now off HD  SCR downtrending off HD  HD and IS as per transplant   On envarsus 4 mg qd w/ belatacept, next dose 5/8  On cellcept 500 mg bid and prednisone 5 mg daily   Monitor UO and bmp scr continues to improve    HTN/Hypotension  BP fluctuating; suboptimal  Now on nifedipine 60 mg bid, hydralazie 50mg BID  Monitor bp     Hypokalemia   Supplemented as needed  Monitor K     Anemia  On epo   Transfuse prn to keep hgb >8  Monitor hgb

## 2025-05-05 NOTE — PRE-OP CHECKLIST - AS BP NONINV SITE
Can we get this patient scheduled for a void trial (cayla and bao), as well as a f/u with Dr Blanchard? They don't need to be the same day.  Thank you,  Candace Johnston   right upper arm

## 2025-05-05 NOTE — PROGRESS NOTE ADULT - ASSESSMENT
69 y.o. M w/ PMHx HTN, HLD, CAD s/p LAD PCI 7/24, HFpEF, HTN, hypothyroid, type 2 DM, ESRD on HD MWF s/p DDRT 4/8/25 who presents to [facility] for hypotension. Transplant nephrology consulted for DDRT and IS management.    1. 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 HD cath removed     2. IS  - Env 5 w/ with low dose belatacept, dosed on 4/10, 4/14, 4/23, next dose 5/8  - cellcept 500 BID  - acyclovir (CMV -/-), valcyte, bactrim  - Hold Env 4mg qd (level 6.7 today)  - Continue pred    3. HTN  - Imdur 30mg, coreg 6.25mg BID, hydral 75mg BID    4. Anemia  - S/p procrit 10K (4/29)        Bryson Flynn  Nephrology Fellow  Feel free to contact me on TEAMS  After 5 pm and on weekends please contact the on-call Fellow.

## 2025-05-05 NOTE — PRE PROCEDURE NOTE - PRE PROCEDURE EVALUATION
Interventional Radiology    HPI: 70 y/o M with PMHx of HLD, CAD s/p LAD PCI , HFpEF , HTN, hypothyroid, type 2 DM, h/o cva with no residual deficits, persistent right sided pleural effusion, and hx of hemorrhagic pericardial effusion (cytopathology negative), ESRD on HD MWF (recently converted from PD in 2025) via permacath placed on 2025, now s/p DDRT on 25. patient now presenting to IR for tunneled HD catheter removal.    Allergies: No Known Allergies    Medications (Abx/Cardiac/Anticoagulation/Blood Products)  acyclovir   Oral Tab/Cap: 200 milliGRAM(s) Oral ( @ 05:44)  aspirin enteric coated: 81 milliGRAM(s) Oral ( @ 11:46)  carvedilol: 6.25 milliGRAM(s) Oral ( @ 08:48)  heparin   Injectable: 5000 Unit(s) SubCutaneous ( @ 17:27)  hydrALAZINE: 75 milliGRAM(s) Oral ( @ 05:45)  isosorbide   mononitrate ER Tablet (IMDUR): 30 milliGRAM(s) Oral ( @ 11:46)  NIFEdipine XL: 60 milliGRAM(s) Oral ( @ 05:45)  nystatin    Suspension: 905267 Unit(s) Oral ( @ 05:44)  trimethoprim   80 mG/sulfamethoxazole 400 m Tablet(s) Oral ( @ 08:47)    Data:    T(C): 37.1  HR: 71  BP: 146/68  RR: 20  SpO2: 96%    Exam  General: No acute distress  Chest: Non labored breathing  Abdomen: Non-distended  Extremities: No swelling, warm    -WBC 5.84 / HgB 8.4 / Hct 27.5 / Plt 448  -Na 139 / Cl 106 / BUN 59 / Glucose 162  -K 4.7 / CO2 19 / Cr 2.50  -ALT 10 / Alk Phos 74 / T.Bili 0.2  -INR0.97    Imaging:     Plan: 69y Male presents for   -Risks/Benefits/alternatives explained with the patient and/or healthcare proxy and witnessed informed consent obtained.     --  Arben Abdi NP  Interventional Radiology  Available on [employer] TEAMS / "[employer][id number]    For EMERGENT inquiries/questions:  IR Pager ([facility]): [phone]    For non-emergent consults/questions:   Please place a sunrise order "Consult- Interventional Radiology" with an appropriate callback number    For questions about scheduling during appropriate work hours, call IR :  [facility]: [phone]    For outpatient IR booking:  [facility]: [phone]

## 2025-05-05 NOTE — PRE-OP CHECKLIST - BP NONINVASIVE DIASTOLIC (MM HG)
Elena Barragan MD [place], T Gi Nurse Msg Pool 9 minutes ago (10:59 AM)       Great, thank you!   Can we scan the pathology into pt's record?     I think we can go ahead and schedule her procedures while on elimination diet.     Thank you!        Pathology report will be in patients chart under the media tab.   67

## 2025-05-05 NOTE — PROGRESS NOTE ADULT - SUBJECTIVE AND OBJECTIVE BOX
[facility] DIVISION OF KIDNEY DISEASES AND HYPERTENSION   FOLLOW UP NOTE    --------------------------------------------------------------------------------  Chief Complaint:    24 hour events/subjective: Pt. was seen and examined today. Feels ok. No fever, chillls, CP, SOB, or LE swelling.      PAST HISTORY  --------------------------------------------------------------------------------  No significant changes to PMH, PSH, FHx, SHx, unless otherwise noted    ALLERGIES & MEDICATIONS  --------------------------------------------------------------------------------  Allergies    No Known Allergies    Intolerances      Standing Inpatient Medications  acyclovir   Oral Tab/Cap 200 milliGRAM(s) Oral two times a day  aspirin enteric coated 81 milliGRAM(s) Oral daily  atorvastatin 40 milliGRAM(s) Oral at bedtime  carvedilol 6.25 milliGRAM(s) Oral every 12 hours  chlorhexidine 2% Cloths 1 Application(s) Topical <User Schedule>  dextrose 5%. 1000 milliLiter(s) IV Continuous <Continuous>  dextrose 5%. 1000 milliLiter(s) IV Continuous <Continuous>  dextrose 50% Injectable 25 Gram(s) IV Push once  dextrose 50% Injectable 12.5 Gram(s) IV Push once  dextrose 50% Injectable 25 Gram(s) IV Push once  dextrose Oral Gel 15 Gram(s) Oral once  famotidine    Tablet 20 milliGRAM(s) Oral daily  glucagon  Injectable 1 milliGRAM(s) IntraMuscular once  hydrALAZINE 75 milliGRAM(s) Oral two times a day  insulin glargine Injectable (LANTUS) 6 Unit(s) SubCutaneous at bedtime  insulin lispro (ADMELOG) corrective regimen sliding scale   SubCutaneous three times a day before meals  insulin lispro (ADMELOG) corrective regimen sliding scale   SubCutaneous at bedtime  insulin lispro Injectable (ADMELOG) 4 Unit(s) SubCutaneous three times a day before meals  isosorbide   mononitrate ER Tablet (IMDUR) 30 milliGRAM(s) Oral daily  levothyroxine 25 MICROGram(s) Oral daily  lidocaine 2% Jelly 3 milliLiter(s) Topical once  mycophenolate mofetil 500 milliGRAM(s) Oral two times a day  NIFEdipine XL 60 milliGRAM(s) Oral every 12 hours  nystatin    Suspension 077627 Unit(s) Oral four times a day  oxybutynin 5 milliGRAM(s) Oral daily  polyethylene glycol 3350 17 Gram(s) Oral daily  predniSONE   Tablet 2.5 milliGRAM(s) Oral daily  tamsulosin 0.4 milliGRAM(s) Oral at bedtime  trimethoprim   80 mG/sulfamethoxazole 400 mG 1 Tablet(s) Oral <User Schedule>    PRN Inpatient Medications  bisacodyl Suppository 10 milliGRAM(s) Rectal once PRN  senna 2 Tablet(s) Oral daily PRN      REVIEW OF SYSTEMS  --------------------------------------------------------------------------------    All other systems were reviewed and are negative, except as noted.    VITALS/PHYSICAL EXAM  --------------------------------------------------------------------------------  T(C): 36.6 (05-05-25 @ 12:17), Max: 37.2 (05-05-25 @ 01:01)  HR: 67 (05-05-25 @ 12:17) (65 - 85)  BP: 151/63 (05-05-25 @ 12:17) (146/68 - 174/63)  RR: 20 (05-05-25 @ 12:17) (16 - 20)  SpO2: 100% (05-05-25 @ 12:17) (96% - 100%)  Wt(kg): --        05-04-25 @ 07:01  -  05-05-25 @ 07:00  --------------------------------------------------------  IN: 1020 mL / OUT: 1442.5 mL / NET: -422.5 mL    05-05-25 @ 07:01  -  05-05-25 @ 15:25  --------------------------------------------------------  IN: 480 mL / OUT: 510 mL / NET: -30 mL        Physical Exam:  	Gen: NAD  	HEENT: Anicteric  	Pulm: CTA B/L  	CV: S1S2+  	Abd: Soft, tenderness near transplant site, +COSMO drain  	Ext: No LE edema B/L  	Neuro: Awake          :Campo cath+ with clear urine  	Skin: Warm and dry  	Dialysis access: None      LABS/STUDIES  --------------------------------------------------------------------------------              8.4    5.84  >-----------<  448      [05-05-25 @ 06:57]              27.5     139  |  106  |  59  ----------------------------<  162      [05-05-25 @ 06:55]  4.7   |  19  |  2.50        Ca     9.7     [05-05-25 @ 06:55]      Mg     2.0     [05-05-25 @ 06:55]      Phos  3.0     [05-05-25 @ 06:55]    TPro  6.3  /  Alb  3.6  /  TBili  0.2  /  DBili  x   /  AST  15  /  ALT  10  /  AlkPhos  74  [05-05-25 @ 06:55]    PT/INR: PT 11.1 , INR 0.97       [05-05-25 @ 07:02]  PTT: 27.2       [05-05-25 @ 07:02]      Creatinine Trend:  SCr 2.50 [05-05 @ 06:55]  SCr 2.82 [05-04 @ 06:33]  SCr 3.09 [05-03 @ 06:55]  SCr 3.30 [05-02 @ 06:47]  SCr 3.62 [05-01 @ 06:45]    Urinalysis - [05-05-25 @ 06:55]      Color  / Appearance  / SG  / pH       Gluc 162 / Ketone   / Bili  / Urobili        Blood  / Protein  / Leuk Est  / Nitrite       RBC  / WBC  / Hyaline  / Gran  / Sq Epi  / Non Sq Epi  / Bacteria       HBsAb 46.0      [04-07-25 @ 21:39]  HBsAg Nonreact      [04-07-25 @ 21:39]  HBcAb Nonreact      [04-07-25 @ 21:39]  HCV 0.05, Nonreact      [04-07-25 @ 21:39]  HIV Nonreact      [04-07-25 @ 21:39]      TacrolimusTacrolimus (), Serum: 6.7 ng/mL (05-05 @ 06:57)  Tacrolimus (), Serum: 5.7 ng/mL (05-04 @ 06:34)  Tacrolimus (), Serum: 4.8 ng/mL (05-03 @ 06:51)  Tacrolimus (), Serum: 5.6 ng/mL (05-02 @ 06:48)    Cyclosporine  Sirolimus  MycophenolateMycophenolic Acid, Serum: 5.5 ug/mL (04-26 @ 19:18)    BK PCR  CMV PCR  Parvo PCR  EBV PCR

## 2025-05-06 ENCOUNTER — TRANSCRIPTION ENCOUNTER (OUTPATIENT)
Age: 70
End: 2025-05-06

## 2025-05-06 VITALS
OXYGEN SATURATION: 100 % | SYSTOLIC BLOOD PRESSURE: 169 MMHG | HEART RATE: 65 BPM | DIASTOLIC BLOOD PRESSURE: 73 MMHG | RESPIRATION RATE: 18 BRPM | TEMPERATURE: 98 F

## 2025-05-06 DIAGNOSIS — I95.9 HYPOTENSION, UNSPECIFIED: ICD-10-CM

## 2025-05-06 LAB
ALBUMIN SERPL ELPH-MCNC: 4 G/DL — SIGNIFICANT CHANGE UP (ref 3.3–5)
ALP SERPL-CCNC: 75 U/L — SIGNIFICANT CHANGE UP (ref 40–120)
ALT FLD-CCNC: 11 U/L — SIGNIFICANT CHANGE UP (ref 10–45)
ANION GAP SERPL CALC-SCNC: 13 MMOL/L — SIGNIFICANT CHANGE UP (ref 5–17)
AST SERPL-CCNC: 13 U/L — SIGNIFICANT CHANGE UP (ref 10–40)
BASOPHILS # BLD AUTO: 0.05 K/UL — SIGNIFICANT CHANGE UP (ref 0–0.2)
BASOPHILS NFR BLD AUTO: 0.8 % — SIGNIFICANT CHANGE UP (ref 0–2)
BILIRUB SERPL-MCNC: 0.2 MG/DL — SIGNIFICANT CHANGE UP (ref 0.2–1.2)
BLD GP AB SCN SERPL QL: NEGATIVE — SIGNIFICANT CHANGE UP
BUN SERPL-MCNC: 58 MG/DL — HIGH (ref 7–23)
CALCIUM SERPL-MCNC: 10.2 MG/DL — SIGNIFICANT CHANGE UP (ref 8.4–10.5)
CHLORIDE SERPL-SCNC: 106 MMOL/L — SIGNIFICANT CHANGE UP (ref 96–108)
CO2 SERPL-SCNC: 21 MMOL/L — LOW (ref 22–31)
CREAT SERPL-MCNC: 2.46 MG/DL — HIGH (ref 0.5–1.3)
EGFR: 27 ML/MIN/1.73M2 — LOW
EGFR: 27 ML/MIN/1.73M2 — LOW
EOSINOPHIL # BLD AUTO: 0.48 K/UL — SIGNIFICANT CHANGE UP (ref 0–0.5)
EOSINOPHIL NFR BLD AUTO: 8.1 % — HIGH (ref 0–6)
FERRITIN SERPL-MCNC: 1300 NG/ML — HIGH (ref 30–400)
GLUCOSE BLDC GLUCOMTR-MCNC: 122 MG/DL — HIGH (ref 70–99)
GLUCOSE BLDC GLUCOMTR-MCNC: 163 MG/DL — HIGH (ref 70–99)
GLUCOSE BLDC GLUCOMTR-MCNC: 300 MG/DL — HIGH (ref 70–99)
GLUCOSE SERPL-MCNC: 133 MG/DL — HIGH (ref 70–99)
HCT VFR BLD CALC: 28.3 % — LOW (ref 39–50)
HGB BLD-MCNC: 8.8 G/DL — LOW (ref 13–17)
IMM GRANULOCYTES NFR BLD AUTO: 1.2 % — HIGH (ref 0–0.9)
IRON SATN MFR SERPL: 21 % — SIGNIFICANT CHANGE UP (ref 16–55)
IRON SATN MFR SERPL: 49 UG/DL — SIGNIFICANT CHANGE UP (ref 45–165)
LYMPHOCYTES # BLD AUTO: 0.4 K/UL — LOW (ref 1–3.3)
LYMPHOCYTES # BLD AUTO: 6.8 % — LOW (ref 13–44)
MAGNESIUM SERPL-MCNC: 2.1 MG/DL — SIGNIFICANT CHANGE UP (ref 1.6–2.6)
MCHC RBC-ENTMCNC: 29 PG — SIGNIFICANT CHANGE UP (ref 27–34)
MCHC RBC-ENTMCNC: 31.1 G/DL — LOW (ref 32–36)
MCV RBC AUTO: 93.4 FL — SIGNIFICANT CHANGE UP (ref 80–100)
MONOCYTES # BLD AUTO: 0.77 K/UL — SIGNIFICANT CHANGE UP (ref 0–0.9)
MONOCYTES NFR BLD AUTO: 13.1 % — SIGNIFICANT CHANGE UP (ref 2–14)
NEUTROPHILS # BLD AUTO: 4.13 K/UL — SIGNIFICANT CHANGE UP (ref 1.8–7.4)
NEUTROPHILS NFR BLD AUTO: 70 % — SIGNIFICANT CHANGE UP (ref 43–77)
NRBC BLD AUTO-RTO: 0 /100 WBCS — SIGNIFICANT CHANGE UP (ref 0–0)
PHOSPHATE SERPL-MCNC: 3.5 MG/DL — SIGNIFICANT CHANGE UP (ref 2.5–4.5)
PLATELET # BLD AUTO: 443 K/UL — HIGH (ref 150–400)
POTASSIUM SERPL-MCNC: 5 MMOL/L — SIGNIFICANT CHANGE UP (ref 3.5–5.3)
POTASSIUM SERPL-SCNC: 5 MMOL/L — SIGNIFICANT CHANGE UP (ref 3.5–5.3)
PROT SERPL-MCNC: 6.7 G/DL — SIGNIFICANT CHANGE UP (ref 6–8.3)
RBC # BLD: 3.03 M/UL — LOW (ref 4.2–5.8)
RBC # FLD: 22.6 % — HIGH (ref 10.3–14.5)
RH IG SCN BLD-IMP: POSITIVE — SIGNIFICANT CHANGE UP
SODIUM SERPL-SCNC: 140 MMOL/L — SIGNIFICANT CHANGE UP (ref 135–145)
TACROLIMUS SERPL-MCNC: 6.7 NG/ML — SIGNIFICANT CHANGE UP
TIBC SERPL-MCNC: 227 UG/DL — SIGNIFICANT CHANGE UP (ref 220–430)
UIBC SERPL-MCNC: 178 UG/DL — SIGNIFICANT CHANGE UP (ref 110–370)
WBC # BLD: 5.9 K/UL — SIGNIFICANT CHANGE UP (ref 3.8–10.5)
WBC # FLD AUTO: 5.9 K/UL — SIGNIFICANT CHANGE UP (ref 3.8–10.5)

## 2025-05-06 PROCEDURE — 84295 ASSAY OF SERUM SODIUM: CPT

## 2025-05-06 PROCEDURE — 87640 STAPH A DNA AMP PROBE: CPT

## 2025-05-06 PROCEDURE — 85025 COMPLETE CBC W/AUTO DIFF WBC: CPT

## 2025-05-06 PROCEDURE — 36415 COLL VENOUS BLD VENIPUNCTURE: CPT

## 2025-05-06 PROCEDURE — 84436 ASSAY OF TOTAL THYROXINE: CPT

## 2025-05-06 PROCEDURE — 82728 ASSAY OF FERRITIN: CPT

## 2025-05-06 PROCEDURE — 85014 HEMATOCRIT: CPT

## 2025-05-06 PROCEDURE — 84443 ASSAY THYROID STIM HORMONE: CPT

## 2025-05-06 PROCEDURE — 97116 GAIT TRAINING THERAPY: CPT

## 2025-05-06 PROCEDURE — 93308 TTE F-UP OR LMTD: CPT

## 2025-05-06 PROCEDURE — 80180 DRUG SCRN QUAN MYCOPHENOLATE: CPT

## 2025-05-06 PROCEDURE — 99232 SBSQ HOSP IP/OBS MODERATE 35: CPT | Mod: GC

## 2025-05-06 PROCEDURE — 0241U: CPT

## 2025-05-06 PROCEDURE — 71250 CT THORAX DX C-: CPT | Mod: MC

## 2025-05-06 PROCEDURE — 82803 BLOOD GASES ANY COMBINATION: CPT

## 2025-05-06 PROCEDURE — 96374 THER/PROPH/DIAG INJ IV PUSH: CPT

## 2025-05-06 PROCEDURE — 84439 ASSAY OF FREE THYROXINE: CPT

## 2025-05-06 PROCEDURE — 86901 BLOOD TYPING SEROLOGIC RH(D): CPT

## 2025-05-06 PROCEDURE — 82947 ASSAY GLUCOSE BLOOD QUANT: CPT

## 2025-05-06 PROCEDURE — 97110 THERAPEUTIC EXERCISES: CPT

## 2025-05-06 PROCEDURE — 85730 THROMBOPLASTIN TIME PARTIAL: CPT

## 2025-05-06 PROCEDURE — 71045 X-RAY EXAM CHEST 1 VIEW: CPT

## 2025-05-06 PROCEDURE — 85610 PROTHROMBIN TIME: CPT

## 2025-05-06 PROCEDURE — 87150 DNA/RNA AMPLIFIED PROBE: CPT

## 2025-05-06 PROCEDURE — 93005 ELECTROCARDIOGRAM TRACING: CPT

## 2025-05-06 PROCEDURE — 82435 ASSAY OF BLOOD CHLORIDE: CPT

## 2025-05-06 PROCEDURE — 87086 URINE CULTURE/COLONY COUNT: CPT

## 2025-05-06 PROCEDURE — 76776 US EXAM K TRANSPL W/DOPPLER: CPT

## 2025-05-06 PROCEDURE — 87040 BLOOD CULTURE FOR BACTERIA: CPT

## 2025-05-06 PROCEDURE — 86900 BLOOD TYPING SEROLOGIC ABO: CPT

## 2025-05-06 PROCEDURE — 84100 ASSAY OF PHOSPHORUS: CPT

## 2025-05-06 PROCEDURE — 83735 ASSAY OF MAGNESIUM: CPT

## 2025-05-06 PROCEDURE — 87641 MR-STAPH DNA AMP PROBE: CPT

## 2025-05-06 PROCEDURE — 86850 RBC ANTIBODY SCREEN: CPT

## 2025-05-06 PROCEDURE — 81001 URINALYSIS AUTO W/SCOPE: CPT

## 2025-05-06 PROCEDURE — 85018 HEMOGLOBIN: CPT

## 2025-05-06 PROCEDURE — 82330 ASSAY OF CALCIUM: CPT

## 2025-05-06 PROCEDURE — 84480 ASSAY TRIIODOTHYRONINE (T3): CPT

## 2025-05-06 PROCEDURE — 87077 CULTURE AEROBIC IDENTIFY: CPT

## 2025-05-06 PROCEDURE — 99285 EMERGENCY DEPT VISIT HI MDM: CPT

## 2025-05-06 PROCEDURE — 80197 ASSAY OF TACROLIMUS: CPT

## 2025-05-06 PROCEDURE — 84132 ASSAY OF SERUM POTASSIUM: CPT

## 2025-05-06 PROCEDURE — 36589 REMOVAL TUNNELED CV CATH: CPT

## 2025-05-06 PROCEDURE — 82962 GLUCOSE BLOOD TEST: CPT

## 2025-05-06 PROCEDURE — 74176 CT ABD & PELVIS W/O CONTRAST: CPT | Mod: MC

## 2025-05-06 PROCEDURE — 83605 ASSAY OF LACTIC ACID: CPT

## 2025-05-06 PROCEDURE — 87637 SARSCOV2&INF A&B&RSV AMP PRB: CPT

## 2025-05-06 PROCEDURE — 80053 COMPREHEN METABOLIC PANEL: CPT

## 2025-05-06 PROCEDURE — 83550 IRON BINDING TEST: CPT

## 2025-05-06 PROCEDURE — 83540 ASSAY OF IRON: CPT

## 2025-05-06 PROCEDURE — 82570 ASSAY OF URINE CREATININE: CPT

## 2025-05-06 PROCEDURE — 97161 PT EVAL LOW COMPLEX 20 MIN: CPT

## 2025-05-06 RX ORDER — TAMSULOSIN HYDROCHLORIDE 0.4 MG/1
1 CAPSULE ORAL
Qty: 0 | Refills: 0 | DISCHARGE
Start: 2025-05-06

## 2025-05-06 RX ORDER — ATORVASTATIN CALCIUM 80 MG/1
1 TABLET, FILM COATED ORAL
Qty: 0 | Refills: 0 | DISCHARGE
Start: 2025-05-06

## 2025-05-06 RX ORDER — NIFEDIPINE 30 MG
1 TABLET, EXTENDED RELEASE 24 HR ORAL
Qty: 0 | Refills: 0 | DISCHARGE
Start: 2025-05-06

## 2025-05-06 RX ORDER — ASPIRIN 325 MG
1 TABLET ORAL
Qty: 0 | Refills: 0 | DISCHARGE
Start: 2025-05-06

## 2025-05-06 RX ORDER — ISOSORBIDE MONONITRATE 60 MG/1
1 TABLET, EXTENDED RELEASE ORAL
Qty: 30 | Refills: 0
Start: 2025-05-06 | End: 2025-06-04

## 2025-05-06 RX ORDER — LIDOCAINE HYDROCHLORIDE 20 MG/ML
1 JELLY TOPICAL
Refills: 0
Start: 2025-05-06

## 2025-05-06 RX ORDER — PREDNISONE 20 MG/1
1 TABLET ORAL
Qty: 0 | Refills: 0 | DISCHARGE
Start: 2025-05-06

## 2025-05-06 RX ORDER — CARVEDILOL 3.12 MG/1
1 TABLET, FILM COATED ORAL
Qty: 60 | Refills: 0
Start: 2025-05-06 | End: 2025-06-04

## 2025-05-06 RX ORDER — SULFAMETHOXAZOLE/TRIMETHOPRIM 800-160 MG
1 TABLET ORAL
Qty: 0 | Refills: 0 | DISCHARGE
Start: 2025-05-06

## 2025-05-06 RX ORDER — INSULIN GLARGINE-YFGN 100 [IU]/ML
6 INJECTION, SOLUTION SUBCUTANEOUS
Qty: 0 | Refills: 0 | DISCHARGE
Start: 2025-05-06

## 2025-05-06 RX ORDER — EPOETIN ALFA 10000 [IU]/ML
10000 SOLUTION INTRAVENOUS; SUBCUTANEOUS ONCE
Refills: 0 | Status: COMPLETED | OUTPATIENT
Start: 2025-05-06 | End: 2025-05-06

## 2025-05-06 RX ORDER — MYCOPHENOLATE MOFETIL 500 MG/1
500 TABLET, FILM COATED ORAL
Qty: 0 | Refills: 0 | DISCHARGE
Start: 2025-05-06

## 2025-05-06 RX ORDER — LEVOTHYROXINE SODIUM 300 MCG
1 TABLET ORAL
Qty: 0 | Refills: 0 | DISCHARGE
Start: 2025-05-06

## 2025-05-06 RX ORDER — OXYBUTYNIN CHLORIDE 5 MG/1
1 TABLET, FILM COATED, EXTENDED RELEASE ORAL
Qty: 30 | Refills: 0
Start: 2025-05-06 | End: 2025-06-04

## 2025-05-06 RX ADMIN — MYCOPHENOLATE MOFETIL 500 MILLIGRAM(S): 500 TABLET, FILM COATED ORAL at 17:31

## 2025-05-06 RX ADMIN — EPOETIN ALFA 10000 UNIT(S): 10000 SOLUTION INTRAVENOUS; SUBCUTANEOUS at 10:49

## 2025-05-06 RX ADMIN — Medication 75 MILLIGRAM(S): at 17:31

## 2025-05-06 RX ADMIN — ISOSORBIDE MONONITRATE 30 MILLIGRAM(S): 60 TABLET, EXTENDED RELEASE ORAL at 12:36

## 2025-05-06 RX ADMIN — Medication 200 MILLIGRAM(S): at 17:31

## 2025-05-06 RX ADMIN — MYCOPHENOLATE MOFETIL 500 MILLIGRAM(S): 500 TABLET, FILM COATED ORAL at 05:54

## 2025-05-06 RX ADMIN — CARVEDILOL 6.25 MILLIGRAM(S): 3.12 TABLET, FILM COATED ORAL at 17:32

## 2025-05-06 RX ADMIN — POLYETHYLENE GLYCOL 3350 17 GRAM(S): 17 POWDER, FOR SOLUTION ORAL at 12:35

## 2025-05-06 RX ADMIN — Medication 81 MILLIGRAM(S): at 12:36

## 2025-05-06 RX ADMIN — INSULIN LISPRO 4 UNIT(S): 100 INJECTION, SOLUTION INTRAVENOUS; SUBCUTANEOUS at 12:34

## 2025-05-06 RX ADMIN — OXYBUTYNIN CHLORIDE 5 MILLIGRAM(S): 5 TABLET, FILM COATED, EXTENDED RELEASE ORAL at 12:36

## 2025-05-06 RX ADMIN — Medication 25 MICROGRAM(S): at 05:54

## 2025-05-06 RX ADMIN — INSULIN LISPRO 4 UNIT(S): 100 INJECTION, SOLUTION INTRAVENOUS; SUBCUTANEOUS at 09:14

## 2025-05-06 RX ADMIN — INSULIN LISPRO 4 UNIT(S): 100 INJECTION, SOLUTION INTRAVENOUS; SUBCUTANEOUS at 17:30

## 2025-05-06 RX ADMIN — INSULIN LISPRO 6: 100 INJECTION, SOLUTION INTRAVENOUS; SUBCUTANEOUS at 12:35

## 2025-05-06 RX ADMIN — Medication 3 MILLILITER(S): at 10:49

## 2025-05-06 RX ADMIN — CARVEDILOL 6.25 MILLIGRAM(S): 3.12 TABLET, FILM COATED ORAL at 05:53

## 2025-05-06 RX ADMIN — TACROLIMUS 5 MILLIGRAM(S): 0.5 CAPSULE ORAL at 09:13

## 2025-05-06 RX ADMIN — Medication 60 MILLIGRAM(S): at 05:52

## 2025-05-06 RX ADMIN — INSULIN LISPRO 2: 100 INJECTION, SOLUTION INTRAVENOUS; SUBCUTANEOUS at 09:14

## 2025-05-06 RX ADMIN — Medication 1 APPLICATION(S): at 09:15

## 2025-05-06 RX ADMIN — Medication 500000 UNIT(S): at 17:31

## 2025-05-06 RX ADMIN — Medication 500000 UNIT(S): at 05:52

## 2025-05-06 RX ADMIN — PREDNISONE 2.5 MILLIGRAM(S): 20 TABLET ORAL at 05:54

## 2025-05-06 RX ADMIN — Medication 200 MILLIGRAM(S): at 05:53

## 2025-05-06 RX ADMIN — Medication 500000 UNIT(S): at 12:35

## 2025-05-06 RX ADMIN — Medication 75 MILLIGRAM(S): at 05:53

## 2025-05-06 RX ADMIN — Medication 20 MILLIGRAM(S): at 12:36

## 2025-05-06 RX ADMIN — Medication 60 MILLIGRAM(S): at 17:31

## 2025-05-06 NOTE — PROGRESS NOTE ADULT - ASSESSMENT
68 y/o M with PMHx of HLD, CAD s/p LAD PCI 7/24, HFpEF , HTN, hypothyroid, type 2 DM, h/o cva with no residual deficits, persistent right sided pleural effusion, and hx of hemorrhagic pericardial effusion (cytopathology negative), ESRD on HD MWF (recently converted from PD in 1/2025) via permacath placed on 1/2025, now s/p DDRT on 4/8/25, c/b HTN requiring 2 SICU admissions who presents to [facility] w/ cough+ hypotension      [] s/p DDRT, readmit hypotension / cough  - trend labs, VS  - Strict I's & O's, JPx2, brand  - Repeat COSMO Cr 9.93 on 5/1.  - Repeat JP1 Cr - 3.41, Repeat JP2 Cr - 10.35 on 5/4  - renal Doppler w/ arvin RI's patent vasculature   - CT chest non contrast w/ stable pleural effusion  - Hypertensive DC IVF   - BCx+ MRSE in 1/2 bottles, TID following, on Linezolid 600 BID   - Removed staples, re-stitched drain site 5/4  - IR permcath removed 5/5   - repeat doppler 5/5       [] IMMUNO   - Env by level,  BID, pred 2.5  - PPX nystatin, bactrim, valcyte, ASA, PPI     [] HTN  - Home: coreg 12.5 bid,hydral 25 BID, cardura 8mg BID, nifed 60 BID, imdur 60 QD  - Here: Coreg 6.25 BID, nifed 60 BID, imdur 30 qd, hydral to 75 BID   70 y/o M with PMHx of HLD, CAD s/p LAD PCI 7/24, HFpEF , HTN, hypothyroid, type 2 DM, h/o cva with no residual deficits, persistent right sided pleural effusion, and hx of hemorrhagic pericardial effusion (cytopathology negative), ESRD on HD MWF (recently converted from PD in 1/2025) via permacath placed on 1/2025, now s/p DDRT on 4/8/25, c/b HTN requiring 2 SICU admissions who presents to [facility] w/ cough+ hypotension      [] s/p DDRT, readmit hypotension / cough  - trend labs, VS  - Strict I's & O's, JPx2, brand  - Repeat COSMO Cr 9.93 on 5/1.  - Repeat JP1 Cr - 3.41, Repeat JP2 Cr - 10.35 on 5/4  - renal Doppler w/ arvin RI's patent vasculature   - CT chest non contrast w/ stable pleural effusion  - Hypertensive DC IVF   - BCx+ MRSE in 1/2 bottles, TID following, on Linezolid 600 BID   - Removed staples, re-stitched drain site 5/4  - IR permcath removed 5/5   - doppler 5/5 patent        [] IMMUNO   - Env by level,  BID, pred 2.5  - PPX nystatin, bactrim, valcyte, ASA, PPI     [] HTN  - Home: coreg 12.5 bid,hydral 25 BID, cardura 8mg BID, nifed 60 BID, imdur 60 QD  - Here: Coreg 6.25 BID, nifed 60 BID, imdur 30 qd, hydral to 75 BID

## 2025-05-06 NOTE — PROGRESS NOTE ADULT - ASSESSMENT
69 y.o. M w/ PMHx HTN, HLD, CAD s/p LAD PCI 7/24, HFpEF, HTN, hypothyroid, type 2 DM, ESRD on HD MWF s/p DDRT 4/8/25 who presents to [facility] for hypotension. Transplant nephrology consulted for DDRT and IS management.    1. S/p DDRT (4/8) with DGF  65 yo DCD kidney with KDPI 99%  -HD cath removed (5/5)  - Pending BK and CMV serologies  - Maintain brand, will need on d/c  - Give lidocaine jelly for irritation  - Scr stable at 2.46 today. Lytes and vol acceptable.     2. IS  - Env 5 w/ with low dose belatacept, dosed on 4/10, 4/14, 4/23, next dose 5/8  - cellcept 500 BID  - acyclovir (CMV -/-), valcyte, bactrim  - Env per level  - Continue pred    3. HTN  - Imdur, coreg, hydral    4. Anemia  - S/p procrit 10K (4/29 and 5/6)      Stable for discharge      Bryson Flynn  Nephrology Fellow  Feel free to contact me on TEAMS  After 5 pm and on weekends please contact the on-call Fellow.

## 2025-05-06 NOTE — PROGRESS NOTE ADULT - SUBJECTIVE AND OBJECTIVE BOX
[facility]   MD MESSI ROSE, MD MORIS MANTILLA, MD REYES THOMASON, NP    TEL:  OFFICE: [phone]  From 5pm-7am Answering Service [phone]    -- RENAL FOLLOW UP NOTE ---Date of Service 05-06-25 @ 13:42    Patient is a 69y old  Male who presents with a chief complaint of s/p DDRT, readmit w/ hypotension & c/f sepsis       Patient seen and examined at bedside. No chest pain/sob    VITALS:  T(F): 98.2 (05-06-25 @ 09:08), Max: 98.7 (05-05-25 @ 21:27)  HR: 63 (05-06-25 @ 09:08)  BP: 160/63 (05-06-25 @ 09:08)  RR: 18 (05-06-25 @ 09:08)  SpO2: 100% (05-06-25 @ 09:08)  Wt(kg): --    05-05 @ 07:01  -  05-06 @ 07:00  --------------------------------------------------------  IN: 600 mL / OUT: 1850 mL / NET: -1250 mL    05-06 @ 07:01  -  05-06 @ 13:42  --------------------------------------------------------  IN: 120 mL / OUT: 230 mL / NET: -110 mL          PHYSICAL EXAM:  General: NAD  Neck: No JVD  Respiratory: CTAB, no wheezes, rales or rhonchi  Cardiovascular: S1, S2, RRR  Gastrointestinal: BS+, soft, NT/ND  Extremities: No peripheral edema    Hospital Medications:   MEDICATIONS  (STANDING):  acyclovir   Oral Tab/Cap 200 milliGRAM(s) Oral two times a day  aspirin enteric coated 81 milliGRAM(s) Oral daily  atorvastatin 40 milliGRAM(s) Oral at bedtime  carvedilol 6.25 milliGRAM(s) Oral every 12 hours  chlorhexidine 2% Cloths 1 Application(s) Topical <User Schedule>  dextrose 5%. 1000 milliLiter(s) (100 mL/Hr) IV Continuous <Continuous>  dextrose 5%. 1000 milliLiter(s) (50 mL/Hr) IV Continuous <Continuous>  dextrose 50% Injectable 25 Gram(s) IV Push once  dextrose 50% Injectable 12.5 Gram(s) IV Push once  dextrose 50% Injectable 25 Gram(s) IV Push once  dextrose Oral Gel 15 Gram(s) Oral once  famotidine    Tablet 20 milliGRAM(s) Oral daily  glucagon  Injectable 1 milliGRAM(s) IntraMuscular once  hydrALAZINE 75 milliGRAM(s) Oral two times a day  insulin glargine Injectable (LANTUS) 6 Unit(s) SubCutaneous at bedtime  insulin lispro (ADMELOG) corrective regimen sliding scale   SubCutaneous three times a day before meals  insulin lispro (ADMELOG) corrective regimen sliding scale   SubCutaneous at bedtime  insulin lispro Injectable (ADMELOG) 4 Unit(s) SubCutaneous three times a day before meals  isosorbide   mononitrate ER Tablet (IMDUR) 30 milliGRAM(s) Oral daily  levothyroxine 25 MICROGram(s) Oral daily  mycophenolate mofetil 500 milliGRAM(s) Oral two times a day  NIFEdipine XL 60 milliGRAM(s) Oral every 12 hours  nystatin    Suspension 188219 Unit(s) Oral four times a day  oxybutynin 5 milliGRAM(s) Oral daily  polyethylene glycol 3350 17 Gram(s) Oral daily  predniSONE   Tablet 2.5 milliGRAM(s) Oral daily  tacrolimus ER Tablet (ENVARSUS XR) 5 milliGRAM(s) Oral <User Schedule>  tamsulosin 0.4 milliGRAM(s) Oral at bedtime  trimethoprim   80 mG/sulfamethoxazole 400 mG 1 Tablet(s) Oral <User Schedule>    Tacrolimus (), Serum: 6.7 ng/mL (05-06 @ 07:08)    LABS:  05-06    140  |  106  |  58[H]  ----------------------------<  133[H]  5.0   |  21[L]  |  2.46[H]    Ca    10.2      06 May 2025 07:08  Phos  3.5     05-06  Mg     2.1     05-06    TPro  6.7  /  Alb  4.0  /  TBili  0.2  /  DBili      /  AST  13  /  ALT  11  /  AlkPhos  75  05-06    Creatinine Trend: 2.46 <--, 2.50 <--, 2.82 <--, 3.09 <--, 3.30 <--, 3.62 <--, 4.41 <--    Albumin: 4.0 g/dL (05-06 @ 07:08)  Phosphorus: 3.5 mg/dL (05-06 @ 07:08)                              8.8    5.90  )-----------( 443      ( 06 May 2025 07:08 )             28.3     Urine Studies:  Urinalysis - [05-06-25 @ 07:08]      Color  / Appearance  / SG  / pH       Gluc 133 / Ketone   / Bili  / Urobili        Blood  / Protein  / Leuk Est  / Nitrite       RBC  / WBC  / Hyaline  / Gran  / Sq Epi  / Non Sq Epi  / Bacteria       Iron 67, TIBC 210, %sat 32      [04-21-25 @ 07:08]  Ferritin 1496      [04-21-25 @ 07:08]  PTH -- (Ca 7.9)      [12-30-24 @ 06:50]   229  PTH -- (Ca 7.6)      [11-27-24 @ 04:23]   250  Vitamin D (25OH) 17.1      [11-25-24 @ 06:50]  TSH 1.96      [05-05-25 @ 07:03]  Lipid: chol 112, TG 61, HDL 46, LDL --      [12-31-24 @ 04:27]    HBsAb 46.0      [04-07-25 @ 21:39]  HBsAg Nonreact      [04-07-25 @ 21:39]  HBcAb Nonreact      [04-07-25 @ 21:39]  HCV 0.05, Nonreact      [04-07-25 @ 21:39]  HIV Nonreact      [04-07-25 @ 21:39]      RADIOLOGY & ADDITIONAL STUDIES:

## 2025-05-06 NOTE — DISCHARGE NOTE NURSING/CASE MANAGEMENT/SOCIAL WORK - FINANCIAL ASSISTANCE
[facility] provides services at a reduced cost to those who are determined to be eligible through [facility]’s financial assistance program. Information regarding [facility]’s financial assistance program can be found by going to [email or web address] or by calling [phone].

## 2025-05-06 NOTE — PROGRESS NOTE ADULT - SUBJECTIVE AND OBJECTIVE BOX
[facility] DIVISION OF KIDNEY DISEASES AND HYPERTENSION   FOLLOW UP NOTE    --------------------------------------------------------------------------------  Chief Complaint:    24 hour events/subjective: Pt. was seen and examined today. Feels ok      PAST HISTORY  --------------------------------------------------------------------------------  No significant changes to PMH, PSH, FHx, SHx, unless otherwise noted    ALLERGIES & MEDICATIONS  --------------------------------------------------------------------------------  Allergies    No Known Allergies    Intolerances      Standing Inpatient Medications  acyclovir   Oral Tab/Cap 200 milliGRAM(s) Oral two times a day  aspirin enteric coated 81 milliGRAM(s) Oral daily  atorvastatin 40 milliGRAM(s) Oral at bedtime  carvedilol 6.25 milliGRAM(s) Oral every 12 hours  chlorhexidine 2% Cloths 1 Application(s) Topical <User Schedule>  dextrose 5%. 1000 milliLiter(s) IV Continuous <Continuous>  dextrose 5%. 1000 milliLiter(s) IV Continuous <Continuous>  dextrose 50% Injectable 25 Gram(s) IV Push once  dextrose 50% Injectable 12.5 Gram(s) IV Push once  dextrose 50% Injectable 25 Gram(s) IV Push once  dextrose Oral Gel 15 Gram(s) Oral once  famotidine    Tablet 20 milliGRAM(s) Oral daily  glucagon  Injectable 1 milliGRAM(s) IntraMuscular once  hydrALAZINE 75 milliGRAM(s) Oral two times a day  insulin glargine Injectable (LANTUS) 6 Unit(s) SubCutaneous at bedtime  insulin lispro (ADMELOG) corrective regimen sliding scale   SubCutaneous three times a day before meals  insulin lispro (ADMELOG) corrective regimen sliding scale   SubCutaneous at bedtime  insulin lispro Injectable (ADMELOG) 4 Unit(s) SubCutaneous three times a day before meals  isosorbide   mononitrate ER Tablet (IMDUR) 30 milliGRAM(s) Oral daily  levothyroxine 25 MICROGram(s) Oral daily  mycophenolate mofetil 500 milliGRAM(s) Oral two times a day  NIFEdipine XL 60 milliGRAM(s) Oral every 12 hours  nystatin    Suspension 443929 Unit(s) Oral four times a day  oxybutynin 5 milliGRAM(s) Oral daily  polyethylene glycol 3350 17 Gram(s) Oral daily  predniSONE   Tablet 2.5 milliGRAM(s) Oral daily  tacrolimus ER Tablet (ENVARSUS XR) 5 milliGRAM(s) Oral <User Schedule>  tamsulosin 0.4 milliGRAM(s) Oral at bedtime  trimethoprim   80 mG/sulfamethoxazole 400 mG 1 Tablet(s) Oral <User Schedule>    PRN Inpatient Medications  bisacodyl Suppository 10 milliGRAM(s) Rectal once PRN  senna 2 Tablet(s) Oral daily PRN      REVIEW OF SYSTEMS  --------------------------------------------------------------------------------  All other systems were reviewed and are negative, except as noted.    VITALS/PHYSICAL EXAM  --------------------------------------------------------------------------------  T(C): 36.7 (05-06-25 @ 13:44), Max: 37.1 (05-05-25 @ 21:27)  HR: 68 (05-06-25 @ 13:44) (63 - 68)  BP: 166/62 (05-06-25 @ 13:44) (146/69 - 169/70)  RR: 18 (05-06-25 @ 13:44) (18 - 18)  SpO2: 100% (05-06-25 @ 13:44) (99% - 100%)  Wt(kg): --        05-05-25 @ 07:01  -  05-06-25 @ 07:00  --------------------------------------------------------  IN: 600 mL / OUT: 1850 mL / NET: -1250 mL    05-06-25 @ 07:01  -  05-06-25 @ 14:16  --------------------------------------------------------  IN: 120 mL / OUT: 435 mL / NET: -315 mL        Physical Exam:  	Gen: NAD  	HEENT: Anicteric  	Pulm: CTA B/L  	CV: S1S2+  	Abd: Soft, tenderness near transplant site, +COSMO drain  	Ext: No LE edema B/L  	Neuro: Awake          :Campo cath+ with clear urine  	Skin: Warm and dry  	Dialysis access: None      LABS/STUDIES  --------------------------------------------------------------------------------              8.8    5.90  >-----------<  443      [05-06-25 @ 07:08]              28.3     140  |  106  |  58  ----------------------------<  133      [05-06-25 @ 07:08]  5.0   |  21  |  2.46        Ca     10.2     [05-06-25 @ 07:08]      Mg     2.1     [05-06-25 @ 07:08]      Phos  3.5     [05-06-25 @ 07:08]    TPro  6.7  /  Alb  4.0  /  TBili  0.2  /  DBili  x   /  AST  13  /  ALT  11  /  AlkPhos  75  [05-06-25 @ 07:08]    PT/INR: PT 11.1 , INR 0.97       [05-05-25 @ 07:02]  PTT: 27.2       [05-05-25 @ 07:02]      Creatinine Trend:  SCr 2.46 [05-06 @ 07:08]  SCr 2.50 [05-05 @ 06:55]  SCr 2.82 [05-04 @ 06:33]  SCr 3.09 [05-03 @ 06:55]  SCr 3.30 [05-02 @ 06:47]    Urinalysis - [05-06-25 @ 07:08]      Color  / Appearance  / SG  / pH       Gluc 133 / Ketone   / Bili  / Urobili        Blood  / Protein  / Leuk Est  / Nitrite       RBC  / WBC  / Hyaline  / Gran  / Sq Epi  / Non Sq Epi  / Bacteria       HBsAb 46.0      [04-07-25 @ 21:39]  HBsAg Nonreact      [04-07-25 @ 21:39]  HBcAb Nonreact      [04-07-25 @ 21:39]  HCV 0.05, Nonreact      [04-07-25 @ 21:39]  HIV Nonreact      [04-07-25 @ 21:39]      TacrolimusTacrolimus (), Serum: 6.7 ng/mL (05-06 @ 07:08)  Tacrolimus (), Serum: 6.7 ng/mL (05-05 @ 06:57)  Tacrolimus (), Serum: 5.7 ng/mL (05-04 @ 06:34)  Tacrolimus (), Serum: 4.8 ng/mL (05-03 @ 06:51)    Cyclosporine  Sirolimus  MycophenolateMycophenolic Acid, Serum: 5.5 ug/mL (04-26 @ 19:18)    BK PCR  CMV PCR  Parvo PCR  EBV PCR

## 2025-05-06 NOTE — PROGRESS NOTE ADULT - ASSESSMENT
68 y/o M with PMHx of HLD, CAD s/p LAD PCI 7/24, HFpEF , HTN, hypothyroid, type 2 DM, h/o cva with no residual deficits, persistent right sided pleural effusion, and hx of hemorrhagic pericardial effusion (cytopathology negative), ESRD on HD MWF (recently converted from PD in 1/2025) via permacath placed on 1/2025, now s/p DDRT on 4/8/25, c/b HTN requiring 2 SICU admissions. Patient presented to the ED urgently from outpatient. States he was having output from drain area and stitch was placed in the outpatient office. Patient afterward had Hypotension episode to 80s/50s and on ED admit has pain from drain site when drains are being manipulated. Denies any other sx aside from a dry cough.      A/P  s/p DDRT 4/2024 with F   Nephrologist is Dr. Menjivar  Was PD then converted to HD, has permacath  Was on HD prior to admission now off HD  SCR downtrending off HD  HD and IS as per transplant   On envarsus 4 mg qd w/ belatacept, next dose 5/8  On cellcept 500 mg bid and prednisone 5 mg daily   Monitor UO and bmp scr continues to improve    HTN/Hypotension  BP fluctuating; suboptimal  Now on nifedipine 60 mg bid, hydralazie 75mg BID  Monitor bp     Hypokalemia   Supplemented as needed  Monitor K     Anemia  On epo   Transfuse prn to keep hgb >8  Monitor hgb

## 2025-05-06 NOTE — PROGRESS NOTE ADULT - SUBJECTIVE AND OBJECTIVE BOX
Transplant Surgery - Multidisciplinary Rounds  --------------------------------------------------------------      Present: Patient seen and examined with multidisciplinary transplant team including Surgeon Dr. Ortega, Nephrologist Dr. Dunn, LÁZARO Forbes, and bedside RN during rounds. Disciplines not in attendance will be notified of plan.     HPI: 68 y/o M with PMHx of HLD, CAD s/p LAD PCI 7/24, HFpEF , HTN, hypothyroid, type 2 DM, h/o cva with no residual deficits, persistent right sided pleural effusion, and hx of hemorrhagic pericardial effusion (cytopathology negative), ESRD on HD MWF (recently converted from PD in 1/2025) via permacath placed on 1/2025, now s/p DDRT on 4/8/25, c/b HTN requiring 2 SICU admissions who presents to [facility] w/ cough+ hypotension    Interval Events:  - Afebrile, HTN   - d/c'd JP1  - repeat doppler: stable RIs, decreased velocities mild hydro  - IR removal of permacath       Immunosupression:  Induction: Thymo  Maintenance: FK by level/ BID/Pred 2.5  Ongoing monitoring for signs of rejection     Potential Discharge date: TBD  Education:  Medications  Plan of care:  See Below         TX DATA HERE          ROS  All other systems were reviewed and are negative, except as noted. +pain at drain sites      PHYSICAL EXAM:  Constitutional: Well developed / well nourished  Eyes: Anicteric, PERRLA  ENMT: nc/at  Neck: supple   Respiratory: CTA B/L  Cardiovascular: RRR  Gastrointestinal: Soft, non distended, nontender. JPx2 ss   Genitourinary: Campo  Extremities: warm b/l upper and lower, no edema  Vascular: Palpable dp pulses bilaterally  Neurological: A&O x3  Skin: no rashes, ulcerations or lesions;  Musculoskeletal: Moving all extremities  Psychiatric: Responsive     Transplant Surgery - Multidisciplinary Rounds  --------------------------------------------------------------      Present: Patient seen and examined with multidisciplinary transplant team including Surgeon Dr. Ortega, Nephrologist Dr. Dunn, LÁZARO Forbes, and bedside RN during rounds. Disciplines not in attendance will be notified of plan.     HPI: 68 y/o M with PMHx of HLD, CAD s/p LAD PCI 7/24, HFpEF , HTN, hypothyroid, type 2 DM, h/o cva with no residual deficits, persistent right sided pleural effusion, and hx of hemorrhagic pericardial effusion (cytopathology negative), ESRD on HD MWF (recently converted from PD in 1/2025) via permacath placed on 1/2025, now s/p DDRT on 4/8/25, c/b HTN requiring 2 SICU admissions who presents to [facility] w/ cough+ hypotension    Interval Events:  - Afebrile, HTN   - d/c'd JP1  - repeat doppler: stable RIs, decreased velocities mild hydro  - IR removal of permacath       Immunosupression:  Induction: Thymo  Maintenance: FK by level/ BID/Pred 2.5  Ongoing monitoring for signs of rejection     Potential Discharge date: TBD  Education:  Medications  Plan of care:  See Below         MEDICATIONS  (STANDING):  acyclovir   Oral Tab/Cap 200 milliGRAM(s) Oral two times a day  aspirin enteric coated 81 milliGRAM(s) Oral daily  atorvastatin 40 milliGRAM(s) Oral at bedtime  carvedilol 6.25 milliGRAM(s) Oral every 12 hours  chlorhexidine 2% Cloths 1 Application(s) Topical <User Schedule>  dextrose 5%. 1000 milliLiter(s) (100 mL/Hr) IV Continuous <Continuous>  dextrose 5%. 1000 milliLiter(s) (50 mL/Hr) IV Continuous <Continuous>  dextrose 50% Injectable 25 Gram(s) IV Push once  dextrose 50% Injectable 12.5 Gram(s) IV Push once  dextrose 50% Injectable 25 Gram(s) IV Push once  dextrose Oral Gel 15 Gram(s) Oral once  famotidine    Tablet 20 milliGRAM(s) Oral daily  glucagon  Injectable 1 milliGRAM(s) IntraMuscular once  hydrALAZINE 75 milliGRAM(s) Oral two times a day  insulin glargine Injectable (LANTUS) 6 Unit(s) SubCutaneous at bedtime  insulin lispro (ADMELOG) corrective regimen sliding scale   SubCutaneous three times a day before meals  insulin lispro (ADMELOG) corrective regimen sliding scale   SubCutaneous at bedtime  insulin lispro Injectable (ADMELOG) 4 Unit(s) SubCutaneous three times a day before meals  isosorbide   mononitrate ER Tablet (IMDUR) 30 milliGRAM(s) Oral daily  levothyroxine 25 MICROGram(s) Oral daily  mycophenolate mofetil 500 milliGRAM(s) Oral two times a day  NIFEdipine XL 60 milliGRAM(s) Oral every 12 hours  nystatin    Suspension 206692 Unit(s) Oral four times a day  oxybutynin 5 milliGRAM(s) Oral daily  polyethylene glycol 3350 17 Gram(s) Oral daily  predniSONE   Tablet 2.5 milliGRAM(s) Oral daily  tacrolimus ER Tablet (ENVARSUS XR) 5 milliGRAM(s) Oral <User Schedule>  tamsulosin 0.4 milliGRAM(s) Oral at bedtime  trimethoprim   80 mG/sulfamethoxazole 400 mG 1 Tablet(s) Oral <User Schedule>    MEDICATIONS  (PRN):  bisacodyl Suppository 10 milliGRAM(s) Rectal once PRN Constipation  senna 2 Tablet(s) Oral daily PRN Constipation      PAST MEDICAL & SURGICAL HISTORY:  Hypertension      ESRD on peritoneal dialysis      CVA (cerebrovascular accident)  2010 without residual effects      Hyperlipidemia      BPH (benign prostatic hyperplasia)      CAD (coronary artery disease)      T2DM (type 2 diabetes mellitus)      Hypothyroidism      History of peritoneal dialysis  s/p PD catheter placement      S/P coronary artery stent placement          Vital Signs Last 24 Hrs  T(C): 36.8 (06 May 2025 09:08), Max: 37.1 (05 May 2025 21:27)  T(F): 98.2 (06 May 2025 09:08), Max: 98.7 (05 May 2025 21:27)  HR: 63 (06 May 2025 09:08) (63 - 68)  BP: 160/63 (06 May 2025 09:08) (146/69 - 169/70)  BP(mean): --  RR: 18 (06 May 2025 09:08) (18 - 20)  SpO2: 100% (06 May 2025 09:08) (99% - 100%)    Parameters below as of 06 May 2025 09:08  Patient On (Oxygen Delivery Method): room air        I&O's Summary    05 May 2025 07:01  -  06 May 2025 07:00  --------------------------------------------------------  IN: 600 mL / OUT: 1850 mL / NET: -1250 mL    06 May 2025 07:01  -  06 May 2025 11:41  --------------------------------------------------------  IN: 120 mL / OUT: 230 mL / NET: -110 mL                              8.8    5.90  )-----------( 443      ( 06 May 2025 07:08 )             28.3     05-06    140  |  106  |  58[H]  ----------------------------<  133[H]  5.0   |  21[L]  |  2.46[H]    Ca    10.2      06 May 2025 07:08  Phos  3.5     05-06  Mg     2.1     05-06    TPro  6.7  /  Alb  4.0  /  TBili  0.2  /  DBili  x   /  AST  13  /  ALT  11  /  AlkPhos  75  05-06    Tacrolimus (), Serum: 6.7 ng/mL (05-06 @ 07:08)        Culture - Urine (collected 05-01-25 @ 12:05)  Source: Clean Catch Clean Catch (Midstream)  Final Report (05-02-25 @ 16:11):    No growth    Culture - Blood (collected 04-30-25 @ 18:20)  Source: Blood Blood  Final Report (05-05-25 @ 23:00):    No growth at 5 days    Culture - Blood (collected 04-30-25 @ 18:00)  Source: Blood Blood  Final Report (05-05-25 @ 23:00):    No growth at 5 days                  ROS  All other systems were reviewed and are negative, except as noted. +pain at drain sites      PHYSICAL EXAM:  Constitutional: Well developed / well nourished  Eyes: Anicteric, PERRLA  ENMT: nc/at  Neck: supple   Respiratory: CTA B/L  Cardiovascular: RRR  Gastrointestinal: Soft, non distended, nontender. JPx2 ss   Genitourinary: Campo  Extremities: warm b/l upper and lower, no edema  Vascular: Palpable dp pulses bilaterally  Neurological: A&O x3  Skin: no rashes, ulcerations or lesions;  Musculoskeletal: Moving all extremities  Psychiatric: Responsive

## 2025-05-06 NOTE — PROGRESS NOTE ADULT - ATTENDING COMMENTS
-Kidney transplant- c/b delayed graft function, now improving, permacath removed today, also c/b suspected urine leak, COSMO Drain cr for one of the drains is still 10, with significant output, the other drain with little output with Cr around 3- the latter drain pulled out bedside by transplant surgeon  -still with significant wes-penile pain- no ulcers noted- improved with urojet- will try to continue on discharge  -patient planned to be discharged with brand in place  -HTN- difficult to control- now improved- continue current regimen  -Immunosuppression- plan to continue envarsus with goal around 7-9, now will hold belatacept, con't MMF 500mg BID, and pred is on lower dose 2.5mg daily per transplant surgery to aide healing
-Kidney transplant- c/b delayed graft function, now improving, permacath removed 5/5/2025 also c/b suspected urine leak, COSMO drain with least output and lowest cr pulled bedside, now with 1 drain remaining  -wes-penile pain improved- no ulcers noted-can con't topical lidocaine jelly on discharge  -patient planned to be discharged with brand in place  -HTN- difficult to control- now improved- continue current regimen  -Immunosuppression- plan to continue envarsus with goal around 7-9, now will hold belatacept, con't MMF 500mg BID, and pred is on lower dose 2.5mg daily per transplant surgery to aide healing  -Anemia- iron panel and ferritin acceptable- please give procrit 10K today    Planned for discharge today.

## 2025-05-06 NOTE — PROGRESS NOTE ADULT - TIME BILLING
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  Patient has known CAD, HLD, HFpEF, h/o CVA , DM, HTN, pleural effusion  On Petra based regimen (next Petra 5/8/25) with low trough level target Tac . Noted prior antibiotic therapy for donor GPC cultures, was on Vanco till 4/16.  Was on Coreg/Hydralazine/Nifedipine/Cardura and Imdur at home.  DSA was negative predischarge last admission  Suggestions:  Continue Campo drainage- explained again to patient regarding need for Campo drainage in view of high fluid creatinine, oxybutynin started   Antihypertensive regimen - continue current regimen  Continue low target trough level for Tac , on Belatacept, next dose due on 5/8/25  Cardiology follow up- spoke to Dr. Dwyer  Will remove dialysis catheter  Will follow  I was present during and reviewed clinical and lab data as well as assessment and plan as documented . Please contact if any additional questions with any change in clinical condition or on availability of any additional information or reports.
DDRT recipient (4/8/25) readmitted with low blood pressure noted in clinic , improved now    He is non oliguric, off dialysis  Elevated fluid creatinine, now has Campo drainage  Donor details noted DCD (No NRP)  KDPI 99% age 60 term creatinine 1.45   Reviewed immunosuppression, prophylaxis, comorbidities, management regimen for comorbidities.  Patient has known CAD, HLD, HFpEF, h/o CVA , DM, HTN, pleural effusion  On Petra based regimen (next Petra 5/8/25) with low trough level target Tac . Noted prior antibiotic therapy for donor GPC cultures, was on Vanco till 4/16.  Was on Coreg/Hydralazine/Nifedipine/Cardura and Imdur at home.  DSA was negative predischarge last admission  Noted elevated creatinine in drain fluid, now has Campo catheter  Suggestions:  Continue Campo drainage- explained again to patient regarding need for Campo drainage in view of high fluid creatinine  Antihypertensive regimen adjusted  Continue low target trough level for Tac , on Belatacept  Will follow  I was present during and reviewed clinical and lab data as well as assessment and plan as documented by the house staff as noted. Please contact if any additional questions with any change in clinical condition or on availability of any additional information or reports.
DDRT recipient readmitted with low blood pressure noted in clinic , improved with IVF. Patient has been on dialysis support.  He is non oliguric, off dialysis  Reviewed clinical, lab data, post transplant course and recent events.   Noted limited echo done- no pericardial effusion  Donor details noted DCD (No NRP)  KDPI 99% age 60 term creatinine 1.45   Reviewed immunosuppression, prophylaxis, comorbidities, management regimen for comorbidities.  Patient has known CAD, HLD, HFpEF, h/o CVA , DM, HTN, pleural effusion  On Petra based regimen (next Petra 5/8/25) with low trough level target Tac/MMF/steroids. Noted prior antibiotic therapy for donor GPC cultures, was on Vanco till 4/16.  Was on Coreg/Hydralazine/Nifedipine/Cardura and Imdur.  DSA was negative predischarge last admission  Noted elevated creatinine in drain fluid, now has Campo catheter  Suggestions:  Reintroduce antihypertensives -  Coreg, Imdur, now on Hydralazine and Nifedipine  Continue low target trough level for Tac/steroids restart MMF if cultures remain negative  Will hold off dialysis   Spoke to patient's primary nephrologist Dr. Menjivar and daughter in law Kasi Escobar, nephrologist  Will follow  I was present during and reviewed clinical and lab data as well as assessment and plan as documented by the house staff as noted. Please contact if any additional questions with any change in clinical condition or on availability of any additional information or reports.
Face-to-face encounter, review of extensive medical records in this and prior charts, laboratory findings, radiographic findings, review of immunosuppression, review of medication regimen for comorbidities; documentation as noted above and discussion of diagnostic impressions and plan with the patient and team.
Face-to-face encounter, review of extensive medical records in this and prior charts, laboratory findings, radiographic findings, review of immunosuppression, review of medication regimen for comorbidities; documentation as noted above and discussion of diagnostic impressions and plan with the patient and team.
DDRT recipient readmitted with low blood pressure noted in clinic , improved with IVF. Patient had been on dialysis support.  He is non oliguric, off dialysis  Reviewed clinical, lab data, post transplant course and recent events.   Noted limited echo done- no pericardial effusion  Donor details noted DCD (No NRP)  KDPI 99% age 60 term creatinine 1.45   Reviewed immunosuppression, prophylaxis, comorbidities, management regimen for comorbidities.  Patient has known CAD, HLD, HFpEF, h/o CVA , DM, HTN, pleural effusion  On Petra based regimen (next Petra 5/8/25) with low trough level target Tac/MMF/steroids. Noted prior antibiotic therapy for donor GPC cultures, was on Vanco till 4/16.  Was on Coreg/Hydralazine/Nifedipine/Cardura and Imdur.  DSA was negative predischarge last admission  Noted elevated creatinine in drain fluid, now has Campo catheter  Suggestions:  Continue Campo drainage- explained to patient regarding need for Campo drainage  Antihypertensive regimen -  Coreg, Imdur, now on Hydralazine- dose up titrated today and Nifedipine  Continue low target trough level for Tac/steroids restart MMF if cultures remain negative  Will hold off dialysis   Will follow  I was present during and reviewed clinical and lab data as well as assessment and plan as documented by the house staff as noted. Please contact if any additional questions with any change in clinical condition or on availability of any additional information or reports.
DDRT recipient readmitted with low blood pressure noted in clinic , improved with IVF. Patient has been on dialysis support.  Now reports being non oliguric  reviewed clinical, lab data, post transplant course and recent events.   Noted limited echo done- no pericardial effusion  Donor details noted DCD (No NRP)  KDPI 99% age 60 term creatinine 1.45   Reviewed immunosuppression, prophylaxis, comorbidities, management regimen for comorbidities.  Patient has known CAD, HLD, HFpEF, h/o CVA , DM, HTN, pleural effusion  On Petra based regimen (next Petra 5/8/25) with low trough level target Tac/MMF/steroids. Noted prior antibiotic therapy for donor GPC cultures, was on Vanco till 4/16.  Was on Coreg/Hydralazine/Nifedipine/Cardura and Imdur.  DSA was negative predischarge last admission  Suggestions:  Procrit  Reintroduce antihypertensives - low dose Coreg, Imdur  Continue low target trough level for Tac/steroids restart MMF if cultures remain negative  Monitor urine output- patient reports unsaved voids, will hold off dialysis for today  Spoke to patient's primary nephrologist Dr. Menjivar.   Will follow  I was present during and reviewed clinical and lab data as well as assessment and plan as documented by the house staff as noted. Please contact if any additional questions with any change in clinical condition or on availability of any additional information or reports.
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  Patient has known CAD, HLD, HFpEF, h/o CVA , DM, HTN, pleural effusion  On Petra based regimen (next Petra 5/8/25) with low trough level target Tac . Noted prior antibiotic therapy for donor GPC cultures, was on Vanco till 4/16.  Was on Coreg/Hydralazine/Nifedipine/Cardura and Imdur at home.  DSA was negative predischarge last admission  Suggestions:  Continue Campo drainage- explained again to patient regarding need for Campo drainage in view of high fluid creatinine, oxybutynin started   Antihypertensive regimen adjusted- Hydralazine to 75 BID  Continue low target trough level for Tac , on Belatacept, next dose due on 5/8/25  Cardiology follow up  Will follow  I was present during and reviewed clinical and lab data as well as assessment and plan as documented . Please contact if any additional questions with any change in clinical condition or on availability of any additional information or reports.

## 2025-05-06 NOTE — DISCHARGE NOTE NURSING/CASE MANAGEMENT/SOCIAL WORK - PATIENT PORTAL LINK FT
You can access the FollowMyHealth Patient Portal offered by [facility] by registering at the following website: [email or web address]. By joining [employer]’s FollowMyHealth portal, you will also be able to view your health information using other applications (apps) compatible with our system.

## 2025-05-06 NOTE — PROGRESS NOTE ADULT - PROVIDER SPECIALTY LIST ADULT
Nephrology
Transplant ID
Transplant ID
Transplant Nephrology
Transplant Surgery
Transplant Surgery
Nephrology
Transplant Surgery
Nephrology
Nephrology
Transplant Surgery
Nephrology
Nephrology
Transplant Nephrology
Transplant Nephrology
Transplant Surgery
Transplant Nephrology
Transplant Nephrology

## 2025-05-06 NOTE — DISCHARGE NOTE NURSING/CASE MANAGEMENT/SOCIAL WORK - NSDCFUADDAPPT_GEN_ALL_CORE_FT
Follow up:    1. Follow up in Transplant Clinic _______  2. Follow up with Surgeon for removal of ureteral stent

## 2025-05-08 ENCOUNTER — APPOINTMENT (OUTPATIENT)
Dept: RHEUMATOLOGY | Facility: CLINIC | Age: 70
End: 2025-05-08
Payer: MEDICARE

## 2025-05-08 VITALS — HEART RATE: 53 BPM | DIASTOLIC BLOOD PRESSURE: 61 MMHG | OXYGEN SATURATION: 100 % | SYSTOLIC BLOOD PRESSURE: 143 MMHG

## 2025-05-08 VITALS
DIASTOLIC BLOOD PRESSURE: 67 MMHG | TEMPERATURE: 97.1 F | WEIGHT: 121.25 LBS | BODY MASS INDEX: 18.44 KG/M2 | OXYGEN SATURATION: 100 % | HEART RATE: 59 BPM | RESPIRATION RATE: 16 BRPM | SYSTOLIC BLOOD PRESSURE: 144 MMHG

## 2025-05-08 PROCEDURE — 96365 THER/PROPH/DIAG IV INF INIT: CPT

## 2025-05-08 RX ORDER — BELATACEPT 250 MG/1
250 INJECTION, POWDER, LYOPHILIZED, FOR SOLUTION INTRAVENOUS
Qty: 0 | Refills: 0 | Status: COMPLETED
Start: 2025-04-14

## 2025-05-15 ENCOUNTER — APPOINTMENT (OUTPATIENT)
Dept: NEPHROLOGY | Facility: CLINIC | Age: 70
End: 2025-05-15
Payer: MEDICARE

## 2025-05-15 VITALS
BODY MASS INDEX: 18.19 KG/M2 | DIASTOLIC BLOOD PRESSURE: 65 MMHG | SYSTOLIC BLOOD PRESSURE: 107 MMHG | HEART RATE: 77 BPM | HEIGHT: 68 IN | OXYGEN SATURATION: 100 % | TEMPERATURE: 98.1 F | RESPIRATION RATE: 16 BRPM | WEIGHT: 120 LBS

## 2025-05-15 DIAGNOSIS — E87.5 HYPERKALEMIA: ICD-10-CM

## 2025-05-15 LAB
ALBUMIN SERPL ELPH-MCNC: 4.3 G/DL
ALP BLD-CCNC: 93 U/L
ALT SERPL-CCNC: 17 U/L
ANION GAP SERPL CALC-SCNC: 15 MMOL/L
AST SERPL-CCNC: 19 U/L
BILIRUB SERPL-MCNC: 0.2 MG/DL
BUN SERPL-MCNC: 37 MG/DL
CALCIUM SERPL-MCNC: 9.8 MG/DL
CHLORIDE SERPL-SCNC: 107 MMOL/L
CO2 SERPL-SCNC: 17 MMOL/L
CREAT SERPL-MCNC: 1.56 MG/DL
EGFRCR SERPLBLD CKD-EPI 2021: 47 ML/MIN/1.73M2
GLUCOSE SERPL-MCNC: 118 MG/DL
HBV SURFACE AG SER QL: NONREACTIVE
HCT VFR BLD CALC: 35.5 %
HCV AB SER QL: NONREACTIVE
HCV S/CO RATIO: 0.08 S/CO
HGB BLD-MCNC: 10.8 G/DL
MAGNESIUM SERPL-MCNC: 2 MG/DL
MCHC RBC-ENTMCNC: 28.3 PG
MCHC RBC-ENTMCNC: 30.4 G/DL
MCV RBC AUTO: 93.2 FL
PHOSPHATE SERPL-MCNC: 2.8 MG/DL
PLATELET # BLD AUTO: 421 K/UL
POTASSIUM SERPL-SCNC: 5.4 MMOL/L
PROT SERPL-MCNC: 6.7 G/DL
RBC # BLD: 3.81 M/UL
RBC # FLD: 21.5 %
SODIUM SERPL-SCNC: 139 MMOL/L
TACROLIMUS SERPL-MCNC: 7.6 NG/ML
WBC # FLD AUTO: 8.21 K/UL

## 2025-05-15 PROCEDURE — 99215 OFFICE O/P EST HI 40 MIN: CPT

## 2025-05-15 RX ORDER — SODIUM BICARBONATE 325 MG/1
325 TABLET ORAL TWICE DAILY
Qty: 120 | Refills: 0 | Status: ACTIVE | COMMUNITY
Start: 2025-05-15 | End: 1900-01-01

## 2025-05-16 LAB
HCV RNA SERPL NAA+PROBE-LOG IU: NOT DETECTED LOGIU/ML
HEPB DNA PCR INT: NOT DETECTED
HEPB DNA PCR LOG: NOT DETECTED LOGIU/ML
HEPC RNA INTERP: NOT DETECTED
HIV1 RNA # SERPL NAA+PROBE: NORMAL
HIV1 RNA # SERPL NAA+PROBE: NORMAL COPIES/ML
HIV1+2 AB SPEC QL IA.RAPID: NONREACTIVE
VIRAL LOAD INTERP: NORMAL
VIRAL LOAD LOG: NORMAL LG COP/ML

## 2025-05-20 ENCOUNTER — APPOINTMENT (OUTPATIENT)
Dept: NEPHROLOGY | Facility: CLINIC | Age: 70
End: 2025-05-20
Payer: MEDICARE

## 2025-05-20 ENCOUNTER — APPOINTMENT (OUTPATIENT)
Dept: TRANSPLANT | Facility: CLINIC | Age: 70
End: 2025-05-20

## 2025-05-20 ENCOUNTER — LABORATORY RESULT (OUTPATIENT)
Age: 70
End: 2025-05-20

## 2025-05-20 VITALS
DIASTOLIC BLOOD PRESSURE: 69 MMHG | RESPIRATION RATE: 16 BRPM | HEART RATE: 56 BPM | SYSTOLIC BLOOD PRESSURE: 138 MMHG | BODY MASS INDEX: 17.37 KG/M2 | OXYGEN SATURATION: 100 % | TEMPERATURE: 98.1 F | HEIGHT: 68 IN | WEIGHT: 114.64 LBS

## 2025-05-20 DIAGNOSIS — E11.9 TYPE 2 DIABETES MELLITUS W/OUT COMPLICATIONS: ICD-10-CM

## 2025-05-20 DIAGNOSIS — T86.10 UNSPECIFIED COMPLICATION OF KIDNEY TRANSPLANT: ICD-10-CM

## 2025-05-20 DIAGNOSIS — I10 ESSENTIAL (PRIMARY) HYPERTENSION: ICD-10-CM

## 2025-05-20 DIAGNOSIS — Z79.60 LONG TERM (CURRENT) USE OF UNSPECIFIED IMMUNOMODULATORS AND IMMUNOSUPPRESSANTS: ICD-10-CM

## 2025-05-20 DIAGNOSIS — T86.19 OTHER COMPLICATION OF KIDNEY TRANSPLANT: ICD-10-CM

## 2025-05-20 DIAGNOSIS — Z94.0 KIDNEY TRANSPLANT STATUS: ICD-10-CM

## 2025-05-20 LAB
ALBUMIN SERPL ELPH-MCNC: 4.4 G/DL
ALP BLD-CCNC: 88 U/L
ALT SERPL-CCNC: 19 U/L
ANION GAP SERPL CALC-SCNC: 12 MMOL/L
APPEARANCE: CLEAR
AST SERPL-CCNC: 22 U/L
BILIRUB SERPL-MCNC: 0.3 MG/DL
BILIRUBIN URINE: NEGATIVE
BLOOD URINE: NEGATIVE
BUN SERPL-MCNC: 41 MG/DL
CALCIUM SERPL-MCNC: 9.2 MG/DL
CALCIUM SERPL-MCNC: 9.2 MG/DL
CHLORIDE SERPL-SCNC: 105 MMOL/L
CO2 SERPL-SCNC: 18 MMOL/L
COLOR: YELLOW
CREAT SERPL-MCNC: 1.33 MG/DL
EGFRCR SERPLBLD CKD-EPI 2021: 58 ML/MIN/1.73M2
GLUCOSE QUALITATIVE U: 250 MG/DL
GLUCOSE SERPL-MCNC: 115 MG/DL
KETONES URINE: NEGATIVE MG/DL
LEUKOCYTE ESTERASE URINE: NEGATIVE
MAGNESIUM SERPL-MCNC: 2 MG/DL
NITRITE URINE: NEGATIVE
PARATHYROID HORMONE INTACT: 97 PG/ML
PH URINE: 5.5
PHOSPHATE SERPL-MCNC: 2.9 MG/DL
POTASSIUM SERPL-SCNC: 5.9 MMOL/L
PROT SERPL-MCNC: 6.5 G/DL
PROTEIN URINE: NORMAL MG/DL
SODIUM SERPL-SCNC: 135 MMOL/L
SPECIFIC GRAVITY URINE: 1.01
TACROLIMUS SERPL-MCNC: 4.9 NG/ML
URATE SERPL-MCNC: 5.5 MG/DL
UROBILINOGEN URINE: 0.2 MG/DL

## 2025-05-20 PROCEDURE — 99215 OFFICE O/P EST HI 40 MIN: CPT

## 2025-05-21 LAB
BASOPHILS # BLD AUTO: 0.05 K/UL
BASOPHILS NFR BLD AUTO: 0.9 %
CREAT SPEC-SCNC: 51 MG/DL
CREAT/PROT UR: 0.4 RATIO
EOSINOPHIL # BLD AUTO: 0.13 K/UL
EOSINOPHIL NFR BLD AUTO: 2.2 %
HCT VFR BLD CALC: 33.5 %
HGB BLD-MCNC: 9.9 G/DL
IMM GRANULOCYTES NFR BLD AUTO: 0.2 %
LYMPHOCYTES # BLD AUTO: 0.37 K/UL
LYMPHOCYTES NFR BLD AUTO: 6.3 %
MAN DIFF?: NORMAL
MCHC RBC-ENTMCNC: 27.8 PG
MCHC RBC-ENTMCNC: 29.6 G/DL
MCV RBC AUTO: 94.1 FL
MONOCYTES # BLD AUTO: 0.76 K/UL
MONOCYTES NFR BLD AUTO: 13 %
NEUTROPHILS # BLD AUTO: 4.52 K/UL
NEUTROPHILS NFR BLD AUTO: 77.4 %
PLATELET # BLD AUTO: 331 K/UL
PROT UR-MCNC: 18 MG/DL
RBC # BLD: 3.56 M/UL
RBC # FLD: 19.8 %
WBC # FLD AUTO: 5.84 K/UL

## 2025-05-22 ENCOUNTER — APPOINTMENT (OUTPATIENT)
Dept: NEPHROLOGY | Facility: CLINIC | Age: 70
End: 2025-05-22

## 2025-05-22 LAB — BKV DNA SPEC QL NAA+PROBE: NOT DETECTED IU/ML

## 2025-05-23 RX ORDER — CARVEDILOL 6.25 MG/1
6.25 TABLET, FILM COATED ORAL TWICE DAILY
Qty: 60 | Refills: 0 | Status: ACTIVE | COMMUNITY
Start: 2025-05-23 | End: 1900-01-01

## 2025-06-03 ENCOUNTER — LABORATORY RESULT (OUTPATIENT)
Age: 70
End: 2025-06-03

## 2025-06-03 ENCOUNTER — APPOINTMENT (OUTPATIENT)
Dept: NEPHROLOGY | Facility: CLINIC | Age: 70
End: 2025-06-03
Payer: MEDICARE

## 2025-06-03 DIAGNOSIS — T86.10 UNSPECIFIED COMPLICATION OF KIDNEY TRANSPLANT: ICD-10-CM

## 2025-06-03 DIAGNOSIS — Z94.0 KIDNEY TRANSPLANT STATUS: ICD-10-CM

## 2025-06-03 DIAGNOSIS — Z79.60 LONG TERM (CURRENT) USE OF UNSPECIFIED IMMUNOMODULATORS AND IMMUNOSUPPRESSANTS: ICD-10-CM

## 2025-06-03 DIAGNOSIS — T86.19 OTHER COMPLICATION OF KIDNEY TRANSPLANT: ICD-10-CM

## 2025-06-03 DIAGNOSIS — I10 ESSENTIAL (PRIMARY) HYPERTENSION: ICD-10-CM

## 2025-06-03 DIAGNOSIS — E87.5 HYPERKALEMIA: ICD-10-CM

## 2025-06-03 PROCEDURE — 99215 OFFICE O/P EST HI 40 MIN: CPT

## 2025-06-04 LAB
ALBUMIN SERPL ELPH-MCNC: 4.5 G/DL
ALP BLD-CCNC: 92 U/L
ALT SERPL-CCNC: 21 U/L
ANION GAP SERPL CALC-SCNC: 16 MMOL/L
APPEARANCE: CLEAR
AST SERPL-CCNC: 24 U/L
BASOPHILS # BLD AUTO: 0.09 K/UL
BASOPHILS NFR BLD AUTO: 1.1 %
BILIRUB SERPL-MCNC: 0.2 MG/DL
BILIRUBIN URINE: NEGATIVE
BLOOD URINE: NEGATIVE
BUN SERPL-MCNC: 33 MG/DL
CALCIUM SERPL-MCNC: 9.5 MG/DL
CHLORIDE SERPL-SCNC: 104 MMOL/L
CO2 SERPL-SCNC: 18 MMOL/L
COLOR: YELLOW
CREAT SERPL-MCNC: 1.07 MG/DL
CREAT SPEC-SCNC: 82 MG/DL
CREAT/PROT UR: 0.2 RATIO
EGFRCR SERPLBLD CKD-EPI 2021: 75 ML/MIN/1.73M2
EOSINOPHIL # BLD AUTO: 0.22 K/UL
EOSINOPHIL NFR BLD AUTO: 2.6 %
GLUCOSE QUALITATIVE U: NEGATIVE MG/DL
GLUCOSE SERPL-MCNC: 96 MG/DL
HCT VFR BLD CALC: 37.3 %
HGB BLD-MCNC: 11.4 G/DL
IMM GRANULOCYTES NFR BLD AUTO: 1.7 %
KETONES URINE: NEGATIVE MG/DL
LEUKOCYTE ESTERASE URINE: NEGATIVE
LYMPHOCYTES # BLD AUTO: 0.44 K/UL
LYMPHOCYTES NFR BLD AUTO: 5.3 %
MAGNESIUM SERPL-MCNC: 1.9 MG/DL
MAN DIFF?: NORMAL
MCHC RBC-ENTMCNC: 28.5 PG
MCHC RBC-ENTMCNC: 30.6 G/DL
MCV RBC AUTO: 93.3 FL
MONOCYTES # BLD AUTO: 0.8 K/UL
MONOCYTES NFR BLD AUTO: 9.6 %
NEUTROPHILS # BLD AUTO: 6.67 K/UL
NEUTROPHILS NFR BLD AUTO: 79.7 %
NITRITE URINE: NEGATIVE
PH URINE: 5.5
PHOSPHATE SERPL-MCNC: 2.4 MG/DL
PLATELET # BLD AUTO: 307 K/UL
POTASSIUM SERPL-SCNC: 5.2 MMOL/L
PROT SERPL-MCNC: 7 G/DL
PROT UR-MCNC: 20 MG/DL
PROTEIN URINE: 30 MG/DL
RBC # BLD: 4 M/UL
RBC # FLD: 18.1 %
SODIUM SERPL-SCNC: 138 MMOL/L
SPECIFIC GRAVITY URINE: 1.01
TACROLIMUS SERPL-MCNC: 5.8 NG/ML
UROBILINOGEN URINE: 0.2 MG/DL
WBC # FLD AUTO: 8.36 K/UL

## 2025-06-06 ENCOUNTER — APPOINTMENT (OUTPATIENT)
Dept: RHEUMATOLOGY | Facility: CLINIC | Age: 70
End: 2025-06-06
Payer: MEDICARE

## 2025-06-06 VITALS
DIASTOLIC BLOOD PRESSURE: 75 MMHG | SYSTOLIC BLOOD PRESSURE: 115 MMHG | HEART RATE: 58 BPM | OXYGEN SATURATION: 99 % | RESPIRATION RATE: 16 BRPM

## 2025-06-06 VITALS
DIASTOLIC BLOOD PRESSURE: 83 MMHG | RESPIRATION RATE: 16 BRPM | HEART RATE: 57 BPM | TEMPERATURE: 98 F | OXYGEN SATURATION: 97 % | SYSTOLIC BLOOD PRESSURE: 120 MMHG

## 2025-06-06 PROCEDURE — 96365 THER/PROPH/DIAG IV INF INIT: CPT

## 2025-06-06 RX ORDER — HYDRALAZINE HYDROCHLORIDE 50 MG/1
50 TABLET ORAL TWICE DAILY
Qty: 90 | Refills: 11 | Status: DISCONTINUED | COMMUNITY
Start: 2025-06-06 | End: 2025-06-06

## 2025-06-06 RX ORDER — HYDRALAZINE HYDROCHLORIDE 25 MG/1
25 TABLET ORAL
Qty: 60 | Refills: 11 | Status: ACTIVE | COMMUNITY
Start: 2025-06-06 | End: 1900-01-01

## 2025-06-06 RX ORDER — BELATACEPT 250 MG/1
250 INJECTION, POWDER, LYOPHILIZED, FOR SOLUTION INTRAVENOUS
Qty: 0 | Refills: 0 | Status: COMPLETED
Start: 2025-04-14

## 2025-06-06 RX ORDER — HYDRALAZINE HYDROCHLORIDE 50 MG/1
50 TABLET ORAL TWICE DAILY
Qty: 60 | Refills: 11 | Status: ACTIVE | COMMUNITY
Start: 2025-06-06 | End: 1900-01-01

## 2025-06-19 ENCOUNTER — APPOINTMENT (OUTPATIENT)
Dept: TRANSPLANT | Facility: HOSPITAL | Age: 70
End: 2025-06-19

## 2025-06-19 ENCOUNTER — OUTPATIENT (OUTPATIENT)
Dept: OUTPATIENT SERVICES | Facility: HOSPITAL | Age: 70
LOS: 1 days | End: 2025-06-19
Payer: MEDICARE

## 2025-06-19 ENCOUNTER — TRANSCRIPTION ENCOUNTER (OUTPATIENT)
Age: 70
End: 2025-06-19

## 2025-06-19 VITALS
HEART RATE: 76 BPM | TEMPERATURE: 97 F | SYSTOLIC BLOOD PRESSURE: 113 MMHG | OXYGEN SATURATION: 99 % | DIASTOLIC BLOOD PRESSURE: 58 MMHG | HEIGHT: 68 IN | WEIGHT: 115.08 LBS | RESPIRATION RATE: 18 BRPM

## 2025-06-19 VITALS
HEART RATE: 55 BPM | DIASTOLIC BLOOD PRESSURE: 82 MMHG | SYSTOLIC BLOOD PRESSURE: 184 MMHG | TEMPERATURE: 98 F | RESPIRATION RATE: 16 BRPM | OXYGEN SATURATION: 100 %

## 2025-06-19 DIAGNOSIS — Z94.0 KIDNEY TRANSPLANT STATUS: ICD-10-CM

## 2025-06-19 DIAGNOSIS — Z95.5 PRESENCE OF CORONARY ANGIOPLASTY IMPLANT AND GRAFT: Chronic | ICD-10-CM

## 2025-06-19 DIAGNOSIS — Z98.890 OTHER SPECIFIED POSTPROCEDURAL STATES: Chronic | ICD-10-CM

## 2025-06-19 LAB
GLUCOSE BLDC GLUCOMTR-MCNC: 178 MG/DL — HIGH (ref 70–99)
GLUCOSE BLDC GLUCOMTR-MCNC: 194 MG/DL — HIGH (ref 70–99)

## 2025-06-19 PROCEDURE — 52310 CYSTOSCOPY AND TREATMENT: CPT

## 2025-06-19 PROCEDURE — C1747: CPT

## 2025-06-19 PROCEDURE — 52310 CYSTOSCOPY AND TREATMENT: CPT | Mod: 58,GC

## 2025-06-19 PROCEDURE — 82962 GLUCOSE BLOOD TEST: CPT

## 2025-06-19 DEVICE — ASCOPE 4 CYSTOSCOPE FLEX 6X390MM: Type: IMPLANTABLE DEVICE | Site: RIGHT | Status: FUNCTIONAL

## 2025-06-19 RX ORDER — SODIUM CHLORIDE 9 G/1000ML
1000 INJECTION, SOLUTION INTRAVENOUS
Refills: 0 | Status: ACTIVE | OUTPATIENT
Start: 2025-06-19 | End: 2026-05-18

## 2025-06-19 RX ORDER — OXYCODONE HYDROCHLORIDE 30 MG/1
10 TABLET ORAL ONCE
Refills: 0 | Status: DISCONTINUED | OUTPATIENT
Start: 2025-06-19 | End: 2025-06-19

## 2025-06-19 RX ADMIN — Medication 50 MILLIGRAM(S): at 10:30

## 2025-06-19 NOTE — ASU DISCHARGE PLAN (ADULT/PEDIATRIC) - NS MD DC FALL RISK RISK
For information on Fall & Injury Prevention, visit: [email or web address] OR  [email or web address] OR  https://www.cdc.gov/steadi/patient.html

## 2025-06-19 NOTE — PRE-ANESTHESIA EVALUATION ADULT - NSPREOPDXFT_GEN_ALL_CORE
Next Office Visit: 11/19/2019  Last seen: 09/06/2019  Last refilled: 08/27/2019  Medication:   gabapentin (NEURONTIN) 400 MG capsule   10/22/2018     Sig: Taking 1 tablet in am and 2 tablets at bedtime                  s/p kidney transplant

## 2025-06-19 NOTE — PROVIDER CONTACT NOTE (CHANGE IN STATUS NOTIFICATION) - ASSESSMENT
Pt systolic pressures over 200. Given 50mg PO Hydralazine 45 minutes ago. Pt now 184/82. Pt stated he did not take any medication today including blood pressure medication.

## 2025-06-19 NOTE — ASU PATIENT PROFILE, ADULT - FALL HARM RISK - RISK INTERVENTIONS

## 2025-06-19 NOTE — ASU DISCHARGE PLAN (ADULT/PEDIATRIC) - FINANCIAL ASSISTANCE
[facility] provides services at a reduced cost to those who are determined to be eligible through [employer]’s financial assistance program. Information regarding [employer]’s financial assistance program can be found by going to [email or web address] or by calling [phone].

## 2025-06-19 NOTE — ASU PREOP CHECKLIST - ASSESSMENT, HISTORY & PHYSICAL COMPLETED AND ON MEDICAL RECORD
Photo Preface (Leave Blank If You Do Not Want): Pictures specific to the patient's diagnosis were obtained at today's visit. Detail Level: Zone done

## 2025-06-19 NOTE — H&P ADULT - ASSESSMENT
68 y/o M with PMHx of HLD, CAD s/p LAD PCI 7/24, HFpEF , HTN, hypothyroid, type 2 DM, h/o cva with no residual deficits, persistent right sided pleural effusion, and hx of hemorrhagic pericardial effusion (cytopathology negative), ESRD on HD MWF (recently converted from PD in 1/2025) via permacath placed on 1/2025, now s/p DDRT on 4/8/25. Pt now presents for cystoscopy and removal of urethral stent.    PLAN:  - OR today    Transplant surgery

## 2025-06-24 ENCOUNTER — APPOINTMENT (OUTPATIENT)
Dept: NEPHROLOGY | Facility: CLINIC | Age: 70
End: 2025-06-24
Payer: MEDICARE

## 2025-06-24 VITALS
RESPIRATION RATE: 16 BRPM | TEMPERATURE: 98 F | HEIGHT: 68 IN | HEART RATE: 66 BPM | BODY MASS INDEX: 18.64 KG/M2 | WEIGHT: 123 LBS | SYSTOLIC BLOOD PRESSURE: 171 MMHG | OXYGEN SATURATION: 99 % | DIASTOLIC BLOOD PRESSURE: 68 MMHG

## 2025-06-24 PROCEDURE — 99214 OFFICE O/P EST MOD 30 MIN: CPT

## 2025-06-25 ENCOUNTER — NON-APPOINTMENT (OUTPATIENT)
Age: 70
End: 2025-06-25

## 2025-06-25 LAB
ALBUMIN SERPL ELPH-MCNC: 4.2 G/DL
ALP BLD-CCNC: 95 U/L
ALT SERPL-CCNC: 20 U/L
ANION GAP SERPL CALC-SCNC: 14 MMOL/L
APPEARANCE: CLEAR
AST SERPL-CCNC: 17 U/L
BACTERIA: NEGATIVE /HPF
BASOPHILS # BLD AUTO: 0.03 K/UL
BASOPHILS NFR BLD AUTO: 0.3 %
BILIRUB SERPL-MCNC: 0.2 MG/DL
BILIRUBIN URINE: NEGATIVE
BKV DNA SPEC QL NAA+PROBE: NOT DETECTED IU/ML
BLOOD URINE: NEGATIVE
BUN SERPL-MCNC: 32 MG/DL
CALCIUM SERPL-MCNC: 9.6 MG/DL
CAST: 0 /LPF
CHLORIDE SERPL-SCNC: 104 MMOL/L
CO2 SERPL-SCNC: 21 MMOL/L
COLOR: YELLOW
CREAT SERPL-MCNC: 1.11 MG/DL
CREAT SPEC-SCNC: 112 MG/DL
CREAT/PROT UR: 0.4 RATIO
EGFRCR SERPLBLD CKD-EPI 2021: 71 ML/MIN/1.73M2
EOSINOPHIL # BLD AUTO: 0.19 K/UL
EOSINOPHIL NFR BLD AUTO: 1.8 %
EPITHELIAL CELLS: 1 /HPF
GLUCOSE QUALITATIVE U: NEGATIVE MG/DL
GLUCOSE SERPL-MCNC: 92 MG/DL
HCT VFR BLD CALC: 31.7 %
HGB BLD-MCNC: 10 G/DL
IMM GRANULOCYTES NFR BLD AUTO: 1.1 %
KETONES URINE: NEGATIVE MG/DL
LEUKOCYTE ESTERASE URINE: ABNORMAL
LYMPHOCYTES # BLD AUTO: 0.54 K/UL
LYMPHOCYTES NFR BLD AUTO: 5.1 %
MAGNESIUM SERPL-MCNC: 1.5 MG/DL
MAN DIFF?: NORMAL
MCHC RBC-ENTMCNC: 29.3 PG
MCHC RBC-ENTMCNC: 31.5 G/DL
MCV RBC AUTO: 93 FL
MICROSCOPIC-UA: NORMAL
MONOCYTES # BLD AUTO: 1.38 K/UL
MONOCYTES NFR BLD AUTO: 13 %
NEUTROPHILS # BLD AUTO: 8.38 K/UL
NEUTROPHILS NFR BLD AUTO: 78.7 %
NITRITE URINE: NEGATIVE
PH URINE: 6
PHOSPHATE SERPL-MCNC: 2.9 MG/DL
PLATELET # BLD AUTO: 309 K/UL
POTASSIUM SERPL-SCNC: 4.6 MMOL/L
PROT SERPL-MCNC: 6.5 G/DL
PROT UR-MCNC: 42 MG/DL
PROTEIN URINE: 30 MG/DL
RBC # BLD: 3.41 M/UL
RBC # FLD: 17.2 %
RED BLOOD CELLS URINE: 1 /HPF
SODIUM SERPL-SCNC: 139 MMOL/L
SPECIFIC GRAVITY URINE: 1.02
TACROLIMUS SERPL-MCNC: 4.4 NG/ML
UROBILINOGEN URINE: 0.2 MG/DL
WBC # FLD AUTO: 10.64 K/UL
WHITE BLOOD CELLS URINE: 8 /HPF

## 2025-06-27 NOTE — PATIENT PROFILE ADULT - NSPROPOAURINARYCATHETER_GEN_A_NUR
[FreeTextEntry1] : 58M w no PMH presents for prostate cancer screening. We agreed to start w PSA check.  -OSH labs -PSA -RTC 1M no

## 2025-06-30 ENCOUNTER — APPOINTMENT (OUTPATIENT)
Dept: RHEUMATOLOGY | Facility: CLINIC | Age: 70
End: 2025-06-30
Payer: MEDICARE

## 2025-06-30 VITALS — HEART RATE: 60 BPM | DIASTOLIC BLOOD PRESSURE: 65 MMHG | SYSTOLIC BLOOD PRESSURE: 135 MMHG | OXYGEN SATURATION: 99 %

## 2025-06-30 VITALS
HEART RATE: 64 BPM | DIASTOLIC BLOOD PRESSURE: 63 MMHG | SYSTOLIC BLOOD PRESSURE: 131 MMHG | RESPIRATION RATE: 16 BRPM | OXYGEN SATURATION: 98 % | TEMPERATURE: 97.7 F

## 2025-06-30 PROCEDURE — 96365 THER/PROPH/DIAG IV INF INIT: CPT

## 2025-06-30 RX ORDER — BELATACEPT 250 MG/1
250 INJECTION, POWDER, LYOPHILIZED, FOR SOLUTION INTRAVENOUS
Qty: 0 | Refills: 0 | Status: COMPLETED
Start: 2025-04-14

## 2025-07-10 ENCOUNTER — LABORATORY RESULT (OUTPATIENT)
Age: 70
End: 2025-07-10

## 2025-07-10 ENCOUNTER — APPOINTMENT (OUTPATIENT)
Dept: NEPHROLOGY | Facility: CLINIC | Age: 70
End: 2025-07-10
Payer: MEDICARE

## 2025-07-10 ENCOUNTER — NON-APPOINTMENT (OUTPATIENT)
Age: 70
End: 2025-07-10

## 2025-07-10 VITALS
TEMPERATURE: 97.7 F | OXYGEN SATURATION: 99 % | DIASTOLIC BLOOD PRESSURE: 69 MMHG | HEART RATE: 65 BPM | HEIGHT: 68 IN | WEIGHT: 126 LBS | RESPIRATION RATE: 16 BRPM | SYSTOLIC BLOOD PRESSURE: 137 MMHG | BODY MASS INDEX: 19.1 KG/M2

## 2025-07-10 PROCEDURE — 99214 OFFICE O/P EST MOD 30 MIN: CPT

## 2025-07-11 LAB
CMV DNA SPEC QL NAA+PROBE: 39 IU/ML
CMVPCR LOG: 1.59 LOG10IU/ML

## 2025-07-12 ENCOUNTER — NON-APPOINTMENT (OUTPATIENT)
Age: 70
End: 2025-07-12

## 2025-07-16 ENCOUNTER — APPOINTMENT (OUTPATIENT)
Dept: TRANSPLANT | Facility: CLINIC | Age: 70
End: 2025-07-16
Payer: MEDICARE

## 2025-07-16 VITALS
OXYGEN SATURATION: 100 % | WEIGHT: 126 LBS | HEART RATE: 64 BPM | SYSTOLIC BLOOD PRESSURE: 95 MMHG | DIASTOLIC BLOOD PRESSURE: 50 MMHG | TEMPERATURE: 98.1 F | RESPIRATION RATE: 16 BRPM | BODY MASS INDEX: 19.1 KG/M2 | HEIGHT: 68 IN

## 2025-07-16 PROCEDURE — 99215 OFFICE O/P EST HI 40 MIN: CPT

## 2025-07-18 LAB
ALBUMIN SERPL ELPH-MCNC: 4.3 G/DL
ALP BLD-CCNC: 89 U/L
ALT SERPL-CCNC: 23 U/L
ANION GAP SERPL CALC-SCNC: 14 MMOL/L
APPEARANCE: CLEAR
AST SERPL-CCNC: 24 U/L
BACTERIA: NEGATIVE /HPF
BASOPHILS # BLD AUTO: 0 K/UL
BASOPHILS NFR BLD AUTO: 0 %
BILIRUB SERPL-MCNC: 0.3 MG/DL
BILIRUBIN URINE: NEGATIVE
BKV DNA SPEC QL NAA+PROBE: NOT DETECTED IU/ML
BLOOD URINE: NEGATIVE
BUN SERPL-MCNC: 30 MG/DL
CALCIUM SERPL-MCNC: 9.8 MG/DL
CAST: 2 /LPF
CHLORIDE SERPL-SCNC: 105 MMOL/L
CO2 SERPL-SCNC: 21 MMOL/L
COLOR: YELLOW
CREAT SERPL-MCNC: 1.11 MG/DL
CREAT SPEC-SCNC: 127 MG/DL
CREAT/PROT UR: 0.3 RATIO
EGFRCR SERPLBLD CKD-EPI 2021: 71 ML/MIN/1.73M2
EOSINOPHIL # BLD AUTO: 0.14 K/UL
EOSINOPHIL NFR BLD AUTO: 1.8 %
EPITHELIAL CELLS: 1 /HPF
GLUCOSE QUALITATIVE U: NEGATIVE MG/DL
GLUCOSE SERPL-MCNC: 145 MG/DL
HCT VFR BLD CALC: 33.7 %
HGB BLD-MCNC: 10.4 G/DL
KETONES URINE: NEGATIVE MG/DL
LEUKOCYTE ESTERASE URINE: NEGATIVE
LYMPHOCYTES # BLD AUTO: 0.46 K/UL
LYMPHOCYTES NFR BLD AUTO: 6.1 %
MAGNESIUM SERPL-MCNC: 1.8 MG/DL
MAN DIFF?: NORMAL
MCHC RBC-ENTMCNC: 28.9 PG
MCHC RBC-ENTMCNC: 30.9 G/DL
MCV RBC AUTO: 93.6 FL
MICROSCOPIC-UA: NORMAL
MONOCYTES # BLD AUTO: 0.59 K/UL
MONOCYTES NFR BLD AUTO: 7.9 %
NEUTROPHILS # BLD AUTO: 6.33 K/UL
NEUTROPHILS NFR BLD AUTO: 84.2 %
NITRITE URINE: NEGATIVE
PH URINE: 5.5
PHOSPHATE SERPL-MCNC: 3.3 MG/DL
PLATELET # BLD AUTO: 346 K/UL
POTASSIUM SERPL-SCNC: 5 MMOL/L
PROT SERPL-MCNC: 7.1 G/DL
PROT UR-MCNC: 38 MG/DL
PROTEIN URINE: 30 MG/DL
RBC # BLD: 3.6 M/UL
RBC # FLD: 15.9 %
RED BLOOD CELLS URINE: 2 /HPF
SODIUM SERPL-SCNC: 140 MMOL/L
SPECIFIC GRAVITY URINE: 1.02
TACROLIMUS SERPL-MCNC: 5.4 NG/ML
UROBILINOGEN URINE: 0.2 MG/DL
WBC # FLD AUTO: 7.52 K/UL
WHITE BLOOD CELLS URINE: 2 /HPF

## 2025-07-21 ENCOUNTER — NON-APPOINTMENT (OUTPATIENT)
Age: 70
End: 2025-07-21

## 2025-07-28 ENCOUNTER — APPOINTMENT (OUTPATIENT)
Dept: RHEUMATOLOGY | Facility: CLINIC | Age: 70
End: 2025-07-28
Payer: MEDICARE

## 2025-07-28 VITALS
TEMPERATURE: 97.8 F | RESPIRATION RATE: 16 BRPM | OXYGEN SATURATION: 99 % | DIASTOLIC BLOOD PRESSURE: 79 MMHG | HEART RATE: 67 BPM | SYSTOLIC BLOOD PRESSURE: 140 MMHG

## 2025-07-28 VITALS — DIASTOLIC BLOOD PRESSURE: 67 MMHG | SYSTOLIC BLOOD PRESSURE: 131 MMHG | HEART RATE: 66 BPM

## 2025-07-28 PROCEDURE — 96365 THER/PROPH/DIAG IV INF INIT: CPT

## 2025-07-28 RX ORDER — BELATACEPT 250 MG/1
250 INJECTION, POWDER, LYOPHILIZED, FOR SOLUTION INTRAVENOUS
Qty: 0 | Refills: 0 | Status: COMPLETED
Start: 2025-04-14

## 2025-07-29 ENCOUNTER — NON-APPOINTMENT (OUTPATIENT)
Age: 70
End: 2025-07-29

## 2025-07-30 ENCOUNTER — APPOINTMENT (OUTPATIENT)
Dept: TRANSPLANT | Facility: CLINIC | Age: 70
End: 2025-07-30

## 2025-07-31 ENCOUNTER — NON-APPOINTMENT (OUTPATIENT)
Age: 70
End: 2025-07-31

## 2025-07-31 LAB
ALBUMIN SERPL ELPH-MCNC: 4.8 G/DL
ALP BLD-CCNC: 84 U/L
ALT SERPL-CCNC: 22 U/L
ANION GAP SERPL CALC-SCNC: 16 MMOL/L
APPEARANCE: CLEAR
AST SERPL-CCNC: 21 U/L
BACTERIA: NEGATIVE /HPF
BASOPHILS # BLD AUTO: 0.05 K/UL
BASOPHILS NFR BLD AUTO: 0.8 %
BILIRUB SERPL-MCNC: 0.2 MG/DL
BILIRUBIN URINE: NEGATIVE
BKV DNA SPEC QL NAA+PROBE: NOT DETECTED IU/ML
BLOOD URINE: NEGATIVE
BUN SERPL-MCNC: 32 MG/DL
CALCIUM SERPL-MCNC: 10 MG/DL
CAST: 2 /LPF
CHLORIDE SERPL-SCNC: 107 MMOL/L
CMV DNA SPEC QL NAA+PROBE: NOT DETECTED IU/ML
CMVPCR LOG: NOT DETECTED LOG10IU/ML
CO2 SERPL-SCNC: 19 MMOL/L
COLOR: YELLOW
CREAT SERPL-MCNC: 1.25 MG/DL
CREAT SPEC-SCNC: 115 MG/DL
CREAT/PROT UR: 0.3 RATIO
EGFRCR SERPLBLD CKD-EPI 2021: 62 ML/MIN/1.73M2
EOSINOPHIL # BLD AUTO: 0.21 K/UL
EOSINOPHIL NFR BLD AUTO: 3.2 %
EPITHELIAL CELLS: 0 /HPF
GLUCOSE QUALITATIVE U: 100 MG/DL
GLUCOSE SERPL-MCNC: 88 MG/DL
HCT VFR BLD CALC: 34.8 %
HGB BLD-MCNC: 10.8 G/DL
IMM GRANULOCYTES NFR BLD AUTO: 1.5 %
KETONES URINE: NEGATIVE MG/DL
LEUKOCYTE ESTERASE URINE: ABNORMAL
LYMPHOCYTES # BLD AUTO: 0.65 K/UL
LYMPHOCYTES NFR BLD AUTO: 10 %
MAGNESIUM SERPL-MCNC: 1.9 MG/DL
MAN DIFF?: NORMAL
MCHC RBC-ENTMCNC: 29.7 PG
MCHC RBC-ENTMCNC: 31 G/DL
MCV RBC AUTO: 95.6 FL
MICROSCOPIC-UA: NORMAL
MONOCYTES # BLD AUTO: 0.67 K/UL
MONOCYTES NFR BLD AUTO: 10.3 %
NEUTROPHILS # BLD AUTO: 4.83 K/UL
NEUTROPHILS NFR BLD AUTO: 74.2 %
NITRITE URINE: NEGATIVE
PH URINE: 5.5
PHOSPHATE SERPL-MCNC: 3.4 MG/DL
PLATELET # BLD AUTO: 235 K/UL
POTASSIUM SERPL-SCNC: 5 MMOL/L
PROT SERPL-MCNC: 7.3 G/DL
PROT UR-MCNC: 29 MG/DL
PROTEIN URINE: 30 MG/DL
RBC # BLD: 3.64 M/UL
RBC # FLD: 15.4 %
RED BLOOD CELLS URINE: 1 /HPF
SODIUM SERPL-SCNC: 141 MMOL/L
SPECIFIC GRAVITY URINE: 1.02
TACROLIMUS SERPL-MCNC: 6.1 NG/ML
UROBILINOGEN URINE: 0.2 MG/DL
WBC # FLD AUTO: 6.51 K/UL
WHITE BLOOD CELLS URINE: 1 /HPF

## 2025-08-12 ENCOUNTER — APPOINTMENT (OUTPATIENT)
Dept: NEPHROLOGY | Facility: CLINIC | Age: 70
End: 2025-08-12
Payer: MEDICARE

## 2025-08-12 ENCOUNTER — APPOINTMENT (OUTPATIENT)
Dept: TRANSPLANT | Facility: CLINIC | Age: 70
End: 2025-08-12

## 2025-08-12 VITALS
DIASTOLIC BLOOD PRESSURE: 64 MMHG | SYSTOLIC BLOOD PRESSURE: 128 MMHG | TEMPERATURE: 97.8 F | BODY MASS INDEX: 20 KG/M2 | OXYGEN SATURATION: 100 % | WEIGHT: 132 LBS | HEIGHT: 68 IN | HEART RATE: 63 BPM

## 2025-08-12 DIAGNOSIS — Z79.60 LONG TERM (CURRENT) USE OF UNSPECIFIED IMMUNOMODULATORS AND IMMUNOSUPPRESSANTS: ICD-10-CM

## 2025-08-12 DIAGNOSIS — R07.89 OTHER CHEST PAIN: ICD-10-CM

## 2025-08-12 DIAGNOSIS — E11.65 TYPE 2 DIABETES MELLITUS WITH HYPERGLYCEMIA: ICD-10-CM

## 2025-08-12 PROCEDURE — 99214 OFFICE O/P EST MOD 30 MIN: CPT

## 2025-08-13 ENCOUNTER — APPOINTMENT (OUTPATIENT)
Dept: CARDIOLOGY | Facility: CLINIC | Age: 70
End: 2025-08-13
Payer: MEDICARE

## 2025-08-13 ENCOUNTER — NON-APPOINTMENT (OUTPATIENT)
Age: 70
End: 2025-08-13

## 2025-08-13 VITALS
OXYGEN SATURATION: 98 % | HEART RATE: 60 BPM | BODY MASS INDEX: 20 KG/M2 | SYSTOLIC BLOOD PRESSURE: 112 MMHG | HEIGHT: 68 IN | WEIGHT: 132 LBS | DIASTOLIC BLOOD PRESSURE: 68 MMHG

## 2025-08-13 DIAGNOSIS — Z01.818 ENCOUNTER FOR OTHER PREPROCEDURAL EXAMINATION: ICD-10-CM

## 2025-08-13 DIAGNOSIS — I44.0 ATRIOVENTRICULAR BLOCK, FIRST DEGREE: ICD-10-CM

## 2025-08-13 DIAGNOSIS — I10 ESSENTIAL (PRIMARY) HYPERTENSION: ICD-10-CM

## 2025-08-13 DIAGNOSIS — I50.31 ACUTE DIASTOLIC (CONGESTIVE) HEART FAILURE: ICD-10-CM

## 2025-08-13 DIAGNOSIS — I25.10 ATHEROSCLEROTIC HEART DISEASE OF NATIVE CORONARY ARTERY W/OUT ANGINA PECTORIS: ICD-10-CM

## 2025-08-13 LAB
25(OH)D3 SERPL-MCNC: 27 NG/ML
ALBUMIN SERPL ELPH-MCNC: 4.4 G/DL
ALP BLD-CCNC: 73 U/L
ALT SERPL-CCNC: 20 U/L
ANION GAP SERPL CALC-SCNC: 16 MMOL/L
APPEARANCE: CLEAR
AST SERPL-CCNC: 18 U/L
BACTERIA: NEGATIVE /HPF
BASOPHILS # BLD AUTO: 0.02 K/UL
BASOPHILS NFR BLD AUTO: 0.3 %
BILIRUB SERPL-MCNC: 0.3 MG/DL
BILIRUBIN URINE: NEGATIVE
BKV DNA SPEC QL NAA+PROBE: NOT DETECTED IU/ML
BLOOD URINE: NEGATIVE
BUN SERPL-MCNC: 40 MG/DL
CALCIUM SERPL-MCNC: 9.7 MG/DL
CALCIUM SERPL-MCNC: 9.7 MG/DL
CAST: 2 /LPF
CHLORIDE SERPL-SCNC: 106 MMOL/L
CHOLEST SERPL-MCNC: 144 MG/DL
CMV DNA SPEC QL NAA+PROBE: NOT DETECTED IU/ML
CMVPCR LOG: NOT DETECTED LOG10IU/ML
CO2 SERPL-SCNC: 18 MMOL/L
COLOR: YELLOW
CREAT SERPL-MCNC: 1.21 MG/DL
CREAT SPEC-SCNC: 95 MG/DL
CREAT/PROT UR: 0.3 RATIO
EGFRCR SERPLBLD CKD-EPI 2021: 64 ML/MIN/1.73M2
EOSINOPHIL # BLD AUTO: 0.19 K/UL
EOSINOPHIL NFR BLD AUTO: 2.9 %
EPITHELIAL CELLS: 1 /HPF
ESTIMATED AVERAGE GLUCOSE: 169 MG/DL
GLUCOSE QUALITATIVE U: 100 MG/DL
GLUCOSE SERPL-MCNC: 208 MG/DL
HBA1C MFR BLD HPLC: 7.5 %
HCT VFR BLD CALC: 31.5 %
HDLC SERPL-MCNC: 55 MG/DL
HGB BLD-MCNC: 9.8 G/DL
IMM GRANULOCYTES NFR BLD AUTO: 3.6 %
KETONES URINE: NEGATIVE MG/DL
LDH SERPL-CCNC: 172 U/L
LDLC SERPL-MCNC: 67 MG/DL
LEUKOCYTE ESTERASE URINE: NEGATIVE
LYMPHOCYTES # BLD AUTO: 0.52 K/UL
LYMPHOCYTES NFR BLD AUTO: 8.1 %
MAGNESIUM SERPL-MCNC: 1.6 MG/DL
MAN DIFF?: NORMAL
MCHC RBC-ENTMCNC: 29.2 PG
MCHC RBC-ENTMCNC: 31.1 G/DL
MCV RBC AUTO: 93.8 FL
MICROSCOPIC-UA: NORMAL
MONOCYTES # BLD AUTO: 0.76 K/UL
MONOCYTES NFR BLD AUTO: 11.8 %
NEUTROPHILS # BLD AUTO: 4.73 K/UL
NEUTROPHILS NFR BLD AUTO: 73.3 %
NITRITE URINE: NEGATIVE
NONHDLC SERPL-MCNC: 90 MG/DL
PARATHYROID HORMONE INTACT: 64 PG/ML
PH URINE: 5.5
PHOSPHATE SERPL-MCNC: 3.3 MG/DL
PLATELET # BLD AUTO: 293 K/UL
POTASSIUM SERPL-SCNC: 5.3 MMOL/L
PROT SERPL-MCNC: 6.5 G/DL
PROT UR-MCNC: 26 MG/DL
PROTEIN URINE: 30 MG/DL
RBC # BLD: 3.36 M/UL
RBC # FLD: 16 %
RED BLOOD CELLS URINE: 1 /HPF
SODIUM SERPL-SCNC: 141 MMOL/L
SPECIFIC GRAVITY URINE: 1.02
TACROLIMUS SERPL-MCNC: 4.3 NG/ML
TRIGL SERPL-MCNC: 129 MG/DL
URATE SERPL-MCNC: 6.1 MG/DL
UROBILINOGEN URINE: 0.2 MG/DL
WBC # FLD AUTO: 6.45 K/UL
WHITE BLOOD CELLS URINE: 1 /HPF

## 2025-08-13 PROCEDURE — 99214 OFFICE O/P EST MOD 30 MIN: CPT

## 2025-08-13 PROCEDURE — 93000 ELECTROCARDIOGRAM COMPLETE: CPT

## 2025-08-13 PROCEDURE — G2211 COMPLEX E/M VISIT ADD ON: CPT

## 2025-08-15 ENCOUNTER — APPOINTMENT (OUTPATIENT)
Dept: ULTRASOUND IMAGING | Facility: HOSPITAL | Age: 70
End: 2025-08-15

## 2025-08-15 ENCOUNTER — OUTPATIENT (OUTPATIENT)
Dept: OUTPATIENT SERVICES | Facility: HOSPITAL | Age: 70
LOS: 1 days | End: 2025-08-15
Payer: MEDICARE

## 2025-08-15 DIAGNOSIS — Z98.890 OTHER SPECIFIED POSTPROCEDURAL STATES: Chronic | ICD-10-CM

## 2025-08-15 DIAGNOSIS — Z94.0 KIDNEY TRANSPLANT STATUS: ICD-10-CM

## 2025-08-15 DIAGNOSIS — Z95.5 PRESENCE OF CORONARY ANGIOPLASTY IMPLANT AND GRAFT: Chronic | ICD-10-CM

## 2025-08-15 PROCEDURE — 76775 US EXAM ABDO BACK WALL LIM: CPT

## 2025-08-15 PROCEDURE — 76775 US EXAM ABDO BACK WALL LIM: CPT | Mod: 26

## 2025-08-20 DIAGNOSIS — Z94.0 KIDNEY TRANSPLANT STATUS: ICD-10-CM

## 2025-08-21 ENCOUNTER — NON-APPOINTMENT (OUTPATIENT)
Age: 70
End: 2025-08-21

## 2025-08-22 ENCOUNTER — APPOINTMENT (OUTPATIENT)
Dept: CT IMAGING | Facility: CLINIC | Age: 70
End: 2025-08-22
Payer: MEDICARE

## 2025-08-22 ENCOUNTER — OUTPATIENT (OUTPATIENT)
Dept: OUTPATIENT SERVICES | Facility: HOSPITAL | Age: 70
LOS: 1 days | End: 2025-08-22
Payer: MEDICARE

## 2025-08-22 DIAGNOSIS — Z94.0 KIDNEY TRANSPLANT STATUS: ICD-10-CM

## 2025-08-22 DIAGNOSIS — Z98.890 OTHER SPECIFIED POSTPROCEDURAL STATES: Chronic | ICD-10-CM

## 2025-08-22 DIAGNOSIS — Z95.5 PRESENCE OF CORONARY ANGIOPLASTY IMPLANT AND GRAFT: Chronic | ICD-10-CM

## 2025-08-22 PROCEDURE — 71250 CT THORAX DX C-: CPT | Mod: 26

## 2025-08-22 PROCEDURE — 71250 CT THORAX DX C-: CPT

## 2025-08-22 PROCEDURE — 74176 CT ABD & PELVIS W/O CONTRAST: CPT | Mod: 26

## 2025-08-22 PROCEDURE — 74176 CT ABD & PELVIS W/O CONTRAST: CPT

## 2025-08-27 ENCOUNTER — APPOINTMENT (OUTPATIENT)
Dept: RHEUMATOLOGY | Facility: CLINIC | Age: 70
End: 2025-08-27
Payer: MEDICARE

## 2025-08-27 ENCOUNTER — NON-APPOINTMENT (OUTPATIENT)
Age: 70
End: 2025-08-27

## 2025-08-27 VITALS — SYSTOLIC BLOOD PRESSURE: 156 MMHG | OXYGEN SATURATION: 100 % | HEART RATE: 59 BPM | DIASTOLIC BLOOD PRESSURE: 55 MMHG

## 2025-08-27 VITALS
RESPIRATION RATE: 16 BRPM | SYSTOLIC BLOOD PRESSURE: 138 MMHG | TEMPERATURE: 97.7 F | DIASTOLIC BLOOD PRESSURE: 63 MMHG | OXYGEN SATURATION: 100 % | HEART RATE: 60 BPM

## 2025-08-27 PROCEDURE — 96365 THER/PROPH/DIAG IV INF INIT: CPT

## 2025-08-27 RX ORDER — BELATACEPT 250 MG/1
250 INJECTION, POWDER, LYOPHILIZED, FOR SOLUTION INTRAVENOUS
Qty: 0 | Refills: 0 | Status: COMPLETED
Start: 2025-04-14

## 2025-09-08 ENCOUNTER — LABORATORY RESULT (OUTPATIENT)
Age: 70
End: 2025-09-08

## 2025-09-08 ENCOUNTER — APPOINTMENT (OUTPATIENT)
Dept: TRANSPLANT | Facility: CLINIC | Age: 70
End: 2025-09-08

## 2025-09-09 LAB
ALBUMIN SERPL ELPH-MCNC: 4.2 G/DL
ALP BLD-CCNC: 64 U/L
ALT SERPL-CCNC: 13 U/L
ANION GAP SERPL CALC-SCNC: 12 MMOL/L
APPEARANCE: CLEAR
AST SERPL-CCNC: 16 U/L
BASOPHILS # BLD AUTO: 0.03 K/UL
BASOPHILS NFR BLD AUTO: 0.5 %
BILIRUB SERPL-MCNC: 0.3 MG/DL
BILIRUBIN URINE: NEGATIVE
BKV DNA SPEC QL NAA+PROBE: 175 IU/ML
BLOOD URINE: NEGATIVE
BUN SERPL-MCNC: 34 MG/DL
CALCIUM SERPL-MCNC: 9.4 MG/DL
CALCIUM SERPL-MCNC: 9.4 MG/DL
CHLORIDE SERPL-SCNC: 106 MMOL/L
CO2 SERPL-SCNC: 21 MMOL/L
COLOR: YELLOW
CREAT SERPL-MCNC: 1.17 MG/DL
CREAT SPEC-SCNC: 112 MG/DL
CREAT/PROT UR: 0.2 RATIO
EGFRCR SERPLBLD CKD-EPI 2021: 67 ML/MIN/1.73M2
EOSINOPHIL # BLD AUTO: 0.18 K/UL
EOSINOPHIL NFR BLD AUTO: 3.2 %
GLUCOSE QUALITATIVE U: 100 MG/DL
GLUCOSE SERPL-MCNC: 254 MG/DL
HCT VFR BLD CALC: 30.4 %
HGB BLD-MCNC: 9.5 G/DL
IMM GRANULOCYTES NFR BLD AUTO: 1.6 %
KETONES URINE: NEGATIVE MG/DL
LEUKOCYTE ESTERASE URINE: NEGATIVE
LYMPHOCYTES # BLD AUTO: 0.44 K/UL
LYMPHOCYTES NFR BLD AUTO: 7.7 %
MAGNESIUM SERPL-MCNC: 1.9 MG/DL
MAN DIFF?: NORMAL
MCHC RBC-ENTMCNC: 29.4 PG
MCHC RBC-ENTMCNC: 31.3 G/DL
MCV RBC AUTO: 94.1 FL
MONOCYTES # BLD AUTO: 0.58 K/UL
MONOCYTES NFR BLD AUTO: 10.2 %
NEUTROPHILS # BLD AUTO: 4.39 K/UL
NEUTROPHILS NFR BLD AUTO: 76.8 %
NITRITE URINE: NEGATIVE
PARATHYROID HORMONE INTACT: 67 PG/ML
PH URINE: 5.5
PHOSPHATE SERPL-MCNC: 3.3 MG/DL
PLATELET # BLD AUTO: 266 K/UL
POTASSIUM SERPL-SCNC: 5.3 MMOL/L
PROT SERPL-MCNC: 6.5 G/DL
PROT UR-MCNC: 24 MG/DL
PROTEIN URINE: NORMAL MG/DL
RBC # BLD: 3.23 M/UL
RBC # FLD: 14.5 %
SODIUM SERPL-SCNC: 139 MMOL/L
SPECIFIC GRAVITY URINE: 1.02
TACROLIMUS SERPL-MCNC: 6.4 NG/ML
URATE SERPL-MCNC: 5.7 MG/DL
UROBILINOGEN URINE: 0.2 MG/DL
WBC # FLD AUTO: 5.71 K/UL

## 2025-09-16 ENCOUNTER — TRANSCRIPTION ENCOUNTER (OUTPATIENT)
Age: 70
End: 2025-09-16

## (undated) DEVICE — SUT SOFSILK 2-0 18" TIES

## (undated) DEVICE — SUT PDS II PLUS 4-0 27" SH

## (undated) DEVICE — SOL IRR POUR H2O 1500ML

## (undated) DEVICE — DRAPE SLUSH / WARMER 44 X 66"

## (undated) DEVICE — TUBING TUR 2 PRONG

## (undated) DEVICE — SYR LUER LOK 20CC

## (undated) DEVICE — SUT PDS II 1 36" CTX

## (undated) DEVICE — POSITIONER FOAM HEADREST (PINK)

## (undated) DEVICE — VENODYNE/SCD SLEEVE CALF LARGE

## (undated) DEVICE — ELCTR BOVIE PENCIL SMOKE EVACUATION

## (undated) DEVICE — BASIN SET DOUBLE

## (undated) DEVICE — SOL IRR POUR NS 0.9% 500ML

## (undated) DEVICE — GOWN TRIMAX LG

## (undated) DEVICE — Device

## (undated) DEVICE — DRAIN RESERVOIR FOR JACKSON PRATT 100CC CARDINAL

## (undated) DEVICE — BAG DECANTER DISP

## (undated) DEVICE — DRAPE 1/2 SHEET 40X57"

## (undated) DEVICE — SPECIMEN CONTAINER 100ML

## (undated) DEVICE — BLADE SURGICAL #11 CARBON

## (undated) DEVICE — DRAPE TOWEL BLUE 17" X 24"

## (undated) DEVICE — ELCTR BOVIE TIP BLADE INSULATED 6.5" EDGE

## (undated) DEVICE — FORCEP OLYMPUS GRASPING SHARP TOOTH 2MM CHANNEL 9.8MM OPENING

## (undated) DEVICE — SOL IRR BAG NS 0.9% 3000ML

## (undated) DEVICE — SUT PDS II 0 27" CT-2

## (undated) DEVICE — WARMING BLANKET LOWER ADULT

## (undated) DEVICE — SUT SOFSILK 4-0 18" TIES

## (undated) DEVICE — AORTIC PUNCH 4.8 MM LONG HANDLE

## (undated) DEVICE — POSITIONER STRAP ARMBOARD VELCRO TS-30

## (undated) DEVICE — PACK BASIN SPECIAL PROCEDURE

## (undated) DEVICE — SUT PDS II 5-0 30" C-1

## (undated) DEVICE — DRAPE LAPAROTOMY TRANSVERSE

## (undated) DEVICE — SUT POLYSORB 3-0 30" V-20 UNDYED

## (undated) DEVICE — VENODYNE/SCD SLEEVE CALF MEDIUM

## (undated) DEVICE — DRSG SURG OPSITE 10X4"

## (undated) DEVICE — STOPCOCK 4-WAY W SWIVEL MALE LUER LOCK NON VENTED RED CAP

## (undated) DEVICE — DRAPE COVER SNAP 36X30"

## (undated) DEVICE — DRAPE GENERAL ENDOSCOPY

## (undated) DEVICE — SUT PDS II 2-0 27" SH

## (undated) DEVICE — NDL OBTURA 25G

## (undated) DEVICE — AORTIC PUNCH 5MM STANDARD HANDLE

## (undated) DEVICE — SUT SOFSILK 4-0 30" TIES

## (undated) DEVICE — HEMOSTSASIS VALVE Y SEAL ACCESSPLUS LG BORE

## (undated) DEVICE — PACK MAJOR ABDOMINAL SUPINE

## (undated) DEVICE — DRAPE 3/4 SHEET 52X76"

## (undated) DEVICE — ACMI SELF-SEALING SEAL UP TO 7FR

## (undated) DEVICE — CATH IV SAFE BC 20G X 1.16" (PINK)

## (undated) DEVICE — SUT PROLENE 6-0 30" BV-1

## (undated) DEVICE — BLADE SCALPEL SAFETYLOCK #11

## (undated) DEVICE — PACK MINOR WITH LAP

## (undated) DEVICE — ELCTR BOVIE TIP BLADE INSULATED 2.75" EDGE

## (undated) DEVICE — SUT PROLENE 6-0 30" C-1

## (undated) DEVICE — SUT POLYSORB 0 36" GU-46

## (undated) DEVICE — STAPLER SKIN VISI-STAT 35 WIDE

## (undated) DEVICE — SUT SILK 2-0 24" TIES

## (undated) DEVICE — PACK CYSTO

## (undated) DEVICE — SUT SOFSILK 4-0 18" V-20

## (undated) DEVICE — FOLEY TRAY 16FR LF URINE METER SURESTEP

## (undated) DEVICE — NDL COUNTER FOAM AND MAGNET 40-70

## (undated) DEVICE — GOWN XL EXTRA LONG

## (undated) DEVICE — WARMING BLANKET UPPER ADULT

## (undated) DEVICE — SUT MONOCRYL 4-0 27" PS-2 UNDYED

## (undated) DEVICE — DRAIN PENROSE .25" X 18" LATEX

## (undated) DEVICE — GLV 7.5 PROTEXIS (CREAM) MICRO

## (undated) DEVICE — PREP CHLORAPREP HI-LITE ORANGE 26ML

## (undated) DEVICE — DRAPE ISOLATION BAG 20X20"

## (undated) DEVICE — BAG URINE W METER 2L

## (undated) DEVICE — AORTIC PUNCH 4.0MM STANDARD HANDLE

## (undated) DEVICE — SYR LUER LOK 10CC

## (undated) DEVICE — PREP BETADINE KIT

## (undated) DEVICE — GOWN LG

## (undated) DEVICE — GLV 7.5 PROTEXIS (BLUE)

## (undated) DEVICE — SOL IRR POUR H2O 250ML

## (undated) DEVICE — DRAPE 3/4 SHEET W REINFORCEMENT 56X77"

## (undated) DEVICE — SUT MONOCRYL 4-0 18" PS-2

## (undated) DEVICE — DRAPE WARMING SOLUTION 44 X 44"

## (undated) DEVICE — DRAPE EQUIPMENT COVER 27"

## (undated) DEVICE — ELCTR GROUNDING PAD ADULT COVIDIEN

## (undated) DEVICE — SUT SOFSILK 2-0 30" TIES

## (undated) DEVICE — GLV 8 PROTEXIS (WHITE)

## (undated) DEVICE — DRAPE IOBAN 23" X 23"

## (undated) DEVICE — POSITIONER FOAM EGG CRATE ULNAR 2PCS (PINK)

## (undated) DEVICE — HND PC ELCTRSRG HAND CNTRL 10

## (undated) DEVICE — DRAIN JACKSON PRATT 10MM FLAT FULL NO TROCAR

## (undated) DEVICE — DRSG DERMABOND 0.7ML

## (undated) DEVICE — TUBING SUCTION 20FT

## (undated) DEVICE — PACK BASIC GOWN

## (undated) DEVICE — DRSG STERISTRIPS 0.5 X 4"

## (undated) DEVICE — SUT BOOT STANDARD (ASSORTED) 5 PAIR

## (undated) DEVICE — GLV 7 PROTEXIS (WHITE)

## (undated) DEVICE — VESSEL LOOP MAXI-RED  0.120" X 16"

## (undated) DEVICE — DRAPE LAPAROTOMY W VELCRO CORD TABS